# Patient Record
Sex: MALE
[De-identification: names, ages, dates, MRNs, and addresses within clinical notes are randomized per-mention and may not be internally consistent; named-entity substitution may affect disease eponyms.]

---

## 2018-03-28 ENCOUNTER — NON-APPOINTMENT (OUTPATIENT)
Age: 63
End: 2018-03-28

## 2018-03-28 ENCOUNTER — LABORATORY RESULT (OUTPATIENT)
Age: 63
End: 2018-03-28

## 2018-03-28 ENCOUNTER — APPOINTMENT (OUTPATIENT)
Dept: INTERNAL MEDICINE | Facility: CLINIC | Age: 63
End: 2018-03-28
Payer: COMMERCIAL

## 2018-03-28 VITALS
HEIGHT: 65 IN | DIASTOLIC BLOOD PRESSURE: 80 MMHG | HEART RATE: 59 BPM | OXYGEN SATURATION: 98 % | WEIGHT: 131 LBS | SYSTOLIC BLOOD PRESSURE: 130 MMHG | TEMPERATURE: 97.4 F | BODY MASS INDEX: 21.83 KG/M2 | RESPIRATION RATE: 17 BRPM

## 2018-03-28 PROCEDURE — 99396 PREV VISIT EST AGE 40-64: CPT

## 2018-03-28 PROCEDURE — 93000 ELECTROCARDIOGRAM COMPLETE: CPT

## 2018-03-29 LAB
25(OH)D3 SERPL-MCNC: 37.1 NG/ML
ALBUMIN SERPL ELPH-MCNC: 4.4 G/DL
ALP BLD-CCNC: 57 U/L
ALT SERPL-CCNC: 26 U/L
ANION GAP SERPL CALC-SCNC: 15 MMOL/L
APPEARANCE: CLEAR
AST SERPL-CCNC: 26 U/L
BASOPHILS # BLD AUTO: 0.02 K/UL
BASOPHILS NFR BLD AUTO: 0.5 %
BILIRUB SERPL-MCNC: 0.8 MG/DL
BILIRUBIN URINE: NEGATIVE
BLOOD URINE: NEGATIVE
BUN SERPL-MCNC: 19 MG/DL
CALCIUM SERPL-MCNC: 9.2 MG/DL
CHLORIDE SERPL-SCNC: 101 MMOL/L
CHOLEST SERPL-MCNC: 202 MG/DL
CHOLEST/HDLC SERPL: 3.9 RATIO
CO2 SERPL-SCNC: 26 MMOL/L
COLOR: YELLOW
CREAT SERPL-MCNC: 0.93 MG/DL
EOSINOPHIL # BLD AUTO: 0.04 K/UL
EOSINOPHIL NFR BLD AUTO: 1 %
GLUCOSE QUALITATIVE U: NEGATIVE MG/DL
GLUCOSE SERPL-MCNC: 96 MG/DL
HBA1C MFR BLD HPLC: 5.9 %
HCT VFR BLD CALC: 46 %
HDLC SERPL-MCNC: 52 MG/DL
HGB BLD-MCNC: 15.3 G/DL
IMM GRANULOCYTES NFR BLD AUTO: 0 %
KETONES URINE: NEGATIVE
LDLC SERPL CALC-MCNC: 127 MG/DL
LEUKOCYTE ESTERASE URINE: NEGATIVE
LYMPHOCYTES # BLD AUTO: 1.12 K/UL
LYMPHOCYTES NFR BLD AUTO: 28.2 %
MAN DIFF?: NORMAL
MCHC RBC-ENTMCNC: 29.5 PG
MCHC RBC-ENTMCNC: 33.3 GM/DL
MCV RBC AUTO: 88.8 FL
MONOCYTES # BLD AUTO: 0.46 K/UL
MONOCYTES NFR BLD AUTO: 11.6 %
NEUTROPHILS # BLD AUTO: 2.33 K/UL
NEUTROPHILS NFR BLD AUTO: 58.7 %
NITRITE URINE: NEGATIVE
PH URINE: 7.5
PLATELET # BLD AUTO: 161 K/UL
POTASSIUM SERPL-SCNC: 4.2 MMOL/L
PROT SERPL-MCNC: 6.8 G/DL
PROTEIN URINE: 30 MG/DL
PSA SERPL-MCNC: 2.32 NG/ML
RBC # BLD: 5.18 M/UL
RBC # FLD: 13.3 %
SODIUM SERPL-SCNC: 142 MMOL/L
SPECIFIC GRAVITY URINE: 1.03
TRIGL SERPL-MCNC: 113 MG/DL
TSH SERPL-ACNC: 3.32 UIU/ML
UROBILINOGEN URINE: 1 MG/DL
WBC # FLD AUTO: 3.97 K/UL

## 2018-04-20 ENCOUNTER — MESSAGE (OUTPATIENT)
Age: 63
End: 2018-04-20

## 2018-04-24 ENCOUNTER — LABORATORY RESULT (OUTPATIENT)
Age: 63
End: 2018-04-24

## 2018-04-26 LAB
APPEARANCE: CLEAR
BILIRUBIN URINE: NEGATIVE
BLOOD URINE: NEGATIVE
COLOR: YELLOW
GLUCOSE QUALITATIVE U: NEGATIVE MG/DL
KETONES URINE: NEGATIVE
LEUKOCYTE ESTERASE URINE: NEGATIVE
NITRITE URINE: NEGATIVE
PH URINE: 7.5
PROTEIN URINE: ABNORMAL MG/DL
SPECIFIC GRAVITY URINE: 1.02
UROBILINOGEN URINE: NEGATIVE MG/DL

## 2018-08-06 ENCOUNTER — MESSAGE (OUTPATIENT)
Age: 63
End: 2018-08-06

## 2018-08-09 LAB
APPEARANCE: CLEAR
BILIRUBIN URINE: NEGATIVE
BLOOD URINE: NEGATIVE
COLOR: YELLOW
GLUCOSE QUALITATIVE U: NEGATIVE MG/DL
KETONES URINE: NEGATIVE
LEUKOCYTE ESTERASE URINE: NEGATIVE
NITRITE URINE: NEGATIVE
PH URINE: 7.5
PROTEIN URINE: NEGATIVE MG/DL
SPECIFIC GRAVITY URINE: 1.02
UROBILINOGEN URINE: NEGATIVE MG/DL

## 2019-06-24 ENCOUNTER — APPOINTMENT (OUTPATIENT)
Dept: INTERNAL MEDICINE | Facility: CLINIC | Age: 64
End: 2019-06-24
Payer: COMMERCIAL

## 2019-06-24 VITALS
WEIGHT: 128 LBS | BODY MASS INDEX: 21.33 KG/M2 | HEART RATE: 64 BPM | TEMPERATURE: 98.1 F | OXYGEN SATURATION: 98 % | HEIGHT: 65 IN | SYSTOLIC BLOOD PRESSURE: 127 MMHG | RESPIRATION RATE: 17 BRPM | DIASTOLIC BLOOD PRESSURE: 80 MMHG

## 2019-06-24 DIAGNOSIS — H10.9 UNSPECIFIED CONJUNCTIVITIS: ICD-10-CM

## 2019-06-24 DIAGNOSIS — R05 COUGH: ICD-10-CM

## 2019-06-24 DIAGNOSIS — S69.90XA UNSPECIFIED INJURY OF UNSPECIFIED WRIST, HAND AND FINGER(S), INITIAL ENCOUNTER: ICD-10-CM

## 2019-06-24 DIAGNOSIS — Z23 ENCOUNTER FOR IMMUNIZATION: ICD-10-CM

## 2019-06-24 DIAGNOSIS — R07.89 OTHER CHEST PAIN: ICD-10-CM

## 2019-06-24 DIAGNOSIS — H61.20 IMPACTED CERUMEN, UNSPECIFIED EAR: ICD-10-CM

## 2019-06-24 DIAGNOSIS — Z12.5 ENCOUNTER FOR SCREENING FOR MALIGNANT NEOPLASM OF PROSTATE: ICD-10-CM

## 2019-06-24 DIAGNOSIS — Z81.8 FAMILY HISTORY OF OTHER MENTAL AND BEHAVIORAL DISORDERS: ICD-10-CM

## 2019-06-24 DIAGNOSIS — R94.31 ABNORMAL ELECTROCARDIOGRAM [ECG] [EKG]: ICD-10-CM

## 2019-06-24 DIAGNOSIS — R82.90 UNSPECIFIED ABNORMAL FINDINGS IN URINE: ICD-10-CM

## 2019-06-24 DIAGNOSIS — L03.90 CELLULITIS, UNSPECIFIED: ICD-10-CM

## 2019-06-24 DIAGNOSIS — E78.5 HYPERLIPIDEMIA, UNSPECIFIED: ICD-10-CM

## 2019-06-24 DIAGNOSIS — J06.9 ACUTE UPPER RESPIRATORY INFECTION, UNSPECIFIED: ICD-10-CM

## 2019-06-24 DIAGNOSIS — J01.90 ACUTE SINUSITIS, UNSPECIFIED: ICD-10-CM

## 2019-06-24 DIAGNOSIS — M25.519 PAIN IN UNSPECIFIED SHOULDER: ICD-10-CM

## 2019-06-24 DIAGNOSIS — R50.9 FEVER, UNSPECIFIED: ICD-10-CM

## 2019-06-24 DIAGNOSIS — Z00.00 ENCOUNTER FOR GENERAL ADULT MEDICAL EXAMINATION W/OUT ABNORMAL FINDINGS: ICD-10-CM

## 2019-06-24 DIAGNOSIS — R53.83 OTHER FATIGUE: ICD-10-CM

## 2019-06-24 PROCEDURE — 90471 IMMUNIZATION ADMIN: CPT

## 2019-06-24 PROCEDURE — 90715 TDAP VACCINE 7 YRS/> IM: CPT

## 2019-06-24 PROCEDURE — 99396 PREV VISIT EST AGE 40-64: CPT | Mod: 25

## 2019-06-28 LAB
25(OH)D3 SERPL-MCNC: 33.9 NG/ML
ALBUMIN SERPL ELPH-MCNC: 4.6 G/DL
ALP BLD-CCNC: 53 U/L
ALT SERPL-CCNC: 37 U/L
ANION GAP SERPL CALC-SCNC: 11 MMOL/L
APPEARANCE: CLEAR
AST SERPL-CCNC: 34 U/L
BASOPHILS # BLD AUTO: 0.03 K/UL
BASOPHILS NFR BLD AUTO: 0.6 %
BILIRUB SERPL-MCNC: 0.8 MG/DL
BILIRUBIN URINE: NEGATIVE
BLOOD URINE: NEGATIVE
BUN SERPL-MCNC: 22 MG/DL
CALCIUM SERPL-MCNC: 9.3 MG/DL
CHLORIDE SERPL-SCNC: 101 MMOL/L
CHOLEST SERPL-MCNC: 192 MG/DL
CHOLEST/HDLC SERPL: 3.6 RATIO
CO2 SERPL-SCNC: 27 MMOL/L
COLOR: NORMAL
CREAT SERPL-MCNC: 0.85 MG/DL
EOSINOPHIL # BLD AUTO: 0.05 K/UL
EOSINOPHIL NFR BLD AUTO: 0.9 %
ESTIMATED AVERAGE GLUCOSE: 120 MG/DL
GLUCOSE QUALITATIVE U: NEGATIVE
GLUCOSE SERPL-MCNC: 98 MG/DL
HBA1C MFR BLD HPLC: 5.8 %
HCT VFR BLD CALC: 46.5 %
HDLC SERPL-MCNC: 54 MG/DL
HGB BLD-MCNC: 14.8 G/DL
IMM GRANULOCYTES NFR BLD AUTO: 0.2 %
KETONES URINE: NEGATIVE
LDLC SERPL CALC-MCNC: 123 MG/DL
LEUKOCYTE ESTERASE URINE: NEGATIVE
LYMPHOCYTES # BLD AUTO: 1.55 K/UL
LYMPHOCYTES NFR BLD AUTO: 29.1 %
MAN DIFF?: NORMAL
MCHC RBC-ENTMCNC: 28.8 PG
MCHC RBC-ENTMCNC: 31.8 GM/DL
MCV RBC AUTO: 90.6 FL
MONOCYTES # BLD AUTO: 0.46 K/UL
MONOCYTES NFR BLD AUTO: 8.6 %
NEUTROPHILS # BLD AUTO: 3.22 K/UL
NEUTROPHILS NFR BLD AUTO: 60.6 %
NITRITE URINE: NEGATIVE
PH URINE: 7
PLATELET # BLD AUTO: 175 K/UL
POTASSIUM SERPL-SCNC: 4 MMOL/L
PROT SERPL-MCNC: 6.7 G/DL
PROTEIN URINE: NEGATIVE
PSA SERPL-MCNC: 2.52 NG/ML
RBC # BLD: 5.13 M/UL
RBC # FLD: 12.6 %
SODIUM SERPL-SCNC: 139 MMOL/L
SPECIFIC GRAVITY URINE: 1.02
T4 FREE SERPL-MCNC: 1.1 NG/DL
TRIGL SERPL-MCNC: 75 MG/DL
TSH SERPL-ACNC: 2.68 UIU/ML
UROBILINOGEN URINE: NORMAL
WBC # FLD AUTO: 5.32 K/UL

## 2020-06-25 ENCOUNTER — APPOINTMENT (OUTPATIENT)
Dept: INTERNAL MEDICINE | Facility: CLINIC | Age: 65
End: 2020-06-25
Payer: COMMERCIAL

## 2020-06-25 ENCOUNTER — NON-APPOINTMENT (OUTPATIENT)
Age: 65
End: 2020-06-25

## 2020-06-25 ENCOUNTER — APPOINTMENT (OUTPATIENT)
Dept: INTERNAL MEDICINE | Facility: CLINIC | Age: 65
End: 2020-06-25

## 2020-06-25 VITALS
WEIGHT: 121 LBS | OXYGEN SATURATION: 97 % | TEMPERATURE: 97 F | RESPIRATION RATE: 17 BRPM | DIASTOLIC BLOOD PRESSURE: 79 MMHG | BODY MASS INDEX: 20.16 KG/M2 | SYSTOLIC BLOOD PRESSURE: 131 MMHG | HEART RATE: 79 BPM | HEIGHT: 65 IN

## 2020-06-25 DIAGNOSIS — Z11.59 ENCOUNTER FOR SCREENING FOR OTHER VIRAL DISEASES: ICD-10-CM

## 2020-06-25 DIAGNOSIS — Z23 ENCOUNTER FOR IMMUNIZATION: ICD-10-CM

## 2020-06-25 DIAGNOSIS — E78.5 HYPERLIPIDEMIA, UNSPECIFIED: ICD-10-CM

## 2020-06-25 DIAGNOSIS — Z12.11 ENCOUNTER FOR SCREENING FOR MALIGNANT NEOPLASM OF COLON: ICD-10-CM

## 2020-06-25 PROCEDURE — 99396 PREV VISIT EST AGE 40-64: CPT

## 2020-06-25 NOTE — PLAN
[FreeTextEntry1] : 1.annual health examination\par * routine general labs \par * age appropriate health recommendations reviewed, discussed.\par 2. h/o hyperlipidemia\par * dietary counselled\par 3.h/o pre diabetes\par Dietary counseling given, dietary avoidance discussed, diet and exercise reviewed with patient; patient reminded of importance of aerobic exercise, weight control, dietary compliance and regular glucose monitoring\par 4.cancer screening\par * FIT test ordered; had colonoscopy three years ago\par 5. HM\par * Pneumococcal vaccine offered, discussed, he will think about it; hepatitis c ab screening; covid 19 antibodies\par schedule follow up in three weeks for labs results review.

## 2020-06-25 NOTE — HEALTH RISK ASSESSMENT
[Yes] : Yes [Good] : ~his/her~  mood as  good [Never (0 pts)] : Never (0 points) [1 or 2 (0 pts)] : 1 or 2 (0 points) [No falls in past year] : Patient reported no falls in the past year [0] : 2) Feeling down, depressed, or hopeless: Not at all (0) [HIV Test offered] : HIV Test offered [Hepatitis C test offered] : Hepatitis C test offered [None] : None [With Family] : lives with family [Employed] : employed [Graduate School] : graduate school [] :  [# Of Children ___] : has [unfilled] children [Sexually Active] : sexually active [Fully functional (using the telephone, shopping, preparing meals, housekeeping, doing laundry, using] : Fully functional and needs no help or supervision to perform IADLs (using the telephone, shopping, preparing meals, housekeeping, doing laundry, using transportation, managing medications and managing finances) [Fully functional (bathing, dressing, toileting, transferring, walking, feeding)] : Fully functional (bathing, dressing, toileting, transferring, walking, feeding) [Smoke Detector] : smoke detector [Carbon Monoxide Detector] : carbon monoxide detector [Seat Belt] :  uses seat belt [] : No [Change in mental status noted] : No change in mental status noted [Reports changes in hearing] : Reports no changes in hearing [Reports changes in vision] : Reports no changes in vision [Reports changes in dental health] : Reports no changes in dental health [Guns at Home] : no guns at home [ColonoscopyDate] : 04/2017

## 2020-06-25 NOTE — PHYSICAL EXAM
[No Acute Distress] : no acute distress [Well Developed] : well developed [Well Nourished] : well nourished [Well-Appearing] : well-appearing [PERRL] : pupils equal round and reactive to light [Normal Sclera/Conjunctiva] : normal sclera/conjunctiva [EOMI] : extraocular movements intact [Normal Oropharynx] : the oropharynx was normal [Normal Outer Ear/Nose] : the outer ears and nose were normal in appearance [No Lymphadenopathy] : no lymphadenopathy [Supple] : supple [No JVD] : no jugular venous distention [No Accessory Muscle Use] : no accessory muscle use [Thyroid Normal, No Nodules] : the thyroid was normal and there were no nodules present [No Respiratory Distress] : no respiratory distress  [Clear to Auscultation] : lungs were clear to auscultation bilaterally [Normal Rate] : normal rate  [No Murmur] : no murmur heard [Regular Rhythm] : with a regular rhythm [Normal S1, S2] : normal S1 and S2 [No Carotid Bruits] : no carotid bruits [No Varicosities] : no varicosities [No Abdominal Bruit] : a ~M bruit was not heard ~T in the abdomen [No Edema] : there was no peripheral edema [Pedal Pulses Present] : the pedal pulses are present [Soft] : abdomen soft [No Extremity Clubbing/Cyanosis] : no extremity clubbing/cyanosis [No Palpable Aorta] : no palpable aorta [Non-distended] : non-distended [Non Tender] : non-tender [No Masses] : no abdominal mass palpated [Normal Bowel Sounds] : normal bowel sounds [No HSM] : no HSM [Normal Anterior Cervical Nodes] : no anterior cervical lymphadenopathy [No CVA Tenderness] : no CVA  tenderness [Normal Posterior Cervical Nodes] : no posterior cervical lymphadenopathy [No Spinal Tenderness] : no spinal tenderness [No Joint Swelling] : no joint swelling [No Rash] : no rash [Grossly Normal Strength/Tone] : grossly normal strength/tone [No Focal Deficits] : no focal deficits [Coordination Grossly Intact] : coordination grossly intact [Normal Gait] : normal gait [Deep Tendon Reflexes (DTR)] : deep tendon reflexes were 2+ and symmetric [Normal Affect] : the affect was normal [Normal Insight/Judgement] : insight and judgment were intact

## 2020-06-26 LAB
25(OH)D3 SERPL-MCNC: 37.4 NG/ML
ALBUMIN SERPL ELPH-MCNC: 4.7 G/DL
ALP BLD-CCNC: 63 U/L
ALT SERPL-CCNC: 67 U/L
ANION GAP SERPL CALC-SCNC: 11 MMOL/L
APPEARANCE: CLEAR
AST SERPL-CCNC: 51 U/L
BACTERIA: NEGATIVE
BASOPHILS # BLD AUTO: 0.02 K/UL
BASOPHILS NFR BLD AUTO: 0.3 %
BILIRUB SERPL-MCNC: 0.6 MG/DL
BILIRUBIN URINE: ABNORMAL
BLOOD URINE: NEGATIVE
BUN SERPL-MCNC: 21 MG/DL
CALCIUM OXALATE CRYSTALS: ABNORMAL
CALCIUM SERPL-MCNC: 9.2 MG/DL
CHLORIDE SERPL-SCNC: 103 MMOL/L
CHOLEST SERPL-MCNC: 180 MG/DL
CHOLEST/HDLC SERPL: 3 RATIO
CO2 SERPL-SCNC: 27 MMOL/L
COLOR: YELLOW
CREAT SERPL-MCNC: 0.84 MG/DL
EOSINOPHIL # BLD AUTO: 0.03 K/UL
EOSINOPHIL NFR BLD AUTO: 0.5 %
ESTIMATED AVERAGE GLUCOSE: 114 MG/DL
GLUCOSE QUALITATIVE U: NEGATIVE
GLUCOSE SERPL-MCNC: 100 MG/DL
HBA1C MFR BLD HPLC: 5.6 %
HCT VFR BLD CALC: 46 %
HDLC SERPL-MCNC: 60 MG/DL
HGB BLD-MCNC: 14.3 G/DL
HYALINE CASTS: 0 /LPF
IMM GRANULOCYTES NFR BLD AUTO: 0.3 %
KETONES URINE: NORMAL
LDLC SERPL CALC-MCNC: 102 MG/DL
LEUKOCYTE ESTERASE URINE: NEGATIVE
LYMPHOCYTES # BLD AUTO: 1.15 K/UL
LYMPHOCYTES NFR BLD AUTO: 18.5 %
MAN DIFF?: NORMAL
MCHC RBC-ENTMCNC: 28.9 PG
MCHC RBC-ENTMCNC: 31.1 GM/DL
MCV RBC AUTO: 93.1 FL
MICROSCOPIC-UA: NORMAL
MONOCYTES # BLD AUTO: 0.39 K/UL
MONOCYTES NFR BLD AUTO: 6.3 %
NEUTROPHILS # BLD AUTO: 4.61 K/UL
NEUTROPHILS NFR BLD AUTO: 74.1 %
NITRITE URINE: NEGATIVE
PH URINE: 7
PLATELET # BLD AUTO: 162 K/UL
POTASSIUM SERPL-SCNC: 4.9 MMOL/L
PROT SERPL-MCNC: 6.3 G/DL
PROTEIN URINE: NEGATIVE
PSA FREE FLD-MCNC: 21 %
PSA FREE SERPL-MCNC: 0.45 NG/ML
PSA SERPL-MCNC: 2.2 NG/ML
RBC # BLD: 4.94 M/UL
RBC # FLD: 13 %
RED BLOOD CELLS URINE: 1 /HPF
SARS-COV-2 IGG SERPL IA-ACNC: 0.01 INDEX
SARS-COV-2 IGG SERPL QL IA: NEGATIVE
SODIUM SERPL-SCNC: 141 MMOL/L
SPECIFIC GRAVITY URINE: >=1.03
SQUAMOUS EPITHELIAL CELLS: 1 /HPF
T4 FREE SERPL-MCNC: 1.1 NG/DL
TRIGL SERPL-MCNC: 88 MG/DL
TSH SERPL-ACNC: 2.03 UIU/ML
UROBILINOGEN URINE: NORMAL
VIT B12 SERPL-MCNC: 883 PG/ML
WBC # FLD AUTO: 6.22 K/UL
WHITE BLOOD CELLS URINE: 0 /HPF

## 2020-06-29 LAB
HCV AB SER QL: NONREACTIVE
HCV S/CO RATIO: 0.07 S/CO

## 2020-06-30 ENCOUNTER — LABORATORY RESULT (OUTPATIENT)
Age: 65
End: 2020-06-30

## 2020-07-02 ENCOUNTER — APPOINTMENT (OUTPATIENT)
Dept: INTERNAL MEDICINE | Facility: CLINIC | Age: 65
End: 2020-07-02
Payer: COMMERCIAL

## 2020-07-02 VITALS
TEMPERATURE: 98 F | DIASTOLIC BLOOD PRESSURE: 71 MMHG | WEIGHT: 123 LBS | RESPIRATION RATE: 16 BRPM | BODY MASS INDEX: 20.49 KG/M2 | HEART RATE: 64 BPM | OXYGEN SATURATION: 98 % | SYSTOLIC BLOOD PRESSURE: 123 MMHG | HEIGHT: 65 IN

## 2020-07-02 DIAGNOSIS — R74.0 NONSPECIFIC ELEVATION OF LEVELS OF TRANSAMINASE AND LACTIC ACID DEHYDROGENASE [LDH]: ICD-10-CM

## 2020-07-02 PROCEDURE — 99214 OFFICE O/P EST MOD 30 MIN: CPT

## 2020-07-02 NOTE — HISTORY OF PRESENT ILLNESS
[de-identified] : patient here today to review and discuss labs results, no acute complains\par  [FreeTextEntry1] : follow up for labs results\par

## 2020-07-02 NOTE — PHYSICAL EXAM
[No Acute Distress] : no acute distress [Well-Appearing] : well-appearing [Well Developed] : well developed [Well Nourished] : well nourished [Normal Sclera/Conjunctiva] : normal sclera/conjunctiva [PERRL] : pupils equal round and reactive to light [EOMI] : extraocular movements intact [Normal Outer Ear/Nose] : the outer ears and nose were normal in appearance [Normal Oropharynx] : the oropharynx was normal [No JVD] : no jugular venous distention [Supple] : supple [No Lymphadenopathy] : no lymphadenopathy [No Accessory Muscle Use] : no accessory muscle use [Thyroid Normal, No Nodules] : the thyroid was normal and there were no nodules present [No Respiratory Distress] : no respiratory distress  [Normal Rate] : normal rate  [Clear to Auscultation] : lungs were clear to auscultation bilaterally [Regular Rhythm] : with a regular rhythm [No Murmur] : no murmur heard [Normal S1, S2] : normal S1 and S2 [No Abdominal Bruit] : a ~M bruit was not heard ~T in the abdomen [No Carotid Bruits] : no carotid bruits [No Varicosities] : no varicosities [Pedal Pulses Present] : the pedal pulses are present [No Extremity Clubbing/Cyanosis] : no extremity clubbing/cyanosis [No Edema] : there was no peripheral edema [No Palpable Aorta] : no palpable aorta [Non Tender] : non-tender [Non-distended] : non-distended [Soft] : abdomen soft [No Masses] : no abdominal mass palpated [No HSM] : no HSM [Normal Bowel Sounds] : normal bowel sounds [Normal Anterior Cervical Nodes] : no anterior cervical lymphadenopathy [Normal Posterior Cervical Nodes] : no posterior cervical lymphadenopathy [No CVA Tenderness] : no CVA  tenderness [No Spinal Tenderness] : no spinal tenderness [No Joint Swelling] : no joint swelling [Grossly Normal Strength/Tone] : grossly normal strength/tone [Coordination Grossly Intact] : coordination grossly intact [No Rash] : no rash [Normal Gait] : normal gait [No Focal Deficits] : no focal deficits [Deep Tendon Reflexes (DTR)] : deep tendon reflexes were 2+ and symmetric [Normal Affect] : the affect was normal [Normal Insight/Judgement] : insight and judgment were intact

## 2020-07-02 NOTE — PLAN
[FreeTextEntry1] : 1.dyslipidemia with high LDL\par low cholesterol, low triglycerides diet,dietary counseling given; dietary avoidance discussed; diet and exercise reviewed with patient\par 2. elevated transaminases\par * possible fatty liver, etiology discussed\par * low fat diet advised\par * repeat LFT in three months\par 3. pre diabetes controlled glucose\par Dietary counseling given, dietary avoidance discussed, diet and exercise reviewed with patient; patient reminded of importance of aerobic exercise, weight control, dietary compliance and regular glucose monitoring\par follow up in three months to monitor lipids, and LFT.

## 2020-12-23 PROBLEM — Z12.11 ENCOUNTER FOR FIT (FECAL IMMUNOCHEMICAL TEST) SCREENING: Status: RESOLVED | Noted: 2020-06-25 | Resolved: 2020-12-23

## 2021-06-29 ENCOUNTER — LABORATORY RESULT (OUTPATIENT)
Age: 66
End: 2021-06-29

## 2021-06-29 ENCOUNTER — APPOINTMENT (OUTPATIENT)
Dept: INTERNAL MEDICINE | Facility: CLINIC | Age: 66
End: 2021-06-29
Payer: COMMERCIAL

## 2021-06-29 ENCOUNTER — NON-APPOINTMENT (OUTPATIENT)
Age: 66
End: 2021-06-29

## 2021-06-29 VITALS
WEIGHT: 123 LBS | HEART RATE: 60 BPM | HEIGHT: 65 IN | BODY MASS INDEX: 20.49 KG/M2 | TEMPERATURE: 98 F | DIASTOLIC BLOOD PRESSURE: 84 MMHG | OXYGEN SATURATION: 97 % | SYSTOLIC BLOOD PRESSURE: 137 MMHG | RESPIRATION RATE: 16 BRPM

## 2021-06-29 DIAGNOSIS — Z92.29 PERSONAL HISTORY OF OTHER DRUG THERAPY: ICD-10-CM

## 2021-06-29 PROCEDURE — 99072 ADDL SUPL MATRL&STAF TM PHE: CPT

## 2021-06-29 PROCEDURE — 99396 PREV VISIT EST AGE 40-64: CPT

## 2021-06-29 NOTE — HEALTH RISK ASSESSMENT
[Good] : ~his/her~  mood as  good [No] : No [No falls in past year] : Patient reported no falls in the past year [0] : 2) Feeling down, depressed, or hopeless: Not at all (0) [Patient reported colonoscopy was normal] : Patient reported colonoscopy was normal [HIV test declined] : HIV test declined [Hepatitis C test declined] : Hepatitis C test declined [None] : None [With Family] : lives with family [Employed] : employed [College] : College [] :  [# Of Children ___] : has [unfilled] children [Sexually Active] : sexually active [Feels Safe at Home] : Feels safe at home [Fully functional (bathing, dressing, toileting, transferring, walking, feeding)] : Fully functional (bathing, dressing, toileting, transferring, walking, feeding) [Fully functional (using the telephone, shopping, preparing meals, housekeeping, doing laundry, using] : Fully functional and needs no help or supervision to perform IADLs (using the telephone, shopping, preparing meals, housekeeping, doing laundry, using transportation, managing medications and managing finances) [Smoke Detector] : smoke detector [Carbon Monoxide Detector] : carbon monoxide detector [Seat Belt] :  uses seat belt [] : No [Change in mental status noted] : No change in mental status noted [Reports changes in hearing] : Reports no changes in hearing [Reports changes in vision] : Reports no changes in vision [Reports changes in dental health] : Reports no changes in dental health [Guns at Home] : no guns at home [ColonoscopyDate] : 04/2017 [FreeTextEntry2] :

## 2021-06-29 NOTE — ASSESSMENT
[FreeTextEntry1] : 1. annual physical exam\par * routine general labs done\par * age appropriate health maintenance recommendations reviewed, discussed including healthy diet, regular exercise\par 2. dyslipidemia\par * dietary counselling\par 3. prediabetes\par * to check A1c\par * dietary counselling\par 4. colon cancer screening\par * FIT test\par 5. covid 19 vaccine completed\par * check serum antibodies\par * will follow up in three weeks

## 2021-06-29 NOTE — HISTORY OF PRESENT ILLNESS
[de-identified] : 65 years old male here for annual physical exam, no acute complains but nocturia

## 2021-07-03 ENCOUNTER — NON-APPOINTMENT (OUTPATIENT)
Age: 66
End: 2021-07-03

## 2021-07-07 LAB
25(OH)D3 SERPL-MCNC: 41.1 NG/ML
ALBUMIN SERPL ELPH-MCNC: 4.6 G/DL
ALP BLD-CCNC: 57 U/L
ALT SERPL-CCNC: 29 U/L
ANION GAP SERPL CALC-SCNC: 13 MMOL/L
APPEARANCE: CLEAR
AST SERPL-CCNC: 30 U/L
BACTERIA: NEGATIVE
BASOPHILS # BLD AUTO: 0.03 K/UL
BASOPHILS NFR BLD AUTO: 0.6 %
BILIRUB SERPL-MCNC: 0.5 MG/DL
BILIRUBIN URINE: NEGATIVE
BLOOD URINE: NEGATIVE
BUN SERPL-MCNC: 21 MG/DL
CALCIUM SERPL-MCNC: 9.1 MG/DL
CHLORIDE SERPL-SCNC: 102 MMOL/L
CHOLEST SERPL-MCNC: 171 MG/DL
CO2 SERPL-SCNC: 24 MMOL/L
COLOR: YELLOW
COVID-19 SPIKE DOMAIN ANTIBODY INTERPRETATION: POSITIVE
CREAT SERPL-MCNC: 0.84 MG/DL
EOSINOPHIL # BLD AUTO: 0.02 K/UL
EOSINOPHIL NFR BLD AUTO: 0.4 %
ESTIMATED AVERAGE GLUCOSE: 114 MG/DL
GLUCOSE QUALITATIVE U: NEGATIVE
GLUCOSE SERPL-MCNC: 89 MG/DL
HBA1C MFR BLD HPLC: 5.6 %
HCT VFR BLD CALC: 46.1 %
HDLC SERPL-MCNC: 57 MG/DL
HGB BLD-MCNC: 14.5 G/DL
HYALINE CASTS: 1 /LPF
IMM GRANULOCYTES NFR BLD AUTO: 0.2 %
KETONES URINE: NEGATIVE
LDLC SERPL CALC-MCNC: 98 MG/DL
LEUKOCYTE ESTERASE URINE: NEGATIVE
LYMPHOCYTES # BLD AUTO: 1.09 K/UL
LYMPHOCYTES NFR BLD AUTO: 21.2 %
MAN DIFF?: NORMAL
MCHC RBC-ENTMCNC: 29.1 PG
MCHC RBC-ENTMCNC: 31.5 GM/DL
MCV RBC AUTO: 92.4 FL
MICROSCOPIC-UA: NORMAL
MONOCYTES # BLD AUTO: 0.43 K/UL
MONOCYTES NFR BLD AUTO: 8.3 %
NEUTROPHILS # BLD AUTO: 3.57 K/UL
NEUTROPHILS NFR BLD AUTO: 69.3 %
NITRITE URINE: NEGATIVE
NONHDLC SERPL-MCNC: 114 MG/DL
PH URINE: 6.5
PLATELET # BLD AUTO: 168 K/UL
POTASSIUM SERPL-SCNC: 3.9 MMOL/L
PROT SERPL-MCNC: 6.5 G/DL
PROTEIN URINE: NORMAL
PSA FREE FLD-MCNC: 19 %
PSA FREE SERPL-MCNC: 0.51 NG/ML
PSA SERPL-MCNC: 2.67 NG/ML
RBC # BLD: 4.99 M/UL
RBC # FLD: 12.6 %
RED BLOOD CELLS URINE: 1 /HPF
SARS-COV-2 AB SERPL IA-ACNC: >250 U/ML
SODIUM SERPL-SCNC: 139 MMOL/L
SPECIFIC GRAVITY URINE: 1.03
SQUAMOUS EPITHELIAL CELLS: 0 /HPF
T4 FREE SERPL-MCNC: 1.1 NG/DL
TRIGL SERPL-MCNC: 80 MG/DL
TSH SERPL-ACNC: 3.39 UIU/ML
UROBILINOGEN URINE: NORMAL
VIT B12 SERPL-MCNC: 724 PG/ML
WBC # FLD AUTO: 5.15 K/UL
WHITE BLOOD CELLS URINE: 0 /HPF

## 2021-07-20 ENCOUNTER — APPOINTMENT (OUTPATIENT)
Dept: INTERNAL MEDICINE | Facility: CLINIC | Age: 66
End: 2021-07-20
Payer: COMMERCIAL

## 2021-07-20 VITALS
SYSTOLIC BLOOD PRESSURE: 145 MMHG | TEMPERATURE: 97.8 F | RESPIRATION RATE: 17 BRPM | HEIGHT: 65 IN | OXYGEN SATURATION: 96 % | WEIGHT: 126 LBS | HEART RATE: 62 BPM | DIASTOLIC BLOOD PRESSURE: 80 MMHG | BODY MASS INDEX: 20.99 KG/M2

## 2021-07-20 PROCEDURE — 99214 OFFICE O/P EST MOD 30 MIN: CPT

## 2021-07-20 PROCEDURE — 99072 ADDL SUPL MATRL&STAF TM PHE: CPT

## 2021-07-20 NOTE — ASSESSMENT
[FreeTextEntry1] : 1. dyslipidemia\par low cholesterol, low triglycerides diet,dietary counseling given; dietary avoidance discussed; diet and exercise reviewed with patient\par * will follow up one year for annual physical exam

## 2021-07-20 NOTE — HISTORY OF PRESENT ILLNESS
[FreeTextEntry1] : follow up for labs results\par  [de-identified] : patient here today to review and discuss labs results, no acute complains\par

## 2022-04-11 PROBLEM — Z11.59 SCREENING FOR VIRAL DISEASE: Status: ACTIVE | Noted: 2020-06-25

## 2022-06-30 ENCOUNTER — APPOINTMENT (OUTPATIENT)
Dept: INTERNAL MEDICINE | Facility: CLINIC | Age: 67
End: 2022-06-30

## 2022-06-30 ENCOUNTER — NON-APPOINTMENT (OUTPATIENT)
Age: 67
End: 2022-06-30

## 2022-06-30 VITALS
RESPIRATION RATE: 17 BRPM | DIASTOLIC BLOOD PRESSURE: 75 MMHG | HEART RATE: 55 BPM | OXYGEN SATURATION: 98 % | HEIGHT: 65 IN | BODY MASS INDEX: 20.66 KG/M2 | WEIGHT: 124 LBS | SYSTOLIC BLOOD PRESSURE: 132 MMHG | TEMPERATURE: 98 F

## 2022-06-30 PROCEDURE — 99396 PREV VISIT EST AGE 40-64: CPT

## 2022-06-30 NOTE — HEALTH RISK ASSESSMENT
[Good] : ~his/her~  mood as  good [Never] : Never [No] : No [No falls in past year] : Patient reported no falls in the past year [0] : 2) Feeling down, depressed, or hopeless: Not at all (0) [PHQ-2 Negative - No further assessment needed] : PHQ-2 Negative - No further assessment needed [Patient reported colonoscopy was normal] : Patient reported colonoscopy was normal [HIV test declined] : HIV test declined [Hepatitis C test declined] : Hepatitis C test declined [None] : None [With Family] : lives with family [Employed] : employed [College] : College [] :  [# Of Children ___] : has [unfilled] children [Sexually Active] : sexually active [Fully functional (bathing, dressing, toileting, transferring, walking, feeding)] : Fully functional (bathing, dressing, toileting, transferring, walking, feeding) [Fully functional (using the telephone, shopping, preparing meals, housekeeping, doing laundry, using] : Fully functional and needs no help or supervision to perform IADLs (using the telephone, shopping, preparing meals, housekeeping, doing laundry, using transportation, managing medications and managing finances) [Smoke Detector] : smoke detector [Carbon Monoxide Detector] : carbon monoxide detector [Seat Belt] :  uses seat belt [[id number]] : 0 [Change in mental status noted] : No change in mental status noted [Reports changes in hearing] : Reports no changes in hearing [Reports changes in vision] : Reports no changes in vision [Reports changes in dental health] : Reports no changes in dental health [Guns at Home] : no guns at home [ColonoscopyDate] : 2017 [FreeTextEntry2] :

## 2022-06-30 NOTE — HISTORY OF PRESENT ILLNESS
[de-identified] : 66 years old male presented today for annual physical exam, offers no complaints

## 2022-06-30 NOTE — PLAN
[FreeTextEntry1] : 1. annual physical exam\par * routine general labs done\par * age appropriate health maintenance recommendations reviewed, discussed including healthy diet, regular exercise\par 2. dyslipidemia\par * low cholesterol diet counseling\par 3. colon cancer screening\par * FIT test\par * follow up one month

## 2022-07-01 LAB
25(OH)D3 SERPL-MCNC: 43 NG/ML
ALBUMIN SERPL ELPH-MCNC: 4.5 G/DL
ALP BLD-CCNC: 69 U/L
ALT SERPL-CCNC: 37 U/L
ANION GAP SERPL CALC-SCNC: 13 MMOL/L
APPEARANCE: CLEAR
AST SERPL-CCNC: 36 U/L
BACTERIA: NEGATIVE
BASOPHILS # BLD AUTO: 0.03 K/UL
BASOPHILS NFR BLD AUTO: 0.7 %
BILIRUB SERPL-MCNC: 0.6 MG/DL
BILIRUBIN URINE: NEGATIVE
BLOOD URINE: NEGATIVE
BUN SERPL-MCNC: 20 MG/DL
CALCIUM SERPL-MCNC: 9.1 MG/DL
CHLORIDE SERPL-SCNC: 104 MMOL/L
CHOLEST SERPL-MCNC: 182 MG/DL
CO2 SERPL-SCNC: 26 MMOL/L
COLOR: YELLOW
CREAT SERPL-MCNC: 0.84 MG/DL
EGFR: 97 ML/MIN/1.73M2
EOSINOPHIL # BLD AUTO: 0.06 K/UL
EOSINOPHIL NFR BLD AUTO: 1.3 %
ESTIMATED AVERAGE GLUCOSE: 120 MG/DL
GLUCOSE QUALITATIVE U: NEGATIVE
GLUCOSE SERPL-MCNC: 94 MG/DL
HBA1C MFR BLD HPLC: 5.8 %
HCT VFR BLD CALC: 46.4 %
HDLC SERPL-MCNC: 57 MG/DL
HGB BLD-MCNC: 14.6 G/DL
HYALINE CASTS: 0 /LPF
IMM GRANULOCYTES NFR BLD AUTO: 0.2 %
KETONES URINE: NEGATIVE
LDLC SERPL CALC-MCNC: 109 MG/DL
LEUKOCYTE ESTERASE URINE: NEGATIVE
LYMPHOCYTES # BLD AUTO: 1.22 K/UL
LYMPHOCYTES NFR BLD AUTO: 26.9 %
MAN DIFF?: NORMAL
MCHC RBC-ENTMCNC: 29.1 PG
MCHC RBC-ENTMCNC: 31.5 GM/DL
MCV RBC AUTO: 92.6 FL
MICROSCOPIC-UA: NORMAL
MONOCYTES # BLD AUTO: 0.34 K/UL
MONOCYTES NFR BLD AUTO: 7.5 %
NEUTROPHILS # BLD AUTO: 2.88 K/UL
NEUTROPHILS NFR BLD AUTO: 63.4 %
NITRITE URINE: NEGATIVE
NONHDLC SERPL-MCNC: 125 MG/DL
PH URINE: 7
PLATELET # BLD AUTO: 174 K/UL
POTASSIUM SERPL-SCNC: 4.1 MMOL/L
PROT SERPL-MCNC: 6.4 G/DL
PROTEIN URINE: NORMAL
PSA FREE FLD-MCNC: 18 %
PSA FREE SERPL-MCNC: 0.48 NG/ML
PSA SERPL-MCNC: 2.6 NG/ML
RBC # BLD: 5.01 M/UL
RBC # FLD: 12.8 %
RED BLOOD CELLS URINE: 0 /HPF
SODIUM SERPL-SCNC: 143 MMOL/L
SPECIFIC GRAVITY URINE: 1.03
SQUAMOUS EPITHELIAL CELLS: 0 /HPF
T4 FREE SERPL-MCNC: 1.1 NG/DL
TRIGL SERPL-MCNC: 80 MG/DL
TSH SERPL-ACNC: 3.55 UIU/ML
UROBILINOGEN URINE: NORMAL
VIT B12 SERPL-MCNC: 739 PG/ML
WBC # FLD AUTO: 4.54 K/UL
WHITE BLOOD CELLS URINE: 0 /HPF

## 2022-08-23 ENCOUNTER — TRANSCRIPTION ENCOUNTER (OUTPATIENT)
Age: 67
End: 2022-08-23

## 2023-04-10 ENCOUNTER — NON-APPOINTMENT (OUTPATIENT)
Age: 68
End: 2023-04-10

## 2023-04-11 ENCOUNTER — APPOINTMENT (OUTPATIENT)
Dept: INTERNAL MEDICINE | Facility: CLINIC | Age: 68
End: 2023-04-11
Payer: MEDICARE

## 2023-04-11 ENCOUNTER — NON-APPOINTMENT (OUTPATIENT)
Age: 68
End: 2023-04-11

## 2023-04-11 VITALS
SYSTOLIC BLOOD PRESSURE: 128 MMHG | BODY MASS INDEX: 21.66 KG/M2 | WEIGHT: 130 LBS | OXYGEN SATURATION: 99 % | HEART RATE: 53 BPM | DIASTOLIC BLOOD PRESSURE: 70 MMHG | TEMPERATURE: 97.3 F | HEIGHT: 65 IN

## 2023-04-11 DIAGNOSIS — Z12.11 ENCOUNTER FOR SCREENING FOR MALIGNANT NEOPLASM OF COLON: ICD-10-CM

## 2023-04-11 DIAGNOSIS — R00.1 BRADYCARDIA, UNSPECIFIED: ICD-10-CM

## 2023-04-11 PROCEDURE — G0402 INITIAL PREVENTIVE EXAM: CPT

## 2023-04-11 NOTE — HISTORY OF PRESENT ILLNESS
[de-identified] : 67 years old male presents for annual physical exam, states feeling well, offers no complaints

## 2023-04-11 NOTE — HEALTH RISK ASSESSMENT
[Good] : ~his/her~  mood as  good [No] : No [No falls in past year] : Patient reported no falls in the past year [0] : 2) Feeling down, depressed, or hopeless: Not at all (0) [PHQ-2 Negative - No further assessment needed] : PHQ-2 Negative - No further assessment needed [Patient reported colonoscopy was normal] : Patient reported colonoscopy was normal [HIV test declined] : HIV test declined [Hepatitis C test declined] : Hepatitis C test declined [With Family] : lives with family [Employed] : employed [Graduate School] : graduate school [] :  [# Of Children ___] : has [unfilled] children [Sexually Active] : sexually active [Feels Safe at Home] : Feels safe at home [Fully functional (bathing, dressing, toileting, transferring, walking, feeding)] : Fully functional (bathing, dressing, toileting, transferring, walking, feeding) [Fully functional (using the telephone, shopping, preparing meals, housekeeping, doing laundry, using] : Fully functional and needs no help or supervision to perform IADLs (using the telephone, shopping, preparing meals, housekeeping, doing laundry, using transportation, managing medications and managing finances) [Smoke Detector] : smoke detector [Carbon Monoxide Detector] : carbon monoxide detector [Seat Belt] :  uses seat belt [Never] : Never [[id number]] : 0 [Reports changes in hearing] : Reports no changes in hearing [Reports changes in vision] : Reports no changes in vision [Reports changes in dental health] : Reports no changes in dental health [Guns at Home] : no guns at home [ColonoscopyDate] : 04/2017 [ColonoscopyComments] : internal hemorrhoid [FreeTextEntry2] :

## 2023-04-11 NOTE — PLAN
[FreeTextEntry1] : * routine general labs done\par * age appropriate health maintenance recommendations reviewed, discussed including healthy diet, regular exercise\par * low cholesterol, low triglycerides diet,dietary counseling given; dietary avoidance discussed; diet and exercise reviewed with patient\par * Dietary counseling given, dietary avoidance discussed, diet and exercise reviewed with patient; patient reminded of importance of aerobic exercise, weight control, dietary compliance and regular glucose monitoring\par * sinus bradycardia on ECG, he is an active runner of 3 miles four days a week\par * FIT test\par * to call back for test results.

## 2023-04-12 LAB
25(OH)D3 SERPL-MCNC: 35.7 NG/ML
ALBUMIN SERPL ELPH-MCNC: 4.6 G/DL
ALP BLD-CCNC: 73 U/L
ALT SERPL-CCNC: 37 U/L
ANION GAP SERPL CALC-SCNC: 13 MMOL/L
APPEARANCE: CLEAR
AST SERPL-CCNC: 29 U/L
BACTERIA: NEGATIVE
BASOPHILS # BLD AUTO: 0.04 K/UL
BASOPHILS NFR BLD AUTO: 0.7 %
BILIRUB SERPL-MCNC: 0.6 MG/DL
BILIRUBIN URINE: NEGATIVE
BLOOD URINE: NEGATIVE
BUN SERPL-MCNC: 20 MG/DL
CALCIUM SERPL-MCNC: 9.4 MG/DL
CHLORIDE SERPL-SCNC: 100 MMOL/L
CHOLEST SERPL-MCNC: 188 MG/DL
CO2 SERPL-SCNC: 24 MMOL/L
COLOR: NORMAL
CREAT SERPL-MCNC: 0.72 MG/DL
EGFR: 101 ML/MIN/1.73M2
EOSINOPHIL # BLD AUTO: 0.04 K/UL
EOSINOPHIL NFR BLD AUTO: 0.7 %
ESTIMATED AVERAGE GLUCOSE: 114 MG/DL
GLUCOSE QUALITATIVE U: NEGATIVE
GLUCOSE SERPL-MCNC: 95 MG/DL
HBA1C MFR BLD HPLC: 5.6 %
HCT VFR BLD CALC: 46.8 %
HDLC SERPL-MCNC: 57 MG/DL
HGB BLD-MCNC: 15.1 G/DL
HYALINE CASTS: 0 /LPF
IMM GRANULOCYTES NFR BLD AUTO: 0.4 %
KETONES URINE: NEGATIVE
LDLC SERPL CALC-MCNC: 113 MG/DL
LEUKOCYTE ESTERASE URINE: NEGATIVE
LYMPHOCYTES # BLD AUTO: 1.08 K/UL
LYMPHOCYTES NFR BLD AUTO: 19.5 %
MAN DIFF?: NORMAL
MCHC RBC-ENTMCNC: 29.5 PG
MCHC RBC-ENTMCNC: 32.3 GM/DL
MCV RBC AUTO: 91.4 FL
MICROSCOPIC-UA: NORMAL
MONOCYTES # BLD AUTO: 0.36 K/UL
MONOCYTES NFR BLD AUTO: 6.5 %
NEUTROPHILS # BLD AUTO: 4 K/UL
NEUTROPHILS NFR BLD AUTO: 72.2 %
NITRITE URINE: NEGATIVE
NONHDLC SERPL-MCNC: 130 MG/DL
PH URINE: 7
PLATELET # BLD AUTO: 167 K/UL
POTASSIUM SERPL-SCNC: 4 MMOL/L
PROT SERPL-MCNC: 6.5 G/DL
PROTEIN URINE: NEGATIVE
PSA FREE FLD-MCNC: 18 %
PSA FREE SERPL-MCNC: 0.48 NG/ML
PSA SERPL-MCNC: 2.68 NG/ML
RBC # BLD: 5.12 M/UL
RBC # FLD: 12.5 %
RED BLOOD CELLS URINE: 2 /HPF
SODIUM SERPL-SCNC: 138 MMOL/L
SPECIFIC GRAVITY URINE: 1.02
SQUAMOUS EPITHELIAL CELLS: 0 /HPF
T4 FREE SERPL-MCNC: 1.2 NG/DL
TRIGL SERPL-MCNC: 90 MG/DL
TSH SERPL-ACNC: 2.23 UIU/ML
UROBILINOGEN URINE: NORMAL
VIT B12 SERPL-MCNC: 802 PG/ML
WBC # FLD AUTO: 5.54 K/UL
WHITE BLOOD CELLS URINE: 0 /HPF

## 2023-10-17 ENCOUNTER — APPOINTMENT (OUTPATIENT)
Dept: INTERNAL MEDICINE | Facility: CLINIC | Age: 68
End: 2023-10-17

## 2024-03-04 ENCOUNTER — TRANSCRIPTION ENCOUNTER (OUTPATIENT)
Age: 69
End: 2024-03-04

## 2024-03-04 ENCOUNTER — INPATIENT (INPATIENT)
Facility: HOSPITAL | Age: 69
LOS: 11 days | Discharge: NURSING, REHAB OR LONG-TERM CARE | DRG: 820 | End: 2024-03-16
Attending: NEUROLOGICAL SURGERY | Admitting: STUDENT IN AN ORGANIZED HEALTH CARE EDUCATION/TRAINING PROGRAM
Payer: MEDICARE

## 2024-03-04 VITALS
DIASTOLIC BLOOD PRESSURE: 86 MMHG | SYSTOLIC BLOOD PRESSURE: 131 MMHG | TEMPERATURE: 98 F | WEIGHT: 126.99 LBS | OXYGEN SATURATION: 95 % | RESPIRATION RATE: 20 BRPM | HEIGHT: 65 IN | HEART RATE: 81 BPM

## 2024-03-04 LAB
ALBUMIN SERPL ELPH-MCNC: 4.2 G/DL — SIGNIFICANT CHANGE UP (ref 3.3–5)
ALP SERPL-CCNC: 77 U/L — SIGNIFICANT CHANGE UP (ref 40–120)
ALT FLD-CCNC: 28 U/L — SIGNIFICANT CHANGE UP (ref 10–45)
ANION GAP SERPL CALC-SCNC: 11 MMOL/L — SIGNIFICANT CHANGE UP (ref 5–17)
APTT BLD: 31.4 SEC — SIGNIFICANT CHANGE UP (ref 24.5–35.6)
AST SERPL-CCNC: 40 U/L — SIGNIFICANT CHANGE UP (ref 10–40)
BASOPHILS # BLD AUTO: 0.03 K/UL — SIGNIFICANT CHANGE UP (ref 0–0.2)
BASOPHILS NFR BLD AUTO: 0.4 % — SIGNIFICANT CHANGE UP (ref 0–2)
BILIRUB SERPL-MCNC: 0.4 MG/DL — SIGNIFICANT CHANGE UP (ref 0.2–1.2)
BLD GP AB SCN SERPL QL: NEGATIVE — SIGNIFICANT CHANGE UP
BUN SERPL-MCNC: 33 MG/DL — HIGH (ref 7–23)
CALCIUM SERPL-MCNC: 10.2 MG/DL — SIGNIFICANT CHANGE UP (ref 8.4–10.5)
CHLORIDE SERPL-SCNC: 98 MMOL/L — SIGNIFICANT CHANGE UP (ref 96–108)
CK SERPL-CCNC: 165 U/L — SIGNIFICANT CHANGE UP (ref 30–200)
CO2 SERPL-SCNC: 29 MMOL/L — SIGNIFICANT CHANGE UP (ref 22–31)
CREAT SERPL-MCNC: 0.93 MG/DL — SIGNIFICANT CHANGE UP (ref 0.5–1.3)
EGFR: 91 ML/MIN/1.73M2 — SIGNIFICANT CHANGE UP
EOSINOPHIL # BLD AUTO: 0.1 K/UL — SIGNIFICANT CHANGE UP (ref 0–0.5)
EOSINOPHIL NFR BLD AUTO: 1.4 % — SIGNIFICANT CHANGE UP (ref 0–6)
GLUCOSE SERPL-MCNC: 114 MG/DL — HIGH (ref 70–99)
HCT VFR BLD CALC: 41.5 % — SIGNIFICANT CHANGE UP (ref 39–50)
HGB BLD-MCNC: 13.5 G/DL — SIGNIFICANT CHANGE UP (ref 13–17)
IMM GRANULOCYTES NFR BLD AUTO: 0.3 % — SIGNIFICANT CHANGE UP (ref 0–0.9)
INR BLD: 1.04 RATIO — SIGNIFICANT CHANGE UP (ref 0.85–1.18)
LYMPHOCYTES # BLD AUTO: 1.01 K/UL — SIGNIFICANT CHANGE UP (ref 1–3.3)
LYMPHOCYTES # BLD AUTO: 14.3 % — SIGNIFICANT CHANGE UP (ref 13–44)
MCHC RBC-ENTMCNC: 28.5 PG — SIGNIFICANT CHANGE UP (ref 27–34)
MCHC RBC-ENTMCNC: 32.5 GM/DL — SIGNIFICANT CHANGE UP (ref 32–36)
MCV RBC AUTO: 87.7 FL — SIGNIFICANT CHANGE UP (ref 80–100)
MONOCYTES # BLD AUTO: 0.72 K/UL — SIGNIFICANT CHANGE UP (ref 0–0.9)
MONOCYTES NFR BLD AUTO: 10.2 % — SIGNIFICANT CHANGE UP (ref 2–14)
NEUTROPHILS # BLD AUTO: 5.16 K/UL — SIGNIFICANT CHANGE UP (ref 1.8–7.4)
NEUTROPHILS NFR BLD AUTO: 73.4 % — SIGNIFICANT CHANGE UP (ref 43–77)
NRBC # BLD: 0 /100 WBCS — SIGNIFICANT CHANGE UP (ref 0–0)
PLATELET # BLD AUTO: 277 K/UL — SIGNIFICANT CHANGE UP (ref 150–400)
POTASSIUM SERPL-MCNC: 4.4 MMOL/L — SIGNIFICANT CHANGE UP (ref 3.5–5.3)
POTASSIUM SERPL-SCNC: 4.4 MMOL/L — SIGNIFICANT CHANGE UP (ref 3.5–5.3)
PROT SERPL-MCNC: 6.8 G/DL — SIGNIFICANT CHANGE UP (ref 6–8.3)
PROTHROM AB SERPL-ACNC: 10.9 SEC — SIGNIFICANT CHANGE UP (ref 9.5–13)
RBC # BLD: 4.73 M/UL — SIGNIFICANT CHANGE UP (ref 4.2–5.8)
RBC # FLD: 12.7 % — SIGNIFICANT CHANGE UP (ref 10.3–14.5)
RH IG SCN BLD-IMP: POSITIVE — SIGNIFICANT CHANGE UP
SODIUM SERPL-SCNC: 138 MMOL/L — SIGNIFICANT CHANGE UP (ref 135–145)
WBC # BLD: 7.04 K/UL — SIGNIFICANT CHANGE UP (ref 3.8–10.5)
WBC # FLD AUTO: 7.04 K/UL — SIGNIFICANT CHANGE UP (ref 3.8–10.5)

## 2024-03-04 PROCEDURE — 71045 X-RAY EXAM CHEST 1 VIEW: CPT | Mod: 26

## 2024-03-04 PROCEDURE — 72148 MRI LUMBAR SPINE W/O DYE: CPT | Mod: 26,MH

## 2024-03-04 PROCEDURE — 72146 MRI CHEST SPINE W/O DYE: CPT | Mod: 26,MH

## 2024-03-04 PROCEDURE — 72141 MRI NECK SPINE W/O DYE: CPT | Mod: 26,MH

## 2024-03-04 PROCEDURE — 99285 EMERGENCY DEPT VISIT HI MDM: CPT | Mod: FS

## 2024-03-04 RX ORDER — DEXAMETHASONE 0.5 MG/5ML
10 ELIXIR ORAL ONCE
Refills: 0 | Status: COMPLETED | OUTPATIENT
Start: 2024-03-04 | End: 2024-03-04

## 2024-03-04 RX ORDER — DEXAMETHASONE 0.5 MG/5ML
4 ELIXIR ORAL EVERY 6 HOURS
Refills: 0 | Status: DISCONTINUED | OUTPATIENT
Start: 2024-03-04 | End: 2024-03-05

## 2024-03-04 RX ADMIN — Medication 102 MILLIGRAM(S): at 22:38

## 2024-03-04 NOTE — ED PROVIDER NOTE - CHIEF COMPLAINT
The patient is a 68y Male complaining of  The patient is a 68y Male complaining of Lower extremity weakness

## 2024-03-04 NOTE — ED PROVIDER NOTE - PROGRESS NOTE DETAILS
Koko Muñoz MD (PGY-3) e/o cord and cauda equina compression. npo. nsgy in the AM. medicine admission for onc work up.

## 2024-03-04 NOTE — ED PROVIDER NOTE - NS ED ATTENDING STATEMENT MOD
I have seen and examined this patient and fully participated in the care of this patient as the teaching attending.  The service was shared with the CARLEE.  I reviewed and verified the documentation.

## 2024-03-04 NOTE — ED PROVIDER NOTE - OUTCOME OF DISCUSSION:
c/f diffuse metastatic disease with spinal canal invasion/cord compression and need for likely emergent MRI/neurosurgery consult

## 2024-03-04 NOTE — ED ADULT NURSE NOTE - OBJECTIVE STATEMENT
69 y/o male complaining of leg numbness. 67 y/o male complaining of leg numbness. A few months ago pt developed numbness and tingling in lower extremities went to outpatient MD and found to have spinal masses. Recently got MRI outpatient but has not seen an oncologist yet. Over the past few days patient has noticed a decline in status. Has been requiring a walker for the last week, increased numbness and tingling in lower extremities especially the right lower extremity. Denies bladder or bowel incontinence. PT A&Ox4, speaking in complete sentences, abdomen soft and nontender. Denies chest pain, SOB, fever, chills, headache, blurry vision. QRN placed IV. Pt in gown. VSS. Daughter at bedside. Safety and comfort measures maintained.

## 2024-03-04 NOTE — ED ADULT NURSE NOTE - NSFALLRISKINTERV_ED_ALL_ED

## 2024-03-04 NOTE — ED PROVIDER NOTE - ATTENDING CONTRIBUTION TO CARE
I was the supervising attending. I have independently seen face-to-face and examined the patient. I have reviewed the history and physical and discussed the MDM with the resident, fellow, CARLEE and/or student. I agree with the assessment and plan as presented unless otherwise documented as follows:    68M denies PMH, PSH of appendectomy, presenting with leg weakness. Patient endorses back pain since January. Symptoms progressed to include B/L leg weakness and paresthesias, R>L. Acutely worsened since 3d ago with 2 falls on Saturday and difficulty ambulating w/o assistance. Denies fevers/chills, bowel/bladder incontinence or retention, chest pain/SOB, abdominal pain, headache, neck pain. Had MRI performed last Thursday w/ concern for spinal masses, has not established care with oncology yet but has scheduled appt. Awake and alert, no acute distress. Exam with 1-2/5 strength R hip flexion, 4/5 strength L hip flexion, 5/5 strength B/L dorsi/plantar flexion, sensation decreased to RLE distal > proximal. No saddle anesthesia, no abdominal tenderness. Rectal exam not performed on initial evaluation due to patient being in the hallway. Workup by QPA reviewed, labs unremarkable, no significant leukocytosis or electrolyte abnormalities. CTH and C-spine unremarkable. Called by radiology regarding CT CAP, T- and L-spine, c/f likely metastatic disease with spinal masses invading into cord. High concern for cord compression, likely acute on chronic. Called MRI to expedite cord compression protocol, neurosurgery consulted emergently and will evaluate patient. Will hold on steroids at this time per neurosurgery recommendations. Results of imaging d/w patient and daughter at bedside. -Yancy Morales MD (Attending)

## 2024-03-04 NOTE — ED PROVIDER NOTE - RAPID ASSESSMENT
68-year-old male with no known past medical history until recently presents to the ED complaining of progressively worsening lower extremity numbness/paresthesias/weakness since January.  Patient reports in the beginning of January he began developing some lower extremity numbness and paresthesias, underwent outpatient x-rays and eventually MRI in February showing concern for metastatic disease.  Patient has an appointment with oncology but has not initiated any treatment options at this time.  Since having the MRI performed last Thursday, over this weekend since Saturday, 3/2/2024 patient has had a notable  decline in functional status, was walking independently last week and now requires a walker to walk even several steps.  Patient reports numbness predominantly of the right lower extremity distal to the knee and aching of the thighs bilaterally.  Patient denies saddle anesthesia, bladder or bowel incontinence or retention, fevers, chills, night sweats.    Patient had MRI without contrast 2/29/2024 showing: "Extensive marrow displacement seen through the visualized thoracic lumbar and visualized sacral spine slightly suspicious for osseous metastatic disease.  In addition there is epidural soft tissue seen extending from the L2-L3 level to the L4-L5 level this is compressing the thecal sac with moderate stenosis.  There is extension of soft tissue into the paravertebral soft tissues more prominent on the right side.  Findings suspicious for epidural extension of tumoral disease.  This will be better evaluated with IV contrast.   Suspicion of para-aortic lymphadenopathy.  Grade 1 spondylolisthesis of L5 on S1."    Oncology Dr. Gage Rodriguez    Patient was seen as a rapid assessment (QPA) patient. The patient will be seen and further worked up in the main emergency department and their care will be completed by the main emergency department team along with a thorough physical exam. Receiving team will follow up on labs, analgesia, any clinical imaging, reassess and disposition as clinically indicated, all decisions regarding the progression of care will be made at their discretion. - David Snider PA-C 68-year-old male with no known past medical history until recently presents to the ED complaining of progressively worsening lower extremity numbness/paresthesias/weakness since January.  Patient reports in the beginning of January he began developing some lower extremity numbness and paresthesias, underwent outpatient x-rays and eventually MRI in February showing concern for metastatic disease.  Patient has an appointment with oncology but has not initiated any treatment options at this time.  Since having the MRI performed last Thursday, over this weekend since Saturday, 3/2/2024 patient has had a notable  decline in functional status, was walking independently last week and now requires a walker to walk even several steps.  Patient reports numbness predominantly of the right lower extremity distal to the knee and aching of the thighs bilaterally.  Patient denies saddle anesthesia, bladder or bowel incontinence or retention, fevers, chills, night sweats.    Patient had MRI without contrast 2/29/2024 showing: "Extensive marrow displacement seen through the visualized thoracic lumbar and visualized sacral spine slightly suspicious for osseous metastatic disease.  In addition there is epidural soft tissue seen extending from the L2-L3 level to the L4-L5 level this is compressing the thecal sac with moderate stenosis.  There is extension of soft tissue into the paravertebral soft tissues more prominent on the right side.  Findings suspicious for epidural extension of tumoral disease.  This will be better evaluated with IV contrast.   Suspicion of para-aortic lymphadenopathy.  Grade 1 spondylolisthesis of L5 on S1."    Oncology Dr. Gage Rodriguez    Patient was seen as a rapid assessment (QPA) patient. The patient will be seen and further worked up in the main emergency department and their care will be completed by the main emergency department team along with a thorough physical exam. Receiving team will follow up on labs, analgesia, any clinical imaging, reassess and disposition as clinically indicated, all decisions regarding the progression of care will be made at their discretion. - David Snider PA-C    Attending MD Weiner: This patient was seen and orders were placed by the PA as per our department's QPA model.  I was not consulted in regards to this patient although I was present and available in the Emergency Department to the PA.  Patient was to be sent to main ED for full medical evaluation and receiving team was to follow up on any labs, analgesia, clinical imaging ordered by the PA.  Any reassessment and disposition decisions were to be made by receiving team as clinically indicated, all decisions regarding the progression of care to be made at their discretion.  I did not perform a comprehensive history and physical on this patient.

## 2024-03-04 NOTE — ED PROVIDER NOTE - PHYSICAL EXAMINATION
Gen: NAD, non-toxic appearing  Head: normal appearing  HEENT: normal conjunctiva  Lung: no respiratory distress, speaking in full sentences, ctab      CV: regular rate and rhythm, no murmurs  Abd: soft, non distended, non tender   MSK: no visible deformities  Neuro:  alert, oriented, reduced sensation over the lumbar distribution of the right lower extremity.  1 out of 5 strength with hip flexion to the right lower extremity.  4 out of 5 strength of the left lower extremity.  Intact sensation to the medial thigh.  Skin: No shawn rashes

## 2024-03-04 NOTE — ED PROVIDER NOTE - CLINICAL SUMMARY MEDICAL DECISION MAKING FREE TEXT BOX
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  Unclear primary source.  Vital signs unremarkable.  Will rule out interval changes to known spinal cord disease.  Rule out intracranial process given recent fall.  Discussed with neurosurgery, requesting that we hold off on steroids.  Spoke with radiology, acute cord compression protocol, will do with and without IV contrast per spine surgery request for better characterization, felt to be in the patient's best interest given that this process is likely diffuse rather than focal disease amenable to acute surgical decompression.

## 2024-03-04 NOTE — ED PROVIDER NOTE - OBJECTIVE STATEMENT
68-year-old male, with no pertinent chronic medical comorbidities, presenting with a chief complaint of lower extremity weakness.  He has known mass lesions to his spinal canal with associated stenosis at the level of the thoracic and lumbar spine.  Currently of unclear etiology, felt to be metastatic disease, has not received definitive cancer workup yet.  Had MRI late in February, which demonstrated lesions.  Now coming in with worsening numbness and worsening lower extremity weakness, particular to the right lower extremity.  Denies bowel or bladder incontinence.  Review of systems otherwise negative.  He did have 2 falls on Saturday.  No allergies to medications.

## 2024-03-05 ENCOUNTER — APPOINTMENT (OUTPATIENT)
Dept: NEUROSURGERY | Facility: HOSPITAL | Age: 69
End: 2024-03-05
Payer: MEDICARE

## 2024-03-05 ENCOUNTER — RESULT REVIEW (OUTPATIENT)
Age: 69
End: 2024-03-05

## 2024-03-05 DIAGNOSIS — E78.5 HYPERLIPIDEMIA, UNSPECIFIED: ICD-10-CM

## 2024-03-05 DIAGNOSIS — R73.03 PREDIABETES: ICD-10-CM

## 2024-03-05 DIAGNOSIS — Z98.890 OTHER SPECIFIED POSTPROCEDURAL STATES: Chronic | ICD-10-CM

## 2024-03-05 DIAGNOSIS — Z90.49 ACQUIRED ABSENCE OF OTHER SPECIFIED PARTS OF DIGESTIVE TRACT: Chronic | ICD-10-CM

## 2024-03-05 DIAGNOSIS — R00.1 BRADYCARDIA, UNSPECIFIED: ICD-10-CM

## 2024-03-05 DIAGNOSIS — G95.20 UNSPECIFIED CORD COMPRESSION: ICD-10-CM

## 2024-03-05 DIAGNOSIS — C79.9 SECONDARY MALIGNANT NEOPLASM OF UNSPECIFIED SITE: ICD-10-CM

## 2024-03-05 DIAGNOSIS — Z86.79 PERSONAL HISTORY OF OTHER DISEASES OF THE CIRCULATORY SYSTEM: ICD-10-CM

## 2024-03-05 DIAGNOSIS — Z29.9 ENCOUNTER FOR PROPHYLACTIC MEASURES, UNSPECIFIED: ICD-10-CM

## 2024-03-05 LAB
A1C WITH ESTIMATED AVERAGE GLUCOSE RESULT: 6 % — HIGH (ref 4–5.6)
ALBUMIN SERPL ELPH-MCNC: 3.9 G/DL — SIGNIFICANT CHANGE UP (ref 3.3–5)
ALP SERPL-CCNC: 97 U/L — SIGNIFICANT CHANGE UP (ref 40–120)
ALT FLD-CCNC: 88 U/L — HIGH (ref 10–45)
ANION GAP SERPL CALC-SCNC: 12 MMOL/L — SIGNIFICANT CHANGE UP (ref 5–17)
APPEARANCE UR: CLEAR — SIGNIFICANT CHANGE UP
APTT BLD: 28.8 SEC — SIGNIFICANT CHANGE UP (ref 24.5–35.6)
AST SERPL-CCNC: 142 U/L — HIGH (ref 10–40)
BACTERIA # UR AUTO: NEGATIVE /HPF — SIGNIFICANT CHANGE UP
BASOPHILS # BLD AUTO: 0.01 K/UL — SIGNIFICANT CHANGE UP (ref 0–0.2)
BASOPHILS NFR BLD AUTO: 0.2 % — SIGNIFICANT CHANGE UP (ref 0–2)
BILIRUB SERPL-MCNC: 0.7 MG/DL — SIGNIFICANT CHANGE UP (ref 0.2–1.2)
BILIRUB UR-MCNC: NEGATIVE — SIGNIFICANT CHANGE UP
BLD GP AB SCN SERPL QL: NEGATIVE — SIGNIFICANT CHANGE UP
BUN SERPL-MCNC: 26 MG/DL — HIGH (ref 7–23)
CALCIUM SERPL-MCNC: 10.2 MG/DL — SIGNIFICANT CHANGE UP (ref 8.4–10.5)
CHLORIDE SERPL-SCNC: 99 MMOL/L — SIGNIFICANT CHANGE UP (ref 96–108)
CO2 SERPL-SCNC: 27 MMOL/L — SIGNIFICANT CHANGE UP (ref 22–31)
COLOR SPEC: YELLOW — SIGNIFICANT CHANGE UP
COMMENT - URINE: SIGNIFICANT CHANGE UP
CREAT SERPL-MCNC: 0.68 MG/DL — SIGNIFICANT CHANGE UP (ref 0.5–1.3)
DIFF PNL FLD: NEGATIVE — SIGNIFICANT CHANGE UP
EGFR: 103 ML/MIN/1.73M2 — SIGNIFICANT CHANGE UP
EOSINOPHIL # BLD AUTO: 0 K/UL — SIGNIFICANT CHANGE UP (ref 0–0.5)
EOSINOPHIL NFR BLD AUTO: 0 % — SIGNIFICANT CHANGE UP (ref 0–6)
ESTIMATED AVERAGE GLUCOSE: 126 MG/DL — HIGH (ref 68–114)
GAS PNL BLDA: SIGNIFICANT CHANGE UP
GLUCOSE BLDC GLUCOMTR-MCNC: 116 MG/DL — HIGH (ref 70–99)
GLUCOSE SERPL-MCNC: 134 MG/DL — HIGH (ref 70–99)
GLUCOSE UR QL: NEGATIVE MG/DL — SIGNIFICANT CHANGE UP
HCT VFR BLD CALC: 38.5 % — LOW (ref 39–50)
HGB BLD-MCNC: 12.6 G/DL — LOW (ref 13–17)
HYALINE CASTS # UR AUTO: 0 — SIGNIFICANT CHANGE UP
IMM GRANULOCYTES NFR BLD AUTO: 0.5 % — SIGNIFICANT CHANGE UP (ref 0–0.9)
INR BLD: 1.01 RATIO — SIGNIFICANT CHANGE UP (ref 0.85–1.18)
KETONES UR-MCNC: NEGATIVE MG/DL — SIGNIFICANT CHANGE UP
LEUKOCYTE ESTERASE UR-ACNC: NEGATIVE — SIGNIFICANT CHANGE UP
LYMPHOCYTES # BLD AUTO: 0.37 K/UL — LOW (ref 1–3.3)
LYMPHOCYTES # BLD AUTO: 5.6 % — LOW (ref 13–44)
MAGNESIUM SERPL-MCNC: 2.2 MG/DL — SIGNIFICANT CHANGE UP (ref 1.6–2.6)
MCHC RBC-ENTMCNC: 28.3 PG — SIGNIFICANT CHANGE UP (ref 27–34)
MCHC RBC-ENTMCNC: 32.7 GM/DL — SIGNIFICANT CHANGE UP (ref 32–36)
MCV RBC AUTO: 86.5 FL — SIGNIFICANT CHANGE UP (ref 80–100)
MONOCYTES # BLD AUTO: 0.32 K/UL — SIGNIFICANT CHANGE UP (ref 0–0.9)
MONOCYTES NFR BLD AUTO: 4.8 % — SIGNIFICANT CHANGE UP (ref 2–14)
NEUTROPHILS # BLD AUTO: 5.9 K/UL — SIGNIFICANT CHANGE UP (ref 1.8–7.4)
NEUTROPHILS NFR BLD AUTO: 88.9 % — HIGH (ref 43–77)
NITRITE UR-MCNC: NEGATIVE — SIGNIFICANT CHANGE UP
NRBC # BLD: 0 /100 WBCS — SIGNIFICANT CHANGE UP (ref 0–0)
PH UR: 7 — SIGNIFICANT CHANGE UP (ref 5–8)
PHOSPHATE SERPL-MCNC: 4.6 MG/DL — HIGH (ref 2.5–4.5)
PLATELET # BLD AUTO: 234 K/UL — SIGNIFICANT CHANGE UP (ref 150–400)
POTASSIUM SERPL-MCNC: 4.1 MMOL/L — SIGNIFICANT CHANGE UP (ref 3.5–5.3)
POTASSIUM SERPL-SCNC: 4.1 MMOL/L — SIGNIFICANT CHANGE UP (ref 3.5–5.3)
PROT SERPL-MCNC: 6.3 G/DL — SIGNIFICANT CHANGE UP (ref 6–8.3)
PROT UR-MCNC: NEGATIVE MG/DL — SIGNIFICANT CHANGE UP
PROTHROM AB SERPL-ACNC: 11.1 SEC — SIGNIFICANT CHANGE UP (ref 9.5–13)
RBC # BLD: 4.45 M/UL — SIGNIFICANT CHANGE UP (ref 4.2–5.8)
RBC # FLD: 12.9 % — SIGNIFICANT CHANGE UP (ref 10.3–14.5)
RBC CASTS # UR COMP ASSIST: 0 /HPF — SIGNIFICANT CHANGE UP (ref 0–4)
RH IG SCN BLD-IMP: POSITIVE — SIGNIFICANT CHANGE UP
SODIUM SERPL-SCNC: 138 MMOL/L — SIGNIFICANT CHANGE UP (ref 135–145)
SP GR SPEC: >1.03 — HIGH (ref 1–1.03)
UROBILINOGEN FLD QL: 0.2 MG/DL — SIGNIFICANT CHANGE UP (ref 0.2–1)
WBC # BLD: 6.63 K/UL — SIGNIFICANT CHANGE UP (ref 3.8–10.5)
WBC # FLD AUTO: 6.63 K/UL — SIGNIFICANT CHANGE UP (ref 3.8–10.5)
WBC UR QL: 0 /HPF — SIGNIFICANT CHANGE UP (ref 0–5)

## 2024-03-05 PROCEDURE — 63101 REMOVE VERT BODY DCMPRN THRC: CPT | Mod: 22

## 2024-03-05 PROCEDURE — 88365 INSITU HYBRIDIZATION (FISH): CPT | Mod: 26

## 2024-03-05 PROCEDURE — 88305 TISSUE EXAM BY PATHOLOGIST: CPT | Mod: 26

## 2024-03-05 PROCEDURE — 72158 MRI LUMBAR SPINE W/O & W/DYE: CPT | Mod: 26

## 2024-03-05 PROCEDURE — 22610 ARTHRD PST TQ 1NTRSPC THRC: CPT | Mod: 59

## 2024-03-05 PROCEDURE — 22556 ARTHRD ANT NTRBD MIN DSC THC: CPT

## 2024-03-05 PROCEDURE — 27280 ARTHR SI JT OPN B1GRF INSTRM: CPT | Mod: 50

## 2024-03-05 PROCEDURE — 15734 MUSCLE-SKIN GRAFT TRUNK: CPT | Mod: 59

## 2024-03-05 PROCEDURE — 14301 TIS TRNFR ANY 30.1-60 SQ CM: CPT

## 2024-03-05 PROCEDURE — 22614 ARTHRD PST TQ 1NTRSPC EA ADD: CPT

## 2024-03-05 PROCEDURE — 63102 REMOVE VERT BODY DCMPRN LMBR: CPT | Mod: 22

## 2024-03-05 PROCEDURE — 88341 IMHCHEM/IMCYTCHM EA ADD ANTB: CPT | Mod: 26

## 2024-03-05 PROCEDURE — 88374 M/PHMTRC ALYS ISHQUANT/SEMIQ: CPT | Mod: 26,59

## 2024-03-05 PROCEDURE — 14302 TIS TRNFR ADDL 30 SQ CM: CPT

## 2024-03-05 PROCEDURE — 22854 INSJ BIOMECHANICAL DEVICE: CPT

## 2024-03-05 PROCEDURE — 22585 ARTHRD ANT NTRBD MIN DSC EA: CPT

## 2024-03-05 PROCEDURE — 88307 TISSUE EXAM BY PATHOLOGIST: CPT | Mod: 26

## 2024-03-05 PROCEDURE — 15002 WOUND PREP TRK/ARM/LEG: CPT

## 2024-03-05 PROCEDURE — 72157 MRI CHEST SPINE W/O & W/DYE: CPT | Mod: 26

## 2024-03-05 PROCEDURE — 22612 ARTHRD PST TQ 1NTRSPC LUMBAR: CPT

## 2024-03-05 PROCEDURE — 88342 IMHCHEM/IMCYTCHM 1ST ANTB: CPT | Mod: 26

## 2024-03-05 PROCEDURE — 63276 BX/EXC XDRL SPINE LESN THRC: CPT

## 2024-03-05 PROCEDURE — 99223 1ST HOSP IP/OBS HIGH 75: CPT

## 2024-03-05 PROCEDURE — 88334 PATH CONSLTJ SURG CYTO XM EA: CPT | Mod: 26,59

## 2024-03-05 PROCEDURE — 63277 BX/EXC XDRL SPINE LESN LMBR: CPT

## 2024-03-05 PROCEDURE — 88331 PATH CONSLTJ SURG 1 BLK 1SPC: CPT | Mod: 26

## 2024-03-05 PROCEDURE — 63103 REMOVE VERTEBRAL BODY ADD-ON: CPT

## 2024-03-05 PROCEDURE — 88360 TUMOR IMMUNOHISTOCHEM/MANUAL: CPT | Mod: 26

## 2024-03-05 PROCEDURE — 22843 INSERT SPINE FIXATION DEVICE: CPT

## 2024-03-05 PROCEDURE — 22848 INSERT PELV FIXATION DEVICE: CPT | Mod: 59

## 2024-03-05 PROCEDURE — 93970 EXTREMITY STUDY: CPT | Mod: 26

## 2024-03-05 DEVICE — SCREW CORT FIX 7X50MM: Type: IMPLANTABLE DEVICE | Status: FUNCTIONAL

## 2024-03-05 DEVICE — SURGIFOAM PAD 8CM X 12.5CM X 10MM (100): Type: IMPLANTABLE DEVICE | Status: FUNCTIONAL

## 2024-03-05 DEVICE — FLOSEAL WITH RECOTHROM THROMBIN 10ML: Type: IMPLANTABLE DEVICE | Status: FUNCTIONAL

## 2024-03-05 DEVICE — GWIRE NITINOL BLUNT 1.45X490MM: Type: IMPLANTABLE DEVICE | Status: FUNCTIONAL

## 2024-03-05 DEVICE — KIT A-LINE 1LUM 20G X 12CM SAFE KIT: Type: IMPLANTABLE DEVICE | Status: FUNCTIONAL

## 2024-03-05 DEVICE — SCREW ST MIS SNGL INNER VIPER: Type: IMPLANTABLE DEVICE | Status: FUNCTIONAL

## 2024-03-05 DEVICE — TACHOSIL 9.5 X 4.8CM: Type: IMPLANTABLE DEVICE | Status: FUNCTIONAL

## 2024-03-05 DEVICE — IMPLANTABLE DEVICE: Type: IMPLANTABLE DEVICE | Status: FUNCTIONAL

## 2024-03-05 DEVICE — ROD 480MM STRL: Type: IMPLANTABLE DEVICE | Status: FUNCTIONAL

## 2024-03-05 DEVICE — SCREW VIPER CORT FIX 6X50MM: Type: IMPLANTABLE DEVICE | Status: FUNCTIONAL

## 2024-03-05 DEVICE — SCREW VIPER CORT FIX 5X40MM: Type: IMPLANTABLE DEVICE | Status: FUNCTIONAL

## 2024-03-05 DEVICE — KIT BONE CEMENT CONFIDENCE: Type: IMPLANTABLE DEVICE | Status: FUNCTIONAL

## 2024-03-05 RX ORDER — GLUCAGON INJECTION, SOLUTION 0.5 MG/.1ML
1 INJECTION, SOLUTION SUBCUTANEOUS ONCE
Refills: 0 | Status: DISCONTINUED | OUTPATIENT
Start: 2024-03-05 | End: 2024-03-05

## 2024-03-05 RX ORDER — DEXTROSE 50 % IN WATER 50 %
25 SYRINGE (ML) INTRAVENOUS ONCE
Refills: 0 | Status: DISCONTINUED | OUTPATIENT
Start: 2024-03-05 | End: 2024-03-05

## 2024-03-05 RX ORDER — POLYETHYLENE GLYCOL 3350 17 G/17G
17 POWDER, FOR SOLUTION ORAL
Refills: 0 | Status: DISCONTINUED | OUTPATIENT
Start: 2024-03-05 | End: 2024-03-05

## 2024-03-05 RX ORDER — INSULIN LISPRO 100/ML
VIAL (ML) SUBCUTANEOUS EVERY 6 HOURS
Refills: 0 | Status: DISCONTINUED | OUTPATIENT
Start: 2024-03-05 | End: 2024-03-05

## 2024-03-05 RX ORDER — MORPHINE SULFATE 50 MG/1
4 CAPSULE, EXTENDED RELEASE ORAL ONCE
Refills: 0 | Status: DISCONTINUED | OUTPATIENT
Start: 2024-03-05 | End: 2024-03-05

## 2024-03-05 RX ORDER — OXYCODONE HYDROCHLORIDE 5 MG/1
5 TABLET ORAL EVERY 4 HOURS
Refills: 0 | Status: DISCONTINUED | OUTPATIENT
Start: 2024-03-05 | End: 2024-03-05

## 2024-03-05 RX ORDER — SODIUM CHLORIDE 9 MG/ML
1000 INJECTION, SOLUTION INTRAVENOUS
Refills: 0 | Status: DISCONTINUED | OUTPATIENT
Start: 2024-03-05 | End: 2024-03-05

## 2024-03-05 RX ORDER — PANTOPRAZOLE SODIUM 20 MG/1
40 TABLET, DELAYED RELEASE ORAL DAILY
Refills: 0 | Status: DISCONTINUED | OUTPATIENT
Start: 2024-03-05 | End: 2024-03-05

## 2024-03-05 RX ORDER — DEXTROSE 50 % IN WATER 50 %
12.5 SYRINGE (ML) INTRAVENOUS ONCE
Refills: 0 | Status: DISCONTINUED | OUTPATIENT
Start: 2024-03-05 | End: 2024-03-05

## 2024-03-05 RX ORDER — OXYCODONE HYDROCHLORIDE 5 MG/1
10 TABLET ORAL EVERY 4 HOURS
Refills: 0 | Status: DISCONTINUED | OUTPATIENT
Start: 2024-03-05 | End: 2024-03-05

## 2024-03-05 RX ORDER — DEXTROSE 50 % IN WATER 50 %
15 SYRINGE (ML) INTRAVENOUS ONCE
Refills: 0 | Status: DISCONTINUED | OUTPATIENT
Start: 2024-03-05 | End: 2024-03-05

## 2024-03-05 RX ORDER — ACETAMINOPHEN 500 MG
650 TABLET ORAL EVERY 6 HOURS
Refills: 0 | Status: DISCONTINUED | OUTPATIENT
Start: 2024-03-05 | End: 2024-03-05

## 2024-03-05 RX ORDER — SENNA PLUS 8.6 MG/1
2 TABLET ORAL AT BEDTIME
Refills: 0 | Status: DISCONTINUED | OUTPATIENT
Start: 2024-03-05 | End: 2024-03-05

## 2024-03-05 RX ADMIN — MORPHINE SULFATE 4 MILLIGRAM(S): 50 CAPSULE, EXTENDED RELEASE ORAL at 01:49

## 2024-03-05 RX ADMIN — SODIUM CHLORIDE 75 MILLILITER(S): 9 INJECTION, SOLUTION INTRAVENOUS at 05:31

## 2024-03-05 RX ADMIN — Medication 4 MILLIGRAM(S): at 12:38

## 2024-03-05 RX ADMIN — Medication 4 MILLIGRAM(S): at 05:10

## 2024-03-05 NOTE — H&P ADULT - NSICDXPASTSURGICALHX_GEN_ALL_CORE_FT
PAST SURGICAL HISTORY:  History of appendectomy      PAST SURGICAL HISTORY:  History of appendectomy     History of arthroscopic knee surgery

## 2024-03-05 NOTE — ED ADULT NURSE REASSESSMENT NOTE - NS ED NURSE REASSESS COMMENT FT1
0745  speaking with pt. Pt A&Ox4. Minimal pain at present. IVL intact without sx of infilt. Fall risk precautions maintained. Color pink. skin W&D

## 2024-03-05 NOTE — ED ADULT NURSE REASSESSMENT NOTE - NS ED NURSE REASSESS COMMENT FT1
0818 Daughter at [facility]. Pt A&Ox4. Minimal pain at present. Declines pain medication  at present. IV fluid infusing well via IV pump. Pt voiding well. NPO

## 2024-03-05 NOTE — H&P ADULT - NSHPPHYSICALEXAM_GEN_ALL_CORE
Vital Signs Last 24 Hrs  T(C): 36.7 (05 Mar 2024 05:00), Max: 36.7 (05 Mar 2024 05:00)  T(F): 98.1 (05 Mar 2024 05:00), Max: 98.1 (05 Mar 2024 05:00)  HR: 85 (05 Mar 2024 07:28) (68 - 85)  BP: 133/81 (05 Mar 2024 07:28) (122/65 - 156/96)  BP(mean): --  RR: 18 (05 Mar 2024 07:28) (16 - 20)  SpO2: 98% (05 Mar 2024 07:28) (95% - 98%)    Parameters below as of 05 Mar 2024 07:28  Patient On (Oxygen Delivery Method): room air        CONSTITUTIONAL: NAD, well-developed, well-groomed, resting in bed  EYES: PERRL; sclera clear  ENMT: Moist oral mucosa, no pharyngeal exudate  NECK: Supple, no palpable masses  RESPIRATORY: Normal respiratory effort; lungs are clear to auscultation bilaterally; No wheezes or rales  CARDIOVASCULAR: Regular rate and rhythm; Normal S1 and S2; No murmurs, rubs, or gallops; trace-1+ edema in b/l LEs (R>L); Peripheral pulses are palpable bilaterally  ABDOMEN: Soft, Nontender, Nondistended; Bowel sounds present  MUSCULOSKELETAL:  No clubbing or cyanosis of digits; No joint swelling or tenderness to palpation  PSYCH: AAOx3 (oriented to person, place, and time); affect appropriate  NEUROLOGY: moving all extremities spontaneously but limited movement of b/l LEs; strength testing grossly 5/5 in b/l UEs, 4/5 in LLE, 3/5 in RLE; decreased sensation to light touch in b/l LEs from ankle to thighs (R worse than L)  SKIN: No rashes; No palpable lesions

## 2024-03-05 NOTE — H&P ADULT - NSICDXFAMILYHX_GEN_ALL_CORE_FT
FAMILY HISTORY:  Father  Still living? Unknown  Family history of cardiac pacemaker, Age at diagnosis: Age Unknown    Mother  Still living? Unknown  FH: arthritis, Age at diagnosis: Age Unknown  FH: dementia, Age at diagnosis: Age Unknown

## 2024-03-05 NOTE — H&P ADULT - HISTORY OF PRESENT ILLNESS
68M w/ hx of HLD, pre-DM, sinus john, presenting with worsening LE numbness/paresthesias and weakness since Jan 2024. Patient reports in the beginning of January he began developing some lower extremity numbness and paresthesias, underwent outpatient x-rays and eventually recent MRI on 2/29/24 showing concern for osseous metastatic disease in thoracic/lumbar/sacral spine as well as extension of soft tissue into the paravertebral soft tissues more prominent on the right side. Patient has an appointment with oncology but has not initiated any treatment options at this time. Since having the MRI on Thursday, 2/29/24, over this weekend since Saturday, 3/2/2024 patient has had a notable decline in functional status. Pt was walking independently last week and now requires a walker to walk even several steps.  Patient reports numbness predominantly of the right lower extremity distal to the knee and aching of the thighs bilaterally.  Patient denies saddle anesthesia, bladder or bowel incontinence or retention, fevers, chills, night sweats.    In ED: VSS. Initial labs grossly unremarkable. CT imaging and MRI spine notable for multiple findings including diffuse osseous metastatic disease throughout the entire spine; pathologic fx w/ tumor into the epidural space at T8 resulting in thoracic cord compression; tumor extension into the epidural space at L3 resulting in cauda equina compression; pathologic fx w/ tumor in the epidural space at L4 contributing to severe canal stenosis; tumor within the bilateral psoas muscles at L3 and L4; bilateral neural foramen effacement by tumor in L-spine; 3rd rib lesion, b/l lung pleura lesions; Mesenteric and retroperitoneal adenopathy; narrowing versus underdistention in the sigmoid colon. Seen by NSGY. Received morphine 4mg IVP x1, dexamethasone 10mg IVP x1 and started on dexamethasone 4mg IV q6h  colon. Admitted to Medicine for further management.    .at L3/L4   are effaced by tumor as well as on the right more than the left at L2/L3   and L4/L5. 68M w/ hx of HLD, pre-DM, sinus john, presenting with worsening LE numbness/paresthesias and weakness since Jan 2024. Patient reports in the beginning of January he began developing some lower extremity numbness and paresthesias, underwent outpatient x-rays and eventually recent MRI on 2/29/24 showing concern for osseous metastatic disease in thoracic/lumbar/sacral spine as well as extension of soft tissue into the paravertebral soft tissues more prominent on the right side. Patient has an appointment with oncology but has not initiated any treatment options at this time. Since having the MRI on Thursday, 2/29/24, over this weekend since Saturday, 3/2/2024 patient has had a notable decline in functional status. Pt was walking independently last week and now requires a walker to walk even several steps. Patient reports numbness predominantly of the right lower extremity distal to the knee and aching of the thighs bilaterally. Patient denies saddle anesthesia, bladder or bowel incontinence or retention, fevers, chills, night sweats.    In ED: VSS. Initial labs grossly unremarkable. CT imaging and MRI spine notable for multiple findings including diffuse osseous metastatic disease throughout the entire spine; pathologic fx w/ tumor into the epidural space at T8 resulting in thoracic cord compression; tumor extension into the epidural space at L3 resulting in cauda equina compression; pathologic fx w/ tumor in the epidural space at L4 contributing to severe canal stenosis; tumor within the bilateral psoas muscles at L3 and L4; bilateral neural foramen effacement by tumor in L-spine; 3rd rib lesion, b/l lung pleura lesions; mesenteric and retroperitoneal adenopathy; narrowing versus underdistention in the sigmoid colon. Seen by NSGY. Received morphine 4mg IVP x1, dexamethasone 10mg IVP x1 and started on dexamethasone 4mg IV q6h. Admitted to Medicine for further management. 68M w/ hx of HLD, pre-DM, sinus john, presenting with worsening LE numbness/paresthesias and weakness since Jan 2024. Patient reports in the beginning of January he began developing some lower extremity numbness and paresthesias, underwent outpatient x-rays and eventually recent MRI on 2/29/24 showing concern for osseous metastatic disease in thoracic/lumbar/sacral spine as well as extension of soft tissue into the paravertebral soft tissues more prominent on the right side. Patient has an appointment with oncology (Dr. Gage Rodriguez from [facility]) but has not met him yet for evaluation. Since having the MRI on Thursday, 2/29/24, over this weekend since Saturday, 3/2/2024 patient has had a notable decline in functional status that was complicated by 2 falls where his legs just gave out. No LOC. Pt was walking independently last week and now requires a walker to walk even several steps. Patient reports having back pain, numbness and weakness RLE>LLE and soreness/aching of legs.  Patient denies saddle anesthesia, bladder or bowel incontinence or retention, fevers, chills, night sweats. Endorsed noticing weight loss, but he is unsure how much as he does not typically weight himself. No known family hx of cancer. Last colonoscopy was in 2017 without significant findings.    In ED: VSS. Initial labs grossly unremarkable. CT imaging and MRI spine notable for multiple findings including diffuse osseous metastatic disease throughout the entire spine; pathologic fx w/ tumor into the epidural space at T8 resulting in thoracic cord compression; tumor extension into the epidural space at L3 resulting in cauda equina compression; pathologic fx w/ tumor in the epidural space at L4 contributing to severe canal stenosis; tumor within the bilateral psoas muscles at L3 and L4; bilateral neural foramen effacement by tumor in L-spine; 3rd rib lesion, b/l lung pleura lesions; mesenteric and retroperitoneal adenopathy; large fecal burden, narrowing versus underdistention in the sigmoid colon. Seen by NSGY. Received morphine 4mg IVP x1, dexamethasone 10mg IVP x1 and started on dexamethasone 4mg IV q6h. Admitted to Medicine for further management. 68M w/ hx of HLD, pre-DM, sinus john, presenting with worsening LE numbness/paresthesias and weakness since Jan 2024. Patient reports in the beginning of January he began developing some lower extremity numbness and paresthesias, underwent outpatient x-rays and eventually recent MRI on 2/29/24 showing concern for osseous metastatic disease in thoracic/lumbar/sacral spine as well as extension of soft tissue into the paravertebral soft tissues more prominent on the right side. Patient has an appointment with oncology (Dr. Gage Rodriguez from [facility]) but has not met him yet for evaluation. Since having the MRI on Thursday, 2/29/24, over this weekend since Saturday, 3/2/2024 patient has had a notable decline in functional status that was complicated by 2 falls where his legs just gave out. No LOC. Pt was walking independently last week and now requires a walker to walk even several steps. Patient reports having back pain, numbness and weakness RLE>LLE and soreness/aching of legs.  Patient denies f/c/n/v, CP, SOB, abdominal pain, saddle anesthesia, bladder or bowel incontinence or retention, night sweats. Endorsed noticing weight loss, but he is unsure how much as he does not typically weight himself. No known family hx of cancer. Last colonoscopy was in 2017 without significant findings.    In ED: VSS. Initial labs grossly unremarkable. CT imaging and MRI spine (prelim report) notable for multiple findings including diffuse osseous metastatic disease throughout the entire spine; pathologic fx w/ tumor into the epidural space at T8 resulting in thoracic cord compression; tumor extension into the epidural space at L3 resulting in cauda equina compression; pathologic fx w/ tumor in the epidural space at L4 contributing to severe canal stenosis; tumor within the bilateral psoas muscles at L3 and L4; bilateral neural foramen effacement by tumor in L-spine; 3rd rib lesion, b/l lung pleura lesions; mesenteric and retroperitoneal adenopathy; large fecal burden, narrowing versus underdistention in the sigmoid colon. Seen by NSGY. Received morphine 4mg IVP x1, dexamethasone 10mg IVP x1 and started on dexamethasone 4mg IV q6h. Admitted to Medicine for further management.

## 2024-03-05 NOTE — H&P ADULT - PROBLEM SELECTOR PLAN 2
CT H/C/A/P and MRI spine imaging notable for diffuse osseous metastatic disease throughout the entire spine; 3rd rib lesion, b/l lung pleura lesions; mesenteric and retroperitoneal adenopathy; large fecal burden with narrowing versus underdistention in the sigmoid colon (last colonoscopy in 2017 without significant findings).  - was scheduled to meet with Oncologist, Dr. Gage Rodriguez ([facility]) for evaluation (but has not met yet)  - c/w pain control with tylenol/oxy PRN for now  - c/w bowel regimen  - f/u PSA level CT H/C/A/P and MRI spine imaging notable for diffuse osseous metastatic disease throughout the entire spine; 3rd rib lesion, b/l lung pleura lesions; mesenteric and retroperitoneal adenopathy; large fecal burden with narrowing versus underdistention in the sigmoid colon (last colonoscopy in 2017 without significant findings).  - was scheduled to meet with Oncologist, Dr. Gage Rodriguez ([facility]) for evaluation (but has not met yet)  - c/w pain control with tylenol/oxy PRN for now  - c/w bowel regimen  - f/u PSA level  - f/u BLE duplex given R>L leg edema/pains  - will need tissue sample to determine primary

## 2024-03-05 NOTE — H&P ADULT - PROBLEM SELECTOR PLAN 6
- DVT ppx: SCDs if DVT studies neg; chemical ppx if okay with NSGY (added on for thoracic/lumbar decompression)  - Diet: NPO  - Dispo: pending OR - DVT ppx: SCDs if DVT studies neg; hold chemical ppx per NSGY given likely OR today  - Diet: NPO  - Dispo: likely OR today

## 2024-03-05 NOTE — H&P ADULT - NSHPSOCIALHISTORY_GEN_ALL_CORE
Denied smoking cigarettes or drinking EtOH. Previously ambulated without assistive devices, but required use of walker since Saturday (3/2/24). Prior to symptoms starting in Jan 2024, led an active lifestyle (ran 3 miles four days a week). Wife passed away from cancer. Pt has three daughters.

## 2024-03-05 NOTE — H&P ADULT - PROBLEM SELECTOR PLAN 3
Hx of sinus john on EKG. Per PCP records, appears to be in setting of previously being active runner.  - on admission, EKG was NSR and non-bradycardic  - no active issues

## 2024-03-05 NOTE — H&P ADULT - PROBLEM SELECTOR PLAN 1
- Appreciate NSGY recs; f/u further recs in AM  - keep NPO; added on for thoracic/lumbar decompression tentatively 3/5/24  - Neurorads consult in AM for T8 localization/marking  - c/w dexamethasone 4mg IV q6h   - check bladder scan q8h x24 hrs for urinary retention; will consider grigsby placement jeanette-OR  - given urgency and significant morbidity of cord compression, would not delay surgical intervention; no further workup needed at this time to proceed with thoracic/lumbar decompression from medicine standpoint MRI spine found evidence of tumor extensions into epidural space with associated with thoracic cord compression and cauda equina compression.  - Appreciate NSGY recs; f/u further recs in AM  - keep NPO; added on for thoracic/lumbar decompression tentatively 3/5/24  - Neurorads consult in AM for T8 localization/marking  - c/w dexamethasone 4mg IV q6h   - check bladder scan q8h x24 hrs for urinary retention; will consider grigsby placement jeanette-OR  - maintain fall/aspiration precautions, eventual PT/OT eval    #Preoperative evaluation  - No significant cardiac, pulmonary, or renal hx  - RCRI score of 0 (estimating 3.9% risk of MACE)  - Baseline >4 METs (prior to symptoms)   - Given urgency and significant morbidity/compromised quality of life of spinal cord compression, would not delay surgical intervention; no further workup needed at this time to proceed with thoracic/lumbar decompression from medicine standpoint MRI spine found evidence of tumor extensions into epidural space with associated with thoracic cord compression and cauda equina compression.  - Appreciate NSGY recs; f/u further recs in AM  - keep NPO; added on for thoracic/lumbar decompression tentatively 3/5/24  - Neurorads consult in AM for T8 localization/marking  - c/w dexamethasone 4mg IV q6h   - c/w PPI daily and low ISS for now for ppx while on steroids  - bladder scan q8h x24 hrs to check for urinary retention; will consider grigsby placement jeanette-OR  - maintain fall/aspiration precautions, eventual PT/OT eval    #Preoperative evaluation  - No significant cardiac, pulmonary, or renal hx  - RCRI score of 0 (estimating 3.9% risk of MACE)  - Baseline >4 METs (prior to symptoms)   - Given urgency and significant morbidity/compromised quality of life associated with spinal cord compression, would not delay surgical intervention; no further workup needed at this time to proceed with thoracic/lumbar decompression from medicine standpoint

## 2024-03-05 NOTE — H&P ADULT - ASSESSMENT
68M w/ hx of HLD, pre-DM, sinus john, presenting with progressively worsening LE numbness/paresthesias and weakness since Jan 2024 with acute change in functional status 3 days prior to admission. MRI/CT imaging concerning for metastatic disease of unknown primary c/b concerns of cord compression/cauda equina syndrome.

## 2024-03-05 NOTE — CONSULT NOTE ADULT - ASSESSMENT
Saturnino Solis  68M, no major pmh, p/w difficulty ambulating since Saturday and OSH MRI report c/f diffuse osseous spinal mets w/ cord compression. Back pain started in Jan, which progressed to RLE weakness in Feb., and now unable to walk w/o assistance since Sat. No bowel/bladder incontinence. 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 CT CAP + for lesions in 3rd rib, L pleura, mesenteric/retroperitoneal adenopathy, narrowed sigmoid colon. PLTs/Coags wnl. Exam: AO3, BUE 5/5, L HF 4+/5, R HF and KE 4/5, decreased sensation from R hip to ankle and along L shin/toes, normoreflexic, rectal tone wnl at rest, below normal w/ squeeze, cannot ambulate w/o assistance.    -Admit to medicine for metastatic w/u  -NPO and added-on for thoracic/lumbar decompression   -Neurorads in AM for T8 localization/marking  -Medicine clearance   -10 of dex and then 4q6  -urinalysis neg for blood   -PSA lab

## 2024-03-05 NOTE — H&P ADULT - NSHPLABSRESULTS_GEN_ALL_CORE
LABS:                        12.6   6.63  )-----------( 234      ( 05 Mar 2024 06:53 )             38.5     03-05    138  |  99  |  26<H>  ----------------------------<  134<H>  4.1   |  27  |  0.68    Ca    10.2      05 Mar 2024 06:53  Phos  4.6     03-05  Mg     2.2     03-05    TPro  6.3  /  Alb  3.9  /  TBili  0.7  /  DBili  x   /  AST  142<H>  /  ALT  88<H>  /  AlkPhos  97  03-05    PT/INR - ( 05 Mar 2024 06:53 )   PT: 11.1 sec;   INR: 1.01 ratio         PTT - ( 05 Mar 2024 06:53 )  PTT:28.8 sec  CARDIAC MARKERS ( 04 Mar 2024 22:51 )  x     / x     / 165 U/L / x     / x          Urinalysis Basic - ( 05 Mar 2024 06:53 )    Color: x / Appearance: x / SG: x / pH: x  Gluc: 134 mg/dL / Ketone: x  / Bili: x / Urobili: x   Blood: x / Protein: x / Nitrite: x   Leuk Esterase: x / RBC: x / WBC x   Sq Epi: x / Non Sq Epi: x / Bacteria: x      IMAGING/ADDITIONAL TESTS:    EKG (3/4/24) personally reviewed: interpretation limited by baseline artifacts, but grossly appears NSR, HR 75, QTc 435    < from: MR Acute Spinal Cord Compression (03.04.24 @ 23:40) >  IMPRESSION:  1. CERVICAL SPINE:   C4 vertebral metastasis. No cervical pathologic   fracture or significant epidural disease  2. THORACIC SPINE:   Diffuse osseous metastases. T8 pathologic burst   fracture with high grade epidural disease causing focal cord deformity   and cord edema  3. LUMBAR SPINE:   Diffuse osseous metastases. L4 superior endplate   pathologic fracture. High-grade epidural disease L3 and L4 vertebral   levels including contiguous involvement of the central canal neural   foramina paraspinal soft tissues  Preliminary report provided  --- End of Report ---    < from: CT Lumbar Spine Reform No Cont (03.04.24 @ 21:06) >  IMPRESSION:  CT CHEST: No pulmonary mass or suspicious nodules.  Lesion in the anterior right 3rd rib at the costochondral junction with   an extraosseous tumoral component extending to the anterior right pleural   surface measuring 1.5 cm. Additional pleural-based soft tissue mass on   the left measuring 1.1 cm without definite associated adjacent rib or   vertebral body lesion.  Enlarged right axillary lymph node.  CT ABDOMEN/PELVIS: Mesenteric and retroperitoneal adenopathy, unclear if   metastatic or secondary to primary a lymphoproliferative malignancy.  Suboptimal assessment of the bowel due to the lack of enteric contrast   and a large stool burden. Narrowing versus underdistention in the sigmoid   colon. Consider colonoscopy for further evaluation if one has not been   recently performed.  Cholelithiasis. Mild intra and extrahepatic biliary duct dilatation and   possible stone in the cystic duct. Follow-up with nonemergent MRCP can be   performed as clinically warranted.  CT THORACIC/LUMBAR SPINES: Osseous metastatic diseasewith pathologic   compression fracture deformities T8 and L4. Extraosseous extension of   tumor into the epidural space at T8, L3 and L4. Correlation with recent   MRI is recommended as the intraspinal canal contents are not well   evaluated on this modality. Tumor within the bilateral psoas muscles at   L3 and L4.  < end of copied text >    < from: CT Head No Cont (03.04.24 @ 20:55) >  IMPRESSION:  CT HEAD:  No acute hemorrhage, mass effect, or midline shift.  CT CERVICAL SPINE:  No acute fracture or subluxation.  No osteoblasticor lytic lesions.  --- End of Report ---  < end of copied text >

## 2024-03-05 NOTE — H&P ADULT - TIME BILLING
reviewing prior documentation, reviewing available recent outpatient records, independently obtaining a history, performing a physical examination, reviewing and independently interpreting tests/imaging, discussing the plan with the patient, counseling and educating the patient, providing emotional support, ordering medications/tests, documenting clinical information in the electronic health record, coordinating care with RN staff, and discussing management with NSGY team.

## 2024-03-05 NOTE — PRE-ANESTHESIA EVALUATION ADULT - BP NONINVASIVE DIASTOLIC (MM HG)
Discharge Information    IV Discontiued Time:  NA    Amount of Fluid Infused:  NA    Discharge Criteria = When patient returns to baseline or as per MD order    Consciousness:  Pt is fully awake    Circulation:  BP +/- 20% of pre-procedure level    Respiration:  Patient is able to breathe deeply    O2 Sat:  Patient is able to maintain O2 Sat >92% on room air    Activity:  Moves 4 extremities on command    Ambulation:  Patient is able to stand and walk     Dressing:  Clean/dry or No Dressing    Notes:   Discharge instructions and AVS given to patient    Patient meets criteria for discharge?  YES    Admitted to PCU?  No    Responsible adult present to accompany patient home?  Yes    Signature/Title:    Shoshana Alfaro RN Care Coordinator  [facility]    
Injection intake:    If this procedure is requiring IV sedation has patient been NPO for 6  Hours? NA    Is patient on coumadin, plavix or other prescribed blood thinner?   No    If patient is on coumadin was it held for 5 days?   NA    If patient is on plavix was it held for 7 days?    NA     Does patient take aspirin?  No    If this is for a cervical procedure and patient is on aspirin has it been held for 6 days?   NA    Any allergies to contrast dye, iodine, steroid and/or numbing medications?  NO    Is patient currently taking antibiotics or have an active infection?  NO    Does patient have a ? Yes       Is patient pregnant or breastfeeding?  Not Applicable    Are the vital signs normal?  Yes  
77

## 2024-03-06 ENCOUNTER — TRANSCRIPTION ENCOUNTER (OUTPATIENT)
Age: 69
End: 2024-03-06

## 2024-03-06 LAB
ANION GAP SERPL CALC-SCNC: 6 MMOL/L — SIGNIFICANT CHANGE UP (ref 5–17)
ANION GAP SERPL CALC-SCNC: 9 MMOL/L — SIGNIFICANT CHANGE UP (ref 5–17)
APTT BLD: 47.4 SEC — HIGH (ref 24.5–35.6)
BASOPHILS # BLD AUTO: 0.01 K/UL — SIGNIFICANT CHANGE UP (ref 0–0.2)
BASOPHILS # BLD AUTO: 0.01 K/UL — SIGNIFICANT CHANGE UP (ref 0–0.2)
BASOPHILS NFR BLD AUTO: 0.1 % — SIGNIFICANT CHANGE UP (ref 0–2)
BASOPHILS NFR BLD AUTO: 0.2 % — SIGNIFICANT CHANGE UP (ref 0–2)
BUN SERPL-MCNC: 25 MG/DL — HIGH (ref 7–23)
BUN SERPL-MCNC: 30 MG/DL — HIGH (ref 7–23)
CALCIUM SERPL-MCNC: 7.6 MG/DL — LOW (ref 8.4–10.5)
CALCIUM SERPL-MCNC: 8.9 MG/DL — SIGNIFICANT CHANGE UP (ref 8.4–10.5)
CHLORIDE SERPL-SCNC: 106 MMOL/L — SIGNIFICANT CHANGE UP (ref 96–108)
CHLORIDE SERPL-SCNC: 108 MMOL/L — SIGNIFICANT CHANGE UP (ref 96–108)
CO2 SERPL-SCNC: 23 MMOL/L — SIGNIFICANT CHANGE UP (ref 22–31)
CO2 SERPL-SCNC: 25 MMOL/L — SIGNIFICANT CHANGE UP (ref 22–31)
CREAT SERPL-MCNC: 0.77 MG/DL — SIGNIFICANT CHANGE UP (ref 0.5–1.3)
CREAT SERPL-MCNC: 0.91 MG/DL — SIGNIFICANT CHANGE UP (ref 0.5–1.3)
CULTURE RESULTS: SIGNIFICANT CHANGE UP
EGFR: 93 ML/MIN/1.73M2 — SIGNIFICANT CHANGE UP
EGFR: 99 ML/MIN/1.73M2 — SIGNIFICANT CHANGE UP
EOSINOPHIL # BLD AUTO: 0 K/UL — SIGNIFICANT CHANGE UP (ref 0–0.5)
EOSINOPHIL # BLD AUTO: 0.02 K/UL — SIGNIFICANT CHANGE UP (ref 0–0.5)
EOSINOPHIL NFR BLD AUTO: 0 % — SIGNIFICANT CHANGE UP (ref 0–6)
EOSINOPHIL NFR BLD AUTO: 0.4 % — SIGNIFICANT CHANGE UP (ref 0–6)
GAS PNL BLDA: SIGNIFICANT CHANGE UP
GLUCOSE BLDC GLUCOMTR-MCNC: 105 MG/DL — HIGH (ref 70–99)
GLUCOSE BLDC GLUCOMTR-MCNC: 124 MG/DL — HIGH (ref 70–99)
GLUCOSE BLDC GLUCOMTR-MCNC: 125 MG/DL — HIGH (ref 70–99)
GLUCOSE BLDC GLUCOMTR-MCNC: 129 MG/DL — HIGH (ref 70–99)
GLUCOSE SERPL-MCNC: 131 MG/DL — HIGH (ref 70–99)
GLUCOSE SERPL-MCNC: 156 MG/DL — HIGH (ref 70–99)
HCT VFR BLD CALC: 25.9 % — LOW (ref 39–50)
HCT VFR BLD CALC: 32.5 % — LOW (ref 39–50)
HCV AB S/CO SERPL IA: 0.04 S/CO — SIGNIFICANT CHANGE UP (ref 0–0.99)
HCV AB SERPL-IMP: SIGNIFICANT CHANGE UP
HGB BLD-MCNC: 11 G/DL — LOW (ref 13–17)
HGB BLD-MCNC: 8.3 G/DL — LOW (ref 13–17)
IMM GRANULOCYTES NFR BLD AUTO: 0.2 % — SIGNIFICANT CHANGE UP (ref 0–0.9)
IMM GRANULOCYTES NFR BLD AUTO: 1.1 % — HIGH (ref 0–0.9)
INR BLD: 1.07 RATIO — SIGNIFICANT CHANGE UP (ref 0.85–1.18)
LYMPHOCYTES # BLD AUTO: 0.63 K/UL — LOW (ref 1–3.3)
LYMPHOCYTES # BLD AUTO: 0.65 K/UL — LOW (ref 1–3.3)
LYMPHOCYTES # BLD AUTO: 11.2 % — LOW (ref 13–44)
LYMPHOCYTES # BLD AUTO: 6.5 % — LOW (ref 13–44)
MAGNESIUM SERPL-MCNC: 1.9 MG/DL — SIGNIFICANT CHANGE UP (ref 1.6–2.6)
MAGNESIUM SERPL-MCNC: 2.6 MG/DL — SIGNIFICANT CHANGE UP (ref 1.6–2.6)
MCHC RBC-ENTMCNC: 26.9 PG — LOW (ref 27–34)
MCHC RBC-ENTMCNC: 27.2 PG — SIGNIFICANT CHANGE UP (ref 27–34)
MCHC RBC-ENTMCNC: 32 GM/DL — SIGNIFICANT CHANGE UP (ref 32–36)
MCHC RBC-ENTMCNC: 33.8 GM/DL — SIGNIFICANT CHANGE UP (ref 32–36)
MCV RBC AUTO: 80.2 FL — SIGNIFICANT CHANGE UP (ref 80–100)
MCV RBC AUTO: 83.8 FL — SIGNIFICANT CHANGE UP (ref 80–100)
MONOCYTES # BLD AUTO: 0.62 K/UL — SIGNIFICANT CHANGE UP (ref 0–0.9)
MONOCYTES # BLD AUTO: 0.78 K/UL — SIGNIFICANT CHANGE UP (ref 0–0.9)
MONOCYTES NFR BLD AUTO: 11 % — SIGNIFICANT CHANGE UP (ref 2–14)
MONOCYTES NFR BLD AUTO: 7.9 % — SIGNIFICANT CHANGE UP (ref 2–14)
NEUTROPHILS # BLD AUTO: 4.36 K/UL — SIGNIFICANT CHANGE UP (ref 1.8–7.4)
NEUTROPHILS # BLD AUTO: 8.38 K/UL — HIGH (ref 1.8–7.4)
NEUTROPHILS NFR BLD AUTO: 77 % — SIGNIFICANT CHANGE UP (ref 43–77)
NEUTROPHILS NFR BLD AUTO: 84.4 % — HIGH (ref 43–77)
NRBC # BLD: 0 /100 WBCS — SIGNIFICANT CHANGE UP (ref 0–0)
NRBC # BLD: 0 /100 WBCS — SIGNIFICANT CHANGE UP (ref 0–0)
PHOSPHATE SERPL-MCNC: 2.7 MG/DL — SIGNIFICANT CHANGE UP (ref 2.5–4.5)
PHOSPHATE SERPL-MCNC: 4.6 MG/DL — HIGH (ref 2.5–4.5)
PLATELET # BLD AUTO: 119 K/UL — LOW (ref 150–400)
PLATELET # BLD AUTO: 173 K/UL — SIGNIFICANT CHANGE UP (ref 150–400)
POTASSIUM SERPL-MCNC: 4.1 MMOL/L — SIGNIFICANT CHANGE UP (ref 3.5–5.3)
POTASSIUM SERPL-MCNC: 4.1 MMOL/L — SIGNIFICANT CHANGE UP (ref 3.5–5.3)
POTASSIUM SERPL-SCNC: 4.1 MMOL/L — SIGNIFICANT CHANGE UP (ref 3.5–5.3)
POTASSIUM SERPL-SCNC: 4.1 MMOL/L — SIGNIFICANT CHANGE UP (ref 3.5–5.3)
PROTHROM AB SERPL-ACNC: 11.8 SEC — SIGNIFICANT CHANGE UP (ref 9.5–13)
PSA FLD-MCNC: 4.28 NG/ML — HIGH (ref 0–4)
RBC # BLD: 3.09 M/UL — LOW (ref 4.2–5.8)
RBC # BLD: 4.05 M/UL — LOW (ref 4.2–5.8)
RBC # FLD: 19.1 % — HIGH (ref 10.3–14.5)
RBC # FLD: 19.1 % — HIGH (ref 10.3–14.5)
SODIUM SERPL-SCNC: 137 MMOL/L — SIGNIFICANT CHANGE UP (ref 135–145)
SODIUM SERPL-SCNC: 140 MMOL/L — SIGNIFICANT CHANGE UP (ref 135–145)
SPECIMEN SOURCE: SIGNIFICANT CHANGE UP
WBC # BLD: 5.65 K/UL — SIGNIFICANT CHANGE UP (ref 3.8–10.5)
WBC # BLD: 9.93 K/UL — SIGNIFICANT CHANGE UP (ref 3.8–10.5)
WBC # FLD AUTO: 5.65 K/UL — SIGNIFICANT CHANGE UP (ref 3.8–10.5)
WBC # FLD AUTO: 9.93 K/UL — SIGNIFICANT CHANGE UP (ref 3.8–10.5)

## 2024-03-06 PROCEDURE — 99291 CRITICAL CARE FIRST HOUR: CPT

## 2024-03-06 PROCEDURE — 76377 3D RENDER W/INTRP POSTPROCES: CPT | Mod: 26

## 2024-03-06 PROCEDURE — 72192 CT PELVIS W/O DYE: CPT | Mod: 26

## 2024-03-06 PROCEDURE — 72131 CT LUMBAR SPINE W/O DYE: CPT | Mod: 26

## 2024-03-06 PROCEDURE — 72128 CT CHEST SPINE W/O DYE: CPT | Mod: 26

## 2024-03-06 PROCEDURE — 72020 X-RAY EXAM OF SPINE 1 VIEW: CPT | Mod: 26

## 2024-03-06 RX ORDER — DEXTROSE 50 % IN WATER 50 %
25 SYRINGE (ML) INTRAVENOUS ONCE
Refills: 0 | Status: DISCONTINUED | OUTPATIENT
Start: 2024-03-06 | End: 2024-03-06

## 2024-03-06 RX ORDER — METHOCARBAMOL 500 MG/1
750 TABLET, FILM COATED ORAL EVERY 8 HOURS
Refills: 0 | Status: DISCONTINUED | OUTPATIENT
Start: 2024-03-06 | End: 2024-03-16

## 2024-03-06 RX ORDER — GLUCAGON INJECTION, SOLUTION 0.5 MG/.1ML
1 INJECTION, SOLUTION SUBCUTANEOUS ONCE
Refills: 0 | Status: DISCONTINUED | OUTPATIENT
Start: 2024-03-06 | End: 2024-03-06

## 2024-03-06 RX ORDER — HYDROMORPHONE HYDROCHLORIDE 2 MG/ML
0.5 INJECTION INTRAMUSCULAR; INTRAVENOUS; SUBCUTANEOUS
Refills: 0 | Status: DISCONTINUED | OUTPATIENT
Start: 2024-03-06 | End: 2024-03-06

## 2024-03-06 RX ORDER — MAGNESIUM SULFATE 500 MG/ML
1 VIAL (ML) INJECTION ONCE
Refills: 0 | Status: COMPLETED | OUTPATIENT
Start: 2024-03-06 | End: 2024-03-07

## 2024-03-06 RX ORDER — ACETAMINOPHEN 500 MG
1000 TABLET ORAL EVERY 8 HOURS
Refills: 0 | Status: DISCONTINUED | OUTPATIENT
Start: 2024-03-06 | End: 2024-03-11

## 2024-03-06 RX ORDER — SODIUM CHLORIDE 9 MG/ML
1000 INJECTION INTRAMUSCULAR; INTRAVENOUS; SUBCUTANEOUS ONCE
Refills: 0 | Status: COMPLETED | OUTPATIENT
Start: 2024-03-06 | End: 2024-03-06

## 2024-03-06 RX ORDER — SODIUM CHLORIDE 9 MG/ML
1000 INJECTION, SOLUTION INTRAVENOUS
Refills: 0 | Status: DISCONTINUED | OUTPATIENT
Start: 2024-03-06 | End: 2024-03-06

## 2024-03-06 RX ORDER — METHOCARBAMOL 500 MG/1
500 TABLET, FILM COATED ORAL EVERY 8 HOURS
Refills: 0 | Status: DISCONTINUED | OUTPATIENT
Start: 2024-03-06 | End: 2024-03-06

## 2024-03-06 RX ORDER — DEXTROSE 50 % IN WATER 50 %
15 SYRINGE (ML) INTRAVENOUS ONCE
Refills: 0 | Status: DISCONTINUED | OUTPATIENT
Start: 2024-03-06 | End: 2024-03-06

## 2024-03-06 RX ORDER — DEXTROSE 50 % IN WATER 50 %
12.5 SYRINGE (ML) INTRAVENOUS ONCE
Refills: 0 | Status: DISCONTINUED | OUTPATIENT
Start: 2024-03-06 | End: 2024-03-06

## 2024-03-06 RX ORDER — POLYETHYLENE GLYCOL 3350 17 G/17G
17 POWDER, FOR SOLUTION ORAL EVERY 24 HOURS
Refills: 0 | Status: DISCONTINUED | OUTPATIENT
Start: 2024-03-06 | End: 2024-03-06

## 2024-03-06 RX ORDER — POLYETHYLENE GLYCOL 3350 17 G/17G
17 POWDER, FOR SOLUTION ORAL
Refills: 0 | Status: DISCONTINUED | OUTPATIENT
Start: 2024-03-06 | End: 2024-03-16

## 2024-03-06 RX ORDER — SODIUM CHLORIDE 9 MG/ML
1000 INJECTION, SOLUTION INTRAVENOUS ONCE
Refills: 0 | Status: COMPLETED | OUTPATIENT
Start: 2024-03-06 | End: 2024-03-06

## 2024-03-06 RX ORDER — PANTOPRAZOLE SODIUM 20 MG/1
40 TABLET, DELAYED RELEASE ORAL DAILY
Refills: 0 | Status: DISCONTINUED | OUTPATIENT
Start: 2024-03-06 | End: 2024-03-06

## 2024-03-06 RX ORDER — HYDROMORPHONE HYDROCHLORIDE 2 MG/ML
0.5 INJECTION INTRAMUSCULAR; INTRAVENOUS; SUBCUTANEOUS ONCE
Refills: 0 | Status: DISCONTINUED | OUTPATIENT
Start: 2024-03-06 | End: 2024-03-06

## 2024-03-06 RX ORDER — SENNA PLUS 8.6 MG/1
2 TABLET ORAL AT BEDTIME
Refills: 0 | Status: DISCONTINUED | OUTPATIENT
Start: 2024-03-06 | End: 2024-03-16

## 2024-03-06 RX ORDER — CEFAZOLIN SODIUM 1 G
2000 VIAL (EA) INJECTION EVERY 8 HOURS
Refills: 0 | Status: COMPLETED | OUTPATIENT
Start: 2024-03-06 | End: 2024-03-06

## 2024-03-06 RX ORDER — CHLORHEXIDINE GLUCONATE 213 G/1000ML
1 SOLUTION TOPICAL DAILY
Refills: 0 | Status: DISCONTINUED | OUTPATIENT
Start: 2024-03-06 | End: 2024-03-11

## 2024-03-06 RX ORDER — ACETAMINOPHEN 500 MG
1000 TABLET ORAL ONCE
Refills: 0 | Status: COMPLETED | OUTPATIENT
Start: 2024-03-06 | End: 2024-03-06

## 2024-03-06 RX ORDER — DEXAMETHASONE 0.5 MG/5ML
4 ELIXIR ORAL EVERY 6 HOURS
Refills: 0 | Status: DISCONTINUED | OUTPATIENT
Start: 2024-03-06 | End: 2024-03-06

## 2024-03-06 RX ORDER — GABAPENTIN 400 MG/1
300 CAPSULE ORAL
Refills: 0 | Status: DISCONTINUED | OUTPATIENT
Start: 2024-03-06 | End: 2024-03-11

## 2024-03-06 RX ORDER — INSULIN LISPRO 100/ML
VIAL (ML) SUBCUTANEOUS
Refills: 0 | Status: DISCONTINUED | OUTPATIENT
Start: 2024-03-06 | End: 2024-03-07

## 2024-03-06 RX ORDER — SODIUM CHLORIDE 9 MG/ML
500 INJECTION, SOLUTION INTRAVENOUS ONCE
Refills: 0 | Status: COMPLETED | OUTPATIENT
Start: 2024-03-06 | End: 2024-03-06

## 2024-03-06 RX ORDER — NALOXONE HYDROCHLORIDE 4 MG/.1ML
0.1 SPRAY NASAL
Refills: 0 | Status: DISCONTINUED | OUTPATIENT
Start: 2024-03-06 | End: 2024-03-07

## 2024-03-06 RX ORDER — MAGNESIUM HYDROXIDE 400 MG/1
30 TABLET, CHEWABLE ORAL EVERY 12 HOURS
Refills: 0 | Status: DISCONTINUED | OUTPATIENT
Start: 2024-03-06 | End: 2024-03-16

## 2024-03-06 RX ORDER — HYDROMORPHONE HYDROCHLORIDE 2 MG/ML
30 INJECTION INTRAMUSCULAR; INTRAVENOUS; SUBCUTANEOUS
Refills: 0 | Status: DISCONTINUED | OUTPATIENT
Start: 2024-03-06 | End: 2024-03-07

## 2024-03-06 RX ORDER — ONDANSETRON 8 MG/1
4 TABLET, FILM COATED ORAL EVERY 6 HOURS
Refills: 0 | Status: DISCONTINUED | OUTPATIENT
Start: 2024-03-06 | End: 2024-03-16

## 2024-03-06 RX ORDER — SODIUM,POTASSIUM PHOSPHATES 278-250MG
1 POWDER IN PACKET (EA) ORAL ONCE
Refills: 0 | Status: COMPLETED | OUTPATIENT
Start: 2024-03-06 | End: 2024-03-07

## 2024-03-06 RX ORDER — SODIUM CHLORIDE 9 MG/ML
500 INJECTION INTRAMUSCULAR; INTRAVENOUS; SUBCUTANEOUS ONCE
Refills: 0 | Status: COMPLETED | OUTPATIENT
Start: 2024-03-06 | End: 2024-03-06

## 2024-03-06 RX ORDER — HYDROMORPHONE HYDROCHLORIDE 2 MG/ML
0.5 INJECTION INTRAMUSCULAR; INTRAVENOUS; SUBCUTANEOUS
Refills: 0 | Status: DISCONTINUED | OUTPATIENT
Start: 2024-03-06 | End: 2024-03-07

## 2024-03-06 RX ADMIN — POLYETHYLENE GLYCOL 3350 17 GRAM(S): 17 POWDER, FOR SOLUTION ORAL at 17:44

## 2024-03-06 RX ADMIN — SODIUM CHLORIDE 75 MILLILITER(S): 9 INJECTION, SOLUTION INTRAVENOUS at 03:13

## 2024-03-06 RX ADMIN — HYDROMORPHONE HYDROCHLORIDE 0.5 MILLIGRAM(S): 2 INJECTION INTRAMUSCULAR; INTRAVENOUS; SUBCUTANEOUS at 10:30

## 2024-03-06 RX ADMIN — GABAPENTIN 300 MILLIGRAM(S): 400 CAPSULE ORAL at 06:18

## 2024-03-06 RX ADMIN — HYDROMORPHONE HYDROCHLORIDE 30 MILLILITER(S): 2 INJECTION INTRAMUSCULAR; INTRAVENOUS; SUBCUTANEOUS at 19:00

## 2024-03-06 RX ADMIN — Medication 1000 MILLIGRAM(S): at 06:19

## 2024-03-06 RX ADMIN — Medication 1000 MILLIGRAM(S): at 14:40

## 2024-03-06 RX ADMIN — Medication 100 MILLIGRAM(S): at 13:40

## 2024-03-06 RX ADMIN — HYDROMORPHONE HYDROCHLORIDE 0.5 MILLIGRAM(S): 2 INJECTION INTRAMUSCULAR; INTRAVENOUS; SUBCUTANEOUS at 03:30

## 2024-03-06 RX ADMIN — SODIUM CHLORIDE 500 MILLILITER(S): 9 INJECTION INTRAMUSCULAR; INTRAVENOUS; SUBCUTANEOUS at 03:09

## 2024-03-06 RX ADMIN — Medication 1000 MILLIGRAM(S): at 02:05

## 2024-03-06 RX ADMIN — Medication 1000 MILLIGRAM(S): at 13:40

## 2024-03-06 RX ADMIN — HYDROMORPHONE HYDROCHLORIDE 30 MILLILITER(S): 2 INJECTION INTRAMUSCULAR; INTRAVENOUS; SUBCUTANEOUS at 14:33

## 2024-03-06 RX ADMIN — SODIUM CHLORIDE 1000 MILLILITER(S): 9 INJECTION INTRAMUSCULAR; INTRAVENOUS; SUBCUTANEOUS at 15:30

## 2024-03-06 RX ADMIN — Medication 1000 MILLIGRAM(S): at 07:00

## 2024-03-06 RX ADMIN — PANTOPRAZOLE SODIUM 40 MILLIGRAM(S): 20 TABLET, DELAYED RELEASE ORAL at 11:51

## 2024-03-06 RX ADMIN — Medication 100 MILLIGRAM(S): at 06:18

## 2024-03-06 RX ADMIN — METHOCARBAMOL 750 MILLIGRAM(S): 500 TABLET, FILM COATED ORAL at 21:22

## 2024-03-06 RX ADMIN — CHLORHEXIDINE GLUCONATE 1 APPLICATION(S): 213 SOLUTION TOPICAL at 11:51

## 2024-03-06 RX ADMIN — Medication 1000 MILLIGRAM(S): at 21:30

## 2024-03-06 RX ADMIN — HYDROMORPHONE HYDROCHLORIDE 0.5 MILLIGRAM(S): 2 INJECTION INTRAMUSCULAR; INTRAVENOUS; SUBCUTANEOUS at 13:45

## 2024-03-06 RX ADMIN — METHOCARBAMOL 500 MILLIGRAM(S): 500 TABLET, FILM COATED ORAL at 06:18

## 2024-03-06 RX ADMIN — Medication 400 MILLIGRAM(S): at 01:50

## 2024-03-06 RX ADMIN — METHOCARBAMOL 750 MILLIGRAM(S): 500 TABLET, FILM COATED ORAL at 13:40

## 2024-03-06 RX ADMIN — SODIUM CHLORIDE 500 MILLILITER(S): 9 INJECTION, SOLUTION INTRAVENOUS at 19:00

## 2024-03-06 RX ADMIN — HYDROMORPHONE HYDROCHLORIDE 0.5 MILLIGRAM(S): 2 INJECTION INTRAMUSCULAR; INTRAVENOUS; SUBCUTANEOUS at 07:42

## 2024-03-06 RX ADMIN — Medication 5 MILLIGRAM(S): at 12:40

## 2024-03-06 RX ADMIN — GABAPENTIN 300 MILLIGRAM(S): 400 CAPSULE ORAL at 17:44

## 2024-03-06 RX ADMIN — Medication 1 TABLET(S): at 11:51

## 2024-03-06 RX ADMIN — SODIUM CHLORIDE 1000 MILLILITER(S): 9 INJECTION, SOLUTION INTRAVENOUS at 20:00

## 2024-03-06 RX ADMIN — HYDROMORPHONE HYDROCHLORIDE 0.5 MILLIGRAM(S): 2 INJECTION INTRAMUSCULAR; INTRAVENOUS; SUBCUTANEOUS at 08:00

## 2024-03-06 RX ADMIN — Medication 1000 MILLIGRAM(S): at 21:00

## 2024-03-06 RX ADMIN — SENNA PLUS 2 TABLET(S): 8.6 TABLET ORAL at 21:22

## 2024-03-06 RX ADMIN — HYDROMORPHONE HYDROCHLORIDE 0.5 MILLIGRAM(S): 2 INJECTION INTRAMUSCULAR; INTRAVENOUS; SUBCUTANEOUS at 13:30

## 2024-03-06 RX ADMIN — HYDROMORPHONE HYDROCHLORIDE 0.5 MILLIGRAM(S): 2 INJECTION INTRAMUSCULAR; INTRAVENOUS; SUBCUTANEOUS at 03:45

## 2024-03-06 RX ADMIN — POLYETHYLENE GLYCOL 3350 17 GRAM(S): 17 POWDER, FOR SOLUTION ORAL at 06:18

## 2024-03-06 NOTE — DISCHARGE NOTE NURSING/CASE MANAGEMENT/SOCIAL WORK - PATIENT PORTAL LINK FT
You can access the FollowMyHealth Patient Portal offered by [facility] by registering at the following website: [email or web address]. By joining [employer]’s FollowMyHealth portal, you will also be able to view your health information using other applications (apps) compatible with our system.

## 2024-03-06 NOTE — PROGRESS NOTE ADULT - ASSESSMENT
A/P:  68M w/ hx of HLD, pre-DM, presenting with c/f diffuse osseous spinal mets w/ cord compression. and cord edema POD 0 from   stage 1:  T8 VCR, T6-T10 fusion for tumor, small csf leak primarily repaired  Stage 2: L4 Partial Corpectomy, L2-Pelvis Fusion  Plastics Closure ([place])    Frozen: Poorly Differentiated Neoplasm    Neuro: neuro checks q 1 hr  monitor for CSF leak, keep sitting up as tolearted per NS   decadron 4 mg q 6 hr for cord edema  CT today  monitor drain output  PCA pump   follow official path     Respiratory: on NC     CV: MAP> 85 mmhg   TTE pending  hypotensive,  ml/hr     Endocrine:   pre-DM, finger stick q 6 hr and ISS     GI: advance diet as tolerated   PPI    Renal: NS 75 ml/hr    Heme/Onc:  cbc post op pending,  ml              DVT ppx: hold chemorpophylaxis POD0, doppler venous      ID: jeanette-op ABX       Social/Family:   Discharge planning:     Code Status: [x] Full Code [] DNR [] DNI [] Goals of Care:   Disposition: [x] ICU [] Stroke Unit [] RCU []PCU []Floor [] Discharge Home     Patient at high risk for neurologic deterioration, critical care time, excluding procedures: 40 minutes       A/P:  68M w/ hx of HLD, pre-DM, presenting with c/f diffuse osseous spinal mets w/ cord compression. and cord edema POD 0 from   stage 1:  T8 VCR, T6-T10 fusion for tumor, small csf leak primarily repaired  Stage 2: L4 Partial Corpectomy, L2-Pelvis Fusion  Plastics Closure ([place])    Frozen: Poorly Differentiated Neoplasm    Neuro: neuro checks q 1 hr  Monitor for CSF leak, keep sitting up as tolearted per NS   CT today  Monitor drain output: 3 HMV, 1 bile bag  PCA pump   follow official path   Pain management currently: Dilaudid, standing IV tylenol until PCA pump could be implemented  PT/OT    Respiratory: on 3 L NC   IS    CV: MAP> 85 mmhg   TTE pending    Endocrine:   pre-DM, finger stick q 6 hr and ISS     GI: advance diet as tolerated - CCD  Bowel regimen  LBM: PTA    Renal: IVL    Heme/Onc:  cbc post op pending,  ml              DVT ppx: hold chemorpophylaxis POD0, doppler venous      ID: Afebrile      Social/Family:   Discharge planning:     Code Status: [x] Full Code [] DNR [] DNI [] Goals of Care:   Disposition: [x] ICU [] Stroke Unit [] RCU []PCU []Floor [] Discharge Home     Patient at high risk for neurologic deterioration, critical care time, excluding procedures: 40 minutes

## 2024-03-06 NOTE — PHYSICAL THERAPY INITIAL EVALUATION ADULT - PERTINENT HX OF CURRENT PROBLEM, REHAB EVAL
68M, no major pmh, p/w difficulty ambulating since Saturday and OSH MRI report c/f diffuse osseous spinal mets w/ cord compression. Back pain started in Jan, which progressed to RLE weakness in Feb., and now unable to walk w/o assistance since Sat. No bowel/bladder incontinence. 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 CT CAP + for lesions in 3rd rib, L pleura, mesenteric/retroperitoneal adenopathy, narrowed sigmoid colon. PLTs/Coags wnl. Pre-Op exam: AO3, BUE 5/5, L HF 4+/5, R HF and KE 4/5, decreased sensation from R hip to ankle and along L shin/toes, normoreflexic, rectal tone wnl at rest, below normal w/ squeeze, cannot ambulate w/o assistance.    Now s/p   -T8 VCR, T6-T10 fusion for tumor, small csf leak primarily repaired  -L4 Partial Corpectomy, L2-Pelvis Fusion

## 2024-03-06 NOTE — PATIENT PROFILE ADULT - VISION (WITH CORRECTIVE LENSES IF THE PATIENT USUALLY WEARS THEM):
pt. wears glassess/Normal vision: sees adequately in most situations; can see medication labels, newsprint

## 2024-03-06 NOTE — BRIEF OPERATIVE NOTE - FIRST ASSIST NAME
Yesi Gomez 1950 is a 72 y.o. male, here for evaluation of the following chief complaint(s):  Follow-up (Patient here for follow up on RA. )         ASSESSMENT/PLAN:    Yesi Gomez 1950 is a 72 y.o. male seen in follow-up for rheumatoid arthritis. 1.  Seronegative, nonerosive rheumatoid arthritis-I suspect most of his joint pain is actually due to osteoarthritis. I see no striking synovitis on exam but he may have some swelling in the right elbow. I will actually increase his Plaquenil to 400 mg daily which would be the standard dose based on his weight anyway. 2.  High risk medication use-he follows with the eye doctor regularly while on Plaquenil. 3.  Chronic gout-no signs of acute attack currently. Continue allopurinol 100 mg daily. His uric acid level is elevated at 8.5 but since he has not had a gout attack in quite some time I will hold off on making any changes for right now but if he starts having attacks again will have a low threshold to titrate allopurinol to a goal uric acid of less than 6. If we need to adjust allopurinol would adjust in 50 mg increments given CKD 3.    4.  CKD 3-avoid methotrexate and systemic NSAIDs. 5.  Carotid artery disease/peripheral vascular disease-status post stenting. Patients with inflammatory arthritis have an increased cardiovascular risk. He is on a statin. 6.  Osteoarthritis-need to avoid NSAIDs because of vascular disease and CKD 3. Can take Tylenol as needed not to exceed 3000 mg total in a day. 1. Rheumatoid arthritis of multiple sites with negative rheumatoid factor (HCC)  -     hydroxychloroquine (PLAQUENIL) 200 MG tablet; Take 2 tablets by mouth every morning, Disp-180 tablet, R-3Normal  -     C-Reactive Protein; Future  -     Sedimentation Rate; Future  -     CBC with Auto Differential; Future  -     Comprehensive Metabolic Panel; Future  2.  High risk medication use  -     hydroxychloroquine (PLAQUENIL) 200 MG
Bhaskar Mart (Resident)
Steve George (Resident)

## 2024-03-06 NOTE — PROGRESS NOTE ADULT - SUBJECTIVE AND OBJECTIVE BOX
Called to evaluate PCA for 68 year old male   S\P Stage 1:  T8 VCR, T6-T10 fusion for tumor, small csf leak primarily repaired  Stage 2: L4 Partial Corpectomy, L2-Pelvis Fusion with Plastics Closure    A+Ox3 Endorsing 7 out of 10 pain level with movement, 3 out of 10 at rest.   Medicated with IV hydromorphone 0.5mg with good analgesia.  Continue acetaminophen 1000mg po q8hr, gabapentin 300mg po BID and Robaxin 750mg po TID  Allergy: Latex, NKDA  Denies opioid use prehospitalization, was taking Meloxicam prn    PCA discussed and explained to patient. Questions answered.   Will initiate hydromorphone PCA 0.2mg / 6minute delay / no basal / 4mg four hour limit.  Anesthesia pain management to follow.

## 2024-03-06 NOTE — PROGRESS NOTE ADULT - SUBJECTIVE AND OBJECTIVE BOX
Patient seen and examined at bedside.    --Anticoagulation--    T(C): 37.1 (03-06-24 @ 03:00), Max: 37.1 (03-06-24 @ 03:00)  HR: 66 (03-06-24 @ 04:00) (63 - 85)  BP: 131/77 (03-05-24 @ 14:32) (122/65 - 133/81)  RR: 20 (03-06-24 @ 04:00) (14 - 23)  SpO2: 98% (03-06-24 @ 04:00) (94% - 100%)  Wt(kg): --    Exam: AO3, BUE 5/5, LLE 5/5, R HF 4/5 and KE 4+/5 with assistance, D/F and P/F 5/5

## 2024-03-06 NOTE — PROGRESS NOTE ADULT - SUBJECTIVE AND OBJECTIVE BOX
HPI:  68M w/ hx of HLD, pre-DM, sinus john, presenting with worsening LE numbness/paresthesias and weakness since Jan 2024. Patient reports in the beginning of January he began developing some lower extremity numbness and paresthesias, underwent outpatient x-rays and eventually recent MRI on 2/29/24 showing concern for osseous metastatic disease in thoracic/lumbar/sacral spine as well as extension of soft tissue into the paravertebral soft tissues more prominent on the right side. Patient has an appointment with oncology (Dr. Gage Rodriguez from [facility]) but has not met him yet for evaluation. Since having the MRI on Thursday, 2/29/24, over this weekend since Saturday, 3/2/2024 patient has had a notable decline in functional status that was complicated by 2 falls where his legs just gave out. No LOC. Pt was walking independently last week and now requires a walker to walk even several steps. Patient reports having back pain, numbness and weakness RLE>LLE and soreness/aching of legs.  Patient denies f/c/n/v, CP, SOB, abdominal pain, saddle anesthesia, bladder or bowel incontinence or retention, night sweats. Endorsed noticing weight loss, but he is unsure how much as he does not typically weight himself. No known family hx of cancer. Last colonoscopy was in 2017 without significant findings.    In ED: VSS. Initial labs grossly unremarkable. CT imaging and MRI spine (prelim report) notable for multiple findings including diffuse osseous metastatic disease throughout the entire spine; pathologic fx w/ tumor into the epidural space at T8 resulting in thoracic cord compression; tumor extension into the epidural space at L3 resulting in cauda equina compression; pathologic fx w/ tumor in the epidural space at L4 contributing to severe canal stenosis; tumor within the bilateral psoas muscles at L3 and L4; bilateral neural foramen effacement by tumor in L-spine; 3rd rib lesion, b/l lung pleura lesions; mesenteric and retroperitoneal adenopathy; large fecal burden, narrowing versus underdistention in the sigmoid colon. Seen by NSGY. Received morphine 4mg IVP x1, dexamethasone 10mg IVP x1 and started on dexamethasone 4mg IV q6h. Admitted to Medicine for further management. (05 Mar 2024 05:40)    SURGERY:   tage 1:  T8 VCR, T6-T10 fusion for tumor, small csf leak primarily repaired  Stage 2: L4 Partial Corpectomy, L2-Pelvis Fusion  Plastics Closure ([place])    Frozen: Poorly Differentiated Neoplasm          ICU Vital Signs Last 24 Hrs  T(C): 36.8 (05 Mar 2024 14:32), Max: 36.8 (05 Mar 2024 12:40)  T(F): 98.2 (05 Mar 2024 13:35), Max: 98.3 (05 Mar 2024 12:40)  HR: 75 (05 Mar 2024 14:32) (68 - 85)  BP: 131/77 (05 Mar 2024 14:32) (122/65 - 156/96)  BP(mean): 81 (05 Mar 2024 14:32) (81 - 81)  ABP: --  ABP(mean): --  RR: 16 (05 Mar 2024 14:32) (16 - 20)  SpO2: 96% (05 Mar 2024 14:32) (96% - 99%)    O2 Parameters below as of 05 Mar 2024 12:40  Patient On (Oxygen Delivery Method): room air           03-05 @ 07:01  -  03-05 @ 23:24  --------------------------------------------------------  IN: 0 mL / OUT: 1250 mL / NET: -1250 mL                             12.6   6.63  )-----------( 234      ( 05 Mar 2024 06:53 )             38.5    03-05    138  |  99  |  26<H>  ----------------------------<  134<H>  4.1   |  27  |  0.68    Ca    10.2      05 Mar 2024 06:53  Phos  4.6     03-05  Mg     2.2     03-05    TPro  6.3  /  Alb  3.9  /  TBili  0.7  /  DBili  x   /  AST  142<H>  /  ALT  88<H>  /  AlkPhos  97  03-05   ABG - ( 05 Mar 2024 21:50 )  pH, Arterial: 7.47  pH, Blood: x     /  pCO2: 36    /  pO2: 139   / HCO3: 26    / Base Excess: 2.6   /  SaO2: 100.0                    PHYSICAL EXAM:    General: No Acute Distress     Neurological: Awake, alert oriented to person, place and time, Following Commands, PERRL, EOMI, Face Symmetrical, Speech Fluent, Moving all extremities, Muscle Strength normal in all four extremities, No Drift, Sensation to Light Touch Intact    Pulmonary: Clear to Auscultation, No Rales, No Rhonchi, No Wheezes     Cardiovascular: S1, S2, Regular Rate and Rhythm     Gastrointestinal: Soft, Nontender, Nondistended     Extremities: No calf tenderness     Incision:       LABS:  Na: 138 (03-05 @ 06:53), 138 (03-04 @ 19:17)  K: 4.1 (03-05 @ 06:53), 4.4 (03-04 @ 19:17)  Cl: 99 (03-05 @ 06:53), 98 (03-04 @ 19:17)  CO2: 27 (03-05 @ 06:53), 29 (03-04 @ 19:17)  BUN: 26 (03-05 @ 06:53), 33 (03-04 @ 19:17)  Cr: 0.68 (03-05 @ 06:53), 0.93 (03-04 @ 19:17)  Glu: 134(03-05 @ 06:53), 114(03-04 @ 19:17)    Hgb: 12.6 (03-05 @ 06:53), 13.5 (03-04 @ 19:17)  Hct: 38.5 (03-05 @ 06:53), 41.5 (03-04 @ 19:17)  WBC: 6.63 (03-05 @ 06:53), 7.04 (03-04 @ 19:17)  Plt: 234 (03-05 @ 06:53), 277 (03-04 @ 19:17)    INR: 1.01 03-05-24 @ 06:53, 1.04 03-04-24 @ 19:17  PTT: 28.8 03-05-24 @ 06:53, 31.4 03-04-24 @ 19:17        IMPRESSION:    1. THORACIC SPINE:   Diffuse osseous metastases. T8 pathologic burst   fracture with high grade epidural disease causing focal cord deformity   and cord edema    2. LUMBAR SPINE:   Diffuse osseous metastases. L4 superior endplate   pathologic fracture. High-grade epidural disease L3 and L4 vertebral   levels including contiguous involvement of the central canal neural   foramina paraspinal soft tissues    3. See separate body CT report for additional findings    The paraspinal musculature is unremarkable.    IMPRESSION:  CT CHEST: No pulmonary mass or suspicious nodules.  Lesion in the anterior right 3rd rib at the costochondral junction with   an extraosseous tumoral component extending to the anterior right pleural   surface measuring 1.5 cm. Additional pleural-based soft tissue mass on   the left measuring 1.1 cm without definite associated adjacent rib or   vertebral body lesion.  Enlarged right axillary lymph node.  CT ABDOMEN/PELVIS: Mesenteric and retroperitoneal adenopathy, unclear if   metastatic or secondary to primary a lymphoproliferative malignancy.  Suboptimal assessment of the bowel due to the lack of enteric contrast   and a large stool burden. Narrowing versus underdistention in the sigmoid   colon. Consider colonoscopy for further evaluation if one has not been   recently performed.  Cholelithiasis. Mild intra and extrahepatic biliary duct dilatation and   possible stone in the cystic duct. Follow-up with nonemergent MRCP can be   performed as clinically warranted.  CT THORACIC/LUMBAR SPINES: Osseous metastatic diseasewith pathologic   compression fracture deformities T8 and L4. Extraosseous extension of   tumor into the epidural space at T8, L3 and L4. Correlation with recent   MRI is recommended as the intraspinal canal contents are not well   evaluated on this modality. Tumor within the bilateral psoas muscles at   L3 and L4.    Findings were discussed with Dr. Morales 3/4/2024 9:56 PM by Dr. Alcantar with   read back confirmation.      A/P:  68M w/ hx of HLD, pre-DM, presenting with c/f diffuse osseous spinal mets w/ cord compression. and cord edema POD 0 from   stage 1:  T8 VCR, T6-T10 fusion for tumor, small csf leak primarily repaired  Stage 2: L4 Partial Corpectomy, L2-Pelvis Fusion  Plastics Closure ([place])    Frozen: Poorly Differentiated Neoplasm    Neuro: neuro checks q 1 hr  monitor for CSF leak , keep sitting up as tolearted per NS   decadron 4 mg q 6 hr for cord edema  CT tomorrow  monitor drain output  PCA pump   follow official path   Respiratory: on NC   CV: MAP> 85 mmhg   TTE pending  Endocrine: pre-DM, finger stick q 6 hr and ISS   Heme/Onc:  cbc post op pending,  ml            DVT ppx: holc chemorpophylaxis POD0 , doppler venous   Renal: NS 75 ml/hr   ID: jeanette-op ABX   GI: advance diet as tolerated   Social/Family:   Discharge planning:     Code Status: [] Full Code [] DNR [] DNI [] Goals of Care:   Disposition: [] ICU [] Stroke Unit [] RCU []PCU []Floor [] Discharge Home     Patient at high risk for neurologic deterioration, critical care time, excluding procedures: 45 minutes HPI:  68M w/ hx of HLD, pre-DM, sinus john, presenting with worsening LE numbness/paresthesias and weakness since Jan 2024. Patient reports in the beginning of January he began developing some lower extremity numbness and paresthesias, underwent outpatient x-rays and eventually recent MRI on 2/29/24 showing concern for osseous metastatic disease in thoracic/lumbar/sacral spine as well as extension of soft tissue into the paravertebral soft tissues more prominent on the right side. Patient has an appointment with oncology (Dr. Gage Rodriguez from [facility]) but has not met him yet for evaluation. Since having the MRI on Thursday, 2/29/24, over this weekend since Saturday, 3/2/2024 patient has had a notable decline in functional status that was complicated by 2 falls where his legs just gave out. No LOC. Pt was walking independently last week and now requires a walker to walk even several steps. Patient reports having back pain, numbness and weakness RLE>LLE and soreness/aching of legs.  Patient denies f/c/n/v, CP, SOB, abdominal pain, saddle anesthesia, bladder or bowel incontinence or retention, night sweats. Endorsed noticing weight loss, but he is unsure how much as he does not typically weight himself. No known family hx of cancer. Last colonoscopy was in 2017 without significant findings.    In ED: VSS. Initial labs grossly unremarkable. CT imaging and MRI spine (prelim report) notable for multiple findings including diffuse osseous metastatic disease throughout the entire spine; pathologic fx w/ tumor into the epidural space at T8 resulting in thoracic cord compression; tumor extension into the epidural space at L3 resulting in cauda equina compression; pathologic fx w/ tumor in the epidural space at L4 contributing to severe canal stenosis; tumor within the bilateral psoas muscles at L3 and L4; bilateral neural foramen effacement by tumor in L-spine; 3rd rib lesion, b/l lung pleura lesions; mesenteric and retroperitoneal adenopathy; large fecal burden, narrowing versus underdistention in the sigmoid colon. Seen by NSGY. Received morphine 4mg IVP x1, dexamethasone 10mg IVP x1 and started on dexamethasone 4mg IV q6h. Admitted to Medicine for further management. (05 Mar 2024 05:40)    SURGERY:   tage 1:  T8 VCR, T6-T10 fusion for tumor, small csf leak primarily repaired  Stage 2: L4 Partial Corpectomy, L2-Pelvis Fusion  Plastics Closure ([place])    Frozen: Poorly Differentiated Neoplasm          ICU Vital Signs Last 24 Hrs  T(C): 36.8 (05 Mar 2024 14:32), Max: 36.8 (05 Mar 2024 12:40)  T(F): 98.2 (05 Mar 2024 13:35), Max: 98.3 (05 Mar 2024 12:40)  HR: 75 (05 Mar 2024 14:32) (68 - 85)  BP: 131/77 (05 Mar 2024 14:32) (122/65 - 156/96)  BP(mean): 81 (05 Mar 2024 14:32) (81 - 81)  ABP: --  ABP(mean): --  RR: 16 (05 Mar 2024 14:32) (16 - 20)  SpO2: 96% (05 Mar 2024 14:32) (96% - 99%)    O2 Parameters below as of 05 Mar 2024 12:40  Patient On (Oxygen Delivery Method): room air           03-05 @ 07:01  -  03-05 @ 23:24  --------------------------------------------------------  IN: 0 mL / OUT: 1250 mL / NET: -1250 mL                             12.6   6.63  )-----------( 234      ( 05 Mar 2024 06:53 )             38.5    03-05    138  |  99  |  26<H>  ----------------------------<  134<H>  4.1   |  27  |  0.68    Ca    10.2      05 Mar 2024 06:53  Phos  4.6     03-05  Mg     2.2     03-05    TPro  6.3  /  Alb  3.9  /  TBili  0.7  /  DBili  x   /  AST  142<H>  /  ALT  88<H>  /  AlkPhos  97  03-05   ABG - ( 05 Mar 2024 21:50 )  pH, Arterial: 7.47  pH, Blood: x     /  pCO2: 36    /  pO2: 139   / HCO3: 26    / Base Excess: 2.6   /  SaO2: 100.0                    PHYSICAL EXAM:    General: No Acute Distress     Neurological: sleepy, screaming in pain, oriented x 1 , Following Commands, PERRL, EOMI, Face Symmetrical, Speech Fluent, UE 4/5 effort dependent, wiggle toes, knee extension 3+/5 bilaterally pain and effort limited, dorsiflexion and plantar flexion 5/5 , knee flexion 3+ /5 bilaterally pain and effort limited   Pulmonary: Clear to Auscultation, No Rales, No Rhonchi, No Wheezes     Cardiovascular: S1, S2, Regular Rate and Rhythm     Gastrointestinal: Soft, Nontender, Nondistended     Extremities: No calf tenderness     Incision:       LABS:  Na: 138 (03-05 @ 06:53), 138 (03-04 @ 19:17)  K: 4.1 (03-05 @ 06:53), 4.4 (03-04 @ 19:17)  Cl: 99 (03-05 @ 06:53), 98 (03-04 @ 19:17)  CO2: 27 (03-05 @ 06:53), 29 (03-04 @ 19:17)  BUN: 26 (03-05 @ 06:53), 33 (03-04 @ 19:17)  Cr: 0.68 (03-05 @ 06:53), 0.93 (03-04 @ 19:17)  Glu: 134(03-05 @ 06:53), 114(03-04 @ 19:17)    Hgb: 12.6 (03-05 @ 06:53), 13.5 (03-04 @ 19:17)  Hct: 38.5 (03-05 @ 06:53), 41.5 (03-04 @ 19:17)  WBC: 6.63 (03-05 @ 06:53), 7.04 (03-04 @ 19:17)  Plt: 234 (03-05 @ 06:53), 277 (03-04 @ 19:17)    INR: 1.01 03-05-24 @ 06:53, 1.04 03-04-24 @ 19:17  PTT: 28.8 03-05-24 @ 06:53, 31.4 03-04-24 @ 19:17        IMPRESSION:    1. THORACIC SPINE:   Diffuse osseous metastases. T8 pathologic burst   fracture with high grade epidural disease causing focal cord deformity   and cord edema    2. LUMBAR SPINE:   Diffuse osseous metastases. L4 superior endplate   pathologic fracture. High-grade epidural disease L3 and L4 vertebral   levels including contiguous involvement of the central canal neural   foramina paraspinal soft tissues    3. See separate body CT report for additional findings    The paraspinal musculature is unremarkable.    IMPRESSION:  CT CHEST: No pulmonary mass or suspicious nodules.  Lesion in the anterior right 3rd rib at the costochondral junction with   an extraosseous tumoral component extending to the anterior right pleural   surface measuring 1.5 cm. Additional pleural-based soft tissue mass on   the left measuring 1.1 cm without definite associated adjacent rib or   vertebral body lesion.  Enlarged right axillary lymph node.  CT ABDOMEN/PELVIS: Mesenteric and retroperitoneal adenopathy, unclear if   metastatic or secondary to primary a lymphoproliferative malignancy.  Suboptimal assessment of the bowel due to the lack of enteric contrast   and a large stool burden. Narrowing versus underdistention in the sigmoid   colon. Consider colonoscopy for further evaluation if one has not been   recently performed.  Cholelithiasis. Mild intra and extrahepatic biliary duct dilatation and   possible stone in the cystic duct. Follow-up with nonemergent MRCP can be   performed as clinically warranted.  CT THORACIC/LUMBAR SPINES: Osseous metastatic diseasewith pathologic   compression fracture deformities T8 and L4. Extraosseous extension of   tumor into the epidural space at T8, L3 and L4. Correlation with recent   MRI is recommended as the intraspinal canal contents are not well   evaluated on this modality. Tumor within the bilateral psoas muscles at   L3 and L4.    Findings were discussed with Dr. Morales 3/4/2024 9:56 PM by Dr. Alcantar with   read back confirmation.      A/P:  68M w/ hx of HLD, pre-DM, presenting with c/f diffuse osseous spinal mets w/ cord compression. and cord edema POD 0 from   stage 1:  T8 VCR, T6-T10 fusion for tumor, small csf leak primarily repaired  Stage 2: L4 Partial Corpectomy, L2-Pelvis Fusion  Plastics Closure ([place])    Frozen: Poorly Differentiated Neoplasm    Neuro: neuro checks q 1 hr  monitor for CSF leak , keep sitting up as tolearted per NS   decadron 4 mg q 6 hr for cord edema  CT tomorrow  monitor drain output  PCA pump   follow official path   Respiratory: on NC   CV: MAP> 85 mmhg   TTE pending  hypotensive,  ml/hr   Endocrine: pre-DM, finger stick q 6 hr and ISS   Heme/Onc:  cbc post op pending,  ml            DVT ppx: hold chemorpophylaxis POD0 , doppler venous   Renal: NS 75 ml/hr   ID: jeanette-op ABX   GI: advance diet as tolerated   PPI  Social/Family:   Discharge planning:     Code Status: [x] Full Code [] DNR [] DNI [] Goals of Care:   Disposition: [x] ICU [] Stroke Unit [] RCU []PCU []Floor [] Discharge Home     Patient at high risk for neurologic deterioration, critical care time, excluding procedures: 40 minutes

## 2024-03-06 NOTE — PATIENT PROFILE ADULT - FALL HARM RISK - HARM RISK INTERVENTIONS
Assistance with ambulation/Assistance OOB with selected safe patient handling equipment/Communicate Risk of Fall with Harm to all staff/Discuss with provider need for PT consult/Monitor gait and stability/Provide patient with walking aids - walker, cane, crutches/Reinforce activity limits and safety measures with patient and family/Sit up slowly, dangle for a short time, stand at bedside before walking/Tailored Fall Risk Interventions/Use of alarms - bed, chair and/or voice tab/Visual Cue: Yellow wristband and red socks/Bed in lowest position, wheels locked, appropriate side rails in place/Call bell, personal items and telephone in reach/Instruct patient to call for assistance before getting out of bed or chair/Non-slip footwear when patient is out of bed/[place] to call system/Physically safe environment - no spills, clutter or unnecessary equipment/Purposeful Proactive Rounding/Room/bathroom lighting operational, light cord in reach

## 2024-03-06 NOTE — BRIEF OPERATIVE NOTE - NSICDXBRIEFPROCEDURE_GEN_ALL_CORE_FT
PROCEDURES:  Exploration, spine, thoracic 06-Mar-2024 00:14:43  Bhaskar Mart  
PROCEDURES:  Exploration, spine, thoracic 06-Mar-2024 00:14:43  Bhaskar Mart

## 2024-03-06 NOTE — PROGRESS NOTE ADULT - CRITICAL CARE ATTENDING COMMENT
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 and cord edema     Surgery 3/5 -  stage 1:  T8 VCR, T6-T10 fusion for tumor, small csf leak primarily repaired  Stage 2: L4 Partial Corpectomy, L2-Pelvis Fusion  Plastics Closure ([place])    Frozen: Poorly Differentiated Neoplasm    Exam: oriented x 3, Following Commands,   PERRL, EOMI, Face Symmetrical, Speech Fluent  UEs maintains antigravity, at least 4/5 to confrontation  LLE lifts antigravity, RLE HF 3+/5, KF/KE 4/5, PF/DF 5/5    Plan:  neuro checks q1h  post-op CT now -> f/u  monitor for CSF leak , keep sitting up as tolerated per NS   clarify steroids with NSGY, f/u official pathology  monitor drain output  analgesia: tylenol, robaxin  pain consult    Wean NC as able, incentive spirometer  MAP> 85 mmhg, TTE pending  finger stick TID and AC, ISS   CCD diet, PPI, bowel regimen  Stop IVF  jeanette-op ABX   SCDs, DVT ppx: hold chemorpophylaxis POD1, doppler venous    I have personally provided the above noted minutes of critical care time including review of laboratory values, imaging, interdisciplinary care coordination, and frequent monitoring for decompensation.    Based on my personal evaluation, this patient has a high probability of imminent or life-threatening deterioration due to the presence of: spinal mass s/p resection and thoracic and lumbosacral fusion, pain, relative hypotension -  which required my direct attention, intervention, and personal management. Other billable procedures, if performed, are documented separately.

## 2024-03-06 NOTE — DISCHARGE NOTE NURSING/CASE MANAGEMENT/SOCIAL WORK - NSDCPEFALRISK_GEN_ALL_CORE
For information on Fall & Injury Prevention, visit: [email or web address] OR  [email or web address] OR  https://www.cdc.gov/steadi/patient.html

## 2024-03-06 NOTE — BRIEF OPERATIVE NOTE - OPERATION/FINDINGS
PRS closure
Stage 1:  T8 VCR, T6-T10 fusion for tumor, small csf leak primarily repaired  Stage 2: L4 Partial Corpectomy, L2-Pelvis Fusion  Plastics Closure ([place])    Frozen: Poorly Differentiated Neoplasm

## 2024-03-06 NOTE — PHYSICAL THERAPY INITIAL EVALUATION ADULT - GENERAL OBSERVATIONS, REHAB EVAL
Pt received semi supine in bed +PCA pump, +ICU Monitoring lines, +3 hemovacs, +drain, +james, +IV, ok for PT per [facility].

## 2024-03-06 NOTE — PROGRESS NOTE ADULT - ASSESSMENT
68M w/ hx of HLD, pre-DM, presenting with c/f diffuse osseous spinal mets w/ cord compression. and cord edema. Now s/p T8 VCR, T6-T10 fusion for tumor, small csf leak primarily repaired, L4 Partial Corpectomy, L2-Pelvis Fusion, PRS Closure.     Plan:      - Pain control as need  - Strict I&Os   - Monitor drain outputs   - Aquacel in place until POD5 (3/10)  - Appreciate care per primary team and NSC     Plastic Surgery  [phone]

## 2024-03-06 NOTE — PROGRESS NOTE ADULT - ASSESSMENT
Saturnino Solis  68M, no major pmh, p/w difficulty ambulating since Saturday and OSH MRI report c/f diffuse osseous spinal mets w/ cord compression. Back pain started in Jan, which progressed to RLE weakness in Feb., and now unable to walk w/o assistance since Sat. No bowel/bladder incontinence. 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 CT CAP + for lesions in 3rd rib, L pleura, mesenteric/retroperitoneal adenopathy, narrowed sigmoid colon. PLTs/Coags wnl. Pre-Op exam: AO3, BUE 5/5, L HF 4+/5, R HF and KE 4/5, decreased sensation from R hip to ankle and along L shin/toes, normoreflexic, rectal tone wnl at rest, below normal w/ squeeze, cannot ambulate w/o assistance.    Now s/p   -T8 VCR, T6-T10 fusion for tumor, small csf leak primarily repaired  -L4 Partial Corpectomy, L2-Pelvis Fusion  -Plastics Closure ([place])    CT T/L/P in AM  LED-p   PCA-p   sit up as tolerated   f/u official path

## 2024-03-06 NOTE — PROGRESS NOTE ADULT - SUBJECTIVE AND OBJECTIVE BOX
Plastic Surgery Daily Resident Progress Note    SUBJECTIVE: Pt seen and examined at bedside.     OBJECTIVE:  Vital Signs Last 24 Hrs  T(C): 37.1 (06 Mar 2024 03:00), Max: 37.1 (06 Mar 2024 03:00)  T(F): 98.8 (06 Mar 2024 03:00), Max: 98.8 (06 Mar 2024 03:00)  HR: 64 (06 Mar 2024 06:00) (63 - 76)  BP: 131/77 (05 Mar 2024 14:32) (122/71 - 131/77)  BP(mean): 81 (05 Mar 2024 14:32) (81 - 81)  RR: 17 (06 Mar 2024 06:00) (14 - 23)  SpO2: 98% (06 Mar 2024 06:00) (94% - 100%)    Parameters below as of 06 Mar 2024 01:50  Patient On (Oxygen Delivery Method): room air      Physical Exam:  General Appearance: A&Ox3, No acute distress  CV: RRR  Abdomen: Soft, ND, NT   Back: Soft, no palpable collections, dressings c/d/i, HVx4    lactated ringers. milliLiter(s) (75 mL/Hr)  multivitamin Tablet(s)      Medications:  MEDICATIONS  (STANDING):  acetaminophen     Tablet .. 1000 milliGRAM(s) Oral every 8 hours  ceFAZolin   IVPB 2000 milliGRAM(s) IV Intermittent every 8 hours  chlorhexidine 4% Liquid 1 Application(s) Topical daily  gabapentin 300 milliGRAM(s) Oral two times a day  insulin lispro (ADMELOG) corrective regimen sliding scale   SubCutaneous Before meals and at bedtime  lactated ringers. 1000 milliLiter(s) (75 mL/Hr) IV Continuous <Continuous>  methocarbamol 500 milliGRAM(s) Oral every 8 hours  multivitamin 1 Tablet(s) Oral daily  pantoprazole  Injectable 40 milliGRAM(s) IV Push daily  polyethylene glycol 3350 17 Gram(s) Oral every 24 hours  senna 2 Tablet(s) Oral at bedtime    MEDICATIONS  (PRN):  bisacodyl 5 milliGRAM(s) Oral every 12 hours PRN Constipation  HYDROmorphone  Injectable 0.5 milliGRAM(s) IV Push every 10 minutes PRN Moderate Pain (4 - 6)  magnesium hydroxide Suspension 30 milliLiter(s) Oral every 12 hours PRN Constipation  ondansetron Injectable 4 milliGRAM(s) IV Push every 6 hours PRN Nausea and/or Vomiting    Allergies:  No Known Drug Allergies  latex (Rash)      Labs:  03-06    140  |  106  |  25<H>  ----------------------------<  156<H>  4.1   |  25  |  0.77    Ca    8.9      06 Mar 2024 02:38  Phos  4.6     03-06  Mg     2.6     03-06    TPro  6.3  /  Alb  3.9  /  TBili  0.7  /  DBili  x   /  AST  142<H>  /  ALT  88<H>  /  AlkPhos  97  03-05                          11.0   9.93  )-----------( 173      ( 06 Mar 2024 02:38 )             32.5     PT/INR - ( 06 Mar 2024 02:38 )   PT: 11.8 sec;   INR: 1.07 ratio         PTT - ( 06 Mar 2024 02:38 )  PTT:47.4 sec

## 2024-03-06 NOTE — PROGRESS NOTE ADULT - ASSESSMENT
A/P:  68M w/ hx of HLD, pre-DM, presenting with c/f diffuse osseous spinal mets w/ cord compression. and cord edema POD 0 from   stage 1:  T8 VCR, T6-T10 fusion for tumor, small csf leak primarily repaired  Stage 2: L4 Partial Corpectomy, L2-Pelvis Fusion  Plastics Closure ([place])    Frozen: Poorly Differentiated Neoplasm    Neuro: neuro checks q 1 hr  Monitor for CSF leak, keep sitting up as tolearted per NS   CT today  Monitor drain output: 3 HMV, 1 bile bag  PCA pump   follow official path   Pain management currently: Dilaudid, standing IV tylenol until PCA pump could be implemented  PT/OT    Respiratory: on 3 L NC   IS    CV: MAP> 85 mmhg   TTE pending    Endocrine:   pre-DM, finger stick q 6 hr and ISS     GI: advance diet as tolerated - CCD  Bowel regimen  LBM: PTA    Renal: IVL    Heme/Onc:  cbc post op pending,  ml              DVT ppx: hold chemorpophylaxis POD0, doppler venous      ID: Afebrile      Social/Family:   Discharge planning:     Code Status: [x] Full Code [] DNR [] DNI [] Goals of Care:   Disposition: [x] ICU [] Stroke Unit [] RCU []PCU []Floor [] Discharge Home     Patient at high risk for neurologic deterioration, critical care time, excluding procedures: 40 minutes

## 2024-03-06 NOTE — PROGRESS NOTE ADULT - SUBJECTIVE AND OBJECTIVE BOX
68M w/ hx of HLD, pre-DM, sinus john, presenting with worsening LE numbness/paresthesias and weakness since Jan 2024. Patient reports in the beginning of January he began developing some lower extremity numbness and paresthesias, underwent outpatient x-rays and eventually recent MRI on 2/29/24 showing concern for osseous metastatic disease in thoracic/lumbar/sacral spine as well as extension of soft tissue into the paravertebral soft tissues more prominent on the right side. Patient has an appointment with oncology (Dr. Gage Rodriguez from [facility]) but has not met him yet for evaluation. Since having the MRI on Thursday, 2/29/24, over this weekend since Saturday, 3/2/2024 patient has had a notable decline in functional status that was complicated by 2 falls where his legs just gave out. No LOC. Pt was walking independently last week and now requires a walker to walk even several steps. Patient reports having back pain, numbness and weakness RLE>LLE and soreness/aching of legs.  Patient denies f/c/n/v, CP, SOB, abdominal pain, saddle anesthesia, bladder or bowel incontinence or retention, night sweats. Endorsed noticing weight loss, but he is unsure how much as he does not typically weight himself. No known family hx of cancer. Last colonoscopy was in 2017 without significant findings.    In ED: VSS. Initial labs grossly unremarkable. CT imaging and MRI spine (prelim report) notable for multiple findings including diffuse osseous metastatic disease throughout the entire spine; pathologic fx w/ tumor into the epidural space at T8 resulting in thoracic cord compression; tumor extension into the epidural space at L3 resulting in cauda equina compression; pathologic fx w/ tumor in the epidural space at L4 contributing to severe canal stenosis; tumor within the bilateral psoas muscles at L3 and L4; bilateral neural foramen effacement by tumor in L-spine; 3rd rib lesion, b/l lung pleura lesions; mesenteric and retroperitoneal adenopathy; large fecal burden, narrowing versus underdistention in the sigmoid colon. Seen by NSGY. Received morphine 4mg IVP x1, dexamethasone 10mg IVP x1 and started on dexamethasone 4mg IV q6h. Admitted to Medicine for further management.    24 hour Events:     Stage 1:  T8 VCR, T6-T10 fusion for tumor, small csf leak primarily repaired  Stage 2: L4 Partial Corpectomy, L2-Pelvis Fusion  Plastics Closure ([place])Allergies    No Known Drug Allergies  latex (Rash)    Intolerances        REVIEW OF SYSTEMS: [ ] Unable to Assess due to neurologic exam   [X] All ROS addressed below are non-contributory, except:  Neuro: [ ] Headache [ ] Back pain [ ] Numbness [ ] Weakness [ ] Ataxia [ ] Dizziness [ ] Aphasia [ ] Dysarthria [ ] Visual disturbance  Resp: [ ] Shortness of breath/dyspnea, [ ] Orthopnea [ ] Cough  CV: [ ] Chest pain [ ] Palpitation [ ] Lightheadedness [ ] Syncope  Renal: [ ] Thirst [ ] Edema  GI: [ ] Nausea [ ] Emesis [ ] Abdominal pain [ ] Constipation [ ] Diarrhea  Hem: [ ] Hematemesis [ ] bright red blood per rectum  ID: [ ] Fever [ ] Chills [ ] Dysuria  ENT: [ ] Rhinorrhea      DEVICES:   [ ] Restraints [ ] ET tube [ ] central line [ ] arterial line [ ] grigsby [ ] NGT/OGT [ ] EVD [ ] LD [ ] LILLIE/HMV [ ] Trach [ ] PEG [ ] Chest Tube     VITALS:   Vital Signs Last 24 Hrs  T(C): 37.1 (06 Mar 2024 03:00), Max: 37.1 (06 Mar 2024 03:00)  T(F): 98.8 (06 Mar 2024 03:00), Max: 98.8 (06 Mar 2024 03:00)  HR: 64 (06 Mar 2024 06:00) (63 - 76)  BP: 131/77 (05 Mar 2024 14:32) (122/71 - 131/77)  BP(mean): 81 (05 Mar 2024 14:32) (81 - 81)  RR: 17 (06 Mar 2024 06:00) (14 - 23)  SpO2: 98% (06 Mar 2024 06:00) (94% - 100%)    Parameters below as of 06 Mar 2024 01:50  Patient On (Oxygen Delivery Method): room air      CAPILLARY BLOOD GLUCOSE      POCT Blood Glucose.: 116 mg/dL (05 Mar 2024 13:42)    I&O's Summary    05 Mar 2024 07:01  -  06 Mar 2024 07:00  --------------------------------------------------------  IN: 1100 mL / OUT: 1805 mL / NET: -705 mL        Respiratory:    ABG - ( 06 Mar 2024 00:14 )  pH, Arterial: 7.44  pH, Blood: x     /  pCO2: 37    /  pO2: 177   / HCO3: 25    / Base Excess: 1.1   /  SaO2: 99.6                LABS:                        11.0   9.93  )-----------( 173      ( 06 Mar 2024 02:38 )             32.5     03-06    140  |  106  |  25<H>  ----------------------------<  156<H>  4.1   |  25  |  0.77             MEDICATION LEVELS:     IVF FLUIDS/MEDICATIONS:   MEDICATIONS  (STANDING):  acetaminophen     Tablet .. 1000 milliGRAM(s) Oral every 8 hours  ceFAZolin   IVPB 2000 milliGRAM(s) IV Intermittent every 8 hours  chlorhexidine 4% Liquid 1 Application(s) Topical daily  dextrose 5%. 1000 milliLiter(s) (50 mL/Hr) IV Continuous <Continuous>  dextrose 5%. 1000 milliLiter(s) (100 mL/Hr) IV Continuous <Continuous>  dextrose 50% Injectable 25 Gram(s) IV Push once  dextrose 50% Injectable 25 Gram(s) IV Push once  dextrose 50% Injectable 12.5 Gram(s) IV Push once  gabapentin 300 milliGRAM(s) Oral two times a day  glucagon  Injectable 1 milliGRAM(s) IntraMuscular once  insulin lispro (ADMELOG) corrective regimen sliding scale   SubCutaneous Before meals and at bedtime  lactated ringers. 1000 milliLiter(s) (75 mL/Hr) IV Continuous <Continuous>  methocarbamol 500 milliGRAM(s) Oral every 8 hours  multivitamin 1 Tablet(s) Oral daily  pantoprazole  Injectable 40 milliGRAM(s) IV Push daily  polyethylene glycol 3350 17 Gram(s) Oral every 24 hours  senna 2 Tablet(s) Oral at bedtime    MEDICATIONS  (PRN):  bisacodyl 5 milliGRAM(s) Oral every 12 hours PRN Constipation  dextrose Oral Gel 15 Gram(s) Oral once PRN Blood Glucose LESS THAN 70 milliGRAM(s)/deciliter  HYDROmorphone  Injectable 0.5 milliGRAM(s) IV Push every 10 minutes PRN Moderate Pain (4 - 6)  magnesium hydroxide Suspension 30 milliLiter(s) Oral every 12 hours PRN Constipation  ondansetron Injectable 4 milliGRAM(s) IV Push every 6 hours PRN Nausea and/or Vomiting        PHYSICAL EXAM:    General: No Acute Distress     Neurological: Awake, moaning in pain, oriented x 3, Following Commands, PERRL, EOMI, Face Symmetrical, Speech Fluent, UE 5/5, RLE HF 4/5, KF 4/5, KE 4/5, DF/PF 5/5. LLE 5/5. Pain limited exam.   Pulmonary: Clear to Auscultation, No Rales, No Rhonchi, No Wheezes     Cardiovascular: S1, S2, Regular Rate and Rhythm     Gastrointestinal: Soft, Nontender, Nondistended     Extremities: No calf tenderness    Patient admitted for osseous mets with cord compression    24 hour Events:     Stage 1:  T8 VCR, T6-T10 fusion for tumor, small csf leak primarily repaired  Stage 2: L4 Partial Corpectomy, L2-Pelvis Fusion  Plastics Closure ([place])Allergies  3/6- Patient complains of back pain and movement is limited 2/2 pain.    No Known Drug Allergies  latex ([place])    Intolerances        REVIEW OF SYSTEMS: [ ] Unable to Assess due to neurologic exam   [X] All ROS addressed below are non-contributory, except:  Neuro: [ ] Headache [ ] Back pain [ ] Numbness [ ] Weakness [ ] Ataxia [ ] Dizziness [ ] Aphasia [ ] Dysarthria [ ] Visual disturbance  Resp: [ ] Shortness of breath/dyspnea, [ ] Orthopnea [ ] Cough  CV: [ ] Chest pain [ ] Palpitation [ ] Lightheadedness [ ] Syncope  Renal: [ ] Thirst [ ] Edema  GI: [ ] Nausea [ ] Emesis [ ] Abdominal pain [ ] Constipation [ ] Diarrhea  Hem: [ ] Hematemesis [ ] bright red blood per rectum  ID: [ ] Fever [ ] Chills [ ] Dysuria  ENT: [ ] Rhinorrhea      DEVICES:   [ ] Restraints [ ] ET tube [ ] central line [ ] arterial line [ ] grigsby [ ] NGT/OGT [ ] EVD [ ] LD [ ] LILLIE/HMV [ ] Trach [ ] PEG [ ] Chest Tube     VITALS:   Vital Signs Last 24 Hrs  T(C): 37.1 (06 Mar 2024 03:00), Max: 37.1 (06 Mar 2024 03:00)  T(F): 98.8 (06 Mar 2024 03:00), Max: 98.8 (06 Mar 2024 03:00)  HR: 64 (06 Mar 2024 06:00) (63 - 76)  BP: 131/77 (05 Mar 2024 14:32) (122/71 - 131/77)  BP(mean): 81 (05 Mar 2024 14:32) (81 - 81)  RR: 17 (06 Mar 2024 06:00) (14 - 23)  SpO2: 98% (06 Mar 2024 06:00) (94% - 100%)    Parameters below as of 06 Mar 2024 01:50  Patient On (Oxygen Delivery Method): room air      CAPILLARY BLOOD GLUCOSE      POCT Blood Glucose.: 116 mg/dL (05 Mar 2024 13:42)    I&O's Summary    05 Mar 2024 07:01  -  06 Mar 2024 07:00  --------------------------------------------------------  IN: 1100 mL / OUT: 1805 mL / NET: -705 mL        Respiratory:    ABG - ( 06 Mar 2024 00:14 )  pH, Arterial: 7.44  pH, Blood: x     /  pCO2: 37    /  pO2: 177   / HCO3: 25    / Base Excess: 1.1   /  SaO2: 99.6                LABS:                        11.0   9.93  )-----------( 173      ( 06 Mar 2024 02:38 )             32.5     03-06    140  |  106  |  25<H>  ----------------------------<  156<H>  4.1   |  25  |  0.77             MEDICATION LEVELS:     IVF FLUIDS/MEDICATIONS:   MEDICATIONS  (STANDING):  acetaminophen     Tablet .. 1000 milliGRAM(s) Oral every 8 hours  ceFAZolin   IVPB 2000 milliGRAM(s) IV Intermittent every 8 hours  chlorhexidine 4% Liquid 1 Application(s) Topical daily  dextrose 5%. 1000 milliLiter(s) (50 mL/Hr) IV Continuous <Continuous>  dextrose 5%. 1000 milliLiter(s) (100 mL/Hr) IV Continuous <Continuous>  dextrose 50% Injectable 25 Gram(s) IV Push once  dextrose 50% Injectable 25 Gram(s) IV Push once  dextrose 50% Injectable 12.5 Gram(s) IV Push once  gabapentin 300 milliGRAM(s) Oral two times a day  glucagon  Injectable 1 milliGRAM(s) IntraMuscular once  insulin lispro (ADMELOG) corrective regimen sliding scale   SubCutaneous Before meals and at bedtime  lactated ringers. 1000 milliLiter(s) (75 mL/Hr) IV Continuous <Continuous>  methocarbamol 500 milliGRAM(s) Oral every 8 hours  multivitamin 1 Tablet(s) Oral daily  pantoprazole  Injectable 40 milliGRAM(s) IV Push daily  polyethylene glycol 3350 17 Gram(s) Oral every 24 hours  senna 2 Tablet(s) Oral at bedtime    MEDICATIONS  (PRN):  bisacodyl 5 milliGRAM(s) Oral every 12 hours PRN Constipation  dextrose Oral Gel 15 Gram(s) Oral once PRN Blood Glucose LESS THAN 70 milliGRAM(s)/deciliter  HYDROmorphone  Injectable 0.5 milliGRAM(s) IV Push every 10 minutes PRN Moderate Pain (4 - 6)  magnesium hydroxide Suspension 30 milliLiter(s) Oral every 12 hours PRN Constipation  ondansetron Injectable 4 milliGRAM(s) IV Push every 6 hours PRN Nausea and/or Vomiting        PHYSICAL EXAM:    General: No Acute Distress     Neurological: Awake, moaning in pain, oriented x 3, Following Commands, PERRL, EOMI, Face Symmetrical, Speech Fluent, UE 5/5, RLE HF 4/5, KF 4/5, KE 4/5, DF/PF 5/5. LLE 5/5. Pain limited exam.   Pulmonary: Clear to Auscultation, No Rales, No Rhonchi, No Wheezes     Cardiovascular: S1, S2, Regular Rate and Rhythm     Gastrointestinal: Soft, Nontender, Nondistended     Extremities: No calf tenderness

## 2024-03-06 NOTE — PHYSICAL THERAPY INITIAL EVALUATION ADULT - ADDITIONAL COMMENTS
Pt lives in a private home with 2 daughters there are 4 steps to enter,1 flight of stairs to bedroom, in the process of making first floor living situation. Pt performed ADL/IADLs independently up until last week when he required assist to ambulate. Ambulates with rolling walker. Owns DME: rolling walker, hospital bed.

## 2024-03-06 NOTE — PROGRESS NOTE ADULT - SUBJECTIVE AND OBJECTIVE BOX
Patient admitted for osseous mets with cord compression    24 hour Events:     Stage 1:  T8 VCR, T6-T10 fusion for tumor, small csf leak primarily repaired  Stage 2: L4 Partial Corpectomy, L2-Pelvis Fusion  Plastics Closure ([place])Allergies  3/6- Patient complains of back pain and movement is limited 2/2 pain.    No Known Drug Allergies  latex (Rash)    Intolerances        REVIEW OF SYSTEMS: [ ] Unable to Assess due to neurologic exam   [X] All ROS addressed below are non-contributory, except:  Neuro: [ ] Headache [ ] Back pain [ ] Numbness [ ] Weakness [ ] Ataxia [ ] Dizziness [ ] Aphasia [ ] Dysarthria [ ] Visual disturbance  Resp: [ ] Shortness of breath/dyspnea, [ ] Orthopnea [ ] Cough  CV: [ ] Chest pain [ ] Palpitation [ ] Lightheadedness [ ] Syncope  Renal: [ ] Thirst [ ] Edema  GI: [ ] Nausea [ ] Emesis [ ] Abdominal pain [ ] Constipation [ ] Diarrhea  Hem: [ ] Hematemesis [ ] bright red blood per rectum  ID: [ ] Fever [ ] Chills [ ] Dysuria  ENT: [ ] Rhinorrhea      DEVICES:   [ ] Restraints [ ] ET tube [ ] central line [ ] arterial line [ ] grigsby [ ] NGT/OGT [ ] EVD [ ] LD [ ] LILLIE/HMV [ ] Trach [ ] PEG [ ] Chest Tube     VITALS:   Vital Signs Last 24 Hrs  T(C): 37.1 (06 Mar 2024 03:00), Max: 37.1 (06 Mar 2024 03:00)  T(F): 98.8 (06 Mar 2024 03:00), Max: 98.8 (06 Mar 2024 03:00)  HR: 64 (06 Mar 2024 06:00) (63 - 76)  BP: 131/77 (05 Mar 2024 14:32) (122/71 - 131/77)  BP(mean): 81 (05 Mar 2024 14:32) (81 - 81)  RR: 17 (06 Mar 2024 06:00) (14 - 23)  SpO2: 98% (06 Mar 2024 06:00) (94% - 100%)    Parameters below as of 06 Mar 2024 01:50  Patient On (Oxygen Delivery Method): room air      CAPILLARY BLOOD GLUCOSE      POCT Blood Glucose.: 116 mg/dL (05 Mar 2024 13:42)    I&O's Summary    05 Mar 2024 07:01  -  06 Mar 2024 07:00  --------------------------------------------------------  IN: 1100 mL / OUT: 1805 mL / NET: -705 mL        Respiratory:    ABG - ( 06 Mar 2024 00:14 )  pH, Arterial: 7.44  pH, Blood: x     /  pCO2: 37    /  pO2: 177   / HCO3: 25    / Base Excess: 1.1   /  SaO2: 99.6                LABS:                        11.0   9.93  )-----------( 173      ( 06 Mar 2024 02:38 )             32.5     03-06    140  |  106  |  25<H>  ----------------------------<  156<H>  4.1   |  25  |  0.77             MEDICATION LEVELS:     IVF FLUIDS/MEDICATIONS:   MEDICATIONS  (STANDING):  acetaminophen     Tablet .. 1000 milliGRAM(s) Oral every 8 hours  ceFAZolin   IVPB 2000 milliGRAM(s) IV Intermittent every 8 hours  chlorhexidine 4% Liquid 1 Application(s) Topical daily  dextrose 5%. 1000 milliLiter(s) (50 mL/Hr) IV Continuous <Continuous>  dextrose 5%. 1000 milliLiter(s) (100 mL/Hr) IV Continuous <Continuous>  dextrose 50% Injectable 25 Gram(s) IV Push once  dextrose 50% Injectable 25 Gram(s) IV Push once  dextrose 50% Injectable 12.5 Gram(s) IV Push once  gabapentin 300 milliGRAM(s) Oral two times a day  glucagon  Injectable 1 milliGRAM(s) IntraMuscular once  insulin lispro (ADMELOG) corrective regimen sliding scale   SubCutaneous Before meals and at bedtime  lactated ringers. 1000 milliLiter(s) (75 mL/Hr) IV Continuous <Continuous>  methocarbamol 500 milliGRAM(s) Oral every 8 hours  multivitamin 1 Tablet(s) Oral daily  pantoprazole  Injectable 40 milliGRAM(s) IV Push daily  polyethylene glycol 3350 17 Gram(s) Oral every 24 hours  senna 2 Tablet(s) Oral at bedtime    MEDICATIONS  (PRN):  bisacodyl 5 milliGRAM(s) Oral every 12 hours PRN Constipation  dextrose Oral Gel 15 Gram(s) Oral once PRN Blood Glucose LESS THAN 70 milliGRAM(s)/deciliter  HYDROmorphone  Injectable 0.5 milliGRAM(s) IV Push every 10 minutes PRN Moderate Pain (4 - 6)  magnesium hydroxide Suspension 30 milliLiter(s) Oral every 12 hours PRN Constipation  ondansetron Injectable 4 milliGRAM(s) IV Push every 6 hours PRN Nausea and/or Vomiting        PHYSICAL EXAM:    General: No Acute Distress     Neurological: Awake, moaning in pain, oriented x 3, Following Commands, PERRL, EOMI, Face Symmetrical, Speech Fluent, UE 5/5, RLE HF 4/5, KF 4/5, KE 4/5, DF/PF 5/5. LLE 5/5. Pain limited exam.   Pulmonary: Clear to Auscultation, No Rales, No Rhonchi, No Wheezes     Cardiovascular: S1, S2, Regular Rate and Rhythm     Gastrointestinal: Soft, Nontender, Nondistended     Extremities: No calf tenderness

## 2024-03-07 LAB
ANION GAP SERPL CALC-SCNC: 7 MMOL/L — SIGNIFICANT CHANGE UP (ref 5–17)
BASOPHILS # BLD AUTO: 0.02 K/UL — SIGNIFICANT CHANGE UP (ref 0–0.2)
BASOPHILS NFR BLD AUTO: 0.3 % — SIGNIFICANT CHANGE UP (ref 0–2)
BUN SERPL-MCNC: 24 MG/DL — HIGH (ref 7–23)
CALCIUM SERPL-MCNC: 6.7 MG/DL — LOW (ref 8.4–10.5)
CHLORIDE SERPL-SCNC: 112 MMOL/L — HIGH (ref 96–108)
CO2 SERPL-SCNC: 20 MMOL/L — LOW (ref 22–31)
CREAT SERPL-MCNC: 0.52 MG/DL — SIGNIFICANT CHANGE UP (ref 0.5–1.3)
EGFR: 111 ML/MIN/1.73M2 — SIGNIFICANT CHANGE UP
EOSINOPHIL # BLD AUTO: 0.03 K/UL — SIGNIFICANT CHANGE UP (ref 0–0.5)
EOSINOPHIL NFR BLD AUTO: 0.4 % — SIGNIFICANT CHANGE UP (ref 0–6)
GLUCOSE BLDC GLUCOMTR-MCNC: 98 MG/DL — SIGNIFICANT CHANGE UP (ref 70–99)
GLUCOSE SERPL-MCNC: 102 MG/DL — HIGH (ref 70–99)
HCT VFR BLD CALC: 27.5 % — LOW (ref 39–50)
HCT VFR BLD CALC: 29.3 % — LOW (ref 39–50)
HGB BLD-MCNC: 9 G/DL — LOW (ref 13–17)
HGB BLD-MCNC: 9.6 G/DL — LOW (ref 13–17)
IMM GRANULOCYTES NFR BLD AUTO: 0.3 % — SIGNIFICANT CHANGE UP (ref 0–0.9)
LYMPHOCYTES # BLD AUTO: 0.67 K/UL — LOW (ref 1–3.3)
LYMPHOCYTES # BLD AUTO: 10 % — LOW (ref 13–44)
MCHC RBC-ENTMCNC: 26.9 PG — LOW (ref 27–34)
MCHC RBC-ENTMCNC: 27.2 PG — SIGNIFICANT CHANGE UP (ref 27–34)
MCHC RBC-ENTMCNC: 32.7 GM/DL — SIGNIFICANT CHANGE UP (ref 32–36)
MCHC RBC-ENTMCNC: 32.8 GM/DL — SIGNIFICANT CHANGE UP (ref 32–36)
MCV RBC AUTO: 82.1 FL — SIGNIFICANT CHANGE UP (ref 80–100)
MCV RBC AUTO: 83.1 FL — SIGNIFICANT CHANGE UP (ref 80–100)
MONOCYTES # BLD AUTO: 0.62 K/UL — SIGNIFICANT CHANGE UP (ref 0–0.9)
MONOCYTES NFR BLD AUTO: 9.3 % — SIGNIFICANT CHANGE UP (ref 2–14)
NEUTROPHILS # BLD AUTO: 5.34 K/UL — SIGNIFICANT CHANGE UP (ref 1.8–7.4)
NEUTROPHILS NFR BLD AUTO: 79.7 % — HIGH (ref 43–77)
NRBC # BLD: 0 /100 WBCS — SIGNIFICANT CHANGE UP (ref 0–0)
NRBC # BLD: 0 /100 WBCS — SIGNIFICANT CHANGE UP (ref 0–0)
PLATELET # BLD AUTO: 111 K/UL — LOW (ref 150–400)
PLATELET # BLD AUTO: 133 K/UL — LOW (ref 150–400)
POTASSIUM SERPL-MCNC: 3.6 MMOL/L — SIGNIFICANT CHANGE UP (ref 3.5–5.3)
POTASSIUM SERPL-SCNC: 3.6 MMOL/L — SIGNIFICANT CHANGE UP (ref 3.5–5.3)
RBC # BLD: 3.31 M/UL — LOW (ref 4.2–5.8)
RBC # BLD: 3.57 M/UL — LOW (ref 4.2–5.8)
RBC # FLD: 18.8 % — HIGH (ref 10.3–14.5)
RBC # FLD: 19 % — HIGH (ref 10.3–14.5)
SODIUM SERPL-SCNC: 139 MMOL/L — SIGNIFICANT CHANGE UP (ref 135–145)
WBC # BLD: 6.04 K/UL — SIGNIFICANT CHANGE UP (ref 3.8–10.5)
WBC # BLD: 6.7 K/UL — SIGNIFICANT CHANGE UP (ref 3.8–10.5)
WBC # FLD AUTO: 6.04 K/UL — SIGNIFICANT CHANGE UP (ref 3.8–10.5)
WBC # FLD AUTO: 6.7 K/UL — SIGNIFICANT CHANGE UP (ref 3.8–10.5)

## 2024-03-07 PROCEDURE — 99232 SBSQ HOSP IP/OBS MODERATE 35: CPT

## 2024-03-07 RX ORDER — HYDROMORPHONE HYDROCHLORIDE 2 MG/ML
0.25 INJECTION INTRAMUSCULAR; INTRAVENOUS; SUBCUTANEOUS EVERY 4 HOURS
Refills: 0 | Status: DISCONTINUED | OUTPATIENT
Start: 2024-03-07 | End: 2024-03-12

## 2024-03-07 RX ORDER — ACETAMINOPHEN 500 MG
650 TABLET ORAL EVERY 6 HOURS
Refills: 0 | Status: DISCONTINUED | OUTPATIENT
Start: 2024-03-07 | End: 2024-03-09

## 2024-03-07 RX ORDER — SODIUM CHLORIDE 9 MG/ML
500 INJECTION INTRAMUSCULAR; INTRAVENOUS; SUBCUTANEOUS ONCE
Refills: 0 | Status: COMPLETED | OUTPATIENT
Start: 2024-03-07 | End: 2024-03-07

## 2024-03-07 RX ORDER — CALCIUM GLUCONATE 100 MG/ML
2 VIAL (ML) INTRAVENOUS ONCE
Refills: 0 | Status: COMPLETED | OUTPATIENT
Start: 2024-03-07 | End: 2024-03-07

## 2024-03-07 RX ORDER — POTASSIUM CHLORIDE 20 MEQ
40 PACKET (EA) ORAL ONCE
Refills: 0 | Status: COMPLETED | OUTPATIENT
Start: 2024-03-07 | End: 2024-03-07

## 2024-03-07 RX ORDER — ENOXAPARIN SODIUM 100 MG/ML
40 INJECTION SUBCUTANEOUS
Refills: 0 | Status: DISCONTINUED | OUTPATIENT
Start: 2024-03-07 | End: 2024-03-07

## 2024-03-07 RX ORDER — OXYCODONE HYDROCHLORIDE 5 MG/1
5 TABLET ORAL EVERY 4 HOURS
Refills: 0 | Status: DISCONTINUED | OUTPATIENT
Start: 2024-03-07 | End: 2024-03-12

## 2024-03-07 RX ORDER — OXYCODONE HYDROCHLORIDE 5 MG/1
10 TABLET ORAL EVERY 4 HOURS
Refills: 0 | Status: DISCONTINUED | OUTPATIENT
Start: 2024-03-07 | End: 2024-03-12

## 2024-03-07 RX ORDER — PHENYLEPHRINE HYDROCHLORIDE 10 MG/ML
0.1 INJECTION INTRAVENOUS
Qty: 40 | Refills: 0 | Status: DISCONTINUED | OUTPATIENT
Start: 2024-03-07 | End: 2024-03-08

## 2024-03-07 RX ADMIN — SENNA PLUS 2 TABLET(S): 8.6 TABLET ORAL at 21:28

## 2024-03-07 RX ADMIN — Medication 1000 MILLIGRAM(S): at 21:28

## 2024-03-07 RX ADMIN — PHENYLEPHRINE HYDROCHLORIDE 2.16 MICROGRAM(S)/KG/MIN: 10 INJECTION INTRAVENOUS at 19:30

## 2024-03-07 RX ADMIN — Medication 1 PACKET(S): at 06:14

## 2024-03-07 RX ADMIN — METHOCARBAMOL 750 MILLIGRAM(S): 500 TABLET, FILM COATED ORAL at 06:16

## 2024-03-07 RX ADMIN — METHOCARBAMOL 750 MILLIGRAM(S): 500 TABLET, FILM COATED ORAL at 21:54

## 2024-03-07 RX ADMIN — POLYETHYLENE GLYCOL 3350 17 GRAM(S): 17 POWDER, FOR SOLUTION ORAL at 17:48

## 2024-03-07 RX ADMIN — Medication 5 MILLIGRAM(S): at 06:16

## 2024-03-07 RX ADMIN — Medication 200 GRAM(S): at 11:06

## 2024-03-07 RX ADMIN — POLYETHYLENE GLYCOL 3350 17 GRAM(S): 17 POWDER, FOR SOLUTION ORAL at 06:15

## 2024-03-07 RX ADMIN — METHOCARBAMOL 750 MILLIGRAM(S): 500 TABLET, FILM COATED ORAL at 13:05

## 2024-03-07 RX ADMIN — HYDROMORPHONE HYDROCHLORIDE 30 MILLILITER(S): 2 INJECTION INTRAMUSCULAR; INTRAVENOUS; SUBCUTANEOUS at 07:18

## 2024-03-07 RX ADMIN — Medication 1 TABLET(S): at 11:06

## 2024-03-07 RX ADMIN — Medication 5 MILLIGRAM(S): at 11:06

## 2024-03-07 RX ADMIN — GABAPENTIN 300 MILLIGRAM(S): 400 CAPSULE ORAL at 06:14

## 2024-03-07 RX ADMIN — OXYCODONE HYDROCHLORIDE 5 MILLIGRAM(S): 5 TABLET ORAL at 19:00

## 2024-03-07 RX ADMIN — SODIUM CHLORIDE 500 MILLILITER(S): 9 INJECTION INTRAMUSCULAR; INTRAVENOUS; SUBCUTANEOUS at 15:50

## 2024-03-07 RX ADMIN — CHLORHEXIDINE GLUCONATE 1 APPLICATION(S): 213 SOLUTION TOPICAL at 21:28

## 2024-03-07 RX ADMIN — Medication 102 GRAM(S): at 06:14

## 2024-03-07 RX ADMIN — Medication 1000 MILLIGRAM(S): at 06:30

## 2024-03-07 RX ADMIN — Medication 1000 MILLIGRAM(S): at 13:05

## 2024-03-07 RX ADMIN — Medication 5 MILLIGRAM(S): at 23:07

## 2024-03-07 RX ADMIN — OXYCODONE HYDROCHLORIDE 5 MILLIGRAM(S): 5 TABLET ORAL at 18:12

## 2024-03-07 RX ADMIN — GABAPENTIN 300 MILLIGRAM(S): 400 CAPSULE ORAL at 17:49

## 2024-03-07 RX ADMIN — SODIUM CHLORIDE 1000 MILLILITER(S): 9 INJECTION INTRAMUSCULAR; INTRAVENOUS; SUBCUTANEOUS at 14:50

## 2024-03-07 RX ADMIN — Medication 1000 MILLIGRAM(S): at 06:00

## 2024-03-07 RX ADMIN — OXYCODONE HYDROCHLORIDE 10 MILLIGRAM(S): 5 TABLET ORAL at 23:07

## 2024-03-07 RX ADMIN — Medication 40 MILLIEQUIVALENT(S): at 06:14

## 2024-03-07 RX ADMIN — SODIUM CHLORIDE 500 MILLILITER(S): 9 INJECTION INTRAMUSCULAR; INTRAVENOUS; SUBCUTANEOUS at 09:44

## 2024-03-07 NOTE — DIETITIAN INITIAL EVALUATION ADULT - PERTINENT MEDS FT
MEDICATIONS  (STANDING):  acetaminophen     Tablet .. 1000 milliGRAM(s) Oral every 8 hours  bisacodyl 5 milliGRAM(s) Oral every 12 hours  chlorhexidine 4% Liquid 1 Application(s) Topical daily  gabapentin 300 milliGRAM(s) Oral two times a day  HYDROmorphone PCA (1 mG/mL) 30 milliLiter(s) PCA Continuous PCA Continuous  insulin lispro (ADMELOG) corrective regimen sliding scale   SubCutaneous Before meals and at bedtime  methocarbamol 750 milliGRAM(s) Oral every 8 hours  multivitamin 1 Tablet(s) Oral daily  polyethylene glycol 3350 17 Gram(s) Oral two times a day  senna 2 Tablet(s) Oral at bedtime    MEDICATIONS  (PRN):  HYDROmorphone PCA (1 mG/mL) Rescue Clinician Bolus 0.5 milliGRAM(s) IV Push every 15 minutes PRN for Pain Scale GREATER THAN 6  magnesium hydroxide Suspension 30 milliLiter(s) Oral every 12 hours PRN Constipation  naloxone Injectable 0.1 milliGRAM(s) IV Push every 3 minutes PRN For ANY of the following changes in patient status:  A. RR LESS THAN 10 breaths per minute, B. Oxygen saturation LESS THAN 90%, C. Sedation score of 6  ondansetron Injectable 4 milliGRAM(s) IV Push every 6 hours PRN Nausea and/or Vomiting

## 2024-03-07 NOTE — OCCUPATIONAL THERAPY INITIAL EVALUATION ADULT - GENERAL OBSERVATIONS, REHAB EVAL
Pt received semisupine in bed in NAD, RN Pat aware. +IVL Pt received semisupine in bed in NAD, RN aware. +IVL +NSCU monitoring +grigsby +PCA pump +O2 NC +HMVx3 +drain

## 2024-03-07 NOTE — DIETITIAN INITIAL EVALUATION ADULT - NSFNSGIIOFT_GEN_A_CORE
-Last BM PTA. On bowel regimen (senna, Miralax, Dulcolax). Prescribed IV Zofran PRN.   -A1c 6.0%. History of pre-DM noted. Continues on insulin lispro sliding scale to aid in management of BG.   -Prescribed multivitamin.   -Multiple drains noted (R upper accordian, R lower accordian, L lower accordian and L upper accordian). See nursing flow sheet for output documentation.

## 2024-03-07 NOTE — DIETITIAN INITIAL EVALUATION ADULT - PROBLEM SELECTOR PLAN 1
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  - Appreciate NSGY recs; f/u further recs in AM  - keep NPO; added on for thoracic/lumbar decompression tentatively 3/5/24  - Neurorads consult in AM for T8 localization/marking  - c/w dexamethasone 4mg IV q6h   - c/w PPI daily and low ISS for now for ppx while on steroids  - bladder scan q8h x24 hrs to check for urinary retention; will consider grigsby placement jeanette-OR  - maintain fall/aspiration precautions, eventual PT/OT eval    #Preoperative evaluation  - No significant cardiac, pulmonary, or renal hx  - RCRI score of 0 (estimating 3.9% risk of MACE)  - Baseline >4 METs (prior to symptoms)   - Given urgency and significant morbidity/compromised quality of life associated with spinal cord compression, would not delay surgical intervention; no further workup needed at this time to proceed with thoracic/lumbar decompression from medicine standpoint

## 2024-03-07 NOTE — DIETITIAN INITIAL EVALUATION ADULT - NSFNSPHYEXAMSKINFT_GEN_A_CORE
Pressure Injury 1: Left:, knee, Suspected deep tissue injury  Pressure Injury 2: Right:, knee, Suspected deep tissue injury  Pressure Injury 3: sacrum, Suspected deep tissue injury  Pressure Injury 4: right shin, Stage II

## 2024-03-07 NOTE — OCCUPATIONAL THERAPY INITIAL EVALUATION ADULT - ADDITIONAL COMMENTS
Pt lives in a private home with 2 daughters there are 4 steps to enter,1 flight of stairs to bedroom, in the process of making first floor living situation. Pt performed ADL/IADLs independently up until last week when he required assist to ambulate. Has both a tub and walk in shower. In the past week pt has used rolling walker 2/2 back pain, does not use at baseline. Owns DME: rolling walker, hospital bed.

## 2024-03-07 NOTE — PROGRESS NOTE ADULT - SUBJECTIVE AND OBJECTIVE BOX
Patient admitted for osseous mets with cord compression.     24 hour Events:     Stage 1:  T8 VCR, T6-T10 fusion for tumor, small csf leak primarily repaired  Stage 2: L4 Partial Corpectomy, L2-Pelvis Fusion  Plastics Closure ([place])Allergies  3/6- Patient complains of back pain and movement is limited 2/2 pain.  3/7- Overnight no acute events, patient's examination is stable.    Allergies    No Known Drug Allergies  latex (Rash)    Intolerances        REVIEW OF SYSTEMS: [ ] Unable to Assess due to neurologic exam   [x] All ROS addressed below are non-contributory, except:  Neuro: [ ] Headache [ ] Back pain [ ] Numbness [ ] Weakness [ ] Ataxia [ ] Dizziness [ ] Aphasia [ ] Dysarthria [ ] Visual disturbance  Resp: [ ] Shortness of breath/dyspnea, [ ] Orthopnea [ ] Cough  CV: [ ] Chest pain [ ] Palpitation [ ] Lightheadedness [ ] Syncope  Renal: [ ] Thirst [ ] Edema  GI: [ ] Nausea [ ] Emesis [ ] Abdominal pain [ ] Constipation [ ] Diarrhea  Hem: [ ] Hematemesis [ ] bright red blood per rectum  ID: [ ] Fever [ ] Chills [ ] Dysuria  ENT: [ ] Rhinorrhea      DEVICES:   [ ] Restraints [ ] ET tube [ ] central line [ ] arterial line [ ] grigsby [ ] NGT/OGT [ ] EVD [ ] LD [ ] LILLIE/HMV [ ] Trach [ ] PEG [ ] Chest Tube     VITALS:   Vital Signs Last 24 Hrs  T(C): 37 (07 Mar 2024 03:00), Max: 37.1 (06 Mar 2024 11:05)  T(F): 98.6 (07 Mar 2024 03:00), Max: 98.8 (06 Mar 2024 11:05)  HR: 78 (07 Mar 2024 06:00) (65 - 83)  BP: --  BP(mean): --  RR: 19 (07 Mar 2024 06:00) (10 - 20)  SpO2: 97% (07 Mar 2024 06:00) (92% - 100%)    Parameters below as of 06 Mar 2024 19:00  Patient On (Oxygen Delivery Method): nasal cannula  O2 Flow (L/min): 2    CAPILLARY BLOOD GLUCOSE      POCT Blood Glucose.: 125 mg/dL (06 Mar 2024 21:33)  POCT Blood Glucose.: 129 mg/dL (06 Mar 2024 16:38)  POCT Blood Glucose.: 124 mg/dL (06 Mar 2024 12:22)  POCT Blood Glucose.: 105 mg/dL (06 Mar 2024 08:41)    I&O's Summary    06 Mar 2024 07:01  -  07 Mar 2024 07:00  --------------------------------------------------------  IN: 4175 mL / OUT: 1935 mL / NET: 2240 mL        Respiratory:    ABG - ( 06 Mar 2024 00:14 )  pH, Arterial: 7.44  pH, Blood: x     /  pCO2: 37    /  pO2: 177   / HCO3: 25    / Base Excess: 1.1   /  SaO2: 99.6                LABS:                        9.6    6.70  )-----------( 133      ( 07 Mar 2024 04:24 )             29.3     03-07    139  |  112<H>  |  24<H>  ----------------------------<  102<H>  3.6   |  20<L>  |  0.52             MEDICATION LEVELS:     IVF FLUIDS/MEDICATIONS:   MEDICATIONS  (STANDING):  acetaminophen     Tablet .. 1000 milliGRAM(s) Oral every 8 hours  bisacodyl 5 milliGRAM(s) Oral every 12 hours  chlorhexidine 4% Liquid 1 Application(s) Topical daily  gabapentin 300 milliGRAM(s) Oral two times a day  HYDROmorphone PCA (1 mG/mL) 30 milliLiter(s) PCA Continuous PCA Continuous  insulin lispro (ADMELOG) corrective regimen sliding scale   SubCutaneous Before meals and at bedtime  methocarbamol 750 milliGRAM(s) Oral every 8 hours  multivitamin 1 Tablet(s) Oral daily  polyethylene glycol 3350 17 Gram(s) Oral two times a day  senna 2 Tablet(s) Oral at bedtime    MEDICATIONS  (PRN):  HYDROmorphone PCA (1 mG/mL) Rescue Clinician Bolus 0.5 milliGRAM(s) IV Push every 15 minutes PRN for Pain Scale GREATER THAN 6  magnesium hydroxide Suspension 30 milliLiter(s) Oral every 12 hours PRN Constipation  naloxone Injectable 0.1 milliGRAM(s) IV Push every 3 minutes PRN For ANY of the following changes in patient status:  A. RR LESS THAN 10 breaths per minute, B. Oxygen saturation LESS THAN 90%, C. Sedation score of 6  ondansetron Injectable 4 milliGRAM(s) IV Push every 6 hours PRN Nausea and/or Vomiting      PHYSICAL EXAM:    General: No Acute Distress     Neurological: Awake, oriented x 3, Following Commands, PERRL, EOMI, Face Symmetrical, Speech Fluent, UE 5/5, RLE HF 4/5, KF 4/5, KE 3/5, DF/PF 5/5. LLE 5/5. Pain limited exam.   Pulmonary: Clear to Auscultation, No Rales, No Rhonchi, No Wheezes     Cardiovascular: S1, S2, Regular Rate and Rhythm     Gastrointestinal: Soft, Nontender, Nondistended     Extremities: No calf tenderness

## 2024-03-07 NOTE — DIETITIAN INITIAL EVALUATION ADULT - PERTINENT LABORATORY DATA
03-07    139  |  112<H>  |  24<H>  ----------------------------<  102<H>  3.6   |  20<L>  |  0.52    Ca    6.7<L>      07 Mar 2024 04:24  Phos  2.7     03-06  Mg     1.9     03-06    POCT Blood Glucose.: 125 mg/dL (03-06-24 @ 21:33)  A1C with Estimated Average Glucose Result: 6.0 % (03-05-24 @ 06:53)

## 2024-03-07 NOTE — DIETITIAN INITIAL EVALUATION ADULT - ADD RECOMMEND
1. Recommend to continue consistent carbohydrate diet as prescribed. Can consider liberalizing if POCT BG remains within or below FSBG goal. Defer consistency to medical team, SLP.   2. Encourage and monitor PO intake. Encourage use of daily menus. Honor dietary preferences as expressed as able.  3. Continue multivitamin as prescribed. Consider addition of vitamin C to further assist in wound healing.   4. RD to add diet Mighty Shakes twice daily to provide additional kcal/protein to support healing.   5. Monitor wt trends/labs/skin integrity/hydration status/bowel regularity.

## 2024-03-07 NOTE — DIETITIAN INITIAL EVALUATION ADULT - OTHER INFO
Dosing wt (3/5): 126.7 lbs  [facility] 130 lbs Dosing wt (3/5): 126.7 lbs  Wt trend (per [facility]): (4/11/23): 130 lbs  Pt reports  lbs. Appears consistent, will monitor/trend.

## 2024-03-07 NOTE — PROGRESS NOTE ADULT - ASSESSMENT
A/P:  68M w/ hx of HLD, pre-DM, presenting with c/f diffuse osseous spinal mets w/ cord compression. and cord edema POD 1 from   stage 1:  T8 VCR, T6-T10 fusion for tumor, small csf leak primarily repaired  Stage 2: L4 Partial Corpectomy, L2-Pelvis Fusion  Plastics Closure ([place])    Frozen: Poorly Differentiated Neoplasm    Neuro: neuro checks q 4 hr  Monitor for CSF leak, keep sitting up as tolerated per NS   CT - reviewed   Monitor drain output: 3 HMV (145), (165), (125) cc drained from each HMV, 1 bile bag (135)cc  PCA pump   follow official path   Pain management currently: Dilaudid, standing IV tylenol until PCA pump could be implemented  PT/OT    Respiratory: on 2 L NC   IS    CV: MAP> 65 mmhg     Endocrine:   Pre- DM: A1c 6.0    GI: CCD  Bowel regimen  LBM: PTA    Renal: IVL  Keep Oseguera in- per neurosurgery     Heme/Onc:    CBC stable   DVT ppx: SQL as per neurosurgery  3/5- LED - negative    ID: Afebrile      Social/Family:   Discharge planning:     Code Status: [x] Full Code [] DNR [] DNI [] Goals of Care:   Disposition: [x] ICU [] Stroke Unit [] RCU []PCU []Floor [] Discharge Home          A/P:  68M w/ hx of HLD, pre-DM, presenting with c/f diffuse osseous spinal mets w/ cord compression. and cord edema POD 1 from   stage 1:  T8 VCR, T6-T10 fusion for tumor, small csf leak primarily repaired  Stage 2: L4 Partial Corpectomy, L2-Pelvis Fusion  Plastics Closure ([place])    Frozen: Poorly Differentiated Neoplasm    Neuro: neuro checks q 4 hr  Monitor for CSF leak, keep sitting up as tolerated per NS   CT - reviewed   Monitor drain output: 3 HMV (145), (165), (125) cc drained from each HMV, 1 bile bag (135)cc  PCA pump (d/c'ed )  follow official path   Pain management currently: Dilaudid, standing IV tylenol until PCA pump could be implemented  PT/OT    Respiratory:   RA  IS    CV: MAP> 65 mmhg   Plan to wean off rg    Endocrine:   Pre- DM: A1c 6.0    GI: CCD  Bowel regimen  LBM: PTA    Renal: IVL  Keep Oseguera in- per neurosurgery     Heme/Onc:    CBC: thrombocytopenia. 4T score: 4: intermediate risk: will order HIT AB  DVT ppx: SQL ( on hold ) as per neurosurgery  3/5- LED - negative    ID: Afebrile      Social/Family:   Discharge planning:     Code Status: [x] Full Code [] DNR [] DNI [] Goals of Care:   Disposition: [x] ICU [] Stroke Unit [] RCU []PCU []Floor [] Discharge Home

## 2024-03-07 NOTE — PROGRESS NOTE ADULT - SUBJECTIVE AND OBJECTIVE BOX
Patient admitted for osseous mets with cord compression    24 hour Events:     Stage 1:  T8 VCR, T6-T10 fusion for tumor, small csf leak primarily repaired  Stage 2: L4 Partial Corpectomy, L2-Pelvis Fusion  Plastics Closure ([place])Allergies  3/6- Patient complains of back pain and movement is limited 2/2 pain.  3/7- Overnight no acute events, patient's examination is stable.    Allergies    No Known Drug Allergies  latex (Rash)    Intolerances        REVIEW OF SYSTEMS: [ ] Unable to Assess due to neurologic exam   [x] All ROS addressed below are non-contributory, except:  Neuro: [ ] Headache [ ] Back pain [ ] Numbness [ ] Weakness [ ] Ataxia [ ] Dizziness [ ] Aphasia [ ] Dysarthria [ ] Visual disturbance  Resp: [ ] Shortness of breath/dyspnea, [ ] Orthopnea [ ] Cough  CV: [ ] Chest pain [ ] Palpitation [ ] Lightheadedness [ ] Syncope  Renal: [ ] Thirst [ ] Edema  GI: [ ] Nausea [ ] Emesis [ ] Abdominal pain [ ] Constipation [ ] Diarrhea  Hem: [ ] Hematemesis [ ] bright red blood per rectum  ID: [ ] Fever [ ] Chills [ ] Dysuria  ENT: [ ] Rhinorrhea      DEVICES:   [ ] Restraints [ ] ET tube [ ] central line [ ] arterial line [ ] grigsby [ ] NGT/OGT [ ] EVD [ ] LD [ ] LILLIE/HMV [ ] Trach [ ] PEG [ ] Chest Tube     VITALS:   Vital Signs Last 24 Hrs  T(C): 37 (07 Mar 2024 03:00), Max: 37.1 (06 Mar 2024 11:05)  T(F): 98.6 (07 Mar 2024 03:00), Max: 98.8 (06 Mar 2024 11:05)  HR: 78 (07 Mar 2024 06:00) (65 - 83)  BP: --  BP(mean): --  RR: 19 (07 Mar 2024 06:00) (10 - 20)  SpO2: 97% (07 Mar 2024 06:00) (92% - 100%)    Parameters below as of 06 Mar 2024 19:00  Patient On (Oxygen Delivery Method): nasal cannula  O2 Flow (L/min): 2    CAPILLARY BLOOD GLUCOSE      POCT Blood Glucose.: 125 mg/dL (06 Mar 2024 21:33)  POCT Blood Glucose.: 129 mg/dL (06 Mar 2024 16:38)  POCT Blood Glucose.: 124 mg/dL (06 Mar 2024 12:22)  POCT Blood Glucose.: 105 mg/dL (06 Mar 2024 08:41)    I&O's Summary    06 Mar 2024 07:01  -  07 Mar 2024 07:00  --------------------------------------------------------  IN: 4175 mL / OUT: 1935 mL / NET: 2240 mL        Respiratory:    ABG - ( 06 Mar 2024 00:14 )  pH, Arterial: 7.44  pH, Blood: x     /  pCO2: 37    /  pO2: 177   / HCO3: 25    / Base Excess: 1.1   /  SaO2: 99.6                LABS:                        9.6    6.70  )-----------( 133      ( 07 Mar 2024 04:24 )             29.3     03-07    139  |  112<H>  |  24<H>  ----------------------------<  102<H>  3.6   |  20<L>  |  0.52             MEDICATION LEVELS:     IVF FLUIDS/MEDICATIONS:   MEDICATIONS  (STANDING):  acetaminophen     Tablet .. 1000 milliGRAM(s) Oral every 8 hours  bisacodyl 5 milliGRAM(s) Oral every 12 hours  chlorhexidine 4% Liquid 1 Application(s) Topical daily  gabapentin 300 milliGRAM(s) Oral two times a day  HYDROmorphone PCA (1 mG/mL) 30 milliLiter(s) PCA Continuous PCA Continuous  insulin lispro (ADMELOG) corrective regimen sliding scale   SubCutaneous Before meals and at bedtime  methocarbamol 750 milliGRAM(s) Oral every 8 hours  multivitamin 1 Tablet(s) Oral daily  polyethylene glycol 3350 17 Gram(s) Oral two times a day  senna 2 Tablet(s) Oral at bedtime    MEDICATIONS  (PRN):  HYDROmorphone PCA (1 mG/mL) Rescue Clinician Bolus 0.5 milliGRAM(s) IV Push every 15 minutes PRN for Pain Scale GREATER THAN 6  magnesium hydroxide Suspension 30 milliLiter(s) Oral every 12 hours PRN Constipation  naloxone Injectable 0.1 milliGRAM(s) IV Push every 3 minutes PRN For ANY of the following changes in patient status:  A. RR LESS THAN 10 breaths per minute, B. Oxygen saturation LESS THAN 90%, C. Sedation score of 6  ondansetron Injectable 4 milliGRAM(s) IV Push every 6 hours PRN Nausea and/or Vomiting      PHYSICAL EXAM:    General: No Acute Distress     Neurological: Awake, oriented x 3, Following Commands, PERRL, EOMI, Face Symmetrical, Speech Fluent, UE 5/5, RLE HF 4/5, KF 4/5, KE 3/5, DF/PF 5/5. LLE 5/5. Pain limited exam.   Pulmonary: Clear to Auscultation, No Rales, No Rhonchi, No Wheezes     Cardiovascular: S1, S2, Regular Rate and Rhythm     Gastrointestinal: Soft, Nontender, Nondistended     Extremities: No calf tenderness    Patient admitted for osseous mets with cord compression.     24 hour Events:     Stage 1:  T8 VCR, T6-T10 fusion for tumor, small csf leak primarily repaired  Stage 2: L4 Partial Corpectomy, L2-Pelvis Fusion  Plastics Closure ([place])Allergies  3/6- Patient complains of back pain and movement is limited 2/2 pain.  3/7- Overnight no acute events, patient's examination is stable.    Allergies    No Known Drug Allergies  latex (Rash)    Intolerances        REVIEW OF SYSTEMS: [ ] Unable to Assess due to neurologic exam   [x] All ROS addressed below are non-contributory, except:  Neuro: [ ] Headache [ ] Back pain [ ] Numbness [ ] Weakness [ ] Ataxia [ ] Dizziness [ ] Aphasia [ ] Dysarthria [ ] Visual disturbance  Resp: [ ] Shortness of breath/dyspnea, [ ] Orthopnea [ ] Cough  CV: [ ] Chest pain [ ] Palpitation [ ] Lightheadedness [ ] Syncope  Renal: [ ] Thirst [ ] Edema  GI: [ ] Nausea [ ] Emesis [ ] Abdominal pain [ ] Constipation [ ] Diarrhea  Hem: [ ] Hematemesis [ ] bright red blood per rectum  ID: [ ] Fever [ ] Chills [ ] Dysuria  ENT: [ ] Rhinorrhea      DEVICES:   [ ] Restraints [ ] ET tube [ ] central line [ ] arterial line [ ] grigsby [ ] NGT/OGT [ ] EVD [ ] LD [ ] LILLIE/HMV [ ] Trach [ ] PEG [ ] Chest Tube     VITALS:   Vital Signs Last 24 Hrs  T(C): 37 (07 Mar 2024 03:00), Max: 37.1 (06 Mar 2024 11:05)  T(F): 98.6 (07 Mar 2024 03:00), Max: 98.8 (06 Mar 2024 11:05)  HR: 78 (07 Mar 2024 06:00) (65 - 83)  BP: --  BP(mean): --  RR: 19 (07 Mar 2024 06:00) (10 - 20)  SpO2: 97% (07 Mar 2024 06:00) (92% - 100%)    Parameters below as of 06 Mar 2024 19:00  Patient On (Oxygen Delivery Method): nasal cannula  O2 Flow (L/min): 2    CAPILLARY BLOOD GLUCOSE      POCT Blood Glucose.: 125 mg/dL (06 Mar 2024 21:33)  POCT Blood Glucose.: 129 mg/dL (06 Mar 2024 16:38)  POCT Blood Glucose.: 124 mg/dL (06 Mar 2024 12:22)  POCT Blood Glucose.: 105 mg/dL (06 Mar 2024 08:41)    I&O's Summary    06 Mar 2024 07:01  -  07 Mar 2024 07:00  --------------------------------------------------------  IN: 4175 mL / OUT: 1935 mL / NET: 2240 mL        Respiratory:    ABG - ( 06 Mar 2024 00:14 )  pH, Arterial: 7.44  pH, Blood: x     /  pCO2: 37    /  pO2: 177   / HCO3: 25    / Base Excess: 1.1   /  SaO2: 99.6                LABS:                        9.6    6.70  )-----------( 133      ( 07 Mar 2024 04:24 )             29.3     03-07    139  |  112<H>  |  24<H>  ----------------------------<  102<H>  3.6   |  20<L>  |  0.52             MEDICATION LEVELS:     IVF FLUIDS/MEDICATIONS:   MEDICATIONS  (STANDING):  acetaminophen     Tablet .. 1000 milliGRAM(s) Oral every 8 hours  bisacodyl 5 milliGRAM(s) Oral every 12 hours  chlorhexidine 4% Liquid 1 Application(s) Topical daily  gabapentin 300 milliGRAM(s) Oral two times a day  HYDROmorphone PCA (1 mG/mL) 30 milliLiter(s) PCA Continuous PCA Continuous  insulin lispro (ADMELOG) corrective regimen sliding scale   SubCutaneous Before meals and at bedtime  methocarbamol 750 milliGRAM(s) Oral every 8 hours  multivitamin 1 Tablet(s) Oral daily  polyethylene glycol 3350 17 Gram(s) Oral two times a day  senna 2 Tablet(s) Oral at bedtime    MEDICATIONS  (PRN):  HYDROmorphone PCA (1 mG/mL) Rescue Clinician Bolus 0.5 milliGRAM(s) IV Push every 15 minutes PRN for Pain Scale GREATER THAN 6  magnesium hydroxide Suspension 30 milliLiter(s) Oral every 12 hours PRN Constipation  naloxone Injectable 0.1 milliGRAM(s) IV Push every 3 minutes PRN For ANY of the following changes in patient status:  A. RR LESS THAN 10 breaths per minute, B. Oxygen saturation LESS THAN 90%, C. Sedation score of 6  ondansetron Injectable 4 milliGRAM(s) IV Push every 6 hours PRN Nausea and/or Vomiting      PHYSICAL EXAM:    General: No Acute Distress     Neurological: Awake, oriented x 3, Following Commands, PERRL, EOMI, Face Symmetrical, Speech Fluent, UE 5/5, RLE HF 4/5, KF 4/5, KE 3/5, DF/PF 5/5. LLE 5/5. Pain limited exam.   Pulmonary: Clear to Auscultation, No Rales, No Rhonchi, No Wheezes     Cardiovascular: S1, S2, Regular Rate and Rhythm     Gastrointestinal: Soft, Nontender, Nondistended     Extremities: No calf tenderness

## 2024-03-07 NOTE — DIETITIAN INITIAL EVALUATION ADULT - PROBLEM SELECTOR PLAN 6
- DVT ppx: SCDs if DVT studies neg; hold chemical ppx per NSGY given likely OR today  - Diet: NPO  - Dispo: likely OR today

## 2024-03-07 NOTE — DIETITIAN INITIAL EVALUATION ADULT - ORAL INTAKE PTA/DIET HISTORY
-Pt reports appetite "OK" at baseline; consumes 3 meals daily. Denies decreased appetite/PO intake PTA.   -Denies food allergies. Denies intolerance to chewing/swallowing.   -Reports limiting starch. Hx of pre-DM noted. Otherwise, pt endorsed enjoyment of most foods.   -Reports taking daily multivitamin.   -Dneies nausea/vomiting/diarrhea; +constipation in-house. On bowel regimen (see below).

## 2024-03-07 NOTE — DIETITIAN INITIAL EVALUATION ADULT - PHYSCIAL ASSESSMENT
Pt in bed consuming breakfast meal; unable to conduct Nutrition Focused Physical Assessment at this time.

## 2024-03-07 NOTE — DIETITIAN INITIAL EVALUATION ADULT - PROBLEM SELECTOR PLAN 2
CT H/C/A/P and MRI spine imaging notable for diffuse osseous metastatic disease throughout the entire spine; 3rd rib lesion, b/l lung pleura lesions; mesenteric and retroperitoneal adenopathy; large fecal burden with narrowing versus underdistention in the sigmoid colon (last colonoscopy in 2017 without significant findings).  - was scheduled to meet with Oncologist, Dr. Gage Rodriguez ([facility]) for evaluation (but has not met yet)  - c/w pain control with tylenol/oxy PRN for now  - c/w bowel regimen  - f/u PSA level  - f/u BLE duplex given R>L leg edema/pains  - will need tissue sample to determine primary

## 2024-03-07 NOTE — PROGRESS NOTE ADULT - SUBJECTIVE AND OBJECTIVE BOX
Plastic Surgery Daily Resident Progress Note    SUBJECTIVE: Pt seen and examined at bedside on morning rounds without complains. Pain well controlled.    OBJECTIVE:  Vital Signs Last 24 Hrs  T(C): 37 (07 Mar 2024 03:00), Max: 37.1 (06 Mar 2024 07:00)  T(F): 98.6 (07 Mar 2024 03:00), Max: 98.8 (06 Mar 2024 11:05)  HR: 78 (07 Mar 2024 06:00) (59 - 83)  BP: --  BP(mean): --  RR: 19 (07 Mar 2024 06:00) (10 - 20)  SpO2: 97% (07 Mar 2024 06:00) (92% - 100%)    Parameters below as of 06 Mar 2024 19:00  Patient On (Oxygen Delivery Method): nasal cannula  O2 Flow (L/min): 2    Physical Exam:  General Appearance: A&Ox3, No acute distress  CV: RRR  Abdomen: Soft, ND, NT   Back: Soft, no palpable collections, dressings c/d/i, HVx4    multivitamin Tablet(s)      Medications:  MEDICATIONS  (STANDING):  acetaminophen     Tablet .. 1000 milliGRAM(s) Oral every 8 hours  bisacodyl 5 milliGRAM(s) Oral every 12 hours  chlorhexidine 4% Liquid 1 Application(s) Topical daily  gabapentin 300 milliGRAM(s) Oral two times a day  HYDROmorphone PCA (1 mG/mL) 30 milliLiter(s) PCA Continuous PCA Continuous  insulin lispro (ADMELOG) corrective regimen sliding scale   SubCutaneous Before meals and at bedtime  methocarbamol 750 milliGRAM(s) Oral every 8 hours  multivitamin 1 Tablet(s) Oral daily  polyethylene glycol 3350 17 Gram(s) Oral two times a day  senna 2 Tablet(s) Oral at bedtime    MEDICATIONS  (PRN):  HYDROmorphone PCA (1 mG/mL) Rescue Clinician Bolus 0.5 milliGRAM(s) IV Push every 15 minutes PRN for Pain Scale GREATER THAN 6  magnesium hydroxide Suspension 30 milliLiter(s) Oral every 12 hours PRN Constipation  naloxone Injectable 0.1 milliGRAM(s) IV Push every 3 minutes PRN For ANY of the following changes in patient status:  A. RR LESS THAN 10 breaths per minute, B. Oxygen saturation LESS THAN 90%, C. Sedation score of 6  ondansetron Injectable 4 milliGRAM(s) IV Push every 6 hours PRN Nausea and/or Vomiting    Allergies:  No Known Drug Allergies  latex (Rash)      Labs:  03-07    139  |  112<H>  |  24<H>  ----------------------------<  102<H>  3.6   |  20<L>  |  0.52    Ca    6.7<L>      07 Mar 2024 04:24  Phos  2.7     03-06  Mg     1.9     03-06                            9.6    6.70  )-----------( 133      ( 07 Mar 2024 04:24 )             29.3     PT/INR - ( 06 Mar 2024 02:38 )   PT: 11.8 sec;   INR: 1.07 ratio         PTT - ( 06 Mar 2024 02:38 )  PTT:47.4 sec

## 2024-03-07 NOTE — DIETITIAN INITIAL EVALUATION ADULT - REASON FOR ADMISSION
Pt is a 67 yo M with PMH: HLD, pre-DM. Presented with concern for diffuse osseous spinal mets with cord compression and cord edema. S/P stage I; T8 VCR, T6-10 fusion for tumor, small CSF leak primarily repaired. S/P stage 2; L4 partial corpectomy, L2-Pelvis fusion with plastics closure.

## 2024-03-07 NOTE — DIETITIAN INITIAL EVALUATION ADULT - NS FNS ENTERAL CURRENT ORDER
How Many Skin Cancers Have You Had?: one What Is The Reason For Today's Visit?: History of Non-Melanoma Skin Cancer When Was Your Last Cancer Diagnosed?: 2020 Current diet order meets estimated nutrient requirements

## 2024-03-07 NOTE — PROGRESS NOTE ADULT - SUBJECTIVE AND OBJECTIVE BOX
Patient seen and examined at bedside.    --Anticoagulation--    T(C): 37 (03-06-24 @ 19:00), Max: 37.1 (03-06-24 @ 03:00)  HR: 71 (03-06-24 @ 20:00) (59 - 80)  BP: --  RR: 14 (03-06-24 @ 20:00) (10 - 23)  SpO2: 99% (03-06-24 @ 20:00) (92% - 100%)  Wt(kg): --    Exam: AO3, BUE 5/5, L HF 4/5, R HF 4/5, and R KE 4+/5 with assistance, L KE 5/5, D/F and P/F 5/5 b/l. dec sensation RLE > LLE

## 2024-03-07 NOTE — DIETITIAN INITIAL EVALUATION ADULT - HEIGHT FOR BMI (CENTIMETERS)
165.1 Glycopyrrolate Counseling:  I discussed with the patient the risks of glycopyrrolate including but not limited to skin rash, drowsiness, dry mouth, difficulty urinating, and blurred vision.

## 2024-03-07 NOTE — DIETITIAN INITIAL EVALUATION ADULT - REASON INDICATOR FOR ASSESSMENT
Nutrition Assessment warranted for length of stay.  Information obtained from: RN, comprehensive chart review, interdisciplinary medical rounds  Nutrition Assessment warranted for length of stay.  Information obtained from: RN, comprehensive chart review, interdisciplinary medical rounds, patient

## 2024-03-07 NOTE — PROGRESS NOTE ADULT - ATTENDING COMMENTS
I agree with the above documentation by Dr. Caruso with the following summary/modifications -     68M w/hx of HLD, pre-DM, presenting with c/f diffuse osseous spinal mets w/ cord compression. and cord edema     Surgery 3/5 -  stage 1:  T8 VCR, T6-T10 fusion for tumor, small csf leak primarily repaired  Stage 2: L4 Partial Corpectomy, L2-Pelvis Fusion  Plastics Closure ([place])  Frozen: Poorly Differentiated Neoplasm    Recent events: started on PCA pump    Exam: oriented x 3, Following Commands,   PERRL, EOMI, Face Symmetrical, Speech Fluent  UEs maintains antigravity, at least 4/5 to confrontation  LLE lifts antigravity, weak R HF and KE 3-4/5    Plan:  neuro checks q4h  monitor for CSF leak , keep sitting up as tolerated per NS   clarify steroids with NSGY, f/u official pathology  monitor drain output  analgesia: tylenol, robaxin, PCA pump    Wean NC as able, incentive spirometer  MAP> 65 mmhg  CCD diet, PPI, bowel regimen -> monitor for BM  stop fingersticks, monitor glucose on BMP  jeanette-op ABx  SCDs, DVT ppx: hold chemoprophylaxis post-op -> will d/w NSGY    Medically complex  Non-critical care time: 40 minutes spent 2/2 post-spinal surgery, hemodynamic management, pain management I agree with the above documentation by Dr. Caruso with the following summary/modifications -     68M w/hx of HLD, pre-DM, presenting with c/f diffuse osseous spinal mets w/ cord compression. and cord edema     Surgery 3/5 -  stage 1:  T8 VCR, T6-T10 fusion for tumor, small csf leak primarily repaired  Stage 2: L4 Partial Corpectomy, L2-Pelvis Fusion  Plastics Closure ([place])  Frozen: Poorly Differentiated Neoplasm    Recent events: started on PCA pump    Exam: oriented x 3, Following Commands,   PERRL, EOMI, Face Symmetrical, Speech Fluent  UEs maintains antigravity, at least 4/5 to confrontation  LLE lifts antigravity, weak R HF and KE 3-4/5    Plan:  neuro checks q4h  monitor for CSF leak , keep sitting up as tolerated per NS   f/u official pathology  monitor drain output  analgesia: tylenol, robaxin, PCA pump    Wean NC as able, incentive spirometer  MAP> 65 mmhg  CCD diet, PPI, bowel regimen -> monitor for BM  stop fingersticks, monitor glucose on BMP  jeanette-op ABx  SCDs, DVT ppx: hold chemoprophylaxis post-op -> will d/w NSGY    Medically complex  Non-critical care time: 40 minutes spent 2/2 post-spinal surgery, hemodynamic management, pain management

## 2024-03-07 NOTE — PROGRESS NOTE ADULT - ASSESSMENT
68M w/ hx of HLD, pre-DM, presenting with c/f diffuse osseous spinal mets w/ cord compression. and cord edema. Now s/p T8 VCR, T6-T10 fusion for tumor, small csf leak primarily repaired, L4 Partial Corpectomy, L2-Pelvis Fusion, PRS Closure.     Plan:      - Pain control as needed  - Strict I&Os   - Monitor drain outputs   - Aquacel in place until POD5 (3/10)  - Appreciate care per primary team and NSC     Plastic Surgery  [phone]

## 2024-03-07 NOTE — PROGRESS NOTE ADULT - ASSESSMENT
[place]  68M, no major pmh, p/w difficulty ambulating since Saturday and OSH MRI report c/f diffuse osseous spinal mets w/ cord compression. Back pain started in Jan, which progressed to RLE weakness in Feb., and now unable to walk w/o assistance since Sat. No bowel/bladder incontinence. 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 CT CAP + for lesions in 3rd rib, L pleura, mesenteric/retroperitoneal adenopathy, narrowed sigmoid colon. PLTs/Coags wnl. Pre-Op exam: AO3, BUE 5/5, L HF 4+/5, R HF and KE 4/5, decreased sensation from R hip to ankle and along L shin/toes, normoreflexic, rectal tone wnl at rest, below normal w/ squeeze, cannot ambulate w/o assistance.    Now s/p   -T8 VCR, T6-T10 fusion for tumor, small csf leak primarily repaired  -L4 Partial Corpectomy, L2-Pelvis Fusion  -Plastics Closure ([place])    Can sit up as tolerated   f/u official path  Per DMS, can work with PT but no bending/lifting/twisting, no steroids  Does not need brace per DMS

## 2024-03-07 NOTE — PROGRESS NOTE ADULT - ASSESSMENT
A/P:  68M w/ hx of HLD, pre-DM, presenting with c/f diffuse osseous spinal mets w/ cord compression. and cord edema POD 1 from   stage 1:  T8 VCR, T6-T10 fusion for tumor, small csf leak primarily repaired  Stage 2: L4 Partial Corpectomy, L2-Pelvis Fusion  Plastics Closure ([place])    Frozen: Poorly Differentiated Neoplasm    Neuro: neuro checks q 1 hr  Monitor for CSF leak, keep sitting up as tolearted per NS   CT - reviewed   Monitor drain output: 3 HMV (145), (165), (125) cc drained from each HMV, 1 bile bag (135)cc  PCA pump   follow official path   Pain management currently: Dilaudid, standing IV tylenol until PCA pump could be implemented  PT/OT    Respiratory: on 3 L NC   IS    CV: MAP> 85 mmhg   TTE pending    Endocrine:   pre-DM, finger stick q 6 hr and ISS     GI: advance diet as tolerated - CCD  Bowel regimen  LBM: PTA    Renal: IVL    Heme/Onc:    CBC stable   DVT ppx: SQL as per neurosurgery  LED - pending results     ID: Afebrile      Social/Family:   Discharge planning:     Code Status: [x] Full Code [] DNR [] DNI [] Goals of Care:   Disposition: [x] ICU [] Stroke Unit [] RCU []PCU []Floor [] Discharge Home     Patient at high risk for neurologic deterioration, critical care time, excluding procedures: 40 minutes       A/P:  68M w/ hx of HLD, pre-DM, presenting with c/f diffuse osseous spinal mets w/ cord compression. and cord edema POD 1 from   stage 1:  T8 VCR, T6-T10 fusion for tumor, small csf leak primarily repaired  Stage 2: L4 Partial Corpectomy, L2-Pelvis Fusion  Plastics Closure ([place])    Frozen: Poorly Differentiated Neoplasm    Neuro: neuro checks q 4 hr  Monitor for CSF leak, keep sitting up as tolerated per NS   CT - reviewed   Monitor drain output: 3 HMV (145), (165), (125) cc drained from each HMV, 1 bile bag (135)cc  PCA pump   follow official path   Pain management currently: Dilaudid, standing IV tylenol until PCA pump could be implemented  PT/OT    Respiratory: on 2 L NC   IS    CV: MAP> 65 mmhg     Endocrine:   Pre- DM: A1c 6.0    GI: CCD  Bowel regimen  LBM: PTA    Renal: IVL  Keep Oseguera in- per neurosurgery     Heme/Onc:    CBC stable   DVT ppx: SQL as per neurosurgery  3/5- LED - negative    ID: Afebrile      Social/Family:   Discharge planning:     Code Status: [x] Full Code [] DNR [] DNI [] Goals of Care:   Disposition: [x] ICU [] Stroke Unit [] RCU []PCU []Floor [] Discharge Home     Patient at high risk for neurologic deterioration, critical care time, excluding procedures: 40 minutes       A/P:  68M w/ hx of HLD, pre-DM, presenting with c/f diffuse osseous spinal mets w/ cord compression. and cord edema POD 1 from   stage 1:  T8 VCR, T6-T10 fusion for tumor, small csf leak primarily repaired  Stage 2: L4 Partial Corpectomy, L2-Pelvis Fusion  Plastics Closure ([place])    Frozen: Poorly Differentiated Neoplasm    Neuro: neuro checks q 4 hr  Monitor for CSF leak, keep sitting up as tolerated per NS   CT - reviewed   Monitor drain output: 3 HMV (145), (165), (125) cc drained from each HMV, 1 bile bag (135)cc  PCA pump   follow official path   Pain management currently: Dilaudid, standing IV tylenol until PCA pump could be implemented  PT/OT    Respiratory: on 2 L NC   IS    CV: MAP> 65 mmhg     Endocrine:   Pre- DM: A1c 6.0    GI: CCD  Bowel regimen  LBM: PTA    Renal: IVL  Keep Oseguera in- per neurosurgery     Heme/Onc:    CBC stable   DVT ppx: SQL as per neurosurgery  3/5- LED - negative    ID: Afebrile      Social/Family:   Discharge planning:     Code Status: [x] Full Code [] DNR [] DNI [] Goals of Care:   Disposition: [x] ICU [] Stroke Unit [] RCU []PCU []Floor [] Discharge Home

## 2024-03-07 NOTE — PROGRESS NOTE ADULT - SUBJECTIVE AND OBJECTIVE BOX
Day 2\1 of Anesthesia Pain Management Service    SUBJECTIVE: Patient attempting to get OOB with RN / PT    Pain Scale Score:	[X] Refer to charted pain scores    THERAPY:    [ ] IV PCA Morphine		[ ] 5 mg/mL	[ ] 1 mg/mL  [X] IV PCA Hydromorphone	[ ] 5 mg/mL	[X] 1 mg/mL  [ ] IV PCA Fentanyl		[ ] 50 micrograms/mL    Demand dose: 0.2 mg     Lockout: 6 minutes   Continuous Rate: 0 mg/hr  4 Hour Limit: 4 mg    MEDICATIONS  (STANDING):  acetaminophen Tablet 1000 milliGRAM(s) Oral every 8 hours  bisacodyl 5 milliGRAM(s) Oral every 12 hours  calcium gluconate IVPB 2 Gram(s) IV Intermittent once  chlorhexidine 4% Liquid 1 Application(s) Topical daily  gabapentin 300 milliGRAM(s) Oral two times a day  HYDROmorphone PCA (1 mG/mL) 30 milliLiter(s) PCA Continuous PCA Continuous  methocarbamol 750 milliGRAM(s) Oral every 8 hours  multivitamin 1 Tablet(s) Oral daily  polyethylene glycol 3350 17 Gram(s) Oral two times a day  senna 2 Tablet(s) Oral at bedtime    MEDICATIONS  (PRN):  HYDROmorphone PCA (1 mG/mL) Rescue Clinician Bolus 0.5 milliGRAM(s) IV Push every 15 minutes PRN for Pain Scale GREATER THAN 6  magnesium hydroxide Suspension 30 milliLiter(s) Oral every 12 hours PRN Constipation  naloxone Injectable 0.1 milliGRAM(s) IV Push every 3 minutes PRN For ANY of the following changes in patient status:  A. RR LESS THAN 10 breaths per minute, B. Oxygen saturation LESS THAN 90%, C. Sedation score of 6  ondansetron Injectable 4 milliGRAM(s) IV Push every 6 hours PRN Nausea and/or Vomiting      OBJECTIVE:    Sedation Score:	[ X] Alert 	[ ] Drowsy 	[ ] Arousable	[ ] Asleep	[ ] Unresponsive    Side Effects:	[X ] None	[ ] Nausea	[ ] Vomiting	[ ] Pruritus  		[ ] Other:    Vital Signs Last 24 Hrs  T(C): 37.5 (07 Mar 2024 07:00), Max: 37.5 (07 Mar 2024 07:00)  T(F): 99.5 (07 Mar 2024 07:00), Max: 99.5 (07 Mar 2024 07:00)  HR: 82 (07 Mar 2024 09:00) (65 - 83)  BP: --  BP(mean): --  RR: 17 (07 Mar 2024 09:00) (10 - 20)  SpO2: 100% (07 Mar 2024 09:00) (92% - 100%)    Parameters below as of 07 Mar 2024 07:00  Patient On (Oxygen Delivery Method): nasal cannula  O2 Flow (L/min): 2      ASSESSMENT/ PLAN    Therapy to  be:               [X] Continued   [ ] Discontinued   [ ] Changed to PRN Analgesics    Documentation and Verification of current medications:   [X] Done	[ ] Not done, not eligible    Comments: Attempting to get OOB with assistance of RN / PT. Endorsing pain. Total PCA use 5.6mg / 19 hours. Continue PCA.

## 2024-03-07 NOTE — OCCUPATIONAL THERAPY INITIAL EVALUATION ADULT - PERTINENT HX OF CURRENT PROBLEM, REHAB EVAL
68M, no major pmh, p/w difficulty ambulating since Saturday and OSH MRI report c/f diffuse osseous spinal mets w/ cord compression. Back pain started in Jan, which progressed to RLE weakness in Feb., and now unable to walk w/o assistance since Sat. No bowel/bladder incontinence. 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 CT CAP + for lesions in 3rd rib, L pleura, mesenteric/retroperitoneal adenopathy, narrowed sigmoid colon. PLTs/Coags wnl. Pre-Op exam: AO3, BUE 5/5, L HF 4+/5, R HF and KE 4/5, decreased sensation from R hip to ankle and along L shin/toes, normoreflexic, rectal tone wnl at rest, below normal w/ squeeze, cannot ambulate w/o assistance.  Pt now s/p -T8 VCR, T6-T10 fusion for tumor, small csf leak primarily repaired -L4 Partial Corpectomy, L2-Pelvis Fusion

## 2024-03-08 ENCOUNTER — RESULT REVIEW (OUTPATIENT)
Age: 69
End: 2024-03-08

## 2024-03-08 LAB
ADD ON TEST-SPECIMEN IN LAB: SIGNIFICANT CHANGE UP
ANION GAP SERPL CALC-SCNC: 4 MMOL/L — LOW (ref 5–17)
BASOPHILS # BLD AUTO: 0.02 K/UL — SIGNIFICANT CHANGE UP (ref 0–0.2)
BASOPHILS NFR BLD AUTO: 0.3 % — SIGNIFICANT CHANGE UP (ref 0–2)
BUN SERPL-MCNC: 17 MG/DL — SIGNIFICANT CHANGE UP (ref 7–23)
CA-I BLD-SCNC: 1.19 MMOL/L — SIGNIFICANT CHANGE UP (ref 1.15–1.33)
CALCIUM SERPL-MCNC: 8 MG/DL — LOW (ref 8.4–10.5)
CHLORIDE SERPL-SCNC: 100 MMOL/L — SIGNIFICANT CHANGE UP (ref 96–108)
CO2 SERPL-SCNC: 31 MMOL/L — SIGNIFICANT CHANGE UP (ref 22–31)
CREAT SERPL-MCNC: 0.69 MG/DL — SIGNIFICANT CHANGE UP (ref 0.5–1.3)
EGFR: 102 ML/MIN/1.73M2 — SIGNIFICANT CHANGE UP
EOSINOPHIL # BLD AUTO: 0.11 K/UL — SIGNIFICANT CHANGE UP (ref 0–0.5)
EOSINOPHIL NFR BLD AUTO: 1.6 % — SIGNIFICANT CHANGE UP (ref 0–6)
GLUCOSE SERPL-MCNC: 141 MG/DL — HIGH (ref 70–99)
HCT VFR BLD CALC: 29.1 % — LOW (ref 39–50)
HCT VFR BLD CALC: 30.2 % — LOW (ref 39–50)
HEPARIN-PF4 AB RESULT: <0.6 U/ML — SIGNIFICANT CHANGE UP (ref 0–0.9)
HGB BLD-MCNC: 9.3 G/DL — LOW (ref 13–17)
HGB BLD-MCNC: 9.5 G/DL — LOW (ref 13–17)
IMM GRANULOCYTES NFR BLD AUTO: 0.6 % — SIGNIFICANT CHANGE UP (ref 0–0.9)
LYMPHOCYTES # BLD AUTO: 0.75 K/UL — LOW (ref 1–3.3)
LYMPHOCYTES # BLD AUTO: 10.9 % — LOW (ref 13–44)
MAGNESIUM SERPL-MCNC: 1.7 MG/DL — SIGNIFICANT CHANGE UP (ref 1.6–2.6)
MCHC RBC-ENTMCNC: 26.4 PG — LOW (ref 27–34)
MCHC RBC-ENTMCNC: 26.5 PG — LOW (ref 27–34)
MCHC RBC-ENTMCNC: 31.5 GM/DL — LOW (ref 32–36)
MCHC RBC-ENTMCNC: 32 GM/DL — SIGNIFICANT CHANGE UP (ref 32–36)
MCV RBC AUTO: 82.9 FL — SIGNIFICANT CHANGE UP (ref 80–100)
MCV RBC AUTO: 83.9 FL — SIGNIFICANT CHANGE UP (ref 80–100)
MONOCYTES # BLD AUTO: 0.56 K/UL — SIGNIFICANT CHANGE UP (ref 0–0.9)
MONOCYTES NFR BLD AUTO: 8.2 % — SIGNIFICANT CHANGE UP (ref 2–14)
NEUTROPHILS # BLD AUTO: 5.37 K/UL — SIGNIFICANT CHANGE UP (ref 1.8–7.4)
NEUTROPHILS NFR BLD AUTO: 78.4 % — HIGH (ref 43–77)
NRBC # BLD: 0 /100 WBCS — SIGNIFICANT CHANGE UP (ref 0–0)
NRBC # BLD: 0 /100 WBCS — SIGNIFICANT CHANGE UP (ref 0–0)
PF4 HEPARIN CMPLX AB SER-ACNC: NEGATIVE — SIGNIFICANT CHANGE UP
PHOSPHATE SERPL-MCNC: 2.6 MG/DL — SIGNIFICANT CHANGE UP (ref 2.5–4.5)
PLATELET # BLD AUTO: 141 K/UL — LOW (ref 150–400)
PLATELET # BLD AUTO: 150 K/UL — SIGNIFICANT CHANGE UP (ref 150–400)
POTASSIUM SERPL-MCNC: 4 MMOL/L — SIGNIFICANT CHANGE UP (ref 3.5–5.3)
POTASSIUM SERPL-SCNC: 4 MMOL/L — SIGNIFICANT CHANGE UP (ref 3.5–5.3)
RBC # BLD: 3.51 M/UL — LOW (ref 4.2–5.8)
RBC # BLD: 3.6 M/UL — LOW (ref 4.2–5.8)
RBC # FLD: 17.7 % — HIGH (ref 10.3–14.5)
RBC # FLD: 18 % — HIGH (ref 10.3–14.5)
SODIUM SERPL-SCNC: 135 MMOL/L — SIGNIFICANT CHANGE UP (ref 135–145)
WBC # BLD: 6.56 K/UL — SIGNIFICANT CHANGE UP (ref 3.8–10.5)
WBC # BLD: 6.85 K/UL — SIGNIFICANT CHANGE UP (ref 3.8–10.5)
WBC # FLD AUTO: 6.56 K/UL — SIGNIFICANT CHANGE UP (ref 3.8–10.5)
WBC # FLD AUTO: 6.85 K/UL — SIGNIFICANT CHANGE UP (ref 3.8–10.5)

## 2024-03-08 PROCEDURE — 93306 TTE W/DOPPLER COMPLETE: CPT | Mod: 26

## 2024-03-08 PROCEDURE — 93356 MYOCRD STRAIN IMG SPCKL TRCK: CPT

## 2024-03-08 PROCEDURE — 99233 SBSQ HOSP IP/OBS HIGH 50: CPT

## 2024-03-08 RX ORDER — ENOXAPARIN SODIUM 100 MG/ML
40 INJECTION SUBCUTANEOUS
Refills: 0 | Status: DISCONTINUED | OUTPATIENT
Start: 2024-03-08 | End: 2024-03-16

## 2024-03-08 RX ORDER — SODIUM,POTASSIUM PHOSPHATES 278-250MG
1 POWDER IN PACKET (EA) ORAL ONCE
Refills: 0 | Status: COMPLETED | OUTPATIENT
Start: 2024-03-08 | End: 2024-03-09

## 2024-03-08 RX ORDER — POLYETHYLENE GLYCOL 3350 17 G/17G
17 POWDER, FOR SOLUTION ORAL
Refills: 0 | Status: DISCONTINUED | OUTPATIENT
Start: 2024-03-08 | End: 2024-03-08

## 2024-03-08 RX ORDER — MAGNESIUM SULFATE 500 MG/ML
1 VIAL (ML) INJECTION ONCE
Refills: 0 | Status: COMPLETED | OUTPATIENT
Start: 2024-03-08 | End: 2024-03-09

## 2024-03-08 RX ORDER — MULTIVIT WITH MIN/MFOLATE/K2 340-15/3 G
296 POWDER (GRAM) ORAL ONCE
Refills: 0 | Status: COMPLETED | OUTPATIENT
Start: 2024-03-08 | End: 2024-03-08

## 2024-03-08 RX ADMIN — OXYCODONE HYDROCHLORIDE 5 MILLIGRAM(S): 5 TABLET ORAL at 19:32

## 2024-03-08 RX ADMIN — OXYCODONE HYDROCHLORIDE 5 MILLIGRAM(S): 5 TABLET ORAL at 10:21

## 2024-03-08 RX ADMIN — Medication 1000 MILLIGRAM(S): at 14:16

## 2024-03-08 RX ADMIN — OXYCODONE HYDROCHLORIDE 10 MILLIGRAM(S): 5 TABLET ORAL at 00:07

## 2024-03-08 RX ADMIN — ENOXAPARIN SODIUM 40 MILLIGRAM(S): 100 INJECTION SUBCUTANEOUS at 17:57

## 2024-03-08 RX ADMIN — Medication 1000 MILLIGRAM(S): at 21:52

## 2024-03-08 RX ADMIN — HYDROMORPHONE HYDROCHLORIDE 0.25 MILLIGRAM(S): 2 INJECTION INTRAMUSCULAR; INTRAVENOUS; SUBCUTANEOUS at 13:35

## 2024-03-08 RX ADMIN — CHLORHEXIDINE GLUCONATE 1 APPLICATION(S): 213 SOLUTION TOPICAL at 22:37

## 2024-03-08 RX ADMIN — Medication 1 TABLET(S): at 11:40

## 2024-03-08 RX ADMIN — Medication 5 MILLIGRAM(S): at 11:40

## 2024-03-08 RX ADMIN — OXYCODONE HYDROCHLORIDE 5 MILLIGRAM(S): 5 TABLET ORAL at 10:51

## 2024-03-08 RX ADMIN — METHOCARBAMOL 750 MILLIGRAM(S): 500 TABLET, FILM COATED ORAL at 14:45

## 2024-03-08 RX ADMIN — POLYETHYLENE GLYCOL 3350 17 GRAM(S): 17 POWDER, FOR SOLUTION ORAL at 17:57

## 2024-03-08 RX ADMIN — Medication 5 MILLIGRAM(S): at 23:32

## 2024-03-08 RX ADMIN — METHOCARBAMOL 750 MILLIGRAM(S): 500 TABLET, FILM COATED ORAL at 21:52

## 2024-03-08 RX ADMIN — Medication 1000 MILLIGRAM(S): at 05:46

## 2024-03-08 RX ADMIN — Medication 1000 MILLIGRAM(S): at 22:22

## 2024-03-08 RX ADMIN — OXYCODONE HYDROCHLORIDE 10 MILLIGRAM(S): 5 TABLET ORAL at 06:32

## 2024-03-08 RX ADMIN — METHOCARBAMOL 750 MILLIGRAM(S): 500 TABLET, FILM COATED ORAL at 05:47

## 2024-03-08 RX ADMIN — GABAPENTIN 300 MILLIGRAM(S): 400 CAPSULE ORAL at 17:57

## 2024-03-08 RX ADMIN — OXYCODONE HYDROCHLORIDE 10 MILLIGRAM(S): 5 TABLET ORAL at 05:57

## 2024-03-08 RX ADMIN — HYDROMORPHONE HYDROCHLORIDE 0.25 MILLIGRAM(S): 2 INJECTION INTRAMUSCULAR; INTRAVENOUS; SUBCUTANEOUS at 04:08

## 2024-03-08 RX ADMIN — OXYCODONE HYDROCHLORIDE 5 MILLIGRAM(S): 5 TABLET ORAL at 20:02

## 2024-03-08 RX ADMIN — HYDROMORPHONE HYDROCHLORIDE 0.25 MILLIGRAM(S): 2 INJECTION INTRAMUSCULAR; INTRAVENOUS; SUBCUTANEOUS at 13:50

## 2024-03-08 RX ADMIN — GABAPENTIN 300 MILLIGRAM(S): 400 CAPSULE ORAL at 05:46

## 2024-03-08 RX ADMIN — SENNA PLUS 2 TABLET(S): 8.6 TABLET ORAL at 21:52

## 2024-03-08 RX ADMIN — Medication 296 MILLILITER(S): at 17:57

## 2024-03-08 RX ADMIN — HYDROMORPHONE HYDROCHLORIDE 0.25 MILLIGRAM(S): 2 INJECTION INTRAMUSCULAR; INTRAVENOUS; SUBCUTANEOUS at 04:38

## 2024-03-08 NOTE — PROGRESS NOTE ADULT - SUBJECTIVE AND OBJECTIVE BOX
Patient admitted for osseous mets with cord compression.     24 hour Events:     Stage 1:  T8 VCR, T6-T10 fusion for tumor, small csf leak primarily repaired  Stage 2: L4 Partial Corpectomy, L2-Pelvis Fusion  Plastics Closure ([place])Allergies  3/6- Patient complains of back pain and movement is limited 2/2 pain.  3/7- Overnight no acute events, patient's examination is stable.  3/8- No acute events overnight. Patient with fluctuating examination, due to pain.     Allergies    No Known Drug Allergies  latex (Rash)    Intolerances        REVIEW OF SYSTEMS: [ ] Unable to Assess due to neurologic exam   [x ] All ROS addressed below are non-contributory, except:  Neuro: [ ] Headache [ ] Back pain [ ] Numbness [ ] Weakness [ ] Ataxia [ ] Dizziness [ ] Aphasia [ ] Dysarthria [ ] Visual disturbance  Resp: [ ] Shortness of breath/dyspnea, [ ] Orthopnea [ ] Cough  CV: [ ] Chest pain [ ] Palpitation [ ] Lightheadedness [ ] Syncope  Renal: [ ] Thirst [ ] Edema  GI: [ ] Nausea [ ] Emesis [ ] Abdominal pain [ ] Constipation [ ] Diarrhea  Hem: [ ] Hematemesis [ ] bright red blood per rectum  ID: [ ] Fever [ ] Chills [ ] Dysuria  ENT: [ ] Rhinorrhea      DEVICES:   [ ] Restraints [ ] ET tube [ ] central line [ ] arterial line [ ] grigsby [ ] NGT/OGT [ ] EVD [ ] LD [ ] LILLIE/HMV [ ] Trach [ ] PEG [ ] Chest Tube     VITALS:   Vital Signs Last 24 Hrs  T(C): 37.1 (07 Mar 2024 23:00), Max: 37.2 (07 Mar 2024 15:00)  T(F): 98.7 (07 Mar 2024 23:00), Max: 99 (07 Mar 2024 15:00)  HR: 80 (08 Mar 2024 06:00) (69 - 90)  BP: --  BP(mean): --  RR: 19 (08 Mar 2024 06:00) (11 - 24)  SpO2: 100% (08 Mar 2024 06:00) (92% - 100%)    Parameters below as of 07 Mar 2024 23:00  Patient On (Oxygen Delivery Method): nasal cannula  O2 Flow (L/min): 2    CAPILLARY BLOOD GLUCOSE      POCT Blood Glucose.: 98 mg/dL (07 Mar 2024 08:33)    I&O's Summary    07 Mar 2024 07:01  -  08 Mar 2024 07:00  --------------------------------------------------------  IN: 1521.6 mL / OUT: 2242 mL / NET: -720.4 mL        Respiratory:        LABS:                        9.5    6.85  )-----------( 141      ( 08 Mar 2024 04:10 )             30.2     03-07    139  |  112<H>  |  24<H>  ----------------------------<  102<H>  3.6   |  20<L>  |  0.52             MEDICATION LEVELS:     IVF FLUIDS/MEDICATIONS:   MEDICATIONS  (STANDING):  acetaminophen     Tablet .. 1000 milliGRAM(s) Oral every 8 hours  bisacodyl 5 milliGRAM(s) Oral every 12 hours  chlorhexidine 4% Liquid 1 Application(s) Topical daily  gabapentin 300 milliGRAM(s) Oral two times a day  methocarbamol 750 milliGRAM(s) Oral every 8 hours  multivitamin 1 Tablet(s) Oral daily  polyethylene glycol 3350 17 Gram(s) Oral two times a day  senna 2 Tablet(s) Oral at bedtime    MEDICATIONS  (PRN):  acetaminophen     Tablet .. 650 milliGRAM(s) Oral every 6 hours PRN Temp greater or equal to 38.5C (101.3F), Mild Pain (1 - 3)  HYDROmorphone  Injectable 0.25 milliGRAM(s) IV Push every 4 hours PRN break through pain  magnesium hydroxide Suspension 30 milliLiter(s) Oral every 12 hours PRN Constipation  ondansetron Injectable 4 milliGRAM(s) IV Push every 6 hours PRN Nausea and/or Vomiting  oxyCODONE    IR 5 milliGRAM(s) Oral every 4 hours PRN Moderate Pain (4 - 6)  oxyCODONE    IR 10 milliGRAM(s) Oral every 4 hours PRN Severe Pain (7 - 10)        IMAGING:      EXAMINATION:  PHYSICAL EXAM:    Constitutional: No Acute Distress     Neurological: Awake, alert oriented to person, place and time, Following Commands, PERRL, EOMI, No Gaze Preference, Face Symmetrical. Pain limited exam.    Motor exam:          Upper extremity                         Delt     Bicep     Tricep    HG                                                 R         5/5 5/5        5/5       5/5                                               L          5/5 5/5 5/5       5/5          Lower extremity                        HF         KF        KE       DF         PF                                                  R        4/5 4/5        4/5 5/5 5/5                                               L         4+/5        5/5 5/5 5/5 5/5    Pulmonary: Clear to Auscultation, No rales, No rhonchi, No wheezes     Cardiovascular: S1, S2, Regular rate and rhythm     Gastrointestinal: Soft, Non-tender, Non-distended     Extremities: No calf tenderness     Incision:

## 2024-03-08 NOTE — PROGRESS NOTE ADULT - SUBJECTIVE AND OBJECTIVE BOX
Patient admitted for osseous mets with cord compression.     24 hour Events:     Stage 1:  T8 VCR, T6-T10 fusion for tumor, small csf leak primarily repaired  Stage 2: L4 Partial Corpectomy, L2-Pelvis Fusion  Plastics Closure ([place])Allergies  3/6- Patient complains of back pain and movement is limited 2/2 pain.  3/7- Overnight no acute events, patient's examination is stable.  3/8- No acute events overnight. Patient with fluctuating examination, due to pain.     Allergies    No Known Drug Allergies  latex (Rash)    Intolerances        REVIEW OF SYSTEMS: [ ] Unable to Assess due to neurologic exam   [x ] All ROS addressed below are non-contributory, except:  Neuro: [ ] Headache [ ] Back pain [ ] Numbness [ ] Weakness [ ] Ataxia [ ] Dizziness [ ] Aphasia [ ] Dysarthria [ ] Visual disturbance  Resp: [ ] Shortness of breath/dyspnea, [ ] Orthopnea [ ] Cough  CV: [ ] Chest pain [ ] Palpitation [ ] Lightheadedness [ ] Syncope  Renal: [ ] Thirst [ ] Edema  GI: [ ] Nausea [ ] Emesis [ ] Abdominal pain [ ] Constipation [ ] Diarrhea  Hem: [ ] Hematemesis [ ] bright red blood per rectum  ID: [ ] Fever [ ] Chills [ ] Dysuria  ENT: [ ] Rhinorrhea      DEVICES:   [ ] Restraints [ ] ET tube [ ] central line [ ] arterial line [ ] grigsby [ ] NGT/OGT [ ] EVD [ ] LD [ ] LILLIE/HMV [ ] Trach [ ] PEG [ ] Chest Tube     VITALS:   Vital Signs Last 24 Hrs  T(C): 37.1 (07 Mar 2024 23:00), Max: 37.2 (07 Mar 2024 15:00)  T(F): 98.7 (07 Mar 2024 23:00), Max: 99 (07 Mar 2024 15:00)  HR: 80 (08 Mar 2024 06:00) (69 - 90)  BP: --  BP(mean): --  RR: 19 (08 Mar 2024 06:00) (11 - 24)  SpO2: 100% (08 Mar 2024 06:00) (92% - 100%)    Parameters below as of 07 Mar 2024 23:00  Patient On (Oxygen Delivery Method): nasal cannula  O2 Flow (L/min): 2    CAPILLARY BLOOD GLUCOSE      POCT Blood Glucose.: 98 mg/dL (07 Mar 2024 08:33)    I&O's Summary    07 Mar 2024 07:01  -  08 Mar 2024 07:00  --------------------------------------------------------  IN: 1521.6 mL / OUT: 2242 mL / NET: -720.4 mL        Respiratory:        LABS:                        9.5    6.85  )-----------( 141      ( 08 Mar 2024 04:10 )             30.2     03-07    139  |  112<H>  |  24<H>  ----------------------------<  102<H>  3.6   |  20<L>  |  0.52             MEDICATION LEVELS:     IVF FLUIDS/MEDICATIONS:   MEDICATIONS  (STANDING):  acetaminophen     Tablet .. 1000 milliGRAM(s) Oral every 8 hours  bisacodyl 5 milliGRAM(s) Oral every 12 hours  chlorhexidine 4% Liquid 1 Application(s) Topical daily  gabapentin 300 milliGRAM(s) Oral two times a day  methocarbamol 750 milliGRAM(s) Oral every 8 hours  multivitamin 1 Tablet(s) Oral daily  polyethylene glycol 3350 17 Gram(s) Oral two times a day  senna 2 Tablet(s) Oral at bedtime    MEDICATIONS  (PRN):  acetaminophen     Tablet .. 650 milliGRAM(s) Oral every 6 hours PRN Temp greater or equal to 38.5C (101.3F), Mild Pain (1 - 3)  HYDROmorphone  Injectable 0.25 milliGRAM(s) IV Push every 4 hours PRN break through pain  magnesium hydroxide Suspension 30 milliLiter(s) Oral every 12 hours PRN Constipation  ondansetron Injectable 4 milliGRAM(s) IV Push every 6 hours PRN Nausea and/or Vomiting  oxyCODONE    IR 5 milliGRAM(s) Oral every 4 hours PRN Moderate Pain (4 - 6)  oxyCODONE    IR 10 milliGRAM(s) Oral every 4 hours PRN Severe Pain (7 - 10)        IMAGING:      EXAMINATION:  PHYSICAL EXAM:    Constitutional: No Acute Distress     Neurological: Awake, alert oriented to person, place and time, Following Commands, PERRL, EOMI, No Gaze Preference, Face Symmetrical. Pain limited exam.    Motor exam:          Upper extremity                         Delt     Bicep     Tricep    HG                                                 R         5/5 5/5        5/5       5/5                                               L          5/5 5/5 5/5       5/5          Lower extremity                        HF         KF        KE       DF         PF                                                  R        4/5 4/5        4/5 5/5 5/5                                               L         4+/5        5/5 5/5 5/5 5/5    Pulmonary: Clear to Auscultation, No rales, No rhonchi, No wheezes     Cardiovascular: S1, S2, Regular rate and rhythm     Gastrointestinal: Soft, Non-tender, Non-distended     Extremities: No calf tenderness     Incision:    Patient admitted for osseous mets with cord compression.     24 hour Events:     Stage 1:  T8 VCR, T6-T10 fusion for tumor, small csf leak primarily repaired  Stage 2: L4 Partial Corpectomy, L2-Pelvis Fusion  Plastics Closure ([place])Allergies  3/6- Patient complains of back pain and movement is limited 2/2 pain.  3/7- Overnight no acute events, patient's examination is stable.  3/8- No acute events overnight. Patient with fluctuating examination, due to pain.     Allergies    No Known Drug Allergies  latex (Rash)    Intolerances      REVIEW OF SYSTEMS: [ ] Unable to Assess due to neurologic exam   [x ] All ROS addressed below are non-contributory, except:  Neuro: [ ] Headache [ ] Back pain [ ] Numbness [ ] Weakness [ ] Ataxia [ ] Dizziness [ ] Aphasia [ ] Dysarthria [ ] Visual disturbance  Resp: [ ] Shortness of breath/dyspnea, [ ] Orthopnea [ ] Cough  CV: [ ] Chest pain [ ] Palpitation [ ] Lightheadedness [ ] Syncope  Renal: [ ] Thirst [ ] Edema  GI: [ ] Nausea [ ] Emesis [ ] Abdominal pain [ ] Constipation [ ] Diarrhea  Hem: [ ] Hematemesis [ ] bright red blood per rectum  ID: [ ] Fever [ ] Chills [ ] Dysuria  ENT: [ ] Rhinorrhea    ICU Vital Signs Last 24 Hrs  T(C): 36.8 (08 Mar 2024 19:00), Max: 37.1 (07 Mar 2024 23:00)  T(F): 98.3 (08 Mar 2024 19:00), Max: 98.7 (07 Mar 2024 23:00)  HR: 73 (08 Mar 2024 20:00) (69 - 88)  BP: --  BP(mean): --  ABP: 116/54 (08 Mar 2024 20:00) (102/38 - 134/58)  ABP(mean): 77 (08 Mar 2024 20:00) (62 - 88)  RR: 16 (08 Mar 2024 20:00) (11 - 19)  SpO2: 100% (08 Mar 2024 20:00) (96% - 100%)    O2 Parameters below as of 08 Mar 2024 19:00  Patient On (Oxygen Delivery Method): nasal cannula  O2 Flow (L/min): 2    I&O's Summary    07 Mar 2024 07:01  -  08 Mar 2024 07:00  --------------------------------------------------------  IN: 1571.6 mL / OUT: 2302 mL / NET: -730.4 mL    08 Mar 2024 07:01  -  08 Mar 2024 22:30  --------------------------------------------------------  IN: 672 mL / OUT: 1635 mL / NET: -963 mL                          9.5    6.85  )-----------( 141      ( 08 Mar 2024 04:10 )             30.2   03-07    139  |  112<H>  |  24<H>  ----------------------------<  102<H>  3.6   |  20<L>  |  0.52    Ca    6.7<L>      07 Mar 2024 04:24    MEDICATIONS  (STANDING):  acetaminophen     Tablet .. 1000 milliGRAM(s) Oral every 8 hours  bisacodyl 5 milliGRAM(s) Oral every 12 hours  chlorhexidine 4% Liquid 1 Application(s) Topical daily  enoxaparin Injectable 40 milliGRAM(s) SubCutaneous <User Schedule>  gabapentin 300 milliGRAM(s) Oral two times a day  methocarbamol 750 milliGRAM(s) Oral every 8 hours  multivitamin 1 Tablet(s) Oral daily  polyethylene glycol 3350 17 Gram(s) Oral two times a day  senna 2 Tablet(s) Oral at bedtime    MEDICATIONS  (PRN):  acetaminophen     Tablet .. 650 milliGRAM(s) Oral every 6 hours PRN Temp greater or equal to 38.5C (101.3F), Mild Pain (1 - 3)  HYDROmorphone  Injectable 0.25 milliGRAM(s) IV Push every 4 hours PRN break through pain  magnesium hydroxide Suspension 30 milliLiter(s) Oral every 12 hours PRN Constipation  ondansetron Injectable 4 milliGRAM(s) IV Push every 6 hours PRN Nausea and/or Vomiting  oxyCODONE    IR 10 milliGRAM(s) Oral every 4 hours PRN Severe Pain (7 - 10)  oxyCODONE    IR 5 milliGRAM(s) Oral every 4 hours PRN Moderate Pain (4 - 6)    < from: CT Pelvis Bony Only No Cont (03.06.24 @ 11:37) >  Findings:    There is redemonstration of a moderate pathologic compression fracture of   the L4 vertebral body. Patient is status post posterior decompression   through the imaged portion of the lower lumbar spine to the level of   L5-S1. Patient is status post lumbar pelvic spinal fusion with paired   bilateral paraspinal rods extending from the imaged portion of the lower   thoracic spine to the level of S2. There are paired bilateral pedicle   screws at the levels of L3, L4, L5, and S1. There are bilateral   sacroiliac bolts at the level of S2. The bilateral L3 and L5 and left L4   pedicle screws are augmented with cement placement. Extruded cement   material extends along the right psoas muscle from the level of the right   L3 pedicle screw by approximately 3.5 cm. Postsurgical changes are seen   within the post laminectomy bed include soft tissue swelling,   subcutaneous and epidural air, and a surgical drain. Morcellated bone   graft material seen posteriorly. Soft tissue fullness is seen within the   right greater than left psoas muscles consistent with known extraosseous   extension of disease. No epidural extension of disease is better seen on   prior MRI.    Additional areas of metastatic disease within the right hemisacrum are   better seen on prior MRI.    No disproportionate fatty atrophy of musculature.    Oseguera catheter within urinary bladder.    IMPRESSION:    Status post instrumented pelvic lumbar pelvic spinal fusion as above.   Pathologic moderate compression deformity of the L4 vertebral body,   unchanged. Additional area of metastatic disease within the right   hemisacrum is better seen on previous MRI.      EXAMINATION:  PHYSICAL EXAM:    Constitutional: No Acute Distress     Neurological: Awake, alert oriented to person, place and time, Following Commands, PERRL, EOMI, No Gaze Preference, Face Symmetrical. Pain limited exam.    Motor exam:          Upper extremity                         Delt     Bicep     Tricep    HG                                                 R         5/5        5/5        5/5       5/5                                               L          5/5        5/5        5/5       5/5          Lower extremity                        HF         KF        KE       DF         PF                                                  R        4/5        4/5        4/5       5/5         5/5                                               L         4+/5        4/5       5/5       5/5          5/5    Pulmonary: Clear to Auscultation, No rales, No rhonchi, No wheezes     Cardiovascular: S1, S2, Regular rate and rhythm     Gastrointestinal: Soft, Non-tender, Non-distended     Extremities: No calf tenderness

## 2024-03-08 NOTE — PROGRESS NOTE ADULT - SUBJECTIVE AND OBJECTIVE BOX
Patient seen and examined at bedside.    --Anticoagulation--    T(C): 37.1 (03-07-24 @ 23:00), Max: 37.5 (03-07-24 @ 07:00)  HR: 80 (03-08-24 @ 00:00) (70 - 90)  BP: --  RR: 13 (03-08-24 @ 00:00) (12 - 24)  SpO2: 99% (03-08-24 @ 00:00) (92% - 100%)  Wt(kg): --    Exam: AO3, BUE 5/5, L HF 4/5, R HF/KF 4/5, KE 4/5, ow b/l D/F and P/F 5/5, LLE 5/5. dec sensation RLE > LLE

## 2024-03-08 NOTE — PROGRESS NOTE ADULT - ATTENDING COMMENTS
68-year-old man with diffuse osseous spinal mets w/ cord compression and cord edema status post stage 1 T8 VCR, T6-T10 fusion for tumor with small csf leak primarily repaired and Stage 2 L4 Partial Corpectomy, L2-Pelvis Fusion with Plastic closure.     Pain control  Monitor drain output  MAP >65  Start VTE prophylaxis  Bowel regimen    Patient seen, data reviewed, care plan updated and coordinated with NSGY.

## 2024-03-08 NOTE — PROGRESS NOTE ADULT - ASSESSMENT
A/P:  68M w/ hx of HLD, pre-DM, presenting with c/f diffuse osseous spinal mets w/ cord compression and cord edema POD 3 from   stage 1:  T8 VCR, T6-T10 fusion for tumor, small csf leak primarily repaired  Stage 2: L4 Partial Corpectomy, L2-Pelvis Fusion  Plastics Closure ([place])    Frozen: Poorly Differentiated Neoplasm    Neuro: neuro checks q 4 hr  No evidence of CSF leak, keep sitting up as tolerated per NS   CT - reviewed   Monitor drain output: 3 HMV (90), (50), (70) cc drained from each HMV, 1 bile bag (2)cc  PCA pump (d/c'ed )-- oxy and dilaudid for breakthrough pain   follow official path   Pain management currently: Dilaudid, standing IV tylenol until PCA pump could be implemented  PT/OT/OOB    Respiratory:   2L   IS    CV: MAP> 65 mmhg     Endocrine:   Pre- DM: A1c 6.0    GI: CCD  Bowel regimen, Mag citrate   LBM: PTA    Renal: IVL  Keep Oseguera in- per neurosurgery     Heme/Onc:    CBC: thrombocytopenia- improving 4T score: 4: intermediate risk: will order HIT AB  DVT ppx: SQL restart today  3/5- LED - negative    ID: Afebrile    Disposition: Floor      Social/Family:   Discharge planning:     Code Status: [x] Full Code [] DNR [] DNI [] Goals of Care:   Disposition: [x] ICU [] Stroke Unit [] RCU []PCU []Floor [] Discharge Home          A/P:  68M w/ hx of HLD, pre-DM, presenting with c/f diffuse osseous spinal mets w/ cord compression and cord edema POD 3 from   stage 1:  T8 VCR, T6-T10 fusion for tumor, small csf leak primarily repaired  Stage 2: L4 Partial Corpectomy, L2-Pelvis Fusion  Plastics Closure ([place])    Frozen: Poorly Differentiated Neoplasm    Neuro: neuro checks q 4 hr  No evidence of CSF leak, keep sitting up as tolerated per NS   CT - reviewed   Monitor drain outpu  PCA pump (d/c'ed )-- oxy and dilaudid for breakthrough pain   follow official path   Pain management currently: Dilaudid, standing IV tylenol until PCA pump could be implemented  PT/OT/OOB    Respiratory:   2L   IS    CV: MAP> 65 mmhg     Endocrine:   Pre- DM: A1c 6.0    GI: CCD  Bowel regimen, Mag citrate   LBM: PTA    Renal: IVL  Keep Oseguera in- per neurosurgery     Heme/Onc:    CBC: thrombocytopenia  negative PF4 AB  DVT ppx: SQL restart today  3/5- LED - negative    ID: Afebrile    Disposition: Floor      Social/Family:   Discharge planning:     Code Status: [x] Full Code [] DNR [] DNI [] Goals of Care:   Disposition: [x] ICU [] Stroke Unit [] RCU []PCU []Floor [] Discharge Home

## 2024-03-08 NOTE — PROGRESS NOTE ADULT - ASSESSMENT
A/P:  68M w/ hx of HLD, pre-DM, presenting with c/f diffuse osseous spinal mets w/ cord compression. and cord edema POD 3 from   stage 1:  T8 VCR, T6-T10 fusion for tumor, small csf leak primarily repaired  Stage 2: L4 Partial Corpectomy, L2-Pelvis Fusion  Plastics Closure ([place])    Frozen: Poorly Differentiated Neoplasm    Neuro: neuro checks q 4 hr  Monitor for CSF leak, keep sitting up as tolerated per NS   CT - reviewed   Monitor drain output: 3 HMV (145), (165), (125) cc drained from each HMV, 1 bile bag (135)cc  PCA pump (d/c'ed )  follow official path   Pain management currently: Dilaudid, standing IV tylenol until PCA pump could be implemented  PT/OT    Respiratory:   RA  IS    CV: MAP> 65 mmhg   Plan to wean off rg    Endocrine:   Pre- DM: A1c 6.0    GI: CCD  Bowel regimen  LBM: PTA    Renal: IVL  Keep Oseguera in- per neurosurgery     Heme/Onc:    CBC: thrombocytopenia. 4T score: 4: intermediate risk: will order HIT AB  DVT ppx: SQL ( on hold ) as per neurosurgery  3/5- LED - negative    ID: Afebrile      Social/Family:   Discharge planning:     Code Status: [x] Full Code [] DNR [] DNI [] Goals of Care:   Disposition: [x] ICU [] Stroke Unit [] RCU []PCU []Floor [] Discharge Home          A/P:  68M w/ hx of HLD, pre-DM, presenting with c/f diffuse osseous spinal mets w/ cord compression and cord edema POD 3 from   stage 1:  T8 VCR, T6-T10 fusion for tumor, small csf leak primarily repaired  Stage 2: L4 Partial Corpectomy, L2-Pelvis Fusion  Plastics Closure ([place])    Frozen: Poorly Differentiated Neoplasm    Neuro: neuro checks q 4 hr  No evidence of CSF leak, keep sitting up as tolerated per NS   CT - reviewed   Monitor drain output: 3 HMV (90), (50), (70) cc drained from each HMV, 1 bile bag (2)cc  PCA pump (d/c'ed )-- oxy and dilaudid for breakthrough pain   follow official path   Pain management currently: Dilaudid, standing IV tylenol until PCA pump could be implemented  PT/OT/OOB    Respiratory:   2L   IS    CV: MAP> 65 mmhg     Endocrine:   Pre- DM: A1c 6.0    GI: CCD  Bowel regimen, Mag citrate   LBM: PTA    Renal: IVL  Keep Oseguera in- per neurosurgery     Heme/Onc:    CBC: thrombocytopenia- improving 4T score: 4: intermediate risk: will order HIT AB  DVT ppx: SQL restart today  3/5- LED - negative    ID: Afebrile    Disposition: Floor      Social/Family:   Discharge planning:     Code Status: [x] Full Code [] DNR [] DNI [] Goals of Care:   Disposition: [x] ICU [] Stroke Unit [] RCU []PCU []Floor [] Discharge Home

## 2024-03-08 NOTE — PROGRESS NOTE ADULT - ASSESSMENT
SolisSaturnino  68M, no major pmh, p/w difficulty ambulating since Saturday and OSH MRI report c/f diffuse osseous spinal mets w/ cord compression. Back pain started in Jan, which progressed to RLE weakness in Feb., and now unable to walk w/o assistance since Sat. No bowel/bladder incontinence. 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 CT CAP + for lesions in 3rd rib, L pleura, mesenteric/retroperitoneal adenopathy, narrowed sigmoid colon. PLTs/Coags wnl. Pre-Op exam: AO3, BUE 5/5, L HF 4+/5, R HF and KE 4/5, decreased sensation from R hip to ankle and along L shin/toes, normoreflexic, rectal tone wnl at rest, below normal w/ squeeze, cannot ambulate w/o assistance.    Now s/p   -T8 VCR, T6-T10 fusion for tumor, small csf leak primarily repaired  -L4 Partial Corpectomy, L2-Pelvis Fusion  -Plastics Closure ([place])    wean pressors   f/u HIT ab   Can sit up as tolerated   f/u official path  Per DMS, can work with PT but no bending/lifting/twisting, no steroids  Does not need brace per DMS

## 2024-03-09 PROCEDURE — 99232 SBSQ HOSP IP/OBS MODERATE 35: CPT

## 2024-03-09 RX ORDER — ACETAMINOPHEN 500 MG
1000 TABLET ORAL ONCE
Refills: 0 | Status: COMPLETED | OUTPATIENT
Start: 2024-03-09 | End: 2024-03-09

## 2024-03-09 RX ADMIN — Medication 1 TABLET(S): at 11:37

## 2024-03-09 RX ADMIN — HYDROMORPHONE HYDROCHLORIDE 0.25 MILLIGRAM(S): 2 INJECTION INTRAMUSCULAR; INTRAVENOUS; SUBCUTANEOUS at 18:26

## 2024-03-09 RX ADMIN — Medication 1000 MILLIGRAM(S): at 22:13

## 2024-03-09 RX ADMIN — METHOCARBAMOL 750 MILLIGRAM(S): 500 TABLET, FILM COATED ORAL at 22:14

## 2024-03-09 RX ADMIN — Medication 1000 MILLIGRAM(S): at 02:52

## 2024-03-09 RX ADMIN — POLYETHYLENE GLYCOL 3350 17 GRAM(S): 17 POWDER, FOR SOLUTION ORAL at 05:29

## 2024-03-09 RX ADMIN — Medication 100 GRAM(S): at 00:11

## 2024-03-09 RX ADMIN — Medication 5 MILLIGRAM(S): at 11:37

## 2024-03-09 RX ADMIN — HYDROMORPHONE HYDROCHLORIDE 0.25 MILLIGRAM(S): 2 INJECTION INTRAMUSCULAR; INTRAVENOUS; SUBCUTANEOUS at 05:34

## 2024-03-09 RX ADMIN — Medication 400 MILLIGRAM(S): at 02:22

## 2024-03-09 RX ADMIN — Medication 1 PACKET(S): at 05:32

## 2024-03-09 RX ADMIN — POLYETHYLENE GLYCOL 3350 17 GRAM(S): 17 POWDER, FOR SOLUTION ORAL at 17:55

## 2024-03-09 RX ADMIN — OXYCODONE HYDROCHLORIDE 5 MILLIGRAM(S): 5 TABLET ORAL at 09:42

## 2024-03-09 RX ADMIN — GABAPENTIN 300 MILLIGRAM(S): 400 CAPSULE ORAL at 05:29

## 2024-03-09 RX ADMIN — METHOCARBAMOL 750 MILLIGRAM(S): 500 TABLET, FILM COATED ORAL at 05:32

## 2024-03-09 RX ADMIN — METHOCARBAMOL 750 MILLIGRAM(S): 500 TABLET, FILM COATED ORAL at 13:04

## 2024-03-09 RX ADMIN — OXYCODONE HYDROCHLORIDE 5 MILLIGRAM(S): 5 TABLET ORAL at 10:12

## 2024-03-09 RX ADMIN — CHLORHEXIDINE GLUCONATE 1 APPLICATION(S): 213 SOLUTION TOPICAL at 22:14

## 2024-03-09 RX ADMIN — HYDROMORPHONE HYDROCHLORIDE 0.25 MILLIGRAM(S): 2 INJECTION INTRAMUSCULAR; INTRAVENOUS; SUBCUTANEOUS at 18:56

## 2024-03-09 RX ADMIN — HYDROMORPHONE HYDROCHLORIDE 0.25 MILLIGRAM(S): 2 INJECTION INTRAMUSCULAR; INTRAVENOUS; SUBCUTANEOUS at 22:45

## 2024-03-09 RX ADMIN — Medication 1000 MILLIGRAM(S): at 22:43

## 2024-03-09 RX ADMIN — HYDROMORPHONE HYDROCHLORIDE 0.25 MILLIGRAM(S): 2 INJECTION INTRAMUSCULAR; INTRAVENOUS; SUBCUTANEOUS at 22:15

## 2024-03-09 RX ADMIN — HYDROMORPHONE HYDROCHLORIDE 0.25 MILLIGRAM(S): 2 INJECTION INTRAMUSCULAR; INTRAVENOUS; SUBCUTANEOUS at 06:04

## 2024-03-09 RX ADMIN — Medication 1000 MILLIGRAM(S): at 13:04

## 2024-03-09 RX ADMIN — GABAPENTIN 300 MILLIGRAM(S): 400 CAPSULE ORAL at 17:55

## 2024-03-09 RX ADMIN — ENOXAPARIN SODIUM 40 MILLIGRAM(S): 100 INJECTION SUBCUTANEOUS at 17:55

## 2024-03-09 NOTE — PROGRESS NOTE ADULT - SUBJECTIVE AND OBJECTIVE BOX
Patient admitted for osseous mets with cord compression.     24 hour Events:     Stage 1:  T8 VCR, T6-T10 fusion for tumor, small csf leak primarily repaired  Stage 2: L4 Partial Corpectomy, L2-Pelvis Fusion  Plastics Closure ([place])Allergies  3/6- Patient complains of back pain and movement is limited 2/2 pain.  3/7- Overnight no acute events, patient's examination is stable.  3/8- No acute events overnight. Patient with fluctuating examination, due to pain.   3/9- No acute events overnight.     Allergies    No Known Drug Allergies  latex (Rash)    Intolerances        REVIEW OF SYSTEMS: [ ] Unable to Assess due to neurologic exam   [ ] All ROS addressed below are non-contributory, except:  Neuro: [ ] Headache [ ] Back pain [ ] Numbness [ ] Weakness [ ] Ataxia [ ] Dizziness [ ] Aphasia [ ] Dysarthria [ ] Visual disturbance  Resp: [ ] Shortness of breath/dyspnea, [ ] Orthopnea [ ] Cough  CV: [ ] Chest pain [ ] Palpitation [ ] Lightheadedness [ ] Syncope  Renal: [ ] Thirst [ ] Edema  GI: [ ] Nausea [ ] Emesis [ ] Abdominal pain [ ] Constipation [ ] Diarrhea  Hem: [ ] Hematemesis [ ] bright red blood per rectum  ID: [ ] Fever [ ] Chills [ ] Dysuria  ENT: [ ] Rhinorrhea      DEVICES:   [ ] Restraints [ ] ET tube [ ] central line [ ] arterial line [ ] grigsby [ ] NGT/OGT [ ] EVD [ ] LD [ ] LILLIE/HMV [ ] Trach [ ] PEG [ ] Chest Tube     VITALS:   Vital Signs Last 24 Hrs  T(C): 36.6 (09 Mar 2024 03:00), Max: 37 (08 Mar 2024 15:00)  T(F): 97.9 (09 Mar 2024 03:00), Max: 98.6 (08 Mar 2024 15:00)  HR: 71 (09 Mar 2024 06:00) (62 - 88)  BP: --  BP(mean): --  RR: 21 (09 Mar 2024 06:00) (11 - 21)  SpO2: 97% (09 Mar 2024 06:00) (96% - 100%)    Parameters below as of 08 Mar 2024 19:00  Patient On (Oxygen Delivery Method): nasal cannula  O2 Flow (L/min): 2    CAPILLARY BLOOD GLUCOSE        I&O's Summary    08 Mar 2024 07:01  -  09 Mar 2024 07:00  --------------------------------------------------------  IN: 1122 mL / OUT: 2855 mL / NET: -1733 mL        Respiratory:        LABS:                        9.3    6.56  )-----------( 150      ( 08 Mar 2024 22:37 )             29.1     03-08    135  |  100  |  17  ----------------------------<  141<H>  4.0   |  31  |  0.69             MEDICATION LEVELS:     IVF FLUIDS/MEDICATIONS:   MEDICATIONS  (STANDING):  acetaminophen     Tablet .. 1000 milliGRAM(s) Oral every 8 hours  bisacodyl 5 milliGRAM(s) Oral every 12 hours  chlorhexidine 4% Liquid 1 Application(s) Topical daily  enoxaparin Injectable 40 milliGRAM(s) SubCutaneous <User Schedule>  gabapentin 300 milliGRAM(s) Oral two times a day  methocarbamol 750 milliGRAM(s) Oral every 8 hours  multivitamin 1 Tablet(s) Oral daily  polyethylene glycol 3350 17 Gram(s) Oral two times a day  senna 2 Tablet(s) Oral at bedtime    MEDICATIONS  (PRN):  acetaminophen     Tablet .. 650 milliGRAM(s) Oral every 6 hours PRN Temp greater or equal to 38.5C (101.3F), Mild Pain (1 - 3)  HYDROmorphone  Injectable 0.25 milliGRAM(s) IV Push every 4 hours PRN break through pain  magnesium hydroxide Suspension 30 milliLiter(s) Oral every 12 hours PRN Constipation  ondansetron Injectable 4 milliGRAM(s) IV Push every 6 hours PRN Nausea and/or Vomiting  oxyCODONE    IR 5 milliGRAM(s) Oral every 4 hours PRN Moderate Pain (4 - 6)  oxyCODONE    IR 10 milliGRAM(s) Oral every 4 hours PRN Severe Pain (7 - 10)        IMAGING:      EXAMINATION:  PHYSICAL EXAM:    Constitutional: No Acute Distress     Neurological: Awake, alert oriented to person, place and time, Following Commands, PERRL, EOMI, No Gaze Preference, Face Symmetrical, Speech Fluent, No dysmetria, No ataxia, No nystagmus     Motor exam:          Upper extremity                         Delt     Bicep     Tricep    HG                                                 R         5/5 5/5 5/5 5/5                                               L          5/5 5/5 5/5 5/5          Lower extremity                        HF         KF        KE       DF         PF                                                  R        5/5 5/5 5/5 5/5 5/5                                               L         5/5 5/5 5/5 5/5 5/5                                                 Sensation: [ ] intact to light touch  [ ] decreased:     Reflexes: Deep Tendon Reflexes Intact     Pulmonary: Clear to Auscultation, No rales, No rhonchi, No wheezes     Cardiovascular: S1, S2, Regular rate and rhythm     Gastrointestinal: Soft, Non-tender, Non-distended     Extremities: No calf tenderness     Incision:    Patient admitted for osseous mets with cord compression.     24 hour Events:     Stage 1:  T8 VCR, T6-T10 fusion for tumor, small csf leak primarily repaired  Stage 2: L4 Partial Corpectomy, L2-Pelvis Fusion  Plastics Closure ([place])Allergies  3/6- Patient complains of back pain and movement is limited 2/2 pain.  3/7- Overnight no acute events, patient's examination is stable.  3/8- No acute events overnight. Patient with fluctuating examination, due to pain.   3/9- No acute events overnight.     Allergies    No Known Drug Allergies  latex (Rash)    Intolerances        REVIEW OF SYSTEMS: [ ] Unable to Assess due to neurologic exam   [X] All ROS addressed below are non-contributory, except:  Neuro: [ ] Headache [ ] Back pain [ ] Numbness [ ] Weakness [ ] Ataxia [ ] Dizziness [ ] Aphasia [ ] Dysarthria [ ] Visual disturbance  Resp: [ ] Shortness of breath/dyspnea, [ ] Orthopnea [ ] Cough  CV: [ ] Chest pain [ ] Palpitation [ ] Lightheadedness [ ] Syncope  Renal: [ ] Thirst [ ] Edema  GI: [ ] Nausea [ ] Emesis [ ] Abdominal pain [ ] Constipation [ ] Diarrhea  Hem: [ ] Hematemesis [ ] bright red blood per rectum  ID: [ ] Fever [ ] Chills [ ] Dysuria  ENT: [ ] Rhinorrhea      DEVICES:   [ ] Restraints [ ] ET tube [ ] central line [ ] arterial line [ ] grigsby [ ] NGT/OGT [ ] EVD [ ] LD [ ] LILLIE/HMV [ ] Trach [ ] PEG [ ] Chest Tube     VITALS:   Vital Signs Last 24 Hrs  T(C): 36.6 (09 Mar 2024 03:00), Max: 37 (08 Mar 2024 15:00)  T(F): 97.9 (09 Mar 2024 03:00), Max: 98.6 (08 Mar 2024 15:00)  HR: 71 (09 Mar 2024 06:00) (62 - 88)  BP: --  BP(mean): --  RR: 21 (09 Mar 2024 06:00) (11 - 21)  SpO2: 97% (09 Mar 2024 06:00) (96% - 100%)    Parameters below as of 08 Mar 2024 19:00  Patient On (Oxygen Delivery Method): nasal cannula  O2 Flow (L/min): 2    CAPILLARY BLOOD GLUCOSE        I&O's Summary    08 Mar 2024 07:01  -  09 Mar 2024 07:00  --------------------------------------------------------  IN: 1122 mL / OUT: 2855 mL / NET: -1733 mL        Respiratory:        LABS:                        9.3    6.56  )-----------( 150      ( 08 Mar 2024 22:37 )             29.1     03-08    135  |  100  |  17  ----------------------------<  141<H>  4.0   |  31  |  0.69             MEDICATION LEVELS:     IVF FLUIDS/MEDICATIONS:   MEDICATIONS  (STANDING):  acetaminophen     Tablet .. 1000 milliGRAM(s) Oral every 8 hours  bisacodyl 5 milliGRAM(s) Oral every 12 hours  chlorhexidine 4% Liquid 1 Application(s) Topical daily  enoxaparin Injectable 40 milliGRAM(s) SubCutaneous <User Schedule>  gabapentin 300 milliGRAM(s) Oral two times a day  methocarbamol 750 milliGRAM(s) Oral every 8 hours  multivitamin 1 Tablet(s) Oral daily  polyethylene glycol 3350 17 Gram(s) Oral two times a day  senna 2 Tablet(s) Oral at bedtime    MEDICATIONS  (PRN):  acetaminophen     Tablet .. 650 milliGRAM(s) Oral every 6 hours PRN Temp greater or equal to 38.5C (101.3F), Mild Pain (1 - 3)  HYDROmorphone  Injectable 0.25 milliGRAM(s) IV Push every 4 hours PRN break through pain  magnesium hydroxide Suspension 30 milliLiter(s) Oral every 12 hours PRN Constipation  ondansetron Injectable 4 milliGRAM(s) IV Push every 6 hours PRN Nausea and/or Vomiting  oxyCODONE    IR 5 milliGRAM(s) Oral every 4 hours PRN Moderate Pain (4 - 6)  oxyCODONE    IR 10 milliGRAM(s) Oral every 4 hours PRN Severe Pain (7 - 10)        IMAGING:        EXAMINATION:  PHYSICAL EXAM:    Constitutional: No Acute Distress     Neurological: Awake, alert oriented to person, place and time, Following Commands, PERRL, EOMI, No Gaze Preference, Face Symmetrical, Speech Fluent, No dysmetria, No ataxia, No nystagmus     Motor exam:          Upper extremity                         Delt     Bicep     Tricep    HG                                                 R         5/5 5/5 5/5 5/5                                               L          5/5 5/5 5/5 5/5          Lower extremity                        HF         KF        KE       DF         PF                                                  R        4/5 5/5 4/5 5/5 5/5                                               L         5/5 5/5 5/5 5/5 5/5                                                 Sensation: [X] intact to light touch  [ ] decreased:   Pulmonary: Clear to Auscultation, No rales, No rhonchi, No wheezes     Cardiovascular: S1, S2, Regular rate and rhythm     Gastrointestinal: Soft, Non-tender, Non-distended     Extremities: No calf tenderness

## 2024-03-09 NOTE — PROGRESS NOTE ADULT - ATTENDING COMMENTS
68-year-old man with diffuse osseous spinal mets w/ cord compression and cord edema status post stage 1 T8 VCR, T6-T10 fusion for tumor with small csf leak primarily repaired and Stage 2 L4 Partial Corpectomy, L2-Pelvis Fusion with Plastic closure.     Pain control  Monitor drain output  MAP >65  VTE prophylaxis  Bowel regimen    Patient seen, data reviewed, care plan updated and coordinated with NSGY.

## 2024-03-09 NOTE — PROGRESS NOTE ADULT - ASSESSMENT
A/P:  68M w/ hx of HLD, pre-DM, presenting with c/f diffuse osseous spinal mets w/ cord compression and cord edema POD 3 from   stage 1:  T8 VCR, T6-T10 fusion for tumor, small csf leak primarily repaired  Stage 2: L4 Partial Corpectomy, L2-Pelvis Fusion  Plastics Closure ([place])    Frozen: Poorly Differentiated Neoplasm    Neuro: neuro checks q 4 hr  No evidence of CSF leak, keep sitting up as tolerated per NS   CT - reviewed   Monitor drain outpu  PCA pump (d/c'ed )-- oxy and dilaudid for breakthrough pain   follow official path   Pain management currently: Dilaudid, standing IV tylenol until PCA pump could be implemented  PT/OT/OOB    Respiratory:   2L   IS    CV: MAP> 65 mmhg     Endocrine:   Pre- DM: A1c 6.0    GI: CCD  Bowel regimen, Mag citrate   LBM: PTA    Renal: IVL  Keep Oseguera in- per neurosurgery     Heme/Onc:    CBC: thrombocytopenia  negative PF4 AB  DVT ppx: SQL restart today  3/5- LED - negative    ID: Afebrile    Disposition: Floor      Social/Family:   Discharge planning:     Code Status: [x] Full Code [] DNR [] DNI [] Goals of Care:   Disposition: [x] ICU [] Stroke Unit [] RCU []PCU []Floor [] Discharge Home          A/P:  68M w/ hx of HLD, pre-DM, presenting with c/f diffuse osseous spinal mets w/ cord compression and cord edema POD 3 from   stage 1:  T8 VCR, T6-T10 fusion for tumor, small csf leak primarily repaired  Stage 2: L4 Partial Corpectomy, L2-Pelvis Fusion  Plastics Closure ([place])    Frozen: Poorly Differentiated Neoplasm    Neuro: neuro checks q 4 hr  No evidence of CSF leak, keep sitting up as tolerated per NS   CT - reviewed   Monitor drain outpu  PCA pump (d/c'ed )-- oxy and dilaudid for breakthrough pain   follow official path   Pain management currently: Dilaudid, standing IV tylenol until PCA pump could be implemented  PT/OT/OOB    Respiratory:   2L   IS    CV: MAP> 65 mmhg     Endocrine:   Pre- DM: A1c 6.0    GI: CCD  Bowel regimen, Mag citrate, will add SMOG enema  LBM: PTA    Renal: IVL  Keep Oseguera in- per neurosurgery     Heme/Onc:    CBC: thrombocytopenia- improving   negative PF4 AB  DVT ppx: SQL  3/5- LED - negative    ID: Afebrile, no leukocyte count     Disposition: Floor      Social/Family:   Discharge planning:     Code Status: [x] Full Code [] DNR [] DNI [] Goals of Care:   Disposition: [x] ICU [] Stroke Unit [] RCU []PCU []Floor [] Discharge Home

## 2024-03-09 NOTE — PROGRESS NOTE ADULT - SUBJECTIVE AND OBJECTIVE BOX
Patient seen and examined at bedside.    --Anticoagulation--  enoxaparin Injectable 40 milliGRAM(s) SubCutaneous <User Schedule>    T(C): 36.8 (03-08-24 @ 23:00), Max: 37 (03-08-24 @ 15:00)  HR: 71 (03-09-24 @ 00:00) (69 - 88)  BP: --  RR: 15 (03-09-24 @ 00:00) (11 - 19)  SpO2: 98% (03-09-24 @ 00:00) (96% - 100%)  Wt(kg): --    Exam: AO3, BUE 5/5, L HF 5 KE 4 L PF/DF 5, R HF 4 KE 4 PF/DF 5

## 2024-03-09 NOTE — PROGRESS NOTE ADULT - ASSESSMENT
SolisSaturnino  68M, no major pmh, p/w difficulty ambulating since Saturday and OSH MRI report c/f diffuse osseous spinal mets w/ cord compression. Back pain started in Jan, which progressed to RLE weakness in Feb., and now unable to walk w/o assistance since Sat. No bowel/bladder incontinence. 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 CT CAP + for lesions in 3rd rib, L pleura, mesenteric/retroperitoneal adenopathy, narrowed sigmoid colon. PLTs/Coags wnl. Pre-Op exam: AO3, BUE 5/5, L HF 4+/5, R HF and KE 4/5, decreased sensation from R hip to ankle and along L shin/toes, normoreflexic, rectal tone wnl at rest, below normal w/ squeeze, cannot ambulate w/o assistance.    Now s/p   -T8 VCR, T6-T10 fusion for tumor, small csf leak primarily repaired  -L4 Partial Corpectomy, L2-Pelvis Fusion  -Plastics Closure ([place])    Needs BM   Can sit up as tolerated   Official path pending   Per DMS, can work with PT but no bending/lifting/twisting  No steroids  Does not need brace per DMS

## 2024-03-10 LAB
ANION GAP SERPL CALC-SCNC: 9 MMOL/L — SIGNIFICANT CHANGE UP (ref 5–17)
BUN SERPL-MCNC: 18 MG/DL — SIGNIFICANT CHANGE UP (ref 7–23)
CALCIUM SERPL-MCNC: 8.7 MG/DL — SIGNIFICANT CHANGE UP (ref 8.4–10.5)
CHLORIDE SERPL-SCNC: 97 MMOL/L — SIGNIFICANT CHANGE UP (ref 96–108)
CO2 SERPL-SCNC: 28 MMOL/L — SIGNIFICANT CHANGE UP (ref 22–31)
CREAT SERPL-MCNC: 0.48 MG/DL — LOW (ref 0.5–1.3)
EGFR: 114 ML/MIN/1.73M2 — SIGNIFICANT CHANGE UP
GLUCOSE SERPL-MCNC: 130 MG/DL — HIGH (ref 70–99)
HCT VFR BLD CALC: 31.4 % — LOW (ref 39–50)
HGB BLD-MCNC: 10.4 G/DL — LOW (ref 13–17)
MAGNESIUM SERPL-MCNC: 2.2 MG/DL — SIGNIFICANT CHANGE UP (ref 1.6–2.6)
MCHC RBC-ENTMCNC: 26.8 PG — LOW (ref 27–34)
MCHC RBC-ENTMCNC: 33.1 GM/DL — SIGNIFICANT CHANGE UP (ref 32–36)
MCV RBC AUTO: 80.9 FL — SIGNIFICANT CHANGE UP (ref 80–100)
NRBC # BLD: 0 /100 WBCS — SIGNIFICANT CHANGE UP (ref 0–0)
PHOSPHATE SERPL-MCNC: 3.9 MG/DL — SIGNIFICANT CHANGE UP (ref 2.5–4.5)
PLATELET # BLD AUTO: 215 K/UL — SIGNIFICANT CHANGE UP (ref 150–400)
POTASSIUM SERPL-MCNC: 4.3 MMOL/L — SIGNIFICANT CHANGE UP (ref 3.5–5.3)
POTASSIUM SERPL-SCNC: 4.3 MMOL/L — SIGNIFICANT CHANGE UP (ref 3.5–5.3)
RBC # BLD: 3.88 M/UL — LOW (ref 4.2–5.8)
RBC # FLD: 17.5 % — HIGH (ref 10.3–14.5)
SODIUM SERPL-SCNC: 134 MMOL/L — LOW (ref 135–145)
WBC # BLD: 7.26 K/UL — SIGNIFICANT CHANGE UP (ref 3.8–10.5)
WBC # FLD AUTO: 7.26 K/UL — SIGNIFICANT CHANGE UP (ref 3.8–10.5)

## 2024-03-10 PROCEDURE — 99232 SBSQ HOSP IP/OBS MODERATE 35: CPT | Mod: FS

## 2024-03-10 RX ORDER — SODIUM CHLORIDE 9 MG/ML
1000 INJECTION, SOLUTION INTRAVENOUS ONCE
Refills: 0 | Status: COMPLETED | OUTPATIENT
Start: 2024-03-10 | End: 2024-03-10

## 2024-03-10 RX ORDER — SODIUM CHLORIDE 9 MG/ML
2 INJECTION INTRAMUSCULAR; INTRAVENOUS; SUBCUTANEOUS EVERY 8 HOURS
Refills: 0 | Status: DISCONTINUED | OUTPATIENT
Start: 2024-03-10 | End: 2024-03-14

## 2024-03-10 RX ADMIN — METHOCARBAMOL 750 MILLIGRAM(S): 500 TABLET, FILM COATED ORAL at 21:47

## 2024-03-10 RX ADMIN — ENOXAPARIN SODIUM 40 MILLIGRAM(S): 100 INJECTION SUBCUTANEOUS at 17:02

## 2024-03-10 RX ADMIN — Medication 1000 MILLIGRAM(S): at 14:00

## 2024-03-10 RX ADMIN — POLYETHYLENE GLYCOL 3350 17 GRAM(S): 17 POWDER, FOR SOLUTION ORAL at 17:02

## 2024-03-10 RX ADMIN — HYDROMORPHONE HYDROCHLORIDE 0.25 MILLIGRAM(S): 2 INJECTION INTRAMUSCULAR; INTRAVENOUS; SUBCUTANEOUS at 16:15

## 2024-03-10 RX ADMIN — OXYCODONE HYDROCHLORIDE 5 MILLIGRAM(S): 5 TABLET ORAL at 15:01

## 2024-03-10 RX ADMIN — Medication 1000 MILLIGRAM(S): at 21:45

## 2024-03-10 RX ADMIN — METHOCARBAMOL 750 MILLIGRAM(S): 500 TABLET, FILM COATED ORAL at 15:23

## 2024-03-10 RX ADMIN — OXYCODONE HYDROCHLORIDE 5 MILLIGRAM(S): 5 TABLET ORAL at 15:35

## 2024-03-10 RX ADMIN — Medication 1000 MILLIGRAM(S): at 22:15

## 2024-03-10 RX ADMIN — OXYCODONE HYDROCHLORIDE 5 MILLIGRAM(S): 5 TABLET ORAL at 09:35

## 2024-03-10 RX ADMIN — OXYCODONE HYDROCHLORIDE 5 MILLIGRAM(S): 5 TABLET ORAL at 10:05

## 2024-03-10 RX ADMIN — SODIUM CHLORIDE 2 GRAM(S): 9 INJECTION INTRAMUSCULAR; INTRAVENOUS; SUBCUTANEOUS at 21:47

## 2024-03-10 RX ADMIN — Medication 5 MILLIGRAM(S): at 12:15

## 2024-03-10 RX ADMIN — METHOCARBAMOL 750 MILLIGRAM(S): 500 TABLET, FILM COATED ORAL at 05:36

## 2024-03-10 RX ADMIN — SODIUM CHLORIDE 1000 MILLILITER(S): 9 INJECTION, SOLUTION INTRAVENOUS at 21:47

## 2024-03-10 RX ADMIN — Medication 1 TABLET(S): at 12:16

## 2024-03-10 RX ADMIN — Medication 5 MILLIGRAM(S): at 21:47

## 2024-03-10 RX ADMIN — GABAPENTIN 300 MILLIGRAM(S): 400 CAPSULE ORAL at 17:02

## 2024-03-10 RX ADMIN — GABAPENTIN 300 MILLIGRAM(S): 400 CAPSULE ORAL at 05:36

## 2024-03-10 RX ADMIN — SENNA PLUS 2 TABLET(S): 8.6 TABLET ORAL at 21:47

## 2024-03-10 RX ADMIN — Medication 1000 MILLIGRAM(S): at 14:30

## 2024-03-10 RX ADMIN — HYDROMORPHONE HYDROCHLORIDE 0.25 MILLIGRAM(S): 2 INJECTION INTRAMUSCULAR; INTRAVENOUS; SUBCUTANEOUS at 16:00

## 2024-03-10 RX ADMIN — CHLORHEXIDINE GLUCONATE 1 APPLICATION(S): 213 SOLUTION TOPICAL at 21:45

## 2024-03-10 RX ADMIN — Medication 1000 MILLIGRAM(S): at 05:37

## 2024-03-10 NOTE — PROGRESS NOTE ADULT - ASSESSMENT
A/P:  68M w/ hx of HLD, pre-DM, presenting with c/f diffuse osseous spinal mets w/ cord compression and cord edema POD 5 from   stage 1:  T8 VCR, T6-T10 fusion for tumor, small csf leak primarily repaired  Stage 2: L4 Partial Corpectomy, L2-Pelvis Fusion  Plastics Closure ([place])    Frozen: Poorly Differentiated Neoplasm    Neuro: neuro checks q 4 hr  No evidence of CSF leak, keep sitting up as tolerated per NS   CT - reviewed   Monitor drain outpu  PCA pump (d/c'ed )-- oxy and dilaudid for breakthrough pain   follow official path   Pain management currently: Dilaudid, standing IV tylenol until PCA pump could be implemented  PT/OT/OOB    Respiratory:   2L   IS    CV: MAP> 65 mmhg     Endocrine:   Pre- DM: A1c 6.0    GI: CCD  Bowel regimen, Mag citrate, will add SMOG enema  LBM: PTA    Renal: IVL  Keep Oseguera in- per neurosurgery     Heme/Onc:    CBC: thrombocytopenia- improving   negative PF4 AB  DVT ppx: SQL  3/5- LED - negative    ID: Afebrile, no leukocyte count     Disposition: Floor      Social/Family:   Discharge planning:     Code Status: [x] Full Code [] DNR [] DNI [] Goals of Care:   Disposition: [x] ICU [] Stroke Unit [] RCU []PCU []Floor [] Discharge Home          A/P:  68M w/ hx of HLD, pre-DM, presenting with c/f diffuse osseous spinal mets w/ cord compression and cord edema POD 5 from   stage 1:  T8 VCR, T6-T10 fusion for tumor, small csf leak primarily repaired  Stage 2: L4 Partial Corpectomy, L2-Pelvis Fusion  Plastics Closure ([place])    Frozen: Poorly Differentiated Neoplasm    Neuro: neuro checks q 4 hr  No evidence of CSF leak, keep sitting up as tolerated per NS   Monitor drain output  PCA pump (d/c'ed )-- oxy , dilaudid for breakthrough pain as well as tyl, gabapentin, robaxin  follow official path   PT/OT/OOB    Respiratory:   2L   IS    CV: MAP> 65 mmhg     Endocrine:   Pre- DM: A1c 6.0  goal euglycemia     GI: CCD  Bowel regimen, Mag citrate, SMOG enema  LBM: 3/9    Renal: IVL  grigsby d/c'd   follow up trial of void     Heme/Onc:    CBC: thrombocytopenia- improving   negative PF4 AB  DVT ppx: SQL  3/5- LED - negative    ID: Afebrile, no leukocyte count       Social/Family: awaiting       Code Status: [x] Full Code [] DNR [] DNI [] Goals of Care:   Disposition: [] ICU [] Stroke Unit [] RCU []PCU [x]Floor [] Discharge Home

## 2024-03-10 NOTE — PROGRESS NOTE ADULT - SUBJECTIVE AND OBJECTIVE BOX
Patient admitted for osseous mets with cord compression.     24 hour Events:     Stage 1:  T8 VCR, T6-T10 fusion for tumor, small csf leak primarily repaired  Stage 2: L4 Partial Corpectomy, L2-Pelvis Fusion  Plastics Closure ([place])Allergies  3/6- Patient complains of back pain and movement is limited 2/2 pain.  3/7- Overnight no acute events, patient's examination is stable.  3/8- No acute events overnight. Patient with fluctuating examination, due to pain.   3/9- No acute events overnight.     Allergies    No Known Drug Allergies  latex (Rash)    Intolerances        REVIEW OF SYSTEMS: [ ] Unable to Assess due to neurologic exam   [X] All ROS addressed below are non-contributory, except:  Neuro: [ ] Headache [ ] Back pain [ ] Numbness [ ] Weakness [ ] Ataxia [ ] Dizziness [ ] Aphasia [ ] Dysarthria [ ] Visual disturbance  Resp: [ ] Shortness of breath/dyspnea, [ ] Orthopnea [ ] Cough  CV: [ ] Chest pain [ ] Palpitation [ ] Lightheadedness [ ] Syncope  Renal: [ ] Thirst [ ] Edema  GI: [ ] Nausea [ ] Emesis [ ] Abdominal pain [ ] Constipation [ ] Diarrhea  Hem: [ ] Hematemesis [ ] bright red blood per rectum  ID: [ ] Fever [ ] Chills [ ] Dysuria  ENT: [ ] Rhinorrhea      DEVICES:   [ ] Restraints [ ] ET tube [ ] central line [ ] arterial line [ ] grigsby [ ] NGT/OGT [ ] EVD [ ] LD [x] LILLIE/HMV, 3 hemovacs [ ] Trach [ ] PEG [x] bile bag     ICU Vital Signs Last 24 Hrs  T(C): 37.6 (09 Mar 2024 23:00), Max: 37.6 (09 Mar 2024 23:00)  T(F): 99.7 (09 Mar 2024 23:00), Max: 99.7 (09 Mar 2024 23:00)  HR: 85 (09 Mar 2024 23:00) (62 - 88)  BP: --  BP(mean): --  ABP: 144/70 (09 Mar 2024 23:00) (105/54 - 144/70)  ABP(mean): 101 (09 Mar 2024 23:00) (73 - 101)  RR: 14 (09 Mar 2024 23:00) (11 - 25)  SpO2: 98% (09 Mar 2024 23:00) (91% - 100%)    O2 Parameters below as of 09 Mar 2024 19:00  Patient On (Oxygen Delivery Method): nasal cannula  O2 Flow (L/min): 2      I&O's Summary    08 Mar 2024 07:01  -  09 Mar 2024 07:00  --------------------------------------------------------  IN: 1122 mL / OUT: 2855 mL / NET: -1733 mL    09 Mar 2024 06:01  -  10 Mar 2024 01:10  --------------------------------------------------------  IN: 125 mL / OUT: 1125 mL / NET: -1000 mL        LABS:                        9.3    6.56  )-----------( 150      ( 08 Mar 2024 22:37 )             29.1   03-08    135  |  100  |  17  ----------------------------<  141<H>  4.0   |  31  |  0.69    Ca    8.0<L>      08 Mar 2024 22:37  Phos  2.6     03-08  Mg     1.7     03-08         MEDICATIONS  (STANDING):  acetaminophen     Tablet .. 1000 milliGRAM(s) Oral every 8 hours  bisacodyl 5 milliGRAM(s) Oral every 12 hours  chlorhexidine 4% Liquid 1 Application(s) Topical daily  enoxaparin Injectable 40 milliGRAM(s) SubCutaneous <User Schedule>  gabapentin 300 milliGRAM(s) Oral two times a day  methocarbamol 750 milliGRAM(s) Oral every 8 hours  multivitamin 1 Tablet(s) Oral daily  polyethylene glycol 3350 17 Gram(s) Oral two times a day  senna 2 Tablet(s) Oral at bedtime    MEDICATIONS  (PRN):  HYDROmorphone  Injectable 0.25 milliGRAM(s) IV Push every 4 hours PRN break through pain  magnesium hydroxide Suspension 30 milliLiter(s) Oral every 12 hours PRN Constipation  ondansetron Injectable 4 milliGRAM(s) IV Push every 6 hours PRN Nausea and/or Vomiting  oxyCODONE    IR 5 milliGRAM(s) Oral every 4 hours PRN Moderate Pain (4 - 6)  oxyCODONE    IR 10 milliGRAM(s) Oral every 4 hours PRN Severe Pain (7 - 10)      EXAMINATION:  PHYSICAL EXAM:    Constitutional: No Acute Distress     Neurological: Awake, alert oriented to person, place and time, Following Commands, PERRL, EOMI, No Gaze Preference, Face Symmetrical, Speech Fluent, No dysmetria, No ataxia, No nystagmus     Motor exam:          Upper extremity                         Delt     Bicep     Tricep    HG                                                 R         5/5        5/5        5/5       5/5                                               L          5/5        5/5        5/5       5/5          Lower extremity                        HF         KF        KE       DF         PF                                                  R        4/5        5/5        4/5       5/5         5/5                                               L         5/5        5/5       4/5       5/5          5/5                                                 Sensation: [X] intact to light touch  [ ] decreased:   Pulmonary: Clear to Auscultation, No rales, No rhonchi, No wheezes     Cardiovascular: S1, S2, Regular rate and rhythm     Gastrointestinal: Soft, Non-tender, Non-distended     Extremities: No calf tenderness  Patient admitted for osseous mets with cord compression.     24 hour Events:     Stage 1:  T8 VCR, T6-T10 fusion for tumor, small csf leak primarily repaired  Stage 2: L4 Partial Corpectomy, L2-Pelvis Fusion  Plastics Closure ([place])Allergies  3/6- Patient complains of back pain and movement is limited 2/2 pain.  3/7- Overnight no acute events, patient's examination is stable.  3/8- No acute events overnight. Patient with fluctuating examination, due to pain.   3/9- No acute events overnight.   3/10 stable exam     Allergies    No Known Drug Allergies  latex (Rash)    Intolerances        REVIEW OF SYSTEMS: [ ] Unable to Assess due to neurologic exam   [X] All ROS addressed below are non-contributory, except:  Neuro: [ ] Headache [ ] Back pain [ ] Numbness [ ] Weakness [ ] Ataxia [ ] Dizziness [ ] Aphasia [ ] Dysarthria [ ] Visual disturbance  Resp: [ ] Shortness of breath/dyspnea, [ ] Orthopnea [ ] Cough  CV: [ ] Chest pain [ ] Palpitation [ ] Lightheadedness [ ] Syncope  Renal: [ ] Thirst [ ] Edema  GI: [ ] Nausea [ ] Emesis [ ] Abdominal pain [ ] Constipation [ ] Diarrhea  Hem: [ ] Hematemesis [ ] bright red blood per rectum  ID: [ ] Fever [ ] Chills [ ] Dysuria  ENT: [ ] Rhinorrhea      DEVICES:   [ ] Restraints [ ] ET tube [ ] central line [ ] arterial line [ ] grigsby [ ] NGT/OGT [ ] EVD [ ] LD [x] LILLIE/HMV, 3 hemovacs [ ] Trach [ ] PEG [x] bile bag     T(C): 37.1 (03-10-24 @ 07:00), Max: 37.6 (03-09-24 @ 23:00)  HR: 67 (03-10-24 @ 07:00) (67 - 93)  BP: --  RR: 16 (03-10-24 @ 07:00) (13 - 25)  SpO2: 100% (03-10-24 @ 07:00) (91% - 100%)  03-09-24 @ 06:01  -  03-10-24 @ 07:00  --------------------------------------------------------  IN: 375 mL / OUT: 2335 mL / NET: -1960 mL    acetaminophen     Tablet .. 1000 milliGRAM(s) Oral every 8 hours  bisacodyl 5 milliGRAM(s) Oral every 12 hours  chlorhexidine 4% Liquid 1 Application(s) Topical daily  enoxaparin Injectable 40 milliGRAM(s) SubCutaneous <User Schedule>  gabapentin 300 milliGRAM(s) Oral two times a day  HYDROmorphone  Injectable 0.25 milliGRAM(s) IV Push every 4 hours PRN  magnesium hydroxide Suspension 30 milliLiter(s) Oral every 12 hours PRN  methocarbamol 750 milliGRAM(s) Oral every 8 hours  multivitamin 1 Tablet(s) Oral daily  ondansetron Injectable 4 milliGRAM(s) IV Push every 6 hours PRN  oxyCODONE    IR 5 milliGRAM(s) Oral every 4 hours PRN  oxyCODONE    IR 10 milliGRAM(s) Oral every 4 hours PRN  polyethylene glycol 3350 17 Gram(s) Oral two times a day  senna 2 Tablet(s) Oral at bedtime        EXAMINATION:  PHYSICAL EXAM:    Constitutional: No Acute Distress     Neurological: Awake, alert oriented to person, place and time, Following Commands, PERRL, EOMI, No Gaze Preference, Face Symmetrical, Speech Fluent, No dysmetria, No ataxia, No nystagmus     Motor exam:          Upper extremity                         Delt     Bicep     Tricep    HG                                                 R         5/5        5/5        5/5       5/5                                               L          5/5        5/5        5/5       5/5          Lower extremity                        HF         KF        KE       DF         PF                                                  R        4/5        5/5        4/5       5/5         5/5                                               L         5/5        5/5       4/5       5/5          5/5                                                 Sensation: [X] intact to light touch  [ ] decreased:   Pulmonary: Clear to Auscultation, No rales, No rhonchi, No wheezes     Cardiovascular: S1, S2, Regular rate and rhythm     Gastrointestinal: Soft, Non-tender, Non-distended         LABS:  Na: 135 (03-08 @ 22:37)  K: 4.0 (03-08 @ 22:37)  Cl: 100 (03-08 @ 22:37)  CO2: 31 (03-08 @ 22:37)  BUN: 17 (03-08 @ 22:37)  Cr: 0.69 (03-08 @ 22:37)  Glu: 141(03-08 @ 22:37)    Hgb: 9.3 (03-08 @ 22:37), 9.5 (03-08 @ 04:10), 9.0 (03-07 @ 18:07)  Hct: 29.1 (03-08 @ 22:37), 30.2 (03-08 @ 04:10), 27.5 (03-07 @ 18:07)  WBC: 6.56 (03-08 @ 22:37), 6.85 (03-08 @ 04:10), 6.04 (03-07 @ 18:07)  Plt: 150 (03-08 @ 22:37), 141 (03-08 @ 04:10), 111 (03-07 @ 18:07)    INR:   PTT:           Extremities: No calf tenderness

## 2024-03-10 NOTE — PROGRESS NOTE ADULT - SUBJECTIVE AND OBJECTIVE BOX
Plastic Surgery Progress Note (pg [facility]: [id number], [facility]: [phone])    SUBJECTIVE  The patient was seen and examined. No acute events overnight.    OBJECTIVE  ___________________________________________________  VITAL SIGNS / I&O's   Vital Signs Last 24 Hrs  T(C): 37.1 (10 Mar 2024 07:00), Max: 37.6 (09 Mar 2024 23:00)  T(F): 98.7 (10 Mar 2024 07:00), Max: 99.7 (09 Mar 2024 23:00)  HR: 67 (10 Mar 2024 07:00) (67 - 93)  BP: --  BP(mean): --  RR: 16 (10 Mar 2024 07:00) (13 - 25)  SpO2: 100% (10 Mar 2024 07:00) (91% - 100%)    Parameters below as of 09 Mar 2024 19:00  Patient On (Oxygen Delivery Method): nasal cannula  O2 Flow (L/min): 2        09 Mar 2024 06:01  -  10 Mar 2024 07:00  --------------------------------------------------------  IN:    Oral Fluid: 375 mL  Total IN: 375 mL    OUT:    Accordian (mL): 15 mL    Accordian (mL): 75 mL    Accordian (mL): 160 mL    Drain (mL): 60 mL    Indwelling Catheter - Urethral (mL): 700 mL    Voided (mL): 1325 mL  Total OUT: 2335 mL    Total NET: -1960 mL        ___________________________________________________  PHYSICAL EXAM    -- CONSTITUTIONAL: NAD, lying in bed  -- NEURO: Awake, alert  PEDRO PABLO:  soft no collections  prineo in place cdi  JPs s/s     ___________________________________________________  LABS                        9.3    6.56  )-----------( 150      ( 08 Mar 2024 22:37 )             29.1     08 Mar 2024 22:37    135    |  100    |  17     ----------------------------<  141    4.0     |  31     |  0.69     Ca    8.0        08 Mar 2024 22:37  Phos  2.6       08 Mar 2024 22:37  Mg     1.7       08 Mar 2024 22:37        CAPILLARY BLOOD GLUCOSE            Urinalysis Basic - ( 08 Mar 2024 22:37 )    Color: x / Appearance: x / SG: x / pH: x  Gluc: 141 mg/dL / Ketone: x  / Bili: x / Urobili: x   Blood: x / Protein: x / Nitrite: x   Leuk Esterase: x / RBC: x / WBC x   Sq Epi: x / Non Sq Epi: x / Bacteria: x      ___________________________________________________  MICRO  Recent Cultures:  Specimen Source: Clean Catch Clean Catch (Midstream), 03-05 @ 01:58; Results   <10,000 CFU/mL Normal Urogenital Mari; Gram Stain: --; Organism: --    ___________________________________________________  MEDICATIONS  (STANDING):  acetaminophen     Tablet .. 1000 milliGRAM(s) Oral every 8 hours  bisacodyl 5 milliGRAM(s) Oral every 12 hours  chlorhexidine 4% Liquid 1 Application(s) Topical daily  enoxaparin Injectable 40 milliGRAM(s) SubCutaneous <User Schedule>  gabapentin 300 milliGRAM(s) Oral two times a day  methocarbamol 750 milliGRAM(s) Oral every 8 hours  multivitamin 1 Tablet(s) Oral daily  polyethylene glycol 3350 17 Gram(s) Oral two times a day  senna 2 Tablet(s) Oral at bedtime    MEDICATIONS  (PRN):  HYDROmorphone  Injectable 0.25 milliGRAM(s) IV Push every 4 hours PRN break through pain  magnesium hydroxide Suspension 30 milliLiter(s) Oral every 12 hours PRN Constipation  ondansetron Injectable 4 milliGRAM(s) IV Push every 6 hours PRN Nausea and/or Vomiting  oxyCODONE    IR 5 milliGRAM(s) Oral every 4 hours PRN Moderate Pain (4 - 6)  oxyCODONE    IR 10 milliGRAM(s) Oral every 4 hours PRN Severe Pain (7 - 10)

## 2024-03-10 NOTE — PROGRESS NOTE ADULT - ASSESSMENT
68M w/ hx of HLD, pre-DM, presenting with c/f diffuse osseous spinal mets w/ cord compression. and cord edema. Now s/p T8 VCR, T6-T10 fusion for tumor, small csf leak primarily repaired, L4 Partial Corpectomy, L2-Pelvis Fusion, PRS Closure.     Plan:      - Monitor drain outputs   - keep prineo  - Appreciate care per primary team and NSC     Aubrey Méndez MD  Plastic and Reconstructive Surgery, PGY3  Available on Microsoft Teams  [facility]: [id number], [facility]: [phone] [facility]: Green Team [id number]

## 2024-03-10 NOTE — PROGRESS NOTE ADULT - NS PANP COMMENT GEN_ALL_CORE FT
I agree with the note above and I independently examined the patient and agree with the above examination     68M w/ hx of HLD, pre-DM, presenting with c/f diffuse osseous spinal mets w/ cord compression. and cord edema  post   stage 1:  T8 VCR, T6-T10 fusion for tumor, small csf leak primarily repaired  Stage 2: L4 Partial Corpectomy, L2-Pelvis Fusion  Poorly Differentiated Neoplasm  neuro checks q 4 hr, 3 hemovac and bile bag ( RU 15cc, RL 75 cc, lL 160 cc, bile bag 60  ml output in 24 hr, managed by plastics)   PT/OT   follow official path and workup for primary malignancy , oncology consulted    2 L NC , Incentive spirometer   SBP goal  mmhg  CCD diet , Last BM today   IVL   lovenox chemoprophlaxis, cbc pending      40 independent minutes

## 2024-03-10 NOTE — PROGRESS NOTE ADULT - SUBJECTIVE AND OBJECTIVE BOX
Patient seen and examined at bedside.    --Anticoagulation--  enoxaparin Injectable 40 milliGRAM(s) SubCutaneous <User Schedule>    T(C): 37.3 (03-10-24 @ 03:00), Max: 37.6 (03-09-24 @ 23:00)  HR: 93 (03-10-24 @ 03:00) (67 - 93)  BP: --  RR: 19 (03-10-24 @ 03:00) (13 - 25)  SpO2: 98% (03-10-24 @ 03:00) (91% - 100%)  Wt(kg): --    PHYSICAL EXAM:    Constitutional: No Acute Distress     Neurological: Awake, alert oriented to person, place and time, Following Commands, PERRL, EOMI, No Gaze Preference, Face Symmetrical, Speech Fluent, No dysmetria, No ataxia, No nystagmus     Motor exam:          Upper extremity                         Delt     Bicep     Tricep    HG                                                 R         5/5        5/5        5/5       5/5                                               L          5/5        5/5        5/5       5/5          Lower extremity                        HF         KF        KE       DF         PF                                                  R        4/5        5/5        4/5       5/5         5/5                                               L         5/5        5/5       4/5       5/5          5/5                                                 Sensation: [X] intact to light touch  [ ] decreased:   Pulmonary: Clear to Auscultation, No rales, No rhonchi, No wheezes     Cardiovascular: S1, S2, Regular rate and rhythm     Gastrointestinal: Soft, Non-tender, Non-distended     Extremities: No calf tenderness

## 2024-03-11 LAB
ALBUMIN SERPL ELPH-MCNC: 2.4 G/DL — LOW (ref 3.3–5)
ALP SERPL-CCNC: 120 U/L — SIGNIFICANT CHANGE UP (ref 40–120)
ALT FLD-CCNC: 134 U/L — HIGH (ref 10–45)
ANION GAP SERPL CALC-SCNC: 7 MMOL/L — SIGNIFICANT CHANGE UP (ref 5–17)
ANION GAP SERPL CALC-SCNC: 9 MMOL/L — SIGNIFICANT CHANGE UP (ref 5–17)
AST SERPL-CCNC: 161 U/L — HIGH (ref 10–40)
BILIRUB DIRECT SERPL-MCNC: <0.1 MG/DL — SIGNIFICANT CHANGE UP (ref 0–0.3)
BILIRUB INDIRECT FLD-MCNC: >0.3 MG/DL — SIGNIFICANT CHANGE UP (ref 0.2–1)
BILIRUB SERPL-MCNC: 0.4 MG/DL — SIGNIFICANT CHANGE UP (ref 0.2–1.2)
BLD GP AB SCN SERPL QL: NEGATIVE — SIGNIFICANT CHANGE UP
BUN SERPL-MCNC: 18 MG/DL — SIGNIFICANT CHANGE UP (ref 7–23)
BUN SERPL-MCNC: 18 MG/DL — SIGNIFICANT CHANGE UP (ref 7–23)
CALCIUM SERPL-MCNC: 8.4 MG/DL — SIGNIFICANT CHANGE UP (ref 8.4–10.5)
CALCIUM SERPL-MCNC: 8.6 MG/DL — SIGNIFICANT CHANGE UP (ref 8.4–10.5)
CHLORIDE SERPL-SCNC: 98 MMOL/L — SIGNIFICANT CHANGE UP (ref 96–108)
CHLORIDE SERPL-SCNC: 99 MMOL/L — SIGNIFICANT CHANGE UP (ref 96–108)
CO2 SERPL-SCNC: 26 MMOL/L — SIGNIFICANT CHANGE UP (ref 22–31)
CO2 SERPL-SCNC: 30 MMOL/L — SIGNIFICANT CHANGE UP (ref 22–31)
CREAT SERPL-MCNC: 0.48 MG/DL — LOW (ref 0.5–1.3)
CREAT SERPL-MCNC: 0.65 MG/DL — SIGNIFICANT CHANGE UP (ref 0.5–1.3)
EGFR: 104 ML/MIN/1.73M2 — SIGNIFICANT CHANGE UP
EGFR: 114 ML/MIN/1.73M2 — SIGNIFICANT CHANGE UP
GLUCOSE BLDC GLUCOMTR-MCNC: 106 MG/DL — HIGH (ref 70–99)
GLUCOSE BLDC GLUCOMTR-MCNC: 108 MG/DL — HIGH (ref 70–99)
GLUCOSE BLDC GLUCOMTR-MCNC: 118 MG/DL — HIGH (ref 70–99)
GLUCOSE SERPL-MCNC: 106 MG/DL — HIGH (ref 70–99)
GLUCOSE SERPL-MCNC: 111 MG/DL — HIGH (ref 70–99)
HCT VFR BLD CALC: 29 % — LOW (ref 39–50)
HGB BLD-MCNC: 9.2 G/DL — LOW (ref 13–17)
MAGNESIUM SERPL-MCNC: 2.1 MG/DL — SIGNIFICANT CHANGE UP (ref 1.6–2.6)
MAGNESIUM SERPL-MCNC: 2.1 MG/DL — SIGNIFICANT CHANGE UP (ref 1.6–2.6)
MCHC RBC-ENTMCNC: 26.6 PG — LOW (ref 27–34)
MCHC RBC-ENTMCNC: 31.7 GM/DL — LOW (ref 32–36)
MCV RBC AUTO: 83.8 FL — SIGNIFICANT CHANGE UP (ref 80–100)
MRSA PCR RESULT.: SIGNIFICANT CHANGE UP
NRBC # BLD: 0 /100 WBCS — SIGNIFICANT CHANGE UP (ref 0–0)
PHOSPHATE SERPL-MCNC: 3.7 MG/DL — SIGNIFICANT CHANGE UP (ref 2.5–4.5)
PHOSPHATE SERPL-MCNC: 4 MG/DL — SIGNIFICANT CHANGE UP (ref 2.5–4.5)
PLATELET # BLD AUTO: 208 K/UL — SIGNIFICANT CHANGE UP (ref 150–400)
POTASSIUM SERPL-MCNC: 4.2 MMOL/L — SIGNIFICANT CHANGE UP (ref 3.5–5.3)
POTASSIUM SERPL-MCNC: 4.6 MMOL/L — SIGNIFICANT CHANGE UP (ref 3.5–5.3)
POTASSIUM SERPL-SCNC: 4.2 MMOL/L — SIGNIFICANT CHANGE UP (ref 3.5–5.3)
POTASSIUM SERPL-SCNC: 4.6 MMOL/L — SIGNIFICANT CHANGE UP (ref 3.5–5.3)
PROT SERPL-MCNC: 5.4 G/DL — LOW (ref 6–8.3)
RBC # BLD: 3.46 M/UL — LOW (ref 4.2–5.8)
RBC # FLD: 17.7 % — HIGH (ref 10.3–14.5)
RH IG SCN BLD-IMP: POSITIVE — SIGNIFICANT CHANGE UP
S AUREUS DNA NOSE QL NAA+PROBE: SIGNIFICANT CHANGE UP
SODIUM SERPL-SCNC: 134 MMOL/L — LOW (ref 135–145)
SODIUM SERPL-SCNC: 135 MMOL/L — SIGNIFICANT CHANGE UP (ref 135–145)
SURGICAL PATHOLOGY STUDY: SIGNIFICANT CHANGE UP
WBC # BLD: 5.92 K/UL — SIGNIFICANT CHANGE UP (ref 3.8–10.5)
WBC # FLD AUTO: 5.92 K/UL — SIGNIFICANT CHANGE UP (ref 3.8–10.5)

## 2024-03-11 PROCEDURE — 99222 1ST HOSP IP/OBS MODERATE 55: CPT

## 2024-03-11 PROCEDURE — 99233 SBSQ HOSP IP/OBS HIGH 50: CPT

## 2024-03-11 RX ORDER — GABAPENTIN 400 MG/1
400 CAPSULE ORAL EVERY 8 HOURS
Refills: 0 | Status: DISCONTINUED | OUTPATIENT
Start: 2024-03-11 | End: 2024-03-12

## 2024-03-11 RX ORDER — ACETAMINOPHEN 500 MG
1000 TABLET ORAL EVERY 6 HOURS
Refills: 0 | Status: DISCONTINUED | OUTPATIENT
Start: 2024-03-11 | End: 2024-03-16

## 2024-03-11 RX ADMIN — Medication 1 TABLET(S): at 11:16

## 2024-03-11 RX ADMIN — METHOCARBAMOL 750 MILLIGRAM(S): 500 TABLET, FILM COATED ORAL at 21:23

## 2024-03-11 RX ADMIN — ENOXAPARIN SODIUM 40 MILLIGRAM(S): 100 INJECTION SUBCUTANEOUS at 17:19

## 2024-03-11 RX ADMIN — OXYCODONE HYDROCHLORIDE 5 MILLIGRAM(S): 5 TABLET ORAL at 11:50

## 2024-03-11 RX ADMIN — Medication 1000 MILLIGRAM(S): at 14:35

## 2024-03-11 RX ADMIN — POLYETHYLENE GLYCOL 3350 17 GRAM(S): 17 POWDER, FOR SOLUTION ORAL at 05:00

## 2024-03-11 RX ADMIN — SODIUM CHLORIDE 2 GRAM(S): 9 INJECTION INTRAMUSCULAR; INTRAVENOUS; SUBCUTANEOUS at 13:02

## 2024-03-11 RX ADMIN — HYDROMORPHONE HYDROCHLORIDE 0.25 MILLIGRAM(S): 2 INJECTION INTRAMUSCULAR; INTRAVENOUS; SUBCUTANEOUS at 15:06

## 2024-03-11 RX ADMIN — SENNA PLUS 2 TABLET(S): 8.6 TABLET ORAL at 21:26

## 2024-03-11 RX ADMIN — SODIUM CHLORIDE 2 GRAM(S): 9 INJECTION INTRAMUSCULAR; INTRAVENOUS; SUBCUTANEOUS at 21:23

## 2024-03-11 RX ADMIN — METHOCARBAMOL 750 MILLIGRAM(S): 500 TABLET, FILM COATED ORAL at 05:00

## 2024-03-11 RX ADMIN — OXYCODONE HYDROCHLORIDE 5 MILLIGRAM(S): 5 TABLET ORAL at 03:30

## 2024-03-11 RX ADMIN — GABAPENTIN 400 MILLIGRAM(S): 400 CAPSULE ORAL at 13:03

## 2024-03-11 RX ADMIN — OXYCODONE HYDROCHLORIDE 5 MILLIGRAM(S): 5 TABLET ORAL at 03:00

## 2024-03-11 RX ADMIN — SODIUM CHLORIDE 2 GRAM(S): 9 INJECTION INTRAMUSCULAR; INTRAVENOUS; SUBCUTANEOUS at 05:00

## 2024-03-11 RX ADMIN — OXYCODONE HYDROCHLORIDE 5 MILLIGRAM(S): 5 TABLET ORAL at 20:17

## 2024-03-11 RX ADMIN — Medication 5 MILLIGRAM(S): at 11:26

## 2024-03-11 RX ADMIN — GABAPENTIN 400 MILLIGRAM(S): 400 CAPSULE ORAL at 21:23

## 2024-03-11 RX ADMIN — Medication 1000 MILLIGRAM(S): at 05:30

## 2024-03-11 RX ADMIN — Medication 1000 MILLIGRAM(S): at 14:01

## 2024-03-11 RX ADMIN — POLYETHYLENE GLYCOL 3350 17 GRAM(S): 17 POWDER, FOR SOLUTION ORAL at 17:19

## 2024-03-11 RX ADMIN — OXYCODONE HYDROCHLORIDE 5 MILLIGRAM(S): 5 TABLET ORAL at 20:50

## 2024-03-11 RX ADMIN — Medication 1000 MILLIGRAM(S): at 05:00

## 2024-03-11 RX ADMIN — GABAPENTIN 300 MILLIGRAM(S): 400 CAPSULE ORAL at 05:10

## 2024-03-11 RX ADMIN — OXYCODONE HYDROCHLORIDE 5 MILLIGRAM(S): 5 TABLET ORAL at 11:17

## 2024-03-11 RX ADMIN — HYDROMORPHONE HYDROCHLORIDE 0.25 MILLIGRAM(S): 2 INJECTION INTRAMUSCULAR; INTRAVENOUS; SUBCUTANEOUS at 15:36

## 2024-03-11 RX ADMIN — METHOCARBAMOL 750 MILLIGRAM(S): 500 TABLET, FILM COATED ORAL at 13:03

## 2024-03-11 NOTE — PROGRESS NOTE ADULT - SUBJECTIVE AND OBJECTIVE BOX
24 hour Events:     Stage 1:  T8 VCR, T6-T10 fusion for tumor, small csf leak primarily repaired  Stage 2: L4 Partial Corpectomy, L2-Pelvis Fusion  Plastics Closure ([place])Allergies  3/6- Patient complains of back pain and movement is limited 2/2 pain.  3/7- Overnight no acute events, patient's examination is stable.  3/8- No acute events overnight. Patient with fluctuating examination, due to pain.   3/9- No acute events overnight.   3/10 stable exam     overnight events: stable exam     Allergies    No Known Drug Allergies  latex (Rash)    Intolerances        REVIEW OF SYSTEMS: [ ] Unable to Assess due to neurologic exam   [X] All ROS addressed below are non-contributory, except:  Neuro: [ ] Headache [ ] Back pain [ ] Numbness [ ] Weakness [ ] Ataxia [ ] Dizziness [ ] Aphasia [ ] Dysarthria [ ] Visual disturbance  Resp: [ ] Shortness of breath/dyspnea, [ ] Orthopnea [ ] Cough  CV: [ ] Chest pain [ ] Palpitation [ ] Lightheadedness [ ] Syncope  Renal: [ ] Thirst [ ] Edema  GI: [ ] Nausea [ ] Emesis [ ] Abdominal pain [ ] Constipation [ ] Diarrhea  Hem: [ ] Hematemesis [ ] bright red blood per rectum  ID: [ ] Fever [ ] Chills [ ] Dysuria  ENT: [ ] Rhinorrhea      DEVICES:   [ ] Restraints [ ] ET tube [ ] central line [ ] arterial line [ ] grigsby [ ] NGT/OGT [ ] EVD [ ] LD [x] LILLIE/HMV, 3 hemovacs [ ] Trach [ ] PEG [x] bile bag       T(C): 37.1 (03-11-24 @ 07:00), Max: 37.1 (03-11-24 @ 07:00)  HR: 71 (03-11-24 @ 07:00) (68 - 80)  BP: 109/57 (03-11-24 @ 07:00) (109/57 - 130/68)  RR: 23 (03-11-24 @ 07:00) (13 - 23)  SpO2: 92% (03-11-24 @ 07:00) (92% - 99%)  03-10-24 @ 07:01  -  03-11-24 @ 07:00  --------------------------------------------------------  IN: 2470 mL / OUT: 2975 mL / NET: -505 mL    acetaminophen     Tablet .. 1000 milliGRAM(s) Oral every 8 hours  bisacodyl 5 milliGRAM(s) Oral every 12 hours  chlorhexidine 4% Liquid 1 Application(s) Topical daily  enoxaparin Injectable 40 milliGRAM(s) SubCutaneous <User Schedule>  gabapentin 300 milliGRAM(s) Oral two times a day  HYDROmorphone  Injectable 0.25 milliGRAM(s) IV Push every 4 hours PRN  magnesium hydroxide Suspension 30 milliLiter(s) Oral every 12 hours PRN  methocarbamol 750 milliGRAM(s) Oral every 8 hours  multivitamin 1 Tablet(s) Oral daily  ondansetron Injectable 4 milliGRAM(s) IV Push every 6 hours PRN  oxyCODONE    IR 10 milliGRAM(s) Oral every 4 hours PRN  oxyCODONE    IR 5 milliGRAM(s) Oral every 4 hours PRN  polyethylene glycol 3350 17 Gram(s) Oral two times a day  senna 2 Tablet(s) Oral at bedtime  sodium chloride 2 Gram(s) Oral every 8 hours      EXAMINATION:  PHYSICAL EXAM:    Constitutional: No Acute Distress     Neurological: Awake, alert oriented to person, place and time, Following Commands, PERRL, EOMI, No Gaze Preference, Face Symmetrical, Speech Fluent, No dysmetria, No ataxia, No nystagmus     Motor exam:          Upper extremity                         Delt     Bicep     Tricep    HG                                                 R         5/5        5/5        5/5       5/5                                               L          5/5        5/5        5/5       5/5          Lower extremity                        HF         KF        KE       DF         PF                                                  R        4/5        5/5        4/5       5/5         5/5                                               L         5/5        5/5       4/5       5/5          5/5                                                 Sensation: [X] intact to light touch  [ ] decreased:   Pulmonary: Clear to Auscultation, No rales, No rhonchi, No wheezes     Cardiovascular: S1, S2, Regular rate and rhythm     Gastrointestinal: Soft, Non-tender, Non-distended   Extremities: No calf tenderness       LABS:  Na: 135 (03-11 @ 03:45), 134 (03-10 @ 13:34), 135 (03-08 @ 22:37)  K: 4.2 (03-11 @ 03:45), 4.3 (03-10 @ 13:34), 4.0 (03-08 @ 22:37)  Cl: 98 (03-11 @ 03:45), 97 (03-10 @ 13:34), 100 (03-08 @ 22:37)  CO2: 30 (03-11 @ 03:45), 28 (03-10 @ 13:34), 31 (03-08 @ 22:37)  BUN: 18 (03-11 @ 03:45), 18 (03-10 @ 13:34), 17 (03-08 @ 22:37)  Cr: 0.65 (03-11 @ 03:45), 0.48 (03-10 @ 13:34), 0.69 (03-08 @ 22:37)  Glu: 106(03-11 @ 03:45), 130(03-10 @ 13:34), 141(03-08 @ 22:37)    Hgb: 9.2 (03-11 @ 03:45), 10.4 (03-10 @ 13:34), 9.3 (03-08 @ 22:37)  Hct: 29.0 (03-11 @ 03:45), 31.4 (03-10 @ 13:34), 29.1 (03-08 @ 22:37)  WBC: 5.92 (03-11 @ 03:45), 7.26 (03-10 @ 13:34), 6.56 (03-08 @ 22:37)  Plt: 208 (03-11 @ 03:45), 215 (03-10 @ 13:34), 150 (03-08 @ 22:37)    INR:   PTT:           A/P:  68M w/ hx of HLD, pre-DM, presenting with c/f diffuse osseous spinal mets w/ cord compression and cord edema POD 5 from   stage 1:  T8 VCR, T6-T10 fusion for tumor, small csf leak primarily repaired  Stage 2: L4 Partial Corpectomy, L2-Pelvis Fusion  Plastics Closure ([place])    Frozen: Poorly Differentiated Neoplasm    Neuro: neuro checks q 4 hr  No evidence of CSF leak   Monitor drain output righ upper 40 cc/24 hr , right lower 25 cc/24 hr left lower 120 cc/24 hr   ,  1 bile bag 40 cc/24 hr  Monitor drain outputs, may remove 1 drain later today per plastics   follow official path   oxyCODONE    IR 10 milliGRAM(s) Oral every 4 hours PRN  oxyCODONE    IR 5 milliGRAM(s) Oral every 4 hours PRN  increase neurontin 400 mg q 8 hr   tylrnol PRN   PT/OT/OOB    Respiratory:   RA   IS    CV: SBP  mmhg     Endocrine:   Pre- DM: A1c 6.0  goal euglycemia     GI: CCD  LBM: 3/10 continue stool softner   repeat LFT in the setting of standing tylenol     Renal: IVL  voiding freely        Heme/Onc:    hgb plt stable   negative PF4 AB  DVT ppx: SQL  3/5- LED - negative    ID: Afebrile, no leukocyte count       Social/Family: awaiting       Code Status: [x] Full Code [] DNR [] DNI [] Goals of Care:   Disposition: [] ICU [] Stroke Unit [] RCU []PCU [x]Floor [] Discharge Home

## 2024-03-11 NOTE — PROGRESS NOTE ADULT - SUBJECTIVE AND OBJECTIVE BOX
SUBJECTIVE: The patient was seen and examined. No acute events overnight.      Vital Signs Last 24 Hrs  T(C): 36.9 (11 Mar 2024 03:00), Max: 37 (10 Mar 2024 19:00)  T(F): 98.4 (11 Mar 2024 03:00), Max: 98.6 (10 Mar 2024 19:00)  HR: 75 (11 Mar 2024 03:00) (68 - 80)  BP: 123/64 (11 Mar 2024 03:00) (122/58 - 130/68)  BP(mean): 88 (11 Mar 2024 03:00) (84 - 91)  RR: 13 (11 Mar 2024 03:00) (13 - 20)  SpO2: 95% (11 Mar 2024 03:00) (95% - 99%)    Parameters below as of 11 Mar 2024 03:00  Patient On (Oxygen Delivery Method): room air        I&O's Detail    10 Mar 2024 07:01  -  11 Mar 2024 07:00  --------------------------------------------------------  IN:    Lactated Ringers Bolus: 1000 mL    Oral Fluid: 1470 mL  Total IN: 2470 mL    OUT:    Accordian (mL): 120 mL    Accordian (mL): 25 mL    Accordian (mL): 40 mL    Drain (mL): 40 mL    Incontinent per Collection Bag (mL): 2750 mL  Total OUT: 2975 mL    Total NET: -505 mL          Physical Exam:  CONSTITUTIONAL: NAD, lying in bed  NEURO: Awake, alert  BACK:  soft no collections  prineo in place cdi  [facility] s/s     LABS:                        9.2    5.92  )-----------( 208      ( 11 Mar 2024 03:45 )             29.0     03-11    135  |  98  |  18  ----------------------------<  106<H>  4.2   |  30  |  0.65    Ca    8.4      11 Mar 2024 03:45  Phos  3.7     03-11  Mg     2.1     03-11        Urinalysis Basic - ( 11 Mar 2024 03:45 )    Color: x / Appearance: x / SG: x / pH: x  Gluc: 106 mg/dL / Ketone: x  / Bili: x / Urobili: x   Blood: x / Protein: x / Nitrite: x   Leuk Esterase: x / RBC: x / WBC x   Sq Epi: x / Non Sq Epi: x / Bacteria: x        RADIOLOGY & ADDITIONAL STUDIES:

## 2024-03-11 NOTE — PROGRESS NOTE ADULT - SUBJECTIVE AND OBJECTIVE BOX
Patient seen and examined at bedside.    --Anticoagulation--  enoxaparin Injectable 40 milliGRAM(s) SubCutaneous <User Schedule>    T(C): 36.8 (03-10-24 @ 23:00), Max: 37.3 (03-10-24 @ 03:00)  HR: 68 (03-10-24 @ 23:00) (67 - 93)  BP: 122/58 (03-10-24 @ 23:00) (122/58 - 130/68)  RR: 14 (03-10-24 @ 23:00) (14 - 20)  SpO2: 98% (03-10-24 @ 23:00) (98% - 100%)  Wt(kg): --    Exam: AO3, BUE 5/5, L HF 5 KE 4 L PF/DF 5, R HF 4 KE 4 PF/DF 5/5

## 2024-03-11 NOTE — PROGRESS NOTE ADULT - ASSESSMENT
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 Back pain started in Jan, which progressed to RLE weakness in Feb., and now unable to walk w/o assistance since Sat. No bowel/bladder incontinence. 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 CT CAP + for lesions in 3rd rib, L pleura, mesenteric/retroperitoneal adenopathy, narrowed sigmoid colon. PLTs/Coags wnl. Pre-Op exam: AO3, BUE 5/5, L HF 4+/5, R HF and KE 4/5, decreased sensation from R hip to ankle and along L shin/toes, normoreflexic, rectal tone wnl at rest, below normal w/ squeeze, cannot ambulate w/o assistance.    Now s/p   -T8 VCR, T6-T10 fusion for tumor, small csf leak primarily repaired  -L4 Partial Corpectomy, L2-Pelvis Fusion  -Plastics Closure ([place])    Transfer to floor   Call Oncology in AM  Can sit up as tolerated   Official path pending   Per DMS, can work with PT but no bending/lifting/twisting  No steroids  Does not need brace per DMS

## 2024-03-11 NOTE — PROGRESS NOTE ADULT - ASSESSMENT
68M w/ hx of HLD, pre-DM, presenting with c/f diffuse osseous spinal mets w/ cord compression. and cord edema. Now s/p T8 VCR, T6-T10 fusion for tumor, small csf leak primarily repaired, L4 Partial Corpectomy, L2-Pelvis Fusion, PRS Closure.     Plan:      - Monitor drain outputs, may remove 1 drain later today  - keep prineo  - Appreciate care per primary team and NSC     Plastic Surgery   [phone]

## 2024-03-12 DIAGNOSIS — D64.9 ANEMIA, UNSPECIFIED: ICD-10-CM

## 2024-03-12 LAB
ALBUMIN SERPL ELPH-MCNC: 2.4 G/DL — LOW (ref 3.3–5)
ALP SERPL-CCNC: 106 U/L — SIGNIFICANT CHANGE UP (ref 40–120)
ALT FLD-CCNC: 113 U/L — HIGH (ref 10–45)
ANION GAP SERPL CALC-SCNC: 9 MMOL/L — SIGNIFICANT CHANGE UP (ref 5–17)
AST SERPL-CCNC: 98 U/L — HIGH (ref 10–40)
BILIRUB SERPL-MCNC: 0.3 MG/DL — SIGNIFICANT CHANGE UP (ref 0.2–1.2)
BUN SERPL-MCNC: 22 MG/DL — SIGNIFICANT CHANGE UP (ref 7–23)
CALCIUM SERPL-MCNC: 8.3 MG/DL — LOW (ref 8.4–10.5)
CHLORIDE SERPL-SCNC: 99 MMOL/L — SIGNIFICANT CHANGE UP (ref 96–108)
CO2 SERPL-SCNC: 28 MMOL/L — SIGNIFICANT CHANGE UP (ref 22–31)
CREAT SERPL-MCNC: 0.71 MG/DL — SIGNIFICANT CHANGE UP (ref 0.5–1.3)
EGFR: 101 ML/MIN/1.73M2 — SIGNIFICANT CHANGE UP
FIBRINOGEN PPP-MCNC: 720 MG/DL — HIGH (ref 200–445)
GLUCOSE SERPL-MCNC: 99 MG/DL — SIGNIFICANT CHANGE UP (ref 70–99)
HAV IGM SER-ACNC: SIGNIFICANT CHANGE UP
HBV CORE AB SER-ACNC: SIGNIFICANT CHANGE UP
HBV CORE IGM SER-ACNC: SIGNIFICANT CHANGE UP
HBV CORE IGM SER-ACNC: SIGNIFICANT CHANGE UP
HBV SURFACE AB SER-ACNC: <3 MIU/ML — LOW
HBV SURFACE AG SER-ACNC: SIGNIFICANT CHANGE UP
HCT VFR BLD CALC: 28.5 % — LOW (ref 39–50)
HCV AB S/CO SERPL IA: 0.04 S/CO — SIGNIFICANT CHANGE UP (ref 0–0.99)
HCV AB SERPL-IMP: SIGNIFICANT CHANGE UP
HGB BLD-MCNC: 9.6 G/DL — LOW (ref 13–17)
HIV 1+2 AB+HIV1 P24 AG SERPL QL IA: SIGNIFICANT CHANGE UP
LDH SERPL L TO P-CCNC: 464 U/L — HIGH (ref 50–242)
MAGNESIUM SERPL-MCNC: 2 MG/DL — SIGNIFICANT CHANGE UP (ref 1.6–2.6)
MCHC RBC-ENTMCNC: 27.7 PG — SIGNIFICANT CHANGE UP (ref 27–34)
MCHC RBC-ENTMCNC: 33.7 GM/DL — SIGNIFICANT CHANGE UP (ref 32–36)
MCV RBC AUTO: 82.1 FL — SIGNIFICANT CHANGE UP (ref 80–100)
NRBC # BLD: 0 /100 WBCS — SIGNIFICANT CHANGE UP (ref 0–0)
PHOSPHATE SERPL-MCNC: 3.6 MG/DL — SIGNIFICANT CHANGE UP (ref 2.5–4.5)
PLATELET # BLD AUTO: 241 K/UL — SIGNIFICANT CHANGE UP (ref 150–400)
POTASSIUM SERPL-MCNC: 4 MMOL/L — SIGNIFICANT CHANGE UP (ref 3.5–5.3)
POTASSIUM SERPL-SCNC: 4 MMOL/L — SIGNIFICANT CHANGE UP (ref 3.5–5.3)
PROT SERPL-MCNC: 4.8 G/DL — LOW (ref 6–8.3)
RBC # BLD: 3.47 M/UL — LOW (ref 4.2–5.8)
RBC # FLD: 17.5 % — HIGH (ref 10.3–14.5)
SODIUM SERPL-SCNC: 136 MMOL/L — SIGNIFICANT CHANGE UP (ref 135–145)
URATE SERPL-MCNC: 4.5 MG/DL — SIGNIFICANT CHANGE UP (ref 3.4–8.8)
WBC # BLD: 7.5 K/UL — SIGNIFICANT CHANGE UP (ref 3.8–10.5)
WBC # FLD AUTO: 7.5 K/UL — SIGNIFICANT CHANGE UP (ref 3.8–10.5)

## 2024-03-12 PROCEDURE — 88291 CYTO/MOLECULAR REPORT: CPT

## 2024-03-12 PROCEDURE — 88189 FLOWCYTOMETRY/READ 16 & >: CPT

## 2024-03-12 PROCEDURE — 99233 SBSQ HOSP IP/OBS HIGH 50: CPT

## 2024-03-12 PROCEDURE — 99223 1ST HOSP IP/OBS HIGH 75: CPT

## 2024-03-12 PROCEDURE — 93010 ELECTROCARDIOGRAM REPORT: CPT

## 2024-03-12 PROCEDURE — 99222 1ST HOSP IP/OBS MODERATE 55: CPT

## 2024-03-12 RX ORDER — HYDROMORPHONE HYDROCHLORIDE 2 MG/ML
0.25 INJECTION INTRAMUSCULAR; INTRAVENOUS; SUBCUTANEOUS EVERY 4 HOURS
Refills: 0 | Status: DISCONTINUED | OUTPATIENT
Start: 2024-03-12 | End: 2024-03-14

## 2024-03-12 RX ORDER — CHLORHEXIDINE GLUCONATE 213 G/1000ML
1 SOLUTION TOPICAL
Refills: 0 | Status: DISCONTINUED | OUTPATIENT
Start: 2024-03-12 | End: 2024-03-16

## 2024-03-12 RX ORDER — OXYCODONE HYDROCHLORIDE 5 MG/1
10 TABLET ORAL EVERY 4 HOURS
Refills: 0 | Status: DISCONTINUED | OUTPATIENT
Start: 2024-03-12 | End: 2024-03-16

## 2024-03-12 RX ORDER — GABAPENTIN 400 MG/1
600 CAPSULE ORAL EVERY 8 HOURS
Refills: 0 | Status: DISCONTINUED | OUTPATIENT
Start: 2024-03-12 | End: 2024-03-16

## 2024-03-12 RX ORDER — OXYCODONE HYDROCHLORIDE 5 MG/1
5 TABLET ORAL EVERY 4 HOURS
Refills: 0 | Status: DISCONTINUED | OUTPATIENT
Start: 2024-03-12 | End: 2024-03-16

## 2024-03-12 RX ADMIN — Medication 1 TABLET(S): at 13:21

## 2024-03-12 RX ADMIN — SODIUM CHLORIDE 2 GRAM(S): 9 INJECTION INTRAMUSCULAR; INTRAVENOUS; SUBCUTANEOUS at 05:31

## 2024-03-12 RX ADMIN — METHOCARBAMOL 750 MILLIGRAM(S): 500 TABLET, FILM COATED ORAL at 13:21

## 2024-03-12 RX ADMIN — METHOCARBAMOL 750 MILLIGRAM(S): 500 TABLET, FILM COATED ORAL at 05:31

## 2024-03-12 RX ADMIN — SENNA PLUS 2 TABLET(S): 8.6 TABLET ORAL at 22:05

## 2024-03-12 RX ADMIN — GABAPENTIN 400 MILLIGRAM(S): 400 CAPSULE ORAL at 13:23

## 2024-03-12 RX ADMIN — OXYCODONE HYDROCHLORIDE 10 MILLIGRAM(S): 5 TABLET ORAL at 16:49

## 2024-03-12 RX ADMIN — GABAPENTIN 600 MILLIGRAM(S): 400 CAPSULE ORAL at 22:06

## 2024-03-12 RX ADMIN — CHLORHEXIDINE GLUCONATE 1 APPLICATION(S): 213 SOLUTION TOPICAL at 21:46

## 2024-03-12 RX ADMIN — OXYCODONE HYDROCHLORIDE 10 MILLIGRAM(S): 5 TABLET ORAL at 08:55

## 2024-03-12 RX ADMIN — SODIUM CHLORIDE 2 GRAM(S): 9 INJECTION INTRAMUSCULAR; INTRAVENOUS; SUBCUTANEOUS at 21:47

## 2024-03-12 RX ADMIN — OXYCODONE HYDROCHLORIDE 5 MILLIGRAM(S): 5 TABLET ORAL at 01:20

## 2024-03-12 RX ADMIN — GABAPENTIN 400 MILLIGRAM(S): 400 CAPSULE ORAL at 05:31

## 2024-03-12 RX ADMIN — OXYCODONE HYDROCHLORIDE 10 MILLIGRAM(S): 5 TABLET ORAL at 09:25

## 2024-03-12 RX ADMIN — METHOCARBAMOL 750 MILLIGRAM(S): 500 TABLET, FILM COATED ORAL at 21:47

## 2024-03-12 RX ADMIN — OXYCODONE HYDROCHLORIDE 10 MILLIGRAM(S): 5 TABLET ORAL at 17:20

## 2024-03-12 RX ADMIN — OXYCODONE HYDROCHLORIDE 5 MILLIGRAM(S): 5 TABLET ORAL at 00:52

## 2024-03-12 RX ADMIN — SODIUM CHLORIDE 2 GRAM(S): 9 INJECTION INTRAMUSCULAR; INTRAVENOUS; SUBCUTANEOUS at 13:21

## 2024-03-12 RX ADMIN — ENOXAPARIN SODIUM 40 MILLIGRAM(S): 100 INJECTION SUBCUTANEOUS at 17:11

## 2024-03-12 NOTE — CONSULT NOTE ADULT - SUBJECTIVE AND OBJECTIVE BOX
Saturnino Solis  68M, no major pmh, p/w difficulty ambulating since Saturday and OSH MRI report c/f diffuse osseous spinal mets w/ cord compression. Back pain started in Jan, which progressed to RLE weakness in Feb., and now unable to walk w/o assistance since Sat. No bowel/bladder incontinence. 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 CT CAP + for lesions in 3rd rib, L pleura, mesenteric/retroperitoneal adenopathy, narrowed sigmoid colon. PLTs/Coags wnl. Exam: AO3, BUE 5/5, L HF 4+/5, R HF and KE 4/5, decreased sensation from R hip to ankle and along L shin/toes, normoreflexic, rectal tone wnl at rest, below normal w/ squeeze, cannot ambulate w/o assistance.    --Anticoagulation--    T(C): 36.6 (03-04-24 @ 17:20), Max: 36.6 (03-04-24 @ 17:20)  HR: 75 (03-05-24 @ 02:00) (68 - 81)  BP: 144/85 (03-05-24 @ 02:00) (131/86 - 156/96)  RR: 17 (03-05-24 @ 01:45) (17 - 20)  SpO2: 97% (03-05-24 @ 01:45) (95% - 98%)  Wt(kg): --    Exam: AO3, BUE 5/5, L HF 4+/5, R HF and KE 4/5, decreased sensation from R hip to ankle and along L shin/toes, normoreflexic, rectal tone wnl at rest, below normal w/ squeeze, cannot ambulate w/o assistance.  
HPI:  68M w/ hx of HLD, pre-DM, sinus john, presenting with worsening LE numbness/paresthesias and weakness since Jan 2024. Patient reports in the beginning of January he began developing some lower extremity numbness and paresthesias, underwent outpatient x-rays and eventually recent MRI on 2/29/24 showing concern for osseous metastatic disease in thoracic/lumbar/sacral spine as well as extension of soft tissue into the paravertebral soft tissues more prominent on the right side. Patient has an appointment with oncology (Dr. Gage Rodriguez from [facility]) but has not met him yet for evaluation. Since having the MRI on Thursday, 2/29/24, over this weekend since Saturday, 3/2/2024 patient has had a notable decline in functional status that was complicated by 2 falls where his legs just gave out. No LOC. Pt was walking independently last week and now requires a walker to walk even several steps. Patient reports having back pain, numbness and weakness RLE>LLE and soreness/aching of legs.  Patient denies f/c/n/v, CP, SOB, abdominal pain, saddle anesthesia, bladder or bowel incontinence or retention, night sweats. Endorsed noticing weight loss, but he is unsure how much as he does not typically weight himself. No known family hx of cancer. Last colonoscopy was in 2017 without significant findings.    In ED: VSS. Initial labs grossly unremarkable. CT imaging and MRI spine (prelim report) notable for multiple findings including diffuse osseous metastatic disease throughout the entire spine; pathologic fx w/ tumor into the epidural space at T8 resulting in thoracic cord compression; tumor extension into the epidural space at L3 resulting in cauda equina compression; pathologic fx w/ tumor in the epidural space at L4 contributing to severe canal stenosis; tumor within the bilateral psoas muscles at L3 and L4; bilateral neural foramen effacement by tumor in L-spine; 3rd rib lesion, b/l lung pleura lesions; mesenteric and retroperitoneal adenopathy; large fecal burden, narrowing versus underdistention in the sigmoid colon. Seen by NSGY. Received morphine 4mg IVP x1, dexamethasone 10mg IVP x1 and started on dexamethasone 4mg IV q6h. Admitted to Medicine for further management. (05 Mar 2024 05:40)      Allergies    No Known Drug Allergies  latex (Rash)    Intolerances        MEDICATIONS  (STANDING):  chlorhexidine 2% Cloths 1 Application(s) Topical <User Schedule>  enoxaparin Injectable 40 milliGRAM(s) SubCutaneous <User Schedule>  gabapentin 600 milliGRAM(s) Oral every 8 hours  methocarbamol 750 milliGRAM(s) Oral every 8 hours  multivitamin 1 Tablet(s) Oral daily  polyethylene glycol 3350 17 Gram(s) Oral two times a day  senna 2 Tablet(s) Oral at bedtime  sodium chloride 2 Gram(s) Oral every 8 hours    MEDICATIONS  (PRN):  acetaminophen     Tablet .. 1000 milliGRAM(s) Oral every 6 hours PRN Temp greater or equal to 38C (100.4F), Mild Pain (1 - 3)  HYDROmorphone  Injectable 0.25 milliGRAM(s) IV Push every 4 hours PRN break through pain  magnesium hydroxide Suspension 30 milliLiter(s) Oral every 12 hours PRN Constipation  ondansetron Injectable 4 milliGRAM(s) IV Push every 6 hours PRN Nausea and/or Vomiting  oxyCODONE    IR 5 milliGRAM(s) Oral every 4 hours PRN Moderate Pain (4 - 6)  oxyCODONE    IR 10 milliGRAM(s) Oral every 4 hours PRN Severe Pain (7 - 10)      PAST MEDICAL & SURGICAL HISTORY:  HLD (hyperlipidemia)      Prediabetes      Sinus bradycardia      History of appendectomy      History of arthroscopic knee surgery          FAMILY HISTORY:  FH: dementia (Mother)    FH: arthritis (Mother)    Family history of cardiac pacemaker (Father)        SOCIAL HISTORY: No alcohol, tobacco, or drug use history    REVIEW OF SYSTEMS:  CONSTITUTIONAL: no fever  EYES/ENT: No visual changes; no throat pain  NECK: No pain or stiffness  RESPIRATORY: no SOB or cough  CARDIOVASCULAR: No chest pain or palpitations  GASTROINTESTINAL: No abdominal pain. No N/V/D/C  GENITOURINARY: No dysuria, change in frequency, or hematuria  NEUROLOGICAL: No numbness or focal weakness  SKIN: No itching, burning, rashes, or lesions  Psych: No depression  MSK: has back pain  Allergy: no urticaria        T(F): 98 (03-12-24 @ 16:30), Max: 99.3 (03-12-24 @ 01:06)  HR: 83 (03-12-24 @ 16:30)  BP: 119/83 (03-12-24 @ 16:30)  RR: 18 (03-12-24 @ 16:30)  SpO2: 94% (03-12-24 @ 16:30)  Wt(kg): --    GENERAL: NAD  Pulm: no increased WOB, CTAB/L  CV: RRR, S1, S2, no m/g/r  ABDOMEN: soft, NT, ND,  MSK: 3 surgical drains w/ sanguinous fluid  EXTREMITIES: no appreciable edema in b/l LE  Neuro: A&Ox3, no focal deficits  SKIN: warm and dry, no visible rash                          9.6    7.50  )-----------( 241      ( 12 Mar 2024 05:05 )             28.5       03-12    136  |  99  |  22  ----------------------------<  99  4.0   |  28  |  0.71    Ca    8.3<L>      12 Mar 2024 05:04  Phos  3.6     03-12  Mg     2.0     03-12    TPro  4.8<L>  /  Alb  2.4<L>  /  TBili  0.3  /  DBili  x   /  AST  98<H>  /  ALT  113<H>  /  AlkPhos  106  03-12      Magnesium: 2.0 mg/dL (03-12 @ 05:04)  Phosphorus: 3.6 mg/dL (03-12 @ 05:04)  Uric Acid: 4.5 mg/dL (03-12 @ 05:04)  Lactate Dehydrogenase, Serum: 464 U/L (03-12 @ 05:04)        
HPI:  68M w/ hx of HLD, pre-DM, sinus john, presenting with worsening LE numbness/paresthesias and weakness since Jan 2024. Patient reports in the beginning of January he began developing some lower extremity numbness and paresthesias, underwent outpatient x-rays and eventually recent MRI on 2/29/24 showing concern for osseous metastatic disease in thoracic/lumbar/sacral spine as well as extension of soft tissue into the paravertebral soft tissues more prominent on the right side. Patient has an appointment with oncology (Dr. Gage Rodriguez from [facility]) but has not met him yet for evaluation. Since having the MRI on Thursday, 2/29/24, over this weekend since Saturday, 3/2/2024 patient has had a notable decline in functional status that was complicated by 2 falls where his legs just gave out. No LOC. Pt was walking independently last week and now requires a walker to walk even several steps. Patient reports having back pain, numbness and weakness RLE>LLE and soreness/aching of legs.  Patient denies f/c/n/v, CP, SOB, abdominal pain, saddle anesthesia, bladder or bowel incontinence or retention, night sweats. Endorsed noticing weight loss, but he is unsure how much as he does not typically weight himself. No known family hx of cancer. Last colonoscopy was in 2017 without significant findings.    In ED: VSS. Initial labs grossly unremarkable. CT imaging and MRI spine (prelim report) notable for multiple findings including diffuse osseous metastatic disease throughout the entire spine; pathologic fx w/ tumor into the epidural space at T8 resulting in thoracic cord compression; tumor extension into the epidural space at L3 resulting in cauda equina compression; pathologic fx w/ tumor in the epidural space at L4 contributing to severe canal stenosis; tumor within the bilateral psoas muscles at L3 and L4; bilateral neural foramen effacement by tumor in L-spine; 3rd rib lesion, b/l lung pleura lesions; mesenteric and retroperitoneal adenopathy; large fecal burden, narrowing versus underdistention in the sigmoid colon. Seen by NSGY. Received morphine 4mg IVP x1, dexamethasone 10mg IVP x1 and started on dexamethasone 4mg IV q6h. Admitted to Medicine for further management. (05 Mar 2024 05:40)    Patient was admitted on 3/5, s/p T8 VCR, T6-T10 fusion for tumor, small csf leak primarily repaired  L4 Partial Corpectomy, L2-Pelvis Fusion, seen today, has some back pain.     REVIEW OF SYSTEMS  Denies chest pain, SOB, N/V, F/C, abdominal pain     VITALS  T(C): 37.1 (03-11-24 @ 07:00), Max: 37.1 (03-11-24 @ 07:00)  HR: 71 (03-11-24 @ 07:00) (68 - 80)  BP: 109/57 (03-11-24 @ 07:00) (109/57 - 130/68)  RR: 23 (03-11-24 @ 07:00) (13 - 23)  SpO2: 92% (03-11-24 @ 07:00) (92% - 99%)  Wt(kg): --    PAST MEDICAL & SURGICAL HISTORY  HLD (hyperlipidemia)    Prediabetes    Sinus bradycardia    History of appendectomy    History of arthroscopic knee surgery        FUNCTIONAL HISTORY  Lives with adult children, he is a , 4 SLAVA, can stay on first floor    Independent ADLs, started using walker recently     CURRENT FUNCTIONAL STATUS  PT  3/10  transfers min assist with RW  gait CG with RW x 10 feet  sitting rest     OT 3/8  bed mobility mod assist x 2  transfers mod assist x 2  LB dressing max assist     RECENT LABS/IMAGING  CBC Full  -  ( 11 Mar 2024 03:45 )  WBC Count : 5.92 K/uL  RBC Count : 3.46 M/uL  Hemoglobin : 9.2 g/dL  Hematocrit : 29.0 %  Platelet Count - Automated : 208 K/uL  Mean Cell Volume : 83.8 fl  Mean Cell Hemoglobin : 26.6 pg  Mean Cell Hemoglobin Concentration : 31.7 gm/dL  Auto Neutrophil # : x  Auto Lymphocyte # : x  Auto Monocyte # : x  Auto Eosinophil # : x  Auto Basophil # : x  Auto Neutrophil % : x  Auto Lymphocyte % : x  Auto Monocyte % : x  Auto Eosinophil % : x  Auto Basophil % : x    03-11    135  |  98  |  18  ----------------------------<  106<H>  4.2   |  30  |  0.65    Ca    8.4      11 Mar 2024 03:45  Phos  3.7     03-11  Mg     2.1     03-11      Urinalysis Basic - ( 11 Mar 2024 03:45 )    Color: x / Appearance: x / SG: x / pH: x  Gluc: 106 mg/dL / Ketone: x  / Bili: x / Urobili: x   Blood: x / Protein: x / Nitrite: x   Leuk Esterase: x / RBC: x / WBC x   Sq Epi: x / Non Sq Epi: x / Bacteria: x    < from: CT Pelvis Bony Only No Cont (03.06.24 @ 11:37) >    IMPRESSION:    Status post instrumented pelvic lumbar pelvic spinal fusion as above.   Pathologic moderate compression deformity of the L4 vertebral body,   unchanged. Additional area of metastatic disease within the right   hemisacrum is better seen on previous MRI.    < end of copied text >    < from: CT Lumbar Spine No Cont (03.06.24 @ 11:37) >      IMPRESSION:    1.  THORACIC SPINE:   T8 corpectomy with placement of a spacing device.   T6-T10 posterior stabilization. Routine postoperative appearance    2.  LUMBAR SPINE:   L2-S2 posterior stabilization. Routine postoperative   appearance      < end of copied text >    < from: MR Lumbar Spine w/wo IV Cont (03.05.24 @ 04:34) >      IMPRESSION:    1. THORACIC SPINE:   Diffuse osseous metastases. T8 pathologic burst   fracture with high grade epidural disease causing focal cord deformity   and cord edema    2. LUMBAR SPINE:   Diffuse osseous metastases. L4 superior endplate   pathologic fracture. High-grade epidural disease L3 and L4 vertebral   levels including contiguous involvement of the central canal neural   foramina paraspinal soft tissues    3. See separate body CT report for additional findings      < end of copied text >      ALLERGIES  No Known Drug Allergies  latex (Rash)      MEDICATIONS   acetaminophen     Tablet .. 1000 milliGRAM(s) Oral every 8 hours  bisacodyl 5 milliGRAM(s) Oral every 12 hours  chlorhexidine 4% Liquid 1 Application(s) Topical daily  enoxaparin Injectable 40 milliGRAM(s) SubCutaneous <User Schedule>  gabapentin 300 milliGRAM(s) Oral two times a day  HYDROmorphone  Injectable 0.25 milliGRAM(s) IV Push every 4 hours PRN  magnesium hydroxide Suspension 30 milliLiter(s) Oral every 12 hours PRN  methocarbamol 750 milliGRAM(s) Oral every 8 hours  multivitamin 1 Tablet(s) Oral daily  ondansetron Injectable 4 milliGRAM(s) IV Push every 6 hours PRN  oxyCODONE    IR 10 milliGRAM(s) Oral every 4 hours PRN  oxyCODONE    IR 5 milliGRAM(s) Oral every 4 hours PRN  polyethylene glycol 3350 17 Gram(s) Oral two times a day  senna 2 Tablet(s) Oral at bedtime  sodium chloride 2 Gram(s) Oral every 8 hours      ----------------------------------------------------------------------------------------  PHYSICAL EXAM  Constitutional - NAD, Comfortable, in bed   Chest - Breathing comfortably  Cardiovascular - S1S2   Abdomen - Soft   Extremities - No C/C/E, No calf tenderness   Neurologic Exam -    follows commands                 Cognitive - Awake, Alert, AAO to self, place, date, year, situation     Communication - Fluent, No dysarthria        Motor -                     LEFT    UE - ShAB 5/5, EF 5/5, EE 5/5, WE 5/5,  5/5                    RIGHT UE - ShAB 5/5, EF 5/5, EE 5/5, WE 5/5,  5/5                    LEFT    LE - HF 5/5, KE 5/5, DF 5/5, PF 5/5                    RIGHT LE - HF 3/5, KE 3/5, DF 5/5, PF 5/5        Sensory - decreased RLE      Psychiatric - Mood stable, Affect WNL  ----------------------------------------------------------------------------------------  ASSESSMENT/PLAN  68yMale with functional deficits after thoracic/lumbar fusion  MRI with diffuse osseous mets, pathologic fractures, T8, L4, epidural cord compression  pathology, oncology pending    Pain - Tylenol dilaudid, oxycodone, methocarbamol gabapentin   DVT PPX - SCDs lovenox   Rehab - Will continue to follow for ongoing rehab needs and recommendations.   continue bedside therapy, PT/OT    Recommend ACUTE inpatient rehabilitation for the functional deficits consisting of 3 hours of therapy/day & x 4 weeks, 24 hour RN/daily PMR physician for comorbid medical management. Patient will be able to tolerate 3 hours a day.   
Chief Complaint:  Patient is a 68y old  Male who presents with a chief complaint of malignant cord compression/cauda equina syndrome (12 Mar 2024 08:29)    HPI:  68M w/ hx of HLD, pre-DM, sinus john    Admitted w/ worsening LE numbness/paresthesias and weakness since Jan 2024. MRI 2/29/24 showed concern for osseous metastatic disease in thoracic/lumbar/sacral spine as well as extension of soft tissue into the paravertebral soft tissues more prominent on the right side. Since 3/2/2024 patient had a notable decline in functional status that was complicated by 2 falls where his legs just gave out. Pt was walking independently last week and now requires a walker to walk even several steps. Patient reports having back pain, numbness and weakness RLE>LLE and soreness/aching of legs.     In ED - CT imaging and MRI spine (prelim report) notable for multiple findings including diffuse osseous metastatic disease throughout the entire spine; pathologic fx w/ tumor into the epidural space at T8 resulting in thoracic cord compression; tumor extension into the epidural space at L3 resulting in cauda equina compression; pathologic fx w/ tumor in the epidural space at L4 contributing to severe canal stenosis; tumor within the bilateral psoas muscles at L3 and L4; bilateral neural foramen effacement by tumor in L-spine; 3rd rib lesion, b/l lung pleura lesions; mesenteric and retroperitoneal adenopathy; large fecal burden, narrowing versus underdistention in the sigmoid colon.     3/6 s/p Stage 1:  T8 VCR, T6-T10 fusion for tumor, small csf leak primarily repaired  Stage 2: L4 Partial Corpectomy, L2-Pelvis Fusion  Plastics Closure ([place])    Current out- patient pain regimen: None    Out Patient Pain Management provider: None    [facility] Prescription Monitoring Program: Reference #[id number]    Opioid Risk Tool (ORT-OUD) Score: Low    Pain Score: 10/10 w/ movement    Patient seen lying in bed, appears comfortable, reports good effect w/ Oxycodone. Acknowledged that he had been avoiding opioids, thus resulting in uncontrolled pain. Lengthy discussion re: acute pain and need for short term opioids and risks of uncontrolled pain decreasing mobility and associated risks. Pt expressed understanding. Pain is mostly in the back at op site and also endorses some paresthesias right thigh and knee. Educated re: goal of pain management to find effective oral regimen so that pain is tolerable and patient can function.    REVIEW OF SYSTEMS:  CONSTITUTIONAL: No fever, weight loss  NEURO: No headaches, memory loss, loss of strength, tremors, dizziness or blurred vision  RESP: No shortness of breath, cough  CV: No chest pain, palpitations  GI: No abdominal pain, nausea, vomiting, diarrhea, constipation, incontinence  : No urinary incontinence/retention  MSK: No joint pain or swelling; No upper or lower motor strength weakness  SKIN: No itching, burning, rashes, or lesions   PSYCHIATRIC: No depression, anxiety, mood swings, or difficulty sleeping      PHYSICAL EXAM  GENERAL: Seen at bedside, NAD, well-groomed, well-developed, appears stated age, no signs of toxicity  NEURO: Alert & Oriented X3, Good concentration; Follows commands  HEENT: Head atraumatic, normocephalic; speech clear and fluent  GI: Appetite good,  (+)BM  : Voiding   EXTREMITIES: moving all extremities  SKIN: No rashes or lesions  PSYCH: affect normal; good eye contact; no signs of depression or anxiety    PAST MEDICAL & SURGICAL HISTORY:  HLD (hyperlipidemia)      Prediabetes      Sinus bradycardia      History of appendectomy      History of arthroscopic knee surgery          FAMILY HISTORY:  FH: dementia (Mother)    FH: arthritis (Mother)    Family history of cardiac pacemaker (Father)        Allergies    No Known Drug Allergies  latex (Rash)      MEDICATIONS  (STANDING):  enoxaparin Injectable 40 milliGRAM(s) SubCutaneous <User Schedule>  gabapentin 400 milliGRAM(s) Oral every 8 hours  methocarbamol 750 milliGRAM(s) Oral every 8 hours  multivitamin 1 Tablet(s) Oral daily  polyethylene glycol 3350 17 Gram(s) Oral two times a day  senna 2 Tablet(s) Oral at bedtime  sodium chloride 2 Gram(s) Oral every 8 hours    MEDICATIONS  (PRN):  acetaminophen     Tablet .. 1000 milliGRAM(s) Oral every 6 hours PRN Temp greater or equal to 38C (100.4F), Mild Pain (1 - 3)  HYDROmorphone  Injectable 0.25 milliGRAM(s) IV Push every 4 hours PRN break through pain  magnesium hydroxide Suspension 30 milliLiter(s) Oral every 12 hours PRN Constipation  ondansetron Injectable 4 milliGRAM(s) IV Push every 6 hours PRN Nausea and/or Vomiting  oxyCODONE    IR 10 milliGRAM(s) Oral every 4 hours PRN Severe Pain (7 - 10)  oxyCODONE    IR 5 milliGRAM(s) Oral every 4 hours PRN Moderate Pain (4 - 6)      Vital Signs:  T(C): 36.9 (03-12-24 @ 09:03)  HR: 76 (03-12-24 @ 09:03)  BP: 135/78 (03-12-24 @ 09:03)  RR: 20 (03-12-24 @ 09:03)  SpO2: 93% (03-12-24 @ 09:03)    Pertinent labs/radiology:  Reviewed                          9.6    7.50  )-----------( 241      ( 12 Mar 2024 05:05 )             28.5       03-12    136  |  99  |  22  ----------------------------<  99  4.0   |  28  |  0.71    Ca    8.3<L>      12 Mar 2024 05:04  Phos  3.6     03-12  Mg     2.0     03-12    TPro  4.8<L>  /  Alb  2.4<L>  /  TBili  0.3  /  DBili  x   /  AST  98<H>  /  ALT  113<H>  /  AlkPhos  106  03-12

## 2024-03-12 NOTE — PROGRESS NOTE ADULT - SUBJECTIVE AND OBJECTIVE BOX
Ashley Zuniga MD  Division of Hospital Medicine  [facility]  Spectra: [phone]      Patient is a 68y old  Male who presents with a chief complaint of malignant cord compression/cauda equina syndrome (12 Mar 2024 12:38)      SUBJECTIVE / OVERNIGHT EVENTS: no acute events overnight. no fever, chills, dizziness, chest pain, nor dyspnea. post-op pain relatively controlled. had large volume diarrhea earlier today in setting of enema given day prior in [facility]U but otherwise doing well.  ADDITIONAL REVIEW OF SYSTEMS:    MEDICATIONS  (STANDING):  enoxaparin Injectable 40 milliGRAM(s) SubCutaneous <User Schedule>  gabapentin 400 milliGRAM(s) Oral every 8 hours  methocarbamol 750 milliGRAM(s) Oral every 8 hours  multivitamin 1 Tablet(s) Oral daily  polyethylene glycol 3350 17 Gram(s) Oral two times a day  senna 2 Tablet(s) Oral at bedtime  sodium chloride 2 Gram(s) Oral every 8 hours    MEDICATIONS  (PRN):  acetaminophen     Tablet .. 1000 milliGRAM(s) Oral every 6 hours PRN Temp greater or equal to 38C (100.4F), Mild Pain (1 - 3)  HYDROmorphone  Injectable 0.25 milliGRAM(s) IV Push every 4 hours PRN break through pain  magnesium hydroxide Suspension 30 milliLiter(s) Oral every 12 hours PRN Constipation  ondansetron Injectable 4 milliGRAM(s) IV Push every 6 hours PRN Nausea and/or Vomiting  oxyCODONE    IR 10 milliGRAM(s) Oral every 4 hours PRN Severe Pain (7 - 10)  oxyCODONE    IR 5 milliGRAM(s) Oral every 4 hours PRN Moderate Pain (4 - 6)    CAPILLARY BLOOD GLUCOSE      POCT Blood Glucose.: 118 mg/dL (11 Mar 2024 21:11)  POCT Blood Glucose.: 108 mg/dL (11 Mar 2024 16:23)    I&O's Summary    11 Mar 2024 07:01  -  12 Mar 2024 07:00  --------------------------------------------------------  IN: 600 mL / OUT: 1095 mL / NET: -495 mL    12 Mar 2024 07:01  -  12 Mar 2024 13:08  --------------------------------------------------------  IN: 360 mL / OUT: 0 mL / NET: 360 mL        PHYSICAL EXAM:  Vital Signs Last 24 Hrs  T(C): 36.8 (12 Mar 2024 12:54), Max: 37.4 (12 Mar 2024 01:06)  T(F): 98.3 (12 Mar 2024 12:54), Max: 99.3 (12 Mar 2024 01:06)  HR: 76 (12 Mar 2024 12:54) (68 - 82)  BP: 129/79 (12 Mar 2024 12:54) (114/67 - 135/78)  BP(mean): 68 (11 Mar 2024 16:30) (68 - 80)  RR: 18 (12 Mar 2024 12:54) (18 - 22)  SpO2: 94% (12 Mar 2024 12:54) (92% - 99%)    Parameters below as of 12 Mar 2024 12:54  Patient On (Oxygen Delivery Method): room air      CONSTITUTIONAL: NAD, well-developed, well-groomed  EYES: PERRLA; conjunctiva and sclera clear  ENMT: Moist oral mucosa, no pharyngeal injection or exudates; normal dentition  NECK: Supple, no palpable masses; no thyromegaly  RESPIRATORY: Normal respiratory effort; lungs are clear to auscultation bilaterally  CARDIOVASCULAR: Regular rate and rhythm, normal S1 and S2, no murmur/rub/gallop; No lower extremity edema  ABDOMEN: Soft, Nondistended, Nontender to palpation, normoactive bowel sounds  MUSCULOSKELETAL: No clubbing or cyanosis of digits; no joint swelling or tenderness to palpation  PSYCH: A+O to person, place, and time; affect appropriate  NEUROLOGY: CN 2-12 are intact and symmetric; no gross sensory deficits, grossly 5/5 strength throughout  SKIN: No rashes; no palpable lesions, +hemovac x 3 and bile bag x1 with serosanguinous drainage    LABS:                        9.6    7.50  )-----------( 241      ( 12 Mar 2024 05:05 )             28.5     03-12    136  |  99  |  22  ----------------------------<  99  4.0   |  28  |  0.71    Ca    8.3<L>      12 Mar 2024 05:04  Phos  3.6     03-12  Mg     2.0     03-12    TPro  4.8<L>  /  Alb  2.4<L>  /  TBili  0.3  /  DBili  x   /  AST  98<H>  /  ALT  113<H>  /  AlkPhos  106  03-12        Urinalysis Basic - ( 12 Mar 2024 05:04 )    Color: x / Appearance: x / SG: x / pH: x  Gluc: 99 mg/dL / Ketone: x  / Bili: x / Urobili: x   Blood: x / Protein: x / Nitrite: x   Leuk Esterase: x / RBC: x / WBC x   Sq Epi: x / Non Sq Epi: x / Bacteria: x      RADIOLOGY & ADDITIONAL TESTS:  Results Reviewed: no leukocytosis, stable anemia  Imaging Personally Reviewed:  Electrocardiogram Personally Reviewed:    COORDINATION OF CARE:  Care Discussed with Consultants/Other Providers [Y]: Neurosurgery MARY Townsend  Prior or Outpatient Records Reviewed [Y/N]:

## 2024-03-12 NOTE — CONSULT NOTE ADULT - ASSESSMENT
67 yo M who p/w back pain to ED and found to have diffuse osseous mets w/ cord edema s/p neurosurg intervention and spinal tumor bx c/w DLBCL. Hematology consulted for new dx of DLBCL.    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 Extraosseous extension of tumor into the epidural space at T8, L3 and L4. Correlation with recent MRI is recommended as the intraspinal canal contents are not well evaluated on this modality. Tumor within the bilateral psoas muscles at L3 and L4.  - MRI CERVICAL SPINE:   C4 vertebral metastasis. No cervical pathologic fracture or significant epidural disease  - MRI THORACIC SPINE:   Diffuse osseous metastases. T8 pathologic burst fracture with high grade epidural disease causing focal cord deformity and cord edema  -MRI LUMBAR SPINE:   Diffuse osseous metastases. L4 superior endplate pathologic fracture. High-grade epidural disease L3 and L4 vertebral levels including contiguous involvement of the central canal neural foramina paraspinal soft tissues  -CT C/A/P: CT CHEST: No pulmonary mass or suspicious nodules. Lesion in the anterior right 3rd rib at the costochondral junction with an extraosseous tumoral component extending to the anterior right pleural surface measuring 1.5 cm. Additional pleural-based soft tissue mass on the left measuring 1.1 cm without definite associated adjacent rib or vertebral body lesion. Enlarged right axillary lymph node.    Mesenteric and retroperitoneal adenopathy, unclear if metastatic or secondary to primary a lymphoproliferative malignancy. Suboptimal assessment of the bowel due to the lack of enteric contrast and a large stool burden. Narrowing versus underdistention in the sigmoid colon. Consider colonoscopy for further evaluation if one has not been recently performed. Cholelithiasis. Mild intra and extrahepatic biliary duct dilatation and possible stone in the cystic duct. Follow-up with nonemergent MRCP can be performed as clinically warranted.  -ECHO 3/8/24: EF 71 %, no regional wall motion abnormalities seen, no significant valvular disease.      Plan:  -get TLS labs: BMP, Ca, Phos, Uric acid; LDH tomorrow  - send G6PD (will need Rasburicase during treatment)  -order Hepatitis panel, HIV, EBV, CMV  -PET CT as outpatient  [place] [place] when appropriate to start [place], currently needs wound healing (surgery a week ago w/ multiple drains containing sanguinous fluid)  -plan to start R-CHOP inpatient vs outpatient after appropriate wound healing and will need a PICC line vs mediport accordingly  -Dr. Chatterjee is aware    Discussed w/ Dr. Gagan Barraza MD, PhD  Heme/Onc Fellow  [id number]

## 2024-03-12 NOTE — CONSULT NOTE ADULT - REASON FOR ADMISSION
malignant cord compression/cauda equina syndrome

## 2024-03-12 NOTE — CONSULT NOTE ADULT - ATTENDING COMMENTS
69 yo M who p/w back pain to ED and found to have diffuse osseous mets w/ cord edema s/p neurosurg intervention and spinal tumor bx c/w DLBCL. Hematology consulted for new dx of DLBCL.    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    Stage IV  -get TLS labs: BMP, Ca, Phos, Uric acid; LDH tomorrow  -PET CT as outpatient  [place] [place] when appropriate to start [place], currently needs wound healing (surgery a week ago w/ multiple drains containing sanguinous fluid)  will follow

## 2024-03-12 NOTE — PROGRESS NOTE ADULT - ASSESSMENT
67 yo male with PMH of HLD, pre-DM, and sinus bradycardia who presents with progressively worsening LE numbness/paresthesias and weakness since January 2024 with acute change in functional status 3 days prior to admission. MRI/CT imaging concerning for metastatic disease with cord compression/cauda equina syndrome. CT CAP + for lesions in 3rd rib, L pleura, mesenteric/retroperitoneal adenopathy, narrowed sigmoid colon. Now s/p Stage 1: T8 VCR, T6-T10 fusion for tumor, small CSF leak primarily repaired on 3/5 and stage 2 L4 partial corpectomy, L2-pelvis fusion with plastics closure. Transferred out of [facility] on 3/11. Path: DLBCL. Heme/onc consulted.

## 2024-03-12 NOTE — PROGRESS NOTE ADULT - ASSESSMENT
ASSESSMENT: 68M w/ hx of HLD, pre-DM, sinus john, presenting with worsening LE numbness/paresthesias and weakness since Jan 2024, outpatient x-rays& MRI on 2/29/24 showing concern for osseous metastatic disease in thoracic/lumbar/sacral spine as well as extension of soft tissue into the paravertebral soft tissues more prominent on the right side. Pt came to ED on 3/2 for back pain and worsening LE weakness RLE > LLE, fall x2 and decline in functional status where his legs gave out. Pt was walking independently last week and now requires a walker to walk even several steps. Also endorses weight loss. In ED CT imaging and MRI spine (prelim report) notable for multiple findings including diffuse osseous metastatic disease throughout the entire spine; pathologic fx w/ tumor into the epidural space at T8 resulting in thoracic cord compression; tumor extension into the epidural space at L3 resulting in cauda equina compression; pathologic fx w/ tumor in the epidural space at L4 contributing to severe canal stenosis; tumor within the bilateral psoas muscles at L3 and L4; bilateral neural foramen effacement by tumor in L-spine.   - now s/p stage 1: T8 VCR (vertebral column resection) T6-T10 fusion for tumor resection, cmall csf leak primarily r    NEURO:    PULM:  Incentive spirometry, mobilize as tolerated    CV: -160 mmHg     RENAL: IVL     GI:      ENDO:   Goal euglycemia (-180)    HEME/ONC:  VTE prophylaxis: [] SCDs [] chemoprophylaxis [] hold chemoprophylaxis due to: [] high risk of DVT/PE on admission due to:    ID:  Bia-op antibiotics     ASSESSMENT: 68M w/ hx of HLD, pre-DM, sinus john, presenting with worsening LE numbness/paresthesias and weakness since Jan 2024, outpatient x-rays& MRI on 2/29/24 showing concern for osseous metastatic disease in thoracic/lumbar/sacral spine as well as extension of soft tissue into the paravertebral soft tissues more prominent on the right side. Pt came to ED on 3/2 for back pain and worsening LE weakness RLE > LLE, fall x2 and decline in functional status where his legs gave out. Pt was walking independently last week and now requires a walker to walk even several steps. Also endorses weight loss. In ED CT imaging and MRI spine (prelim report) notable for multiple findings including diffuse osseous metastatic disease throughout the entire spine; pathologic fx w/ tumor into the epidural space at T8 resulting in thoracic cord compression; tumor extension into the epidural space at L3 resulting in cauda equina compression; pathologic fx w/ tumor in the epidural space at L4 contributing to severe canal stenosis; tumor within the bilateral psoas muscles at L3 and L4; bilateral neural foramen effacement by tumor in L-spine.   - now s/p stage 1: T8 VCR (vertebral column resection) T6-T10 fusion for tumor resection, small csf leak primarily repaired, Stage 2 L4 Partial Corpectomy, L2-Pelvis Fusion, Plastics Closure.     NEURO: Neurochecks q4h   Path: B cell lymphoma - onc consulted f/u rec   Drain output in 24h: Bile Bag (25cc), per plastics HMV R upper 50cc, R Lower 15cc, L Lower 105cc  Pain control w/ PRN meds tylenol, oxycodone 5/10, dilaudid (breakthrough). Standing meds: Robaxin, gabapentin 400TID   Activity: work with PT, NO bending, lifting or twisting.   PT/OT PMR Rec AR     PULM: on room air sat >92%   Incentive spirometer at bedside, mobilize as tolerated    CV: -160 mmHg, within parameter without medications     RENAL: IVL, voiding appropriately   Hyponatremia, on Na tabs 2q6h, Na 135     GI: CCD   LBM 3/12 , bowel regimen with miralax and senna     ENDO: Goal euglycemia (-180)    HEME/ONC: post op anemia s/p 1 UPRBC intraop for EBL 550cc. H/H stable on CBC, hemodynamically stable   Surgical Path: B cell lymphoma - pending onc recs   VTE prophylaxis: [x] SCDs [x] chemoprophylaxis [x] high risk of DVT/PE on admission due to: malignancy, LED 3/5 negative for DVT     ID: afebrile, no leukocytosis   Bia-op antibiotics w/ ancef     Will discuss with Dr. Partida   [id number]  ASSESSMENT: 68M w/ hx of HLD, pre-DM, sinus john, presenting with worsening LE numbness/paresthesias and weakness since Jan 2024, outpatient x-rays& MRI on 2/29/24 showing concern for osseous metastatic disease in thoracic/lumbar/sacral spine as well as extension of soft tissue into the paravertebral soft tissues more prominent on the right side. Pt came to ED on 3/2 for back pain and worsening LE weakness RLE > LLE, fall x2 and decline in functional status where his legs gave out. Pt was walking independently last week and now requires a walker to walk even several steps. Also endorses weight loss. In ED CT imaging and MRI spine (prelim report) notable for multiple findings including diffuse osseous metastatic disease throughout the entire spine; pathologic fx w/ tumor into the epidural space at T8 resulting in thoracic cord compression; tumor extension into the epidural space at L3 resulting in cauda equina compression; pathologic fx w/ tumor in the epidural space at L4 contributing to severe canal stenosis; tumor within the bilateral psoas muscles at L3 and L4; bilateral neural foramen effacement by tumor in L-spine.   - now s/p stage 1: T8 VCR (vertebral column resection) T6-T10 fusion for tumor resection, small csf leak primarily repaired, Stage 2 L4 Partial Corpectomy, L2-Pelvis Fusion, Plastics Closure.     NEURO: Neurochecks q4h   Path: Diffuse large B cell lymphoma - heme/onc consulted will f/u rec   Drain output in 24h: Bile Bag (25cc), per plastics HMV R upper 50cc, R Lower 15cc, L Lower 105cc  Pain control w/ PRN meds tylenol, oxycodone 5/10, dilaudid (breakthrough). Standing meds: Robaxin, gabapentin 600 TID  Activity: work with PT, NO bending, lifting or twisting.   PT/OT PMR Rec AR     PULM: on room air sat >92%   Incentive spirometer at bedside, mobilize as tolerated    CV: -160 mmHg, within parameter without medications     RENAL: IVL, voiding appropriately   Hyponatremia improving on Na tabs 2q6h, Na 135     GI: CCD   LBM 3/12 , bowel regimen with miralax and senna   Transaminitis improving     ENDO: Goal euglycemia (-180)    HEME/ONC: post op anemia s/p 1 UPRBC intraop for EBL 550cc. H/H stable on CBC, hemodynamically stable   Surgical Path: Diffuse large B cell lymphoma - pending heme/onc recs   VTE prophylaxis: [x] SCDs [x] chemoprophylaxis [x] high risk of DVT/PE on admission due to: malignancy, LED 3/5 negative for DVT     ID: afebrile, no leukocytosis   Bia-op antibiotics w/ ancef   HIV neg     Will discuss with Dr. Partida   [id number]

## 2024-03-12 NOTE — PROGRESS NOTE ADULT - SUBJECTIVE AND OBJECTIVE BOX
SUBJECTIVE:     Vital Signs Last 24 Hrs  T(C): 37.2 (03-12-24 @ 04:57), Max: 37.4 (03-12-24 @ 01:06)  T(F): 99 (03-12-24 @ 04:57), Max: 99.3 (03-12-24 @ 01:06)  HR: 74 (03-12-24 @ 04:57) (68 - 82)  BP: 120/75 (03-12-24 @ 04:57) (113/57 - 135/78)  BP(mean): 68 (03-11-24 @ 16:30) (68 - 80)  RR: 18 (03-12-24 @ 04:57) (18 - 22)  SpO2: 92% (03-12-24 @ 04:57) (92% - 99%)    PHYSICAL EXAM:  Constitutional: No Acute Distress   Neurological:   Incision:   Drains:   Pulmonary: Clear to Auscultation, No rales, No rhonchi, No wheezes   Cardiovascular: S1, S2, Regular rate and rhythm   Gastrointestinal: Soft, Non-tender, Non-distended, +bowel sounds x 4  Extremities: No calf tenderness bilaterally, no cyanosis, clubbing or edema    LABS:             9.6    7.50  )-----------( 241      ( 12 Mar 2024 05:05 )             28.5    03-12  136  |  99  |  22  ----------------------------<  99  4.0   |  28  |  0.71  Ca    8.3<L>      12 Mar 2024 05:04  Phos  3.6     03-12  Mg     2.0     03-12  TPro  4.8<L>  /  Alb  2.4<L>  /  TBili  0.3  /  DBili  x   /  AST  98<H>  /  ALT  113<H>  /  AlkPhos  106  03-12    03-11 @ 07:01  -  03-12 @ 07:00  --------------------------------------------------------  IN: 600 mL / OUT: 1095 mL / NET: -495 mL    IMAGING:   < from: CT Lumbar Spine No Cont (03.06.24 @ 11:37) >  ACC: [id number] EXAM:  CT THORACIC SPINE   ORDERED BY: HARRIETT KELSEY   ACC: [id number] EXAM:  CT LUMBAR SPINE   ORDERED BY: HARRIETT KELSEY   FINDINGS:   THORACIC SPINE  The thoracic spine is significant for interval posterior stabilization.   This was performed T6-T10 with T8 corpectomy. Within the T8 surgical   space a vertically oriented spacing device was positioned. Vertebroplasty   material is present within T6 T7 T9 and T10. Pedicle screws are present   at each of these levels on each side. These is secured by vertical rods.   Hardware causes mild streak artifact partially obscuring adjacent   structures. Allowing for this artifact, the hardware appears intact and   appropriate in position. Interface between trabecular bone and screw   threads appears appropriate. T8 laminectomy is noted. The posterior   paraspinal soft tissues demonstrate small pockets of fluid and air   typical for the recent postoperative state. There is also noted in the   epidural space and adjacent to the corpectomy spacing device. No large   fluid collection is noted. There is paraspinal infiltration eccentric to   the right that does not appear substantially changed from preoperative   imaging 3/5/2024. Note that the cord and the previously demonstrated   epidural disease are not resolved by the CT technique.    The remaining thoracic vertebra above T1-T5 and below T11 and T12 remain   intact. No interval fracture or lesion has developed.    Thoracic intervertebral disc spaces appear unchanged.    LUMBAR SPINE  The lumbar spine is significant for interval posterior stabilization.   This was performed at L2-S1 with pedicle screwspresent at each level in   each side. At the inferior margin of this construct an additional screw   crosses through the sacral level S2 to extend into each site of the   pelvic bones to the superior acetabular regions. These are secured by   vertical rods. This hardware causes some mild streak artifact partially   obscuring adjacent structures. Allowing for this artifact the hardware   appears intact and appropriate in position. Intravenous between   trabecular bone and screw threads appears appropriate. Vertebroplasty   material is noted within L2 L3 L4 and L5. Partial collapse of the L4   superior endplate is again noted. L3 and L4 laminectomy is noted. The   post-rest paraspinal soft tissues demonstrate infiltration and small foci   of air including in the epidural space. No large fluid collection is   seen. The hardware is noted to stabilize the slight grade 1   anterolisthesis L5 on S1. Asymmetry soft tissue extension extending into   the psoas musculature right greater than left does not appear   substantially changed from preoperative imaging 3/5/2024.  The L1 vertebral body remains intact. Lumbar intervertebral disc spaces   at the T12-L1 and L1-L2 levels remain intact.    IMPRESSION:  1.  THORACIC SPINE:   T8 corpectomy with placement of a spacing device.   T6-T10 posterior stabilization. Routine postoperative appearance    2.  LUMBAR SPINE:   L2-S2 posterior stabilization. Routine postoperative   appearance  --- End of Report ---  DANNI GERARD MD; Attending Radiologist  This document has been electronically signed. Mar  6 2024  2:02PM  < end of copied text >    MEDICATIONS  (STANDING):  bisacodyl 5 milliGRAM(s) Oral every 12 hours  enoxaparin Injectable 40 milliGRAM(s) SubCutaneous <User Schedule>  gabapentin 400 milliGRAM(s) Oral every 8 hours  methocarbamol 750 milliGRAM(s) Oral every 8 hours  multivitamin 1 Tablet(s) Oral daily  polyethylene glycol 3350 17 Gram(s) Oral two times a day  senna 2 Tablet(s) Oral at bedtime  sodium chloride 2 Gram(s) Oral every 8 hours    MEDICATIONS  (PRN):  acetaminophen     Tablet .. 1000 milliGRAM(s) Oral every 6 hours PRN Temp greater or equal to 38C (100.4F), Mild Pain (1 - 3)  HYDROmorphone  Injectable 0.25 milliGRAM(s) IV Push every 4 hours PRN break through pain  magnesium hydroxide Suspension 30 milliLiter(s) Oral every 12 hours PRN Constipation  ondansetron Injectable 4 milliGRAM(s) IV Push every 6 hours PRN Nausea and/or Vomiting  oxyCODONE    IR 10 milliGRAM(s) Oral every 4 hours PRN Severe Pain (7 - 10)  oxyCODONE    IR 5 milliGRAM(s) Oral every 4 hours PRN Moderate Pain (4 - 6)    DIET: CCD  SUBJECTIVE: patient seen and evaluated at bedside this AM. no overnight events. Endorsing abdominal cramping this AM after bowel movement, that quickly improved now resolved likely cramping 2/2 bowel regimen meds. Vitals and labs reviewed.     Vital Signs Last 24 Hrs  T(C): 37.2 (03-12-24 @ 04:57), Max: 37.4 (03-12-24 @ 01:06)  T(F): 99 (03-12-24 @ 04:57), Max: 99.3 (03-12-24 @ 01:06)  HR: 74 (03-12-24 @ 04:57) (68 - 82)  BP: 120/75 (03-12-24 @ 04:57) (113/57 - 135/78)  BP(mean): 68 (03-11-24 @ 16:30) (68 - 80)  RR: 18 (03-12-24 @ 04:57) (18 - 22)  SpO2: 92% (03-12-24 @ 04:57) (92% - 99%)    PHYSICAL EXAM:  Constitutional: No Acute Distress   Neurological: Awake, Oriented x3 (person, place and time), no facial asymmetry, EOMI, speech fluent, Motor Exam: B/L upper extremities 5/5, R Lower ext: HF/KE/KF 4/5, DF/PF 5/5, L Lower ext: 5/5 strength, decreased sensation over RLE below knee  Incision: thoracic to pelvic   Drains: 1 bile bag, LILLIE's x3: R upper, R lower, and L Lower   Pulmonary: Clear lungs   Cardiovascular:  Regular rate and rhythm   Gastrointestinal: Soft, Non-tender, Non-distended abdomen, bowel sounds present x 4  Extremities: No edema, no calf tenderness    LABS:             9.6    7.50  )-----------( 241      ( 12 Mar 2024 05:05 )             28.5    03-12  136  |  99  |  22  ----------------------------<  99  4.0   |  28  |  0.71  Ca    8.3<L>      12 Mar 2024 05:04  Phos  3.6     03-12  Mg     2.0     03-12  TPro  4.8<L>  /  Alb  2.4<L>  /  TBili  0.3  /  DBili  x   /  AST  98<H>  /  ALT  113<H>  /  AlkPhos  106  03-12 03-11 @ 07:01  -  03-12 @ 07:00  --------------------------------------------------------  IN: 600 mL / OUT: 1095 mL / NET: -495 mL    IMAGING:   < from: CT Lumbar Spine No Cont (03.06.24 @ 11:37) >  ACC: [id number] EXAM:  CT THORACIC SPINE   ORDERED BY: HARRIETT KELSEY   ACC: [id number] EXAM:  CT LUMBAR SPINE   ORDERED BY: HARRIETT KELSEY   FINDINGS:   THORACIC SPINE  The thoracic spine is significant for interval posterior stabilization.   This was performed T6-T10 with T8 corpectomy. Within the T8 surgical   space a vertically oriented spacing device was positioned. Vertebroplasty   material is present within T6 T7 T9 and T10. Pedicle screws are present   at each of these levels on each side. These is secured by vertical rods.   Hardware causes mild streak artifact partially obscuring adjacent   structures. Allowing for this artifact, the hardware appears intact and   appropriate in position. Interface between trabecular bone and screw   threads appears appropriate. T8 laminectomy is noted. The posterior   paraspinal soft tissues demonstrate small pockets of fluid and air   typical for the recent postoperative state. There is also noted in the   epidural space and adjacent to the corpectomy spacing device. No large   fluid collection is noted. There is paraspinal infiltration eccentric to   the right that does not appear substantially changed from preoperative   imaging 3/5/2024. Note that the cord and the previously demonstrated   epidural disease are not resolved by the CT technique.    The remaining thoracic vertebra above T1-T5 and below T11 and T12 remain   intact. No interval fracture or lesion has developed.    Thoracic intervertebral disc spaces appear unchanged.    LUMBAR SPINE  The lumbar spine is significant for interval posterior stabilization.   This was performed at L2-S1 with pedicle screwspresent at each level in   each side. At the inferior margin of this construct an additional screw   crosses through the sacral level S2 to extend into each site of the   pelvic bones to the superior acetabular regions. These are secured by   vertical rods. This hardware causes some mild streak artifact partially   obscuring adjacent structures. Allowing for this artifact the hardware   appears intact and appropriate in position. Intravenous between   trabecular bone and screw threads appears appropriate. Vertebroplasty   material is noted within L2 L3 L4 and L5. Partial collapse of the L4   superior endplate is again noted. L3 and L4 laminectomy is noted. The   post-rest paraspinal soft tissues demonstrate infiltration and small foci   of air including in the epidural space. No large fluid collection is   seen. The hardware is noted to stabilize the slight grade 1   anterolisthesis L5 on S1. Asymmetry soft tissue extension extending into   the psoas musculature right greater than left does not appear   substantially changed from preoperative imaging 3/5/2024.  The L1 vertebral body remains intact. Lumbar intervertebral disc spaces   at the T12-L1 and L1-L2 levels remain intact.    IMPRESSION:  1.  THORACIC SPINE:   T8 corpectomy with placement of a spacing device.   T6-T10 posterior stabilization. Routine postoperative appearance    2.  LUMBAR SPINE:   L2-S2 posterior stabilization. Routine postoperative   appearance  --- End of Report ---  DANNI GERARD MD; Attending Radiologist  This document has been electronically signed. Mar  6 2024  2:02PM  < end of copied text >    MEDICATIONS  (STANDING):  bisacodyl 5 milliGRAM(s) Oral every 12 hours  enoxaparin Injectable 40 milliGRAM(s) SubCutaneous <User Schedule>  gabapentin 400 milliGRAM(s) Oral every 8 hours  methocarbamol 750 milliGRAM(s) Oral every 8 hours  multivitamin 1 Tablet(s) Oral daily  polyethylene glycol 3350 17 Gram(s) Oral two times a day  senna 2 Tablet(s) Oral at bedtime  sodium chloride 2 Gram(s) Oral every 8 hours    MEDICATIONS  (PRN):  acetaminophen     Tablet .. 1000 milliGRAM(s) Oral every 6 hours PRN Temp greater or equal to 38C (100.4F), Mild Pain (1 - 3)  HYDROmorphone  Injectable 0.25 milliGRAM(s) IV Push every 4 hours PRN break through pain  magnesium hydroxide Suspension 30 milliLiter(s) Oral every 12 hours PRN Constipation  ondansetron Injectable 4 milliGRAM(s) IV Push every 6 hours PRN Nausea and/or Vomiting  oxyCODONE    IR 10 milliGRAM(s) Oral every 4 hours PRN Severe Pain (7 - 10)  oxyCODONE    IR 5 milliGRAM(s) Oral every 4 hours PRN Moderate Pain (4 - 6)    DIET: CCD  SUBJECTIVE: patient seen and evaluated at bedside this AM. No overnight events. Endorsing abdominal cramping this AM after bowel movement, that quickly improved now resolved likely cramping 2/2 bowel regimen meds. Vitals and labs reviewed.     Vital Signs Last 24 Hrs  T(C): 37.2 (03-12-24 @ 04:57), Max: 37.4 (03-12-24 @ 01:06)  T(F): 99 (03-12-24 @ 04:57), Max: 99.3 (03-12-24 @ 01:06)  HR: 74 (03-12-24 @ 04:57) (68 - 82)  BP: 120/75 (03-12-24 @ 04:57) (113/57 - 135/78)  BP(mean): 68 (03-11-24 @ 16:30) (68 - 80)  RR: 18 (03-12-24 @ 04:57) (18 - 22)  SpO2: 92% (03-12-24 @ 04:57) (92% - 99%)    PHYSICAL EXAM:  Constitutional: No Acute Distress   Neurological: Awake, Oriented x3 (person, place and time), no facial asymmetry, EOMI, speech fluent, Motor Exam: B/L upper extremities 5/5, R Lower ext: HF/KE/KF 4/5, DF/PF 5/5, L Lower ext: 5/5 strength, decreased sensation over RLE below knee  Incision: thoracic to pelvic incision clean/dry and intact   Drains: 1 bile bag, HMVx3: R upper, R lower, and L Lower   Pulmonary: Clear lungs   Cardiovascular:  Regular rate and rhythm   Gastrointestinal: Soft, Non-tender, Non-distended abdomen, bowel sounds present x 4  Extremities: No edema, no calf tenderness    LABS:             9.6    7.50  )-----------( 241      ( 12 Mar 2024 05:05 )             28.5    03-12  136  |  99  |  22  ----------------------------<  99  4.0   |  28  |  0.71  Ca    8.3<L>      12 Mar 2024 05:04  Phos  3.6     03-12  Mg     2.0     03-12  TPro  4.8<L>  /  Alb  2.4<L>  /  TBili  0.3  /  DBili  x   /  AST  98<H>  /  ALT  113<H>  /  AlkPhos  106  03-12 03-11 @ 07:01  -  03-12 @ 07:00  --------------------------------------------------------  IN: 600 mL / OUT: 1095 mL / NET: -495 mL    IMAGING:   < from: CT Lumbar Spine No Cont (03.06.24 @ 11:37) >  ACC: [id number] EXAM:  CT THORACIC SPINE   ORDERED BY: HARRIETT KELSEY   ACC: [id number] EXAM:  CT LUMBAR SPINE   ORDERED BY: HARRIETT KELSEY   FINDINGS:   THORACIC SPINE  The thoracic spine is significant for interval posterior stabilization.   This was performed T6-T10 with T8 corpectomy. Within the T8 surgical   space a vertically oriented spacing device was positioned. Vertebroplasty   material is present within T6 T7 T9 and T10. Pedicle screws are present   at each of these levels on each side. These is secured by vertical rods.   Hardware causes mild streak artifact partially obscuring adjacent   structures. Allowing for this artifact, the hardware appears intact and   appropriate in position. Interface between trabecular bone and screw   threads appears appropriate. T8 laminectomy is noted. The posterior   paraspinal soft tissues demonstrate small pockets of fluid and air   typical for the recent postoperative state. There is also noted in the   epidural space and adjacent to the corpectomy spacing device. No large   fluid collection is noted. There is paraspinal infiltration eccentric to   the right that does not appear substantially changed from preoperative   imaging 3/5/2024. Note that the cord and the previously demonstrated   epidural disease are not resolved by the CT technique.    The remaining thoracic vertebra above T1-T5 and below T11 and T12 remain   intact. No interval fracture or lesion has developed.    Thoracic intervertebral disc spaces appear unchanged.    LUMBAR SPINE  The lumbar spine is significant for interval posterior stabilization.   This was performed at L2-S1 with pedicle screwspresent at each level in   each side. At the inferior margin of this construct an additional screw   crosses through the sacral level S2 to extend into each site of the   pelvic bones to the superior acetabular regions. These are secured by   vertical rods. This hardware causes some mild streak artifact partially   obscuring adjacent structures. Allowing for this artifact the hardware   appears intact and appropriate in position. Intravenous between   trabecular bone and screw threads appears appropriate. Vertebroplasty   material is noted within L2 L3 L4 and L5. Partial collapse of the L4   superior endplate is again noted. L3 and L4 laminectomy is noted. The   post-rest paraspinal soft tissues demonstrate infiltration and small foci   of air including in the epidural space. No large fluid collection is   seen. The hardware is noted to stabilize the slight grade 1   anterolisthesis L5 on S1. Asymmetry soft tissue extension extending into   the psoas musculature right greater than left does not appear   substantially changed from preoperative imaging 3/5/2024.  The L1 vertebral body remains intact. Lumbar intervertebral disc spaces   at the T12-L1 and L1-L2 levels remain intact.    IMPRESSION:  1.  THORACIC SPINE:   T8 corpectomy with placement of a spacing device.   T6-T10 posterior stabilization. Routine postoperative appearance    2.  LUMBAR SPINE:   L2-S2 posterior stabilization. Routine postoperative   appearance  --- End of Report ---  DANNI GERARD MD; Attending Radiologist  This document has been electronically signed. Mar  6 2024  2:02PM  < end of copied text >    MEDICATIONS  (STANDING):  bisacodyl 5 milliGRAM(s) Oral every 12 hours  enoxaparin Injectable 40 milliGRAM(s) SubCutaneous <User Schedule>  gabapentin 400 milliGRAM(s) Oral every 8 hours  methocarbamol 750 milliGRAM(s) Oral every 8 hours  multivitamin 1 Tablet(s) Oral daily  polyethylene glycol 3350 17 Gram(s) Oral two times a day  senna 2 Tablet(s) Oral at bedtime  sodium chloride 2 Gram(s) Oral every 8 hours    MEDICATIONS  (PRN):  acetaminophen     Tablet .. 1000 milliGRAM(s) Oral every 6 hours PRN Temp greater or equal to 38C (100.4F), Mild Pain (1 - 3)  HYDROmorphone  Injectable 0.25 milliGRAM(s) IV Push every 4 hours PRN break through pain  magnesium hydroxide Suspension 30 milliLiter(s) Oral every 12 hours PRN Constipation  ondansetron Injectable 4 milliGRAM(s) IV Push every 6 hours PRN Nausea and/or Vomiting  oxyCODONE    IR 10 milliGRAM(s) Oral every 4 hours PRN Severe Pain (7 - 10)  oxyCODONE    IR 5 milliGRAM(s) Oral every 4 hours PRN Moderate Pain (4 - 6)    DIET: CCD  SUBJECTIVE: patient seen and evaluated at bedside this AM. No overnight events. Endorsing abdominal cramping this AM after bowel movement, that quickly improved now resolved discussed w/ patient that cramping likely 2/2 bowel regimen meds (will dc dulcolax). Vitals and labs reviewed.     Vital Signs Last 24 Hrs  T(C): 37.2 (03-12-24 @ 04:57), Max: 37.4 (03-12-24 @ 01:06)  T(F): 99 (03-12-24 @ 04:57), Max: 99.3 (03-12-24 @ 01:06)  HR: 74 (03-12-24 @ 04:57) (68 - 82)  BP: 120/75 (03-12-24 @ 04:57) (113/57 - 135/78)  BP(mean): 68 (03-11-24 @ 16:30) (68 - 80)  RR: 18 (03-12-24 @ 04:57) (18 - 22)  SpO2: 92% (03-12-24 @ 04:57) (92% - 99%)    PHYSICAL EXAM:  Constitutional: No Acute Distress   Neurological: Awake, Oriented x3 (person, place and time), no facial asymmetry, EOMI, speech fluent, Motor Exam: B/L upper extremities 5/5, R Lower ext: HF/KE/KF 4/5, DF/PF 5/5, L Lower ext: 5/5 strength, decreased sensation over RLE below knee  Incision: thoracic to pelvic incision clean/dry and intact   Drains: 1 bile bag, HMVx3: R upper, R lower, and L Lower   Pulmonary: Clear lungs   Cardiovascular:  Regular rate and rhythm   Gastrointestinal: Soft, Non-tender, Non-distended abdomen, bowel sounds present x 4  Extremities: No edema, no calf tenderness    LABS:             9.6    7.50  )-----------( 241      ( 12 Mar 2024 05:05 )             28.5    03-12  136  |  99  |  22  ----------------------------<  99  4.0   |  28  |  0.71  Ca    8.3<L>      12 Mar 2024 05:04  Phos  3.6     03-12  Mg     2.0     03-12  TPro  4.8<L>  /  Alb  2.4<L>  /  TBili  0.3  /  DBili  x   /  AST  98<H>  /  ALT  113<H>  /  AlkPhos  106  03-12 03-11 @ 07:01  -  03-12 @ 07:00  --------------------------------------------------------  IN: 600 mL / OUT: 1095 mL / NET: -495 mL    IMAGING:   < from: CT Lumbar Spine No Cont (03.06.24 @ 11:37) >  ACC: [id number] EXAM:  CT THORACIC SPINE   ORDERED BY: HARRIETT KELSEY   ACC: [id number] EXAM:  CT LUMBAR SPINE   ORDERED BY: HARRIETT KELSEY   FINDINGS:   THORACIC SPINE  The thoracic spine is significant for interval posterior stabilization.   This was performed T6-T10 with T8 corpectomy. Within the T8 surgical   space a vertically oriented spacing device was positioned. Vertebroplasty   material is present within T6 T7 T9 and T10. Pedicle screws are present   at each of these levels on each side. These is secured by vertical rods.   Hardware causes mild streak artifact partially obscuring adjacent   structures. Allowing for this artifact, the hardware appears intact and   appropriate in position. Interface between trabecular bone and screw   threads appears appropriate. T8 laminectomy is noted. The posterior   paraspinal soft tissues demonstrate small pockets of fluid and air   typical for the recent postoperative state. There is also noted in the   epidural space and adjacent to the corpectomy spacing device. No large   fluid collection is noted. There is paraspinal infiltration eccentric to   the right that does not appear substantially changed from preoperative   imaging 3/5/2024. Note that the cord and the previously demonstrated   epidural disease are not resolved by the CT technique.    The remaining thoracic vertebra above T1-T5 and below T11 and T12 remain   intact. No interval fracture or lesion has developed.    Thoracic intervertebral disc spaces appear unchanged.    LUMBAR SPINE  The lumbar spine is significant for interval posterior stabilization.   This was performed at L2-S1 with pedicle screwspresent at each level in   each side. At the inferior margin of this construct an additional screw   crosses through the sacral level S2 to extend into each site of the   pelvic bones to the superior acetabular regions. These are secured by   vertical rods. This hardware causes some mild streak artifact partially   obscuring adjacent structures. Allowing for this artifact the hardware   appears intact and appropriate in position. Intravenous between   trabecular bone and screw threads appears appropriate. Vertebroplasty   material is noted within L2 L3 L4 and L5. Partial collapse of the L4   superior endplate is again noted. L3 and L4 laminectomy is noted. The   post-rest paraspinal soft tissues demonstrate infiltration and small foci   of air including in the epidural space. No large fluid collection is   seen. The hardware is noted to stabilize the slight grade 1   anterolisthesis L5 on S1. Asymmetry soft tissue extension extending into   the psoas musculature right greater than left does not appear   substantially changed from preoperative imaging 3/5/2024.  The L1 vertebral body remains intact. Lumbar intervertebral disc spaces   at the T12-L1 and L1-L2 levels remain intact.    IMPRESSION:  1.  THORACIC SPINE:   T8 corpectomy with placement of a spacing device.   T6-T10 posterior stabilization. Routine postoperative appearance    2.  LUMBAR SPINE:   L2-S2 posterior stabilization. Routine postoperative   appearance  --- End of Report ---  DANNI GERARD MD; Attending Radiologist  This document has been electronically signed. Mar  6 2024  2:02PM  < end of copied text >    MEDICATIONS  (STANDING):  bisacodyl 5 milliGRAM(s) Oral every 12 hours  enoxaparin Injectable 40 milliGRAM(s) SubCutaneous <User Schedule>  gabapentin 400 milliGRAM(s) Oral every 8 hours  methocarbamol 750 milliGRAM(s) Oral every 8 hours  multivitamin 1 Tablet(s) Oral daily  polyethylene glycol 3350 17 Gram(s) Oral two times a day  senna 2 Tablet(s) Oral at bedtime  sodium chloride 2 Gram(s) Oral every 8 hours    MEDICATIONS  (PRN):  acetaminophen     Tablet .. 1000 milliGRAM(s) Oral every 6 hours PRN Temp greater or equal to 38C (100.4F), Mild Pain (1 - 3)  HYDROmorphone  Injectable 0.25 milliGRAM(s) IV Push every 4 hours PRN break through pain  magnesium hydroxide Suspension 30 milliLiter(s) Oral every 12 hours PRN Constipation  ondansetron Injectable 4 milliGRAM(s) IV Push every 6 hours PRN Nausea and/or Vomiting  oxyCODONE    IR 10 milliGRAM(s) Oral every 4 hours PRN Severe Pain (7 - 10)  oxyCODONE    IR 5 milliGRAM(s) Oral every 4 hours PRN Moderate Pain (4 - 6)    DIET: CCD

## 2024-03-12 NOTE — CONSULT NOTE ADULT - ASSESSMENT
68M w/ hx of HLD, pre-DM, sinus john    Admitted w/ worsening LE numbness/paresthesias and weakness since Jan 2024. MRI 2/29/24 showed concern for osseous metastatic disease in thoracic/lumbar/sacral spine as well as extension of soft tissue into the paravertebral soft tissues more prominent on the right side. Since 3/2/2024 patient had a notable decline in functional status that was complicated by 2 falls where his legs just gave out. Pt was walking independently last week and now requires a walker to walk even several steps. Patient reports having back pain, numbness and weakness RLE>LLE and soreness/aching of legs.     In ED - CT imaging and MRI spine (prelim report) notable for multiple findings including diffuse osseous metastatic disease throughout the entire spine; pathologic fx w/ tumor into the epidural space at T8 resulting in thoracic cord compression; tumor extension into the epidural space at L3 resulting in cauda equina compression; pathologic fx w/ tumor in the epidural space at L4 contributing to severe canal stenosis; tumor within the bilateral psoas muscles at L3 and L4; bilateral neural foramen effacement by tumor in L-spine; 3rd rib lesion, b/l lung pleura lesions; mesenteric and retroperitoneal adenopathy; large fecal burden, narrowing versus underdistention in the sigmoid colon.     3/6 s/p Stage 1:  T8 VCR, T6-T10 fusion for tumor, small csf leak primarily repaired  Stage 2: L4 Partial Corpectomy, L2-Pelvis Fusion  Plastics Closure ([place])    Current out- patient pain regimen: None  Out Patient Pain Management provider: None    Patient reports good effect w/ Oxycodone. Acknowledged that he had been avoiding opioids, thus resulting in uncontrolled pain.  Pain is mostly in the back at op site and also endorses some paresthesias right thigh and knee.    Plan discussed with primary team:  Increase Gabapentin to 600 mg every 8 hrs (CrCl = 82.7)  Continue Oxy IR 5 mg every 4 hrs PRN moderate pain and 10 mg every 4 hrs PRN severe pain  Discontinue IV Dilaudid  Recommend GAP consult (Geriatric and Palliative Care) for this patient with advanced illness and management of cancer-related pain    Warm/cool packs for comfort  Continue Bowel Regimen  Incentive Spirometer  PT per primary team  Monitor for sedation, respiratory depression  Out-patient pain practice list can be provided for additional pain management after discharge if necessary  Narcan Rescue Kit on discharge (Naloxone 4 mg/0.1 ml nasal spray - 1 spray q 2-3 minutes alternating between nostrils)    Time spent on encounter:   55     Minutes    [facility]  [phone]

## 2024-03-13 LAB
ALBUMIN SERPL ELPH-MCNC: 2.4 G/DL — LOW (ref 3.3–5)
ALP SERPL-CCNC: 211 U/L — HIGH (ref 40–120)
ALT FLD-CCNC: 160 U/L — HIGH (ref 10–45)
ANION GAP SERPL CALC-SCNC: 11 MMOL/L — SIGNIFICANT CHANGE UP (ref 5–17)
AST SERPL-CCNC: 133 U/L — HIGH (ref 10–40)
BASOPHILS # BLD AUTO: 0 K/UL — SIGNIFICANT CHANGE UP (ref 0–0.2)
BASOPHILS NFR BLD AUTO: 0 % — SIGNIFICANT CHANGE UP (ref 0–2)
BILIRUB SERPL-MCNC: 0.4 MG/DL — SIGNIFICANT CHANGE UP (ref 0.2–1.2)
BUN SERPL-MCNC: 20 MG/DL — SIGNIFICANT CHANGE UP (ref 7–23)
CALCIUM SERPL-MCNC: 8.5 MG/DL — SIGNIFICANT CHANGE UP (ref 8.4–10.5)
CHLORIDE SERPL-SCNC: 99 MMOL/L — SIGNIFICANT CHANGE UP (ref 96–108)
CHOLEST SERPL-MCNC: 151 MG/DL — SIGNIFICANT CHANGE UP
CO2 SERPL-SCNC: 24 MMOL/L — SIGNIFICANT CHANGE UP (ref 22–31)
CREAT SERPL-MCNC: 0.58 MG/DL — SIGNIFICANT CHANGE UP (ref 0.5–1.3)
EGFR: 108 ML/MIN/1.73M2 — SIGNIFICANT CHANGE UP
EOSINOPHIL # BLD AUTO: 0.15 K/UL — SIGNIFICANT CHANGE UP (ref 0–0.5)
EOSINOPHIL NFR BLD AUTO: 1.8 % — SIGNIFICANT CHANGE UP (ref 0–6)
GLUCOSE SERPL-MCNC: 102 MG/DL — HIGH (ref 70–99)
HCT VFR BLD CALC: 31.3 % — LOW (ref 39–50)
HDLC SERPL-MCNC: 27 MG/DL — LOW
HGB BLD-MCNC: 9.9 G/DL — LOW (ref 13–17)
LIPID PNL WITH DIRECT LDL SERPL: 101 MG/DL — HIGH
LYMPHOCYTES # BLD AUTO: 0.78 K/UL — LOW (ref 1–3.3)
LYMPHOCYTES # BLD AUTO: 9.6 % — LOW (ref 13–44)
MAGNESIUM SERPL-MCNC: 2 MG/DL — SIGNIFICANT CHANGE UP (ref 1.6–2.6)
MANUAL SMEAR VERIFICATION: SIGNIFICANT CHANGE UP
MCHC RBC-ENTMCNC: 26.5 PG — LOW (ref 27–34)
MCHC RBC-ENTMCNC: 31.6 GM/DL — LOW (ref 32–36)
MCV RBC AUTO: 83.7 FL — SIGNIFICANT CHANGE UP (ref 80–100)
MONOCYTES # BLD AUTO: 0.78 K/UL — SIGNIFICANT CHANGE UP (ref 0–0.9)
MONOCYTES NFR BLD AUTO: 9.6 % — SIGNIFICANT CHANGE UP (ref 2–14)
NEUTROPHILS # BLD AUTO: 6.37 K/UL — SIGNIFICANT CHANGE UP (ref 1.8–7.4)
NEUTROPHILS NFR BLD AUTO: 78.1 % — HIGH (ref 43–77)
NON HDL CHOLESTEROL: 124 MG/DL — SIGNIFICANT CHANGE UP
PHOSPHATE SERPL-MCNC: 3.4 MG/DL — SIGNIFICANT CHANGE UP (ref 2.5–4.5)
PLAT MORPH BLD: NORMAL — SIGNIFICANT CHANGE UP
PLATELET # BLD AUTO: 302 K/UL — SIGNIFICANT CHANGE UP (ref 150–400)
POLYCHROMASIA BLD QL SMEAR: SLIGHT — SIGNIFICANT CHANGE UP
POTASSIUM SERPL-MCNC: 3.8 MMOL/L — SIGNIFICANT CHANGE UP (ref 3.5–5.3)
POTASSIUM SERPL-SCNC: 3.8 MMOL/L — SIGNIFICANT CHANGE UP (ref 3.5–5.3)
PROMYELOCYTES # FLD: 0.9 % — HIGH (ref 0–0)
PROT SERPL-MCNC: 5.1 G/DL — LOW (ref 6–8.3)
RBC # BLD: 3.74 M/UL — LOW (ref 4.2–5.8)
RBC # FLD: 17.4 % — HIGH (ref 10.3–14.5)
RBC BLD AUTO: SIGNIFICANT CHANGE UP
SODIUM SERPL-SCNC: 134 MMOL/L — LOW (ref 135–145)
TRIGL SERPL-MCNC: 127 MG/DL — SIGNIFICANT CHANGE UP
WBC # BLD: 8.15 K/UL — SIGNIFICANT CHANGE UP (ref 3.8–10.5)
WBC # FLD AUTO: 8.15 K/UL — SIGNIFICANT CHANGE UP (ref 3.8–10.5)

## 2024-03-13 PROCEDURE — 99232 SBSQ HOSP IP/OBS MODERATE 35: CPT

## 2024-03-13 RX ADMIN — OXYCODONE HYDROCHLORIDE 5 MILLIGRAM(S): 5 TABLET ORAL at 04:00

## 2024-03-13 RX ADMIN — GABAPENTIN 600 MILLIGRAM(S): 400 CAPSULE ORAL at 05:14

## 2024-03-13 RX ADMIN — Medication 1000 MILLIGRAM(S): at 15:36

## 2024-03-13 RX ADMIN — OXYCODONE HYDROCHLORIDE 5 MILLIGRAM(S): 5 TABLET ORAL at 10:25

## 2024-03-13 RX ADMIN — Medication 1000 MILLIGRAM(S): at 08:44

## 2024-03-13 RX ADMIN — OXYCODONE HYDROCHLORIDE 5 MILLIGRAM(S): 5 TABLET ORAL at 03:29

## 2024-03-13 RX ADMIN — POLYETHYLENE GLYCOL 3350 17 GRAM(S): 17 POWDER, FOR SOLUTION ORAL at 05:15

## 2024-03-13 RX ADMIN — METHOCARBAMOL 750 MILLIGRAM(S): 500 TABLET, FILM COATED ORAL at 13:21

## 2024-03-13 RX ADMIN — Medication 1000 MILLIGRAM(S): at 09:14

## 2024-03-13 RX ADMIN — SODIUM CHLORIDE 2 GRAM(S): 9 INJECTION INTRAMUSCULAR; INTRAVENOUS; SUBCUTANEOUS at 05:15

## 2024-03-13 RX ADMIN — ENOXAPARIN SODIUM 40 MILLIGRAM(S): 100 INJECTION SUBCUTANEOUS at 16:58

## 2024-03-13 RX ADMIN — OXYCODONE HYDROCHLORIDE 5 MILLIGRAM(S): 5 TABLET ORAL at 17:49

## 2024-03-13 RX ADMIN — GABAPENTIN 600 MILLIGRAM(S): 400 CAPSULE ORAL at 21:06

## 2024-03-13 RX ADMIN — CHLORHEXIDINE GLUCONATE 1 APPLICATION(S): 213 SOLUTION TOPICAL at 05:14

## 2024-03-13 RX ADMIN — OXYCODONE HYDROCHLORIDE 5 MILLIGRAM(S): 5 TABLET ORAL at 09:55

## 2024-03-13 RX ADMIN — METHOCARBAMOL 750 MILLIGRAM(S): 500 TABLET, FILM COATED ORAL at 05:14

## 2024-03-13 RX ADMIN — SODIUM CHLORIDE 2 GRAM(S): 9 INJECTION INTRAMUSCULAR; INTRAVENOUS; SUBCUTANEOUS at 13:21

## 2024-03-13 RX ADMIN — METHOCARBAMOL 750 MILLIGRAM(S): 500 TABLET, FILM COATED ORAL at 21:06

## 2024-03-13 RX ADMIN — GABAPENTIN 600 MILLIGRAM(S): 400 CAPSULE ORAL at 13:23

## 2024-03-13 RX ADMIN — Medication 1 TABLET(S): at 13:21

## 2024-03-13 RX ADMIN — POLYETHYLENE GLYCOL 3350 17 GRAM(S): 17 POWDER, FOR SOLUTION ORAL at 17:00

## 2024-03-13 RX ADMIN — Medication 1000 MILLIGRAM(S): at 15:06

## 2024-03-13 RX ADMIN — SODIUM CHLORIDE 2 GRAM(S): 9 INJECTION INTRAMUSCULAR; INTRAVENOUS; SUBCUTANEOUS at 21:07

## 2024-03-13 NOTE — PROGRESS NOTE ADULT - SUBJECTIVE AND OBJECTIVE BOX
SUBJECTIVE:   Doing well. R knee cap to shin paresthesias OOB to chair   OVERNIGHT EVENTS: none    Vital Signs Last 24 Hrs  T(C): 36.8 (13 Mar 2024 09:50), Max: 37.6 (12 Mar 2024 21:00)  T(F): 98.2 (13 Mar 2024 09:50), Max: 99.6 (12 Mar 2024 21:00)  HR: 69 (13 Mar 2024 09:50) (69 - 83)  BP: 118/66 (13 Mar 2024 09:50) (105/64 - 119/83)  BP(mean): --  RR: 16 (13 Mar 2024 09:50) (16 - 18)  SpO2: 94% (13 Mar 2024 09:50) (94% - 95%)    Parameters below as of 13 Mar 2024 09:50  Patient On (Oxygen Delivery Method): room air        PHYSICAL EXAM:    Constitutional: No Acute Distress     Neurological: Awake alert Ox3, Speech clear Following Commands, Moving all Extremities 5/5 except R HF 4/5 KF/KE 4+/5 DF/PF 5/5 Left bile bag 25cc / L HMV  60ccscant drainage R HMV upper 5 R Lower HMV 20cc/ 24 hrs . Incision C/D/I     Pulmonary: Clear to Auscultation,     Cardiovascular: S1, S2, Regular rate and rhythm     Gastrointestinal: Soft, Non-tender, Non-distended     Extremities: No calf tenderness         LABS:                        9.9    8.15  )-----------( 302      ( 13 Mar 2024 05:57 )             31.3    03-13    134<L>  |  99  |  20  ----------------------------<  102<H>  3.8   |  24  |  0.58    Ca    8.5      13 Mar 2024 05:57  Phos  3.4     03-13  Mg     2.0     03-13    TPro  5.1<L>  /  Alb  2.4<L>  /  TBili  0.4  /  DBili  x   /  AST  133<H>  /  ALT  160<H>  /  AlkPhos  211<H>  03-13      IMAGING:         MEDICATIONS:    acetaminophen     Tablet .. 1000 milliGRAM(s) Oral every 6 hours PRN Temp greater or equal to 38C (100.4F), Mild Pain (1 - 3)  gabapentin 600 milliGRAM(s) Oral every 8 hours  HYDROmorphone  Injectable 0.25 milliGRAM(s) IV Push every 4 hours PRN break through pain  methocarbamol 750 milliGRAM(s) Oral every 8 hours  ondansetron Injectable 4 milliGRAM(s) IV Push every 6 hours PRN Nausea and/or Vomiting  oxyCODONE    IR 10 milliGRAM(s) Oral every 4 hours PRN Severe Pain (7 - 10)  oxyCODONE    IR 5 milliGRAM(s) Oral every 4 hours PRN Moderate Pain (4 - 6)  magnesium hydroxide Suspension 30 milliLiter(s) Oral every 12 hours PRN Constipation  polyethylene glycol 3350 17 Gram(s) Oral two times a day  senna 2 Tablet(s) Oral at bedtime  enoxaparin Injectable 40 milliGRAM(s) SubCutaneous <User Schedule>  multivitamin 1 Tablet(s) Oral daily  sodium chloride 2 Gram(s) Oral every 8 hours      DIET:

## 2024-03-13 NOTE — PROGRESS NOTE ADULT - ASSESSMENT
68M w/ hx of HLD, pre-DM, presenting with c/f diffuse osseous spinal mets w/ cord compression. and cord edema. Now s/p T8 VCR, T6-T10 fusion for tumor, small csf leak primarily repaired, L4 Partial Corpectomy, L2-Pelvis Fusion, PRS Closure.     Plan:   - will coordinate with neurosurgery, would like to remove both left drains today  - Monitor drain outputs  - keep prineo  - Appreciate care per primary team and NSC     Plastic Surgery   [phone]

## 2024-03-13 NOTE — PROGRESS NOTE ADULT - SUBJECTIVE AND OBJECTIVE BOX
Ashley Zuniga MD  Division of Hospital Medicine  [facility]  Spectra: [phone]      Patient is a 68y old  Male who presents with a chief complaint of malignant cord compression/cauda equina syndrome (13 Mar 2024 08:05)      SUBJECTIVE / OVERNIGHT EVENTS: no acute events overnight. no fever, chills, chest pain, nor dyspnea. pain controlled on current regimen.   ADDITIONAL REVIEW OF SYSTEMS:    MEDICATIONS  (STANDING):  chlorhexidine 2% Cloths 1 Application(s) Topical <User Schedule>  enoxaparin Injectable 40 milliGRAM(s) SubCutaneous <User Schedule>  gabapentin 600 milliGRAM(s) Oral every 8 hours  methocarbamol 750 milliGRAM(s) Oral every 8 hours  multivitamin 1 Tablet(s) Oral daily  polyethylene glycol 3350 17 Gram(s) Oral two times a day  senna 2 Tablet(s) Oral at bedtime  sodium chloride 2 Gram(s) Oral every 8 hours    MEDICATIONS  (PRN):  acetaminophen     Tablet .. 1000 milliGRAM(s) Oral every 6 hours PRN Temp greater or equal to 38C (100.4F), Mild Pain (1 - 3)  HYDROmorphone  Injectable 0.25 milliGRAM(s) IV Push every 4 hours PRN break through pain  magnesium hydroxide Suspension 30 milliLiter(s) Oral every 12 hours PRN Constipation  ondansetron Injectable 4 milliGRAM(s) IV Push every 6 hours PRN Nausea and/or Vomiting  oxyCODONE    IR 5 milliGRAM(s) Oral every 4 hours PRN Moderate Pain (4 - 6)  oxyCODONE    IR 10 milliGRAM(s) Oral every 4 hours PRN Severe Pain (7 - 10)      CAPILLARY BLOOD GLUCOSE        I&O's Summary    12 Mar 2024 07:01  -  13 Mar 2024 07:00  --------------------------------------------------------  IN: 1280 mL / OUT: 1660 mL / NET: -380 mL        PHYSICAL EXAM:  Vital Signs Last 24 Hrs  T(C): 36.8 (13 Mar 2024 09:50), Max: 37.6 (12 Mar 2024 21:00)  T(F): 98.2 (13 Mar 2024 09:50), Max: 99.6 (12 Mar 2024 21:00)  HR: 69 (13 Mar 2024 09:50) (69 - 83)  BP: 118/66 (13 Mar 2024 09:50) (105/64 - 129/79)  BP(mean): --  RR: 16 (13 Mar 2024 09:50) (16 - 18)  SpO2: 94% (13 Mar 2024 09:50) (94% - 95%)    Parameters below as of 13 Mar 2024 09:50  Patient On (Oxygen Delivery Method): room air    CONSTITUTIONAL: NAD, well-developed, well-groomed  EYES: PERRLA; conjunctiva and sclera clear  ENMT: Moist oral mucosa, no pharyngeal injection or exudates; normal dentition  NECK: Supple, no palpable masses; no thyromegaly  RESPIRATORY: Normal respiratory effort; lungs are clear to auscultation bilaterally  CARDIOVASCULAR: Regular rate and rhythm, normal S1 and S2, no murmur/rub/gallop; No lower extremity edema  ABDOMEN: Soft, Nondistended, Nontender to palpation, normoactive bowel sounds  MUSCULOSKELETAL: No clubbing or cyanosis of digits; no joint swelling or tenderness to palpation  PSYCH: A+O to person, place, and time; affect appropriate  NEUROLOGY: CN 2-12 are intact and symmetric; no gross sensory deficits, grossly 5/5 strength throughout  SKIN: No rashes; no palpable lesions, +hemovac x 2 with  serosanguinous drainage    LABS:                        9.9    8.15  )-----------( 302      ( 13 Mar 2024 05:57 )             31.3     03-13    134<L>  |  99  |  20  ----------------------------<  102<H>  3.8   |  24  |  0.58    Ca    8.5      13 Mar 2024 05:57  Phos  3.4     03-13  Mg     2.0     03-13    TPro  5.1<L>  /  Alb  2.4<L>  /  TBili  0.4  /  DBili  x   /  AST  133<H>  /  ALT  160<H>  /  AlkPhos  211<H>  03-13        Urinalysis Basic - ( 13 Mar 2024 05:57 )    Color: x / Appearance: x / SG: x / pH: x  Gluc: 102 mg/dL / Ketone: x  / Bili: x / Urobili: x   Blood: x / Protein: x / Nitrite: x   Leuk Esterase: x / RBC: x / WBC x   Sq Epi: x / Non Sq Epi: x / Bacteria: x        RADIOLOGY & ADDITIONAL TESTS:  Results Reviewed: H/H stable, electrolytes at goal  Imaging Personally Reviewed:  Electrocardiogram Personally Reviewed:    COORDINATION OF CARE:  Care Discussed with Consultants/Other Providers [Y]: neurosurgery NP Celena  Prior or Outpatient Records Reviewed [Y/N]:

## 2024-03-13 NOTE — PROGRESS NOTE ADULT - ASSESSMENT
68M w/ hx of HLD, pre-DM, sinus john, presenting with worsening LE numbness/paresthesias and weakness since Jan 2024, outpatient x-rays& MRI on 2/29/24 showing concern for osseous metastatic disease in thoracic/lumbar/sacral spine as well as extension of soft tissue into the paravertebral soft tissues more prominent on the right side. Pt came to ED on 3/2 for back pain and worsening LE weakness RLE > LLE, fall x2 and decline in functional status where his legs gave out. Pt was walking independently last week and now requires a walker to walk even several steps. Also endorses weight loss. In ED CT imaging and MRI spine notable for multiple findings including diffuse osseous metastatic disease throughout the entire spine; pathologic fx w/ tumor into the epidural space at T8 resulting in thoracic cord compression; tumor extension into the epidural space at L3 resulting in cauda equina compression; pathologic fx w/ tumor in the epidural space at L4 contributing to severe canal stenosis; tumor within the bilateral psoas muscles at L3 and L4; bilateral neural foramen effacement by tumor in L-spine.   - s/p stage 1: T8 VCR (vertebral column resection) T6-T10 fusion for tumor resection, small csf leak primarily repaired, Stage 2 L4 Partial Corpectomy, L2-Pelvis Fusion, Plastics Closure 3/5/24   Pathology Diffuse large B-cell lymphoma, germinal center B-cell type.    Plan     Neuro stable. Left bile bag removed wo difficulty. L HMV removed by plastics   Vitals stable  Hematology -Needs treatment for B cell lymphoma in expedited fashion.  Cleared for chemotherapy 2 weeks post op. D/w Heme fellow Plans for discharge to acute rehab for possible 1 week & chemotherapy  after rehab. Patient & family (daughter & niece at bedside)  agreeable . Logistics of inpatient or outpatient chemo to be worked out depending on functional status.   Pain well controlled On oxycodone prn & robaxin q8   DVT ppx  labs acceptable   PT/OT acute rehab.

## 2024-03-13 NOTE — PROGRESS NOTE ADULT - SUBJECTIVE AND OBJECTIVE BOX
SUBJECTIVE: The patient was seen and examined. No acute events overnight. Patient states pain is well controlled.      Vital Signs Last 24 Hrs  T(C): 37.1 (13 Mar 2024 05:00), Max: 37.6 (12 Mar 2024 21:00)  T(F): 98.8 (13 Mar 2024 05:00), Max: 99.6 (12 Mar 2024 21:00)  HR: 71 (13 Mar 2024 05:00) (71 - 83)  BP: 110/70 (13 Mar 2024 05:00) (105/64 - 135/78)  BP(mean): --  RR: 18 (13 Mar 2024 05:00) (18 - 20)  SpO2: 95% (13 Mar 2024 05:00) (93% - 95%)    Parameters below as of 13 Mar 2024 05:00  Patient On (Oxygen Delivery Method): room air        I&O's Detail    12 Mar 2024 07:01  -  13 Mar 2024 07:00  --------------------------------------------------------  IN:    Oral Fluid: 1280 mL  Total IN: 1280 mL    OUT:    Accordian (mL): 60 mL    Accordian (mL): 5 mL    Accordian (mL): 20 mL    Drain (mL): 25 mL    Voided (mL): 1550 mL  Total OUT: 1660 mL    Total NET: -380 mL          Physical Exam:  CONSTITUTIONAL: NAD, lying in bed  NEURO: Awake, alert  BACK:  soft no collections  prineo in place cdi  hemovac x3 s/s  bile bag s/s       LABS:                        9.9    8.15  )-----------( 302      ( 13 Mar 2024 05:57 )             31.3     03-13    134<L>  |  99  |  20  ----------------------------<  102<H>  3.8   |  24  |  0.58    Ca    8.5      13 Mar 2024 05:57  Phos  3.4     03-13  Mg     2.0     03-13    TPro  5.1<L>  /  Alb  2.4<L>  /  TBili  0.4  /  DBili  x   /  AST  133<H>  /  ALT  160<H>  /  AlkPhos  211<H>  03-13      Urinalysis Basic - ( 13 Mar 2024 05:57 )    Color: x / Appearance: x / SG: x / pH: x  Gluc: 102 mg/dL / Ketone: x  / Bili: x / Urobili: x   Blood: x / Protein: x / Nitrite: x   Leuk Esterase: x / RBC: x / WBC x   Sq Epi: x / Non Sq Epi: x / Bacteria: x        RADIOLOGY & ADDITIONAL STUDIES:

## 2024-03-14 DIAGNOSIS — C83.30 DIFFUSE LARGE B-CELL LYMPHOMA, UNSPECIFIED SITE: ICD-10-CM

## 2024-03-14 LAB
CMV DNA CSF QL NAA+PROBE: SIGNIFICANT CHANGE UP IU/ML
CMV DNA SPEC NAA+PROBE-LOG#: SIGNIFICANT CHANGE UP LOG10IU/ML
HIV 1+2 AB+HIV1 P24 AG SERPL QL IA: SIGNIFICANT CHANGE UP

## 2024-03-14 PROCEDURE — 99232 SBSQ HOSP IP/OBS MODERATE 35: CPT

## 2024-03-14 PROCEDURE — 99233 SBSQ HOSP IP/OBS HIGH 50: CPT

## 2024-03-14 RX ORDER — SODIUM CHLORIDE 9 MG/ML
2 INJECTION INTRAMUSCULAR; INTRAVENOUS; SUBCUTANEOUS EVERY 12 HOURS
Refills: 0 | Status: DISCONTINUED | OUTPATIENT
Start: 2024-03-14 | End: 2024-03-16

## 2024-03-14 RX ADMIN — OXYCODONE HYDROCHLORIDE 5 MILLIGRAM(S): 5 TABLET ORAL at 18:33

## 2024-03-14 RX ADMIN — SODIUM CHLORIDE 2 GRAM(S): 9 INJECTION INTRAMUSCULAR; INTRAVENOUS; SUBCUTANEOUS at 05:10

## 2024-03-14 RX ADMIN — GABAPENTIN 600 MILLIGRAM(S): 400 CAPSULE ORAL at 21:14

## 2024-03-14 RX ADMIN — SODIUM CHLORIDE 2 GRAM(S): 9 INJECTION INTRAMUSCULAR; INTRAVENOUS; SUBCUTANEOUS at 17:38

## 2024-03-14 RX ADMIN — ENOXAPARIN SODIUM 40 MILLIGRAM(S): 100 INJECTION SUBCUTANEOUS at 17:36

## 2024-03-14 RX ADMIN — OXYCODONE HYDROCHLORIDE 5 MILLIGRAM(S): 5 TABLET ORAL at 12:46

## 2024-03-14 RX ADMIN — GABAPENTIN 600 MILLIGRAM(S): 400 CAPSULE ORAL at 05:10

## 2024-03-14 RX ADMIN — OXYCODONE HYDROCHLORIDE 5 MILLIGRAM(S): 5 TABLET ORAL at 00:30

## 2024-03-14 RX ADMIN — CHLORHEXIDINE GLUCONATE 1 APPLICATION(S): 213 SOLUTION TOPICAL at 06:47

## 2024-03-14 RX ADMIN — METHOCARBAMOL 750 MILLIGRAM(S): 500 TABLET, FILM COATED ORAL at 21:14

## 2024-03-14 RX ADMIN — OXYCODONE HYDROCHLORIDE 5 MILLIGRAM(S): 5 TABLET ORAL at 12:16

## 2024-03-14 RX ADMIN — METHOCARBAMOL 750 MILLIGRAM(S): 500 TABLET, FILM COATED ORAL at 05:10

## 2024-03-14 RX ADMIN — OXYCODONE HYDROCHLORIDE 5 MILLIGRAM(S): 5 TABLET ORAL at 00:00

## 2024-03-14 RX ADMIN — Medication 1 TABLET(S): at 11:07

## 2024-03-14 RX ADMIN — OXYCODONE HYDROCHLORIDE 5 MILLIGRAM(S): 5 TABLET ORAL at 04:33

## 2024-03-14 RX ADMIN — OXYCODONE HYDROCHLORIDE 5 MILLIGRAM(S): 5 TABLET ORAL at 04:03

## 2024-03-14 RX ADMIN — OXYCODONE HYDROCHLORIDE 5 MILLIGRAM(S): 5 TABLET ORAL at 19:00

## 2024-03-14 NOTE — PROGRESS NOTE ADULT - SUBJECTIVE AND OBJECTIVE BOX
Plastic Surgery Progress Note (pg [facility]: [id number], [facility]: [phone])    SUBJECTIVE  The patient was seen and examined. No acute events overnight. Pain controlled, afebrile w/ stable vitals.     OBJECTIVE  ___________________________________________________  VITAL SIGNS / I&O's   Vital Signs Last 24 Hrs  T(C): 37.2 (14 Mar 2024 05:00), Max: 37.2 (14 Mar 2024 05:00)  T(F): 98.9 (14 Mar 2024 05:00), Max: 98.9 (14 Mar 2024 05:00)  HR: 82 (14 Mar 2024 05:00) (69 - 87)  BP: 115/68 (14 Mar 2024 05:00) (111/63 - 118/70)  BP(mean): --  RR: 20 (14 Mar 2024 05:00) (16 - 20)  SpO2: 95% (14 Mar 2024 05:00) (94% - 96%)    Parameters below as of 14 Mar 2024 05:00  Patient On (Oxygen Delivery Method): room air          13 Mar 2024 07:01  -  14 Mar 2024 07:00  --------------------------------------------------------  IN:    Oral Fluid: 320 mL  Total IN: 320 mL    OUT:    Accordian (mL): 18 mL    Accordian (mL): 70 mL    Voided (mL): 200 mL    Voided (mL): 1250 mL  Total OUT: 1538 mL    Total NET: -1218 mL        ___________________________________________________  PHYSICAL EXAM    CONSTITUTIONAL: NAD, lying in bed  NEURO: Awake, alert  BACK:  soft no collections  prineo in place cdi  hemovac x1 s/s    ___________________________________________________  LABS                        9.9    8.15  )-----------( 302      ( 13 Mar 2024 05:57 )             31.3     13 Mar 2024 05:57    134    |  99     |  20     ----------------------------<  102    3.8     |  24     |  0.58     Ca    8.5        13 Mar 2024 05:57  Phos  3.4       13 Mar 2024 05:57  Mg     2.0       13 Mar 2024 05:57    TPro  5.1    /  Alb  2.4    /  TBili  0.4    /  DBili  x      /  AST  133    /  ALT  160    /  AlkPhos  211    13 Mar 2024 05:57      CAPILLARY BLOOD GLUCOSE            Urinalysis Basic - ( 13 Mar 2024 05:57 )    Color: x / Appearance: x / SG: x / pH: x  Gluc: 102 mg/dL / Ketone: x  / Bili: x / Urobili: x   Blood: x / Protein: x / Nitrite: x   Leuk Esterase: x / RBC: x / WBC x   Sq Epi: x / Non Sq Epi: x / Bacteria: x      ___________________________________________________  MICRO  Recent Cultures:    ___________________________________________________  MEDICATIONS  (STANDING):  chlorhexidine 2% Cloths 1 Application(s) Topical <User Schedule>  enoxaparin Injectable 40 milliGRAM(s) SubCutaneous <User Schedule>  gabapentin 600 milliGRAM(s) Oral every 8 hours  methocarbamol 750 milliGRAM(s) Oral every 8 hours  multivitamin 1 Tablet(s) Oral daily  polyethylene glycol 3350 17 Gram(s) Oral two times a day  senna 2 Tablet(s) Oral at bedtime  sodium chloride 2 Gram(s) Oral every 8 hours    MEDICATIONS  (PRN):  acetaminophen     Tablet .. 1000 milliGRAM(s) Oral every 6 hours PRN Temp greater or equal to 38C (100.4F), Mild Pain (1 - 3)  HYDROmorphone  Injectable 0.25 milliGRAM(s) IV Push every 4 hours PRN break through pain  magnesium hydroxide Suspension 30 milliLiter(s) Oral every 12 hours PRN Constipation  ondansetron Injectable 4 milliGRAM(s) IV Push every 6 hours PRN Nausea and/or Vomiting  oxyCODONE    IR 10 milliGRAM(s) Oral every 4 hours PRN Severe Pain (7 - 10)  oxyCODONE    IR 5 milliGRAM(s) Oral every 4 hours PRN Moderate Pain (4 - 6)

## 2024-03-14 NOTE — PROGRESS NOTE ADULT - SUBJECTIVE AND OBJECTIVE BOX
Ashley Zuniga MD  Division of Hospital Medicine  [facility]  Spectra: [phone]      Patient is a 68y old  Male who presents with a chief complaint of malignant cord compression/cauda equina syndrome (14 Mar 2024 11:20)      SUBJECTIVE / OVERNIGHT EVENTS: no acute events overnight. seen out of bed in chair with pain controlled 3/10 in severity. no lightheadedness, dizziness, chest pain. sob, abd pain, n/v, nor constipation. does feel a little "off" with a "fogginess" he feels 2/2 to pain medications/muscle relaxants.  ADDITIONAL REVIEW OF SYSTEMS:    MEDICATIONS  (STANDING):  chlorhexidine 2% Cloths 1 Application(s) Topical <User Schedule>  enoxaparin Injectable 40 milliGRAM(s) SubCutaneous <User Schedule>  gabapentin 600 milliGRAM(s) Oral every 8 hours  methocarbamol 750 milliGRAM(s) Oral every 8 hours  multivitamin 1 Tablet(s) Oral daily  polyethylene glycol 3350 17 Gram(s) Oral two times a day  senna 2 Tablet(s) Oral at bedtime  sodium chloride 2 Gram(s) Oral every 8 hours    MEDICATIONS  (PRN):  acetaminophen     Tablet .. 1000 milliGRAM(s) Oral every 6 hours PRN Temp greater or equal to 38C (100.4F), Mild Pain (1 - 3)  HYDROmorphone  Injectable 0.25 milliGRAM(s) IV Push every 4 hours PRN break through pain  magnesium hydroxide Suspension 30 milliLiter(s) Oral every 12 hours PRN Constipation  ondansetron Injectable 4 milliGRAM(s) IV Push every 6 hours PRN Nausea and/or Vomiting  oxyCODONE    IR 5 milliGRAM(s) Oral every 4 hours PRN Moderate Pain (4 - 6)  oxyCODONE    IR 10 milliGRAM(s) Oral every 4 hours PRN Severe Pain (7 - 10)      CAPILLARY BLOOD GLUCOSE        I&O's Summary    13 Mar 2024 07:01  -  14 Mar 2024 07:00  --------------------------------------------------------  IN: 320 mL / OUT: 1538 mL / NET: -1218 mL        PHYSICAL EXAM:  Vital Signs Last 24 Hrs  T(C): 36.7 (14 Mar 2024 13:17), Max: 37.2 (14 Mar 2024 05:00)  T(F): 98 (14 Mar 2024 13:17), Max: 98.9 (14 Mar 2024 05:00)  HR: 78 (14 Mar 2024 13:17) (71 - 87)  BP: 102/59 (14 Mar 2024 13:17) (102/59 - 119/71)  BP(mean): --  RR: 18 (14 Mar 2024 13:17) (18 - 20)  SpO2: 98% (14 Mar 2024 13:17) (94% - 98%)    Parameters below as of 14 Mar 2024 05:00  Patient On (Oxygen Delivery Method): room air    CONSTITUTIONAL: NAD, well-developed, well-groomed  EYES: PERRLA; conjunctiva and sclera clear  ENMT: Moist oral mucosa, no pharyngeal injection or exudates; normal dentition  NECK: Supple, no palpable masses; no thyromegaly  RESPIRATORY: Normal respiratory effort; lungs are clear to auscultation bilaterally  CARDIOVASCULAR: Regular rate and rhythm, normal S1 and S2, no murmur/rub/gallop; No lower extremity edema  ABDOMEN: Soft, Nondistended, Nontender to palpation, normoactive bowel sounds  MUSCULOSKELETAL: No clubbing or cyanosis of digits; no joint swelling or tenderness to palpation  PSYCH: A+O to person, place, and time; affect appropriate  NEUROLOGY: CN 2-12 are intact and symmetric; no gross sensory deficits, grossly 5/5 strength throughout  SKIN: No rashes; no palpable lesions, +hemovac x 1 with serosanguinous drainage    LABS:                        9.9    8.15  )-----------( 302      ( 13 Mar 2024 05:57 )             31.3     03-13    134<L>  |  99  |  20  ----------------------------<  102<H>  3.8   |  24  |  0.58    Ca    8.5      13 Mar 2024 05:57  Phos  3.4     03-13  Mg     2.0     03-13    TPro  5.1<L>  /  Alb  2.4<L>  /  TBili  0.4  /  DBili  x   /  AST  133<H>  /  ALT  160<H>  /  AlkPhos  211<H>  03-13        Urinalysis Basic - ( 13 Mar 2024 05:57 )    Color: x / Appearance: x / SG: x / pH: x  Gluc: 102 mg/dL / Ketone: x  / Bili: x / Urobili: x   Blood: x / Protein: x / Nitrite: x   Leuk Esterase: x / RBC: x / WBC x   Sq Epi: x / Non Sq Epi: x / Bacteria: x        RADIOLOGY & ADDITIONAL TESTS:  Results Reviewed:   Imaging Personally Reviewed:  Electrocardiogram Personally Reviewed:    COORDINATION OF CARE:  Care Discussed with Consultants/Other Providers [Y]: Neurosurgery NP Celena  Prior or Outpatient Records Reviewed [Y/N]:

## 2024-03-14 NOTE — CHART NOTE - NSCHARTNOTEFT_GEN_A_CORE
68M w/ hx of HLD, pre-DM, sinus john    Admitted w/ worsening LE numbness/paresthesias and weakness since Jan 2024. MRI 2/29/24 showed concern for osseous metastatic disease in thoracic/lumbar/sacral spine as well as extension of soft tissue into the paravertebral soft tissues more prominent on the right side. Since 3/2/2024 patient had a notable decline in functional status that was complicated by 2 falls where his legs just gave out. Pt was walking independently last week and now requires a walker to walk even several steps. Patient reports having back pain, numbness and weakness RLE>LLE and soreness/aching of legs.     In ED - CT imaging and MRI spine (prelim report) notable for multiple findings including diffuse osseous metastatic disease throughout the entire spine; pathologic fx w/ tumor into the epidural space at T8 resulting in thoracic cord compression; tumor extension into the epidural space at L3 resulting in cauda equina compression; pathologic fx w/ tumor in the epidural space at L4 contributing to severe canal stenosis; tumor within the bilateral psoas muscles at L3 and L4; bilateral neural foramen effacement by tumor in L-spine; 3rd rib lesion, b/l lung pleura lesions; mesenteric and retroperitoneal adenopathy; large fecal burden, narrowing versus underdistention in the sigmoid colon.     3/6 s/p Stage 1:  T8 VCR, T6-T10 fusion for tumor, small csf leak primarily repaired  Stage 2: L4 Partial Corpectomy, L2-Pelvis Fusion  Plastics Closure ([facility])    Current out- patient pain regimen: None  Out Patient Pain Management provider: None    Patient w/ acute post-op pain, EMR reviewed, pain controlled w/ current regimen.  Only required 4 doses Oxy IR 5 mg for BTP in 24 hrs, RNs documenting pain level decreases to 1/10.    Discontinue Oxycodone 10 mg (not taking)  Continue Gabapentin 600 mg every 8 hrs (CrCl = 101.3), if paresthesias persist can consider increasing to 800 mg TID.  Continue Oxy IR 5 mg every 4 hrs PRN severe pain   Discontinue IV Dilaudid  Recommend GAP consult (Geriatric and Palliative Care) for this patient with advanced illness and management of cancer-related pain    Warm/cool packs for comfort  Continue Bowel Regimen  Incentive Spirometer  PT per primary team  Monitor for sedation, respiratory depression  Out-patient pain practice list can be provided for additional pain management after discharge if necessary  Narcan Rescue Kit on discharge (Naloxone 4 mg/0.1 ml nasal spray - 1 spray q 2-3 minutes alternating between nostrils)    Signing off    [facility]  [phone]
CAPRINI SCORE [CLOT] Score on Admission for     AGE RELATED RISK FACTORS                                                       MOBILITY RELATED FACTORS  [ ] Age 41-60 years                                            (1 Point)                  [ ] Bed rest                                                        (1 Point)  [x ] Age: 61-74 years                                           (2 Points)                 [ ] Plaster cast                                                   (2 Points)  [ ] Age= 75 years                                              (3 Points)                 [ ] Bed bound for more than 72 hours                 (2 Points)    DISEASE RELATED RISK FACTORS                                               GENDER SPECIFIC FACTORS  [ ] Edema in the lower extremities                       (1 Point)                  [ ] Pregnancy                                                     (1 Point)  [ ] Varicose veins                                               (1 Point)                  [ ] Post-partum < 6 weeks                                   (1 Point)             [ ] BMI > 25 Kg/m2                                            (1 Point)                  [ ] Hormonal therapy  or oral contraception          (1 Point)                 [ ] Sepsis (in the previous month)                        (1 Point)                  [ ] History of pregnancy complications                 (1 point)  [ ] Pneumonia or serious lung disease                                               [ ] Unexplained or recurrent                     (1 Point)           (in the previous month)                               (1 Point)  [ ] Abnormal pulmonary function test                     (1 Point)                 SURGERY RELATED RISK FACTORS (include planned surgeries)  [ ] Acute myocardial infarction                              (1 Point)                 [ ]  Section                                             (1 Point)  [ ] Congestive heart failure (in the previous month)  (1 Point)         [ ] Minor surgery                                                  (1 Point)   [ ] Inflammatory bowel disease                             (1 Point)                 [ ] Arthroscopic surgery                                        (2 Points)  [ ] Central venous access                                      (2 Points)                [ x] General surgery lasting more than 45 minutes   (2 Points)       [ ] Stroke (in the previous month)                          (5 Points)               [ ] Elective arthroplasty                                         (5 Points)            [ ] current or past malignancy                              (2 Points)                                                                                                       HEMATOLOGY RELATED FACTORS                                                 TRAUMA RELATED RISK FACTORS  [ ] Prior episodes of VTE                                     (3 Points)                [ ] Fracture of the hip, pelvis, or leg                       (5 Points)  [ ] Positive family history for VTE                         (3 Points)                 [ ] Acute spinal cord injury (in the previous month)  (5 Points)  [ ] Prothrombin 86586 A                                     (3 Points)                 [ ] Paralysis  (less than 1 month)                             (5 Points)  [ ] Factor V Leiden                                             (3 Points)                  [ ] Multiple Trauma within 1 month                        (5 Points)  [ ] Lupus anticoagulants                                     (3 Points)                                                           [ ] Anticardiolipin antibodies                               (3 Points)                                                       [ ] High homocysteine in the blood                      (3 Points)                                             [ ] Other congenital or acquired thrombophilia      (3 Points)                                                [ ] Heparin induced thrombocytopenia                  (3 Points)                                          Total Score [      4    ]    Risk:  Very low 0   Low 1 to 2   Moderate 3 to 4   High =5       VTE Prophylasix Recommednations:  [ x] mechanical pneumatic compression devices                                      [ ] contraindicated: _____________________  [ ] chemo prophylasix                                                                                   [ x] contraindicated _____________________    **** HIGH LIKELIHOOD DVT PRESENT ON ADMISSION  [ ] (please order LE dopplers within 24 hours of admission)
Patient seen and examined in ED earlier this morning.    History, labs, chart reviewed.    Reports being in good spirits, aware of plan for OR today. Had opportunity to speak with neurosurgery team, all questioned answered. Currently, reports mild intermittent lower back pain and decreased sensation of RLE. No pain in legs. Urinating well without sensation of incomplete emptying.    On exam, lungs CTA b/l. +S1S2, RRR, no m/g/r. Abdomen soft, non-distended. Foot plantar/dorsiflexors 5/5 b/l. No suprapubic distension or tenderness. Diminished sensation to light touch in LEs b/l, R>L. No clonus.    Plan for OR today with neurosurgery for urgent spinal decompression in setting of likely metastatic disease. No contraindication to OR, patient is optimized for this urgent procedure.    Post-operatively, hospital medicine will continue to follow and guide care of medical conditions.

## 2024-03-14 NOTE — PROGRESS NOTE ADULT - ASSESSMENT
69 yo male with PMH of HLD, pre-DM, and sinus bradycardia who presents with progressively worsening LE numbness/paresthesias and weakness since January 2024 with acute change in functional status 3 days prior to admission. MRI/CT imaging concerning for metastatic disease with cord compression/cauda equina syndrome. CT CAP + for lesions in 3rd rib, L pleura, mesenteric/retroperitoneal adenopathy, narrowed sigmoid colon. Now s/p Stage 1: T8 VCR, T6-T10 fusion for tumor, small CSF leak primarily repaired on 3/5 and stage 2 L4 partial corpectomy, L2-pelvis fusion with plastics closure. Transferred out of [facility] on 3/11. Path: DLBCL. Heme/onc consulted with plan for initiation of treatment after rehab.

## 2024-03-14 NOTE — PROGRESS NOTE ADULT - SUBJECTIVE AND OBJECTIVE BOX
patient now on 4 [facility]    REVIEW OF SYSTEMS  Constitutional - No fever,  No fatigue  HEENT - No vertigo, No neck pain  Neurological - No headaches, No memory loss  Psychiatric - No depression, No anxiety    FUNCTIONAL PROGRESS  PT 3/14  bed mobility min assist  transfers CG with RW  gait CG with RW x 50 feet   standing rest     OT 3/8  bed mobility mod assist x 2  transfers mod assist x 2  LB dressing max assist     VITALS  T(C): 36.7 (03-14-24 @ 08:00), Max: 37.2 (03-14-24 @ 05:00)  HR: 85 (03-14-24 @ 08:00) (71 - 87)  BP: 119/71 (03-14-24 @ 08:00) (111/63 - 119/71)  RR: 18 (03-14-24 @ 08:00) (18 - 20)  SpO2: 94% (03-14-24 @ 08:00) (94% - 96%)  Wt(kg): --    MEDICATIONS   acetaminophen     Tablet .. 1000 milliGRAM(s) every 6 hours PRN  chlorhexidine 2% Cloths 1 Application(s) <User Schedule>  enoxaparin Injectable 40 milliGRAM(s) <User Schedule>  gabapentin 600 milliGRAM(s) every 8 hours  HYDROmorphone  Injectable 0.25 milliGRAM(s) every 4 hours PRN  magnesium hydroxide Suspension 30 milliLiter(s) every 12 hours PRN  methocarbamol 750 milliGRAM(s) every 8 hours  multivitamin 1 Tablet(s) daily  ondansetron Injectable 4 milliGRAM(s) every 6 hours PRN  oxyCODONE    IR 10 milliGRAM(s) every 4 hours PRN  oxyCODONE    IR 5 milliGRAM(s) every 4 hours PRN  polyethylene glycol 3350 17 Gram(s) two times a day  senna 2 Tablet(s) at bedtime  sodium chloride 2 Gram(s) every 8 hours      RECENT LABS - Reviewed                        9.9    8.15  )-----------( 302      ( 13 Mar 2024 05:57 )             31.3     03-13    134<L>  |  99  |  20  ----------------------------<  102<H>  3.8   |  24  |  0.58    Ca    8.5      13 Mar 2024 05:57  Phos  3.4     03-13  Mg     2.0     03-13    TPro  5.1<L>  /  Alb  2.4<L>  /  TBili  0.4  /  DBili  x   /  AST  133<H>  /  ALT  160<H>  /  AlkPhos  211<H>  03-13      Urinalysis Basic - ( 13 Mar 2024 05:57 )    Color: x / Appearance: x / SG: x / pH: x  Gluc: 102 mg/dL / Ketone: x  / Bili: x / Urobili: x   Blood: x / Protein: x / Nitrite: x   Leuk Esterase: x / RBC: x / WBC x   Sq Epi: x / Non Sq Epi: x / Bacteria: x        ----------------------------------------------------------------------------------------  PHYSICAL EXAM  Constitutional - NAD, Comfortable, in chair    Chest - Breathing comfortably  Cardiovascular - S1S2   Abdomen - Soft   Extremities - No C/C/E, No calf tenderness   Neurologic Exam -    follows commands                 Cognitive - Awake, Alert, AAO to self, place, date, year, situation     Communication - Fluent, No dysarthria        Motor -                     LEFT    UE - ShAB 5/5, EF 5/5, EE 5/5, WE 5/5,  5/5                    RIGHT UE - ShAB 5/5, EF 5/5, EE 5/5, WE 5/5,  5/5                    LEFT    LE - HF 5/5, KE 5/5, DF 5/5, PF 5/5                    RIGHT LE - HF 3/5, KE 3/5, DF 5/5, PF 5/5        Sensory - decreased RLE      Psychiatric - Mood stable, Affect WNL  ----------------------------------------------------------------------------------------  ASSESSMENT/PLAN  68yMale with functional deficits after thoracic/lumbar fusion  MRI with diffuse osseous mets, pathologic fractures, T8, L4, epidural cord compression  pathology, DLBCL, plan for PET CT as outpatient, to start chemo after short course of inpatient rehab    Pain - Tylenol gabapentin oxycodone, seen by chronic pain   DVT PPX - SCDs lovenox   Rehab - Will continue to follow for ongoing rehab needs and recommendations.   continue bedside therapy, PT/OT    Recommend ACUTE inpatient rehabilitation for the functional deficits consisting of 3 hours of therapy/day & x 4 weeks, 24 hour RN/daily PMR physician for comorbid medical management. Patient will be able to tolerate 3 hours a day.

## 2024-03-14 NOTE — PROGRESS NOTE ADULT - ASSESSMENT
68M w/ hx of HLD, pre-DM, sinus john, presenting with worsening LE numbness/paresthesias and weakness since Jan 2024, outpatient x-rays& MRI on 2/29/24 showing concern for osseous metastatic disease in thoracic/lumbar/sacral spine as well as extension of soft tissue into the paravertebral soft tissues more prominent on the right side. Pt came to ED on 3/2 for back pain and worsening LE weakness RLE > LLE, fall x2 and decline in functional status where his legs gave out. Pt was walking independently last week and now requires a walker to walk even several steps. Also endorses weight loss. In ED CT imaging and MRI spine notable for multiple findings including diffuse osseous metastatic disease throughout the entire spine; pathologic fx w/ tumor into the epidural space at T8 resulting in thoracic cord compression; tumor extension into the epidural space at L3 resulting in cauda equina compression; pathologic fx w/ tumor in the epidural space at L4 contributing to severe canal stenosis; tumor within the bilateral psoas muscles at L3 and L4; bilateral neural foramen effacement by tumor in L-spine.   - s/p stage 1: T8 VCR (vertebral column resection) T6-T10 fusion for tumor resection, small csf leak primarily repaired, Stage 2 L4 Partial Corpectomy, L2-Pelvis Fusion, Plastics Closure 3/5/24   Pathology Diffuse large B-cell lymphoma, germinal center B-cell type.    Plan     Neuro stable.   Vitals stable  Hematology -Needs treatment for B cell lymphoma in expedited fashion.  Cleared for chemotherapy 2 weeks post op. D/w Heme fellow Plans for discharge to acute rehab for possible 1 week & chemotherapy  after rehab. Patient & family (daughter & niece at bedside)  agreeable . Logistics of inpatient or outpatient chemo to be worked out depending on functional status.   Pain well controlled On oxycodone prn & robaxin q8 & Neurontin   DVT ppx  Hyponatremia - on salt tabs   am labs   PT/OT acute rehab. Possible d/c in am

## 2024-03-14 NOTE — PROGRESS NOTE ADULT - ASSESSMENT
68M w/ hx of HLD, pre-DM, presenting with c/f diffuse osseous spinal mets w/ cord compression. and cord edema. Now s/p T8 VCR, T6-T10 fusion for tumor, small csf leak primarily repaired, L4 Partial Corpectomy, L2-Pelvis Fusion, PRS Closure.     Plan:   - L drains removed 3/13  - Right upper drain removed 3/14  - Monitor drain output for remaining drain, possibly able to remove 3/15 if output continues to decline  - keep prineo  - Appreciate care per primary team and [facility] [place] [facility]     Plastic Surgery   [phone]

## 2024-03-15 DIAGNOSIS — R21 RASH AND OTHER NONSPECIFIC SKIN ERUPTION: ICD-10-CM

## 2024-03-15 DIAGNOSIS — E87.1 HYPO-OSMOLALITY AND HYPONATREMIA: ICD-10-CM

## 2024-03-15 PROBLEM — R00.1 BRADYCARDIA, UNSPECIFIED: Chronic | Status: ACTIVE | Noted: 2024-03-05

## 2024-03-15 PROBLEM — R73.03 PREDIABETES: Chronic | Status: ACTIVE | Noted: 2024-03-05

## 2024-03-15 PROBLEM — E78.5 HYPERLIPIDEMIA, UNSPECIFIED: Chronic | Status: ACTIVE | Noted: 2024-03-05

## 2024-03-15 LAB
ANION GAP SERPL CALC-SCNC: 12 MMOL/L — SIGNIFICANT CHANGE UP (ref 5–17)
BUN SERPL-MCNC: 16 MG/DL — SIGNIFICANT CHANGE UP (ref 7–23)
CALCIUM SERPL-MCNC: 8.7 MG/DL — SIGNIFICANT CHANGE UP (ref 8.4–10.5)
CHLORIDE SERPL-SCNC: 99 MMOL/L — SIGNIFICANT CHANGE UP (ref 96–108)
CO2 SERPL-SCNC: 25 MMOL/L — SIGNIFICANT CHANGE UP (ref 22–31)
CREAT SERPL-MCNC: 0.59 MG/DL — SIGNIFICANT CHANGE UP (ref 0.5–1.3)
EBV DNA SERPL NAA+PROBE-ACNC: SIGNIFICANT CHANGE UP IU/ML
EBVPCR LOG: SIGNIFICANT CHANGE UP LOG10IU/ML
EGFR: 107 ML/MIN/1.73M2 — SIGNIFICANT CHANGE UP
GLUCOSE SERPL-MCNC: 93 MG/DL — SIGNIFICANT CHANGE UP (ref 70–99)
HCT VFR BLD CALC: 30.5 % — LOW (ref 39–50)
HGB BLD-MCNC: 9.6 G/DL — LOW (ref 13–17)
MCHC RBC-ENTMCNC: 26.4 PG — LOW (ref 27–34)
MCHC RBC-ENTMCNC: 31.5 GM/DL — LOW (ref 32–36)
MCV RBC AUTO: 83.8 FL — SIGNIFICANT CHANGE UP (ref 80–100)
NRBC # BLD: 0 /100 WBCS — SIGNIFICANT CHANGE UP (ref 0–0)
PLATELET # BLD AUTO: 358 K/UL — SIGNIFICANT CHANGE UP (ref 150–400)
POTASSIUM SERPL-MCNC: 4 MMOL/L — SIGNIFICANT CHANGE UP (ref 3.5–5.3)
POTASSIUM SERPL-SCNC: 4 MMOL/L — SIGNIFICANT CHANGE UP (ref 3.5–5.3)
RBC # BLD: 3.64 M/UL — LOW (ref 4.2–5.8)
RBC # FLD: 17.6 % — HIGH (ref 10.3–14.5)
SODIUM SERPL-SCNC: 136 MMOL/L — SIGNIFICANT CHANGE UP (ref 135–145)
WBC # BLD: 10.74 K/UL — HIGH (ref 3.8–10.5)
WBC # FLD AUTO: 10.74 K/UL — HIGH (ref 3.8–10.5)

## 2024-03-15 PROCEDURE — 99232 SBSQ HOSP IP/OBS MODERATE 35: CPT

## 2024-03-15 RX ORDER — HYDROCORTISONE 1 %
1 OINTMENT (GRAM) TOPICAL
Qty: 0 | Refills: 0 | DISCHARGE
Start: 2024-03-15 | End: 2024-03-21

## 2024-03-15 RX ORDER — HYDROCORTISONE 1 %
1 OINTMENT (GRAM) TOPICAL EVERY 12 HOURS
Refills: 0 | Status: DISCONTINUED | OUTPATIENT
Start: 2024-03-15 | End: 2024-03-16

## 2024-03-15 RX ADMIN — SODIUM CHLORIDE 2 GRAM(S): 9 INJECTION INTRAMUSCULAR; INTRAVENOUS; SUBCUTANEOUS at 05:09

## 2024-03-15 RX ADMIN — Medication 1 TABLET(S): at 13:07

## 2024-03-15 RX ADMIN — SODIUM CHLORIDE 2 GRAM(S): 9 INJECTION INTRAMUSCULAR; INTRAVENOUS; SUBCUTANEOUS at 17:26

## 2024-03-15 RX ADMIN — SENNA PLUS 2 TABLET(S): 8.6 TABLET ORAL at 21:02

## 2024-03-15 RX ADMIN — OXYCODONE HYDROCHLORIDE 10 MILLIGRAM(S): 5 TABLET ORAL at 05:13

## 2024-03-15 RX ADMIN — METHOCARBAMOL 750 MILLIGRAM(S): 500 TABLET, FILM COATED ORAL at 05:08

## 2024-03-15 RX ADMIN — OXYCODONE HYDROCHLORIDE 5 MILLIGRAM(S): 5 TABLET ORAL at 17:26

## 2024-03-15 RX ADMIN — OXYCODONE HYDROCHLORIDE 10 MILLIGRAM(S): 5 TABLET ORAL at 10:59

## 2024-03-15 RX ADMIN — OXYCODONE HYDROCHLORIDE 5 MILLIGRAM(S): 5 TABLET ORAL at 17:57

## 2024-03-15 RX ADMIN — CHLORHEXIDINE GLUCONATE 1 APPLICATION(S): 213 SOLUTION TOPICAL at 05:59

## 2024-03-15 RX ADMIN — METHOCARBAMOL 750 MILLIGRAM(S): 500 TABLET, FILM COATED ORAL at 21:02

## 2024-03-15 RX ADMIN — OXYCODONE HYDROCHLORIDE 5 MILLIGRAM(S): 5 TABLET ORAL at 00:16

## 2024-03-15 RX ADMIN — GABAPENTIN 600 MILLIGRAM(S): 400 CAPSULE ORAL at 21:02

## 2024-03-15 RX ADMIN — Medication 1 APPLICATION(S): at 17:25

## 2024-03-15 RX ADMIN — METHOCARBAMOL 750 MILLIGRAM(S): 500 TABLET, FILM COATED ORAL at 13:07

## 2024-03-15 RX ADMIN — OXYCODONE HYDROCHLORIDE 10 MILLIGRAM(S): 5 TABLET ORAL at 12:00

## 2024-03-15 RX ADMIN — POLYETHYLENE GLYCOL 3350 17 GRAM(S): 17 POWDER, FOR SOLUTION ORAL at 17:25

## 2024-03-15 RX ADMIN — OXYCODONE HYDROCHLORIDE 5 MILLIGRAM(S): 5 TABLET ORAL at 01:00

## 2024-03-15 RX ADMIN — GABAPENTIN 600 MILLIGRAM(S): 400 CAPSULE ORAL at 05:08

## 2024-03-15 RX ADMIN — ENOXAPARIN SODIUM 40 MILLIGRAM(S): 100 INJECTION SUBCUTANEOUS at 17:25

## 2024-03-15 RX ADMIN — GABAPENTIN 600 MILLIGRAM(S): 400 CAPSULE ORAL at 13:07

## 2024-03-15 RX ADMIN — OXYCODONE HYDROCHLORIDE 10 MILLIGRAM(S): 5 TABLET ORAL at 06:00

## 2024-03-15 NOTE — PROGRESS NOTE ADULT - PROBLEM SELECTOR PROBLEM 2
Metastatic disease
DLBCL (diffuse large B cell lymphoma)
DLBCL (diffuse large B cell lymphoma)
Metastatic disease

## 2024-03-15 NOTE — PROGRESS NOTE ADULT - PROBLEM SELECTOR PLAN 8
DVT ppx: lovenox  duplex on 3/5/24 negative for DVT    Dispo: Acute Rehab - controlled by diet/exercise, not on meds  - lipid panel reviewed

## 2024-03-15 NOTE — PROGRESS NOTE ADULT - PROBLEM SELECTOR PLAN 6
- controlled by diet/exercise, not on meds  - HbA1c 6.0% Hx of sinus john on EKG. Per PCP records, appears to be in setting of previously being active runner.  - on admission, EKG was NSR and non-bradycardic  - no active issues

## 2024-03-15 NOTE — PROGRESS NOTE ADULT - PROBLEM SELECTOR PROBLEM 6
Pharmacy is calling. They need to verify the Adderall prescription. Please call/    Prediabetes History of sinus bradycardia

## 2024-03-15 NOTE — PROGRESS NOTE ADULT - PROBLEM SELECTOR PLAN 7
- controlled by diet/exercise, not on meds  - lipid panel reviewed - controlled by diet/exercise, not on meds  - HbA1c 6.0%

## 2024-03-15 NOTE — PROGRESS NOTE ADULT - SUBJECTIVE AND OBJECTIVE BOX
SUBJECTIVE: Patient seen and examined at bedside this am, daughter present. He is comfortable in no acute distress, pain well controlled on current regimen. vitals and labs reviewed, no overnight events. Pending bed at [facility]     Vital Signs Last 24 Hrs  T(C): 36.8 (03-15-24 @ 08:00), Max: 37.7 (03-15-24 @ 00:00)  T(F): 98.2 (03-15-24 @ 08:00), Max: 99.9 (03-15-24 @ 00:00)  HR: 75 (03-15-24 @ 08:00) (70 - 78)  BP: 107/61 (03-15-24 @ 08:00) (102/59 - 126/60)  BP(mean): --  RR: 18 (03-15-24 @ 08:00) (18 - 18)  SpO2: 98% (03-15-24 @ 08:00) (95% - 98%)    PHYSICAL EXAM:  Constitutional: No Acute Distress  Neurological: Awake/ Alert, oriented x3 (person, place and time), EOMI,  speech fluent, Motor exam: b/l Upper extremities 5/5 strength, Lower extremities: RIGHT  HF 4/5 KF/KE 4+/5 DF/PF 5/5, LEFT 5/5 throughout, Dec sensation to RLE below knee  Incision: Posterior incisions Thoracic and lumbar covered w/ prineo clean and dry   Drains: all prev drain sites covered w. dressing that is clean, dry and intact   Pulmonary: Clear lungs   Cardiovascular:  Regular rate and rhythm   Gastrointestinal: Soft, Non-tender, Non-distended , +bowel sounds x 4  Extremities: No calf tenderness bilaterally, no edema, + SCD's on b/l LE     LABS:             9.6    10.74 )-----------( 358      ( 15 Mar 2024 06:37 )             30.5    03-15  136  |  99  |  16  ----------------------------<  93  4.0   |  25  |  0.59  Ca    8.7      15 Mar 2024 06:37    03-14 @ 07:01  -  03-15 @ 07:00  --------------------------------------------------------  IN: 720 mL / OUT: 2030 mL / NET: -1310 mL    03-15 @ 07:01  -  03-15 @ 13:05  --------------------------------------------------------  IN: 0 mL / OUT: 700 mL / NET: -700 mL    IMAGING: reviewed     MEDICATIONS  (STANDING):  chlorhexidine 2% Cloths 1 Application(s) Topical <User Schedule>  enoxaparin Injectable 40 milliGRAM(s) SubCutaneous <User Schedule>  gabapentin 600 milliGRAM(s) Oral every 8 hours  hydrocortisone 1% Ointment 1 Application(s) Topical every 12 hours  methocarbamol 750 milliGRAM(s) Oral every 8 hours  multivitamin 1 Tablet(s) Oral daily  polyethylene glycol 3350 17 Gram(s) Oral two times a day  senna 2 Tablet(s) Oral at bedtime  sodium chloride 2 Gram(s) Oral every 12 hours    MEDICATIONS  (PRN):  acetaminophen     Tablet .. 1000 milliGRAM(s) Oral every 6 hours PRN Temp greater or equal to 38C (100.4F), Mild Pain (1 - 3)  magnesium hydroxide Suspension 30 milliLiter(s) Oral every 12 hours PRN Constipation  ondansetron Injectable 4 milliGRAM(s) IV Push every 6 hours PRN Nausea and/or Vomiting  oxyCODONE    IR 10 milliGRAM(s) Oral every 4 hours PRN Severe Pain (7 - 10)  oxyCODONE    IR 5 milliGRAM(s) Oral every 4 hours PRN Moderate Pain (4 - 6)    DIET: regular

## 2024-03-15 NOTE — PROGRESS NOTE ADULT - TIME BILLING
chart review of PT/OT notes, exam, counseling and education on inpatient rehabilitation, coordination of care with rehab team and 
- Ordering, reviewing, and interpreting labs, testing, and imaging.  - Independently obtaining a review of systems and performing a physical exam  - Reviewing consultant documentation/recommendations.  - Counselling and educating patient regarding interpretation of aforementioned items and plan of care.
Data reviewed, patient seen/examined and care plan reviewed/updated with fellow.
- Ordering, reviewing, and interpreting labs, testing, and imaging.  - Independently obtaining a review of systems and performing a physical exam  - Reviewing consultant documentation/recommendations.  - Counselling and educating patient regarding interpretation of aforementioned items and plan of care.
Data reviewed, patient seen/examined and care plan reviewed/updated with fellow.
- Ordering, reviewing, and interpreting labs, testing, and imaging.  - Independently obtaining a review of systems and performing a physical exam  - Reviewing consultant documentation/recommendations.  - Counselling and educating patient regarding interpretation of aforementioned items and plan of care.
- Ordering, reviewing, and interpreting labs, testing, and imaging.  - Independently obtaining a review of systems and performing a physical exam  - Reviewing consultant documentation/recommendations.  - Counselling and educating patient regarding interpretation of aforementioned items and plan of care.

## 2024-03-15 NOTE — PROGRESS NOTE ADULT - SUBJECTIVE AND OBJECTIVE BOX
Ashley Zuniga MD  Division of Hospital Medicine  [facility]  Spectra: [phone]      Patient is a 68y old  Male who presents with a chief complaint of malignant cord compression/cauda equina syndrome (15 Mar 2024 13:05)      SUBJECTIVE / OVERNIGHT EVENTS: no acute events overnight. seen ambulating with PT down the eduardo did well though with increased pain with ambulation (improved with oxycodone 10mg). Endorses L back rash/itching   ADDITIONAL REVIEW OF SYSTEMS:    MEDICATIONS  (STANDING):  chlorhexidine 2% Cloths 1 Application(s) Topical <User Schedule>  enoxaparin Injectable 40 milliGRAM(s) SubCutaneous <User Schedule>  gabapentin 600 milliGRAM(s) Oral every 8 hours  hydrocortisone 1% Ointment 1 Application(s) Topical every 12 hours  methocarbamol 750 milliGRAM(s) Oral every 8 hours  multivitamin 1 Tablet(s) Oral daily  polyethylene glycol 3350 17 Gram(s) Oral two times a day  senna 2 Tablet(s) Oral at bedtime  sodium chloride 2 Gram(s) Oral every 12 hours    MEDICATIONS  (PRN):  acetaminophen     Tablet .. 1000 milliGRAM(s) Oral every 6 hours PRN Temp greater or equal to 38C (100.4F), Mild Pain (1 - 3)  magnesium hydroxide Suspension 30 milliLiter(s) Oral every 12 hours PRN Constipation  ondansetron Injectable 4 milliGRAM(s) IV Push every 6 hours PRN Nausea and/or Vomiting  oxyCODONE    IR 5 milliGRAM(s) Oral every 4 hours PRN Moderate Pain (4 - 6)  oxyCODONE    IR 10 milliGRAM(s) Oral every 4 hours PRN Severe Pain (7 - 10)      CAPILLARY BLOOD GLUCOSE        I&O's Summary    14 Mar 2024 07:01  -  15 Mar 2024 07:00  --------------------------------------------------------  IN: 720 mL / OUT: 2030 mL / NET: -1310 mL    15 Mar 2024 07:01  -  15 Mar 2024 13:36  --------------------------------------------------------  IN: 0 mL / OUT: 700 mL / NET: -700 mL        PHYSICAL EXAM:  Vital Signs Last 24 Hrs  T(C): 36.8 (15 Mar 2024 08:00), Max: 37.7 (15 Mar 2024 00:00)  T(F): 98.2 (15 Mar 2024 08:00), Max: 99.9 (15 Mar 2024 00:00)  HR: 75 (15 Mar 2024 08:00) (70 - 78)  BP: 107/61 (15 Mar 2024 08:00) (107/61 - 126/60)  BP(mean): --  RR: 18 (15 Mar 2024 08:00) (18 - 18)  SpO2: 98% (15 Mar 2024 08:00) (95% - 98%)    Parameters below as of 15 Mar 2024 04:53  Patient On (Oxygen Delivery Method): room air    CONSTITUTIONAL: NAD, well-developed, well-groomed  EYES: PERRLA; conjunctiva and sclera clear  ENMT: Moist oral mucosa, no pharyngeal injection or exudates; normal dentition  NECK: Supple, no palpable masses; no thyromegaly  RESPIRATORY: Normal respiratory effort; lungs are clear to auscultation bilaterally  CARDIOVASCULAR: Regular rate and rhythm, normal S1 and S2, no murmur/rub/gallop; No lower extremity edema  ABDOMEN: Soft, Nondistended, Nontender to palpation, normoactive bowel sounds  MUSCULOSKELETAL: No clubbing or cyanosis of digits; no joint swelling or tenderness to palpation  PSYCH: A+O to person, place, and time; affect appropriate  NEUROLOGY: CN 2-12 are intact and symmetric; no gross sensory deficits, grossly 5/5 strength throughout with exception of 4+/5 R HF  SKIN: No rashes; no palpable lesions, incision sites with sutures c/d/i, +pink/erythematous maculopapular rash on L mid back    LABS:                        9.6    10.74 )-----------( 358      ( 15 Mar 2024 06:37 )             30.5     03-15    136  |  99  |  16  ----------------------------<  93  4.0   |  25  |  0.59    Ca    8.7      15 Mar 2024 06:37        Urinalysis Basic - ( 15 Mar 2024 06:37 )    Color: x / Appearance: x / SG: x / pH: x  Gluc: 93 mg/dL / Ketone: x  / Bili: x / Urobili: x   Blood: x / Protein: x / Nitrite: x   Leuk Esterase: x / RBC: x / WBC x   Sq Epi: x / Non Sq Epi: x / Bacteria: x          RADIOLOGY & ADDITIONAL TESTS:  Results Reviewed: H/H stable, hyponatremia resolved  Imaging Personally Reviewed:  Electrocardiogram Personally Reviewed:    COORDINATION OF CARE:  Care Discussed with Consultants/Other Providers [Y]: Neurosurgery MARY Townsend  Prior or Outpatient Records Reviewed [Y/N]:

## 2024-03-15 NOTE — PROGRESS NOTE ADULT - ASSESSMENT
68M w/ hx of HLD, pre-DM, presenting with c/f diffuse osseous spinal mets w/ cord compression. and cord edema. Now s/p T8 VCR, T6-T10 fusion for tumor, small csf leak primarily repaired, L4 Partial Corpectomy, L2-Pelvis Fusion, PRS Closure.     Plan:   - L drains removed 3/13  - Right upper drain removed 3/14  - Monitor drain output for remaining drain  - keep prineo  - Appreciate care per primary team and [facility]     [facility]   [phone]

## 2024-03-15 NOTE — PROGRESS NOTE ADULT - PROBLEM SELECTOR PLAN 4
s/p 1u PRBC intra-op.  - Hb stable in 9 range  - monitor H/H on CBC periodically resolved with salt tabs  - c/w salt tabs as ordered   - monitor Na on BMP

## 2024-03-15 NOTE — PROGRESS NOTE ADULT - PROBLEM SELECTOR PLAN 5
Hx of sinus john on EKG. Per PCP records, appears to be in setting of previously being active runner.  - on admission, EKG was NSR and non-bradycardic  - no active issues s/p 1u PRBC intra-op.  - Hb stable in 9 range  - monitor H/H on CBC periodically

## 2024-03-15 NOTE — PROGRESS NOTE ADULT - SUBJECTIVE AND OBJECTIVE BOX
SUBJECTIVE: The patient was seen and examined. No acute events overnight.      Vital Signs Last 24 Hrs  T(C): 37.2 (15 Mar 2024 04:53), Max: 37.7 (15 Mar 2024 00:00)  T(F): 98.9 (15 Mar 2024 04:53), Max: 99.9 (15 Mar 2024 00:00)  HR: 78 (15 Mar 2024 04:53) (70 - 85)  BP: 113/64 (15 Mar 2024 04:53) (102/59 - 126/60)  BP(mean): --  RR: 18 (15 Mar 2024 04:53) (18 - 18)  SpO2: 95% (15 Mar 2024 04:53) (94% - 98%)    Parameters below as of 15 Mar 2024 04:53  Patient On (Oxygen Delivery Method): room air        I&O's Detail    14 Mar 2024 07:01  -  15 Mar 2024 07:00  --------------------------------------------------------  IN:    Oral Fluid: 720 mL  Total IN: 720 mL    OUT:    Accordian (mL): 30 mL    Voided (mL): 2000 mL  Total OUT: 2030 mL    Total NET: -1310 mL          Physical Exam:  CONSTITUTIONAL: NAD, lying in bed  NEURO: Awake, alert  BACK:  soft  palpable fluid collection in left upper  prineo in place cdi  hemovac x1 s/s    LABS:                        9.6    10.74 )-----------( 358      ( 15 Mar 2024 06:37 )             30.5     03-15    136  |  99  |  16  ----------------------------<  93  4.0   |  25  |  0.59    Ca    8.7      15 Mar 2024 06:37        Urinalysis Basic - ( 15 Mar 2024 06:37 )    Color: x / Appearance: x / SG: x / pH: x  Gluc: 93 mg/dL / Ketone: x  / Bili: x / Urobili: x   Blood: x / Protein: x / Nitrite: x   Leuk Esterase: x / RBC: x / WBC x   Sq Epi: x / Non Sq Epi: x / Bacteria: x        RADIOLOGY & ADDITIONAL STUDIES:

## 2024-03-15 NOTE — PROGRESS NOTE ADULT - PROBLEM SELECTOR PLAN 3
s/p 1u PRBC intra-op.  - Hb stable in 9 range  - monitor hemovac output  - monitor H/H on CBC
s/p 1u PRBC intra-op.  - Hb stable in 9 range  - monitor hemovac and bile bag output  - monitor H/H on CBC
s/p 1u PRBC intra-op.  - Hb stable in 9 range  - monitor hemovac and bile bag output  - monitor H/H on CBC
has pink/erythematous maculopapular rash on L mid back consistent with contact dermatitis  - start topical hydrocortisone ointment to affected area BID x 7 days  - can trial benadryl PRN itching if needed but topical should suffice  - avoid sutures/incision areas

## 2024-03-15 NOTE — PROGRESS NOTE ADULT - PROBLEM SELECTOR PLAN 1
MRI spine found evidence of tumor extensions into epidural space with associated with thoracic cord compression and cauda equina compression  - s/p Stage 1: T8 VCR, T6-T10 fusion for tumor, small CSF leak primarily repaired on 3/5 and stage 2 L4 partial corpectomy, L2-pelvis fusion with plastics closure  - off steroids post-op  - post-op care as per neurosurgery team  - monitor hemovac output
MRI spine found evidence of tumor extensions into epidural space with associated with thoracic cord compression and cauda equina compression  - s/p Stage 1: T8 VCR, T6-T10 fusion for tumor, small CSF leak primarily repaired on 3/5 and stage 2 L4 partial corpectomy, L2-pelvis fusion with plastics closure  - off steroids post-op  - post-op care as per neurosurgery team  - all hemovacs now removed  - pain control with oxycodone PRN and gabapentin ATC, bowel regimen
MRI spine found evidence of tumor extensions into epidural space with associated with thoracic cord compression and cauda equina compression  - s/p Stage 1: T8 VCR, T6-T10 fusion for tumor, small CSF leak primarily repaired on 3/5 and stage 2 L4 partial corpectomy, L2-pelvis fusion with plastics closure  - off steroids post-op  - post-op care as per neurosurgery team  - monitor hemovac output  - pain control with oxycodone PRN and gabapentin ATC, bowel regimen
MRI spine found evidence of tumor extensions into epidural space with associated with thoracic cord compression and cauda equina compression  - s/p Stage 1: T8 VCR, T6-T10 fusion for tumor, small CSF leak primarily repaired on 3/5 and stage 2 L4 partial corpectomy, L2-pelvis fusion with plastics closure  - off steroids post-op  - post-op care as per neurosurgery team  - monitor hemovac and bile bag output

## 2024-03-15 NOTE — PROGRESS NOTE ADULT - ASSESSMENT
ASSESSMENT: 68M w/ hx of HLD, pre-DM, sinus john, presenting with worsening LE numbness/paresthesias and weakness since Jan 2024, outpatient x-rays& MRI on 2/29/24 showing concern for osseous metastatic disease in thoracic/lumbar/sacral spine as well as extension of soft tissue into the paravertebral soft tissues more prominent on the right side. Pt came to ED on 3/2 for back pain and worsening LE weakness RLE > LLE, fall x2 and decline in functional status where his legs gave out. Pt was walking independently last week and now requires a walker to walk even several steps. Also endorses weight loss. In ED CT imaging and MRI spine (prelim report) notable for multiple findings including diffuse osseous metastatic disease throughout the entire spine; pathologic fx w/ tumor into the epidural space at T8 resulting in thoracic cord compression; tumor extension into the epidural space at L3 resulting in cauda equina compression; pathologic fx w/ tumor in the epidural space at L4 contributing to severe canal stenosis; tumor within the bilateral psoas muscles at L3 and L4; bilateral neural foramen effacement by tumor in L-spine.   - now s/p stage 1: T8 VCR (vertebral column resection) T6-T10 fusion for tumor resection, small csf leak primarily repaired, Stage 2 L4 Partial Corpectomy, L2-Pelvis Fusion, Plastics Closure.     NEURO: Neurochecks / vitals q4h   Path: Diffuse large B cell lymphoma - heme/onc consulted   Plastics following- Remaining R Lower drain removed today 3/15   Pain control w/ PRN meds tylenol, oxycodone 5/10. Standing meds: Robaxin, gabapentin 600 TID  Activity: work with PT, NO bending, lifting or twisting.   PT/OT PMR Rec AR     PULM: sat >94%, on room air   Incentive spirometer at bedside, mobilize as tolerated    CV: -150mmHg, within parameter w/o medications    RENAL: IVL, voiding appropriately   Hyponatremia improving on Na tabs 2q12h, Na 136. continue to follow in rehab    GI: reg diet   LBM 3/14, cont bowel regimen w/ miralax and senna   Hepatitis panel negative     ENDO:  no active issues     HEME/ONC: post op anemia s/p 1 UPRBC intraop for EBL 550cc. H/H stable on CBC, hemodynamically stable   Surgical Path: Diffuse large B cell lymphoma- Oncology following. Needs treatment for B cell lymphoma in expedited fashion.  Cleared by neurosurgery for chemotherapy 2 weeks post op. will attend 1 week of acute rehab, then follow up with Dr. Chatterjee regarding treatment plan. Logistics of inpatient or outpatient chemo to be worked out depending on functional status.   VTE prophylaxis: [x] SCDs [x] chemoprophylaxis [x] high risk of DVT/PE on admission due to: malignancy, LED 3/5 negative for DVT     ID: afebrile. No clinical evidence of infection  received periop abx  hiv neg     Will discuss with Dr. Partida   910[address] spectra  ASSESSMENT: 68M w/ hx of HLD, pre-DM, sinus john, presenting with worsening LE numbness/paresthesias and weakness since Jan 2024, outpatient x-rays& MRI on 2/29/24 showing concern for osseous metastatic disease in thoracic/lumbar/sacral spine as well as extension of soft tissue into the paravertebral soft tissues more prominent on the right side. Pt came to ED on 3/2 for back pain and worsening LE weakness RLE > LLE, fall x2 and decline in functional status where his legs gave out. Pt was walking independently last week and now requires a walker to walk even several steps. Also endorses weight loss. In ED CT imaging and MRI spine (prelim report) notable for multiple findings including diffuse osseous metastatic disease throughout the entire spine; pathologic fx w/ tumor into the epidural space at T8 resulting in thoracic cord compression; tumor extension into the epidural space at L3 resulting in cauda equina compression; pathologic fx w/ tumor in the epidural space at L4 contributing to severe canal stenosis; tumor within the bilateral psoas muscles at L3 and L4; bilateral neural foramen effacement by tumor in L-spine.   - now s/p stage 1: T8 VCR (vertebral column resection) T6-T10 fusion for tumor resection, small csf leak primarily repaired, Stage 2 L4 Partial Corpectomy, L2-Pelvis Fusion, Plastics Closure 3/5    NEURO: Neurochecks / vitals q4h   Path: Diffuse large B cell lymphoma - heme/onc consulted   Plastics following- Remaining R Lower drain removed today 3/15   Pain control w/ PRN meds tylenol, oxycodone 5/10. Standing meds: Robaxin, gabapentin 600 TID  Activity: work with PT, NO bending, lifting or twisting.   PT/OT PMR Rec AR     PULM: sat >94%, on room air   Incentive spirometer at bedside, mobilize as tolerated    CV: -150mmHg, within parameter w/o medications    RENAL: IVL, voiding appropriately   Hyponatremia improving on Na tabs 2q12h, Na 136. continue to follow BMP in rehab    GI: reg diet   LBM 3/14, cont bowel regimen w/ miralax and senna   Hepatitis panel negative     ENDO:  no active issues     HEME/ONC: post op anemia s/p 1 UPRBC intraop for EBL 550cc. H/H stable on CBC, hemodynamically stable   Surgical Path: Diffuse large B cell lymphoma- Oncology following. Needs treatment for B cell lymphoma in expedited fashion. Cleared for chemotherapy 2 weeks post op. Will attend 1 week of acute rehab, then follow up with Dr. Chatterjee regarding treatment plan. Logistics of inpatient or outpatient chemo to be worked out depending on functional status.   VTE prophylaxis: [x] SCDs [x] chemoprophylaxis [x] high risk of DVT/PE on admission due to: malignancy, LED 3/5 negative for DVT     ID: afebrile. No clinical evidence of infection  received periop abx w/ ancef   hiv neg     Will discuss with Dr. Partida   [id number] spectra

## 2024-03-15 NOTE — PROGRESS NOTE ADULT - NSPROGADDITIONALINFOA_GEN_ALL_CORE
.  Ashley Zuniga MD  Division of Hospital Medicine  [facility]   Spectra: [phone]    Plan discussed with patient and Neurosurgery MARY Townsend.
.  Ashley Zuniga MD  Division of Hospital Medicine  [facility]   Spectra: [phone]    Plan discussed with patient and Neurosurgery MARY Townsend.
.  Ashley Zuniga MD  Division of Hospital Medicine  [facility]   Spectra: [phone]    Plan discussed with patient and Neurosurgery NP Celena.
.  Ashley Zuniga MD  Division of Hospital Medicine  [facility]   Spectra: [phone]    Plan discussed with patient and Neurosurgery NP Celena.

## 2024-03-15 NOTE — PROGRESS NOTE ADULT - PROBLEM SELECTOR PLAN 2
CT H/C/A/P and MRI spine imaging notable for diffuse osseous metastatic disease throughout the entire spine; 3rd rib lesion, b/l lung pleura lesions; mesenteric and retroperitoneal adenopathy; large fecal burden with narrowing versus underdistention in the sigmoid colon (last colonoscopy in 2017 without significant findings).  - s/p OR as above  - path consistent with DLBCL  - Heme/onc consulted, recs appreciated  - plan to initiate chemotherapy as outpatient vs. inpatient after completes acute rehab  - will need close follow-up with heme/onc upon discharge
CT H/C/A/P and MRI spine imaging notable for diffuse osseous metastatic disease throughout the entire spine; 3rd rib lesion, b/l lung pleura lesions; mesenteric and retroperitoneal adenopathy; large fecal burden with narrowing versus underdistention in the sigmoid colon (last colonoscopy in 2017 without significant findings).  - s/p OR as above  - path consistent with DLBCL  - Heme/onc consulted, f/u recs  - pain control with oxycodone PRN and gabapentin ATC, bowel regimen
CT H/C/A/P and MRI spine imaging notable for diffuse osseous metastatic disease throughout the entire spine; 3rd rib lesion, b/l lung pleura lesions; mesenteric and retroperitoneal adenopathy; large fecal burden with narrowing versus underdistention in the sigmoid colon (last colonoscopy in 2017 without significant findings).  - s/p OR as above  - path consistent with DLBCL  - Heme/onc consulted, recs appreciated. will need to initiate inpatient vs. outpatient treatment once clear from neurosurgery standpoint  - pain control with oxycodone PRN and gabapentin ATC, bowel regimen
CT H/C/A/P and MRI spine imaging notable for diffuse osseous metastatic disease throughout the entire spine; 3rd rib lesion, b/l lung pleura lesions; mesenteric and retroperitoneal adenopathy; large fecal burden with narrowing versus underdistention in the sigmoid colon (last colonoscopy in 2017 without significant findings).  - s/p OR as above  - path consistent with DLBCL  - Heme/onc consulted, recs appreciated  - plan to initiate chemotherapy as outpatient vs. inpatient after completes acute rehab  - will need close follow-up with heme/onc upon discharge

## 2024-03-16 ENCOUNTER — INPATIENT (INPATIENT)
Facility: HOSPITAL | Age: 69
LOS: 5 days | Discharge: ANOTHER HOSPITAL | DRG: 949 | End: 2024-03-22
Attending: PHYSICAL MEDICINE & REHABILITATION | Admitting: PHYSICAL MEDICINE & REHABILITATION
Payer: MEDICARE

## 2024-03-16 ENCOUNTER — TRANSCRIPTION ENCOUNTER (OUTPATIENT)
Age: 69
End: 2024-03-16

## 2024-03-16 VITALS
TEMPERATURE: 99 F | WEIGHT: 117.95 LBS | HEART RATE: 69 BPM | OXYGEN SATURATION: 95 % | DIASTOLIC BLOOD PRESSURE: 66 MMHG | SYSTOLIC BLOOD PRESSURE: 109 MMHG | HEIGHT: 65 IN | RESPIRATION RATE: 17 BRPM

## 2024-03-16 VITALS
RESPIRATION RATE: 18 BRPM | SYSTOLIC BLOOD PRESSURE: 110 MMHG | HEART RATE: 69 BPM | OXYGEN SATURATION: 94 % | TEMPERATURE: 98 F | DIASTOLIC BLOOD PRESSURE: 65 MMHG

## 2024-03-16 DIAGNOSIS — D64.9 ANEMIA, UNSPECIFIED: ICD-10-CM

## 2024-03-16 DIAGNOSIS — R26.89 OTHER ABNORMALITIES OF GAIT AND MOBILITY: ICD-10-CM

## 2024-03-16 DIAGNOSIS — Z51.89 ENCOUNTER FOR OTHER SPECIFIED AFTERCARE: ICD-10-CM

## 2024-03-16 DIAGNOSIS — E44.0 MODERATE PROTEIN-CALORIE MALNUTRITION: ICD-10-CM

## 2024-03-16 DIAGNOSIS — D43.4 NEOPLASM OF UNCERTAIN BEHAVIOR OF SPINAL CORD: ICD-10-CM

## 2024-03-16 DIAGNOSIS — L89.892 PRESSURE ULCER OF OTHER SITE, STAGE 2: ICD-10-CM

## 2024-03-16 DIAGNOSIS — R73.03 PREDIABETES: ICD-10-CM

## 2024-03-16 DIAGNOSIS — G82.20 PARAPLEGIA, UNSPECIFIED: ICD-10-CM

## 2024-03-16 DIAGNOSIS — G83.4 CAUDA EQUINA SYNDROME: ICD-10-CM

## 2024-03-16 DIAGNOSIS — C83.30 DIFFUSE LARGE B-CELL LYMPHOMA, UNSPECIFIED SITE: ICD-10-CM

## 2024-03-16 DIAGNOSIS — Z48.3 AFTERCARE FOLLOWING SURGERY FOR NEOPLASM: ICD-10-CM

## 2024-03-16 DIAGNOSIS — L89.152 PRESSURE ULCER OF SACRAL REGION, STAGE 2: ICD-10-CM

## 2024-03-16 DIAGNOSIS — R50.9 FEVER, UNSPECIFIED: ICD-10-CM

## 2024-03-16 DIAGNOSIS — Z98.890 OTHER SPECIFIED POSTPROCEDURAL STATES: Chronic | ICD-10-CM

## 2024-03-16 DIAGNOSIS — E78.5 HYPERLIPIDEMIA, UNSPECIFIED: ICD-10-CM

## 2024-03-16 DIAGNOSIS — Z90.49 ACQUIRED ABSENCE OF OTHER SPECIFIED PARTS OF DIGESTIVE TRACT: Chronic | ICD-10-CM

## 2024-03-16 DIAGNOSIS — M84.58XD PATHOLOGICAL FRACTURE IN NEOPLASTIC DISEASE, OTHER SPECIFIED SITE, SUBSEQUENT ENCOUNTER FOR FRACTURE WITH ROUTINE HEALING: ICD-10-CM

## 2024-03-16 DIAGNOSIS — Z98.1 ARTHRODESIS STATUS: ICD-10-CM

## 2024-03-16 DIAGNOSIS — E87.1 HYPO-OSMOLALITY AND HYPONATREMIA: ICD-10-CM

## 2024-03-16 DIAGNOSIS — B96.20 UNSPECIFIED ESCHERICHIA COLI [E. COLI] AS THE CAUSE OF DISEASES CLASSIFIED ELSEWHERE: ICD-10-CM

## 2024-03-16 DIAGNOSIS — Z63.4 DISAPPEARANCE AND DEATH OF FAMILY MEMBER: ICD-10-CM

## 2024-03-16 DIAGNOSIS — R20.0 ANESTHESIA OF SKIN: ICD-10-CM

## 2024-03-16 DIAGNOSIS — G95.20 UNSPECIFIED CORD COMPRESSION: ICD-10-CM

## 2024-03-16 DIAGNOSIS — I82.463 ACUTE EMBOLISM AND THROMBOSIS OF CALF MUSCULAR VEIN, BILATERAL: ICD-10-CM

## 2024-03-16 DIAGNOSIS — N39.0 URINARY TRACT INFECTION, SITE NOT SPECIFIED: ICD-10-CM

## 2024-03-16 DIAGNOSIS — G62.9 POLYNEUROPATHY, UNSPECIFIED: ICD-10-CM

## 2024-03-16 LAB
FLUAV AG NPH QL: SIGNIFICANT CHANGE UP
FLUBV AG NPH QL: SIGNIFICANT CHANGE UP
RSV RNA NPH QL NAA+NON-PROBE: SIGNIFICANT CHANGE UP
SARS-COV-2 RNA SPEC QL NAA+PROBE: SIGNIFICANT CHANGE UP

## 2024-03-16 PROCEDURE — 88341 IMHCHEM/IMCYTCHM EA ADD ANTB: CPT

## 2024-03-16 PROCEDURE — 83735 ASSAY OF MAGNESIUM: CPT

## 2024-03-16 PROCEDURE — C1889: CPT

## 2024-03-16 PROCEDURE — 88331 PATH CONSLTJ SURG 1 BLK 1SPC: CPT

## 2024-03-16 PROCEDURE — 80061 LIPID PANEL: CPT

## 2024-03-16 PROCEDURE — 72157 MRI CHEST SPINE W/O & W/DYE: CPT | Mod: MC

## 2024-03-16 PROCEDURE — 84132 ASSAY OF SERUM POTASSIUM: CPT

## 2024-03-16 PROCEDURE — 88280 CHROMOSOME KARYOTYPE STUDY: CPT

## 2024-03-16 PROCEDURE — 86803 HEPATITIS C AB TEST: CPT

## 2024-03-16 PROCEDURE — 80074 ACUTE HEPATITIS PANEL: CPT

## 2024-03-16 PROCEDURE — 97110 THERAPEUTIC EXERCISES: CPT

## 2024-03-16 PROCEDURE — 88365 INSITU HYBRIDIZATION (FISH): CPT

## 2024-03-16 PROCEDURE — 88307 TISSUE EXAM BY PATHOLOGIST: CPT

## 2024-03-16 PROCEDURE — 72192 CT PELVIS W/O DYE: CPT | Mod: MC

## 2024-03-16 PROCEDURE — 82435 ASSAY OF BLOOD CHLORIDE: CPT

## 2024-03-16 PROCEDURE — 87640 STAPH A DNA AMP PROBE: CPT

## 2024-03-16 PROCEDURE — 87641 MR-STAPH DNA AMP PROBE: CPT

## 2024-03-16 PROCEDURE — 82962 GLUCOSE BLOOD TEST: CPT

## 2024-03-16 PROCEDURE — 93005 ELECTROCARDIOGRAM TRACING: CPT

## 2024-03-16 PROCEDURE — 83605 ASSAY OF LACTIC ACID: CPT

## 2024-03-16 PROCEDURE — 96374 THER/PROPH/DIAG INJ IV PUSH: CPT

## 2024-03-16 PROCEDURE — 86705 HEP B CORE ANTIBODY IGM: CPT

## 2024-03-16 PROCEDURE — 85384 FIBRINOGEN ACTIVITY: CPT

## 2024-03-16 PROCEDURE — 88285 CHROMOSOME COUNT ADDITIONAL: CPT

## 2024-03-16 PROCEDURE — 97166 OT EVAL MOD COMPLEX 45 MIN: CPT

## 2024-03-16 PROCEDURE — P9016: CPT

## 2024-03-16 PROCEDURE — 85730 THROMBOPLASTIN TIME PARTIAL: CPT

## 2024-03-16 PROCEDURE — 99285 EMERGENCY DEPT VISIT HI MDM: CPT

## 2024-03-16 PROCEDURE — 88184 FLOWCYTOMETRY/ TC 1 MARKER: CPT

## 2024-03-16 PROCEDURE — 88305 TISSUE EXAM BY PATHOLOGIST: CPT

## 2024-03-16 PROCEDURE — 87799 DETECT AGENT NOS DNA QUANT: CPT

## 2024-03-16 PROCEDURE — 85610 PROTHROMBIN TIME: CPT

## 2024-03-16 PROCEDURE — 86706 HEP B SURFACE ANTIBODY: CPT

## 2024-03-16 PROCEDURE — 87389 HIV-1 AG W/HIV-1&-2 AB AG IA: CPT

## 2024-03-16 PROCEDURE — 81001 URINALYSIS AUTO W/SCOPE: CPT

## 2024-03-16 PROCEDURE — 82550 ASSAY OF CK (CPK): CPT

## 2024-03-16 PROCEDURE — 80076 HEPATIC FUNCTION PANEL: CPT

## 2024-03-16 PROCEDURE — 88237 TISSUE CULTURE BONE MARROW: CPT

## 2024-03-16 PROCEDURE — 88185 FLOWCYTOMETRY/TC ADD-ON: CPT

## 2024-03-16 PROCEDURE — 93306 TTE W/DOPPLER COMPLETE: CPT

## 2024-03-16 PROCEDURE — 76498 UNLISTED MR PROCEDURE: CPT

## 2024-03-16 PROCEDURE — 99222 1ST HOSP IP/OBS MODERATE 55: CPT

## 2024-03-16 PROCEDURE — 88275 CYTOGENETICS 100-300: CPT

## 2024-03-16 PROCEDURE — 80053 COMPREHEN METABOLIC PANEL: CPT

## 2024-03-16 PROCEDURE — 87086 URINE CULTURE/COLONY COUNT: CPT

## 2024-03-16 PROCEDURE — 97162 PT EVAL MOD COMPLEX 30 MIN: CPT

## 2024-03-16 PROCEDURE — 86704 HEP B CORE ANTIBODY TOTAL: CPT

## 2024-03-16 PROCEDURE — 83615 LACTATE (LD) (LDH) ENZYME: CPT

## 2024-03-16 PROCEDURE — 76000 FLUOROSCOPY <1 HR PHYS/QHP: CPT

## 2024-03-16 PROCEDURE — 88360 TUMOR IMMUNOHISTOCHEM/MANUAL: CPT

## 2024-03-16 PROCEDURE — 97530 THERAPEUTIC ACTIVITIES: CPT

## 2024-03-16 PROCEDURE — 71045 X-RAY EXAM CHEST 1 VIEW: CPT

## 2024-03-16 PROCEDURE — 82947 ASSAY GLUCOSE BLOOD QUANT: CPT

## 2024-03-16 PROCEDURE — 84100 ASSAY OF PHOSPHORUS: CPT

## 2024-03-16 PROCEDURE — 70450 CT HEAD/BRAIN W/O DYE: CPT | Mod: MC

## 2024-03-16 PROCEDURE — A9585: CPT

## 2024-03-16 PROCEDURE — 86022 PLATELET ANTIBODIES: CPT

## 2024-03-16 PROCEDURE — 88333 PATH CONSLTJ SURG CYTO XM 1: CPT

## 2024-03-16 PROCEDURE — 76377 3D RENDER W/INTRP POSTPROCES: CPT

## 2024-03-16 PROCEDURE — 82803 BLOOD GASES ANY COMBINATION: CPT

## 2024-03-16 PROCEDURE — 85027 COMPLETE CBC AUTOMATED: CPT

## 2024-03-16 PROCEDURE — 97535 SELF CARE MNGMENT TRAINING: CPT

## 2024-03-16 PROCEDURE — 84295 ASSAY OF SERUM SODIUM: CPT

## 2024-03-16 PROCEDURE — 36415 COLL VENOUS BLD VENIPUNCTURE: CPT

## 2024-03-16 PROCEDURE — 72020 X-RAY EXAM OF SPINE 1 VIEW: CPT

## 2024-03-16 PROCEDURE — 86900 BLOOD TYPING SEROLOGIC ABO: CPT

## 2024-03-16 PROCEDURE — C1713: CPT

## 2024-03-16 PROCEDURE — 85018 HEMOGLOBIN: CPT

## 2024-03-16 PROCEDURE — 81450 HL NEO GSAP 5-50DNA/DNA&RNA: CPT

## 2024-03-16 PROCEDURE — 71260 CT THORAX DX C+: CPT | Mod: MC

## 2024-03-16 PROCEDURE — 87205 SMEAR GRAM STAIN: CPT

## 2024-03-16 PROCEDURE — 80048 BASIC METABOLIC PNL TOTAL CA: CPT

## 2024-03-16 PROCEDURE — 86850 RBC ANTIBODY SCREEN: CPT

## 2024-03-16 PROCEDURE — 84550 ASSAY OF BLOOD/URIC ACID: CPT

## 2024-03-16 PROCEDURE — 93356 MYOCRD STRAIN IMG SPCKL TRCK: CPT

## 2024-03-16 PROCEDURE — 82955 ASSAY OF G6PD ENZYME: CPT

## 2024-03-16 PROCEDURE — 93970 EXTREMITY STUDY: CPT

## 2024-03-16 PROCEDURE — 83036 HEMOGLOBIN GLYCOSYLATED A1C: CPT

## 2024-03-16 PROCEDURE — 86923 COMPATIBILITY TEST ELECTRIC: CPT

## 2024-03-16 PROCEDURE — 72158 MRI LUMBAR SPINE W/O & W/DYE: CPT | Mod: MC

## 2024-03-16 PROCEDURE — C9399: CPT

## 2024-03-16 PROCEDURE — 82330 ASSAY OF CALCIUM: CPT

## 2024-03-16 PROCEDURE — 72128 CT CHEST SPINE W/O DYE: CPT | Mod: MC

## 2024-03-16 PROCEDURE — 88264 CHROMOSOME ANALYSIS 20-25: CPT

## 2024-03-16 PROCEDURE — 72125 CT NECK SPINE W/O DYE: CPT | Mod: MC

## 2024-03-16 PROCEDURE — 85014 HEMATOCRIT: CPT

## 2024-03-16 PROCEDURE — 88342 IMHCHEM/IMCYTCHM 1ST ANTB: CPT

## 2024-03-16 PROCEDURE — 85025 COMPLETE CBC W/AUTO DIFF WBC: CPT

## 2024-03-16 PROCEDURE — 74177 CT ABD & PELVIS W/CONTRAST: CPT | Mod: MC

## 2024-03-16 PROCEDURE — 97116 GAIT TRAINING THERAPY: CPT

## 2024-03-16 PROCEDURE — 72131 CT LUMBAR SPINE W/O DYE: CPT | Mod: MC

## 2024-03-16 PROCEDURE — 88374 M/PHMTRC ALYS ISHQUANT/SEMIQ: CPT

## 2024-03-16 PROCEDURE — 86901 BLOOD TYPING SEROLOGIC RH(D): CPT

## 2024-03-16 PROCEDURE — G0103: CPT

## 2024-03-16 PROCEDURE — C1769: CPT

## 2024-03-16 PROCEDURE — 88271 CYTOGENETICS DNA PROBE: CPT

## 2024-03-16 RX ORDER — METHOCARBAMOL 500 MG/1
750 TABLET, FILM COATED ORAL EVERY 8 HOURS
Refills: 0 | Status: DISCONTINUED | OUTPATIENT
Start: 2024-03-16 | End: 2024-03-22

## 2024-03-16 RX ORDER — MAGNESIUM HYDROXIDE 400 MG/1
30 TABLET, CHEWABLE ORAL
Qty: 0 | Refills: 0 | DISCHARGE
Start: 2024-03-16

## 2024-03-16 RX ORDER — MELOXICAM 15 MG/1
1 TABLET ORAL
Refills: 0 | DISCHARGE

## 2024-03-16 RX ORDER — ENOXAPARIN SODIUM 100 MG/ML
40 INJECTION SUBCUTANEOUS
Qty: 0 | Refills: 0 | DISCHARGE
Start: 2024-03-16

## 2024-03-16 RX ORDER — POLYETHYLENE GLYCOL 3350 17 G/17G
17 POWDER, FOR SOLUTION ORAL
Qty: 0 | Refills: 0 | DISCHARGE
Start: 2024-03-16

## 2024-03-16 RX ORDER — GABAPENTIN 400 MG/1
600 CAPSULE ORAL EVERY 8 HOURS
Refills: 0 | Status: DISCONTINUED | OUTPATIENT
Start: 2024-03-16 | End: 2024-03-22

## 2024-03-16 RX ORDER — METHOCARBAMOL 500 MG/1
1 TABLET, FILM COATED ORAL
Qty: 0 | Refills: 0 | DISCHARGE
Start: 2024-03-16

## 2024-03-16 RX ORDER — ACETAMINOPHEN 500 MG
2 TABLET ORAL
Qty: 0 | Refills: 0 | DISCHARGE
Start: 2024-03-16

## 2024-03-16 RX ORDER — OXYCODONE HYDROCHLORIDE 5 MG/1
1 TABLET ORAL
Qty: 0 | Refills: 0 | DISCHARGE
Start: 2024-03-16

## 2024-03-16 RX ORDER — ENOXAPARIN SODIUM 100 MG/ML
40 INJECTION SUBCUTANEOUS
Refills: 0 | Status: DISCONTINUED | OUTPATIENT
Start: 2024-03-16 | End: 2024-03-22

## 2024-03-16 RX ORDER — SODIUM CHLORIDE 9 MG/ML
2 INJECTION INTRAMUSCULAR; INTRAVENOUS; SUBCUTANEOUS
Qty: 0 | Refills: 0 | DISCHARGE
Start: 2024-03-16

## 2024-03-16 RX ORDER — POLYETHYLENE GLYCOL 3350 17 G/17G
17 POWDER, FOR SOLUTION ORAL
Refills: 0 | Status: DISCONTINUED | OUTPATIENT
Start: 2024-03-16 | End: 2024-03-22

## 2024-03-16 RX ORDER — GABAPENTIN 400 MG/1
1 CAPSULE ORAL
Qty: 0 | Refills: 0 | DISCHARGE
Start: 2024-03-16

## 2024-03-16 RX ORDER — SODIUM CHLORIDE 9 MG/ML
2 INJECTION INTRAMUSCULAR; INTRAVENOUS; SUBCUTANEOUS EVERY 12 HOURS
Refills: 0 | Status: DISCONTINUED | OUTPATIENT
Start: 2024-03-16 | End: 2024-03-22

## 2024-03-16 RX ORDER — ACETAMINOPHEN 500 MG
2 TABLET ORAL
Refills: 0 | DISCHARGE

## 2024-03-16 RX ORDER — OXYCODONE HYDROCHLORIDE 5 MG/1
10 TABLET ORAL EVERY 4 HOURS
Refills: 0 | Status: DISCONTINUED | OUTPATIENT
Start: 2024-03-16 | End: 2024-03-20

## 2024-03-16 RX ORDER — SENNA PLUS 8.6 MG/1
2 TABLET ORAL AT BEDTIME
Refills: 0 | Status: DISCONTINUED | OUTPATIENT
Start: 2024-03-16 | End: 2024-03-22

## 2024-03-16 RX ORDER — OXYCODONE HYDROCHLORIDE 5 MG/1
5 TABLET ORAL EVERY 4 HOURS
Refills: 0 | Status: DISCONTINUED | OUTPATIENT
Start: 2024-03-16 | End: 2024-03-20

## 2024-03-16 RX ORDER — SENNA PLUS 8.6 MG/1
2 TABLET ORAL
Qty: 0 | Refills: 0 | DISCHARGE
Start: 2024-03-16

## 2024-03-16 RX ORDER — ACETAMINOPHEN 500 MG
975 TABLET ORAL EVERY 6 HOURS
Refills: 0 | Status: DISCONTINUED | OUTPATIENT
Start: 2024-03-16 | End: 2024-03-22

## 2024-03-16 RX ADMIN — OXYCODONE HYDROCHLORIDE 5 MILLIGRAM(S): 5 TABLET ORAL at 22:39

## 2024-03-16 RX ADMIN — Medication 1000 MILLIGRAM(S): at 05:17

## 2024-03-16 RX ADMIN — GABAPENTIN 600 MILLIGRAM(S): 400 CAPSULE ORAL at 05:17

## 2024-03-16 RX ADMIN — OXYCODONE HYDROCHLORIDE 10 MILLIGRAM(S): 5 TABLET ORAL at 14:59

## 2024-03-16 RX ADMIN — OXYCODONE HYDROCHLORIDE 10 MILLIGRAM(S): 5 TABLET ORAL at 18:45

## 2024-03-16 RX ADMIN — GABAPENTIN 600 MILLIGRAM(S): 400 CAPSULE ORAL at 14:59

## 2024-03-16 RX ADMIN — POLYETHYLENE GLYCOL 3350 17 GRAM(S): 17 POWDER, FOR SOLUTION ORAL at 05:18

## 2024-03-16 RX ADMIN — SENNA PLUS 2 TABLET(S): 8.6 TABLET ORAL at 21:22

## 2024-03-16 RX ADMIN — METHOCARBAMOL 750 MILLIGRAM(S): 500 TABLET, FILM COATED ORAL at 21:23

## 2024-03-16 RX ADMIN — SODIUM CHLORIDE 2 GRAM(S): 9 INJECTION INTRAMUSCULAR; INTRAVENOUS; SUBCUTANEOUS at 05:17

## 2024-03-16 RX ADMIN — OXYCODONE HYDROCHLORIDE 5 MILLIGRAM(S): 5 TABLET ORAL at 21:22

## 2024-03-16 RX ADMIN — METHOCARBAMOL 750 MILLIGRAM(S): 500 TABLET, FILM COATED ORAL at 14:59

## 2024-03-16 RX ADMIN — CHLORHEXIDINE GLUCONATE 1 APPLICATION(S): 213 SOLUTION TOPICAL at 05:18

## 2024-03-16 RX ADMIN — ENOXAPARIN SODIUM 40 MILLIGRAM(S): 100 INJECTION SUBCUTANEOUS at 18:30

## 2024-03-16 RX ADMIN — SODIUM CHLORIDE 2 GRAM(S): 9 INJECTION INTRAMUSCULAR; INTRAVENOUS; SUBCUTANEOUS at 18:45

## 2024-03-16 RX ADMIN — METHOCARBAMOL 750 MILLIGRAM(S): 500 TABLET, FILM COATED ORAL at 05:17

## 2024-03-16 RX ADMIN — Medication 1 APPLICATION(S): at 05:18

## 2024-03-16 RX ADMIN — OXYCODONE HYDROCHLORIDE 5 MILLIGRAM(S): 5 TABLET ORAL at 01:50

## 2024-03-16 RX ADMIN — GABAPENTIN 600 MILLIGRAM(S): 400 CAPSULE ORAL at 21:22

## 2024-03-16 RX ADMIN — OXYCODONE HYDROCHLORIDE 5 MILLIGRAM(S): 5 TABLET ORAL at 09:58

## 2024-03-16 SDOH — SOCIAL STABILITY - SOCIAL INSECURITY: DISSAPEARANCE AND DEATH OF FAMILY MEMBER: Z63.4

## 2024-03-16 NOTE — H&P ADULT - NSHPLABSRESULTS_GEN_ALL_CORE
RECENT LABS/IMAGING                        9.6    10.74 )-----------( 358      ( 15 Mar 2024 06:37 )             30.5     03-15    136  |  99  |  16  ----------------------------<  93  4.0   |  25  |  0.59    Ca    8.7      15 Mar 2024 06:37      Urinalysis Basic - ( 15 Mar 2024 06:37 )    Color: x / Appearance: x / SG: x / pH: x  Gluc: 93 mg/dL / Ketone: x  / Bili: x / Urobili: x   Blood: x / Protein: x / Nitrite: x   Leuk Esterase: x / RBC: x / WBC x   Sq Epi: x / Non Sq Epi: x / Bacteria: x    < from: TTE W or WO Ultrasound Enhancing Agent (03.08.24 @ 07:25) >    CONCLUSIONS:      1. Left ventricular cavity is normal in size. Left ventricular wall thickness is normal. Left ventricular systolic function is normal with an ejection fraction of 71 % by Putnam's method of disks. There are no regional wall motion abnormalities seen.   2. Left ventricular global longitudinal strain is -24.4 % which is normal (< -18%). Images were acquired on a Ram ultrasound system and processed using Tribzi strain analysis software with a heart rate of 75 bpm and a blood pressure of 117/58 mmHg.   3. Normal left ventricular diastolic function, with normal filling pressure.   4. Normal right ventricular cavity size, with wall thickness, and normal systolic function.   5. The left atrium is mildly dilated.   6. No significant valvular disease.   7. No pericardial effusion seen.   8. No prior echocardiogram is available for comparison.  < from: CT 3D Reconstruct w/ Workstation (03.06.24 @ 11:38) >    Status post instrumented pelvic lumbar pelvic spinal fusion as above.   Pathologic moderate compression deformity of the L4 vertebral body,   unchanged. Additional area of metastatic disease within the right   hemisacrum is better seen on previous MRI.    < end of copied text >    < from: CT Thoracic Spine No Cont (03.06.24 @ 11:37) >    IMPRESSION:    1.  THORACIC SPINE:   T8 corpectomy with placement of a spacing device.   T6-T10 posterior stabilization. Routine postoperative appearance    2.  LUMBAR SPINE:   L2-S2 posterior stabilization. Routine postoperative   appearance    < end of copied text >

## 2024-03-16 NOTE — H&P ADULT - NSHPSOCIALHISTORY_GEN_ALL_CORE
Smoking - Denied  EtOH - Denied   Drugs - Denied     Lives   PTA: Independent in ADLs and ambulation     CURRENT FUNCTIONAL STATUS  Bed Mobility:   Transfers:   Gait: Smoking - Denied  EtOH - Denied   Drugs - Denied     Pt lives in a private home with 2 daughters there are 4 steps to enter,1 flight of stairs to bedroom, in the process of making first floor living situation. Pt performed ADL/IADLs independently up until last week when he required assist to ambulate. Has both a tub and walk in shower. In the past week pt has used rolling walker 2/2 back pain, does not use at baseline. Owns DME: rolling walker, hospital bed.    CURRENT FUNCTIONAL STATUS  PT/OT 3/15  Bed Mobility: Min A  Transfers: Sit-stand CG RW  Gait: 50ft x 2, CG RW  LBD: Max A

## 2024-03-16 NOTE — DISCHARGE NOTE PROVIDER - CARE PROVIDERS DIRECT ADDRESSES
,DirectAddress_Unknown,[email or web address] ,DirectAddress_Unknown,[email or web address]

## 2024-03-16 NOTE — H&P ADULT - NSHPPHYSICALEXAM_GEN_ALL_CORE
Gen - NAD, Comfortable  HEENT - NCAT, EOMI, MMM  Neck - Supple, No limited ROM  Pulm - CTAB, No wheeze, No rhonchi, No crackles  Cardiovascular - RRR, S1S2, No murmurs  Abdomen - Soft, NT/ND, +BS  Extremities - No C/C/E, No calf tenderness  Neuro-     Cognitive - AAOx3     Communication - Fluent, No dysarthria     Motor -                     LEFT    UE - ShAB 5/5, EF 5/5, EE 5/5, WE 5/5,  5/5                    RIGHT UE - ShAB 5/5, EF 5/5, EE 5/5, WE 5/5,  5/5                    LEFT    LE - HF 3/5, KE 4/5, DF 5/5, PF 5/5                    RIGHT LE - HF 2/5, KE 4/5, DF 5/5, PF 5/5        Sensory - Slightly diminished to LT of R lateral thigh and R medial calf     Reflexes - No clonus     Tone - normal  Psychiatric - Mood stable, Affect WNL  Skin:  all skin intact. Surgical incision healing well, no erythema or drainage.  Wounds: None Present Gen - NAD, Comfortable  HEENT - NCAT, EOMI, MMM  Neck - Supple, No limited ROM  Pulm - CTAB, No wheeze, No rhonchi, No crackles  Cardiovascular - RRR, S1S2, No murmurs  Abdomen - Soft, NT/ND, +BS  Extremities - No C/C/E, No calf tenderness  Neuro-     Cognitive - AAOx3     Communication - Fluent, No dysarthria     Motor -                     LEFT    UE - ShAB 5/5, EF 5/5, EE 5/5, WE 5/5,  5/5                    RIGHT UE - ShAB 5/5, EF 5/5, EE 5/5, WE 5/5,  5/5                    LEFT    LE - HF 3/5, KE 4/5, DF 5/5, PF 5/5                    RIGHT LE - HF 2/5, KE 4/5, DF 5/5, PF 5/5        Sensory - Slightly diminished to LT of R lateral thigh and R medial calf     Reflexes - No clonus     Tone - normal  Psychiatric - Mood stable, Affect WNL  Skin:   - Upper thoracic surgical incision: covered with Prineo dressing with no erythema, warmth,  nor discharge  - Lower thoracic and lumbosacral surgical incision: covered with Prineo dressing with no erythema, warmth,  nor discharge  - Bia-incisional sites of previous drains healing with no erythema, warmth,  nor discharge  - Stage 2 pressure injuries on sacrum and right shin

## 2024-03-16 NOTE — PROGRESS NOTE ADULT - REASON FOR ADMISSION
malignant cord compression/cauda equina syndrome

## 2024-03-16 NOTE — DISCHARGE NOTE PROVIDER - NSDCMRMEDTOKEN_GEN_ALL_CORE_FT
meloxicam 15 mg oral tablet: 1 tab(s) orally once a day for back pain (recent 14-day prescription)  Multivitamins tablet: 1 tablet orally once a day  Tylenol 500 mg oral tablet: 2 tab(s) orally 3 times a day as needed for  pain (Takes max 3000mg/day)   Aquaphor Itch Relief Maximum Strength 1% topical ointment: Apply topically to affected area every 12 hours 1 Apply topically to affected area every 12 hours  LEFT UPPER BACK RASH - DO NOT APPLY TO /NEAR INCISION  enoxaparin: 40 milligram(s) subcutaneous once a day at 1800. DVT PROPHYLAXIS WHILE IN REHAB ONLY  gabapentin 600 mg oral tablet: 1 tab(s) orally every 8 hours  magnesium hydroxide 8% oral suspension: 30 milliliter(s) orally every 12 hours As needed Constipation  methocarbamol 750 mg oral tablet: 1 tab(s) orally every 8 hours  Multiple Vitamins oral tablet: 1 tab(s) orally once a day  oxyCODONE 10 mg oral tablet: 1 tab(s) orally every 4 hours As needed Severe Pain (7 - 10)  oxyCODONE 5 mg oral tablet: 1 tab(s) orally every 4 hours As needed Moderate Pain (4 - 6)  polyethylene glycol 3350 oral powder for reconstitution: 17 gram(s) orally 2 times a day  senna leaf extract oral tablet: 2 tab(s) orally once a day (at bedtime)  sodium chloride 1 g oral tablet: 2 tab(s) orally every 12 hours  Tylenol 325 mg oral tablet: 2 tab(s) orally every 6 hours as needed for  mild pain

## 2024-03-16 NOTE — H&P ADULT - ATTENDING COMMENTS
68M presented with C.E.S 2/2 spinal tumors s/p resection, good candidate for acute rehab, agree with resident plan as above.

## 2024-03-16 NOTE — PROGRESS NOTE ADULT - SUBJECTIVE AND OBJECTIVE BOX
SUBJECTIVE: Patient seen and examined at bedside this am, both daughters present. He is comfortable in no acute distress, pain well controlled on current regimen. vitals and labs reviewed, no overnight events. discharge planning including medication reconciliation, med adverse side effects and post op incision care discussed with patient and family who display understanding.     Vital Signs Last 24 Hrs  T(C): 37 (03-16-24 @ 13:57), Max: 37.6 (03-15-24 @ 21:05)  T(F): 98.6 (03-16-24 @ 13:57), Max: 99.6 (03-15-24 @ 21:05)  HR: 69 (03-16-24 @ 13:57) (64 - 78)  BP: 109/66 (03-16-24 @ 13:57) (105/60 - 118/68)  BP(mean): --  RR: 17 (03-16-24 @ 13:57) (17 - 18)  SpO2: 95% (03-16-24 @ 13:57) (93% - 97%)    PHYSICAL EXAM:  Constitutional: No Acute Distress  Neurological: Awake/ Alert, oriented x3 (person, place and time), EOMI,  speech fluent, Motor exam: b/l Upper extremities 5/5 strength, Lower extremities: RIGHT  HF 4/5 KF/KE 4+/5 DF/PF 5/5, LEFT 5/5 throughout, Dec sensation to RLE below knee  Incision: Posterior incisions Thoracic and lumbar covered w/ prineo clean and dry   Drains: all prev drain sites covered w. dressing that is clean, dry and intact   Pulmonary: Clear lungs   Cardiovascular:  Regular rate and rhythm   Gastrointestinal: Soft, Non-tender, Non-distended , +bowel sounds x 4  Extremities: No calf tenderness bilaterally, no edema, + SCD's on b/l LE     LABS:              9.6    10.74 )-----------( 358      ( 15 Mar 2024 06:37 )             30.5   03-15  136  |  99  |  16  ----------------------------<  93  4.0   |  25  |  0.59  Ca    8.7      15 Mar 2024 06:37    I&O's Summary  15 Mar 2024 07:01  -  16 Mar 2024 07:00  --------------------------------------------------------  IN: 400 mL / OUT: 2000 mL / NET: -1600 mL    IMAGING: reviewed     MEDICATIONS  (STANDING):  enoxaparin Injectable 40 milliGRAM(s) SubCutaneous <User Schedule>  gabapentin 600 milliGRAM(s) Oral every 8 hours  hydrocortisone 1% Ointment 1 Application(s) Topical every 12 hours  methocarbamol 750 milliGRAM(s) Oral every 8 hours  multivitamin 1 Tablet(s) Oral daily  polyethylene glycol 3350 17 Gram(s) Oral two times a day  senna 2 Tablet(s) Oral at bedtime  sodium chloride 2 Gram(s) Oral every 12 hours    MEDICATIONS  (PRN):  acetaminophen     Tablet .. 1000 milliGRAM(s) Oral every 6 hours PRN Temp greater or equal to 38C (100.4F), Mild Pain (1 - 3)  magnesium hydroxide Suspension 30 milliLiter(s) Oral every 12 hours PRN Constipation  ondansetron Injectable 4 milliGRAM(s) IV Push every 6 hours PRN Nausea and/or Vomiting  oxyCODONE    IR 5 milliGRAM(s) Oral every 4 hours PRN Moderate Pain (4 - 6)  oxyCODONE    IR 10 milliGRAM(s) Oral every 4 hours PRN Severe Pain (7 - 10)    DIET: regular

## 2024-03-16 NOTE — DISCHARGE NOTE PROVIDER - NSDCCPCAREPLAN_GEN_ALL_CORE_FT
PRINCIPAL DISCHARGE DIAGNOSIS  Diagnosis: Spinal cord compression  Assessment and Plan of Treatment: You had the follow procedures performed at [facility] on 3/5/2024 with Dr. Partida Stage 1:  T8 Vertebral Column Resection (VCR), T6-T10 fusion for tumor, small csf leak primarily repaired Stage 2: L4 Partial Corpectomy, L2-Pelvis Fusion Plastics Closure (Adrian)  Please follow up with Dr. Partida within 1-2 weeks of discharge.   Please follow up with Dr. Campbell (plastic surgeon) within 1 week of discharge.   Recommended Rehab stay for 1 week total, with urgent follow up with Oncology Dr. Quintanilla in 1 week for Chemo planning   PLEASE FOLLOW UP WITH HEMATOLOGIST/ONCOLOGIST DR QUINTANILLA WITHIN 1 WEEK OF DISCHARGE FROM REHAB FOR TREATMENT PLANNING.   Continue the following for pain: (chronic pain eval performed)   - Acetaminophen (limit in setting of elevated LFT's)   - Oxycodone 5mg every 4-6 hours AS NEEDED for moderate pain   -Oxycodone 10 mg every 4-6 hours as needed for severe pain   - Gabapentin 600 mg every 8 hours   - Robacin 750 mg every 8 hours   Please make all necessary appointments and follow up. ***Please DO NOT take any Aspirin and NSAIDs (Advil, Aleve, Motrin, Ibuprofen) until cleared by your Neurosurgeon*** Please DO NOT do any heavy lifting, bending, twisting and straining. You have prineo incision strips over wound THESE WILL FALL OFF ON THEIR OWN . You may shower, but NO SOAP / NO SHAMPOO. DO NOT do any scrubbing. Pat dry only. Please come to the emergency room for any of the following: positional headaches, altered mental status, seizures, pain uncontrolled by pain medications, fevers, leaking / bleeding from surgical site, chest pain and shortness of breath.  You have been started on a new medication, oxycodone.  Oxycodone helps to control pain. Oxycodone is an additive medication so it is important to limit the use of this medication.  Side effects include nausea, vomiting, constipation, dry mouth, lightheadedness, dizziness or drowsiness. Continue taking bowel regimen with miralax and senna.      SECONDARY DISCHARGE DIAGNOSES  Diagnosis: DLBCL (diffuse large B cell lymphoma)  Assessment and Plan of Treatment: Pathology report obtained from surgical procedure on 3/5   Please follow up urgently with Dr. Tobias Quintanilla from Oncology regarding treatment planning   [address], [place], [place] [id number] [phone]      Diagnosis: Hyponatremia  Assessment and Plan of Treatment: Hyponatremia now resolved/improved. Continue Na tabs 2q12h  Follow up BMP in 2 days (3/18) Na goal normonatremia 135-145       Diagnosis: Transaminitis  Assessment and Plan of Treatment: Mildly elevated Liver Function Tests seen on CMP 3/13 without clinical correlation   Please follow up and repeat CMP for monitoring on 3/18   Limit hepatotoxic medications    Diagnosis: Rash  Assessment and Plan of Treatment: Hydrocortisone ointment 1% every 12 hours x7 days only to L upper back rash / irritation.   DO NOT APPLY TO INCISION SITE OR NEAR INCISION SITE

## 2024-03-16 NOTE — PROGRESS NOTE ADULT - THIS PATIENT HAS THE FOLLOWING CONDITION(S)/DIAGNOSES ON THIS ADMISSION:
None
spinal cord compression
None
spinal cord compression
None
spinal cord compression
spinal cord compression
None
spinal cord compression
spinal cord compression

## 2024-03-16 NOTE — H&P ADULT - ASSESSMENT
Assessment/Plan:  JOE YU is a 68y with ****.  Hospital Course complicated by ***. Patient now admitted for a multidisciplinary rehab program. 03-16-24 @ 12:10        Comprehensive Multidisciplinary Rehab Program:  - Start comprehensive rehab program of PT/OT/SLP - 3 hours a day, 5 days a week. P&O as needed       HTN  -   - Monitor BP    HLD  - cont statin    T2DM  - Lantus  - Premeal  - SSI  - Monitor fingersticks    Pain  - Tylenol PRN  - Avoid sedating medications that may interfere with cognitive recovery    Mood / Cognition  - Neuropsychology consult  - [ ] Enhanced Supervision  [ ] Constant Observation    Sleep  - Maintain quiet hours and a low stim environment.   - Monitor sleep logs (for BIU)  - Melatonin PRN     GI / Bowel  - Senna qHS  - Miralax PRN Daily  - GI ppx: ***     / Bladder  - Currently patient voids:      [ ] independent      [ ] external collection device (condom cath)      [ ] Indwelling grigsby catheter      [ ] Intermittent catheterization  - Continue bladder scans Q8 hours with straight cath for >400cc.  - Toileting schedule every 4 hours    Skin / Pressure injury  - Skin assessment on admission performed [  ] :   - Monitor Incisions:    - Turn q2 hours in bed while awake, air mattress  - nursing to monitor skin qShift  - soft heel protectors  - skin barrier cream PRN    Diet/Dysphagia:  - Diet Consistency: ***  - Supplements: ***  - Aspiration Precautions  - SLP consult for swallow function evaluation and treatment  - Nutrition consult    DVT prophylaxis:   - *****  - SCDs  - Last Doppler on ***       Outpatient Follow-up:  ** Specialty and Name of physician      Code Status/Emergency Contact:      ---------------    Goals: Safe discharge to home  Estimated Length of Stay: 10-14 days  Rehab Potential: Good  Medical Prognosis: Good  Estimated Disposition: Home with home care      PRESCREEN COMPARISON:  I have reviewed the prescreen information and I have found no relevant changes between the preadmission screening and my post admission evaluation.    RATIONALE FOR INPATIENT ADMISSION: Patient demonstrates the following:  [X] Medically appropriate for rehabilitation admission  [X] Has attainable rehab goals with an appropriate initial discharge plan  [X]Has rehabilitation potential (expected to make a significant improvement within a reasonable period of time)  [X] Requires close medical management by a rehab physician, rehab nursing care, Hospitalist and comprehensive interdisciplinary team (including PT, OT and/or SLP, Prosthetics and Orthotics)     Assessment/Plan:  JOE YU is a 68y with ****.  Hospital Course complicated by ***. Patient now admitted for a multidisciplinary rehab program. 03-16-24 @ 12:10    68M w/ hx of HLD, pre-DM, and sinus bradycardia who presented to [facility] on 3/5 with worsening LE numbness/paresthesias and weakness since Jan 2024, outpatient x-rays & MRI on 2/29/24 showing concern for osseous metastatic disease in thoracic/lumbar/sacral spine as well as extension of soft tissue into the paravertebral soft tissues more prominent on the right side. He was previously ambulating independently without device but now required a RW. Repeat MRI showed multiple findings including diffuse osseous metastatic disease throughout the entire spine; pathologic fx w/ tumor into the epidural space at T8 resulting in thoracic cord compression; tumor extension into the epidural space at L3 resulting in cauda equina compression; pathologic fx w/ tumor in the epidural space at L4 contributing to severe canal stenosis; tumor within the bilateral psoas muscles at L3 and L4; bilateral neural foramen effacement by tumor in L-spine. Patient s/p stage 1: T8 VCR (vertebral column resection) T6-T10 fusion for tumor resection, small csf leak primarily repaired, Stage 2 L4 Partial Corpectomy, L2-Pelvis Fusion, Plastics Closure 3/5 HMV drain x3 and 1 Bile bag placed w/ output closely monitored. Post operatively monitored in NSCU on PCA pump, durotomy encountered intraop and primarily repaired up from OR flat and incremental 15 degrees with no positional headaches or sign of CSF leak. Patient evaluated by PT/OT/PM&R and recommended for acute inpatient rehab. Patient is medically stable for discharge to [facility] on 3/16.    Comprehensive Multidisciplinary Rehab Program:  - Start comprehensive rehab program of PT/OT/SLP - 3 hours a day, 5 days a week. P&O as needed       HTN  -   - Monitor BP    HLD  - cont statin    T2DM  - Lantus  - Premeal  - SSI  - Monitor fingersticks    Pain  - Tylenol PRN  - Avoid sedating medications that may interfere with cognitive recovery    Mood / Cognition  - Neuropsychology consult  - [ ] Enhanced Supervision  [ ] Constant Observation    Sleep  - Maintain quiet hours and a low stim environment.   - Monitor sleep logs (for BIU)  - Melatonin PRN     GI / Bowel  - Senna qHS  - Miralax PRN Daily  - GI ppx: ***     / Bladder  - Currently patient voids:      [ ] independent      [ ] external collection device (condom cath)      [ ] Indwelling grigsby catheter      [ ] Intermittent catheterization  - Continue bladder scans Q8 hours with straight cath for >400cc.  - Toileting schedule every 4 hours    Skin / Pressure injury  - Skin assessment on admission performed [  ] :   - Monitor Incisions:    - Turn q2 hours in bed while awake, air mattress  - nursing to monitor skin qShift  - soft heel protectors  - skin barrier cream PRN    Diet/Dysphagia:  - Diet Consistency: ***  - Supplements: ***  - Aspiration Precautions  - SLP consult for swallow function evaluation and treatment  - Nutrition consult    DVT prophylaxis:   - *****  - SCDs  - Last Doppler on ***       Outpatient Follow-up:  ** Specialty and Name of physician      Code Status/Emergency Contact:      ---------------    Goals: Safe discharge to home  Estimated Length of Stay: 10-14 days  Rehab Potential: Good  Medical Prognosis: Good  Estimated Disposition: Home with home care      PRESCREEN COMPARISON:  I have reviewed the prescreen information and I have found no relevant changes between the preadmission screening and my post admission evaluation.    RATIONALE FOR INPATIENT ADMISSION: Patient demonstrates the following:  [X] Medically appropriate for rehabilitation admission  [X] Has attainable rehab goals with an appropriate initial discharge plan  [X]Has rehabilitation potential (expected to make a significant improvement within a reasonable period of time)  [X] Requires close medical management by a rehab physician, rehab nursing care, Hospitalist and comprehensive interdisciplinary team (including PT, OT and/or SLP, Prosthetics and Orthotics)     Assessment/Plan:  JOE YU is a 68y with hx of HLD, pre-DM, and sinus bradycardia who presented to [facility] on 3/5 with worsening LE numbness/paresthesias and weakness since Jan 2024, found to have diffuse osseous metastatic disease throughout entire spine, pathological fx with tumor extending into epidural space at T8, tumor extension into L3 epidural space, pathologic fx w/ tumor in the epidural space at L4 contributing to severe canal stenosis; tumor within the bilateral psoas muscles at L3 and L4; bilateral neural foramen effacement by tumor in L-spine. Patient s/p stage 1: T8 VCR (vertebral column resection) T6-T10 fusion for tumor resection, small csf leak primarily repaired, Stage 2 L4 Partial Corpectomy, L2-Pelvis Fusion, Plastics Closure 3/5. Patient now admitted for a multidisciplinary rehab program. 03-16-24 @ 12:10      Cauda equina syndrome  Spine tumor resection  - MRI showed multiple findings including diffuse osseous metastatic disease throughout the entire spine; pathologic fx w/ tumor into the epidural space at T8 resulting in thoracic cord compression; tumor extension into the epidural space at L3 resulting in cauda equina compression; pathologic fx w/ tumor in the epidural space at L4 contributing to severe canal stenosis; tumor within the bilateral psoas muscles at L3 and L4; bilateral neural foramen effacement by tumor in L-spine.   - s/p stage 1: T8 VCR (vertebral column resection) T6-T10 fusion for tumor resection, small csf leak primarily repaired, Stage 2 L4 Partial Corpectomy, L2-Pelvis Fusion, Plastics Closure 3/5 HMV drain x3 and 1 Bile bag   - Start comprehensive rehab program of PT/OT/SLP - 3 hours a day, 5 days a week. 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    - Stage IV  -PET CT as outpatient  -[facility] to get back w/ recommendations when appropriate to start chemo, currently needs wound healing (surgery a week ago w/ multiple drains containing sanguinous fluid)  will follow .   - F/u with oncology outpatient    Pain  - tylenol, oxycodone PRN pain  - Robaxin 750mg q8 for spasms  - gabapentin 600mg q8    Pre-diabetes  - Off meds  - Consistent carb diet    Hyponatremia  - Continue sodium tabs  - Trend lytes    Sleep  - Melatonin PRN     GI / Bowel  - Senna qHS  - Miralax PRN Daily     / Bladder  - Currently patient voids independently. Check PVRs on admission. SC for > 400ccs    Skin / Pressure injury  - Skin assessment on admission performed [  ] :   - Monitor Incisions:    - Turn q2 hours in bed while awake, air mattress  - nursing to monitor skin qShift  - soft heel protectors  - skin barrier cream PRN    Diet/Dysphagia:  - Diet Consistency: Consistent carbs    DVT prophylaxis:   - Lovenox ppx      Outpatient Follow-up:  Bhaskar Partida  Neurosurgery  [address]  [place] [address]  Phone: [phone]  Fax: [phone]  Follow Up Time: 2 weeks    Tobias Chatterjee  Medical Oncology  [address] [place] [address]  Phone: [phone]  Fax: [phone]  Follow Up Time: 1 week    Gunnar Campbell  Plastic Surgery  [address], [address]  [place] [address]  Phone: [phone]  Fax: [phone]  Follow Up Time:      ---------------    Goals: Safe discharge to home  Estimated Length of Stay: 10-14 days  Rehab Potential: Good  Medical Prognosis: Good  Estimated Disposition: Home with home care      PRESCREEN COMPARISON:  I have reviewed the prescreen information and I have found no relevant changes between the preadmission screening and my post admission evaluation.    RATIONALE FOR INPATIENT ADMISSION: Patient demonstrates the following:  [X] Medically appropriate for rehabilitation admission  [X] Has attainable rehab goals with an appropriate initial discharge plan  [X]Has rehabilitation potential (expected to make a significant improvement within a reasonable period of time)  [X] Requires close medical management by a rehab physician, rehab nursing care, Hospitalist and comprehensive interdisciplinary team (including PT, OT and/or SLP, Prosthetics and Orthotics)     Assessment/Plan:  JOE YU is a 68y with hx of HLD, pre-DM, and sinus bradycardia who presented to [facility] on 3/5 with worsening LE numbness/paresthesias and weakness since Jan 2024, found to have diffuse osseous metastatic disease throughout entire spine, pathological fx with tumor extending into epidural space at T8, tumor extension into L3 epidural space, pathologic fx w/ tumor in the epidural space at L4 contributing to severe canal stenosis; tumor within the bilateral psoas muscles at L3 and L4; bilateral neural foramen effacement by tumor in L-spine. Patient s/p stage 1: T8 VCR (vertebral column resection) T6-T10 fusion for tumor resection, small csf leak primarily repaired, Stage 2 L4 Partial Corpectomy, L2-Pelvis Fusion, Plastics Closure 3/5. Patient now admitted for a multidisciplinary rehab program. 03-16-24 @ 12:10      Cauda equina syndrome  Spine tumor resection  - MRI showed multiple findings including diffuse osseous metastatic disease throughout the entire spine; pathologic fx w/ tumor into the epidural space at T8 resulting in thoracic cord compression; tumor extension into the epidural space at L3 resulting in cauda equina compression; pathologic fx w/ tumor in the epidural space at L4 contributing to severe canal stenosis; tumor within the bilateral psoas muscles at L3 and L4; bilateral neural foramen effacement by tumor in L-spine.   - s/p stage 1: T8 VCR (vertebral column resection) T6-T10 fusion for tumor resection, small csf leak primarily repaired, Stage 2 L4 Partial Corpectomy, L2-Pelvis Fusion, Plastics Closure 3/5 HMV drain x3 and 1 Bile bag   - All drains removed  - Start comprehensive rehab program of PT/OT/SLP - 3 hours a day, 5 days a week. 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    - Stage IV  -PET CT as outpatient  [place] [place]/ [place] when appropriate to start chemo, currently needs wound healing (surgery a week ago w/ multiple drains containing sanguinous fluid)  will follow .   - F/u with oncology outpatient    Pain  - tylenol, oxycodone PRN pain  - Robaxin 750mg q8 for spasms  - gabapentin 600mg q8    Pre-diabetes  - Off meds  - Consistent carb diet    Hyponatremia  - Continue sodium tabs  - Trend lytes    Sleep  - Melatonin PRN     GI / Bowel  - Senna qHS  - Miralax PRN Daily     / Bladder  - Currently patient voids independently. Check PVRs on admission. SC for > 400ccs    Skin / Pressure injury  - Skin assessment on admission performed [ x ] : Surgical site healing well  - Monitor Incisions:  q shift  - Turn q2 hours in bed while awake, air mattress  - nursing to monitor skin qShift    Diet/Dysphagia:  - Diet Consistency: Consistent carb    DVT prophylaxis:   - Lovenox ppx      Outpatient Follow-up:  Bhaskar Partida  Neurosurgery  [address]  [place] [address]  Phone: [phone]  Fax: [phone]  Follow Up Time: 2 weeks    Tobias Chatterjee  Medical Oncology  [address] [place] [address]  Phone: [phone]  Fax: [phone]  Follow Up Time: 1 week    Gunnar Campbell  Plastic Surgery  [address] [facility]  [place] [address]  Phone: [phone]  Fax: [phone]  Follow Up Time:      ---------------    Goals: Safe discharge to home  Estimated Length of Stay: 10-14 days  Rehab Potential: Good  Medical Prognosis: Good  Estimated Disposition: Home with home care      PRESCREEN COMPARISON:  I have reviewed the prescreen information and I have found no relevant changes between the preadmission screening and my post admission evaluation.    RATIONALE FOR INPATIENT ADMISSION: Patient demonstrates the following:  [X] Medically appropriate for rehabilitation admission  [X] Has attainable rehab goals with an appropriate initial discharge plan  [X]Has rehabilitation potential (expected to make a significant improvement within a reasonable period of time)  [X] Requires close medical management by a rehab physician, rehab nursing care, Hospitalist and comprehensive interdisciplinary team (including PT, OT and/or SLP, Prosthetics and Orthotics)     Mr. 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      Spinal cord compression due to metastasis s/p surgical resection  - MRI    * diffuse osseous metastatic disease throughout the entire spine    * pathologic fx w/ tumor into the epidural space at T8 resulting in thoracic cord compression    * tumor extension into the epidural space at L3 resulting in cauda equina compression    * pathologic fx w/ tumor in the epidural space at L4 contributing to severe canal stenosis    * tumor within the bilateral psoas muscles at L3 and L4    * bilateral neural foramen effacement by tumor in L-spine.   - Surgical pathology report: Diffuse large B-cell lymphoma  - Spine tumor resection surgeries:    * Stage 1: T8 VCR (vertebral column resection) T6-T10 fusion for tumor resection, small csf leak primarily repaired    * Stage 2: L4 Partial Corpectomy, L2-Pelvis Fusion, Plastics Closure 3/5 HMV drain x3 and 1 Bile bag     * All drains removed    * Spine and fall precautions    * F/U with neurosurgery, plastics, and oncology outpatient  - Cauda equina syndrome  - Paraparesis  - Lower extremity sensory deficits  - Impaired ADLs and mobility  - Need for assistance with personal care  - Start comprehensive rehab program of PT/OT - 3 hours a day, 5 days a week. 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    - Stage IV  - PET CT as outpatient  - [facility] [place] to get back w/ recommendations when appropriate to start chemo, currently needs wound healing (surgery a week ago w/ multiple drains containing sanguinous fluid)  will follow .   - F/u with oncology outpatient    Pain  - tylenol, oxycodone PRN pain  - Robaxin 750mg q8 for spasms  - gabapentin 600mg q8    Pre-diabetes  - Off meds  - Consistent carb diet    Hyponatremia  - Continue sodium tabs  - Trend electrolytes    Sleep  - Melatonin PRN     GI / Bowel  - Senna qHS  - Miralax PRN Daily     / Bladder  - Currently patient voids independently. Check PVRs on admission. SC for > 400ccs    Skin / Pressure injury  - Skin assessment on admission performed [ x ] : Surgical site healing well  - Monitor Incisions:  q shift  - Turn q2 hours in bed while awake, air mattress  - nursing to monitor skin qShift    Diet/Dysphagia:  - Diet Consistency: Consistent carb    DVT prophylaxis:   - Lovenox ppx      Outpatient Follow-up:  Bhaskar Partida  Neurosurgery  [address] [facility] [address]  [place] [address]  Phone: [phone]  Fax: [phone]  Follow Up Time: 2 weeks    Tobias Chatterjee  Medical Oncology  [address] [place] [address]  Phone: [phone]  Fax: [phone]  Follow Up Time: 1 week    Gunnar Campbell  Plastic Surgery  [address]  [place] [address]  Phone: [phone]  Fax: [phone]  Follow Up Time:      ---------------    Goals: Safe discharge to home  Estimated Length of Stay: 14-21 days  Rehab Potential: Good  Medical Prognosis: Good  Estimated Disposition: Home with home care    PRESCREEN COMPARISON:  I have reviewed the prescreen information and I have found no relevant changes between the preadmission screening and my post admission evaluation.    RATIONALE FOR INPATIENT ADMISSION: Patient demonstrates the following:  [X] Medically appropriate for rehabilitation admission  [X] Has attainable rehab goals with an appropriate initial discharge plan  [X]Has rehabilitation potential (expected to make a significant improvement within a reasonable period of time)  [X] Requires close medical management by a rehab physician, rehab nursing care, Hospitalist and comprehensive interdisciplinary team (including PT, OT and/or SLP, Prosthetics and Orthotics)     Mr. 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      Thoracic spinal cord injury secondary to compression by metastasis s/p surgical resection  - MRI:    * diffuse osseous metastatic disease throughout the entire spine    * pathologic fx w/ tumor into the epidural space at T8 resulting in thoracic cord compression    * tumor extension into the epidural space at L3 resulting in cauda equina compression    * pathologic fx w/ tumor in the epidural space at L4 contributing to severe canal stenosis    * tumor within the bilateral psoas muscles at L3 and L4    * bilateral neural foramen effacement by tumor in L-spine.   - Surgical pathology report: Diffuse large B-cell lymphoma  - Spine tumor resection surgeries:    * Stage 1: T8 VCR (vertebral column resection) T6-T10 fusion for tumor resection, small csf leak primarily repaired    * Stage 2: L4 Partial Corpectomy, L2-Pelvis Fusion, Plastics Closure 3/5 HMV drain x3 and 1 Bile bag     * All drains removed    * Spine and fall precautions    * F/U with neurosurgery, plastics, and oncology outpatient  - Cauda equina syndrome  - Paraparesis  - Lower extremity sensory deficits  - Impaired ADLs and mobility  - Need for assistance with personal care  - Start comprehensive rehab program of PT/OT - 3 hours a day, 5 days a week. 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    - Stage IV  - PET CT as outpatient  - [facility] to get back w/ recommendations when appropriate to start chemo, currently needs wound healing (surgery a week ago w/ multiple drains containing sanguinous fluid)  will follow .   - F/u with oncology outpatient    Pain  - tylenol, oxycodone PRN pain  - Robaxin 750mg q8 for spasms  - gabapentin 600mg q8    Pre-diabetes  - Off meds  - Consistent carb diet    Hyponatremia  - Continue sodium tabs  - Trend electrolytes    Sleep  - Melatonin PRN     GI / Bowel  - Senna qHS  - Miralax PRN Daily     / Bladder  - Currently patient voids independently. Check PVRs on admission. SC for > 400ccs    Skin / Pressure injury assessment   * Upper thoracic surgical incision: covered with Prineo dressing with no erythema, warmth,  nor discharge  * Lower thoracic and lumbosacral surgical incision: covered with Prineo dressing with no erythema, warmth,  nor discharge  * Bia-incisional sites of previous drains healing with no erythema, warmth,  nor discharge  * Stage 2 pressure injuries on sacrum and right shin  - Monitor Incisions:  q shift  - Turn q2 hours in bed while awake, air mattress  - nursing to monitor skin qShift    Diet/Dysphagia:  - Diet Consistency: Consistent carb    DVT prophylaxis:   - Lovenox ppx      Outpatient Follow-up:  Bhaskar Partida  Neurosurgery  [address]  [place] [address]  Phone: [phone]  Fax: [phone]  Follow Up Time: 2 weeks    Tobias Chatterjee  Medical Oncology  [address] [place] [address]  Phone: [phone]  Fax: [phone]  Follow Up Time: 1 week    Gunnar Campbell  Plastic Surgery  [address] [facility] [address] [facility]  [place] [address]  Phone: [phone]  Fax: [phone]  Follow Up Time:      ---------------    Goals: Safe discharge to home  Estimated Length of Stay: 14-21 days  Rehab Potential: Good  Medical Prognosis: Good  Estimated Disposition: Home with home care    PRESCREEN COMPARISON:  I have reviewed the prescreen information and I have found no relevant changes between the preadmission screening and my post admission evaluation.    RATIONALE FOR INPATIENT ADMISSION: Patient demonstrates the following:  [X] Medically appropriate for rehabilitation admission  [X] Has attainable rehab goals with an appropriate initial discharge plan  [X]Has rehabilitation potential (expected to make a significant improvement within a reasonable period of time)  [X] Requires close medical management by a rehab physician, rehab nursing care, Hospitalist and comprehensive interdisciplinary team (including PT, OT and/or SLP, Prosthetics and Orthotics)

## 2024-03-16 NOTE — DISCHARGE NOTE PROVIDER - PROVIDER TOKENS
PROVIDER:[TOKEN:[[id number]],FOLLOWUP:[2 weeks]],PROVIDER:[TOKEN:[[id number]],FOLLOWUP:[1 week]] PROVIDER:[TOKEN:[[id number]],FOLLOWUP:[2 weeks]],PROVIDER:[TOKEN:[[id number]],FOLLOWUP:[1 week]],PROVIDER:[TOKEN:[[id number]]]

## 2024-03-16 NOTE — H&P ADULT - REASON FOR ADMISSION
Spinal tumor resection Spinal cord compression due to metastasis s/p surgical resection Thoracic spinal cord injury secondary to compression by metastasis s/p surgical resection

## 2024-03-16 NOTE — H&P ADULT - NSHPREVIEWOFSYSTEMS_GEN_ALL_CORE
REVIEW OF SYSTEMS  Constitutional: No fever, No Chills, No fatigue  HEENT: No eye pain, No visual disturbances, No difficulty hearing  Pulm: No cough,  No shortness of breath  Cardio: No chest pain, No palpitations  GI:  No abdominal pain, No nausea, No vomiting, No diarrhea, No constipation  : No dysuria, No frequency, No hematuria  Neuro: No headaches, No memory loss, No tremors. (+) loss of strength, numbness, difficulty walking  Skin: No itching, No lesions   MSK: No joint pain, No joint swelling, (+) back pain Constitutional: No fever, No Chills, No fatigue  HEENT: No eye pain, No visual disturbances, No difficulty hearing  Pulm: No cough,  No shortness of breath  Cardio: No chest pain, No palpitations  GI:  No abdominal pain, No nausea, No vomiting, No diarrhea, No constipation  : No dysuria, No frequency, No hematuria  Neuro: No headaches, No memory loss, No tremors. (+) loss of strength, numbness, difficulty walking  Skin: No itching, No lesions   MSK: No joint pain, No joint swelling, (+) back pain

## 2024-03-16 NOTE — PROGRESS NOTE ADULT - ASSESSMENT
ASSESSMENT: 68M w/ hx of HLD, pre-DM, sinus john, presenting with worsening LE numbness/paresthesias and weakness since Jan 2024, outpatient x-rays& MRI on 2/29/24 showing concern for osseous metastatic disease in thoracic/lumbar/sacral spine as well as extension of soft tissue into the paravertebral soft tissues more prominent on the right side. Pt came to ED on 3/2 for back pain and worsening LE weakness RLE > LLE, fall x2 and decline in functional status where his legs gave out. Pt was walking independently last week and now requires a walker to walk even several steps. Also endorses weight loss. In ED CT imaging and MRI spine (prelim report) notable for multiple findings including diffuse osseous metastatic disease throughout the entire spine; pathologic fx w/ tumor into the epidural space at T8 resulting in thoracic cord compression; tumor extension into the epidural space at L3 resulting in cauda equina compression; pathologic fx w/ tumor in the epidural space at L4 contributing to severe canal stenosis; tumor within the bilateral psoas muscles at L3 and L4; bilateral neural foramen effacement by tumor in L-spine.   - now s/p stage 1: T8 VCR (vertebral column resection) T6-T10 fusion for tumor resection, small csf leak primarily repaired, Stage 2 L4 Partial Corpectomy, L2-Pelvis Fusion, Plastics Closure 3/5    NEURO: Neurochecks / vitals q4h   Path: Diffuse large B cell lymphoma - heme/onc consulted. F/u outpatient   Plastics following- all drains d/c'd   Pain control w/ PRN meds tylenol, oxycodone 5/10. Standing meds: Robaxin, gabapentin 600 TID  Activity: work with PT, NO bending, lifting or twisting.   PT/OT PMR Rec AR     PULM: sat >94%, on room air   Incentive spirometer at bedside, mobilize as tolerated    CV: -150mmHg, within parameter w/o medications    RENAL: IVL, voiding appropriately   Hyponatremia improving on Na tabs 2q12h, Na 136. continue to follow BMP in rehab    GI: reg diet   LBM 3/15, cont bowel regimen w/ miralax and senna   Hepatitis panel negative. Transaminitis no clinical correlation- continue to follow at rehab. Limit hepatotoxic meds     ENDO:  no active issues     HEME/ONC: post op anemia s/p 1 UPRBC intraop for EBL 550cc. H/H stable on CBC, hemodynamically stable   Surgical Path: Diffuse large B cell lymphoma- Oncology following. Needs treatment for B cell lymphoma in expedited fashion. Cleared for chemotherapy 2 weeks post op. Will attend 1 week of acute rehab, then follow up with Dr. Chatterjee regarding treatment plan. Logistics of inpatient or outpatient chemo to be worked out depending on functional status.   VTE prophylaxis: [x] SCDs [x] chemoprophylaxis [x] high risk of DVT/PE on admission due to: malignancy, LED 3/5 negative for DVT     ID: afebrile. No clinical evidence of infection  received periop abx w/ ancef   hiv neg     Will discuss with Dr. Partida   [id number] spectra

## 2024-03-16 NOTE — DISCHARGE NOTE PROVIDER - CARE PROVIDER_API CALL
Bhaskar Partida  Neurosurgery  [address], [address]  [place] [address]  Phone: [phone]  Fax: [phone]  Follow Up Time: 2 weeks    Tobias Chatterjee  Medical Oncology  [address] [place] [address]  Phone: [phone]  Fax: [phone]  Follow Up Time: 1 week   Bhaskar Partida  Neurosurgery  [address], [address]  [place] [address]  Phone: [phone]  Fax: [phone]  Follow Up Time: 2 weeks    Tobias Chatterjee  Medical Oncology  [address] [place] [address]  Phone: [phone]  Fax: [phone]  Follow Up Time: 1 week    Gunnar Campbell  Plastic Surgery  [address], [address]  [place] [address]  Phone: [phone]  Fax: [phone]  Follow Up Time:

## 2024-03-16 NOTE — PATIENT PROFILE ADULT - FALL HARM RISK - HARM RISK INTERVENTIONS

## 2024-03-16 NOTE — DISCHARGE NOTE PROVIDER - HOSPITAL COURSE
68M w/ hx of HLD, pre-DM, sinus john, presenting with worsening LE numbness/paresthesias and weakness since Jan 2024, outpatient x-rays & MRI on 2/29/24 showing concern for osseous metastatic disease in thoracic/lumbar/sacral spine as well as extension of soft tissue into the paravertebral soft tissues more prominent on the right side. Pt came to ED on 3/2 for back pain and worsening LE weakness RLE > LLE, fall x2 and decline in functional status where his legs gave out. Pt was walking independently last week and now requires a walker to walk even several steps. Also endorses weight loss. In ED CT imaging and MRI spine (prelim report) notable for multiple findings including diffuse osseous metastatic disease throughout the entire spine; pathologic fx w/ tumor into the epidural space at T8 resulting in thoracic cord compression; tumor extension into the epidural space at L3 resulting in cauda equina compression; pathologic fx w/ tumor in the epidural space at L4 contributing to severe canal stenosis; tumor within the bilateral psoas muscles at L3 and L4; bilateral neural foramen effacement by tumor in L-spine. Admitted to [facility] cleared by medicine for urgent procedure. Metastatic work up CT C/A/P results available.   Now s/p stage 1: T8 VCR (vertebral column resection) T6-T10 fusion for tumor resection, small csf leak primarily repaired, Stage 2 L4 Partial Corpectomy, L2-Pelvis Fusion, Plastics Closure 3/5 HMV drain x3 and 1 Bile bag placed w/ output closely monitored. Post oper[facility] pump, durotomy encountered intraop and primarily repaired up from OR flat and incremental 15 degrees with no positional headaches or sign of CSF leak. MAP goals >85, automapping has not required pressors. Experience intermittent RLE worsening weakness exam is pain limited. CT T/L/P performed postop. Exam stable pain well controlled off PCA. Patient transferred to [address] [place] [address] [facility]. Patient evaluated by PT./OT and PMR postop who recommend Acute Rehab. Drains removed by plastic surgery. Pathology result from OR- DLBCL diffuse large B cell lymphoma. Results discussed with patient. Hematology/oncology consulted who recommend plan to start R-CHOP inpatient vs outpatient after appropriate wound healing and will need a PICC line vs mediport accordingly. Patient is cleared to begin Chemo approx 2 weeks from OR date. Plan to participate in 1 week of Acute Rehab, subsequently follow up with Dr. Chatterjee (onc) in 1 week for treatment planning. heme/Oncology Team coordinating discharge follow up (they have been notified of patient discharge)   Lower extremity Dopplers negative for DVT on admission   Hospitalist following on 4 christian for co-management   On the day of discharge the patient is medically and neurologically stable for discharge to Rehab

## 2024-03-16 NOTE — DISCHARGE NOTE PROVIDER - REASON FOR ADMISSION
malignant cord compression/cauda equina syndrome malignant cord compression/cauda equina syndrome  Stage 1:  T8 Vertebral Column Resection (VCR), T6-T10 fusion for tumor, small csf leak primarily repaired Stage 2: L4 Partial Corpectomy, L2-Pelvis Fusion Plastics Closure ([place]) malignant cord compression/cauda equina syndrome  Stage 1:  T8 Vertebral Column Resection (VCR), T6-T10 fusion for tumor, small csf leak primarily repaired Stage 2: L4 Partial Corpectomy, L2-Pelvis Fusion Plastics Closure ([place]) 3/5

## 2024-03-16 NOTE — H&P ADULT - HISTORY OF PRESENT ILLNESS
68M w/ hx of HLD, pre-DM, and sinus bradycardia who presented to [facility] on 3/5 with worsening LE numbness/paresthesias and weakness since Jan 2024, outpatient x-rays & MRI on 2/29/24 showing concern for osseous metastatic disease in thoracic/lumbar/sacral spine as well as extension of soft tissue into the paravertebral soft tissues more prominent on the right side. He was previously ambulating independently without device but now required a RW. Repeat MRI showed multiple findings including diffuse osseous metastatic disease throughout the entire spine; pathologic fx w/ tumor into the epidural space at T8 resulting in thoracic cord compression; tumor extension into the epidural space at L3 resulting in cauda equina compression; pathologic fx w/ tumor in the epidural space at L4 contributing to severe canal stenosis; tumor within the bilateral psoas muscles at L3 and L4; bilateral neural foramen effacement by tumor in L-spine. Patient s/p stage 1: T8 VCR (vertebral column resection) T6-T10 fusion for tumor resection, small csf leak primarily repaired, Stage 2 L4 Partial Corpectomy, L2-Pelvis Fusion, Plastics Closure 3/5 HMV drain x3 and 1 Bile bag placed w/ output closely monitored. Post operatively monitored in [facility] on PCA pump, durotomy encountered intraop and primarily repaired up from OR flat and incremental 15 degrees with no positional headaches or sign of CSF leak. Patient evaluated by PT/OT/PM&R and recommended for acute inpatient rehab. Patient is medically stable for discharge to [facility] on 3/16. Mr. Saturnino Solis is a 68-year-old male patient with past medical history of HLD, pre-Diabetes Mellitus, and sinus bradycardia who presented to [facility] on 3/5 with a history of progressively worsening lower extremity numbness/paresthesias and weakness with onset on Jan 2024. She underwent x-rays & MRI imaging as outpatient on 2/29/24 showing concern for osseous metastatic disease in thoracic/lumbar/sacral spine as well as extension of soft tissue into the paravertebral soft tissues more prominent on the right side. He was previously ambulating independently without device but now required a RW. Repeat MRI showed multiple findings including diffuse osseous metastatic disease throughout the entire spine; pathologic fx w/ tumor into the epidural space at T8 resulting in thoracic cord compression; tumor extension into the epidural space at L3 resulting in cauda equina compression; pathologic fx w/ tumor in the epidural space at L4 contributing to severe canal stenosis; tumor within the bilateral psoas muscles at L3 and L4; bilateral neural foramen effacement by tumor in L-spine. He underwent a two stage surgical resection with a combined surgical team including Neurosurgery (Dr. Bhaskar Partida) and Plastic Surgery (Dr. Gunnar Campbell). He underwent the following procedures:     ·	Stage 1: T8 VCR (vertebral column resection) T6-T10 fusion for tumor resection, small csf leak primarily repaired  ·	Stage 2: L4 Partial Corpectomy, L2-Pelvis Fusion, Plastics Closure 3/5 HMV drain x3 and 1 Bile bag placed w/ output closely monitored.     He was monitored post-operatively in [facility] on PCA pump, durotomy encountered intraop and primarily repaired up from OR flat and incremental 15 degrees with no positional headaches or sign of CSF leak. Surgical pathology reporting diffuse large B-cell lymphoma. Patient evaluated by PT/OT/PM&R and recommended for acute inpatient rehab. Patient was discharged to [facility] on 3/16/24.

## 2024-03-16 NOTE — PROGRESS NOTE ADULT - PROVIDER SPECIALTY LIST ADULT
Anesthesia
NSICU
NSICU
Plastic Surgery
Plastic Surgery
Anesthesia
NSICU
NSICU
Neurosurgery
Plastic Surgery
Anesthesia
NSICU
Neurosurgery
Neurosurgery
Plastic Surgery
NSICU
Neurosurgery
Plastic Surgery
Rehab Medicine
NSICU
Neurosurgery
Hospitalist

## 2024-03-17 LAB
ALBUMIN SERPL ELPH-MCNC: 2 G/DL — LOW (ref 3.3–5)
ALP SERPL-CCNC: 136 U/L — HIGH (ref 40–120)
ALT FLD-CCNC: 138 U/L — HIGH (ref 10–45)
ANION GAP SERPL CALC-SCNC: 6 MMOL/L — SIGNIFICANT CHANGE UP (ref 5–17)
AST SERPL-CCNC: 69 U/L — HIGH (ref 10–40)
BASOPHILS # BLD AUTO: 0.1 K/UL — SIGNIFICANT CHANGE UP (ref 0–0.2)
BASOPHILS NFR BLD AUTO: 1 % — SIGNIFICANT CHANGE UP (ref 0–2)
BILIRUB SERPL-MCNC: 0.5 MG/DL — SIGNIFICANT CHANGE UP (ref 0.2–1.2)
BUN SERPL-MCNC: 21 MG/DL — SIGNIFICANT CHANGE UP (ref 7–23)
CALCIUM SERPL-MCNC: 8.5 MG/DL — SIGNIFICANT CHANGE UP (ref 8.4–10.5)
CHLORIDE SERPL-SCNC: 99 MMOL/L — SIGNIFICANT CHANGE UP (ref 96–108)
CO2 SERPL-SCNC: 31 MMOL/L — SIGNIFICANT CHANGE UP (ref 22–31)
CREAT SERPL-MCNC: 0.68 MG/DL — SIGNIFICANT CHANGE UP (ref 0.5–1.3)
EGFR: 103 ML/MIN/1.73M2 — SIGNIFICANT CHANGE UP
EOSINOPHIL # BLD AUTO: 0.21 K/UL — SIGNIFICANT CHANGE UP (ref 0–0.5)
EOSINOPHIL NFR BLD AUTO: 2 % — SIGNIFICANT CHANGE UP (ref 0–6)
G6PD RBC-CCNC: 15.1 U/G HGB — SIGNIFICANT CHANGE UP (ref 7–20.5)
GLUCOSE SERPL-MCNC: 91 MG/DL — SIGNIFICANT CHANGE UP (ref 70–99)
HCT VFR BLD CALC: 30.7 % — LOW (ref 39–50)
HGB BLD-MCNC: 9.7 G/DL — LOW (ref 13–17)
LYMPHOCYTES # BLD AUTO: 0.93 K/UL — LOW (ref 1–3.3)
LYMPHOCYTES # BLD AUTO: 9 % — LOW (ref 13–44)
MCHC RBC-ENTMCNC: 26.4 PG — LOW (ref 27–34)
MCHC RBC-ENTMCNC: 31.6 GM/DL — LOW (ref 32–36)
MCV RBC AUTO: 83.4 FL — SIGNIFICANT CHANGE UP (ref 80–100)
MONOCYTES # BLD AUTO: 0.62 K/UL — SIGNIFICANT CHANGE UP (ref 0–0.9)
MONOCYTES NFR BLD AUTO: 6 % — SIGNIFICANT CHANGE UP (ref 2–14)
NEUTROPHILS # BLD AUTO: 8.25 K/UL — HIGH (ref 1.8–7.4)
NEUTROPHILS NFR BLD AUTO: 75 % — SIGNIFICANT CHANGE UP (ref 43–77)
PLATELET # BLD AUTO: 438 K/UL — HIGH (ref 150–400)
POTASSIUM SERPL-MCNC: 3.8 MMOL/L — SIGNIFICANT CHANGE UP (ref 3.5–5.3)
POTASSIUM SERPL-SCNC: 3.8 MMOL/L — SIGNIFICANT CHANGE UP (ref 3.5–5.3)
PROT SERPL-MCNC: 5.3 G/DL — LOW (ref 6–8.3)
RBC # BLD: 3.68 M/UL — LOW (ref 4.2–5.8)
RBC # FLD: 17.5 % — HIGH (ref 10.3–14.5)
SODIUM SERPL-SCNC: 136 MMOL/L — SIGNIFICANT CHANGE UP (ref 135–145)
WBC # BLD: 10.31 K/UL — SIGNIFICANT CHANGE UP (ref 3.8–10.5)
WBC # FLD AUTO: 10.31 K/UL — SIGNIFICANT CHANGE UP (ref 3.8–10.5)

## 2024-03-17 PROCEDURE — 99223 1ST HOSP IP/OBS HIGH 75: CPT

## 2024-03-17 PROCEDURE — 99232 SBSQ HOSP IP/OBS MODERATE 35: CPT

## 2024-03-17 RX ORDER — HYDROCORTISONE 1 %
1 OINTMENT (GRAM) TOPICAL EVERY 12 HOURS
Refills: 0 | Status: DISCONTINUED | OUTPATIENT
Start: 2024-03-17 | End: 2024-03-22

## 2024-03-17 RX ADMIN — METHOCARBAMOL 750 MILLIGRAM(S): 500 TABLET, FILM COATED ORAL at 05:41

## 2024-03-17 RX ADMIN — METHOCARBAMOL 750 MILLIGRAM(S): 500 TABLET, FILM COATED ORAL at 21:30

## 2024-03-17 RX ADMIN — OXYCODONE HYDROCHLORIDE 10 MILLIGRAM(S): 5 TABLET ORAL at 21:30

## 2024-03-17 RX ADMIN — OXYCODONE HYDROCHLORIDE 10 MILLIGRAM(S): 5 TABLET ORAL at 17:42

## 2024-03-17 RX ADMIN — SODIUM CHLORIDE 2 GRAM(S): 9 INJECTION INTRAMUSCULAR; INTRAVENOUS; SUBCUTANEOUS at 17:42

## 2024-03-17 RX ADMIN — OXYCODONE HYDROCHLORIDE 10 MILLIGRAM(S): 5 TABLET ORAL at 18:37

## 2024-03-17 RX ADMIN — OXYCODONE HYDROCHLORIDE 5 MILLIGRAM(S): 5 TABLET ORAL at 03:15

## 2024-03-17 RX ADMIN — METHOCARBAMOL 750 MILLIGRAM(S): 500 TABLET, FILM COATED ORAL at 14:27

## 2024-03-17 RX ADMIN — OXYCODONE HYDROCHLORIDE 10 MILLIGRAM(S): 5 TABLET ORAL at 14:21

## 2024-03-17 RX ADMIN — OXYCODONE HYDROCHLORIDE 10 MILLIGRAM(S): 5 TABLET ORAL at 12:21

## 2024-03-17 RX ADMIN — OXYCODONE HYDROCHLORIDE 10 MILLIGRAM(S): 5 TABLET ORAL at 22:12

## 2024-03-17 RX ADMIN — GABAPENTIN 600 MILLIGRAM(S): 400 CAPSULE ORAL at 14:27

## 2024-03-17 RX ADMIN — POLYETHYLENE GLYCOL 3350 17 GRAM(S): 17 POWDER, FOR SOLUTION ORAL at 17:42

## 2024-03-17 RX ADMIN — Medication 1 TABLET(S): at 12:21

## 2024-03-17 RX ADMIN — OXYCODONE HYDROCHLORIDE 5 MILLIGRAM(S): 5 TABLET ORAL at 02:43

## 2024-03-17 RX ADMIN — GABAPENTIN 600 MILLIGRAM(S): 400 CAPSULE ORAL at 21:30

## 2024-03-17 RX ADMIN — SODIUM CHLORIDE 2 GRAM(S): 9 INJECTION INTRAMUSCULAR; INTRAVENOUS; SUBCUTANEOUS at 06:30

## 2024-03-17 RX ADMIN — POLYETHYLENE GLYCOL 3350 17 GRAM(S): 17 POWDER, FOR SOLUTION ORAL at 05:42

## 2024-03-17 RX ADMIN — OXYCODONE HYDROCHLORIDE 5 MILLIGRAM(S): 5 TABLET ORAL at 06:53

## 2024-03-17 RX ADMIN — GABAPENTIN 600 MILLIGRAM(S): 400 CAPSULE ORAL at 05:40

## 2024-03-17 RX ADMIN — ENOXAPARIN SODIUM 40 MILLIGRAM(S): 100 INJECTION SUBCUTANEOUS at 17:42

## 2024-03-17 NOTE — DIETITIAN INITIAL EVALUATION ADULT - HEIGHT FOR BMI (INCHES)
Subjective:       Patient ID: Rola Richter is a 78 y.o. female.    Chief Complaint: No chief complaint on file.    New to me patient here for UC visit.  Few days of ST and sinus congestion/cough and PND which is green.  No fever and a little SOB at times, no pl pain.    Review of Systems   Constitutional: Negative for fever.   HENT: Positive for postnasal drip, sinus pressure and sore throat.    Respiratory: Positive for shortness of breath.    Cardiovascular: Negative for chest pain.   Gastrointestinal: Negative for abdominal pain and nausea.   Skin: Negative for rash.   All other systems reviewed and are negative.      Objective:      Physical Exam  Constitutional:       General: She is not in acute distress.     Appearance: She is well-developed.   HENT:      Right Ear: Tympanic membrane normal. Tympanic membrane is not erythematous.      Left Ear: Tympanic membrane normal. Tympanic membrane is not erythematous.      Nose: Mucosal edema present.      Right Sinus: Maxillary sinus tenderness and frontal sinus tenderness present.      Left Sinus: Maxillary sinus tenderness and frontal sinus tenderness present.      Mouth/Throat:      Pharynx: Posterior oropharyngeal erythema present.   Cardiovascular:      Rate and Rhythm: Normal rate and regular rhythm.      Heart sounds: No murmur heard.      Pulmonary:      Effort: Pulmonary effort is normal.      Breath sounds: Normal breath sounds.   Musculoskeletal:      Cervical back: Neck supple.   Lymphadenopathy:      Cervical: No cervical adenopathy.   Skin:     General: Skin is warm and dry.         Assessment:       1. Sore throat    2. Acute pansinusitis, recurrence not specified    3. Primary hypertension        Plan:       Sore throat  -     POCT COVID-19 Rapid Screening  - Negative    Acute pansinusitis, recurrence not specified  -     doxycycline (MONODOX) 100 MG capsule; Take 1 capsule (100 mg total) by mouth 2 (two) times daily.  Dispense: 20 capsule;  Refill: 0    Other ordersDictation #1  MRN:[id number]  CSN:[id number]    -     promethazine-dextromethorphan (PROMETHAZINE-DM) 6.25-15 mg/5 mL Syrp; Take 5 mLs by mouth 4 (four) times daily as needed.  Dispense: 180 mL; Refill: 0      Patient Instructions   Push fluids intake.  Drink plenty of water.         5

## 2024-03-17 NOTE — CONSULT NOTE ADULT - ASSESSMENT
Pt returning ANUJA Nagy call regarding XR CHEST results.  Please call [phone] 68y with hx of HLD, pre-DM, and sinus bradycardia who presented to [facility] on 3/5 with worsening LE numbness/paresthesias and weakness since Jan 2024, found to have diffuse osseous metastatic disease throughout entire spine, pathological fx with tumor extending into epidural space at T8, tumor extension into L3 epidural space, pathologic fx w/ tumor in the epidural space at L4 contributing to severe canal stenosis; tumor within the bilateral psoas muscles at L3 and L4; bilateral neural foramen effacement by tumor in L-spine. Patient s/p stage 1: T8 VCR (vertebral column resection) T6-T10 fusion for tumor resection, small csf leak primarily repaired, Stage 2 L4 Partial Corpectomy, L2-Pelvis Fusion, Plastics Closure 3/5. Patient now admitted for a multidisciplinary rehab program. 03-16-24 @ 12:10    Diffuse Osseous Metastatic Disease  Pathological Fx w/ Tumor Extending into Epidural Space  Cauda Equina Syndrome  Spine tumor resection  - MRI showed multiple findings including diffuse osseous metastatic disease throughout the entire spine; pathologic fx w/ tumor into the epidural space at T8 resulting in thoracic cord compression; tumor extension into the epidural space at L3 resulting in cauda equina compression; pathologic fx w/ tumor in the epidural space at L4 contributing to severe canal stenosis; tumor within the bilateral psoas muscles at L3 and L4; bilateral neural foramen effacement by tumor in L-spine.   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    - Stage IV  -Seen by heme/onc 3/12, PET CT as outpatient, [facility] to get back w/ recommendations when appropriate to start chemo, currently needs wound healing  -Per discharge note: Patient is cleared to begin Chemo approx 2 weeks from OR date. Plan to participate in 1 week of Acute Rehab, subsequently follow up with Dr. Chatterjee (onc) in 1 week for treatment planning.    Pre-diabetes  -HbA1C 6.0 on 3/5  -Consistent carb diet  -Outpatient monitoring with PMD    Hyponatremia  -Na 136  -Continue sodium chloride 1g Q12H  -Encourage oral intake  -Monitor BMP     Anemia  -Likely multifactorial  -s/p 1 unit RBC transfusion intra-op  -H/H stable  -Monitor CBC    History of Sinus Bradycardia   -EKG 3/12 personally reviewed, shows NSR with HR 72, no acute changes  -Per documentation, "Per PCP records, appears to be in setting of previously being active runner"  -Monitor as routine     History of HLD  -Controlled by diet/exercise, not on meds  -Lipid panel 3/13 reviewed     DVT PPx  - Lovenox SC   68y with hx of HLD, pre-DM, and sinus bradycardia who presented to [facility] on 3/5 with worsening LE numbness/paresthesias and weakness since Jan 2024, found to have diffuse osseous metastatic disease throughout entire spine, pathological fx with tumor extending into epidural space at T8, tumor extension into L3 epidural space, pathologic fx w/ tumor in the epidural space at L4 contributing to severe canal stenosis; tumor within the bilateral psoas muscles at L3 and L4; bilateral neural foramen effacement by tumor in L-spine. Patient s/p stage 1: T8 VCR (vertebral column resection) T6-T10 fusion for tumor resection, small csf leak primarily repaired, Stage 2 L4 Partial Corpectomy, L2-Pelvis Fusion, Plastics Closure 3/5. Patient now admitted for a multidisciplinary rehab program. 03-16-24 @ 12:10    Diffuse Osseous Metastatic Disease  Pathological Fx w/ Tumor Extending into Epidural Space  Cauda Equina Syndrome  Spine tumor resection  - MRI showed multiple findings including diffuse osseous metastatic disease throughout the entire spine; pathologic fx w/ tumor into the epidural space at T8 resulting in thoracic cord compression; tumor extension into the epidural space at L3 resulting in cauda equina compression; pathologic fx w/ tumor in the epidural space at L4 contributing to severe canal stenosis; tumor within the bilateral psoas muscles at L3 and L4; bilateral neural foramen effacement by tumor in L-spine.   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    - Stage IV  -Seen by heme/onc 3/12, PET CT as outpatient, [facility] to get back w/ recommendations when appropriate to start chemo, currently needs wound healing  -Per discharge note: Patient is cleared to begin Chemo approx 2 weeks from OR date. Plan to participate in 1 week of Acute Rehab, subsequently follow up with Dr. Chatterjee (onc) in 1 week for treatment planning.    Pre-diabetes  -HbA1C 6.0 on 3/5  -Consistent carb diet  -Outpatient monitoring with PMD    Hyponatremia  -Na 136  -Continue sodium chloride 2g Q12H  -Encourage oral intake  -Monitor BMP, taper salt tablets if able     Anemia  -Likely multifactorial  -s/p 1 unit RBC transfusion intra-op  -H/H stable  -Monitor CBC    History of Sinus Bradycardia   -EKG 3/12 personally reviewed, shows NSR with HR 72, no acute changes  -Per documentation, "Per PCP records, appears to be in setting of previously being active runner"  -Monitor as routine     History of HLD  -Controlled by diet/exercise, not on meds  -Lipid panel 3/13 reviewed     DVT PPx  - Lovenox SC

## 2024-03-17 NOTE — DIETITIAN INITIAL EVALUATION ADULT - ORAL INTAKE PTA/DIET HISTORY
Patient reports good appetite, observed 100% breakfast tray consumed. Reported  lb in Fall 2023. Current documented weight 117.9 lb. NFPE completed. Noted stage 2 pressure injury. Patient declined double portion protein and declined oral nutrition supplement at this time. Encouraged adequate protein intake with each meal. Continue daily MVI.

## 2024-03-17 NOTE — DIETITIAN INITIAL EVALUATION ADULT - ADD RECOMMEND
1. Consistent Carbohydrate Diet (No Snacks)  2. Daily MVI  3. Encourage adequate protein with each meal

## 2024-03-17 NOTE — DIETITIAN INITIAL EVALUATION ADULT - PERTINENT LABORATORY DATA
03-17    136  |  99  |  21  ----------------------------<  91  3.8   |  31  |  0.68    Ca    8.5      17 Mar 2024 05:59    TPro  5.3<L>  /  Alb  2.0<L>  /  TBili  0.5  /  DBili  x   /  AST  69<H>  /  ALT  138<H>  /  AlkPhos  136<H>  03-17  A1C with Estimated Average Glucose Result: 6.0 % (03-05-24 @ 06:53)

## 2024-03-17 NOTE — DIETITIAN INITIAL EVALUATION ADULT - OTHER INFO
68M w/ hx of HLD, pre-DM, and sinus bradycardia who presented to [facility] on 3/5 with worsening LE numbness/paresthesias and weakness since Jan 2024, outpatient x-rays & MRI on 2/29/24 showing concern for osseous metastatic disease in thoracic/lumbar/sacral spine as well as extension of soft tissue into the paravertebral soft tissues more prominent on the right side. He was previously ambulating independently without device but now required a RW. Repeat MRI showed multiple findings including diffuse osseous metastatic disease throughout the entire spine; pathologic fx w/ tumor into the epidural space at T8 resulting in thoracic cord compression; tumor extension into the epidural space at L3 resulting in cauda equina compression; pathologic fx w/ tumor in the epidural space at L4 contributing to severe canal stenosis; tumor within the bilateral psoas muscles at L3 and L4; bilateral neural foramen effacement by tumor in L-spine. Patient s/p stage 1: T8 VCR (vertebral column resection) T6-T10 fusion for tumor resection, small csf leak primarily repaired, Stage 2 L4 Partial Corpectomy, L2-Pelvis Fusion, Plastics Closure 3/5 HMV drain x3 and 1 Bile bag placed w/ output closely monitored. Post operatively monitored in NSCU on PCA pump, durotomy encountered intraop and primarily repaired up from OR flat and incremental 15 degrees with no positional headaches or sign of CSF leak. Patient evaluated by PT/OT/PM&R and recommended for acute inpatient rehab. Patient is medically stable for discharge to [facility] on 3/16.

## 2024-03-17 NOTE — DIETITIAN INITIAL EVALUATION ADULT - PERTINENT MEDS FT
MEDICATIONS  (STANDING):  enoxaparin Injectable 40 milliGRAM(s) SubCutaneous <User Schedule>  gabapentin 600 milliGRAM(s) Oral every 8 hours  methocarbamol 750 milliGRAM(s) Oral every 8 hours  multivitamin 1 Tablet(s) Oral daily  polyethylene glycol 3350 17 Gram(s) Oral two times a day  senna 2 Tablet(s) Oral at bedtime  sodium chloride 2 Gram(s) Oral every 12 hours    MEDICATIONS  (PRN):  acetaminophen     Tablet .. 975 milliGRAM(s) Oral every 6 hours PRN Temp greater or equal to 38C (100.4F), Mild Pain (1 - 3)  oxyCODONE    IR 5 milliGRAM(s) Oral every 4 hours PRN Moderate Pain (4 - 6)  oxyCODONE    IR 10 milliGRAM(s) Oral every 4 hours PRN Severe Pain (7 - 10)

## 2024-03-17 NOTE — PROGRESS NOTE ADULT - SUBJECTIVE AND OBJECTIVE BOX
Cc: Gait dysfunction    HPI: Patient seen and examined at bedside. No acute events overnight.   Pain overall controlled, no chest pain, no N/V, no Fevers/Chills. No other new ROS  Has been tolerating rehabilitation program.    acetaminophen     Tablet .. 975 milliGRAM(s) Oral every 6 hours PRN  enoxaparin Injectable 40 milliGRAM(s) SubCutaneous <User Schedule>  gabapentin 600 milliGRAM(s) Oral every 8 hours  hydrocortisone 1% Cream 1 Application(s) Topical every 12 hours PRN  methocarbamol 750 milliGRAM(s) Oral every 8 hours  multivitamin 1 Tablet(s) Oral daily  oxyCODONE    IR 5 milliGRAM(s) Oral every 4 hours PRN  oxyCODONE    IR 10 milliGRAM(s) Oral every 4 hours PRN  polyethylene glycol 3350 17 Gram(s) Oral two times a day  senna 2 Tablet(s) Oral at bedtime  sodium chloride 2 Gram(s) Oral every 12 hours      T(C): 37.1 (03-17-24 @ 07:45), Max: 37.6 (03-16-24 @ 19:58)  HR: 70 (03-17-24 @ 07:45) (70 - 75)  BP: 101/66 (03-17-24 @ 07:45) (101/66 - 105/69)  RR: 17 (03-17-24 @ 07:45) (17 - 17)  SpO2: 94% (03-17-24 @ 07:45) (93% - 94%)    In NAD  HEENT- EOMI  Heart- S1S2  Lungs- CTA bl.  Abd- + BS, NT  Ext- No calf pain  Neuro- Exam unchanged    CBC 3/17/24 reviewed  CMP 3/17/24 reviewed  CMP 3/13/24 reviewed      Imp: Patient with diagnosis of diffuse osseous metastatic disease s/p tumor resection admitted for comprehensive acute rehabilitation.    Plan:  - Continue PT/OT/SLP as indicated  - DVT prophylaxis - continue Lovenox subQ  - Skin- Turn q2h, check skin daily  - Continue current medications  -Active issues-   Pain - Continue Gabapentin, Robaxin, Oxycodone PRN, Tylenol  - Patient is stable to continue current rehabilitation program.

## 2024-03-18 ENCOUNTER — NON-APPOINTMENT (OUTPATIENT)
Age: 69
End: 2024-03-18

## 2024-03-18 LAB
ALBUMIN SERPL ELPH-MCNC: 2 G/DL — LOW (ref 3.3–5)
ALP SERPL-CCNC: 127 U/L — HIGH (ref 40–120)
ALT FLD-CCNC: 131 U/L — HIGH (ref 10–45)
ANION GAP SERPL CALC-SCNC: 5 MMOL/L — SIGNIFICANT CHANGE UP (ref 5–17)
ANION GAP SERPL CALC-SCNC: 6 MMOL/L — SIGNIFICANT CHANGE UP (ref 5–17)
AST SERPL-CCNC: 62 U/L — HIGH (ref 10–40)
BASOPHILS # BLD AUTO: 0.03 K/UL — SIGNIFICANT CHANGE UP (ref 0–0.2)
BASOPHILS NFR BLD AUTO: 0.3 % — SIGNIFICANT CHANGE UP (ref 0–2)
BILIRUB SERPL-MCNC: 0.5 MG/DL — SIGNIFICANT CHANGE UP (ref 0.2–1.2)
BUN SERPL-MCNC: 24 MG/DL — HIGH (ref 7–23)
BUN SERPL-MCNC: 25 MG/DL — HIGH (ref 7–23)
CALCIUM SERPL-MCNC: 8.4 MG/DL — SIGNIFICANT CHANGE UP (ref 8.4–10.5)
CALCIUM SERPL-MCNC: 8.5 MG/DL — SIGNIFICANT CHANGE UP (ref 8.4–10.5)
CHLORIDE SERPL-SCNC: 100 MMOL/L — SIGNIFICANT CHANGE UP (ref 96–108)
CHLORIDE SERPL-SCNC: 97 MMOL/L — SIGNIFICANT CHANGE UP (ref 96–108)
CO2 SERPL-SCNC: 30 MMOL/L — SIGNIFICANT CHANGE UP (ref 22–31)
CO2 SERPL-SCNC: 30 MMOL/L — SIGNIFICANT CHANGE UP (ref 22–31)
CREAT SERPL-MCNC: 0.71 MG/DL — SIGNIFICANT CHANGE UP (ref 0.5–1.3)
CREAT SERPL-MCNC: 0.74 MG/DL — SIGNIFICANT CHANGE UP (ref 0.5–1.3)
EGFR: 100 ML/MIN/1.73M2 — SIGNIFICANT CHANGE UP
EGFR: 101 ML/MIN/1.73M2 — SIGNIFICANT CHANGE UP
EOSINOPHIL # BLD AUTO: 0.18 K/UL — SIGNIFICANT CHANGE UP (ref 0–0.5)
EOSINOPHIL NFR BLD AUTO: 1.9 % — SIGNIFICANT CHANGE UP (ref 0–6)
GLUCOSE SERPL-MCNC: 130 MG/DL — HIGH (ref 70–99)
GLUCOSE SERPL-MCNC: 99 MG/DL — SIGNIFICANT CHANGE UP (ref 70–99)
HCT VFR BLD CALC: 28.9 % — LOW (ref 39–50)
HCT VFR BLD CALC: 29.8 % — LOW (ref 39–50)
HGB BLD-MCNC: 9.4 G/DL — LOW (ref 13–17)
HGB BLD-MCNC: 9.7 G/DL — LOW (ref 13–17)
IMM GRANULOCYTES NFR BLD AUTO: 2.5 % — HIGH (ref 0–0.9)
LACTATE SERPL-SCNC: 1.3 MMOL/L — SIGNIFICANT CHANGE UP (ref 0.7–2)
LYMPHOCYTES # BLD AUTO: 0.76 K/UL — LOW (ref 1–3.3)
LYMPHOCYTES # BLD AUTO: 7.8 % — LOW (ref 13–44)
MCHC RBC-ENTMCNC: 27.1 PG — SIGNIFICANT CHANGE UP (ref 27–34)
MCHC RBC-ENTMCNC: 27.1 PG — SIGNIFICANT CHANGE UP (ref 27–34)
MCHC RBC-ENTMCNC: 32.5 GM/DL — SIGNIFICANT CHANGE UP (ref 32–36)
MCHC RBC-ENTMCNC: 32.6 GM/DL — SIGNIFICANT CHANGE UP (ref 32–36)
MCV RBC AUTO: 83.2 FL — SIGNIFICANT CHANGE UP (ref 80–100)
MCV RBC AUTO: 83.3 FL — SIGNIFICANT CHANGE UP (ref 80–100)
MONOCYTES # BLD AUTO: 0.58 K/UL — SIGNIFICANT CHANGE UP (ref 0–0.9)
MONOCYTES NFR BLD AUTO: 6 % — SIGNIFICANT CHANGE UP (ref 2–14)
NEUTROPHILS # BLD AUTO: 7.91 K/UL — HIGH (ref 1.8–7.4)
NEUTROPHILS NFR BLD AUTO: 81.5 % — HIGH (ref 43–77)
NRBC # BLD: 0 /100 WBCS — SIGNIFICANT CHANGE UP (ref 0–0)
NRBC # BLD: 0 /100 WBCS — SIGNIFICANT CHANGE UP (ref 0–0)
PLATELET # BLD AUTO: 408 K/UL — HIGH (ref 150–400)
PLATELET # BLD AUTO: 412 K/UL — HIGH (ref 150–400)
POTASSIUM SERPL-MCNC: 4 MMOL/L — SIGNIFICANT CHANGE UP (ref 3.5–5.3)
POTASSIUM SERPL-MCNC: 4.1 MMOL/L — SIGNIFICANT CHANGE UP (ref 3.5–5.3)
POTASSIUM SERPL-SCNC: 4 MMOL/L — SIGNIFICANT CHANGE UP (ref 3.5–5.3)
POTASSIUM SERPL-SCNC: 4.1 MMOL/L — SIGNIFICANT CHANGE UP (ref 3.5–5.3)
PROCALCITONIN SERPL-MCNC: 0.11 NG/ML — HIGH
PROT SERPL-MCNC: 5.1 G/DL — LOW (ref 6–8.3)
RAPID RVP RESULT: SIGNIFICANT CHANGE UP
RBC # BLD: 3.47 M/UL — LOW (ref 4.2–5.8)
RBC # BLD: 3.58 M/UL — LOW (ref 4.2–5.8)
RBC # FLD: 17 % — HIGH (ref 10.3–14.5)
RBC # FLD: 17.4 % — HIGH (ref 10.3–14.5)
SARS-COV-2 RNA SPEC QL NAA+PROBE: SIGNIFICANT CHANGE UP
SODIUM SERPL-SCNC: 132 MMOL/L — LOW (ref 135–145)
SODIUM SERPL-SCNC: 136 MMOL/L — SIGNIFICANT CHANGE UP (ref 135–145)
WBC # BLD: 9.32 K/UL — SIGNIFICANT CHANGE UP (ref 3.8–10.5)
WBC # BLD: 9.7 K/UL — SIGNIFICANT CHANGE UP (ref 3.8–10.5)
WBC # FLD AUTO: 9.32 K/UL — SIGNIFICANT CHANGE UP (ref 3.8–10.5)
WBC # FLD AUTO: 9.7 K/UL — SIGNIFICANT CHANGE UP (ref 3.8–10.5)

## 2024-03-18 PROCEDURE — 99232 SBSQ HOSP IP/OBS MODERATE 35: CPT

## 2024-03-18 RX ORDER — ACETAMINOPHEN 500 MG
1000 TABLET ORAL ONCE
Refills: 0 | Status: DISCONTINUED | OUTPATIENT
Start: 2024-03-18 | End: 2024-03-18

## 2024-03-18 RX ADMIN — SODIUM CHLORIDE 2 GRAM(S): 9 INJECTION INTRAMUSCULAR; INTRAVENOUS; SUBCUTANEOUS at 05:47

## 2024-03-18 RX ADMIN — OXYCODONE HYDROCHLORIDE 10 MILLIGRAM(S): 5 TABLET ORAL at 04:20

## 2024-03-18 RX ADMIN — OXYCODONE HYDROCHLORIDE 10 MILLIGRAM(S): 5 TABLET ORAL at 16:19

## 2024-03-18 RX ADMIN — ENOXAPARIN SODIUM 40 MILLIGRAM(S): 100 INJECTION SUBCUTANEOUS at 18:46

## 2024-03-18 RX ADMIN — OXYCODONE HYDROCHLORIDE 10 MILLIGRAM(S): 5 TABLET ORAL at 06:07

## 2024-03-18 RX ADMIN — GABAPENTIN 600 MILLIGRAM(S): 400 CAPSULE ORAL at 21:49

## 2024-03-18 RX ADMIN — Medication 1 TABLET(S): at 12:56

## 2024-03-18 RX ADMIN — SODIUM CHLORIDE 2 GRAM(S): 9 INJECTION INTRAMUSCULAR; INTRAVENOUS; SUBCUTANEOUS at 18:44

## 2024-03-18 RX ADMIN — METHOCARBAMOL 750 MILLIGRAM(S): 500 TABLET, FILM COATED ORAL at 05:47

## 2024-03-18 RX ADMIN — OXYCODONE HYDROCHLORIDE 10 MILLIGRAM(S): 5 TABLET ORAL at 15:28

## 2024-03-18 RX ADMIN — METHOCARBAMOL 750 MILLIGRAM(S): 500 TABLET, FILM COATED ORAL at 13:01

## 2024-03-18 RX ADMIN — Medication 975 MILLIGRAM(S): at 20:03

## 2024-03-18 RX ADMIN — SENNA PLUS 2 TABLET(S): 8.6 TABLET ORAL at 21:49

## 2024-03-18 RX ADMIN — OXYCODONE HYDROCHLORIDE 10 MILLIGRAM(S): 5 TABLET ORAL at 22:55

## 2024-03-18 RX ADMIN — METHOCARBAMOL 750 MILLIGRAM(S): 500 TABLET, FILM COATED ORAL at 21:49

## 2024-03-18 RX ADMIN — OXYCODONE HYDROCHLORIDE 10 MILLIGRAM(S): 5 TABLET ORAL at 08:30

## 2024-03-18 RX ADMIN — GABAPENTIN 600 MILLIGRAM(S): 400 CAPSULE ORAL at 13:00

## 2024-03-18 RX ADMIN — GABAPENTIN 600 MILLIGRAM(S): 400 CAPSULE ORAL at 05:46

## 2024-03-18 RX ADMIN — OXYCODONE HYDROCHLORIDE 10 MILLIGRAM(S): 5 TABLET ORAL at 21:49

## 2024-03-18 RX ADMIN — POLYETHYLENE GLYCOL 3350 17 GRAM(S): 17 POWDER, FOR SOLUTION ORAL at 05:47

## 2024-03-18 RX ADMIN — POLYETHYLENE GLYCOL 3350 17 GRAM(S): 17 POWDER, FOR SOLUTION ORAL at 18:46

## 2024-03-18 RX ADMIN — Medication 975 MILLIGRAM(S): at 21:00

## 2024-03-18 RX ADMIN — OXYCODONE HYDROCHLORIDE 10 MILLIGRAM(S): 5 TABLET ORAL at 09:44

## 2024-03-18 NOTE — PROVIDER CONTACT NOTE (OTHER) - ACTION/TREATMENT ORDERED:
Pt Bloodwork ordered and complete, PO Tylenol administered as per order, chest xray ordered but refused by patient, MD notified. COVID swab ordered and complete , UA ordered.

## 2024-03-18 NOTE — CHART NOTE - NSCHARTNOTEFT_GEN_A_CORE
Notified by RN that patient had a low grade temperature of 100.1 rectally. Will order fever workup on patient. Given PO tylenol. Will f/u on labs.     Plan:  -cbc, bmp, lactate, procalcitonin  -blood cx  -UA  -RVP

## 2024-03-18 NOTE — PROGRESS NOTE ADULT - SUBJECTIVE AND OBJECTIVE BOX
HPI:  Mr. Saturnino Solis is a 68-year-old male patient with past medical history of HLD, pre-Diabetes Mellitus, and sinus bradycardia who presented to [facility] on 3/5 with a history of progressively worsening lower extremity numbness/paresthesias and weakness with onset on Jan 2024. She underwent x-rays & MRI imaging as outpatient on 2/29/24 showing concern for osseous metastatic disease in thoracic/lumbar/sacral spine as well as extension of soft tissue into the paravertebral soft tissues more prominent on the right side. He was previously ambulating independently without device but now required a RW. Repeat MRI showed multiple findings including diffuse osseous metastatic disease throughout the entire spine; pathologic fx w/ tumor into the epidural space at T8 resulting in thoracic cord compression; tumor extension into the epidural space at L3 resulting in cauda equina compression; pathologic fx w/ tumor in the epidural space at L4 contributing to severe canal stenosis; tumor within the bilateral psoas muscles at L3 and L4; bilateral neural foramen effacement by tumor in L-spine. He underwent a two stage surgical resection with a combined surgical team including Neurosurgery (Dr. Bhaskar Partida) and Plastic Surgery (Dr. Gunnar Campbell). He underwent the following procedures:     ·	Stage 1: T8 VCR (vertebral column resection) T6-T10 fusion for tumor resection, small csf leak primarily repaired  ·	Stage 2: L4 Partial Corpectomy, L2-Pelvis Fusion, Plastics Closure 3/5 HMV drain x3 and 1 Bile bag placed w/ output closely monitored.     He was monitored post-operatively in NSCU on PCA pump, durotomy encountered intraop and primarily repaired up from OR flat and incremental 15 degrees with no positional headaches or sign of CSF leak. Surgical pathology reporting diffuse large B-cell lymphoma. Patient evaluated by PT/OT/PM&R and recommended for acute inpatient rehab. Patient was discharged to [facility] on 3/16/24. (16 Mar 2024 12:07)    ___________________________________________________________________________    SUBJECTIVE/ROS  Patient was seen and evaluated at bedside today.  Reported no overnight events and is in no acute distress.  Tolerated admission process and initial evaluations.  Had a discussion related to treatment plan and expressed understanding.  Eager to participate on the recommended rehabilitation program.  Denies any CP, SOB, YUN, palpitations, fever, chills, body aches, cough, congestion, or any other symptoms at this time.   ___________________________________________________________________________    VITALS  T(C): 36.7 (03-18-24 @ 09:00), Max: 36.9 (03-17-24 @ 19:56)  HR: 73 (03-18-24 @ 09:00) (73 - 73)  BP: 103/67 (03-18-24 @ 09:00) (103/67 - 108/66)  RR: 17 (03-18-24 @ 09:00) (16 - 17)  SpO2: 94% (03-18-24 @ 09:00) (94% - 94%)  ___________________________________________________________________________    LABS                          9.4    9.32  )-----------( 412      ( 18 Mar 2024 05:33 )             28.9       03-18    136  |  100  |  24<H>  ----------------------------<  99  4.1   |  30  |  0.71    Ca    8.5      18 Mar 2024 05:33    TPro  5.1<L>  /  Alb  2.0<L>  /  TBili  0.5  /  DBili  x   /  AST  62<H>  /  ALT  131<H>  /  AlkPhos  127<H>  03-18    ___________________________________________________________________________    MEDICATIONS  (STANDING):  enoxaparin Injectable 40 milliGRAM(s) SubCutaneous <User Schedule>  gabapentin 600 milliGRAM(s) Oral every 8 hours  methocarbamol 750 milliGRAM(s) Oral every 8 hours  multivitamin 1 Tablet(s) Oral daily  polyethylene glycol 3350 17 Gram(s) Oral two times a day  senna 2 Tablet(s) Oral at bedtime  sodium chloride 2 Gram(s) Oral every 12 hours    MEDICATIONS  (PRN):  acetaminophen     Tablet .. 975 milliGRAM(s) Oral every 6 hours PRN Temp greater or equal to 38C (100.4F), Mild Pain (1 - 3)  hydrocortisone 1% Cream 1 Application(s) Topical every 12 hours PRN Itching  oxyCODONE    IR 5 milliGRAM(s) Oral every 4 hours PRN Moderate Pain (4 - 6)  oxyCODONE    IR 10 milliGRAM(s) Oral every 4 hours PRN Severe Pain (7 - 10)    ___________________________________________________________________________    PHYSICAL EXAM:    Physical Exam: Gen - NAD, Comfortable  HEENT - NCAT, EOMI, MMM  Neck - Supple, No limited ROM  Pulm - CTAB, No wheeze, No rhonchi, No crackles  Cardiovascular - RRR, S1S2, No murmurs  Abdomen - Soft, NT/ND, +BS  Extremities - No C/C/E, No calf tenderness  Neuro-     Cognitive - AAOx3     Communication - Fluent, No dysarthria     Motor - See NATALIE Examination below     Sensory - See NATALIE Examination below     Reflexes - No clonus     Tone - normal  Psychiatric - Mood stable, Affect WNL  Skin:   - Upper thoracic surgical incision: covered with Prineo dressing with no erythema, warmth,  nor discharge  - Lower thoracic and lumbosacral surgical incision: covered with Prineo dressing with no erythema, warmth,  nor discharge  - Bia-incisional sites of previous drains healing with no erythema, warmth,  nor discharge  - Stage 2 pressure injuries on sacrum and right shin  ___________________________________________________________________________    NATALIE Examination (3/18/24)    Motor (Key Muscles):              C5      C6      C7      C8      T1      L2      L3      L4      L5      S1  R        5/5     5/5    5/5     5/5    5/5    3/5    3/5    5/5     5/5     5/5  L         5/5    5/5     5/5    5/5    5/5     5/5   3/5     4/5     5/5     5/5      Sensory (Light touch): (0=absent, 1=impaired, 2=normal, NT=not testable)             C1   C2   C3   C4   C5   C6   C7   C8   R         2    2      2      2     2      2     2     2   L         2     2     2      2     2      2     2     2               T1   T2   T3   T4   T5   T6   T7   T8   T9   T10   T11   T12  R         2     2     2     2     2     2     2      2     2      2       2       2   L         2     2     2     2      2     2     2     2      2     2       2       2                L1   L2   L3   L4   L5   S1   S2   S3   S4-5         R         2     2     1    1    1    2     2     2     2   L         2     2     2     2     2     2     2     2     2      Sensory (Pin Prick): (0=absent, 1=impaired, 2=normal, NT=not testable)             C1   C2   C3   C4   C5   C6   C7   C8   R          2    2      2     2     2      2     2     2   L          2     2     2     2      2     2      2     2               T1   T2   T3   T4   T5   T6   T7   T8   T9   T10   T11   T12  R         2     2     2     2     2      2     2     2      2     2       2       1  L         2     2     2      2     2      2     2     2     2     2       2       1               L1   L2   L3   L4   L5   S1   S2   S3   S4-5         R         2     2     2    2     2     2     2     2      2   L         2     2     2     2     2     2     2     2      2    (DAP) Deep Anal Pressure Present  (VAC) Voluntary Anal Contraction Present    ___________________________________________________________________________

## 2024-03-18 NOTE — PROGRESS NOTE ADULT - ASSESSMENT
Mr. Saturnino Solis is a 68-year-old male patient with past medical history of HLD, pre-Diabetes Mellitus, and sinus bradycardia who is admitted for Acute Inpatient Rehabilitation with a multidisciplinary rehab program at [facility] with functional impairments in ADLs and mobility secondary to spinal diffuse large B-cell lymphoma metastasis requiring surgical resection.      Thoracic spinal cord injury secondary to compression by metastasis s/p surgical resection  - T8 AIS D  - MRI:    * diffuse osseous metastatic disease throughout the entire spine    * pathologic fx w/ tumor into the epidural space at T8 resulting in thoracic cord compression    * tumor extension into the epidural space at L3 resulting in cauda equina compression    * pathologic fx w/ tumor in the epidural space at L4 contributing to severe canal stenosis    * tumor within the bilateral psoas muscles at L3 and L4    * bilateral neural foramen effacement by tumor in L-spine.   - Surgical pathology report: Diffuse large B-cell lymphoma  - Spine tumor resection surgeries:    * Stage 1: T8 VCR (vertebral column resection) T6-T10 fusion for tumor resection, small csf leak primarily repaired    * Stage 2: L4 Partial Corpectomy, L2-Pelvis Fusion, Plastics Closure 3/5 HMV drain x3 and 1 Bile bag     * All drains removed    * Spine and fall precautions    * F/U with neurosurgery, plastics, and oncology outpatient  - Cauda equina syndrome  - Paraparesis  - Lower extremity sensory deficits  - Impaired ADLs and mobility  - Need for assistance with personal care  - Start comprehensive rehab program of PT/OT - 3 hours a day, 5 days a week. 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    - Stage IV  - PET CT as outpatient  - [facility] to get back w/ recommendations when appropriate to start chemo, currently needs wound healing (surgery a week ago w/ multiple drains containing sanguinous fluid)  will follow .   - F/u with oncology outpatient    Pain  - tylenol, oxycodone PRN pain  - Robaxin 750mg q8 for spasms  - gabapentin 600mg q8    Pre-diabetes  - Off meds  - Consistent carb diet    Hyponatremia  - Continue sodium tabs  - Trend electrolytes    Sleep  - Melatonin PRN     GI / Bowel  - Senna qHS  - Miralax PRN Daily     / Bladder  - Currently patient voids independently. Check PVRs on admission. SC for > 400ccs    Skin / Pressure injury assessment   * Upper thoracic surgical incision: covered with Prineo dressing with no erythema, warmth,  nor discharge  * Lower thoracic and lumbosacral surgical incision: covered with Prineo dressing with no erythema, warmth,  nor discharge  * Bia-incisional sites of previous drains healing with no erythema, warmth,  nor discharge  * Stage 2 pressure injuries on sacrum and right shin  - Monitor Incisions:  q shift  - Turn q2 hours in bed while awake, air mattress  - nursing to monitor skin qShift    Diet/Dysphagia:  - Diet Consistency: Consistent carb    DVT prophylaxis:   - Lovenox ppx      Outpatient Follow-up:  Bhaskar Partida  Neurosurgery  [address]  [place] [address]  Phone: [phone]  Fax: [phone]  Follow Up Time: 2 weeks    Tobias Chatterjee  Medical Oncology  [address] [place] [address]  Phone: [phone]  Fax: [phone]  Follow Up Time: 1 week    Gunnar Campbell  Plastic Surgery  [address]  [place] [address]  Phone: [phone]  Fax: [phone]  Follow Up Time:      ---------------

## 2024-03-18 NOTE — PROGRESS NOTE ADULT - SUBJECTIVE AND OBJECTIVE BOX
|-------------------------------------------|                       Subjective   |-------------------------------------------|    No events overnight  No new complaints to offer me    |------------------------------------------|                       Objective  |------------------------------------------|    Vital Signs Last 24 Hrs  T(F): 98 (18 Mar 2024 09:00), Max: 98.4 (17 Mar 2024 19:56)  HR: 73 (18 Mar 2024 09:00) (73 - 73)  BP: 103/67 (18 Mar 2024 09:00) (103/67 - 108/66)  RR: 17 (18 Mar 2024 09:00) (16 - 17)  SpO2: 94% (18 Mar 2024 09:00) (94% - 94%)  I&O's Summary    17 Mar 2024 07:01  -  18 Mar 2024 07:00  --------------------------------------------------------  IN: 0 mL / OUT: 200 mL / NET: -200 mL        Examination:   General: NAD, Comfortable  Pulm: CTA BL No Rhonchi Rales or wheezes  Neck: Supple, No JVD  CVS: RRR No rubs murmurs or gallops  Abdomen: Soft, Nontender, Nondistended; No masses or organomegally  Extremities: No calf tenderness, No pitting edema  Labs:                        9.4    9.32  )-----------( 412      ( 18 Mar 2024 05:33 )             28.9       03-18    136  |  100  |  24  ----------------------------<  99  4.1   |  30  |  0.71    Ca    8.5      18 Mar 2024 05:33    TPro  5.1  /  Alb  2.0  /  TBili  0.5  /  DBili  x   /  AST  62  /  ALT  131  /  AlkPhos  127  03-18       |-------------------------------------------------|                  Assessment and plan  |-------------------------------------------------|    68M  HLD, pre-DM, and sinus bradycardia   Admit for Worseing BL Lower extremity paraesthesia   found to have diffuse osseous metastatic disease throughout entire spine, pathological fx with tumor extending into epidural space at T8, tumor extension into L3 epidural space, pathologic fx w/ tumor in the epidural space at L4 contributing to severe canal stenosis; tumor within the bilateral psoas muscles at L3 and L4; bilateral neural foramen effacement by tumor in L-spine. Patient s/p stage 1: T8 VCR (vertebral column resection) T6-T10 fusion for tumor resection, small csf leak primarily repaired, Stage 2 L4 Partial Corpectomy, L2-Pelvis Fusion, Plastics Closure 3/5. Patient now admitted for a multidisciplinary rehab program. 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 Plan to participate in 1 week of Acute Rehab, subsequently follow up with Dr. Chatterjee (onc) in 1 week for treatment planning.    Pre-diabetes  -HbA1C 6.0 on 3/5  -Consistent carb diet  -Outpatient monitoring with PMD    Hyponatremia  -Na 136  -Continue sodium chloride 2g Q12H  -Encourage oral intake  -Monitor BMP, taper salt tablets if able     Anemia  -Likely multifactorial  -s/p 1 unit RBC transfusion intra-op  -H/H stable  -Monitor CBC    History of Sinus Bradycardia   -EKG 3/12 reviewed, shows NSR with HR 72, no acute changes  -Per documentation, "Per PCP records, appears to be in setting of previously being active runner"        DVT PPx  - Lovenox SC

## 2024-03-19 LAB
APPEARANCE UR: ABNORMAL
BACTERIA # UR AUTO: ABNORMAL /HPF
BILIRUB UR-MCNC: NEGATIVE — SIGNIFICANT CHANGE UP
CHROM ANALY INTERPHASE BLD FISH-IMP: SIGNIFICANT CHANGE UP
COLOR SPEC: YELLOW — SIGNIFICANT CHANGE UP
DIFF PNL FLD: ABNORMAL
EPI CELLS # UR: 0 — SIGNIFICANT CHANGE UP
GLUCOSE UR QL: NEGATIVE MG/DL — SIGNIFICANT CHANGE UP
KETONES UR-MCNC: NEGATIVE MG/DL — SIGNIFICANT CHANGE UP
LEUKOCYTE ESTERASE UR-ACNC: ABNORMAL
NITRITE UR-MCNC: POSITIVE
PH UR: 6 — SIGNIFICANT CHANGE UP (ref 5–8)
PROT UR-MCNC: SIGNIFICANT CHANGE UP MG/DL
RBC CASTS # UR COMP ASSIST: 1 /HPF — SIGNIFICANT CHANGE UP (ref 0–4)
SP GR SPEC: 1.02 — SIGNIFICANT CHANGE UP (ref 1–1.03)
UROBILINOGEN FLD QL: 1 MG/DL — SIGNIFICANT CHANGE UP (ref 0.2–1)
WBC UR QL: >50 /HPF — HIGH (ref 0–5)

## 2024-03-19 PROCEDURE — 90791 PSYCH DIAGNOSTIC EVALUATION: CPT

## 2024-03-19 PROCEDURE — 99232 SBSQ HOSP IP/OBS MODERATE 35: CPT

## 2024-03-19 PROCEDURE — 93970 EXTREMITY STUDY: CPT | Mod: 26

## 2024-03-19 RX ORDER — CEFTRIAXONE 500 MG/1
1000 INJECTION, POWDER, FOR SOLUTION INTRAMUSCULAR; INTRAVENOUS EVERY 24 HOURS
Refills: 0 | Status: COMPLETED | OUTPATIENT
Start: 2024-03-19 | End: 2024-03-21

## 2024-03-19 RX ADMIN — OXYCODONE HYDROCHLORIDE 10 MILLIGRAM(S): 5 TABLET ORAL at 09:30

## 2024-03-19 RX ADMIN — SODIUM CHLORIDE 2 GRAM(S): 9 INJECTION INTRAMUSCULAR; INTRAVENOUS; SUBCUTANEOUS at 05:41

## 2024-03-19 RX ADMIN — SODIUM CHLORIDE 2 GRAM(S): 9 INJECTION INTRAMUSCULAR; INTRAVENOUS; SUBCUTANEOUS at 17:24

## 2024-03-19 RX ADMIN — GABAPENTIN 600 MILLIGRAM(S): 400 CAPSULE ORAL at 14:24

## 2024-03-19 RX ADMIN — GABAPENTIN 600 MILLIGRAM(S): 400 CAPSULE ORAL at 05:40

## 2024-03-19 RX ADMIN — OXYCODONE HYDROCHLORIDE 5 MILLIGRAM(S): 5 TABLET ORAL at 21:24

## 2024-03-19 RX ADMIN — CEFTRIAXONE 100 MILLIGRAM(S): 500 INJECTION, POWDER, FOR SOLUTION INTRAMUSCULAR; INTRAVENOUS at 17:24

## 2024-03-19 RX ADMIN — METHOCARBAMOL 750 MILLIGRAM(S): 500 TABLET, FILM COATED ORAL at 05:40

## 2024-03-19 RX ADMIN — Medication 1 TABLET(S): at 12:51

## 2024-03-19 RX ADMIN — METHOCARBAMOL 750 MILLIGRAM(S): 500 TABLET, FILM COATED ORAL at 14:24

## 2024-03-19 RX ADMIN — OXYCODONE HYDROCHLORIDE 5 MILLIGRAM(S): 5 TABLET ORAL at 22:24

## 2024-03-19 RX ADMIN — OXYCODONE HYDROCHLORIDE 10 MILLIGRAM(S): 5 TABLET ORAL at 08:41

## 2024-03-19 RX ADMIN — OXYCODONE HYDROCHLORIDE 10 MILLIGRAM(S): 5 TABLET ORAL at 03:58

## 2024-03-19 RX ADMIN — ENOXAPARIN SODIUM 40 MILLIGRAM(S): 100 INJECTION SUBCUTANEOUS at 17:25

## 2024-03-19 RX ADMIN — OXYCODONE HYDROCHLORIDE 10 MILLIGRAM(S): 5 TABLET ORAL at 13:30

## 2024-03-19 RX ADMIN — POLYETHYLENE GLYCOL 3350 17 GRAM(S): 17 POWDER, FOR SOLUTION ORAL at 05:41

## 2024-03-19 RX ADMIN — SENNA PLUS 2 TABLET(S): 8.6 TABLET ORAL at 21:19

## 2024-03-19 RX ADMIN — METHOCARBAMOL 750 MILLIGRAM(S): 500 TABLET, FILM COATED ORAL at 21:19

## 2024-03-19 RX ADMIN — POLYETHYLENE GLYCOL 3350 17 GRAM(S): 17 POWDER, FOR SOLUTION ORAL at 17:24

## 2024-03-19 RX ADMIN — OXYCODONE HYDROCHLORIDE 10 MILLIGRAM(S): 5 TABLET ORAL at 18:03

## 2024-03-19 RX ADMIN — OXYCODONE HYDROCHLORIDE 10 MILLIGRAM(S): 5 TABLET ORAL at 17:24

## 2024-03-19 RX ADMIN — GABAPENTIN 600 MILLIGRAM(S): 400 CAPSULE ORAL at 21:19

## 2024-03-19 RX ADMIN — OXYCODONE HYDROCHLORIDE 10 MILLIGRAM(S): 5 TABLET ORAL at 12:51

## 2024-03-19 NOTE — CONSULT NOTE ADULT - ASSESSMENT
Patient is seated in wheelchair at bedside. Family/visitors are not present. Patient is alert and oriented to person, place, time, and situation. Speech is fluent and comprehensible. He indicates pain is being managed. Appetite and sleep are good.     Emotional functioning and behavioral history: Mood and affect normal. Patient indicates he is grieving the recent loss of his wife. Overall, he reports "doing ok." He has been utilizing adaptive coping strategies, such as seeking (and providing) comfort to his daughters, reading a [employer]ism book about coping with loss, engaging in prayer, and staying connected to his [place] community. On self-report screening of recent mood, his responses suggest minimal anxiety (JASMIN-7 = 1/21) and minimal depression (PHQ-9 = 2/27), including reduced sense of pleasure, trouble concentrating, and trouble relaxing. He denies ideation, plan, or intent to harm self/other. Thoughts are logical and future-oriented. He denies prior psychiatric history. He denies use of alcohol or substances.     Psychosocial history: He was born and raised in [place]. 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 He is a [employer]  at a [employer] in [place], [place]. He lives with two of his daughters, and has another daughter who lives out of state but is here presently.     Impression: Patient with mild adjustment difficulty associated with recent change in functioning due to health concerns (cancer). Additionally, he is bereaving the recent loss of his wife. He acknowledges use of multiple adaptive coping strategies and has a support network of family and friends/colleagues. He is eager to participate in acute rehabilitation and is hopeful he will make progress over time.     Psychoeducation is provided regarding the rehabilitation process and adaptive coping. Support and encouragement are provided. He expresses appreciation for session.     Plan: Follow-up, supportive psychotherapy to address adjustment and bereavement. Patient in agreement with plan. Neuropsychology remains available through discharge.

## 2024-03-19 NOTE — PROGRESS NOTE ADULT - SUBJECTIVE AND OBJECTIVE BOX
|-------------------------------------------|                       Subjective   |-------------------------------------------|    No events overnight  No new complaints to offer me    |------------------------------------------|                       Objective  |------------------------------------------|    Vital Signs Last 24 Hrs  T(F): 98.5 (19 Mar 2024 08:34), Max: 100.1 (18 Mar 2024 19:56)  HR: 80 (19 Mar 2024 08:34) (77 - 80)  BP: 105/62 (19 Mar 2024 08:34) (105/62 - 114/67)  RR: 16 (19 Mar 2024 08:34) (16 - 18)  SpO2: 95% (19 Mar 2024 08:34) (95% - 97%)  I&O's Summary      Examination:   General: NAD, Comfortable  Pulm: CTA BL No Rhonchi Rales or wheezes  Neck: Supple, No JVD  CVS: RRR No rubs murmurs or gallops  Abdomen: Soft, Nontender, Nondistended; No masses or organomegally  Extremities: No calf tenderness, No pitting edema  Labs:                        9.7    9.70  )-----------( 408      ( 18 Mar 2024 20:05 )             29.8       03-18    132  |  97  |  25  ----------------------------<  130  4.0   |  30  |  0.74    Ca    8.4      18 Mar 2024 20:05    TPro  5.1  /  Alb  2.0  /  TBili  0.5  /  DBili  x   /  AST  62  /  ALT  131  /  AlkPhos  127  03-18        Lactate, Blood: 1.3 mmol/L (03-18 @ 20:05)    |-------------------------------------------------|                  Assessment and plan  |-------------------------------------------------|      68M  HLD, pre-DM, and sinus bradycardia   Admit for Worseing BL Lower extremity paraesthesia   found to have diffuse osseous metastatic disease throughout entire spine, pathological fx with tumor extending into epidural space at T8, tumor extension into L3 epidural space, pathologic fx w/ tumor in the epidural space at L4 contributing to severe canal stenosis; tumor within the bilateral psoas muscles at L3 and L4; bilateral neural foramen effacement by tumor in L-spine. Patient s/p stage 1: T8 VCR (vertebral column resection) T6-T10 fusion for tumor resection, small csf leak primarily repaired, Stage 2 L4 Partial Corpectomy, L2-Pelvis Fusion, Plastics Closure 3/5. Patient now admitted for a multidisciplinary rehab program. 03-16-24 @ 12:10    Fever  100.1 measured night of Mar18  Procal and lactate low normal. RVP neg. declined CXR  UA still pending.   Mar19 assymptomatic and stable. will cont to watch. 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 Plan to participate in 1 week of Acute Rehab, subsequently follow up with Dr. Chatterjee (onc) in 1 week for treatment planning.        Pre-diabetes  -HbA1C 6.0 on 3/5  -Consistent carb diet  -Outpatient monitoring with PMD    Hyponatremia  -Na 136  -Continue sodium chloride 2g Q12H  -Encourage oral intake  -Monitor BMP, taper salt tablets if able     Anemia  -Likely multifactorial  -s/p 1 unit RBC transfusion intra-op  -H/H stable  -Monitor CBC    History of Sinus Bradycardia   -EKG 3/12 reviewed, shows NSR with HR 72, no acute changes  -Per documentation, "Per PCP records, appears to be in setting of previously being active runner"        DVT PPx  - Lovenox SC

## 2024-03-19 NOTE — PROVIDER CONTACT NOTE (OTHER) - ASSESSMENT
pt with BLE swelling, ultrasound ordered. Call received from radiologist with positive dvt to b/l LE soleal vein. VSS

## 2024-03-19 NOTE — PROVIDER CONTACT NOTE (OTHER) - SITUATION
pt with BLE swelling, ultrasound ordered. Call received from radiologist with positive dvt to b/l LE soleal vein.
Pt is A&Ox4, VSS except elevated temp of 99.9 oral, 100.1 rectal. Pt asymptomatic, denies chills and aches. MD notified

## 2024-03-19 NOTE — PROGRESS NOTE ADULT - SUBJECTIVE AND OBJECTIVE BOX
HPI:  Mr. Saturnino Solis is a 68-year-old male patient with past medical history of HLD, pre-Diabetes Mellitus, and sinus bradycardia who presented to [facility] on 3/5 with a history of progressively worsening lower extremity numbness/paresthesias and weakness with onset on Jan 2024. She underwent x-rays & MRI imaging as outpatient on 2/29/24 showing concern for osseous metastatic disease in thoracic/lumbar/sacral spine as well as extension of soft tissue into the paravertebral soft tissues more prominent on the right side. He was previously ambulating independently without device but now required a RW. Repeat MRI showed multiple findings including diffuse osseous metastatic disease throughout the entire spine; pathologic fx w/ tumor into the epidural space at T8 resulting in thoracic cord compression; tumor extension into the epidural space at L3 resulting in cauda equina compression; pathologic fx w/ tumor in the epidural space at L4 contributing to severe canal stenosis; tumor within the bilateral psoas muscles at L3 and L4; bilateral neural foramen effacement by tumor in L-spine. He underwent a two stage surgical resection with a combined surgical team including Neurosurgery (Dr. Bhaskar Partida) and Plastic Surgery (Dr. Gunnar Campbell). He underwent the following procedures:     ·	Stage 1: T8 VCR (vertebral column resection) T6-T10 fusion for tumor resection, small csf leak primarily repaired  ·	Stage 2: L4 Partial Corpectomy, L2-Pelvis Fusion, Plastics Closure 3/5 HMV drain x3 and 1 Bile bag placed w/ output closely monitored.     He was monitored post-operatively in NSCU on PCA pump, durotomy encountered intraop and primarily repaired up from OR flat and incremental 15 degrees with no positional headaches or sign of CSF leak. Surgical pathology reporting diffuse large B-cell lymphoma. Patient evaluated by PT/OT/PM&R and recommended for acute inpatient rehab. Patient was discharged to [facility] on 3/16/24. (16 Mar 2024 12:07)    ___________________________________________________________________________    SUBJECTIVE/ROS  Patient was seen and evaluated at bedside today.  Reported no overnight events and is in no acute distress.  Expressed some sedation with pain medications but that it is tolerable.  Psychology consulted for assessment given patient is dealing from grief from family passing.  Case to be discussed at Interdisciplinary Team meeting later today.  A tentative discharge date will be determined per scheduled discussion.  Patient is motivated to participate on the recommended rehabilitation program.  Denies any CP, SOB, YUN, palpitations, fever, chills, body aches, cough, congestion, or any other symptoms at this time.   ___________________________________________________________________________    Vital Signs Last 24 Hrs  T(C): 36.9 (19 Mar 2024 08:34), Max: 37.8 (18 Mar 2024 19:56)  T(F): 98.5 (19 Mar 2024 08:34), Max: 100.1 (18 Mar 2024 19:56)  HR: 80 (19 Mar 2024 08:34) (77 - 80)  BP: 105/62 (19 Mar 2024 08:34) (105/62 - 114/67)  RR: 16 (19 Mar 2024 08:34) (16 - 18)  SpO2: 95% (19 Mar 2024 08:34) (95% - 97%)    ___________________________________________________________________________    LABS                          9.4    9.32  )-----------( 412      ( 18 Mar 2024 05:33 )             28.9       03-18    136  |  100  |  24<H>  ----------------------------<  99  4.1   |  30  |  0.71    Ca    8.5      18 Mar 2024 05:33    TPro  5.1<L>  /  Alb  2.0<L>  /  TBili  0.5  /  DBili  x   /  AST  62<H>  /  ALT  131<H>  /  AlkPhos  127<H>  03-18    ___________________________________________________________________________    MEDICATIONS  (STANDING):  enoxaparin Injectable 40 milliGRAM(s) SubCutaneous <User Schedule>  gabapentin 600 milliGRAM(s) Oral every 8 hours  methocarbamol 750 milliGRAM(s) Oral every 8 hours  multivitamin 1 Tablet(s) Oral daily  polyethylene glycol 3350 17 Gram(s) Oral two times a day  senna 2 Tablet(s) Oral at bedtime  sodium chloride 2 Gram(s) Oral every 12 hours    MEDICATIONS  (PRN):  acetaminophen     Tablet .. 975 milliGRAM(s) Oral every 6 hours PRN Temp greater or equal to 38C (100.4F), Mild Pain (1 - 3)  hydrocortisone 1% Cream 1 Application(s) Topical every 12 hours PRN Itching  oxyCODONE    IR 5 milliGRAM(s) Oral every 4 hours PRN Moderate Pain (4 - 6)  oxyCODONE    IR 10 milliGRAM(s) Oral every 4 hours PRN Severe Pain (7 - 10)    ___________________________________________________________________________    PHYSICAL EXAM:    Physical Exam: Gen - NAD, Comfortable  HEENT - NCAT, EOMI, MMM  Neck - Supple, No limited ROM  Pulm - CTAB, No wheeze, No rhonchi, No crackles  Cardiovascular - RRR, S1S2, No murmurs  Abdomen - Soft, NT/ND, +BS  Extremities - No C/C/E, No calf tenderness  Neuro-     Cognitive - AAOx3     Communication - Fluent, No dysarthria     Motor - See NATALIE Examination below     Sensory - See NATALIE Examination below     Reflexes - No clonus     Tone - normal  Psychiatric - Mood stable, Affect WNL  Skin:   - Upper thoracic surgical incision: covered with Prineo dressing with no erythema, warmth,  nor discharge  - Lower thoracic and lumbosacral surgical incision: covered with Prineo dressing with no erythema, warmth,  nor discharge  - Bia-incisional sites of previous drains healing with no erythema, warmth,  nor discharge  - Stage 2 pressure injuries on sacrum and right shin  ___________________________________________________________________________    NATALIE Examination (3/18/24)    Motor (Key Muscles):              C5      C6      C7      C8      T1      L2      L3      L4      L5      S1  R        5/5     5/5    5/5     5/5    5/5    3/5    3/5    5/5     5/5     5/5  L         5/5    5/5     5/5    5/5    5/5     5/5   3/5     4/5     5/5     5/5      Sensory (Light touch): (0=absent, 1=impaired, 2=normal, NT=not testable)             C1   C2   C3   C4   C5   C6   C7   C8   R         2    2      2      2     2      2     2     2   L         2     2     2      2     2      2     2     2               T1   T2   T3   T4   T5   T6   T7   T8   T9   T10   T11   T12  R         2     2     2     2     2     2     2      2     2      2       2       2   L         2     2     2     2      2     2     2     2      2     2       2       2                L1   L2   L3   L4   L5   S1   S2   S3   S4-5         R         2     2     1    1    1    2     2     2     2   L         2     2     2     2     2     2     2     2     2      Sensory (Pin Prick): (0=absent, 1=impaired, 2=normal, NT=not testable)             C1   C2   C3   C4   C5   C6   C7   C8   R          2    2      2     2     2      2     2     2   L          2     2     2     2      2     2      2     2               T1   T2   T3   T4   T5   T6   T7   T8   T9   T10   T11   T12  R         2     2     2     2     2      2     2     2      2     2       2       1  L         2     2     2      2     2      2     2     2     2     2       2       1               L1   L2   L3   L4   L5   S1   S2   S3   S4-5         R         2     2     2    2     2     2     2     2      2   L         2     2     2     2     2     2     2     2      2    (DAP) Deep Anal Pressure Present  (VAC) Voluntary Anal Contraction Present    ___________________________________________________________________________

## 2024-03-19 NOTE — PROGRESS NOTE ADULT - ASSESSMENT
Mr. Saturnino Solis is a 68-year-old male patient with past medical history of HLD, pre-Diabetes Mellitus, and sinus bradycardia who is admitted for Acute Inpatient Rehabilitation with a multidisciplinary rehab program at [facility] with functional impairments in ADLs and mobility secondary to spinal diffuse large B-cell lymphoma metastasis requiring surgical resection.      Thoracic spinal cord injury secondary to compression by metastasis s/p surgical resection  - T8 AIS D  - MRI:    * diffuse osseous metastatic disease throughout the entire spine    * pathologic fx w/ tumor into the epidural space at T8 resulting in thoracic cord compression    * tumor extension into the epidural space at L3 resulting in cauda equina compression    * pathologic fx w/ tumor in the epidural space at L4 contributing to severe canal stenosis    * tumor within the bilateral psoas muscles at L3 and L4    * bilateral neural foramen effacement by tumor in L-spine.   - Surgical pathology report: Diffuse large B-cell lymphoma  - Spine tumor resection surgeries:    * Stage 1: T8 VCR (vertebral column resection) T6-T10 fusion for tumor resection, small csf leak primarily repaired    * Stage 2: L4 Partial Corpectomy, L2-Pelvis Fusion, Plastics Closure 3/5 HMV drain x3 and 1 Bile bag     * All drains removed    * Spine and fall precautions    * F/U with neurosurgery, plastics, and oncology outpatient  - Cauda equina syndrome  - Paraparesis  - Lower extremity sensory deficits  - Impaired ADLs and mobility  - Need for assistance with personal care  - Start comprehensive rehab program of PT/OT - 3 hours a day, 5 days a week. 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    - Stage IV  - PET CT as outpatient  - [facility] [place] get [place] w/ recommendations when appropriate to start chemo, currently needs wound healing (surgery a week ago w/ multiple drains containing sanguinous fluid)  will follow .   - F/u with oncology outpatient    Pain  - tylenol, oxycodone PRN pain  - Robaxin 750mg q8 for spasms  - gabapentin 600mg q8    Pre-diabetes  - Off meds  - Consistent carb diet    Hyponatremia  - Continue sodium tabs  - Trend electrolytes    Sleep  - Melatonin PRN     GI / Bowel  - Senna qHS  - Miralax PRN Daily     / Bladder  - Currently patient voids independently. Check PVRs on admission. SC for > 400ccs    Skin / Pressure injury assessment   * Upper thoracic surgical incision: covered with Prineo dressing with no erythema, warmth,  nor discharge  * Lower thoracic and lumbosacral surgical incision: covered with Prineo dressing with no erythema, warmth,  nor discharge  * Bia-incisional sites of previous drains healing with no erythema, warmth,  nor discharge  * Stage 2 pressure injuries on sacrum and right shin  - Monitor Incisions:  q shift  - Turn q2 hours in bed while awake, air mattress  - nursing to monitor skin qShift    Diet/Dysphagia:  - Diet Consistency: Consistent carb    DVT prophylaxis:   - Lovenox ppx      Outpatient Follow-up:  Bhaskar Partida  Neurosurgery  [address]  [place] [address]  Phone: [phone]  Fax: [phone]  Follow Up Time: 2 weeks    Tobias Chatterjee  Medical Oncology  [address] [place] [address]  Phone: [phone]  Fax: [phone]  Follow Up Time: 1 week    Gunnar Campbell  Plastic Surgery  [address] [facility] [address] [facility] [address]  [place] [address]  Phone: [phone]  Fax: [phone]  Follow Up Time:      ---------------

## 2024-03-20 DIAGNOSIS — I82.4Z9 ACUTE EMBOLISM AND THROMBOSIS OF UNSPECIFIED DEEP VEINS OF UNSPECIFIED DISTAL LOWER EXTREMITY: ICD-10-CM

## 2024-03-20 DIAGNOSIS — C85.90 NON-HODGKIN LYMPHOMA, UNSPECIFIED, UNSPECIFIED SITE: ICD-10-CM

## 2024-03-20 PROCEDURE — 99223 1ST HOSP IP/OBS HIGH 75: CPT

## 2024-03-20 PROCEDURE — 99232 SBSQ HOSP IP/OBS MODERATE 35: CPT

## 2024-03-20 RX ORDER — HYDROMORPHONE HYDROCHLORIDE 2 MG/ML
2 INJECTION INTRAMUSCULAR; INTRAVENOUS; SUBCUTANEOUS EVERY 6 HOURS
Refills: 0 | Status: DISCONTINUED | OUTPATIENT
Start: 2024-03-20 | End: 2024-03-22

## 2024-03-20 RX ORDER — OXYCODONE HYDROCHLORIDE 5 MG/1
10 TABLET ORAL EVERY 4 HOURS
Refills: 0 | Status: DISCONTINUED | OUTPATIENT
Start: 2024-03-20 | End: 2024-03-22

## 2024-03-20 RX ORDER — TRAMADOL HYDROCHLORIDE 50 MG/1
50 TABLET ORAL EVERY 6 HOURS
Refills: 0 | Status: DISCONTINUED | OUTPATIENT
Start: 2024-03-20 | End: 2024-03-22

## 2024-03-20 RX ADMIN — OXYCODONE HYDROCHLORIDE 10 MILLIGRAM(S): 5 TABLET ORAL at 19:35

## 2024-03-20 RX ADMIN — SODIUM CHLORIDE 2 GRAM(S): 9 INJECTION INTRAMUSCULAR; INTRAVENOUS; SUBCUTANEOUS at 17:07

## 2024-03-20 RX ADMIN — GABAPENTIN 600 MILLIGRAM(S): 400 CAPSULE ORAL at 21:06

## 2024-03-20 RX ADMIN — METHOCARBAMOL 750 MILLIGRAM(S): 500 TABLET, FILM COATED ORAL at 21:06

## 2024-03-20 RX ADMIN — Medication 1 TABLET(S): at 12:48

## 2024-03-20 RX ADMIN — HYDROMORPHONE HYDROCHLORIDE 2 MILLIGRAM(S): 2 INJECTION INTRAMUSCULAR; INTRAVENOUS; SUBCUTANEOUS at 23:02

## 2024-03-20 RX ADMIN — ENOXAPARIN SODIUM 40 MILLIGRAM(S): 100 INJECTION SUBCUTANEOUS at 17:06

## 2024-03-20 RX ADMIN — OXYCODONE HYDROCHLORIDE 10 MILLIGRAM(S): 5 TABLET ORAL at 20:30

## 2024-03-20 RX ADMIN — SODIUM CHLORIDE 2 GRAM(S): 9 INJECTION INTRAMUSCULAR; INTRAVENOUS; SUBCUTANEOUS at 05:47

## 2024-03-20 RX ADMIN — METHOCARBAMOL 750 MILLIGRAM(S): 500 TABLET, FILM COATED ORAL at 14:26

## 2024-03-20 RX ADMIN — OXYCODONE HYDROCHLORIDE 10 MILLIGRAM(S): 5 TABLET ORAL at 08:14

## 2024-03-20 RX ADMIN — OXYCODONE HYDROCHLORIDE 10 MILLIGRAM(S): 5 TABLET ORAL at 02:20

## 2024-03-20 RX ADMIN — HYDROMORPHONE HYDROCHLORIDE 2 MILLIGRAM(S): 2 INJECTION INTRAMUSCULAR; INTRAVENOUS; SUBCUTANEOUS at 12:47

## 2024-03-20 RX ADMIN — GABAPENTIN 600 MILLIGRAM(S): 400 CAPSULE ORAL at 05:47

## 2024-03-20 RX ADMIN — POLYETHYLENE GLYCOL 3350 17 GRAM(S): 17 POWDER, FOR SOLUTION ORAL at 05:47

## 2024-03-20 RX ADMIN — OXYCODONE HYDROCHLORIDE 10 MILLIGRAM(S): 5 TABLET ORAL at 09:01

## 2024-03-20 RX ADMIN — METHOCARBAMOL 750 MILLIGRAM(S): 500 TABLET, FILM COATED ORAL at 05:46

## 2024-03-20 RX ADMIN — CEFTRIAXONE 100 MILLIGRAM(S): 500 INJECTION, POWDER, FOR SOLUTION INTRAMUSCULAR; INTRAVENOUS at 16:54

## 2024-03-20 RX ADMIN — HYDROMORPHONE HYDROCHLORIDE 2 MILLIGRAM(S): 2 INJECTION INTRAMUSCULAR; INTRAVENOUS; SUBCUTANEOUS at 14:29

## 2024-03-20 RX ADMIN — GABAPENTIN 600 MILLIGRAM(S): 400 CAPSULE ORAL at 14:26

## 2024-03-20 RX ADMIN — HYDROMORPHONE HYDROCHLORIDE 2 MILLIGRAM(S): 2 INJECTION INTRAMUSCULAR; INTRAVENOUS; SUBCUTANEOUS at 17:05

## 2024-03-20 RX ADMIN — OXYCODONE HYDROCHLORIDE 10 MILLIGRAM(S): 5 TABLET ORAL at 03:20

## 2024-03-20 RX ADMIN — HYDROMORPHONE HYDROCHLORIDE 2 MILLIGRAM(S): 2 INJECTION INTRAMUSCULAR; INTRAVENOUS; SUBCUTANEOUS at 17:54

## 2024-03-20 NOTE — CONSULT NOTE ADULT - PROBLEM SELECTOR RECOMMENDATION 2
< from: US Duplex Venous Lower Ext Complete, Bilateral (03.19.24 @ 13:58) >  IMPRESSION:  Acute deep venous thrombosis: below the knee.  Bilateral soleal vein thrombosis.  Weighing risks of bleeding (recent spine surgery) vs. clotting risks in decision making-with distal thromboses, can consider continuing current prophylactic dosing LMWH, with repeat LE venous duplex study 1 week (or earlier as may be indicated clinically). If any progression of clot(s)-->therapeutic anticoagulation if cleared by neurosurgery to do so.

## 2024-03-20 NOTE — CONSULT NOTE ADULT - SUBJECTIVE AND OBJECTIVE BOX
Patient seen alone at bedside for 30-minute psychology consultation. Neuropsychologist is introduced as a member of the rehabilitation team and the purpose of the session is explained. Patient agrees to participate.     Per patient, in January 2024, he began experiencing difficulty walking and consulted an orthopedist, followed by physical therapy. When his walking did not improve, he sought further medical consultation and indicates MRI revealed the presence of tumors in his spine and elsewhere in his body. He was supposed to follow-up with oncology, when his wife's health started declining from her own cancer. Since then, his wife has passed away and he had surgery on his spine. He is here for acute rehabilitation services. He intends to follow-up with oncology as an outpatient.     Medical records are reviewed. Per records: Saturnino Solis is a 68-year-old male patient with past medical history of HLD, pre-Diabetes Mellitus, and sinus bradycardia who presented to [facility] on 3/5 with a history of progressively worsening lower extremity numbness/paresthesias and weakness with onset on Jan 2024. She underwent x-rays & MRI imaging as outpatient on 2/29/24 showing concern for osseous metastatic disease in thoracic/lumbar/sacral spine as well as extension of soft tissue into the paravertebral soft tissues more prominent on the right side. He was previously ambulating independently without device but now required a RW. Repeat MRI showed multiple findings including diffuse osseous metastatic disease throughout the entire spine; pathologic fx w/ tumor into the epidural space at T8 resulting in thoracic cord compression; tumor extension into the epidural space at L3 resulting in cauda equina compression; pathologic fx w/ tumor in the epidural space at L4 contributing to severe canal stenosis; tumor within the bilateral psoas muscles at L3 and L4; bilateral neural foramen effacement by tumor in L-spine. He underwent a two stage surgical resection with a combined surgical team including Neurosurgery (Dr. Bhaskar Partida) and Plastic Surgery (Dr. Gunnar Campbell). He underwent the following procedures:     Stage 1: T8 VCR (vertebral column resection) T6-T10 fusion for tumor resection, small csf leak primarily repaired  Stage 2: L4 Partial Corpectomy, L2-Pelvis Fusion, Plastics Closure 3/5 HMV drain x3 and 1 Bile bag placed w/ output closely monitored.     He was monitored post-operatively in NSCU on PCA pump, durotomy encountered intraop and primarily repaired up from OR flat and incremental 15 degrees with no positional headaches or sign of CSF leak. Surgical pathology reporting diffuse large B-cell lymphoma. Patient evaluated by PT/OT/PM&R and recommended for acute inpatient rehab. Patient was discharged to [facility] on 3/16/24. 
HPI   68 year old male with past medical history of HLD, pre-DM, and sinus bradycardia who presented to [facility] on 3/5 with worsening LE numbness/paresthesias and weakness since Jan 2024, outpatient x-rays & MRI on 2/29/24 showing concern for osseous metastatic disease in thoracic/lumbar/sacral spine as well as extension of soft tissue into the paravertebral soft tissues more prominent on the right side. He was previously ambulating independently without device but now required a RW. Repeat MRI showed multiple findings including diffuse osseous metastatic disease throughout the entire spine; pathologic fx w/ tumor into the epidural space at T8 resulting in thoracic cord compression; tumor extension into the epidural space at L3 resulting in cauda equina compression; pathologic fx w/ tumor in the epidural space at L4 contributing to severe canal stenosis; tumor within the bilateral psoas muscles at L3 and L4; bilateral neural foramen effacement by tumor in L-spine. Patient s/p stage 1: T8 VCR (vertebral column resection) T6-T10 fusion for tumor resection, small csf leak primarily repaired, Stage 2 L4 Partial Corpectomy, L2-Pelvis Fusion, Plastics Closure 3/5 HMV drain x3 and 1 Bile bag placed w/ output closely monitored. Post operatively monitored in NSCU on PCA pump, durotomy encountered intraop and primarily repaired up from OR flat and incremental 15 degrees with no positional headaches or sign of CSF leak. Patient evaluated by PT/OT/PM&R and recommended for acute inpatient rehab. Patient is medically stable for discharge to [facility] on 3/16.    Patient seen and examined at bedside. Endorses back pain and LE weakness unchanged. Rest of ROS is negative.    ALLERGIES:  latex (Rash)  No Known Drug Allergies    PAST MEDICAL HISTORY:  HLD (hyperlipidemia)   Prediabetes   Sinus bradycardia.     PAST SURGICAL HISTORY:  History of appendectomy   History of arthroscopic knee surgery.     FAMILY HISTORY:  Father  Still living? Unknown  Family history of cardiac pacemaker, Age at diagnosis: Age Unknown    Mother  Still living? Unknown  FH: arthritis, Age at diagnosis: Age Unknown  FH: dementia, Age at diagnosis: Age Unknown.    Social History  Smoking - Denied  EtOH - Denied   Drugs - Denied     MEDICATIONS  (STANDING):  enoxaparin Injectable 40 milliGRAM(s) SubCutaneous <User Schedule>  gabapentin 600 milliGRAM(s) Oral every 8 hours  methocarbamol 750 milliGRAM(s) Oral every 8 hours  multivitamin 1 Tablet(s) Oral daily  polyethylene glycol 3350 17 Gram(s) Oral two times a day  senna 2 Tablet(s) Oral at bedtime  sodium chloride 2 Gram(s) Oral every 12 hours    MEDICATIONS  (PRN):  acetaminophen     Tablet .. 975 milliGRAM(s) Oral every 6 hours PRN Temp greater or equal to 38C (100.4F), Mild Pain (1 - 3)  oxyCODONE    IR 5 milliGRAM(s) Oral every 4 hours PRN Moderate Pain (4 - 6)  oxyCODONE    IR 10 milliGRAM(s) Oral every 4 hours PRN Severe Pain (7 - 10)    Vital Signs Last 24 Hrs  T(F): 98.7 (17 Mar 2024 07:45), Max: 99.7 (16 Mar 2024 19:58)  HR: 70 (17 Mar 2024 07:45) (69 - 75)  BP: 101/66 (17 Mar 2024 07:45) (101/66 - 109/66)  RR: 17 (17 Mar 2024 07:45) (17 - 17)  SpO2: 94% (17 Mar 2024 07:45) (93% - 95%)  I&O's Summary    16 Mar 2024 07:01  -  17 Mar 2024 07:00  --------------------------------------------------------  IN: 0 mL / OUT: 200 mL / NET: -200 mL    BMI (kg/m2): 19.6 (03-16-24 @ 13:57)    PHYSICAL EXAM:  GENERAL: NAD, frail   HEENT: NCAT, dry mucous membrane  CHEST/LUNG: Clear to percussion bilaterally; No rales, rhonchi, wheezing  HEART: Regular rate and rhythm; No murmurs  ABDOMEN: Soft, Nontender, Nondistended; Bowel sounds present  MUSCULOSKELETAL/EXTREMITIES:  2+ Peripheral Pulses, No LE edema  Thoracic/lumbar incision healing well, no redness/discharge   PSYCH: Appropriate affect  NEURO: Alert & Oriented x 3    I personally reviewed the below data/images/labs:    LABS:                        9.7    10.31 )-----------( 438      ( 17 Mar 2024 05:59 )             30.7       03-17    136  |  99  |  21  ----------------------------<  91  3.8   |  31  |  0.68    Ca    8.5      17 Mar 2024 05:59    TPro  5.3  /  Alb  2.0  /  TBili  0.5  /  DBili  x   /  AST  69  /  ALT  138  /  AlkPhos  136  03-17      03-13 Chol 151 mg/dL LDL -- HDL 27 mg/dL Trig 127 mg/dL    Urinalysis Basic - ( 17 Mar 2024 05:59 )    Color: x / Appearance: x / SG: x / pH: x  Gluc: 91 mg/dL / Ketone: x  / Bili: x / Urobili: x   Blood: x / Protein: x / Nitrite: x   Leuk Esterase: x / RBC: x / WBC x   Sq Epi: x / Non Sq Epi: x / Bacteria: x  TTE W or WO Ultrasound Enhancing Agent (03.08.24 @ 07:25) >   1. Left ventricular cavity is normal in size. Left ventricular wall thickness is normal. Left ventricular systolic function is normal with an ejection fraction of 71 % by Putnam's method of disks. There are no regional wall motion abnormalities seen.   2. Left ventricular global longitudinal strain is -24.4 % which is normal (< -18%). Images were acquired on a [employer] ultrasound system and processed using [employer] strain analysis software with a heart rate of 75 bpm and a blood pressure of 117/58 mmHg.   3. Normal left ventricular diastolic function, with normal filling pressure.   4. Normal right ventricular cavity size, with wall thickness, and normal systolic function.   5. The left atrium is mildly dilated.   6. No significant valvular disease.   7. No pericardial effusion seen.   8. No prior echocardiogram is available for comparison.    CT L Spine 3D Reconstruct w/ Workstation (03.06.24 @ 11:38) >  Status post instrumented pelvic lumbar pelvic spinal fusion as above.   Pathologic moderate compression deformity of the L4 vertebral body,   unchanged. Additional area of metastatic disease within the right   hemisacrum is better seen on previous MRI.    CT Thoracic Spine No Cont (03.06.24 @ 11:37)     IMPRESSION:  1.  THORACIC SPINE:   T8 corpectomy with placement of a spacing device.   T6-T10 posterior stabilization. Routine postoperative appearance    2.  LUMBAR SPINE:   L2-S2 posterior stabilization. Routine postoperative   appearance    Consultant(s) Notes Reviewed:   Care Discused with Consultants/Other Providers:  Imaging Personally Reviewed:       
SATURNINO SOLIS  68y  Male  Admitting: JAIRO Florentino    HPI:  Mr. Saturnino Solis is a 68-year-old male patient with past medical history of HLD, pre-Diabetes Mellitus, and sinus bradycardia who presented to [facility] on 3/5 with a history of progressively worsening lower extremity numbness/paresthesias and weakness with onset Jan 2024. He underwent x-rays & MRI imaging as outpatient on 2/29/24 showing concern for osseous metastatic disease in thoracic/lumbar/sacral spine as well as extension of soft tissue into the paravertebral soft tissues more prominent on the right side. He was previously ambulating independently without device but now required a RW. Repeat MRI showed multiple findings including diffuse osseous metastatic disease throughout the entire spine; pathologic fx w/ tumor into the epidural space at T8 resulting in thoracic cord compression; tumor extension into the epidural space at L3 resulting in cauda equina compression; pathologic fx w/ tumor in the epidural space at L4 contributing to severe canal stenosis; tumor within the bilateral psoas muscles at L3 and L4; bilateral neural foramen effacement by tumor in L-spine. He underwent a two stage surgical resection with a combined surgical team including Neurosurgery (Dr. Bhaskar Partida) and Plastic Surgery (Dr. Gunnar Campbell). He underwent the following procedures:     ·	Stage 1: T8 VCR (vertebral column resection) T6-T10 fusion for tumor resection, small csf leak primarily repaired  ·	Stage 2: L4 Partial Corpectomy, L2-Pelvis Fusion, Plastics Closure 3/5 HMV drain x3 and 1 Bile bag placed w/ output closely monitored.     He was monitored post-operatively in NSCU on PCA pump, durotomy encountered intraop and primarily repaired up from OR flat and incremental 15 degrees with no positional headaches or sign of CSF leak. Surgical pathology reporting diffuse large B-cell lymphoma. Patient evaluated by PT/OT/PM&R and recommended for acute inpatient rehab. Patient was discharged to [facility] on 3/16/24. (16 Mar 2024 12:07)  Oncology consulted for recent DLBCL diagnosis.    PAST MEDICAL & SURGICAL HISTORY:  HLD (hyperlipidemia)      Prediabetes      Sinus bradycardia      History of appendectomy      History of arthroscopic knee surgery      MEDICATIONS  (STANDING):  cefTRIAXone   IVPB 1000 milliGRAM(s) IV Intermittent every 24 hours  enoxaparin Injectable 40 milliGRAM(s) SubCutaneous <User Schedule>  gabapentin 600 milliGRAM(s) Oral every 8 hours  methocarbamol 750 milliGRAM(s) Oral every 8 hours  multivitamin 1 Tablet(s) Oral daily  polyethylene glycol 3350 17 Gram(s) Oral two times a day  senna 2 Tablet(s) Oral at bedtime  sodium chloride 2 Gram(s) Oral every 12 hours    MEDICATIONS  (PRN):  acetaminophen     Tablet .. 975 milliGRAM(s) Oral every 6 hours PRN Temp greater or equal to 38C (100.4F), Mild Pain (1 - 3)  hydrocortisone 1% Cream 1 Application(s) Topical every 12 hours PRN Itching  oxyCODONE    IR 10 milliGRAM(s) Oral every 4 hours PRN Severe Pain (7 - 10)  oxyCODONE    IR 5 milliGRAM(s) Oral every 4 hours PRN Moderate Pain (4 - 6)    Allergies    latex (Rash)  No Known Drug Allergies    FAMILY HISTORY:  FH: dementia (Mother)    FH: arthritis (Mother)    Family history of cardiac pacemaker (Father)        SOCIAL HISTORY: No EtOH, no tobacco    REVIEW OF SYSTEMS:    CONSTITUTIONAL: chills  EYES/ENT: No visual changes;  No vertigo or throat pain   NECK: No pain or stiffness  RESPIRATORY: No hemoptysis; No shortness of breath  CARDIOVASCULAR: No chest pain or palpitations  GASTROINTESTINAL: No hematemesis; No melena or hematochezia.  GENITOURINARY: No hematuria  NEUROLOGICAL: +weakness  SKIN: No itching   All other review of systems is negative unless indicated above.    Height (cm): 165.1 (03-19 @ 10:44)  Weight (kg): 53.5 (03-19 @ 10:44)  BMI (kg/m2): 19.6 (03-19 @ 10:44)  BSA (m2): 1.58 (03-19 @ 10:44)    T(F): 99.3 (03-20-24 @ 08:40), Max: 99.3 (03-20-24 @ 08:40)  HR: 63 (03-20-24 @ 08:40)  BP: 110/66 (03-20-24 @ 08:40)  RR: 16 (03-20-24 @ 08:40)  SpO2: 94% (03-20-24 @ 08:40)      GENERAL: NAD, well-developed  HEAD:  Atraumatic, Normocephalic  EYES: EOMI, PERRLA, conjunctiva and sclera clear  NECK: Supple, No JVD  CHEST/LUNG: Clear to auscultation ant.; No wheeze  HEART: Regular rate and rhythm; No murmurs, rubs, or gallops  ABDOMEN: Soft, Nontender, Nondistended; Bowel sounds present  EXTREMITIES:  no calf tenderness  NEUROLOGY: awake, alert  SKIN: No rashes on skin ant.      Labs:             9.7    9.70  )-----------( 408      ( 03-18 @ 20:05 )             29.8                9.4    9.32  )-----------( 412      ( 03-18 @ 05:33 )             28.9       03-18    132<L>  |  97  |  25<H>  ----------------------------<  130<H>  4.0   |  30  |  0.74    Ca    8.4      18 Mar 2024 20:05    Culture - Blood (collected 18 Mar 2024 20:10)  Source: .Blood Blood-Peripheral  Preliminary Report (20 Mar 2024 03:01):    No growth at 24 hours    Culture - Blood (collected 18 Mar 2024 20:05)  Source: .Blood Blood-Peripheral  Preliminary Report (20 Mar 2024 03:01):    No growth at 24 hours  Final Diagnosis   1&2. Spine tumor:   Diffuse large B-cell lymphoma, NOS, germinal center B-cell type. See   note and description.   Diagnostic note: The morphologic and immunophenotypic findings are   consistent with the DLBCL. Based on the immunohistochemical profile,   this DLBCL is of the germinal center B-cell type (Michael' algorithm).   The neoplastic B-cells express BCL2 but lack MYC. FISH identified   BCL2,   but not MYC rearrangement (see below), formally ruling out a double-hit   lymphoma. NGS molecular profiling has been requested. Upon completion,   the results and final interpretation will be reported in an addendum.   Dr. Kya Gibbs, Division of Hematopathology, has reviewed the case and   concurred with the above interpretation. _   Microscopic description:   Sections show a diffuse proliferation of medium-sized to large atypical   cells with round to irregular nuclei, vesicular chromatin and moderately   abundant cytoplasm. There are abundant apopotic bodies. Many of the cells   appear to show poor viability. Focal necrosis is noted. The atypical   cells are seen infiltrating bone and bone marrow, as well as as skeletal   muscle.   Immunohistochemistry:   Immunostains for the following markers were performed on block 2A:   CD3, CD5, CD20, PAX5, CD10, BCL6, BCL2, MUM1, Ki67, CMYC, Cyclin D1,   TIFFANI, CD21, CD23, CD30, P53   Neoplastic cells are:   Positive: CD20, PAX5, BCL6, CD10, BCL2, CD21 (partial)   Negative: CD3, CD5, MUM1, MYC, cyclin D1, TIFFANI, CD23, CD30   Other: CD3/CD5 highlight many scattered small T-cells. Ki-67 staining is   suboptimal but shows small focal area with proliferation index of up to   50%. p53 staining failed.   Repeated Ki-67 immunostain shows similar results. p53 is positive in the   neoplastic B-cells.   Cytogenetic studies   FISH Analysis   Accession / CaseNo: [id number] / [id number]   Collection Date: 03/05/2024   Results: Abnormal   Interpretation:   INTERPRETATION   SIGNAL PATTERN ABNORMALITY IDENTIFIED % CUTOFF   BCL2 (18q21) Rearrangement Detected 1~2R1~2G1~2F   BCL2 gene rearrangement 62.0% 5.8%   BCL6 (3q27) Rearrangement Normal 2F   None -   N/A   MYC (8q24) Rearrangement Not Detected 3F to 4F   MYC gains (trisomy/tetrasomy) 52.0% 16.2%   (Atypical)   MYC/IgH/CEN8 t(8;14) Not Detected 2R>2G2A   Gain of IGH/chromosome 14/14q or 24.0% 16.2%   (Atypical)   IGH rearrangement   3~4R>2G3~4A Gains of MYC/chromosome 8/8q and 54.0% 16.2%   IGH or chromosome 14/14q or   rearrangement to other loci   BCL2 rearrangements are observed in double/triple-hit lymphomas   (see note), 90% of follicular lymphomas, 30% of diffuse large cell   lymphomas (sometimes with prior follicular type), and rarely in other   lymphoproliferative disorders.   This abnormal signal pattern is indicative of a gain of MYC/chromosome   8/8q, but not MYC amplification, which is defined as >4 signals.   The >2G signal pattern with the MYC/IGH probe set is suggestive of gain   of the IGH locus, trisomy 14 or an IGH rearrangement with a gene other   than MYC.   There is no evidence of MYC or BCL6 rearrangement in this case.   Therefore, while this study is ABNORMAL, there is NO EVIDENCE OF A   "DOUBLE/TRIPLE-HIT LYMPHOMA " (see note).   NOTE:   High-grade B-cell lymphoma (HGBCL) with MYC and BCL2 and/or BCL6   rearrangements ( "double/triple-hit lymphoma ") is now renamed as diffuse   large Bcell lymphoma/high-grade B-cell lymphoma with MYC and BCL2 gene   rearrangements by 5th WHO classification. ICC classification comprises   two groups: HGBCL with MYC and BCL2 rearrangements (with or without BCL6   rearrangement) and a new provisional entity, HBGBL with MYC and BCL6   rearrangements.   Please note that the current WHO classification does not employ BCL6   gene rearrangement in double/triple-hit lymphomas. Cases with BCL6/MYC   t(3;8) pseudo-hit translocation need to be  from these entities.   Cases with Ig/MYC translocations have a worse prognosis than non-Ig/MYC   translocation cases. Cases with MYC gene amplification are not included   in these entities.   Probe Set Details:   BCL2 (18q21): nuc priya(3'BCL2,5'BCL2)x2~4(3 ' BCL2 con 5'   BCL2)x1~2{31/50}   BCL6 (3q27): nuc priya(BCL6x2){50}   MYC (8q24): nuc priya(MYCx3~4){26/50}   MYC/IgH/CEN8 t(8;14): nuc priya(CEP8x3~4,MYCx3~4,IGHx3~>3){27/50}/(CEP8x2,MY   Cx2,IGHx3~>3){12/50}   Molecular studies:   Neotype lymphoid disorders NGS profile ([employer]): Pending   Lymphoam work-up   Clinical Information   Epidural spine tumor   Verified by: Missael Masters   (Electronic Signature)   Reported on: 03/13/24 17:44 [facility], [facility] [place] [address]   Phone: [phone] Fax: [phone]     Consultant notes reviewed : YES [x ] ; NO [ ]      Radiology and additional tests:  < from: US Duplex Venous Lower Ext Complete, Bilateral (03.19.24 @ 13:58) >  IMPRESSION:  Acute deep venous thrombosis: below the knee.    Bilateral soleal vein thrombosis.    < end of copied text >  < from: CT Lumbar Spine No Cont (03.06.24 @ 11:37) >    IMPRESSION:    1.  THORACIC SPINE:   T8 corpectomy with placement of a spacing device.   T6-T10 posterior stabilization. Routine postoperative appearance    2.  LUMBAR SPINE:   L2-S2 posterior stabilization. Routine postoperative   appearance    < end of copied text >    < from: VA Duplex Lower Ext Vein Scan, Laci (03.05.24 @ 10:44) >  IMPRESSION:  No evidence of deep venous thrombosis in either lower extremity.      < end of copied text >

## 2024-03-20 NOTE — CONSULT NOTE ADULT - REASON FOR ADMISSION
Spinal tumor resection
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

## 2024-03-20 NOTE — CONSULT NOTE ADULT - ASSESSMENT
Mr. Saturnino Solis is a 68-year-old male patient with past medical history of HLD, pre-Diabetes Mellitus, and sinus bradycardia who presented to [facility] on 3/5 with a history of progressively worsening lower extremity numbness/paresthesias and weakness with onset Jan 2024. He underwent x-rays & MRI imaging as outpatient on 2/29/24 showing concern for osseous metastatic disease in thoracic/lumbar/sacral spine as well as extension of soft tissue into the paravertebral soft tissues more prominent on the right side. He was previously ambulating independently without device but now required a RW. Repeat MRI showed multiple findings including diffuse osseous metastatic disease throughout the entire spine; pathologic fx w/ tumor into the epidural space at T8 resulting in thoracic cord compression; tumor extension into the epidural space at L3 resulting in cauda equina compression; pathologic fx w/ tumor in the epidural space at L4 contributing to severe canal stenosis; tumor within the bilateral psoas muscles at L3 and L4; bilateral neural foramen effacement by tumor in L-spine. He underwent a two stage surgical resection with a combined surgical team including Neurosurgery (Dr. Bhaskar Partida) and Plastic Surgery (Dr. Gunnar Campbell). He underwent the following procedures:     ·	Stage 1: T8 VCR (vertebral column resection) T6-T10 fusion for tumor resection, small csf leak primarily repaired  ·	Stage 2: L4 Partial Corpectomy, L2-Pelvis Fusion, Plastics Closure 3/5 HMV drain x3 and 1 Bile bag placed w/ output closely monitored.     He was monitored post-operatively in NSCU on PCA pump, durotomy encountered intraop and primarily repaired up from OR flat and incremental 15 degrees with no positional headaches or sign of CSF leak. Surgical pathology reporting diffuse large B-cell lymphoma. Patient evaluated by PT/OT/PM&R and recommended for acute inpatient rehab. Patient was discharged to [facility] on 3/16/24. (16 Mar 2024 12:07)  Oncology consulted for recent DLBCL diagnosis.

## 2024-03-20 NOTE — CONSULT NOTE ADULT - PROBLEM SELECTOR RECOMMENDATION 9
Patient with recently diagnosed advanced DLBCL, s/p spine/pelvic surgeries. Patient aware of recommended H/O f/u as outpatient for necessary treatment (Dr. Chatterjee).  While here on acute rehab, supportive H/O care.

## 2024-03-20 NOTE — PROGRESS NOTE ADULT - SUBJECTIVE AND OBJECTIVE BOX
HPI:  Mr. Saturnino Solis is a 68-year-old male patient with past medical history of HLD, pre-Diabetes Mellitus, and sinus bradycardia who presented to [facility] on 3/5 with a history of progressively worsening lower extremity numbness/paresthesias and weakness with onset on Jan 2024. She underwent x-rays & MRI imaging as outpatient on 2/29/24 showing concern for osseous metastatic disease in thoracic/lumbar/sacral spine as well as extension of soft tissue into the paravertebral soft tissues more prominent on the right side. He was previously ambulating independently without device but now required a RW. Repeat MRI showed multiple findings including diffuse osseous metastatic disease throughout the entire spine; pathologic fx w/ tumor into the epidural space at T8 resulting in thoracic cord compression; tumor extension into the epidural space at L3 resulting in cauda equina compression; pathologic fx w/ tumor in the epidural space at L4 contributing to severe canal stenosis; tumor within the bilateral psoas muscles at L3 and L4; bilateral neural foramen effacement by tumor in L-spine. He underwent a two stage surgical resection with a combined surgical team including Neurosurgery (Dr. Bhaskar Partida) and Plastic Surgery (Dr. Gunnar Campbell). He underwent the following procedures:     ·	Stage 1: T8 VCR (vertebral column resection) T6-T10 fusion for tumor resection, small csf leak primarily repaired  ·	Stage 2: L4 Partial Corpectomy, L2-Pelvis Fusion, Plastics Closure 3/5 HMV drain x3 and 1 Bile bag placed w/ output closely monitored.     He was monitored post-operatively in NSCU on PCA pump, durotomy encountered intraop and primarily repaired up from OR flat and incremental 15 degrees with no positional headaches or sign of CSF leak. Surgical pathology reporting diffuse large B-cell lymphoma. Patient evaluated by PT/OT/PM&R and recommended for acute inpatient rehab. Patient was discharged to [facility] on 3/16/24. (16 Mar 2024 12:07)    ___________________________________________________________________________    SUBJECTIVE/ROS  Patient was seen and evaluated at bedside today.  Reported no overnight events and is in no acute distress.  Expressed some sedation with pain medications but that pain is considerable at times.  Pain regimen adjusted and will monitor response.  Psychology following and patient expressed is benefiting from it regarding his grief coping capacity.  Case was discussed at Interdisciplinary Team meeting  and tentative discharge date noted above.  Seen by Hematology-Oncology earlier today with recommendations on chart.  Follow-up doppler ordered for 3/26 per their recommendations.  Patient is motivated to participate on the recommended rehabilitation program.  Denies any CP, SOB, YUN, palpitations, fever, chills, body aches, cough, congestion, or any other symptoms at this time.   ___________________________________________________________________________    Vital Signs Last 24 Hrs  T(C): 37.4 (20 Mar 2024 08:40), Max: 37.4 (20 Mar 2024 08:40)  T(F): 99.3 (20 Mar 2024 08:40), Max: 99.3 (20 Mar 2024 08:40)  HR: 63 (20 Mar 2024 08:40) (63 - 86)  BP: 110/66 (20 Mar 2024 08:40) (106/62 - 110/66)  RR: 16 (20 Mar 2024 08:40) (16 - 16)  SpO2: 94% (20 Mar 2024 08:40) (94% - 94%)    ___________________________________________________________________________    LABS                          9.4    9.32  )-----------( 412      ( 18 Mar 2024 05:33 )             28.9       03-18    136  |  100  |  24<H>  ----------------------------<  99  4.1   |  30  |  0.71    Ca    8.5      18 Mar 2024 05:33    TPro  5.1<L>  /  Alb  2.0<L>  /  TBili  0.5  /  DBili  x   /  AST  62<H>  /  ALT  131<H>  /  AlkPhos  127<H>  03-18    ___________________________________________________________________________    MEDICATIONS  (STANDING):  cefTRIAXone   IVPB 1000 milliGRAM(s) IV Intermittent every 24 hours  enoxaparin Injectable 40 milliGRAM(s) SubCutaneous <User Schedule>  gabapentin 600 milliGRAM(s) Oral every 8 hours  HYDROmorphone   Tablet 2 milliGRAM(s) Oral every 6 hours  methocarbamol 750 milliGRAM(s) Oral every 8 hours  multivitamin 1 Tablet(s) Oral daily  polyethylene glycol 3350 17 Gram(s) Oral two times a day  senna 2 Tablet(s) Oral at bedtime  sodium chloride 2 Gram(s) Oral every 12 hours    MEDICATIONS  (PRN):  acetaminophen     Tablet .. 975 milliGRAM(s) Oral every 6 hours PRN Temp greater or equal to 38C (100.4F), Mild Pain (1 - 3)  hydrocortisone 1% Cream 1 Application(s) Topical every 12 hours PRN Itching  oxyCODONE    IR 10 milliGRAM(s) Oral every 4 hours PRN Severe Pain (7 - 10)  traMADol 50 milliGRAM(s) Oral every 6 hours PRN Moderate Pain (4 - 6)    ___________________________________________________________________________    PHYSICAL EXAM:    Physical Exam: Gen - NAD, Comfortable  HEENT - NCAT, EOMI, MMM  Neck - Supple, No limited ROM  Pulm - CTAB, No wheeze, No rhonchi, No crackles  Cardiovascular - RRR, S1S2, No murmurs  Abdomen - Soft, NT/ND, +BS  Extremities - No C/C/E, No calf tenderness  Neuro-     Cognitive - AAOx3     Communication - Fluent, No dysarthria     Motor - See NATALIE Examination below     Sensory - See NATALIE Examination below     Reflexes - No clonus     Tone - normal  Psychiatric - Mood stable, Affect WNL  Skin:   - Upper thoracic surgical incision: covered with Prineo dressing with no erythema, warmth,  nor discharge  - Lower thoracic and lumbosacral surgical incision: covered with Prineo dressing with no erythema, warmth,  nor discharge  - Bia-incisional sites of previous drains healing with no erythema, warmth,  nor discharge  - Stage 2 pressure injuries on sacrum and right shin  ___________________________________________________________________________    NATALIE Examination (3/18/24)    Motor (Key Muscles):              C5      C6      C7      C8      T1      L2      L3      L4      L5      S1  R        5/5     5/5    5/5     5/5    5/5    3/5    3/5    5/5     5/5     5/5  L         5/5    5/5     5/5    5/5    5/5     5/5   3/5     4/5     5/5     5/5      Sensory (Light touch): (0=absent, 1=impaired, 2=normal, NT=not testable)             C1   C2   C3   C4   C5   C6   C7   C8   R         2    2      2      2     2      2     2     2   L         2     2     2      2     2      2     2     2               T1   T2   T3   T4   T5   T6   T7   T8   T9   T10   T11   T12  R         2     2     2     2     2     2     2      2     2      2       2       2   L         2     2     2     2      2     2     2     2      2     2       2       2                L1   L2   L3   L4   L5   S1   S2   S3   S4-5         R         2     2     1    1    1    2     2     2     2   L         2     2     2     2     2     2     2     2     2      Sensory (Pin Prick): (0=absent, 1=impaired, 2=normal, NT=not testable)             C1   C2   C3   C4   C5   C6   C7   C8   R          2    2      2     2     2      2     2     2   L          2     2     2     2      2     2      2     2               T1   T2   T3   T4   T5   T6   T7   T8   T9   T10   T11   T12  R         2     2     2     2     2      2     2     2      2     2       2       1  L         2     2     2      2     2      2     2     2     2     2       2       1               L1   L2   L3   L4   L5   S1   S2   S3   S4-5         R         2     2     2    2     2     2     2     2      2   L         2     2     2     2     2     2     2     2      2    (DAP) Deep Anal Pressure Present  (VAC) Voluntary Anal Contraction Present    ___________________________________________________________________________   HPI:  Mr. Saturnino Solis is a 68-year-old male patient with past medical history of HLD, pre-Diabetes Mellitus, and sinus bradycardia who presented to [facility] on 3/5 with a history of progressively worsening lower extremity numbness/paresthesias and weakness with onset on Jan 2024. She underwent x-rays & MRI imaging as outpatient on 2/29/24 showing concern for osseous metastatic disease in thoracic/lumbar/sacral spine as well as extension of soft tissue into the paravertebral soft tissues more prominent on the right side. He was previously ambulating independently without device but now required a RW. Repeat MRI showed multiple findings including diffuse osseous metastatic disease throughout the entire spine; pathologic fx w/ tumor into the epidural space at T8 resulting in thoracic cord compression; tumor extension into the epidural space at L3 resulting in cauda equina compression; pathologic fx w/ tumor in the epidural space at L4 contributing to severe canal stenosis; tumor within the bilateral psoas muscles at L3 and L4; bilateral neural foramen effacement by tumor in L-spine. He underwent a two stage surgical resection with a combined surgical team including Neurosurgery (Dr. Bhaskar Partida) and Plastic Surgery (Dr. Gunnar Campbell). He underwent the following procedures:     ·	Stage 1: T8 VCR (vertebral column resection) T6-T10 fusion for tumor resection, small csf leak primarily repaired  ·	Stage 2: L4 Partial Corpectomy, L2-Pelvis Fusion, Plastics Closure 3/5 HMV drain x3 and 1 Bile bag placed w/ output closely monitored.     He was monitored post-operatively in NSCU on PCA pump, durotomy encountered intraop and primarily repaired up from OR flat and incremental 15 degrees with no positional headaches or sign of CSF leak. Surgical pathology reporting diffuse large B-cell lymphoma. Patient evaluated by PT/OT/PM&R and recommended for acute inpatient rehab. Patient was discharged to [facility] on 3/16/24. (16 Mar 2024 12:07)    ___________________________________________________________________________    SUBJECTIVE/ROS  Patient was seen and evaluated at bedside today.  Reported no overnight events and is in no acute distress.  Expressed some sedation with pain medications but that pain is considerable at times.  Pain regimen adjusted and will monitor response.  Psychology following and patient expressed is benefiting from it regarding his grief coping capacity.  Case was discussed at Interdisciplinary Team meeting  and tentative discharge date noted above.  Acute below knee bilateral soleal DVTs noted yesterday as below noted.  Seen by Hematology-Oncology earlier today with recommendations on chart.  Follow-up doppler ordered for 3/26 per their recommendations.  Patient is motivated to participate on the recommended rehabilitation program.  Denies any CP, SOB, YUN, palpitations, fever, chills, body aches, cough, congestion, or any other symptoms at this time.   ___________________________________________________________________________    US DPLX LWR EXT VEINS COMPL BI (03/19/2024)   IMPRESSION: Acute deep venous thrombosis: below the knee. Bilateral soleal vein thrombosis.    ___________________________________________________________________________    Vital Signs Last 24 Hrs  T(C): 37.4 (20 Mar 2024 08:40), Max: 37.4 (20 Mar 2024 08:40)  T(F): 99.3 (20 Mar 2024 08:40), Max: 99.3 (20 Mar 2024 08:40)  HR: 63 (20 Mar 2024 08:40) (63 - 86)  BP: 110/66 (20 Mar 2024 08:40) (106/62 - 110/66)  RR: 16 (20 Mar 2024 08:40) (16 - 16)  SpO2: 94% (20 Mar 2024 08:40) (94% - 94%)    ___________________________________________________________________________    LABS                          9.4    9.32  )-----------( 412      ( 18 Mar 2024 05:33 )             28.9       03-18    136  |  100  |  24<H>  ----------------------------<  99  4.1   |  30  |  0.71    Ca    8.5      18 Mar 2024 05:33    TPro  5.1<L>  /  Alb  2.0<L>  /  TBili  0.5  /  DBili  x   /  AST  62<H>  /  ALT  131<H>  /  AlkPhos  127<H>  03-18    ___________________________________________________________________________    MEDICATIONS  (STANDING):  cefTRIAXone   IVPB 1000 milliGRAM(s) IV Intermittent every 24 hours  enoxaparin Injectable 40 milliGRAM(s) SubCutaneous <User Schedule>  gabapentin 600 milliGRAM(s) Oral every 8 hours  HYDROmorphone   Tablet 2 milliGRAM(s) Oral every 6 hours  methocarbamol 750 milliGRAM(s) Oral every 8 hours  multivitamin 1 Tablet(s) Oral daily  polyethylene glycol 3350 17 Gram(s) Oral two times a day  senna 2 Tablet(s) Oral at bedtime  sodium chloride 2 Gram(s) Oral every 12 hours    MEDICATIONS  (PRN):  acetaminophen     Tablet .. 975 milliGRAM(s) Oral every 6 hours PRN Temp greater or equal to 38C (100.4F), Mild Pain (1 - 3)  hydrocortisone 1% Cream 1 Application(s) Topical every 12 hours PRN Itching  oxyCODONE    IR 10 milliGRAM(s) Oral every 4 hours PRN Severe Pain (7 - 10)  traMADol 50 milliGRAM(s) Oral every 6 hours PRN Moderate Pain (4 - 6)    ___________________________________________________________________________    PHYSICAL EXAM:    Physical Exam: Gen - NAD, Comfortable  HEENT - NCAT, EOMI, MMM  Neck - Supple, No limited ROM  Pulm - CTAB, No wheeze, No rhonchi, No crackles  Cardiovascular - RRR, S1S2, No murmurs  Abdomen - Soft, NT/ND, +BS  Extremities - No C/C/E, No calf tenderness  Neuro-     Cognitive - AAOx3     Communication - Fluent, No dysarthria     Motor - See NATALIE Examination below     Sensory - See NATALIE Examination below     Reflexes - No clonus     Tone - normal  Psychiatric - Mood stable, Affect WNL  Skin:   - Upper thoracic surgical incision: covered with Prineo dressing with no erythema, warmth,  nor discharge  - Lower thoracic and lumbosacral surgical incision: covered with Prineo dressing with no erythema, warmth,  nor discharge  - Bia-incisional sites of previous drains healing with no erythema, warmth,  nor discharge  - Stage 2 pressure injuries on sacrum and right shin  ___________________________________________________________________________    NATALIE Examination (3/18/24)    Motor (Key Muscles):              C5      C6      C7      C8      T1      L2      L3      L4      L5      S1  R        5/5     5/5    5/5     5/5    5/5    3/5    3/5    5/5     5/5     5/5  L         5/5    5/5     5/5    5/5    5/5     5/5   3/5     4/5     5/5     5/5      Sensory (Light touch): (0=absent, 1=impaired, 2=normal, NT=not testable)             C1   C2   C3   C4   C5   C6   C7   C8   R         2    2      2      2     2      2     2     2   L         2     2     2      2     2      2     2     2               T1   T2   T3   T4   T5   T6   T7   T8   T9   T10   T11   T12  R         2     2     2     2     2     2     2      2     2      2       2       2   L         2     2     2     2      2     2     2     2      2     2       2       2                L1   L2   L3   L4   L5   S1   S2   S3   S4-5         R         2     2     1    1    1    2     2     2     2   L         2     2     2     2     2     2     2     2     2      Sensory (Pin Prick): (0=absent, 1=impaired, 2=normal, NT=not testable)             C1   C2   C3   C4   C5   C6   C7   C8   R          2    2      2     2     2      2     2     2   L          2     2     2     2      2     2      2     2               T1   T2   T3   T4   T5   T6   T7   T8   T9   T10   T11   T12  R         2     2     2     2     2      2     2     2      2     2       2       1  L         2     2     2      2     2      2     2     2     2     2       2       1               L1   L2   L3   L4   L5   S1   S2   S3   S4-5         R         2     2     2    2     2     2     2     2      2   L         2     2     2     2     2     2     2     2      2    (DAP) Deep Anal Pressure Present  (VAC) Voluntary Anal Contraction Present    ___________________________________________________________________________

## 2024-03-20 NOTE — PROGRESS NOTE ADULT - ASSESSMENT
Mr. Saturnino Solis is a 68-year-old male patient with past medical history of HLD, pre-Diabetes Mellitus, and sinus bradycardia who is admitted for Acute Inpatient Rehabilitation with a multidisciplinary rehab program at [facility] with functional impairments in ADLs and mobility secondary to spinal diffuse large B-cell lymphoma metastasis requiring surgical resection.      Thoracic spinal cord injury secondary to compression by metastasis s/p surgical resection  - T8 AIS D  - MRI:    * diffuse osseous metastatic disease throughout the entire spine    * pathologic fx w/ tumor into the epidural space at T8 resulting in thoracic cord compression    * tumor extension into the epidural space at L3 resulting in cauda equina compression    * pathologic fx w/ tumor in the epidural space at L4 contributing to severe canal stenosis    * tumor within the bilateral psoas muscles at L3 and L4    * bilateral neural foramen effacement by tumor in L-spine.   - Surgical pathology report: Diffuse large B-cell lymphoma  - Spine tumor resection surgeries:    * Stage 1: T8 VCR (vertebral column resection) T6-T10 fusion for tumor resection, small csf leak primarily repaired    * Stage 2: L4 Partial Corpectomy, L2-Pelvis Fusion, Plastics Closure 3/5 HMV drain x3 and 1 Bile bag     * All drains removed    * Spine and fall precautions    * F/U with neurosurgery, plastics, and oncology outpatient  - Cauda equina syndrome  - Paraparesis  - Lower extremity sensory deficits  - Impaired ADLs and mobility  - Need for assistance with personal care  - Start comprehensive rehab program of PT/OT - 3 hours a day, 5 days a week. 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    - Stage IV  - PET CT as outpatient  - [facility] [place]/ recommendations when appropriate to start chemo, currently needs wound healing (surgery a week ago w/ multiple drains containing sanguinous fluid)  will follow .   - F/u with oncology outpatient    Pain  - gabapentin 600 milliGRAM(s) Oral every 8 hours  - HYDROmorphone   Tablet 2 milliGRAM(s) Oral every 6 hours  - methocarbamol 750 milliGRAM(s) Oral every 8 hours  (PRN):  - acetaminophen     Tablet .. 975 milliGRAM(s) Oral every 6 hours PRN Temp greater or equal to 38C (100.4F), Mild Pain (1 - 3)  - traMADol 50 milliGRAM(s) Oral every 6 hours PRN Moderate Pain (4 - 6)  - oxyCODONE    IR 10 milliGRAM(s) Oral every 4 hours PRN Severe Pain (7 - 10)    Pre-diabetes  - Off meds  - Consistent carb diet    Hyponatremia  - Continue sodium tabs  - Trend electrolytes    Sleep  - Melatonin PRN     GI / Bowel  - Senna qHS  - Miralax PRN Daily     / Bladder  - Currently patient voids independently. Check PVRs on admission. SC for > 400ccs    Skin / Pressure injury assessment   * Upper thoracic surgical incision: covered with Prineo dressing with no erythema, warmth,  nor discharge  * Lower thoracic and lumbosacral surgical incision: covered with Prineo dressing with no erythema, warmth,  nor discharge  * Bia-incisional sites of previous drains healing with no erythema, warmth,  nor discharge  * Stage 2 pressure injuries on sacrum and right shin  - Monitor Incisions:  q shift  - Turn q2 hours in bed while awake, air mattress  - nursing to monitor skin qShift    Diet/Dysphagia:  - Diet Consistency: Consistent carb    DVT prophylaxis:   - Lovenox ppx      Outpatient Follow-up:  Bhaskar Partida  Neurosurgery  [address]  [place] [address]  Phone: [phone]  Fax: [phone]  Follow Up Time: 2 weeks    Tobias Chatterjee  Medical Oncology  [address] [place] [address]  Phone: [phone]  Fax: [phone]  Follow Up Time: 1 week    Gunnar Campbell  Plastic Surgery  [address], [address]  [place] [address]  Phone: [phone]  Fax: [phone]  Follow Up Time:      ---------------   Mr. Saturnino Solis is a 68-year-old male patient with past medical history of HLD, pre-Diabetes Mellitus, and sinus bradycardia who is admitted for Acute Inpatient Rehabilitation with a multidisciplinary rehab program at [facility] with functional impairments in ADLs and mobility secondary to spinal diffuse large B-cell lymphoma metastasis requiring surgical resection.      Thoracic spinal cord injury secondary to compression by metastasis s/p surgical resection  - T8 AIS D  - MRI:    * diffuse osseous metastatic disease throughout the entire spine    * pathologic fx w/ tumor into the epidural space at T8 resulting in thoracic cord compression    * tumor extension into the epidural space at L3 resulting in cauda equina compression    * pathologic fx w/ tumor in the epidural space at L4 contributing to severe canal stenosis    * tumor within the bilateral psoas muscles at L3 and L4    * bilateral neural foramen effacement by tumor in L-spine.   - Surgical pathology report: Diffuse large B-cell lymphoma  - Spine tumor resection surgeries:    * Stage 1: T8 VCR (vertebral column resection) T6-T10 fusion for tumor resection, small csf leak primarily repaired    * Stage 2: L4 Partial Corpectomy, L2-Pelvis Fusion, Plastics Closure 3/5 HMV drain x3 and 1 Bile bag     * All drains removed    * Spine and fall precautions    * F/U with neurosurgery, plastics, and oncology outpatient  - Cauda equina syndrome  - Paraparesis  - Lower extremity sensory deficits  - Impaired ADLs and mobility  - Need for assistance with personal care  - Start comprehensive rehab program of PT/OT - 3 hours a day, 5 days a week. 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    - Stage IV  - PET CT as outpatient  - [facility] to get back w/ recommendations when appropriate to start chemo, currently needs wound healing (surgery a week ago w/ multiple drains containing sanguinous fluid)  will follow .   - F/u with oncology outpatient    Pain  - gabapentin 600 milliGRAM(s) Oral every 8 hours  - HYDROmorphone   Tablet 2 milliGRAM(s) Oral every 6 hours  - methocarbamol 750 milliGRAM(s) Oral every 8 hours  (PRN):  - acetaminophen     Tablet .. 975 milliGRAM(s) Oral every 6 hours PRN Temp greater or equal to 38C (100.4F), Mild Pain (1 - 3)  - traMADol 50 milliGRAM(s) Oral every 6 hours PRN Moderate Pain (4 - 6)  - oxyCODONE    IR 10 milliGRAM(s) Oral every 4 hours PRN Severe Pain (7 - 10)    Pre-diabetes  - Off meds  - Consistent carb diet    Hyponatremia  - Continue sodium tabs  - Trend electrolytes    Sleep  - Melatonin PRN     GI / Bowel  - Senna qHS  - Miralax PRN Daily     / Bladder  - Currently patient voids independently. Check PVRs on admission. SC for > 400ccs    Skin / Pressure injury assessment   * Upper thoracic surgical incision: covered with Prineo dressing with no erythema, warmth,  nor discharge  * Lower thoracic and lumbosacral surgical incision: covered with Prineo dressing with no erythema, warmth,  nor discharge  * Bia-incisional sites of previous drains healing with no erythema, warmth,  nor discharge  * Stage 2 pressure injuries on sacrum and right shin  - Monitor Incisions:  q shift  - Turn q2 hours in bed while awake, air mattress  - nursing to monitor skin qShift    Diet/Dysphagia:  - Diet Consistency: Consistent carb    DVT prophylaxis:   - Bilateral soleal DVTs noted (3/19)  - Seen by Hematology  - Continue Lovenox  - Follow-up imaging on 3/26 (ordered)    Outpatient Follow-up:  Bhaskar Partida  Neurosurgery  [address]  [place] [address]  Phone: [phone]  Fax: [phone]  Follow Up Time: 2 weeks    Tobias Chatterjee  Medical Oncology  [address] [place] [address]  Phone: [phone]  Fax: [phone]  Follow Up Time: 1 week    Gunnar Campbell  Plastic Surgery  [address]  [place] [address]  Phone: [phone]  Fax: [phone]  Follow Up Time:      ---------------

## 2024-03-21 ENCOUNTER — OUTPATIENT (OUTPATIENT)
Dept: OUTPATIENT SERVICES | Facility: HOSPITAL | Age: 69
LOS: 1 days | Discharge: HOME | End: 2024-03-21

## 2024-03-21 DIAGNOSIS — Z90.49 ACQUIRED ABSENCE OF OTHER SPECIFIED PARTS OF DIGESTIVE TRACT: Chronic | ICD-10-CM

## 2024-03-21 DIAGNOSIS — C83.30 DIFFUSE LARGE B-CELL LYMPHOMA, UNSPECIFIED SITE: ICD-10-CM

## 2024-03-21 LAB
ALBUMIN SERPL ELPH-MCNC: 2.1 G/DL — LOW (ref 3.3–5)
ALP SERPL-CCNC: 119 U/L — SIGNIFICANT CHANGE UP (ref 40–120)
ALT FLD-CCNC: 86 U/L — HIGH (ref 10–45)
ANION GAP SERPL CALC-SCNC: 6 MMOL/L — SIGNIFICANT CHANGE UP (ref 5–17)
AST SERPL-CCNC: 38 U/L — SIGNIFICANT CHANGE UP (ref 10–40)
BILIRUB SERPL-MCNC: 0.6 MG/DL — SIGNIFICANT CHANGE UP (ref 0.2–1.2)
BUN SERPL-MCNC: 15 MG/DL — SIGNIFICANT CHANGE UP (ref 7–23)
CALCIUM SERPL-MCNC: 8.5 MG/DL — SIGNIFICANT CHANGE UP (ref 8.4–10.5)
CHLORIDE SERPL-SCNC: 99 MMOL/L — SIGNIFICANT CHANGE UP (ref 96–108)
CO2 SERPL-SCNC: 30 MMOL/L — SIGNIFICANT CHANGE UP (ref 22–31)
CREAT SERPL-MCNC: 0.57 MG/DL — SIGNIFICANT CHANGE UP (ref 0.5–1.3)
EGFR: 108 ML/MIN/1.73M2 — SIGNIFICANT CHANGE UP
GLUCOSE SERPL-MCNC: 105 MG/DL — HIGH (ref 70–99)
HCT VFR BLD CALC: 28.4 % — LOW (ref 39–50)
HGB BLD-MCNC: 9.2 G/DL — LOW (ref 13–17)
MCHC RBC-ENTMCNC: 27.3 PG — SIGNIFICANT CHANGE UP (ref 27–34)
MCHC RBC-ENTMCNC: 32.4 GM/DL — SIGNIFICANT CHANGE UP (ref 32–36)
MCV RBC AUTO: 84.3 FL — SIGNIFICANT CHANGE UP (ref 80–100)
NRBC # BLD: 0 /100 WBCS — SIGNIFICANT CHANGE UP (ref 0–0)
PLATELET # BLD AUTO: 396 K/UL — SIGNIFICANT CHANGE UP (ref 150–400)
POTASSIUM SERPL-MCNC: 3.7 MMOL/L — SIGNIFICANT CHANGE UP (ref 3.5–5.3)
POTASSIUM SERPL-SCNC: 3.7 MMOL/L — SIGNIFICANT CHANGE UP (ref 3.5–5.3)
PROT SERPL-MCNC: 5.4 G/DL — LOW (ref 6–8.3)
RBC # BLD: 3.37 M/UL — LOW (ref 4.2–5.8)
RBC # FLD: 16.9 % — HIGH (ref 10.3–14.5)
SODIUM SERPL-SCNC: 135 MMOL/L — SIGNIFICANT CHANGE UP (ref 135–145)
WBC # BLD: 8.17 K/UL — SIGNIFICANT CHANGE UP (ref 3.8–10.5)
WBC # FLD AUTO: 8.17 K/UL — SIGNIFICANT CHANGE UP (ref 3.8–10.5)

## 2024-03-21 PROCEDURE — 99232 SBSQ HOSP IP/OBS MODERATE 35: CPT

## 2024-03-21 RX ADMIN — HYDROMORPHONE HYDROCHLORIDE 2 MILLIGRAM(S): 2 INJECTION INTRAMUSCULAR; INTRAVENOUS; SUBCUTANEOUS at 18:20

## 2024-03-21 RX ADMIN — HYDROMORPHONE HYDROCHLORIDE 2 MILLIGRAM(S): 2 INJECTION INTRAMUSCULAR; INTRAVENOUS; SUBCUTANEOUS at 00:00

## 2024-03-21 RX ADMIN — HYDROMORPHONE HYDROCHLORIDE 2 MILLIGRAM(S): 2 INJECTION INTRAMUSCULAR; INTRAVENOUS; SUBCUTANEOUS at 05:08

## 2024-03-21 RX ADMIN — CEFTRIAXONE 100 MILLIGRAM(S): 500 INJECTION, POWDER, FOR SOLUTION INTRAMUSCULAR; INTRAVENOUS at 17:21

## 2024-03-21 RX ADMIN — GABAPENTIN 600 MILLIGRAM(S): 400 CAPSULE ORAL at 13:05

## 2024-03-21 RX ADMIN — Medication 1 TABLET(S): at 11:16

## 2024-03-21 RX ADMIN — HYDROMORPHONE HYDROCHLORIDE 2 MILLIGRAM(S): 2 INJECTION INTRAMUSCULAR; INTRAVENOUS; SUBCUTANEOUS at 06:00

## 2024-03-21 RX ADMIN — METHOCARBAMOL 750 MILLIGRAM(S): 500 TABLET, FILM COATED ORAL at 13:05

## 2024-03-21 RX ADMIN — HYDROMORPHONE HYDROCHLORIDE 2 MILLIGRAM(S): 2 INJECTION INTRAMUSCULAR; INTRAVENOUS; SUBCUTANEOUS at 17:21

## 2024-03-21 RX ADMIN — GABAPENTIN 600 MILLIGRAM(S): 400 CAPSULE ORAL at 21:13

## 2024-03-21 RX ADMIN — GABAPENTIN 600 MILLIGRAM(S): 400 CAPSULE ORAL at 05:08

## 2024-03-21 RX ADMIN — METHOCARBAMOL 750 MILLIGRAM(S): 500 TABLET, FILM COATED ORAL at 05:09

## 2024-03-21 RX ADMIN — SODIUM CHLORIDE 2 GRAM(S): 9 INJECTION INTRAMUSCULAR; INTRAVENOUS; SUBCUTANEOUS at 05:13

## 2024-03-21 RX ADMIN — HYDROMORPHONE HYDROCHLORIDE 2 MILLIGRAM(S): 2 INJECTION INTRAMUSCULAR; INTRAVENOUS; SUBCUTANEOUS at 12:15

## 2024-03-21 RX ADMIN — HYDROMORPHONE HYDROCHLORIDE 2 MILLIGRAM(S): 2 INJECTION INTRAMUSCULAR; INTRAVENOUS; SUBCUTANEOUS at 11:15

## 2024-03-21 RX ADMIN — Medication 1 APPLICATION(S): at 08:50

## 2024-03-21 RX ADMIN — HYDROMORPHONE HYDROCHLORIDE 2 MILLIGRAM(S): 2 INJECTION INTRAMUSCULAR; INTRAVENOUS; SUBCUTANEOUS at 23:13

## 2024-03-21 RX ADMIN — SODIUM CHLORIDE 2 GRAM(S): 9 INJECTION INTRAMUSCULAR; INTRAVENOUS; SUBCUTANEOUS at 17:21

## 2024-03-21 RX ADMIN — ENOXAPARIN SODIUM 40 MILLIGRAM(S): 100 INJECTION SUBCUTANEOUS at 17:21

## 2024-03-21 RX ADMIN — METHOCARBAMOL 750 MILLIGRAM(S): 500 TABLET, FILM COATED ORAL at 21:13

## 2024-03-21 NOTE — PROGRESS NOTE ADULT - ASSESSMENT
68M  HLD, pre-DM, and sinus bradycardia   Admit for Worseing BL Lower extremity paraesthesia   found to have diffuse osseous metastatic disease throughout entire spine, pathological fx with tumor extending into epidural space at T8, tumor extension into L3 epidural space, pathologic fx w/ tumor in the epidural space at L4 contributing to severe canal stenosis; tumor within the bilateral psoas muscles at L3 and L4; bilateral neural foramen effacement by tumor in L-spine. Patient s/p stage 1: T8 VCR (vertebral column resection) T6-T10 fusion for tumor resection, small csf leak primarily repaired, Stage 2 L4 Partial Corpectomy, L2-Pelvis Fusion, Plastics Closure 3/5. Patient now admitted for a multidisciplinary rehab program. 03-16-24 @ 12:10    One episode of low grade fever  UTI   T 100.1 on March 18  UA showed large LE, positive nitrite  Urine cx pending. 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 Plan to participate in 1 week of Acute Rehab, subsequently follow up with Dr. Chatterjee (onc) in 1 week for treatment planning.    Pre-diabetes  -HbA1C 6.0 on 3/5  -Consistent carb diet  -Outpatient monitoring with PMD    Hyponatremia  -Na now wnl  -Continue sodium chloride 2g Q12H  -Encourage oral intake  -Monitor BMP, taper salt tablets if able     Anemia  -Likely multifactorial  -s/p 1 unit RBC transfusion intra-op  -H/H stable  -Monitor CBC    History of Sinus Bradycardia   -EKG 3/12 reviewed, shows NSR with HR 72, no acute changes  -Per documentation, "Per PCP records, appears to be in setting of previously being active runner"      DVT PPx  - Lovenox SC

## 2024-03-21 NOTE — CHART NOTE - NSCHARTNOTEFT_GEN_A_CORE
Nutrition Follow Up Note  Hospital Course   (Per Electronic Medical Record)    Source:  Patient [X]  Medical Record [X]      Diet:   Consistent Carbohydrate Diet w/ Thin Liquids (IDDSI Level 0)  Tolerates Diet Consistency Well  No Chewing/Swallowing Difficulties  No Recent Nausea, Vomiting, Diarrhea or Constipation (as Per Patient)  Consumes % of Meals (as Per Documentation) - States Good PO Intake/Appetite (Per Patient)  Declines Nutrition Supplementation   Education Provided on Proper Nutrition &  Consistent Carbohydrate Diet    Enteral/Parenteral Nutrition: Not Applicable    Current Weight: 117.9lb on 3/20  Obtain New Weight to Confirm  Obtain Weights Weekly     Pertinent Medications: MEDICATIONS  (STANDING):  cefTRIAXone   IVPB 1000 milliGRAM(s) IV Intermittent every 24 hours  enoxaparin Injectable 40 milliGRAM(s) SubCutaneous <User Schedule>  gabapentin 600 milliGRAM(s) Oral every 8 hours  HYDROmorphone   Tablet 2 milliGRAM(s) Oral every 6 hours  methocarbamol 750 milliGRAM(s) Oral every 8 hours  multivitamin 1 Tablet(s) Oral daily  polyethylene glycol 3350 17 Gram(s) Oral two times a day  senna 2 Tablet(s) Oral at bedtime  sodium chloride 2 Gram(s) Oral every 12 hours    MEDICATIONS  (PRN):  acetaminophen     Tablet .. 975 milliGRAM(s) Oral every 6 hours PRN Temp greater or equal to 38C (100.4F), Mild Pain (1 - 3)  hydrocortisone 1% Cream 1 Application(s) Topical every 12 hours PRN Itching  oxyCODONE    IR 10 milliGRAM(s) Oral every 4 hours PRN Severe Pain (7 - 10)  traMADol 50 milliGRAM(s) Oral every 6 hours PRN Moderate Pain (4 - 6)    Pertinent Labs:  03-21 Na135 mmol/L Glu 105 mg/dL<H> K+ 3.7 mmol/L Cr  0.57 mg/dL BUN 15 mg/dL 03-21 Alb 2.1 g/dL<L> 03-13 Chol 151 mg/dL LDL --    HDL 27 mg/dL<L> Trig 127 mg/dL    Skin: Multiple Pressure Ulcers (Stage 2s)   Multiple Surgical Incisions  (as Per Nursing Flow Sheet)     Edema: +1 Edema Noted to Bilateral Lower Extremities   (as Per Documentation)     Last Bowel Movement: on 3/20    Estimated Needs:   [X] No Change Since Previous Assessment    Previous Nutrition Diagnosis:   Moderate Malnutrition    Nutrition Diagnosis is [X] Ongoing - Declines Nutrition Supplementation     New Nutrition Diagnosis: [X] Not Applicable  [X] Chewing Difficulties      Interventions:   1. Education Provided on Proper Nutrition &  Consistent Carbohydrate Diet  2. Recommend Continue Nutrition Plan of Care     Monitoring & Evaluation:   [X] Weights   [X] PO Intake   [X] Skin Integrity   [X] Follow Up (Per Protocol)  [X] Tolerance to Diet Prescription   [X] Other: Labs     Registered Dietitian/Nutritionist Remains Available.  Eleno Ramirez, DAMIENN, CDN    Phone# [phone]

## 2024-03-21 NOTE — PROGRESS NOTE ADULT - SUBJECTIVE AND OBJECTIVE BOX
Patient is a 68y old  Male who presents with a chief complaint of Thoracic spinal cord injury secondary to compression by metastasis s/p surgical resection (21 Mar 2024 13:07)      Subjective and overnight events:  Patient seen and examined at bedside. no new complaints. lower back pain controlled. + reports mild neuropathy on right knee cap and foot.  no fever, chills, sob, cp, abd pain.     ALLERGIES:  latex (Rash)  No Known Drug Allergies    MEDICATIONS  (STANDING):  cefTRIAXone   IVPB 1000 milliGRAM(s) IV Intermittent every 24 hours  enoxaparin Injectable 40 milliGRAM(s) SubCutaneous <User Schedule>  gabapentin 600 milliGRAM(s) Oral every 8 hours  HYDROmorphone   Tablet 2 milliGRAM(s) Oral every 6 hours  methocarbamol 750 milliGRAM(s) Oral every 8 hours  multivitamin 1 Tablet(s) Oral daily  polyethylene glycol 3350 17 Gram(s) Oral two times a day  senna 2 Tablet(s) Oral at bedtime  sodium chloride 2 Gram(s) Oral every 12 hours    MEDICATIONS  (PRN):  acetaminophen     Tablet .. 975 milliGRAM(s) Oral every 6 hours PRN Temp greater or equal to 38C (100.4F), Mild Pain (1 - 3)  hydrocortisone 1% Cream 1 Application(s) Topical every 12 hours PRN Itching  oxyCODONE    IR 10 milliGRAM(s) Oral every 4 hours PRN Severe Pain (7 - 10)  traMADol 50 milliGRAM(s) Oral every 6 hours PRN Moderate Pain (4 - 6)    Vital Signs Last 24 Hrs  T(F): 98.2 (21 Mar 2024 08:33), Max: 99.3 (20 Mar 2024 20:37)  HR: 69 (21 Mar 2024 08:33) (69 - 78)  BP: 104/62 (21 Mar 2024 08:33) (104/62 - 108/63)  RR: 16 (21 Mar 2024 08:33) (16 - 16)  SpO2: 95% (21 Mar 2024 08:33) (95% - 95%)  I&O's Summary    PHYSICAL EXAM:  General: NAD, A/O x 3  ENT: MMM  Neck: Supple, No JVD  Lungs: Clear to auscultation bilaterally  Cardio: RRR, S1/S2, No murmurs  Abdomen: Soft, Nontender, Nondistended; Bowel sounds present  Extremities: No calf tenderness, No pitting edema    LABS:                        9.2    8.17  )-----------( 396      ( 21 Mar 2024 05:59 )             28.4     03-21    135  |  99  |  15  ----------------------------<  105  3.7   |  30  |  0.57    Ca    8.5      21 Mar 2024 05:59    TPro  5.4  /  Alb  2.1  /  TBili  0.6  /  DBili  x   /  AST  38  /  ALT  86  /  AlkPhos  119  03-21      Lactate, Blood: 1.3 mmol/L (03-18 @ 20:05)      03-13 Chol 151 mg/dL LDL -- HDL 27 mg/dL Trig 127 mg/dL      Urinalysis Basic - ( 21 Mar 2024 05:59 )    Color: x / Appearance: x / SG: x / pH: x  Gluc: 105 mg/dL / Ketone: x  / Bili: x / Urobili: x   Blood: x / Protein: x / Nitrite: x   Leuk Esterase: x / RBC: x / WBC x   Sq Epi: x / Non Sq Epi: x / Bacteria: x      Culture - Blood (collected 18 Mar 2024 20:10)  Source: .Blood Blood-Peripheral  Preliminary Report (21 Mar 2024 03:01):    No growth at 48 Hours    Culture - Blood (collected 18 Mar 2024 20:05)  Source: .Blood Blood-Peripheral  Preliminary Report (21 Mar 2024 03:01):    No growth at 48 Hours          RADIOLOGY & ADDITIONAL TESTS:    Care Discussed with Consultants/Other Providers:

## 2024-03-21 NOTE — PROGRESS NOTE ADULT - SUBJECTIVE AND OBJECTIVE BOX
HPI:  Mr. Saturnino Solis is a 68-year-old male patient with past medical history of HLD, pre-Diabetes Mellitus, and sinus bradycardia who presented to [facility] on 3/5 with a history of progressively worsening lower extremity numbness/paresthesias and weakness with onset on Jan 2024. She underwent x-rays & MRI imaging as outpatient on 2/29/24 showing concern for osseous metastatic disease in thoracic/lumbar/sacral spine as well as extension of soft tissue into the paravertebral soft tissues more prominent on the right side. He was previously ambulating independently without device but now required a RW. Repeat MRI showed multiple findings including diffuse osseous metastatic disease throughout the entire spine; pathologic fx w/ tumor into the epidural space at T8 resulting in thoracic cord compression; tumor extension into the epidural space at L3 resulting in cauda equina compression; pathologic fx w/ tumor in the epidural space at L4 contributing to severe canal stenosis; tumor within the bilateral psoas muscles at L3 and L4; bilateral neural foramen effacement by tumor in L-spine. He underwent a two stage surgical resection with a combined surgical team including Neurosurgery (Dr. Bhaskar Partida) and Plastic Surgery (Dr. Gunnar Campbell). He underwent the following procedures:     ·	Stage 1: T8 VCR (vertebral column resection) T6-T10 fusion for tumor resection, small csf leak primarily repaired  ·	Stage 2: L4 Partial Corpectomy, L2-Pelvis Fusion, Plastics Closure 3/5 HMV drain x3 and 1 Bile bag placed w/ output closely monitored.     He was monitored post-operatively in NSCU on PCA pump, durotomy encountered intraop and primarily repaired up from OR flat and incremental 15 degrees with no positional headaches or sign of CSF leak. Surgical pathology reporting diffuse large B-cell lymphoma. Patient evaluated by PT/OT/PM&R and recommended for acute inpatient rehab. Patient was discharged to [facility] on 3/16/24. (16 Mar 2024 12:07)    TDD: 3/27th Home  ___________________________________________________________________________    SUBJECTIVE/ROS  Patient was seen and evaluated at bedside today.  Reported no overnight events and is in no acute distress.  His pain is slightly improved with current adjustment.   Psychology following and patient expressed is benefiting from it regarding his grief coping capacity.  Patient is motivated to participate on the recommended rehabilitation program.  Denies any CP, SOB, YUN, palpitations, fever, chills, body aches, cough, congestion, or any other symptoms at this time.   ___________________________________________________________________________    US DPLX LWR EXT VEINS COMPL BI (03/19/2024)   IMPRESSION: Acute deep venous thrombosis: below the knee. Bilateral soleal vein thrombosis.    ___________________________________________________________________________    Vital Signs Last 24 Hrs  T(C): 36.8 (21 Mar 2024 08:33), Max: 37.4 (20 Mar 2024 20:37)  T(F): 98.2 (21 Mar 2024 08:33), Max: 99.3 (20 Mar 2024 20:37)  HR: 69 (21 Mar 2024 08:33) (69 - 78)  BP: 104/62 (21 Mar 2024 08:33) (104/62 - 108/63)  BP(mean): --  RR: 16 (21 Mar 2024 08:33) (16 - 16)  SpO2: 95% (21 Mar 2024 08:33) (95% - 95%)    Parameters below as of 21 Mar 2024 08:33  Patient On (Oxygen Delivery Method): room air      ___________________________________________________________________________  LABS                        9.2    8.17  )-----------( 396      ( 21 Mar 2024 05:59 )             28.4     03-21    135  |  99  |  15  ----------------------------<  105<H>  3.7   |  30  |  0.57    Ca    8.5      21 Mar 2024 05:59    TPro  5.4<L>  /  Alb  2.1<L>  /  TBili  0.6  /  DBili  x   /  AST  38  /  ALT  86<H>  /  AlkPhos  119  03-21    ___________________________________________________________________________    MEDICATIONS  (STANDING):  cefTRIAXone   IVPB 1000 milliGRAM(s) IV Intermittent every 24 hours  enoxaparin Injectable 40 milliGRAM(s) SubCutaneous <User Schedule>  gabapentin 600 milliGRAM(s) Oral every 8 hours  HYDROmorphone   Tablet 2 milliGRAM(s) Oral every 6 hours  methocarbamol 750 milliGRAM(s) Oral every 8 hours  multivitamin 1 Tablet(s) Oral daily  polyethylene glycol 3350 17 Gram(s) Oral two times a day  senna 2 Tablet(s) Oral at bedtime  sodium chloride 2 Gram(s) Oral every 12 hours    MEDICATIONS  (PRN):  acetaminophen     Tablet .. 975 milliGRAM(s) Oral every 6 hours PRN Temp greater or equal to 38C (100.4F), Mild Pain (1 - 3)  hydrocortisone 1% Cream 1 Application(s) Topical every 12 hours PRN Itching  oxyCODONE    IR 10 milliGRAM(s) Oral every 4 hours PRN Severe Pain (7 - 10)  traMADol 50 milliGRAM(s) Oral every 6 hours PRN Moderate Pain (4 - 6)    ___________________________________________________________________________    PHYSICAL EXAM:    Physical Exam: Gen - NAD, Comfortable  HEENT - NCAT, EOMI, MMM  Neck - Supple, No limited ROM  Pulm - CTAB, No wheeze, No rhonchi, No crackles  Cardiovascular - RRR, S1S2, No murmurs  Abdomen - Soft, NT/ND, +BS  Extremities - No C/C/E, No calf tenderness  Neuro-     Cognitive - AAOx3     Communication - Fluent, No dysarthria     Motor - See NATALIE Examination below     Sensory - See NATALIE Examination below     Reflexes - No clonus     Tone - normal  Psychiatric - Mood stable, Affect WNL  Skin:   - Upper thoracic surgical incision: covered with Prineo dressing with no erythema, warmth,  nor discharge  - Lower thoracic and lumbosacral surgical incision: covered with Prineo dressing with no erythema, warmth,  nor discharge  - Bia-incisional sites of previous drains healing with no erythema, warmth,  nor discharge  - Stage 2 pressure injuries on sacrum and right shin  ___________________________________________________________________________    NATALIE Examination (3/18/24)    Motor (Key Muscles):              C5      C6      C7      C8      T1      L2      L3      L4      L5      S1  R        5/5     5/5    5/5     5/5    5/5    3/5    3/5    5/5     5/5     5/5  L         5/5    5/5     5/5    5/5    5/5     5/5   3/5     4/5     5/5     5/5      Sensory (Light touch): (0=absent, 1=impaired, 2=normal, NT=not testable)             C1   C2   C3   C4   C5   C6   C7   C8   R         2    2      2      2     2      2     2     2   L         2     2     2      2     2      2     2     2               T1   T2   T3   T4   T5   T6   T7   T8   T9   T10   T11   T12  R         2     2     2     2     2     2     2      2     2      2       2       2   L         2     2     2     2      2     2     2     2      2     2       2       2                L1   L2   L3   L4   L5   S1   S2   S3   S4-5         R         2     2     1    1    1    2     2     2     2   L         2     2     2     2     2     2     2     2     2      Sensory (Pin Prick): (0=absent, 1=impaired, 2=normal, NT=not testable)             C1   C2   C3   C4   C5   C6   C7   C8   R          2    2      2     2     2      2     2     2   L          2     2     2     2      2     2      2     2               T1   T2   T3   T4   T5   T6   T7   T8   T9   T10   T11   T12  R         2     2     2     2     2      2     2     2      2     2       2       1  L         2     2     2      2     2      2     2     2     2     2       2       1               L1   L2   L3   L4   L5   S1   S2   S3   S4-5         R         2     2     1   1    1    2     2     2      2   L         2     2     2     2     2     2     2     2      2    (DAP) Deep Anal Pressure Present  (VAC) Voluntary Anal Contraction Present    ___________________________________________________________________________   HPI:  Mr. Saturnino Solis is a 68-year-old male patient with past medical history of HLD, pre-Diabetes Mellitus, and sinus bradycardia who presented to [facility] on 3/5 with a history of progressively worsening lower extremity numbness/paresthesias and weakness with onset on Jan 2024. She underwent x-rays & MRI imaging as outpatient on 2/29/24 showing concern for osseous metastatic disease in thoracic/lumbar/sacral spine as well as extension of soft tissue into the paravertebral soft tissues more prominent on the right side. He was previously ambulating independently without device but now required a RW. Repeat MRI showed multiple findings including diffuse osseous metastatic disease throughout the entire spine; pathologic fx w/ tumor into the epidural space at T8 resulting in thoracic cord compression; tumor extension into the epidural space at L3 resulting in cauda equina compression; pathologic fx w/ tumor in the epidural space at L4 contributing to severe canal stenosis; tumor within the bilateral psoas muscles at L3 and L4; bilateral neural foramen effacement by tumor in L-spine. He underwent a two stage surgical resection with a combined surgical team including Neurosurgery (Dr. Bhaskar Partida) and Plastic Surgery (Dr. Gunnar Campbell). He underwent the following procedures:     ·	Stage 1: T8 VCR (vertebral column resection) T6-T10 fusion for tumor resection, small csf leak primarily repaired  ·	Stage 2: L4 Partial Corpectomy, L2-Pelvis Fusion, Plastics Closure 3/5 HMV drain x3 and 1 Bile bag placed w/ output closely monitored.     He was monitored post-operatively in NSCU on PCA pump, durotomy encountered intraop and primarily repaired up from OR flat and incremental 15 degrees with no positional headaches or sign of CSF leak. Surgical pathology reporting diffuse large B-cell lymphoma. Patient evaluated by PT/OT/PM&R and recommended for acute inpatient rehab. Patient was discharged to [facility] on 3/16/24. (16 Mar 2024 12:07)    TDD: 3/27th Home  ___________________________________________________________________________    SUBJECTIVE/ROS  Patient was seen and evaluated at bedside today.  Reported no overnight events and is in no acute distress.  His pain is slightly improved with current adjustment.   Psychology following and patient expressed is benefiting from it regarding his grief coping capacity.  Scheduled for Oncology appointment tomorrow and transportation was coordinated earlier today.  Patient is motivated to participate on the recommended rehabilitation program.  Denies any CP, SOB, YUN, palpitations, fever, chills, body aches, cough, congestion, or any other symptoms at this time.   ___________________________________________________________________________    US DPLX LWR EXT VEINS COMPL BI (03/19/2024)   IMPRESSION: Acute deep venous thrombosis: below the knee. Bilateral soleal vein thrombosis.    ___________________________________________________________________________    Vital Signs Last 24 Hrs  T(C): 36.8 (21 Mar 2024 08:33), Max: 37.4 (20 Mar 2024 20:37)  T(F): 98.2 (21 Mar 2024 08:33), Max: 99.3 (20 Mar 2024 20:37)  HR: 69 (21 Mar 2024 08:33) (69 - 78)  BP: 104/62 (21 Mar 2024 08:33) (104/62 - 108/63)  RR: 16 (21 Mar 2024 08:33) (16 - 16)  SpO2: 95% (21 Mar 2024 08:33) (95% - 95%)    ___________________________________________________________________________    LABS                        9.2    8.17  )-----------( 396      ( 21 Mar 2024 05:59 )             28.4     03-21    135  |  99  |  15  ----------------------------<  105<H>  3.7   |  30  |  0.57    Ca    8.5      21 Mar 2024 05:59    TPro  5.4<L>  /  Alb  2.1<L>  /  TBili  0.6  /  DBili  x   /  AST  38  /  ALT  86<H>  /  AlkPhos  119  03-21    ___________________________________________________________________________    MEDICATIONS  (STANDING):  cefTRIAXone   IVPB 1000 milliGRAM(s) IV Intermittent every 24 hours  enoxaparin Injectable 40 milliGRAM(s) SubCutaneous <User Schedule>  gabapentin 600 milliGRAM(s) Oral every 8 hours  HYDROmorphone   Tablet 2 milliGRAM(s) Oral every 6 hours  methocarbamol 750 milliGRAM(s) Oral every 8 hours  multivitamin 1 Tablet(s) Oral daily  polyethylene glycol 3350 17 Gram(s) Oral two times a day  senna 2 Tablet(s) Oral at bedtime  sodium chloride 2 Gram(s) Oral every 12 hours    MEDICATIONS  (PRN):  acetaminophen     Tablet .. 975 milliGRAM(s) Oral every 6 hours PRN Temp greater or equal to 38C (100.4F), Mild Pain (1 - 3)  hydrocortisone 1% Cream 1 Application(s) Topical every 12 hours PRN Itching  oxyCODONE    IR 10 milliGRAM(s) Oral every 4 hours PRN Severe Pain (7 - 10)  traMADol 50 milliGRAM(s) Oral every 6 hours PRN Moderate Pain (4 - 6)    ___________________________________________________________________________    PHYSICAL EXAM:    Physical Exam: Gen - NAD, Comfortable  HEENT - NCAT, EOMI, MMM  Neck - Supple, No limited ROM  Pulm - CTAB, No wheeze, No rhonchi, No crackles  Cardiovascular - RRR, S1S2, No murmurs  Abdomen - Soft, NT/ND, +BS  Extremities - No C/C/E, No calf tenderness  Neuro-     Cognitive - AAOx3     Communication - Fluent, No dysarthria     Motor - See NATALIE Examination below     Sensory - See NATALIE Examination below     Reflexes - No clonus     Tone - normal  Psychiatric - Mood stable, Affect WNL  Skin:   - Upper thoracic surgical incision: covered with Prineo dressing with no erythema, warmth,  nor discharge  - Lower thoracic and lumbosacral surgical incision: covered with Prineo dressing with no erythema, warmth,  nor discharge  - Bia-incisional sites of previous drains healing with no erythema, warmth,  nor discharge  - Stage 2 pressure injuries on sacrum and right shin  ___________________________________________________________________________    NATALIE Examination (3/18/24)    Motor (Key Muscles):              C5      C6      C7      C8      T1      L2      L3      L4      L5      S1  R        5/5     5/5    5/5     5/5    5/5    3/5    3/5    5/5     5/5     5/5  L         5/5    5/5     5/5    5/5    5/5     5/5   3/5     4/5     5/5     5/5      Sensory (Light touch): (0=absent, 1=impaired, 2=normal, NT=not testable)             C1   C2   C3   C4   C5   C6   C7   C8   R         2    2      2      2     2      2     2     2   L         2     2     2      2     2      2     2     2               T1   T2   T3   T4   T5   T6   T7   T8   T9   T10   T11   T12  R         2     2     2     2     2     2     2      2     2      2       2       2   L         2     2     2     2      2     2     2     2      2     2       2       2                L1   L2   L3   L4   L5   S1   S2   S3   S4-5         R         2     2     1    1    1    2     2     2     2   L         2     2     2     2     2     2     2     2     2      Sensory (Pin Prick): (0=absent, 1=impaired, 2=normal, NT=not testable)             C1   C2   C3   C4   C5   C6   C7   C8   R          2    2      2     2     2      2     2     2   L          2     2     2     2      2     2      2     2               T1   T2   T3   T4   T5   T6   T7   T8   T9   T10   T11   T12  R         2     2     2     2     2      2     2     2      2     2       2       1  L         2     2     2      2     2      2     2     2     2     2       2       1               L1   L2   L3   L4   L5   S1   S2   S3   S4-5         R         2     2     1   1    1    2     2     2      2   L         2     2     2     2     2     2     2     2      2    (DAP) Deep Anal Pressure Present  (VAC) Voluntary Anal Contraction Present    ___________________________________________________________________________

## 2024-03-21 NOTE — PROGRESS NOTE ADULT - ASSESSMENT
Mr. 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      Thoracic spinal cord injury secondary to compression by metastasis s/p surgical resection  - T8 AIS D  - MRI:    * diffuse osseous metastatic disease throughout the entire spine    * pathologic fx w/ tumor into the epidural space at T8 resulting in thoracic cord compression    * tumor extension into the epidural space at L3 resulting in cauda equina compression    * pathologic fx w/ tumor in the epidural space at L4 contributing to severe canal stenosis    * tumor within the bilateral psoas muscles at L3 and L4    * bilateral neural foramen effacement by tumor in L-spine.   - Surgical pathology report: Diffuse large B-cell lymphoma  - Spine tumor resection surgeries:    * Stage 1: T8 VCR (vertebral column resection) T6-T10 fusion for tumor resection, small csf leak primarily repaired    * Stage 2: L4 Partial Corpectomy, L2-Pelvis Fusion, Plastics Closure 3/5 HMV drain x3 and 1 Bile bag     * All drains removed    * Spine and fall precautions    * F/U with neurosurgery, plastics, and oncology outpatient  - Cauda equina syndrome  - Paraparesis  - Lower extremity sensory deficits  - Impaired ADLs and mobility  - Need for assistance with personal care  - Start comprehensive rehab program of PT/OT - 3 hours a day, 5 days a week. 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    - Stage IV  - PET CT as outpatient  - [facility] [place] w/ recommendations when appropriate to start chemo, currently needs wound healing (surgery a week ago w/ multiple drains containing sanguinous fluid)  will follow .   - F/u with oncology outpatient    Pain  - gabapentin 600 milliGRAM(s) Oral every 8 hours  - HYDROmorphone   Tablet 2 milliGRAM(s) Oral every 6 hours  - methocarbamol 750 milliGRAM(s) Oral every 8 hours  (PRN):  - acetaminophen     Tablet .. 975 milliGRAM(s) Oral every 6 hours PRN Temp greater or equal to 38C (100.4F), Mild Pain (1 - 3)  - traMADol 50 milliGRAM(s) Oral every 6 hours PRN Moderate Pain (4 - 6)  - oxyCODONE    IR 10 milliGRAM(s) Oral every 4 hours PRN Severe Pain (7 - 10)    Pre-diabetes  - Off meds  - Consistent carb diet    Hyponatremia  - Continue sodium tabs  - Trend electrolytes    Sleep  - Melatonin PRN     GI / Bowel  - Senna qHS  - Miralax PRN Daily     / Bladder  - Currently patient voids independently. Check PVRs on admission. SC for > 400ccs    Skin / Pressure injury assessment   * Upper thoracic surgical incision: covered with Prineo dressing with no erythema, warmth,  nor discharge  * Lower thoracic and lumbosacral surgical incision: covered with Prineo dressing with no erythema, warmth,  nor discharge  * Bia-incisional sites of previous drains healing with no erythema, warmth,  nor discharge  * Stage 2 pressure injuries on sacrum and right shin  - Monitor Incisions:  q shift  - Turn q2 hours in bed while awake, air mattress  - nursing to monitor skin qShift    Diet/Dysphagia:  - Diet Consistency: Consistent carb    DVT prophylaxis:   - Bilateral soleal DVTs noted (3/19)  - Seen by Hematology  - Continue Lovenox  - Follow-up imaging on 3/26 (ordered)    Outpatient Follow-up:  Bhaskar Partida  Neurosurgery  [address]  [place] [address]  Phone: [phone]  Fax: [phone]  Follow Up Time: 2 weeks    Tobias Chatterjee  Medical Oncology  [address] [place] [address]  Phone: [phone]  Fax: [phone]  Follow Up Time: 1 week    Gunnar Campbell  Plastic Surgery  [address], [address]  [place] [address]  Phone: [phone]  Fax: [phone]  Follow Up Time:      ---------------   Mr. 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      Thoracic spinal cord injury secondary to compression by metastasis s/p surgical resection  - T8 AIS D  - MRI:    * diffuse osseous metastatic disease throughout the entire spine    * pathologic fx w/ tumor into the epidural space at T8 resulting in thoracic cord compression    * tumor extension into the epidural space at L3 resulting in cauda equina compression    * pathologic fx w/ tumor in the epidural space at L4 contributing to severe canal stenosis    * tumor within the bilateral psoas muscles at L3 and L4    * bilateral neural foramen effacement by tumor in L-spine.   - Surgical pathology report: Diffuse large B-cell lymphoma  - Spine tumor resection surgeries:    * Stage 1: T8 VCR (vertebral column resection) T6-T10 fusion for tumor resection, small csf leak primarily repaired    * Stage 2: L4 Partial Corpectomy, L2-Pelvis Fusion, Plastics Closure 3/5 HMV drain x3 and 1 Bile bag     * All drains removed    * Spine and fall precautions    * F/U with neurosurgery, plastics, and oncology outpatient  - Cauda equina syndrome  - Paraparesis  - Lower extremity sensory deficits  - Impaired ADLs and mobility  - Need for assistance with personal care  - Continue comprehensive rehab program of PT/OT - 3 hours a day, 5 days a week. 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    - Stage IV  - PET CT as outpatient  - [facility] [place] get back w/ recommendations when appropriate to start chemo, currently needs wound healing (surgery a week ago w/ multiple drains containing sanguinous fluid)  will follow .   - F/u with oncology outpatient    Pain  - gabapentin 600 milliGRAM(s) Oral every 8 hours  - HYDROmorphone   Tablet 2 milliGRAM(s) Oral every 6 hours  - methocarbamol 750 milliGRAM(s) Oral every 8 hours  (PRN):  - acetaminophen     Tablet .. 975 milliGRAM(s) Oral every 6 hours PRN Temp greater or equal to 38C (100.4F), Mild Pain (1 - 3)  - traMADol 50 milliGRAM(s) Oral every 6 hours PRN Moderate Pain (4 - 6)  - oxyCODONE    IR 10 milliGRAM(s) Oral every 4 hours PRN Severe Pain (7 - 10)    Pre-diabetes  - Off meds  - Consistent carb diet    Hyponatremia  - Continue sodium tabs  - Trend electrolytes    Sleep  - Melatonin PRN     GI / Bowel  - Senna qHS  - Miralax PRN Daily     / Bladder  - Currently patient voids independently. Check PVRs on admission. SC for > 400ccs    Skin / Pressure injury assessment   * Upper thoracic surgical incision: covered with Prineo dressing with no erythema, warmth,  nor discharge  * Lower thoracic and lumbosacral surgical incision: covered with Prineo dressing with no erythema, warmth,  nor discharge  * Bia-incisional sites of previous drains healing with no erythema, warmth,  nor discharge  * Stage 2 pressure injuries on sacrum and right shin  - Monitor Incisions:  q shift  - Turn q2 hours in bed while awake, air mattress  - nursing to monitor skin qShift    Diet/Dysphagia:  - Diet Consistency: Consistent carb    DVT prophylaxis:   - Bilateral soleal DVTs noted (3/19)  - Seen by Hematology  - Continue Lovenox  - Follow-up imaging on 3/26 (ordered)    Outpatient Follow-up:  Bhaskar Partida  Neurosurgery  [address]  [place] [address]  Phone: [phone]  Fax: [phone]  Follow Up Time: 2 weeks    Tobias Chatterjee  Medical Oncology  [address] [place] [address]  Phone: [phone]  Fax: [phone]  Follow Up Time: 1 week    Gunnar Campbell  Plastic Surgery  [address]  [place] [address]  Phone: [phone]  Fax: [phone]  Follow Up Time:      ---------------

## 2024-03-22 ENCOUNTER — APPOINTMENT (OUTPATIENT)
Dept: HEMATOLOGY ONCOLOGY | Facility: CLINIC | Age: 69
End: 2024-03-22
Payer: MEDICARE

## 2024-03-22 ENCOUNTER — TRANSCRIPTION ENCOUNTER (OUTPATIENT)
Age: 69
End: 2024-03-22

## 2024-03-22 ENCOUNTER — INPATIENT (INPATIENT)
Facility: HOSPITAL | Age: 69
LOS: 3 days | Discharge: HOME HEALTH | DRG: 846 | End: 2024-03-26
Attending: INTERNAL MEDICINE | Admitting: INTERNAL MEDICINE
Payer: MEDICARE

## 2024-03-22 VITALS
DIASTOLIC BLOOD PRESSURE: 68 MMHG | TEMPERATURE: 97.3 F | HEART RATE: 76 BPM | RESPIRATION RATE: 16 BRPM | SYSTOLIC BLOOD PRESSURE: 106 MMHG | OXYGEN SATURATION: 96 %

## 2024-03-22 VITALS
HEIGHT: 63 IN | DIASTOLIC BLOOD PRESSURE: 73 MMHG | HEART RATE: 70 BPM | OXYGEN SATURATION: 97 % | RESPIRATION RATE: 16 BRPM | WEIGHT: 115.3 LBS | SYSTOLIC BLOOD PRESSURE: 114 MMHG | TEMPERATURE: 99 F

## 2024-03-22 VITALS — WEIGHT: 117.95 LBS | HEIGHT: 65 IN

## 2024-03-22 DIAGNOSIS — Z98.890 OTHER SPECIFIED POSTPROCEDURAL STATES: Chronic | ICD-10-CM

## 2024-03-22 DIAGNOSIS — C83.30 DIFFUSE LARGE B-CELL LYMPHOMA, UNSPECIFIED SITE: ICD-10-CM

## 2024-03-22 DIAGNOSIS — Z90.49 ACQUIRED ABSENCE OF OTHER SPECIFIED PARTS OF DIGESTIVE TRACT: Chronic | ICD-10-CM

## 2024-03-22 DIAGNOSIS — I82.409 ACUTE EMBOLISM AND THROMBOSIS OF UNSPECIFIED DEEP VEINS OF UNSPECIFIED LOWER EXTREMITY: ICD-10-CM

## 2024-03-22 DIAGNOSIS — Z29.9 ENCOUNTER FOR PROPHYLACTIC MEASURES, UNSPECIFIED: ICD-10-CM

## 2024-03-22 DIAGNOSIS — C85.90 NON-HODGKIN LYMPHOMA, UNSPECIFIED, UNSPECIFIED SITE: ICD-10-CM

## 2024-03-22 DIAGNOSIS — B99.9 UNSPECIFIED INFECTIOUS DISEASE: ICD-10-CM

## 2024-03-22 LAB
-  AMOXICILLIN/CLAVULANIC ACID: SIGNIFICANT CHANGE UP
-  AMPICILLIN/SULBACTAM: SIGNIFICANT CHANGE UP
-  AMPICILLIN: SIGNIFICANT CHANGE UP
-  AZTREONAM: SIGNIFICANT CHANGE UP
-  CEFAZOLIN: SIGNIFICANT CHANGE UP
-  CEFEPIME: SIGNIFICANT CHANGE UP
-  CEFTRIAXONE: SIGNIFICANT CHANGE UP
-  CEFUROXIME: SIGNIFICANT CHANGE UP
-  CIPROFLOXACIN: SIGNIFICANT CHANGE UP
-  ERTAPENEM: SIGNIFICANT CHANGE UP
-  GENTAMICIN: SIGNIFICANT CHANGE UP
-  IMIPENEM: SIGNIFICANT CHANGE UP
-  LEVOFLOXACIN: SIGNIFICANT CHANGE UP
-  MEROPENEM: SIGNIFICANT CHANGE UP
-  NITROFURANTOIN: SIGNIFICANT CHANGE UP
-  PIPERACILLIN/TAZOBACTAM: SIGNIFICANT CHANGE UP
-  TOBRAMYCIN: SIGNIFICANT CHANGE UP
-  TRIMETHOPRIM/SULFAMETHOXAZOLE: SIGNIFICANT CHANGE UP
ALBUMIN SERPL ELPH-MCNC: 3 G/DL — LOW (ref 3.3–5)
ALP SERPL-CCNC: 137 U/L — HIGH (ref 40–120)
ALT FLD-CCNC: 86 U/L — HIGH (ref 10–45)
ANION GAP SERPL CALC-SCNC: 14 MMOL/L — SIGNIFICANT CHANGE UP (ref 5–17)
AST SERPL-CCNC: 61 U/L — HIGH (ref 10–40)
BILIRUB SERPL-MCNC: 0.4 MG/DL — SIGNIFICANT CHANGE UP (ref 0.2–1.2)
BUN SERPL-MCNC: 19 MG/DL — SIGNIFICANT CHANGE UP (ref 7–23)
CALCIUM SERPL-MCNC: 9.2 MG/DL — SIGNIFICANT CHANGE UP (ref 8.4–10.5)
CHLORIDE SERPL-SCNC: 98 MMOL/L — SIGNIFICANT CHANGE UP (ref 96–108)
CHROM ANALY OVERALL INTERP SPEC-IMP: SIGNIFICANT CHANGE UP
CO2 SERPL-SCNC: 22 MMOL/L — SIGNIFICANT CHANGE UP (ref 22–31)
CREAT SERPL-MCNC: 0.51 MG/DL — SIGNIFICANT CHANGE UP (ref 0.5–1.3)
CULTURE RESULTS: ABNORMAL
EGFR: 112 ML/MIN/1.73M2 — SIGNIFICANT CHANGE UP
GLUCOSE SERPL-MCNC: 105 MG/DL — HIGH (ref 70–99)
LDH SERPL L TO P-CCNC: 612 U/L — HIGH (ref 50–242)
MAGNESIUM SERPL-MCNC: 2.2 MG/DL — SIGNIFICANT CHANGE UP (ref 1.6–2.6)
METHOD TYPE: SIGNIFICANT CHANGE UP
ORGANISM # SPEC MICROSCOPIC CNT: ABNORMAL
ORGANISM # SPEC MICROSCOPIC CNT: SIGNIFICANT CHANGE UP
PHOSPHATE SERPL-MCNC: 4.3 MG/DL — SIGNIFICANT CHANGE UP (ref 2.5–4.5)
POTASSIUM SERPL-MCNC: 4.2 MMOL/L — SIGNIFICANT CHANGE UP (ref 3.5–5.3)
POTASSIUM SERPL-SCNC: 4.2 MMOL/L — SIGNIFICANT CHANGE UP (ref 3.5–5.3)
PROT SERPL-MCNC: 6 G/DL — SIGNIFICANT CHANGE UP (ref 6–8.3)
SODIUM SERPL-SCNC: 134 MMOL/L — LOW (ref 135–145)
SPECIMEN SOURCE: SIGNIFICANT CHANGE UP
URATE SERPL-MCNC: 4.1 MG/DL — SIGNIFICANT CHANGE UP (ref 3.4–8.8)

## 2024-03-22 PROCEDURE — 99239 HOSP IP/OBS DSCHRG MGMT >30: CPT

## 2024-03-22 PROCEDURE — 71250 CT THORAX DX C-: CPT | Mod: 26

## 2024-03-22 PROCEDURE — 99232 SBSQ HOSP IP/OBS MODERATE 35: CPT

## 2024-03-22 PROCEDURE — G2212 PROLONG OUTPT/OFFICE VIS: CPT

## 2024-03-22 PROCEDURE — 74176 CT ABD & PELVIS W/O CONTRAST: CPT | Mod: 26

## 2024-03-22 PROCEDURE — 99215 OFFICE O/P EST HI 40 MIN: CPT

## 2024-03-22 PROCEDURE — G2211 COMPLEX E/M VISIT ADD ON: CPT

## 2024-03-22 RX ORDER — ACETAMINOPHEN 500 MG
3 TABLET ORAL
Qty: 0 | Refills: 0 | DISCHARGE
Start: 2024-03-22

## 2024-03-22 RX ORDER — ENOXAPARIN SODIUM 100 MG/ML
40 INJECTION SUBCUTANEOUS EVERY 24 HOURS
Refills: 0 | Status: DISCONTINUED | OUTPATIENT
Start: 2024-03-22 | End: 2024-03-23

## 2024-03-22 RX ORDER — HYDROCORTISONE 1 %
1 OINTMENT (GRAM) TOPICAL
Qty: 0 | Refills: 0 | DISCHARGE
Start: 2024-03-22

## 2024-03-22 RX ORDER — HYDROMORPHONE HYDROCHLORIDE 2 MG/ML
1 INJECTION INTRAMUSCULAR; INTRAVENOUS; SUBCUTANEOUS ONCE
Refills: 0 | Status: DISCONTINUED | OUTPATIENT
Start: 2024-03-22 | End: 2024-03-22

## 2024-03-22 RX ORDER — OXYCODONE HYDROCHLORIDE 5 MG/1
10 TABLET ORAL EVERY 4 HOURS
Refills: 0 | Status: DISCONTINUED | OUTPATIENT
Start: 2024-03-22 | End: 2024-03-24

## 2024-03-22 RX ORDER — TRAMADOL HYDROCHLORIDE 50 MG/1
50 TABLET ORAL EVERY 6 HOURS
Refills: 0 | Status: DISCONTINUED | OUTPATIENT
Start: 2024-03-22 | End: 2024-03-26

## 2024-03-22 RX ORDER — HYDROMORPHONE HYDROCHLORIDE 2 MG/ML
1 INJECTION INTRAMUSCULAR; INTRAVENOUS; SUBCUTANEOUS
Qty: 0 | Refills: 0 | DISCHARGE
Start: 2024-03-22

## 2024-03-22 RX ORDER — GABAPENTIN 400 MG/1
2 CAPSULE ORAL
Qty: 0 | Refills: 0 | DISCHARGE
Start: 2024-03-22

## 2024-03-22 RX ORDER — METHOCARBAMOL 500 MG/1
1 TABLET, FILM COATED ORAL
Qty: 0 | Refills: 0 | DISCHARGE
Start: 2024-03-22

## 2024-03-22 RX ORDER — ALLOPURINOL 300 MG
300 TABLET ORAL DAILY
Refills: 0 | Status: DISCONTINUED | OUTPATIENT
Start: 2024-03-22 | End: 2024-03-26

## 2024-03-22 RX ORDER — POLYETHYLENE GLYCOL 3350 17 G/17G
17 POWDER, FOR SOLUTION ORAL
Qty: 0 | Refills: 0 | DISCHARGE
Start: 2024-03-22

## 2024-03-22 RX ORDER — METHOCARBAMOL 500 MG/1
750 TABLET, FILM COATED ORAL EVERY 8 HOURS
Refills: 0 | Status: DISCONTINUED | OUTPATIENT
Start: 2024-03-22 | End: 2024-03-26

## 2024-03-22 RX ORDER — SENNA PLUS 8.6 MG/1
2 TABLET ORAL
Qty: 0 | Refills: 0 | DISCHARGE
Start: 2024-03-22

## 2024-03-22 RX ORDER — SODIUM CHLORIDE 9 MG/ML
2 INJECTION INTRAMUSCULAR; INTRAVENOUS; SUBCUTANEOUS
Qty: 0 | Refills: 0 | DISCHARGE
Start: 2024-03-22

## 2024-03-22 RX ORDER — SODIUM CHLORIDE 9 MG/ML
1000 INJECTION INTRAMUSCULAR; INTRAVENOUS; SUBCUTANEOUS
Refills: 0 | Status: DISCONTINUED | OUTPATIENT
Start: 2024-03-22 | End: 2024-03-26

## 2024-03-22 RX ORDER — HYDROMORPHONE HYDROCHLORIDE 2 MG/ML
2 INJECTION INTRAMUSCULAR; INTRAVENOUS; SUBCUTANEOUS EVERY 6 HOURS
Refills: 0 | Status: COMPLETED | OUTPATIENT
Start: 2024-03-22 | End: 2024-03-29

## 2024-03-22 RX ORDER — GABAPENTIN 400 MG/1
600 CAPSULE ORAL EVERY 8 HOURS
Refills: 0 | Status: DISCONTINUED | OUTPATIENT
Start: 2024-03-22 | End: 2024-03-26

## 2024-03-22 RX ORDER — TRAMADOL HYDROCHLORIDE 50 MG/1
1 TABLET ORAL
Qty: 0 | Refills: 0 | DISCHARGE
Start: 2024-03-22

## 2024-03-22 RX ORDER — OXYCODONE HYDROCHLORIDE 5 MG/1
1 TABLET ORAL
Qty: 0 | Refills: 0 | DISCHARGE
Start: 2024-03-22

## 2024-03-22 RX ADMIN — SODIUM CHLORIDE 50 MILLILITER(S): 9 INJECTION INTRAMUSCULAR; INTRAVENOUS; SUBCUTANEOUS at 18:39

## 2024-03-22 RX ADMIN — METHOCARBAMOL 750 MILLIGRAM(S): 500 TABLET, FILM COATED ORAL at 15:54

## 2024-03-22 RX ADMIN — GABAPENTIN 600 MILLIGRAM(S): 400 CAPSULE ORAL at 05:55

## 2024-03-22 RX ADMIN — OXYCODONE HYDROCHLORIDE 10 MILLIGRAM(S): 5 TABLET ORAL at 22:04

## 2024-03-22 RX ADMIN — OXYCODONE HYDROCHLORIDE 10 MILLIGRAM(S): 5 TABLET ORAL at 23:04

## 2024-03-22 RX ADMIN — OXYCODONE HYDROCHLORIDE 10 MILLIGRAM(S): 5 TABLET ORAL at 15:15

## 2024-03-22 RX ADMIN — SODIUM CHLORIDE 2 GRAM(S): 9 INJECTION INTRAMUSCULAR; INTRAVENOUS; SUBCUTANEOUS at 05:56

## 2024-03-22 RX ADMIN — ENOXAPARIN SODIUM 40 MILLIGRAM(S): 100 INJECTION SUBCUTANEOUS at 22:04

## 2024-03-22 RX ADMIN — METHOCARBAMOL 750 MILLIGRAM(S): 500 TABLET, FILM COATED ORAL at 22:28

## 2024-03-22 RX ADMIN — OXYCODONE HYDROCHLORIDE 10 MILLIGRAM(S): 5 TABLET ORAL at 06:49

## 2024-03-22 RX ADMIN — HYDROMORPHONE HYDROCHLORIDE 2 MILLIGRAM(S): 2 INJECTION INTRAMUSCULAR; INTRAVENOUS; SUBCUTANEOUS at 05:56

## 2024-03-22 RX ADMIN — HYDROMORPHONE HYDROCHLORIDE 2 MILLIGRAM(S): 2 INJECTION INTRAMUSCULAR; INTRAVENOUS; SUBCUTANEOUS at 06:49

## 2024-03-22 RX ADMIN — HYDROMORPHONE HYDROCHLORIDE 2 MILLIGRAM(S): 2 INJECTION INTRAMUSCULAR; INTRAVENOUS; SUBCUTANEOUS at 18:02

## 2024-03-22 RX ADMIN — HYDROMORPHONE HYDROCHLORIDE 2 MILLIGRAM(S): 2 INJECTION INTRAMUSCULAR; INTRAVENOUS; SUBCUTANEOUS at 18:32

## 2024-03-22 RX ADMIN — GABAPENTIN 600 MILLIGRAM(S): 400 CAPSULE ORAL at 15:15

## 2024-03-22 RX ADMIN — TRAMADOL HYDROCHLORIDE 50 MILLIGRAM(S): 50 TABLET ORAL at 09:37

## 2024-03-22 RX ADMIN — Medication 1 APPLICATION(S): at 09:36

## 2024-03-22 RX ADMIN — TRAMADOL HYDROCHLORIDE 50 MILLIGRAM(S): 50 TABLET ORAL at 08:37

## 2024-03-22 RX ADMIN — GABAPENTIN 600 MILLIGRAM(S): 400 CAPSULE ORAL at 22:04

## 2024-03-22 RX ADMIN — OXYCODONE HYDROCHLORIDE 10 MILLIGRAM(S): 5 TABLET ORAL at 07:28

## 2024-03-22 RX ADMIN — HYDROMORPHONE HYDROCHLORIDE 2 MILLIGRAM(S): 2 INJECTION INTRAMUSCULAR; INTRAVENOUS; SUBCUTANEOUS at 00:01

## 2024-03-22 RX ADMIN — METHOCARBAMOL 750 MILLIGRAM(S): 500 TABLET, FILM COATED ORAL at 05:56

## 2024-03-22 RX ADMIN — OXYCODONE HYDROCHLORIDE 10 MILLIGRAM(S): 5 TABLET ORAL at 15:45

## 2024-03-22 NOTE — H&P ADULT - PROBLEM SELECTOR PLAN 1
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  CNS-IPI score: 4; high risk, Age >60, , >2 extra maira sites stage 4  Plan for curative intent treatment with R-CHOP along with IV MTX (3.5g/m2) on D14 given extensive spine involvement  Consented for MR-CHOP and placed in chart  To start Rituxan tomorrow 3/22  ECHO reviewed EF 71%, hepatitis/EBV/HIV panel negative   Start allopurinol 100 mg daily  Monitor TLS labs and CBC, Rasburicase if uric acid >8 CD10+ DLBCL, positive for BCL-2 rearrangement, negative for MYC rearrangement.  CNS-IPI score: 4; high risk, Age >60, , >2 extra maira sites stage 4  Plan for curative intent treatment with R-CHOP along with IV MTX (3.5g/m2) on D14 given extensive spine involvement  Consented for MR-CHOP and placed in chart  To start Rituxan tomorrow 3/22  ECHO reviewed EF 71%, hepatitis/EBV/HIV panel negative   Pain control/PT/OT  Start allopurinol 100 mg daily  Monitor TLS labs and CBC, Rasburicase if uric acid >8 CD10+ DLBCL, positive for BCL-2 rearrangement, negative for MYC rearrangement.  CNS-IPI score: 4; high risk, Age >60, , >2 extra maira sites stage 4  Plan for curative intent treatment with R-CHOP along with IV MTX (3.5g/m2) on D14 given extensive spine involvement  Consented for MR-CHOP and placed in chart  To start Rituxan tomorrow 3/22  ECHO reviewed EF 71%, hepatitis/EBV/HIV panel negative   Pain control/PT/OT  Start allopurinol 300 mg daily  Monitor TLS labs and CBC daily, Rasburicase 3mg if uric acid >8 and 6mg for uric acid>12 CD10+ DLBCL, positive for BCL-2 rearrangement, negative for MYC rearrangement.  CNS-IPI score: 4; high risk, Age >60, , >2 extra maira sites stage 4  Plan for curative intent treatment with R-CHOP along with IV MTX (3.5g/m2) on D14 given extensive spine involvement  Consented for MR-CHOP and placed in chart  To start Rituxan tomorrow 3/22  ECHO reviewed EF 71%, hepatitis/EBV/HIV panel negative   Pain control/PT/OT  Start allopurinol 300 mg daily  Monitor TLS labs and CBC daily, Rasburicase 3mg if uric acid >8 and 6mg for uric acid>12  Transfuse if Hb <8, PLT <10 or <15 and febrile or <50 and bleeding

## 2024-03-22 NOTE — DISCHARGE NOTE PROVIDER - DETAILS OF MALNUTRITION DIAGNOSIS/DIAGNOSES
This patient has been assessed with a concern for Malnutrition and was treated during this hospitalization for the following Nutrition diagnosis/diagnoses:     -  03/17/2024: Moderate protein-calorie malnutrition

## 2024-03-22 NOTE — HISTORY OF PRESENT ILLNESS
[de-identified] : This is the first time I am meeting Mr. Solis.  [de-identified] : Reason for visit: DLBCL  Since discharge from [facility], Mr. Solis has been at [facility].   History of present illness. Initially presented at [facility] with progressing LE numbness/paresthesias and weakness since Jan 2024.  MRI spine (3/5/24): diffuse osseous disease including C4, T8 burst fracture, with epidural disease causing cord deformity and cord edema, L4 endplant pathological fracture, L3-L4 continuous involvement of central canal neural foramina and paraspinal soft tissues. Additionally, CT C/A/P showed Stage IV disease with an anterior third right rib oseous lesion extending to a 1.5cm soft tissue lesion on right lung pleural surface, 1.1cm left plaural surface lesion, 2.4cm right axillary LN, extensive retroperitoneal and mesenteric LAD with amira conglomerate mass of 8.1 x 3.8 cm.   S/P two stage neurosurgical intervention:           Stage 1: T8 VCR (vertebral column resection) T6-T10 fusion for tumor resection, small csf leak primarily repaired 	Stage 2: L4 Partial Corpectomy, L2-Pelvis Fusion, Plastics Closure 3/5 HMV drain x3 and 1 Bile bag placed w/ output closely monitored.   Course complicated by bilateral soleal DVT  patient was discharges on 3/16 to [place] acute rehab.   Pathology:  CD10+ DLBCL, positive for BCL-2 rearrangement, negative for MYC rearrangement.   CNS-IPI score: 4; high risk  Age >60   >2 extra maira sites  stage 4   ECHO: performed   Social:  Active ,  recently lost wife due to breast cancer (Feb 2024) 3 daughters aged 31, 28, 26.   ROS: right leg weakness  Examination: Mr Solis is articulate and in no acute distress. He presents from a stretcher. He can stand with assistance. Extensor hallicus longus 4+/5 bilaterally

## 2024-03-22 NOTE — PATIENT PROFILE ADULT - FALL HARM RISK - HARM RISK INTERVENTIONS
Observation Stay Multiple Days Assistance with ambulation/Assistance OOB with selected safe patient handling equipment/Communicate Risk of Fall with Harm to all staff/Discuss with provider need for PT consult/Monitor for mental status changes/Monitor gait and stability/Provide patient with walking aids - walker, cane, crutches/Reinforce activity limits and safety measures with patient and family/Reorient to person, place and time as needed/Sit up slowly, dangle for a short time, stand at bedside before walking/Tailored Fall Risk Interventions/Visual Cue: Yellow wristband and red socks/Bed in lowest position, wheels locked, appropriate side rails in place/Call bell, personal items and telephone in reach/Instruct patient to call for assistance before getting out of bed or chair/Non-slip footwear when patient is out of bed/[place] to call system/Physically safe environment - no spills, clutter or unnecessary equipment/Purposeful Proactive Rounding/Room/bathroom lighting operational, light cord in reach

## 2024-03-22 NOTE — DISCHARGE NOTE PROVIDER - CARE PROVIDER_API CALL
Bhaskar Partida  Neurosurgery  [address]  [place] [address]  Phone: [phone]  Fax: [phone]  Follow Up Time: 1 week    Tobias Chatterjee  Medical Oncology  [address] [place] [address]  Phone: [phone]  Fax: [phone]  Follow Up Time: 1 week    Gunnar Campbell  Plastic Surgery  [address]  [place] [address]  Phone: [phone]  Fax: [phone]  Follow Up Time: 1 week

## 2024-03-22 NOTE — PROGRESS NOTE ADULT - PROVIDER SPECIALTY LIST ADULT
Internal Medicine
Physiatry
Heme/Onc
Hospitalist
Internal Medicine
Physiatry
Rehab Medicine
Physiatry

## 2024-03-22 NOTE — DISCHARGE NOTE PROVIDER - NSDCCPCAREPLAN_GEN_ALL_CORE_FT
PRINCIPAL DISCHARGE DIAGNOSIS  Diagnosis: Diffuse large B cell lymphoma  Assessment and Plan of Treatment: You suffered a thoracic spinal cord injury secondary to comression by metasis and underwent surgical resection and L2-Pelvis fusion. Your spinal injury level is T8 AIS D. Please follow up with oncologist and neurosurgery team.      SECONDARY DISCHARGE DIAGNOSES  Diagnosis: DVT, lower extremity  Assessment and Plan of Treatment: You have bilateral soleal DVTs identified on 3/19. Please obtain weekly dopplers at [facility] and follow up with oncology/PCP for further management.    Diagnosis: Acute UTI  Assessment and Plan of Treatment: You had a UTI during your rehab stay and treated with IV ceftriaxone for 3 days. Your urine culture sensitivites to antiobitocs are pending. This will be followed at [facility].    Diagnosis: Hyponatremia  Assessment and Plan of Treatment: Please continue taking sodium tabs as prescribed.

## 2024-03-22 NOTE — ASSESSMENT
[FreeTextEntry1] : 67yo with new diagnosis of germinal center DLBCL involving spine s/p 2 stage neurosurgical intervention. Course complicated by 1) spinal cord disease, 2) elevated LDH and 3) bilateral CVT.   PMHx: prediabetes.   For over 70 minutes (11:30 AM - 1PM), in the presence of daughter, Bita, we Dx the diagnosis of DLBCL.  Discussed curative intent treatment with R-CHOP given every 21 days x 6 cycles. Discussed reponse rate of approximately 80% with curative rate of 60%.  Given extensive spine involvement, discussed the addition of IV MTX (3.5g/m2) on D14. (MR-CHOP)  Patient has been consented for MR-CHOP Will arrange for transfer to [facility] today with chemo initiation.    Please obtain repeat CT C/A/P prior to treatment  Will also infused zometa during this first cycle.

## 2024-03-22 NOTE — H&P ADULT - ASSESSMENT
67yo with new diagnosis of germinal center DLBCL involving spine s/p 2 stage neurosurgical intervention. 69 yo M with new diagnosis of germinal center DLBCL involving spine s/p 2 stage neurosurgical intervention admitted to start MR-CHOP.  67 yo M with new diagnosis of germinal center DLBCL involving spine s/p 2 stage neurosurgical intervention admitted to start MR-CHOP.     pmhx : sinus bradycardia,  pre-DM, HLD, LE DVT

## 2024-03-22 NOTE — H&P ADULT - NSHPPHYSICALEXAM_GEN_ALL_CORE
GENERAL: NAD, well-developed  HEAD:  Atraumatic, Normocephalic  EYES: EOMI, PERRLA, conjunctiva and sclera clear  NECK: Supple, No JVD  CHEST/LUNG: Clear to auscultation bilaterally; No wheeze  HEART: Regular rate and rhythm; No murmurs, rubs, or gallops  ABDOMEN: Soft, Nontender, Nondistended; Bowel sounds present  EXTREMITIES:  2+ Peripheral Pulses, No clubbing, cyanosis, or edema  NEUROLOGY: non-focal  SKIN: No rashes or lesions GENERAL: NAD,  HEAD:  Atraumatic, Normocephalic  EYES: EOMI conjunctiva and sclera clear  CHEST/LUNG: Clear to auscultation bilaterally; No wheeze  HEART: Regular rate and rhythm; No murmurs, rubs, or gallops  ABDOMEN: Soft, Nontender, Nondistended; Bowel sounds present  EXTREMITIES:  No clubbing, cyanosis, or edema  NEUROLOGY: non-focal  SKIN: upper and lower back healing incisional scars, no discharge

## 2024-03-22 NOTE — DISCHARGE NOTE PROVIDER - CARE PROVIDERS DIRECT ADDRESSES
,DirectAddress_Unknown,[email or web address]

## 2024-03-22 NOTE — DISCHARGE NOTE NURSING/CASE MANAGEMENT/SOCIAL WORK - PATIENT PORTAL LINK FT
You can access the FollowMyHealth Patient Portal offered by [facility] by registering at the following website: [email or web address]. By joining [facility]’s FollowMyHealth portal, you will also be able to view your health information using other applications (apps) compatible with our system.

## 2024-03-22 NOTE — PROGRESS NOTE ADULT - REASON FOR ADMISSION
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
Spinal tumor resection
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

## 2024-03-22 NOTE — H&P ADULT - NSHPLABSRESULTS_GEN_ALL_CORE
LABS:                        9.2    8.17  )-----------( 396      ( 21 Mar 2024 05:59 )             28.4       03-21    135  |  99  |  15  ----------------------------<  105<H>  3.7   |  30  |  0.57    Ca    8.5      21 Mar 2024 05:59    TPro  5.4<L>  /  Alb  2.1<L>  /  TBili  0.6  /  DBili  x   /  AST  38  /  ALT  86<H>  /  AlkPhos  119  03-21        RADIOLOGY:

## 2024-03-22 NOTE — PROGRESS NOTE ADULT - PROBLEM SELECTOR PLAN 1
Patient with recently diagnosed advanced DLBCL, s/p spine/pelvic surgeries. Patient aware of recommended H/O f/u as outpatient for necessary treatment (Dr. Chatterjee).  While here on acute rehab, supportive H/O care.  Continues with rehab.

## 2024-03-22 NOTE — DISCHARGE NOTE PROVIDER - NSDCMRMEDTOKEN_GEN_ALL_CORE_FT
acetaminophen 325 mg oral tablet: 3 tab(s) orally every 6 hours As needed Temp greater or equal to 38C (100.4F), Mild Pain (1 - 3)  enoxaparin: 40 milligram(s) subcutaneous once a day at 1800. DVT PROPHYLAXIS WHILE IN REHAB ONLY  gabapentin 300 mg oral capsule: 2 cap(s) orally every 8 hours  hydrocortisone 1% topical cream: 1 Apply topically to affected area every 12 hours As needed Itching  HYDROmorphone 2 mg oral tablet: 1 tab(s) orally every 6 hours  methocarbamol 750 mg oral tablet: 1 tab(s) orally every 8 hours  Multiple Vitamins oral tablet: 1 tab(s) orally once a day  oxyCODONE 10 mg oral tablet: 1 tab(s) orally every 4 hours As needed Severe Pain (7 - 10)  polyethylene glycol 3350 oral powder for reconstitution: 17 gram(s) orally 2 times a day  senna leaf extract oral tablet: 2 tab(s) orally once a day (at bedtime)  sodium chloride 1 g oral tablet: 2 tab(s) orally every 12 hours  traMADol 50 mg oral tablet: 1 tab(s) orally every 6 hours As needed Moderate Pain (4 - 6)

## 2024-03-22 NOTE — H&P ADULT - PROBLEM SELECTOR PLAN 3
VA duplex LE 3/19/2024: Acute deep venous thrombosis: below the knee. Bilateral soleal vein thrombosis.  Likely provoked in setting of recent major surgery, immbolization, DLBCL  Weighing risks of bleeding (recent spine surgery) vs. clotting risks in decision making-with distal thromboses, can consider continuing current prophylactic dosing LMWH, with repeat LE venous duplex study 1 week (or earlier as may be indicated clinically). If any progression of clot(s)-->therapeutic anticoagulation if cleared by neurosurgery to do so. VA duplex LE 3/19/2024: Acute deep venous thrombosis: below the knee. Bilateral soleal vein thrombosis.  Likely provoked in setting of recent major surgery, immobilization  Weighing risks of bleeding (recent spine surgery) vs. clotting risks in decision making-with distal thromboses, continuing with current prophylactic LMWH, with repeat LE venous duplex study 1 week (or earlier as may be indicated clinically). If any progression of clot(s)-->therapeutic anticoagulation if cleared by neurosurgery to do so.

## 2024-03-22 NOTE — DISCHARGE NOTE PROVIDER - NSDCFUSCHEDAPPT_GEN_ALL_CORE_FT
Anna Vanegas  [facility] Physician [facility]  NEUROSURG [address]  Scheduled Appointment: 03/29/2024    Gunnar Campbell  [facility]  PLASTICSUR [address]  Scheduled Appointment: 03/29/2024

## 2024-03-22 NOTE — PROGRESS NOTE ADULT - NUTRITIONAL ASSESSMENT
This patient has been assessed with a concern for Malnutrition and has been determined to have a diagnosis/diagnoses of Moderate protein-calorie malnutrition.    This patient is being managed with:   Diet Consistent Carbohydrate/No Snacks-  Entered: Mar 16 2024  1:03PM  

## 2024-03-22 NOTE — H&P ADULT - HISTORY OF PRESENT ILLNESS
HPI:  68-year-old male patient with past medical history of HLD, pre-Diabetes Mellitus, and sinus bradycardia who presented to [facility] on 3/5 with a history of progressively worsening lower extremity numbness/paresthesias and weakness with onset Jan 2024. He underwent x-rays & MRI imaging as outpatient on 2/29/24 showing concern for osseous metastatic disease in thoracic/lumbar/sacral spine as well as extension of soft tissue into the paravertebral soft tissues more prominent on the right side. He was previously ambulating independently without device but now required a RW. Repeat MRI showed multiple findings including diffuse osseous metastatic disease throughout the entire spine; pathologic fx w/ tumor into the epidural space at T8 resulting in thoracic cord compression; tumor extension into the epidural space at L3 resulting in cauda equina compression; pathologic fx w/ tumor in the epidural space at L4 contributing to severe canal stenosis; tumor within the bilateral psoas muscles at L3 and L4; bilateral neural foramen effacement by tumor in L-spine. He underwent a two stage surgical resection with a combined surgical team including Neurosurgery (Dr. Bhaskar Partida) and Plastic Surgery (Dr. Gunnar Campbell). He underwent the following procedures:     ·	Stage 1: T8 VCR (vertebral column resection) T6-T10 fusion for tumor resection, small csf leak primarily repaired  ·	Stage 2: L4 Partial Corpectomy, L2-Pelvis Fusion, Plastics Closure 3/5 HMV drain x3 and 1 Bile bag placed w/ output closely monitored.     He was monitored post-operatively in NSCU on PCA pump, durotomy encountered intraop and primarily repaired up from OR flat and incremental 15 degrees with no positional headaches or sign of CSF leak. Surgical pathology reporting diffuse large B-cell lymphoma. Patient evaluated by PT/OT/PM&R and recommended for acute inpatient rehab. Patient was discharged to [facility] on 3/16/24. Seen Dr. Goldberg at [facility] today 3/22 who advised admission to [facility] for treatment.  68-year-old male patient with past medical history of HLD, pre-Diabetes Mellitus, and sinus bradycardia who presented to [facility] on 3/5 with a history of progressively worsening lower extremity numbness/paresthesias and weakness with onset Jan 2024. He underwent x-rays & MRI imaging as outpatient on 2/29/24 showing concern for osseous metastatic disease in thoracic/lumbar/sacral spine as well as extension of soft tissue into the paravertebral soft tissues more prominent on the right side. He was previously ambulating independently without device but now required a RW. Repeat MRI showed multiple findings including diffuse osseous metastatic disease throughout the entire spine; pathologic fx w/ tumor into the epidural space at T8 resulting in thoracic cord compression; tumor extension into the epidural space at L3 resulting in cauda equina compression; pathologic fx w/ tumor in the epidural space at L4 contributing to severe canal stenosis; tumor within the bilateral psoas muscles at L3 and L4; bilateral neural foramen effacement by tumor in L-spine. He underwent a two stage surgical resection with a combined surgical team including Neurosurgery (Dr. Bhaskar Partida) and Plastic Surgery (Dr. Gunnar Campbell). He underwent the following procedures:     ·	Stage 1: T8 VCR (vertebral column resection) T6-T10 fusion for tumor resection, small csf leak primarily repaired  ·	Stage 2: L4 Partial Corpectomy, L2-Pelvis Fusion, Plastics Closure 3/5 HMV drain x3 and 1 Bile bag placed w/ output closely monitored.     He was monitored post-operatively in NSCU on PCA pump, durotomy encountered intraop and primarily repaired up from OR flat and incremental 15 degrees with no positional headaches or sign of CSF leak. Surgical pathology reporting diffuse large B-cell lymphoma. Patient evaluated by PT/OT/PM&R and recommended for acute inpatient rehab. Patient was discharged to [facility] on 3/16/24. Seen Dr. Goldberg at [facility] today 3/22 who advised admission to [facility] for treatment.

## 2024-03-22 NOTE — H&P ADULT - NSHPSOCIALHISTORY_GEN_ALL_CORE
Denies tobacco, etoh or illicit drug use  Currently residing at [facility], previously was living with wife and 2 children   recently lost wife due to breast cancer ( feb 2024)  3 daughters aged 31, 28, 26  Active

## 2024-03-22 NOTE — PROGRESS NOTE ADULT - ASSESSMENT
Mr. Saturnino Solis is a 68-year-old male patient with past medical history of HLD, pre-Diabetes Mellitus, and sinus bradycardia who is admitted for Acute Inpatient Rehabilitation with a multidisciplinary rehab program at [facility] with functional impairments in ADLs and mobility secondary to spinal diffuse large B-cell lymphoma metastasis requiring surgical resection.      Thoracic spinal cord injury secondary to compression by metastasis s/p surgical resection  - T8 AIS D  - MRI:    * diffuse osseous metastatic disease throughout the entire spine    * pathologic fx w/ tumor into the epidural space at T8 resulting in thoracic cord compression    * tumor extension into the epidural space at L3 resulting in cauda equina compression    * pathologic fx w/ tumor in the epidural space at L4 contributing to severe canal stenosis    * tumor within the bilateral psoas muscles at L3 and L4    * bilateral neural foramen effacement by tumor in L-spine.   - Surgical pathology report: Diffuse large B-cell lymphoma  - Spine tumor resection surgeries:    * Stage 1: T8 VCR (vertebral column resection) T6-T10 fusion for tumor resection, small csf leak primarily repaired    * Stage 2: L4 Partial Corpectomy, L2-Pelvis Fusion, Plastics Closure 3/5 HMV drain x3 and 1 Bile bag     * All drains removed    * Spine and fall precautions    * F/U with neurosurgery, plastics, and oncology outpatient  - Cauda equina syndrome  - Paraparesis  - Lower extremity sensory deficits  - Impaired ADLs and mobility  - Need for assistance with personal care  - Continue comprehensive rehab program of PT/OT - 3 hours a day, 5 days a week. 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    - Stage IV  - PET CT as outpatient  - [facility] to get back w/ recommendations when appropriate to start chemo, currently needs wound healing (surgery a week ago w/ multiple drains containing sanguinous fluid)  will follow .   - F/u with oncology outpatient    Pain  - gabapentin 600 milliGRAM(s) Oral every 8 hours  - HYDROmorphone   Tablet 2 milliGRAM(s) Oral every 6 hours  - methocarbamol 750 milliGRAM(s) Oral every 8 hours  (PRN):  - acetaminophen     Tablet .. 975 milliGRAM(s) Oral every 6 hours PRN Temp greater or equal to 38C (100.4F), Mild Pain (1 - 3)  - traMADol 50 milliGRAM(s) Oral every 6 hours PRN Moderate Pain (4 - 6)  - oxyCODONE    IR 10 milliGRAM(s) Oral every 4 hours PRN Severe Pain (7 - 10)    Pre-diabetes  - Off meds  - Consistent carb diet    Hyponatremia  - Continue sodium tabs  - Trend electrolytes    Sleep  - Melatonin PRN     GI / Bowel  - Senna qHS  - Miralax PRN Daily     / Bladder  - Currently patient voids independently. Check PVRs on admission. SC for > 400ccs    Skin / Pressure injury assessment   * Upper thoracic surgical incision: covered with Prineo dressing with no erythema, warmth,  nor discharge  * Lower thoracic and lumbosacral surgical incision: covered with Prineo dressing with no erythema, warmth,  nor discharge  * Bia-incisional sites of previous drains healing with no erythema, warmth,  nor discharge  * Stage 2 pressure injuries on sacrum and right shin  - Monitor Incisions:  q shift  - Turn q2 hours in bed while awake, air mattress  - nursing to monitor skin qShift    Diet/Dysphagia:  - Diet Consistency: Consistent carb    DVT prophylaxis:   - Bilateral soleal DVTs noted (3/19)  - Seen by Hematology  - Continue Lovenox  - Follow-up imaging on 3/26 (ordered)    Outpatient Follow-up:  Bhaskar Partida  Neurosurgery  [address]  [place] [address]  Phone: [phone]  Fax: [phone]  Follow Up Time: 2 weeks    Tobias Chatterjee  Medical Oncology  [address] [place] [address]  Phone: [phone]  Fax: [phone]  Follow Up Time: 1 week    Gunnar Campbell  Plastic Surgery  [address]  [place] [address]  Phone: [phone]  Fax: [phone]  Follow Up Time:      ---------------   Mr. Saturnino Solis is a 68-year-old male patient with past medical history of HLD, pre-Diabetes Mellitus, and sinus bradycardia who is admitted for Acute Inpatient Rehabilitation with a multidisciplinary rehab program at [facility] with functional impairments in ADLs and mobility secondary to spinal diffuse large B-cell lymphoma metastasis requiring surgical resection.      Thoracic spinal cord injury secondary to compression by metastasis s/p surgical resection  - T8 AIS D  - MRI:    * diffuse osseous metastatic disease throughout the entire spine    * pathologic fx w/ tumor into the epidural space at T8 resulting in thoracic cord compression    * tumor extension into the epidural space at L3 resulting in cauda equina compression    * pathologic fx w/ tumor in the epidural space at L4 contributing to severe canal stenosis    * tumor within the bilateral psoas muscles at L3 and L4    * bilateral neural foramen effacement by tumor in L-spine.   - Surgical pathology report: Diffuse large B-cell lymphoma  - Spine tumor resection surgeries:    * Stage 1: T8 VCR (vertebral column resection) T6-T10 fusion for tumor resection, small csf leak primarily repaired    * Stage 2: L4 Partial Corpectomy, L2-Pelvis Fusion, Plastics Closure 3/5 HMV drain x3 and 1 Bile bag     * All drains removed    * Spine and fall precautions    * F/U with neurosurgery, plastics, and oncology outpatient  - Cauda equina syndrome  - Paraparesis  - Lower extremity sensory deficits  - Impaired ADLs and mobility  - Need for assistance with personal care  - Discharge to [facility] today    Hi risk DLBCL  - 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    - Stage IV  - PET CT as outpatient  - [facility] [place] w/ recommendations when appropriate to start chemo, currently needs wound healing (surgery a week ago w/ multiple drains containing sanguinous fluid)  will follow .   - F/u with oncology outpatient    Pain  - gabapentin 600 milliGRAM(s) Oral every 8 hours  - HYDROmorphone   Tablet 2 milliGRAM(s) Oral every 6 hours  - methocarbamol 750 milliGRAM(s) Oral every 8 hours  (PRN):  - acetaminophen     Tablet .. 975 milliGRAM(s) Oral every 6 hours PRN Temp greater or equal to 38C (100.4F), Mild Pain (1 - 3)  - traMADol 50 milliGRAM(s) Oral every 6 hours PRN Moderate Pain (4 - 6)  - oxyCODONE    IR 10 milliGRAM(s) Oral every 4 hours PRN Severe Pain (7 - 10)    Pre-diabetes  - Off meds  - Consistent carb diet    Hyponatremia  - Continue sodium tabs  - Trend electrolytes    Sleep  - Melatonin PRN     GI / Bowel  - Senna qHS  - Miralax PRN Daily     / Bladder  - Currently patient voids independently. Check PVRs on admission. SC for > 400ccs    Skin / Pressure injury assessment   * Upper thoracic surgical incision: covered with Prineo dressing with no erythema, warmth,  nor discharge  * Lower thoracic and lumbosacral surgical incision: covered with Prineo dressing with no erythema, warmth,  nor discharge  * Bia-incisional sites of previous drains healing with no erythema, warmth,  nor discharge  * Stage 2 pressure injuries on sacrum and right shin  - Monitor Incisions:  q shift  - Turn q2 hours in bed while awake, air mattress  - nursing to monitor skin qShift    Diet/Dysphagia:  - Diet Consistency: Consistent carb    DVT prophylaxis:   - Bilateral soleal DVTs noted (3/19)  - Seen by Hematology  - Continue Lovenox  - Follow-up imaging on 3/26 (ordered)    Outpatient Follow-up:  Bhaskar Partida  Neurosurgery  [address]  [place] [address]  Phone: [phone]  Fax: [phone]  Follow Up Time: 2 weeks    Tobias Chatterjee  Medical Oncology  [address] [place] [address]  Phone: [phone]  Fax: [phone]  Follow Up Time: 1 week    Gunnar Campbell  Plastic Surgery  [address]  [place] [address]  Phone: [phone]  Fax: [phone]  Follow Up Time:      ---------------

## 2024-03-22 NOTE — PATIENT PROFILE ADULT - DOES PATIENT HAVE ADVANCE DIRECTIVE
Thalidomide Pregnancy And Lactation Text: This medication is Pregnancy Category X and is absolutely contraindicated during pregnancy. It is unknown if it is excreted in breast milk. Topical Sulfur Applications Pregnancy And Lactation Text: This medication is Pregnancy Category C and has an unknown safety profile during pregnancy. It is unknown if this topical medication is excreted in breast milk. Hydroxychloroquine Counseling:  I discussed with the patient that a baseline ophthalmologic exam is needed at the start of therapy and every year thereafter while on therapy. A CBC may also be warranted for monitoring.  The side effects of this medication were discussed with the patient, including but not limited to agranulocytosis, aplastic anemia, seizures, rashes, retinopathy, and liver toxicity. Patient instructed to call the office should any adverse effect occur.  The patient verbalized understanding of the proper use and possible adverse effects of Plaquenil.  All the patient's questions and concerns were addressed. Bactrim Counseling:  I discussed with the patient the risks of sulfa antibiotics including but not limited to GI upset, allergic reaction, drug rash, diarrhea, dizziness, photosensitivity, and yeast infections.  Rarely, more serious reactions can occur including but not limited to aplastic anemia, agranulocytosis, methemoglobinemia, blood dyscrasias, liver or kidney failure, lung infiltrates or desquamative/blistering drug rashes. Rifampin Pregnancy And Lactation Text: This medication is Pregnancy Category C and it isn't know if it is safe during pregnancy. It is also excreted in breast milk and should not be used if you are breast feeding. High Dose Vitamin A Counseling: Side effects reviewed, pt to contact office should one occur. Azithromycin Pregnancy And Lactation Text: This medication is considered safe during pregnancy and is also secreted in breast milk. Glycopyrrolate Pregnancy And Lactation Text: This medication is Pregnancy Category B and is considered safe during pregnancy. It is unknown if it is excreted breast milk. Isotretinoin Pregnancy And Lactation Text: This medication is Pregnancy Category X and is considered extremely dangerous during pregnancy. It is unknown if it is excreted in breast milk. Cimetidine Counseling:  I discussed with the patient the risks of Cimetidine including but not limited to gynecomastia, headache, diarrhea, nausea, drowsiness, arrhythmias, pancreatitis, skin rashes, psychosis, bone marrow suppression and kidney toxicity. Imiquimod Counseling:  I discussed with the patient the risks of imiquimod including but not limited to erythema, scaling, itching, weeping, crusting, and pain.  Patient understands that the inflammatory response to imiquimod is variable from person to person and was educated regarded proper titration schedule.  If flu-like symptoms develop, patient knows to discontinue the medication and contact us. Use Enhanced Medication Counseling?: No Siliq Pregnancy And Lactation Text: The risk during pregnancy and breastfeeding is uncertain with this medication. Azathioprine Pregnancy And Lactation Text: This medication is Pregnancy Category D and isn't considered safe during pregnancy. It is unknown if this medication is excreted in breast milk. Imiquimod Pregnancy And Lactation Text: This medication is Pregnancy Category C. It is unknown if this medication is excreted in breast milk. Simponi Counseling:  I discussed with the patient the risks of golimumab including but not limited to myelosuppression, immunosuppression, autoimmune hepatitis, demyelinating diseases, lymphoma, and serious infections.  The patient understands that monitoring is required including a PPD at baseline and must alert us or the primary physician if symptoms of infection or other concerning signs are noted. Sarecycline Counseling: Patient advised regarding possible photosensitivity and discoloration of the teeth, skin, lips, tongue and gums.  Patient instructed to avoid sunlight, if possible.  When exposed to sunlight, patients should wear protective clothing, sunglasses, and sunscreen.  The patient was instructed to call the office immediately if the following severe adverse effects occur:  hearing changes, easy bruising/bleeding, severe headache, or vision changes.  The patient verbalized understanding of the proper use and possible adverse effects of sarecycline.  All of the patient's questions and concerns were addressed. Cimzia Pregnancy And Lactation Text: This medication crosses the placenta but can be considered safe in certain situations. Cimzia may be excreted in breast milk. Hydroxychloroquine Pregnancy And Lactation Text: This medication has been shown to cause fetal harm but it isn't assigned a Pregnancy Risk Category. There are small amounts excreted in breast milk. Cimzia Counseling:  I discussed with the patient the risks of Cimzia including but not limited to immunosuppression, allergic reactions and infections.  The patient understands that monitoring is required including a PPD at baseline and must alert us or the primary physician if symptoms of infection or other concerning signs are noted. Cellcept Counseling:  I discussed with the patient the risks of mycophenolate mofetil including but not limited to infection/immunosuppression, GI upset, hypokalemia, hypercholesterolemia, bone marrow suppression, lymphoproliferative disorders, malignancy, GI ulceration/bleed/perforation, colitis, interstitial lung disease, kidney failure, progressive multifocal leukoencephalopathy, and birth defects.  The patient understands that monitoring is required including a baseline creatinine and regular CBC testing. In addition, patient must alert us immediately if symptoms of infection or other concerning signs are noted. Valtrex Counseling: I discussed with the patient the risks of valacyclovir including but not limited to kidney damage, nausea, vomiting and severe allergy.  The patient understands that if the infection seems to be worsening or is not improving, they are to call. Zyclara Counseling:  I discussed with the patient the risks of imiquimod including but not limited to erythema, scaling, itching, weeping, crusting, and pain.  Patient understands that the inflammatory response to imiquimod is variable from person to person and was educated regarded proper titration schedule.  If flu-like symptoms develop, patient knows to discontinue the medication and contact us. Cimetidine Pregnancy And Lactation Text: This medication is Pregnancy Category B and is considered safe during pregnancy. It is also excreted in breast milk and breast feeding isn't recommended. Sarecycline Pregnancy And Lactation Text: This medication is Pregnancy Category D and not consider safe during pregnancy. It is also excreted in breast milk. Nsaids Counseling: NSAID Counseling: I discussed with the patient that NSAIDs should be taken with food. Prolonged use of NSAIDs can result in the development of stomach ulcers.  Patient advised to stop taking NSAIDs if abdominal pain occurs.  The patient verbalized understanding of the proper use and possible adverse effects of NSAIDs.  All of the patient's questions and concerns were addressed. Cosentyx Counseling:  I discussed with the patient the risks of Cosentyx including but not limited to worsening of Crohn's disease, immunosuppression, allergic reactions and infections.  The patient understands that monitoring is required including a PPD at baseline and must alert us or the primary physician if symptoms of infection or other concerning signs are noted. Minoxidil Counseling: Minoxidil is a topical medication which can increase blood flow where it is applied. It is uncertain how this medication increases hair growth. Side effects are uncommon and include stinging and allergic reactions. Cephalexin Counseling: I counseled the patient regarding use of cephalexin as an antibiotic for prophylactic and/or therapeutic purposes. Cephalexin (commonly prescribed under brand name Keflex) is a cephalosporin antibiotic which is active against numerous classes of bacteria, including most skin bacteria. Side effects may include nausea, diarrhea, gastrointestinal upset, rash, hives, yeast infections, and in rare cases, hepatitis, kidney disease, seizures, fever, confusion, neurologic symptoms, and others. Patients with severe allergies to penicillin medications are cautioned that there is about a 10% incidence of cross-reactivity with cephalosporins. When possible, patients with penicillin allergies should use alternatives to cephalosporins for antibiotic therapy. Detail Level: Zone Bactrim Pregnancy And Lactation Text: This medication is Pregnancy Category D and is known to cause fetal risk.  It is also excreted in breast milk. Doxepin Counseling:  Patient advised that the medication is sedating and not to drive a car after taking this medication. Patient informed of potential adverse effects including but not limited to dry mouth, urinary retention, and blurry vision.  The patient verbalized understanding of the proper use and possible adverse effects of doxepin.  All of the patient's questions and concerns were addressed. High Dose Vitamin A Pregnancy And Lactation Text: High dose vitamin A therapy is contraindicated during pregnancy and breast feeding. Valtrex Pregnancy And Lactation Text: this medication is Pregnancy Category B and is considered safe during pregnancy. This medication is not directly found in breast milk but it's metabolite acyclovir is present. Benzoyl Peroxide Counseling: Patient counseled that medicine may cause skin irritation and bleach clothing.  In the event of skin irritation, the patient was advised to reduce the amount of the drug applied or use it less frequently.   The patient verbalized understanding of the proper use and possible adverse effects of benzoyl peroxide.  All of the patient's questions and concerns were addressed. Cephalexin Pregnancy And Lactation Text: This medication is Pregnancy Category B and considered safe during pregnancy.  It is also excreted in breast milk but can be used safely for shorter doses. Cosentyx Pregnancy And Lactation Text: This medication is Pregnancy Category B and is considered safe during pregnancy. It is unknown if this medication is excreted in breast milk. Hydroxyzine Counseling: Patient advised that the medication is sedating and not to drive a car after taking this medication.  Patient informed of potential adverse effects including but not limited to dry mouth, urinary retention, and blurry vision.  The patient verbalized understanding of the proper use and possible adverse effects of hydroxyzine.  All of the patient's questions and concerns were addressed. Doxepin Pregnancy And Lactation Text: This medication is Pregnancy Category C and it isn't known if it is safe during pregnancy. It is also excreted in breast milk and breast feeding isn't recommended. Skyrizi Counseling: I discussed with the patient the risks of risankizumab-rzaa including but not limited to immunosuppression, and serious infections.  The patient understands that monitoring is required including a PPD at baseline and must alert us or the primary physician if symptoms of infection or other concerning signs are noted. Cyclophosphamide Counseling:  I discussed with the patient the risks of cyclophosphamide including but not limited to hair loss, hormonal abnormalities, decreased fertility, abdominal pain, diarrhea, nausea and vomiting, bone marrow suppression and infection. The patient understands that monitoring is required while taking this medication. Nsaids Pregnancy And Lactation Text: These medications are considered safe up to 30 weeks gestation. It is excreted in breast milk. Minoxidil Pregnancy And Lactation Text: This medication has not been assigned a Pregnancy Risk Category but animal studies failed to show danger with the topical medication. It is unknown if the medication is excreted in breast milk. Tetracycline Counseling: Patient counseled regarding possible photosensitivity and increased risk for sunburn.  Patient instructed to avoid sunlight, if possible.  When exposed to sunlight, patients should wear protective clothing, sunglasses, and sunscreen.  The patient was instructed to call the office immediately if the following severe adverse effects occur:  hearing changes, easy bruising/bleeding, severe headache, or vision changes.  The patient verbalized understanding of the proper use and possible adverse effects of tetracycline.  All of the patient's questions and concerns were addressed. Patient understands to avoid pregnancy while on therapy due to potential birth defects. Clindamycin Counseling: I counseled the patient regarding use of clindamycin as an antibiotic for prophylactic and/or therapeutic purposes. Clindamycin is active against numerous classes of bacteria, including skin bacteria. Side effects may include nausea, diarrhea, gastrointestinal upset, rash, hives, yeast infections, and in rare cases, colitis. Odomzo Counseling- I discussed with the patient the risks of Odomzo including but not limited to nausea, vomiting, diarrhea, constipation, weight loss, changes in the sense of taste, decreased appetite, muscle spasms, and hair loss.  The patient verbalized understanding of the proper use and possible adverse effects of Odomzo.  All of the patient's questions and concerns were addressed. Benzoyl Peroxide Pregnancy And Lactation Text: This medication is Pregnancy Category C. It is unknown if benzoyl peroxide is excreted in breast milk. Hydroxyzine Pregnancy And Lactation Text: This medication is not safe during pregnancy and should not be taken. It is also excreted in breast milk and breast feeding isn't recommended. Cyclosporine Counseling:  I discussed with the patient the risks of cyclosporine including but not limited to hypertension, gingival hyperplasia,myelosuppression, immunosuppression, liver damage, kidney damage, neurotoxicity, lymphoma, and serious infections. The patient understands that monitoring is required including baseline blood pressure, CBC, CMP, lipid panel and uric acid, and then 1-2 times monthly CMP and blood pressure. Dupixent Counseling: I discussed with the patient the risks of dupilumab including but not limited to eye infection and irritation, cold sores, injection site reactions, worsening of asthma, allergic reactions and increased risk of parasitic infection.  Live vaccines should be avoided while taking dupilumab. Dupilumab will also interact with certain medications such as warfarin and cyclosporine. The patient understands that monitoring is required and they must alert us or the primary physician if symptoms of infection or other concerning signs are noted. Picato Counseling:  I discussed with the patient the risks of Picato including but not limited to erythema, scaling, itching, weeping, crusting, and pain. Arava Counseling:  Patient counseled regarding adverse effects of Arava including but not limited to nausea, vomiting, abnormalities in liver function tests. Patients may develop mouth sores, rash, diarrhea, and abnormalities in blood counts. The patient understands that monitoring is required including LFTs and blood counts.  There is a rare possibility of scarring of the liver and lung problems that can occur when taking methotrexate. Persistent nausea, loss of appetite, pale stools, dark urine, cough, and shortness of breath should be reported immediately. Patient advised to discontinue Arava treatment and consult with a physician prior to attempting conception. The patient will have to undergo a treatment to eliminate Arava from the body prior to conception. Cyclophosphamide Pregnancy And Lactation Text: This medication is Pregnancy Category D and it isn't considered safe during pregnancy. This medication is excreted in breast milk. Dupixent Pregnancy And Lactation Text: This medication likely crosses the placenta but the risk for the fetus is uncertain. This medication is excreted in breast milk. Cyclosporine Pregnancy And Lactation Text: This medication is Pregnancy Category C and it isn't know if it is safe during pregnancy. This medication is excreted in breast milk. Clindamycin Pregnancy And Lactation Text: This medication can be used in pregnancy if certain situations. Clindamycin is also present in breast milk. Clofazimine Counseling:  I discussed with the patient the risks of clofazimine including but not limited to skin and eye pigmentation, liver damage, nausea/vomiting, gastrointestinal bleeding and allergy. Stelara Counseling:  I discussed with the patient the risks of ustekinumab including but not limited to immunosuppression, malignancy, posterior leukoencephalopathy syndrome, and serious infections.  The patient understands that monitoring is required including a PPD at baseline and must alert us or the primary physician if symptoms of infection or other concerning signs are noted. Carac Counseling:  I discussed with the patient the risks of Carac including but not limited to erythema, scaling, itching, weeping, crusting, and pain. Clofazimine Pregnancy And Lactation Text: This medication is Pregnancy Category C and isn't considered safe during pregnancy. It is excreted in breast milk. Doxycycline Counseling:  Patient counseled regarding possible photosensitivity and increased risk for sunburn.  Patient instructed to avoid sunlight, if possible.  When exposed to sunlight, patients should wear protective clothing, sunglasses, and sunscreen.  The patient was instructed to call the office immediately if the following severe adverse effects occur:  hearing changes, easy bruising/bleeding, severe headache, or vision changes.  The patient verbalized understanding of the proper use and possible adverse effects of doxycycline.  All of the patient's questions and concerns were addressed. Carac Pregnancy And Lactation Text: This medication is Pregnancy Category X and contraindicated in pregnancy and in women who may become pregnant. It is unknown if this medication is excreted in breast milk. Taltz Counseling: I discussed with the patient the risks of ixekizumab including but not limited to immunosuppression, serious infections, worsening of inflammatory bowel disease and drug reactions.  The patient understands that monitoring is required including a PPD at baseline and must alert us or the primary physician if symptoms of infection or other concerning signs are noted. Methotrexate Counseling:  Patient counseled regarding adverse effects of methotrexate including but not limited to nausea, vomiting, abnormalities in liver function tests. Patients may develop mouth sores, rash, diarrhea, and abnormalities in blood counts. The patient understands that monitoring is required including LFT's and blood counts.  There is a rare possibility of scarring of the liver and lung problems that can occur when taking methotrexate. Persistent nausea, loss of appetite, pale stools, dark urine, cough, and shortness of breath should be reported immediately. Patient advised to discontinue methotrexate treatment at least three months before attempting to become pregnant.  I discussed the need for folate supplements while taking methotrexate.  These supplements can decrease side effects during methotrexate treatment. The patient verbalized understanding of the proper use and possible adverse effects of methotrexate.  All of the patient's questions and concerns were addressed. No Albendazole Counseling:  I discussed with the patient the risks of albendazole including but not limited to cytopenia, kidney damage, nausea/vomiting and severe allergy.  The patient understands that this medication is being used in an off-label manner. Protopic Counseling: Patient may experience a mild burning sensation during topical application. Protopic is not approved in children less than 2 years of age. There have been case reports of hematologic and skin malignancies in patients using topical calcineurin inhibitors although causality is questionable. Fluconazole Counseling:  Patient counseled regarding adverse effects of fluconazole including but not limited to headache, diarrhea, nausea, upset stomach, liver function test abnormalities, taste disturbance, and stomach pain.  There is a rare possibility of liver failure that can occur when taking fluconazole.  The patient understands that monitoring of LFTs and kidney function test may be required, especially at baseline. The patient verbalized understanding of the proper use and possible adverse effects of fluconazole.  All of the patient's questions and concerns were addressed. Otezla Counseling: The side effects of Otezla were discussed with the patient, including but not limited to worsening or new depression, weight loss, diarrhea, nausea, upper respiratory tract infection, and headache. Patient instructed to call the office should any adverse effect occur.  The patient verbalized understanding of the proper use and possible adverse effects of Otezla.  All the patient's questions and concerns were addressed. Enbrel Counseling:  I discussed with the patient the risks of etanercept including but not limited to myelosuppression, immunosuppression, autoimmune hepatitis, demyelinating diseases, lymphoma, and infections.  The patient understands that monitoring is required including a PPD at baseline and must alert us or the primary physician if symptoms of infection or other concerning signs are noted. Albendazole Pregnancy And Lactation Text: This medication is Pregnancy Category C and it isn't known if it is safe during pregnancy. It is also excreted in breast milk. Solaraze Counseling:  I discussed with the patient the risks of Solaraze including but not limited to erythema, scaling, itching, weeping, crusting, and pain. Colchicine Counseling:  Patient counseled regarding adverse effects including but not limited to stomach upset (nausea, vomiting, stomach pain, or diarrhea).  Patient instructed to limit alcohol consumption while taking this medication.  Colchicine may reduce blood counts especially with prolonged use.  The patient understands that monitoring of kidney function and blood counts may be required, especially at baseline. The patient verbalized understanding of the proper use and possible adverse effects of colchicine.  All of the patient's questions and concerns were addressed. Methotrexate Pregnancy And Lactation Text: This medication is Pregnancy Category X and is known to cause fetal harm. This medication is excreted in breast milk. Griseofulvin Counseling:  I discussed with the patient the risks of griseofulvin including but not limited to photosensitivity, cytopenia, liver damage, nausea/vomiting and severe allergy.  The patient understands that this medication is best absorbed when taken with a fatty meal (e.g., ice cream or french fries). Fluconazole Pregnancy And Lactation Text: This medication is Pregnancy Category C and it isn't know if it is safe during pregnancy. It is also excreted in breast milk. Otezla Pregnancy And Lactation Text: This medication is Pregnancy Category C and it isn't known if it is safe during pregnancy. It is unknown if it is excreted in breast milk. Protopic Pregnancy And Lactation Text: This medication is Pregnancy Category C. It is unknown if this medication is excreted in breast milk when applied topically. 5-Fu Counseling: 5-Fluorouracil Counseling:  I discussed with the patient the risks of 5-fluorouracil including but not limited to erythema, scaling, itching, weeping, crusting, and pain. Doxycycline Pregnancy And Lactation Text: This medication is Pregnancy Category D and not consider safe during pregnancy. It is also excreted in breast milk but is considered safe for shorter treatment courses. Erythromycin Counseling:  I discussed with the patient the risks of erythromycin including but not limited to GI upset, allergic reaction, drug rash, diarrhea, increase in liver enzymes, and yeast infections. Prednisone Counseling:  I discussed with the patient the risks of prolonged use of prednisone including but not limited to weight gain, insomnia, osteoporosis, mood changes, diabetes, susceptibility to infection, glaucoma and high blood pressure.  In cases where prednisone use is prolonged, patients should be monitored with blood pressure checks, serum glucose levels and an eye exam.  Additionally, the patient may need to be placed on GI prophylaxis, PCP prophylaxis, and calcium and vitamin D supplementation and/or a bisphosphonate.  The patient verbalized understanding of the proper use and the possible adverse effects of prednisone.  All of the patient's questions and concerns were addressed. Tremfya Counseling: I discussed with the patient the risks of guselkumab including but not limited to immunosuppression, serious infections, worsening of inflammatory bowel disease and drug reactions.  The patient understands that monitoring is required including a PPD at baseline and must alert us or the primary physician if symptoms of infection or other concerning signs are noted. Griseofulvin Pregnancy And Lactation Text: This medication is Pregnancy Category X and is known to cause serious birth defects. It is unknown if this medication is excreted in breast milk but breast feeding should be avoided. Oxybutynin Counseling:  I discussed with the patient the risks of oxybutynin including but not limited to skin rash, drowsiness, dry mouth, difficulty urinating, and blurred vision. Humira Counseling:  I discussed with the patient the risks of adalimumab including but not limited to myelosuppression, immunosuppression, autoimmune hepatitis, demyelinating diseases, lymphoma, and serious infections.  The patient understands that monitoring is required including a PPD at baseline and must alert us or the primary physician if symptoms of infection or other concerning signs are noted. Ivermectin Counseling:  Patient instructed to take medication on an empty stomach with a full glass of water.  Patient informed of potential adverse effects including but not limited to nausea, diarrhea, dizziness, itching, and swelling of the extremities or lymph nodes.  The patient verbalized understanding of the proper use and possible adverse effects of ivermectin.  All of the patient's questions and concerns were addressed. Erythromycin Pregnancy And Lactation Text: This medication is Pregnancy Category B and is considered safe during pregnancy. It is also excreted in breast milk. Dapsone Counseling: I discussed with the patient the risks of dapsone including but not limited to hemolytic anemia, agranulocytosis, rashes, methemoglobinemia, kidney failure, peripheral neuropathy, headaches, GI upset, and liver toxicity.  Patients who start dapsone require monitoring including baseline LFTs and weekly CBCs for the first month, then every month thereafter.  The patient verbalized understanding of the proper use and possible adverse effects of dapsone.  All of the patient's questions and concerns were addressed. Topical Retinoid counseling:  Patient advised to apply a pea-sized amount only at bedtime and wait 30 minutes after washing their face before applying.  If too drying, patient may add a non-comedogenic moisturizer. The patient verbalized understanding of the proper use and possible adverse effects of retinoids.  All of the patient's questions and concerns were addressed. Itraconazole Counseling:  I discussed with the patient the risks of itraconazole including but not limited to liver damage, nausea/vomiting, neuropathy, and severe allergy.  The patient understands that this medication is best absorbed when taken with acidic beverages such as non-diet cola or ginger ale.  The patient understands that monitoring is required including baseline LFTs and repeat LFTs at intervals.  The patient understands that they are to contact us or the primary physician if concerning signs are noted. Oxybutynin Pregnancy And Lactation Text: This medication is Pregnancy Category B and is considered safe during pregnancy. It is unknown if it is excreted in breast milk. Solaraze Pregnancy And Lactation Text: This medication is Pregnancy Category B and is considered safe. There is some data to suggest avoiding during the third trimester. It is unknown if this medication is excreted in breast milk. Drysol Counseling:  I discussed with the patient the risks of drysol/aluminum chloride including but not limited to skin rash, itching, irritation, burning. Xeljanz Counseling: I discussed with the patient the risks of Xeljanz therapy including increased risk of infection, liver issues, headache, diarrhea, or cold symptoms. Live vaccines should be avoided. They were instructed to call if they have any problems. Birth Control Pills Pregnancy And Lactation Text: This medication should be avoided if pregnant and for the first 30 days post-partum. Ilumya Counseling: I discussed with the patient the risks of tildrakizumab including but not limited to immunosuppression, malignancy, posterior leukoencephalopathy syndrome, and serious infections.  The patient understands that monitoring is required including a PPD at baseline and must alert us or the primary physician if symptoms of infection or other concerning signs are noted. Birth Control Pills Counseling: Birth Control Pill Counseling: I discussed with the patient the potential side effects of OCPs including but not limited to increased risk of stroke, heart attack, thrombophlebitis, deep venous thrombosis, hepatic adenomas, breast changes, GI upset, headaches, and depression.  The patient verbalized understanding of the proper use and possible adverse effects of OCPs. All of the patient's questions and concerns were addressed. Metronidazole Counseling:  I discussed with the patient the risks of metronidazole including but not limited to seizures, nausea/vomiting, a metallic taste in the mouth, nausea/vomiting and severe allergy. Dapsone Pregnancy And Lactation Text: This medication is Pregnancy Category C and is not considered safe during pregnancy or breast feeding. Drysol Pregnancy And Lactation Text: This medication is considered safe during pregnancy and breast feeding. Tazorac Counseling:  Patient advised that medication is irritating and drying.  Patient may need to apply sparingly and wash off after an hour before eventually leaving it on overnight.  The patient verbalized understanding of the proper use and possible adverse effects of tazorac.  All of the patient's questions and concerns were addressed. Ketoconazole Counseling:   Patient counseled regarding improving absorption with orange juice.  Adverse effects include but are not limited to breast enlargement, headache, diarrhea, nausea, upset stomach, liver function test abnormalities, taste disturbance, and stomach pain.  There is a rare possibility of liver failure that can occur when taking ketoconazole. The patient understands that monitoring of LFTs may be required, especially at baseline. The patient verbalized understanding of the proper use and possible adverse effects of ketoconazole.  All of the patient's questions and concerns were addressed. Infliximab Counseling:  I discussed with the patient the risks of infliximab including but not limited to myelosuppression, immunosuppression, autoimmune hepatitis, demyelinating diseases, lymphoma, and serious infections.  The patient understands that monitoring is required including a PPD at baseline and must alert us or the primary physician if symptoms of infection or other concerning signs are noted. Xelreginaldz Pregnancy And Lactation Text: This medication is Pregnancy Category D and is not considered safe during pregnancy.  The risk during breast feeding is also uncertain. Erivedge Counseling- I discussed with the patient the risks of Erivedge including but not limited to nausea, vomiting, diarrhea, constipation, weight loss, changes in the sense of taste, decreased appetite, muscle spasms, and hair loss.  The patient verbalized understanding of the proper use and possible adverse effects of Erivedge.  All of the patient's questions and concerns were addressed. Elidel Counseling: Patient may experience a mild burning sensation during topical application. Elidel is not approved in children less than 2 years of age. There have been case reports of hematologic and skin malignancies in patients using topical calcineurin inhibitors although causality is questionable. Metronidazole Pregnancy And Lactation Text: This medication is Pregnancy Category B and considered safe during pregnancy.  It is also excreted in breast milk. Acitretin Counseling:  I discussed with the patient the risks of acitretin including but not limited to hair loss, dry lips/skin/eyes, liver damage, hyperlipidemia, depression/suicidal ideation, photosensitivity.  Serious rare side effects can include but are not limited to pancreatitis, pseudotumor cerebri, bony changes, clot formation/stroke/heart attack.  Patient understands that alcohol is contraindicated since it can result in liver toxicity and significantly prolong the elimination of the drug by many years. Xolair Counseling:  Patient informed of potential adverse effects including but not limited to fever, muscle aches, rash and allergic reactions.  The patient verbalized understanding of the proper use and possible adverse effects of Xolair.  All of the patient's questions and concerns were addressed. Spironolactone Pregnancy And Lactation Text: This medication can cause feminization of the male fetus and should be avoided during pregnancy. The active metabolite is also found in breast milk. Tazorac Pregnancy And Lactation Text: This medication is not safe during pregnancy. It is unknown if this medication is excreted in breast milk. Eucrisa Counseling: Patient may experience a mild burning sensation during topical application. Eucrisa is not approved in children less than 2 years of age. Spironolactone Counseling: Patient advised regarding risks of diarrhea, abdominal pain, hyperkalemia, birth defects (for female patients), liver toxicity and renal toxicity. The patient may need blood work to monitor liver and kidney function and potassium levels while on therapy. The patient verbalized understanding of the proper use and possible adverse effects of spironolactone.  All of the patient's questions and concerns were addressed. Ketoconazole Pregnancy And Lactation Text: This medication is Pregnancy Category C and it isn't know if it is safe during pregnancy. It is also excreted in breast milk and breast feeding isn't recommended. Acitretin Pregnancy And Lactation Text: This medication is Pregnancy Category X and should not be given to women who are pregnant or may become pregnant in the future. This medication is excreted in breast milk. Minocycline Counseling: Patient advised regarding possible photosensitivity and discoloration of the teeth, skin, lips, tongue and gums.  Patient instructed to avoid sunlight, if possible.  When exposed to sunlight, patients should wear protective clothing, sunglasses, and sunscreen.  The patient was instructed to call the office immediately if the following severe adverse effects occur:  hearing changes, easy bruising/bleeding, severe headache, or vision changes.  The patient verbalized understanding of the proper use and possible adverse effects of minocycline.  All of the patient's questions and concerns were addressed. SSKI Counseling:  I discussed with the patient the risks of SSKI including but not limited to thyroid abnormalities, metallic taste, GI upset, fever, headache, acne, arthralgias, paraesthesias, lymphadenopathy, easy bleeding, arrhythmias, and allergic reaction. Topical Clindamycin Counseling: Patient counseled that this medication may cause skin irritation or allergic reactions.  In the event of skin irritation, the patient was advised to reduce the amount of the drug applied or use it less frequently.   The patient verbalized understanding of the proper use and possible adverse effects of clindamycin.  All of the patient's questions and concerns were addressed. Terbinafine Counseling: Patient counseling regarding adverse effects of terbinafine including but not limited to headache, diarrhea, rash, upset stomach, liver function test abnormalities, itching, taste/smell disturbance, nausea, abdominal pain, and flatulence.  There is a rare possibility of liver failure that can occur when taking terbinafine.  The patient understands that a baseline LFT and kidney function test may be required. The patient verbalized understanding of the proper use and possible adverse effects of terbinafine.  All of the patient's questions and concerns were addressed. Rituxan Counseling:  I discussed with the patient the risks of Rituxan infusions. Side effects can include infusion reactions, severe drug rashes including mucocutaneous reactions, reactivation of latent hepatitis and other infections and rarely progressive multifocal leukoencephalopathy.  All of the patient's questions and concerns were addressed. Bexarotene Counseling:  I discussed with the patient the risks of bexarotene including but not limited to hair loss, dry lips/skin/eyes, liver abnormalities, hyperlipidemia, pancreatitis, depression/suicidal ideation, photosensitivity, drug rash/allergic reactions, hypothyroidism, anemia, leukopenia, infection, cataracts, and teratogenicity.  Patient understands that they will need regular blood tests to check lipid profile, liver function tests, white blood cell count, thyroid function tests and pregnancy test if applicable. Xolair Pregnancy And Lactation Text: This medication is Pregnancy Category B and is considered safe during pregnancy. This medication is excreted in breast milk. Gabapentin Counseling: I discussed with the patient the risks of gabapentin including but not limited to dizziness, somnolence, fatigue and ataxia. Rituxan Pregnancy And Lactation Text: This medication is Pregnancy Category C and it isn't know if it is safe during pregnancy. It is unknown if this medication is excreted in breast milk but similar antibodies are known to be excreted. Sski Pregnancy And Lactation Text: This medication is Pregnancy Category D and isn't considered safe during pregnancy. It is excreted in breast milk. Glycopyrrolate Counseling:  I discussed with the patient the risks of glycopyrrolate including but not limited to skin rash, drowsiness, dry mouth, difficulty urinating, and blurred vision. Hydroquinone Counseling:  Patient advised that medication may result in skin irritation, lightening (hypopigmentation), dryness, and burning.  In the event of skin irritation, the patient was advised to reduce the amount of the drug applied or use it less frequently.  Rarely, spots that are treated with hydroquinone can become darker (pseudoochronosis).  Should this occur, patient instructed to stop medication and call the office. The patient verbalized understanding of the proper use and possible adverse effects of hydroquinone.  All of the patient's questions and concerns were addressed. Quinolones Counseling:  I discussed with the patient the risks of fluoroquinolones including but not limited to GI upset, allergic reaction, drug rash, diarrhea, dizziness, photosensitivity, yeast infections, liver function test abnormalities, tendonitis/tendon rupture. Bexarotene Pregnancy And Lactation Text: This medication is Pregnancy Category X and should not be given to women who are pregnant or may become pregnant. This medication should not be used if you are breast feeding. Rifampin Counseling: I discussed with the patient the risks of rifampin including but not limited to liver damage, kidney damage, red-orange body fluids, nausea/vomiting and severe allergy. Siliq Counseling:  I discussed with the patient the risks of Siliq including but not limited to new or worsening depression, suicidal thoughts and behavior, immunosuppression, malignancy, posterior leukoencephalopathy syndrome, and serious infections.  The patient understands that monitoring is required including a PPD at baseline and must alert us or the primary physician if symptoms of infection or other concerning signs are noted. There is also a special program designed to monitor depression which is required with Siliq. Azathioprine Counseling:  I discussed with the patient the risks of azathioprine including but not limited to myelosuppression, immunosuppression, hepatotoxicity, lymphoma, and infections.  The patient understands that monitoring is required including baseline LFTs, Creatinine, possible TPMP genotyping and weekly CBCs for the first month and then every 2 weeks thereafter.  The patient verbalized understanding of the proper use and possible adverse effects of azathioprine.  All of the patient's questions and concerns were addressed. Thalidomide Counseling: I discussed with the patient the risks of thalidomide including but not limited to birth defects, anxiety, weakness, chest pain, dizziness, cough and severe allergy. Isotretinoin Counseling: Patient should get monthly blood tests, not donate blood, not drive at night if vision affected, not share medication, and not undergo elective surgery for 6 months after tx completed. Side effects reviewed, pt to contact office should one occur. Azithromycin Counseling:  I discussed with the patient the risks of azithromycin including but not limited to GI upset, allergic reaction, drug rash, diarrhea, and yeast infections. Topical Sulfur Applications Counseling: Topical Sulfur Counseling: Patient counseled that this medication may cause skin irritation or allergic reactions.  In the event of skin irritation, the patient was advised to reduce the amount of the drug applied or use it less frequently.   The patient verbalized understanding of the proper use and possible adverse effects of topical sulfur application.  All of the patient's questions and concerns were addressed.

## 2024-03-22 NOTE — PROGRESS NOTE ADULT - PROBLEM SELECTOR PLAN 2
Acute deep venous thrombosis: below the knee.  Bilateral soleal vein thrombosis.  Weighing risks of bleeding (recent spine surgery) vs. clotting risks in decision making-with distal thromboses, can consider continuing current prophylactic dosing LMWH, with repeat LE venous duplex study 1 week (or earlier as may be indicated clinically). If any progression of clot(s)-->therapeutic anticoagulation if cleared by neurosurgery to do so. Patient without related symptoms currently.

## 2024-03-22 NOTE — DISCHARGE NOTE PROVIDER - HOSPITAL COURSE
HPI:  Mr. Saturnino Solis is a 68-year-old male patient with past medical history of HLD, pre-Diabetes Mellitus, and sinus bradycardia who presented to [facility] on 3/5 with a history of progressively worsening lower extremity numbness/paresthesias and weakness with onset on Jan 2024. She underwent x-rays & MRI imaging as outpatient on 2/29/24 showing concern for osseous metastatic disease in thoracic/lumbar/sacral spine as well as extension of soft tissue into the paravertebral soft tissues more prominent on the right side. He was previously ambulating independently without device but now required a RW. Repeat MRI showed multiple findings including diffuse osseous metastatic disease throughout the entire spine; pathologic fx w/ tumor into the epidural space at T8 resulting in thoracic cord compression; tumor extension into the epidural space at L3 resulting in cauda equina compression; pathologic fx w/ tumor in the epidural space at L4 contributing to severe canal stenosis; tumor within the bilateral psoas muscles at L3 and L4; bilateral neural foramen effacement by tumor in L-spine. He underwent a two stage surgical resection with a combined surgical team including Neurosurgery (Dr. Bhaskar Partida) and Plastic Surgery (Dr. Gunnar Campbell). He underwent the following procedures:     Stage 1: T8 VCR (vertebral column resection) T6-T10 fusion for tumor resection, small csf leak primarily repaired  Stage 2: L4 Partial Corpectomy, L2-Pelvis Fusion, Plastics Closure 3/5 HMV drain x3 and 1 Bile bag placed w/ output closely monitored.     He was monitored post-operatively in NSCU on PCA pump, durotomy encountered intraop and primarily repaired up from OR flat and incremental 15 degrees with no positional headaches or sign of CSF leak. Surgical pathology reporting diffuse large B-cell lymphoma. Patient evaluated by PT/OT/PM&R and recommended for acute inpatient rehab. Patient was discharged to [facility] on 3/16/24. (16 Mar 2024 12:07)    Pt was stable upon rehab admission to  [facility]. Admitted with gait instability, ADL, and functional impairments.     Rehab Course significant for pain that was manageable with addition of standing Dilaudid 2mg q6h PO and PRN tramadol/Oxycodone. Rehab complicated by b/l soleal DVTs on 3/19 treated with monitoring. Please repeat duplexes weekly. Further complicated by UTI treated with IV CFTX for 3 days. His UCx is positive for E.coli, with pending abx sensitivity that needs to be followed up. Per discussion with the Oncology team evaluating Mr. Solis today at [facility], they recommended he will be admitted to [facility] for chemotherapy today. Patient was set to be discharged on 3/26/25 and can likely be discharged from [facility] to home. Should consult PM&R service at [facility] for reassessment once acute Oncologic treatment inpatient is deemed completed.      All other medical co-morbidities were stable. Pt tolerated course of inpatient PT/OT/SLP rehab with significant functional improvements.  Pt was medically cleared on 3/22 for discharge to [facility]    Pt will follow up with the following:    Bhaskar Partida  Neurosurgery  [address]  [place] [address]  Phone: [phone]  Fax: [phone]  Follow Up Time: 2 weeks    Tobias Chatterjee  Medical Oncology  [address] [place] [address]  Phone: [phone]  Fax: [phone]  Follow Up Time: 1 week    Gunnar Campbell  Plastic Surgery  [address]  [place] [address]  Phone: [phone]  Fax: [phone]  Follow Up Time:     HPI:  Mr. Saturnino Solis is a 68-year-old male patient with past medical history of HLD, pre-Diabetes Mellitus, and sinus bradycardia who presented to [facility] on 3/5 with a history of progressively worsening lower extremity numbness/paresthesias and weakness with onset on Jan 2024. She underwent x-rays & MRI imaging as outpatient on 2/29/24 showing concern for osseous metastatic disease in thoracic/lumbar/sacral spine as well as extension of soft tissue into the paravertebral soft tissues more prominent on the right side. He was previously ambulating independently without device but now required a RW. Repeat MRI showed multiple findings including diffuse osseous metastatic disease throughout the entire spine; pathologic fx w/ tumor into the epidural space at T8 resulting in thoracic cord compression; tumor extension into the epidural space at L3 resulting in cauda equina compression; pathologic fx w/ tumor in the epidural space at L4 contributing to severe canal stenosis; tumor within the bilateral psoas muscles at L3 and L4; bilateral neural foramen effacement by tumor in L-spine. He underwent a two stage surgical resection with a combined surgical team including Neurosurgery (Dr. Bhaskar Partida) and Plastic Surgery (Dr. Gunnar Campbell). He underwent the following procedures:     Stage 1: T8 VCR (vertebral column resection) T6-T10 fusion for tumor resection, small csf leak primarily repaired  Stage 2: L4 Partial Corpectomy, L2-Pelvis Fusion, Plastics Closure 3/5 HMV drain x3 and 1 Bile bag placed w/ output closely monitored.     He was monitored post-operatively in NSCU on PCA pump, durotomy encountered intraop and primarily repaired up from OR flat and incremental 15 degrees with no positional headaches or sign of CSF leak. Surgical pathology reporting diffuse large B-cell lymphoma. Patient evaluated by PT/OT/PM&R and recommended for acute inpatient rehab. Patient was discharged to [facility] on 3/16/24. (16 Mar 2024 12:07)    Pt was stable upon rehab admission to  [facility]. Admitted with gait instability, ADL, and functional impairments.     Rehab Course significant for pain that was manageable with addition of standing Dilaudid 2mg q6h PO and PRN tramadol/Oxycodone. Rehab complicated by b/l soleal DVTs on 3/19 treated with monitoring. Please repeat duplexes weekly. Further complicated by UTI treated with IV CFTX for 3 days. His UCx is positive for E.coli, with pending abx sensitivity that needs to be followed up. Per discussion with the Oncology team evaluating Mr. Solis today at [facility], they recommended he will be admitted to [facility] for chemotherapy today. Patient was set to be discharged on 3/26/25 and can likely be discharged from [facility] to home. Should consult PM&R service at [facility] for reassessment once acute Oncologic treatment inpatient is deemed completed.    Patient's family would need family training at [facility] on helping patient with transfers, mobility, etc.       All other medical co-morbidities were stable. Pt tolerated course of inpatient PT/OT/SLP rehab with significant functional improvements.  Pt was medically cleared on 3/22 for discharge to [facility]    Pt will follow up with the following:    Bhaskar Partida  Neurosurgery  [address]  [place] [address]  Phone: [phone]  Fax: [phone]  Follow Up Time: 2 weeks    Tobias Chatterjee  Medical Oncology  [address] [place] [address]  Phone: [phone]  Fax: [phone]  Follow Up Time: 1 week    Gunnar Campbell  Plastic Surgery  [address]  [place] [address]  Phone: [phone]  Fax: [phone]  Follow Up Time:     HPI:  Mr. Saturnino oSlis is a 68-year-old male patient with past medical history of HLD, pre-Diabetes Mellitus, and sinus bradycardia who presented to [facility] on 3/5 with a history of progressively worsening lower extremity numbness/paresthesias and weakness with onset on Jan 2024. She underwent x-rays & MRI imaging as outpatient on 2/29/24 showing concern for osseous metastatic disease in thoracic/lumbar/sacral spine as well as extension of soft tissue into the paravertebral soft tissues more prominent on the right side. He was previously ambulating independently without device but now required a RW. Repeat MRI showed multiple findings including diffuse osseous metastatic disease throughout the entire spine; pathologic fx w/ tumor into the epidural space at T8 resulting in thoracic cord compression; tumor extension into the epidural space at L3 resulting in cauda equina compression; pathologic fx w/ tumor in the epidural space at L4 contributing to severe canal stenosis; tumor within the bilateral psoas muscles at L3 and L4; bilateral neural foramen effacement by tumor in L-spine. He underwent a two stage surgical resection with a combined surgical team including Neurosurgery (Dr. Bhaskar Partida) and Plastic Surgery (Dr. Gunnar Campbell). He underwent the following procedures:     Stage 1: T8 VCR (vertebral column resection) T6-T10 fusion for tumor resection, small csf leak primarily repaired  Stage 2: L4 Partial Corpectomy, L2-Pelvis Fusion, Plastics Closure 3/5 HMV drain x3 and 1 Bile bag placed w/ output closely monitored.     He was monitored post-operatively in NSCU on PCA pump, durotomy encountered intraop and primarily repaired up from OR flat and incremental 15 degrees with no positional headaches or sign of CSF leak. Surgical pathology reporting diffuse large B-cell lymphoma. Patient evaluated by PT/OT/PM&R and recommended for acute inpatient rehab. Patient was discharged to [facility] on 3/16/24. (16 Mar 2024 12:07)    Pt was stable upon rehab admission to  Inpatient Rehabilitation Facility. Admitted with gait instability, ADL, and functional impairments.     Rehab Course significant for pain that was manageable with addition of standing Dilaudid 2mg q6h PO and PRN tramadol/Oxycodone. Rehab complicated by b/l soleal DVTs on 3/19 treated with monitoring. Please repeat duplexes weekly. Further complicated by UTI treated with IV CFTX for 3 days. His UCx is positive for E.coli, with pending abx sensitivity that needs to be followed up. Per discussion with the Oncology team evaluating Mr. Solis today at [facility], they recommended he will be admitted to [facility] for chemotherapy today. Patient was set to be discharged on 3/26/25 and can likely be discharged from [facility] to home. Should consult PM&R service at [facility] for reassessment once acute Oncologic treatment inpatient is deemed completed.    Patient's family would need family training at [facility] on assisting patient with transfers, mobility, etc.       All other medical co-morbidities were stable. Pt tolerated course of inpatient PT/OT/SLP rehab with significant functional improvements.  Pt was medically cleared on 3/22 for discharge to [facility]    Pt will follow up with the following:    Bhaskar Partida  Neurosurgery  [address]  [place] [address]  Phone: [phone]  Fax: [phone]  Follow Up Time: 2 weeks    Tobias Chatterjee  Medical Oncology  [address] [place] [address]  Phone: [phone]  Fax: [phone]  Follow Up Time: 1 week    Gunnar Campbell  Plastic Surgery  [address]  [place] [address]  Phone: [phone]  Fax: [phone]  Follow Up Time:

## 2024-03-22 NOTE — PROGRESS NOTE ADULT - SUBJECTIVE AND OBJECTIVE BOX
HPI:  Mr. Saturnino Solis is a 68-year-old male patient with past medical history of HLD, pre-Diabetes Mellitus, and sinus bradycardia who presented to [facility] on 3/5 with a history of progressively worsening lower extremity numbness/paresthesias and weakness with onset on Jan 2024. She underwent x-rays & MRI imaging as outpatient on 2/29/24 showing concern for osseous metastatic disease in thoracic/lumbar/sacral spine as well as extension of soft tissue into the paravertebral soft tissues more prominent on the right side. He was previously ambulating independently without device but now required a RW. Repeat MRI showed multiple findings including diffuse osseous metastatic disease throughout the entire spine; pathologic fx w/ tumor into the epidural space at T8 resulting in thoracic cord compression; tumor extension into the epidural space at L3 resulting in cauda equina compression; pathologic fx w/ tumor in the epidural space at L4 contributing to severe canal stenosis; tumor within the bilateral psoas muscles at L3 and L4; bilateral neural foramen effacement by tumor in L-spine. He underwent a two stage surgical resection with a combined surgical team including Neurosurgery (Dr. Bhaskar Partida) and Plastic Surgery (Dr. Gunnar Campbell). He underwent the following procedures:     ·	Stage 1: T8 VCR (vertebral column resection) T6-T10 fusion for tumor resection, small csf leak primarily repaired  ·	Stage 2: L4 Partial Corpectomy, L2-Pelvis Fusion, Plastics Closure 3/5 HMV drain x3 and 1 Bile bag placed w/ output closely monitored.     He was monitored post-operatively in NSCU on PCA pump, durotomy encountered intraop and primarily repaired up from OR flat and incremental 15 degrees with no positional headaches or sign of CSF leak. Surgical pathology reporting diffuse large B-cell lymphoma. Patient evaluated by PT/OT/PM&R and recommended for acute inpatient rehab. Patient was discharged to [facility] on 3/16/24. (16 Mar 2024 12:07)    TDD: 3/27th Home  ___________________________________________________________________________    SUBJECTIVE/ROS  Patient was seen and evaluated at bedside today.  Reported no overnight events and is in no acute distress.  His pain is currently under control with recent adjustments in analgesia.  Scheduled for Oncology appointment later today.  Reviewed weekend coverage with patient.  Patient expressed understanding and felt comfortable.  Patient is motivated to participate on the recommended rehabilitation program.  Denies any CP, SOB, YUN, palpitations, fever, chills, body aches, cough, congestion, or any other symptoms at this time.   ___________________________________________________________________________    US DPLX LWR EXT VEINS COMPL BI (03/19/2024)   IMPRESSION: Acute deep venous thrombosis: below the knee. Bilateral soleal vein thrombosis.    ___________________________________________________________________________    Vital Signs Last 24 Hrs  T(C): 36.9 (03-22-24 @ 08:17), Max: 37.2 (03-21-24 @ 21:17)  T(F): 98.4 (03-22-24 @ 08:17), Max: 98.9 (03-21-24 @ 21:17)  HR: 76 (03-22-24 @ 08:17) (73 - 76)  BP: 118/71 (03-22-24 @ 08:17) (118/67 - 118/71)  RR: 15 (03-22-24 @ 08:17) (15 - 16)  SpO2: 94% (03-22-24 @ 08:17) (94% - 95%)    ___________________________________________________________________________    LABS                        9.2    8.17  )-----------( 396      ( 21 Mar 2024 05:59 )             28.4     03-21    135  |  99  |  15  ----------------------------<  105<H>  3.7   |  30  |  0.57    Ca    8.5      21 Mar 2024 05:59    TPro  5.4<L>  /  Alb  2.1<L>  /  TBili  0.6  /  DBili  x   /  AST  38  /  ALT  86<H>  /  AlkPhos  119  03-21    ___________________________________________________________________________    MEDICATIONS  (STANDING):  cefTRIAXone   IVPB 1000 milliGRAM(s) IV Intermittent every 24 hours  enoxaparin Injectable 40 milliGRAM(s) SubCutaneous <User Schedule>  gabapentin 600 milliGRAM(s) Oral every 8 hours  HYDROmorphone   Tablet 2 milliGRAM(s) Oral every 6 hours  methocarbamol 750 milliGRAM(s) Oral every 8 hours  multivitamin 1 Tablet(s) Oral daily  polyethylene glycol 3350 17 Gram(s) Oral two times a day  senna 2 Tablet(s) Oral at bedtime  sodium chloride 2 Gram(s) Oral every 12 hours    MEDICATIONS  (PRN):  acetaminophen     Tablet .. 975 milliGRAM(s) Oral every 6 hours PRN Temp greater or equal to 38C (100.4F), Mild Pain (1 - 3)  hydrocortisone 1% Cream 1 Application(s) Topical every 12 hours PRN Itching  oxyCODONE    IR 10 milliGRAM(s) Oral every 4 hours PRN Severe Pain (7 - 10)  traMADol 50 milliGRAM(s) Oral every 6 hours PRN Moderate Pain (4 - 6)    ___________________________________________________________________________    PHYSICAL EXAM:    Physical Exam: Gen - NAD, Comfortable  HEENT - NCAT, EOMI, MMM  Neck - Supple, No limited ROM  Pulm - CTAB, No wheeze, No rhonchi, No crackles  Cardiovascular - RRR, S1S2, No murmurs  Abdomen - Soft, NT/ND, +BS  Extremities - No C/C/E, No calf tenderness  Neuro-     Cognitive - AAOx3     Communication - Fluent, No dysarthria     Motor - See NATALIE Examination below     Sensory - See NATALIE Examination below     Reflexes - No clonus     Tone - normal  Psychiatric - Mood stable, Affect WNL  Skin:   - Upper thoracic surgical incision: covered with Prineo dressing with no erythema, warmth,  nor discharge  - Lower thoracic and lumbosacral surgical incision: covered with Prineo dressing with no erythema, warmth,  nor discharge  - Bia-incisional sites of previous drains healing with no erythema, warmth,  nor discharge  - Stage 2 pressure injuries on sacrum and right shin  ___________________________________________________________________________    NATALIE Examination (3/18/24)    Motor (Key Muscles):              C5      C6      C7      C8      T1      L2      L3      L4      L5      S1  R        5/5     5/5    5/5     5/5    5/5    3/5    3/5    5/5     5/5     5/5  L         5/5    5/5     5/5    5/5    5/5     5/5   3/5     4/5     5/5     5/5      Sensory (Light touch): (0=absent, 1=impaired, 2=normal, NT=not testable)             C1   C2   C3   C4   C5   C6   C7   C8   R         2    2      2      2     2      2     2     2   L         2     2     2      2     2      2     2     2               T1   T2   T3   T4   T5   T6   T7   T8   T9   T10   T11   T12  R         2     2     2     2     2     2     2      2     2      2       2       2   L         2     2     2     2      2     2     2     2      2     2       2       2                L1   L2   L3   L4   L5   S1   S2   S3   S4-5         R         2     2     1    1    1    2     2     2     2   L         2     2     2     2     2     2     2     2     2      Sensory (Pin Prick): (0=absent, 1=impaired, 2=normal, NT=not testable)             C1   C2   C3   C4   C5   C6   C7   C8   R          2    2      2     2     2      2     2     2   L          2     2     2     2      2     2      2     2               T1   T2   T3   T4   T5   T6   T7   T8   T9   T10   T11   T12  R         2     2     2     2     2      2     2     2      2     2       2       1  L         2     2     2      2     2      2     2     2     2     2       2       1               L1   L2   L3   L4   L5   S1   S2   S3   S4-5         R         2     2     1   1    1    2     2     2      2   L         2     2     2     2     2     2     2     2      2    (DAP) Deep Anal Pressure Present  (VAC) Voluntary Anal Contraction Present    ___________________________________________________________________________   HPI:  Mr. Saturnino Solis is a 68-year-old male patient with past medical history of HLD, pre-Diabetes Mellitus, and sinus bradycardia who presented to [facility] on 3/5 with a history of progressively worsening lower extremity numbness/paresthesias and weakness with onset on Jan 2024. She underwent x-rays & MRI imaging as outpatient on 2/29/24 showing concern for osseous metastatic disease in thoracic/lumbar/sacral spine as well as extension of soft tissue into the paravertebral soft tissues more prominent on the right side. He was previously ambulating independently without device but now required a RW. Repeat MRI showed multiple findings including diffuse osseous metastatic disease throughout the entire spine; pathologic fx w/ tumor into the epidural space at T8 resulting in thoracic cord compression; tumor extension into the epidural space at L3 resulting in cauda equina compression; pathologic fx w/ tumor in the epidural space at L4 contributing to severe canal stenosis; tumor within the bilateral psoas muscles at L3 and L4; bilateral neural foramen effacement by tumor in L-spine. He underwent a two stage surgical resection with a combined surgical team including Neurosurgery (Dr. Bhaskar Partida) and Plastic Surgery (Dr. Gunnar Campbell). He underwent the following procedures:     ·	Stage 1: T8 VCR (vertebral column resection) T6-T10 fusion for tumor resection, small csf leak primarily repaired  ·	Stage 2: L4 Partial Corpectomy, L2-Pelvis Fusion, Plastics Closure 3/5 HMV drain x3 and 1 Bile bag placed w/ output closely monitored.     He was monitored post-operatively in NSCU on PCA pump, durotomy encountered intraop and primarily repaired up from OR flat and incremental 15 degrees with no positional headaches or sign of CSF leak. Surgical pathology reporting diffuse large B-cell lymphoma. Patient evaluated by PT/OT/PM&R and recommended for acute inpatient rehab. Patient was discharged to [facility] on 3/16/24. (16 Mar 2024 12:07)    TDD: 3/27th Home  ___________________________________________________________________________    SUBJECTIVE/ROS  Patient was seen and evaluated at bedside today.  Reported no overnight events and is in no acute distress.  His pain is currently under control with recent adjustments in analgesia.  Patient transported to scheduled Oncology appointment today.  Per discussion with the Oncology team evaluating Mr. Solis, he was recommended transfer to [facility] for chemotherapy.   Patient was set to be discharged on 3/26/25 and can likely be discharged from [facility] to home.   Should consult PM&R service at [facility] for reassessment once acute Oncologic treatment inpatient is deemed completed.  Denies any CP, SOB, YUN, palpitations, fever, chills, body aches, cough, congestion, or any other symptoms at this time.   ___________________________________________________________________________    US DPLX LWR EXT VEINS COMPL BI (03/19/2024)   IMPRESSION: Acute deep venous thrombosis: below the knee. Bilateral soleal vein thrombosis.    ___________________________________________________________________________    Vital Signs Last 24 Hrs  T(C): 36.9 (03-22-24 @ 08:17), Max: 37.2 (03-21-24 @ 21:17)  T(F): 98.4 (03-22-24 @ 08:17), Max: 98.9 (03-21-24 @ 21:17)  HR: 76 (03-22-24 @ 08:17) (73 - 76)  BP: 118/71 (03-22-24 @ 08:17) (118/67 - 118/71)  RR: 15 (03-22-24 @ 08:17) (15 - 16)  SpO2: 94% (03-22-24 @ 08:17) (94% - 95%)    ___________________________________________________________________________    LABS                        9.2    8.17  )-----------( 396      ( 21 Mar 2024 05:59 )             28.4     03-21    135  |  99  |  15  ----------------------------<  105<H>  3.7   |  30  |  0.57    Ca    8.5      21 Mar 2024 05:59    TPro  5.4<L>  /  Alb  2.1<L>  /  TBili  0.6  /  DBili  x   /  AST  38  /  ALT  86<H>  /  AlkPhos  119  03-21    ___________________________________________________________________________    MEDICATIONS  (STANDING):  cefTRIAXone   IVPB 1000 milliGRAM(s) IV Intermittent every 24 hours  enoxaparin Injectable 40 milliGRAM(s) SubCutaneous <User Schedule>  gabapentin 600 milliGRAM(s) Oral every 8 hours  HYDROmorphone   Tablet 2 milliGRAM(s) Oral every 6 hours  methocarbamol 750 milliGRAM(s) Oral every 8 hours  multivitamin 1 Tablet(s) Oral daily  polyethylene glycol 3350 17 Gram(s) Oral two times a day  senna 2 Tablet(s) Oral at bedtime  sodium chloride 2 Gram(s) Oral every 12 hours    MEDICATIONS  (PRN):  acetaminophen     Tablet .. 975 milliGRAM(s) Oral every 6 hours PRN Temp greater or equal to 38C (100.4F), Mild Pain (1 - 3)  hydrocortisone 1% Cream 1 Application(s) Topical every 12 hours PRN Itching  oxyCODONE    IR 10 milliGRAM(s) Oral every 4 hours PRN Severe Pain (7 - 10)  traMADol 50 milliGRAM(s) Oral every 6 hours PRN Moderate Pain (4 - 6)    ___________________________________________________________________________    PHYSICAL EXAM:    Physical Exam: Gen - NAD, Comfortable  HEENT - NCAT, EOMI, MMM  Neck - Supple, No limited ROM  Pulm - CTAB, No wheeze, No rhonchi, No crackles  Cardiovascular - RRR, S1S2, No murmurs  Abdomen - Soft, NT/ND, +BS  Extremities - No C/C/E, No calf tenderness  Neuro-     Cognitive - AAOx3     Communication - Fluent, No dysarthria     Motor - See NATALIE Examination below     Sensory - See NATALIE Examination below     Reflexes - No clonus     Tone - normal  Psychiatric - Mood stable, Affect WNL  Skin:   - Upper thoracic surgical incision: covered with Prineo dressing with no erythema, warmth,  nor discharge  - Lower thoracic and lumbosacral surgical incision: covered with Prineo dressing with no erythema, warmth,  nor discharge  - Bia-incisional sites of previous drains healing with no erythema, warmth,  nor discharge  - Stage 2 pressure injuries on sacrum and right shin  ___________________________________________________________________________    NATALIE Examination (3/18/24)    Motor (Key Muscles):              C5      C6      C7      C8      T1      L2      L3      L4      L5      S1  R        5/5     5/5    5/5     5/5    5/5    3/5    3/5    5/5     5/5     5/5  L         5/5    5/5     5/5    5/5    5/5     5/5   3/5     4/5     5/5     5/5      Sensory (Light touch): (0=absent, 1=impaired, 2=normal, NT=not testable)             C1   C2   C3   C4   C5   C6   C7   C8   R         2    2      2      2     2      2     2     2   L         2     2     2      2     2      2     2     2               T1   T2   T3   T4   T5   T6   T7   T8   T9   T10   T11   T12  R         2     2     2     2     2     2     2      2     2      2       2       2   L         2     2     2     2      2     2     2     2      2     2       2       2                L1   L2   L3   L4   L5   S1   S2   S3   S4-5         R         2     2     1    1    1    2     2     2     2   L         2     2     2     2     2     2     2     2     2      Sensory (Pin Prick): (0=absent, 1=impaired, 2=normal, NT=not testable)             C1   C2   C3   C4   C5   C6   C7   C8   R          2    2      2     2     2      2     2     2   L          2     2     2     2      2     2      2     2               T1   T2   T3   T4   T5   T6   T7   T8   T9   T10   T11   T12  R         2     2     2     2     2      2     2     2      2     2       2       1  L         2     2     2      2     2      2     2     2     2     2       2       1               L1   L2   L3   L4   L5   S1   S2   S3   S4-5         R         2     2     1   1    1    2     2     2      2   L         2     2     2     2     2     2     2     2      2    (DAP) Deep Anal Pressure Present  (VAC) Voluntary Anal Contraction Present    ___________________________________________________________________________

## 2024-03-22 NOTE — PROGRESS NOTE ADULT - ASSESSMENT
Mr. Saturnino Solis is a 68-year-old male patient with past medical history of HLD, pre-Diabetes Mellitus, and sinus bradycardia who presented to [facility] on 3/5 with a history of progressively worsening lower extremity numbness/paresthesias and weakness with onset Jan 2024. He underwent x-rays & MRI imaging as outpatient on 2/29/24 showing concern for osseous metastatic disease in thoracic/lumbar/sacral spine as well as extension of soft tissue into the paravertebral soft tissues more prominent on the right side. He was previously ambulating independently without device but now required a RW. Repeat MRI showed multiple findings including diffuse osseous metastatic disease throughout the entire spine; pathologic fx w/ tumor into the epidural space at T8 resulting in thoracic cord compression; tumor extension into the epidural space at L3 resulting in cauda equina compression; pathologic fx w/ tumor in the epidural space at L4 contributing to severe canal stenosis; tumor within the bilateral psoas muscles at L3 and L4; bilateral neural foramen effacement by tumor in L-spine. He underwent a two stage surgical resection with a combined surgical team including Neurosurgery (Dr. Bhaskar Partida) and Plastic Surgery (Dr. Gunnar Campbell). He underwent the following procedures:     ·	Stage 1: T8 VCR (vertebral column resection) T6-T10 fusion for tumor resection, small csf leak primarily repaired  ·	Stage 2: L4 Partial Corpectomy, L2-Pelvis Fusion, Plastics Closure 3/5 HMV drain x3 and 1 Bile bag placed w/ output closely monitored.     He was monitored post-operatively in NSCU on PCA pump, durotomy encountered intraop and primarily repaired up from OR flat and incremental 15 degrees with no positional headaches or sign of CSF leak. Surgical pathology reporting diffuse large B-cell lymphoma. Patient evaluated by PT/OT/PM&R and recommended for acute inpatient rehab. Patient was discharged to [facility] on 3/16/24. (16 Mar 2024 12:07)  Oncology consulted for recent DLBCL diagnosis.

## 2024-03-22 NOTE — DISCHARGE NOTE NURSING/CASE MANAGEMENT/SOCIAL WORK - NSDCPEFALRISK_GEN_ALL_CORE
For information on Fall & Injury Prevention, visit: [email or web address] OR  [email or web address] OR  https://www.cdc.gov/steadi/patient.html

## 2024-03-22 NOTE — PROGRESS NOTE ADULT - SUBJECTIVE AND OBJECTIVE BOX
SATURNINO SOLIS   68y   Male    Admitting: JAIRO Florentino  HPI:    Mr. Saturnino Solis is a 68-year-old male patient with past medical history of HLD, pre-Diabetes Mellitus, and sinus bradycardia who presented to [facility] on 3/5 with a history of progressively worsening lower extremity numbness/paresthesias and weakness with onset Jan 2024. He underwent x-rays & MRI imaging as outpatient on 2/29/24 showing concern for osseous metastatic disease in thoracic/lumbar/sacral spine as well as extension of soft tissue into the paravertebral soft tissues more prominent on the right side. He was previously ambulating independently without device but now required a RW. Repeat MRI showed multiple findings including diffuse osseous metastatic disease throughout the entire spine; pathologic fx w/ tumor into the epidural space at T8 resulting in thoracic cord compression; tumor extension into the epidural space at L3 resulting in cauda equina compression; pathologic fx w/ tumor in the epidural space at L4 contributing to severe canal stenosis; tumor within the bilateral psoas muscles at L3 and L4; bilateral neural foramen effacement by tumor in L-spine. He underwent a two stage surgical resection with a combined surgical team including Neurosurgery (Dr. Bhaskar Partida) and Plastic Surgery (Dr. Gunnar Campbell). He underwent the following procedures:     ·	Stage 1: T8 VCR (vertebral column resection) T6-T10 fusion for tumor resection, small csf leak primarily repaired  ·	Stage 2: L4 Partial Corpectomy, L2-Pelvis Fusion, Plastics Closure 3/5 HMV drain x3 and 1 Bile bag placed w/ output closely monitored.     He was monitored post-operatively in NSCU on PCA pump, durotomy encountered intraop and primarily repaired up from OR flat and incremental 15 degrees with no positional headaches or sign of CSF leak. Surgical pathology reporting diffuse large B-cell lymphoma. Patient evaluated by PT/OT/PM&R and recommended for acute inpatient rehab. Patient was discharged to [facility] on 3/16/24. (16 Mar 2024 12:07)  Oncology consulted for recent DLBCL diagnosis.    PAST MEDICAL & SURGICAL HISTORY:  HLD (hyperlipidemia)      Prediabetes      Sinus bradycardia      History of appendectomy      History of arthroscopic knee surgery        HEALTH ISSUES - PROBLEM Dx:  Non-Hodgkin lymphoma    DVT, lower extremity, distal      MEDICATIONS  (STANDING):  enoxaparin Injectable 40 milliGRAM(s) SubCutaneous <User Schedule>  gabapentin 600 milliGRAM(s) Oral every 8 hours  HYDROmorphone   Tablet 2 milliGRAM(s) Oral every 6 hours  methocarbamol 750 milliGRAM(s) Oral every 8 hours  multivitamin 1 Tablet(s) Oral daily  polyethylene glycol 3350 17 Gram(s) Oral two times a day  senna 2 Tablet(s) Oral at bedtime  sodium chloride 2 Gram(s) Oral every 12 hours    MEDICATIONS  (PRN):  acetaminophen     Tablet .. 975 milliGRAM(s) Oral every 6 hours PRN Temp greater or equal to 38C (100.4F), Mild Pain (1 - 3)  hydrocortisone 1% Cream 1 Application(s) Topical every 12 hours PRN Itching  oxyCODONE    IR 10 milliGRAM(s) Oral every 4 hours PRN Severe Pain (7 - 10)  traMADol 50 milliGRAM(s) Oral every 6 hours PRN Moderate Pain (4 - 6)    Allergies    latex (Rash)  No Known Drug Allergies    INTERVAL HPI/OVERNIGHT EVENTS:  Patient S&E at bedside. States feels well. Denies pain. No c/o CP or SOB.    VITAL SIGNS:  T(F): 98.4 (03-22-24 @ 08:17)  HR: 76 (03-22-24 @ 08:17)  BP: 118/71 (03-22-24 @ 08:17)  RR: 15 (03-22-24 @ 08:17)  SpO2: 94% (03-22-24 @ 08:17)      PHYSICAL EXAM:  Constitutional: NAD  Eyes: sclera non-icteric  Neck: no JVD  Respiratory: CTA ant.; no wheezes  Cardiovascular: RRR, no M/R/G  Gastrointestinal: soft, NTND  Extremities: no calf tenderness  Neurological: Awake, alert.    Labs:             9.2    8.17  )-----------( 396      ( 03-21 @ 05:59 )             28.4       03-21    135  |  99  |  15  ----------------------------<  105<H>  3.7   |  30  |  0.57    Ca    8.5      21 Mar 2024 05:59    TPro  5.4<L>  /  Alb  2.1<L>  /  TBili  0.6  /  DBili  x   /  AST  38  /  ALT  86<H>  /  AlkPhos  119  03-21      Consultant notes reviewed : YES [x ] ; NO [ ]

## 2024-03-22 NOTE — DISCHARGE NOTE PROVIDER - PROVIDER TOKENS
PROVIDER:[TOKEN:[[id number]],FOLLOWUP:[1 week]],PROVIDER:[TOKEN:[[id number]],FOLLOWUP:[1 week]],PROVIDER:[TOKEN:[[id number]],FOLLOWUP:[1 week]]

## 2024-03-23 LAB
ALBUMIN SERPL ELPH-MCNC: 2.9 G/DL — LOW (ref 3.3–5)
ALP SERPL-CCNC: 124 U/L — HIGH (ref 40–120)
ALT FLD-CCNC: 82 U/L — HIGH (ref 10–45)
ANION GAP SERPL CALC-SCNC: 10 MMOL/L — SIGNIFICANT CHANGE UP (ref 5–17)
APTT BLD: 29.9 SEC — SIGNIFICANT CHANGE UP (ref 24.5–35.6)
AST SERPL-CCNC: 54 U/L — HIGH (ref 10–40)
BASOPHILS # BLD AUTO: 0.04 K/UL — SIGNIFICANT CHANGE UP (ref 0–0.2)
BASOPHILS NFR BLD AUTO: 0.7 % — SIGNIFICANT CHANGE UP (ref 0–2)
BILIRUB SERPL-MCNC: 0.3 MG/DL — SIGNIFICANT CHANGE UP (ref 0.2–1.2)
BUN SERPL-MCNC: 22 MG/DL — SIGNIFICANT CHANGE UP (ref 7–23)
CALCIUM SERPL-MCNC: 8.4 MG/DL — SIGNIFICANT CHANGE UP (ref 8.4–10.5)
CHLORIDE SERPL-SCNC: 102 MMOL/L — SIGNIFICANT CHANGE UP (ref 96–108)
CO2 SERPL-SCNC: 25 MMOL/L — SIGNIFICANT CHANGE UP (ref 22–31)
CREAT SERPL-MCNC: 0.78 MG/DL — SIGNIFICANT CHANGE UP (ref 0.5–1.3)
D DIMER BLD IA.RAPID-MCNC: 1768 NG/ML DDU — HIGH
EGFR: 98 ML/MIN/1.73M2 — SIGNIFICANT CHANGE UP
EOSINOPHIL # BLD AUTO: 0.16 K/UL — SIGNIFICANT CHANGE UP (ref 0–0.5)
EOSINOPHIL NFR BLD AUTO: 2.7 % — SIGNIFICANT CHANGE UP (ref 0–6)
FIBRINOGEN PPP-MCNC: 542 MG/DL — HIGH (ref 200–445)
GLUCOSE SERPL-MCNC: 103 MG/DL — HIGH (ref 70–99)
HCT VFR BLD CALC: 28.2 % — LOW (ref 39–50)
HGB BLD-MCNC: 8.6 G/DL — LOW (ref 13–17)
IMM GRANULOCYTES NFR BLD AUTO: 1 % — HIGH (ref 0–0.9)
INR BLD: 1.12 RATIO — SIGNIFICANT CHANGE UP (ref 0.85–1.18)
LDH SERPL L TO P-CCNC: 544 U/L — HIGH (ref 50–242)
LYMPHOCYTES # BLD AUTO: 0.68 K/UL — LOW (ref 1–3.3)
LYMPHOCYTES # BLD AUTO: 11.7 % — LOW (ref 13–44)
MAGNESIUM SERPL-MCNC: 2.1 MG/DL — SIGNIFICANT CHANGE UP (ref 1.6–2.6)
MCHC RBC-ENTMCNC: 25.8 PG — LOW (ref 27–34)
MCHC RBC-ENTMCNC: 30.5 GM/DL — LOW (ref 32–36)
MCV RBC AUTO: 84.7 FL — SIGNIFICANT CHANGE UP (ref 80–100)
MONOCYTES # BLD AUTO: 0.62 K/UL — SIGNIFICANT CHANGE UP (ref 0–0.9)
MONOCYTES NFR BLD AUTO: 10.6 % — SIGNIFICANT CHANGE UP (ref 2–14)
NEUTROPHILS # BLD AUTO: 4.27 K/UL — SIGNIFICANT CHANGE UP (ref 1.8–7.4)
NEUTROPHILS NFR BLD AUTO: 73.3 % — SIGNIFICANT CHANGE UP (ref 43–77)
NRBC # BLD: 0 /100 WBCS — SIGNIFICANT CHANGE UP (ref 0–0)
PHOSPHATE SERPL-MCNC: 3.9 MG/DL — SIGNIFICANT CHANGE UP (ref 2.5–4.5)
PLATELET # BLD AUTO: 380 K/UL — SIGNIFICANT CHANGE UP (ref 150–400)
POTASSIUM SERPL-MCNC: 4.1 MMOL/L — SIGNIFICANT CHANGE UP (ref 3.5–5.3)
POTASSIUM SERPL-SCNC: 4.1 MMOL/L — SIGNIFICANT CHANGE UP (ref 3.5–5.3)
PROT SERPL-MCNC: 5.3 G/DL — LOW (ref 6–8.3)
PROTHROM AB SERPL-ACNC: 11.7 SEC — SIGNIFICANT CHANGE UP (ref 9.5–13)
RBC # BLD: 3.33 M/UL — LOW (ref 4.2–5.8)
RBC # FLD: 16.7 % — HIGH (ref 10.3–14.5)
SODIUM SERPL-SCNC: 137 MMOL/L — SIGNIFICANT CHANGE UP (ref 135–145)
URATE SERPL-MCNC: 4.6 MG/DL — SIGNIFICANT CHANGE UP (ref 3.4–8.8)
WBC # BLD: 5.83 K/UL — SIGNIFICANT CHANGE UP (ref 3.8–10.5)
WBC # FLD AUTO: 5.83 K/UL — SIGNIFICANT CHANGE UP (ref 3.8–10.5)

## 2024-03-23 PROCEDURE — 99233 SBSQ HOSP IP/OBS HIGH 50: CPT | Mod: FS

## 2024-03-23 RX ORDER — HYDROCORTISONE 20 MG
50 TABLET ORAL ONCE
Refills: 0 | Status: COMPLETED | OUTPATIENT
Start: 2024-03-23 | End: 2024-03-23

## 2024-03-23 RX ORDER — HYDROCORTISONE 20 MG
100 TABLET ORAL ONCE
Refills: 0 | Status: COMPLETED | OUTPATIENT
Start: 2024-03-23 | End: 2024-03-23

## 2024-03-23 RX ORDER — LACTULOSE 10 G/15ML
10 SOLUTION ORAL ONCE
Refills: 0 | Status: DISCONTINUED | OUTPATIENT
Start: 2024-03-23 | End: 2024-03-26

## 2024-03-23 RX ORDER — DIPHENHYDRAMINE HCL 50 MG
25 CAPSULE ORAL ONCE
Refills: 0 | Status: COMPLETED | OUTPATIENT
Start: 2024-03-23 | End: 2024-03-23

## 2024-03-23 RX ORDER — SENNA PLUS 8.6 MG/1
2 TABLET ORAL AT BEDTIME
Refills: 0 | Status: DISCONTINUED | OUTPATIENT
Start: 2024-03-23 | End: 2024-03-26

## 2024-03-23 RX ORDER — FAMOTIDINE 10 MG/ML
20 INJECTION INTRAVENOUS ONCE
Refills: 0 | Status: COMPLETED | OUTPATIENT
Start: 2024-03-23 | End: 2024-03-23

## 2024-03-23 RX ORDER — CHLORHEXIDINE GLUCONATE 213 G/1000ML
1 SOLUTION TOPICAL DAILY
Refills: 0 | Status: DISCONTINUED | OUTPATIENT
Start: 2024-03-23 | End: 2024-03-26

## 2024-03-23 RX ORDER — SALIVA SUBSTITUTE COMB NO.11 351 MG
15 POWDER IN PACKET (EA) MUCOUS MEMBRANE THREE TIMES A DAY
Refills: 0 | Status: DISCONTINUED | OUTPATIENT
Start: 2024-03-23 | End: 2024-03-23

## 2024-03-23 RX ORDER — MAGNESIUM HYDROXIDE 400 MG/1
30 TABLET, CHEWABLE ORAL DAILY
Refills: 0 | Status: DISCONTINUED | OUTPATIENT
Start: 2024-03-23 | End: 2024-03-26

## 2024-03-23 RX ORDER — APIXABAN 2.5 MG/1
5 TABLET, FILM COATED ORAL
Refills: 0 | Status: DISCONTINUED | OUTPATIENT
Start: 2024-03-23 | End: 2024-03-23

## 2024-03-23 RX ORDER — RITUXIMAB 10 MG/ML
574 INJECTION, SOLUTION INTRAVENOUS ONCE
Refills: 0 | Status: COMPLETED | OUTPATIENT
Start: 2024-03-23 | End: 2024-03-23

## 2024-03-23 RX ORDER — APIXABAN 2.5 MG/1
5 TABLET, FILM COATED ORAL
Refills: 0 | Status: DISCONTINUED | OUTPATIENT
Start: 2024-03-23 | End: 2024-03-26

## 2024-03-23 RX ORDER — DIPHENHYDRAMINE HCL 50 MG
50 CAPSULE ORAL ONCE
Refills: 0 | Status: COMPLETED | OUTPATIENT
Start: 2024-03-23 | End: 2024-03-23

## 2024-03-23 RX ORDER — ACETAMINOPHEN 500 MG
650 TABLET ORAL ONCE
Refills: 0 | Status: COMPLETED | OUTPATIENT
Start: 2024-03-23 | End: 2024-03-23

## 2024-03-23 RX ORDER — POLYETHYLENE GLYCOL 3350 17 G/17G
17 POWDER, FOR SOLUTION ORAL
Refills: 0 | Status: DISCONTINUED | OUTPATIENT
Start: 2024-03-23 | End: 2024-03-26

## 2024-03-23 RX ADMIN — HYDROMORPHONE HYDROCHLORIDE 2 MILLIGRAM(S): 2 INJECTION INTRAMUSCULAR; INTRAVENOUS; SUBCUTANEOUS at 01:25

## 2024-03-23 RX ADMIN — Medication 25 MILLIGRAM(S): at 12:36

## 2024-03-23 RX ADMIN — METHOCARBAMOL 750 MILLIGRAM(S): 500 TABLET, FILM COATED ORAL at 15:00

## 2024-03-23 RX ADMIN — HYDROMORPHONE HYDROCHLORIDE 2 MILLIGRAM(S): 2 INJECTION INTRAMUSCULAR; INTRAVENOUS; SUBCUTANEOUS at 18:03

## 2024-03-23 RX ADMIN — METHOCARBAMOL 750 MILLIGRAM(S): 500 TABLET, FILM COATED ORAL at 06:19

## 2024-03-23 RX ADMIN — Medication 650 MILLIGRAM(S): at 10:08

## 2024-03-23 RX ADMIN — RITUXIMAB 574 MILLIGRAM(S): 10 INJECTION, SOLUTION INTRAVENOUS at 11:30

## 2024-03-23 RX ADMIN — FAMOTIDINE 20 MILLIGRAM(S): 10 INJECTION INTRAVENOUS at 12:37

## 2024-03-23 RX ADMIN — Medication 300 MILLIGRAM(S): at 18:02

## 2024-03-23 RX ADMIN — Medication 15 MILLILITER(S): at 06:19

## 2024-03-23 RX ADMIN — GABAPENTIN 600 MILLIGRAM(S): 400 CAPSULE ORAL at 15:00

## 2024-03-23 RX ADMIN — TRAMADOL HYDROCHLORIDE 50 MILLIGRAM(S): 50 TABLET ORAL at 21:48

## 2024-03-23 RX ADMIN — Medication 50 MILLIGRAM(S): at 10:09

## 2024-03-23 RX ADMIN — TRAMADOL HYDROCHLORIDE 50 MILLIGRAM(S): 50 TABLET ORAL at 08:56

## 2024-03-23 RX ADMIN — APIXABAN 5 MILLIGRAM(S): 2.5 TABLET, FILM COATED ORAL at 18:00

## 2024-03-23 RX ADMIN — CHLORHEXIDINE GLUCONATE 1 APPLICATION(S): 213 SOLUTION TOPICAL at 12:25

## 2024-03-23 RX ADMIN — TRAMADOL HYDROCHLORIDE 50 MILLIGRAM(S): 50 TABLET ORAL at 09:59

## 2024-03-23 RX ADMIN — HYDROMORPHONE HYDROCHLORIDE 2 MILLIGRAM(S): 2 INJECTION INTRAMUSCULAR; INTRAVENOUS; SUBCUTANEOUS at 13:31

## 2024-03-23 RX ADMIN — HYDROMORPHONE HYDROCHLORIDE 2 MILLIGRAM(S): 2 INJECTION INTRAMUSCULAR; INTRAVENOUS; SUBCUTANEOUS at 00:25

## 2024-03-23 RX ADMIN — HYDROMORPHONE HYDROCHLORIDE 2 MILLIGRAM(S): 2 INJECTION INTRAMUSCULAR; INTRAVENOUS; SUBCUTANEOUS at 12:22

## 2024-03-23 RX ADMIN — HYDROMORPHONE HYDROCHLORIDE 2 MILLIGRAM(S): 2 INJECTION INTRAMUSCULAR; INTRAVENOUS; SUBCUTANEOUS at 06:19

## 2024-03-23 RX ADMIN — HYDROMORPHONE HYDROCHLORIDE 2 MILLIGRAM(S): 2 INJECTION INTRAMUSCULAR; INTRAVENOUS; SUBCUTANEOUS at 17:59

## 2024-03-23 RX ADMIN — Medication 50 MILLIGRAM(S): at 12:37

## 2024-03-23 RX ADMIN — Medication 100 MILLIGRAM(S): at 10:39

## 2024-03-23 RX ADMIN — METHOCARBAMOL 750 MILLIGRAM(S): 500 TABLET, FILM COATED ORAL at 21:51

## 2024-03-23 RX ADMIN — HYDROMORPHONE HYDROCHLORIDE 2 MILLIGRAM(S): 2 INJECTION INTRAMUSCULAR; INTRAVENOUS; SUBCUTANEOUS at 07:19

## 2024-03-23 RX ADMIN — TRAMADOL HYDROCHLORIDE 50 MILLIGRAM(S): 50 TABLET ORAL at 20:48

## 2024-03-23 RX ADMIN — GABAPENTIN 600 MILLIGRAM(S): 400 CAPSULE ORAL at 21:51

## 2024-03-23 RX ADMIN — GABAPENTIN 600 MILLIGRAM(S): 400 CAPSULE ORAL at 06:20

## 2024-03-23 NOTE — DIETITIAN INITIAL EVALUATION ADULT - PROBLEM SELECTOR PLAN 3
VA duplex LE 3/19/2024: Acute deep venous thrombosis: below the knee. Bilateral soleal vein thrombosis.  Likely provoked in setting of recent major surgery, immobilization  Weighing risks of bleeding (recent spine surgery) vs. clotting risks in decision making-with distal thromboses, continuing with current prophylactic LMWH, with repeat LE venous duplex study 1 week (or earlier as may be indicated clinically). If any progression of clot(s)-->therapeutic anticoagulation if cleared by neurosurgery to do so.

## 2024-03-23 NOTE — ADVANCED PRACTICE NURSE CONSULT - ASSESSMENT
Patient A&Ox4, VSS within range of baseline, no signs of distress no reports of pain. Lab results reviewed by Dr. Chatterjee and team during rounds, hepatitis panel done, non-reactive, ok to start treatment. Patient with R 20g PIV inserted 3/23/24, no s/s of tenderness or redness at insertion site, dressing c/d/i, flushing easily with 10cc NS, with positive blood return. Patient and daughters at bedside educated on chemo regime, verbalized understanding. Patient premedicated with Benadryl 50mg IV, Tylenol 650mg PO and Hydrocortisone 100mg IV as per order.  2 RN verification completed at bedside prior to start. Premedicated with Tylenol 650mg PO, Hydrocortisone 50mg IV, Benadryl 50mg IV. At 1130 started day 0 riTUXimab-pvvr (RUXIENCE) IVPB (eMAR) [Ordered as RUXIENCE IVPB (eMAR)] - 574 milliGRAM(s) in sodium chloride 0.9% 516.6 milliLiter(s), IV Intermittent, once, infusion started at a rate of 50 mG/hour; then rate increase by 50 mG/hour increments every 30 minutes if no signs of reactions, to a maximum rate of 400 mG/hour. At 1225 infusing rate of 150ml/hr when rigors were noted, infusion stopped and ROGER Mejia notified and at bedside. Medicated as per MAR, patient re-assessed with no signs of distress. Then at 1300 infusion restarted at 50ml/hr, ROGER Mejia aware. VSS done q 30 mins, no signs of distress, attached to lowest port of primary NS line into R PIV via Alaris pump. 50ml increments q 30mins as tolerated by patient, VSS done q 30 min, max rate reached 300ml/hr, patient tolerating well no s/s of distress.  Primary RN aware of treatment plan. Call bell within reach. Safety maintained, nursing care on-going.   Patient A&Ox4, VSS within range of baseline, no signs of distress no reports of pain. Lab results reviewed by Dr. Chatterjee and team during rounds, hepatitis panel done 3/12/24, non-reactive, ok to start treatment. Patient with R 20g PIV inserted 3/23/24, no s/s of tenderness or redness at insertion site, dressing c/d/i, flushing easily with 10cc NS, with positive blood return. Patient and daughters at bedside educated on chemo regime, verbalized understanding. Patient premedicated with Benadryl 50mg IV, Tylenol 650mg PO and Hydrocortisone 100mg IV as per order.  2 RN verification completed at bedside prior to start. Premedicated with Tylenol 650mg PO, Hydrocortisone 50mg IV, Benadryl 50mg IV. At 1130 started day 0 riTUXimab-pvvr (RUXIENCE) IVPB (eMAR) [Ordered as RUXIENCE IVPB (eMAR)] - 574 milliGRAM(s) in sodium chloride 0.9% 516.6 milliLiter(s), IV Intermittent, once, infusion started at a rate of 50 mG/hour; then rate increase by 50 mG/hour increments every 30 minutes if no signs of reactions. At 1225 infusing rate of 150ml/hr patient started rigoring , infusion stopped and ROGER Mejia notified and at bedside. Medicated as per MAR, patient re-assessed with no signs of distress. Then at 1300 infusion restarted at 50ml/hr, ROGER Mejia aware. VSS done q 30 mins, no signs of distress, 50ml increments q 30mins as tolerated by patient, max rate reached 100ml/hr. Primary RN aware of treatment plan. Call bell within reach. Safety maintained, nursing care on-going.

## 2024-03-23 NOTE — PHYSICAL THERAPY INITIAL EVALUATION ADULT - ADDITIONAL COMMENTS
Prior to admission, pt at [facility]. Prior to admission to [place], pt's daughters states pt lives with family in a private house with 4STE and 1 flight of stairs inside, is independent in ADLs and ambulation, owns a walker and no home care services prior to hospitalization.

## 2024-03-23 NOTE — PHYSICAL THERAPY INITIAL EVALUATION ADULT - IMPAIRED TRANSFERS: SIT/STAND, REHAB EVAL
impaired balance/narrow base of support/impaired postural control/impaired sensory feedback/decreased strength

## 2024-03-23 NOTE — PROGRESS NOTE ADULT - SUBJECTIVE AND OBJECTIVE BOX
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    Protocol/Chemo Regimen: R-CHOP along with IV MTX (3.5g/m2) on D14 given extensive spine involvement    Day: Rituxan Day 0.    Pt endorsed:   + fatigue  +back pain    Review of Systems: Denies any chest pain, palpitation, SOB.    Pain scale:   On Dilaudid    Diet: Regular/Ravin    Allergies: latex (Rash)  No Known Drug Allergies    HEME/ONC MEDICATIONS  apixaban 5 milliGRAM(s) Oral two times a day    STANDING MEDICATIONS  allopurinol 300 milliGRAM(s) Oral daily  chlorhexidine 2% Cloths 1 Application(s) Topical daily  gabapentin 600 milliGRAM(s) Oral every 8 hours  HYDROmorphone   Tablet 2 milliGRAM(s) Oral every 6 hours  lactulose Syrup 10 Gram(s) Oral once  magnesium hydroxide Suspension 30 milliLiter(s) Oral daily  methocarbamol 750 milliGRAM(s) Oral every 8 hours  polyethylene glycol 3350 17 Gram(s) Oral two times a day  senna 2 Tablet(s) Oral at bedtime  sodium chloride 0.9%. 1000 milliLiter(s) IV Continuous <Continuous>    PRN MEDICATIONS  oxyCODONE    IR 10 milliGRAM(s) Oral every 4 hours PRN  traMADol 50 milliGRAM(s) Oral every 6 hours PRN    Vital Signs Last 24 Hrs  T(C): 37 (23 Mar 2024 15:00), Max: 37.7 (23 Mar 2024 12:50)  T(F): 98.6 (23 Mar 2024 15:00), Max: 99.9 (23 Mar 2024 13:30)  HR: 73 (23 Mar 2024 15:00) (62 - 92)  BP: 107/66 (23 Mar 2024 15:00) (94/58 - 137/79)  BP(mean): --  RR: 16 (23 Mar 2024 15:00) (15 - 18)  SpO2: 97% (23 Mar 2024 15:00) (95% - 99%)    Parameters below as of 23 Mar 2024 15:00  Patient On (Oxygen Delivery Method): nasal cannula  O2 Flow (L/min): 2    PHYSICAL EXAM  General: NAD  HEENT: PERRLA, EOMOI, clear oropharynx  CV: (+) S1/S2 RRR  Lungs: clear to auscultation, no wheezes or rales  Abdomen: soft, non-tender, non-distended (+) BS  Ext: no clubbing, cyanosis or edema  Skin: no rashes and no petechiae  Neuro: alert and oriented X 3, no focal deficits  Central Line:     RECENT CULTURES:  03-19 @ 18:30  Clean Catch Clean Catch (Midstream)  Escherichia coli ESBL  Escherichia coli ESBL  LUPE    >100,000 CFU/ml Escherichia coli ESBL    03-18 @ 20:10  .Blood Blood-Peripheral  No growth at 4 days    03-18 @ 20:05  .Blood Blood-Peripheral  No growth at 4 days    LABS:                        8.6    5.83  )-----------( 380      ( 23 Mar 2024 07:23 )             28.2     Mean Cell Volume : 84.7 fl  Mean Cell Hemoglobin : 25.8 pg  Mean Cell Hemoglobin Concentration : 30.5 gm/dL  Auto Neutrophil # : 4.27 K/uL  Auto Lymphocyte # : 0.68 K/uL  Auto Monocyte # : 0.62 K/uL  Auto Eosinophil # : 0.16 K/uL  Auto Basophil # : 0.04 K/uL  Auto Neutrophil % : 73.3 %  Auto Lymphocyte % : 11.7 %  Auto Monocyte % : 10.6 %  Auto Eosinophil % : 2.7 %  Auto Basophil % : 0.7 %      03-23    137  |  102  |  22  ----------------------------<  103<H>  4.1   |  25  |  0.78    Ca    8.4      23 Mar 2024 07:23  Phos  3.9     03-23  Mg     2.1     03-23    TPro  5.3<L>  /  Alb  2.9<L>  /  TBili  0.3  /  DBili  x   /  AST  54<H>  /  ALT  82<H>  /  AlkPhos  124<H>  03-23  Mg 2.1  Phos 3.9  Mg 2.2  Phos 4.3  PT/INR - ( 23 Mar 2024 07:23 )   PT: 11.7 sec;   INR: 1.12 ratio    PTT - ( 23 Mar 2024 07:23 )  PTT:29.9 sec    Uric Acid 4.6      Uric Acid 4.1    RADIOLOGY & ADDITIONAL STUDIES:  CT Abdomen and Pelvis No Cont (03.22.24 @ 21:51) >  L2-S1 laminectomy with posterior spinal fusion. Since 3/6/2024   comparison, interval removal drainage catheters and increased size of   paraspinal collection containing punctate foci of air that is difficult   to measure given lack of intravenous contrast. While this maybe expected   postsurgical change, consider contrast-enhanced MRI for further   evaluation if warranted clinically.  Acute, mild compression deformity of L1.  Small bilateral pleural effusions.  Cholelithiasis with mild biliary duct dilatation,similar to 3/4/2024.  Retroperitoneal lymphadenopathy is redemonstrated.

## 2024-03-23 NOTE — DIETITIAN INITIAL EVALUATION ADULT - NSFNSPHYEXAMSKINFT_GEN_A_CORE
Pressure Injury 1: sacrum, none  Pressure Injury 2: none, none  Pressure Injury 3: none, none  Pressure Injury 4: none, none  Pressure Injury 5: none, none  Pressure Injury 6: none, none  Pressure Injury 7: none, none  Pressure Injury 8: none, none  Pressure Injury 9: none, none  Pressure Injury 10: none, none  Pressure Injury 11: none, none, Pressure Injury 1: sacrum, Suspected deep tissue injury  Pressure Injury 2: none, none  Pressure Injury 3: none, none  Pressure Injury 4: none, none  Pressure Injury 5: none, none  Pressure Injury 6: none, none  Pressure Injury 7: none, none  Pressure Injury 8: none, none  Pressure Injury 9: none, none  Pressure Injury 10: none, none  Pressure Injury 11: none, none

## 2024-03-23 NOTE — PROGRESS NOTE ADULT - ASSESSMENT
69 yo M with new diagnosis of germinal center DLBCL involving spine s/p 2 stage neurosurgical intervention admitted to start MR-CHOP.

## 2024-03-23 NOTE — PHYSICAL THERAPY INITIAL EVALUATION ADULT - PERTINENT HX OF CURRENT PROBLEM, REHAB EVAL
68-year-old male patient with past medical history of HLD, pre-Diabetes Mellitus, and sinus bradycardia who presented to [facility] on 3/5 with a history of progressively worsening lower extremity numbness/paresthesias and weakness with onset Jan 2024. He underwent x-rays & MRI imaging as outpatient on 2/29/24 showing concern for osseous metastatic disease in thoracic/lumbar/sacral spine as well as extension of soft tissue into the paravertebral soft tissues more prominent on the right side. He was previously ambulating independently without device but now required a RW. Repeat MRI showed multiple findings including diffuse osseous metastatic disease throughout the entire spine; pathologic fx w/ tumor into the epidural space at T8 resulting in thoracic cord compression; tumor extension into the epidural space at L3 resulting in cauda equina compression; pathologic fx w/ tumor in the epidural space at L4 contributing to severe canal stenosis; tumor within the bilateral psoas muscles at L3 and L4; bilateral neural foramen effacement by tumor in L-spine. He underwent a two stage surgical resection with a combined surgical team including Neurosurgery (Dr. Bhaskar Partida) and Plastic Surgery (Dr. Gunnar Campbell). He underwent the following procedures: Stage 1: T8 VCR (vertebral column resection) T6-T10 fusion for tumor resection, small csf leak primarily repaired; Stage 2: L4 Partial Corpectomy, L2-Pelvis Fusion, Plastics Closure 3/5 HMV drain x3 and 1 Bile bag placed w/ output closely monitored. He was monitored post-operatively in NSCU on PCA pump, durotomy encountered intraop and primarily repaired up from OR flat and incremental 15 degrees with no positional headaches or sign of CSF leak. Surgical pathology reporting diffuse large B-cell lymphoma. Patient evaluated by PT/OT/PM&R and recommended for acute inpatient rehab. Patient was discharged to [facility] on 3/16[facility]. Seen Dr. Goldberg at [facility] today 3/22 who advised admission to [facility] for treatment. VA duplex LE 3/19/2024: Acute DVT: below the knee. Bilateral soleal vein thrombosis. Weighing risks of bleeding (recent spine surgery) vs. clotting risks in decision making-with distal thromboses, continuing with current prophylactic LMWH, with repeat LE venous duplex study 1 week (or earlier as may be indicated clinically). If any progression of clot(s)-->therapeutic anticoagulation if cleared by neurosurgery to do so.

## 2024-03-23 NOTE — PHYSICAL THERAPY INITIAL EVALUATION ADULT - GENERAL OBSERVATIONS, REHAB EVAL
Rec'd sitting in bedside recliner in NAD, +PIV, A&O x4, daughters at bedside, pleasant and agreeable to PT.

## 2024-03-23 NOTE — DIETITIAN INITIAL EVALUATION ADULT - PERTINENT MEDS FT
MEDICATIONS  (STANDING):  allopurinol 300 milliGRAM(s) Oral daily  apixaban 5 milliGRAM(s) Oral two times a day  chlorhexidine 2% Cloths 1 Application(s) Topical daily  gabapentin 600 milliGRAM(s) Oral every 8 hours  HYDROmorphone   Tablet 2 milliGRAM(s) Oral every 6 hours  lactulose Syrup 10 Gram(s) Oral once  magnesium hydroxide Suspension 30 milliLiter(s) Oral daily  methocarbamol 750 milliGRAM(s) Oral every 8 hours  polyethylene glycol 3350 17 Gram(s) Oral two times a day  senna 2 Tablet(s) Oral at bedtime  sodium chloride 0.9%. 1000 milliLiter(s) (50 mL/Hr) IV Continuous <Continuous>    MEDICATIONS  (PRN):  oxyCODONE    IR 10 milliGRAM(s) Oral every 4 hours PRN Severe Pain (7 - 10)  traMADol 50 milliGRAM(s) Oral every 6 hours PRN Moderate Pain (4 - 6)

## 2024-03-23 NOTE — DIETITIAN INITIAL EVALUATION ADULT - PERTINENT LABORATORY DATA
03-23    137  |  102  |  22  ----------------------------<  103<H>  4.1   |  25  |  0.78    Ca    8.4      23 Mar 2024 07:23  Phos  3.9     03-23  Mg     2.1     03-23    TPro  5.3<L>  /  Alb  2.9<L>  /  TBili  0.3  /  DBili  x   /  AST  54<H>  /  ALT  82<H>  /  AlkPhos  124<H>  03-23  A1C with Estimated Average Glucose Result: 6.0 % (03-05-24 @ 06:53)

## 2024-03-23 NOTE — DIETITIAN INITIAL EVALUATION ADULT - ORAL INTAKE PTA/DIET HISTORY
pt reports being in the hospital over the past 3 weeks. prior to the hospital he was consuming cereal for breakfast, chicken, beef or pork for dinner and leftover for lunch.

## 2024-03-23 NOTE — PROGRESS NOTE ADULT - NS ATTEND AMEND GEN_ALL_CORE FT
.    Primary: [place]    Vital Signs Last 24 Hrs  T(C): 36.8 (23 Mar 2024 05:13), Max: 37.4 (22 Mar 2024 16:55)  T(F): 98.2 (23 Mar 2024 05:13), Max: 99.3 (22 Mar 2024 16:55)  HR: 72 (23 Mar 2024 05:13) (70 - 92)  BP: 108/71 (23 Mar 2024 05:13) (97/68 - 118/71)  BP(mean): --  RR: 18 (23 Mar 2024 05:13) (15 - 18)  SpO2: 96% (23 Mar 2024 05:13) (94% - 97%)    Parameters below as of 23 Mar 2024 05:13  Patient On (Oxygen Delivery Method): room air    MEDICATIONS  (STANDING):  allopurinol 300 milliGRAM(s) Oral daily  Biotene Dry Mouth Oral Rinse 15 milliLiter(s) Swish and Spit three times a day  chlorhexidine 2% Cloths 1 Application(s) Topical daily  enoxaparin Injectable 40 milliGRAM(s) SubCutaneous every 24 hours  gabapentin 600 milliGRAM(s) Oral every 8 hours  HYDROmorphone   Tablet 2 milliGRAM(s) Oral every 6 hours  magnesium hydroxide Suspension 30 milliLiter(s) Oral daily  methocarbamol 750 milliGRAM(s) Oral every 8 hours  polyethylene glycol 3350 17 Gram(s) Oral two times a day  senna 2 Tablet(s) Oral at bedtime  sodium chloride 0.9%. 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 Course complicated by 1) spinal cord disease, 2) elevated LDH and 3) bilateral DVT.  ?  PMHx: prediabetes.    Plan:  Heme:   start RCHOP - monitor for tumor lysis given elevated LDH  PLT goal > 50,000 given full anticoagulation  Change back to DOAC    ID: primary prophylaxis is not required    Nutrition: tolerating PO; D/C biotene     Over 50 minutes were spent in direct patient care and care coordination .    Primary: [place]    Vital Signs Last 24 Hrs  T(C): 36.8 (23 Mar 2024 05:13), Max: 37.4 (22 Mar 2024 16:55)  T(F): 98.2 (23 Mar 2024 05:13), Max: 99.3 (22 Mar 2024 16:55)  HR: 72 (23 Mar 2024 05:13) (70 - 92)  BP: 108/71 (23 Mar 2024 05:13) (97/68 - 118/71)  BP(mean): --  RR: 18 (23 Mar 2024 05:13) (15 - 18)  SpO2: 96% (23 Mar 2024 05:13) (94% - 97%)    Parameters below as of 23 Mar 2024 05:13  Patient On (Oxygen Delivery Method): room air    MEDICATIONS  (STANDING):  allopurinol 300 milliGRAM(s) Oral daily  Biotene Dry Mouth Oral Rinse 15 milliLiter(s) Swish and Spit three times a day  chlorhexidine 2% Cloths 1 Application(s) Topical daily  enoxaparin Injectable 40 milliGRAM(s) SubCutaneous every 24 hours  gabapentin 600 milliGRAM(s) Oral every 8 hours  HYDROmorphone   Tablet 2 milliGRAM(s) Oral every 6 hours  magnesium hydroxide Suspension 30 milliLiter(s) Oral daily  methocarbamol 750 milliGRAM(s) Oral every 8 hours  polyethylene glycol 3350 17 Gram(s) Oral two times a day  senna 2 Tablet(s) Oral at bedtime  sodium chloride 0.9%. 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 Course complicated by 1) spinal cord disease, 2) elevated LDH and 3) bilateral DVT.  ?  PMHx: prediabetes.    Plan:  Heme:   start RCHOP - monitor for tumor lysis given elevated LDH  PLT goal > 50,000 given full anticoagulation  Change back to DOAC    ID: primary prophylaxis is not required    Nutrition: tolerating PO; D/C biotene     Over 50 minutes were spent in direct patient care and care coordination.

## 2024-03-24 ENCOUNTER — TRANSCRIPTION ENCOUNTER (OUTPATIENT)
Age: 69
End: 2024-03-24

## 2024-03-24 DIAGNOSIS — R74.01 ELEVATION OF LEVELS OF LIVER TRANSAMINASE LEVELS: ICD-10-CM

## 2024-03-24 LAB
ALBUMIN SERPL ELPH-MCNC: 1.8 G/DL — LOW (ref 3.3–5)
ALBUMIN SERPL ELPH-MCNC: 2.3 G/DL — LOW (ref 3.3–5)
ALBUMIN SERPL ELPH-MCNC: 2.6 G/DL — LOW (ref 3.3–5)
ALBUMIN SERPL ELPH-MCNC: 3 G/DL — LOW (ref 3.3–5)
ALP SERPL-CCNC: 119 U/L — SIGNIFICANT CHANGE UP (ref 40–120)
ALP SERPL-CCNC: 128 U/L — HIGH (ref 40–120)
ALP SERPL-CCNC: 143 U/L — HIGH (ref 40–120)
ALP SERPL-CCNC: 92 U/L — SIGNIFICANT CHANGE UP (ref 40–120)
ALT FLD-CCNC: 109 U/L — HIGH (ref 10–45)
ALT FLD-CCNC: 59 U/L — HIGH (ref 10–45)
ALT FLD-CCNC: 75 U/L — HIGH (ref 10–45)
ALT FLD-CCNC: 81 U/L — HIGH (ref 10–45)
ANION GAP SERPL CALC-SCNC: 10 MMOL/L — SIGNIFICANT CHANGE UP (ref 5–17)
ANION GAP SERPL CALC-SCNC: 11 MMOL/L — SIGNIFICANT CHANGE UP (ref 5–17)
ANION GAP SERPL CALC-SCNC: 9 MMOL/L — SIGNIFICANT CHANGE UP (ref 5–17)
ANION GAP SERPL CALC-SCNC: 9 MMOL/L — SIGNIFICANT CHANGE UP (ref 5–17)
AST SERPL-CCNC: 37 U/L — SIGNIFICANT CHANGE UP (ref 10–40)
AST SERPL-CCNC: 45 U/L — HIGH (ref 10–40)
AST SERPL-CCNC: 50 U/L — HIGH (ref 10–40)
AST SERPL-CCNC: 82 U/L — HIGH (ref 10–40)
BASOPHILS # BLD AUTO: 0.02 K/UL — SIGNIFICANT CHANGE UP (ref 0–0.2)
BASOPHILS NFR BLD AUTO: 0.4 % — SIGNIFICANT CHANGE UP (ref 0–2)
BILIRUB SERPL-MCNC: 0.1 MG/DL — LOW (ref 0.2–1.2)
BILIRUB SERPL-MCNC: 0.2 MG/DL — SIGNIFICANT CHANGE UP (ref 0.2–1.2)
BUN SERPL-MCNC: 16 MG/DL — SIGNIFICANT CHANGE UP (ref 7–23)
BUN SERPL-MCNC: 18 MG/DL — SIGNIFICANT CHANGE UP (ref 7–23)
BUN SERPL-MCNC: 21 MG/DL — SIGNIFICANT CHANGE UP (ref 7–23)
BUN SERPL-MCNC: 22 MG/DL — SIGNIFICANT CHANGE UP (ref 7–23)
CALCIUM SERPL-MCNC: 5.6 MG/DL — CRITICAL LOW (ref 8.4–10.5)
CALCIUM SERPL-MCNC: 7.8 MG/DL — LOW (ref 8.4–10.5)
CALCIUM SERPL-MCNC: 7.9 MG/DL — LOW (ref 8.4–10.5)
CALCIUM SERPL-MCNC: 8.2 MG/DL — LOW (ref 8.4–10.5)
CHLORIDE SERPL-SCNC: 100 MMOL/L — SIGNIFICANT CHANGE UP (ref 96–108)
CHLORIDE SERPL-SCNC: 100 MMOL/L — SIGNIFICANT CHANGE UP (ref 96–108)
CHLORIDE SERPL-SCNC: 104 MMOL/L — SIGNIFICANT CHANGE UP (ref 96–108)
CHLORIDE SERPL-SCNC: 113 MMOL/L — HIGH (ref 96–108)
CO2 SERPL-SCNC: 19 MMOL/L — LOW (ref 22–31)
CO2 SERPL-SCNC: 24 MMOL/L — SIGNIFICANT CHANGE UP (ref 22–31)
CO2 SERPL-SCNC: 24 MMOL/L — SIGNIFICANT CHANGE UP (ref 22–31)
CO2 SERPL-SCNC: 25 MMOL/L — SIGNIFICANT CHANGE UP (ref 22–31)
CREAT SERPL-MCNC: 0.46 MG/DL — LOW (ref 0.5–1.3)
CREAT SERPL-MCNC: 0.54 MG/DL — SIGNIFICANT CHANGE UP (ref 0.5–1.3)
CREAT SERPL-MCNC: 0.63 MG/DL — SIGNIFICANT CHANGE UP (ref 0.5–1.3)
CREAT SERPL-MCNC: 0.64 MG/DL — SIGNIFICANT CHANGE UP (ref 0.5–1.3)
CULTURE RESULTS: SIGNIFICANT CHANGE UP
EGFR: 104 ML/MIN/1.73M2 — SIGNIFICANT CHANGE UP
EGFR: 105 ML/MIN/1.73M2 — SIGNIFICANT CHANGE UP
EGFR: 110 ML/MIN/1.73M2 — SIGNIFICANT CHANGE UP
EGFR: 115 ML/MIN/1.73M2 — SIGNIFICANT CHANGE UP
EOSINOPHIL # BLD AUTO: 0.06 K/UL — SIGNIFICANT CHANGE UP (ref 0–0.5)
EOSINOPHIL NFR BLD AUTO: 1.3 % — SIGNIFICANT CHANGE UP (ref 0–6)
GLUCOSE SERPL-MCNC: 104 MG/DL — HIGH (ref 70–99)
GLUCOSE SERPL-MCNC: 126 MG/DL — HIGH (ref 70–99)
GLUCOSE SERPL-MCNC: 141 MG/DL — HIGH (ref 70–99)
GLUCOSE SERPL-MCNC: 97 MG/DL — SIGNIFICANT CHANGE UP (ref 70–99)
HCT VFR BLD CALC: 26.2 % — LOW (ref 39–50)
HGB BLD-MCNC: 8.4 G/DL — LOW (ref 13–17)
IMM GRANULOCYTES NFR BLD AUTO: 0.8 % — SIGNIFICANT CHANGE UP (ref 0–0.9)
LDH SERPL L TO P-CCNC: 432 U/L — HIGH (ref 50–242)
LDH SERPL L TO P-CCNC: 580 U/L — HIGH (ref 50–242)
LDH SERPL L TO P-CCNC: 580 U/L — HIGH (ref 50–242)
LDH SERPL L TO P-CCNC: 760 U/L — HIGH (ref 50–242)
LYMPHOCYTES # BLD AUTO: 0.39 K/UL — LOW (ref 1–3.3)
LYMPHOCYTES # BLD AUTO: 8.2 % — LOW (ref 13–44)
MAGNESIUM SERPL-MCNC: 1.5 MG/DL — LOW (ref 1.6–2.6)
MAGNESIUM SERPL-MCNC: 2.1 MG/DL — SIGNIFICANT CHANGE UP (ref 1.6–2.6)
MAGNESIUM SERPL-MCNC: 2.1 MG/DL — SIGNIFICANT CHANGE UP (ref 1.6–2.6)
MAGNESIUM SERPL-MCNC: 2.2 MG/DL — SIGNIFICANT CHANGE UP (ref 1.6–2.6)
MCHC RBC-ENTMCNC: 27 PG — SIGNIFICANT CHANGE UP (ref 27–34)
MCHC RBC-ENTMCNC: 32.1 GM/DL — SIGNIFICANT CHANGE UP (ref 32–36)
MCV RBC AUTO: 84.2 FL — SIGNIFICANT CHANGE UP (ref 80–100)
MONOCYTES # BLD AUTO: 0.29 K/UL — SIGNIFICANT CHANGE UP (ref 0–0.9)
MONOCYTES NFR BLD AUTO: 6.1 % — SIGNIFICANT CHANGE UP (ref 2–14)
NEUTROPHILS # BLD AUTO: 3.96 K/UL — SIGNIFICANT CHANGE UP (ref 1.8–7.4)
NEUTROPHILS NFR BLD AUTO: 83.2 % — HIGH (ref 43–77)
NRBC # BLD: 0 /100 WBCS — SIGNIFICANT CHANGE UP (ref 0–0)
PHOSPHATE SERPL-MCNC: 2.2 MG/DL — LOW (ref 2.5–4.5)
PHOSPHATE SERPL-MCNC: 2.7 MG/DL — SIGNIFICANT CHANGE UP (ref 2.5–4.5)
PHOSPHATE SERPL-MCNC: 3 MG/DL — SIGNIFICANT CHANGE UP (ref 2.5–4.5)
PHOSPHATE SERPL-MCNC: 3.4 MG/DL — SIGNIFICANT CHANGE UP (ref 2.5–4.5)
PLATELET # BLD AUTO: 346 K/UL — SIGNIFICANT CHANGE UP (ref 150–400)
POTASSIUM SERPL-MCNC: 3.1 MMOL/L — LOW (ref 3.5–5.3)
POTASSIUM SERPL-MCNC: 3.4 MMOL/L — LOW (ref 3.5–5.3)
POTASSIUM SERPL-MCNC: 4.1 MMOL/L — SIGNIFICANT CHANGE UP (ref 3.5–5.3)
POTASSIUM SERPL-MCNC: 4.1 MMOL/L — SIGNIFICANT CHANGE UP (ref 3.5–5.3)
POTASSIUM SERPL-SCNC: 3.1 MMOL/L — LOW (ref 3.5–5.3)
POTASSIUM SERPL-SCNC: 3.4 MMOL/L — LOW (ref 3.5–5.3)
POTASSIUM SERPL-SCNC: 4.1 MMOL/L — SIGNIFICANT CHANGE UP (ref 3.5–5.3)
POTASSIUM SERPL-SCNC: 4.1 MMOL/L — SIGNIFICANT CHANGE UP (ref 3.5–5.3)
PROT SERPL-MCNC: 3.7 G/DL — LOW (ref 6–8.3)
PROT SERPL-MCNC: 4.8 G/DL — LOW (ref 6–8.3)
PROT SERPL-MCNC: 5 G/DL — LOW (ref 6–8.3)
PROT SERPL-MCNC: 5.5 G/DL — LOW (ref 6–8.3)
RBC # BLD: 3.11 M/UL — LOW (ref 4.2–5.8)
RBC # FLD: 16.6 % — HIGH (ref 10.3–14.5)
SODIUM SERPL-SCNC: 135 MMOL/L — SIGNIFICANT CHANGE UP (ref 135–145)
SODIUM SERPL-SCNC: 135 MMOL/L — SIGNIFICANT CHANGE UP (ref 135–145)
SODIUM SERPL-SCNC: 137 MMOL/L — SIGNIFICANT CHANGE UP (ref 135–145)
SODIUM SERPL-SCNC: 141 MMOL/L — SIGNIFICANT CHANGE UP (ref 135–145)
SPECIMEN SOURCE: SIGNIFICANT CHANGE UP
URATE SERPL-MCNC: 2.4 MG/DL — LOW (ref 3.4–8.8)
URATE SERPL-MCNC: 2.5 MG/DL — LOW (ref 3.4–8.8)
URATE SERPL-MCNC: 2.9 MG/DL — LOW (ref 3.4–8.8)
URATE SERPL-MCNC: 3.4 MG/DL — SIGNIFICANT CHANGE UP (ref 3.4–8.8)
WBC # BLD: 4.76 K/UL — SIGNIFICANT CHANGE UP (ref 3.8–10.5)
WBC # FLD AUTO: 4.76 K/UL — SIGNIFICANT CHANGE UP (ref 3.8–10.5)

## 2024-03-24 PROCEDURE — 99233 SBSQ HOSP IP/OBS HIGH 50: CPT | Mod: FS

## 2024-03-24 RX ORDER — DOXORUBICIN HYDROCHLORIDE 2 MG/ML
77 INJECTION, SOLUTION INTRAVENOUS ONCE
Refills: 0 | Status: COMPLETED | OUTPATIENT
Start: 2024-03-24 | End: 2024-03-24

## 2024-03-24 RX ORDER — METOCLOPRAMIDE HCL 10 MG
10 TABLET ORAL EVERY 6 HOURS
Refills: 0 | Status: DISCONTINUED | OUTPATIENT
Start: 2024-03-24 | End: 2024-03-26

## 2024-03-24 RX ORDER — VINCRISTINE SULFATE 1 MG/ML
2 VIAL (ML) INTRAVENOUS ONCE
Refills: 0 | Status: COMPLETED | OUTPATIENT
Start: 2024-03-24 | End: 2024-03-24

## 2024-03-24 RX ORDER — ONDANSETRON 8 MG/1
16 TABLET, FILM COATED ORAL ONCE
Refills: 0 | Status: COMPLETED | OUTPATIENT
Start: 2024-03-24 | End: 2024-03-24

## 2024-03-24 RX ORDER — CYCLOPHOSPHAMIDE 100 MG
1148 VIAL (EA) INTRAVENOUS ONCE
Refills: 0 | Status: COMPLETED | OUTPATIENT
Start: 2024-03-24 | End: 2024-03-24

## 2024-03-24 RX ORDER — APIXABAN 2.5 MG/1
1 TABLET, FILM COATED ORAL
Qty: 174 | Refills: 0
Start: 2024-03-24 | End: 2024-06-18

## 2024-03-24 RX ORDER — FOSAPREPITANT DIMEGLUMINE 150 MG/5ML
150 INJECTION, POWDER, LYOPHILIZED, FOR SOLUTION INTRAVENOUS ONCE
Refills: 0 | Status: COMPLETED | OUTPATIENT
Start: 2024-03-24 | End: 2024-03-24

## 2024-03-24 RX ORDER — POTASSIUM CHLORIDE 20 MEQ
20 PACKET (EA) ORAL
Refills: 0 | Status: COMPLETED | OUTPATIENT
Start: 2024-03-24 | End: 2024-03-24

## 2024-03-24 RX ADMIN — HYDROMORPHONE HYDROCHLORIDE 2 MILLIGRAM(S): 2 INJECTION INTRAMUSCULAR; INTRAVENOUS; SUBCUTANEOUS at 12:22

## 2024-03-24 RX ADMIN — Medication 100 MILLIGRAM(S): at 11:56

## 2024-03-24 RX ADMIN — POLYETHYLENE GLYCOL 3350 17 GRAM(S): 17 POWDER, FOR SOLUTION ORAL at 17:48

## 2024-03-24 RX ADMIN — Medication 20 MILLIEQUIVALENT(S): at 09:03

## 2024-03-24 RX ADMIN — SENNA PLUS 2 TABLET(S): 8.6 TABLET ORAL at 21:51

## 2024-03-24 RX ADMIN — HYDROMORPHONE HYDROCHLORIDE 2 MILLIGRAM(S): 2 INJECTION INTRAMUSCULAR; INTRAVENOUS; SUBCUTANEOUS at 06:25

## 2024-03-24 RX ADMIN — HYDROMORPHONE HYDROCHLORIDE 2 MILLIGRAM(S): 2 INJECTION INTRAMUSCULAR; INTRAVENOUS; SUBCUTANEOUS at 13:37

## 2024-03-24 RX ADMIN — APIXABAN 5 MILLIGRAM(S): 2.5 TABLET, FILM COATED ORAL at 17:49

## 2024-03-24 RX ADMIN — OXYCODONE HYDROCHLORIDE 10 MILLIGRAM(S): 5 TABLET ORAL at 09:03

## 2024-03-24 RX ADMIN — Medication 1148 MILLIGRAM(S): at 14:30

## 2024-03-24 RX ADMIN — METHOCARBAMOL 750 MILLIGRAM(S): 500 TABLET, FILM COATED ORAL at 05:25

## 2024-03-24 RX ADMIN — HYDROMORPHONE HYDROCHLORIDE 2 MILLIGRAM(S): 2 INJECTION INTRAMUSCULAR; INTRAVENOUS; SUBCUTANEOUS at 00:12

## 2024-03-24 RX ADMIN — SODIUM CHLORIDE 50 MILLILITER(S): 9 INJECTION INTRAMUSCULAR; INTRAVENOUS; SUBCUTANEOUS at 05:26

## 2024-03-24 RX ADMIN — DOXORUBICIN HYDROCHLORIDE 462 MILLIGRAM(S): 2 INJECTION, SOLUTION INTRAVENOUS at 14:15

## 2024-03-24 RX ADMIN — MAGNESIUM HYDROXIDE 30 MILLILITER(S): 400 TABLET, CHEWABLE ORAL at 12:21

## 2024-03-24 RX ADMIN — METHOCARBAMOL 750 MILLIGRAM(S): 500 TABLET, FILM COATED ORAL at 16:42

## 2024-03-24 RX ADMIN — Medication 20 MILLIEQUIVALENT(S): at 12:21

## 2024-03-24 RX ADMIN — HYDROMORPHONE HYDROCHLORIDE 2 MILLIGRAM(S): 2 INJECTION INTRAMUSCULAR; INTRAVENOUS; SUBCUTANEOUS at 01:12

## 2024-03-24 RX ADMIN — HYDROMORPHONE HYDROCHLORIDE 2 MILLIGRAM(S): 2 INJECTION INTRAMUSCULAR; INTRAVENOUS; SUBCUTANEOUS at 17:48

## 2024-03-24 RX ADMIN — OXYCODONE HYDROCHLORIDE 10 MILLIGRAM(S): 5 TABLET ORAL at 09:30

## 2024-03-24 RX ADMIN — Medication 2 MILLIGRAM(S): at 14:03

## 2024-03-24 RX ADMIN — APIXABAN 5 MILLIGRAM(S): 2.5 TABLET, FILM COATED ORAL at 05:25

## 2024-03-24 RX ADMIN — HYDROMORPHONE HYDROCHLORIDE 2 MILLIGRAM(S): 2 INJECTION INTRAMUSCULAR; INTRAVENOUS; SUBCUTANEOUS at 05:25

## 2024-03-24 RX ADMIN — GABAPENTIN 600 MILLIGRAM(S): 400 CAPSULE ORAL at 21:51

## 2024-03-24 RX ADMIN — HYDROMORPHONE HYDROCHLORIDE 2 MILLIGRAM(S): 2 INJECTION INTRAMUSCULAR; INTRAVENOUS; SUBCUTANEOUS at 23:55

## 2024-03-24 RX ADMIN — GABAPENTIN 600 MILLIGRAM(S): 400 CAPSULE ORAL at 16:42

## 2024-03-24 RX ADMIN — Medication 300 MILLIGRAM(S): at 12:22

## 2024-03-24 RX ADMIN — GABAPENTIN 600 MILLIGRAM(S): 400 CAPSULE ORAL at 06:25

## 2024-03-24 RX ADMIN — METHOCARBAMOL 750 MILLIGRAM(S): 500 TABLET, FILM COATED ORAL at 21:51

## 2024-03-24 RX ADMIN — Medication 20 MILLIEQUIVALENT(S): at 16:42

## 2024-03-24 RX ADMIN — ONDANSETRON 116 MILLIGRAM(S): 8 TABLET, FILM COATED ORAL at 11:55

## 2024-03-24 RX ADMIN — FOSAPREPITANT DIMEGLUMINE 300 MILLIGRAM(S): 150 INJECTION, POWDER, LYOPHILIZED, FOR SOLUTION INTRAVENOUS at 12:45

## 2024-03-24 NOTE — ADVANCED PRACTICE NURSE CONSULT - REASON FOR CONSULT
DLBCL.MR- Chop. Cycle 1 Consent in chart   DLBCL.MR- Chop. Cycle 1 Consent in chart. TTE done 3/8 EF 71%

## 2024-03-24 NOTE — DISCHARGE NOTE PROVIDER - NSDCCPCAREPLAN_GEN_ALL_CORE_FT
PRINCIPAL DISCHARGE DIAGNOSIS  Diagnosis: DLBCL (diffuse large B cell lymphoma)  Assessment and Plan of Treatment: Notify your physician if bleeding; swelling; persistent nausea and vomiting; unable to urinate; pain not relieved by medications; fever; numbness, tingling; excessive diarrhea, inability to tolerate liquids or foods; increased irritability or sluggishness, rash

## 2024-03-24 NOTE — DISCHARGE NOTE PROVIDER - CARE PROVIDER_API CALL
Tobias Chatterjee  Medical Oncology  [address] [place] [id number]  Phone: [phone]  Fax: [phone]  Follow Up Time:

## 2024-03-24 NOTE — ADVANCED PRACTICE NURSE CONSULT - ASSESSMENT
Pt alert and O x 4.  Labs reviewed by DR Chatterjee on rounds. Right 20 g placed today 3/24.   + blood return obtained and flushing well. site intact, no s/s of bleeding, redness or swelling. Chemotherapy teaching done, pt verbalized understanding. Pt premedicated with zofran 16 mg Iv, prednisone 100 mg po and emend 150 mg Iv. 2 certified RN verification done. At 14:03 started vinCRIStine IVPB (eMAR)  - Start Date: 24-Mar-2024 2 milliGRAM(s) in sodium chloride 0.9% 50 milliLiter(s), IV Intermittent, once, infuse over 5 Minute(s), infuse at 624 mL/Hr, Stop After 1 Doses. administered via free flow over 10 min via 20 Alban Right PIV, blood return checked Q 2-3 min. pt noted to have BM 3/23/23. pt tolerated  chemotherapy well.  At 14:15 started DOXOrubicin Injectable (eMAR) [Known as ADRIAMYCIN (eMAR)] - Start Date: 24-Mar-2024  77 milliGRAM(s), IV Push Chemo, once, Administer Over 5 Minute(s), Stop After 1 Doses  Special Instructions: see chemo order; day 1   (Calc Info: 50.3268 milliGRAM(s)/m2/DOSE x 1.53 m2 = 77 milliGRAM(s)/Dose     (Requested dose was 50 milliGRAM(s) per m2), Per Consultant Recommendations  Dispensed As:  DOXOrubicin Hydrochloride 2 mg/mL SOLN [employer], 2 mg/mL, 50.3268 milliGRAM(s)/m2 = 77 milliGRAM(s). Administer via free flow via 20 Alban Right 20 alban PIV, blood  return checked Q 2-3 minutes , patient tolerated well. At 14:30 started   .cyclophosphamide IVPB (eMAR) [Known as CYTOXAN IVPB (eMAR)] - Start Date: 24-Mar-2024  1148 milliGRAM(s) in sodium chloride 0.9% 250 milliLiter(s), IV Intermittent, once, infuse over 1 Hour(s), infuse at 307.4 mL/Hr, Stop After 1 Doses  Special Instructions: see chemo order; day 1. infusing over 1 hr via lowest port NS line via 20 alban PIV, pt tolerating well. well. Primary RN aware.   Primary RN aware of plan of care. safety maintained.   Pt alert and O x 4.  Labs reviewed by DR Chatterjee on rounds. Right 20 g placed today 3/24.   + blood return obtained and flushing well. site intact, no s/s of bleeding, redness or swelling. Chemotherapy teaching done, pt verbalized understanding. Pt premedicated with zofran 16 mg Iv, prednisone 100 mg po and emend 150 mg Iv. 2 certified RN verification done. At 14:03 started vinCRIStine IVPB (eMAR)  - Start Date: 24-Mar-2024 2 milliGRAM(s) in sodium chloride 0.9% 50 milliLiter(s), IV Intermittent, once, infuse over 5 Minute(s), infuse at 624 mL/Hr, Stop After 1 Doses. administered via free flow over 10 min via 20 [place] Right PIV, blood return checked Q 2-3 min. pt noted to have BM 3/23/23. pt tolerated  chemotherapy well. At 14:15 started DOXOrubicin Injectable (eMAR) [Known as ADRIAMYCIN (eMAR)] - Start Date: 24-Mar-2024  77 milliGRAM(s), IV Push Chemo, once, Administer Over 5 Minute(s), Stop After 1 Doses  Special Instructions: see chemo order; day 1   (Calc Info: 50.3268 milliGRAM(s)/m2/DOSE x 1.53 m2 = 77 milliGRAM(s)/Dose     (Requested dose was 50 milliGRAM(s) per m2), Per Consultant Recommendations  Dispensed As:  DOXOrubicin Hydrochloride 2 mg/mL SOLN [employer], 2 mg/mL, 50.3268 milliGRAM(s)/m2 = 77 milliGRAM(s). Administer via free flow via 20 Alban Right 20 alban PIV, blood  return checked Q 2-3 minutes , patient tolerated well. At 14:30 started   cyclophosphamide IVPB (eMAR) [Known as CYTOXAN IVPB (eMAR)] - Start Date: 24-Mar-2024  1148 milliGRAM(s) in sodium chloride 0.9% 250 milliLiter(s), IV Intermittent, once, infuse over 1 Hour(s), infuse at 307.4 mL/Hr, Stop After 1 Doses  Special Instructions: see chemo order; day 1. infusing over 1 hr via lowest port NS line via 20 alban PIV, pt tolerating well.  Primary RN aware of plan of care. safety maintained.

## 2024-03-24 NOTE — PROGRESS NOTE ADULT - SUBJECTIVE AND OBJECTIVE BOX
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    Protocol/Chemo Regimen: R-CHOP along with IV MTX (3.5g/m2) on D14 given extensive spine involvement    Day: Rituxan Day 0.  CHOP Day 1    Pt endorsed:   + fatigue  +back pain    Review of Systems: Denies any chest pain, palpitation, SOB.    Pain scale:   On Oxycodone/Tramadol PRN    Diet: Regular/Ravin    Allergies: latex (Rash)  No Known Drug Allergies    HEME/ONC MEDICATIONS  apixaban 5 milliGRAM(s) Oral two times a day    STANDING MEDICATIONS  allopurinol 300 milliGRAM(s) Oral daily  chlorhexidine 2% Cloths 1 Application(s) Topical daily  gabapentin 600 milliGRAM(s) Oral every 8 hours  HYDROmorphone   Tablet 2 milliGRAM(s) Oral every 6 hours  lactulose Syrup 10 Gram(s) Oral once  magnesium hydroxide Suspension 30 milliLiter(s) Oral daily  methocarbamol 750 milliGRAM(s) Oral every 8 hours  polyethylene glycol 3350 17 Gram(s) Oral two times a day  potassium chloride    Tablet ER 20 milliEquivalent(s) Oral every 2 hours  predniSONE   Tablet 100 milliGRAM(s) Oral every 24 hours  senna 2 Tablet(s) Oral at bedtime  sodium chloride 0.9%. 1000 milliLiter(s) IV Continuous <Continuous>    PRN MEDICATIONS  metoclopramide Injectable 10 milliGRAM(s) IV Push every 6 hours PRN  oxyCODONE    IR 10 milliGRAM(s) Oral every 4 hours PRN  traMADol 50 milliGRAM(s) Oral every 6 hours PRN    Vital Signs Last 24 Hrs  T(C): 37.1 (24 Mar 2024 14:57), Max: 37.1 (23 Mar 2024 16:00)  T(F): 98.8 (24 Mar 2024 14:57), Max: 98.8 (23 Mar 2024 16:00)  HR: 65 (24 Mar 2024 14:57) (62 - 82)  BP: 101/52 (24 Mar 2024 14:57) (101/52 - 128/75)  BP(mean): --  RR: 18 (24 Mar 2024 14:57) (18 - 18)  SpO2: 96% (24 Mar 2024 14:57) (96% - 99%)    Parameters below as of 24 Mar 2024 14:57  Patient On (Oxygen Delivery Method): room air    PHYSICAL EXAM  General: NAD  HEENT: PERRLA, EOMOI, clear oropharynx  CV: (+) S1/S2 RRR  Lungs: clear to auscultation, no wheezes or rales  Abdomen: soft, non-tender, non-distended (+) BS  Ext: trace edema in BLE  Skin: no rashes and no petechiae, s/p Vertebral column resection, Few steri strips +  Neuro: alert and oriented X 3, no focal deficits  Central Line: PIVL    RECENT CULTURES:  03-19 @ 18:30  Clean Catch Clean Catch (Midstream)  Escherichia coli ESBL  Escherichia coli ESBL  LUPE    >100,000 CFU/ml Escherichia coli ESBL  03-18 @ 20:10  .Blood Blood-Peripheral  No growth at 5 days  --  03-18 @ 20:05  .Blood Blood-Peripheral  No growth at 5 days    LABS:                        8.4    4.76  )-----------( 346      ( 24 Mar 2024 07:01 )             26.2     Mean Cell Volume : 84.2 fl  Mean Cell Hemoglobin : 27.0 pg  Mean Cell Hemoglobin Concentration : 32.1 gm/dL  Auto Neutrophil # : 3.96 K/uL  Auto Lymphocyte # : 0.39 K/uL  Auto Monocyte # : 0.29 K/uL  Auto Eosinophil # : 0.06 K/uL  Auto Basophil # : 0.02 K/uL  Auto Neutrophil % : 83.2 %  Auto Lymphocyte % : 8.2 %  Auto Monocyte % : 6.1 %  Auto Eosinophil % : 1.3 %  Auto Basophil % : 0.4 %    03-24    137  |  104  |  18  ----------------------------<  97  3.4<L>   |  24  |  0.54    Ca    7.9<L>      24 Mar 2024 07:01  Phos  3.0     03-24  Mg     2.1     03-24    TPro  4.8<L>  /  Alb  2.3<L>  /  TBili  0.2  /  DBili  x   /  AST  45<H>  /  ALT  75<H>  /  AlkPhos  119  03-24    Mg 2.1  Phos 3.0  Mg 2.1  Phos 3.4  Mg 1.5  Phos 2.2    PT/INR - ( 23 Mar 2024 07:23 )   PT: 11.7 sec;   INR: 1.12 ratio    PTT - ( 23 Mar 2024 07:23 )  PTT:29.9 sec    Uric Acid 2.9    Uric Acid 3.4      Uric Acid 2.5    RADIOLOGY & ADDITIONAL STUDIES:  CT Abdomen and Pelvis No Cont (03.22.24 @ 21:51) >  L2-S1 laminectomy with posterior spinal fusion. Since 3/6/2024   comparison, interval removal drainage catheters and increased size of   paraspinal collection containing punctate foci of air that is difficult   to measure given lack of intravenous contrast. While this maybe expected   postsurgical change, consider contrast-enhanced MRI for further   evaluation if warranted clinically.  Acute, mild compression deformity of L1.  Small bilateral pleural effusions.  Cholelithiasis with mild biliary duct dilatation,similar to 3/4/2024.  Retroperitoneal lymphadenopathy is redemonstrated.     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    Protocol/Chemo Regimen: M R-CHOP along with IV MTX (3.5g/m2) on D14 given extensive spine involvement     Day: MR CHOP Day 2    Pt endorsed:   + fatigue  +back pain    Review of Systems: Denies any chest pain, palpitation, SOB.    Pain scale:   On Oxycodone/Tramadol PRN    Diet: Regular/Ravin    Allergies: latex (Rash)  No Known Drug Allergies    HEME/ONC MEDICATIONS  apixaban 5 milliGRAM(s) Oral two times a day    STANDING MEDICATIONS  allopurinol 300 milliGRAM(s) Oral daily  chlorhexidine 2% Cloths 1 Application(s) Topical daily  gabapentin 600 milliGRAM(s) Oral every 8 hours  HYDROmorphone   Tablet 2 milliGRAM(s) Oral every 6 hours  lactulose Syrup 10 Gram(s) Oral once  magnesium hydroxide Suspension 30 milliLiter(s) Oral daily  methocarbamol 750 milliGRAM(s) Oral every 8 hours  polyethylene glycol 3350 17 Gram(s) Oral two times a day  potassium chloride    Tablet ER 20 milliEquivalent(s) Oral every 2 hours  predniSONE   Tablet 100 milliGRAM(s) Oral every 24 hours  senna 2 Tablet(s) Oral at bedtime  sodium chloride 0.9%. 1000 milliLiter(s) IV Continuous <Continuous>    PRN MEDICATIONS  metoclopramide Injectable 10 milliGRAM(s) IV Push every 6 hours PRN  oxyCODONE    IR 10 milliGRAM(s) Oral every 4 hours PRN  traMADol 50 milliGRAM(s) Oral every 6 hours PRN    Vital Signs Last 24 Hrs  T(C): 37.1 (24 Mar 2024 14:57), Max: 37.1 (23 Mar 2024 16:00)  T(F): 98.8 (24 Mar 2024 14:57), Max: 98.8 (23 Mar 2024 16:00)  HR: 65 (24 Mar 2024 14:57) (62 - 82)  BP: 101/52 (24 Mar 2024 14:57) (101/52 - 128/75)  BP(mean): --  RR: 18 (24 Mar 2024 14:57) (18 - 18)  SpO2: 96% (24 Mar 2024 14:57) (96% - 99%)    Parameters below as of 24 Mar 2024 14:57  Patient On (Oxygen Delivery Method): room air    PHYSICAL EXAM  General: NAD  HEENT: PERRLA, EOMOI, clear oropharynx  CV: (+) S1/S2 RRR  Lungs: clear to auscultation, no wheezes or rales  Abdomen: soft, non-tender, non-distended (+) BS  Ext: trace edema in BLE  Skin: no rashes and no petechiae, s/p Vertebral column resection, Few steri strips +  Neuro: alert and oriented X 3, no focal deficits  Central Line: PIVL    RECENT CULTURES:  03-19 @ 18:30  Clean Catch Clean Catch (Midstream)  Escherichia coli ESBL  Escherichia coli ESBL  LUPE    >100,000 CFU/ml Escherichia coli ESBL  03-18 @ 20:10  .Blood Blood-Peripheral  No growth at 5 days  --  03-18 @ 20:05  .Blood Blood-Peripheral  No growth at 5 days    LABS:                        8.4    4.76  )-----------( 346      ( 24 Mar 2024 07:01 )             26.2     Mean Cell Volume : 84.2 fl  Mean Cell Hemoglobin : 27.0 pg  Mean Cell Hemoglobin Concentration : 32.1 gm/dL  Auto Neutrophil # : 3.96 K/uL  Auto Lymphocyte # : 0.39 K/uL  Auto Monocyte # : 0.29 K/uL  Auto Eosinophil # : 0.06 K/uL  Auto Basophil # : 0.02 K/uL  Auto Neutrophil % : 83.2 %  Auto Lymphocyte % : 8.2 %  Auto Monocyte % : 6.1 %  Auto Eosinophil % : 1.3 %  Auto Basophil % : 0.4 %    03-24    137  |  104  |  18  ----------------------------<  97  3.4<L>   |  24  |  0.54    Ca    7.9<L>      24 Mar 2024 07:01  Phos  3.0     03-24  Mg     2.1     03-24    TPro  4.8<L>  /  Alb  2.3<L>  /  TBili  0.2  /  DBili  x   /  AST  45<H>  /  ALT  75<H>  /  AlkPhos  119  03-24    Mg 2.1  Phos 3.0  Mg 2.1  Phos 3.4  Mg 1.5  Phos 2.2    PT/INR - ( 23 Mar 2024 07:23 )   PT: 11.7 sec;   INR: 1.12 ratio    PTT - ( 23 Mar 2024 07:23 )  PTT:29.9 sec    Uric Acid 2.9    Uric Acid 3.4      Uric Acid 2.5    RADIOLOGY & ADDITIONAL STUDIES:  CT Abdomen and Pelvis No Cont (03.22.24 @ 21:51) >  L2-S1 laminectomy with posterior spinal fusion. Since 3/6/2024   comparison, interval removal drainage catheters and increased size of   paraspinal collection containing punctate foci of air that is difficult   to measure given lack of intravenous contrast. While this maybe expected   postsurgical change, consider contrast-enhanced MRI for further   evaluation if warranted clinically.  Acute, mild compression deformity of L1.  Small bilateral pleural effusions.  Cholelithiasis with mild biliary duct dilatation,similar to 3/4/2024.  Retroperitoneal lymphadenopathy is redemonstrated.

## 2024-03-24 NOTE — PROGRESS NOTE ADULT - NUTRITIONAL ASSESSMENT
This patient has been assessed with a concern for Malnutrition and has been determined to have a diagnosis/diagnoses of Severe protein-calorie malnutrition.    This patient is being managed with:   Diet Regular-  Ravin(7 Gm Arginine/7 Gm Glut/1.2 Gm HMB     Qty per Day:  2  Entered: Mar 23 2024  3:25PM   This patient has been assessed with a concern for Malnutrition and has been determined to have a diagnosis/diagnoses of Severe protein-calorie malnutrition.    This patient is being managed with:   Diet Regular-  Ravin(7 Gm Arginine/7 Gm Glut/1.2 Gm HMB     Qty per Day:  2  Entered: Mar 23 2024  3:25PM        He was monitored post-operatively in NSCU on PCA pump, durotomy encountered intraop and primarily repaired up from OR flat and incremental 15 degrees with no positional headaches or sign of CSF leak.Patient evaluated by PT/OT/PM&R and recommended for acute inpatient rehab. .

## 2024-03-24 NOTE — DISCHARGE NOTE PROVIDER - HOSPITAL COURSE
68-year-old male patient with past medical history of HLD, pre-Diabetes Mellitus, and sinus bradycardia who presented to [facility] on 3/5 with a history of progressively worsening lower extremity numbness/paresthesias and weakness with onset Jan 2024. 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 He underwent a two stage surgical resection with a combined surgical team including Neurosurgery, T8 VCR (vertebral column resection) T6-T10 fusion for tumor resection, L4 Partial Corpectomy, L2-Pelvis Fusion, Plastics Closure 3/5,  Surgical pathology reporting diffuse large B-cell lymphoma.  Patient was discharged to [facility] on 3/16/24, patient P/T [facility] for f/u at [facility] on 3/22,  advised admission to [facility] for treatment; MR Chop was started on 3/23.  Patient received IVF and antiemetics, strict I/O  and monitoring of CBC and electrolytes. Tolerated chemotherapy without complications. Stable for discharge home and follow up as an outpatient.

## 2024-03-24 NOTE — PROGRESS NOTE ADULT - NS ATTEND AMEND GEN_ALL_CORE FT
.    Primary: [place]    Vital Signs Last 24 Hrs  T(C): 36.7 (24 Mar 2024 01:30), Max: 37.7 (23 Mar 2024 12:50)  T(F): 98 (24 Mar 2024 01:30), Max: 99.9 (23 Mar 2024 13:30)  HR: 73 (24 Mar 2024 01:30) (62 - 82)  BP: 116/66 (24 Mar 2024 01:30) (94/58 - 137/79)  BP(mean): --  RR: 18 (24 Mar 2024 01:30) (15 - 18)  SpO2: 99% (24 Mar 2024 01:30) (95% - 99%)    Parameters below as of 24 Mar 2024 01:30  Patient On (Oxygen Delivery Method): room air    MEDICATIONS  (STANDING):  allopurinol 300 milliGRAM(s) Oral daily  apixaban 5 milliGRAM(s) Oral two times a day  chlorhexidine 2% Cloths 1 Application(s) Topical daily  gabapentin 600 milliGRAM(s) Oral every 8 hours  HYDROmorphone   Tablet 2 milliGRAM(s) Oral every 6 hours  lactulose Syrup 10 Gram(s) Oral once  magnesium hydroxide Suspension 30 milliLiter(s) Oral daily  methocarbamol 750 milliGRAM(s) Oral every 8 hours  polyethylene glycol 3350 17 Gram(s) Oral two times a day  senna 2 Tablet(s) Oral at bedtime  sodium chloride 0.9%. 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 Course complicated by 1) spinal cord disease, 2) elevated LDH and 3) bilateral DVT.    PMHx: prediabetes.    Plan:  Heme:   monitor for tumor lysis given elevated LDH  PLT goal > 50,000 given full anticoagulation  DOAC    ID: primary prophylaxis is not required    Nutrition: tolerating PO    Over 35 minutes were spent in direct patient care and in discharge management.  If tumor lysis labs at 3PM are negative then D/C home.  Follow-up Tuesday for Neulasta. .    Primary: [place]    Vital Signs Last 24 Hrs  T(C): 36.7 (24 Mar 2024 01:30), Max: 37.7 (23 Mar 2024 12:50)  T(F): 98 (24 Mar 2024 01:30), Max: 99.9 (23 Mar 2024 13:30)  HR: 73 (24 Mar 2024 01:30) (62 - 82)  BP: 116/66 (24 Mar 2024 01:30) (94/58 - 137/79)  BP(mean): --  RR: 18 (24 Mar 2024 01:30) (15 - 18)  SpO2: 99% (24 Mar 2024 01:30) (95% - 99%)    Parameters below as of 24 Mar 2024 01:30  Patient On (Oxygen Delivery Method): room air    MEDICATIONS  (STANDING):  allopurinol 300 milliGRAM(s) Oral daily  apixaban 5 milliGRAM(s) Oral two times a day  chlorhexidine 2% Cloths 1 Application(s) Topical daily  gabapentin 600 milliGRAM(s) Oral every 8 hours  HYDROmorphone   Tablet 2 milliGRAM(s) Oral every 6 hours  lactulose Syrup 10 Gram(s) Oral once  magnesium hydroxide Suspension 30 milliLiter(s) Oral daily  methocarbamol 750 milliGRAM(s) Oral every 8 hours  polyethylene glycol 3350 17 Gram(s) Oral two times a day  senna 2 Tablet(s) Oral at bedtime  sodium chloride 0.9%. 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 Course complicated by 1) spinal cord disease, 2) elevated LDH and 3) bilateral DVT.    PMHx: prediabetes.    Plan:  Heme:   monitor for tumor lysis given elevated LDH  PLT goal > 50,000 given full anticoagulation  DOAC    ID: primary prophylaxis is not required    Nutrition: tolerating PO    Over 50 minutes were spent in direct patient care and care coordination  If tumor lysis labs are negative then D/C home 3/25/24  Follow-up Tuesday for Neulasta.

## 2024-03-24 NOTE — DISCHARGE NOTE PROVIDER - NSDCFUSCHEDAPPT_GEN_ALL_CORE_FT
Anna Vanegas  [facility] Physician [facility]  NEUROSURG [address] [facility]  Scheduled Appointment: 03/29/2024    Gunnar Campbell  [facility]  PLASTICSUR [address] [facility]  Scheduled Appointment: 03/29/2024     [facility]  [facility]  Scheduled Appointment: 03/27/2024    Anna Vanegas  [facility]  NEUROSURG [address] [facility]  Scheduled Appointment: 03/29/2024    Gunnar Campbell  [facility]  PLASTICSUR [address]  Scheduled Appointment: 03/29/2024    [facility]  Scheduled Appointment: 04/01/2024    Tobias Chatterjee  [facility]  Scheduled Appointment: 04/01/2024

## 2024-03-24 NOTE — DISCHARGE NOTE PROVIDER - NSDCFUADDINST_GEN_ALL_CORE_FT
To [facility] on Wednesday 3/27 at 9:40 to receive Fulphila injection  To [facility] on Monday 4/1  at 1:30 pm  to see Dr Chatterjee.

## 2024-03-24 NOTE — PROGRESS NOTE ADULT - ASSESSMENT
67 yo M with new diagnosis of germinal center DLBCL involving spine s/p 2 stage neurosurgical intervention admitted to start MR-CHOP.      68-year-old male patient with past medical history of HLD, pre-Diabetes Mellitus, and sinus bradycardia who presented to [facility] on 3/5 with a history of progressively worsening lower extremity numbness/paresthesias and weakness with onset Jan 2024. 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 He underwent a two stage surgical resection with a combined surgical team including Neurosurgery, T8 VCR (vertebral column resection) T6-T10 fusion for tumor resection, L4 Partial Corpectomy, L2-Pelvis Fusion, Plastics Closure 3/5,  Surgical pathology reporting diffuse large B-cell lymphoma.  Patient was discharged to [facility] on 3/16/24, patient P/T [facility] for f/u at [facility] on 3/22,  advised admission to [facility] for treatment, patient has anemia secondary to chemotherapy and disease condition.

## 2024-03-24 NOTE — PROGRESS NOTE ADULT - PROBLEM SELECTOR PLAN 1
DLBCL (diffuse large B cell lymphoma). 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  Plan for curative intent treatment with R-CHOP along with IV MTX (3.5g/m2) on D14 given extensive spine involvement  ECHO reviewed EF 71%, hepatitis/EBV/HIV panel negative   Pain control/PT/OT  Continue allopurinol 300 mg daily  Monitor TLS labs and CBC daily, Rasburicase 3mg if uric acid >8 and 6mg for uric acid>12  Transfuse if Hb <8, PLT <10 or <15 and febrile or <50 and bleeding.  3/23- Rituxan today, developed infusion reactions when increased rate to 150 cc/hr, rigors and chills, Benadryl 25 mg IV, Hydrocortisone 50 mg and Pepcid 20 mg x1 dose given.  3/24- CHOP today, hypokalemia - Replete K+.  3/24- Discharge planning for tomorrow home, Neulasta on Tuesday, 3/26. DLBCL (diffuse large B cell lymphoma). 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  Plan for curative intent treatment with R-CHOP along with IV MTX (3.5g/m2) on D14 given extensive spine involvement  ECHO reviewed EF 71%, hepatitis/EBV/HIV panel negative   Pain control/PT/OT  Continue allopurinol 300 mg daily  Monitor TLS labs and CBC daily, Rasburicase 3mg if uric acid >8 and 6mg for uric acid>12  Transfuse if Hb <8, PLT <10 or <15 and febrile or <50 and bleeding.  3/23- Rituxan today, developed infusion reactions when increased rate to 150 cc/hr, rigors and chills, Benadryl 25 mg IV, Hydrocortisone 50 mg and Pepcid 20 mg x1 dose given.  3/24- CHOP today, hypokalemia - Replete K+.  3/24- Discharge planning for tomorrow home, Neulasta on Tuesday, 3/26.  Will require Day 14 Methotrexate.

## 2024-03-24 NOTE — DISCHARGE NOTE PROVIDER - NSDCMRMEDTOKEN_GEN_ALL_CORE_FT
apixaban 5 mg oral tablet: 1 tab(s) orally 2 times a day MDD: 2  gabapentin 300 mg oral capsule: 2 cap(s) orally every 8 hours  HYDROmorphone 2 mg oral tablet: 1 tab(s) orally every 6 hours  polyethylene glycol 3350 oral powder for reconstitution: 17 gram(s) orally 2 times a day  predniSONE 50 mg oral tablet: 2 tab(s) orally every 24 hours  traMADol 50 mg oral tablet: 1 tab(s) orally every 6 hours As needed Moderate Pain (4 - 6)   apixaban 5 mg oral tablet: 1 tab(s) orally 2 times a day  methocarbamol 750 mg oral tablet: 1 tab(s) orally every 8 hours  predniSONE 50 mg oral tablet: 2 tab(s) orally every 24 hours  traMADol 50 mg oral tablet: 1 tab(s) orally every 6 hours As needed Moderate Pain (4 - 6)   apixaban 5 mg oral tablet: 1 tab(s) orally 2 times a day  HYDROmorphone 2 mg oral tablet: 1 tab(s) orally every 6 hours as needed for  severe pain MDD: 4 tabs  methocarbamol 750 mg oral tablet: 1 tab(s) orally every 8 hours  naloxone 3 mg/0.1 mL nasal spray: 1 spray(s) intranasally every 8 hours as needed for narcotic overdose  Neurontin 600 mg oral tablet: 1 tab(s) orally every 8 hours  predniSONE 50 mg oral tablet: 2 tab(s) orally every 24 hours Continue thru 3/28 and then stop  senna (sennosides) 8.6 mg oral tablet: 2 tab(s) orally once a day (at bedtime) HOLD for LBM  traMADol 50 mg oral tablet: 1 tab(s) orally every 6 hours as needed for Moderate Pain (4 - 6) MDD: 4 tabs  traMADol 50 mg oral tablet: 1 tab(s) orally every 6 hours As needed Moderate Pain (4 - 6)   apixaban 5 mg oral tablet: 1 tab(s) orally 2 times a day  HYDROmorphone 2 mg oral tablet: 1 tab(s) orally every 6 hours as needed for  severe pain MDD: 4 tabs  methocarbamol 750 mg oral tablet: 1 tab(s) orally every 8 hours  naloxone 3 mg/0.1 mL nasal spray: 1 spray(s) intranasally every 8 hours as needed for narcotic overdose  Neurontin 600 mg oral tablet: 1 tab(s) orally every 8 hours  predniSONE 50 mg oral tablet: 2 tab(s) orally every 24 hours Continue thru 3/28 and then stop  senna (sennosides) 8.6 mg oral tablet: 2 tab(s) orally once a day (at bedtime) HOLD for LBM  traMADol 50 mg oral tablet: 1 tab(s) orally every 6 hours as needed for Moderate Pain (4 - 6) MDD: 4 tabs   apixaban 5 mg oral tablet: 1 tab(s) orally 2 times a day  HYDROmorphone 2 mg oral tablet: 1 tab(s) orally every 6 hours as needed for  severe pain MDD: 4 tabs  methocarbamol 750 mg oral tablet: 1 tab(s) orally every 8 hours  naloxone 3 mg/0.1 mL nasal spray: 1 spray(s) intranasally every 8 hours as needed for narcotic overdose  Neurontin 600 mg oral tablet: 1 tab(s) orally every 8 hours  predniSONE 50 mg oral tablet: 2 tab(s) orally every 24 hours Continue thru 3/28 and then stop  senna (sennosides) 8.6 mg oral tablet: 2 tab(s) orally once a day (at bedtime) HOLD for LBM  traMADol 50 mg oral tablet: 1 tab(s) orally every 6 hours as needed for Moderate Pain (4 - 6) MDD: 4 tabs  Transport Wheelchair: DLBCL, Pt will require a transport Patient will require a transport wheelchair due to generalized weakness secondary to deconditioning.

## 2024-03-25 ENCOUNTER — NON-APPOINTMENT (OUTPATIENT)
Age: 69
End: 2024-03-25

## 2024-03-25 LAB
ALBUMIN SERPL ELPH-MCNC: 2.6 G/DL — LOW (ref 3.3–5)
ALP SERPL-CCNC: 127 U/L — HIGH (ref 40–120)
ALT FLD-CCNC: 94 U/L — HIGH (ref 10–45)
ANION GAP SERPL CALC-SCNC: 9 MMOL/L — SIGNIFICANT CHANGE UP (ref 5–17)
APTT BLD: 27.4 SEC — SIGNIFICANT CHANGE UP (ref 24.5–35.6)
AST SERPL-CCNC: 62 U/L — HIGH (ref 10–40)
BASOPHILS # BLD AUTO: 0.01 K/UL — SIGNIFICANT CHANGE UP (ref 0–0.2)
BASOPHILS NFR BLD AUTO: 0.2 % — SIGNIFICANT CHANGE UP (ref 0–2)
BILIRUB SERPL-MCNC: 0.2 MG/DL — SIGNIFICANT CHANGE UP (ref 0.2–1.2)
BUN SERPL-MCNC: 19 MG/DL — SIGNIFICANT CHANGE UP (ref 7–23)
CALCIUM SERPL-MCNC: 8.1 MG/DL — LOW (ref 8.4–10.5)
CHLORIDE SERPL-SCNC: 102 MMOL/L — SIGNIFICANT CHANGE UP (ref 96–108)
CO2 SERPL-SCNC: 24 MMOL/L — SIGNIFICANT CHANGE UP (ref 22–31)
CREAT SERPL-MCNC: 0.62 MG/DL — SIGNIFICANT CHANGE UP (ref 0.5–1.3)
D DIMER BLD IA.RAPID-MCNC: 1168 NG/ML DDU — HIGH
EGFR: 105 ML/MIN/1.73M2 — SIGNIFICANT CHANGE UP
EOSINOPHIL # BLD AUTO: 0 K/UL — SIGNIFICANT CHANGE UP (ref 0–0.5)
EOSINOPHIL NFR BLD AUTO: 0 % — SIGNIFICANT CHANGE UP (ref 0–6)
FIBRINOGEN PPP-MCNC: 453 MG/DL — HIGH (ref 200–445)
GLUCOSE SERPL-MCNC: 111 MG/DL — HIGH (ref 70–99)
HCT VFR BLD CALC: 26.8 % — LOW (ref 39–50)
HGB BLD-MCNC: 8.5 G/DL — LOW (ref 13–17)
IMM GRANULOCYTES NFR BLD AUTO: 0.7 % — SIGNIFICANT CHANGE UP (ref 0–0.9)
INR BLD: 1.06 RATIO — SIGNIFICANT CHANGE UP (ref 0.85–1.18)
LDH SERPL L TO P-CCNC: 697 U/L — HIGH (ref 50–242)
LYMPHOCYTES # BLD AUTO: 0.44 K/UL — LOW (ref 1–3.3)
LYMPHOCYTES # BLD AUTO: 7.8 % — LOW (ref 13–44)
MAGNESIUM SERPL-MCNC: 2.4 MG/DL — SIGNIFICANT CHANGE UP (ref 1.6–2.6)
MCHC RBC-ENTMCNC: 26.7 PG — LOW (ref 27–34)
MCHC RBC-ENTMCNC: 31.7 GM/DL — LOW (ref 32–36)
MCV RBC AUTO: 84.3 FL — SIGNIFICANT CHANGE UP (ref 80–100)
MONOCYTES # BLD AUTO: 0.35 K/UL — SIGNIFICANT CHANGE UP (ref 0–0.9)
MONOCYTES NFR BLD AUTO: 6.2 % — SIGNIFICANT CHANGE UP (ref 2–14)
NEUTROPHILS # BLD AUTO: 4.77 K/UL — SIGNIFICANT CHANGE UP (ref 1.8–7.4)
NEUTROPHILS NFR BLD AUTO: 85.1 % — HIGH (ref 43–77)
NRBC # BLD: 0 /100 WBCS — SIGNIFICANT CHANGE UP (ref 0–0)
PHOSPHATE SERPL-MCNC: 2.7 MG/DL — SIGNIFICANT CHANGE UP (ref 2.5–4.5)
PLATELET # BLD AUTO: 339 K/UL — SIGNIFICANT CHANGE UP (ref 150–400)
POTASSIUM SERPL-MCNC: 4.2 MMOL/L — SIGNIFICANT CHANGE UP (ref 3.5–5.3)
POTASSIUM SERPL-SCNC: 4.2 MMOL/L — SIGNIFICANT CHANGE UP (ref 3.5–5.3)
PROT SERPL-MCNC: 5.3 G/DL — LOW (ref 6–8.3)
PROTHROM AB SERPL-ACNC: 11.6 SEC — SIGNIFICANT CHANGE UP (ref 9.5–13)
RBC # BLD: 3.18 M/UL — LOW (ref 4.2–5.8)
RBC # FLD: 16 % — HIGH (ref 10.3–14.5)
SODIUM SERPL-SCNC: 135 MMOL/L — SIGNIFICANT CHANGE UP (ref 135–145)
URATE SERPL-MCNC: 2.2 MG/DL — LOW (ref 3.4–8.8)
WBC # BLD: 5.61 K/UL — SIGNIFICANT CHANGE UP (ref 3.8–10.5)
WBC # FLD AUTO: 5.61 K/UL — SIGNIFICANT CHANGE UP (ref 3.8–10.5)

## 2024-03-25 PROCEDURE — 99232 SBSQ HOSP IP/OBS MODERATE 35: CPT | Mod: FS

## 2024-03-25 PROCEDURE — 99222 1ST HOSP IP/OBS MODERATE 55: CPT

## 2024-03-25 RX ADMIN — HYDROMORPHONE HYDROCHLORIDE 2 MILLIGRAM(S): 2 INJECTION INTRAMUSCULAR; INTRAVENOUS; SUBCUTANEOUS at 18:32

## 2024-03-25 RX ADMIN — CHLORHEXIDINE GLUCONATE 1 APPLICATION(S): 213 SOLUTION TOPICAL at 11:45

## 2024-03-25 RX ADMIN — HYDROMORPHONE HYDROCHLORIDE 2 MILLIGRAM(S): 2 INJECTION INTRAMUSCULAR; INTRAVENOUS; SUBCUTANEOUS at 01:03

## 2024-03-25 RX ADMIN — MAGNESIUM HYDROXIDE 30 MILLILITER(S): 400 TABLET, CHEWABLE ORAL at 11:31

## 2024-03-25 RX ADMIN — HYDROMORPHONE HYDROCHLORIDE 2 MILLIGRAM(S): 2 INJECTION INTRAMUSCULAR; INTRAVENOUS; SUBCUTANEOUS at 11:32

## 2024-03-25 RX ADMIN — METHOCARBAMOL 750 MILLIGRAM(S): 500 TABLET, FILM COATED ORAL at 05:54

## 2024-03-25 RX ADMIN — HYDROMORPHONE HYDROCHLORIDE 2 MILLIGRAM(S): 2 INJECTION INTRAMUSCULAR; INTRAVENOUS; SUBCUTANEOUS at 23:58

## 2024-03-25 RX ADMIN — TRAMADOL HYDROCHLORIDE 50 MILLIGRAM(S): 50 TABLET ORAL at 22:46

## 2024-03-25 RX ADMIN — HYDROMORPHONE HYDROCHLORIDE 2 MILLIGRAM(S): 2 INJECTION INTRAMUSCULAR; INTRAVENOUS; SUBCUTANEOUS at 17:32

## 2024-03-25 RX ADMIN — GABAPENTIN 600 MILLIGRAM(S): 400 CAPSULE ORAL at 13:04

## 2024-03-25 RX ADMIN — GABAPENTIN 600 MILLIGRAM(S): 400 CAPSULE ORAL at 21:13

## 2024-03-25 RX ADMIN — APIXABAN 5 MILLIGRAM(S): 2.5 TABLET, FILM COATED ORAL at 05:55

## 2024-03-25 RX ADMIN — METHOCARBAMOL 750 MILLIGRAM(S): 500 TABLET, FILM COATED ORAL at 13:03

## 2024-03-25 RX ADMIN — APIXABAN 5 MILLIGRAM(S): 2.5 TABLET, FILM COATED ORAL at 17:31

## 2024-03-25 RX ADMIN — TRAMADOL HYDROCHLORIDE 50 MILLIGRAM(S): 50 TABLET ORAL at 21:20

## 2024-03-25 RX ADMIN — Medication 300 MILLIGRAM(S): at 11:31

## 2024-03-25 RX ADMIN — METHOCARBAMOL 750 MILLIGRAM(S): 500 TABLET, FILM COATED ORAL at 21:13

## 2024-03-25 RX ADMIN — HYDROMORPHONE HYDROCHLORIDE 2 MILLIGRAM(S): 2 INJECTION INTRAMUSCULAR; INTRAVENOUS; SUBCUTANEOUS at 05:55

## 2024-03-25 RX ADMIN — HYDROMORPHONE HYDROCHLORIDE 2 MILLIGRAM(S): 2 INJECTION INTRAMUSCULAR; INTRAVENOUS; SUBCUTANEOUS at 12:32

## 2024-03-25 RX ADMIN — Medication 100 MILLIGRAM(S): at 11:32

## 2024-03-25 RX ADMIN — GABAPENTIN 600 MILLIGRAM(S): 400 CAPSULE ORAL at 05:54

## 2024-03-25 NOTE — CONSULT NOTE ADULT - SUBJECTIVE AND OBJECTIVE BOX
HPI:  68-year-old male patient with past medical history of HLD, pre-Diabetes Mellitus, and sinus bradycardia who presented to [facility] on 3/5 with a history of progressively worsening lower extremity numbness/paresthesias and weakness with onset Jan 2024. He underwent x-rays & MRI imaging as outpatient on 2/29/24 showing concern for osseous metastatic disease in thoracic/lumbar/sacral spine as well as extension of soft tissue into the paravertebral soft tissues more prominent on the right side. He was previously ambulating independently without device but now required a RW. Repeat MRI showed multiple findings including diffuse osseous metastatic disease throughout the entire spine; pathologic fx w/ tumor into the epidural space at T8 resulting in thoracic cord compression; tumor extension into the epidural space at L3 resulting in cauda equina compression; pathologic fx w/ tumor in the epidural space at L4 contributing to severe canal stenosis; tumor within the bilateral psoas muscles at L3 and L4; bilateral neural foramen effacement by tumor in L-spine. He underwent a two stage surgical resection with a combined surgical team including Neurosurgery (Dr. Bhaskar Partida) and Plastic Surgery (Dr. Gunnar Campbell). He underwent the following procedures:     ·	Stage 1: T8 VCR (vertebral column resection) T6-T10 fusion for tumor resection, small csf leak primarily repaired  ·	Stage 2: L4 Partial Corpectomy, L2-Pelvis Fusion, Plastics Closure 3/5 HMV drain x3 and 1 Bile bag placed w/ output closely monitored.     He was monitored post-operatively in NSCU on PCA pump, durotomy encountered intraop and primarily repaired up from OR flat and incremental 15 degrees with no positional headaches or sign of CSF leak. Surgical pathology reporting diffuse large B-cell lymphoma. Patient evaluated by PT/OT/PM&R and recommended for acute inpatient rehab. Patient was discharged to [facility] on 3/16/24. Seen Dr. Goldberg at [facility] today 3/22 who advised admission to [facility] for treatment.  (22 Mar 2024 14:31)    Patient was admitted on 3/22, seen today, daughter at bedside. No new complaints, denies new weakness.   Has some lower back discomfort.     REVIEW OF SYSTEMS  Denies chest pain, SOB, N/V, F/C, abdominal pain     VITALS  T(C): 36.6 (03-25-24 @ 09:22), Max: 37.1 (03-24-24 @ 14:57)  HR: 63 (03-25-24 @ 09:22) (63 - 77)  BP: 108/62 (03-25-24 @ 09:22) (101/52 - 124/67)  RR: 18 (03-25-24 @ 09:22) (18 - 18)  SpO2: 96% (03-25-24 @ 09:22) (94% - 96%)  Wt(kg): --    PAST MEDICAL & SURGICAL HISTORY  HLD (hyperlipidemia)    Prediabetes    Sinus bradycardia    History of appendectomy    History of arthroscopic knee surgery        FUNCTIONAL HISTORY  Lives with daughters, 4 steps to enter, one flight inside  Independent AMB and ADLs PTA     CURRENT FUNCTIONAL STATUS  PT  3/23  transfers min assist with RW  gait CG/min assist with RW x 45 feet     RECENT LABS/IMAGING  CBC Full  -  ( 25 Mar 2024 07:10 )  WBC Count : 5.61 K/uL  RBC Count : 3.18 M/uL  Hemoglobin : 8.5 g/dL  Hematocrit : 26.8 %  Platelet Count - Automated : 339 K/uL  Mean Cell Volume : 84.3 fl  Mean Cell Hemoglobin : 26.7 pg  Mean Cell Hemoglobin Concentration : 31.7 gm/dL  Auto Neutrophil # : 4.77 K/uL  Auto Lymphocyte # : 0.44 K/uL  Auto Monocyte # : 0.35 K/uL  Auto Eosinophil # : 0.00 K/uL  Auto Basophil # : 0.01 K/uL  Auto Neutrophil % : 85.1 %  Auto Lymphocyte % : 7.8 %  Auto Monocyte % : 6.2 %  Auto Eosinophil % : 0.0 %  Auto Basophil % : 0.2 %    03-25    135  |  102  |  19  ----------------------------<  111<H>  4.2   |  24  |  0.62    Ca    8.1<L>      25 Mar 2024 07:10  Phos  2.7     03-25  Mg     2.4     03-25    TPro  5.3<L>  /  Alb  2.6<L>  /  TBili  0.2  /  DBili  x   /  AST  62<H>  /  ALT  94<H>  /  AlkPhos  127<H>  03-25    Urinalysis Basic - ( 25 Mar 2024 07:10 )    Color: x / Appearance: x / SG: x / pH: x  Gluc: 111 mg/dL / Ketone: x  / Bili: x / Urobili: x   Blood: x / Protein: x / Nitrite: x   Leuk Esterase: x / RBC: x / WBC x   Sq Epi: x / Non Sq Epi: x / Bacteria: x      IMPRESSION:  L2-S1 laminectomy with posterior spinal fusion. Since 3/6/2024   comparison, interval removal drainage catheters and increased size of   paraspinal collection containing punctate foci of air that is difficult   to measure given lack of intravenous contrast. While this maybe expected   postsurgical change, consider contrast-enhanced MRI for further   evaluation if warranted clinically.    Acute, mild compression deformity of L1.    Small bilateral pleural effusions.    Cholelithiasis with mild biliary duct dilatation,similar to 3/4/2024.    Retroperitoneal lymphadenopathy is redemonstrated.      ALLERGIES  latex (Rash)  No Known Drug Allergies      MEDICATIONS   allopurinol 300 milliGRAM(s) Oral daily  apixaban 5 milliGRAM(s) Oral two times a day  chlorhexidine 2% Cloths 1 Application(s) Topical daily  gabapentin 600 milliGRAM(s) Oral every 8 hours  HYDROmorphone   Tablet 2 milliGRAM(s) Oral every 6 hours  lactulose Syrup 10 Gram(s) Oral once  magnesium hydroxide Suspension 30 milliLiter(s) Oral daily  methocarbamol 750 milliGRAM(s) Oral every 8 hours  metoclopramide Injectable 10 milliGRAM(s) IV Push every 6 hours PRN  oxyCODONE    IR 10 milliGRAM(s) Oral every 4 hours PRN  polyethylene glycol 3350 17 Gram(s) Oral two times a day  predniSONE   Tablet 100 milliGRAM(s) Oral every 24 hours  senna 2 Tablet(s) Oral at bedtime  sodium chloride 0.9%. 1000 milliLiter(s) IV Continuous <Continuous>  traMADol 50 milliGRAM(s) Oral every 6 hours PRN      ----------------------------------------------------------------------------------------  PHYSICAL EXAM  Constitutional - NAD, Comfortable, in chair   Chest - Breathing comfortably room air   Cardiovascular - S1S2   Abdomen - Soft   Extremities - No C/C/E, No calf tenderness   Neurologic Exam -    follows commands                 Cognitive - Awake, Alert, AAO to self, place, date, year, situation     Communication - Fluent, No dysarthria        Motor -                     LEFT    UE - ShAB 5/5, EF 5/5, EE 5/5, WE 5/5,  5/5                    RIGHT UE - ShAB 5/5, EF 5/5, EE 5/5, WE 5/5,  5/5                    LEFT    LE - HF 5/5, KE 3/5, DF 5/5, PF 5/5                    RIGHT LE - HF 5/5, KE 3/5, DF 5/5, PF 5/5        Sensory - Intact to LT     Psychiatric - Mood stable, Affect WNL  ----------------------------------------------------------------------------------------  ASSESSMENT/PLAN  68yMale with functional deficits after surgical resection of lymphoma  pathologic fracture, thoracic cord compression, s/p thoracic fusion, stage 2 lumbar fusion  readmitted for treatment, R-CHOP, IV MTX, plan for next cycle of treatment 4/8  DVT b/l LE , on eliquis   transaminitis, continue to monitor     Pain - Tylenol oxycodone, tramadol, methocarbamol   Rehab - Will continue to follow for ongoing rehab needs and recommendations.   needs OT evaluation   patient requires min assist with transfers, gait, stair training    Recommend ACUTE inpatient rehabilitation for the functional deficits consisting of 3 hours of therapy/day & x 4 weeks, 24 hour RN/daily PMR physician for comorbid medical management. Patient will be able to tolerate 3 hours a day.

## 2024-03-25 NOTE — PROGRESS NOTE ADULT - NS ATTEND AMEND GEN_ALL_CORE FT
.    Primary: [place]    Vital Signs Last 24 Hrs  T(C): 36.5 (25 Mar 2024 05:29), Max: 37.1 (24 Mar 2024 14:57)  T(F): 97.7 (25 Mar 2024 05:29), Max: 98.8 (24 Mar 2024 14:57)  HR: 65 (25 Mar 2024 05:29) (65 - 77)  BP: 117/67 (25 Mar 2024 05:29) (101/52 - 124/67)  BP(mean): --  RR: 18 (25 Mar 2024 05:29) (18 - 18)  SpO2: 96% (25 Mar 2024 05:29) (94% - 98%)    Parameters below as of 25 Mar 2024 05:29  Patient On (Oxygen Delivery Method): room air    MEDICATIONS  (STANDING):  allopurinol 300 milliGRAM(s) Oral daily  apixaban 5 milliGRAM(s) Oral two times a day  chlorhexidine 2% Cloths 1 Application(s) Topical daily  gabapentin 600 milliGRAM(s) Oral every 8 hours  HYDROmorphone   Tablet 2 milliGRAM(s) Oral every 6 hours  lactulose Syrup 10 Gram(s) Oral once  magnesium hydroxide Suspension 30 milliLiter(s) Oral daily  methocarbamol 750 milliGRAM(s) Oral every 8 hours  polyethylene glycol 3350 17 Gram(s) Oral two times a day  predniSONE   Tablet 100 milliGRAM(s) Oral every 24 hours  senna 2 Tablet(s) Oral at bedtime  sodium chloride 0.9%. 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 Course complicated by 1) spinal cord disease, 2) elevated LDH and 3) bilateral DVT.  Hospitalization complicated by mild transaminitis (active).    PMHx: prediabetes.    Plan:  Heme:   monitor for tumor lysis given elevated LDH  PLT goal > 50,000 given full anticoagulation  DOAC  Neulasta today in [facility].     ID: primary prophylaxis is not required    Nutrition: tolerating PO    Over 35 minutes were spent in discharge management.  Re-admit 4/8/24 (Monday) for high dose MTX. .    Primary: [place]    Vital Signs Last 24 Hrs  T(C): 36.5 (25 Mar 2024 05:29), Max: 37.1 (24 Mar 2024 14:57)  T(F): 97.7 (25 Mar 2024 05:29), Max: 98.8 (24 Mar 2024 14:57)  HR: 65 (25 Mar 2024 05:29) (65 - 77)  BP: 117/67 (25 Mar 2024 05:29) (101/52 - 124/67)  BP(mean): --  RR: 18 (25 Mar 2024 05:29) (18 - 18)  SpO2: 96% (25 Mar 2024 05:29) (94% - 98%)    Parameters below as of 25 Mar 2024 05:29  Patient On (Oxygen Delivery Method): room air    MEDICATIONS  (STANDING):  allopurinol 300 milliGRAM(s) Oral daily  apixaban 5 milliGRAM(s) Oral two times a day  chlorhexidine 2% Cloths 1 Application(s) Topical daily  gabapentin 600 milliGRAM(s) Oral every 8 hours  HYDROmorphone   Tablet 2 milliGRAM(s) Oral every 6 hours  lactulose Syrup 10 Gram(s) Oral once  magnesium hydroxide Suspension 30 milliLiter(s) Oral daily  methocarbamol 750 milliGRAM(s) Oral every 8 hours  polyethylene glycol 3350 17 Gram(s) Oral two times a day  predniSONE   Tablet 100 milliGRAM(s) Oral every 24 hours  senna 2 Tablet(s) Oral at bedtime  sodium chloride 0.9%. 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 Course complicated by 1) spinal cord disease, 2) elevated LDH and 3) bilateral DVT.  Hospitalization complicated by mild transaminitis (active).    PMHx: prediabetes.    Plan:  Heme:   monitor for tumor lysis given elevated LDH  PLT goal > 50,000 given full anticoagulation  DOAC  Ne[facility]asta 3/26/24 in [facility].     ID: primary prophylaxis is not required    Nutrition: tolerating PO    Discharge pending on home rehab.  Patient has been discharged from inpatient [facility] rehab and by that center deemed fit for home.    We await communication with PT recommendations.     Over 35 minutes were spent in direct patient care.  W Re-admit 4/8/24 (Monday) for high dose MTX.

## 2024-03-25 NOTE — OCCUPATIONAL THERAPY INITIAL EVALUATION ADULT - VISUAL ACUITY
not tested Dupixent Pregnancy And Lactation Text: This medication likely crosses the placenta but the risk for the fetus is uncertain. This medication is excreted in breast milk.

## 2024-03-25 NOTE — PROGRESS NOTE ADULT - SUBJECTIVE AND OBJECTIVE BOX
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    Protocol/Chemo Regimen: M R-CHOP along with IV MTX (3.5g/m2) on D14 given extensive spine involvement     Day: MR CHOP Day 3    Pt endorsed:   Generalized weakness     Review of Systems: Patient denies  nausea, vomiting, diarrhea, abdominal pain, SOB, chest pain and headache.      Pain scale:   On Oxycodone/Tramadol PRN    Diet: Regular/Ravin    Allergies: latex (Rash)  No Known Drug Allergies      Allergies    latex (Rash)  No Known Drug Allergies    Intolerances        ANTIMICROBIALS      HEME/ONC MEDICATIONS  apixaban 5 milliGRAM(s) Oral two times a day      STANDING MEDICATIONS  allopurinol 300 milliGRAM(s) Oral daily  chlorhexidine 2% Cloths 1 Application(s) Topical daily  gabapentin 600 milliGRAM(s) Oral every 8 hours  HYDROmorphone   Tablet 2 milliGRAM(s) Oral every 6 hours  lactulose Syrup 10 Gram(s) Oral once  magnesium hydroxide Suspension 30 milliLiter(s) Oral daily  methocarbamol 750 milliGRAM(s) Oral every 8 hours  polyethylene glycol 3350 17 Gram(s) Oral two times a day  predniSONE   Tablet 100 milliGRAM(s) Oral every 24 hours  senna 2 Tablet(s) Oral at bedtime  sodium chloride 0.9%. 1000 milliLiter(s) IV Continuous <Continuous>      PRN MEDICATIONS  metoclopramide Injectable 10 milliGRAM(s) IV Push every 6 hours PRN  oxyCODONE    IR 10 milliGRAM(s) Oral every 4 hours PRN  traMADol 50 milliGRAM(s) Oral every 6 hours PRN        Vital Signs Last 24 Hrs  T(C): 36.6 (25 Mar 2024 09:22), Max: 37.1 (24 Mar 2024 14:57)  T(F): 97.9 (25 Mar 2024 09:22), Max: 98.8 (24 Mar 2024 14:57)  HR: 63 (25 Mar 2024 09:22) (63 - 77)  BP: 108/62 (25 Mar 2024 09:22) (101/52 - 124/67)  BP(mean): --  RR: 18 (25 Mar 2024 09:22) (18 - 18)  SpO2: 96% (25 Mar 2024 09:22) (94% - 96%)    Parameters below as of 25 Mar 2024 09:22  Patient On (Oxygen Delivery Method): room air        PHYSICAL EXAM  General: NAD  HEENT:  clear oropharynx, anicteric sclera  CV: (+) S1/S2 RRR  Lungs: clear to auscultation, no wheezes or rales  Abdomen: soft, non-tender, non-distended (+) BS  Ext: +2 non pitting   edema b/l LE   Skin: no rash  Neuro: alert and oriented X 3  Central Line: PIVL CDI           LABS:                        8.5    5.61  )-----------( 339      ( 25 Mar 2024 07:10 )             26.8         Mean Cell Volume : 84.3 fl  Mean Cell Hemoglobin : 26.7 pg  Mean Cell Hemoglobin Concentration : 31.7 gm/dL  Auto Neutrophil # : 4.77 K/uL  Auto Lymphocyte # : 0.44 K/uL  Auto Monocyte # : 0.35 K/uL  Auto Eosinophil # : 0.00 K/uL  Auto Basophil # : 0.01 K/uL  Auto Neutrophil % : 85.1 %  Auto Lymphocyte % : 7.8 %  Auto Monocyte % : 6.2 %  Auto Eosinophil % : 0.0 %  Auto Basophil % : 0.2 %      03-25    135  |  102  |  19  ----------------------------<  111<H>  4.2   |  24  |  0.62    Ca    8.1<L>      25 Mar 2024 07:10  Phos  2.7     03-25  Mg     2.4     03-25    TPro  5.3<L>  /  Alb  2.6<L>  /  TBili  0.2  /  DBili  x   /  AST  62<H>  /  ALT  94<H>  /  AlkPhos  127<H>  03-25          PT/INR - ( 25 Mar 2024 07:10 )   PT: 11.6 sec;   INR: 1.06 ratio         PTT - ( 25 Mar 2024 07:10 )  PTT:27.4 sec    RECENT CULTURES:  03-23 @ 18:03  Clean Catch Clean Catch (Midstream)      <10,000 CFU/mL Normal Urogenital Mari  --  03-19 @ 18:30  Clean Catch Clean Catch (Midstream)  Escherichia coli ESBL  Escherichia coli ESBL  LUPE    >100,000 CFU/ml Escherichia coli ESBL  --  03-18 @ 20:10  .Blood Blood-Peripheral      No growth at 5 days  --  03-18 @ 20:05  .Blood Blood-Peripheral    No growth at 5 days  --    RADIOLOGY & ADDITIONAL STUDIES:  CT Abdomen and Pelvis No Cont (03.22.24 @ 21:51) >  L2-S1 laminectomy with posterior spinal fusion. Since 3/6/2024   comparison, interval removal drainage catheters and increased size of   paraspinal collection containing punctate foci of air that is difficult   to measure given lack of intravenous contrast. While this maybe expected   postsurgical change, consider contrast-enhanced MRI for further   evaluation if warranted clinically.  Acute, mild compression deformity of L1.  Small bilateral pleural effusions.  Cholelithiasis with mild biliary duct dilatation,similar to 3/4/2024.  Retroperitoneal lymphadenopathy is redemonstrated.  US Duplex Venous Lower Ext Complete, Bilateral (03.19.24 @ 13:58) >  IMPRESSION:  Acute deep venous thrombosis: below the knee.    Bilateral soleal vein thrombosis.           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    Protocol/Chemo Regimen: M R-CHOP along with IV MTX (3.5g/m2) on D14 given extensive spine involvement     Day: s/p  MR CHOP Day 3    Pt endorsed:     Generalized weakness     Review of Systems: Patient denies  nausea, vomiting, diarrhea, abdominal pain, SOB, chest pain and headache.      Pain scale:   On Oxycodone/Tramadol PRN    Diet: Regular/Ravin    Allergies: latex (Rash)  No Known Drug Allergies      Allergies    latex (Rash)  No Known Drug Allergies    Intolerances        ANTIMICROBIALS      HEME/ONC MEDICATIONS  apixaban 5 milliGRAM(s) Oral two times a day      STANDING MEDICATIONS  allopurinol 300 milliGRAM(s) Oral daily  chlorhexidine 2% Cloths 1 Application(s) Topical daily  gabapentin 600 milliGRAM(s) Oral every 8 hours  HYDROmorphone   Tablet 2 milliGRAM(s) Oral every 6 hours  lactulose Syrup 10 Gram(s) Oral once  magnesium hydroxide Suspension 30 milliLiter(s) Oral daily  methocarbamol 750 milliGRAM(s) Oral every 8 hours  polyethylene glycol 3350 17 Gram(s) Oral two times a day  predniSONE   Tablet 100 milliGRAM(s) Oral every 24 hours  senna 2 Tablet(s) Oral at bedtime  sodium chloride 0.9%. 1000 milliLiter(s) IV Continuous <Continuous>      PRN MEDICATIONS  metoclopramide Injectable 10 milliGRAM(s) IV Push every 6 hours PRN  oxyCODONE    IR 10 milliGRAM(s) Oral every 4 hours PRN  traMADol 50 milliGRAM(s) Oral every 6 hours PRN        Vital Signs Last 24 Hrs  T(C): 36.6 (25 Mar 2024 09:22), Max: 37.1 (24 Mar 2024 14:57)  T(F): 97.9 (25 Mar 2024 09:22), Max: 98.8 (24 Mar 2024 14:57)  HR: 63 (25 Mar 2024 09:22) (63 - 77)  BP: 108/62 (25 Mar 2024 09:22) (101/52 - 124/67)  BP(mean): --  RR: 18 (25 Mar 2024 09:22) (18 - 18)  SpO2: 96% (25 Mar 2024 09:22) (94% - 96%)    Parameters below as of 25 Mar 2024 09:22  Patient On (Oxygen Delivery Method): room air        PHYSICAL EXAM  General: NAD  HEENT:  clear oropharynx, anicteric sclera  CV: (+) S1/S2 RRR  Lungs: clear to auscultation, no wheezes or rales  Abdomen: soft, non-tender, non-distended (+) BS  Ext: +2 non pitting   edema b/l LE   Skin: no rashs/p Vertebral column resection,  steri strips + in place   Neuro: alert and oriented X 3  Central Line: PIVL CDI           LABS:                        8.5    5.61  )-----------( 339      ( 25 Mar 2024 07:10 )             26.8         Mean Cell Volume : 84.3 fl  Mean Cell Hemoglobin : 26.7 pg  Mean Cell Hemoglobin Concentration : 31.7 gm/dL  Auto Neutrophil # : 4.77 K/uL  Auto Lymphocyte # : 0.44 K/uL  Auto Monocyte # : 0.35 K/uL  Auto Eosinophil # : 0.00 K/uL  Auto Basophil # : 0.01 K/uL  Auto Neutrophil % : 85.1 %  Auto Lymphocyte % : 7.8 %  Auto Monocyte % : 6.2 %  Auto Eosinophil % : 0.0 %  Auto Basophil % : 0.2 %      03-25    135  |  102  |  19  ----------------------------<  111<H>  4.2   |  24  |  0.62    Ca    8.1<L>      25 Mar 2024 07:10  Phos  2.7     03-25  Mg     2.4     03-25    TPro  5.3<L>  /  Alb  2.6<L>  /  TBili  0.2  /  DBili  x   /  AST  62<H>  /  ALT  94<H>  /  AlkPhos  127<H>  03-25          PT/INR - ( 25 Mar 2024 07:10 )   PT: 11.6 sec;   INR: 1.06 ratio         PTT - ( 25 Mar 2024 07:10 )  PTT:27.4 sec    RECENT CULTURES:  03-23 @ 18:03  Clean Catch Clean Catch (Midstream)      <10,000 CFU/mL Normal Urogenital Mari  --  03-19 @ 18:30  Clean Catch Clean Catch (Midstream)  Escherichia coli ESBL  Escherichia coli ESBL  LUPE    >100,000 CFU/ml Escherichia coli ESBL  --  03-18 @ 20:10  .Blood Blood-Peripheral      No growth at 5 days  --  03-18 @ 20:05  .Blood Blood-Peripheral    No growth at 5 days  --    RADIOLOGY & ADDITIONAL STUDIES:  CT Abdomen and Pelvis No Cont (03.22.24 @ 21:51) >  L2-S1 laminectomy with posterior spinal fusion. Since 3/6/2024   comparison, interval removal drainage catheters and increased size of   paraspinal collection containing punctate foci of air that is difficult   to measure given lack of intravenous contrast. While this maybe expected   postsurgical change, consider contrast-enhanced MRI for further   evaluation if warranted clinically.  Acute, mild compression deformity of L1.  Small bilateral pleural effusions.  Cholelithiasis with mild biliary duct dilatation,similar to 3/4/2024.  Retroperitoneal lymphadenopathy is redemonstrated.  US Duplex Venous Lower Ext Complete, Bilateral (03.19.24 @ 13:58) >  IMPRESSION:  Acute deep venous thrombosis: below the knee.    Bilateral soleal vein thrombosis.

## 2024-03-25 NOTE — PROGRESS NOTE ADULT - NUTRITIONAL ASSESSMENT
This patient has been assessed with a concern for Malnutrition and has been determined to have a diagnosis/diagnoses of Severe protein-calorie malnutrition.    This patient is being managed with:   Diet Regular-  Ravin(7 Gm Arginine/7 Gm Glut/1.2 Gm HMB     Qty per Day:  2  Entered: Mar 23 2024  3:25PM

## 2024-03-25 NOTE — PROGRESS NOTE ADULT - PROBLEM SELECTOR PLAN 1
DLBCL (diffuse large B cell lymphoma). 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  Plan for curative intent treatment with R-CHOP along with IV MTX (3.5g/m2) on D14 given extensive spine involvement  ECHO reviewed EF 71%, hepatitis/EBV/HIV panel negative   Pain control/PT/OT  Continue allopurinol 300 mg daily  Monitor TLS labs and CBC daily, Rasburicase 3mg if uric acid >8 and 6mg for uric acid>12  Transfuse if Hb <8, PLT <10 or <15 and febrile or <50 and bleeding.  3/23- s/p Rituxan developed infusion reactions when increased rate to 150 cc/hr, rigors and chills, Benadryl 25 mg IV, Hydrocortisone 50 mg and Pepcid 20 mg x1 dose given.  3/24- CHOP today  3/26 - Discharge planning for tomorrow acute rehab , Neulasta on Tuesday   Will require Day 14 Methotrexate on 4/8

## 2024-03-25 NOTE — OCCUPATIONAL THERAPY INITIAL EVALUATION ADULT - ADDITIONAL COMMENTS
Pt admitted from [facility]. Prior to admission to [place], pt lives with family in a private house with 4 SLAVA and 1 flight of stairs inside, is independent in ADLs and ambulation, owns RW.

## 2024-03-25 NOTE — OCCUPATIONAL THERAPY INITIAL EVALUATION ADULT - PERTINENT HX OF CURRENT PROBLEM, REHAB EVAL
68-year-old male patient with past medical history of HLD, pre-Diabetes Mellitus, and sinus bradycardia who presented to [facility] on 3/5 with a history of progressively worsening lower extremity numbness/paresthesias and weakness with onset Jan 2024. He underwent x-rays & MRI imaging as outpatient on 2/29/24 showing concern for osseous metastatic disease in thoracic/lumbar/sacral spine as well as extension of soft tissue into the paravertebral soft tissues more prominent on the right side. He was previously ambulating independently without device but now required a RW. Repeat MRI showed multiple findings including diffuse osseous metastatic disease throughout the entire spine; pathologic fx w/ tumor into the epidural space at T8 resulting in thoracic cord compression; tumor extension into the epidural space at L3 resulting in cauda equina compression; pathologic fx w/ tumor in the epidural space at L4 contributing to severe canal stenosis; tumor within the bilateral psoas muscles at L3 and L4; bilateral neural foramen effacement by tumor in L-spine. He underwent a two stage surgical resection with a combined surgical team including Neurosurgery (Dr. Bhaskar Partida) and Plastic Surgery (Dr. Gunnar Campbell). He underwent the following procedures:   ·	Stage 1: T8 VCR (vertebral column resection) T6-T10 fusion for tumor resection, small csf leak primarily repaired  ·	Stage 2: L4 Partial Corpectomy, L2-Pelvis Fusion, Plastics Closure 3/5 HMV drain x3 and 1 Bile bag placed w/ output closely monitored. He was monitored post-operatively in NSCU on PCA pump, durotomy encountered intraop and primarily repaired up from OR flat and incremental 15 degrees with no positional headaches or sign of CSF leak. Surgical pathology reporting diffuse large B-cell lymphoma. Patient evaluated by PT/OT/PM&R and recommended for acute inpatient rehab. Patient was discharged to [facility] on 3/16/24. Seen Dr. Goldberg at [facility] today 3/22 who advised admission to [facility] for treatment. Patient was admitted on 3/22, seen today, daughter at bedside. No new complaints, denies new weakness.  Has some lower back discomfort.

## 2024-03-25 NOTE — PROGRESS NOTE ADULT - ASSESSMENT
68-year-old male patient with past medical history of HLD, pre-Diabetes Mellitus, and sinus bradycardia who presented to [facility] on 3/5 with a history of progressively worsening lower extremity numbness/paresthesias and weakness with onset Jan 2024. 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 He underwent a two stage surgical resection with a combined surgical team including Neurosurgery, T8 VCR (vertebral column resection) T6-T10 fusion for tumor resection, L4 Partial Corpectomy, L2-Pelvis Fusion, Plastics Closure 3/5,  Surgical pathology reporting diffuse large B-cell lymphoma.  Patient was discharged to [facility] on 3/16/24, patient P/T [facility] for f/u at [facility] on 3/22,  advised admission to [facility] for treatment; MR Chop was started on 3/23. 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

## 2024-03-26 ENCOUNTER — TRANSCRIPTION ENCOUNTER (OUTPATIENT)
Age: 69
End: 2024-03-26

## 2024-03-26 VITALS
RESPIRATION RATE: 18 BRPM | SYSTOLIC BLOOD PRESSURE: 101 MMHG | OXYGEN SATURATION: 95 % | HEART RATE: 58 BPM | DIASTOLIC BLOOD PRESSURE: 59 MMHG | TEMPERATURE: 98 F

## 2024-03-26 LAB
ALBUMIN SERPL ELPH-MCNC: 2.8 G/DL — LOW (ref 3.3–5)
ALP SERPL-CCNC: 132 U/L — HIGH (ref 40–120)
ALT FLD-CCNC: 82 U/L — HIGH (ref 10–45)
ANION GAP SERPL CALC-SCNC: 11 MMOL/L — SIGNIFICANT CHANGE UP (ref 5–17)
AST SERPL-CCNC: 43 U/L — HIGH (ref 10–40)
BASOPHILS # BLD AUTO: 0.01 K/UL — SIGNIFICANT CHANGE UP (ref 0–0.2)
BASOPHILS NFR BLD AUTO: 0.2 % — SIGNIFICANT CHANGE UP (ref 0–2)
BILIRUB SERPL-MCNC: 0.4 MG/DL — SIGNIFICANT CHANGE UP (ref 0.2–1.2)
BUN SERPL-MCNC: 17 MG/DL — SIGNIFICANT CHANGE UP (ref 7–23)
CALCIUM SERPL-MCNC: 8.3 MG/DL — LOW (ref 8.4–10.5)
CHLORIDE SERPL-SCNC: 99 MMOL/L — SIGNIFICANT CHANGE UP (ref 96–108)
CO2 SERPL-SCNC: 24 MMOL/L — SIGNIFICANT CHANGE UP (ref 22–31)
CREAT SERPL-MCNC: 0.47 MG/DL — LOW (ref 0.5–1.3)
EGFR: 115 ML/MIN/1.73M2 — SIGNIFICANT CHANGE UP
EOSINOPHIL # BLD AUTO: 0.02 K/UL — SIGNIFICANT CHANGE UP (ref 0–0.5)
EOSINOPHIL NFR BLD AUTO: 0.4 % — SIGNIFICANT CHANGE UP (ref 0–6)
GLUCOSE SERPL-MCNC: 111 MG/DL — HIGH (ref 70–99)
HCT VFR BLD CALC: 28.5 % — LOW (ref 39–50)
HGB BLD-MCNC: 9.2 G/DL — LOW (ref 13–17)
IMM GRANULOCYTES NFR BLD AUTO: 0.5 % — SIGNIFICANT CHANGE UP (ref 0–0.9)
LDH SERPL L TO P-CCNC: 667 U/L — HIGH (ref 50–242)
LYMPHOCYTES # BLD AUTO: 0.41 K/UL — LOW (ref 1–3.3)
LYMPHOCYTES # BLD AUTO: 7.3 % — LOW (ref 13–44)
MAGNESIUM SERPL-MCNC: 2.4 MG/DL — SIGNIFICANT CHANGE UP (ref 1.6–2.6)
MCHC RBC-ENTMCNC: 26.7 PG — LOW (ref 27–34)
MCHC RBC-ENTMCNC: 32.3 GM/DL — SIGNIFICANT CHANGE UP (ref 32–36)
MCV RBC AUTO: 82.6 FL — SIGNIFICANT CHANGE UP (ref 80–100)
MONOCYTES # BLD AUTO: 0.21 K/UL — SIGNIFICANT CHANGE UP (ref 0–0.9)
MONOCYTES NFR BLD AUTO: 3.7 % — SIGNIFICANT CHANGE UP (ref 2–14)
NEUTROPHILS # BLD AUTO: 4.93 K/UL — SIGNIFICANT CHANGE UP (ref 1.8–7.4)
NEUTROPHILS NFR BLD AUTO: 87.9 % — HIGH (ref 43–77)
NRBC # BLD: 0 /100 WBCS — SIGNIFICANT CHANGE UP (ref 0–0)
PHOSPHATE SERPL-MCNC: 3 MG/DL — SIGNIFICANT CHANGE UP (ref 2.5–4.5)
PLATELET # BLD AUTO: 369 K/UL — SIGNIFICANT CHANGE UP (ref 150–400)
POTASSIUM SERPL-MCNC: 3 MMOL/L — LOW (ref 3.5–5.3)
POTASSIUM SERPL-SCNC: 3 MMOL/L — LOW (ref 3.5–5.3)
PROT SERPL-MCNC: 5.4 G/DL — LOW (ref 6–8.3)
RBC # BLD: 3.45 M/UL — LOW (ref 4.2–5.8)
RBC # FLD: 15.7 % — HIGH (ref 10.3–14.5)
SODIUM SERPL-SCNC: 134 MMOL/L — LOW (ref 135–145)
URATE SERPL-MCNC: 2.6 MG/DL — LOW (ref 3.4–8.8)
WBC # BLD: 5.61 K/UL — SIGNIFICANT CHANGE UP (ref 3.8–10.5)
WBC # FLD AUTO: 5.61 K/UL — SIGNIFICANT CHANGE UP (ref 3.8–10.5)

## 2024-03-26 PROCEDURE — 80053 COMPREHEN METABOLIC PANEL: CPT

## 2024-03-26 PROCEDURE — 83735 ASSAY OF MAGNESIUM: CPT

## 2024-03-26 PROCEDURE — 74176 CT ABD & PELVIS W/O CONTRAST: CPT | Mod: MC

## 2024-03-26 PROCEDURE — 71250 CT THORAX DX C-: CPT | Mod: MC

## 2024-03-26 PROCEDURE — 85384 FIBRINOGEN ACTIVITY: CPT

## 2024-03-26 PROCEDURE — 83615 LACTATE (LD) (LDH) ENZYME: CPT

## 2024-03-26 PROCEDURE — 85379 FIBRIN DEGRADATION QUANT: CPT

## 2024-03-26 PROCEDURE — 97166 OT EVAL MOD COMPLEX 45 MIN: CPT

## 2024-03-26 PROCEDURE — 85025 COMPLETE CBC W/AUTO DIFF WBC: CPT

## 2024-03-26 PROCEDURE — 82955 ASSAY OF G6PD ENZYME: CPT

## 2024-03-26 PROCEDURE — 84100 ASSAY OF PHOSPHORUS: CPT

## 2024-03-26 PROCEDURE — 86850 RBC ANTIBODY SCREEN: CPT

## 2024-03-26 PROCEDURE — 97116 GAIT TRAINING THERAPY: CPT

## 2024-03-26 PROCEDURE — 86900 BLOOD TYPING SEROLOGIC ABO: CPT

## 2024-03-26 PROCEDURE — 87086 URINE CULTURE/COLONY COUNT: CPT

## 2024-03-26 PROCEDURE — 85730 THROMBOPLASTIN TIME PARTIAL: CPT

## 2024-03-26 PROCEDURE — 97162 PT EVAL MOD COMPLEX 30 MIN: CPT

## 2024-03-26 PROCEDURE — 86901 BLOOD TYPING SEROLOGIC RH(D): CPT

## 2024-03-26 PROCEDURE — 84550 ASSAY OF BLOOD/URIC ACID: CPT

## 2024-03-26 PROCEDURE — 85610 PROTHROMBIN TIME: CPT

## 2024-03-26 PROCEDURE — 97110 THERAPEUTIC EXERCISES: CPT

## 2024-03-26 PROCEDURE — 99239 HOSP IP/OBS DSCHRG MGMT >30: CPT

## 2024-03-26 RX ORDER — GABAPENTIN 400 MG/1
1 CAPSULE ORAL
Qty: 90 | Refills: 0
Start: 2024-03-26 | End: 2024-04-24

## 2024-03-26 RX ORDER — POTASSIUM CHLORIDE 20 MEQ
40 PACKET (EA) ORAL
Refills: 0 | Status: COMPLETED | OUTPATIENT
Start: 2024-03-26 | End: 2024-03-26

## 2024-03-26 RX ORDER — TRAMADOL HYDROCHLORIDE 50 MG/1
1 TABLET ORAL
Qty: 0 | Refills: 0 | DISCHARGE
Start: 2024-03-26

## 2024-03-26 RX ORDER — HYDROMORPHONE HYDROCHLORIDE 2 MG/ML
1 INJECTION INTRAMUSCULAR; INTRAVENOUS; SUBCUTANEOUS
Qty: 12 | Refills: 0
Start: 2024-03-26 | End: 2024-03-28

## 2024-03-26 RX ORDER — METHOCARBAMOL 500 MG/1
1 TABLET, FILM COATED ORAL
Qty: 42 | Refills: 0
Start: 2024-03-26 | End: 2024-04-08

## 2024-03-26 RX ORDER — TRAMADOL HYDROCHLORIDE 50 MG/1
1 TABLET ORAL
Qty: 12 | Refills: 0
Start: 2024-03-26 | End: 2024-03-28

## 2024-03-26 RX ORDER — METHOCARBAMOL 500 MG/1
1 TABLET, FILM COATED ORAL
Qty: 0 | Refills: 0 | DISCHARGE
Start: 2024-03-26

## 2024-03-26 RX ORDER — APIXABAN 2.5 MG/1
1 TABLET, FILM COATED ORAL
Qty: 0 | Refills: 0 | DISCHARGE
Start: 2024-03-26

## 2024-03-26 RX ORDER — NALOXONE HYDROCHLORIDE 4 MG/.1ML
1 SPRAY NASAL
Qty: 1 | Refills: 0
Start: 2024-03-26 | End: 2024-03-28

## 2024-03-26 RX ADMIN — HYDROMORPHONE HYDROCHLORIDE 2 MILLIGRAM(S): 2 INJECTION INTRAMUSCULAR; INTRAVENOUS; SUBCUTANEOUS at 11:59

## 2024-03-26 RX ADMIN — HYDROMORPHONE HYDROCHLORIDE 2 MILLIGRAM(S): 2 INJECTION INTRAMUSCULAR; INTRAVENOUS; SUBCUTANEOUS at 05:47

## 2024-03-26 RX ADMIN — Medication 40 MILLIEQUIVALENT(S): at 15:13

## 2024-03-26 RX ADMIN — MAGNESIUM HYDROXIDE 30 MILLILITER(S): 400 TABLET, CHEWABLE ORAL at 11:59

## 2024-03-26 RX ADMIN — HYDROMORPHONE HYDROCHLORIDE 2 MILLIGRAM(S): 2 INJECTION INTRAMUSCULAR; INTRAVENOUS; SUBCUTANEOUS at 01:30

## 2024-03-26 RX ADMIN — Medication 300 MILLIGRAM(S): at 11:59

## 2024-03-26 RX ADMIN — METHOCARBAMOL 750 MILLIGRAM(S): 500 TABLET, FILM COATED ORAL at 05:47

## 2024-03-26 RX ADMIN — GABAPENTIN 600 MILLIGRAM(S): 400 CAPSULE ORAL at 13:36

## 2024-03-26 RX ADMIN — Medication 100 MILLIGRAM(S): at 11:59

## 2024-03-26 RX ADMIN — APIXABAN 5 MILLIGRAM(S): 2.5 TABLET, FILM COATED ORAL at 05:47

## 2024-03-26 RX ADMIN — Medication 40 MILLIEQUIVALENT(S): at 13:41

## 2024-03-26 RX ADMIN — METHOCARBAMOL 750 MILLIGRAM(S): 500 TABLET, FILM COATED ORAL at 13:36

## 2024-03-26 RX ADMIN — TRAMADOL HYDROCHLORIDE 50 MILLIGRAM(S): 50 TABLET ORAL at 11:04

## 2024-03-26 RX ADMIN — GABAPENTIN 600 MILLIGRAM(S): 400 CAPSULE ORAL at 05:47

## 2024-03-26 NOTE — DISCHARGE NOTE NURSING/CASE MANAGEMENT/SOCIAL WORK - PATIENT PORTAL LINK FT
You can access the FollowMyHealth Patient Portal offered by [facility] by registering at the following website: [email or web address]. By joining [employer]’s FollowMyHealth portal, you will also be able to view your health information using other applications (apps) compatible with our system.

## 2024-03-26 NOTE — DISCHARGE NOTE NURSING/CASE MANAGEMENT/SOCIAL WORK - NSDCCRNAME_GEN_ALL_CORE_FT
As discussed you may refer to Licensed agency list provided to hire HHAs privately as needed to assist with functional activities

## 2024-03-26 NOTE — PROGRESS NOTE ADULT - PROBLEM SELECTOR PLAN 3
VA duplex LE 3/19/2024: Acute deep venous thrombosis: below the knee. Bilateral soleal vein thrombosis.  Likely provoked in setting of recent major surgery, immobilization  3/23- Start Eliquis 5 mg PO BID.

## 2024-03-26 NOTE — PROGRESS NOTE ADULT - PROBLEM SELECTOR PLAN 1
DLBCL (diffuse large B cell lymphoma). 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  Plan for curative intent treatment with R-CHOP along with IV MTX (3.5g/m2) on D14 given extensive spine involvement  ECHO reviewed EF 71%, hepatitis/EBV/HIV panel negative   Pain control/PT/OT  Continue allopurinol 300 mg daily  Monitor TLS labs and CBC daily, Rasburicase 3mg if uric acid >8 and 6mg for uric acid>12  Transfuse if Hb <8, PLT <10 or <15 and febrile or <50 and bleeding.  3/23- s/p Rituxan developed infusion reactions when increased rate to 150 cc/hr, rigors and chills, Benadryl 25 mg IV, Hydrocortisone 50 mg and Pepcid 20 mg x1 dose given.  3/24- CHOP today  3/26 - Discharge planning to home Neulasta on Wednesday   Will require Day 14 Methotrexate on 4/8 DLBCL (diffuse large B cell lymphoma). 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  Plan for curative intent treatment with R-CHOP along with IV MTX (3.5g/m2) on D14 given extensive spine involvement  ECHO reviewed EF 71%, hepatitis/EBV/HIV panel negative   Pain control/PT/OT  Continue allopurinol 300 mg daily  Monitor TLS labs and CBC daily, Rasburicase 3mg if uric acid >8 and 6mg for uric acid>12  Transfuse if Hb <8, PLT <10 or <15 and febrile or <50 and bleeding.  3/23- s/p Rituxan developed infusion reactions when increased rate to 150 cc/hr, rigors and chills, Benadryl 25 mg IV, Hydrocortisone 50 mg and Pepcid 20 mg x1 dose given.  3/24- CHOP today  3/26 - Discharge planning to home Neulasta on Wednesday  3/26 Hypokalemia Potassium chloride 40 MEq PO x 2   Will require Day 14 Methotrexate on 4/8

## 2024-03-26 NOTE — CHART NOTE - NSCHARTNOTEFT_GEN_A_CORE
Pt will require a transport Patient will require a transport wheelchair due to generalized weakness secondary to deconditioning. The beneficiary has a mobility limitation that significantly impairs his ability to participate in one or more MRADLs such as toileting, feeding, dressing, grooming, and bathing in customary locations in the home. The patients' mobility limitation cannot be sufficiently resolved by the use of an appropriately fitted cane or walker. The patient is  unable to ambulate with a walker. Use of a manual wheelchair will significantly improve the beneficiary's  ability to participate in MRADLs and the beneficiary will use it on a regular basis in the home. the beneficiary has a caregiver who is available , willing and able to provide assistance with the wheelchair. The patient is not able to self propel a standard or lightweight wheelchair
Lab called me about + UCx senstivity. Taken on 3/19 for positive UA at [facility]. He was treated with IV CFTX for 3 days. Sensitivity resulted with resistance to CFTX.   Called [facility] and spoke to Harvey and updated her about result.

## 2024-03-26 NOTE — PROGRESS NOTE ADULT - SUBJECTIVE AND OBJECTIVE BOX
no new complaints     REVIEW OF SYSTEMS  Constitutional - No fever,  No fatigue  HEENT - No vertigo, No neck pain  Neurological - No headaches, No memory loss  Psychiatric - No depression, No anxiety    FUNCTIONAL PROGRESS  OT 3/26  transfers CG with RW  LB dressing max assist   grooming supervision     VITALS  T(C): 36.5 (03-26-24 @ 09:00), Max: 36.8 (03-25-24 @ 17:13)  HR: 84 (03-26-24 @ 09:00) (61 - 84)  BP: 134/74 (03-26-24 @ 09:00) (107/69 - 137/78)  RR: 18 (03-26-24 @ 09:00) (16 - 18)  SpO2: 95% (03-26-24 @ 09:00) (95% - 98%)  Wt(kg): --    MEDICATIONS   allopurinol 300 milliGRAM(s) daily  apixaban 5 milliGRAM(s) two times a day  chlorhexidine 2% Cloths 1 Application(s) daily  gabapentin 600 milliGRAM(s) every 8 hours  HYDROmorphone   Tablet 2 milliGRAM(s) every 6 hours  lactulose Syrup 10 Gram(s) once  magnesium hydroxide Suspension 30 milliLiter(s) daily  methocarbamol 750 milliGRAM(s) every 8 hours  metoclopramide Injectable 10 milliGRAM(s) every 6 hours PRN  oxyCODONE    IR 10 milliGRAM(s) every 4 hours PRN  polyethylene glycol 3350 17 Gram(s) two times a day  predniSONE   Tablet 100 milliGRAM(s) every 24 hours  senna 2 Tablet(s) at bedtime  sodium chloride 0.9%. 1000 milliLiter(s) <Continuous>  traMADol 50 milliGRAM(s) every 6 hours PRN      RECENT LABS - Reviewed                        9.2    5.61  )-----------( 369      ( 26 Mar 2024 10:51 )             28.5     03-25    135  |  102  |  19  ----------------------------<  111<H>  4.2   |  24  |  0.62    Ca    8.1<L>      25 Mar 2024 07:10  Phos  2.7     03-25  Mg     2.4     03-25    TPro  5.3<L>  /  Alb  2.6<L>  /  TBili  0.2  /  DBili  x   /  AST  62<H>  /  ALT  94<H>  /  AlkPhos  127<H>  03-25    PT/INR - ( 25 Mar 2024 07:10 )   PT: 11.6 sec;   INR: 1.06 ratio         PTT - ( 25 Mar 2024 07:10 )  PTT:27.4 sec  Urinalysis Basic - ( 25 Mar 2024 07:10 )    Color: x / Appearance: x / SG: x / pH: x  Gluc: 111 mg/dL / Ketone: x  / Bili: x / Urobili: x   Blood: x / Protein: x / Nitrite: x   Leuk Esterase: x / RBC: x / WBC x   Sq Epi: x / Non Sq Epi: x / Bacteria: x        ----------------------------------------------------------------------------------------  PHYSICAL EXAM  Constitutional - NAD, Comfortable, in chair   Chest - Breathing comfortably room air   Cardiovascular - S1S2   Abdomen - Soft   Extremities - No C/C/E, No calf tenderness   Neurologic Exam -    follows commands                 Cognitive - Awake, Alert, AAO to self, place, date, year, situation     Communication - Fluent, No dysarthria        Motor -                     LEFT    UE - ShAB 5/5, EF 5/5, EE 5/5, WE 5/5,  5/5                    RIGHT UE - ShAB 5/5, EF 5/5, EE 5/5, WE 5/5,  5/5                    LEFT    LE - HF 5/5, KE 3/5, DF 5/5, PF 5/5                    RIGHT LE - HF 5/5, KE 3/5, DF 5/5, PF 5/5        Sensory - Intact to LT     Psychiatric - Mood stable, Affect WNL  ----------------------------------------------------------------------------------------  ASSESSMENT/PLAN  68yMale with functional deficits after surgical resection of lymphoma  pathologic fracture, thoracic cord compression, s/p thoracic fusion, stage 2 lumbar fusion  readmitted for treatment, R-CHOP, IV MTX  DVT b/l LE , on eliquis   transaminitis, continue to monitor     Pain - Tylenol oxycodone, tramadol, methocarbamol   Rehab - Will continue to follow for ongoing rehab needs and recommendations.   seen by OT, CG with transfers, needs assist with dressing   recommend PT follow up for stairs  plan is discharge home with family support, home therapy

## 2024-03-26 NOTE — PROGRESS NOTE ADULT - PROBLEM SELECTOR PLAN 2
Not neutropenic, afebrile  Pan cultures if febrile.  3/23 - Ucx on 3/19, E.coli >100K, patient asymptomatic, will recheck urine cx.  3/23- F/U UCX
Not neutropenic, afebrile  Pan cultures if febrile.  3/23 - Ucx on 3/19, E.coli >100K, patient asymptomatic, f/u  recheck urine cx.
Not neutropenic, afebrile  Pan cultures if febrile.  3/23 - Ucx on 3/19, E.coli >100K, patient asymptomatic, 3/23 recheck urine cx   <10,000 CFU/mL Normal Urogenital Mari
Not neutropenic, afebrile  Pan cultures if febrile.  3/23 - Ucx on 3/19, E.coli >100K, patient asymptomatic, will recheck urine cx.  3/23- F/U UCX

## 2024-03-26 NOTE — PROGRESS NOTE ADULT - SUBJECTIVE AND OBJECTIVE BOX
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    Protocol/Chemo Regimen: M R-CHOP along with IV MTX (3.5g/m2) on D14 given extensive spine involvement     Day: s/p  MR CHOP Day 4    Pt endorsed:     Generalized weakness     Review of Systems: Patient denies  nausea, vomiting, diarrhea, abdominal pain, SOB, chest pain and headache.      Pain scale:   On Oxycodone/Tramadol PRN    Diet: Regular/Ravin    Allergies: latex (Rash)  No Known Drug Allergies      Allergies    latex (Rash)  No Known Drug Allergies    Intolerances        ANTIMICROBIALS      HEME/ONC MEDICATIONS  apixaban 5 milliGRAM(s) Oral two times a day      STANDING MEDICATIONS  allopurinol 300 milliGRAM(s) Oral daily  chlorhexidine 2% Cloths 1 Application(s) Topical daily  gabapentin 600 milliGRAM(s) Oral every 8 hours  HYDROmorphone   Tablet 2 milliGRAM(s) Oral every 6 hours  lactulose Syrup 10 Gram(s) Oral once  magnesium hydroxide Suspension 30 milliLiter(s) Oral daily  methocarbamol 750 milliGRAM(s) Oral every 8 hours  polyethylene glycol 3350 17 Gram(s) Oral two times a day  predniSONE   Tablet 100 milliGRAM(s) Oral every 24 hours  senna 2 Tablet(s) Oral at bedtime  sodium chloride 0.9%. 1000 milliLiter(s) IV Continuous <Continuous>      PRN MEDICATIONS  metoclopramide Injectable 10 milliGRAM(s) IV Push every 6 hours PRN  oxyCODONE    IR 10 milliGRAM(s) Oral every 4 hours PRN  traMADol 50 milliGRAM(s) Oral every 6 hours PRN      Vital Signs Last 24 Hrs  T(C): 36.5 (26 Mar 2024 09:00), Max: 36.8 (25 Mar 2024 17:13)  T(F): 97.7 (26 Mar 2024 09:00), Max: 98.2 (25 Mar 2024 17:13)  HR: 84 (26 Mar 2024 09:00) (61 - 84)  BP: 134/74 (26 Mar 2024 09:00) (107/69 - 137/78)  BP(mean): --  RR: 18 (26 Mar 2024 09:00) (16 - 18)  SpO2: 95% (26 Mar 2024 09:00) (95% - 98%)    Parameters below as of 26 Mar 2024 09:00  Patient On (Oxygen Delivery Method): room air            PHYSICAL EXAM  General: NAD  HEENT:  clear oropharynx, anicteric sclera  CV: (+) S1/S2 RRR  Lungs: clear to auscultation, no wheezes or rales  Abdomen: soft, non-tender, non-distended (+) BS  Ext: +2 non pitting   edema b/l LE   Skin: no rashs/p Vertebral column resection,  steri strips + in place   Neuro: alert and oriented X 3  Central Line: PIVL CDI     LABS:                          8.5    5.61  )-----------( 339      ( 25 Mar 2024 07:10 )             26.8         Mean Cell Volume : 84.3 fl  Mean Cell Hemoglobin : 26.7 pg  Mean Cell Hemoglobin Concentration : 31.7 gm/dL  Auto Neutrophil # : 4.77 K/uL  Auto Lymphocyte # : 0.44 K/uL  Auto Monocyte # : 0.35 K/uL  Auto Eosinophil # : 0.00 K/uL  Auto Basophil # : 0.01 K/uL  Auto Neutrophil % : 85.1 %  Auto Lymphocyte % : 7.8 %  Auto Monocyte % : 6.2 %  Auto Eosinophil % : 0.0 %  Auto Basophil % : 0.2 %      03-25    135  |  102  |  19  ----------------------------<  111<H>  4.2   |  24  |  0.62    Ca    8.1<L>      25 Mar 2024 07:10  Phos  2.7     03-25  Mg     2.4     03-25    TPro  5.3<L>  /  Alb  2.6<L>  /  TBili  0.2  /  DBili  x   /  AST  62<H>  /  ALT  94<H>  /  AlkPhos  127<H>  03-25          PT/INR - ( 25 Mar 2024 07:10 )   PT: 11.6 sec;   INR: 1.06 ratio         PTT - ( 25 Mar 2024 07:10 )  PTT:27.4 sec                RECENT CULTURES:  03-23 @ 18:03  Clean Catch Clean Catch (Midstream)      <10,000 CFU/mL Normal Urogenital Mari  --  03-19 @ 18:30  Clean Catch Clean Catch (Midstream)  Escherichia coli ESBL  Escherichia coli ESBL  LUPE    >100,000 CFU/ml Escherichia coli ESBL  --  03-18 @ 20:10  .Blood Blood-Peripheral      No growth at 5 days  --  03-18 @ 20:05  .Blood Blood-Peripheral    No growth at 5 days  --    RADIOLOGY & ADDITIONAL STUDIES:  CT Abdomen and Pelvis No Cont (03.22.24 @ 21:51) >  L2-S1 laminectomy with posterior spinal fusion. Since 3/6/2024   comparison, interval removal drainage catheters and increased size of   paraspinal collection containing punctate foci of air that is difficult   to measure given lack of intravenous contrast. While this maybe expected   postsurgical change, consider contrast-enhanced MRI for further   evaluation if warranted clinically.  Acute, mild compression deformity of L1.  Small bilateral pleural effusions.  Cholelithiasis with mild biliary duct dilatation,similar to 3/4/2024.  Retroperitoneal lymphadenopathy is redemonstrated.  US Duplex Venous Lower Ext Complete, Bilateral (03.19.24 @ 13:58) >  IMPRESSION:  Acute deep venous thrombosis: below the knee.    Bilateral soleal vein thrombosis.

## 2024-03-26 NOTE — PROGRESS NOTE ADULT - ASSESSMENT
68-year-old male patient with past medical history of HLD, pre-Diabetes Mellitus, and sinus bradycardia who presented to [facility] on 3/5 with a history of progressively worsening lower extremity numbness/paresthesias and weakness with onset Jan 2024. 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 He underwent a two stage surgical resection with a combined surgical team including Neurosurgery, T8 VCR (vertebral column resection) T6-T10 fusion for tumor resection, L4 Partial Corpectomy, L2-Pelvis Fusion, Plastics Closure 3/5,  Surgical pathology reporting diffuse large B-cell lymphoma.  Patient was discharged to [facility] on 3/16/24, patient P/T [facility] for f/u at [facility] on 3/22,  advised admission to [facility] for treatment; MR Chop was started on 3/23. 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

## 2024-03-26 NOTE — PROGRESS NOTE ADULT - NS ATTEND AMEND GEN_ALL_CORE FT
.    Primary: [place]    Vital Signs Last 24 Hrs  T(C): 36.3 (26 Mar 2024 05:55), Max: 36.8 (25 Mar 2024 17:13)  T(F): 97.4 (26 Mar 2024 05:55), Max: 98.2 (25 Mar 2024 17:13)  HR: 64 (26 Mar 2024 05:55) (61 - 66)  BP: 135/76 (26 Mar 2024 05:55) (107/69 - 137/78)  BP(mean): --  RR: 17 (26 Mar 2024 05:55) (16 - 18)  SpO2: 97% (26 Mar 2024 05:55) (96% - 98%)    Parameters below as of 26 Mar 2024 05:55  Patient On (Oxygen Delivery Method): room air    MEDICATIONS  (STANDING):  allopurinol 300 milliGRAM(s) Oral daily  apixaban 5 milliGRAM(s) Oral two times a day  chlorhexidine 2% Cloths 1 Application(s) Topical daily  gabapentin 600 milliGRAM(s) Oral every 8 hours  HYDROmorphone   Tablet 2 milliGRAM(s) Oral every 6 hours  lactulose Syrup 10 Gram(s) Oral once  magnesium hydroxide Suspension 30 milliLiter(s) Oral daily  methocarbamol 750 milliGRAM(s) Oral every 8 hours  polyethylene glycol 3350 17 Gram(s) Oral two times a day  predniSONE   Tablet 100 milliGRAM(s) Oral every 24 hours  senna 2 Tablet(s) Oral at bedtime  sodium chloride 0.9%. 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 Course complicated by 1) spinal cord disease, 2) elevated LDH and 3) bilateral DVT.  Hospitalization complicated by mild transaminitis (active).    PMHx: prediabetes.    Plan:  Heme:   monitor for tumor lysis given elevated LDH  PLT goal > 50,000 given full anticoagulation  DOAC  Neulasta 3/27/24 in [facility].     ID: primary prophylaxis is not required    Nutrition: tolerating PO    Discharge pending on home rehab.  Patient has been discharged from inpatient [facility] rehab and by that center deemed fit for home.    We await communication with PT recommendations.     Over 35 minutes were spent in direct patient care.  W Re-admit 4/8/24 (Monday) for high dose MTX. .    Primary: [place]    Vital Signs Last 24 Hrs  T(C): 36.3 (26 Mar 2024 05:55), Max: 36.8 (25 Mar 2024 17:13)  T(F): 97.4 (26 Mar 2024 05:55), Max: 98.2 (25 Mar 2024 17:13)  HR: 64 (26 Mar 2024 05:55) (61 - 66)  BP: 135/76 (26 Mar 2024 05:55) (107/69 - 137/78)  BP(mean): --  RR: 17 (26 Mar 2024 05:55) (16 - 18)  SpO2: 97% (26 Mar 2024 05:55) (96% - 98%)    Parameters below as of 26 Mar 2024 05:55  Patient On (Oxygen Delivery Method): room air    MEDICATIONS  (STANDING):  allopurinol 300 milliGRAM(s) Oral daily  apixaban 5 milliGRAM(s) Oral two times a day  chlorhexidine 2% Cloths 1 Application(s) Topical daily  gabapentin 600 milliGRAM(s) Oral every 8 hours  HYDROmorphone   Tablet 2 milliGRAM(s) Oral every 6 hours  lactulose Syrup 10 Gram(s) Oral once  magnesium hydroxide Suspension 30 milliLiter(s) Oral daily  methocarbamol 750 milliGRAM(s) Oral every 8 hours  polyethylene glycol 3350 17 Gram(s) Oral two times a day  predniSONE   Tablet 100 milliGRAM(s) Oral every 24 hours  senna 2 Tablet(s) Oral at bedtime  sodium chloride 0.9%. 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 Course complicated by 1) spinal cord disease, 2) elevated LDH and 3) bilateral DVT.  Hospitalization complicated by mild transaminitis (active).    PMHx: prediabetes.    Plan:  Heme:   monitor for tumor lysis given elevated LDH  PLT goal > 50,000 given full anticoagulation  DOAC  Neulasta 3/27/24 in [facility].     ID: primary prophylaxis is not required    Nutrition: tolerating PO    Discharge pending on home rehab.  Patient has been discharged from inpatient [facility] rehab and by that center deemed fit for home.    We await communication with PT recommendations.     Over 35 minutes were spent in discharge management.  Re-admit 4/8/24 (Monday) for high dose MTX.

## 2024-03-26 NOTE — DISCHARGE NOTE NURSING/CASE MANAGEMENT/SOCIAL WORK - NSDCPEFALRISK_GEN_ALL_CORE
For information on Fall & Injury Prevention, visit: [email or web address] OR  [email or web address] OR  https://www.cdc.gov/steadi/patient.html

## 2024-03-26 NOTE — PROGRESS NOTE ADULT - PROBLEM SELECTOR PLAN 4
encourage ambulation/out of bed to chair.
3/24 - Grade 1 transaminitis, stable   avoid hepatotoxic agents.  Monitor LFTS daily
3/24 - Grade 1 transaminitis, stable   avoid hepatotoxic agents.  Monitor LFTS daily
encourage ambulation/out of bed to chair.

## 2024-03-26 NOTE — PROGRESS NOTE ADULT - PROBLEM SELECTOR PLAN 5
encourage ambulation/out of bed to chair.
3/24 - Grade 1 transaminitis, avoid hepatotoxic agents.
encourage ambulation/out of bed to chair.

## 2024-03-26 NOTE — PROGRESS NOTE ADULT - PROBLEM SELECTOR PROBLEM 1
DLBCL (diffuse large B cell lymphoma)

## 2024-03-27 ENCOUNTER — NON-APPOINTMENT (OUTPATIENT)
Age: 69
End: 2024-03-27

## 2024-03-27 ENCOUNTER — APPOINTMENT (OUTPATIENT)
Dept: INFUSION THERAPY | Facility: HOSPITAL | Age: 69
End: 2024-03-27

## 2024-03-27 LAB — G6PD RBC-CCNC: 17.1 U/G HGB — SIGNIFICANT CHANGE UP (ref 7–20.5)

## 2024-03-28 ENCOUNTER — APPOINTMENT (OUTPATIENT)
Dept: INTERNAL MEDICINE | Facility: CLINIC | Age: 69
End: 2024-03-28
Payer: MEDICARE

## 2024-03-28 VITALS
DIASTOLIC BLOOD PRESSURE: 66 MMHG | BODY MASS INDEX: 18.33 KG/M2 | TEMPERATURE: 98 F | RESPIRATION RATE: 16 BRPM | SYSTOLIC BLOOD PRESSURE: 103 MMHG | OXYGEN SATURATION: 96 % | WEIGHT: 110 LBS | HEART RATE: 73 BPM | HEIGHT: 65 IN

## 2024-03-28 DIAGNOSIS — Z51.89 ENCOUNTER FOR OTHER SPECIFIED AFTERCARE: ICD-10-CM

## 2024-03-28 DIAGNOSIS — D63.0 ANEMIA IN NEOPLASTIC DISEASE: ICD-10-CM

## 2024-03-28 DIAGNOSIS — Z09 ENCOUNTER FOR FOLLOW-UP EXAMINATION AFTER COMPLETED TREATMENT FOR CONDITIONS OTHER THAN MALIGNANT NEOPLASM: ICD-10-CM

## 2024-03-28 DIAGNOSIS — C83.38 DIFFUSE LARGE B-CELL LYMPHOMA, LYMPH NODES OF MULTIPLE SITES: ICD-10-CM

## 2024-03-28 PROCEDURE — 99496 TRANSJ CARE MGMT HIGH F2F 7D: CPT

## 2024-03-28 RX ORDER — GABAPENTIN 600 MG/1
600 TABLET, COATED ORAL
Refills: 0 | Status: ACTIVE | COMMUNITY

## 2024-03-28 RX ORDER — CYCLOPHOSPHAMIDE 200 MG/ML
500 INJECTION, SOLUTION INTRAVENOUS
Refills: 0 | Status: ACTIVE | COMMUNITY

## 2024-03-28 RX ORDER — RITUXIMAB 10 MG/ML
500 INJECTION, SOLUTION INTRAVENOUS
Refills: 0 | Status: ACTIVE | COMMUNITY

## 2024-03-28 RX ORDER — METHOTREXATE SODIUM 25 MG/ML
250 INJECTION, SOLUTION INTRA-ARTERIAL; INTRAMUSCULAR; INTRATHECAL; INTRAVENOUS
Refills: 0 | Status: ACTIVE | COMMUNITY

## 2024-03-28 RX ORDER — DOXORUBICIN HYDROCHLORIDE 2 MG/ML
50 INJECTION, POWDER, LYOPHILIZED, FOR SOLUTION INTRAVENOUS
Refills: 0 | Status: ACTIVE | COMMUNITY

## 2024-03-28 RX ORDER — TRAMADOL HYDROCHLORIDE 50 MG/1
50 TABLET, COATED ORAL
Refills: 0 | Status: ACTIVE | COMMUNITY

## 2024-03-28 NOTE — HISTORY OF PRESENT ILLNESS
[Post-hospitalization from ___ Hospital] : Post-hospitalization from [unfilled] Hospital [Admitted on: ___] : The patient was admitted on [unfilled] [Discharged on ___] : discharged on [unfilled] [Pertinent Labs] : pertinent labs [Radiology Findings] : radiology findings [Discharge Summary] : discharge summary [Discharge Med List] : discharge medication list [Med Reconciliation] : medication reconciliation has been completed [Patient Contacted By: ____] : and contacted by [unfilled] [[email or web address]] : 68 years old male presents for hospital follow up after discharge from [facility] on 03/26; he was admitted on 03/05 diagnosed with DLBCL with spine bone metastasis and cord compression, had surgery and started on chemotherapy CHOP. he today states feeling "ok" , pain mild on pain medication, has generalized weakness and occasional nausea. discharge summary reviewed, medication reconciliation done; scheduled to see oncologist on Monday April 1; to see neurosurgeon tomorrow.

## 2024-03-28 NOTE — PLAN
[FreeTextEntry1] : 1. hospital discharge follow up * discharge summary reviewed * medication reconciliation done 2. 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

## 2024-03-28 NOTE — PHYSICAL EXAM
[No Acute Distress] : no acute distress [Well Nourished] : well nourished [Well Developed] : well developed [Well-Appearing] : well-appearing [Normal Sclera/Conjunctiva] : normal sclera/conjunctiva [PERRL] : pupils equal round and reactive to light [EOMI] : extraocular movements intact [Normal Oropharynx] : the oropharynx was normal [Normal Outer Ear/Nose] : the outer ears and nose were normal in appearance [No JVD] : no jugular venous distention [Supple] : supple [No Lymphadenopathy] : no lymphadenopathy [Thyroid Normal, No Nodules] : the thyroid was normal and there were no nodules present [No Respiratory Distress] : no respiratory distress  [No Accessory Muscle Use] : no accessory muscle use [Clear to Auscultation] : lungs were clear to auscultation bilaterally [Normal Rate] : normal rate  [Normal S1, S2] : normal S1 and S2 [Regular Rhythm] : with a regular rhythm [No Carotid Bruits] : no carotid bruits [No Murmur] : no murmur heard [No Abdominal Bruit] : a ~M bruit was not heard ~T in the abdomen [No Varicosities] : no varicosities [No Edema] : there was no peripheral edema [Pedal Pulses Present] : the pedal pulses are present [No Palpable Aorta] : no palpable aorta [Soft] : abdomen soft [No Extremity Clubbing/Cyanosis] : no extremity clubbing/cyanosis [Non Tender] : non-tender [Non-distended] : non-distended [No HSM] : no HSM [No Masses] : no abdominal mass palpated [Normal Posterior Cervical Nodes] : no posterior cervical lymphadenopathy [Normal Bowel Sounds] : normal bowel sounds [Normal Anterior Cervical Nodes] : no anterior cervical lymphadenopathy [No CVA Tenderness] : no CVA  tenderness [No Joint Swelling] : no joint swelling [No Spinal Tenderness] : no spinal tenderness [Grossly Normal Strength/Tone] : grossly normal strength/tone [No Rash] : no rash [Coordination Grossly Intact] : coordination grossly intact [Normal Gait] : normal gait [No Focal Deficits] : no focal deficits [Deep Tendon Reflexes (DTR)] : deep tendon reflexes were 2+ and symmetric [Normal Affect] : the affect was normal [Normal Insight/Judgement] : insight and judgment were intact

## 2024-03-29 ENCOUNTER — NON-APPOINTMENT (OUTPATIENT)
Age: 69
End: 2024-03-29

## 2024-03-29 ENCOUNTER — APPOINTMENT (OUTPATIENT)
Dept: PLASTIC SURGERY | Facility: CLINIC | Age: 69
End: 2024-03-29
Payer: MEDICARE

## 2024-03-29 ENCOUNTER — APPOINTMENT (OUTPATIENT)
Dept: NEUROSURGERY | Facility: CLINIC | Age: 69
End: 2024-03-29
Payer: MEDICARE

## 2024-03-29 VITALS
HEIGHT: 65 IN | DIASTOLIC BLOOD PRESSURE: 58 MMHG | WEIGHT: 110 LBS | BODY MASS INDEX: 18.33 KG/M2 | OXYGEN SATURATION: 98 % | HEART RATE: 58 BPM | TEMPERATURE: 98 F | SYSTOLIC BLOOD PRESSURE: 93 MMHG

## 2024-03-29 VITALS
SYSTOLIC BLOOD PRESSURE: 98 MMHG | BODY MASS INDEX: 18.33 KG/M2 | DIASTOLIC BLOOD PRESSURE: 60 MMHG | TEMPERATURE: 98.2 F | HEIGHT: 65 IN | WEIGHT: 110 LBS | OXYGEN SATURATION: 98 % | HEART RATE: 65 BPM

## 2024-03-29 PROCEDURE — 99024 POSTOP FOLLOW-UP VISIT: CPT

## 2024-03-29 RX ORDER — TRAMADOL HYDROCHLORIDE 50 MG/1
50 TABLET, COATED ORAL
Qty: 24 | Refills: 0 | Status: ACTIVE | COMMUNITY
Start: 2024-03-28 | End: 1900-01-01

## 2024-03-29 NOTE — HISTORY OF PRESENT ILLNESS
[FreeTextEntry1] :  Pt is a 68 year male s/p back reconstruction with muscle flaps. DOS 3/5/24.  Pt doing well. He is undergoing chemotherapy. No complaints at this time. Denies fever, chills, pain, rash.

## 2024-03-29 NOTE — PHYSICAL EXAM
[de-identified] : Cervical back incision healed with some dry eschar, no drainage/bleeding noted, no sign of gross infection

## 2024-03-31 NOTE — PHYSICAL EXAM
[Longitudinal] : longitudinal [Clean] : clean [Intact] : intact [Healing Well] : healing well [No Drainage] : without drainage [Normal Skin] : normal [Oriented To Time, Place, And Person] : oriented to person, place, and time [Affect] : the affect was normal [Impaired Insight] : insight and judgment were intact [Person] : oriented to person [Place] : oriented to place [Time] : oriented to time [Short Term Intact] : short term memory intact [Remote Intact] : remote memory intact [Span Intact] : the attention span was normal [Concentration Intact] : normal concentrating ability [Fluency] : fluency intact [Comprehension] : comprehension intact [Current Events] : adequate knowledge of current events [Past History] : adequate knowledge of personal past history [Vocabulary] : adequate range of vocabulary [Cranial Nerves Oculomotor (III)] : extraocular motion intact [Cranial Nerves Optic (II)] : visual acuity intact bilaterally,  pupils equal round and reactive to light [Cranial Nerves Facial (VII)] : face symmetrical [Cranial Nerves Trigeminal (V)] : facial sensation intact symmetrically [Cranial Nerves Glossopharyngeal (IX)] : tongue and palate midline [Cranial Nerves Vestibulocochlear (VIII)] : hearing was intact bilaterally [Cranial Nerves Hypoglossal (XII)] : there was no tongue deviation with protrusion [Cranial Nerves Accessory (XI - Cranial And Spinal)] : head turning and shoulder shrug symmetric [Motor Tone] : muscle tone was normal in all four extremities [Motor Strength] : muscle strength was normal in all four extremities [No Muscle Atrophy] : normal bulk in all four extremities [Sensation Tactile Decrease] : light touch was intact [Abnormal Walk] : normal gait [Balance] : balance was intact [2+] : Patella left 2+ [Sclera] : the sclera and conjunctiva were normal [PERRL With Normal Accommodation] : pupils were equal in size, round, reactive to light, with normal accommodation [Extraocular Movements] : extraocular movements were intact [Outer Ear] : the ears and nose were normal in appearance [Oropharynx] : the oropharynx was normal [Neck Appearance] : the appearance of the neck was normal [Neck Cervical Mass (___cm)] : no neck mass was observed [Jugular Venous Distention Increased] : there was no jugular-venous distention [Thyroid Diffuse Enlargement] : the thyroid was not enlarged [Thyroid Nodule] : there were no palpable thyroid nodules [Auscultation Breath Sounds / Voice Sounds] : lungs were clear to auscultation bilaterally [Heart Rate And Rhythm] : heart rate was normal and rhythm regular [Heart Sounds] : normal S1 and S2 [Heart Sounds Gallop] : no gallops [Murmurs] : no murmurs [Heart Sounds Pericardial Friction Rub] : no pericardial rub [Full Pulse] : the pedal pulses are present [Edema] : there was no peripheral edema [Bowel Sounds] : normal bowel sounds [Abdomen Soft] : soft [Abdomen Tenderness] : non-tender [] : no hepato-splenomegaly [Abdomen Mass (___ Cm)] : no abdominal mass palpated [Tremor] : no tremor present [Past-pointing] : there was no past-pointing [Able to toe walk] : the patient was not able to toe walk [Able to heel walk] : the patient was not able to heel walk

## 2024-03-31 NOTE — ASSESSMENT
[FreeTextEntry1] : Impression: 68 years old male presents approximately 3 weeks s/p  Stage I: Posterior instrumented arthrodesis using allograft bone from T6 to T7 to T8 to T9 to T10 and Stage II: Posterior instrumented arthrodesis using allograft bone from L2 to L3 to L4 to L5 to S1 for multiple spine fractures. patient was admitted to [facility] ER with urinary retention and back pain. Imaging showed multiple pathological fractures with sever cord compression at T8.  He was discharged from [facility] on 03/26. Patient with a history of Diffuse large cell lymphoma. He is currently completing his first cycle of chemotherapy. This is scheduled to be completed on 4/18/2024. Patient has an oncology f/u on 4/1/24. He presents to for his POA accompanied by his daughter. He is ambulating with a walker. His posterior spine incision dressing was removed. Positive scabbing. No signs or symptoms of infection noted.  Discussion: A detailed discussion occurred regarding post operative rehabilitation and the need to continue f/u with  his oncologist.  Plan: -Xray thoraco/lumbar  -CT scan thoraco/lumbar in 3 months to evaluate the hardware -F/U in office in 3 months with new imaging

## 2024-03-31 NOTE — REASON FOR VISIT
[Family Member] : family member [de-identified] : 3/5/24 [de-identified] : T6-T10 posterior spinal fusion and L2-S1 posterior spinal fusion [de-identified] : 3 [de-identified] : 68 years old male presents approximately 3 weeks s/p  Stage I: Posterior instrumented arthrodesis using allograft bone from T6 to T7 to T8 to T9 to T10 and Stage II: Posterior instrumented arthrodesis using allograft bone from L2 to L3 to L4 to L5 to S1 for multiple spine fractures. patient was admitted to [facility] ER with urinary retention and back pain. Imaging showed multiple pathological fractures with sever cord compression at T8.  He was discharged from [facility] on 03/26.  Patient with a history of Diffuse large cell lymphoma. He is currently completing his first cycle of chemotherapy. This is scheduled to be completed on 4/18/2024. Patient has an oncology f/u on 4/1/24.  He presents to for his POA accompanied by his daughter. He is ambulating with a walker. His posterior spine incision dressing was removed. Positive scabbing. No signs or symptoms of infection noted.

## 2024-04-01 ENCOUNTER — RESULT REVIEW (OUTPATIENT)
Age: 69
End: 2024-04-01

## 2024-04-01 ENCOUNTER — OUTPATIENT (OUTPATIENT)
Dept: OUTPATIENT SERVICES | Facility: HOSPITAL | Age: 69
LOS: 1 days | End: 2024-04-01
Payer: MEDICARE

## 2024-04-01 ENCOUNTER — APPOINTMENT (OUTPATIENT)
Dept: HEMATOLOGY ONCOLOGY | Facility: CLINIC | Age: 69
End: 2024-04-01

## 2024-04-01 ENCOUNTER — APPOINTMENT (OUTPATIENT)
Dept: HEMATOLOGY ONCOLOGY | Facility: CLINIC | Age: 69
End: 2024-04-01
Payer: MEDICARE

## 2024-04-01 ENCOUNTER — APPOINTMENT (OUTPATIENT)
Dept: RADIOLOGY | Facility: IMAGING CENTER | Age: 69
End: 2024-04-01
Payer: MEDICARE

## 2024-04-01 VITALS
WEIGHT: 107.56 LBS | BODY MASS INDEX: 17.9 KG/M2 | OXYGEN SATURATION: 98 % | TEMPERATURE: 98.4 F | DIASTOLIC BLOOD PRESSURE: 66 MMHG | SYSTOLIC BLOOD PRESSURE: 111 MMHG | RESPIRATION RATE: 16 BRPM | HEART RATE: 74 BPM

## 2024-04-01 DIAGNOSIS — S22.009A UNSPECIFIED FRACTURE OF UNSPECIFIED THORACIC VERTEBRA, INITIAL ENCOUNTER FOR CLOSED FRACTURE: ICD-10-CM

## 2024-04-01 DIAGNOSIS — Z98.890 OTHER SPECIFIED POSTPROCEDURAL STATES: Chronic | ICD-10-CM

## 2024-04-01 DIAGNOSIS — Z90.49 ACQUIRED ABSENCE OF OTHER SPECIFIED PARTS OF DIGESTIVE TRACT: Chronic | ICD-10-CM

## 2024-04-01 LAB
BASOPHILS # BLD AUTO: 0.01 K/UL — SIGNIFICANT CHANGE UP (ref 0–0.2)
BASOPHILS NFR BLD AUTO: 1.3 % — SIGNIFICANT CHANGE UP (ref 0–2)
EOSINOPHIL # BLD AUTO: 0.03 K/UL — SIGNIFICANT CHANGE UP (ref 0–0.5)
EOSINOPHIL NFR BLD AUTO: 3.9 % — SIGNIFICANT CHANGE UP (ref 0–6)
HCT VFR BLD CALC: 28.9 % — LOW (ref 39–50)
HGB BLD-MCNC: 9.4 G/DL — LOW (ref 13–17)
IMM GRANULOCYTES NFR BLD AUTO: 1.3 % — HIGH (ref 0–0.9)
LYMPHOCYTES # BLD AUTO: 0.22 K/UL — LOW (ref 1–3.3)
LYMPHOCYTES # BLD AUTO: 28.6 % — SIGNIFICANT CHANGE UP (ref 13–44)
MCHC RBC-ENTMCNC: 26.9 PG — LOW (ref 27–34)
MCHC RBC-ENTMCNC: 32.5 G/DL — SIGNIFICANT CHANGE UP (ref 32–36)
MCV RBC AUTO: 82.6 FL — SIGNIFICANT CHANGE UP (ref 80–100)
MONOCYTES # BLD AUTO: 0.24 K/UL — SIGNIFICANT CHANGE UP (ref 0–0.9)
MONOCYTES NFR BLD AUTO: 31.2 % — HIGH (ref 2–14)
NEUTROPHILS # BLD AUTO: 0.26 K/UL — LOW (ref 1.8–7.4)
NEUTROPHILS NFR BLD AUTO: 33.7 % — LOW (ref 43–77)
NRBC # BLD: 0 /100 WBCS — SIGNIFICANT CHANGE UP (ref 0–0)
PLATELET # BLD AUTO: 174 K/UL — SIGNIFICANT CHANGE UP (ref 150–400)
RBC # BLD: 3.5 M/UL — LOW (ref 4.2–5.8)
RBC # FLD: 16.2 % — HIGH (ref 10.3–14.5)
WBC # BLD: 0.77 K/UL — CRITICAL LOW (ref 3.8–10.5)
WBC # FLD AUTO: 0.77 K/UL — CRITICAL LOW (ref 3.8–10.5)

## 2024-04-01 PROCEDURE — 72070 X-RAY EXAM THORAC SPINE 2VWS: CPT

## 2024-04-01 PROCEDURE — 72070 X-RAY EXAM THORAC SPINE 2VWS: CPT | Mod: 26

## 2024-04-01 PROCEDURE — G2212 PROLONG OUTPT/OFFICE VIS: CPT

## 2024-04-01 PROCEDURE — 72100 X-RAY EXAM L-S SPINE 2/3 VWS: CPT | Mod: 26

## 2024-04-01 PROCEDURE — 99215 OFFICE O/P EST HI 40 MIN: CPT

## 2024-04-01 PROCEDURE — 72100 X-RAY EXAM L-S SPINE 2/3 VWS: CPT

## 2024-04-01 PROCEDURE — G2211 COMPLEX E/M VISIT ADD ON: CPT

## 2024-04-01 RX ORDER — MORPHINE SULFATE 10 MG/1
10 CAPSULE, EXTENDED RELEASE ORAL
Qty: 60 | Refills: 0 | Status: DISCONTINUED | OUTPATIENT
Start: 2024-04-01 | End: 2024-04-01

## 2024-04-01 RX ORDER — OXYCODONE 9 MG/1
9 CAPSULE, EXTENDED RELEASE ORAL
Qty: 60 | Refills: 0 | Status: DISCONTINUED | COMMUNITY
Start: 2024-04-01 | End: 2024-04-01

## 2024-04-01 RX ORDER — OXYCODONE 5 MG/1
5 TABLET ORAL
Qty: 45 | Refills: 0 | Status: ACTIVE | COMMUNITY
Start: 2024-04-01

## 2024-04-01 RX ORDER — POLYETHYLENE GLYCOL 3350 17 G/17G
17 POWDER, FOR SOLUTION ORAL DAILY
Qty: 15 | Refills: 0 | Status: ACTIVE | COMMUNITY
Start: 2024-04-01 | End: 1900-01-01

## 2024-04-01 RX ORDER — MORPHINE SULFATE 15 MG/1
15 TABLET, FILM COATED, EXTENDED RELEASE ORAL
Qty: 60 | Refills: 0 | Status: DISCONTINUED | OUTPATIENT
Start: 2024-04-01 | End: 2024-04-01

## 2024-04-02 ENCOUNTER — APPOINTMENT (OUTPATIENT)
Dept: HEMATOLOGY ONCOLOGY | Facility: CLINIC | Age: 69
End: 2024-04-02
Payer: MEDICARE

## 2024-04-02 DIAGNOSIS — K59.00 CONSTIPATION, UNSPECIFIED: ICD-10-CM

## 2024-04-02 LAB
ALBUMIN SERPL ELPH-MCNC: 3.7 G/DL
ALP BLD-CCNC: 128 U/L
ALT SERPL-CCNC: 32 U/L
ANION GAP SERPL CALC-SCNC: 8 MMOL/L
AST SERPL-CCNC: 22 U/L
BILIRUB SERPL-MCNC: 0.4 MG/DL
BUN SERPL-MCNC: 21 MG/DL
CALCIUM SERPL-MCNC: 9.2 MG/DL
CHLORIDE SERPL-SCNC: 92 MMOL/L
CO2 SERPL-SCNC: 29 MMOL/L
CREAT SERPL-MCNC: 0.92 MG/DL
EGFR: 92 ML/MIN/1.73M2
GLUCOSE SERPL-MCNC: 131 MG/DL
POTASSIUM SERPL-SCNC: 4.5 MMOL/L
PROT SERPL-MCNC: 5.9 G/DL
SODIUM SERPL-SCNC: 130 MMOL/L

## 2024-04-02 PROCEDURE — 99212 OFFICE O/P EST SF 10 MIN: CPT

## 2024-04-02 NOTE — ASSESSMENT
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 Course complicated by 1) spinal cord disease, 2) elevated LDH and 3) bilateral DVT. Hospitalization complicated by mild transaminitis (active).  PMHx: prediabetes.  Plan: Heme:  PLT goal > 50,000 given full anticoagulation DOAC Admit day +14 for high dose MTX 3.5mg/mm (April 8th to avoid weekend admission). Planning six cycles of MR-CHOP    Pain: D/C methocarbamol tid, long-acting opiate bid, prn oxycodone.  GI: merolax  Hepatic: Bili/AST/ALT: WNL  Over 60 minutes were spent in direct patient care (1/25PM - 2:30PM); discussing pain management and hospitalization.  We will follow-up with Saturnino tomorrow AM.

## 2024-04-02 NOTE — ASSESSMENT
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 Course complicated by 1) spinal cord disease, 2) elevated LDH and 3) bilateral DVT. Hospitalization complicated by mild transaminitis (active).  PMHx: prediabetes.  Plan: Heme:  PLT goal > 50,000 given full anticoagulation DOAC Admit day +14 for high dose MTX 3.5mg/mm (April 8th to avoid weekend admission). Planning six cycles of MR-CHOP    Pain:  C/w Morphine Sul ER Q12 and Oxy for break through pain   GI:  Miralax 17g daily Start Lactulose daily and increase to TID if needed.   Hepatic: Bili/AST/ALT: WNL

## 2024-04-02 NOTE — HISTORY OF PRESENT ILLNESS
[de-identified] : Mr. Solis was last seen: in the hospital  [de-identified] : Reason for visit: DLBCL post hospitalization follow-up.  Since last discharge: has not defecated; pain 8/10  Saturnino does not spontaneously report: fever, chills, sweats, weight loss, skin rashes, petechiae, bruising, eye irritation, hearing loss, mouth sores, sore throat, cough, hemoptysis, pleuritic chest pain, dyspnea, change in vision, focal numbness/weakness, chest pains, palpitations, nausea, vomiting, diarrhea, dysuria, hematuria, bowel or bladder incontinence.  Medications:  CHOP  + Neulasta hydromorphine 2mg tid PRN  methocarbamol tid allopurinol 300mg daily apixaban 5mg bid  Neurontin 600mg tid   Examination:  Cheng is articulate and in no acute distress. No occiput, poster cervical, anterior cervical, submandibular, sublingual, submental, supraclavicular nor axillary adenopathy left paraspinal mass: non-tender Lungs: Clear Cardiac: without rubs Abd: soft and non-tender, Traube's space is tympanitic No inguinal nor femoral adenopathy No sig peripheral edema R thigh flextion causes back pain absent right knee jerk, 1- left knee jerk + anal wink Gait: using walker secondary to pain.

## 2024-04-02 NOTE — HISTORY OF PRESENT ILLNESS
[de-identified] : Mr. Solis was last seen: 4/1/24  [de-identified] : Reason for visit: F/u pain management  Since last visit: He reports he woke up at 3AM w/ NRS 8/10 pain took an Oxy and pain was better. He took MS Contin 6AM and was able to walk around for a bit. In comparison to the last few days pain is much more manageable.  He took Mirlax lastnight and still has not had a BM. Appetite is good was able to eat 100% of his breakfast.   Saturnino does not spontaneously report: fever, chills, sweats, weight loss, skin rashes, petechiae, bruising, eye irritation, hearing loss, mouth sores, sore throat, cough, hemoptysis, pleuritic chest pain, dyspnea, change in vision, focal numbness/weakness, chest pains, palpitations, nausea, vomiting, diarrhea, dysuria, hematuria, bowel or bladder incontinence.  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 No occiput, poster cervical, anterior cervical, submandibular, sublingual, submental, supraclavicular nor axillary adenopathy left paraspinal mass: non-tender Lungs: Clear Cardiac: without rubs Abd: soft and non-tender, Traube's space is tympanitic No inguinal nor femoral adenopathy No sig peripheral edema R thigh flextion causes back pain absent right knee jerk, 1- left knee jerk + anal wink Gait: using walker secondary to pain.

## 2024-04-08 ENCOUNTER — TRANSCRIPTION ENCOUNTER (OUTPATIENT)
Age: 69
End: 2024-04-08

## 2024-04-08 ENCOUNTER — INPATIENT (INPATIENT)
Facility: HOSPITAL | Age: 69
LOS: 1 days | Discharge: HOME HEALTH | DRG: 847 | End: 2024-04-10
Attending: INTERNAL MEDICINE | Admitting: INTERNAL MEDICINE
Payer: MEDICARE

## 2024-04-08 VITALS
OXYGEN SATURATION: 96 % | HEIGHT: 61.02 IN | RESPIRATION RATE: 18 BRPM | SYSTOLIC BLOOD PRESSURE: 121 MMHG | HEART RATE: 75 BPM | WEIGHT: 110.23 LBS | DIASTOLIC BLOOD PRESSURE: 76 MMHG | TEMPERATURE: 99 F

## 2024-04-08 DIAGNOSIS — I82.409 ACUTE EMBOLISM AND THROMBOSIS OF UNSPECIFIED DEEP VEINS OF UNSPECIFIED LOWER EXTREMITY: ICD-10-CM

## 2024-04-08 DIAGNOSIS — C83.30 DIFFUSE LARGE B-CELL LYMPHOMA, UNSPECIFIED SITE: ICD-10-CM

## 2024-04-08 DIAGNOSIS — Z90.49 ACQUIRED ABSENCE OF OTHER SPECIFIED PARTS OF DIGESTIVE TRACT: Chronic | ICD-10-CM

## 2024-04-08 DIAGNOSIS — B99.9 UNSPECIFIED INFECTIOUS DISEASE: ICD-10-CM

## 2024-04-08 DIAGNOSIS — C83.38 DIFFUSE LARGE B-CELL LYMPHOMA, LYMPH NODES OF MULTIPLE SITES: ICD-10-CM

## 2024-04-08 DIAGNOSIS — Z29.9 ENCOUNTER FOR PROPHYLACTIC MEASURES, UNSPECIFIED: ICD-10-CM

## 2024-04-08 DIAGNOSIS — G62.9 POLYNEUROPATHY, UNSPECIFIED: ICD-10-CM

## 2024-04-08 DIAGNOSIS — Z98.890 OTHER SPECIFIED POSTPROCEDURAL STATES: Chronic | ICD-10-CM

## 2024-04-08 LAB
ALBUMIN SERPL ELPH-MCNC: 3.8 G/DL — SIGNIFICANT CHANGE UP (ref 3.3–5)
ALP SERPL-CCNC: 147 U/L — HIGH (ref 40–120)
ALT FLD-CCNC: 21 U/L — SIGNIFICANT CHANGE UP (ref 10–45)
ANION GAP SERPL CALC-SCNC: 11 MMOL/L — SIGNIFICANT CHANGE UP (ref 5–17)
APTT BLD: 37 SEC — HIGH (ref 24.5–35.6)
AST SERPL-CCNC: 22 U/L — SIGNIFICANT CHANGE UP (ref 10–40)
BASOPHILS # BLD AUTO: 0.14 K/UL — SIGNIFICANT CHANGE UP (ref 0–0.2)
BASOPHILS NFR BLD AUTO: 0.9 % — SIGNIFICANT CHANGE UP (ref 0–2)
BILIRUB SERPL-MCNC: 0.2 MG/DL — SIGNIFICANT CHANGE UP (ref 0.2–1.2)
BLD GP AB SCN SERPL QL: NEGATIVE — SIGNIFICANT CHANGE UP
BUN SERPL-MCNC: 16 MG/DL — SIGNIFICANT CHANGE UP (ref 7–23)
CALCIUM SERPL-MCNC: 9.2 MG/DL — SIGNIFICANT CHANGE UP (ref 8.4–10.5)
CHLORIDE SERPL-SCNC: 98 MMOL/L — SIGNIFICANT CHANGE UP (ref 96–108)
CO2 SERPL-SCNC: 29 MMOL/L — SIGNIFICANT CHANGE UP (ref 22–31)
CREAT SERPL-MCNC: 0.55 MG/DL — SIGNIFICANT CHANGE UP (ref 0.5–1.3)
EGFR: 109 ML/MIN/1.73M2 — SIGNIFICANT CHANGE UP
EOSINOPHIL # BLD AUTO: 0 K/UL — SIGNIFICANT CHANGE UP (ref 0–0.5)
EOSINOPHIL NFR BLD AUTO: 0 % — SIGNIFICANT CHANGE UP (ref 0–6)
FIBRINOGEN PPP-MCNC: 570 MG/DL — HIGH (ref 200–445)
GIANT PLATELETS BLD QL SMEAR: PRESENT — SIGNIFICANT CHANGE UP
GLUCOSE SERPL-MCNC: 95 MG/DL — SIGNIFICANT CHANGE UP (ref 70–99)
HCT VFR BLD CALC: 33.4 % — LOW (ref 39–50)
HGB BLD-MCNC: 10.5 G/DL — LOW (ref 13–17)
INR BLD: 1.32 RATIO — HIGH (ref 0.85–1.18)
LDH SERPL L TO P-CCNC: 439 U/L — HIGH (ref 50–242)
LYMPHOCYTES # BLD AUTO: 0.27 K/UL — LOW (ref 1–3.3)
LYMPHOCYTES # BLD AUTO: 1.7 % — LOW (ref 13–44)
MAGNESIUM SERPL-MCNC: 2.3 MG/DL — SIGNIFICANT CHANGE UP (ref 1.6–2.6)
MANUAL SMEAR VERIFICATION: SIGNIFICANT CHANGE UP
MCHC RBC-ENTMCNC: 26.8 PG — LOW (ref 27–34)
MCHC RBC-ENTMCNC: 31.4 GM/DL — LOW (ref 32–36)
MCV RBC AUTO: 85.2 FL — SIGNIFICANT CHANGE UP (ref 80–100)
METAMYELOCYTES # FLD: 2.6 % — HIGH (ref 0–0)
MONOCYTES # BLD AUTO: 0.55 K/UL — SIGNIFICANT CHANGE UP (ref 0–0.9)
MONOCYTES NFR BLD AUTO: 3.5 % — SIGNIFICANT CHANGE UP (ref 2–14)
MYELOCYTES NFR BLD: 0.9 % — HIGH (ref 0–0)
NEUTROPHILS # BLD AUTO: 14.09 K/UL — HIGH (ref 1.8–7.4)
NEUTROPHILS NFR BLD AUTO: 78.1 % — HIGH (ref 43–77)
NEUTS BAND # BLD: 11.4 % — HIGH (ref 0–8)
PH UR: 7.5 — SIGNIFICANT CHANGE UP (ref 5–8)
PH UR: 8.5 (ref 5–8)
PHOSPHATE SERPL-MCNC: 4 MG/DL — SIGNIFICANT CHANGE UP (ref 2.5–4.5)
PLAT MORPH BLD: ABNORMAL
PLATELET # BLD AUTO: 211 K/UL — SIGNIFICANT CHANGE UP (ref 150–400)
POTASSIUM SERPL-MCNC: 3.9 MMOL/L — SIGNIFICANT CHANGE UP (ref 3.5–5.3)
POTASSIUM SERPL-SCNC: 3.9 MMOL/L — SIGNIFICANT CHANGE UP (ref 3.5–5.3)
PROMYELOCYTES # FLD: 0.9 % — HIGH (ref 0–0)
PROT SERPL-MCNC: 6.5 G/DL — SIGNIFICANT CHANGE UP (ref 6–8.3)
PROTHROM AB SERPL-ACNC: 13.7 SEC — HIGH (ref 9.5–13)
RBC # BLD: 3.92 M/UL — LOW (ref 4.2–5.8)
RBC # FLD: 17.6 % — HIGH (ref 10.3–14.5)
RBC BLD AUTO: SIGNIFICANT CHANGE UP
RH IG SCN BLD-IMP: POSITIVE — SIGNIFICANT CHANGE UP
SARS-COV-2 RNA SPEC QL NAA+PROBE: SIGNIFICANT CHANGE UP
SODIUM SERPL-SCNC: 138 MMOL/L — SIGNIFICANT CHANGE UP (ref 135–145)
TOXIC GRANULES BLD QL SMEAR: PRESENT — SIGNIFICANT CHANGE UP
URATE SERPL-MCNC: 6.1 MG/DL — SIGNIFICANT CHANGE UP (ref 3.4–8.8)
WBC # BLD: 15.74 K/UL — HIGH (ref 3.8–10.5)
WBC # FLD AUTO: 15.74 K/UL — HIGH (ref 3.8–10.5)

## 2024-04-08 PROCEDURE — 99221 1ST HOSP IP/OBS SF/LOW 40: CPT | Mod: FS

## 2024-04-08 RX ORDER — LEUCOVORIN CALCIUM 5 MG
1 TABLET ORAL
Qty: 60 | Refills: 0
Start: 2024-04-08 | End: 2024-04-22

## 2024-04-08 RX ORDER — METOCLOPRAMIDE HCL 10 MG
10 TABLET ORAL EVERY 6 HOURS
Refills: 0 | Status: DISCONTINUED | OUTPATIENT
Start: 2024-04-08 | End: 2024-04-10

## 2024-04-08 RX ORDER — LACTULOSE 10 G/15ML
10 SOLUTION ORAL DAILY
Refills: 0 | Status: DISCONTINUED | OUTPATIENT
Start: 2024-04-08 | End: 2024-04-10

## 2024-04-08 RX ORDER — OXYCODONE HYDROCHLORIDE 5 MG/1
5 TABLET ORAL EVERY 4 HOURS
Refills: 0 | Status: DISCONTINUED | OUTPATIENT
Start: 2024-04-08 | End: 2024-04-10

## 2024-04-08 RX ORDER — MORPHINE SULFATE 50 MG/1
15 CAPSULE, EXTENDED RELEASE ORAL
Refills: 0 | Status: DISCONTINUED | OUTPATIENT
Start: 2024-04-08 | End: 2024-04-10

## 2024-04-08 RX ORDER — SENNA PLUS 8.6 MG/1
2 TABLET ORAL
Qty: 0 | Refills: 0 | DISCHARGE

## 2024-04-08 RX ORDER — ONDANSETRON 8 MG/1
1 TABLET, FILM COATED ORAL
Qty: 30 | Refills: 0
Start: 2024-04-08 | End: 2024-04-17

## 2024-04-08 RX ORDER — METOCLOPRAMIDE HCL 10 MG
1 TABLET ORAL
Qty: 40 | Refills: 0
Start: 2024-04-08 | End: 2024-04-17

## 2024-04-08 RX ORDER — GABAPENTIN 400 MG/1
600 CAPSULE ORAL EVERY 8 HOURS
Refills: 0 | Status: DISCONTINUED | OUTPATIENT
Start: 2024-04-08 | End: 2024-04-10

## 2024-04-08 RX ORDER — SODIUM CHLORIDE 9 MG/ML
1000 INJECTION, SOLUTION INTRAVENOUS
Refills: 0 | Status: DISCONTINUED | OUTPATIENT
Start: 2024-04-08 | End: 2024-04-10

## 2024-04-08 RX ORDER — METHOTREXATE 2.5 MG/1
5145 TABLET ORAL ONCE
Refills: 0 | Status: COMPLETED | OUTPATIENT
Start: 2024-04-08 | End: 2024-04-08

## 2024-04-08 RX ORDER — ONDANSETRON 8 MG/1
16 TABLET, FILM COATED ORAL EVERY 24 HOURS
Refills: 0 | Status: COMPLETED | OUTPATIENT
Start: 2024-04-08 | End: 2024-04-09

## 2024-04-08 RX ORDER — SODIUM CHLORIDE 9 MG/ML
1000 INJECTION INTRAMUSCULAR; INTRAVENOUS; SUBCUTANEOUS
Refills: 0 | Status: DISCONTINUED | OUTPATIENT
Start: 2024-04-08 | End: 2024-04-08

## 2024-04-08 RX ORDER — APIXABAN 2.5 MG/1
5 TABLET, FILM COATED ORAL EVERY 12 HOURS
Refills: 0 | Status: DISCONTINUED | OUTPATIENT
Start: 2024-04-08 | End: 2024-04-10

## 2024-04-08 RX ADMIN — APIXABAN 5 MILLIGRAM(S): 2.5 TABLET, FILM COATED ORAL at 16:59

## 2024-04-08 RX ADMIN — OXYCODONE HYDROCHLORIDE 5 MILLIGRAM(S): 5 TABLET ORAL at 18:58

## 2024-04-08 RX ADMIN — MORPHINE SULFATE 15 MILLIGRAM(S): 50 CAPSULE, EXTENDED RELEASE ORAL at 18:23

## 2024-04-08 RX ADMIN — OXYCODONE HYDROCHLORIDE 5 MILLIGRAM(S): 5 TABLET ORAL at 18:29

## 2024-04-08 RX ADMIN — ONDANSETRON 116 MILLIGRAM(S): 8 TABLET, FILM COATED ORAL at 13:56

## 2024-04-08 RX ADMIN — GABAPENTIN 600 MILLIGRAM(S): 400 CAPSULE ORAL at 22:03

## 2024-04-08 RX ADMIN — METHOTREXATE 5145 MILLIGRAM(S): 2.5 TABLET ORAL at 15:24

## 2024-04-08 RX ADMIN — GABAPENTIN 600 MILLIGRAM(S): 400 CAPSULE ORAL at 13:41

## 2024-04-08 RX ADMIN — SODIUM CHLORIDE 150 MILLILITER(S): 9 INJECTION, SOLUTION INTRAVENOUS at 09:10

## 2024-04-08 RX ADMIN — MORPHINE SULFATE 15 MILLIGRAM(S): 50 CAPSULE, EXTENDED RELEASE ORAL at 16:59

## 2024-04-08 NOTE — H&P ADULT - PROBLEM SELECTOR PLAN 2
Not neutropenic, if febrile pan culture, f/u cx results.  4/8- F/U COVID PCR Not neutropenic, if febrile pan culture, f/u cx results.  4/8- COVID PCR (-).

## 2024-04-08 NOTE — H&P ADULT - ASSESSMENT
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

## 2024-04-08 NOTE — H&P ADULT - HISTORY OF PRESENT ILLNESS
8-year-old male patient with past medical history of HLD, pre-Diabetes Mellitus,B/L LE DVT on eliquis,  DLBCL (diffuse large B cell lymphoma)/ CD10+ DLBCL, positive for BCL-2 rearrangement, negative for MYC rearrangement receiving cycle 1 MR-CHOP (3/23 rituximab and 3/24 CHOP) now admitted for day 14  HD MTX.               Patient presented to [facility] on 3/5 with a history of progressively worsening lower extremity numbness/paresthesias and weakness with onset Jan 2024. He underwent x-rays & MRI imaging as outpatient on 2/29/24 showing concern for osseous metastatic disease in thoracic/lumbar/sacral spine as well as extension of soft tissue into the paravertebral soft tissues more prominent on the right side. He was  ambulating independently without device but required a  RW. Repeat MRI showed multiple findings including diffuse osseous metastatic disease throughout the entire spine; pathologic fx w/ tumor into the epidural space at T8 resulting in thoracic cord compression; tumor extension into the epidural space at L3 resulting in cauda equina compression; pathologic fx w/ tumor in the epidural space at L4 contributing to severe canal stenosis; tumor within the bilateral psoas muscles at L3 and L4; bilateral neural foramen effacement by tumor in L-spine. He underwent a two stage surgical resection with a combined surgical team including Neurosurgery (Dr. Bhaskar Partida) and Plastic Surgery (Dr. Gunnar Campbell). He underwent the following procedures:       Stage 1: T8 VCR (vertebral column resection) T6-T10 fusion for tumor resection, small csf leak primarily repaired     ·Stage 2: L4 Partial Corpectomy, L2-Pelvis Fusion, Plastics Closure 3/5 HMV drain x3 and 1 Bile bag placed w/ output closely monitored.     Patient was discharged to [facility] on 3/16/24, patient P/T [facility] for f/u at [facility] on 3/22,  advised admission to [facility] for treatment; MR Chop was started on 3/23.       Started cycle 1 MR-CHOP on 3/23/24  8-year-old male patient with past medical history of HLD, pre-Diabetes Mellitus,B/L LE DVT on eliquis,  DLBCL (diffuse large B cell lymphoma)/ CD10+ DLBCL, positive for BCL-2 rearrangement, negative for MYC rearrangement receiving cycle 1 MR-CHOP (3/23 rituximab and 3/24 CHOP) now admitted for day 14  HD MTX.      Patient presented to [facility] on 3/5 with a history of progressively worsening lower extremity numbness/paresthesias and weakness with onset Jan 2024. He underwent x-rays & MRI imaging as outpatient on 2/29/24 showing concern for osseous metastatic disease in thoracic/lumbar/sacral spine as well as extension of soft tissue into the paravertebral soft tissues more prominent on the right side. He was  ambulating independently without device but required a  RW. Repeat MRI showed multiple findings including diffuse osseous metastatic disease throughout the entire spine; pathologic fx w/ tumor into the epidural space at T8 resulting in thoracic cord compression; tumor extension into the epidural space at L3 resulting in cauda equina compression; pathologic fx w/ tumor in the epidural space at L4 contributing to severe canal stenosis; tumor within the bilateral psoas muscles at L3 and L4; bilateral neural foramen effacement by tumor in L-spine. He underwent a two stage surgical resection with a combined surgical team including Neurosurgery (Dr. Bhaskar Partida) and Plastic Surgery (Dr. Gunnar Campbell). He underwent the following procedures:    Stage 1: T8 VCR (vertebral column resection) T6-T10 fusion for tumor resection, small csf leak primarily repaired , Stage 2: L4 Partial Corpectomy, L2-Pelvis Fusion, Plastics Closure 3/5 HMV drain x3 and 1 Bile bag placed w/ output closely monitored. Patient was discharged to [facility] on 3/16/24, patient P/T [facility] for f/u at [facility] on 3/22,  advised admission to [facility] for treatment; MR Chop was started on 3/23.     Started cycle 1 MR-CHOP on 3/23/24

## 2024-04-08 NOTE — DISCHARGE NOTE PROVIDER - HOSPITAL COURSE
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 68-year-old male patient with past medical history of HLD, pre-Diabetes Mellitus,B/L LE DVT on Eliquis,  DLBCL (diffuse large B cell lymphoma)/ CD10+ DLBCL, positive for BCL-2 rearrangement, negative for MYC rearrangement receiving cycle 1 MR-CHOP (3/23 rituximab and 3/24 CHOP) now admitted for day 14  HD MTX.   Methotrexate was started after hydrating with D5W with NaHCo3, monitor Urine pH, maintained >7.5, monitored MTX level Q 24 hrs after completion of MTX, Leucovorin was started 24 hrs post MTX, evaluated by physical therapy 67 yo male Stage IV DLBCL, GCB subtype w/ spinal involvement (multiple pathological fractures and cord compressions) s/p two stage neurosurgical resection and plastics closure, currently on cycle 1 MR-CHOP (rituximab on 3/23, CHOP on 3/24) now admitted for Day 14 treatment of IV HD Methotrexate (3.5g/m2). PMH includes HLD, pre-DM, and b/le LE DVT's on Eliquis.    Methotrexate was started after hydrating with D5W with NaHCo3, monitor Urine pH, maintained >7.5, monitored MTX level Q 24 hrs after completion of MTX, Leucovorin was started 24 hrs post MTX, evaluated by physical therapy .    Day 16 of course, MTX level = 0.16. Patient stable for discharge. will follow up at [facility] for next cycle of MR-CHOP

## 2024-04-08 NOTE — DISCHARGE NOTE NURSING/CASE MANAGEMENT/SOCIAL WORK - PATIENT PORTAL LINK FT
You can access the FollowMyHealth Patient Portal offered by [facility] by registering at the following website: [email or web address]. By joining "[employer]’s FollowMyHealth portal, you will also be able to view your health information using other applications (apps) compatible with our system.

## 2024-04-08 NOTE — DISCHARGE NOTE PROVIDER - NSDCFUSCHEDAPPT_GEN_ALL_CORE_FT
[facility]  Scheduled Appointment: 04/15/2024    [facility]  Scheduled Appointment: 04/15/2024    Patricio Bautista  [facility]  [facility] [address]  Scheduled Appointment: 04/24/2024    Patricio Bautista  [facility]  [facility] [address]  Scheduled Appointment: 05/29/2024

## 2024-04-08 NOTE — DISCHARGE NOTE PROVIDER - NSDCFUADDINST_GEN_ALL_CORE_FT
You have an appointment at [facility] on Monday 4/15/24 at 7:50am for labwork then stay for 8:20am appt for chemo treatment.

## 2024-04-08 NOTE — H&P ADULT - PROBLEM SELECTOR PLAN 1
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  Monitor CBC with Diff, transfuse as needed.  Monitor electrolytes, replete as needed.  Daily weight.  Strict I/O.  Urine alkalization with D5W with 150 meq NaHCo3@ 150 cc/hr  Monitor urine pH prior to initiation of MTX, >7.5 then Q 6 hrs.  Methotrexate 3500 mg/m2= 5154mg IV over 3 hrs when urine pH >7.5.  Leucovorin 25 mg IVSS q 6 hrs after start of MTX and continue until MTX <0.06um  Premeds: Zofran 16 mg IV daily x2 day  Reglan 10 mg IV Q 6hrs PRN n/V  Monitor MTx level 24 hrs after start of MTX then daily.

## 2024-04-08 NOTE — PATIENT PROFILE ADULT - FALL HARM RISK - HARM RISK INTERVENTIONS

## 2024-04-08 NOTE — H&P ADULT - PROBLEM SELECTOR PLAN 3
On 3/19- BLE doppler shows acute DVT below knee, didn’t treat while in [place], Eliquis started on 3/23,   Continue  Eliquis

## 2024-04-08 NOTE — PHYSICAL THERAPY INITIAL EVALUATION ADULT - SITTING BALANCE: STATIC
Pt reports having a good appetite and PO intake PTA; consuming >75% of most meals. Follows regular diet. Pt denies any known food allergies or intolerances. Micronutrient/Other supplementation: vitamin C, Protein-energy supplementation: none. Denies any difficulty chewing/swallowing at this time. 
good balance

## 2024-04-08 NOTE — H&P ADULT - PROBLEM SELECTOR PLAN 5
Continue Eliquis, encourage ambulation.  PT- consult- patient fall risk, unsteady gait PT refereal.  Lactulose PRN for constipation.

## 2024-04-08 NOTE — DISCHARGE NOTE NURSING/CASE MANAGEMENT/SOCIAL WORK - NSDCPEFALRISK_GEN_ALL_CORE
History & Physical Examination    Patient Name: Candy Virk  MRN: [id number]  CSN: [id number]  YOB: 1977    Diagnosis: L. UPJ obstruction    Present Illness:  L. UPJ obstruction      No prescriptions prior to admission.     No current fac For information on Fall & Injury Prevention, visit: [email or web address] OR  [email or web address] OR  https://www.cdc.gov/steadi/patient.html

## 2024-04-08 NOTE — DISCHARGE NOTE PROVIDER - NSDCMRMEDTOKEN_GEN_ALL_CORE_FT
apixaban 5 mg oral tablet: 1 tab(s) orally 2 times a day  lactulose 10 g/15 mL oral syrup: 15 milliliter(s) orally once a day As needed for constipation.  leucovorin 10 mg oral tablet: 1 tab(s) orally every 6 hours MDD: 4  MS Contin 15 mg oral tablet, extended release: 1 tab(s) orally  MS Contin 15 mg/12 hr oral tablet, extended release: 1 tab(s) orally 2 times a day  naloxone 3 mg/0.1 mL nasal spray: 1 spray(s) intranasally every 8 hours as needed for narcotic overdose  Neurontin 600 mg oral tablet: 1 tab(s) orally every 8 hours  oxyCODONE 5 mg oral tablet: 1 tab(s) orally every 4 hours as needed for Severe back pain.  Transport Wheelchair: DLBCL, Pt will require a transport Patient will require a transport wheelchair due to generalized weakness secondary to deconditioning.   apixaban 5 mg oral tablet: 1 tab(s) orally 2 times a day  lactulose 10 g/15 mL oral syrup: 15 milliliter(s) orally once a day As needed for constipation.  leucovorin 10 mg oral tablet: 1 tab(s) orally every 6 hours MDD: 4  MS Contin 15 mg oral tablet, extended release: 1 tab(s) orally  MS Contin 15 mg/12 hr oral tablet, extended release: 1 tab(s) orally 2 times a day  naloxone 3 mg/0.1 mL nasal spray: 1 spray(s) intranasally every 8 hours as needed for narcotic overdose  Neurontin 600 mg oral tablet: 1 tab(s) orally every 8 hours  ondansetron 8 mg oral tablet: 1 tab(s) orally every 8 hours as needed for  nausea MDD: 3  oxyCODONE 5 mg oral tablet: 1 tab(s) orally every 4 hours as needed for Severe back pain.  Reglan 10 mg oral tablet: 1 tab(s) orally every 6 hours as needed for  nausea MDD: 4  Transport Wheelchair: DLBCL, Pt will require a transport Patient will require a transport wheelchair due to generalized weakness secondary to deconditioning.   acetaminophen 325 mg oral tablet: 2 tab(s) orally every 6 hours as needed for Mild Pain (1 - 3)  apixaban 5 mg oral tablet: 1 tab(s) orally 2 times a day  fluticasone 50 mcg/inh nasal spray: 1 spray(s) nasal 2 times a day  lactulose 10 g/15 mL oral syrup: 15 milliliter(s) orally once a day As needed for constipation.  leucovorin 10 mg oral tablet: 1 tab(s) orally every 6 hours - please continue for 3 days after discharge MDD: 4  MS Contin 15 mg/12 hr oral tablet, extended release: 1 tab(s) orally 2 times a day  naloxone 3 mg/0.1 mL nasal spray: 1 spray(s) intranasally every 8 hours as needed for narcotic overdose  Neurontin 600 mg oral tablet: 1 tab(s) orally every 8 hours  ondansetron 8 mg oral tablet: 1 tab(s) orally every 8 hours as needed for  nausea MDD: 3  oxyCODONE 5 mg oral tablet: 1 tab(s) orally every 4 hours as needed for Severe back pain.  Reglan 10 mg oral tablet: 1 tab(s) orally every 6 hours as needed for  nausea MDD: 4  Transport Wheelchair: DLBCL, Pt will require a transport Patient will require a transport wheelchair due to generalized weakness secondary to deconditioning.

## 2024-04-08 NOTE — DISCHARGE NOTE PROVIDER - NSDCFUADDAPPT_GEN_ALL_CORE_FT
You have an appointment with ______.  You have an appointment at [facility] on Monday 4/15/24 at 7:50am for labwork then stay for 8:20am appt for chemo treatment.

## 2024-04-08 NOTE — DISCHARGE NOTE PROVIDER - NSDCCPCAREPLAN_GEN_ALL_CORE_FT
PRINCIPAL DISCHARGE DIAGNOSIS  Diagnosis: DLBCL (diffuse large B cell lymphoma)  Assessment and Plan of Treatment: maintain counts, remain free from infection . Notify MD and report to ER for any temperature greater than or equal to 100.4 degrees, intractable nausea, vomiting, diarrhea, or uncontrolled bleeding.        SECONDARY DISCHARGE DIAGNOSES  Diagnosis: DLBCL (diffuse large B cell lymphoma)  Assessment and Plan of Treatment:

## 2024-04-08 NOTE — ADVANCED PRACTICE NURSE CONSULT - ASSESSMENT
Pt admitted today for chemotherapy, a&ox4.  Right 20G PIV placed today with positive blood return and flushing well. Site intact, No s/s of bleeding, swelling or redness. Labs reviewed by Dr. Lara on rounds. Urine PH 8.5, ok to start treatment. Chemotherapy teaching provided, pt verbalized understanding. 2 certified RN verification done. Pt premedicated with Zofran 16 mg IVPB. At 1527 Methotrexate 3.5 gm/m2= 5145mg Iv started, infusing over 3 hrs via Right 20G PIV through alaris pump, Pt tolerating well. primary RN aware of plan of care. Safety maintained.

## 2024-04-08 NOTE — PHYSICAL THERAPY INITIAL EVALUATION ADULT - PLANNED THERAPY INTERVENTIONS, PT EVAL
stair negotiation GOAL: Patient will negotiate up / down 1 flight of stairs with supervision in 2 weeks/balance training/bed mobility training/gait training/transfer training

## 2024-04-08 NOTE — H&P ADULT - NSHPLABSRESULTS_GEN_ALL_CORE
LABS:                            10.5   15.74 )-----------( 211      ( 08 Apr 2024 09:30 )             33.4         Mean Cell Volume : 85.2 fl  Mean Cell Hemoglobin : 26.8 pg  Mean Cell Hemoglobin Concentration : 31.4 gm/dL  Auto Neutrophil # : 14.09 K/uL  Auto Lymphocyte # : 0.27 K/uL  Auto Monocyte # : 0.55 K/uL  Auto Eosinophil # : 0.00 K/uL  Auto Basophil # : 0.14 K/uL  Auto Neutrophil % : 78.1 %  Auto Lymphocyte % : 1.7 %  Auto Monocyte % : 3.5 %  Auto Eosinophil % : 0.0 %  Auto Basophil % : 0.9 %      04-08    138  |  98  |  16  ----------------------------<  95  3.9   |  29  |  0.55    Ca    9.2      08 Apr 2024 09:30  Phos  4.0     04-08  Mg     2.3     04-08    TPro  6.5  /  Alb  3.8  /  TBili  0.2  /  DBili  x   /  AST  22  /  ALT  21  /  AlkPhos  147<H>  04-08      Mg 2.3  Phos 4.0      PT/INR - ( 08 Apr 2024 09:30 )   PT: 13.7 sec;   INR: 1.32 ratio         PTT - ( 08 Apr 2024 09:30 )  PTT:37.0 sec      Uric Acid 6.1

## 2024-04-08 NOTE — H&P ADULT - MLM HIDDEN
Ears: no ear pain and no hearing problems.Nose: no nasal congestion and no nasal drainage.Mouth/Throat: no dysphagia, no hoarseness and no throat pain.Neck: no lumps, no pain, no stiffness and no swollen glands. yes

## 2024-04-08 NOTE — DISCHARGE NOTE PROVIDER - CARE PROVIDER_API CALL
Tobias Chatterjee  Medical Oncology  [address] [place] [id number]  Phone: [phone]  Fax: [phone]  Follow Up Time:

## 2024-04-08 NOTE — DISCHARGE NOTE NURSING/CASE MANAGEMENT/SOCIAL WORK - NSDCFUADDAPPT_GEN_ALL_CORE_FT
You have an appointment at [facility] on Monday 4/15/24 at 7:50am for labwork then stay for 8:20am appt for chemo treatment.

## 2024-04-08 NOTE — PHYSICAL THERAPY INITIAL EVALUATION ADULT - ADDITIONAL COMMENTS
Patient lives in private home with 2 adult children (who work during the day), 4 steps to enter, 1 flight to bedroom. Patient has been staying on first floor. Ambulates with rolling walker, receives home PT, has [facility]HA a few hours/week. DME: Rolling walker, shower chair.

## 2024-04-08 NOTE — PHYSICAL THERAPY INITIAL EVALUATION ADULT - PERTINENT HX OF CURRENT PROBLEM, REHAB EVAL
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     He underwent a two stage surgical resection with a combined surgical team including Neurosurgery (Dr. Bhaskar Partida) and Plastic Surgery (Dr. Gunnar Campbell). He underwent the following procedures:    Stage 1: T8 VCR (vertebral column resection) T6-T10 fusion for tumor resection, small csf leak primarily repaired , Stage 2: L4 Partial Corpectomy, L2-Pelvis Fusion, Plastics Closure 3/5 HMV drain x3 and 1 Bile bag placed w/ output closely monitored. Patient was discharged to [facility] on 3/16/24, patient P/T [facility] for f/u at [facility] on 3/22,  advised admission to [address] for treatment; MR Chop was started on 3/23.   Started cycle 1 MR-CHOP on 3/23/24. Now admitted for day 14 HD MTX.

## 2024-04-09 LAB
ALBUMIN SERPL ELPH-MCNC: 2.8 G/DL — LOW (ref 3.3–5)
ALP SERPL-CCNC: 143 U/L — HIGH (ref 40–120)
ALT FLD-CCNC: 44 U/L — SIGNIFICANT CHANGE UP (ref 10–45)
ANION GAP SERPL CALC-SCNC: 8 MMOL/L — SIGNIFICANT CHANGE UP (ref 5–17)
AST SERPL-CCNC: 40 U/L — SIGNIFICANT CHANGE UP (ref 10–40)
BASOPHILS # BLD AUTO: 0.06 K/UL — SIGNIFICANT CHANGE UP (ref 0–0.2)
BASOPHILS NFR BLD AUTO: 0.5 % — SIGNIFICANT CHANGE UP (ref 0–2)
BILIRUB SERPL-MCNC: 0.3 MG/DL — SIGNIFICANT CHANGE UP (ref 0.2–1.2)
BUN SERPL-MCNC: 13 MG/DL — SIGNIFICANT CHANGE UP (ref 7–23)
CALCIUM SERPL-MCNC: 8.1 MG/DL — LOW (ref 8.4–10.5)
CHLORIDE SERPL-SCNC: 99 MMOL/L — SIGNIFICANT CHANGE UP (ref 96–108)
CO2 SERPL-SCNC: 33 MMOL/L — HIGH (ref 22–31)
CREAT SERPL-MCNC: 0.66 MG/DL — SIGNIFICANT CHANGE UP (ref 0.5–1.3)
EGFR: 103 ML/MIN/1.73M2 — SIGNIFICANT CHANGE UP
EOSINOPHIL # BLD AUTO: 0.01 K/UL — SIGNIFICANT CHANGE UP (ref 0–0.5)
EOSINOPHIL NFR BLD AUTO: 0.1 % — SIGNIFICANT CHANGE UP (ref 0–6)
GLUCOSE SERPL-MCNC: 104 MG/DL — HIGH (ref 70–99)
HCT VFR BLD CALC: 29.4 % — LOW (ref 39–50)
HGB BLD-MCNC: 8.8 G/DL — LOW (ref 13–17)
IMM GRANULOCYTES NFR BLD AUTO: 6.7 % — HIGH (ref 0–0.9)
LDH SERPL L TO P-CCNC: 377 U/L — HIGH (ref 50–242)
LYMPHOCYTES # BLD AUTO: 0.6 K/UL — LOW (ref 1–3.3)
LYMPHOCYTES # BLD AUTO: 4.9 % — LOW (ref 13–44)
MAGNESIUM SERPL-MCNC: 2.1 MG/DL — SIGNIFICANT CHANGE UP (ref 1.6–2.6)
MCHC RBC-ENTMCNC: 25.8 PG — LOW (ref 27–34)
MCHC RBC-ENTMCNC: 29.9 GM/DL — LOW (ref 32–36)
MCV RBC AUTO: 86.2 FL — SIGNIFICANT CHANGE UP (ref 80–100)
MONOCYTES # BLD AUTO: 1.03 K/UL — HIGH (ref 0–0.9)
MONOCYTES NFR BLD AUTO: 8.4 % — SIGNIFICANT CHANGE UP (ref 2–14)
NEUTROPHILS # BLD AUTO: 9.68 K/UL — HIGH (ref 1.8–7.4)
NEUTROPHILS NFR BLD AUTO: 79.4 % — HIGH (ref 43–77)
NRBC # BLD: 0 /100 WBCS — SIGNIFICANT CHANGE UP (ref 0–0)
PH UR: >=9 (ref 5–8)
PH UR: >=9 (ref 5–8)
PHOSPHATE SERPL-MCNC: 3.7 MG/DL — SIGNIFICANT CHANGE UP (ref 2.5–4.5)
PLATELET # BLD AUTO: 199 K/UL — SIGNIFICANT CHANGE UP (ref 150–400)
POTASSIUM SERPL-MCNC: 3.3 MMOL/L — LOW (ref 3.5–5.3)
POTASSIUM SERPL-SCNC: 3.3 MMOL/L — LOW (ref 3.5–5.3)
PROT SERPL-MCNC: 5 G/DL — LOW (ref 6–8.3)
RBC # BLD: 3.41 M/UL — LOW (ref 4.2–5.8)
RBC # FLD: 17.2 % — HIGH (ref 10.3–14.5)
SODIUM SERPL-SCNC: 140 MMOL/L — SIGNIFICANT CHANGE UP (ref 135–145)
URATE SERPL-MCNC: 6.5 MG/DL — SIGNIFICANT CHANGE UP (ref 3.4–8.8)
WBC # BLD: 12.2 K/UL — HIGH (ref 3.8–10.5)
WBC # FLD AUTO: 12.2 K/UL — HIGH (ref 3.8–10.5)

## 2024-04-09 PROCEDURE — 99232 SBSQ HOSP IP/OBS MODERATE 35: CPT | Mod: FS

## 2024-04-09 RX ORDER — POTASSIUM CHLORIDE 20 MEQ
40 PACKET (EA) ORAL EVERY 4 HOURS
Refills: 0 | Status: COMPLETED | OUTPATIENT
Start: 2024-04-09 | End: 2024-04-09

## 2024-04-09 RX ORDER — LEUCOVORIN CALCIUM 5 MG
25 TABLET ORAL EVERY 6 HOURS
Refills: 0 | Status: DISCONTINUED | OUTPATIENT
Start: 2024-04-09 | End: 2024-04-10

## 2024-04-09 RX ORDER — ACETAMINOPHEN 500 MG
650 TABLET ORAL EVERY 6 HOURS
Refills: 0 | Status: DISCONTINUED | OUTPATIENT
Start: 2024-04-09 | End: 2024-04-10

## 2024-04-09 RX ADMIN — Medication 40 MILLIEQUIVALENT(S): at 14:17

## 2024-04-09 RX ADMIN — Medication 40 MILLIEQUIVALENT(S): at 17:02

## 2024-04-09 RX ADMIN — MORPHINE SULFATE 15 MILLIGRAM(S): 50 CAPSULE, EXTENDED RELEASE ORAL at 05:20

## 2024-04-09 RX ADMIN — APIXABAN 5 MILLIGRAM(S): 2.5 TABLET, FILM COATED ORAL at 05:20

## 2024-04-09 RX ADMIN — GABAPENTIN 600 MILLIGRAM(S): 400 CAPSULE ORAL at 14:12

## 2024-04-09 RX ADMIN — APIXABAN 5 MILLIGRAM(S): 2.5 TABLET, FILM COATED ORAL at 17:02

## 2024-04-09 RX ADMIN — MORPHINE SULFATE 15 MILLIGRAM(S): 50 CAPSULE, EXTENDED RELEASE ORAL at 17:02

## 2024-04-09 RX ADMIN — OXYCODONE HYDROCHLORIDE 5 MILLIGRAM(S): 5 TABLET ORAL at 14:12

## 2024-04-09 RX ADMIN — MORPHINE SULFATE 15 MILLIGRAM(S): 50 CAPSULE, EXTENDED RELEASE ORAL at 17:25

## 2024-04-09 RX ADMIN — OXYCODONE HYDROCHLORIDE 5 MILLIGRAM(S): 5 TABLET ORAL at 14:47

## 2024-04-09 RX ADMIN — ONDANSETRON 116 MILLIGRAM(S): 8 TABLET, FILM COATED ORAL at 11:56

## 2024-04-09 RX ADMIN — Medication 25 MILLIGRAM(S): at 14:41

## 2024-04-09 RX ADMIN — GABAPENTIN 600 MILLIGRAM(S): 400 CAPSULE ORAL at 05:20

## 2024-04-09 RX ADMIN — GABAPENTIN 600 MILLIGRAM(S): 400 CAPSULE ORAL at 21:22

## 2024-04-09 RX ADMIN — MORPHINE SULFATE 15 MILLIGRAM(S): 50 CAPSULE, EXTENDED RELEASE ORAL at 05:50

## 2024-04-09 RX ADMIN — Medication 25 MILLIGRAM(S): at 21:22

## 2024-04-09 NOTE — PROGRESS NOTE ADULT - SUBJECTIVE AND OBJECTIVE BOX
Diagnosis: DLBCL Stage IV w/ spinal involvement, GCB subtype    Protocol/Chemo Regimen: cycle 1 MR-CHOP (admit for day 14 MTX)    Day: 15     Pt endorsed:    Review of Systems:    Pain scale:                                        Location:    Diet:     Allergies:  latex (Rash)  No Known Drug Allergies    HEME/ONC MEDICATIONS  apixaban 5 milliGRAM(s) Oral every 12 hours      STANDING MEDICATIONS  dextrose 5% 1000 milliLiter(s) IV Continuous <Continuous>  gabapentin 600 milliGRAM(s) Oral every 8 hours  morphine ER Tablet 15 milliGRAM(s) Oral two times a day  ondansetron  IVPB 16 milliGRAM(s) IV Intermittent every 24 hours      PRN MEDICATIONS  lactulose Syrup 10 Gram(s) Oral daily PRN  metoclopramide Injectable 10 milliGRAM(s) IV Push every 6 hours PRN  oxyCODONE    IR 5 milliGRAM(s) Oral every 4 hours PRN      Vital Signs Last 24 Hrs  T(C): 36.8 (09 Apr 2024 05:22), Max: 37.3 (08 Apr 2024 13:15)  T(F): 98.3 (09 Apr 2024 05:22), Max: 99.2 (08 Apr 2024 13:15)  HR: 72 (09 Apr 2024 05:22) (50 - 78)  BP: 122/72 (09 Apr 2024 05:22) (99/60 - 132/65)  RR: 16 (09 Apr 2024 05:22) (16 - 18)  SpO2: 96% (09 Apr 2024 05:22) (94% - 97%)    Parameters below as of 09 Apr 2024 05:22  Patient On (Oxygen Delivery Method): room air    PHYSICAL EXAM  General: adult in NAD  HEENT: clear oropharynx, anicteric sclera, pink conjunctiva  Neck: supple  CV: normal S1/S2 RRR  Lungs: positive air movement b/l ant lungs,clear to auscultation, no wheezes, no rales  Abdomen: soft non-tender non-distended  Ext: no clubbing cyanosis or edema  Skin: no rashes and no petechiae  Neuro: alert and oriented X 4, no focal deficits  Central Line:     LABS:                        10.5   15.74 )-----------( 211      ( 08 Apr 2024 09:30 )             33.4         Mean Cell Volume : 85.2 fl  Mean Cell Hemoglobin : 26.8 pg  Mean Cell Hemoglobin Concentration : 31.4 gm/dL  Auto Neutrophil # : 14.09 K/uL  Auto Lymphocyte # : 0.27 K/uL  Auto Monocyte # : 0.55 K/uL  Auto Eosinophil # : 0.00 K/uL  Auto Basophil # : 0.14 K/uL  Auto Neutrophil % : 78.1 %  Auto Lymphocyte % : 1.7 %  Auto Monocyte % : 3.5 %  Auto Eosinophil % : 0.0 %  Auto Basophil % : 0.9 %      04-08    138  |  98  |  16  ----------------------------<  95  3.9   |  29  |  0.55    Ca    9.2      08 Apr 2024 09:30  Phos  4.0     04-08  Mg     2.3     04-08    TPro  6.5  /  Alb  3.8  /  TBili  0.2  /  DBili  x   /  AST  22  /  ALT  21  /  AlkPhos  147<H>  04-08    PT/INR - ( 08 Apr 2024 09:30 )   PT: 13.7 sec;   INR: 1.32 ratio    PTT - ( 08 Apr 2024 09:30 )  PTT:37.0 sec      Uric Acid 6.1    RADIOLOGY & ADDITIONAL STUDIES:         Diagnosis: DLBCL Stage IV w/ spinal involvement, GCB subtype    Protocol/Chemo Regimen: cycle 1 MR-CHOP (admit for day 14 MTX)    Day: 15     Pt endorsed: no complaints    Review of Systems: denies nausea, vomiting, diarrhea, chest pain, sob    Pain scale: 0                                           Diet: regular    Allergies:  latex (Rash)  No Known Drug Allergies    HEME/ONC MEDICATIONS  apixaban 5 milliGRAM(s) Oral every 12 hours      STANDING MEDICATIONS  dextrose 5% 1000 milliLiter(s) IV Continuous <Continuous>  gabapentin 600 milliGRAM(s) Oral every 8 hours  morphine ER Tablet 15 milliGRAM(s) Oral two times a day  ondansetron  IVPB 16 milliGRAM(s) IV Intermittent every 24 hours      PRN MEDICATIONS  lactulose Syrup 10 Gram(s) Oral daily PRN  metoclopramide Injectable 10 milliGRAM(s) IV Push every 6 hours PRN  oxyCODONE    IR 5 milliGRAM(s) Oral every 4 hours PRN      Vital Signs Last 24 Hrs  T(C): 36.8 (09 Apr 2024 05:22), Max: 37.3 (08 Apr 2024 13:15)  T(F): 98.3 (09 Apr 2024 05:22), Max: 99.2 (08 Apr 2024 13:15)  HR: 72 (09 Apr 2024 05:22) (50 - 78)  BP: 122/72 (09 Apr 2024 05:22) (99/60 - 132/65)  RR: 16 (09 Apr 2024 05:22) (16 - 18)  SpO2: 96% (09 Apr 2024 05:22) (94% - 97%)    Parameters below as of 09 Apr 2024 05:22  Patient On (Oxygen Delivery Method): room air    PHYSICAL EXAM  General: adult in NAD  HEENT: clear oropharynx, anicteric sclera, pink conjunctiva  Neck: supple  CV: normal S1/S2 RRR  Lungs: positive air movement b/l ant lungs,clear to auscultation, no wheezes, no rales  Abdomen: soft non-tender non-distended  Ext: no clubbing cyanosis or edema  Skin: no rashes and no petechiae  Neuro: alert and oriented X 4, no focal deficits  Central Line:     Cultures:  Culture - Urine (03.23.24 @ 18:03)    Specimen Source: Clean Catch Clean Catch (Midstream)   Culture Results:   <10,000 CFU/mL Normal Urogenital Mari    Culture - Urine (03.19.24 @ 18:30)    -  Amoxicillin/Clavulanic Acid: S <=8/4 Consider reserving for cystitis when ampicillin/sulbactam is resistant   -  Ampicillin: R >16 These ampicillin results predict results for amoxicillin   -  Ampicillin/Sulbactam: R >16/8   -  Aztreonam: R >16   -  Cefazolin: R >16 For uncomplicated UTI with K. pneumoniae, E. coli, or P. mirablis: LUPE <=16 is sensitive and LUPE >=32 is resistant. This also predicts results for oral agents cefaclor, cefdinir, cefpodoxime, cefprozil, cefuroxime axetil, cephalexin and locarbef for uncomplicated UTI. Note that some isolates may be susceptible to these agents while testing resistant to cefazolin.   -  Cefepime: R >16   -  Ceftriaxone: R >32   -  Cefuroxime: R >16   -  Ciprofloxacin: R 1   -  Ertapenem: S <=0.5   -  Gentamicin: S <=2   -  Imipenem: S <=1   -  Levofloxacin: I 1   -  Meropenem: S <=1   -  Nitrofurantoin: S <=32 Should not be used to treat pyelonephritis   -  Piperacillin/Tazobactam: S <=8   -  Tobramycin: S <=2   -  Trimethoprim/Sulfamethoxazole: S <=0.5/9.5   Specimen Source: Clean Catch Clean Catch (Midstream)   Culture Results:   >100,000 CFU/ml Escherichia coli ESBL   Organism Identification: Escherichia coli ESBL   Organism: Escherichia coli ESBL   Method Type: LUPE    LABS:          RADIOLOGY & ADDITIONAL STUDIES:         Diagnosis: DLBCL Stage IV w/ spinal involvement, GCB subtype    Protocol/Chemo Regimen: cycle 1 MR-CHOP (admit for day 14 MTX)    Day: 15     Pt endorsed: no complaints    Review of Systems: denies nausea, vomiting, diarrhea, chest pain, sob    Pain scale: 0                                           Diet: regular    Allergies:  latex (Rash)  No Known Drug Allergies    HEME/ONC MEDICATIONS  apixaban 5 milliGRAM(s) Oral every 12 hours      STANDING MEDICATIONS  dextrose 5% 1000 milliLiter(s) IV Continuous <Continuous>  gabapentin 600 milliGRAM(s) Oral every 8 hours  morphine ER Tablet 15 milliGRAM(s) Oral two times a day  ondansetron  IVPB 16 milliGRAM(s) IV Intermittent every 24 hours      PRN MEDICATIONS  lactulose Syrup 10 Gram(s) Oral daily PRN  metoclopramide Injectable 10 milliGRAM(s) IV Push every 6 hours PRN  oxyCODONE    IR 5 milliGRAM(s) Oral every 4 hours PRN      Vital Signs Last 24 Hrs  T(C): 36.8 (09 Apr 2024 05:22), Max: 37.3 (08 Apr 2024 13:15)  T(F): 98.3 (09 Apr 2024 05:22), Max: 99.2 (08 Apr 2024 13:15)  HR: 72 (09 Apr 2024 05:22) (50 - 78)  BP: 122/72 (09 Apr 2024 05:22) (99/60 - 132/65)  RR: 16 (09 Apr 2024 05:22) (16 - 18)  SpO2: 96% (09 Apr 2024 05:22) (94% - 97%)    Parameters below as of 09 Apr 2024 05:22  Patient On (Oxygen Delivery Method): room air    PHYSICAL EXAM  General: adult in NAD  HEENT: clear oropharynx, anicteric sclera, pink conjunctiva  Neck: supple  CV: normal S1/S2 RRR  Lungs: positive air movement b/l ant lungs,clear to auscultation, no wheezes, no rales  Abdomen: soft non-tender non-distended  Ext: no clubbing cyanosis or edema  Skin: no rashes and no petechiae  Neuro: alert and oriented X 4, no focal deficits  Central Line: PIV    Cultures:  Culture - Urine (03.23.24 @ 18:03)    Specimen Source: Clean Catch Clean Catch (Midstream)   Culture Results:   <10,000 CFU/mL Normal Urogenital Mari    Culture - Urine (03.19.24 @ 18:30)    -  Amoxicillin/Clavulanic Acid: S <=8/4 Consider reserving for cystitis when ampicillin/sulbactam is resistant   -  Ampicillin: R >16 These ampicillin results predict results for amoxicillin   -  Ampicillin/Sulbactam: R >16/8   -  Aztreonam: R >16   -  Cefazolin: R >16 For uncomplicated UTI with K. pneumoniae, E. coli, or P. mirablis: LUPE <=16 is sensitive and LUPE >=32 is resistant. This also predicts results for oral agents cefaclor, cefdinir, cefpodoxime, cefprozil, cefuroxime axetil, cephalexin and locarbef for uncomplicated UTI. Note that some isolates may be susceptible to these agents while testing resistant to cefazolin.   -  Cefepime: R >16   -  Ceftriaxone: R >32   -  Cefuroxime: R >16   -  Ciprofloxacin: R 1   -  Ertapenem: S <=0.5   -  Gentamicin: S <=2   -  Imipenem: S <=1   -  Levofloxacin: I 1   -  Meropenem: S <=1   -  Nitrofurantoin: S <=32 Should not be used to treat pyelonephritis   -  Piperacillin/Tazobactam: S <=8   -  Tobramycin: S <=2   -  Trimethoprim/Sulfamethoxazole: S <=0.5/9.5   Specimen Source: Clean Catch Clean Catch (Midstream)   Culture Results:   >100,000 CFU/ml Escherichia coli ESBL   Organism Identification: Escherichia coli ESBL   Organism: Escherichia coli ESBL   Method Type: LUPE    LABS:                        8.8    12.20 )-----------( 199      ( 09 Apr 2024 07:54 )             29.4     09 Apr 2024 07:54    140    |  99     |  13     ----------------------------<  104    3.3     |  33     |  0.66     Ca    8.1        09 Apr 2024 07:54  Phos  3.7       09 Apr 2024 07:54  Mg     2.1       09 Apr 2024 07:54    TPro  5.0    /  Alb  2.8    /  TBili  0.3    /  DBili  x      /  AST  40     /  ALT  44     /  AlkPhos  143    09 Apr 2024 07:54    PT/INR - ( 08 Apr 2024 09:30 )   PT: 13.7 sec;   INR: 1.32 ratio    PTT - ( 08 Apr 2024 09:30 )  PTT:37.0 sec    LIVER FUNCTIONS - ( 09 Apr 2024 07:54 )  Alb: 2.8 g/dL / Pro: 5.0 g/dL / ALK PHOS: 143 U/L / ALT: 44 U/L / AST: 40 U/L / GGT: x           Urinalysis Basic - ( 09 Apr 2024 07:54 )  Color: x / Appearance: x / SG: x / pH: x  Gluc: 104 mg/dL / Ketone: x  / Bili: x / Urobili: x   Blood: x / Protein: x / Nitrite: x   Leuk Esterase: x / RBC: x / WBC x   Sq Epi: x / Non Sq Epi: x / Bacteria: x          RADIOLOGY & ADDITIONAL STUDIES:  from: Xray Lumbar Spine AP + Lateral (04.01.24 @ 16:07)   IMPRESSION:  Postsurgical changes as above.  Compression fracture deformities detailed above. Correlate with recent CT   for more detailed evaluation

## 2024-04-09 NOTE — PROGRESS NOTE ADULT - PROBLEM SELECTOR PLAN 1
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  Monitor CBC with Diff, transfuse as needed.  Monitor electrolytes, replete as needed.  Daily weight.  Strict I/O.  Urine alkalization with D5W with 150 meq NaHCo3@ 150 cc/hr  Monitor urine pH prior to initiation of MTX, >7.5 then Q 6 hrs.  Methotrexate 3500 mg/m2= 5154mg IV over 3 hrs when urine pH >7.5.  Leucovorin 25 mg IVSS q 6 hrs after start of MTX and continue until MTX <0.06um  Premeds: Zofran 16 mg IV daily x2 day  Reglan 10 mg IV Q 6hrs PRN n/V  Monitor MTx level 24 hrs after start of MTX then daily. 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  Monitor CBC with Diff, transfuse as needed.  Monitor electrolytes, replete as needed.  Daily weight.  Strict I/O.  Urine alkalization with D5W with 150 meq NaHCo3@ 150 cc/hr  Monitor urine pH prior to initiation of MTX, >7.5 then Q 6 hrs.  Methotrexate 3500 mg/m2= 5154mg IV over 3 hrs when urine pH >7.5.  Leucovorin 25 mg IVSS q 6 hrs after start of MTX and continue until MTX <0.06um  Premeds: Zofran 16 mg IV daily x2 day  Reglan 10 mg IV Q 6hrs PRN n/V  Monitor MTX level 24 hrs after start of MTX then daily.

## 2024-04-09 NOTE — PROGRESS NOTE ADULT - NS ATTEND AMEND GEN_ALL_CORE FT
DLBCL, GCB subtype, extensive spinal resection DLBCL, GCB subtype, had extensive spinal resection, admitted to  receive high dose MTX for CNS ppx in light of extensive paraspinal  disease.  Received rituxan in OPD, MTX 3.5 gm/sq m 4/8; now to continue to receive LV rescue, IVFs  with NaHCO3, urine alkalinization, I and O, diurese as needed, follow renal fxn.  Follow MTX levels.  Tolerate HD MTX well, no N/V/D.   Encourage OOB.

## 2024-04-09 NOTE — PROGRESS NOTE ADULT - ASSESSMENT
67 yo male Stage IV DLBCL, GCB subtype w/ spinal involvement (multiple pathological fractures and cord compressions) s/p two stage neurosurgical resection and plastics closure, currently on cycle 1 MR-CHOP (rituximab on 3/23, CHOPon 3/24) now admitted for Day 14 treatment of IV HD Methotrexate (3.5g/m2). PMH includes HLD, pre-DM, and b/le LE DVT's on Eliquis. 67 yo male Stage IV DLBCL, GCB subtype w/ spinal involvement (multiple pathological fractures and cord compressions) s/p two stage neurosurgical resection and plastics closure, currently on cycle 1 MR-CHOP (rituximab on 3/23, CHOP on 3/24) now admitted for Day 14 treatment of IV HD Methotrexate (3.5g/m2). PMH includes HLD, pre-DM, and b/le LE DVT's on Eliquis.

## 2024-04-10 ENCOUNTER — NON-APPOINTMENT (OUTPATIENT)
Age: 69
End: 2024-04-10

## 2024-04-10 VITALS
HEART RATE: 65 BPM | SYSTOLIC BLOOD PRESSURE: 115 MMHG | RESPIRATION RATE: 18 BRPM | TEMPERATURE: 98 F | DIASTOLIC BLOOD PRESSURE: 67 MMHG | OXYGEN SATURATION: 97 %

## 2024-04-10 LAB
ALBUMIN SERPL ELPH-MCNC: 3.1 G/DL — LOW (ref 3.3–5)
ALP SERPL-CCNC: 139 U/L — HIGH (ref 40–120)
ALT FLD-CCNC: 36 U/L — SIGNIFICANT CHANGE UP (ref 10–45)
ANION GAP SERPL CALC-SCNC: 9 MMOL/L — SIGNIFICANT CHANGE UP (ref 5–17)
AST SERPL-CCNC: 26 U/L — SIGNIFICANT CHANGE UP (ref 10–40)
BASOPHILS # BLD AUTO: 0.07 K/UL — SIGNIFICANT CHANGE UP (ref 0–0.2)
BASOPHILS NFR BLD AUTO: 0.7 % — SIGNIFICANT CHANGE UP (ref 0–2)
BILIRUB SERPL-MCNC: 0.3 MG/DL — SIGNIFICANT CHANGE UP (ref 0.2–1.2)
BUN SERPL-MCNC: 12 MG/DL — SIGNIFICANT CHANGE UP (ref 7–23)
CALCIUM SERPL-MCNC: 8.5 MG/DL — SIGNIFICANT CHANGE UP (ref 8.4–10.5)
CHLORIDE SERPL-SCNC: 101 MMOL/L — SIGNIFICANT CHANGE UP (ref 96–108)
CO2 SERPL-SCNC: 28 MMOL/L — SIGNIFICANT CHANGE UP (ref 22–31)
CREAT SERPL-MCNC: 0.59 MG/DL — SIGNIFICANT CHANGE UP (ref 0.5–1.3)
EGFR: 107 ML/MIN/1.73M2 — SIGNIFICANT CHANGE UP
EOSINOPHIL # BLD AUTO: 0.01 K/UL — SIGNIFICANT CHANGE UP (ref 0–0.5)
EOSINOPHIL NFR BLD AUTO: 0.1 % — SIGNIFICANT CHANGE UP (ref 0–6)
GLUCOSE SERPL-MCNC: 105 MG/DL — HIGH (ref 70–99)
HCT VFR BLD CALC: 29.8 % — LOW (ref 39–50)
HGB BLD-MCNC: 9 G/DL — LOW (ref 13–17)
IMM GRANULOCYTES NFR BLD AUTO: 1.1 % — HIGH (ref 0–0.9)
LDH SERPL L TO P-CCNC: 361 U/L — HIGH (ref 50–242)
LYMPHOCYTES # BLD AUTO: 0.51 K/UL — LOW (ref 1–3.3)
LYMPHOCYTES # BLD AUTO: 5.3 % — LOW (ref 13–44)
MAGNESIUM SERPL-MCNC: 2.3 MG/DL — SIGNIFICANT CHANGE UP (ref 1.6–2.6)
MCHC RBC-ENTMCNC: 26 PG — LOW (ref 27–34)
MCHC RBC-ENTMCNC: 30.2 GM/DL — LOW (ref 32–36)
MCV RBC AUTO: 86.1 FL — SIGNIFICANT CHANGE UP (ref 80–100)
MONOCYTES # BLD AUTO: 0.36 K/UL — SIGNIFICANT CHANGE UP (ref 0–0.9)
MONOCYTES NFR BLD AUTO: 3.8 % — SIGNIFICANT CHANGE UP (ref 2–14)
MTX SERPL-SCNC: 0.16 UMOL/L — LOW (ref 0.5–5)
MTX SERPL-SCNC: 0.84 UMOL/L — SIGNIFICANT CHANGE UP (ref 0.5–5)
NEUTROPHILS # BLD AUTO: 8.51 K/UL — HIGH (ref 1.8–7.4)
NEUTROPHILS NFR BLD AUTO: 89 % — HIGH (ref 43–77)
NRBC # BLD: 0 /100 WBCS — SIGNIFICANT CHANGE UP (ref 0–0)
PH UR: 8 — SIGNIFICANT CHANGE UP (ref 5–8)
PH UR: 8.5 (ref 5–8)
PH UR: >=9 (ref 5–8)
PHOSPHATE SERPL-MCNC: 3.8 MG/DL — SIGNIFICANT CHANGE UP (ref 2.5–4.5)
PLATELET # BLD AUTO: 214 K/UL — SIGNIFICANT CHANGE UP (ref 150–400)
POTASSIUM SERPL-MCNC: 4 MMOL/L — SIGNIFICANT CHANGE UP (ref 3.5–5.3)
POTASSIUM SERPL-SCNC: 4 MMOL/L — SIGNIFICANT CHANGE UP (ref 3.5–5.3)
PROT SERPL-MCNC: 5.3 G/DL — LOW (ref 6–8.3)
RBC # BLD: 3.46 M/UL — LOW (ref 4.2–5.8)
RBC # FLD: 17.5 % — HIGH (ref 10.3–14.5)
SODIUM SERPL-SCNC: 138 MMOL/L — SIGNIFICANT CHANGE UP (ref 135–145)
URATE SERPL-MCNC: 6 MG/DL — SIGNIFICANT CHANGE UP (ref 3.4–8.8)
WBC # BLD: 9.57 K/UL — SIGNIFICANT CHANGE UP (ref 3.8–10.5)
WBC # FLD AUTO: 9.57 K/UL — SIGNIFICANT CHANGE UP (ref 3.8–10.5)

## 2024-04-10 PROCEDURE — 97161 PT EVAL LOW COMPLEX 20 MIN: CPT

## 2024-04-10 PROCEDURE — 83615 LACTATE (LD) (LDH) ENZYME: CPT

## 2024-04-10 PROCEDURE — 36415 COLL VENOUS BLD VENIPUNCTURE: CPT

## 2024-04-10 PROCEDURE — 86900 BLOOD TYPING SEROLOGIC ABO: CPT

## 2024-04-10 PROCEDURE — 86850 RBC ANTIBODY SCREEN: CPT

## 2024-04-10 PROCEDURE — 85610 PROTHROMBIN TIME: CPT

## 2024-04-10 PROCEDURE — 85730 THROMBOPLASTIN TIME PARTIAL: CPT

## 2024-04-10 PROCEDURE — 94640 AIRWAY INHALATION TREATMENT: CPT

## 2024-04-10 PROCEDURE — 86901 BLOOD TYPING SEROLOGIC RH(D): CPT

## 2024-04-10 PROCEDURE — 87635 SARS-COV-2 COVID-19 AMP PRB: CPT

## 2024-04-10 PROCEDURE — 84550 ASSAY OF BLOOD/URIC ACID: CPT

## 2024-04-10 PROCEDURE — 85384 FIBRINOGEN ACTIVITY: CPT

## 2024-04-10 PROCEDURE — 83986 ASSAY PH BODY FLUID NOS: CPT

## 2024-04-10 PROCEDURE — 99238 HOSP IP/OBS DSCHRG MGMT 30/<: CPT

## 2024-04-10 PROCEDURE — 80204 DRUG ASSAY METHOTREXATE: CPT

## 2024-04-10 PROCEDURE — 80053 COMPREHEN METABOLIC PANEL: CPT

## 2024-04-10 PROCEDURE — 84100 ASSAY OF PHOSPHORUS: CPT

## 2024-04-10 PROCEDURE — 85025 COMPLETE CBC W/AUTO DIFF WBC: CPT

## 2024-04-10 PROCEDURE — 83735 ASSAY OF MAGNESIUM: CPT

## 2024-04-10 RX ORDER — LEUCOVORIN CALCIUM 5 MG
1 TABLET ORAL
Qty: 60 | Refills: 0
Start: 2024-04-10 | End: 2024-04-24

## 2024-04-10 RX ORDER — FLUTICASONE PROPIONATE 50 MCG
1 SPRAY, SUSPENSION NASAL
Refills: 0 | Status: DISCONTINUED | OUTPATIENT
Start: 2024-04-10 | End: 2024-04-10

## 2024-04-10 RX ORDER — ACETAMINOPHEN 500 MG
2 TABLET ORAL
Qty: 0 | Refills: 0 | DISCHARGE
Start: 2024-04-10

## 2024-04-10 RX ORDER — FLUTICASONE PROPIONATE 50 MCG
1 SPRAY, SUSPENSION NASAL
Qty: 0 | Refills: 0 | DISCHARGE
Start: 2024-04-10

## 2024-04-10 RX ORDER — MORPHINE SULFATE 50 MG/1
1 CAPSULE, EXTENDED RELEASE ORAL
Refills: 0 | DISCHARGE

## 2024-04-10 RX ADMIN — Medication 650 MILLIGRAM(S): at 11:46

## 2024-04-10 RX ADMIN — MORPHINE SULFATE 15 MILLIGRAM(S): 50 CAPSULE, EXTENDED RELEASE ORAL at 18:20

## 2024-04-10 RX ADMIN — GABAPENTIN 600 MILLIGRAM(S): 400 CAPSULE ORAL at 06:46

## 2024-04-10 RX ADMIN — Medication 1 SPRAY(S): at 14:44

## 2024-04-10 RX ADMIN — MORPHINE SULFATE 15 MILLIGRAM(S): 50 CAPSULE, EXTENDED RELEASE ORAL at 17:27

## 2024-04-10 RX ADMIN — MORPHINE SULFATE 15 MILLIGRAM(S): 50 CAPSULE, EXTENDED RELEASE ORAL at 06:46

## 2024-04-10 RX ADMIN — Medication 25 MILLIGRAM(S): at 08:27

## 2024-04-10 RX ADMIN — Medication 10 MILLIGRAM(S): at 11:59

## 2024-04-10 RX ADMIN — APIXABAN 5 MILLIGRAM(S): 2.5 TABLET, FILM COATED ORAL at 17:27

## 2024-04-10 RX ADMIN — LACTULOSE 10 GRAM(S): 10 SOLUTION ORAL at 13:46

## 2024-04-10 RX ADMIN — Medication 650 MILLIGRAM(S): at 12:28

## 2024-04-10 RX ADMIN — Medication 25 MILLIGRAM(S): at 02:52

## 2024-04-10 RX ADMIN — APIXABAN 5 MILLIGRAM(S): 2.5 TABLET, FILM COATED ORAL at 06:46

## 2024-04-10 RX ADMIN — GABAPENTIN 600 MILLIGRAM(S): 400 CAPSULE ORAL at 13:03

## 2024-04-10 RX ADMIN — SODIUM CHLORIDE 150 MILLILITER(S): 9 INJECTION, SOLUTION INTRAVENOUS at 08:28

## 2024-04-10 NOTE — PROGRESS NOTE ADULT - PROBLEM SELECTOR PLAN 1
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  Monitor CBC with Diff, transfuse as needed. electrolytes, replete as needed.  Daily weight. Strict I/O.  Urine alkalization with D5W with 150 meq NaHCo3@ 150 cc/hr  Monitor urine pH prior to initiation of MTX, >7.5 then Q 6 hrs.  Methotrexate 3500 mg/m2= 5154mg IV over 3 hrs when urine pH >7.5.  Leucovorin 25 mg IVSS q 6 hrs after start of MTX and continue until MTX <0.06um  4/9 MTX level 24 hrs = 0.84   cont to check MTX level daily - plan d/c home when > 0.20. 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  Monitor CBC with Diff, transfuse as needed. electrolytes, replete as needed.  Daily weight. Strict I/O.  Urine alkalization with D5W with 150 meq NaHCo3@ 150 cc/hr  Monitor urine pH prior to initiation of MTX, >7.5 then Q 6 hrs.  Methotrexate 3500 mg/m2= 5154mg IV over 3 hrs when urine pH >7.5.  Leucovorin 25 mg IVSS q 6 hrs after start of MTX and continue until MTX <0.06um  4/9 MTX level 24 hrs = 0.84   4/10 MTX level 0.16. d/c home on leucovorin x  3 days

## 2024-04-10 NOTE — PROGRESS NOTE ADULT - PROBLEM SELECTOR PLAN 5
Continue Eliquis, encourage ambulation.  PT- consult- patient fall risk, unsteady gait PT referral  Lactulose PRN for constipation.
Continue Eliquis, encourage ambulation.  PT- consult- patient fall risk, unsteady gait PT referral  Lactulose PRN for constipation.

## 2024-04-10 NOTE — PROGRESS NOTE ADULT - PROBLEM SELECTOR PLAN 3
On 3/19- BLE doppler shows acute DVT below knee, didn’t treat while in [place], Eliquis started on 3/23,   Continue  Eliquis
On 3/19- BLE doppler shows acute DVT below knee, didn’t treat while in [place], Eliquis started on 3/23,   Continue  Eliquis

## 2024-04-10 NOTE — PROGRESS NOTE ADULT - SUBJECTIVE AND OBJECTIVE BOX
Diagnosis: DLBCL Stage IV w/ paraspinal involvement, GCB subtype    Protocol/Chemo Regimen: cycle 1 MR-CHOP (admit for day 14 MTX)    Day: 16     Pt endorsed: no complaints    Review of Systems: denies nausea, vomiting, diarrhea, chest pain, sob    Pain scale: 0                                           Diet: regular    Allergies:  latex (Rash)  No Known Drug Allergies    HEME/ONC MEDICATIONS  apixaban 5 milliGRAM(s) Oral every 12 hours      STANDING MEDICATIONS  dextrose 5% 1000 milliLiter(s) IV Continuous <Continuous>  gabapentin 600 milliGRAM(s) Oral every 8 hours  leucovorin IVPB (eMAR) 25 milliGRAM(s) IV Intermittent every 6 hours  morphine ER Tablet 15 milliGRAM(s) Oral two times a day    PRN MEDICATIONS  acetaminophen     Tablet .. 650 milliGRAM(s) Oral every 6 hours PRN  lactulose Syrup 10 Gram(s) Oral daily PRN  metoclopramide Injectable 10 milliGRAM(s) IV Push every 6 hours PRN  oxyCODONE    IR 5 milliGRAM(s) Oral every 4 hours PRN      Vital Signs Last 24 Hrs  T(C): 36.8 (10 Apr 2024 05:10), Max: 36.9 (09 Apr 2024 09:35)  T(F): 98.2 (10 Apr 2024 05:10), Max: 98.5 (09 Apr 2024 09:35)  HR: 73 (10 Apr 2024 05:10) (69 - 77)  BP: 120/65 (10 Apr 2024 05:10) (100/59 - 122/73)  RR: 18 (10 Apr 2024 05:10) (18 - 18)  SpO2: 96% (10 Apr 2024 05:10) (94% - 98%)    Parameters below as of 10 Apr 2024 05:10  Patient On (Oxygen Delivery Method): room air      PHYSICAL EXAM  General: adult in NAD  HEENT: clear oropharynx, anicteric sclera, pink conjunctiva  Neck: supple  CV: normal S1/S2 RRR  Lungs: positive air movement b/l ant lungs,clear to auscultation, no wheezes, no rales  Abdomen: soft non-tender non-distended  Ext: no clubbing cyanosis or edema  Skin: no rashes and no petechiae  Neuro: alert and oriented X 4, no focal deficits  Central Line: PIV    Cultures:  Culture - Urine (03.23.24 @ 18:03)    Specimen Source: Clean Catch Clean Catch (Midstream)   Culture Results:   <10,000 CFU/mL Normal Urogenital Mari    Culture - Urine (03.19.24 @ 18:30)    -  Amoxicillin/Clavulanic Acid: S <=8/4 Consider reserving for cystitis when ampicillin/sulbactam is resistant   -  Ampicillin: R >16 These ampicillin results predict results for amoxicillin   -  Ampicillin/Sulbactam: R >16/8   -  Aztreonam: R >16   -  Cefazolin: R >16 For uncomplicated UTI with K. pneumoniae, E. coli, or P. mirablis: LUPE <=16 is sensitive and LUPE >=32 is resistant. This also predicts results for oral agents cefaclor, cefdinir, cefpodoxime, cefprozil, cefuroxime axetil, cephalexin and locarbef for uncomplicated UTI. Note that some isolates may be susceptible to these agents while testing resistant to cefazolin.   -  Cefepime: R >16   -  Ceftriaxone: R >32   -  Cefuroxime: R >16   -  Ciprofloxacin: R 1   -  Ertapenem: S <=0.5   -  Gentamicin: S <=2   -  Imipenem: S <=1   -  Levofloxacin: I 1   -  Meropenem: S <=1   -  Nitrofurantoin: S <=32 Should not be used to treat pyelonephritis   -  Piperacillin/Tazobactam: S <=8   -  Tobramycin: S <=2   -  Trimethoprim/Sulfamethoxazole: S <=0.5/9.5   Specimen Source: Clean Catch Clean Catch (Midstream)   Culture Results:   >100,000 CFU/ml Escherichia coli ESBL   Organism Identification: Escherichia coli ESBL   Organism: Escherichia coli ESBL   Method Type: LUPE    LABS:   Methotrexate Level, Serum (04.09.24 @ 16:11)    Methotrexate Level, Serum: 0.84: Therapeutic Range                          Urinalysis Basic - ( 09 Apr 2024 07:54 )  Color: x / Appearance: x / SG: x / pH: x  Gluc: 104 mg/dL / Ketone: x  / Bili: x / Urobili: x   Blood: x / Protein: x / Nitrite: x   Leuk Esterase: x / RBC: x / WBC x   Sq Epi: x / Non Sq Epi: x / Bacteria: x          RADIOLOGY & ADDITIONAL STUDIES:  from: Xray Lumbar Spine AP + Lateral (04.01.24 @ 16:07)   IMPRESSION:  Postsurgical changes as above.  Compression fracture deformities detailed above. Correlate with recent CT   for more detailed evaluation         Diagnosis: DLBCL Stage IV w/ paraspinal involvement, GCB subtype    Protocol/Chemo Regimen: cycle 1 MR-CHOP (admit for day 14 MTX)    Day: 16     Pt endorsed: intermittent nausea    Review of Systems: denies nausea, vomiting, diarrhea, chest pain, sob    Pain scale: 0                                           Diet: regular    Allergies:  latex (Rash)  No Known Drug Allergies    HEME/ONC MEDICATIONS  apixaban 5 milliGRAM(s) Oral every 12 hours      STANDING MEDICATIONS  dextrose 5% 1000 milliLiter(s) IV Continuous <Continuous>  gabapentin 600 milliGRAM(s) Oral every 8 hours  leucovorin IVPB (eMAR) 25 milliGRAM(s) IV Intermittent every 6 hours  morphine ER Tablet 15 milliGRAM(s) Oral two times a day    PRN MEDICATIONS  acetaminophen     Tablet .. 650 milliGRAM(s) Oral every 6 hours PRN  lactulose Syrup 10 Gram(s) Oral daily PRN  metoclopramide Injectable 10 milliGRAM(s) IV Push every 6 hours PRN  oxyCODONE    IR 5 milliGRAM(s) Oral every 4 hours PRN      Vital Signs Last 24 Hrs  T(C): 36.8 (10 Apr 2024 05:10), Max: 36.9 (09 Apr 2024 09:35)  T(F): 98.2 (10 Apr 2024 05:10), Max: 98.5 (09 Apr 2024 09:35)  HR: 73 (10 Apr 2024 05:10) (69 - 77)  BP: 120/65 (10 Apr 2024 05:10) (100/59 - 122/73)  RR: 18 (10 Apr 2024 05:10) (18 - 18)  SpO2: 96% (10 Apr 2024 05:10) (94% - 98%)    Parameters below as of 10 Apr 2024 05:10  Patient On (Oxygen Delivery Method): room air      PHYSICAL EXAM  General: adult in NAD  HEENT: clear oropharynx, anicteric sclera, pink conjunctiva  Neck: supple  CV: normal S1/S2 RRR  Lungs: positive air movement b/l ant lungs,clear to auscultation, no wheezes, no rales  Abdomen: soft non-tender non-distended  Ext: no clubbing cyanosis or edema  Skin: no rashes and no petechiae  Neuro: alert and oriented X 4, no focal deficits  Central Line: PIV    Cultures:  Culture - Urine (03.23.24 @ 18:03)    Specimen Source: Clean Catch Clean Catch (Midstream)   Culture Results:   <10,000 CFU/mL Normal Urogenital Mari    Culture - Urine (03.19.24 @ 18:30)    -  Amoxicillin/Clavulanic Acid: S <=8/4 Consider reserving for cystitis when ampicillin/sulbactam is resistant   -  Ampicillin: R >16 These ampicillin results predict results for amoxicillin   -  Ampicillin/Sulbactam: R >16/8   -  Aztreonam: R >16   -  Cefazolin: R >16 For uncomplicated UTI with K. pneumoniae, E. coli, or P. mirablis: LUPE <=16 is sensitive and LUPE >=32 is resistant. This also predicts results for oral agents cefaclor, cefdinir, cefpodoxime, cefprozil, cefuroxime axetil, cephalexin and locarbef for uncomplicated UTI. Note that some isolates may be susceptible to these agents while testing resistant to cefazolin.   -  Cefepime: R >16   -  Ceftriaxone: R >32   -  Cefuroxime: R >16   -  Ciprofloxacin: R 1   -  Ertapenem: S <=0.5   -  Gentamicin: S <=2   -  Imipenem: S <=1   -  Levofloxacin: I 1   -  Meropenem: S <=1   -  Nitrofurantoin: S <=32 Should not be used to treat pyelonephritis   -  Piperacillin/Tazobactam: S <=8   -  Tobramycin: S <=2   -  Trimethoprim/Sulfamethoxazole: S <=0.5/9.5   Specimen Source: Clean Catch Clean Catch (Midstream)   Culture Results:   >100,000 CFU/ml Escherichia coli ESBL   Organism Identification: Escherichia coli ESBL   Organism: Escherichia coli ESBL   Method Type: LUPE    LABS:     Methotrexate Level, Serum (04.10.24 @ 09:03)    Methotrexate Level, Serum: 0.16: Therapeutic Range                        9.0    9.57  )-----------( 214      ( 10 Apr 2024 09:03 )             29.8     10 Apr 2024 09:03    138    |  101    |  12     ----------------------------<  105    4.0     |  28     |  0.59     Ca    8.5        10 Apr 2024 09:03  Phos  3.8       10 Apr 2024 09:03  Mg     2.3       10 Apr 2024 09:03    TPro  5.3    /  Alb  3.1    /  TBili  0.3    /  DBili  x      /  AST  26     /  ALT  36     /  AlkPhos  139    10 Apr 2024 09:03    LIVER FUNCTIONS - ( 10 Apr 2024 09:03 )  Alb: 3.1 g/dL / Pro: 5.3 g/dL / ALK PHOS: 139 U/L / ALT: 36 U/L / AST: 26 U/L / GGT: x           Urinalysis Basic - ( 10 Apr 2024 09:03 )  Color: x / Appearance: x / SG: x / pH: x  Gluc: 105 mg/dL / Ketone: x  / Bili: x / Urobili: x   Blood: x / Protein: x / Nitrite: x   Leuk Esterase: x / RBC: x / WBC x   Sq Epi: x / Non Sq Epi: x / Bacteria: x     Urinalysis Basic - ( 09 Apr 2024 07:54 )  Color: x / Appearance: x / SG: x / pH: x  Gluc: 104 mg/dL / Ketone: x  / Bili: x / Urobili: x   Blood: x / Protein: x / Nitrite: x   Leuk Esterase: x / RBC: x / WBC x   Sq Epi: x / Non Sq Epi: x / Bacteria: x          RADIOLOGY & ADDITIONAL STUDIES:  from: Xray Lumbar Spine AP + Lateral (04.01.24 @ 16:07)   IMPRESSION:  Postsurgical changes as above.  Compression fracture deformities detailed above. Correlate with recent CT   for more detailed evaluation

## 2024-04-10 NOTE — PROGRESS NOTE ADULT - PROBLEM SELECTOR PLAN 2
Not neutropenic, if febrile pan culture, f/u cx results.  4/8- COVID PCR (-).
Not neutropenic, if febrile pan culture, f/u cx results.  4/8- COVID PCR (-).

## 2024-04-10 NOTE — PROGRESS NOTE ADULT - NS ATTEND AMEND GEN_ALL_CORE FT
DLBCL, GCB subtype, had extensive spinal resection, admitted to  receive high dose MTX for CNS ppx in light of extensive paraspinal  disease.  Received rituxan in OPD, MTX 3.5 gm/sq m 4/8; now to continue to receive LV rescue, IVFs  with NaHCO3, urine alkalinization, I and O, diurese as needed, follow renal fxn.  Follow MTX levels.  Tolerate HD MTX well, no N/V/D.   Encourage OOB. DLBCL, GCB subtype, had extensive spinal resection at T8 and L4, admitted to  receive high dose MTX for CNS ppx in light of extensive paraspinal  disease.  Tumor had TP53, CREBBP, CARD11, FAS, ZMYM3  Received rituxan in OPD, MTX 3.5 gm/sq m 4/8; now to continue to receive LV rescue, IVFs  with NaHCO3, urine alkalinization, I and O, diurese as needed, follow renal fxn.  Follow MTX levels.  MTX level 0.8 4/9  Tolerate HD MTX well, no N/V/D.   If level today < 0.2 can go home on po LV  pending OPD repeat level  Will be due for C2 R-CHOP on day 22  Incr LDH ? myeloid turnover post PEG filgrastim, disease less likely  vs TLS. LDH now trending down.  Encourage OOB.  Outpt f/u Dr. Chatterjee DLBCL, GCB subtype, had extensive spinal resection at T8 and L4, admitted to  receive high dose MTX for CNS ppx in light of extensive paraspinal  disease.  Tumor had TP53, CREBBP, CARD11, FAS, ZMYM3  Received rituxan in OPD, MTX 3.5 gm/sq m 4/8; now to continue to receive LV rescue, IVFs  with NaHCO3, urine alkalinization, I and O, diurese as needed, follow renal fxn.  Follow MTX levels.  MTX level 0.8 4/9  having nausea and vomiting   If level today < 0.2 can go home on po LV  pending OPD repeat level if nausea abates with  medication and taking po well.  Will be due for C2 R-CHOP on day 22  Incr LDH ? myeloid turnover post PEG filgrastim, disease less likely  vs TLS. LDH now trending down.  Encourage OOB.  Outpt f/u Dr. Chatterjee

## 2024-04-10 NOTE — PROGRESS NOTE ADULT - ASSESSMENT
67 yo male Stage IV DLBCL, GCB subtype w/ spinal involvement (multiple pathological fractures and cord compressions) s/p two stage neurosurgical resection and plastics closure, currently on cycle 1 MR-CHOP (rituximab on 3/23, CHOP on 3/24) now admitted for Day 14 treatment of IV HD Methotrexate (3.5g/m2). PMH includes HLD, pre-DM, and b/le LE DVT's on Eliquis.

## 2024-04-11 ENCOUNTER — NON-APPOINTMENT (OUTPATIENT)
Age: 69
End: 2024-04-11

## 2024-04-11 ENCOUNTER — RESULT REVIEW (OUTPATIENT)
Age: 69
End: 2024-04-11

## 2024-04-11 ENCOUNTER — APPOINTMENT (OUTPATIENT)
Dept: HEMATOLOGY ONCOLOGY | Facility: CLINIC | Age: 69
End: 2024-04-11
Payer: MEDICARE

## 2024-04-11 ENCOUNTER — APPOINTMENT (OUTPATIENT)
Dept: INFUSION THERAPY | Facility: HOSPITAL | Age: 69
End: 2024-04-11

## 2024-04-11 LAB
ALBUMIN SERPL ELPH-MCNC: 3.6 G/DL — SIGNIFICANT CHANGE UP (ref 3.3–5)
ALP SERPL-CCNC: 130 U/L — HIGH (ref 40–120)
ALT FLD-CCNC: 39 U/L — SIGNIFICANT CHANGE UP (ref 10–45)
ANION GAP SERPL CALC-SCNC: 9 MMOL/L — SIGNIFICANT CHANGE UP (ref 5–17)
AST SERPL-CCNC: 33 U/L — SIGNIFICANT CHANGE UP (ref 10–40)
BASOPHILS # BLD AUTO: 0.02 K/UL — SIGNIFICANT CHANGE UP (ref 0–0.2)
BASOPHILS NFR BLD AUTO: 0.2 % — SIGNIFICANT CHANGE UP (ref 0–2)
BILIRUB SERPL-MCNC: 0.4 MG/DL — SIGNIFICANT CHANGE UP (ref 0.2–1.2)
BUN SERPL-MCNC: 19 MG/DL — SIGNIFICANT CHANGE UP (ref 7–23)
CALCIUM SERPL-MCNC: 9.4 MG/DL — SIGNIFICANT CHANGE UP (ref 8.4–10.5)
CHLORIDE SERPL-SCNC: 102 MMOL/L — SIGNIFICANT CHANGE UP (ref 96–108)
CO2 SERPL-SCNC: 30 MMOL/L — SIGNIFICANT CHANGE UP (ref 22–31)
CREAT SERPL-MCNC: 0.52 MG/DL — SIGNIFICANT CHANGE UP (ref 0.5–1.3)
EGFR: 111 ML/MIN/1.73M2 — SIGNIFICANT CHANGE UP
EOSINOPHIL # BLD AUTO: 0 K/UL — SIGNIFICANT CHANGE UP (ref 0–0.5)
EOSINOPHIL NFR BLD AUTO: 0 % — SIGNIFICANT CHANGE UP (ref 0–6)
GLUCOSE SERPL-MCNC: 150 MG/DL — HIGH (ref 70–99)
HCT VFR BLD CALC: 27.1 % — LOW (ref 39–50)
HGB BLD-MCNC: 8.5 G/DL — LOW (ref 13–17)
IMM GRANULOCYTES NFR BLD AUTO: 0.6 % — SIGNIFICANT CHANGE UP (ref 0–0.9)
LYMPHOCYTES # BLD AUTO: 0.21 K/UL — LOW (ref 1–3.3)
LYMPHOCYTES # BLD AUTO: 2.1 % — LOW (ref 13–44)
MCHC RBC-ENTMCNC: 27.1 PG — SIGNIFICANT CHANGE UP (ref 27–34)
MCHC RBC-ENTMCNC: 31.4 G/DL — LOW (ref 32–36)
MCV RBC AUTO: 86.3 FL — SIGNIFICANT CHANGE UP (ref 80–100)
MONOCYTES # BLD AUTO: 0.06 K/UL — SIGNIFICANT CHANGE UP (ref 0–0.9)
MONOCYTES NFR BLD AUTO: 0.6 % — LOW (ref 2–14)
NEUTROPHILS # BLD AUTO: 9.42 K/UL — HIGH (ref 1.8–7.4)
NEUTROPHILS NFR BLD AUTO: 96.5 % — HIGH (ref 43–77)
NRBC # BLD: 0 /100 WBCS — SIGNIFICANT CHANGE UP (ref 0–0)
PLATELET # BLD AUTO: 225 K/UL — SIGNIFICANT CHANGE UP (ref 150–400)
POTASSIUM SERPL-MCNC: 4 MMOL/L — SIGNIFICANT CHANGE UP (ref 3.5–5.3)
POTASSIUM SERPL-SCNC: 4 MMOL/L — SIGNIFICANT CHANGE UP (ref 3.5–5.3)
PROT SERPL-MCNC: 6.1 G/DL — SIGNIFICANT CHANGE UP (ref 6–8.3)
RBC # BLD: 3.14 M/UL — LOW (ref 4.2–5.8)
RBC # FLD: 17.1 % — HIGH (ref 10.3–14.5)
SODIUM SERPL-SCNC: 141 MMOL/L — SIGNIFICANT CHANGE UP (ref 135–145)
WBC # BLD: 9.77 K/UL — SIGNIFICANT CHANGE UP (ref 3.8–10.5)
WBC # FLD AUTO: 9.77 K/UL — SIGNIFICANT CHANGE UP (ref 3.8–10.5)

## 2024-04-11 PROCEDURE — G2211 COMPLEX E/M VISIT ADD ON: CPT

## 2024-04-11 PROCEDURE — 99214 OFFICE O/P EST MOD 30 MIN: CPT

## 2024-04-12 DIAGNOSIS — R11.2 NAUSEA WITH VOMITING, UNSPECIFIED: ICD-10-CM

## 2024-04-12 DIAGNOSIS — E86.0 DEHYDRATION: ICD-10-CM

## 2024-04-14 ENCOUNTER — NON-APPOINTMENT (OUTPATIENT)
Age: 69
End: 2024-04-14

## 2024-04-15 ENCOUNTER — RESULT REVIEW (OUTPATIENT)
Age: 69
End: 2024-04-15

## 2024-04-15 ENCOUNTER — APPOINTMENT (OUTPATIENT)
Dept: HEMATOLOGY ONCOLOGY | Facility: CLINIC | Age: 69
End: 2024-04-15

## 2024-04-15 ENCOUNTER — APPOINTMENT (OUTPATIENT)
Dept: HEMATOLOGY ONCOLOGY | Facility: CLINIC | Age: 69
End: 2024-04-15
Payer: MEDICARE

## 2024-04-15 VITALS
HEART RATE: 93 BPM | TEMPERATURE: 98.4 F | WEIGHT: 101.83 LBS | OXYGEN SATURATION: 97 % | DIASTOLIC BLOOD PRESSURE: 78 MMHG | SYSTOLIC BLOOD PRESSURE: 128 MMHG | BODY MASS INDEX: 16.95 KG/M2 | RESPIRATION RATE: 16 BRPM

## 2024-04-15 LAB
BASOPHILS # BLD AUTO: 0.05 K/UL — SIGNIFICANT CHANGE UP (ref 0–0.2)
BASOPHILS NFR BLD AUTO: 0.7 % — SIGNIFICANT CHANGE UP (ref 0–2)
EOSINOPHIL # BLD AUTO: 0.02 K/UL — SIGNIFICANT CHANGE UP (ref 0–0.5)
EOSINOPHIL NFR BLD AUTO: 0.3 % — SIGNIFICANT CHANGE UP (ref 0–6)
HCT VFR BLD CALC: 30.3 % — LOW (ref 39–50)
HGB BLD-MCNC: 9.7 G/DL — LOW (ref 13–17)
IMM GRANULOCYTES NFR BLD AUTO: 1 % — HIGH (ref 0–0.9)
LYMPHOCYTES # BLD AUTO: 0.51 K/UL — LOW (ref 1–3.3)
LYMPHOCYTES # BLD AUTO: 7.5 % — LOW (ref 13–44)
MCHC RBC-ENTMCNC: 26.9 PG — LOW (ref 27–34)
MCHC RBC-ENTMCNC: 32 G/DL — SIGNIFICANT CHANGE UP (ref 32–36)
MCV RBC AUTO: 83.9 FL — SIGNIFICANT CHANGE UP (ref 80–100)
MONOCYTES # BLD AUTO: 0.36 K/UL — SIGNIFICANT CHANGE UP (ref 0–0.9)
MONOCYTES NFR BLD AUTO: 5.3 % — SIGNIFICANT CHANGE UP (ref 2–14)
NEUTROPHILS # BLD AUTO: 5.81 K/UL — SIGNIFICANT CHANGE UP (ref 1.8–7.4)
NEUTROPHILS NFR BLD AUTO: 85.2 % — HIGH (ref 43–77)
NRBC # BLD: 0 /100 WBCS — SIGNIFICANT CHANGE UP (ref 0–0)
PLATELET # BLD AUTO: 259 K/UL — SIGNIFICANT CHANGE UP (ref 150–400)
RBC # BLD: 3.61 M/UL — LOW (ref 4.2–5.8)
RBC # FLD: 16.6 % — HIGH (ref 10.3–14.5)
WBC # BLD: 6.82 K/UL — SIGNIFICANT CHANGE UP (ref 3.8–10.5)
WBC # FLD AUTO: 6.82 K/UL — SIGNIFICANT CHANGE UP (ref 3.8–10.5)

## 2024-04-15 PROCEDURE — G2211 COMPLEX E/M VISIT ADD ON: CPT

## 2024-04-15 PROCEDURE — 99214 OFFICE O/P EST MOD 30 MIN: CPT

## 2024-04-16 ENCOUNTER — NON-APPOINTMENT (OUTPATIENT)
Age: 69
End: 2024-04-16

## 2024-04-16 LAB
ALBUMIN SERPL ELPH-MCNC: 3.9 G/DL
ALP BLD-CCNC: 113 U/L
ALT SERPL-CCNC: 31 U/L
ANION GAP SERPL CALC-SCNC: 14 MMOL/L
AST SERPL-CCNC: 18 U/L
BILIRUB SERPL-MCNC: 0.4 MG/DL
BUN SERPL-MCNC: 13 MG/DL
CALCIUM SERPL-MCNC: 9 MG/DL
CHLORIDE SERPL-SCNC: 98 MMOL/L
CO2 SERPL-SCNC: 26 MMOL/L
CREAT SERPL-MCNC: 0.58 MG/DL
EGFR: 108 ML/MIN/1.73M2
GLUCOSE SERPL-MCNC: 127 MG/DL
POTASSIUM SERPL-SCNC: 3.1 MMOL/L
PROT SERPL-MCNC: 6 G/DL
SODIUM SERPL-SCNC: 138 MMOL/L

## 2024-04-16 NOTE — HISTORY OF PRESENT ILLNESS
[de-identified] : Mr. Solis was last seen: 4/11/24  [de-identified] : Reason for visit: hyperemesis post HD MTX  Since last visit: pain is better controlled by continues to be opiate dependent. Neurontin does not appear to have any affect.   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

## 2024-04-16 NOTE — ASSESSMENT
[FreeTextEntry1] : Assessment: 68-year-old , day +21 MRCHOP cycle 1 for stage IV germinal center DLBCL involving spine s/p 2 neurosurgical intervention. Course complicated by 1) spinal cord disease, 2) elevated LDH at presentation, 3) bilateral DVT; hyperemesis post HD-MTX   PMHx: prediabetes.  Plan: Heme:  PLT goal > 50,000 given full anticoagulation. DOAC delay cycle 2 until day + 28 - likely use HD-MTX post RCHOP given toxicity with first cycle Will obtain spinal MRI day 10 cycle post cycle 2 of RICE (4/1/24).  Pain: D/C Neurontin, add lidocaine patch.  Saturnino to call in 48 hours with a pain update.    Over 35 minutes were spent in direct patient care with greater than 50% managing his pain and disease.

## 2024-04-17 ENCOUNTER — NON-APPOINTMENT (OUTPATIENT)
Age: 69
End: 2024-04-17

## 2024-04-21 ENCOUNTER — NON-APPOINTMENT (OUTPATIENT)
Age: 69
End: 2024-04-21

## 2024-04-22 ENCOUNTER — APPOINTMENT (OUTPATIENT)
Dept: HEMATOLOGY ONCOLOGY | Facility: CLINIC | Age: 69
End: 2024-04-22

## 2024-04-22 ENCOUNTER — RESULT REVIEW (OUTPATIENT)
Age: 69
End: 2024-04-22

## 2024-04-22 ENCOUNTER — APPOINTMENT (OUTPATIENT)
Dept: INFUSION THERAPY | Facility: HOSPITAL | Age: 69
End: 2024-04-22

## 2024-04-22 LAB
ANION GAP SERPL CALC-SCNC: 11 MMOL/L — SIGNIFICANT CHANGE UP (ref 5–17)
BASOPHILS # BLD AUTO: 0.04 K/UL — SIGNIFICANT CHANGE UP (ref 0–0.2)
BASOPHILS NFR BLD AUTO: 0.6 % — SIGNIFICANT CHANGE UP (ref 0–2)
BUN SERPL-MCNC: 17 MG/DL — SIGNIFICANT CHANGE UP (ref 7–23)
CALCIUM SERPL-MCNC: 9.3 MG/DL — SIGNIFICANT CHANGE UP (ref 8.4–10.5)
CHLORIDE SERPL-SCNC: 99 MMOL/L — SIGNIFICANT CHANGE UP (ref 96–108)
CO2 SERPL-SCNC: 27 MMOL/L — SIGNIFICANT CHANGE UP (ref 22–31)
CREAT SERPL-MCNC: 0.45 MG/DL — LOW (ref 0.5–1.3)
EGFR: 116 ML/MIN/1.73M2 — SIGNIFICANT CHANGE UP
EOSINOPHIL # BLD AUTO: 0.05 K/UL — SIGNIFICANT CHANGE UP (ref 0–0.5)
EOSINOPHIL NFR BLD AUTO: 0.8 % — SIGNIFICANT CHANGE UP (ref 0–6)
GLUCOSE SERPL-MCNC: 111 MG/DL — HIGH (ref 70–99)
HCT VFR BLD CALC: 30.1 % — LOW (ref 39–50)
HGB BLD-MCNC: 9.8 G/DL — LOW (ref 13–17)
IMM GRANULOCYTES NFR BLD AUTO: 0.5 % — SIGNIFICANT CHANGE UP (ref 0–0.9)
LYMPHOCYTES # BLD AUTO: 0.22 K/UL — LOW (ref 1–3.3)
LYMPHOCYTES # BLD AUTO: 3.3 % — LOW (ref 13–44)
MCHC RBC-ENTMCNC: 27.8 PG — SIGNIFICANT CHANGE UP (ref 27–34)
MCHC RBC-ENTMCNC: 32.6 G/DL — SIGNIFICANT CHANGE UP (ref 32–36)
MCV RBC AUTO: 85.5 FL — SIGNIFICANT CHANGE UP (ref 80–100)
MONOCYTES # BLD AUTO: 0.16 K/UL — SIGNIFICANT CHANGE UP (ref 0–0.9)
MONOCYTES NFR BLD AUTO: 2.4 % — SIGNIFICANT CHANGE UP (ref 2–14)
NEUTROPHILS # BLD AUTO: 6.14 K/UL — SIGNIFICANT CHANGE UP (ref 1.8–7.4)
NEUTROPHILS NFR BLD AUTO: 92.4 % — HIGH (ref 43–77)
NRBC # BLD: 0 /100 WBCS — SIGNIFICANT CHANGE UP (ref 0–0)
PLATELET # BLD AUTO: 282 K/UL — SIGNIFICANT CHANGE UP (ref 150–400)
POTASSIUM SERPL-MCNC: 4.2 MMOL/L — SIGNIFICANT CHANGE UP (ref 3.5–5.3)
POTASSIUM SERPL-SCNC: 4.2 MMOL/L — SIGNIFICANT CHANGE UP (ref 3.5–5.3)
RBC # BLD: 3.52 M/UL — LOW (ref 4.2–5.8)
RBC # FLD: 17.7 % — HIGH (ref 10.3–14.5)
SODIUM SERPL-SCNC: 137 MMOL/L — SIGNIFICANT CHANGE UP (ref 135–145)
WBC # BLD: 6.64 K/UL — SIGNIFICANT CHANGE UP (ref 3.8–10.5)
WBC # FLD AUTO: 6.64 K/UL — SIGNIFICANT CHANGE UP (ref 3.8–10.5)

## 2024-04-23 DIAGNOSIS — Z51.11 ENCOUNTER FOR ANTINEOPLASTIC CHEMOTHERAPY: ICD-10-CM

## 2024-04-24 PROCEDURE — 97530 THERAPEUTIC ACTIVITIES: CPT | Mod: GP

## 2024-04-24 PROCEDURE — 84145 PROCALCITONIN (PCT): CPT

## 2024-04-24 PROCEDURE — 87040 BLOOD CULTURE FOR BACTERIA: CPT

## 2024-04-24 PROCEDURE — 80048 BASIC METABOLIC PNL TOTAL CA: CPT

## 2024-04-24 PROCEDURE — 80053 COMPREHEN METABOLIC PANEL: CPT

## 2024-04-24 PROCEDURE — 93970 EXTREMITY STUDY: CPT

## 2024-04-24 PROCEDURE — 97161 PT EVAL LOW COMPLEX 20 MIN: CPT | Mod: GP

## 2024-04-24 PROCEDURE — 85027 COMPLETE CBC AUTOMATED: CPT

## 2024-04-24 PROCEDURE — 97110 THERAPEUTIC EXERCISES: CPT | Mod: GP

## 2024-04-24 PROCEDURE — 81001 URINALYSIS AUTO W/SCOPE: CPT

## 2024-04-24 PROCEDURE — 87637 SARSCOV2&INF A&B&RSV AMP PRB: CPT

## 2024-04-24 PROCEDURE — 87186 SC STD MICRODIL/AGAR DIL: CPT

## 2024-04-24 PROCEDURE — 36415 COLL VENOUS BLD VENIPUNCTURE: CPT

## 2024-04-24 PROCEDURE — 83605 ASSAY OF LACTIC ACID: CPT

## 2024-04-24 PROCEDURE — 87086 URINE CULTURE/COLONY COUNT: CPT

## 2024-04-24 PROCEDURE — 85025 COMPLETE CBC W/AUTO DIFF WBC: CPT

## 2024-04-24 PROCEDURE — 97165 OT EVAL LOW COMPLEX 30 MIN: CPT | Mod: GO

## 2024-04-24 PROCEDURE — 0225U NFCT DS DNA&RNA 21 SARSCOV2: CPT

## 2024-04-24 PROCEDURE — 97116 GAIT TRAINING THERAPY: CPT | Mod: GP

## 2024-04-24 PROCEDURE — 97535 SELF CARE MNGMENT TRAINING: CPT | Mod: GO

## 2024-05-02 ENCOUNTER — RESULT REVIEW (OUTPATIENT)
Age: 69
End: 2024-05-02

## 2024-05-06 ENCOUNTER — RESULT REVIEW (OUTPATIENT)
Age: 69
End: 2024-05-06

## 2024-05-06 ENCOUNTER — APPOINTMENT (OUTPATIENT)
Dept: HEMATOLOGY ONCOLOGY | Facility: CLINIC | Age: 69
End: 2024-05-06

## 2024-05-06 ENCOUNTER — APPOINTMENT (OUTPATIENT)
Dept: HEMATOLOGY ONCOLOGY | Facility: CLINIC | Age: 69
End: 2024-05-06
Payer: MEDICARE

## 2024-05-06 VITALS
WEIGHT: 96.98 LBS | BODY MASS INDEX: 16.14 KG/M2 | HEART RATE: 74 BPM | RESPIRATION RATE: 16 BRPM | SYSTOLIC BLOOD PRESSURE: 119 MMHG | DIASTOLIC BLOOD PRESSURE: 79 MMHG | OXYGEN SATURATION: 99 % | TEMPERATURE: 97.5 F

## 2024-05-06 LAB
BASOPHILS # BLD AUTO: 0.12 K/UL — SIGNIFICANT CHANGE UP (ref 0–0.2)
BASOPHILS NFR BLD AUTO: 1 % — SIGNIFICANT CHANGE UP (ref 0–2)
EOSINOPHIL # BLD AUTO: 0 K/UL — SIGNIFICANT CHANGE UP (ref 0–0.5)
EOSINOPHIL NFR BLD AUTO: 0 % — SIGNIFICANT CHANGE UP (ref 0–6)
HCT VFR BLD CALC: 32.2 % — LOW (ref 39–50)
HGB BLD-MCNC: 10 G/DL — LOW (ref 13–17)
LYMPHOCYTES # BLD AUTO: 0.72 K/UL — LOW (ref 1–3.3)
LYMPHOCYTES # BLD AUTO: 6 % — LOW (ref 13–44)
MCHC RBC-ENTMCNC: 27.1 PG — SIGNIFICANT CHANGE UP (ref 27–34)
MCHC RBC-ENTMCNC: 31.1 G/DL — LOW (ref 32–36)
MCV RBC AUTO: 87.3 FL — SIGNIFICANT CHANGE UP (ref 80–100)
METAMYELOCYTES # FLD: 1 % — HIGH (ref 0–0)
MONOCYTES # BLD AUTO: 0.72 K/UL — SIGNIFICANT CHANGE UP (ref 0–0.9)
MONOCYTES NFR BLD AUTO: 6 % — SIGNIFICANT CHANGE UP (ref 2–14)
NEUTROPHILS # BLD AUTO: 10.35 K/UL — HIGH (ref 1.8–7.4)
NEUTROPHILS NFR BLD AUTO: 86 % — HIGH (ref 43–77)
NRBC # BLD: 0 /100 WBCS — SIGNIFICANT CHANGE UP (ref 0–0)
NRBC # BLD: SIGNIFICANT CHANGE UP /100 WBCS (ref 0–0)
PLAT MORPH BLD: NORMAL — SIGNIFICANT CHANGE UP
PLATELET # BLD AUTO: 159 K/UL — SIGNIFICANT CHANGE UP (ref 150–400)
RBC # BLD: 3.69 M/UL — LOW (ref 4.2–5.8)
RBC # FLD: 17.4 % — HIGH (ref 10.3–14.5)
RBC BLD AUTO: SIGNIFICANT CHANGE UP
WBC # BLD: 12.03 K/UL — HIGH (ref 3.8–10.5)
WBC # FLD AUTO: 12.03 K/UL — HIGH (ref 3.8–10.5)

## 2024-05-06 PROCEDURE — 99214 OFFICE O/P EST MOD 30 MIN: CPT

## 2024-05-06 PROCEDURE — G2211 COMPLEX E/M VISIT ADD ON: CPT

## 2024-05-06 NOTE — ASSESSMENT
[FreeTextEntry1] : Assessment: 68-year-old , day +15 cycle 2 RCHOP for stage IV germinal center DLBCL involving spine s/p 2 stage neurosurgical intervention. Course complicated by 1) spinal cord disease, 2) elevated LDH and 3) bilateral DVT. Prolonged cytopenia's with MRCHOP - that has transitioned care to RCHOP to be followed by HI-MTX   PMHx: prediabetes.  Plan: Heme: change RCHOP to q 21; radiographs (Spinal MRI day + 10 cycle 3)   PLT goal > 50,000 given full anticoagulation DOAC   Pain: continue oxycodone  start zanaflex 4mg qhs   Hepatic: Bili/AST/ALT: WNL  Over 35 minutes were spent in direct patient care with greater than 50% discussing his treatment plan.     [Medication(s)] : Medication(s)

## 2024-05-06 NOTE — HISTORY OF PRESENT ILLNESS
[de-identified] : Mr. Solis was last seen: 4/2/24  [de-identified] : Reason for visit: F/u pain management  Since last visit: 2/10 back pain/tighness; BM  He reports he woke up at 3AM w/ NRS 8/10 pain took an Oxy and pain was better. He took MS Contin 6AM and was able to walk around for a bit. In comparison to the last few days pain is much more manageable.  He took Mirlax lastnight and still has not had a BM. Appetite is good was able to eat 100% of his breakfast.   Saturnino does not spontaneously report: fever, chills, sweats, weight loss, skin rashes, petechiae, bruising, eye irritation, hearing loss, mouth sores, sore throat, cough, hemoptysis, pleuritic chest pain, dyspnea, change in vision, focal numbness/weakness, chest pains, palpitations, nausea, vomiting, diarrhea, dysuria, hematuria, bowel or bladder incontinence.  Medications: R CHOP  + Neulasta Oxycodone 5mg Q4PRN  -- three times a day apixaban 5mg bid zyprexa qhs    Examination: Minister Solis is articulate and in no acute distress. No occiput, poster cervical, anterior cervical, submandibular, sublingual, submental, supraclavicular nor axillary adenopathy left paraspinal mass: non-tender Lungs: Clear Cardiac: without rubs Abd: soft and non-tender, Traube's space is tympanitic No inguinal nor femoral adenopathy No sig peripheral edema Gait: using walker; can almost stand on each foot independently

## 2024-05-07 LAB
ALBUMIN SERPL ELPH-MCNC: 4 G/DL
ALP BLD-CCNC: 113 U/L
ALT SERPL-CCNC: 15 U/L
ANION GAP SERPL CALC-SCNC: 9 MMOL/L
AST SERPL-CCNC: 16 U/L
BILIRUB SERPL-MCNC: 0.2 MG/DL
BUN SERPL-MCNC: 16 MG/DL
CALCIUM SERPL-MCNC: 8.9 MG/DL
CHLORIDE SERPL-SCNC: 99 MMOL/L
CO2 SERPL-SCNC: 30 MMOL/L
CREAT SERPL-MCNC: 0.51 MG/DL
EGFR: 112 ML/MIN/1.73M2
GLUCOSE SERPL-MCNC: 102 MG/DL
POTASSIUM SERPL-SCNC: 4 MMOL/L
PROT SERPL-MCNC: 5.7 G/DL
SODIUM SERPL-SCNC: 138 MMOL/L

## 2024-05-13 ENCOUNTER — RESULT REVIEW (OUTPATIENT)
Age: 69
End: 2024-05-13

## 2024-05-13 ENCOUNTER — APPOINTMENT (OUTPATIENT)
Dept: INFUSION THERAPY | Facility: HOSPITAL | Age: 69
End: 2024-05-13

## 2024-05-13 LAB
BASOPHILS # BLD AUTO: 0.12 K/UL — SIGNIFICANT CHANGE UP (ref 0–0.2)
BASOPHILS NFR BLD AUTO: 1.4 % — SIGNIFICANT CHANGE UP (ref 0–2)
EOSINOPHIL # BLD AUTO: 0.13 K/UL — SIGNIFICANT CHANGE UP (ref 0–0.5)
EOSINOPHIL NFR BLD AUTO: 1.5 % — SIGNIFICANT CHANGE UP (ref 0–6)
HCT VFR BLD CALC: 31.8 % — LOW (ref 39–50)
HGB BLD-MCNC: 10.3 G/DL — LOW (ref 13–17)
IMM GRANULOCYTES NFR BLD AUTO: 0.8 % — SIGNIFICANT CHANGE UP (ref 0–0.9)
LYMPHOCYTES # BLD AUTO: 0.81 K/UL — LOW (ref 1–3.3)
LYMPHOCYTES # BLD AUTO: 9.5 % — LOW (ref 13–44)
MCHC RBC-ENTMCNC: 27.6 PG — SIGNIFICANT CHANGE UP (ref 27–34)
MCHC RBC-ENTMCNC: 32.4 G/DL — SIGNIFICANT CHANGE UP (ref 32–36)
MCV RBC AUTO: 85.3 FL — SIGNIFICANT CHANGE UP (ref 80–100)
MONOCYTES # BLD AUTO: 1.1 K/UL — HIGH (ref 0–0.9)
MONOCYTES NFR BLD AUTO: 12.9 % — SIGNIFICANT CHANGE UP (ref 2–14)
NEUTROPHILS # BLD AUTO: 6.28 K/UL — SIGNIFICANT CHANGE UP (ref 1.8–7.4)
NEUTROPHILS NFR BLD AUTO: 73.9 % — SIGNIFICANT CHANGE UP (ref 43–77)
NRBC # BLD: 0 /100 WBCS — SIGNIFICANT CHANGE UP (ref 0–0)
PLATELET # BLD AUTO: 318 K/UL — SIGNIFICANT CHANGE UP (ref 150–400)
RBC # BLD: 3.73 M/UL — LOW (ref 4.2–5.8)
RBC # FLD: 17.3 % — HIGH (ref 10.3–14.5)
WBC # BLD: 8.51 K/UL — SIGNIFICANT CHANGE UP (ref 3.8–10.5)
WBC # FLD AUTO: 8.51 K/UL — SIGNIFICANT CHANGE UP (ref 3.8–10.5)

## 2024-05-20 ENCOUNTER — APPOINTMENT (OUTPATIENT)
Dept: HEMATOLOGY ONCOLOGY | Facility: CLINIC | Age: 69
End: 2024-05-20

## 2024-05-21 ENCOUNTER — OUTPATIENT (OUTPATIENT)
Dept: OUTPATIENT SERVICES | Facility: HOSPITAL | Age: 69
LOS: 1 days | Discharge: HOME | End: 2024-05-21

## 2024-05-21 DIAGNOSIS — C83.30 DIFFUSE LARGE B-CELL LYMPHOMA, UNSPECIFIED SITE: ICD-10-CM

## 2024-05-21 DIAGNOSIS — Z98.890 OTHER SPECIFIED POSTPROCEDURAL STATES: Chronic | ICD-10-CM

## 2024-05-21 DIAGNOSIS — Z90.49 ACQUIRED ABSENCE OF OTHER SPECIFIED PARTS OF DIGESTIVE TRACT: Chronic | ICD-10-CM

## 2024-05-23 ENCOUNTER — OUTPATIENT (OUTPATIENT)
Dept: OUTPATIENT SERVICES | Facility: HOSPITAL | Age: 69
LOS: 1 days | End: 2024-05-23
Payer: MEDICARE

## 2024-05-23 ENCOUNTER — APPOINTMENT (OUTPATIENT)
Dept: MRI IMAGING | Facility: CLINIC | Age: 69
End: 2024-05-23
Payer: MEDICARE

## 2024-05-23 DIAGNOSIS — Z98.890 OTHER SPECIFIED POSTPROCEDURAL STATES: Chronic | ICD-10-CM

## 2024-05-23 DIAGNOSIS — C83.38 DIFFUSE LARGE B-CELL LYMPHOMA, LYMPH NODES OF MULTIPLE SITES: ICD-10-CM

## 2024-05-23 DIAGNOSIS — Z90.49 ACQUIRED ABSENCE OF OTHER SPECIFIED PARTS OF DIGESTIVE TRACT: Chronic | ICD-10-CM

## 2024-05-23 PROCEDURE — 72159 MR ANGIO SPINE W/O&W/DYE: CPT | Mod: 26,MH

## 2024-05-23 PROCEDURE — C8933: CPT

## 2024-05-23 PROCEDURE — A9585: CPT

## 2024-05-28 ENCOUNTER — RESULT REVIEW (OUTPATIENT)
Age: 69
End: 2024-05-28

## 2024-05-28 ENCOUNTER — APPOINTMENT (OUTPATIENT)
Dept: HEMATOLOGY ONCOLOGY | Facility: CLINIC | Age: 69
End: 2024-05-28

## 2024-05-28 ENCOUNTER — APPOINTMENT (OUTPATIENT)
Dept: HEMATOLOGY ONCOLOGY | Facility: CLINIC | Age: 69
End: 2024-05-28
Payer: MEDICARE

## 2024-05-28 VITALS
WEIGHT: 100.09 LBS | BODY MASS INDEX: 16.66 KG/M2 | RESPIRATION RATE: 16 BRPM | OXYGEN SATURATION: 96 % | TEMPERATURE: 98.3 F | SYSTOLIC BLOOD PRESSURE: 115 MMHG | DIASTOLIC BLOOD PRESSURE: 73 MMHG | HEART RATE: 82 BPM

## 2024-05-28 LAB
ALBUMIN SERPL ELPH-MCNC: 4 G/DL
ALP BLD-CCNC: 104 U/L
ALT SERPL-CCNC: 16 U/L
ANION GAP SERPL CALC-SCNC: 12 MMOL/L
AST SERPL-CCNC: 16 U/L
BASOPHILS # BLD AUTO: 0.06 K/UL — SIGNIFICANT CHANGE UP (ref 0–0.2)
BASOPHILS NFR BLD AUTO: 0.8 % — SIGNIFICANT CHANGE UP (ref 0–2)
BILIRUB SERPL-MCNC: 0.3 MG/DL
BUN SERPL-MCNC: 15 MG/DL
CALCIUM SERPL-MCNC: 9.2 MG/DL
CHLORIDE SERPL-SCNC: 103 MMOL/L
CO2 SERPL-SCNC: 27 MMOL/L
CREAT SERPL-MCNC: 0.5 MG/DL
EGFR: 112 ML/MIN/1.73M2
EOSINOPHIL # BLD AUTO: 0.04 K/UL — SIGNIFICANT CHANGE UP (ref 0–0.5)
EOSINOPHIL NFR BLD AUTO: 0.6 % — SIGNIFICANT CHANGE UP (ref 0–6)
GLUCOSE SERPL-MCNC: 103 MG/DL
HCT VFR BLD CALC: 33.7 % — LOW (ref 39–50)
HGB BLD-MCNC: 10.7 G/DL — LOW (ref 13–17)
IMM GRANULOCYTES NFR BLD AUTO: 1.4 % — HIGH (ref 0–0.9)
LYMPHOCYTES # BLD AUTO: 0.41 K/UL — LOW (ref 1–3.3)
LYMPHOCYTES # BLD AUTO: 5.7 % — LOW (ref 13–44)
MCHC RBC-ENTMCNC: 27.8 PG — SIGNIFICANT CHANGE UP (ref 27–34)
MCHC RBC-ENTMCNC: 31.8 G/DL — LOW (ref 32–36)
MCV RBC AUTO: 87.5 FL — SIGNIFICANT CHANGE UP (ref 80–100)
MONOCYTES # BLD AUTO: 0.77 K/UL — SIGNIFICANT CHANGE UP (ref 0–0.9)
MONOCYTES NFR BLD AUTO: 10.7 % — SIGNIFICANT CHANGE UP (ref 2–14)
NEUTROPHILS # BLD AUTO: 5.85 K/UL — SIGNIFICANT CHANGE UP (ref 1.8–7.4)
NEUTROPHILS NFR BLD AUTO: 80.8 % — HIGH (ref 43–77)
NRBC # BLD: 0 /100 WBCS — SIGNIFICANT CHANGE UP (ref 0–0)
PLATELET # BLD AUTO: 223 K/UL — SIGNIFICANT CHANGE UP (ref 150–400)
POTASSIUM SERPL-SCNC: 4.4 MMOL/L
PROT SERPL-MCNC: 5.6 G/DL
RBC # BLD: 3.85 M/UL — LOW (ref 4.2–5.8)
RBC # FLD: 17.2 % — HIGH (ref 10.3–14.5)
SODIUM SERPL-SCNC: 141 MMOL/L
WBC # BLD: 7.23 K/UL — SIGNIFICANT CHANGE UP (ref 3.8–10.5)
WBC # FLD AUTO: 7.23 K/UL — SIGNIFICANT CHANGE UP (ref 3.8–10.5)

## 2024-05-28 PROCEDURE — 99213 OFFICE O/P EST LOW 20 MIN: CPT

## 2024-05-28 PROCEDURE — G2211 COMPLEX E/M VISIT ADD ON: CPT

## 2024-05-28 NOTE — ASSESSMENT
[Medication(s)] : Medication(s) [FreeTextEntry1] : Assessment: 68-year-old , day +16 cycle 3 RCHOP for stage IV germinal center DLBCL involving spine s/p 2 stage neurosurgical intervention. Course complicated by 1) spinal cord disease, 2) elevated LDH and 3) bilateral DVT. Prolonged cytopenia's with MRCHOP - that has transitioned care to RCHOP to be followed by HI-MTX   PMHx: prediabetes.  Plan: Heme: change RCHOP to q 21;  Radiographs: Spinal MRI 5/23 results pending   PLT goal > 50,000 given full anticoagulation C/w DOAC   Pain: continue oxycodone    Hepatic: Bili/AST/ALT: WNL

## 2024-05-28 NOTE — HISTORY OF PRESENT ILLNESS
[de-identified] : Mr. Solis was last seen: 5/6/24  [de-identified] : Reason for visit: F/u pain management  Since last visit: Appetite is good. Back pain is better, still has tightness/ discomfort NRS 2/10.   Saturnino does not spontaneously report: fever, chills, sweats, weight loss, skin rashes, petechiae, bruising, eye irritation, hearing loss, mouth sores, sore throat, cough, hemoptysis, pleuritic chest pain, dyspnea, change in vision, focal numbness/weakness, chest pains, palpitations, nausea, vomiting, diarrhea, dysuria, hematuria, bowel or bladder incontinence.  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 No occiput, poster cervical, anterior cervical, submandibular, sublingual, submental, supraclavicular nor axillary adenopathy left paraspinal mass: non-tender Lungs: Clear Cardiac: without rubs Abd: soft and non-tender, Traube's space is tympanitic No inguinal nor femoral adenopathy + 1 R Tibal edema  Gait: using walker; can almost stand on each foot independently

## 2024-05-29 ENCOUNTER — APPOINTMENT (OUTPATIENT)
Dept: INTERNAL MEDICINE | Facility: CLINIC | Age: 69
End: 2024-05-29
Payer: MEDICARE

## 2024-05-29 ENCOUNTER — APPOINTMENT (OUTPATIENT)
Dept: INTERNAL MEDICINE | Facility: CLINIC | Age: 69
End: 2024-05-29

## 2024-05-29 ENCOUNTER — NON-APPOINTMENT (OUTPATIENT)
Age: 69
End: 2024-05-29

## 2024-05-29 VITALS
DIASTOLIC BLOOD PRESSURE: 75 MMHG | SYSTOLIC BLOOD PRESSURE: 114 MMHG | WEIGHT: 100 LBS | RESPIRATION RATE: 16 BRPM | BODY MASS INDEX: 16.66 KG/M2 | HEIGHT: 65 IN | HEART RATE: 80 BPM | OXYGEN SATURATION: 99 % | TEMPERATURE: 98 F

## 2024-05-29 DIAGNOSIS — R73.03 PREDIABETES.: ICD-10-CM

## 2024-05-29 DIAGNOSIS — E78.5 HYPERLIPIDEMIA, UNSPECIFIED: ICD-10-CM

## 2024-05-29 DIAGNOSIS — Z00.00 ENCOUNTER FOR GENERAL ADULT MEDICAL EXAMINATION W/OUT ABNORMAL FINDINGS: ICD-10-CM

## 2024-05-29 PROCEDURE — G0439: CPT

## 2024-05-29 RX ORDER — CYCLOBENZAPRINE HYDROCHLORIDE 5 MG/1
5 TABLET, FILM COATED ORAL
Qty: 10 | Refills: 0 | Status: DISCONTINUED | COMMUNITY
Start: 2024-02-10

## 2024-05-29 RX ORDER — OLANZAPINE 5 MG/1
5 TABLET, FILM COATED ORAL
Qty: 30 | Refills: 0 | Status: DISCONTINUED | COMMUNITY
Start: 2024-04-18

## 2024-05-29 RX ORDER — LEUCOVORIN CALCIUM 10 MG/1
10 TABLET ORAL
Qty: 60 | Refills: 0 | Status: ACTIVE | COMMUNITY
Start: 2024-04-08

## 2024-05-29 RX ORDER — NALOXONE HYDROCHLORIDE 4 MG/.1ML
4 SPRAY NASAL
Qty: 2 | Refills: 0 | Status: ACTIVE | COMMUNITY
Start: 2024-03-26

## 2024-05-29 RX ORDER — ONDANSETRON 8 MG/1
8 TABLET ORAL
Qty: 30 | Refills: 0 | Status: DISCONTINUED | COMMUNITY
Start: 2024-04-09

## 2024-05-29 RX ORDER — HYDROMORPHONE HYDROCHLORIDE 2 MG/1
2 TABLET ORAL
Qty: 36 | Refills: 0 | Status: DISCONTINUED | COMMUNITY
Start: 2024-03-29 | End: 2024-05-29

## 2024-05-29 NOTE — PLAN
[FreeTextEntry1] : 1. annual physical exam * routine general labs done * age appropriate health maintenance recommendations reviewed, discussed including healthy diet, regular exercise 2. DLBCL * f/u with hematology/oncology * c/w chemotherapy 3. dyslipidemia * low cholesterol, low triglycerides diet, dietary counseling given; dietary avoidance discussed; diet and exercise reviewed with patient 4. prediabetes * Dietary counseling given, dietary avoidance discussed, diet and exercise reviewed with patient; patient reminded of importance of aerobic exercise, weight control, dietary compliance and regular glucose monitoring * to call back for test results * f/u in six months

## 2024-05-29 NOTE — HEALTH RISK ASSESSMENT
[Fair] :  ~his/her~ mood as fair [No] : No [No falls in past year] : Patient reported no falls in the past year [0] : 2) Feeling down, depressed, or hopeless: Not at all (0) [PHQ-2 Negative - No further assessment needed] : PHQ-2 Negative - No further assessment needed [HIV test declined] : HIV test declined [Hepatitis C test declined] : Hepatitis C test declined [With Family] : lives with family [Retired] : retired [] :  [# Of Children ___] : has [unfilled] children [Sexually Active] : sexually active [Smoke Detector] : smoke detector [Carbon Monoxide Detector] : carbon monoxide detector [Seat Belt] :  uses seat belt [[id number]] : 0 [Reports changes in hearing] : Reports no changes in hearing [Reports changes in vision] : Reports no changes in vision [Reports changes in dental health] : Reports no changes in dental health [Guns at Home] : no guns at home [de-identified] : needs help [de-identified] : needs help

## 2024-05-30 LAB
25(OH)D3 SERPL-MCNC: 23.5 NG/ML
APPEARANCE: CLEAR
BACTERIA: NEGATIVE /HPF
BILIRUBIN URINE: NEGATIVE
BLOOD URINE: NEGATIVE
CAST: 0 /LPF
CHOLEST SERPL-MCNC: 161 MG/DL
COLOR: YELLOW
EPITHELIAL CELLS: 0 /HPF
ESTIMATED AVERAGE GLUCOSE: 108 MG/DL
GLUCOSE QUALITATIVE U: NEGATIVE MG/DL
HBA1C MFR BLD HPLC: 5.4 %
HDLC SERPL-MCNC: 49 MG/DL
KETONES URINE: NEGATIVE MG/DL
LDLC SERPL CALC-MCNC: 97 MG/DL
LEUKOCYTE ESTERASE URINE: NEGATIVE
MICROSCOPIC-UA: NORMAL
NITRITE URINE: NEGATIVE
NONHDLC SERPL-MCNC: 112 MG/DL
PH URINE: 6.5
PROTEIN URINE: NEGATIVE MG/DL
PSA FREE FLD-MCNC: 20 %
PSA FREE SERPL-MCNC: 0.43 NG/ML
PSA SERPL-MCNC: 2.15 NG/ML
RED BLOOD CELLS URINE: 2 /HPF
SPECIFIC GRAVITY URINE: 1.02
T4 FREE SERPL-MCNC: 1.2 NG/DL
TRIGL SERPL-MCNC: 77 MG/DL
TSH SERPL-ACNC: 2.78 UIU/ML
UROBILINOGEN URINE: 0.2 MG/DL
VIT B12 SERPL-MCNC: >2000 PG/ML
WHITE BLOOD CELLS URINE: 0 /HPF

## 2024-06-03 ENCOUNTER — RESULT REVIEW (OUTPATIENT)
Age: 69
End: 2024-06-03

## 2024-06-03 ENCOUNTER — APPOINTMENT (OUTPATIENT)
Dept: INFUSION THERAPY | Facility: HOSPITAL | Age: 69
End: 2024-06-03

## 2024-06-03 LAB
BASOPHILS # BLD AUTO: 0.05 K/UL — SIGNIFICANT CHANGE UP (ref 0–0.2)
BASOPHILS NFR BLD AUTO: 0.5 % — SIGNIFICANT CHANGE UP (ref 0–2)
EOSINOPHIL # BLD AUTO: 0.01 K/UL — SIGNIFICANT CHANGE UP (ref 0–0.5)
EOSINOPHIL NFR BLD AUTO: 0.1 % — SIGNIFICANT CHANGE UP (ref 0–6)
HCT VFR BLD CALC: 33.1 % — LOW (ref 39–50)
HGB BLD-MCNC: 10.5 G/DL — LOW (ref 13–17)
IMM GRANULOCYTES NFR BLD AUTO: 0.7 % — SIGNIFICANT CHANGE UP (ref 0–0.9)
LYMPHOCYTES # BLD AUTO: 0.37 K/UL — LOW (ref 1–3.3)
LYMPHOCYTES # BLD AUTO: 3.7 % — LOW (ref 13–44)
MCHC RBC-ENTMCNC: 27.9 PG — SIGNIFICANT CHANGE UP (ref 27–34)
MCHC RBC-ENTMCNC: 31.7 G/DL — LOW (ref 32–36)
MCV RBC AUTO: 87.8 FL — SIGNIFICANT CHANGE UP (ref 80–100)
MONOCYTES # BLD AUTO: 0.16 K/UL — SIGNIFICANT CHANGE UP (ref 0–0.9)
MONOCYTES NFR BLD AUTO: 1.6 % — LOW (ref 2–14)
NEUTROPHILS # BLD AUTO: 9.42 K/UL — HIGH (ref 1.8–7.4)
NEUTROPHILS NFR BLD AUTO: 93.4 % — HIGH (ref 43–77)
NRBC # BLD: 0 /100 WBCS — SIGNIFICANT CHANGE UP (ref 0–0)
PLATELET # BLD AUTO: 309 K/UL — SIGNIFICANT CHANGE UP (ref 150–400)
RBC # BLD: 3.77 M/UL — LOW (ref 4.2–5.8)
RBC # FLD: 16.5 % — HIGH (ref 10.3–14.5)
WBC # BLD: 10.08 K/UL — SIGNIFICANT CHANGE UP (ref 3.8–10.5)
WBC # FLD AUTO: 10.08 K/UL — SIGNIFICANT CHANGE UP (ref 3.8–10.5)

## 2024-06-03 RX ORDER — MORPHINE SULFATE 50 MG/1
1 CAPSULE, EXTENDED RELEASE ORAL
Refills: 0 | DISCHARGE

## 2024-06-03 RX ORDER — OXYCODONE HYDROCHLORIDE 5 MG/1
1 TABLET ORAL
Refills: 0 | DISCHARGE

## 2024-06-03 RX ORDER — LACTULOSE 10 G/15ML
15 SOLUTION ORAL
Qty: 0 | Refills: 0 | DISCHARGE

## 2024-06-04 ENCOUNTER — NON-APPOINTMENT (OUTPATIENT)
Age: 69
End: 2024-06-04

## 2024-06-04 DIAGNOSIS — Z51.89 ENCOUNTER FOR OTHER SPECIFIED AFTERCARE: ICD-10-CM

## 2024-06-04 DIAGNOSIS — C83.38 DIFFUSE LARGE B-CELL LYMPHOMA, LYMPH NODES OF MULTIPLE SITES: ICD-10-CM

## 2024-06-04 DIAGNOSIS — R11.2 NAUSEA WITH VOMITING, UNSPECIFIED: ICD-10-CM

## 2024-06-04 DIAGNOSIS — Z51.11 ENCOUNTER FOR ANTINEOPLASTIC CHEMOTHERAPY: ICD-10-CM

## 2024-06-06 RX ORDER — OXYCODONE 5 MG/1
5 TABLET ORAL EVERY 4 HOURS
Qty: 180 | Refills: 0 | Status: ACTIVE | COMMUNITY
Start: 2024-04-01 | End: 1900-01-01

## 2024-06-17 ENCOUNTER — RESULT REVIEW (OUTPATIENT)
Age: 69
End: 2024-06-17

## 2024-06-17 ENCOUNTER — APPOINTMENT (OUTPATIENT)
Dept: HEMATOLOGY ONCOLOGY | Facility: CLINIC | Age: 69
End: 2024-06-17
Payer: MEDICARE

## 2024-06-17 VITALS
DIASTOLIC BLOOD PRESSURE: 69 MMHG | HEART RATE: 86 BPM | WEIGHT: 104.06 LBS | BODY MASS INDEX: 17.32 KG/M2 | SYSTOLIC BLOOD PRESSURE: 112 MMHG | TEMPERATURE: 97.3 F | OXYGEN SATURATION: 98 % | RESPIRATION RATE: 16 BRPM

## 2024-06-17 DIAGNOSIS — I82.411 ACUTE EMBOLISM AND THROMBOSIS OF RIGHT FEMORAL VEIN: ICD-10-CM

## 2024-06-17 LAB
ALBUMIN SERPL ELPH-MCNC: 4.1 G/DL
ALP BLD-CCNC: 109 U/L
ALT SERPL-CCNC: 15 U/L
ANION GAP SERPL CALC-SCNC: 10 MMOL/L
AST SERPL-CCNC: 17 U/L
BASOPHILS # BLD AUTO: 0.04 K/UL — SIGNIFICANT CHANGE UP (ref 0–0.2)
BASOPHILS NFR BLD AUTO: 0.4 % — SIGNIFICANT CHANGE UP (ref 0–2)
BILIRUB SERPL-MCNC: 0.3 MG/DL
BUN SERPL-MCNC: 14 MG/DL
CALCIUM SERPL-MCNC: 9.2 MG/DL
CHLORIDE SERPL-SCNC: 103 MMOL/L
CO2 SERPL-SCNC: 27 MMOL/L
CREAT SERPL-MCNC: 0.45 MG/DL
EGFR: 116 ML/MIN/1.73M2
EOSINOPHIL # BLD AUTO: 0.01 K/UL — SIGNIFICANT CHANGE UP (ref 0–0.5)
EOSINOPHIL NFR BLD AUTO: 0.1 % — SIGNIFICANT CHANGE UP (ref 0–6)
GLUCOSE SERPL-MCNC: 118 MG/DL
HCT VFR BLD CALC: 34 % — LOW (ref 39–50)
HGB BLD-MCNC: 10.8 G/DL — LOW (ref 13–17)
IMM GRANULOCYTES NFR BLD AUTO: 2.2 % — HIGH (ref 0–0.9)
LYMPHOCYTES # BLD AUTO: 0.55 K/UL — LOW (ref 1–3.3)
LYMPHOCYTES # BLD AUTO: 5.3 % — LOW (ref 13–44)
MCHC RBC-ENTMCNC: 28.1 PG — SIGNIFICANT CHANGE UP (ref 27–34)
MCHC RBC-ENTMCNC: 31.8 G/DL — LOW (ref 32–36)
MCV RBC AUTO: 88.5 FL — SIGNIFICANT CHANGE UP (ref 80–100)
MONOCYTES # BLD AUTO: 0.87 K/UL — SIGNIFICANT CHANGE UP (ref 0–0.9)
MONOCYTES NFR BLD AUTO: 8.3 % — SIGNIFICANT CHANGE UP (ref 2–14)
NEUTROPHILS # BLD AUTO: 8.75 K/UL — HIGH (ref 1.8–7.4)
NEUTROPHILS NFR BLD AUTO: 83.7 % — HIGH (ref 43–77)
NRBC # BLD: 0 /100 WBCS — SIGNIFICANT CHANGE UP (ref 0–0)
PLATELET # BLD AUTO: 177 K/UL — SIGNIFICANT CHANGE UP (ref 150–400)
POTASSIUM SERPL-SCNC: 4 MMOL/L
PROT SERPL-MCNC: 6.1 G/DL
RBC # BLD: 3.84 M/UL — LOW (ref 4.2–5.8)
RBC # FLD: 16.7 % — HIGH (ref 10.3–14.5)
SODIUM SERPL-SCNC: 139 MMOL/L
WBC # BLD: 10.45 K/UL — SIGNIFICANT CHANGE UP (ref 3.8–10.5)
WBC # FLD AUTO: 10.45 K/UL — SIGNIFICANT CHANGE UP (ref 3.8–10.5)

## 2024-06-17 PROCEDURE — 99214 OFFICE O/P EST MOD 30 MIN: CPT

## 2024-06-17 PROCEDURE — G2211 COMPLEX E/M VISIT ADD ON: CPT

## 2024-06-17 RX ORDER — APIXABAN 5 MG/1
5 TABLET, FILM COATED ORAL
Qty: 60 | Refills: 2 | Status: ACTIVE | COMMUNITY
Start: 2024-04-25 | End: 1900-01-01

## 2024-06-17 RX ORDER — LACTULOSE 10 G/15ML
10 SOLUTION ORAL
Qty: 1 | Refills: 1 | Status: ACTIVE | COMMUNITY
Start: 2024-04-02 | End: 1900-01-01

## 2024-06-17 NOTE — HISTORY OF PRESENT ILLNESS
[de-identified] : Mr. Solis was last seen: 5/28/24  [de-identified] : Reason for visit: F/u pain management  Since last visit: back pain slightly increased after neulasta; lactulose effective for constipation.    Saturnino does not spontaneously report: fever, chills, sweats, weight loss, skin rashes, petechiae, bruising, eye irritation, hearing loss, mouth sores, sore throat, cough, hemoptysis, pleuritic chest pain, dyspnea, change in vision, focal numbness/weakness, chest pains, palpitations, nausea, vomiting, diarrhea, dysuria, hematuria, bowel or bladder incontinence.  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 No occiput, poster cervical, anterior cervical, submandibular, sublingual, submental, supraclavicular nor axillary adenopathy left paraspinal mass: non-tender Lungs: Clear Cardiac: without rubs Abd: soft and non-tender, Traube's space is tympanitic No inguinal nor femoral adenopathy No sig  Gait: using walker; can almost stand on each foot independently

## 2024-06-17 NOTE — ASSESSMENT
[Medication(s)] : Medication(s) [FreeTextEntry1] : Assessment: 68-year-old , day +14 cycle 4 RCHOP for stage IV germinal center DLBCL involving spine s/p 2 stage neurosurgical intervention. Course complicated by 1) spinal cord disease, 2) elevated LDH and 3) bilateral DVT. Prolonged cytopenia's with MRCHOP - that has transitioned care to RCHOP to be followed by HI-MTX   PMHx: prediabetes.  Plan: Heme: continue RCHOP x6 to be followed by high dose MTX    PLT goal > 50,000 given full anticoagulation C/w DOAC  Radiographs: Spinal MRI pended below   Pain: continue oxycodone; will discuss with neursurg.     Hepatic: Bili/AST/ALT: WNL  Over 35 minutes were spent in direct patient care with greater than 50% discussing his disease response.   Addendum I: MRI 5/23/24):  Again demonstrated status post T8 corpectomy with fusion of T6-T10 with multilevel transpedicular screws and vertical supporting rods. There is cement material in the T6, T7, T9 and T10 vertebral bodies.  Status post fusion of L2 through the pelvis with transpedicular screws L2-L5 and bilateral sacroiliac screws. There are vertical supporting rods. There is cement material within the L2-L5 vertebral bodies. There is a dorsal subcutaneous fluid collection spanning L2-L5 extending to the laminectomy bed measuring 7.7 x 3.2 cm. There is peripheral enhancement surrounding the fluid cavity. This may represent postsurgical changes. There is a small fluid collection in the laminectomy bed at T8.  There is a moderate L1 compression fracture which is unchanged from CT chest abdomen and pelvis 3/22/2024. There is focal kyphosis. Again demonstrated is a pathologic L4 compression fracture. Again demonstrated is a previously noted T8 compression fracture with corpectomy. The remainder the vertebral body heights are maintained. There are multiple STIR hyperintense enhancing lesions in the thoracic and lumbar spine consistent with osseous metastasis. This is overall improved compared to 3/5/2024. There is interval resolution of the epidural soft tissue tumor in the lumbar spine spanning L2-L5 and in the thoracic spine at the T8 level.  There is interval reexpansion of the thoracic cord at the T8 level. There is no cord compression. There is improved caliber of the spinal canal in the lumbar spine compared to preoperative imaging.  MRA images demonstrates no abnormal vasculature.  IMPRESSION:  Status post fusion T6-T10 and L2 through the pelvis. Again demonstrated are bone marrow infiltrative lesions in the thoracic and lumbar spine consistent with metastasis, significantly improved from prior MRI 3/5/2024. Resolution of epidural soft tissue tumor in the thoracic and lumbar spine with reexpansion of the spinal canal and resolution of cord compression.  Peripherally enhancing fluid collection in the dorsal subcutaneous soft tissues and laminectomy bed spanning L2-L5 which may represent postsurgical changes and clinical correlation is recommended.  Moderate L1 compression fracture which is new from 3/5/2024 but is not significant change from CT chest, abdomen and pelvis 3/22/2024. No new compression fracture.  A radiologist clinical note was sent to Dr. Chatterjee on 5/30/2024 4:37 PM via hospital based secure email.

## 2024-06-20 ENCOUNTER — OUTPATIENT (OUTPATIENT)
Dept: OUTPATIENT SERVICES | Facility: HOSPITAL | Age: 69
LOS: 1 days | End: 2024-06-20
Payer: MEDICARE

## 2024-06-20 ENCOUNTER — RESULT REVIEW (OUTPATIENT)
Age: 69
End: 2024-06-20

## 2024-06-20 ENCOUNTER — APPOINTMENT (OUTPATIENT)
Dept: CT IMAGING | Facility: CLINIC | Age: 69
End: 2024-06-20
Payer: MEDICARE

## 2024-06-20 DIAGNOSIS — Z90.49 ACQUIRED ABSENCE OF OTHER SPECIFIED PARTS OF DIGESTIVE TRACT: Chronic | ICD-10-CM

## 2024-06-20 DIAGNOSIS — Z98.890 OTHER SPECIFIED POSTPROCEDURAL STATES: Chronic | ICD-10-CM

## 2024-06-20 DIAGNOSIS — S22.009A UNSPECIFIED FRACTURE OF UNSPECIFIED THORACIC VERTEBRA, INITIAL ENCOUNTER FOR CLOSED FRACTURE: ICD-10-CM

## 2024-06-20 PROCEDURE — 72128 CT CHEST SPINE W/O DYE: CPT | Mod: 26,MH

## 2024-06-20 PROCEDURE — 72128 CT CHEST SPINE W/O DYE: CPT

## 2024-06-20 PROCEDURE — 76376 3D RENDER W/INTRP POSTPROCES: CPT | Mod: 26

## 2024-06-20 PROCEDURE — 76376 3D RENDER W/INTRP POSTPROCES: CPT

## 2024-06-20 PROCEDURE — 72131 CT LUMBAR SPINE W/O DYE: CPT

## 2024-06-20 PROCEDURE — 72131 CT LUMBAR SPINE W/O DYE: CPT | Mod: 26,MH

## 2024-06-20 RX ORDER — OLANZAPINE 10 MG/1
10 TABLET, FILM COATED ORAL
Qty: 30 | Refills: 2 | Status: ACTIVE | COMMUNITY
Start: 2024-04-12 | End: 1900-01-01

## 2024-06-20 RX ORDER — PREDNISONE 20 MG/1
20 TABLET ORAL
Qty: 25 | Refills: 0 | Status: ACTIVE | COMMUNITY
Start: 2024-04-18 | End: 1900-01-01

## 2024-06-20 RX ORDER — TIZANIDINE HYDROCHLORIDE 4 MG/1
4 CAPSULE ORAL
Qty: 30 | Refills: 0 | Status: ACTIVE | COMMUNITY
Start: 2024-05-06 | End: 1900-01-01

## 2024-06-21 DIAGNOSIS — C83.38 DIFFUSE LARGE B-CELL LYMPHOMA, LYMPH NODES OF MULTIPLE SITES: ICD-10-CM

## 2024-06-24 ENCOUNTER — APPOINTMENT (OUTPATIENT)
Dept: INFUSION THERAPY | Facility: HOSPITAL | Age: 69
End: 2024-06-24

## 2024-06-25 ENCOUNTER — NON-APPOINTMENT (OUTPATIENT)
Age: 69
End: 2024-06-25

## 2024-06-25 RX ORDER — METOCLOPRAMIDE 10 MG/1
10 TABLET ORAL
Qty: 60 | Refills: 0 | Status: ACTIVE | COMMUNITY
Start: 2024-04-30 | End: 1900-01-01

## 2024-06-28 PROBLEM — S22.009A: Status: ACTIVE | Noted: 2024-03-29

## 2024-07-01 ENCOUNTER — APPOINTMENT (OUTPATIENT)
Dept: NEUROSURGERY | Facility: CLINIC | Age: 69
End: 2024-07-01
Payer: MEDICARE

## 2024-07-01 ENCOUNTER — NON-APPOINTMENT (OUTPATIENT)
Age: 69
End: 2024-07-01

## 2024-07-01 VITALS
HEART RATE: 89 BPM | OXYGEN SATURATION: 96 % | DIASTOLIC BLOOD PRESSURE: 74 MMHG | BODY MASS INDEX: 17.33 KG/M2 | HEIGHT: 65 IN | SYSTOLIC BLOOD PRESSURE: 111 MMHG | WEIGHT: 104 LBS

## 2024-07-01 DIAGNOSIS — S22.009A UNSPECIFIED FRACTURE OF UNSPECIFIED THORACIC VERTEBRA, INITIAL ENCOUNTER FOR CLOSED FRACTURE: ICD-10-CM

## 2024-07-01 PROCEDURE — 99215 OFFICE O/P EST HI 40 MIN: CPT

## 2024-07-08 ENCOUNTER — APPOINTMENT (OUTPATIENT)
Dept: HEMATOLOGY ONCOLOGY | Facility: CLINIC | Age: 69
End: 2024-07-08
Payer: MEDICARE

## 2024-07-08 ENCOUNTER — RESULT REVIEW (OUTPATIENT)
Age: 69
End: 2024-07-08

## 2024-07-08 VITALS
BODY MASS INDEX: 17.68 KG/M2 | TEMPERATURE: 98.3 F | HEART RATE: 92 BPM | DIASTOLIC BLOOD PRESSURE: 68 MMHG | SYSTOLIC BLOOD PRESSURE: 112 MMHG | OXYGEN SATURATION: 96 % | WEIGHT: 106.26 LBS | RESPIRATION RATE: 16 BRPM

## 2024-07-08 DIAGNOSIS — C83.38 DIFFUSE LARGE B-CELL LYMPHOMA, LYMPH NODES OF MULTIPLE SITES: ICD-10-CM

## 2024-07-08 LAB
BASOPHILS # BLD AUTO: 0.05 K/UL — SIGNIFICANT CHANGE UP (ref 0–0.2)
BASOPHILS NFR BLD AUTO: 0.5 % — SIGNIFICANT CHANGE UP (ref 0–2)
EOSINOPHIL # BLD AUTO: 0.02 K/UL — SIGNIFICANT CHANGE UP (ref 0–0.5)
EOSINOPHIL NFR BLD AUTO: 0.2 % — SIGNIFICANT CHANGE UP (ref 0–6)
HCT VFR BLD CALC: 34.2 % — LOW (ref 39–50)
HGB BLD-MCNC: 10.7 G/DL — LOW (ref 13–17)
IMM GRANULOCYTES NFR BLD AUTO: 1.6 % — HIGH (ref 0–0.9)
LYMPHOCYTES # BLD AUTO: 0.58 K/UL — LOW (ref 1–3.3)
LYMPHOCYTES # BLD AUTO: 5.4 % — LOW (ref 13–44)
MCHC RBC-ENTMCNC: 28.1 PG — SIGNIFICANT CHANGE UP (ref 27–34)
MCHC RBC-ENTMCNC: 31.3 G/DL — LOW (ref 32–36)
MCV RBC AUTO: 89.8 FL — SIGNIFICANT CHANGE UP (ref 80–100)
MONOCYTES # BLD AUTO: 1.1 K/UL — HIGH (ref 0–0.9)
MONOCYTES NFR BLD AUTO: 10.2 % — SIGNIFICANT CHANGE UP (ref 2–14)
NEUTROPHILS # BLD AUTO: 8.9 K/UL — HIGH (ref 1.8–7.4)
NEUTROPHILS NFR BLD AUTO: 82.1 % — HIGH (ref 43–77)
NRBC # BLD: 0 /100 WBCS — SIGNIFICANT CHANGE UP (ref 0–0)
PLATELET # BLD AUTO: 169 K/UL — SIGNIFICANT CHANGE UP (ref 150–400)
RBC # BLD: 3.81 M/UL — LOW (ref 4.2–5.8)
RBC # FLD: 16.3 % — HIGH (ref 10.3–14.5)
WBC # BLD: 10.82 K/UL — HIGH (ref 3.8–10.5)
WBC # FLD AUTO: 10.82 K/UL — HIGH (ref 3.8–10.5)

## 2024-07-08 PROCEDURE — 99214 OFFICE O/P EST MOD 30 MIN: CPT

## 2024-07-08 PROCEDURE — G2211 COMPLEX E/M VISIT ADD ON: CPT

## 2024-07-08 RX ORDER — DULOXETINE HYDROCHLORIDE 30 MG/1
30 CAPSULE, DELAYED RELEASE PELLETS ORAL
Qty: 30 | Refills: 2 | Status: ACTIVE | COMMUNITY
Start: 2024-07-08 | End: 1900-01-01

## 2024-07-08 RX ORDER — OXYCODONE 5 MG/1
5 TABLET ORAL
Qty: 90 | Refills: 0 | Status: ACTIVE | COMMUNITY
Start: 2024-07-08 | End: 1900-01-01

## 2024-07-15 ENCOUNTER — APPOINTMENT (OUTPATIENT)
Dept: INFUSION THERAPY | Facility: HOSPITAL | Age: 69
End: 2024-07-15

## 2024-07-23 ENCOUNTER — OUTPATIENT (OUTPATIENT)
Dept: OUTPATIENT SERVICES | Facility: HOSPITAL | Age: 69
LOS: 1 days | Discharge: HOME | End: 2024-07-23

## 2024-07-23 DIAGNOSIS — C83.30 DIFFUSE LARGE B-CELL LYMPHOMA, UNSPECIFIED SITE: ICD-10-CM

## 2024-07-29 ENCOUNTER — RESULT REVIEW (OUTPATIENT)
Age: 69
End: 2024-07-29

## 2024-07-29 ENCOUNTER — APPOINTMENT (OUTPATIENT)
Dept: HEMATOLOGY ONCOLOGY | Facility: CLINIC | Age: 69
End: 2024-07-29
Payer: MEDICARE

## 2024-07-29 VITALS
BODY MASS INDEX: 17.61 KG/M2 | RESPIRATION RATE: 16 BRPM | OXYGEN SATURATION: 98 % | DIASTOLIC BLOOD PRESSURE: 72 MMHG | SYSTOLIC BLOOD PRESSURE: 114 MMHG | WEIGHT: 105.82 LBS | HEART RATE: 72 BPM | TEMPERATURE: 98.3 F

## 2024-07-29 LAB
BASOPHILS # BLD AUTO: 0.09 K/UL — SIGNIFICANT CHANGE UP (ref 0–0.2)
BASOPHILS NFR BLD AUTO: 1 % — SIGNIFICANT CHANGE UP (ref 0–2)
EOSINOPHIL # BLD AUTO: 0 K/UL — SIGNIFICANT CHANGE UP (ref 0–0.5)
EOSINOPHIL NFR BLD AUTO: 0 % — SIGNIFICANT CHANGE UP (ref 0–6)
HCT VFR BLD CALC: 32.2 % — LOW (ref 39–50)
HGB BLD-MCNC: 10.4 G/DL — LOW (ref 13–17)
LYMPHOCYTES # BLD AUTO: 0.85 K/UL — LOW (ref 1–3.3)
LYMPHOCYTES # BLD AUTO: 10 % — LOW (ref 13–44)
MCHC RBC-ENTMCNC: 28.3 PG — SIGNIFICANT CHANGE UP (ref 27–34)
MCHC RBC-ENTMCNC: 32.3 G/DL — SIGNIFICANT CHANGE UP (ref 32–36)
MCV RBC AUTO: 87.7 FL — SIGNIFICANT CHANGE UP (ref 80–100)
MONOCYTES # BLD AUTO: 1.19 K/UL — HIGH (ref 0–0.9)
MONOCYTES NFR BLD AUTO: 14 % — SIGNIFICANT CHANGE UP (ref 2–14)
NEUTROPHILS # BLD AUTO: 6.4 K/UL — SIGNIFICANT CHANGE UP (ref 1.8–7.4)
NEUTROPHILS NFR BLD AUTO: 75 % — SIGNIFICANT CHANGE UP (ref 43–77)
NRBC # BLD: 0 /100 WBCS — SIGNIFICANT CHANGE UP (ref 0–0)
NRBC # BLD: SIGNIFICANT CHANGE UP /100 WBCS (ref 0–0)
PLAT MORPH BLD: NORMAL — SIGNIFICANT CHANGE UP
PLATELET # BLD AUTO: 163 K/UL — SIGNIFICANT CHANGE UP (ref 150–400)
RBC # BLD: 3.67 M/UL — LOW (ref 4.2–5.8)
RBC # FLD: 15.9 % — HIGH (ref 10.3–14.5)
RBC BLD AUTO: SIGNIFICANT CHANGE UP
WBC # BLD: 8.53 K/UL — SIGNIFICANT CHANGE UP (ref 3.8–10.5)
WBC # FLD AUTO: 8.53 K/UL — SIGNIFICANT CHANGE UP (ref 3.8–10.5)

## 2024-07-29 PROCEDURE — 99214 OFFICE O/P EST MOD 30 MIN: CPT

## 2024-07-29 PROCEDURE — G2211 COMPLEX E/M VISIT ADD ON: CPT

## 2024-07-29 NOTE — HISTORY OF PRESENT ILLNESS
[de-identified] : Mr. Solis was last seen: 7/8/24  [de-identified] : Reason for visit: DLBCL  Since last visit:  feeling okay, using walker.   Did not start cymbalta.  Pain: 1-2/10 but he is stoic.    Saturnino does not spontaneously report: fever, chills, sweats, weight loss, skin rashes, petechiae, bruising, eye irritation, hearing loss, mouth sores, sore throat, cough, hemoptysis, pleuritic chest pain, dyspnea, change in vision, focal numbness/weakness, chest pains, palpitations, nausea, vomiting, diarrhea, dysuria, hematuria, bowel or bladder incontinence.  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 No occiput, poster cervical, anterior cervical, submandibular, sublingual, submental, supraclavicular nor axillary adenopathy left paraspinal mass: non-tender Lungs: Clear Cardiac: without rubs Abd: soft and non-tender, Traube's space is tympanitic No inguinal nor femoral adenopathy Trace to 1+ pretibial edema bilaterally. Gait: using walker; can almost stand on each foot independently;

## 2024-07-29 NOTE — HISTORY OF PRESENT ILLNESS
[de-identified] : Mr. Solis was last seen: 7/8/24  [de-identified] : Reason for visit: DLBCL  Since last visit:  feeling okay, using walker.   Did not start cymbalta.  Pain: 1-2/10 but he is stoic.    Saturnino does not spontaneously report: fever, chills, sweats, weight loss, skin rashes, petechiae, bruising, eye irritation, hearing loss, mouth sores, sore throat, cough, hemoptysis, pleuritic chest pain, dyspnea, change in vision, focal numbness/weakness, chest pains, palpitations, nausea, vomiting, diarrhea, dysuria, hematuria, bowel or bladder incontinence.  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 No occiput, poster cervical, anterior cervical, submandibular, sublingual, submental, supraclavicular nor axillary adenopathy left paraspinal mass: non-tender Lungs: Clear Cardiac: without rubs Abd: soft and non-tender, Traube's space is tympanitic No inguinal nor femoral adenopathy Trace to 1+ pretibial edema bilaterally. Gait: using walker; can almost stand on each foot independently;

## 2024-07-29 NOTE — ASSESSMENT
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 Course complicated by 1) spinal cord disease, 2) elevated LDH and 3) bilateral DVT. Prolonged cytopenia's with MRCHOP - that has transitioned care to RCHOP to be followed by HI-MTX   PMHx: prediabetes.  Plan: Heme: RCHOP to be followed by high dose 3.5g/mm MTX q 14-21  - starting three weeks post cycle 6 (arrange hospitalizing for: 8/5/24)  Planning 3-4 cycles.   PLT goal > 50,000 given full anticoagulation C/w DOAC  Radiographs: Spinal MRI pended below, Dr. Partida is planning next radiographs end of August.    Pain: continue oxycodone; Cymbalta 30mg qhs (will start)  Change Zyprexa to every other night.   Over 35 minutes were spent in direct patient care with greater than 50% discussing his disease response and planned hospitalization.   Addendum I: MRI 5/23/24):  Again demonstrated status post T8 corpectomy with fusion of T6-T10 with multilevel transpedicular screws and vertical supporting rods. There is cement material in the T6, T7, T9 and T10 vertebral bodies.  Status post fusion of L2 through the pelvis with transpedicular screws L2-L5 and bilateral sacroiliac screws. There are vertical supporting rods. There is cement material within the L2-L5 vertebral bodies. There is a dorsal subcutaneous fluid collection spanning L2-L5 extending to the laminectomy bed measuring 7.7 x 3.2 cm. There is peripheral enhancement surrounding the fluid cavity. This may represent postsurgical changes. There is a small fluid collection in the laminectomy bed at T8.  There is a moderate L1 compression fracture which is unchanged from CT chest abdomen and pelvis 3/22/2024. There is focal kyphosis. Again demonstrated is a pathologic L4 compression fracture. Again demonstrated is a previously noted T8 compression fracture with corpectomy. The remainder the vertebral body heights are maintained. There are multiple STIR hyperintense enhancing lesions in the thoracic and lumbar spine consistent with osseous metastasis. This is overall improved compared to 3/5/2024. There is interval resolution of the epidural soft tissue tumor in the lumbar spine spanning L2-L5 and in the thoracic spine at the T8 level.  There is interval reexpansion of the thoracic cord at the T8 level. There is no cord compression. There is improved caliber of the spinal canal in the lumbar spine compared to preoperative imaging.  MRA images demonstrates no abnormal vasculature.  IMPRESSION:  Status post fusion T6-T10 and L2 through the pelvis. Again demonstrated are bone marrow infiltrative lesions in the thoracic and lumbar spine consistent with metastasis, significantly improved from prior MRI 3/5/2024. Resolution of epidural soft tissue tumor in the thoracic and lumbar spine with reexpansion of the spinal canal and resolution of cord compression.  Peripherally enhancing fluid collection in the dorsal subcutaneous soft tissues and laminectomy bed spanning L2-L5 which may represent postsurgical changes and clinical correlation is recommended.  Moderate L1 compression fracture which is new from 3/5/2024 but is not significant change from CT chest, abdomen and pelvis 3/22/2024. No new compression fracture.  A radiologist clinical note was sent to Dr. Chatterjee on 5/30/2024 4:37 PM via hospital based secure email.     [AdvancecareDate] : 07/08/24

## 2024-08-05 ENCOUNTER — INPATIENT (INPATIENT)
Facility: HOSPITAL | Age: 69
LOS: 2 days | Discharge: HOME | DRG: 842 | End: 2024-08-08
Attending: INTERNAL MEDICINE | Admitting: INTERNAL MEDICINE
Payer: MEDICARE

## 2024-08-05 ENCOUNTER — TRANSCRIPTION ENCOUNTER (OUTPATIENT)
Age: 69
End: 2024-08-05

## 2024-08-05 VITALS
HEART RATE: 74 BPM | DIASTOLIC BLOOD PRESSURE: 70 MMHG | HEIGHT: 64.5 IN | SYSTOLIC BLOOD PRESSURE: 123 MMHG | RESPIRATION RATE: 18 BRPM | TEMPERATURE: 97 F | OXYGEN SATURATION: 98 % | WEIGHT: 107.81 LBS

## 2024-08-05 DIAGNOSIS — I82.409 ACUTE EMBOLISM AND THROMBOSIS OF UNSPECIFIED DEEP VEINS OF UNSPECIFIED LOWER EXTREMITY: ICD-10-CM

## 2024-08-05 DIAGNOSIS — Z98.890 OTHER SPECIFIED POSTPROCEDURAL STATES: Chronic | ICD-10-CM

## 2024-08-05 DIAGNOSIS — C83.30 DIFFUSE LARGE B-CELL LYMPHOMA, UNSPECIFIED SITE: ICD-10-CM

## 2024-08-05 DIAGNOSIS — C83.38 DIFFUSE LARGE B-CELL LYMPHOMA, LYMPH NODES OF MULTIPLE SITES: ICD-10-CM

## 2024-08-05 DIAGNOSIS — Z90.49 ACQUIRED ABSENCE OF OTHER SPECIFIED PARTS OF DIGESTIVE TRACT: Chronic | ICD-10-CM

## 2024-08-05 DIAGNOSIS — B99.9 UNSPECIFIED INFECTIOUS DISEASE: ICD-10-CM

## 2024-08-05 DIAGNOSIS — Z29.9 ENCOUNTER FOR PROPHYLACTIC MEASURES, UNSPECIFIED: ICD-10-CM

## 2024-08-05 LAB
ALBUMIN SERPL ELPH-MCNC: 3.9 G/DL — SIGNIFICANT CHANGE UP (ref 3.3–5)
ALP SERPL-CCNC: 86 U/L — SIGNIFICANT CHANGE UP (ref 40–120)
ALT FLD-CCNC: 16 U/L — SIGNIFICANT CHANGE UP (ref 10–45)
ANION GAP SERPL CALC-SCNC: 11 MMOL/L — SIGNIFICANT CHANGE UP (ref 5–17)
AST SERPL-CCNC: 18 U/L — SIGNIFICANT CHANGE UP (ref 10–40)
BASOPHILS # BLD AUTO: 0 K/UL — SIGNIFICANT CHANGE UP (ref 0–0.2)
BASOPHILS NFR BLD AUTO: 0 % — SIGNIFICANT CHANGE UP (ref 0–2)
BILIRUB SERPL-MCNC: 0.2 MG/DL — SIGNIFICANT CHANGE UP (ref 0.2–1.2)
BUN SERPL-MCNC: 16 MG/DL — SIGNIFICANT CHANGE UP (ref 7–23)
CALCIUM SERPL-MCNC: 9 MG/DL — SIGNIFICANT CHANGE UP (ref 8.4–10.5)
CHLORIDE SERPL-SCNC: 100 MMOL/L — SIGNIFICANT CHANGE UP (ref 96–108)
CO2 SERPL-SCNC: 26 MMOL/L — SIGNIFICANT CHANGE UP (ref 22–31)
CREAT SERPL-MCNC: 0.5 MG/DL — SIGNIFICANT CHANGE UP (ref 0.5–1.3)
EGFR: 112 ML/MIN/1.73M2 — SIGNIFICANT CHANGE UP
EOSINOPHIL # BLD AUTO: 0 K/UL — SIGNIFICANT CHANGE UP (ref 0–0.5)
EOSINOPHIL NFR BLD AUTO: 0 % — SIGNIFICANT CHANGE UP (ref 0–6)
GLUCOSE SERPL-MCNC: 127 MG/DL — HIGH (ref 70–99)
HCT VFR BLD CALC: 33.7 % — LOW (ref 39–50)
HGB BLD-MCNC: 10.2 G/DL — LOW (ref 13–17)
LYMPHOCYTES # BLD AUTO: 0.34 K/UL — LOW (ref 1–3.3)
LYMPHOCYTES # BLD AUTO: 7.1 % — LOW (ref 13–44)
MAGNESIUM SERPL-MCNC: 2.2 MG/DL — SIGNIFICANT CHANGE UP (ref 1.6–2.6)
MCHC RBC-ENTMCNC: 27.1 PG — SIGNIFICANT CHANGE UP (ref 27–34)
MCHC RBC-ENTMCNC: 30.3 GM/DL — LOW (ref 32–36)
MCV RBC AUTO: 89.4 FL — SIGNIFICANT CHANGE UP (ref 80–100)
MONOCYTES # BLD AUTO: 0.76 K/UL — SIGNIFICANT CHANGE UP (ref 0–0.9)
MONOCYTES NFR BLD AUTO: 16 % — HIGH (ref 2–14)
NEUTROPHILS # BLD AUTO: 3.66 K/UL — SIGNIFICANT CHANGE UP (ref 1.8–7.4)
NEUTROPHILS NFR BLD AUTO: 72.9 % — SIGNIFICANT CHANGE UP (ref 43–77)
PH UR: 7.5 — SIGNIFICANT CHANGE UP (ref 5–8)
PH UR: 8 — SIGNIFICANT CHANGE UP (ref 5–8)
PH UR: 8.5 (ref 5–8)
PH UR: 8.5 (ref 5–8)
PHOSPHATE SERPL-MCNC: 3.6 MG/DL — SIGNIFICANT CHANGE UP (ref 2.5–4.5)
PLATELET # BLD AUTO: 236 K/UL — SIGNIFICANT CHANGE UP (ref 150–400)
POTASSIUM SERPL-MCNC: 4.2 MMOL/L — SIGNIFICANT CHANGE UP (ref 3.5–5.3)
POTASSIUM SERPL-SCNC: 4.2 MMOL/L — SIGNIFICANT CHANGE UP (ref 3.5–5.3)
PROT SERPL-MCNC: 5.8 G/DL — LOW (ref 6–8.3)
RBC # BLD: 3.77 M/UL — LOW (ref 4.2–5.8)
RBC # FLD: 15.7 % — HIGH (ref 10.3–14.5)
SODIUM SERPL-SCNC: 137 MMOL/L — SIGNIFICANT CHANGE UP (ref 135–145)
WBC # BLD: 4.76 K/UL — SIGNIFICANT CHANGE UP (ref 3.8–10.5)
WBC # FLD AUTO: 4.76 K/UL — SIGNIFICANT CHANGE UP (ref 3.8–10.5)

## 2024-08-05 RX ORDER — METOCLOPRAMIDE HCL 5 MG/ML
1 VIAL (ML) INJECTION
Qty: 0 | Refills: 0 | DISCHARGE

## 2024-08-05 RX ORDER — OXYCODONE HYDROCHLORIDE 30 MG/1
5 TABLET ORAL EVERY 4 HOURS
Refills: 0 | Status: DISCONTINUED | OUTPATIENT
Start: 2024-08-05 | End: 2024-08-08

## 2024-08-05 RX ORDER — ONDANSETRON HCL/PF 4 MG/2 ML
16 VIAL (ML) INJECTION EVERY 24 HOURS
Refills: 0 | Status: COMPLETED | OUTPATIENT
Start: 2024-08-05 | End: 2024-08-06

## 2024-08-05 RX ORDER — APIXABAN 5 MG/1
5 TABLET, FILM COATED ORAL EVERY 12 HOURS
Refills: 0 | Status: DISCONTINUED | OUTPATIENT
Start: 2024-08-05 | End: 2024-08-08

## 2024-08-05 RX ORDER — LEUCOVORIN CALCIUM 10 MG/ML
2 INJECTION INTRAMUSCULAR; INTRAVENOUS
Qty: 56 | Refills: 0
Start: 2024-08-05 | End: 2024-08-11

## 2024-08-05 RX ORDER — LEUCOVORIN CALCIUM 10 MG/ML
1 INJECTION INTRAMUSCULAR; INTRAVENOUS
Qty: 0 | Refills: 0 | DISCHARGE

## 2024-08-05 RX ORDER — LEUCOVORIN CALCIUM 10 MG/ML
1 INJECTION INTRAMUSCULAR; INTRAVENOUS
Qty: 28 | Refills: 0
Start: 2024-08-05 | End: 2024-08-11

## 2024-08-05 RX ORDER — DULOXETINE HCL 20 MG
30 CAPSULE,DELAYED RELEASE (ENTERIC COATED) ORAL DAILY
Refills: 0 | Status: DISCONTINUED | OUTPATIENT
Start: 2024-08-05 | End: 2024-08-08

## 2024-08-05 RX ORDER — ACETAMINOPHEN 500 MG
650 TABLET ORAL EVERY 6 HOURS
Refills: 0 | Status: DISCONTINUED | OUTPATIENT
Start: 2024-08-05 | End: 2024-08-08

## 2024-08-05 RX ORDER — TIZANIDINE HYDROCHLORIDE 2 MG/1
4 CAPSULE ORAL DAILY
Refills: 0 | Status: DISCONTINUED | OUTPATIENT
Start: 2024-08-05 | End: 2024-08-08

## 2024-08-05 RX ORDER — LACTULOSE 10 G/15 ML
10 SOLUTION, ORAL ORAL DAILY
Refills: 0 | Status: DISCONTINUED | OUTPATIENT
Start: 2024-08-05 | End: 2024-08-08

## 2024-08-05 RX ORDER — SODIUM BICARBONATE 0.9MEQ/ML
0.23 SYRINGE (ML) INTRAVENOUS
Qty: 150 | Refills: 0 | Status: DISCONTINUED | OUTPATIENT
Start: 2024-08-05 | End: 2024-08-08

## 2024-08-05 RX ORDER — DULOXETINE HCL 20 MG
1 CAPSULE,DELAYED RELEASE (ENTERIC COATED) ORAL
Qty: 0 | Refills: 0 | DISCHARGE
Start: 2024-08-05

## 2024-08-05 RX ORDER — METHOTREXATE 2.5 MG/1
5285 TABLET ORAL ONCE
Refills: 0 | Status: COMPLETED | OUTPATIENT
Start: 2024-08-05 | End: 2024-08-05

## 2024-08-05 RX ADMIN — Medication 150 MEQ/KG/HR: at 10:43

## 2024-08-05 RX ADMIN — Medication 10 MILLIGRAM(S): at 21:49

## 2024-08-05 RX ADMIN — Medication 15 MILLILITER(S): at 21:49

## 2024-08-05 RX ADMIN — APIXABAN 5 MILLIGRAM(S): 5 TABLET, FILM COATED ORAL at 17:03

## 2024-08-05 RX ADMIN — METHOTREXATE 5285 MILLIGRAM(S): 2.5 TABLET ORAL at 15:08

## 2024-08-05 RX ADMIN — Medication 15 MILLILITER(S): at 13:37

## 2024-08-05 RX ADMIN — OXYCODONE HYDROCHLORIDE 5 MILLIGRAM(S): 30 TABLET ORAL at 21:49

## 2024-08-05 RX ADMIN — Medication 116 MILLIGRAM(S): at 14:25

## 2024-08-05 RX ADMIN — Medication 30 MILLIGRAM(S): at 17:03

## 2024-08-05 RX ADMIN — OXYCODONE HYDROCHLORIDE 5 MILLIGRAM(S): 30 TABLET ORAL at 22:49

## 2024-08-05 RX ADMIN — Medication 150 MEQ/KG/HR: at 21:49

## 2024-08-05 NOTE — H&P ADULT - PROBLEM SELECTOR PLAN 1
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  Monitor electrolytes, replete as needed.  Daily weight. Strict I/O, antemetics   Urine alkalization with D5W with 150 meq NaHCo3@ 150 cc/hr  Monitor urine pH prior to initiation of MTX, >7.5 then Q 6 hrs.  Methotrexate 3500 mg/m2 over 3 hrs when urine pH >7.5.  Leucovorin 25 mg IVSS q 6 hrs after start of MTX and continue until MTX <0.05um  Monitor MTX level 24 hrs after start of MTX then daily.  Discharge planning this week DLBCL (diffuse large B cell lymphoma)/ CD10+ DLBCL, positive for BCL-2 rearrangement, negative for MYC rearrangement s/p 6 cycles of RCHOP) now admitted cycle 2 HD MTX ( 3.5g/m2)   Admit to 7 kelly  Insert IVL   Monitor CBC with Diff, transfuse as needed.  Monitor electrolytes, replete as needed.  Daily weight. Strict I/O, antemetics   Urine alkalization with D5W with 150 meq NaHCo3@ 150 cc/hr  Monitor urine pH prior to initiation of MTX, >7.5 then Q 6 hrs.  Methotrexate 3500 mg/m2 over 3 hrs when urine pH >7.5.  Leucovorin 25 mg IVSS q 6 hrs after start of MTX and continue until MTX <0.05um  Monitor MTX level 24 hrs after start of MTX then daily.  Discharge planning this week

## 2024-08-05 NOTE — DISCHARGE NOTE PROVIDER - NSDCQMAMI_CARD_ALL_CORE
5/20/2021              Alexandro Barrios         Dear [facility],      Your recent pap was completely normal with negative HPV testing. I still need to see you once a year for annual gyne exams.  Remember
No

## 2024-08-05 NOTE — H&P ADULT - NEUROLOGICAL
Paresthesias in right thigh. negative Paresthesias in right thigh./normal/cranial nerves II-XII intact/sensation intact

## 2024-08-05 NOTE — H&P ADULT - HISTORY OF PRESENT ILLNESS
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 Patient is now s/p cycle 6  RCHOP on 7/15 and admitted for HD MTX (3.5gm/m2)     Patient presented to [facility] on 3/5/24 with a history of progressively worsening lower extremity numbness/paresthesias and weakness with onset Jan 2024. He underwent x-rays & MRI imaging as outpatient on 2/29/24 showing concern for osseous metastatic disease in thoracic/lumbar/sacral spine as well as extension of soft tissue into the paravertebral soft tissues more prominent on the right side. 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 Course complicated by 1) spinal cord disease, 2) elevated LDH and 3) bilateral DVT. First cycle of MR-CHOP was started 3/23/24.  8-year-old male patient with past medical history of HLD, pre-Diabetes Mellitus,B/L LE DVT on eliquis,  DLBCL (diffuse large B cell lymphoma)/ CD10+ DLBCL, positive for BCL-2 rearrangement, negative for MYC rearrangement who received 1 cycle of MR-CHOP with prolonged cytopenias and  transitioned  to RCHOP in which he completed 6 cycles (last 7/15) and now  admitted for cycle 2  HD MTX (3.5gm/m2) (cycle 1 HD MTX 4/8/24)     Patient presented to [facility] on 3/5/24 with a history of progressively worsening lower extremity numbness/paresthesias and weakness with onset Jan 2024. He underwent x-rays & MRI imaging as outpatient on 2/29/24 showing concern for osseous metastatic disease in thoracic/lumbar/sacral spine as well as extension of soft tissue into the paravertebral soft tissues more prominent on the right side. 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 Course complicated by 1) spinal cord disease, 2) elevated LDH and 3) bilateral DVT. First cycle of MR-CHOP was started 3/23/24.

## 2024-08-05 NOTE — H&P ADULT - ASSESSMENT
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 Patient is now s/p cycle 6  RCHOP on 7/15 and admitted for HD MTX (3.5gm/m2)       8-year-old male patient with past medical history of HLD, pre-Diabetes Mellitus,B/L LE DVT on eliquis,  DLBCL (diffuse large B cell lymphoma)/ CD10+ DLBCL, positive for BCL-2 rearrangement, negative for MYC rearrangement who received 1 cycle of MR-CHOP with prolonged cytopenias and  transitioned  to RCHOP in which he completed 6 cycles (last 7/15) and now  admitted for cycle 2  HD MTX (3.5gm/m2) (cycle 1 HD MTX 4/8/24)

## 2024-08-05 NOTE — PHYSICAL THERAPY INITIAL EVALUATION ADULT - ADDITIONAL COMMENTS
Pt resides in a pvt home w/ daughters, 4 steps to enter (+HR), first floor set up inside. PTA pt was independent w/ all functional mobility & ADL's. Utilized a RW for ambulation, also owns cane, shower chair, & transport wheelchair.

## 2024-08-05 NOTE — PHYSICAL THERAPY INITIAL EVALUATION ADULT - PERTINENT HX OF CURRENT PROBLEM, REHAB EVAL
68 y.o. M PMH HLD, pre-Diabetes Mellitus,B/L LE DVT on eliquis,  DLBCL (diffuse large B cell lymphoma)/ CD10+ DLBCL, positive for BCL-2 rearrangement, negative for MYC rearrangement who received 2 cycles of MR-CHOP with prolonged cytopenias and has transitioned care to RCHOP to be followed by HD-MTX. Patient is now s/p cycle 6  RCHOP on 7/15 and admitted for HD MTX (3.5gm/m2)     Patient presented to [facility] on 3/5/24 with a history of progressively worsening lower extremity numbness/paresthesias and weakness with onset Jan 2024. He underwent x-rays & MRI imaging as outpatient on 2/29/24 showing concern for osseous metastatic disease in thoracic/lumbar/sacral spine as well as extension of soft tissue into the paravertebral soft tissues more prominent on the right side. 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 Course complicated by 1) spinal cord disease, 2) elevated LDH and 3) bilateral DVT. First cycle of MR-CHOP was started 3/23/24.

## 2024-08-05 NOTE — DISCHARGE NOTE PROVIDER - NSDCACTIVITY_GEN_ALL_CORE
Walking - Indoors allowed Walking - Indoors allowed/No heavy lifting/straining/Walking - Outdoors allowed

## 2024-08-05 NOTE — DISCHARGE NOTE PROVIDER - HOSPITAL COURSE
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 Patient is now s/p cycle 6  RCHOP on 7/15 and admitted for HD MTX (3.5gm/m2) 68-year-old male patient with past medical history of HLD, pre-Diabetes Mellitus,B/L LE DVT on eliquis,  DLBCL (diffuse large B cell lymphoma)/ CD10+ DLBCL, positive for BCL-2 rearrangement, negative for MYC rearrangement who received 2 cycles of MR-CHOP with prolonged cytopenias and has transitioned care to RCHOP to be followed by HD-MTX. Patient is now s/p cycle 6  RCHOP on 7/15 and admitted for HD MTX (3.5gm/m2) Methotrexate was started after hydrating with D5W with NaHCo3, monitored  Urine pH closely  maintained >7.5, monitored MTX level Q 24 hrs after completion of MTX, Leucovorin was started 24 hrs post MTX, evaluated by physical therapy.  Patient received IVF and antiemetics, strict I/O  and monitoring of CBC and electrolytes. Tolerated chemotherapy without complications. Stable for discharge home and follow up as an outpatient.     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 Patient is now s/p cycle 6  RCHOP on 7/15 and admitted for HD MTX (3.5gm/m2) Methotrexate was started after hydrating with D5W with NaHCo3, monitored  Urine pH closely  maintained >7.5, monitored MTX level Q 24 hrs after completion of MTX, Leucovorin was started 24 hrs post MTX.  Patient received IVF and antiemetics, strict I/O  and monitoring of CBC and electrolytes. Tolerated chemotherapy without complications. Stable for discharge home and follow up as an outpatient.     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 Patient is now s/p cycle 6  RCHOP on 7/15 and admitted for HD MTX (3.5gm/m2) . IV hydration started with sodium bicarb to achieve urine ph >7.5 for HD MTX and then q6 to maintain at 8.5. Once alkalinized, Methotrexate 3.5 gm/m2 IV over 3 hours when urine ph >7.5. Leucovorin rescue to start post methotrexate . MTX levels were monitored daily. CBC and electrolytes were monitored. Strict I/O, daily weights, diuresis PRN, antiemetics. Patient tolerated chemotherapy without adverse effects. Methotrexate level at discharge was --,  will continue Leucovorin for -- hrs at home then stop      8-year-old male patient with past medical history of HLD, pre-Diabetes Mellitus,B/L LE DVT on eliquis,  DLBCL (diffuse large B cell lymphoma)/ CD10+ DLBCL, positive for BCL-2 rearrangement, negative for MYC rearrangement who received 1 cycle of MR-CHOP with prolonged cytopenias and  transitioned  to RCHOP in which he completed 6 cycles (last 7/15) and now  admitted for cycle 2  HD MTX (3.5gm/m2) (cycle 1 HD MTX 4/8/24) . IV hydration started with sodium bicarb to achieve urine ph >7.5 for HD MTX and then q6 to maintain at 8.5. Once alkalinized, Methotrexate 3.5 gm/m2 IV over 3 hours when urine ph >7.5. Leucovorin rescue to start post methotrexate . MTX levels were monitored daily. CBC and electrolytes were monitored. Strict I/O, daily weights, diuresis PRN, antiemetics. Patient tolerated chemotherapy without adverse effects. Methotrexate level at discharge was --,  will continue Leucovorin for -- hrs at home then stop      8-year-old male patient with past medical history of HLD, pre-Diabetes Mellitus,B/L LE DVT on eliquis,  DLBCL (diffuse large B cell lymphoma)/ CD10+ DLBCL, positive for BCL-2 rearrangement, negative for MYC rearrangement who received 1 cycle of MR-CHOP with prolonged cytopenias and  transitioned  to RCHOP in which he completed 6 cycles (last 7/15) and now  admitted for cycle 2  HD MTX (3.5gm/m2) (cycle 1 HD MTX 4/8/24) . IV hydration started with sodium bicarb to achieve urine ph >7.5 for HD MTX and then q6 to maintain at 8.5. Once alkalinized, Methotrexate 3.5 gm/m2 IV over 3 hours when urine ph >7.5. Leucovorin rescue to start post methotrexate . MTX levels were monitored daily. CBC and electrolytes were monitored. Strict I/O, daily weights, diuresis PRN, antiemetics. Patient tolerated chemotherapy without adverse effects. Methotrexate level at discharge was _________,  will continue Leucovorin for 5mg PO q 6 hrs at home then stop after 3 days.    Patient now stable for discharge home with outpatient follow-up.     68-year-old male patient with past medical history of HLD, pre-Diabetes Mellitus,B/L LE DVT on eliquis,  DLBCL (diffuse large B cell lymphoma)/ CD10+ DLBCL, positive for BCL-2 rearrangement, negative for MYC rearrangement who received 1 cycle of MR-CHOP with prolonged cytopenias and  transitioned  to RCHOP in which he completed 6 cycles (last 7/15) and now  admitted for cycle 2  HD MTX (3.5gm/m2) (cycle 1 HD MTX 4/8/24) . IV hydration started with sodium bicarb to achieve urine ph >7.5 for HD MTX and then q6 to maintain at 8.5. Once alkalinized, Methotrexate 3.5 gm/m2 IV over 3 hours when urine ph >7.5. Leucovorin rescue to start post methotrexate . MTX levels were monitored daily. CBC and electrolytes were monitored. Strict I/O, daily weights, diuresis PRN, antiemetics. Patient tolerated chemotherapy without adverse effects. Methotrexate level at discharge was 0.09,  will continue Leucovorin for 5mg PO q 6 hrs at home then stop after 2 days.    Patient now stable for discharge home with outpatient follow-up.

## 2024-08-05 NOTE — DISCHARGE NOTE PROVIDER - NSFOLLOWUPCLINICS_GEN_ALL_ED_FT
[facility]  Hematology/Oncology  [address] [id number]  Phone: [phone]  Fax:

## 2024-08-05 NOTE — DISCHARGE NOTE PROVIDER - NSDCFUADDAPPT_GEN_ALL_CORE_FT
You have an appointment on Monday 8/12 at 9am for labs and then to see Dr. Chatterjee  You have an appointment on Monday 8/12 at 9am for labs and then 930amto see Dr. Chatterjee

## 2024-08-05 NOTE — ADVANCED PRACTICE NURSE CONSULT - ASSESSMENT
Pt admitted today for chemotherapy treatment, patient A&O4. Left 20G PIV inserted today, flushing easily with positive blood return. Site intact, No s/s of bleeding, swelling or redness. Labs reviewed by Dr. Tatum on rounds. Urine PH 8.5. Chemotherapy teaching provided, well understood. 2 certified RN verification done. Pt premedicated with Zofran 16 mg Iv. At 15:08 Methotrexate 3.5 gm/m2= 5285 mg Iv started infusing over 3 hrs through left PIV via alaris pump, Pt tolerating well. primary RN aware of plan of care. Safety maintained.

## 2024-08-05 NOTE — DISCHARGE NOTE PROVIDER - CARE PROVIDER_API CALL
Tobias Chatterjee  Medical Oncology  [address] [place] [id number]  Phone: [phone]  Fax: [phone]  Follow Up Time:

## 2024-08-05 NOTE — PATIENT PROFILE ADULT - FALL HARM RISK - HARM RISK INTERVENTIONS

## 2024-08-05 NOTE — DISCHARGE NOTE PROVIDER - NSDCMRMEDTOKEN_GEN_ALL_CORE_FT
acetaminophen 325 mg oral tablet: 2 tab(s) orally every 6 hours as needed for Mild Pain (1 - 3)  apixaban 5 mg oral tablet: 1 tab(s) orally 2 times a day  fluticasone 50 mcg/inh nasal spray: 1 spray(s) nasal 2 times a day  lactulose 10 g/15 mL oral syrup: 15 milliliter(s) orally once a day As needed for constipation.  MS Contin 15 mg/12 hr oral tablet, extended release: 1 tab(s) orally 2 times a day  ondansetron 8 mg oral tablet: 1 tab(s) orally every 8 hours as needed for  nausea MDD: 3  oxyCODONE 5 mg oral tablet: 1 tab(s) orally every 4 hours as needed for Severe back pain.  Reglan 10 mg oral tablet: 1 tab(s) orally every 6 hours as needed for  nausea MDD: 4   apixaban 5 mg oral tablet: 1 tab(s) orally 2 times a day  DULoxetine 30 mg oral delayed release capsule: 1 cap(s) orally once a day  lactulose 10 g/15 mL oral syrup: 15 milliliter(s) orally once a day As needed for constipation.  leucovorin 5 mg oral tablet: 1 tab(s) orally every 6 hours  OLANZapine 10 mg oral tablet: 1 tab(s) orally every other day (at bedtime)  oxyCODONE 5 mg oral tablet: 1 tab(s) orally every 4 hours as needed for Severe back pain.  Reglan 10 mg oral tablet: 1 tab(s) orally every 6 hours as needed for  nausea  tiZANidine 4 mg oral capsule: 2 cap(s) orally once a day (at bedtime)   apixaban 5 mg oral tablet: 1 tab(s) orally 2 times a day  DULoxetine 30 mg oral delayed release capsule: 1 cap(s) orally once a day  lactulose 10 g/15 mL oral syrup: 15 milliliter(s) orally once a day As needed for constipation.  leucovorin 5 mg oral tablet: 1 tab(s) orally every 6 hours Continue for 3 days and then stop  OLANZapine 10 mg oral tablet: 1 tab(s) orally every other day (at bedtime)  oxyCODONE 5 mg oral tablet: 1 tab(s) orally every 4 hours as needed for Severe back pain.  Reglan 10 mg oral tablet: 1 tab(s) orally every 6 hours as needed for  nausea  tiZANidine 4 mg oral capsule: 2 cap(s) orally once a day (at bedtime)   apixaban 5 mg oral tablet: 1 tab(s) orally 2 times a day  DULoxetine 30 mg oral delayed release capsule: 1 cap(s) orally once a day  lactulose 10 g/15 mL oral syrup: 15 milliliter(s) orally once a day As needed for constipation.  leucovorin 5 mg oral tablet: 1 tab(s) orally every 6 hours  OLANZapine 10 mg oral tablet: 1 tab(s) orally every other day (at bedtime)  oxyCODONE 5 mg oral tablet: 1 tab(s) orally every 4 hours as needed for Severe back pain.  tiZANidine 4 mg oral capsule: 2 cap(s) orally once a day (at bedtime)  Zofran 8 mg oral tablet: 1 tab(s) orally every 8 hours as needed for  nausea   apixaban 5 mg oral tablet: 1 tab(s) orally 2 times a day  DULoxetine 30 mg oral delayed release capsule: 1 cap(s) orally once a day  lactulose 10 g/15 mL oral syrup: 15 milliliter(s) orally once a day As needed for constipation.  leucovorin 5 mg oral tablet: 2 tab(s) orally every 6 hours Continue for 3 days and then stop  OLANZapine 10 mg oral tablet: 1 tab(s) orally every other day (at bedtime)  oxyCODONE 5 mg oral tablet: 1 tab(s) orally every 4 hours as needed for Severe back pain.  tiZANidine 4 mg oral capsule: 2 cap(s) orally once a day (at bedtime)  Zofran 8 mg oral tablet: 1 tab(s) orally every 8 hours as needed for  nausea   apixaban 5 mg oral tablet: 1 tab(s) orally 2 times a day  DULoxetine 30 mg oral delayed release capsule: 1 cap(s) orally once a day  lactulose 10 g/15 mL oral syrup: 15 milliliter(s) orally once a day As needed for constipation.  leucovorin 5 mg oral tablet: 2 tab(s) orally every 6 hours Continue for 2 days and then stop  OLANZapine 10 mg oral tablet: 1 tab(s) orally every other day (at bedtime)  oxyCODONE 5 mg oral tablet: 1 tab(s) orally every 4 hours as needed for Severe back pain.  tiZANidine 4 mg oral capsule: 2 cap(s) orally once a day (at bedtime)  Zofran 8 mg oral tablet: 1 tab(s) orally every 8 hours as needed for  nausea

## 2024-08-05 NOTE — DISCHARGE NOTE PROVIDER - NSDCFUSCHEDAPPT_GEN_ALL_CORE_FT
Patricio Bautista  [facility]  [address]  Scheduled Appointment: 08/29/2024    [facility]  CATSCAN [address]  Scheduled Appointment: 09/03/2024    [facility]  [address]  Scheduled Appointment: 09/03/2024    Bhaskar Partida  [facility]  NEUROSURG [address] [facility]  Scheduled Appointment: 09/05/2024     Dwaine Rooney  [facility] Physician [facility]  [facility]  Scheduled Appointment: 08/09/2024    Patricio Bautista  [facility] Physician [facility]  INTMED [address]  Scheduled Appointment: 08/29/2024    [facility]  [address]  Scheduled Appointment: 09/03/2024    [facility]  [address]  Scheduled Appointment: 09/03/2024    Bhaskar Partida  [facility]  NEUROSURG [address] [facility]  Scheduled Appointment: 09/05/2024     [facility]  [facility]  Scheduled Appointment: 08/12/2024    Tobias Chattejree  [facility]  [facility]  Scheduled Appointment: 08/12/2024    Patricio Bautista  [facility]  INTMED [address]  Scheduled Appointment: 08/29/2024    [facility]  [address]  Scheduled Appointment: 09/03/2024    [facility]  [address]SC[address]  Scheduled Appointment: 09/03/2024    Bhaskar Partida  [facility]  NEUROSURG [address] [facility]  Scheduled Appointment: 09/05/2024

## 2024-08-05 NOTE — H&P ADULT - PROBLEM SELECTOR PLAN 4
Continue Eliquis, encourage ambulation.  PT- consult- patient fall risk, unsteady gait PT ordered   Lactulose PRN for constipation.

## 2024-08-05 NOTE — DISCHARGE NOTE PROVIDER - NSDCFUADDINST_GEN_ALL_CORE_FT
You have an appointment on Friday 8/9 at 9:30 am to see Dwaine DURAN  You have an appointment on Monday 8/12 at 9am for labs and then to see Dr. Chatterjee

## 2024-08-05 NOTE — H&P ADULT - NSHPLABSRESULTS_GEN_ALL_CORE
LABS:                          10.2   4.76  )-----------( 236      ( 05 Aug 2024 11:07 )             33.7         Mean Cell Volume : 89.4 fl  Mean Cell Hemoglobin : 27.1 pg  Mean Cell Hemoglobin Concentration : 30.3 gm/dL  Auto Neutrophil # : x  Auto Lymphocyte # : x  Auto Monocyte # : x  Auto Eosinophil # : x  Auto Basophil # : x  Auto Neutrophil % : x  Auto Lymphocyte % : x  Auto Monocyte % : x  Auto Eosinophil % : x  Auto Basophil % : x      08-05    137  |  100  |  16  ----------------------------<  127<H>  4.2   |  26  |  0.50    Ca    9.0      05 Aug 2024 11:07  Phos  3.6     08-05  Mg     2.2     08-05    TPro  5.8<L>  /  Alb  3.9  /  TBili  0.2  /  DBili  x   /  AST  18  /  ALT  16  /  AlkPhos  86  08-05      Mg 2.2  Phos 3.6

## 2024-08-06 LAB
ALBUMIN SERPL ELPH-MCNC: 3.8 G/DL — SIGNIFICANT CHANGE UP (ref 3.3–5)
ALP SERPL-CCNC: 91 U/L — SIGNIFICANT CHANGE UP (ref 40–120)
ALT FLD-CCNC: 23 U/L — SIGNIFICANT CHANGE UP (ref 10–45)
ANION GAP SERPL CALC-SCNC: 11 MMOL/L — SIGNIFICANT CHANGE UP (ref 5–17)
AST SERPL-CCNC: 26 U/L — SIGNIFICANT CHANGE UP (ref 10–40)
BASOPHILS # BLD AUTO: 0 K/UL — SIGNIFICANT CHANGE UP (ref 0–0.2)
BASOPHILS NFR BLD AUTO: 0 % — SIGNIFICANT CHANGE UP (ref 0–2)
BILIRUB SERPL-MCNC: 0.6 MG/DL — SIGNIFICANT CHANGE UP (ref 0.2–1.2)
BUN SERPL-MCNC: 8 MG/DL — SIGNIFICANT CHANGE UP (ref 7–23)
CALCIUM SERPL-MCNC: 9.2 MG/DL — SIGNIFICANT CHANGE UP (ref 8.4–10.5)
CHLORIDE SERPL-SCNC: 101 MMOL/L — SIGNIFICANT CHANGE UP (ref 96–108)
CO2 SERPL-SCNC: 29 MMOL/L — SIGNIFICANT CHANGE UP (ref 22–31)
CREAT SERPL-MCNC: 0.48 MG/DL — LOW (ref 0.5–1.3)
EGFR: 114 ML/MIN/1.73M2 — SIGNIFICANT CHANGE UP
EOSINOPHIL # BLD AUTO: 0 K/UL — SIGNIFICANT CHANGE UP (ref 0–0.5)
EOSINOPHIL NFR BLD AUTO: 0 % — SIGNIFICANT CHANGE UP (ref 0–6)
GLUCOSE SERPL-MCNC: 116 MG/DL — HIGH (ref 70–99)
HCT VFR BLD CALC: 35.1 % — LOW (ref 39–50)
HGB BLD-MCNC: 11.4 G/DL — LOW (ref 13–17)
LYMPHOCYTES # BLD AUTO: 0.26 K/UL — LOW (ref 1–3.3)
LYMPHOCYTES # BLD AUTO: 7.6 % — LOW (ref 13–44)
MAGNESIUM SERPL-MCNC: 2.3 MG/DL — SIGNIFICANT CHANGE UP (ref 1.6–2.6)
MCHC RBC-ENTMCNC: 28.1 PG — SIGNIFICANT CHANGE UP (ref 27–34)
MCHC RBC-ENTMCNC: 32.5 GM/DL — SIGNIFICANT CHANGE UP (ref 32–36)
MCV RBC AUTO: 86.7 FL — SIGNIFICANT CHANGE UP (ref 80–100)
MONOCYTES # BLD AUTO: 0.85 K/UL — SIGNIFICANT CHANGE UP (ref 0–0.9)
MONOCYTES NFR BLD AUTO: 24.6 % — HIGH (ref 2–14)
MTX SERPL-SCNC: 0.92 UMOL/L — SIGNIFICANT CHANGE UP (ref 0.5–5)
NEUTROPHILS # BLD AUTO: 2.35 K/UL — SIGNIFICANT CHANGE UP (ref 1.8–7.4)
NEUTROPHILS NFR BLD AUTO: 62.7 % — SIGNIFICANT CHANGE UP (ref 43–77)
PH UR: 8.5 (ref 5–8)
PH UR: >=9 (ref 5–8)
PHOSPHATE SERPL-MCNC: 3.4 MG/DL — SIGNIFICANT CHANGE UP (ref 2.5–4.5)
PLATELET # BLD AUTO: 246 K/UL — SIGNIFICANT CHANGE UP (ref 150–400)
POTASSIUM SERPL-MCNC: 3.4 MMOL/L — LOW (ref 3.5–5.3)
POTASSIUM SERPL-SCNC: 3.4 MMOL/L — LOW (ref 3.5–5.3)
PROT SERPL-MCNC: 5.9 G/DL — LOW (ref 6–8.3)
RBC # BLD: 4.05 M/UL — LOW (ref 4.2–5.8)
RBC # FLD: 15.4 % — HIGH (ref 10.3–14.5)
SODIUM SERPL-SCNC: 141 MMOL/L — SIGNIFICANT CHANGE UP (ref 135–145)
WBC # BLD: 3.47 K/UL — LOW (ref 3.8–10.5)
WBC # FLD AUTO: 3.47 K/UL — LOW (ref 3.8–10.5)

## 2024-08-06 PROCEDURE — 99233 SBSQ HOSP IP/OBS HIGH 50: CPT | Mod: FS

## 2024-08-06 RX ORDER — LEUCOVORIN CALCIUM 10 MG/ML
25 INJECTION INTRAMUSCULAR; INTRAVENOUS EVERY 6 HOURS
Refills: 0 | Status: DISCONTINUED | OUTPATIENT
Start: 2024-08-06 | End: 2024-08-08

## 2024-08-06 RX ORDER — POTASSIUM CHLORIDE 1500 MG/1
20 TABLET, EXTENDED RELEASE ORAL ONCE
Refills: 0 | Status: COMPLETED | OUTPATIENT
Start: 2024-08-06 | End: 2024-08-06

## 2024-08-06 RX ADMIN — APIXABAN 5 MILLIGRAM(S): 5 TABLET, FILM COATED ORAL at 05:52

## 2024-08-06 RX ADMIN — APIXABAN 5 MILLIGRAM(S): 5 TABLET, FILM COATED ORAL at 18:12

## 2024-08-06 RX ADMIN — OXYCODONE HYDROCHLORIDE 5 MILLIGRAM(S): 30 TABLET ORAL at 22:47

## 2024-08-06 RX ADMIN — OXYCODONE HYDROCHLORIDE 5 MILLIGRAM(S): 30 TABLET ORAL at 21:47

## 2024-08-06 RX ADMIN — Medication 15 MILLILITER(S): at 21:47

## 2024-08-06 RX ADMIN — Medication 15 MILLILITER(S): at 05:52

## 2024-08-06 RX ADMIN — Medication 150 MEQ/KG/HR: at 13:29

## 2024-08-06 RX ADMIN — POTASSIUM CHLORIDE 20 MILLIEQUIVALENT(S): 1500 TABLET, EXTENDED RELEASE ORAL at 11:40

## 2024-08-06 RX ADMIN — Medication 116 MILLIGRAM(S): at 13:29

## 2024-08-06 RX ADMIN — Medication 30 MILLIGRAM(S): at 18:12

## 2024-08-06 RX ADMIN — Medication 150 MEQ/KG/HR: at 05:52

## 2024-08-06 RX ADMIN — LEUCOVORIN CALCIUM 25 MILLIGRAM(S): 10 INJECTION INTRAMUSCULAR; INTRAVENOUS at 15:08

## 2024-08-06 RX ADMIN — Medication 15 MILLILITER(S): at 13:29

## 2024-08-06 RX ADMIN — TIZANIDINE HYDROCHLORIDE 4 MILLIGRAM(S): 2 CAPSULE ORAL at 21:47

## 2024-08-06 RX ADMIN — LEUCOVORIN CALCIUM 25 MILLIGRAM(S): 10 INJECTION INTRAMUSCULAR; INTRAVENOUS at 21:10

## 2024-08-06 NOTE — PROGRESS NOTE ADULT - ASSESSMENT
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 Patient is now s/p cycle 6 RCHOP on 7/15 and admitted for HD MTX (3.5gm/m2).

## 2024-08-06 NOTE — PROGRESS NOTE ADULT - SUBJECTIVE AND OBJECTIVE BOX
Diagnosis: DLBCL Stage IV     Protocol/Chemo Regimen: HD MTX (s/p RCHOP 7/15)     Day: 2     Subjective:     Review of Systems: Denies nausea, vomiting, diarrhea, chest pain, SOB     Pain scale: -     Diet: Regular     Allergies: No Known Drug Allergies, latex (Rash)    ----------------------------------             Diagnosis: DLBCL Stage IV     Protocol/Chemo Regimen: HD MTX (s/p RCHOP 7/15)     Day: 2     Subjective: no acute complaints     Review of Systems: Denies nausea, vomiting, diarrhea, chest pain, SOB     Pain scale: -     Diet: Regular     Allergies: No Known Drug Allergies, latex (Rash)    HEME/ONC MEDICATIONS  apixaban 5 milliGRAM(s) Oral every 12 hours    STANDING MEDICATIONS  Biotene Dry Mouth Oral Rinse 15 milliLiter(s) Swish and Spit three times a day  DULoxetine 30 milliGRAM(s) Oral daily  leucovorin IVPB (eMAR) 25 milliGRAM(s) IV Intermittent every 6 hours  OLANZapine 10 milliGRAM(s) Oral <User Schedule>  sodium bicarbonate  Infusion 0.46 mEq/kG/Hr IV Continuous <Continuous>    PRN MEDICATIONS  acetaminophen     Tablet .. 650 milliGRAM(s) Oral every 6 hours PRN  lactulose Syrup 10 Gram(s) Oral daily PRN  oxyCODONE    IR 5 milliGRAM(s) Oral every 4 hours PRN  tiZANidine 4 milliGRAM(s) Oral daily PRN    Vital Signs Last 24 Hrs  T(C): 36.4 (06 Aug 2024 13:00), Max: 36.7 (05 Aug 2024 16:57)  T(F): 97.5 (06 Aug 2024 13:00), Max: 98.1 (05 Aug 2024 16:57)  HR: 77 (06 Aug 2024 13:00) (64 - 77)  BP: 124/78 (06 Aug 2024 13:00) (117/75 - 132/79)  BP(mean): --  RR: 18 (06 Aug 2024 13:00) (18 - 18)  SpO2: 98% (06 Aug 2024 13:00) (95% - 98%)    Parameters below as of 06 Aug 2024 13:00  Patient On (Oxygen Delivery Method): room air    PHYSICAL EXAM  General: adult in NAD  HEENT: clear oropharynx, no erythema, no ulcers  Neck: supple  CV: normal S1, S2, RRR  Lungs: clear to auscultation, no wheezes, no rales  Abdomen: soft, nontender, nondistended, normal BS  Ext: no edema  Skin: no rash  Neuro: alert and oriented x 3    LABS:                        11.4   3.47  )-----------( 246      ( 06 Aug 2024 10:18 )             35.1     Mean Cell Volume : 86.7 fl  Mean Cell Hemoglobin : 28.1 pg  Mean Cell Hemoglobin Concentration : 32.5 gm/dL  Auto Neutrophil # : 2.35 K/uL  Auto Lymphocyte # : 0.26 K/uL  Auto Monocyte # : 0.85 K/uL  Auto Eosinophil # : 0.00 K/uL  Auto Basophil # : 0.00 K/uL  Auto Neutrophil % : 62.7 %  Auto Lymphocyte % : 7.6 %  Auto Monocyte % : 24.6 %  Auto Eosinophil % : 0.0 %  Auto Basophil % : 0.0 %    08-06    141  |  101  |  8   ----------------------------<  116<H>  3.4<L>   |  29  |  0.48<L>    Ca    9.2      06 Aug 2024 10:18  Phos  3.4     08-06  Mg     2.3     08-06    TPro  5.9<L>  /  Alb  3.8  /  TBili  0.6  /  DBili  x   /  AST  26  /  ALT  23  /  AlkPhos  91  08-06    Mg 2.3  Phos 3.4

## 2024-08-06 NOTE — PROGRESS NOTE ADULT - NS ATTEND AMEND GEN_ALL_CORE FT
Primary: [place]    DLBCL, GCB subtype, had extensive spinal resection at T8 and L4, admitted to receive cycle 2 high dose MTX for CNS ppx in light of extensive paraspinal disease. Initial HD-MTX during MR-CHOP in April 2024. Tumor had TP53, CREBBP, CARD11, FAS, ZMYM3.    Heme:  - Completed R-CHOP (completed 6 cycles 7/15/24), has received 8 doses of R since March 2024.  - MTX 3.5 gm/sq m on 8/5/24, now to continue to receive LV rescue, IVFs with NaHCO3, urine alkalinization, I and O, diurese as needed, follow renal fxn.  - Follow MTX levels.    DVT/VTE ppx:  - Hx of DVT on Apixaban    Dispo:  -Outpt f/u Dr. Chatterjee.

## 2024-08-06 NOTE — PROGRESS NOTE ADULT - PROBLEM SELECTOR PLAN 1
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  Monitor electrolytes, replete as needed.  Daily weight. Strict I/O, antemetics   Urine alkalization with D5W with 150 meq NaHCo3@ 150 cc/hr  Monitor urine pH prior to initiation of MTX, >7.5 then Q 6 hrs.  Methotrexate 3500 mg/m2 over 3 hrs when urine pH >7.5.  Leucovorin 25 mg IVSS q 6 hrs after start of MTX and continue until MTX <0.05um  Monitor MTX level 24 hrs after start of MTX then daily.  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  Monitor electrolytes, replete as needed.  Daily weight. Strict I/O, antemetics   Urine alkalization with D5W with 150 meq NaHCo3@ 150 cc/hr  Monitor urine pH prior to initiation of MTX, >7.5 then Q 6 hrs.  Methotrexate 3500 mg/m2 over 3 hrs when urine pH >7.5.  Leucovorin 25 mg IVSS q 6 hrs after start of MTX and continue until MTX <0.05um  Monitor MTX level 24 hrs after start of MTX then daily.  Discharge planning this week  8/6 Hypokalemia: replace kcl 20 meq po x 1

## 2024-08-07 LAB
ALBUMIN SERPL ELPH-MCNC: 3.7 G/DL — SIGNIFICANT CHANGE UP (ref 3.3–5)
ALP SERPL-CCNC: 84 U/L — SIGNIFICANT CHANGE UP (ref 40–120)
ALT FLD-CCNC: 26 U/L — SIGNIFICANT CHANGE UP (ref 10–45)
ANION GAP SERPL CALC-SCNC: 15 MMOL/L — SIGNIFICANT CHANGE UP (ref 5–17)
AST SERPL-CCNC: 22 U/L — SIGNIFICANT CHANGE UP (ref 10–40)
BASOPHILS # BLD AUTO: 0.02 K/UL — SIGNIFICANT CHANGE UP (ref 0–0.2)
BASOPHILS NFR BLD AUTO: 0.4 % — SIGNIFICANT CHANGE UP (ref 0–2)
BILIRUB SERPL-MCNC: 0.4 MG/DL — SIGNIFICANT CHANGE UP (ref 0.2–1.2)
BUN SERPL-MCNC: 9 MG/DL — SIGNIFICANT CHANGE UP (ref 7–23)
CALCIUM SERPL-MCNC: 8.7 MG/DL — SIGNIFICANT CHANGE UP (ref 8.4–10.5)
CHLORIDE SERPL-SCNC: 100 MMOL/L — SIGNIFICANT CHANGE UP (ref 96–108)
CO2 SERPL-SCNC: 28 MMOL/L — SIGNIFICANT CHANGE UP (ref 22–31)
CREAT SERPL-MCNC: 0.53 MG/DL — SIGNIFICANT CHANGE UP (ref 0.5–1.3)
EGFR: 111 ML/MIN/1.73M2 — SIGNIFICANT CHANGE UP
EOSINOPHIL # BLD AUTO: 0 K/UL — SIGNIFICANT CHANGE UP (ref 0–0.5)
EOSINOPHIL NFR BLD AUTO: 0 % — SIGNIFICANT CHANGE UP (ref 0–6)
GLUCOSE SERPL-MCNC: 175 MG/DL — HIGH (ref 70–99)
HCT VFR BLD CALC: 35.2 % — LOW (ref 39–50)
HGB BLD-MCNC: 11.1 G/DL — LOW (ref 13–17)
IMM GRANULOCYTES NFR BLD AUTO: 0.4 % — SIGNIFICANT CHANGE UP (ref 0–0.9)
LYMPHOCYTES # BLD AUTO: 0.35 K/UL — LOW (ref 1–3.3)
LYMPHOCYTES # BLD AUTO: 7.2 % — LOW (ref 13–44)
MAGNESIUM SERPL-MCNC: 2.3 MG/DL — SIGNIFICANT CHANGE UP (ref 1.6–2.6)
MCHC RBC-ENTMCNC: 27.7 PG — SIGNIFICANT CHANGE UP (ref 27–34)
MCHC RBC-ENTMCNC: 31.5 GM/DL — LOW (ref 32–36)
MCV RBC AUTO: 87.8 FL — SIGNIFICANT CHANGE UP (ref 80–100)
MONOCYTES # BLD AUTO: 0.51 K/UL — SIGNIFICANT CHANGE UP (ref 0–0.9)
MONOCYTES NFR BLD AUTO: 10.6 % — SIGNIFICANT CHANGE UP (ref 2–14)
NEUTROPHILS # BLD AUTO: 3.93 K/UL — SIGNIFICANT CHANGE UP (ref 1.8–7.4)
NEUTROPHILS NFR BLD AUTO: 81.4 % — HIGH (ref 43–77)
NRBC # BLD: 0 /100 WBCS — SIGNIFICANT CHANGE UP (ref 0–0)
PH UR: 5.5 — SIGNIFICANT CHANGE UP (ref 5–8)
PH UR: 8.5 (ref 5–8)
PH UR: >=9 (ref 5–8)
PH UR: >=9 (ref 5–8)
PHOSPHATE SERPL-MCNC: 3.2 MG/DL — SIGNIFICANT CHANGE UP (ref 2.5–4.5)
PLATELET # BLD AUTO: 242 K/UL — SIGNIFICANT CHANGE UP (ref 150–400)
POTASSIUM SERPL-MCNC: 3.3 MMOL/L — LOW (ref 3.5–5.3)
POTASSIUM SERPL-SCNC: 3.3 MMOL/L — LOW (ref 3.5–5.3)
PROT SERPL-MCNC: 5.5 G/DL — LOW (ref 6–8.3)
RBC # BLD: 4.01 M/UL — LOW (ref 4.2–5.8)
RBC # FLD: 15.6 % — HIGH (ref 10.3–14.5)
SODIUM SERPL-SCNC: 143 MMOL/L — SIGNIFICANT CHANGE UP (ref 135–145)
WBC # BLD: 4.83 K/UL — SIGNIFICANT CHANGE UP (ref 3.8–10.5)
WBC # FLD AUTO: 4.83 K/UL — SIGNIFICANT CHANGE UP (ref 3.8–10.5)

## 2024-08-07 PROCEDURE — 99232 SBSQ HOSP IP/OBS MODERATE 35: CPT | Mod: FS

## 2024-08-07 RX ORDER — LORATADINE 10 MG
17 TABLET,DISINTEGRATING ORAL DAILY
Refills: 0 | Status: DISCONTINUED | OUTPATIENT
Start: 2024-08-07 | End: 2024-08-08

## 2024-08-07 RX ORDER — SENNOSIDES 8.6 MG/1
2 TABLET ORAL AT BEDTIME
Refills: 0 | Status: DISCONTINUED | OUTPATIENT
Start: 2024-08-07 | End: 2024-08-08

## 2024-08-07 RX ORDER — POTASSIUM CHLORIDE 1500 MG/1
20 TABLET, EXTENDED RELEASE ORAL
Refills: 0 | Status: COMPLETED | OUTPATIENT
Start: 2024-08-07 | End: 2024-08-07

## 2024-08-07 RX ORDER — FUROSEMIDE 10 MG/ML
20 INJECTION, SOLUTION INTRAVENOUS ONCE
Refills: 0 | Status: COMPLETED | OUTPATIENT
Start: 2024-08-07 | End: 2024-08-07

## 2024-08-07 RX ADMIN — Medication 15 MILLILITER(S): at 21:24

## 2024-08-07 RX ADMIN — Medication 15 MILLILITER(S): at 05:30

## 2024-08-07 RX ADMIN — Medication 30 MILLIGRAM(S): at 17:50

## 2024-08-07 RX ADMIN — Medication 150 MEQ/KG/HR: at 13:03

## 2024-08-07 RX ADMIN — APIXABAN 5 MILLIGRAM(S): 5 TABLET, FILM COATED ORAL at 05:30

## 2024-08-07 RX ADMIN — Medication 10 MILLIGRAM(S): at 21:23

## 2024-08-07 RX ADMIN — POTASSIUM CHLORIDE 20 MILLIEQUIVALENT(S): 1500 TABLET, EXTENDED RELEASE ORAL at 15:33

## 2024-08-07 RX ADMIN — LEUCOVORIN CALCIUM 25 MILLIGRAM(S): 10 INJECTION INTRAMUSCULAR; INTRAVENOUS at 15:33

## 2024-08-07 RX ADMIN — OXYCODONE HYDROCHLORIDE 5 MILLIGRAM(S): 30 TABLET ORAL at 15:52

## 2024-08-07 RX ADMIN — Medication 150 MEQ/KG/HR: at 21:25

## 2024-08-07 RX ADMIN — LEUCOVORIN CALCIUM 25 MILLIGRAM(S): 10 INJECTION INTRAMUSCULAR; INTRAVENOUS at 21:23

## 2024-08-07 RX ADMIN — POTASSIUM CHLORIDE 20 MILLIEQUIVALENT(S): 1500 TABLET, EXTENDED RELEASE ORAL at 17:50

## 2024-08-07 RX ADMIN — SENNOSIDES 2 TABLET(S): 8.6 TABLET ORAL at 21:23

## 2024-08-07 RX ADMIN — OXYCODONE HYDROCHLORIDE 5 MILLIGRAM(S): 30 TABLET ORAL at 22:24

## 2024-08-07 RX ADMIN — OXYCODONE HYDROCHLORIDE 5 MILLIGRAM(S): 30 TABLET ORAL at 16:50

## 2024-08-07 RX ADMIN — Medication 15 MILLILITER(S): at 13:03

## 2024-08-07 RX ADMIN — OXYCODONE HYDROCHLORIDE 5 MILLIGRAM(S): 30 TABLET ORAL at 21:29

## 2024-08-07 RX ADMIN — FUROSEMIDE 20 MILLIGRAM(S): 10 INJECTION, SOLUTION INTRAVENOUS at 13:02

## 2024-08-07 RX ADMIN — POTASSIUM CHLORIDE 20 MILLIEQUIVALENT(S): 1500 TABLET, EXTENDED RELEASE ORAL at 13:03

## 2024-08-07 RX ADMIN — Medication 10 GRAM(S): at 15:52

## 2024-08-07 RX ADMIN — Medication 150 MEQ/KG/HR: at 17:51

## 2024-08-07 RX ADMIN — APIXABAN 5 MILLIGRAM(S): 5 TABLET, FILM COATED ORAL at 17:50

## 2024-08-07 RX ADMIN — LEUCOVORIN CALCIUM 25 MILLIGRAM(S): 10 INJECTION INTRAMUSCULAR; INTRAVENOUS at 03:08

## 2024-08-07 RX ADMIN — LEUCOVORIN CALCIUM 25 MILLIGRAM(S): 10 INJECTION INTRAMUSCULAR; INTRAVENOUS at 10:51

## 2024-08-07 NOTE — PROGRESS NOTE ADULT - PROBLEM SELECTOR PLAN 4
Continue Eliquis, encourage ambulation.  PT- consult- patient fall risk, unsteady gait PT ordered   Lactulose PRN for constipation. VTE ppx: continue Eliquis, encourage ambulation.  PT consult: patient fall risk, unsteady gait PT ordered   Lactulose PRN for constipation. VTE ppx: continue Eliquis, encourage ambulation.  PT consult: patient fall risk, unsteady gait PT ordered   Senna nightly, Miralax PRN, Lactulose PRN for constipation.

## 2024-08-07 NOTE — PROGRESS NOTE ADULT - SUBJECTIVE AND OBJECTIVE BOX
Diagnosis: DLBCL Stage IV     Protocol/Chemo Regimen: HD MTX (s/p RCHOP 7/15)     Day: 3    Subjective: no acute complaints, +urinating frequently with IVF    Review of Systems: Denies HA, chest pain, palpitations, shortness of breath, nausea, vomiting, abdominal pain, constipation, diarrhea     Pain scale: denies    Diet: Regular     Allergies: No Known Drug Allergies, latex (Rash)      ANTIMICROBIALS      HEME/ONC MEDICATIONS  apixaban 5 milliGRAM(s) Oral every 12 hours      STANDING MEDICATIONS  Biotene Dry Mouth Oral Rinse 15 milliLiter(s) Swish and Spit three times a day  DULoxetine 30 milliGRAM(s) Oral daily  leucovorin IVPB (eMAR) 25 milliGRAM(s) IV Intermittent every 6 hours  OLANZapine 10 milliGRAM(s) Oral <User Schedule>  potassium chloride    Tablet ER 20 milliEquivalent(s) Oral every 2 hours  sodium bicarbonate  Infusion 0.46 mEq/kG/Hr IV Continuous <Continuous>      PRN MEDICATIONS  acetaminophen     Tablet .. 650 milliGRAM(s) Oral every 6 hours PRN  lactulose Syrup 10 Gram(s) Oral daily PRN  oxyCODONE    IR 5 milliGRAM(s) Oral every 4 hours PRN  tiZANidine 4 milliGRAM(s) Oral daily PRN        Vital Signs Last 24 Hrs  T(C): 36.8 (07 Aug 2024 14:00), Max: 36.9 (06 Aug 2024 17:10)  T(F): 98.2 (07 Aug 2024 14:00), Max: 98.4 (06 Aug 2024 17:10)  HR: 78 (07 Aug 2024 14:00) (66 - 89)  BP: 103/69 (07 Aug 2024 14:00) (103/69 - 136/79)  BP(mean): --  RR: 18 (07 Aug 2024 14:00) (16 - 18)  SpO2: 97% (07 Aug 2024 14:00) (95% - 98%)    Parameters below as of 07 Aug 2024 14:00  Patient On (Oxygen Delivery Method): room air        PHYSICAL EXAM  General: adult in NAD  HEENT: clear oropharynx, no erythema, no ulcers  Neck: supple  CV: normal S1, S2, RRR  Lungs: CTAB, no wheezes  Abdomen: soft, nontender, nondistended, normoactive bowel sounds  Ext: +1 BLE edema  Skin: no rash  Neuro: alert and oriented x 3  PIV: CDI          LABS:             11.1   4.83  )-----------( 242      ( 07 Aug 2024 10:35 )             35.2     Mean Cell Volume : 87.8 fl  Mean Cell Hemoglobin : 27.7 pg  Mean Cell Hemoglobin Concentration : 31.5 gm/dL  Auto Neutrophil # : 3.93 K/uL  Auto Lymphocyte # : 0.35 K/uL  Auto Monocyte # : 0.51 K/uL  Auto Eosinophil # : 0.00 K/uL  Auto Basophil # : 0.02 K/uL  Auto Neutrophil % : 81.4 %  Auto Lymphocyte % : 7.2 %  Auto Monocyte % : 10.6 %  Auto Eosinophil % : 0.0 %  Auto Basophil % : 0.4 %    08-07    143  |  100  |  9   ----------------------------<  175<H>  3.3<L>   |  28  |  0.53    Ca    8.7      07 Aug 2024 10:36  Phos  3.2     08-07  Mg     2.3     08-07    TPro  5.5<L>  /  Alb  3.7  /  TBili  0.4  /  DBili  x   /  AST  22  /  ALT  26  /  AlkPhos  84  08-07    Mg 2.3  Phos 3.2             Diagnosis: DLBCL Stage IV     Protocol/Chemo Regimen: C2 HD MTX      Day: 3    Subjective: no acute complaints, +urinating frequently with IVF    Review of Systems: Denies HA, chest pain, palpitations, shortness of breath, nausea, vomiting, abdominal pain, constipation, diarrhea     Pain scale: denies    Diet: Regular     Allergies: No Known Drug Allergies, latex (Rash)      ANTIMICROBIALS      HEME/ONC MEDICATIONS  apixaban 5 milliGRAM(s) Oral every 12 hours      STANDING MEDICATIONS  Biotene Dry Mouth Oral Rinse 15 milliLiter(s) Swish and Spit three times a day  DULoxetine 30 milliGRAM(s) Oral daily  leucovorin IVPB (eMAR) 25 milliGRAM(s) IV Intermittent every 6 hours  OLANZapine 10 milliGRAM(s) Oral <User Schedule>  potassium chloride    Tablet ER 20 milliEquivalent(s) Oral every 2 hours  sodium bicarbonate  Infusion 0.46 mEq/kG/Hr IV Continuous <Continuous>      PRN MEDICATIONS  acetaminophen     Tablet .. 650 milliGRAM(s) Oral every 6 hours PRN  lactulose Syrup 10 Gram(s) Oral daily PRN  oxyCODONE    IR 5 milliGRAM(s) Oral every 4 hours PRN  tiZANidine 4 milliGRAM(s) Oral daily PRN        Vital Signs Last 24 Hrs  T(C): 36.8 (07 Aug 2024 14:00), Max: 36.9 (06 Aug 2024 17:10)  T(F): 98.2 (07 Aug 2024 14:00), Max: 98.4 (06 Aug 2024 17:10)  HR: 78 (07 Aug 2024 14:00) (66 - 89)  BP: 103/69 (07 Aug 2024 14:00) (103/69 - 136/79)  BP(mean): --  RR: 18 (07 Aug 2024 14:00) (16 - 18)  SpO2: 97% (07 Aug 2024 14:00) (95% - 98%)    Parameters below as of 07 Aug 2024 14:00  Patient On (Oxygen Delivery Method): room air        PHYSICAL EXAM  General: adult in NAD  HEENT: clear oropharynx, no erythema, no ulcers  Neck: supple  CV: normal S1, S2, RRR  Lungs: CTAB, no wheezes  Abdomen: soft, nontender, nondistended, normoactive bowel sounds  Ext: +1 BLE edema  Skin: no rash  Neuro: alert and oriented x 3  PIV: CDI          LABS:             11.1   4.83  )-----------( 242      ( 07 Aug 2024 10:35 )             35.2     Mean Cell Volume : 87.8 fl  Mean Cell Hemoglobin : 27.7 pg  Mean Cell Hemoglobin Concentration : 31.5 gm/dL  Auto Neutrophil # : 3.93 K/uL  Auto Lymphocyte # : 0.35 K/uL  Auto Monocyte # : 0.51 K/uL  Auto Eosinophil # : 0.00 K/uL  Auto Basophil # : 0.02 K/uL  Auto Neutrophil % : 81.4 %  Auto Lymphocyte % : 7.2 %  Auto Monocyte % : 10.6 %  Auto Eosinophil % : 0.0 %  Auto Basophil % : 0.4 %    08-07    143  |  100  |  9   ----------------------------<  175<H>  3.3<L>   |  28  |  0.53    Ca    8.7      07 Aug 2024 10:36  Phos  3.2     08-07  Mg     2.3     08-07    TPro  5.5<L>  /  Alb  3.7  /  TBili  0.4  /  DBili  x   /  AST  22  /  ALT  26  /  AlkPhos  84  08-07    Mg 2.3  Phos 3.2             Diagnosis: DLBCL Stage IV     Protocol/Chemo Regimen: C2 HD MTX      Day: 3    Subjective: no acute complaints, +urinating frequently with IVF, last BM on Sunday    Review of Systems: Denies HA, chest pain, palpitations, shortness of breath, nausea, vomiting, abdominal pain, constipation, diarrhea     Pain scale: denies    Diet: Regular     Allergies: No Known Drug Allergies, latex (Rash)      ANTIMICROBIALS      HEME/ONC MEDICATIONS  apixaban 5 milliGRAM(s) Oral every 12 hours      STANDING MEDICATIONS  Biotene Dry Mouth Oral Rinse 15 milliLiter(s) Swish and Spit three times a day  DULoxetine 30 milliGRAM(s) Oral daily  leucovorin IVPB (eMAR) 25 milliGRAM(s) IV Intermittent every 6 hours  OLANZapine 10 milliGRAM(s) Oral <User Schedule>  potassium chloride    Tablet ER 20 milliEquivalent(s) Oral every 2 hours  sodium bicarbonate  Infusion 0.46 mEq/kG/Hr IV Continuous <Continuous>      PRN MEDICATIONS  acetaminophen     Tablet .. 650 milliGRAM(s) Oral every 6 hours PRN  lactulose Syrup 10 Gram(s) Oral daily PRN  oxyCODONE    IR 5 milliGRAM(s) Oral every 4 hours PRN  tiZANidine 4 milliGRAM(s) Oral daily PRN        Vital Signs Last 24 Hrs  T(C): 36.8 (07 Aug 2024 14:00), Max: 36.9 (06 Aug 2024 17:10)  T(F): 98.2 (07 Aug 2024 14:00), Max: 98.4 (06 Aug 2024 17:10)  HR: 78 (07 Aug 2024 14:00) (66 - 89)  BP: 103/69 (07 Aug 2024 14:00) (103/69 - 136/79)  BP(mean): --  RR: 18 (07 Aug 2024 14:00) (16 - 18)  SpO2: 97% (07 Aug 2024 14:00) (95% - 98%)    Parameters below as of 07 Aug 2024 14:00  Patient On (Oxygen Delivery Method): room air        PHYSICAL EXAM  General: adult in NAD  HEENT: clear oropharynx, no erythema, no ulcers  Neck: supple  CV: normal S1, S2, RRR  Lungs: CTAB, no wheezes  Abdomen: soft, nontender, nondistended, normoactive bowel sounds  Ext: +1 BLE edema  Skin: no rash  Neuro: alert and oriented x 3  PIV: CDI          LABS:             11.1   4.83  )-----------( 242      ( 07 Aug 2024 10:35 )             35.2     Mean Cell Volume : 87.8 fl  Mean Cell Hemoglobin : 27.7 pg  Mean Cell Hemoglobin Concentration : 31.5 gm/dL  Auto Neutrophil # : 3.93 K/uL  Auto Lymphocyte # : 0.35 K/uL  Auto Monocyte # : 0.51 K/uL  Auto Eosinophil # : 0.00 K/uL  Auto Basophil # : 0.02 K/uL  Auto Neutrophil % : 81.4 %  Auto Lymphocyte % : 7.2 %  Auto Monocyte % : 10.6 %  Auto Eosinophil % : 0.0 %  Auto Basophil % : 0.4 %    08-07    143  |  100  |  9   ----------------------------<  175<H>  3.3<L>   |  28  |  0.53    Ca    8.7      07 Aug 2024 10:36  Phos  3.2     08-07  Mg     2.3     08-07    TPro  5.5<L>  /  Alb  3.7  /  TBili  0.4  /  DBili  x   /  AST  22  /  ALT  26  /  AlkPhos  84  08-07    Mg 2.3  Phos 3.2

## 2024-08-07 NOTE — PROGRESS NOTE ADULT - PROBLEM SELECTOR PLAN 3
On 3/19- BLE doppler shows acute DVT below knee; Eliquis started on 3/23,   Continue  Eliquis. On 3/19/24 - BLE doppler shows acute DVT below knee  Eliquis started on 3/23/24.  Continue Eliquis.

## 2024-08-07 NOTE — PROGRESS NOTE ADULT - ASSESSMENT
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 Patient is now s/p cycle 6 RCHOP on 7/15 and admitted for HD MTX (3.5gm/m2).  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 Patient is now s/p cycle 6 RCHOP on 7/15 and admitted for HD MTX (3.5gm/m2). 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 Patient is now admitted for Cycle 2 HDMTX (3.5gm/m2) (cycle 1 HD MTX 4/8/24). 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

## 2024-08-07 NOTE — PROGRESS NOTE ADULT - PROBLEM SELECTOR PLAN 2
Not neutropenic, afebrile  if febrile pan culture and CXR Not neutropenic, afebrile  If febrile, pan culture q 48 hrs and CXR q 5 days

## 2024-08-07 NOTE — PROGRESS NOTE ADULT - PROBLEM SELECTOR PLAN 1
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  Monitor electrolytes, replete as needed.  Daily weight. Strict I/O, antemetics   Urine alkalization with D5W with 150 meq NaHCo3@ 150 cc/hr  Monitor urine pH prior to initiation of MTX, >7.5 then Q 6 hrs.  Methotrexate 3500 mg/m2 over 3 hrs when urine pH >7.5.  Leucovorin 25 mg IVSS q 6 hrs after start of MTX and continue until MTX <0.05um  Monitor MTX level 24 hrs after start of MTX then daily.  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 Hgb goal >7.0 or >8.0 for discharge, plt goal >10k or >15k if febrile.   Monitor electrolytes, replete as needed.  Daily weight. Strict I/O, antemetics, mouth care. Diuresis PRN.  Urine alkalization with D5W with 150 meq NaHCo3@ 150 cc/hr  Monitor urine pH prior to initiation of MTX, >7.5 to start MTX then maintain at >8.5 Q 6 hrs.  Methotrexate 3500 mg/m2 over 3 hrs when urine pH >7.5.  Leucovorin 25 mg IVSS q 6 hrs after start of MTX and continue until MTX <0.05um  Monitor MTX level 24 hrs after start of MTX then daily.  Discharge planning once MTX level <0.2 - home on PO leucovorin x 3 days  8/7 KCl 20 meq PO x 3 doses for mild hypokalemia and also requiring lasix. Lasix 20 mg IV x 1 for BLE edema in the setting of aggressive IVF.  8/7 MTX level pending.

## 2024-08-07 NOTE — PROGRESS NOTE ADULT - NS ATTEND AMEND GEN_ALL_CORE FT
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 Initial HD-MTX during MR-CHOP in April 2024. Tumor had TP53, CREBBP, CARD11, FAS, ZMYM3.    Heme:  - Completed R-CHOP (completed 6 cycles 7/15/24), has received 8 doses of R since March 2024.  - MTX 3.5 gm/sq m on 8/5/24, now to continue to receive LV rescue, IVFs with NaHCO3, urine alkalinization, I and O, diurese as needed, follow renal fxn.  - Follow MTX levels.    DVT/VTE ppx:  - Hx of DVT on Apixaban    Dispo:  -Outpt f/u Dr. Chatterjee. Primary: [place]    67 yo M Cycle 2 Day 3 HD-MTX for GCB-type DLBCL. Onc Hx: had extensive spinal resection at T8 and L4, admitted to receive cycle 2 high dose MTX for CNS ppx in light of extensive paraspinal disease. Initial HD-MTX during MR-CHOP in April 2024. Tumor had TP53, CREBBP, CARD11, FAS, ZMYM3.    Heme:  - Completed R-CHOP (completed 6 cycles 7/15/24), has received 8 doses of R since March 2024.  - MTX 3.5 gm/sq m on 8/5/24, LV rescue, IVFs with NaHCO3, urine alkalinization, I and O, diurese as needed, follow renal fxn.  - Follow MTX levels.    DVT/VTE ppx:  - Hx of DVT on Apixaban    Dispo:  -Outpt f/u Dr. Chatterjee.

## 2024-08-08 ENCOUNTER — TRANSCRIPTION ENCOUNTER (OUTPATIENT)
Age: 69
End: 2024-08-08

## 2024-08-08 ENCOUNTER — NON-APPOINTMENT (OUTPATIENT)
Age: 69
End: 2024-08-08

## 2024-08-08 VITALS
RESPIRATION RATE: 18 BRPM | SYSTOLIC BLOOD PRESSURE: 117 MMHG | HEART RATE: 78 BPM | TEMPERATURE: 98 F | OXYGEN SATURATION: 98 % | DIASTOLIC BLOOD PRESSURE: 79 MMHG

## 2024-08-08 LAB
ALBUMIN SERPL ELPH-MCNC: 3.3 G/DL — SIGNIFICANT CHANGE UP (ref 3.3–5)
ALP SERPL-CCNC: 77 U/L — SIGNIFICANT CHANGE UP (ref 40–120)
ALT FLD-CCNC: 23 U/L — SIGNIFICANT CHANGE UP (ref 10–45)
ANION GAP SERPL CALC-SCNC: 9 MMOL/L — SIGNIFICANT CHANGE UP (ref 5–17)
AST SERPL-CCNC: 20 U/L — SIGNIFICANT CHANGE UP (ref 10–40)
BASOPHILS # BLD AUTO: 0.02 K/UL — SIGNIFICANT CHANGE UP (ref 0–0.2)
BASOPHILS NFR BLD AUTO: 0.7 % — SIGNIFICANT CHANGE UP (ref 0–2)
BILIRUB SERPL-MCNC: 0.5 MG/DL — SIGNIFICANT CHANGE UP (ref 0.2–1.2)
BUN SERPL-MCNC: 11 MG/DL — SIGNIFICANT CHANGE UP (ref 7–23)
CALCIUM SERPL-MCNC: 9.1 MG/DL — SIGNIFICANT CHANGE UP (ref 8.4–10.5)
CHLORIDE SERPL-SCNC: 101 MMOL/L — SIGNIFICANT CHANGE UP (ref 96–108)
CO2 SERPL-SCNC: 33 MMOL/L — HIGH (ref 22–31)
CREAT SERPL-MCNC: 0.55 MG/DL — SIGNIFICANT CHANGE UP (ref 0.5–1.3)
EGFR: 109 ML/MIN/1.73M2 — SIGNIFICANT CHANGE UP
EOSINOPHIL # BLD AUTO: 0.01 K/UL — SIGNIFICANT CHANGE UP (ref 0–0.5)
EOSINOPHIL NFR BLD AUTO: 0.3 % — SIGNIFICANT CHANGE UP (ref 0–6)
GLUCOSE SERPL-MCNC: 114 MG/DL — HIGH (ref 70–99)
HCT VFR BLD CALC: 34.1 % — LOW (ref 39–50)
HGB BLD-MCNC: 10.5 G/DL — LOW (ref 13–17)
IMM GRANULOCYTES NFR BLD AUTO: 0.7 % — SIGNIFICANT CHANGE UP (ref 0–0.9)
LYMPHOCYTES # BLD AUTO: 0.42 K/UL — LOW (ref 1–3.3)
LYMPHOCYTES # BLD AUTO: 14.4 % — SIGNIFICANT CHANGE UP (ref 13–44)
MAGNESIUM SERPL-MCNC: 2.2 MG/DL — SIGNIFICANT CHANGE UP (ref 1.6–2.6)
MCHC RBC-ENTMCNC: 27.3 PG — SIGNIFICANT CHANGE UP (ref 27–34)
MCHC RBC-ENTMCNC: 30.8 GM/DL — LOW (ref 32–36)
MCV RBC AUTO: 88.6 FL — SIGNIFICANT CHANGE UP (ref 80–100)
MONOCYTES # BLD AUTO: 0.2 K/UL — SIGNIFICANT CHANGE UP (ref 0–0.9)
MONOCYTES NFR BLD AUTO: 6.8 % — SIGNIFICANT CHANGE UP (ref 2–14)
MTX SERPL-SCNC: 0.09 UMOL/L — LOW (ref 0.5–5)
MTX SERPL-SCNC: 0.23 UMOL/L — LOW (ref 0.5–5)
NEUTROPHILS # BLD AUTO: 2.25 K/UL — SIGNIFICANT CHANGE UP (ref 1.8–7.4)
NEUTROPHILS NFR BLD AUTO: 77.1 % — HIGH (ref 43–77)
NRBC # BLD: 0 /100 WBCS — SIGNIFICANT CHANGE UP (ref 0–0)
PH UR: >=9 (ref 5–8)
PHOSPHATE SERPL-MCNC: 3.6 MG/DL — SIGNIFICANT CHANGE UP (ref 2.5–4.5)
PLATELET # BLD AUTO: 204 K/UL — SIGNIFICANT CHANGE UP (ref 150–400)
POTASSIUM SERPL-MCNC: 3.2 MMOL/L — LOW (ref 3.5–5.3)
POTASSIUM SERPL-SCNC: 3.2 MMOL/L — LOW (ref 3.5–5.3)
PROT SERPL-MCNC: 5.2 G/DL — LOW (ref 6–8.3)
RBC # BLD: 3.85 M/UL — LOW (ref 4.2–5.8)
RBC # FLD: 15.8 % — HIGH (ref 10.3–14.5)
SODIUM SERPL-SCNC: 143 MMOL/L — SIGNIFICANT CHANGE UP (ref 135–145)
WBC # BLD: 2.92 K/UL — LOW (ref 3.8–10.5)
WBC # FLD AUTO: 2.92 K/UL — LOW (ref 3.8–10.5)

## 2024-08-08 PROCEDURE — 85025 COMPLETE CBC W/AUTO DIFF WBC: CPT

## 2024-08-08 PROCEDURE — 97530 THERAPEUTIC ACTIVITIES: CPT

## 2024-08-08 PROCEDURE — 36415 COLL VENOUS BLD VENIPUNCTURE: CPT

## 2024-08-08 PROCEDURE — 83986 ASSAY PH BODY FLUID NOS: CPT

## 2024-08-08 PROCEDURE — 97116 GAIT TRAINING THERAPY: CPT

## 2024-08-08 PROCEDURE — 97161 PT EVAL LOW COMPLEX 20 MIN: CPT

## 2024-08-08 PROCEDURE — 84100 ASSAY OF PHOSPHORUS: CPT

## 2024-08-08 PROCEDURE — 83735 ASSAY OF MAGNESIUM: CPT

## 2024-08-08 PROCEDURE — 80204 DRUG ASSAY METHOTREXATE: CPT

## 2024-08-08 PROCEDURE — 99238 HOSP IP/OBS DSCHRG MGMT 30/<: CPT

## 2024-08-08 PROCEDURE — 80053 COMPREHEN METABOLIC PANEL: CPT

## 2024-08-08 RX ORDER — POTASSIUM CHLORIDE 1500 MG/1
20 TABLET, EXTENDED RELEASE ORAL
Refills: 0 | Status: DISCONTINUED | OUTPATIENT
Start: 2024-08-08 | End: 2024-08-08

## 2024-08-08 RX ORDER — POTASSIUM CHLORIDE 1500 MG/1
20 TABLET, EXTENDED RELEASE ORAL
Refills: 0 | Status: COMPLETED | OUTPATIENT
Start: 2024-08-08 | End: 2024-08-08

## 2024-08-08 RX ORDER — ASPIRIN 325 MG
5 TABLET ORAL ONCE
Refills: 0 | Status: DISCONTINUED | OUTPATIENT
Start: 2024-08-08 | End: 2024-08-08

## 2024-08-08 RX ORDER — LACTULOSE 10 G/15 ML
10 SOLUTION, ORAL ORAL
Refills: 0 | Status: COMPLETED | OUTPATIENT
Start: 2024-08-08 | End: 2024-08-08

## 2024-08-08 RX ORDER — LEUCOVORIN CALCIUM 10 MG/ML
2 INJECTION INTRAMUSCULAR; INTRAVENOUS
Qty: 56 | Refills: 0
Start: 2024-08-08 | End: 2024-08-14

## 2024-08-08 RX ADMIN — LEUCOVORIN CALCIUM 25 MILLIGRAM(S): 10 INJECTION INTRAMUSCULAR; INTRAVENOUS at 09:05

## 2024-08-08 RX ADMIN — POTASSIUM CHLORIDE 20 MILLIEQUIVALENT(S): 1500 TABLET, EXTENDED RELEASE ORAL at 13:52

## 2024-08-08 RX ADMIN — POTASSIUM CHLORIDE 20 MILLIEQUIVALENT(S): 1500 TABLET, EXTENDED RELEASE ORAL at 12:00

## 2024-08-08 RX ADMIN — POTASSIUM CHLORIDE 20 MILLIEQUIVALENT(S): 1500 TABLET, EXTENDED RELEASE ORAL at 09:05

## 2024-08-08 RX ADMIN — APIXABAN 5 MILLIGRAM(S): 5 TABLET, FILM COATED ORAL at 05:39

## 2024-08-08 RX ADMIN — LEUCOVORIN CALCIUM 25 MILLIGRAM(S): 10 INJECTION INTRAMUSCULAR; INTRAVENOUS at 02:33

## 2024-08-08 RX ADMIN — Medication 15 MILLILITER(S): at 05:39

## 2024-08-08 RX ADMIN — Medication 10 GRAM(S): at 07:39

## 2024-08-08 NOTE — DISCHARGE NOTE NURSING/CASE MANAGEMENT/SOCIAL WORK - NSDCPEELIQUIS_GEN_ALL_CORE
Apixaban/Eliquis - Compliance/Apixaban/Eliquis - Dietary Advice/Apixaban/Eliquis - Follow up monitoring/Apixaban/Eliquis - Potential for adverse drug reactions and interactions Doxepin Counseling:  Patient advised that the medication is sedating and not to drive a car after taking this medication. Patient informed of potential adverse effects including but not limited to dry mouth, urinary retention, and blurry vision.  The patient verbalized understanding of the proper use and possible adverse effects of doxepin.  All of the patient's questions and concerns were addressed.

## 2024-08-08 NOTE — PROGRESS NOTE ADULT - NS ATTEST RISK PROBLEM GEN_ALL_CORE FT
on active chemotherapy monitoring and management of side effects, cytopenias, infections, bleeding and other complications.

## 2024-08-08 NOTE — DISCHARGE NOTE NURSING/CASE MANAGEMENT/SOCIAL WORK - NSDCPEFALRISK_GEN_ALL_CORE
For information on Fall & Injury Prevention, visit: [email or web address] OR  [email or web address] OR  https://www.cdc.gov/steadi/patient.html

## 2024-08-08 NOTE — PROGRESS NOTE ADULT - ASSESSMENT
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 Patient is now admitted for Cycle 2 HDMTX (3.5gm/m2) (cycle 1 HD MTX 4/8/24). 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 Patient is now admitted for Cycle 2 HDMTX (3.5gm/m2) (cycle 1 HD MTX 4/8/24). 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

## 2024-08-08 NOTE — DISCHARGE NOTE NURSING/CASE MANAGEMENT/SOCIAL WORK - PATIENT PORTAL LINK FT
You can access the FollowMyHealth Patient Portal offered by [facility] by registering at the following website: [email or web address]. By joining [facility]’s FollowMyHealth portal, you will also be able to view your health information using other applications (apps) compatible with our system.

## 2024-08-08 NOTE — PROGRESS NOTE ADULT - NS ATTEND AMEND GEN_ALL_CORE FT
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 Onc Hx: had extensive spinal resection at T8 and L4, admitted to receive cycle 2 high dose MTX for CNS ppx in light of extensive paraspinal disease. Initial HD-MTX during MR-CHOP in April 2024. Tumor had TP53, CREBBP, CARD11, FAS, ZMYM3.    Heme:  - Completed R-CHOP (completed 6 cycles 7/15/24), has received 8 doses of R since March 2024.  - MTX 3.5 gm/sq m on 8/5/24, LV rescue, IVFs with NaHCO3, urine alkalinization, I and O, diurese as needed, follow renal fxn.  - Follow MTX levels.    DVT/VTE ppx:  - Hx of DVT on Apixaban    Dispo:  -Outpt f/u Dr. Chatterjee. Primary: Sen    67 yo M Cycle 2 Day 4 HD-MTX for GCB-type DLBCL. Onc Hx: had extensive spinal resection at T8 and L4, admitted to receive cycle 2 high dose MTX for CNS ppx in light of extensive paraspinal disease. Initial HD-MTX during MR-CHOP in April 2024. Tumor had TP53, CREBBP, CARD11, FAS, ZMYM3.    Heme:  - Completed R-CHOP (completed 6 cycles 7/15/24), has received 8 doses of R since March 2024.  - MTX 3.5 gm/sq m on 8/5/24, LV rescue, IVFs with NaHCO3, urine alkalinization, I and O, diurese as needed, follow renal fxn.  - Follow MTX levels.    DVT/VTE ppx:  - Hx of DVT on Apixaban    Dispo:  - Possible dc today, pending MTX level <0.2  -Outpt f/u Dr. Chatterjee.

## 2024-08-08 NOTE — PROGRESS NOTE ADULT - SUBJECTIVE AND OBJECTIVE BOX
Diagnosis: DLBCL Stage IV     Protocol/Chemo Regimen: C2 HD MTX 3.5gm      Day: 4    Subjective: no acute complaints, +urinating frequently with IVF, last BM on Sunday    Review of Systems: Denies HA, chest pain, palpitations, shortness of breath, nausea, vomiting, abdominal pain, constipation, diarrhea     Pain scale: denies    Diet: Regular     Allergies: No Known Drug Allergies, latex (Rash)      ANTIMICROBIALS      HEME/ONC MEDICATIONS  apixaban 5 milliGRAM(s) Oral every 12 hours      MEDICATIONS  (STANDING):  apixaban 5 milliGRAM(s) Oral every 12 hours  Biotene Dry Mouth Oral Rinse 15 milliLiter(s) Swish and Spit three times a day  DULoxetine 30 milliGRAM(s) Oral daily  leucovorin IVPB (eMAR) 25 milliGRAM(s) IV Intermittent every 6 hours  OLANZapine 10 milliGRAM(s) Oral <User Schedule>  potassium chloride  20 mEq/100 mL IVPB 20 milliEquivalent(s) IV Intermittent every 2 hours  senna 2 Tablet(s) Oral at bedtime  sodium bicarbonate  Infusion 0.46 mEq/kG/Hr (150 mL/Hr) IV Continuous <Continuous>    MEDICATIONS  (PRN):  acetaminophen     Tablet .. 650 milliGRAM(s) Oral every 6 hours PRN Temp greater or equal to 38C (100.4F), Mild Pain (1 - 3)  lactulose Syrup 10 Gram(s) Oral daily PRN constipation  oxyCODONE    IR 5 milliGRAM(s) Oral every 4 hours PRN Severe Pain (7 - 10)  polyethylene glycol 3350 17 Gram(s) Oral daily PRN Constipation  tiZANidine 4 milliGRAM(s) Oral daily PRN Muscle Spasm        Vital Signs Last 24 Hrs  T(C): 36.4 (08 Aug 2024 05:38), Max: 36.9 (07 Aug 2024 13:00)  T(F): 97.5 (08 Aug 2024 05:38), Max: 98.4 (07 Aug 2024 13:00)  HR: 66 (08 Aug 2024 05:38) (66 - 89)  BP: 121/74 (08 Aug 2024 05:38) (103/69 - 121/74)  RR: 18 (08 Aug 2024 05:38) (16 - 18)  SpO2: 96% (08 Aug 2024 05:38) (96% - 98%)    Parameters below as of 08 Aug 2024 05:38  Patient On (Oxygen Delivery Method): room air            PHYSICAL EXAM  General: adult in NAD  HEENT: clear oropharynx, no erythema, no ulcers  CV: normal S1, S2, RRR  Lungs: CTAB, no wheezes  Abdomen: soft, nontender, nondistended, normoactive bowel sounds  Ext: +1 BLE edema  Skin: no rash  Neuro: alert and oriented x 3  PIV: CDI      LABS:               Diagnosis: DLBCL Stage IV     Protocol/Chemo Regimen: C2 HD MTX 3.5gm      Day: 4    Subjective: no acute complaints, +BM today    Review of Systems: Denies HA, chest pain, palpitations, shortness of breath, nausea, vomiting, abdominal pain, constipation, diarrhea     Pain scale: denies    Diet: Regular     Allergies: No Known Drug Allergies, latex (Rash)      ANTIMICROBIALS      HEME/ONC MEDICATIONS  apixaban 5 milliGRAM(s) Oral every 12 hours      MEDICATIONS  (STANDING):  apixaban 5 milliGRAM(s) Oral every 12 hours  Biotene Dry Mouth Oral Rinse 15 milliLiter(s) Swish and Spit three times a day  DULoxetine 30 milliGRAM(s) Oral daily  OLANZapine 10 milliGRAM(s) Oral <User Schedule>  potassium chloride  20 mEq/100 mL IVPB 20 milliEquivalent(s) IV Intermittent every 2 hours  senna 2 Tablet(s) Oral at bedtime  sodium bicarbonate  Infusion 0.46 mEq/kG/Hr (150 mL/Hr) IV Continuous <Continuous>    MEDICATIONS  (PRN):  acetaminophen     Tablet .. 650 milliGRAM(s) Oral every 6 hours PRN Temp greater or equal to 38C (100.4F), Mild Pain (1 - 3)  lactulose Syrup 10 Gram(s) Oral daily PRN constipation  oxyCODONE    IR 5 milliGRAM(s) Oral every 4 hours PRN Severe Pain (7 - 10)  polyethylene glycol 3350 17 Gram(s) Oral daily PRN Constipation  tiZANidine 4 milliGRAM(s) Oral daily PRN Muscle Spasm        Vital Signs Last 24 Hrs  T(C): 36.4 (08 Aug 2024 05:38), Max: 36.9 (07 Aug 2024 13:00)  T(F): 97.5 (08 Aug 2024 05:38), Max: 98.4 (07 Aug 2024 13:00)  HR: 66 (08 Aug 2024 05:38) (66 - 89)  BP: 121/74 (08 Aug 2024 05:38) (103/69 - 121/74)  RR: 18 (08 Aug 2024 05:38) (16 - 18)  SpO2: 96% (08 Aug 2024 05:38) (96% - 98%)    Parameters below as of 08 Aug 2024 05:38  Patient On (Oxygen Delivery Method): room air      PHYSICAL EXAM  General: adult in NAD  HEENT: clear oropharynx, no erythema, no ulcers  CV: normal S1, S2, RRR  Lungs: CTAB, no wheezes  Abdomen: soft, nontender, nondistended, normoactive bowel sounds  Ext: +1 BLE edema  Skin: no rash  Neuro: alert and oriented x 3  PIV: CDI      LABS:                        10.5   2.92  )-----------( 204      ( 08 Aug 2024 06:57 )             34.1         Mean Cell Volume : 88.6 fl  Mean Cell Hemoglobin : 27.3 pg  Mean Cell Hemoglobin Concentration : 30.8 gm/dL  Auto Neutrophil # : 2.25 K/uL  Auto Lymphocyte # : 0.42 K/uL  Auto Monocyte # : 0.20 K/uL  Auto Eosinophil # : 0.01 K/uL  Auto Basophil # : 0.02 K/uL  Auto Neutrophil % : 77.1 %  Auto Lymphocyte % : 14.4 %  Auto Monocyte % : 6.8 %  Auto Eosinophil % : 0.3 %  Auto Basophil % : 0.7 %      08-08    143  |  101  |  11  ----------------------------<  114<H>  3.2<L>   |  33<H>  |  0.55    Ca    9.1      08 Aug 2024 06:59  Phos  3.6     08-08  Mg     2.2     08-08    TPro  5.2<L>  /  Alb  3.3  /  TBili  0.5  /  DBili  x   /  AST  20  /  ALT  23  /  AlkPhos  77  08-08

## 2024-08-08 NOTE — PROGRESS NOTE ADULT - PROBLEM/PLAN-1
Problem: Non-Pressure Injury Wound  Goal: # No deterioration in wound  Outcome: Outcome Met, Continue evaluating goal progress toward completion  Dr. George saw patient in the clinic today.  Plan to continue the VAC and she will see her again on 11/22/17.  Okay to not change the VAC dressing until then per Dr. George.        
DISPLAY PLAN FREE TEXT

## 2024-08-08 NOTE — PROGRESS NOTE ADULT - PROBLEM SELECTOR PLAN 4
VTE ppx: continue Eliquis, encourage ambulation.  PT consult: patient fall risk, unsteady gait PT ordered   Senna nightly, Miralax PRN, Lactulose PRN for constipation.

## 2024-08-08 NOTE — PROGRESS NOTE ADULT - PROBLEM SELECTOR PLAN 1
DLBCL (diffuse large B cell lymphoma)/ CD10+ DLBCL, positive for BCL-2 rearrangement, negative for MYC rearrangement s/p 6 cycles of RCHOP) now admitted for C2 HD MTX ( 3.5g/m2), first cycle HD MTX was with MR-CHOP in April 2024  Monitor CBC with Diff, transfuse as needed. Hgb goal >7.0 or >8.0 for discharge, plt goal >10k or >15k if febrile.   Monitor electrolytes, replete as needed.  Daily weight. Strict I/O, antemetics, mouth care. Diuresis PRN.  Urine alkalization with D5W with 150 meq NaHCo3@ 150 cc/hr  Monitor urine pH prior to initiation of MTX, >7.5 to start MTX then maintain at >8.5 Q 6 hrs.  Methotrexate 3500 mg/m2 over 3 hrs when urine pH >7.5.  Leucovorin 25 mg IVSS q 6 hrs after start of MTX and continue until MTX <0.05um  Monitor MTX level 24 hrs after start of MTX then daily.  Discharge planning once MTX level <0.2 - home on PO leucovorin x 3 days  8/8 KCl 20 meq IV x 3 doses for mild hypokalemia. Lasix 20 mg IV x 1 for BLE edema in the setting of aggressive IVF.  8/7 MTX level 0.23 DLBCL (diffuse large B cell lymphoma)/ CD10+ DLBCL, positive for BCL-2 rearrangement, negative for MYC rearrangement s/p 6 cycles of RCHOP) now admitted for C2 HD MTX ( 3.5g/m2), first cycle HD MTX was with MR-CHOP in April 2024  Monitor CBC with Diff, transfuse as needed. Hgb goal >7.0 or >8.0 for discharge, plt goal >10k or >15k if febrile.   Monitor electrolytes, replete as needed.  Daily weight. Strict I/O, antemetics, mouth care. Diuresis PRN.  Urine alkalization with D5W with 150 meq NaHCo3@ 150 cc/hr  Monitor urine pH prior to initiation of MTX, >7.5 to start MTX then maintain at >8.5 Q 6 hrs.  Methotrexate 3500 mg/m2 over 3 hrs when urine pH >7.5.  Leucovorin 25 mg IVSS q 6 hrs after start of MTX and continue until MTX <0.05um  Monitor MTX level 24 hrs after start of MTX then daily.  Discharge planning once MTX level <0.2 - home on PO leucovorin x 2 days  8/8 MTX level 0.09 Discharged home

## 2024-08-08 NOTE — PROGRESS NOTE ADULT - PROBLEM SELECTOR PROBLEM 1
DLBCL (diffuse large B cell lymphoma)

## 2024-08-12 ENCOUNTER — RESULT REVIEW (OUTPATIENT)
Age: 69
End: 2024-08-12

## 2024-08-12 ENCOUNTER — APPOINTMENT (OUTPATIENT)
Dept: HEMATOLOGY ONCOLOGY | Facility: CLINIC | Age: 69
End: 2024-08-12

## 2024-08-12 ENCOUNTER — APPOINTMENT (OUTPATIENT)
Dept: HEMATOLOGY ONCOLOGY | Facility: CLINIC | Age: 69
End: 2024-08-12
Payer: MEDICARE

## 2024-08-12 VITALS
OXYGEN SATURATION: 97 % | TEMPERATURE: 97.4 F | WEIGHT: 106.26 LBS | BODY MASS INDEX: 17.68 KG/M2 | HEART RATE: 67 BPM | RESPIRATION RATE: 17 BRPM | SYSTOLIC BLOOD PRESSURE: 108 MMHG | DIASTOLIC BLOOD PRESSURE: 67 MMHG

## 2024-08-12 LAB
BASOPHILS # BLD AUTO: 0.02 K/UL — SIGNIFICANT CHANGE UP (ref 0–0.2)
BASOPHILS NFR BLD AUTO: 0.9 % — SIGNIFICANT CHANGE UP (ref 0–2)
EOSINOPHIL # BLD AUTO: 0.01 K/UL — SIGNIFICANT CHANGE UP (ref 0–0.5)
EOSINOPHIL NFR BLD AUTO: 0.4 % — SIGNIFICANT CHANGE UP (ref 0–6)
HCT VFR BLD CALC: 33.6 % — LOW (ref 39–50)
HGB BLD-MCNC: 10.5 G/DL — LOW (ref 13–17)
IMM GRANULOCYTES NFR BLD AUTO: 0.4 % — SIGNIFICANT CHANGE UP (ref 0–0.9)
LYMPHOCYTES # BLD AUTO: 0.31 K/UL — LOW (ref 1–3.3)
LYMPHOCYTES # BLD AUTO: 13.5 % — SIGNIFICANT CHANGE UP (ref 13–44)
MCHC RBC-ENTMCNC: 28.4 PG — SIGNIFICANT CHANGE UP (ref 27–34)
MCHC RBC-ENTMCNC: 31.3 G/DL — LOW (ref 32–36)
MCV RBC AUTO: 90.8 FL — SIGNIFICANT CHANGE UP (ref 80–100)
MONOCYTES # BLD AUTO: 0.49 K/UL — SIGNIFICANT CHANGE UP (ref 0–0.9)
MONOCYTES NFR BLD AUTO: 21.3 % — HIGH (ref 2–14)
NEUTROPHILS # BLD AUTO: 1.46 K/UL — LOW (ref 1.8–7.4)
NEUTROPHILS NFR BLD AUTO: 63.5 % — SIGNIFICANT CHANGE UP (ref 43–77)
NRBC # BLD: 0 /100 WBCS — SIGNIFICANT CHANGE UP (ref 0–0)
PLATELET # BLD AUTO: 160 K/UL — SIGNIFICANT CHANGE UP (ref 150–400)
RBC # BLD: 3.7 M/UL — LOW (ref 4.2–5.8)
RBC # FLD: 15.1 % — HIGH (ref 10.3–14.5)
WBC # BLD: 2.3 K/UL — LOW (ref 3.8–10.5)
WBC # FLD AUTO: 2.3 K/UL — LOW (ref 3.8–10.5)

## 2024-08-12 PROCEDURE — G2211 COMPLEX E/M VISIT ADD ON: CPT

## 2024-08-12 PROCEDURE — 99214 OFFICE O/P EST MOD 30 MIN: CPT

## 2024-08-12 NOTE — HISTORY OF PRESENT ILLNESS
[de-identified] : Mr. Solis was last seen: 7/29/24  [de-identified] : Reason for visit: DLBCL  Since last visit:  feeling okay, using walker. started cymbalta.  Pain: 1-2/10 but he is stoic.    Saturnino does not spontaneously report: fever, chills, sweats, weight loss, skin rashes, petechiae, bruising, eye irritation, hearing loss, mouth sores, sore throat, cough, hemoptysis, pleuritic chest pain, dyspnea, change in vision, focal numbness/weakness, chest pains, palpitations, nausea, vomiting, diarrhea, dysuria, hematuria, bowel or bladder incontinence.  Medications: Oxycodone 5mg Q4PRN  -- four times a day = Lactulose cymbalta 30mg qd apixaban 5mg bid Reglan PRN Zyprexa qod -- zanaflex  Examination: Minister Solis is articulate and in no acute distress. No occiput, poster cervical, anterior cervical, submandibular, sublingual, submental, supraclavicular nor axillary adenopathy left paraspinal mass: non-tender Lungs: Clear Cardiac: without rubs Abd: soft and non-tender, Traube's space is tympanitic No inguinal nor femoral adenopathy Trace to 1+ pretibial edema bilaterally. Gait: using walker; can almost stand on each foot independently;

## 2024-08-12 NOTE — ASSESSMENT
[Medication(s)] : Medication(s) [Curative] : Goals of care discussed with patient: Curative [With Patient/Caregiver] : With Patient/Caregiver [Full Code] : full code [FreeTextEntry1] : Assessment: 68-year-old , day 8 High dose MTX (second of four planned infusion) and post RCHOP x 6 (completed:  7/15/24)  for stage IV germinal center DLBCL involving spine s/p 2 stage neurosurgical intervention. Course complicated by 1) spinal cord disease, 2) elevated LDH and 3) bilateral DVT. Prolonged cytopenia's with MRCHOP - that has transitioned care to RCHOP to be followed by HI-MTX x 3   PMHx: prediabetes.  Plan: Heme: RCHOP to be followed by high dose 3.5g/mm MTX q 14-21  - starting three weeks post cycle 6 (arrange hospitalizing for: 8/5/24)  Planning three more therapies. PLT goal > 50,000 given full anticoagulation C/w DOAC  Radiographs: Spinal MRI pended below, Dr. Partida is planning next radiographs end of August.    Pain: continue oxycodone; Cymbalta 30mg qhs (will start)  D/C Zyprexa.     Over 35 minutes were spent in direct patient care with greater than 50% discussing his disease response and planned hospitalization.   Addendum I: MRI 5/23/24):  Again demonstrated status post T8 corpectomy with fusion of T6-T10 with multilevel transpedicular screws and vertical supporting rods. There is cement material in the T6, T7, T9 and T10 vertebral bodies.  Status post fusion of L2 through the pelvis with transpedicular screws L2-L5 and bilateral sacroiliac screws. There are vertical supporting rods. There is cement material within the L2-L5 vertebral bodies. There is a dorsal subcutaneous fluid collection spanning L2-L5 extending to the laminectomy bed measuring 7.7 x 3.2 cm. There is peripheral enhancement surrounding the fluid cavity. This may represent postsurgical changes. There is a small fluid collection in the laminectomy bed at T8.  There is a moderate L1 compression fracture which is unchanged from CT chest abdomen and pelvis 3/22/2024. There is focal kyphosis. Again demonstrated is a pathologic L4 compression fracture. Again demonstrated is a previously noted T8 compression fracture with corpectomy. The remainder the vertebral body heights are maintained. There are multiple STIR hyperintense enhancing lesions in the thoracic and lumbar spine consistent with osseous metastasis. This is overall improved compared to 3/5/2024. There is interval resolution of the epidural soft tissue tumor in the lumbar spine spanning L2-L5 and in the thoracic spine at the T8 level.  There is interval reexpansion of the thoracic cord at the T8 level. There is no cord compression. There is improved caliber of the spinal canal in the lumbar spine compared to preoperative imaging.  MRA images demonstrates no abnormal vasculature.  IMPRESSION:  Status post fusion T6-T10 and L2 through the pelvis. Again demonstrated are bone marrow infiltrative lesions in the thoracic and lumbar spine consistent with metastasis, significantly improved from prior MRI 3/5/2024. Resolution of epidural soft tissue tumor in the thoracic and lumbar spine with reexpansion of the spinal canal and resolution of cord compression.  Peripherally enhancing fluid collection in the dorsal subcutaneous soft tissues and laminectomy bed spanning L2-L5 which may represent postsurgical changes and clinical correlation is recommended.  Moderate L1 compression fracture which is new from 3/5/2024 but is not significant change from CT chest, abdomen and pelvis 3/22/2024. No new compression fracture.  A radiologist clinical note was sent to Dr. Chatterjee on 5/30/2024 4:37 PM via hospital based secure email.     [AdvancecareDate] : 07/08/24

## 2024-08-19 ENCOUNTER — TRANSCRIPTION ENCOUNTER (OUTPATIENT)
Age: 69
End: 2024-08-19

## 2024-08-19 ENCOUNTER — INPATIENT (INPATIENT)
Facility: HOSPITAL | Age: 69
LOS: 1 days | Discharge: HOME | DRG: 847 | End: 2024-08-21
Attending: INTERNAL MEDICINE | Admitting: INTERNAL MEDICINE
Payer: MEDICARE

## 2024-08-19 VITALS — WEIGHT: 103.84 LBS | HEIGHT: 61 IN

## 2024-08-19 DIAGNOSIS — Z90.49 ACQUIRED ABSENCE OF OTHER SPECIFIED PARTS OF DIGESTIVE TRACT: Chronic | ICD-10-CM

## 2024-08-19 DIAGNOSIS — C83.30 DIFFUSE LARGE B-CELL LYMPHOMA, UNSPECIFIED SITE: ICD-10-CM

## 2024-08-19 DIAGNOSIS — C83.38 DIFFUSE LARGE B-CELL LYMPHOMA, LYMPH NODES OF MULTIPLE SITES: ICD-10-CM

## 2024-08-19 DIAGNOSIS — Z29.9 ENCOUNTER FOR PROPHYLACTIC MEASURES, UNSPECIFIED: ICD-10-CM

## 2024-08-19 DIAGNOSIS — I82.409 ACUTE EMBOLISM AND THROMBOSIS OF UNSPECIFIED DEEP VEINS OF UNSPECIFIED LOWER EXTREMITY: ICD-10-CM

## 2024-08-19 DIAGNOSIS — B99.9 UNSPECIFIED INFECTIOUS DISEASE: ICD-10-CM

## 2024-08-19 DIAGNOSIS — Z98.890 OTHER SPECIFIED POSTPROCEDURAL STATES: Chronic | ICD-10-CM

## 2024-08-19 LAB
ALBUMIN SERPL ELPH-MCNC: 4.3 G/DL — SIGNIFICANT CHANGE UP (ref 3.3–5)
ALP SERPL-CCNC: 104 U/L — SIGNIFICANT CHANGE UP (ref 40–120)
ALT FLD-CCNC: 19 U/L — SIGNIFICANT CHANGE UP (ref 10–45)
ANION GAP SERPL CALC-SCNC: 11 MMOL/L — SIGNIFICANT CHANGE UP (ref 5–17)
APTT BLD: 35.8 SEC — HIGH (ref 24.5–35.6)
AST SERPL-CCNC: 20 U/L — SIGNIFICANT CHANGE UP (ref 10–40)
BASOPHILS # BLD AUTO: 0 K/UL — SIGNIFICANT CHANGE UP (ref 0–0.2)
BASOPHILS NFR BLD AUTO: 0 % — SIGNIFICANT CHANGE UP (ref 0–2)
BILIRUB SERPL-MCNC: 0.4 MG/DL — SIGNIFICANT CHANGE UP (ref 0.2–1.2)
BLD GP AB SCN SERPL QL: NEGATIVE — SIGNIFICANT CHANGE UP
BUN SERPL-MCNC: 18 MG/DL — SIGNIFICANT CHANGE UP (ref 7–23)
CALCIUM SERPL-MCNC: 9.3 MG/DL — SIGNIFICANT CHANGE UP (ref 8.4–10.5)
CHLORIDE SERPL-SCNC: 104 MMOL/L — SIGNIFICANT CHANGE UP (ref 96–108)
CO2 SERPL-SCNC: 26 MMOL/L — SIGNIFICANT CHANGE UP (ref 22–31)
CREAT SERPL-MCNC: 0.52 MG/DL — SIGNIFICANT CHANGE UP (ref 0.5–1.3)
DACRYOCYTES BLD QL SMEAR: SLIGHT — SIGNIFICANT CHANGE UP
EGFR: 110 ML/MIN/1.73M2 — SIGNIFICANT CHANGE UP
ELLIPTOCYTES BLD QL SMEAR: SLIGHT — SIGNIFICANT CHANGE UP
EOSINOPHIL # BLD AUTO: 0 K/UL — SIGNIFICANT CHANGE UP (ref 0–0.5)
EOSINOPHIL NFR BLD AUTO: 0 % — SIGNIFICANT CHANGE UP (ref 0–6)
GLUCOSE SERPL-MCNC: 94 MG/DL — SIGNIFICANT CHANGE UP (ref 70–99)
HCT VFR BLD CALC: 36.7 % — LOW (ref 39–50)
HGB BLD-MCNC: 11.6 G/DL — LOW (ref 13–17)
INR BLD: 1.21 RATIO — HIGH (ref 0.85–1.18)
LDH SERPL L TO P-CCNC: 230 U/L — SIGNIFICANT CHANGE UP (ref 50–242)
LYMPHOCYTES # BLD AUTO: 0.44 K/UL — LOW (ref 1–3.3)
LYMPHOCYTES # BLD AUTO: 11.2 % — LOW (ref 13–44)
MAGNESIUM SERPL-MCNC: 2.4 MG/DL — SIGNIFICANT CHANGE UP (ref 1.6–2.6)
MANUAL SMEAR VERIFICATION: SIGNIFICANT CHANGE UP
MCHC RBC-ENTMCNC: 27.6 PG — SIGNIFICANT CHANGE UP (ref 27–34)
MCHC RBC-ENTMCNC: 31.6 GM/DL — LOW (ref 32–36)
MCV RBC AUTO: 87.4 FL — SIGNIFICANT CHANGE UP (ref 80–100)
MONOCYTES # BLD AUTO: 0.51 K/UL — SIGNIFICANT CHANGE UP (ref 0–0.9)
MONOCYTES NFR BLD AUTO: 12.9 % — SIGNIFICANT CHANGE UP (ref 2–14)
NEUTROPHILS # BLD AUTO: 2.91 K/UL — SIGNIFICANT CHANGE UP (ref 1.8–7.4)
NEUTROPHILS NFR BLD AUTO: 72.4 % — SIGNIFICANT CHANGE UP (ref 43–77)
NEUTS BAND # BLD: 0.9 % — SIGNIFICANT CHANGE UP (ref 0–8)
PH UR: 8 — SIGNIFICANT CHANGE UP (ref 5–8)
PHOSPHATE SERPL-MCNC: 4.1 MG/DL — SIGNIFICANT CHANGE UP (ref 2.5–4.5)
PLAT MORPH BLD: NORMAL — SIGNIFICANT CHANGE UP
PLATELET # BLD AUTO: 184 K/UL — SIGNIFICANT CHANGE UP (ref 150–400)
POIKILOCYTOSIS BLD QL AUTO: SLIGHT — SIGNIFICANT CHANGE UP
POTASSIUM SERPL-MCNC: 4.1 MMOL/L — SIGNIFICANT CHANGE UP (ref 3.5–5.3)
POTASSIUM SERPL-SCNC: 4.1 MMOL/L — SIGNIFICANT CHANGE UP (ref 3.5–5.3)
PROT SERPL-MCNC: 6.5 G/DL — SIGNIFICANT CHANGE UP (ref 6–8.3)
PROTHROM AB SERPL-ACNC: 13.2 SEC — HIGH (ref 9.5–13)
RBC # BLD: 4.2 M/UL — SIGNIFICANT CHANGE UP (ref 4.2–5.8)
RBC # FLD: 15.4 % — HIGH (ref 10.3–14.5)
RBC BLD AUTO: ABNORMAL
RH IG SCN BLD-IMP: POSITIVE — SIGNIFICANT CHANGE UP
SODIUM SERPL-SCNC: 141 MMOL/L — SIGNIFICANT CHANGE UP (ref 135–145)
URATE SERPL-MCNC: 5.2 MG/DL — SIGNIFICANT CHANGE UP (ref 3.4–8.8)
VARIANT LYMPHS # BLD: 2.6 % — SIGNIFICANT CHANGE UP (ref 0–6)
WBC # BLD: 3.97 K/UL — SIGNIFICANT CHANGE UP (ref 3.8–10.5)
WBC # FLD AUTO: 3.97 K/UL — SIGNIFICANT CHANGE UP (ref 3.8–10.5)

## 2024-08-19 PROCEDURE — 99221 1ST HOSP IP/OBS SF/LOW 40: CPT | Mod: FS

## 2024-08-19 RX ORDER — METOCLOPRAMIDE HCL 5 MG
10 TABLET ORAL EVERY 6 HOURS
Refills: 0 | Status: DISCONTINUED | OUTPATIENT
Start: 2024-08-19 | End: 2024-08-19

## 2024-08-19 RX ORDER — ONDANSETRON HCL/PF 4 MG/2 ML
1 VIAL (ML) INJECTION
Qty: 0 | Refills: 0 | DISCHARGE

## 2024-08-19 RX ORDER — TIZANIDINE HYDROCHLORIDE 2 MG/1
4 CAPSULE ORAL DAILY
Refills: 0 | Status: DISCONTINUED | OUTPATIENT
Start: 2024-08-19 | End: 2024-08-21

## 2024-08-19 RX ORDER — LEUCOVORIN CALCIUM 100 MG/10ML
5 INJECTION, POWDER, LYOPHILIZED, FOR SOLUTION INTRAMUSCULAR; INTRAVENOUS
Qty: 15 | Refills: 0
Start: 2024-08-19 | End: 2024-08-21

## 2024-08-19 RX ORDER — ONDANSETRON 2 MG/ML
16 INJECTION, SOLUTION INTRAMUSCULAR; INTRAVENOUS EVERY 24 HOURS
Refills: 0 | Status: COMPLETED | OUTPATIENT
Start: 2024-08-19 | End: 2024-08-20

## 2024-08-19 RX ORDER — TIZANIDINE HYDROCHLORIDE 2 MG/1
2 CAPSULE ORAL
Qty: 0 | Refills: 0 | DISCHARGE

## 2024-08-19 RX ORDER — ONDANSETRON 2 MG/ML
16 INJECTION, SOLUTION INTRAMUSCULAR; INTRAVENOUS EVERY 24 HOURS
Refills: 0 | Status: DISCONTINUED | OUTPATIENT
Start: 2024-08-19 | End: 2024-08-19

## 2024-08-19 RX ORDER — APIXABAN 5 MG/1
5 TABLET, FILM COATED ORAL
Refills: 0 | Status: DISCONTINUED | OUTPATIENT
Start: 2024-08-19 | End: 2024-08-21

## 2024-08-19 RX ORDER — LACTULOSE 10 G
10 PACKET (EA) ORAL DAILY
Refills: 0 | Status: DISCONTINUED | OUTPATIENT
Start: 2024-08-19 | End: 2024-08-21

## 2024-08-19 RX ORDER — OXYCODONE HYDROCHLORIDE 5 MG/1
5 TABLET ORAL EVERY 4 HOURS
Refills: 0 | Status: DISCONTINUED | OUTPATIENT
Start: 2024-08-19 | End: 2024-08-21

## 2024-08-19 RX ORDER — SODIUM BICARBONATE 84 MG/ML
0.48 INJECTION, SOLUTION INTRAVENOUS
Qty: 150 | Refills: 0 | Status: DISCONTINUED | OUTPATIENT
Start: 2024-08-19 | End: 2024-08-21

## 2024-08-19 RX ORDER — METOCLOPRAMIDE HCL 5 MG
10 TABLET ORAL EVERY 6 HOURS
Refills: 0 | Status: DISCONTINUED | OUTPATIENT
Start: 2024-08-19 | End: 2024-08-21

## 2024-08-19 RX ORDER — METHOTREXATE SODIUM 2.5 MG
5005 TABLET ORAL ONCE
Refills: 0 | Status: DISCONTINUED | OUTPATIENT
Start: 2024-08-19 | End: 2024-08-19

## 2024-08-19 RX ORDER — METHOTREXATE SODIUM 2.5 MG
5005 TABLET ORAL ONCE
Refills: 0 | Status: COMPLETED | OUTPATIENT
Start: 2024-08-19 | End: 2024-08-19

## 2024-08-19 RX ORDER — DULOXETINE HCL 30 MG
30 CAPSULE,DELAYED RELEASE (ENTERIC COATED) ORAL
Refills: 0 | Status: DISCONTINUED | OUTPATIENT
Start: 2024-08-19 | End: 2024-08-21

## 2024-08-19 RX ORDER — SODIUM CHLORIDE 9 MG/ML
1000 INJECTION INTRAMUSCULAR; INTRAVENOUS; SUBCUTANEOUS
Refills: 0 | Status: DISCONTINUED | OUTPATIENT
Start: 2024-08-19 | End: 2024-08-21

## 2024-08-19 RX ADMIN — Medication 30 MILLIGRAM(S): at 17:31

## 2024-08-19 RX ADMIN — OXYCODONE HYDROCHLORIDE 5 MILLIGRAM(S): 5 TABLET ORAL at 19:55

## 2024-08-19 RX ADMIN — APIXABAN 5 MILLIGRAM(S): 5 TABLET, FILM COATED ORAL at 17:31

## 2024-08-19 RX ADMIN — TIZANIDINE HYDROCHLORIDE 4 MILLIGRAM(S): 2 CAPSULE ORAL at 22:28

## 2024-08-19 RX ADMIN — ONDANSETRON 116 MILLIGRAM(S): 2 INJECTION, SOLUTION INTRAMUSCULAR; INTRAVENOUS at 15:53

## 2024-08-19 RX ADMIN — OXYCODONE HYDROCHLORIDE 5 MILLIGRAM(S): 5 TABLET ORAL at 20:55

## 2024-08-19 RX ADMIN — Medication 5005 MILLIGRAM(S): at 16:43

## 2024-08-19 NOTE — H&P ADULT - HISTORY OF PRESENT ILLNESS
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 Patient is now s/p cycle 6  RCHOP on 7/15 and admitted for cycle 3 HD MTX (3.5gm/m2)     Patient presented to [facility] on 3/5/24 with a history of progressively worsening lower extremity numbness/paresthesias and weakness with onset Jan 2024. He underwent x-rays & MRI imaging as outpatient on 2/29/24 showing concern for osseous metastatic disease in thoracic/lumbar/sacral spine as well as extension of soft tissue into the paravertebral soft tissues more prominent on the right side. 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 Course complicated by 1) spinal cord disease, 2) elevated LDH and 3) bilateral DVT. First cycle of MR-CHOP was started 3/23/24.

## 2024-08-19 NOTE — H&P ADULT - ASSESSMENT
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 Patient is now s/p cycle 6  RCHOP on 7/15 and admitted for cycle 3 HD MTX (3.5gm/m2)

## 2024-08-19 NOTE — DISCHARGE NOTE PROVIDER - NSDCFUADDAPPT_GEN_ALL_CORE_FT
Follow up at [facility] on  Follow up at [facility] on   Dr. Chatterjee on 8/26 – 1:30labs and 2pm with Dr. Chatterjee. Follow up at [facility] on  8/26 at  1:30 PM for labs and 2pm with Dr. Chatterjee.

## 2024-08-19 NOTE — DISCHARGE NOTE PROVIDER - NSDCCPCAREPLAN_GEN_ALL_CORE_FT
PRINCIPAL DISCHARGE DIAGNOSIS  Diagnosis: DLBCL (diffuse large B cell lymphoma)  Assessment and Plan of Treatment: Notify MD or go to nearest ER if you develop fever greater than 100.4, nausea, vomiting, not relieved with medications, worsening diarrhea, chest pain, difficulty breathing, worsening pain, bleeding.

## 2024-08-19 NOTE — PATIENT PROFILE ADULT - FUNCTIONAL ASSESSMENT - BASIC MOBILITY 6.
3-calculated by average/Not able to assess (calculate score using [facility] averaging method)

## 2024-08-19 NOTE — DISCHARGE NOTE PROVIDER - NSDCFUSCHEDAPPT_GEN_ALL_CORE_FT
[facility]  [facility]  Scheduled Appointment: 08/26/2024    Tobias Chatterjee  [facility]  [facility]  Scheduled Appointment: 08/26/2024    Patricio Bautista  [facility]  [facility] [id number]  Scheduled Appointment: 08/29/2024    [facility]  CATSCAN [address]  Scheduled Appointment: 09/18/2024    [facility]  [address]SCAN [address]  Scheduled Appointment: 09/18/2024    Bhaskar Partida  [facility]  NEUROSURG [id number] [facility]  Scheduled Appointment: 09/23/2024

## 2024-08-19 NOTE — ADVANCED PRACTICE NURSE CONSULT - ASSESSMENT
Pt admitted  today for chemotherapy treatment, alert and o x 4. Left 20 G PIV placed today,  accessed. + blood return obtained and flushing well. Site intact, No s/s of bleeding, swelling or redness. Labs reviewed by DR Goldberg on rounds. Urine PH 8.  Chemotherapy teaching provided, well understood. 2 certified RN verification done. Pt premedicated with Zofran 16 mg Iv. At 16:43 pt started  Methotrexate 3500 mg/m2= 5005 mg  mg Iv ,  infusing over 3 hrs via left 20 G PIV.  Pt tolerating well. Primary RN aware of plan of care. Safety maintained.

## 2024-08-19 NOTE — DISCHARGE NOTE PROVIDER - HOSPITAL COURSE
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 Patient is now s/p cycle 6  RCHOP on 7/15 and admitted for cycle 3 HD MTX (3.5gm/m2) 68-year-old male patient with past medical history of HLD, pre-Diabetes Mellitus,B/L LE DVT on eliquis,  DLBCL (diffuse large B cell lymphoma)/ CD10+ DLBCL, positive for BCL-2 rearrangement, negative for MYC rearrangement who received 2 cycles of MR-CHOP with prolonged cytopenias and has transitioned care to RCHOP to be followed by HD-MTX. Patient is now s/p cycle 6  RCHOP on 7/15 and admitted for cycle 3 HD MTX (3.5gm/m2).  During this hospitalization monitored his labs daily, CBC, CMP, electrolytes, maintained strict I/O, daily weights, monitored vital signs Q 4 hrs, pain meds as needed. Patient was on Duloxetine 30 mg for pain which was increased  to 60 mg PO daily, continued on IVF, monitored daily methotrexate levels today's level is 0.18. Physical therapy was evaluated during this admission ans was determined that he does not have any PT needs, he is stable for discharge with outpatient follow up.

## 2024-08-19 NOTE — PATIENT PROFILE ADULT - FALL HARM RISK - HARM RISK INTERVENTIONS

## 2024-08-19 NOTE — DISCHARGE NOTE PROVIDER - NSDCMRMEDTOKEN_GEN_ALL_CORE_FT
apixaban 5 mg oral tablet: 1 tab(s) orally 2 times a day  DULoxetine 30 mg oral delayed release capsule: 1 cap(s) orally once a day  lactulose 10 g/15 mL oral syrup: 15 milliliter(s) orally once a day As needed for constipation.  leucovorin 10 mg oral tablet: 5 milligram(s) orally every 8 hours  oxyCODONE 5 mg oral tablet: 1 tab(s) orally every 4 hours as needed for Severe back pain.  tiZANidine 4 mg oral capsule: 2 cap(s) orally once a day (at bedtime)   apixaban 5 mg oral tablet: 1 tab(s) orally 2 times a day  DULoxetine 60 mg oral delayed release capsule: 1 cap(s) orally once a day  lactulose 10 g/15 mL oral syrup: 15 milliliter(s) orally once a day As needed for constipation.  leucovorin 5 mg oral tablet: 1 tab(s) orally every 8 hours Take for 3 days, total of 9 doses, last dose on 8/23/2024 at 10 PM). MDD: 3  oxyCODONE 5 mg oral tablet: 1 tab(s) orally every 4 hours as needed for Severe back pain.  tiZANidine 4 mg oral capsule: 2 cap(s) orally once a day (at bedtime)

## 2024-08-19 NOTE — H&P ADULT - PROBLEM SELECTOR PLAN 1
DLBCL (diffuse large B cell lymphoma)/ CD10+ DLBCL, positive for BCL-2 rearrangement, negative for MYC rearrangement s/p 6 cycles of RCHOP) now admitted cycle 3 HD MTX ( 3.5g/m2)   Admit to 7 [facility]  Insert IVL       Monitor CBC with Diff, transfuse as needed.  Monitor electrolytes, replete as needed.  Daily weight. Strict I/O, antemetics   Urine alkalization with D5W with 150 meq NaHCo3@ 150 cc/hr  Monitor urine pH prior to initiation of MTX, >7.5 then Q 6 hrs.  Methotrexate 3500 mg/m2 over 3 hrs when urine pH >7.5.  Leucovorin 25 mg IVSS q 6 hrs after start of MTX and continue until MTX <0.05um  Monitor MTX level 24 hrs after start of MTX then daily.  D/C home on DLBCL (diffuse large B cell lymphoma)/ CD10+ DLBCL, positive for BCL-2 rearrangement, negative for MYC rearrangement s/p 6 cycles of RCHOP) now admitted cycle 3 HD MTX ( 3.5g/m2)   Admit to 7 [facility]  IVL   Monitor CBC with Diff, transfuse as needed.  Monitor electrolytes, replete as needed.  Daily weight. Strict I/O, antemetics   Urine alkalization with D5W with 150 meq NaHCo3@ 150 cc/hr  Monitor urine pH prior to initiation of MTX, >7.5 then Q 6 hrs.  Methotrexate 3500 mg/m2 = 5005 mg IV  over 3 hrs when urine pH >7.5.  Leucovorin 25 mg IVSS q 6 hrs after start of MTX and continue until MTX <0.05umZofran 16 mg IV daily x2 days , Reglan 10 mg IV Q 6hrs PRN.  Monitor MTX level 24 hrs after start of MTX then daily.  D/C home on

## 2024-08-20 DIAGNOSIS — I82.409 ACUTE EMBOLISM AND THROMBOSIS OF UNSPECIFIED DEEP VEINS OF UNSPECIFIED LOWER EXTREMITY: ICD-10-CM

## 2024-08-20 DIAGNOSIS — B99.9 UNSPECIFIED INFECTIOUS DISEASE: ICD-10-CM

## 2024-08-20 DIAGNOSIS — M54.9 DORSALGIA, UNSPECIFIED: ICD-10-CM

## 2024-08-20 DIAGNOSIS — C83.30 DIFFUSE LARGE B-CELL LYMPHOMA, UNSPECIFIED SITE: ICD-10-CM

## 2024-08-20 DIAGNOSIS — Z29.9 ENCOUNTER FOR PROPHYLACTIC MEASURES, UNSPECIFIED: ICD-10-CM

## 2024-08-20 LAB
ALBUMIN SERPL ELPH-MCNC: 3.5 G/DL — SIGNIFICANT CHANGE UP (ref 3.3–5)
ALBUMIN SERPL ELPH-MCNC: 3.7 G/DL — SIGNIFICANT CHANGE UP (ref 3.3–5)
ALP SERPL-CCNC: 90 U/L — SIGNIFICANT CHANGE UP (ref 40–120)
ALP SERPL-CCNC: 95 U/L — SIGNIFICANT CHANGE UP (ref 40–120)
ALT FLD-CCNC: 19 U/L — SIGNIFICANT CHANGE UP (ref 10–45)
ALT FLD-CCNC: 27 U/L — SIGNIFICANT CHANGE UP (ref 10–45)
ANION GAP SERPL CALC-SCNC: 11 MMOL/L — SIGNIFICANT CHANGE UP (ref 5–17)
ANION GAP SERPL CALC-SCNC: 8 MMOL/L — SIGNIFICANT CHANGE UP (ref 5–17)
AST SERPL-CCNC: 23 U/L — SIGNIFICANT CHANGE UP (ref 10–40)
AST SERPL-CCNC: 24 U/L — SIGNIFICANT CHANGE UP (ref 10–40)
BASOPHILS # BLD AUTO: 0.06 K/UL — SIGNIFICANT CHANGE UP (ref 0–0.2)
BASOPHILS NFR BLD AUTO: 1.8 % — SIGNIFICANT CHANGE UP (ref 0–2)
BILIRUB SERPL-MCNC: 0.4 MG/DL — SIGNIFICANT CHANGE UP (ref 0.2–1.2)
BILIRUB SERPL-MCNC: 0.7 MG/DL — SIGNIFICANT CHANGE UP (ref 0.2–1.2)
BUN SERPL-MCNC: 14 MG/DL — SIGNIFICANT CHANGE UP (ref 7–23)
BUN SERPL-MCNC: 14 MG/DL — SIGNIFICANT CHANGE UP (ref 7–23)
CALCIUM SERPL-MCNC: 8.5 MG/DL — SIGNIFICANT CHANGE UP (ref 8.4–10.5)
CALCIUM SERPL-MCNC: 8.9 MG/DL — SIGNIFICANT CHANGE UP (ref 8.4–10.5)
CHLORIDE SERPL-SCNC: 100 MMOL/L — SIGNIFICANT CHANGE UP (ref 96–108)
CHLORIDE SERPL-SCNC: 98 MMOL/L — SIGNIFICANT CHANGE UP (ref 96–108)
CO2 SERPL-SCNC: 28 MMOL/L — SIGNIFICANT CHANGE UP (ref 22–31)
CO2 SERPL-SCNC: 28 MMOL/L — SIGNIFICANT CHANGE UP (ref 22–31)
CREAT SERPL-MCNC: 0.57 MG/DL — SIGNIFICANT CHANGE UP (ref 0.5–1.3)
CREAT SERPL-MCNC: 0.63 MG/DL — SIGNIFICANT CHANGE UP (ref 0.5–1.3)
EGFR: 104 ML/MIN/1.73M2 — SIGNIFICANT CHANGE UP
EGFR: 107 ML/MIN/1.73M2 — SIGNIFICANT CHANGE UP
EOSINOPHIL # BLD AUTO: 0.12 K/UL — SIGNIFICANT CHANGE UP (ref 0–0.5)
EOSINOPHIL NFR BLD AUTO: 3.7 % — SIGNIFICANT CHANGE UP (ref 0–6)
GLUCOSE SERPL-MCNC: 115 MG/DL — HIGH (ref 70–99)
GLUCOSE SERPL-MCNC: 124 MG/DL — HIGH (ref 70–99)
HCT VFR BLD CALC: 31.9 % — LOW (ref 39–50)
HGB BLD-MCNC: 10.1 G/DL — LOW (ref 13–17)
LDH SERPL L TO P-CCNC: 213 U/L — SIGNIFICANT CHANGE UP (ref 50–242)
LYMPHOCYTES # BLD AUTO: 0.37 K/UL — LOW (ref 1–3.3)
LYMPHOCYTES # BLD AUTO: 11.1 % — LOW (ref 13–44)
MAGNESIUM SERPL-MCNC: 2.2 MG/DL — SIGNIFICANT CHANGE UP (ref 1.6–2.6)
MANUAL SMEAR VERIFICATION: SIGNIFICANT CHANGE UP
MCHC RBC-ENTMCNC: 28.1 PG — SIGNIFICANT CHANGE UP (ref 27–34)
MCHC RBC-ENTMCNC: 31.7 GM/DL — LOW (ref 32–36)
MCV RBC AUTO: 88.9 FL — SIGNIFICANT CHANGE UP (ref 80–100)
MONOCYTES # BLD AUTO: 0.31 K/UL — SIGNIFICANT CHANGE UP (ref 0–0.9)
MONOCYTES NFR BLD AUTO: 9.3 % — SIGNIFICANT CHANGE UP (ref 2–14)
MTX SERPL-SCNC: 0.8 UMOL/L — SIGNIFICANT CHANGE UP (ref 0.5–5)
NEUTROPHILS # BLD AUTO: 2.47 K/UL — SIGNIFICANT CHANGE UP (ref 1.8–7.4)
NEUTROPHILS NFR BLD AUTO: 72.2 % — SIGNIFICANT CHANGE UP (ref 43–77)
NEUTS BAND # BLD: 1.9 % — SIGNIFICANT CHANGE UP (ref 0–8)
PH UR: 8.5 (ref 5–8)
PH UR: >=9 (ref 5–8)
PHOSPHATE SERPL-MCNC: 3.5 MG/DL — SIGNIFICANT CHANGE UP (ref 2.5–4.5)
PLAT MORPH BLD: NORMAL — SIGNIFICANT CHANGE UP
PLATELET # BLD AUTO: 158 K/UL — SIGNIFICANT CHANGE UP (ref 150–400)
POTASSIUM SERPL-MCNC: 3.5 MMOL/L — SIGNIFICANT CHANGE UP (ref 3.5–5.3)
POTASSIUM SERPL-MCNC: 3.5 MMOL/L — SIGNIFICANT CHANGE UP (ref 3.5–5.3)
POTASSIUM SERPL-SCNC: 3.5 MMOL/L — SIGNIFICANT CHANGE UP (ref 3.5–5.3)
POTASSIUM SERPL-SCNC: 3.5 MMOL/L — SIGNIFICANT CHANGE UP (ref 3.5–5.3)
PROT SERPL-MCNC: 5.3 G/DL — LOW (ref 6–8.3)
PROT SERPL-MCNC: 5.7 G/DL — LOW (ref 6–8.3)
RBC # BLD: 3.59 M/UL — LOW (ref 4.2–5.8)
RBC # FLD: 14.9 % — HIGH (ref 10.3–14.5)
RBC BLD AUTO: SIGNIFICANT CHANGE UP
SODIUM SERPL-SCNC: 136 MMOL/L — SIGNIFICANT CHANGE UP (ref 135–145)
SODIUM SERPL-SCNC: 137 MMOL/L — SIGNIFICANT CHANGE UP (ref 135–145)
URATE SERPL-MCNC: 5.2 MG/DL — SIGNIFICANT CHANGE UP (ref 3.4–8.8)
WBC # BLD: 3.33 K/UL — LOW (ref 3.8–10.5)
WBC # FLD AUTO: 3.33 K/UL — LOW (ref 3.8–10.5)

## 2024-08-20 PROCEDURE — 99233 SBSQ HOSP IP/OBS HIGH 50: CPT | Mod: FS

## 2024-08-20 RX ORDER — LEUCOVORIN CALCIUM 100 MG/10ML
25 INJECTION, POWDER, LYOPHILIZED, FOR SOLUTION INTRAMUSCULAR; INTRAVENOUS EVERY 6 HOURS
Refills: 0 | Status: DISCONTINUED | OUTPATIENT
Start: 2024-08-20 | End: 2024-08-21

## 2024-08-20 RX ADMIN — APIXABAN 5 MILLIGRAM(S): 5 TABLET, FILM COATED ORAL at 05:16

## 2024-08-20 RX ADMIN — OXYCODONE HYDROCHLORIDE 5 MILLIGRAM(S): 5 TABLET ORAL at 18:52

## 2024-08-20 RX ADMIN — LEUCOVORIN CALCIUM 25 MILLIGRAM(S): 100 INJECTION, POWDER, LYOPHILIZED, FOR SOLUTION INTRAMUSCULAR; INTRAVENOUS at 17:02

## 2024-08-20 RX ADMIN — Medication 30 MILLIGRAM(S): at 17:59

## 2024-08-20 RX ADMIN — ONDANSETRON 116 MILLIGRAM(S): 2 INJECTION, SOLUTION INTRAMUSCULAR; INTRAVENOUS at 15:07

## 2024-08-20 RX ADMIN — LEUCOVORIN CALCIUM 25 MILLIGRAM(S): 100 INJECTION, POWDER, LYOPHILIZED, FOR SOLUTION INTRAMUSCULAR; INTRAVENOUS at 21:03

## 2024-08-20 RX ADMIN — APIXABAN 5 MILLIGRAM(S): 5 TABLET, FILM COATED ORAL at 17:02

## 2024-08-20 RX ADMIN — SODIUM CHLORIDE 20 MILLILITER(S): 9 INJECTION INTRAMUSCULAR; INTRAVENOUS; SUBCUTANEOUS at 17:03

## 2024-08-20 RX ADMIN — TIZANIDINE HYDROCHLORIDE 4 MILLIGRAM(S): 2 CAPSULE ORAL at 21:02

## 2024-08-20 RX ADMIN — SODIUM BICARBONATE 150 MEQ/KG/HR: 84 INJECTION, SOLUTION INTRAVENOUS at 17:02

## 2024-08-20 NOTE — DIETITIAN INITIAL EVALUATION ADULT - PHYSCIAL ASSESSMENT
Weight Hx Per:  - Source: patient   - UBW:   - Reported weight changes:    Weight Hx Per [facility]:     Current Admission Weights:  - Dosing weight: 103.13 pounds/47.1 kg (08/19)  - Daily weight:     Weight Change:  -     **  Will continue to monitor weight trends as available/able.     %IBW: Weight Hx Per:  - Source: patient   - UBW: 125 pounds   - Reported weight changes: Pt endorses weight loss in the past year.     Weight Hx Per [facility] HIE: no available weights to assess (HIE not loading)    Current Admission Weights:  - Dosing weight: 103.13 pounds/47.1 kg (08/19)  - Daily weight: 47 kg (08/20)    Weight Change:  - 18% weight loss >/ 1 year     **  Will continue to monitor weight trends as available/able.     %IBW: 92%

## 2024-08-20 NOTE — PROGRESS NOTE ADULT - NS ATTEND AMEND GEN_ALL_CORE FT
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 Patient is now s/p cycle 6  RCHOP on 7/15 and admitted for cycle 3 HD MTX (3.5gm/m2)    Plan:   Monitor CBC with Diff, transfuse as needed.  Monitor electrolytes, replete as needed.  Daily weight. Strict I/O, antemetics   Urine alkalization with D5W with 150 meq NaHCo3@ 150 cc/hr  Monitor urine pH prior to initiation of MTX, >7.5 then Q 6 hrs.  Methotrexate 3500 mg/m2 = 5005 mg IV  over 3 hrs when urine pH >7.5.  Leucovorin 25 mg IVSS q 6 hrs after start of MTX and continue until MTX <0.05umZofran 16 mg IV daily x2 days , Reglan 10 mg IV Q 6hrs PRN.  Monitor MTX level 24 hrs after start of MTX then daily.  D/C home when MTX level <0.2     DVT (deep venous thrombosis).   Continue Eliquis. Primary: Sen    Vital Signs Last 24 Hrs  T(C): 36.7 (20 Aug 2024 05:29), Max: 36.8 (20 Aug 2024 01:12)  T(F): 98 (20 Aug 2024 05:29), Max: 98.2 (20 Aug 2024 01:12)  HR: 62 (20 Aug 2024 05:29) (62 - 77)  BP: 117/72 (20 Aug 2024 05:29) (111/65 - 138/76)  BP(mean): --  RR: 18 (20 Aug 2024 05:29) (17 - 18)  SpO2: 98% (20 Aug 2024 05:29) (97% - 100%)    Parameters below as of 20 Aug 2024 05:29  Patient On (Oxygen Delivery Method): room air    Assessment: 68 year old day +2 cycle 3 high dose MTX as primary CNS prophylaxis for DLBCL.        Plan:   Monitor CBC with Diff, transfuse as needed.  Monitor electrolytes, replete as needed.  Daily weight. Strict I/O, antemetics   Urine alkalization with D5W with 150 meq NaHCo3@ 150 cc/hr  Monitor urine pH prior to initiation of MTX, >7.5 then Q 6 hrs.  Methotrexate 3500 mg/m2 = 5005 mg IV  over 3 hrs when urine pH >7.5.  Leucovorin 25 mg IVSS q 6 hrs after start of MTX and continue until MTX <0.05umZofran 16 mg IV daily x2 days , Reglan 10 mg IV Q 6hrs PRN.  Monitor MTX level 24 hrs after start of MTX then daily.  D/C home when MTX level <0.2     DVT (deep venous thrombosis).   Continue Eliquis.

## 2024-08-20 NOTE — PHYSICAL THERAPY INITIAL EVALUATION ADULT - ADDITIONAL COMMENTS
Pt resides in a pvt home w/ daughters, 4 steps to enter (+HR), first floor set up inside. PTA pt was independent w/ all functional mobility & ADL's. Utilized a RW for ambulation, also owns cane, shower chair, & transport wheelchair. Pt resides in a pvt home w/ daughters, 4 steps to enter (+HR), first floor set up inside. pt reports, during stair negotiation, family stays beside. PTA pt was independent w/ all functional mobility & ADL's. Utilized a RW for ambulation, also owns cane, shower chair, & transport wheelchair.  No h/o fall.

## 2024-08-20 NOTE — PHYSICAL THERAPY INITIAL EVALUATION ADULT - PERTINENT HX OF CURRENT PROBLEM, REHAB EVAL
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 Patient is now s/p cycle 6  RCHOP on 7/15 and admitted for cycle 3 HD MTX (3.5gm/m2)   Patient presented to [facility] on 3/5/24 with a history of progressively worsening lower extremity numbness/paresthesias and weakness with onset Jan 2024. He underwent x-rays & MRI imaging as outpatient on 2/29/24 showing concern for osseous metastatic disease in thoracic/lumbar/sacral spine as well as extension of soft tissue into the paravertebral soft tissues more prominent on the right side. 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 Patient is now s/p cycle 6  RCHOP on 7/15 and admitted for cycle 3 HD MTX (3.5gm/m2)   Patient presented to [facility] on 3/5/24 with a history of progressively worsening lower extremity numbness/paresthesias and weakness with onset Jan 2024. He underwent x-rays & MRI imaging as outpatient on 2/29/24 showing concern for osseous metastatic disease in thoracic/lumbar/sacral spine as well as extension of soft tissue into the paravertebral soft tissues more prominent on the right side.   Per Neuro sx note 3/12/2024- "CT imaging and MRI spine (prelim report) notable for multiple findings including diffuse osseous metastatic disease throughout the entire spine; pathologic fx w/ tumor into the epidural space at T8 resulting in thoracic cord compression; tumor extension into the epidural space at L3 resulting in cauda equina compression; pathologic fx w/ tumor in the epidural space at L4 contributing to severe canal stenosis; tumor within the bilateral psoas muscles at L3 and L4; bilateral neural foramen effacement by tumor in L-spine. - now s/p stage 1: T8 VCR (vertebral column resection) T6-T10 fusion for tumor resection, small csf leak primarily repaired, Stage 2 L4 Partial Corpectomy, L2-Pelvis Fusion, Plastics Closure 3/5/24. Activity: work with PT, NO bending, lifting or twisting. " Per neuro resident note 3/11- "Does not need brace per DMS"

## 2024-08-20 NOTE — DIETITIAN INITIAL EVALUATION ADULT - REASON INDICATOR FOR ASSESSMENT
Nutrition consult warranted for: pressure injury stage >/2   Information obtained from: electronic medical record and patient  Chart reviewed, events noted.

## 2024-08-20 NOTE — PROGRESS NOTE ADULT - PROBLEM SELECTOR PLAN 1
Monitor CBC with Diff, transfuse as needed.  Monitor electrolytes, replete as needed.  Daily weight. Strict I/O, antemetics   Urine alkalization with D5W with 150 meq NaHCo3@ 150 cc/hr  Monitor urine pH prior to initiation of MTX, >7.5 then Q 6 hrs.  Methotrexate 3500 mg/m2 = 5005 mg IV  over 3 hrs when urine pH >7.5.  Leucovorin 25 mg IVSS q 6 hrs after start of MTX and continue until MTX <0.05umZofran 16 mg IV daily x2 days , Reglan 10 mg IV Q 6hrs PRN.  Monitor MTX level 24 hrs after start of MTX then daily.  D/C home when MTX level <0.2 Monitor CBC with Diff, transfuse as needed.  Monitor electrolytes, replete as needed.  Daily weight. Strict I/O, antemetic   Continue Urine alkalization with D5W with 150 meq NaHCo3@ 150 cc/hr  Monitor urine pH prior to initiation of MTX, >7.5 then Q 6 hrs.  Methotrexate 3500 mg/m2 = 5005 mg IV  over 3 hrs when urine pH >7.5.  Leucovorin 25 mg IVSS q 6 hrs after start of MTX and continue until MTX <0.05umZofran 16 mg IV daily x2 days , Reglan 10 mg IV Q 6hrs PRN.  Monitor MTX level 24 hrs after start of MTX then daily.  D/C home when MTX level <0.2

## 2024-08-20 NOTE — DIETITIAN INITIAL EVALUATION ADULT - PERTINENT MEDS FT
MEDICATIONS  (STANDING):  apixaban 5 milliGRAM(s) Oral two times a day  DULoxetine 30 milliGRAM(s) Oral <User Schedule>  leucovorin IVPB (eMAR) 25 milliGRAM(s) IV Intermittent every 6 hours  ondansetron  IVPB 16 milliGRAM(s) IV Intermittent every 24 hours  sodium bicarbonate  Infusion 0.478 mEq/kG/Hr (150 mL/Hr) IV Continuous <Continuous>  sodium chloride 0.9%. 1000 milliLiter(s) (20 mL/Hr) IV Continuous <Continuous>    MEDICATIONS  (PRN):  lactulose Syrup 10 Gram(s) Oral daily PRN constipation  metoclopramide Injectable 10 milliGRAM(s) IV Push every 6 hours PRN nausea/vomiting  oxyCODONE    IR 5 milliGRAM(s) Oral every 4 hours PRN Severe back pain.  tiZANidine 4 milliGRAM(s) Oral daily PRN Muscle Spasm

## 2024-08-20 NOTE — DIETITIAN INITIAL EVALUATION ADULT - PERSON TAUGHT/METHOD
Emphasized the importance of adequate kcal and protein intake, provided recommendations to optimize nutritional intake in case of decreased appetite, recommended small frequent meals by consuming nutrient-dense snacks between meals./verbal instruction/patient instructed

## 2024-08-20 NOTE — PROGRESS NOTE ADULT - SUBJECTIVE AND OBJECTIVE BOX
Diagnosis:    Protocol/Chemo Regimen:    Day:     Pt endorsed:    Review of Systems:     Pain scale:     Diet:     Allergies    latex (Rash)  No Known Drug Allergies    Intolerances        ANTIMICROBIALS      HEME/ONC MEDICATIONS  apixaban 5 milliGRAM(s) Oral two times a day      STANDING MEDICATIONS  DULoxetine 30 milliGRAM(s) Oral <User Schedule>  ondansetron  IVPB 16 milliGRAM(s) IV Intermittent every 24 hours  sodium bicarbonate  Infusion 0.478 mEq/kG/Hr IV Continuous <Continuous>  sodium chloride 0.9%. 1000 milliLiter(s) IV Continuous <Continuous>      PRN MEDICATIONS  lactulose Syrup 10 Gram(s) Oral daily PRN  metoclopramide Injectable 10 milliGRAM(s) IV Push every 6 hours PRN  oxyCODONE    IR 5 milliGRAM(s) Oral every 4 hours PRN  tiZANidine 4 milliGRAM(s) Oral daily PRN        Vital Signs Last 24 Hrs  T(C): 36.7 (20 Aug 2024 05:29), Max: 36.8 (20 Aug 2024 01:12)  T(F): 98 (20 Aug 2024 05:29), Max: 98.2 (20 Aug 2024 01:12)  HR: 62 (20 Aug 2024 05:29) (62 - 77)  BP: 117/72 (20 Aug 2024 05:29) (111/65 - 138/76)  BP(mean): --  RR: 18 (20 Aug 2024 05:29) (17 - 18)  SpO2: 98% (20 Aug 2024 05:29) (97% - 100%)    Parameters below as of 20 Aug 2024 05:29  Patient On (Oxygen Delivery Method): room air        PHYSICAL EXAM  General: NAD  HEENT: PERRLA, EOMOI, clear oropharynx, anicteric sclera, pink conjunctiva  Neck: supple  CV: (+) S1/S2 RRR  Lungs: clear to auscultation, no wheezes or rales  Abdomen: soft, non-tender, non-distended (+) BS  Ext: no clubbing, cyanosis or edema  Skin: no rashes and no petechiae  Neuro: alert and oriented X 3, no focal deficits  Central Line:     RECENT CULTURES:        LABS:                        11.6   3.97  )-----------( 184      ( 19 Aug 2024 11:50 )             36.7         Mean Cell Volume : 87.4 fl  Mean Cell Hemoglobin : 27.6 pg  Mean Cell Hemoglobin Concentration : 31.6 gm/dL  Auto Neutrophil # : 2.91 K/uL  Auto Lymphocyte # : 0.44 K/uL  Auto Monocyte # : 0.51 K/uL  Auto Eosinophil # : 0.00 K/uL  Auto Basophil # : 0.00 K/uL  Auto Neutrophil % : 72.4 %  Auto Lymphocyte % : 11.2 %  Auto Monocyte % : 12.9 %  Auto Eosinophil % : 0.0 %  Auto Basophil % : 0.0 %      08-19    141  |  104  |  18  ----------------------------<  94  4.1   |  26  |  0.52    Ca    9.3      19 Aug 2024 11:50  Phos  4.1     08-19  Mg     2.4     08-19    TPro  6.5  /  Alb  4.3  /  TBili  0.4  /  DBili  x   /  AST  20  /  ALT  19  /  AlkPhos  104  08-19      Mg 2.4  Phos 4.1      PT/INR - ( 19 Aug 2024 11:50 )   PT: 13.2 sec;   INR: 1.21 ratio         PTT - ( 19 Aug 2024 11:50 )  PTT:35.8 sec      Uric Acid 5.2        RADIOLOGY & ADDITIONAL STUDIES:         Diagnosis: DLBCL stage IV GC    Protocol/Chemo Regimen: Cycle # 3 HD MTX    Day: 2    Pt endorsed: Feeling well this am, no complaints reported today.     Review of Systems: Denies any nausea, vomiting diarrhea, chest pain, palpitation.     Pain scale: chronic back pain    Diet: Regular    Allergies: latex (Rash)  No Known Drug Allergies    HEME/ONC MEDICATIONS  apixaban 5 milliGRAM(s) Oral two times a day    STANDING MEDICATIONS  DULoxetine 30 milliGRAM(s) Oral <User Schedule>  ondansetron  IVPB 16 milliGRAM(s) IV Intermittent every 24 hours  sodium bicarbonate  Infusion 0.478 mEq/kG/Hr IV Continuous <Continuous>  sodium chloride 0.9%. 1000 milliLiter(s) IV Continuous <Continuous>    PRN MEDICATIONS  lactulose Syrup 10 Gram(s) Oral daily PRN  metoclopramide Injectable 10 milliGRAM(s) IV Push every 6 hours PRN  oxyCODONE    IR 5 milliGRAM(s) Oral every 4 hours PRN  tiZANidine 4 milliGRAM(s) Oral daily PRN    Vital Signs Last 24 Hrs  T(C): 36.7 (20 Aug 2024 05:29), Max: 36.8 (20 Aug 2024 01:12)  T(F): 98 (20 Aug 2024 05:29), Max: 98.2 (20 Aug 2024 01:12)  HR: 62 (20 Aug 2024 05:29) (62 - 77)  BP: 117/72 (20 Aug 2024 05:29) (111/65 - 138/76)  BP(mean): --  RR: 18 (20 Aug 2024 05:29) (17 - 18)  SpO2: 98% (20 Aug 2024 05:29) (97% - 100%)    Parameters below as of 20 Aug 2024 05:29  Patient On (Oxygen Delivery Method): room air    PHYSICAL EXAM  General: NAD  HEENT: PERRLA, EOMOI, clear oropharynx  CV: (+) S1/S2 RRR  Lungs: clear to auscultation, no wheezes or rales  Abdomen: soft, non-tender, non-distended (+) BS  Ext: no clubbing, cyanosis or edema  Skin: no rashes and no petechiae  Neuro: alert and oriented X 3, no focal deficits  Central Line: PIVL    RECENT CULTURES:  NA    LABS:                       10.1   3.33  )-----------( 158      ( 20 Aug 2024 06:48 )             31.9     Mean Cell Volume : 88.9 fl  Mean Cell Hemoglobin : 28.1 pg  Mean Cell Hemoglobin Concentration : 31.7 gm/dL  Auto Neutrophil # : 2.47 K/uL  Auto Lymphocyte # : 0.37 K/uL  Auto Monocyte # : 0.31 K/uL  Auto Eosinophil # : 0.12 K/uL  Auto Basophil # : 0.06 K/uL  Auto Neutrophil % : 72.2 %  Auto Lymphocyte % : 11.1 %  Auto Monocyte % : 9.3 %  Auto Eosinophil % : 3.7 %  Auto Basophil % : 1.8 %      08-20    136  |  100  |  14  ----------------------------<  115<H>  3.5   |  28  |  0.57    Ca    8.9      20 Aug 2024 06:46  Phos  3.5     08-20  Mg     2.2     08-20    TPro  5.3<L>  /  Alb  3.5  /  TBili  0.7  /  DBili  x   /  AST  23  /  ALT  19  /  AlkPhos  90  08-20  Mg 2.2  Phos 3.5  Mg 2.4  Phos 4.1  PT/INR - ( 19 Aug 2024 11:50 )   PT: 13.2 sec;   INR: 1.21 ratio    PTT - ( 19 Aug 2024 11:50 )  PTT:35.8 sec    Uric Acid 5.2    Uric Acid 5.2    RADIOLOGY & ADDITIONAL STUDIES:  NA

## 2024-08-20 NOTE — PROGRESS NOTE ADULT - PROBLEM SELECTOR PLAN 3
Continue Eliquis. Continue Eliquis. (Acute deep venous thrombosis: below the knee, Bilateral soleal vein thrombosis)- Doppler from 3/19/2024.

## 2024-08-20 NOTE — DIETITIAN INITIAL EVALUATION ADULT - ADD RECOMMEND
1. Continue current diet order as prescribed.   2.   3.  1. Continue current diet order as prescribed.   2. Pt is not amenable to receiving oral nutritional supplements at this time.   3. Monitor and encourage PO intake. Encourage use of daily menus. Honor dietary preferences as expressed as able.

## 2024-08-20 NOTE — DIETITIAN INITIAL EVALUATION ADULT - PERTINENT LABORATORY DATA
08-20    136  |  100  |  14  ----------------------------<  115<H>  3.5   |  28  |  0.57    Ca    8.9      20 Aug 2024 06:46  Phos  3.5     08-20  Mg     2.2     08-20    TPro  5.3<L>  /  Alb  3.5  /  TBili  0.7  /  DBili  x   /  AST  23  /  ALT  19  /  AlkPhos  90  08-20  A1C with Estimated Average Glucose Result: 6.0 % (03-05-24 @ 06:53)

## 2024-08-20 NOTE — DIETITIAN INITIAL EVALUATION ADULT - ENERGY INTAKE
- Pt reports  appetite/PO intake since admission, consuming ~% of most meals. Is amenable to receiving oral nutrition supplements at this time. Pt made aware of menu ordering procedures in house, food preferences obtained will honor as able.  Fair (50-75%) - Pt reports good appetite/PO intake since admission, consuming >75% of most meals. Is amenable to receiving oral nutrition supplements at this time. Pt made aware of menu ordering procedures in house.  Adequate (%)

## 2024-08-20 NOTE — PHYSICAL THERAPY INITIAL EVALUATION ADULT - PLANNED THERAPY INTERVENTIONS, PT EVAL
GOAL: Pt will negotiate 15 steps  up and down using HR support, independent  within 2-3 weeks./balance training/gait training/strengthening

## 2024-08-20 NOTE — DIETITIAN INITIAL EVALUATION ADULT - PROBLEM SELECTOR PLAN 1
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  Monitor electrolytes, replete as needed.  Daily weight. Strict I/O, antemetics   Urine alkalization with D5W with 150 meq NaHCo3@ 150 cc/hr  Monitor urine pH prior to initiation of MTX, >7.5 then Q 6 hrs.  Methotrexate 3500 mg/m2 = 5005 mg IV  over 3 hrs when urine pH >7.5.  Leucovorin 25 mg IVSS q 6 hrs after start of MTX and continue until MTX <0.05umZofran 16 mg IV daily x2 days , Reglan 10 mg IV Q 6hrs PRN.  Monitor MTX level 24 hrs after start of MTX then daily.  D/C home on

## 2024-08-21 ENCOUNTER — TRANSCRIPTION ENCOUNTER (OUTPATIENT)
Age: 69
End: 2024-08-21

## 2024-08-21 VITALS
SYSTOLIC BLOOD PRESSURE: 119 MMHG | DIASTOLIC BLOOD PRESSURE: 75 MMHG | OXYGEN SATURATION: 98 % | HEART RATE: 76 BPM | RESPIRATION RATE: 18 BRPM | TEMPERATURE: 98 F

## 2024-08-21 LAB
ALBUMIN SERPL ELPH-MCNC: 3.3 G/DL — SIGNIFICANT CHANGE UP (ref 3.3–5)
ALP SERPL-CCNC: 89 U/L — SIGNIFICANT CHANGE UP (ref 40–120)
ALT FLD-CCNC: 24 U/L — SIGNIFICANT CHANGE UP (ref 10–45)
ANION GAP SERPL CALC-SCNC: 12 MMOL/L — SIGNIFICANT CHANGE UP (ref 5–17)
AST SERPL-CCNC: 21 U/L — SIGNIFICANT CHANGE UP (ref 10–40)
BASOPHILS # BLD AUTO: 0 K/UL — SIGNIFICANT CHANGE UP (ref 0–0.2)
BASOPHILS NFR BLD AUTO: 0 % — SIGNIFICANT CHANGE UP (ref 0–2)
BILIRUB SERPL-MCNC: 0.4 MG/DL — SIGNIFICANT CHANGE UP (ref 0.2–1.2)
BUN SERPL-MCNC: 9 MG/DL — SIGNIFICANT CHANGE UP (ref 7–23)
CALCIUM SERPL-MCNC: 8.6 MG/DL — SIGNIFICANT CHANGE UP (ref 8.4–10.5)
CHLORIDE SERPL-SCNC: 101 MMOL/L — SIGNIFICANT CHANGE UP (ref 96–108)
CO2 SERPL-SCNC: 29 MMOL/L — SIGNIFICANT CHANGE UP (ref 22–31)
CREAT SERPL-MCNC: 0.53 MG/DL — SIGNIFICANT CHANGE UP (ref 0.5–1.3)
DACRYOCYTES BLD QL SMEAR: SLIGHT — SIGNIFICANT CHANGE UP
EGFR: 110 ML/MIN/1.73M2 — SIGNIFICANT CHANGE UP
ELLIPTOCYTES BLD QL SMEAR: SLIGHT — SIGNIFICANT CHANGE UP
EOSINOPHIL # BLD AUTO: 0.06 K/UL — SIGNIFICANT CHANGE UP (ref 0–0.5)
EOSINOPHIL NFR BLD AUTO: 1.8 % — SIGNIFICANT CHANGE UP (ref 0–6)
GLUCOSE SERPL-MCNC: 124 MG/DL — HIGH (ref 70–99)
HCT VFR BLD CALC: 33.1 % — LOW (ref 39–50)
HGB BLD-MCNC: 10.8 G/DL — LOW (ref 13–17)
LDH SERPL L TO P-CCNC: 207 U/L — SIGNIFICANT CHANGE UP (ref 50–242)
LYMPHOCYTES # BLD AUTO: 0.49 K/UL — LOW (ref 1–3.3)
LYMPHOCYTES # BLD AUTO: 14.3 % — SIGNIFICANT CHANGE UP (ref 13–44)
MAGNESIUM SERPL-MCNC: 2.2 MG/DL — SIGNIFICANT CHANGE UP (ref 1.6–2.6)
MANUAL SMEAR VERIFICATION: SIGNIFICANT CHANGE UP
MCHC RBC-ENTMCNC: 28.7 PG — SIGNIFICANT CHANGE UP (ref 27–34)
MCHC RBC-ENTMCNC: 32.6 GM/DL — SIGNIFICANT CHANGE UP (ref 32–36)
MCV RBC AUTO: 88 FL — SIGNIFICANT CHANGE UP (ref 80–100)
MONOCYTES # BLD AUTO: 0.52 K/UL — SIGNIFICANT CHANGE UP (ref 0–0.9)
MONOCYTES NFR BLD AUTO: 15.2 % — HIGH (ref 2–14)
MTX SERPL-SCNC: 0.18 UMOL/L — LOW (ref 0.5–5)
NEUTROPHILS # BLD AUTO: 2.36 K/UL — SIGNIFICANT CHANGE UP (ref 1.8–7.4)
NEUTROPHILS NFR BLD AUTO: 67.8 % — SIGNIFICANT CHANGE UP (ref 43–77)
NEUTS BAND # BLD: 0.9 % — SIGNIFICANT CHANGE UP (ref 0–8)
PH UR: >=9 (ref 5–8)
PHOSPHATE SERPL-MCNC: 3.4 MG/DL — SIGNIFICANT CHANGE UP (ref 2.5–4.5)
PLAT MORPH BLD: NORMAL — SIGNIFICANT CHANGE UP
PLATELET # BLD AUTO: 177 K/UL — SIGNIFICANT CHANGE UP (ref 150–400)
POIKILOCYTOSIS BLD QL AUTO: SLIGHT — SIGNIFICANT CHANGE UP
POTASSIUM SERPL-MCNC: 3.2 MMOL/L — LOW (ref 3.5–5.3)
POTASSIUM SERPL-SCNC: 3.2 MMOL/L — LOW (ref 3.5–5.3)
PROT SERPL-MCNC: 5.2 G/DL — LOW (ref 6–8.3)
RBC # BLD: 3.76 M/UL — LOW (ref 4.2–5.8)
RBC # FLD: 15.1 % — HIGH (ref 10.3–14.5)
RBC BLD AUTO: ABNORMAL
SODIUM SERPL-SCNC: 142 MMOL/L — SIGNIFICANT CHANGE UP (ref 135–145)
URATE SERPL-MCNC: 5 MG/DL — SIGNIFICANT CHANGE UP (ref 3.4–8.8)
WBC # BLD: 3.43 K/UL — LOW (ref 3.8–10.5)
WBC # FLD AUTO: 3.43 K/UL — LOW (ref 3.8–10.5)

## 2024-08-21 PROCEDURE — 99239 HOSP IP/OBS DSCHRG MGMT >30: CPT

## 2024-08-21 RX ORDER — DULOXETINE HCL 30 MG
60 CAPSULE,DELAYED RELEASE (ENTERIC COATED) ORAL
Refills: 0 | Status: DISCONTINUED | OUTPATIENT
Start: 2024-08-21 | End: 2024-08-21

## 2024-08-21 RX ORDER — DULOXETINE HCL 30 MG
1 CAPSULE,DELAYED RELEASE (ENTERIC COATED) ORAL
Qty: 0 | Refills: 0 | DISCHARGE
Start: 2024-08-21

## 2024-08-21 RX ORDER — POTASSIUM CHLORIDE 10 MEQ
20 TABLET, EXT RELEASE, PARTICLES/CRYSTALS ORAL
Refills: 0 | Status: COMPLETED | OUTPATIENT
Start: 2024-08-21 | End: 2024-08-21

## 2024-08-21 RX ORDER — LEUCOVORIN CALCIUM 100 MG/10ML
1 INJECTION, POWDER, LYOPHILIZED, FOR SOLUTION INTRAMUSCULAR; INTRAVENOUS
Qty: 9 | Refills: 0
Start: 2024-08-21 | End: 2024-08-23

## 2024-08-21 RX ADMIN — LEUCOVORIN CALCIUM 25 MILLIGRAM(S): 100 INJECTION, POWDER, LYOPHILIZED, FOR SOLUTION INTRAMUSCULAR; INTRAVENOUS at 11:39

## 2024-08-21 RX ADMIN — Medication 20 MILLIEQUIVALENT(S): at 11:44

## 2024-08-21 RX ADMIN — Medication 20 MILLIEQUIVALENT(S): at 13:08

## 2024-08-21 RX ADMIN — APIXABAN 5 MILLIGRAM(S): 5 TABLET, FILM COATED ORAL at 05:27

## 2024-08-21 RX ADMIN — LEUCOVORIN CALCIUM 25 MILLIGRAM(S): 100 INJECTION, POWDER, LYOPHILIZED, FOR SOLUTION INTRAMUSCULAR; INTRAVENOUS at 03:32

## 2024-08-21 RX ADMIN — Medication 20 MILLIEQUIVALENT(S): at 15:06

## 2024-08-21 NOTE — PROGRESS NOTE ADULT - PROBLEM SELECTOR PLAN 3
Continue Eliquis. (Acute deep venous thrombosis: below the knee, Bilateral soleal vein thrombosis)- Doppler from 3/19/2024.

## 2024-08-21 NOTE — PROGRESS NOTE ADULT - PROBLEM SELECTOR PLAN 2
Not neutropenic, if febrile pan culture, f/u cx results.
Not neutropenic, if febrile pan culture, f/u cx results.

## 2024-08-21 NOTE — DISCHARGE NOTE NURSING/CASE MANAGEMENT/SOCIAL WORK - PATIENT PORTAL LINK FT
You can access the FollowMyHealth Patient Portal offered by [facility] by registering at the following website: [email or web address]. By joining [employer]’s FollowMyHealth portal, you will also be able to view your health information using other applications (apps) compatible with our system.

## 2024-08-21 NOTE — DISCHARGE NOTE NURSING/CASE MANAGEMENT/SOCIAL WORK - NSDCFUADDAPPT_GEN_ALL_CORE_FT
Follow up at [facility] on   Dr. Chatterjee on 8/26 – 1:30labs and 2pm with Dr. Chatterjee.

## 2024-08-21 NOTE — PROGRESS NOTE ADULT - NS ATTEND AMEND GEN_ALL_CORE FT
.Primary: [place]    Vital Signs Last 24 Hrs  T(C): 36.9 (21 Aug 2024 05:14), Max: 36.9 (20 Aug 2024 17:34)  T(F): 98.5 (21 Aug 2024 05:14), Max: 98.5 (21 Aug 2024 05:14)  HR: 70 (21 Aug 2024 05:14) (58 - 74)  BP: 117/72 (21 Aug 2024 05:14) (109/66 - 138/79)  BP(mean): --  RR: 18 (21 Aug 2024 05:14) (18 - 18)  SpO2: 95% (21 Aug 2024 05:14) (95% - 98%)    Parameters below as of 21 Aug 2024 05:14  Patient On (Oxygen Delivery Method): room air    MEDICATIONS  (STANDING):  apixaban 5 milliGRAM(s) Oral two times a day  DULoxetine 30 milliGRAM(s) Oral <User Schedule>  leucovorin IVPB (eMAR) 25 milliGRAM(s) IV Intermittent every 6 hours  sodium bicarbonate  Infusion 0.478 mEq/kG/Hr (150 mL/Hr) IV Continuous <Continuous>  sodium chloride 0.9%. 1000 milliLiter(s) (20 mL/Hr) IV Continuous <Continuous>      Assessment: 68 year old day +3 cycle 3 high dose MTX as primary CNS prophylaxis for DLBCL.        Plan:   Heme:  MTX (8/20/24): 0.8  maintain urine alkalization and IV leucovorin util 0.2 then D/C to home on oral leucovorin.  Continue Eliquis.    Nutrition: tolerating PO    Pain: cymbalta 30mg daily, PRN opiates    Over 55 minutes were spent in direct patient care and care coordination. .Primary: [place]    Vital Signs Last 24 Hrs  T(C): 36.9 (21 Aug 2024 05:14), Max: 36.9 (20 Aug 2024 17:34)  T(F): 98.5 (21 Aug 2024 05:14), Max: 98.5 (21 Aug 2024 05:14)  HR: 70 (21 Aug 2024 05:14) (58 - 74)  BP: 117/72 (21 Aug 2024 05:14) (109/66 - 138/79)  BP(mean): --  RR: 18 (21 Aug 2024 05:14) (18 - 18)  SpO2: 95% (21 Aug 2024 05:14) (95% - 98%)    Parameters below as of 21 Aug 2024 05:14  Patient On (Oxygen Delivery Method): room air    MEDICATIONS  (STANDING):  apixaban 5 milliGRAM(s) Oral two times a day  DULoxetine 30 milliGRAM(s) Oral <User Schedule>  leucovorin IVPB (eMAR) 25 milliGRAM(s) IV Intermittent every 6 hours  sodium bicarbonate  Infusion 0.478 mEq/kG/Hr (150 mL/Hr) IV Continuous <Continuous>  sodium chloride 0.9%. 1000 milliLiter(s) (20 mL/Hr) IV Continuous <Continuous>      Assessment: 68 year old day +3 cycle 3 high dose MTX as primary CNS prophylaxis for DLBCL.        Plan:   Heme:  MTX (8/21/24): 0.18  start oral leucovorin 5mg PO q6  Continue Eliquis.    Nutrition: tolerating PO    Pain: increase cymbalta 60mg daily, PRN opiates    Over 35 minutes were spent in discharge management.

## 2024-08-21 NOTE — PROGRESS NOTE ADULT - PROBLEM SELECTOR PLAN 5
Continue Eliquis, h/o LE DVT.   Encourage ambulation.
Continue Eliquis, h/o LE DVT.   Encourage ambulation.

## 2024-08-21 NOTE — PROGRESS NOTE ADULT - PROBLEM SELECTOR PLAN 4
Continue Tizanidine, Oxycodone PRN.  Increased Cymbalta to 60 mg PO daily for musculoskeletal pain.
Continue Tizanidine, Oxycodone PRN.

## 2024-08-21 NOTE — PROGRESS NOTE ADULT - PROBLEM SELECTOR PLAN 1
Monitor CBC with Diff, transfuse as needed.  Monitor electrolytes, replete as needed.  Daily weight. Strict I/O, antemetic   Continue Urine alkalization with D5W with 150 meq NaHCo3@ 150 cc/hr  Monitor urine pH prior to initiation of MTX, >7.5 then Q 6 hrs.  Methotrexate 3500 mg/m2 = 5005 mg IV  over 3 hrs when urine pH >7.5.  Leucovorin 25 mg IVSS q 6 hrs after start of MTX and continue until MTX <0.05umZofran 16 mg IV daily x2 days , Reglan 10 mg IV Q 6hrs PRN.  Monitor MTX level 24 hrs after start of MTX then daily.  D/C home when MTX level <0.2  8/21 - MTX level is 0.18, will discharge home today with outpatient follow up.  8/21 - Hypokalemia - Replete K+.

## 2024-08-21 NOTE — PROGRESS NOTE ADULT - ASSESSMENT
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 Patient is now s/p cycle 6  RCHOP on 7/15 and admitted for cycle 3 HD MTX (3.5gm/m2) 
negative...
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 Patient is now s/p cycle 6  RCHOP on 7/15 and admitted for cycle 3 HD MTX (3.5gm/m2)

## 2024-08-21 NOTE — PROGRESS NOTE ADULT - SUBJECTIVE AND OBJECTIVE BOX
Diagnosis:    Protocol/Chemo Regimen:    Day:     Pt endorsed:    Review of Systems:     Pain scale:     Diet:     Allergies    latex (Rash)  No Known Drug Allergies    Intolerances        ANTIMICROBIALS      HEME/ONC MEDICATIONS  apixaban 5 milliGRAM(s) Oral two times a day      STANDING MEDICATIONS  DULoxetine 60 milliGRAM(s) Oral <User Schedule>  leucovorin IVPB (eMAR) 25 milliGRAM(s) IV Intermittent every 6 hours  potassium chloride    Tablet ER 20 milliEquivalent(s) Oral every 2 hours  sodium bicarbonate  Infusion 0.478 mEq/kG/Hr IV Continuous <Continuous>  sodium chloride 0.9%. 1000 milliLiter(s) IV Continuous <Continuous>      PRN MEDICATIONS  lactulose Syrup 10 Gram(s) Oral daily PRN  metoclopramide Injectable 10 milliGRAM(s) IV Push every 6 hours PRN  oxyCODONE    IR 5 milliGRAM(s) Oral every 4 hours PRN  tiZANidine 4 milliGRAM(s) Oral daily PRN        Vital Signs Last 24 Hrs  T(C): 36.9 (21 Aug 2024 13:21), Max: 36.9 (20 Aug 2024 17:34)  T(F): 98.5 (21 Aug 2024 13:21), Max: 98.5 (21 Aug 2024 05:14)  HR: 76 (21 Aug 2024 13:21) (58 - 76)  BP: 119/75 (21 Aug 2024 13:21) (108/70 - 138/79)  BP(mean): --  RR: 18 (21 Aug 2024 13:21) (18 - 18)  SpO2: 98% (21 Aug 2024 13:21) (95% - 98%)    Parameters below as of 21 Aug 2024 13:21  Patient On (Oxygen Delivery Method): room air        PHYSICAL EXAM  General: NAD  HEENT: PERRLA, EOMOI, clear oropharynx, anicteric sclera, pink conjunctiva  Neck: supple  CV: (+) S1/S2 RRR  Lungs: clear to auscultation, no wheezes or rales  Abdomen: soft, non-tender, non-distended (+) BS  Ext: no clubbing, cyanosis or edema  Skin: no rashes and no petechiae  Neuro: alert and oriented X 3, no focal deficits  Central Line:     RECENT CULTURES:        LABS:                        10.8   3.43  )-----------( 177      ( 21 Aug 2024 07:25 )             33.1         Mean Cell Volume : 88.0 fl  Mean Cell Hemoglobin : 28.7 pg  Mean Cell Hemoglobin Concentration : 32.6 gm/dL  Auto Neutrophil # : 2.36 K/uL  Auto Lymphocyte # : 0.49 K/uL  Auto Monocyte # : 0.52 K/uL  Auto Eosinophil # : 0.06 K/uL  Auto Basophil # : 0.00 K/uL  Auto Neutrophil % : 67.8 %  Auto Lymphocyte % : 14.3 %  Auto Monocyte % : 15.2 %  Auto Eosinophil % : 1.8 %  Auto Basophil % : 0.0 %      08-21    142  |  101  |  9   ----------------------------<  124<H>  3.2<L>   |  29  |  0.53    Ca    8.6      21 Aug 2024 07:16  Phos  3.4     08-21  Mg     2.2     08-21    TPro  5.2<L>  /  Alb  3.3  /  TBili  0.4  /  DBili  x   /  AST  21  /  ALT  24  /  AlkPhos  89  08-21      Mg 2.2  Phos 3.4            Uric Acid 5.0        RADIOLOGY & ADDITIONAL STUDIES:         Diagnosis: DLBCL stage IV GC    Protocol/Chemo Regimen: Cycle # 3 HD MTX    Day: 3    Pt endorsed: Feeling well this am, no complaints reported today.     Review of Systems: Denies any nausea, vomiting diarrhea, chest pain, palpitation.     Pain scale: chronic back pain    Diet: Regular    Allergies: latex (Rash)  No Known Drug Allergies    HEME/ONC MEDICATIONS  apixaban 5 milliGRAM(s) Oral two times a day    STANDING MEDICATIONS  DULoxetine 60 milliGRAM(s) Oral <User Schedule>  leucovorin IVPB (eMAR) 25 milliGRAM(s) IV Intermittent every 6 hours  potassium chloride    Tablet ER 20 milliEquivalent(s) Oral every 2 hours  sodium bicarbonate  Infusion 0.478 mEq/kG/Hr IV Continuous <Continuous>  sodium chloride 0.9%. 1000 milliLiter(s) IV Continuous <Continuous>    PRN MEDICATIONS  lactulose Syrup 10 Gram(s) Oral daily PRN  metoclopramide Injectable 10 milliGRAM(s) IV Push every 6 hours PRN  oxyCODONE    IR 5 milliGRAM(s) Oral every 4 hours PRN  tiZANidine 4 milliGRAM(s) Oral daily PRN    Vital Signs Last 24 Hrs  T(C): 36.9 (21 Aug 2024 13:21), Max: 36.9 (20 Aug 2024 17:34)  T(F): 98.5 (21 Aug 2024 13:21), Max: 98.5 (21 Aug 2024 05:14)  HR: 76 (21 Aug 2024 13:21) (58 - 76)  BP: 119/75 (21 Aug 2024 13:21) (108/70 - 138/79)  BP(mean): --  RR: 18 (21 Aug 2024 13:21) (18 - 18)  SpO2: 98% (21 Aug 2024 13:21) (95% - 98%)    Parameters below as of 21 Aug 2024 13:21  Patient On (Oxygen Delivery Method): room air    PHYSICAL EXAM  General: NAD  HEENT: PERRLA, EOMOI, clear oropharynx  CV: (+) S1/S2 RRR  Lungs: clear to auscultation, no wheezes or rales  Abdomen: soft, non-tender, non-distended (+) BS  Ext: no clubbing, cyanosis or edema  Skin: no rashes and no petechiae  Neuro: alert and oriented X 3, no focal deficits  Central Line: PIVL    LABS:                        10.8   3.43  )-----------( 177      ( 21 Aug 2024 07:25 )             33.1     Mean Cell Volume : 88.0 fl  Mean Cell Hemoglobin : 28.7 pg  Mean Cell Hemoglobin Concentration : 32.6 gm/dL  Auto Neutrophil # : 2.36 K/uL  Auto Lymphocyte # : 0.49 K/uL  Auto Monocyte # : 0.52 K/uL  Auto Eosinophil # : 0.06 K/uL  Auto Basophil # : 0.00 K/uL  Auto Neutrophil % : 67.8 %  Auto Lymphocyte % : 14.3 %  Auto Monocyte % : 15.2 %  Auto Eosinophil % : 1.8 %  Auto Basophil % : 0.0 %    08-21    142  |  101  |  9   ----------------------------<  124<H>  3.2<L>   |  29  |  0.53    Ca    8.6      21 Aug 2024 07:16  Phos  3.4     08-21  Mg     2.2     08-21    TPro  5.2<L>  /  Alb  3.3  /  TBili  0.4  /  DBili  x   /  AST  21  /  ALT  24  /  AlkPhos  89  08-21  Mg 2.2  Phos 3.4    Uric Acid 5.0    RADIOLOGY & ADDITIONAL STUDIES:  NA

## 2024-08-22 ENCOUNTER — NON-APPOINTMENT (OUTPATIENT)
Age: 69
End: 2024-08-22

## 2024-08-26 ENCOUNTER — RESULT REVIEW (OUTPATIENT)
Age: 69
End: 2024-08-26

## 2024-08-26 ENCOUNTER — RX RENEWAL (OUTPATIENT)
Age: 69
End: 2024-08-26

## 2024-08-26 ENCOUNTER — APPOINTMENT (OUTPATIENT)
Dept: HEMATOLOGY ONCOLOGY | Facility: CLINIC | Age: 69
End: 2024-08-26
Payer: MEDICARE

## 2024-08-26 ENCOUNTER — APPOINTMENT (OUTPATIENT)
Dept: HEMATOLOGY ONCOLOGY | Facility: CLINIC | Age: 69
End: 2024-08-26

## 2024-08-26 VITALS
HEART RATE: 70 BPM | TEMPERATURE: 98.1 F | DIASTOLIC BLOOD PRESSURE: 64 MMHG | WEIGHT: 104.06 LBS | RESPIRATION RATE: 18 BRPM | OXYGEN SATURATION: 98 % | SYSTOLIC BLOOD PRESSURE: 99 MMHG | BODY MASS INDEX: 17.32 KG/M2

## 2024-08-26 LAB
ALBUMIN SERPL ELPH-MCNC: 4 G/DL
ALP BLD-CCNC: 97 U/L
ALT SERPL-CCNC: 23 U/L
ANION GAP SERPL CALC-SCNC: 9 MMOL/L
AST SERPL-CCNC: 22 U/L
BASOPHILS # BLD AUTO: 0.02 K/UL — SIGNIFICANT CHANGE UP (ref 0–0.2)
BASOPHILS NFR BLD AUTO: 1.3 % — SIGNIFICANT CHANGE UP (ref 0–2)
BILIRUB SERPL-MCNC: 0.4 MG/DL
BUN SERPL-MCNC: 14 MG/DL
CALCIUM SERPL-MCNC: 9.3 MG/DL
CHLORIDE SERPL-SCNC: 103 MMOL/L
CO2 SERPL-SCNC: 28 MMOL/L
CREAT SERPL-MCNC: 0.66 MG/DL
DACRYOCYTES BLD QL SMEAR: SLIGHT — SIGNIFICANT CHANGE UP
EGFR: 103 ML/MIN/1.73M2
ELLIPTOCYTES BLD QL SMEAR: SLIGHT — SIGNIFICANT CHANGE UP
EOSINOPHIL # BLD AUTO: 0.05 K/UL — SIGNIFICANT CHANGE UP (ref 0–0.5)
EOSINOPHIL NFR BLD AUTO: 3.3 % — SIGNIFICANT CHANGE UP (ref 0–6)
GLUCOSE SERPL-MCNC: 95 MG/DL
HCT VFR BLD CALC: 33.2 % — LOW (ref 39–50)
HGB BLD-MCNC: 10.9 G/DL — LOW (ref 13–17)
IMM GRANULOCYTES NFR BLD AUTO: 0.7 % — SIGNIFICANT CHANGE UP (ref 0–0.9)
LYMPHOCYTES # BLD AUTO: 0.32 K/UL — LOW (ref 1–3.3)
LYMPHOCYTES # BLD AUTO: 20.9 % — SIGNIFICANT CHANGE UP (ref 13–44)
MCHC RBC-ENTMCNC: 28.8 PG — SIGNIFICANT CHANGE UP (ref 27–34)
MCHC RBC-ENTMCNC: 32.8 G/DL — SIGNIFICANT CHANGE UP (ref 32–36)
MCV RBC AUTO: 87.6 FL — SIGNIFICANT CHANGE UP (ref 80–100)
MONOCYTES # BLD AUTO: 0.4 K/UL — SIGNIFICANT CHANGE UP (ref 0–0.9)
MONOCYTES NFR BLD AUTO: 26.1 % — HIGH (ref 2–14)
NEUTROPHILS # BLD AUTO: 0.73 K/UL — LOW (ref 1.8–7.4)
NEUTROPHILS NFR BLD AUTO: 47.7 % — SIGNIFICANT CHANGE UP (ref 43–77)
NRBC # BLD: 0 /100 WBCS — SIGNIFICANT CHANGE UP (ref 0–0)
PLAT MORPH BLD: NORMAL — SIGNIFICANT CHANGE UP
PLATELET # BLD AUTO: 170 K/UL — SIGNIFICANT CHANGE UP (ref 150–400)
POIKILOCYTOSIS BLD QL AUTO: SLIGHT — SIGNIFICANT CHANGE UP
POTASSIUM SERPL-SCNC: 4.5 MMOL/L
PROT SERPL-MCNC: 6.1 G/DL
RBC # BLD: 3.79 M/UL — LOW (ref 4.2–5.8)
RBC # FLD: 14.6 % — HIGH (ref 10.3–14.5)
RBC BLD AUTO: ABNORMAL
SODIUM SERPL-SCNC: 140 MMOL/L
WBC # BLD: 1.53 K/UL — LOW (ref 3.8–10.5)
WBC # FLD AUTO: 1.53 K/UL — LOW (ref 3.8–10.5)

## 2024-08-26 PROCEDURE — 99214 OFFICE O/P EST MOD 30 MIN: CPT

## 2024-08-26 PROCEDURE — G2211 COMPLEX E/M VISIT ADD ON: CPT

## 2024-08-26 NOTE — ASSESSMENT
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 Course complicated by 1) spinal cord disease, 2) elevated LDH and 3) bilateral DVT. Prolonged cytopenia's with MRCHOP - that has transitioned care to RCHOP to be followed by HI-MTX x 3   PMHx: prediabetes.  Plan: Heme: completed RCHOP x6 (3/24/24 -7/15/24).  Now pursing post RCHOP, high-dose MTX as treatment (total of four treatments); dose 3.5g/mm MTX ; next planned admission: 9/3/24 PLT goal > 50,000 given full anticoagulation C/W DOAC: total of 6 months of therapy -- to be completed on 9/24/24  Radiographs: Spinal MRI pended below, Dr. Partida is planning next radiographs 9/18/24 -- will add chest abd pelvic CT for restaging on the same date.     Pain: continue oxycodone; Cymbalta 60mg qhs (dose increased during last hospitalization)    Over 35 minutes were spent in direct patient care with greater than 50% discussing his disease response and planned hospitalization.   Addendum I: MRI 5/23/24):  Again demonstrated status post T8 corpectomy with fusion of T6-T10 with multilevel transpedicular screws and vertical supporting rods. There is cement material in the T6, T7, T9 and T10 vertebral bodies.  Status post fusion of L2 through the pelvis with transpedicular screws L2-L5 and bilateral sacroiliac screws. There are vertical supporting rods. There is cement material within the L2-L5 vertebral bodies. There is a dorsal subcutaneous fluid collection spanning L2-L5 extending to the laminectomy bed measuring 7.7 x 3.2 cm. There is peripheral enhancement surrounding the fluid cavity. This may represent postsurgical changes. There is a small fluid collection in the laminectomy bed at T8.  There is a moderate L1 compression fracture which is unchanged from CT chest abdomen and pelvis 3/22/2024. There is focal kyphosis. Again demonstrated is a pathologic L4 compression fracture. Again demonstrated is a previously noted T8 compression fracture with corpectomy. The remainder the vertebral body heights are maintained. There are multiple STIR hyperintense enhancing lesions in the thoracic and lumbar spine consistent with osseous metastasis. This is overall improved compared to 3/5/2024. There is interval resolution of the epidural soft tissue tumor in the lumbar spine spanning L2-L5 and in the thoracic spine at the T8 level.  There is interval reexpansion of the thoracic cord at the T8 level. There is no cord compression. There is improved caliber of the spinal canal in the lumbar spine compared to preoperative imaging.  MRA images demonstrates no abnormal vasculature.  IMPRESSION:  Status post fusion T6-T10 and L2 through the pelvis. Again demonstrated are bone marrow infiltrative lesions in the thoracic and lumbar spine consistent with metastasis, significantly improved from prior MRI 3/5/2024. Resolution of epidural soft tissue tumor in the thoracic and lumbar spine with reexpansion of the spinal canal and resolution of cord compression.  Peripherally enhancing fluid collection in the dorsal subcutaneous soft tissues and laminectomy bed spanning L2-L5 which may represent postsurgical changes and clinical correlation is recommended.  Moderate L1 compression fracture which is new from 3/5/2024 but is not significant change from CT chest, abdomen and pelvis 3/22/2024. No new compression fracture.  A radiologist clinical note was sent to Dr. Chatterjee on 5/30/2024 4:37 PM via hospital based secure email.

## 2024-08-26 NOTE — HISTORY OF PRESENT ILLNESS
[de-identified] : Mr. Solis was last seen: 8/12/24  [de-identified] : Reason for visit: DLBCL  Since last visit:  feeling okay, using walker. Cymbalta appears to be helping  Saturnino does not spontaneously report: fever, chills, sweats, weight loss, skin rashes, petechiae, bruising, eye irritation, hearing loss, mouth sores, sore throat, cough, hemoptysis, pleuritic chest pain, dyspnea, change in vision, focal numbness/weakness, chest pains, palpitations, nausea, vomiting, diarrhea, dysuria, hematuria, bowel or bladder incontinence.  Medications: Oxycodone 5mg Q4PRN  -- four times a day = Lactulose Cymbalta 60mg qd apixaban 5mg bid Reglan PRN Zanaflex  Examination: Minister Solis is articulate and in no acute distress. No occiput, poster cervical, anterior cervical, submandibular, sublingual, submental, supraclavicular nor axillary adenopathy left paraspinal mass: non-tender Lungs: Clear Cardiac: without rubs Abd: soft and non-tender, Traube's space is tympanitic No inguinal nor femoral adenopathy Trace to 1+ pretibial edema bilaterally. Gait: using walker; can almost stand on each foot independently;

## 2024-08-29 ENCOUNTER — APPOINTMENT (OUTPATIENT)
Dept: INTERNAL MEDICINE | Facility: CLINIC | Age: 69
End: 2024-08-29
Payer: MEDICARE

## 2024-08-29 VITALS
OXYGEN SATURATION: 98 % | TEMPERATURE: 97.9 F | WEIGHT: 104 LBS | BODY MASS INDEX: 17.33 KG/M2 | DIASTOLIC BLOOD PRESSURE: 66 MMHG | HEART RATE: 75 BPM | HEIGHT: 65 IN | SYSTOLIC BLOOD PRESSURE: 107 MMHG | RESPIRATION RATE: 17 BRPM

## 2024-08-29 DIAGNOSIS — I82.411 ACUTE EMBOLISM AND THROMBOSIS OF RIGHT FEMORAL VEIN: ICD-10-CM

## 2024-08-29 DIAGNOSIS — R73.03 PREDIABETES.: ICD-10-CM

## 2024-08-29 DIAGNOSIS — C83.38 DIFFUSE LARGE B-CELL LYMPHOMA, LYMPH NODES OF MULTIPLE SITES: ICD-10-CM

## 2024-08-29 DIAGNOSIS — E55.9 VITAMIN D DEFICIENCY, UNSPECIFIED: ICD-10-CM

## 2024-08-29 PROCEDURE — 99214 OFFICE O/P EST MOD 30 MIN: CPT

## 2024-08-29 PROCEDURE — G2211 COMPLEX E/M VISIT ADD ON: CPT

## 2024-08-29 RX ORDER — LEUCOVORIN CALCIUM 5 MG/1
5 TABLET ORAL
Qty: 28 | Refills: 0 | Status: ACTIVE | COMMUNITY
Start: 2024-08-06

## 2024-08-29 NOTE — PLAN
[FreeTextEntry1] : 1. DLBCL * c/w chemotherapy * f/u with hematology/oncology 2. prediabetes * a1c improved * Dietary counseling given, dietary avoidance discussed, diet and exercise reviewed with patient; patient reminded of importance of aerobic exercise, weight control, dietary compliance and regular glucose monitoring 3. DVT * c/w Eliquis 4. vitamin D insufficiency * recommend daily supplement * f/u in six months

## 2024-08-29 NOTE — HISTORY OF PRESENT ILLNESS
[FreeTextEntry1] : follow up [de-identified] : 69 years old male with DLBCL on chemotherapy, prediabetes, dyslipidemia presents for follow up, states feeling ok, scheduled for chemotherpay in hospital next Tuesday; today to review and discuss labs test results

## 2024-09-03 ENCOUNTER — TRANSCRIPTION ENCOUNTER (OUTPATIENT)
Age: 69
End: 2024-09-03

## 2024-09-03 ENCOUNTER — INPATIENT (INPATIENT)
Facility: HOSPITAL | Age: 69
LOS: 1 days | Discharge: HOME | DRG: 842 | End: 2024-09-05
Attending: INTERNAL MEDICINE | Admitting: INTERNAL MEDICINE
Payer: MEDICARE

## 2024-09-03 VITALS
TEMPERATURE: 98 F | HEART RATE: 75 BPM | DIASTOLIC BLOOD PRESSURE: 71 MMHG | SYSTOLIC BLOOD PRESSURE: 124 MMHG | RESPIRATION RATE: 18 BRPM

## 2024-09-03 DIAGNOSIS — C83.30 DIFFUSE LARGE B-CELL LYMPHOMA, UNSPECIFIED SITE: ICD-10-CM

## 2024-09-03 DIAGNOSIS — Z90.49 ACQUIRED ABSENCE OF OTHER SPECIFIED PARTS OF DIGESTIVE TRACT: Chronic | ICD-10-CM

## 2024-09-03 DIAGNOSIS — B99.9 UNSPECIFIED INFECTIOUS DISEASE: ICD-10-CM

## 2024-09-03 DIAGNOSIS — Z29.9 ENCOUNTER FOR PROPHYLACTIC MEASURES, UNSPECIFIED: ICD-10-CM

## 2024-09-03 DIAGNOSIS — Z98.890 OTHER SPECIFIED POSTPROCEDURAL STATES: Chronic | ICD-10-CM

## 2024-09-03 DIAGNOSIS — C83.38 DIFFUSE LARGE B-CELL LYMPHOMA, LYMPH NODES OF MULTIPLE SITES: ICD-10-CM

## 2024-09-03 DIAGNOSIS — I82.409 ACUTE EMBOLISM AND THROMBOSIS OF UNSPECIFIED DEEP VEINS OF UNSPECIFIED LOWER EXTREMITY: ICD-10-CM

## 2024-09-03 DIAGNOSIS — M54.9 DORSALGIA, UNSPECIFIED: ICD-10-CM

## 2024-09-03 LAB
ALBUMIN SERPL ELPH-MCNC: 4 G/DL — SIGNIFICANT CHANGE UP (ref 3.3–5)
ALP SERPL-CCNC: 101 U/L — SIGNIFICANT CHANGE UP (ref 40–120)
ALT FLD-CCNC: 19 U/L — SIGNIFICANT CHANGE UP (ref 10–45)
ANION GAP SERPL CALC-SCNC: 8 MMOL/L — SIGNIFICANT CHANGE UP (ref 5–17)
AST SERPL-CCNC: 16 U/L — SIGNIFICANT CHANGE UP (ref 10–40)
BASOPHILS # BLD AUTO: 0.01 K/UL — SIGNIFICANT CHANGE UP (ref 0–0.2)
BASOPHILS NFR BLD AUTO: 0.3 % — SIGNIFICANT CHANGE UP (ref 0–2)
BILIRUB SERPL-MCNC: 0.3 MG/DL — SIGNIFICANT CHANGE UP (ref 0.2–1.2)
BUN SERPL-MCNC: 15 MG/DL — SIGNIFICANT CHANGE UP (ref 7–23)
CALCIUM SERPL-MCNC: 9.1 MG/DL — SIGNIFICANT CHANGE UP (ref 8.4–10.5)
CHLORIDE SERPL-SCNC: 102 MMOL/L — SIGNIFICANT CHANGE UP (ref 96–108)
CO2 SERPL-SCNC: 29 MMOL/L — SIGNIFICANT CHANGE UP (ref 22–31)
CREAT SERPL-MCNC: 0.48 MG/DL — LOW (ref 0.5–1.3)
EGFR: 113 ML/MIN/1.73M2 — SIGNIFICANT CHANGE UP
EOSINOPHIL # BLD AUTO: 0.06 K/UL — SIGNIFICANT CHANGE UP (ref 0–0.5)
EOSINOPHIL NFR BLD AUTO: 1.6 % — SIGNIFICANT CHANGE UP (ref 0–6)
GLUCOSE SERPL-MCNC: 108 MG/DL — HIGH (ref 70–99)
HCT VFR BLD CALC: 34.4 % — LOW (ref 39–50)
HGB BLD-MCNC: 11.1 G/DL — LOW (ref 13–17)
IMM GRANULOCYTES NFR BLD AUTO: 0.3 % — SIGNIFICANT CHANGE UP (ref 0–0.9)
LYMPHOCYTES # BLD AUTO: 0.31 K/UL — LOW (ref 1–3.3)
LYMPHOCYTES # BLD AUTO: 8.5 % — LOW (ref 13–44)
MAGNESIUM SERPL-MCNC: 2.3 MG/DL — SIGNIFICANT CHANGE UP (ref 1.6–2.6)
MCHC RBC-ENTMCNC: 28.4 PG — SIGNIFICANT CHANGE UP (ref 27–34)
MCHC RBC-ENTMCNC: 32.3 GM/DL — SIGNIFICANT CHANGE UP (ref 32–36)
MCV RBC AUTO: 88 FL — SIGNIFICANT CHANGE UP (ref 80–100)
MONOCYTES # BLD AUTO: 0.43 K/UL — SIGNIFICANT CHANGE UP (ref 0–0.9)
MONOCYTES NFR BLD AUTO: 11.8 % — SIGNIFICANT CHANGE UP (ref 2–14)
MTX SERPL-SCNC: <0.04 UMOL/L — LOW (ref 0.5–5)
NEUTROPHILS # BLD AUTO: 2.82 K/UL — SIGNIFICANT CHANGE UP (ref 1.8–7.4)
NEUTROPHILS NFR BLD AUTO: 77.5 % — HIGH (ref 43–77)
NRBC # BLD: 0 /100 WBCS — SIGNIFICANT CHANGE UP (ref 0–0)
PH UR: 8.5 (ref 5–8)
PH UR: >=9 (ref 5–8)
PHOSPHATE SERPL-MCNC: 3.5 MG/DL — SIGNIFICANT CHANGE UP (ref 2.5–4.5)
PLATELET # BLD AUTO: 155 K/UL — SIGNIFICANT CHANGE UP (ref 150–400)
POTASSIUM SERPL-MCNC: 4.2 MMOL/L — SIGNIFICANT CHANGE UP (ref 3.5–5.3)
POTASSIUM SERPL-SCNC: 4.2 MMOL/L — SIGNIFICANT CHANGE UP (ref 3.5–5.3)
PROT SERPL-MCNC: 6.1 G/DL — SIGNIFICANT CHANGE UP (ref 6–8.3)
RBC # BLD: 3.91 M/UL — LOW (ref 4.2–5.8)
RBC # FLD: 14.9 % — HIGH (ref 10.3–14.5)
SODIUM SERPL-SCNC: 139 MMOL/L — SIGNIFICANT CHANGE UP (ref 135–145)
WBC # BLD: 3.64 K/UL — LOW (ref 3.8–10.5)
WBC # FLD AUTO: 3.64 K/UL — LOW (ref 3.8–10.5)

## 2024-09-03 PROCEDURE — 99221 1ST HOSP IP/OBS SF/LOW 40: CPT

## 2024-09-03 RX ORDER — METHOTREXATE SODIUM 2.5 MG
5005 TABLET ORAL ONCE
Refills: 0 | Status: COMPLETED | OUTPATIENT
Start: 2024-09-03 | End: 2024-09-03

## 2024-09-03 RX ORDER — ONDANSETRON 2 MG/ML
1 INJECTION, SOLUTION INTRAMUSCULAR; INTRAVENOUS
Qty: 0 | Refills: 0 | DISCHARGE

## 2024-09-03 RX ORDER — METOCLOPRAMIDE HCL 5 MG
10 TABLET ORAL EVERY 6 HOURS
Refills: 0 | Status: DISCONTINUED | OUTPATIENT
Start: 2024-09-03 | End: 2024-09-05

## 2024-09-03 RX ORDER — LEUCOVORIN CALCIUM 100 MG/10ML
1 INJECTION, POWDER, LYOPHILIZED, FOR SOLUTION INTRAMUSCULAR; INTRAVENOUS
Qty: 8 | Refills: 0
Start: 2024-09-03 | End: 2024-09-04

## 2024-09-03 RX ORDER — ACETAMINOPHEN 325 MG/1
650 TABLET ORAL EVERY 6 HOURS
Refills: 0 | Status: DISCONTINUED | OUTPATIENT
Start: 2024-09-03 | End: 2024-09-05

## 2024-09-03 RX ORDER — SODIUM BICARBONATE 84 MG/ML
0.48 INJECTION, SOLUTION INTRAVENOUS
Qty: 150 | Refills: 0 | Status: DISCONTINUED | OUTPATIENT
Start: 2024-09-03 | End: 2024-09-05

## 2024-09-03 RX ORDER — OXYCODONE HYDROCHLORIDE 5 MG/1
5 TABLET ORAL EVERY 4 HOURS
Refills: 0 | Status: DISCONTINUED | OUTPATIENT
Start: 2024-09-03 | End: 2024-09-05

## 2024-09-03 RX ORDER — DULOXETINE HCL 30 MG
30 CAPSULE,DELAYED RELEASE (ENTERIC COATED) ORAL DAILY
Refills: 0 | Status: DISCONTINUED | OUTPATIENT
Start: 2024-09-03 | End: 2024-09-05

## 2024-09-03 RX ORDER — APIXABAN 5 MG/1
5 TABLET, FILM COATED ORAL EVERY 12 HOURS
Refills: 0 | Status: DISCONTINUED | OUTPATIENT
Start: 2024-09-03 | End: 2024-09-05

## 2024-09-03 RX ORDER — TIZANIDINE HYDROCHLORIDE 2 MG/1
8 CAPSULE ORAL AT BEDTIME
Refills: 0 | Status: DISCONTINUED | OUTPATIENT
Start: 2024-09-03 | End: 2024-09-05

## 2024-09-03 RX ORDER — LACTULOSE 10 G
10 PACKET (EA) ORAL DAILY
Refills: 0 | Status: DISCONTINUED | OUTPATIENT
Start: 2024-09-03 | End: 2024-09-05

## 2024-09-03 RX ORDER — ONDANSETRON 2 MG/ML
16 INJECTION, SOLUTION INTRAMUSCULAR; INTRAVENOUS EVERY 24 HOURS
Refills: 0 | Status: COMPLETED | OUTPATIENT
Start: 2024-09-03 | End: 2024-09-04

## 2024-09-03 RX ORDER — DULOXETINE HCL 30 MG
1 CAPSULE,DELAYED RELEASE (ENTERIC COATED) ORAL
Refills: 0 | DISCHARGE

## 2024-09-03 RX ORDER — LEUCOVORIN CALCIUM 100 MG/10ML
25 INJECTION, POWDER, LYOPHILIZED, FOR SOLUTION INTRAMUSCULAR; INTRAVENOUS EVERY 6 HOURS
Refills: 0 | Status: DISCONTINUED | OUTPATIENT
Start: 2024-09-04 | End: 2024-09-05

## 2024-09-03 RX ADMIN — SODIUM BICARBONATE 150 MEQ/KG/HR: 84 INJECTION, SOLUTION INTRAVENOUS at 10:55

## 2024-09-03 RX ADMIN — OXYCODONE HYDROCHLORIDE 5 MILLIGRAM(S): 5 TABLET ORAL at 14:58

## 2024-09-03 RX ADMIN — Medication 30 MILLIGRAM(S): at 13:22

## 2024-09-03 RX ADMIN — SODIUM BICARBONATE 150 MEQ/KG/HR: 84 INJECTION, SOLUTION INTRAVENOUS at 21:57

## 2024-09-03 RX ADMIN — OXYCODONE HYDROCHLORIDE 5 MILLIGRAM(S): 5 TABLET ORAL at 15:58

## 2024-09-03 RX ADMIN — ONDANSETRON 116 MILLIGRAM(S): 2 INJECTION, SOLUTION INTRAMUSCULAR; INTRAVENOUS at 12:37

## 2024-09-03 RX ADMIN — APIXABAN 5 MILLIGRAM(S): 5 TABLET, FILM COATED ORAL at 17:11

## 2024-09-03 RX ADMIN — TIZANIDINE HYDROCHLORIDE 8 MILLIGRAM(S): 2 CAPSULE ORAL at 21:56

## 2024-09-03 RX ADMIN — Medication 5005 MILLIGRAM(S): at 14:14

## 2024-09-03 NOTE — DISCHARGE NOTE PROVIDER - NSDCCPCAREPLAN_GEN_ALL_CORE_FT
PRINCIPAL DISCHARGE DIAGNOSIS  Diagnosis: DLBCL (diffuse large B cell lymphoma)  Assessment and Plan of Treatment: Notify MD or report to ER for fever greater or equal to 100.4, persistent nausea, vomiting, diarrhea, bleeding.       PRINCIPAL DISCHARGE DIAGNOSIS  Diagnosis: DLBCL (diffuse large B cell lymphoma)  Assessment and Plan of Treatment: Notify MD or report to ER for fever greater or equal to 100.4, persistent nausea, vomiting, diarrhea, bleeding. Continue leucovorin as ordered x 3 days.

## 2024-09-03 NOTE — DISCHARGE NOTE PROVIDER - NSDCFUADDINST_GEN_ALL_CORE_FT
To [facility] on Follow up with Dr. Chatterjee with telehealth appointment on Monday 9/30 at 8:30am.

## 2024-09-03 NOTE — DISCHARGE NOTE PROVIDER - HOSPITAL COURSE
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 Patient is now s/p cycle 6  RCHOP on 7/15 and admitted for cycle 4 HD MTX (3.5gm/m2). PIV placed. Patient received IV hydration to alkalinize urine to pH >7.5 to receive HD MTX and then monitored q6 to maintain at 8.5. Patient received antiemetics, strict I/O and repletion of electrolytes as needed. Leucovorin rescue started post Methotrexate. Methotrexate levels were monitored. Patient tolerated chemotherapy. Discharged home with MTX level at ____   68-year-old male patient with past medical history of HLD, pre-Diabetes Mellitus,B/L LE DVT on eliquis,  DLBCL (diffuse large B cell lymphoma)/ CD10+ DLBCL, positive for BCL-2 rearrangement, negative for MYC rearrangement who received 2 cycles of MR-CHOP with prolonged cytopenias and has transitioned care to RCHOP to be followed by HD-MTX. Patient is now s/p cycle 6  RCHOP on 7/15 and admitted for cycle 4 HD MTX (3.5gm/m2). PIV placed. Patient received IV hydration to alkalinize urine to pH >7.5 to receive HD MTX and then monitored q6 to maintain at 8.5. Patient received antiemetics, strict I/O and repletion of electrolytes as needed. Leucovorin rescue started post Methotrexate. Methotrexate levels were monitored. Patient tolerated chemotherapy. Discharged home with MTX level at 0.16. Discharged with outpatient follow up arranged.

## 2024-09-03 NOTE — DISCHARGE NOTE PROVIDER - CARE PROVIDER_API CALL
Tobias Chatterjee  Medical Oncology  [address] [place] [id number]  Phone: [phone]  Fax: [phone]  Follow Up Time:

## 2024-09-03 NOTE — DISCHARGE NOTE PROVIDER - NSDCFUSCHEDAPPT_GEN_ALL_CORE_FT
Tobias Chatterjee  [facility] PreAdmits  Scheduled Appointment: 09/03/2024    [facility]  [address]  Scheduled Appointment: 09/18/2024    [facility]  [address]  Scheduled Appointment: 09/18/2024    [facility]  [address]  Scheduled Appointment: 09/18/2024    Bhaskar Partida  [facility] Physician [facility]  NEUROSURG [address]  Scheduled Appointment: 09/23/2024    Tobias Chatterjee  [facility]  [facility]  Scheduled Appointment: 09/30/2024     [facility]  [address]SCAN [address]  Scheduled Appointment: 09/18/2024    [facility] [address]  [facility]ment: 09/18/2024    [facility] [address]  Scheduled Appointment: 09/18/2024    Bhaskar Partida  [facility]  NEUROSURG [address] [facility]  Scheduled Appointment: 09/23/2024    Tobias Chatterjee  [facility]  [facility]  Scheduled Appointment: 09/30/2024

## 2024-09-03 NOTE — ADVANCED PRACTICE NURSE CONSULT - ASSESSMENT
Pt. seen in bed a/ox4,denies any discomfort at this time. Chemotherapy teachings done.Pt. verbalized understanding. G # 20 inserted , right upper arm today .Dsg dry and intact .Site with no s/s of redness ,swelling or pain. With positive blood return noted and flushing easily with 10 ML NS. Drug verification done by 2 RN.'s.  Lab. values reviewed by Dr. Chatterjee prior to signing orders.Urine ph=9 . Pt. received zofran 16  mg IVPSS  as premedications. At 14:14 , started pt. on methotrexate IVPB (eMAR)  - Start Date: 03-Sep-2024  5005 milliGRAM(s) in dextrose 5% 500 milliLiter(s), IV Intermittent, once, infuse over 3 Hour(s), infuse at 233.4 mL/Hr, Stop After 1 Doses  Special Instructions: Protect from light  Administration Instructions: CAUTION: HAZARDOUS DRUG  This is a High Alert Medication. Chemo Tip attached to the lowest port of saline via alaris pump .Infusing well over 3 hours . Pt. tolerated infusion well. Primary RN aware of present treatment . Safety maintained .

## 2024-09-03 NOTE — H&P ADULT - NSHPLABSRESULTS_GEN_ALL_CORE
LABS:                        11.1   3.64  )-----------( 155      ( 03 Sep 2024 09:54 )             34.4         Mean Cell Volume : 88.0 fl  Mean Cell Hemoglobin : 28.4 pg  Mean Cell Hemoglobin Concentration : 32.3 gm/dL  Auto Neutrophil # : 2.82 K/uL  Auto Lymphocyte # : 0.31 K/uL  Auto Monocyte # : 0.43 K/uL  Auto Eosinophil # : 0.06 K/uL  Auto Basophil # : 0.01 K/uL  Auto Neutrophil % : 77.5 %  Auto Lymphocyte % : 8.5 %  Auto Monocyte % : 11.8 %  Auto Eosinophil % : 1.6 %  Auto Basophil % : 0.3 %      09-03    139  |  102  |  15  ----------------------------<  108<H>  4.2   |  29  |  0.48<L>    Ca    9.1      03 Sep 2024 11:11  Phos  3.5     09-03  Mg     2.3     09-03    TPro  6.1  /  Alb  4.0  /  TBili  0.3  /  DBili  x   /  AST  16  /  ALT  19  /  AlkPhos  101  09-03

## 2024-09-03 NOTE — PATIENT PROFILE ADULT - FALL HARM RISK - RISK INTERVENTIONS

## 2024-09-03 NOTE — H&P ADULT - PROBLEM SELECTOR PLAN 1
DLBCL (diffuse large B cell lymphoma)/ CD10+ DLBCL, positive for BCL-2 rearrangement, negative for MYC rearrangement s/p 6 cycles of RCHOP) now admitted cycle 3 HD MTX ( 3.5g/m2)   Admit to 7 [facility]  Place IVL   Urine alkalization sodium bicarb to achieve pH 7.5 and then q6 to maintain at 8.5  Methotrexate 3500 mg/m2 over 3 hrs when urine pH >7.5.  Leucovorin 25 mg IVSS q 6 hrs after start of MTX and continue until MTX <0.05um  Zofran 16 mg IV daily x2 days , Reglan 10 mg IV Q 6hrs PRN.  Monitor MTX level 24 hrs after start of MTX then daily.  Monitor CBC with Diff, transfuse as needed.  Monitor electrolytes, replete as needed.  Daily weight. Strict I/O, antemetics   discharge home when MTX level <0.2 Admit to [facility]  DLBCL (diffuse large B cell lymphoma)/ CD10+ DLBCL, positive for BCL-2 rearrangement, negative for MYC rearrangement s/p 6 cycles of RCHOP) now admitted cycle 3 HD MTX ( 3.5g/m2)   Place IVL   Urine alkalization sodium bicarb to achieve pH 7.5 and then q6 to maintain at 8.5  Methotrexate 3500 mg/m2 over 3 hrs when urine pH >7.5.  Leucovorin 25 mg IVSS q 6 hrs after start of MTX   Zofran 16 mg IV daily x2 days , Reglan 10 mg IV Q 6hrs PRN.  Monitor MTX level 24 hrs after start of MTX then daily.  Monitor CBC with Diff, transfuse as needed.  Monitor electrolytes, replete as needed.  Daily weight. Strict I/O, antemetics   discharge home when MTX level <0.2 with leucovorin 5mg PO q6 x 3 days

## 2024-09-03 NOTE — H&P ADULT - ASSESSMENT
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 Patient is now s/p cycle 6  RCHOP on 7/15 and admitted for cycle 4 HD MTX (3.5gm/m2)

## 2024-09-03 NOTE — H&P ADULT - HISTORY OF PRESENT ILLNESS
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 Patient is now s/p cycle 6  RCHOP on 7/15 and admitted for cycle 4 HD MTX (3.5gm/m2)     Patient presented to [facility] on 3/5/24 with a history of progressively worsening lower extremity numbness/paresthesias and weakness with onset Jan 2024. He underwent x-rays & MRI imaging as outpatient on 2/29/24 showing concern for osseous metastatic disease in thoracic/lumbar/sacral spine as well as extension of soft tissue into the paravertebral soft tissues more prominent on the right side. 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 Course complicated by 1) spinal cord disease, 2) elevated LDH and 3) bilateral DVT. First cycle of MR-CHOP was started 3/23/24. Patient received cycle 1 HD MTX on 4/8, cycle 2 on 8/5 and cycle 3 on 8/19.

## 2024-09-03 NOTE — DISCHARGE NOTE PROVIDER - NSDCMRMEDTOKEN_GEN_ALL_CORE_FT
apixaban 5 mg oral tablet: 1 tab(s) orally 2 times a day  DULoxetine 60 mg oral delayed release capsule: 1 cap(s) orally once a day  lactulose 10 g/15 mL oral syrup: 15 milliliter(s) orally once a day As needed for constipation.  leucovorin 5 mg oral tablet: 1 tab(s) orally every 8 hours Take for 3 days, total of 9 doses, last dose on 8/23/2024 at 10 PM). MDD: 3  oxyCODONE 5 mg oral tablet: 1 tab(s) orally every 4 hours as needed for Severe back pain.  tiZANidine 4 mg oral capsule: 2 cap(s) orally once a day (at bedtime)   apixaban 5 mg oral tablet: 1 tab(s) orally 2 times a day  Cymbalta 30 mg oral delayed release capsule: 1 cap(s) orally once a day  lactulose 10 g/15 mL oral syrup: 15 milliliter(s) orally once a day As needed for constipation.  leucovorin 5 mg oral tablet: 1 tab(s) orally every 6 hours  oxyCODONE 5 mg oral tablet: 1 tab(s) orally every 4 hours as needed for Severe back pain.  tiZANidine 4 mg oral capsule: 2 cap(s) orally once a day (at bedtime)  Zofran 8 mg oral tablet: 1 tab(s) orally every 8 hours as needed for  nausea   apixaban 5 mg oral tablet: 1 tab(s) orally 2 times a day  Cymbalta 30 mg oral delayed release capsule: 1 cap(s) orally once a day  lactulose 10 g/15 mL oral syrup: 15 milliliter(s) orally once a day As needed for constipation.  leucovorin 5 mg oral tablet: 1 tab(s) orally every 6 hours stop after 3 days  oxyCODONE 5 mg oral tablet: 1 tab(s) orally every 4 hours as needed for Severe back pain.  tiZANidine 4 mg oral capsule: 2 cap(s) orally once a day (at bedtime)  Zofran 8 mg oral tablet: 1 tab(s) orally every 8 hours as needed for  nausea

## 2024-09-04 LAB
ALBUMIN SERPL ELPH-MCNC: 3.5 G/DL — SIGNIFICANT CHANGE UP (ref 3.3–5)
ALP SERPL-CCNC: 94 U/L — SIGNIFICANT CHANGE UP (ref 40–120)
ALT FLD-CCNC: 19 U/L — SIGNIFICANT CHANGE UP (ref 10–45)
ANION GAP SERPL CALC-SCNC: 9 MMOL/L — SIGNIFICANT CHANGE UP (ref 5–17)
AST SERPL-CCNC: 18 U/L — SIGNIFICANT CHANGE UP (ref 10–40)
BASOPHILS # BLD AUTO: 0 K/UL — SIGNIFICANT CHANGE UP (ref 0–0.2)
BASOPHILS NFR BLD AUTO: 0 % — SIGNIFICANT CHANGE UP (ref 0–2)
BILIRUB SERPL-MCNC: 0.7 MG/DL — SIGNIFICANT CHANGE UP (ref 0.2–1.2)
BUN SERPL-MCNC: 10 MG/DL — SIGNIFICANT CHANGE UP (ref 7–23)
CALCIUM SERPL-MCNC: 8.5 MG/DL — SIGNIFICANT CHANGE UP (ref 8.4–10.5)
CHLORIDE SERPL-SCNC: 98 MMOL/L — SIGNIFICANT CHANGE UP (ref 96–108)
CO2 SERPL-SCNC: 31 MMOL/L — SIGNIFICANT CHANGE UP (ref 22–31)
CREAT SERPL-MCNC: 0.62 MG/DL — SIGNIFICANT CHANGE UP (ref 0.5–1.3)
DACRYOCYTES BLD QL SMEAR: SLIGHT — SIGNIFICANT CHANGE UP
EGFR: 105 ML/MIN/1.73M2 — SIGNIFICANT CHANGE UP
ELLIPTOCYTES BLD QL SMEAR: SLIGHT — SIGNIFICANT CHANGE UP
EOSINOPHIL # BLD AUTO: 0.05 K/UL — SIGNIFICANT CHANGE UP (ref 0–0.5)
EOSINOPHIL NFR BLD AUTO: 1.7 % — SIGNIFICANT CHANGE UP (ref 0–6)
GLUCOSE SERPL-MCNC: 115 MG/DL — HIGH (ref 70–99)
HCT VFR BLD CALC: 33 % — LOW (ref 39–50)
HGB BLD-MCNC: 10.6 G/DL — LOW (ref 13–17)
LYMPHOCYTES # BLD AUTO: 0.35 K/UL — LOW (ref 1–3.3)
LYMPHOCYTES # BLD AUTO: 12.3 % — LOW (ref 13–44)
MAGNESIUM SERPL-MCNC: 2.3 MG/DL — SIGNIFICANT CHANGE UP (ref 1.6–2.6)
MANUAL SMEAR VERIFICATION: SIGNIFICANT CHANGE UP
MCHC RBC-ENTMCNC: 27.9 PG — SIGNIFICANT CHANGE UP (ref 27–34)
MCHC RBC-ENTMCNC: 32.1 GM/DL — SIGNIFICANT CHANGE UP (ref 32–36)
MCV RBC AUTO: 86.8 FL — SIGNIFICANT CHANGE UP (ref 80–100)
MONOCYTES # BLD AUTO: 0.23 K/UL — SIGNIFICANT CHANGE UP (ref 0–0.9)
MONOCYTES NFR BLD AUTO: 7.9 % — SIGNIFICANT CHANGE UP (ref 2–14)
MTX SERPL-SCNC: 5.08 UMOL/L — HIGH (ref 0.5–5)
NEUTROPHILS # BLD AUTO: 2.23 K/UL — SIGNIFICANT CHANGE UP (ref 1.8–7.4)
NEUTROPHILS NFR BLD AUTO: 77.2 % — HIGH (ref 43–77)
NEUTS BAND # BLD: 0.9 % — SIGNIFICANT CHANGE UP (ref 0–8)
PH UR: 8.5 (ref 5–8)
PH UR: >=9 (ref 5–8)
PHOSPHATE SERPL-MCNC: 3.2 MG/DL — SIGNIFICANT CHANGE UP (ref 2.5–4.5)
PLAT MORPH BLD: NORMAL — SIGNIFICANT CHANGE UP
PLATELET # BLD AUTO: 159 K/UL — SIGNIFICANT CHANGE UP (ref 150–400)
POIKILOCYTOSIS BLD QL AUTO: SLIGHT — SIGNIFICANT CHANGE UP
POTASSIUM SERPL-MCNC: 3.3 MMOL/L — LOW (ref 3.5–5.3)
POTASSIUM SERPL-SCNC: 3.3 MMOL/L — LOW (ref 3.5–5.3)
PROT SERPL-MCNC: 5.4 G/DL — LOW (ref 6–8.3)
RBC # BLD: 3.8 M/UL — LOW (ref 4.2–5.8)
RBC # FLD: 14.4 % — SIGNIFICANT CHANGE UP (ref 10.3–14.5)
RBC BLD AUTO: ABNORMAL
SODIUM SERPL-SCNC: 138 MMOL/L — SIGNIFICANT CHANGE UP (ref 135–145)
WBC # BLD: 2.86 K/UL — LOW (ref 3.8–10.5)
WBC # FLD AUTO: 2.86 K/UL — LOW (ref 3.8–10.5)

## 2024-09-04 PROCEDURE — 99233 SBSQ HOSP IP/OBS HIGH 50: CPT | Mod: FS

## 2024-09-04 RX ORDER — FAMOTIDINE 10 MG/ML
20 INJECTION INTRAVENOUS DAILY
Refills: 0 | Status: DISCONTINUED | OUTPATIENT
Start: 2024-09-04 | End: 2024-09-05

## 2024-09-04 RX ORDER — POTASSIUM CHLORIDE 10 MEQ
40 TABLET, EXT RELEASE, PARTICLES/CRYSTALS ORAL EVERY 4 HOURS
Refills: 0 | Status: COMPLETED | OUTPATIENT
Start: 2024-09-04 | End: 2024-09-04

## 2024-09-04 RX ADMIN — TIZANIDINE HYDROCHLORIDE 8 MILLIGRAM(S): 2 CAPSULE ORAL at 21:37

## 2024-09-04 RX ADMIN — ONDANSETRON 116 MILLIGRAM(S): 2 INJECTION, SOLUTION INTRAMUSCULAR; INTRAVENOUS at 12:13

## 2024-09-04 RX ADMIN — APIXABAN 5 MILLIGRAM(S): 5 TABLET, FILM COATED ORAL at 06:11

## 2024-09-04 RX ADMIN — Medication 40 MILLIEQUIVALENT(S): at 15:24

## 2024-09-04 RX ADMIN — Medication 30 MILLIGRAM(S): at 12:24

## 2024-09-04 RX ADMIN — FAMOTIDINE 20 MILLIGRAM(S): 10 INJECTION INTRAVENOUS at 15:24

## 2024-09-04 RX ADMIN — LEUCOVORIN CALCIUM 25 MILLIGRAM(S): 100 INJECTION, POWDER, LYOPHILIZED, FOR SOLUTION INTRAMUSCULAR; INTRAVENOUS at 19:55

## 2024-09-04 RX ADMIN — SODIUM BICARBONATE 150 MEQ/KG/HR: 84 INJECTION, SOLUTION INTRAVENOUS at 17:18

## 2024-09-04 RX ADMIN — Medication 40 MILLIEQUIVALENT(S): at 12:12

## 2024-09-04 RX ADMIN — LEUCOVORIN CALCIUM 25 MILLIGRAM(S): 100 INJECTION, POWDER, LYOPHILIZED, FOR SOLUTION INTRAMUSCULAR; INTRAVENOUS at 13:38

## 2024-09-04 RX ADMIN — APIXABAN 5 MILLIGRAM(S): 5 TABLET, FILM COATED ORAL at 17:18

## 2024-09-04 RX ADMIN — SODIUM BICARBONATE 150 MEQ/KG/HR: 84 INJECTION, SOLUTION INTRAVENOUS at 06:13

## 2024-09-04 NOTE — PROGRESS NOTE ADULT - SUBJECTIVE AND OBJECTIVE BOX
Diagnosis:    Protocol/Chemo Regimen:    Day:     Pt endorsed:    Review of Systems:     Pain scale:     Diet:     Allergies    latex (Rash)  No Known Drug Allergies    Intolerances    HEME/ONC MEDICATIONS  apixaban 5 milliGRAM(s) Oral every 12 hours      STANDING MEDICATIONS  DULoxetine 30 milliGRAM(s) Oral daily  leucovorin IVPB (eMAR) 25 milliGRAM(s) IV Intermittent every 6 hours  ondansetron  IVPB 16 milliGRAM(s) IV Intermittent every 24 hours  sodium bicarbonate  Infusion 0.478 mEq/kG/Hr IV Continuous <Continuous>  tiZANidine 8 milliGRAM(s) Oral at bedtime      PRN MEDICATIONS  acetaminophen     Tablet .. 650 milliGRAM(s) Oral every 6 hours PRN  lactulose Syrup 10 Gram(s) Oral daily PRN  metoclopramide Injectable 10 milliGRAM(s) IV Push every 6 hours PRN  oxyCODONE    IR 5 milliGRAM(s) Oral every 4 hours PRN        Vital Signs Last 24 Hrs  T(C): 36.5 (04 Sep 2024 05:32), Max: 36.8 (03 Sep 2024 17:55)  T(F): 97.7 (04 Sep 2024 05:32), Max: 98.2 (03 Sep 2024 17:55)  HR: 58 (04 Sep 2024 05:32) (58 - 75)  BP: 128/74 (04 Sep 2024 05:32) (124/71 - 148/90)  BP(mean): --  RR: 18 (04 Sep 2024 05:32) (18 - 18)  SpO2: 98% (04 Sep 2024 05:32) (97% - 98%)    Parameters below as of 04 Sep 2024 05:32  Patient On (Oxygen Delivery Method): room air    PHYSICAL EXAM  General: NAD  HEENT: clear oropharynx, anicteric sclera, pink conjunctiva  Neck: supple  CV: (+) S1/S2 RRR  Lungs: positive air movement b/l ant lungs, clear to auscultation, no wheezes, no rales  Abdomen: soft, non-tender, non-distended  Ext: no clubbing cyanosis or edema  Skin: no rashes and no petechiae  Neuro: alert and oriented X 3, no focal deficits  Central Line:         LABS:                        Diagnosis: DLBCL    Protocol/Chemo Regimen: Cycle 4 HD MTX (3.5gm/m2).     Day: 2    Pt endorsed: Decreased appetite.     Review of Systems: Denies nausea, vomiting, diarrhea, chest pain, shortness of breath, headache. Weakness remains stable. ambulates with a walker.     Pain scale: 0    Diet: regular    Allergies    latex (Rash)  No Known Drug Allergies    HEME/ONC MEDICATIONS  apixaban 5 milliGRAM(s) Oral every 12 hours      STANDING MEDICATIONS  DULoxetine 30 milliGRAM(s) Oral daily  leucovorin IVPB (eMAR) 25 milliGRAM(s) IV Intermittent every 6 hours  ondansetron  IVPB 16 milliGRAM(s) IV Intermittent every 24 hours  sodium bicarbonate  Infusion 0.478 mEq/kG/Hr IV Continuous <Continuous>  tiZANidine 8 milliGRAM(s) Oral at bedtime      PRN MEDICATIONS  acetaminophen     Tablet .. 650 milliGRAM(s) Oral every 6 hours PRN  lactulose Syrup 10 Gram(s) Oral daily PRN  metoclopramide Injectable 10 milliGRAM(s) IV Push every 6 hours PRN  oxyCODONE    IR 5 milliGRAM(s) Oral every 4 hours PRN    Vital Signs Last 24 Hrs  T(C): 36.5 (04 Sep 2024 05:32), Max: 36.8 (03 Sep 2024 17:55)  T(F): 97.7 (04 Sep 2024 05:32), Max: 98.2 (03 Sep 2024 17:55)  HR: 58 (04 Sep 2024 05:32) (58 - 75)  BP: 128/74 (04 Sep 2024 05:32) (124/71 - 148/90)  BP(mean): --  RR: 18 (04 Sep 2024 05:32) (18 - 18)  SpO2: 98% (04 Sep 2024 05:32) (97% - 98%)    Parameters below as of 04 Sep 2024 05:32  Patient On (Oxygen Delivery Method): room air    PHYSICAL EXAM  General: NAD  HEENT: clear oropharynx,   CV: (+) S1/S2 RRR  Lungs: positive air movement b/l ant lungs, clear to auscultation, no wheezes, no rales  Abdomen: soft, non-tender, non-distended  Ext: no edema  Skin: no rashes and no petechiae  Neuro: alert and oriented X 3, no focal deficits  Central Line: PIV    LABS:                   10.6   2.86  )-----------( 159      ( 04 Sep 2024 06:50 )             33.0         Mean Cell Volume : 86.8 fl  Mean Cell Hemoglobin : 27.9 pg  Mean Cell Hemoglobin Concentration : 32.1 gm/dL  Auto Neutrophil # : 2.23 K/uL  Auto Lymphocyte # : 0.35 K/uL  Auto Monocyte # : 0.23 K/uL  Auto Eosinophil # : 0.05 K/uL  Auto Basophil # : 0.00 K/uL  Auto Neutrophil % : 77.2 %  Auto Lymphocyte % : 12.3 %  Auto Monocyte % : 7.9 %  Auto Eosinophil % : 1.7 %  Auto Basophil % : 0.0 %      09-04    138  |  98  |  10  ----------------------------<  115<H>  3.3<L>   |  31  |  0.62    Ca    8.5      04 Sep 2024 06:53  Phos  3.2     09-04  Mg     2.3     09-04    TPro  5.4<L>  /  Alb  3.5  /  TBili  0.7  /  DBili  x   /  AST  18  /  ALT  19  /  AlkPhos  94  09-04

## 2024-09-04 NOTE — PROGRESS NOTE ADULT - NS ATTEND AMEND GEN_ALL_CORE FT
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 He had bone and paravertebral disease at presentation with leg weakness. He had NS decompression before starting therapy and has had slow improvement in ability to walk with a walker. He started therapy with MR-CHOP which segued to R-CHOP and HD MTX.  He is admitted for 3rd cycle HD MTX, 3.5 gm/sq m given 9/3  Cont IVFs with NaHCO3, alkalinize urine, follow I and O, creat, LFTs, MTX level.  LV rescue.  Diurese prn.  Below knee DVT - can stop eliquis, has had nearly 6 mos of AC.  Encourage ambulation with walker.

## 2024-09-05 ENCOUNTER — TRANSCRIPTION ENCOUNTER (OUTPATIENT)
Age: 69
End: 2024-09-05

## 2024-09-05 VITALS
OXYGEN SATURATION: 98 % | TEMPERATURE: 98 F | RESPIRATION RATE: 18 BRPM | HEART RATE: 75 BPM | SYSTOLIC BLOOD PRESSURE: 130 MMHG | DIASTOLIC BLOOD PRESSURE: 79 MMHG

## 2024-09-05 LAB
ALBUMIN SERPL ELPH-MCNC: 3.7 G/DL — SIGNIFICANT CHANGE UP (ref 3.3–5)
ALP SERPL-CCNC: 99 U/L — SIGNIFICANT CHANGE UP (ref 40–120)
ALT FLD-CCNC: 23 U/L — SIGNIFICANT CHANGE UP (ref 10–45)
ANION GAP SERPL CALC-SCNC: 7 MMOL/L — SIGNIFICANT CHANGE UP (ref 5–17)
AST SERPL-CCNC: 20 U/L — SIGNIFICANT CHANGE UP (ref 10–40)
BASOPHILS # BLD AUTO: 0.01 K/UL — SIGNIFICANT CHANGE UP (ref 0–0.2)
BASOPHILS NFR BLD AUTO: 0.4 % — SIGNIFICANT CHANGE UP (ref 0–2)
BILIRUB SERPL-MCNC: 0.6 MG/DL — SIGNIFICANT CHANGE UP (ref 0.2–1.2)
BUN SERPL-MCNC: 8 MG/DL — SIGNIFICANT CHANGE UP (ref 7–23)
CALCIUM SERPL-MCNC: 9.1 MG/DL — SIGNIFICANT CHANGE UP (ref 8.4–10.5)
CHLORIDE SERPL-SCNC: 99 MMOL/L — SIGNIFICANT CHANGE UP (ref 96–108)
CO2 SERPL-SCNC: 30 MMOL/L — SIGNIFICANT CHANGE UP (ref 22–31)
CREAT SERPL-MCNC: 0.56 MG/DL — SIGNIFICANT CHANGE UP (ref 0.5–1.3)
EGFR: 108 ML/MIN/1.73M2 — SIGNIFICANT CHANGE UP
EOSINOPHIL # BLD AUTO: 0.08 K/UL — SIGNIFICANT CHANGE UP (ref 0–0.5)
EOSINOPHIL NFR BLD AUTO: 3.2 % — SIGNIFICANT CHANGE UP (ref 0–6)
GLUCOSE SERPL-MCNC: 117 MG/DL — HIGH (ref 70–99)
HCT VFR BLD CALC: 34.3 % — LOW (ref 39–50)
HGB BLD-MCNC: 11 G/DL — LOW (ref 13–17)
IMM GRANULOCYTES NFR BLD AUTO: 0.4 % — SIGNIFICANT CHANGE UP (ref 0–0.9)
LYMPHOCYTES # BLD AUTO: 0.37 K/UL — LOW (ref 1–3.3)
LYMPHOCYTES # BLD AUTO: 15 % — SIGNIFICANT CHANGE UP (ref 13–44)
MAGNESIUM SERPL-MCNC: 2.2 MG/DL — SIGNIFICANT CHANGE UP (ref 1.6–2.6)
MCHC RBC-ENTMCNC: 27.6 PG — SIGNIFICANT CHANGE UP (ref 27–34)
MCHC RBC-ENTMCNC: 32.1 GM/DL — SIGNIFICANT CHANGE UP (ref 32–36)
MCV RBC AUTO: 86 FL — SIGNIFICANT CHANGE UP (ref 80–100)
MONOCYTES # BLD AUTO: 0.28 K/UL — SIGNIFICANT CHANGE UP (ref 0–0.9)
MONOCYTES NFR BLD AUTO: 11.3 % — SIGNIFICANT CHANGE UP (ref 2–14)
MTX SERPL-SCNC: 0.16 UMOL/L — LOW (ref 0.5–5)
MTX SERPL-SCNC: 0.22 UMOL/L — LOW (ref 0.5–5)
NEUTROPHILS # BLD AUTO: 1.72 K/UL — LOW (ref 1.8–7.4)
NEUTROPHILS NFR BLD AUTO: 69.7 % — SIGNIFICANT CHANGE UP (ref 43–77)
NRBC # BLD: 0 /100 WBCS — SIGNIFICANT CHANGE UP (ref 0–0)
PH UR: 8.5 (ref 5–8)
PH UR: >=9 (ref 5–8)
PH UR: >=9 (ref 5–8)
PHOSPHATE SERPL-MCNC: 3.2 MG/DL — SIGNIFICANT CHANGE UP (ref 2.5–4.5)
PLATELET # BLD AUTO: 169 K/UL — SIGNIFICANT CHANGE UP (ref 150–400)
POTASSIUM SERPL-MCNC: 3.9 MMOL/L — SIGNIFICANT CHANGE UP (ref 3.5–5.3)
POTASSIUM SERPL-SCNC: 3.9 MMOL/L — SIGNIFICANT CHANGE UP (ref 3.5–5.3)
PROT SERPL-MCNC: 5.6 G/DL — LOW (ref 6–8.3)
RBC # BLD: 3.99 M/UL — LOW (ref 4.2–5.8)
RBC # FLD: 14.7 % — HIGH (ref 10.3–14.5)
SODIUM SERPL-SCNC: 136 MMOL/L — SIGNIFICANT CHANGE UP (ref 135–145)
WBC # BLD: 2.47 K/UL — LOW (ref 3.8–10.5)
WBC # FLD AUTO: 2.47 K/UL — LOW (ref 3.8–10.5)

## 2024-09-05 PROCEDURE — 99238 HOSP IP/OBS DSCHRG MGMT 30/<: CPT

## 2024-09-05 RX ORDER — LEUCOVORIN CALCIUM 100 MG/10ML
1 INJECTION, POWDER, LYOPHILIZED, FOR SOLUTION INTRAMUSCULAR; INTRAVENOUS
Qty: 8 | Refills: 0
Start: 2024-09-05 | End: 2024-09-06

## 2024-09-05 RX ADMIN — LEUCOVORIN CALCIUM 25 MILLIGRAM(S): 100 INJECTION, POWDER, LYOPHILIZED, FOR SOLUTION INTRAMUSCULAR; INTRAVENOUS at 02:03

## 2024-09-05 RX ADMIN — APIXABAN 5 MILLIGRAM(S): 5 TABLET, FILM COATED ORAL at 05:57

## 2024-09-05 RX ADMIN — LEUCOVORIN CALCIUM 25 MILLIGRAM(S): 100 INJECTION, POWDER, LYOPHILIZED, FOR SOLUTION INTRAMUSCULAR; INTRAVENOUS at 13:39

## 2024-09-05 RX ADMIN — OXYCODONE HYDROCHLORIDE 5 MILLIGRAM(S): 5 TABLET ORAL at 08:08

## 2024-09-05 RX ADMIN — LEUCOVORIN CALCIUM 25 MILLIGRAM(S): 100 INJECTION, POWDER, LYOPHILIZED, FOR SOLUTION INTRAMUSCULAR; INTRAVENOUS at 08:06

## 2024-09-05 RX ADMIN — OXYCODONE HYDROCHLORIDE 5 MILLIGRAM(S): 5 TABLET ORAL at 09:08

## 2024-09-05 RX ADMIN — Medication 30 MILLIGRAM(S): at 11:20

## 2024-09-05 RX ADMIN — ACETAMINOPHEN 650 MILLIGRAM(S): 325 TABLET ORAL at 17:22

## 2024-09-05 RX ADMIN — FAMOTIDINE 20 MILLIGRAM(S): 10 INJECTION INTRAVENOUS at 11:20

## 2024-09-05 RX ADMIN — APIXABAN 5 MILLIGRAM(S): 5 TABLET, FILM COATED ORAL at 17:23

## 2024-09-05 NOTE — PROGRESS NOTE ADULT - PROBLEM SELECTOR PLAN 3
Patient is on DVT prophylaxis, continue Eliquis.   Encourage ambulation.
Patient is on DVT prophylaxis, continue Eliquis.   Encourage ambulation.

## 2024-09-05 NOTE — PROGRESS NOTE ADULT - NS ATTEND AMEND GEN_ALL_CORE FT
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 He had bone and paravertebral disease at presentation with leg weakness. He had NS decompression before starting therapy and has had slow improvement in ability to walk with a walker. He started therapy with MR-CHOP which segued to R-CHOP and HD MTX.  He is admitted for 3rd cycle HD MTX, 3.5 gm/sq m given 9/3  Cont IVFs with NaHCO3, alkalinize urine, follow I and O, creat, LFTs, MTX level.  LV rescue.  Diurese prn.  Below knee DVT - can stop eliquis, has had nearly 6 mos of AC.  Encourage ambulation with walker. 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 He had bone and paravertebral disease at presentation with leg weakness. He had NS decompression before starting therapy and has had slow improvement in ability to walk with a walker. He started therapy with MR-CHOP which segued to R-CHOP and HD MTX.  He is admitted for 3rd cycle HD MTX, 3.5 gm/sq m given 9/3  MTX level 0.22 early AM; can d/c home if repeat today < 0.20 on po LV 5 mg q6 hr x 3d.  Cont IVFs with NaHCO3, alkalinize urine, follow I and O until d/c.  LV rescue.  Diurese prn.  Below knee DVT - can stop eliquis towards end of Sept., 2024, will have had nearly 6 mos of AC.  Encourage ambulation with walker.  d/c home for f/u with Dr. Chatterjee at [facility]

## 2024-09-05 NOTE — PROGRESS NOTE ADULT - SUBJECTIVE AND OBJECTIVE BOX
Diagnosis: DLBCL    Protocol/Chemo Regimen: Cycle 4 HD MTX (3.5gm/m2).     Day: 3    Pt endorsed: Decreased appetite.     Review of Systems: Denies nausea, vomiting, diarrhea, chest pain, SOB     Pain scale: 0    Diet: regular    Allergies: latex (Rash), No Known Drug Allergies      ----------------------------                                                  Diagnosis: DLBCL    Protocol/Chemo Regimen: Cycle 4 HD MTX (3.5gm/m2).     Day: 3    Pt endorsed: no acute complaints     Review of Systems: Denies nausea, vomiting, diarrhea, chest pain, SOB     Pain scale: 0    Diet: regular    Allergies: latex (Rash), No Known Drug Allergies    HEME/ONC MEDICATIONS  apixaban 5 milliGRAM(s) Oral every 12 hours    STANDING MEDICATIONS  DULoxetine 30 milliGRAM(s) Oral daily  famotidine    Tablet 20 milliGRAM(s) Oral daily  leucovorin IVPB (eMAR) 25 milliGRAM(s) IV Intermittent every 6 hours  sodium bicarbonate  Infusion 0.478 mEq/kG/Hr IV Continuous <Continuous>  tiZANidine 8 milliGRAM(s) Oral at bedtime    PRN MEDICATIONS  acetaminophen     Tablet .. 650 milliGRAM(s) Oral every 6 hours PRN  lactulose Syrup 10 Gram(s) Oral daily PRN  metoclopramide Injectable 10 milliGRAM(s) IV Push every 6 hours PRN  oxyCODONE    IR 5 milliGRAM(s) Oral every 4 hours PRN    Vital Signs Last 24 Hrs  T(C): 36.6 (05 Sep 2024 05:56), Max: 36.7 (04 Sep 2024 21:36)  T(F): 97.8 (05 Sep 2024 05:56), Max: 98.1 (04 Sep 2024 21:36)  HR: 60 (05 Sep 2024 05:56) (60 - 74)  BP: 116/76 (05 Sep 2024 05:56) (112/72 - 138/77)  BP(mean): --  RR: 18 (05 Sep 2024 05:56) (18 - 18)  SpO2: 97% (05 Sep 2024 05:56) (97% - 100%)    Parameters below as of 05 Sep 2024 05:56  Patient On (Oxygen Delivery Method): room air    PHYSICAL EXAM  General: adult in NAD  HEENT: clear oropharynx, no erythema, no ulcers  Neck: supple  CV: normal S1, S2, RRR  Lungs: clear to auscultation, no wheezes, no rales  Abdomen: soft, nontender, nondistended, normal BS  Ext: no edema  Skin: no rash  Neuro: alert and oriented x 3    LABS:                        11.0   2.47  )-----------( 169      ( 05 Sep 2024 06:55 )             34.3     Mean Cell Volume : 86.0 fl  Mean Cell Hemoglobin : 27.6 pg  Mean Cell Hemoglobin Concentration : 32.1 gm/dL  Auto Neutrophil # : 1.72 K/uL  Auto Lymphocyte # : 0.37 K/uL  Auto Monocyte # : 0.28 K/uL  Auto Eosinophil # : 0.08 K/uL  Auto Basophil # : 0.01 K/uL  Auto Neutrophil % : 69.7 %  Auto Lymphocyte % : 15.0 %  Auto Monocyte % : 11.3 %  Auto Eosinophil % : 3.2 %  Auto Basophil % : 0.4 %    09-05    136  |  99  |  8   ----------------------------<  117<H>  3.9   |  30  |  0.56    Ca    9.1      05 Sep 2024 06:54  Phos  3.2     09-05  Mg     2.2     09-05    TPro  5.6<L>  /  Alb  3.7  /  TBili  0.6  /  DBili  x   /  AST  20  /  ALT  23  /  AlkPhos  99  09-05    Mg 2.2  Phos 3.2

## 2024-09-05 NOTE — PROGRESS NOTE ADULT - PROBLEM SELECTOR PLAN 1
Admit to 7 [facility]  DLBCL (diffuse large B cell lymphoma)/ CD10+ DLBCL, positive for BCL-2 rearrangement, negative for MYC rearrangement s/p 6 cycles of RCHOP) now admitted cycle 3 HD MTX ( 3.5g/m2)   Place IVL   Urine alkalization sodium bicarb to achieve pH 7.5 and then q6 to maintain at 8.5  Methotrexate 3500 mg/m2 over 3 hrs when urine pH >7.5.  Leucovorin 25 mg IVSS q 6 hrs after start of MTX   Zofran 16 mg IV daily x2 days , Reglan 10 mg IV Q 6hrs PRN.  Monitor MTX level 24 hrs after start of MTX then daily.  Monitor CBC with Diff, transfuse as needed.  Monitor electrolytes, replete as needed.  Daily weight. Strict I/O, antemetics   discharge home when MTX level <0.2 with leucovorin 5mg PO q6 x 3 days
Admit to 7 [facility]  DLBCL (diffuse large B cell lymphoma)/ CD10+ DLBCL, positive for BCL-2 rearrangement, negative for MYC rearrangement s/p 6 cycles of RCHOP) now admitted cycle 3 HD MTX ( 3.5g/m2)   Place IVL   Urine alkalization sodium bicarb to achieve pH 7.5 and then q6 to maintain at 8.5  Methotrexate 3500 mg/m2 over 3 hrs when urine pH >7.5.  Leucovorin 25 mg IVSS q 6 hrs after start of MTX   Zofran 16 mg IV daily x2 days , Reglan 10 mg IV Q 6hrs PRN.  Monitor MTX level 24 hrs after start of MTX then daily.  Monitor CBC with Diff, transfuse as needed.  Monitor electrolytes, replete as needed.  Daily weight. Strict I/O, antemetics   discharge home when MTX level <0.2 with leucovorin 5mg PO q6 x 3 days

## 2024-09-05 NOTE — DISCHARGE NOTE NURSING/CASE MANAGEMENT/SOCIAL WORK - PATIENT PORTAL LINK FT
You can access the FollowMyHealth Patient Portal offered by [facility] by registering at the following website: [email or web address]. By joining [facility]’s FollowMyHealth portal, you will also be able to view your health information using other applications (apps) compatible with our system.

## 2024-09-06 ENCOUNTER — NON-APPOINTMENT (OUTPATIENT)
Age: 69
End: 2024-09-06

## 2024-09-09 DIAGNOSIS — C83.38 DIFFUSE LARGE B-CELL LYMPHOMA, LYMPH NODES OF MULTIPLE SITES: ICD-10-CM

## 2024-09-09 RX ORDER — DIPHENHYDRAMINE HYDROCHLORIDE AND LIDOCAINE HYDROCHLORIDE AND ALUMINUM HYDROXIDE AND MAGNESIUM HYDRO
KIT
Qty: 1 | Refills: 0 | Status: ACTIVE | COMMUNITY
Start: 2024-09-09 | End: 1900-01-01

## 2024-09-09 RX ORDER — NYSTATIN 100000 [USP'U]/ML
100000 SUSPENSION ORAL 3 TIMES DAILY
Qty: 450 | Refills: 0 | Status: ACTIVE | COMMUNITY
Start: 2024-09-09 | End: 1900-01-01

## 2024-09-09 RX ORDER — DEXAMETHASONE 0.5 MG/5ML
0.5 SOLUTION ORAL
Qty: 450 | Refills: 0 | Status: ACTIVE | COMMUNITY
Start: 2024-09-09 | End: 1900-01-01

## 2024-09-18 ENCOUNTER — APPOINTMENT (OUTPATIENT)
Dept: CT IMAGING | Facility: IMAGING CENTER | Age: 69
End: 2024-09-18
Payer: MEDICARE

## 2024-09-18 ENCOUNTER — OUTPATIENT (OUTPATIENT)
Dept: OUTPATIENT SERVICES | Facility: HOSPITAL | Age: 69
LOS: 1 days | End: 2024-09-18
Payer: MEDICARE

## 2024-09-18 ENCOUNTER — RESULT REVIEW (OUTPATIENT)
Age: 69
End: 2024-09-18

## 2024-09-18 DIAGNOSIS — Z98.890 OTHER SPECIFIED POSTPROCEDURAL STATES: Chronic | ICD-10-CM

## 2024-09-18 DIAGNOSIS — Z90.49 ACQUIRED ABSENCE OF OTHER SPECIFIED PARTS OF DIGESTIVE TRACT: Chronic | ICD-10-CM

## 2024-09-18 DIAGNOSIS — S22.009A UNSPECIFIED FRACTURE OF UNSPECIFIED THORACIC VERTEBRA, INITIAL ENCOUNTER FOR CLOSED FRACTURE: ICD-10-CM

## 2024-09-18 PROCEDURE — 76377 3D RENDER W/INTRP POSTPROCES: CPT | Mod: 26

## 2024-09-18 PROCEDURE — 71250 CT THORAX DX C-: CPT | Mod: 26,MH

## 2024-09-18 PROCEDURE — 74176 CT ABD & PELVIS W/O CONTRAST: CPT | Mod: 26,MH

## 2024-09-18 PROCEDURE — 72131 CT LUMBAR SPINE W/O DYE: CPT | Mod: 26,MH

## 2024-09-18 PROCEDURE — 72128 CT CHEST SPINE W/O DYE: CPT | Mod: 26,MH

## 2024-09-18 PROCEDURE — 71250 CT THORAX DX C-: CPT

## 2024-09-18 PROCEDURE — 74176 CT ABD & PELVIS W/O CONTRAST: CPT

## 2024-09-18 PROCEDURE — 72131 CT LUMBAR SPINE W/O DYE: CPT

## 2024-09-18 PROCEDURE — 76377 3D RENDER W/INTRP POSTPROCES: CPT

## 2024-09-18 PROCEDURE — 72128 CT CHEST SPINE W/O DYE: CPT

## 2024-09-19 ENCOUNTER — OUTPATIENT (OUTPATIENT)
Dept: OUTPATIENT SERVICES | Facility: HOSPITAL | Age: 69
LOS: 1 days | Discharge: HOME | End: 2024-09-19

## 2024-09-19 DIAGNOSIS — C83.30 DIFFUSE LARGE B-CELL LYMPHOMA, UNSPECIFIED SITE: ICD-10-CM

## 2024-09-19 DIAGNOSIS — Z90.49 ACQUIRED ABSENCE OF OTHER SPECIFIED PARTS OF DIGESTIVE TRACT: Chronic | ICD-10-CM

## 2024-09-19 DIAGNOSIS — Z98.890 OTHER SPECIFIED POSTPROCEDURAL STATES: Chronic | ICD-10-CM

## 2024-09-23 ENCOUNTER — APPOINTMENT (OUTPATIENT)
Dept: NEUROSURGERY | Facility: CLINIC | Age: 69
End: 2024-09-23
Payer: MEDICARE

## 2024-09-23 ENCOUNTER — APPOINTMENT (OUTPATIENT)
Dept: NEUROSURGERY | Facility: CLINIC | Age: 69
End: 2024-09-23

## 2024-09-23 DIAGNOSIS — S32.019A UNSPECIFIED FRACTURE OF FIRST LUMBAR VERTEBRA, INITIAL ENCOUNTER FOR CLOSED FRACTURE: ICD-10-CM

## 2024-09-23 PROCEDURE — 99212 OFFICE O/P EST SF 10 MIN: CPT

## 2024-09-25 PROBLEM — S32.019A L1 VERTEBRAL FRACTURE: Status: ACTIVE | Noted: 2024-09-25

## 2024-09-25 NOTE — HISTORY OF PRESENT ILLNESS
[FreeTextEntry1] : Saturnino Solis is a pleasant but unfortunate gentleman who had back pain for quite some time in his lower back and then presented to the hospital urgently with urinary retention and weakness of his legs, right side worse than the left. In fact, the weakness had occurred for a number of days, but by the time he got to the hospital, he could not stand.  Pt underwent 3/5/24 thoracic spine surgery for pathologic fracture with high-grade spinal cord compression at T8. Pathologic fracture at L4 with significant epidural compression from L2-L3 to L4-L5 from epidural tumor.  Stage I: Posterior instrumented arthrodesis using allograft bone from T6 to T7 to T8 to T9 to T10. Placement of pedicle screws bilaterally at T6, T7, T9, and T10. Vertebroplasty via fenestrated screws at T6, T7, T9, and T10. Bilateral lateral extracavitary approach at T8 with removal of bilateral T8 and T9 ribs. Sectioning of bilateral T8 roots. Complete corpectomy of T8 vertebral body. Complete diskectomy of T7-8 disk. Complete diskectomy of T8-9 disk. Laminectomy for removal of epidural tumor at T7, T8, T9. Difficulty modifier. Placement of titanium cage filled with allograft bone as a biomechanical device between T7 and T9 vertebral bodies once the T8 body was removed allowing an anterior arthrodesis between T7 and T9.  Stage II: Posterior instrumented arthrodesis using allograft bone from L2 to L3 to L4 to L5 to S1. Sacropelvic arthrodesis using allograft bone from sacrum and pelvis. Placement of bilateral pedicle screws at L2, L3, L5, and S1. Placement of bilateral pelvic screws. Laminectomy of L2, L3, L4, L5, and S1 for removal of epidural tumor. Right-sided lateral extra cavitary approach for removal of epidural tumor and vertebral body involvement tumor. Partial removal of L3 and L4 vertebral body, greater than 30% of each. Vertebroplasty via fenestrated screws at L2, L3, and L5. Difficulty modifier.  Pt had MRA spinal canal on 5/23/24 that demonstrated: Status post fusion T6-T10 and L2 through the pelvis. Again demonstrated are bone marrow infiltrative lesions in the thoracic and lumbar spine consistent with metastasis, significantly improved from prior MRI 3/5/2024. Resolution of epidural soft tissue tumor in the thoracic and lumbar spine with reexpansion of the spinal canal and resolution of cord compression.  Peripherally enhancing fluid collection in the dorsal subcutaneous soft tissues and laminectomy bed spanning L2-L5 which may represent postsurgical changes and clinical correlation is recommended.  Moderate L1 compression fracture which is new from 3/5/2024 but is not significant change from CT chest, abdomen and pelvis 3/22/2024. No new compression fracture.  Pt presents for a follow up visit via telehealth.  Pt undergoing chemotherapy with Dr. Chatterjee.  Pt reports mouth sores are getting better but he is fatigued and is less flexible after surgery.  Pt ambulates with walker. Pt reports that he has pain on the bony protrusion in his lower back when he presses on a hard surface. Also endorses lower back stiffness and occasional sharp pain that radiates to the right thigh. Reports intermittent pain in the right leg, right heel and lower back.  Pt endorses tightness, soreness and stiffness and soreness more so than pain.  States that he walks leaning forward but overall feels slightly straighter.  CT T/L spine 9/18/24 reviewed in detail and patient is advised that fracture - anterior wedge compression fracture deformity of the L1 vertebral body looks the same.  Advised that if pain worsens that additional surgery can be done.  Explained that additional surgery will lead to increased stiffness.  If no surgery will follow up with CT T and L spine wo in 6 months as there is nothing concerning on current imaging.

## 2024-09-25 NOTE — REASON FOR VISIT
[Follow-Up: _____] : a [unfilled] follow-up visit [Home] : at home, [unfilled] , at the time of the visit. [Medical Office: ([facility])___] : at the medical office located in  [Patient] : the patient [Self] : self [Other: _____] : [unfilled]

## 2024-09-25 NOTE — ASSESSMENT
[FreeTextEntry1] : IMPRESSION: CT T/L spine 9/18/24 reviewed in detail and patient is advised that fracture - anterior wedge compression fracture deformity of the L1 vertebral body looks the same.  Advised that if pain worsens that additional surgery can be done.  Explained that additional surgery will lead to increased stiffness.  If no surgery will follow up with CT T and L spine wo in 6 months as there is nothing concerning on current imaging.  PLAN: 1. CT T& L SPINE UN 6 MONTHS 2. F/U AFTER IMAGING

## 2024-09-25 NOTE — REASON FOR VISIT
[Follow-Up: _____] : a [unfilled] follow-up visit [Home] : at home, [unfilled] , at the time of the visit. [Medical Office: ([facility])___] : at the medical office located in  [Patient] : the patient [Self] : self [Other: _____] : [unfilled]

## 2024-09-25 NOTE — REASON FOR VISIT
[Follow-Up: _____] : a [unfilled] follow-up visit [Home] : at home, [unfilled] , at the time of the visit. [Medical Office: ([facility])___] : at the medical office located in  [Patient] : the patient [Self] : self [Other: _____] : [unfilled]

## 2024-09-29 PROCEDURE — 83986 ASSAY PH BODY FLUID NOS: CPT

## 2024-09-29 PROCEDURE — 85610 PROTHROMBIN TIME: CPT

## 2024-09-29 PROCEDURE — 80053 COMPREHEN METABOLIC PANEL: CPT

## 2024-09-29 PROCEDURE — 86850 RBC ANTIBODY SCREEN: CPT

## 2024-09-29 PROCEDURE — 97161 PT EVAL LOW COMPLEX 20 MIN: CPT

## 2024-09-29 PROCEDURE — 83735 ASSAY OF MAGNESIUM: CPT

## 2024-09-29 PROCEDURE — 80204 DRUG ASSAY METHOTREXATE: CPT

## 2024-09-29 PROCEDURE — 86901 BLOOD TYPING SEROLOGIC RH(D): CPT

## 2024-09-29 PROCEDURE — 85025 COMPLETE CBC W/AUTO DIFF WBC: CPT

## 2024-09-29 PROCEDURE — 86900 BLOOD TYPING SEROLOGIC ABO: CPT

## 2024-09-29 PROCEDURE — 36415 COLL VENOUS BLD VENIPUNCTURE: CPT

## 2024-09-29 PROCEDURE — 84550 ASSAY OF BLOOD/URIC ACID: CPT

## 2024-09-29 PROCEDURE — 84100 ASSAY OF PHOSPHORUS: CPT

## 2024-09-29 PROCEDURE — 85730 THROMBOPLASTIN TIME PARTIAL: CPT

## 2024-09-29 PROCEDURE — 83615 LACTATE (LD) (LDH) ENZYME: CPT

## 2024-09-30 ENCOUNTER — APPOINTMENT (OUTPATIENT)
Dept: HEMATOLOGY ONCOLOGY | Facility: CLINIC | Age: 69
End: 2024-09-30
Payer: MEDICARE

## 2024-09-30 DIAGNOSIS — I82.411 ACUTE EMBOLISM AND THROMBOSIS OF RIGHT FEMORAL VEIN: ICD-10-CM

## 2024-09-30 PROCEDURE — 99214 OFFICE O/P EST MOD 30 MIN: CPT

## 2024-09-30 PROCEDURE — G2211 COMPLEX E/M VISIT ADD ON: CPT

## 2024-09-30 RX ORDER — OXYCODONE 5 MG/1
5 TABLET ORAL
Qty: 90 | Refills: 0 | Status: ACTIVE | COMMUNITY
Start: 2024-09-30 | End: 1900-01-01

## 2024-09-30 NOTE — HISTORY OF PRESENT ILLNESS
[de-identified] : Mr. Solis was last seen: 8/26/24. This is a telehealth visit. I am in [facility]. Saturnino is at home. Consent given for this telehealth visit.  [de-identified] : Reason for visit: DLBCL  Since last visit:  completed HD-MTX; evaluated by Jaquan (9/23/24); near resolution of mouth sores.   Saturnino does not spontaneously report: fever, chills, sweats, weight loss, skin rashes, petechiae, bruising, eye irritation, hearing loss, sore throat, cough, hemoptysis, pleuritic chest pain, dyspnea, change in vision, focal numbness/weakness, chest pains, palpitations, nausea, vomiting, diarrhea, dysuria, hematuria, bowel or bladder incontinence.  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 No occiput, poster cervical, anterior cervical, submandibular, sublingual, submental, supraclavicular nor axillary adenopathy left paraspinal mass: non-tender Lungs: Clear Cardiac: without rubs Abd: soft and non-tender, Traube's space is tympanitic No inguinal nor femoral adenopathy Trace to 1+ pretibial edema bilaterally. Gait: using walker; can almost stand on each foot independently;

## 2024-09-30 NOTE — ASSESSMENT
[FreeTextEntry1] : Assessment: 69-year-old , post high-dose MTX (x4 completed: 9/2/24) and post RCHOP x 6 (completed: 7/15/24) for stage IV germinal center DLBCL involving spine (s/p 2 stage neurosurgical intervention). Currently data suggestive of CR1 - will obtain clonoseq for molecular lifdg3nfg of remission.   Course complicated by 1) spinal cord disease, 2) elevated LDH and 3) bilateral DVT. Prolonged cytopenia's with MRCHOP - that has transitioned care to RCHOP to be followed by HI-MTX x 3   PMHx: prediabetes.  Plan: full anticoagulation to stop at this time:  total of 6 months of therapy Clonoseq: will request clonoseq permission.  D/C nystatin and Decadron   Radiographs: pended below    Pain: continue oxycodone; Cymbalta 30mg qhs - re-discussed 60mg qhs.  Over 35 minutes were spent in direct patient care with greater than 50% discussing his disease status and monitoring. I will discuss pain management with Dr. Bautista. Follow-up with this office in three months.   Addendum I:  Radiographs (9/18/24): CT THORACIC SPINE  -CT LUMBAR SPINE  - ORDERED BY: JAMES HOWARD  FINDINGS-  Patient is status post posterior spinal fusion from T6 through T10 using paired bilateral vertical rods secured by bilateral transpedicular screws (bypassing a T8 corpectomy, status post placement of T8 vertebral body cage device). Patient is also status post posterior spinal fusion from L2 through S2 using paired bilateral vertical rods secured by bilateral transpedicular screws. There are bilateral sacroiliac fusion screws. No significant change in kyphoplasty cement within the T6, T7, T9, and T10 vertebral bodies as well as additional vertebroplasty of L2, L3, L4 and L5. There is slight interval increase in lucency surrounding the bilateral L2 transpedicular screws (and possibly the L3 transpedicular screws to a lesser extent), suggesting interval mild mechanical loosening. There is no additional visualized hardware complication. There are laminectomy defects at T8 as well as unchanged laminectomies at L2-L3, L3-L4 and L4-L5, with associated postsurgical changes in the posterior paraspinal soft tissues.  There is no new fracture. There is no significant change in moderate anterior wedge compression deformity of the T1 vertebral body without significant retropulsion. There is grade 1 anterolisthesis at L5-S1.  There is no significant interval change in posterior disc bulge at L5-S1 and concomitant mild bilateral facet arthropathy result in mild to moderate bilateral neural foraminal narrowing. No definite additional areas of central canal or neural foraminal narrowing within the thoracic or lumbar spine.  The visualized abdomen and pelvis demonstrate no significant findings.  The paraspinal muscles appear symetric and unremarkable. .   IMPRESSION:  Patient is again noted to be status post posterior spinal fusion from T6 through T10 and L2-S2 posterior postsurgical changes, as above. As compared to 6/20/2024, there is slight interval increase in mild lucency surrounding the bilateral L2 transverse reticular screws (as well as minimal surrounding the L3 transpedicular screws to a lesser extent), suggesting mild mechanical loosening. Otherwise, no evidence for hardware complication.  No significant change in L1 compression deformity without significant retropulsion.  No additional significant interval change including no significant change in discogenic degenerative disease and facet arthropathy of the thoracolumbar spine, most pronounced at L5-S1 where there is mild to moderate bilateral neural foraminal narrowing.  CT CHEST ABDOMEN AND PELVIS  (09/18/2024): COMPARISON: CT chest, abdomen and pelvis 3/22/2024. CT thoracolumbar spine 6/20/2024. CHEST: LUNGS AND LARGE AIRWAYS: Patent central airways. No pulmonary nodules. PLEURA: No pleural effusion. VESSELS: Atherosclerotic change of a normal caliber thoracic aorta. Main pulmonary artery is normal in caliber. HEART: Heart size is normal. No pericardial effusion. Aortic valve and coronary artery calcifications. MEDIASTINUM AND EUGENE: No lymphadenopathy. CHEST WALL AND LOWER NECK: Marked decrease in the previously enlarged right axillary lymph node, which is no longer measurable.  ABDOMEN AND PELVIS: LIVER: Within normal limits. BILE DUCTS: Normal caliber. GALLBLADDER: Cholelithiasis. SPLEEN: Within normal limits. PANCREAS: Within normal limits. ADRENALS: Within normal limits. KIDNEYS/URETERS: Within normal limits.  BLADDER: Underdistended. REPRODUCTIVE ORGANS: Normal-sized prostate.  BOWEL: No bowel obstruction. Appendix is not visualized. No evidence of inflammation in the pericecal region. PERITONEUM/RETROPERITONEUM: Within normal limits. VESSELS: Atherosclerotic changes. 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 ABDOMINAL WALL: Within normal limits. BONES: Status post T6-T10 posterior spinal fusion with T8 corpectomy and T8 vertebral body cage device. Also status post L2-S1 posterior spinal fusion with overlying postsurgical changes including questionable paraspinal collection, unchanged since 6/20/2024. Anterior wedge compression deformity of L1, stable since 6/20/2024, but increased since 3/22/2024. Please refer to concurrently obtained dedicated CT spine.  IMPRESSION: Limited noncontrast study.  Decreased lymphadenopathy since 3/22/2024.

## 2024-12-04 NOTE — PHYSICAL THERAPY INITIAL EVALUATION ADULT - RISK REDUCTION/PREVENTION, PT EVAL
Medication: Boniva and bupropion passed protocol.   Last office visit date: 10/14/24  Next appointment scheduled?: Yes   Number of refills given: 1    
risk factors

## 2024-12-19 ENCOUNTER — OUTPATIENT (OUTPATIENT)
Dept: OUTPATIENT SERVICES | Facility: HOSPITAL | Age: 69
LOS: 1 days | Discharge: HOME | End: 2024-12-19

## 2024-12-19 DIAGNOSIS — Z98.890 OTHER SPECIFIED POSTPROCEDURAL STATES: Chronic | ICD-10-CM

## 2024-12-19 DIAGNOSIS — C83.30 DIFFUSE LARGE B-CELL LYMPHOMA, UNSPECIFIED SITE: ICD-10-CM

## 2024-12-19 DIAGNOSIS — Z90.49 ACQUIRED ABSENCE OF OTHER SPECIFIED PARTS OF DIGESTIVE TRACT: Chronic | ICD-10-CM

## 2024-12-23 ENCOUNTER — APPOINTMENT (OUTPATIENT)
Dept: HEMATOLOGY ONCOLOGY | Facility: CLINIC | Age: 69
End: 2024-12-23
Payer: MEDICARE

## 2024-12-23 VITALS
DIASTOLIC BLOOD PRESSURE: 85 MMHG | HEART RATE: 77 BPM | BODY MASS INDEX: 19.6 KG/M2 | RESPIRATION RATE: 17 BRPM | WEIGHT: 106.48 LBS | OXYGEN SATURATION: 99 % | SYSTOLIC BLOOD PRESSURE: 146 MMHG | HEIGHT: 62 IN | TEMPERATURE: 97.3 F

## 2024-12-23 DIAGNOSIS — Z92.29 PERSONAL HISTORY OF OTHER DRUG THERAPY: ICD-10-CM

## 2024-12-23 DIAGNOSIS — Z00.00 ENCOUNTER FOR GENERAL ADULT MEDICAL EXAMINATION W/OUT ABNORMAL FINDINGS: ICD-10-CM

## 2024-12-23 DIAGNOSIS — E55.9 VITAMIN D DEFICIENCY, UNSPECIFIED: ICD-10-CM

## 2024-12-23 DIAGNOSIS — Z86.69 PERSONAL HISTORY OF OTHER DISEASES OF THE NERVOUS SYSTEM AND SENSE ORGANS: ICD-10-CM

## 2024-12-23 DIAGNOSIS — Z87.898 PERSONAL HISTORY OF OTHER SPECIFIED CONDITIONS: ICD-10-CM

## 2024-12-23 DIAGNOSIS — R74.01 ELEVATION OF LEVELS OF LIVER TRANSAMINASE LEVELS: ICD-10-CM

## 2024-12-23 DIAGNOSIS — E78.5 HYPERLIPIDEMIA, UNSPECIFIED: ICD-10-CM

## 2024-12-23 DIAGNOSIS — Z86.79 PERSONAL HISTORY OF OTHER DISEASES OF THE CIRCULATORY SYSTEM: ICD-10-CM

## 2024-12-23 DIAGNOSIS — C83.38 DIFFUSE LARGE B-CELL LYMPHOMA, LYMPH NODES OF MULTIPLE SITES: ICD-10-CM

## 2024-12-23 DIAGNOSIS — Z12.5 ENCOUNTER FOR SCREENING FOR MALIGNANT NEOPLASM OF PROSTATE: ICD-10-CM

## 2024-12-23 DIAGNOSIS — Z87.2 PERSONAL HISTORY OF DISEASES OF THE SKIN AND SUBCUTANEOUS TISSUE: ICD-10-CM

## 2024-12-23 DIAGNOSIS — J06.9 ACUTE UPPER RESPIRATORY INFECTION, UNSPECIFIED: ICD-10-CM

## 2024-12-23 DIAGNOSIS — Z09 ENCOUNTER FOR FOLLOW-UP EXAMINATION AFTER COMPLETED TREATMENT FOR CONDITIONS OTHER THAN MALIGNANT NEOPLASM: ICD-10-CM

## 2024-12-23 DIAGNOSIS — Z11.59 ENCOUNTER FOR SCREENING FOR OTHER VIRAL DISEASES: ICD-10-CM

## 2024-12-23 DIAGNOSIS — S22.009A UNSPECIFIED FRACTURE OF UNSPECIFIED THORACIC VERTEBRA, INITIAL ENCOUNTER FOR CLOSED FRACTURE: ICD-10-CM

## 2024-12-23 DIAGNOSIS — R82.90 UNSPECIFIED ABNORMAL FINDINGS IN URINE: ICD-10-CM

## 2024-12-23 DIAGNOSIS — R00.1 BRADYCARDIA, UNSPECIFIED: ICD-10-CM

## 2024-12-23 DIAGNOSIS — Z87.09 PERSONAL HISTORY OF OTHER DISEASES OF THE RESPIRATORY SYSTEM: ICD-10-CM

## 2024-12-23 DIAGNOSIS — R07.89 OTHER CHEST PAIN: ICD-10-CM

## 2024-12-23 DIAGNOSIS — K59.00 CONSTIPATION, UNSPECIFIED: ICD-10-CM

## 2024-12-23 DIAGNOSIS — M25.519 PAIN IN UNSPECIFIED SHOULDER: ICD-10-CM

## 2024-12-23 DIAGNOSIS — S32.019A UNSPECIFIED FRACTURE OF FIRST LUMBAR VERTEBRA, INITIAL ENCOUNTER FOR CLOSED FRACTURE: ICD-10-CM

## 2024-12-23 DIAGNOSIS — Z23 ENCOUNTER FOR IMMUNIZATION: ICD-10-CM

## 2024-12-23 DIAGNOSIS — Z86.39 PERSONAL HISTORY OF OTHER ENDOCRINE, NUTRITIONAL AND METABOLIC DISEASE: ICD-10-CM

## 2024-12-23 DIAGNOSIS — Z12.11 ENCOUNTER FOR SCREENING FOR MALIGNANT NEOPLASM OF COLON: ICD-10-CM

## 2024-12-23 DIAGNOSIS — D63.0 ANEMIA IN NEOPLASTIC DISEASE: ICD-10-CM

## 2024-12-23 DIAGNOSIS — S69.90XA UNSPECIFIED INJURY OF UNSPECIFIED WRIST, HAND AND FINGER(S), INITIAL ENCOUNTER: ICD-10-CM

## 2024-12-23 DIAGNOSIS — R73.03 PREDIABETES.: ICD-10-CM

## 2024-12-23 DIAGNOSIS — R79.89 OTHER SPECIFIED ABNORMAL FINDINGS OF BLOOD CHEMISTRY: ICD-10-CM

## 2024-12-23 PROCEDURE — 99215 OFFICE O/P EST HI 40 MIN: CPT

## 2024-12-23 PROCEDURE — G2211 COMPLEX E/M VISIT ADD ON: CPT

## 2024-12-23 RX ORDER — FAMOTIDINE 20 MG/1
20 TABLET, FILM COATED ORAL DAILY
Qty: 30 | Refills: 4 | Status: ACTIVE | COMMUNITY
Start: 2024-12-23 | End: 1900-01-01

## 2024-12-23 RX ORDER — OXYCODONE 5 MG/1
5 TABLET ORAL
Qty: 120 | Refills: 0 | Status: ACTIVE | COMMUNITY
Start: 2024-12-23 | End: 1900-01-01

## 2025-01-01 ENCOUNTER — APPOINTMENT (OUTPATIENT)
Dept: INFUSION THERAPY | Facility: HOSPITAL | Age: 70
End: 2025-01-01

## 2025-01-01 ENCOUNTER — INPATIENT (INPATIENT)
Facility: HOSPITAL | Age: 70
LOS: 3 days | Discharge: OTHER INSTITUTION | DRG: 948 | End: 2025-08-04
Attending: PHYSICAL MEDICINE & REHABILITATION | Admitting: PHYSICAL MEDICINE & REHABILITATION
Payer: MEDICARE

## 2025-01-01 ENCOUNTER — APPOINTMENT (OUTPATIENT)
Dept: HEMATOLOGY ONCOLOGY | Facility: CLINIC | Age: 70
End: 2025-01-01

## 2025-01-01 ENCOUNTER — OUTPATIENT (OUTPATIENT)
Dept: OUTPATIENT SERVICES | Facility: HOSPITAL | Age: 70
LOS: 1 days | End: 2025-01-01
Payer: MEDICARE

## 2025-01-01 ENCOUNTER — INPATIENT (INPATIENT)
Facility: HOSPITAL | Age: 70
LOS: 20 days | DRG: 293 | End: 2025-08-25
Attending: INTERNAL MEDICINE | Admitting: STUDENT IN AN ORGANIZED HEALTH CARE EDUCATION/TRAINING PROGRAM
Payer: MEDICARE

## 2025-01-01 ENCOUNTER — INPATIENT (INPATIENT)
Facility: HOSPITAL | Age: 70
LOS: 1 days | Discharge: HOME | DRG: 842 | End: 2025-02-01
Attending: STUDENT IN AN ORGANIZED HEALTH CARE EDUCATION/TRAINING PROGRAM | Admitting: INTERNAL MEDICINE
Payer: MEDICARE

## 2025-01-01 ENCOUNTER — INPATIENT (INPATIENT)
Facility: HOSPITAL | Age: 70
LOS: 1 days | Discharge: HOME | DRG: 840 | End: 2025-01-25
Attending: HOSPITALIST | Admitting: STUDENT IN AN ORGANIZED HEALTH CARE EDUCATION/TRAINING PROGRAM
Payer: MEDICARE

## 2025-01-01 VITALS
DIASTOLIC BLOOD PRESSURE: 71 MMHG | SYSTOLIC BLOOD PRESSURE: 104 MMHG | OXYGEN SATURATION: 99 % | HEART RATE: 92 BPM | RESPIRATION RATE: 18 BRPM

## 2025-01-01 VITALS
HEART RATE: 76 BPM | HEIGHT: 60 IN | SYSTOLIC BLOOD PRESSURE: 131 MMHG | OXYGEN SATURATION: 98 % | RESPIRATION RATE: 18 BRPM | DIASTOLIC BLOOD PRESSURE: 69 MMHG | TEMPERATURE: 97 F | WEIGHT: 100.09 LBS

## 2025-01-01 VITALS
RESPIRATION RATE: 17 BRPM | OXYGEN SATURATION: 98 % | DIASTOLIC BLOOD PRESSURE: 64 MMHG | SYSTOLIC BLOOD PRESSURE: 109 MMHG | TEMPERATURE: 98 F | HEART RATE: 86 BPM

## 2025-01-01 VITALS
OXYGEN SATURATION: 100 % | WEIGHT: 88.63 LBS | RESPIRATION RATE: 18 BRPM | HEART RATE: 89 BPM | SYSTOLIC BLOOD PRESSURE: 106 MMHG | TEMPERATURE: 98 F | HEIGHT: 60 IN | DIASTOLIC BLOOD PRESSURE: 71 MMHG

## 2025-01-01 VITALS
OXYGEN SATURATION: 96 % | WEIGHT: 104.06 LBS | RESPIRATION RATE: 16 BRPM | HEIGHT: 63 IN | HEART RATE: 111 BPM | TEMPERATURE: 97 F | DIASTOLIC BLOOD PRESSURE: 91 MMHG | SYSTOLIC BLOOD PRESSURE: 136 MMHG

## 2025-01-01 VITALS
DIASTOLIC BLOOD PRESSURE: 81 MMHG | SYSTOLIC BLOOD PRESSURE: 128 MMHG | RESPIRATION RATE: 17 BRPM | HEART RATE: 98 BPM | OXYGEN SATURATION: 98 % | TEMPERATURE: 98 F

## 2025-01-01 VITALS
RESPIRATION RATE: 16 BRPM | SYSTOLIC BLOOD PRESSURE: 142 MMHG | WEIGHT: 77.16 LBS | OXYGEN SATURATION: 98 % | DIASTOLIC BLOOD PRESSURE: 82 MMHG | TEMPERATURE: 98 F | HEART RATE: 89 BPM | HEIGHT: 63 IN

## 2025-01-01 DIAGNOSIS — Z90.49 ACQUIRED ABSENCE OF OTHER SPECIFIED PARTS OF DIGESTIVE TRACT: Chronic | ICD-10-CM

## 2025-01-01 DIAGNOSIS — Z98.890 OTHER SPECIFIED POSTPROCEDURAL STATES: Chronic | ICD-10-CM

## 2025-01-01 DIAGNOSIS — R53.81 OTHER MALAISE: ICD-10-CM

## 2025-01-01 DIAGNOSIS — C83.30 DIFFUSE LARGE B-CELL LYMPHOMA, UNSPECIFIED SITE: ICD-10-CM

## 2025-01-01 DIAGNOSIS — K83.1 OBSTRUCTION OF BILE DUCT: ICD-10-CM

## 2025-01-01 DIAGNOSIS — J81.0 ACUTE PULMONARY EDEMA: ICD-10-CM

## 2025-01-01 DIAGNOSIS — Z29.9 ENCOUNTER FOR PROPHYLACTIC MEASURES, UNSPECIFIED: ICD-10-CM

## 2025-01-01 DIAGNOSIS — C83.38 DIFFUSE LARGE B-CELL LYMPHOMA, LYMPH NODES OF MULTIPLE SITES: ICD-10-CM

## 2025-01-01 DIAGNOSIS — J96.01 ACUTE RESPIRATORY FAILURE WITH HYPOXIA: ICD-10-CM

## 2025-01-01 DIAGNOSIS — Z51.5 ENCOUNTER FOR PALLIATIVE CARE: ICD-10-CM

## 2025-01-01 DIAGNOSIS — J93.9 PNEUMOTHORAX, UNSPECIFIED: ICD-10-CM

## 2025-01-01 DIAGNOSIS — R17 UNSPECIFIED JAUNDICE: ICD-10-CM

## 2025-01-01 DIAGNOSIS — I50.810 RIGHT HEART FAILURE, UNSPECIFIED: ICD-10-CM

## 2025-01-01 DIAGNOSIS — Z71.89 OTHER SPECIFIED COUNSELING: ICD-10-CM

## 2025-01-01 DIAGNOSIS — Z94.84 STEM CELLS TRANSPLANT STATUS: ICD-10-CM

## 2025-01-01 DIAGNOSIS — Z94.81 BONE MARROW TRANSPLANT STATUS: ICD-10-CM

## 2025-01-01 DIAGNOSIS — L89.90 PRESSURE ULCER OF UNSPECIFIED SITE, UNSPECIFIED STAGE: ICD-10-CM

## 2025-01-01 LAB
% CD3 - BAL: SIGNIFICANT CHANGE UP %
% CD4 - BAL: SIGNIFICANT CHANGE UP %
% CD8 - BAL: SIGNIFICANT CHANGE UP %
A1C WITH ESTIMATED AVERAGE GLUCOSE RESULT: 6.3 % — HIGH (ref 4–5.6)
ADD ON TEST-SPECIMEN IN LAB: SIGNIFICANT CHANGE UP
ALBUMIN SERPL ELPH-MCNC: 2.6 G/DL — LOW (ref 3.3–5)
ALBUMIN SERPL ELPH-MCNC: 2.6 G/DL — LOW (ref 3.3–5)
ALBUMIN SERPL ELPH-MCNC: 2.7 G/DL — LOW (ref 3.3–5)
ALBUMIN SERPL ELPH-MCNC: 2.7 G/DL — LOW (ref 3.3–5)
ALBUMIN SERPL ELPH-MCNC: 2.8 G/DL — LOW (ref 3.3–5)
ALBUMIN SERPL ELPH-MCNC: 2.9 G/DL — LOW (ref 3.3–5)
ALBUMIN SERPL ELPH-MCNC: 2.9 G/DL — LOW (ref 3.3–5)
ALBUMIN SERPL ELPH-MCNC: 3 G/DL — LOW (ref 3.3–5)
ALBUMIN SERPL ELPH-MCNC: 3 G/DL — LOW (ref 3.3–5)
ALBUMIN SERPL ELPH-MCNC: 3.1 G/DL — LOW (ref 3.3–5)
ALBUMIN SERPL ELPH-MCNC: 3.2 G/DL — LOW (ref 3.3–5)
ALBUMIN SERPL ELPH-MCNC: 3.3 G/DL — SIGNIFICANT CHANGE UP (ref 3.3–5)
ALBUMIN SERPL ELPH-MCNC: 3.4 G/DL — SIGNIFICANT CHANGE UP (ref 3.3–5)
ALBUMIN SERPL ELPH-MCNC: 3.5 G/DL — SIGNIFICANT CHANGE UP (ref 3.3–5)
ALBUMIN SERPL ELPH-MCNC: 3.5 G/DL — SIGNIFICANT CHANGE UP (ref 3.3–5)
ALBUMIN SERPL ELPH-MCNC: 3.6 G/DL — SIGNIFICANT CHANGE UP (ref 3.3–5)
ALBUMIN SERPL ELPH-MCNC: 3.7 G/DL — SIGNIFICANT CHANGE UP (ref 3.3–5)
ALBUMIN SERPL ELPH-MCNC: 3.8 G/DL — SIGNIFICANT CHANGE UP (ref 3.3–5)
ALBUMIN SERPL ELPH-MCNC: 4 G/DL — SIGNIFICANT CHANGE UP (ref 3.3–5)
ALP SERPL-CCNC: 129 U/L — HIGH (ref 40–120)
ALP SERPL-CCNC: 132 U/L — HIGH (ref 40–120)
ALP SERPL-CCNC: 138 U/L — HIGH (ref 40–120)
ALP SERPL-CCNC: 139 U/L — HIGH (ref 40–120)
ALP SERPL-CCNC: 143 U/L — HIGH (ref 40–120)
ALP SERPL-CCNC: 143 U/L — HIGH (ref 40–120)
ALP SERPL-CCNC: 145 U/L — HIGH (ref 40–120)
ALP SERPL-CCNC: 146 U/L — HIGH (ref 40–120)
ALP SERPL-CCNC: 148 U/L — HIGH (ref 40–120)
ALP SERPL-CCNC: 150 U/L — HIGH (ref 40–120)
ALP SERPL-CCNC: 151 U/L — HIGH (ref 40–120)
ALP SERPL-CCNC: 157 U/L — HIGH (ref 40–120)
ALP SERPL-CCNC: 160 U/L — HIGH (ref 40–120)
ALP SERPL-CCNC: 162 U/L — HIGH (ref 40–120)
ALP SERPL-CCNC: 163 U/L — HIGH (ref 40–120)
ALP SERPL-CCNC: 167 U/L — HIGH (ref 40–120)
ALP SERPL-CCNC: 169 U/L — HIGH (ref 40–120)
ALP SERPL-CCNC: 173 U/L — HIGH (ref 40–120)
ALP SERPL-CCNC: 176 U/L — HIGH (ref 40–120)
ALP SERPL-CCNC: 186 U/L — HIGH (ref 40–120)
ALP SERPL-CCNC: 186 U/L — HIGH (ref 40–120)
ALP SERPL-CCNC: 289 U/L — HIGH (ref 40–120)
ALP SERPL-CCNC: 289 U/L — HIGH (ref 40–120)
ALP SERPL-CCNC: 303 U/L — HIGH (ref 40–120)
ALP SERPL-CCNC: 323 U/L — HIGH (ref 40–120)
ALP SERPL-CCNC: 585 U/L — HIGH (ref 40–120)
ALP SERPL-CCNC: 653 U/L — HIGH (ref 40–120)
ALP SERPL-CCNC: 655 U/L — HIGH (ref 40–120)
ALT FLD-CCNC: 175 U/L — HIGH (ref 10–45)
ALT FLD-CCNC: 183 U/L — HIGH (ref 10–45)
ALT FLD-CCNC: 19 U/L — SIGNIFICANT CHANGE UP (ref 10–45)
ALT FLD-CCNC: 20 U/L — SIGNIFICANT CHANGE UP (ref 10–45)
ALT FLD-CCNC: 200 U/L — HIGH (ref 10–45)
ALT FLD-CCNC: 21 U/L — SIGNIFICANT CHANGE UP (ref 10–45)
ALT FLD-CCNC: 239 U/L — HIGH (ref 10–45)
ALT FLD-CCNC: 24 U/L — SIGNIFICANT CHANGE UP (ref 10–45)
ALT FLD-CCNC: 25 U/L — SIGNIFICANT CHANGE UP (ref 10–45)
ALT FLD-CCNC: 26 U/L — SIGNIFICANT CHANGE UP (ref 10–45)
ALT FLD-CCNC: 27 U/L — SIGNIFICANT CHANGE UP (ref 10–45)
ALT FLD-CCNC: 28 U/L — SIGNIFICANT CHANGE UP (ref 10–45)
ALT FLD-CCNC: 28 U/L — SIGNIFICANT CHANGE UP (ref 10–45)
ALT FLD-CCNC: 30 U/L — SIGNIFICANT CHANGE UP (ref 10–45)
ALT FLD-CCNC: 30 U/L — SIGNIFICANT CHANGE UP (ref 10–45)
ALT FLD-CCNC: 31 U/L — SIGNIFICANT CHANGE UP (ref 10–45)
ALT FLD-CCNC: 32 U/L — SIGNIFICANT CHANGE UP (ref 10–45)
ALT FLD-CCNC: 34 U/L — SIGNIFICANT CHANGE UP (ref 10–45)
ALT FLD-CCNC: 35 U/L — SIGNIFICANT CHANGE UP (ref 10–45)
ALT FLD-CCNC: 36 U/L — SIGNIFICANT CHANGE UP (ref 10–45)
ALT FLD-CCNC: 36 U/L — SIGNIFICANT CHANGE UP (ref 10–45)
ALT FLD-CCNC: 38 U/L — SIGNIFICANT CHANGE UP (ref 10–45)
ALT FLD-CCNC: 58 U/L — HIGH (ref 10–45)
ALT FLD-CCNC: 60 U/L — HIGH (ref 10–45)
ALT FLD-CCNC: 612 U/L — HIGH (ref 10–45)
ALT FLD-CCNC: 62 U/L — HIGH (ref 10–45)
ALT FLD-CCNC: 620 U/L — HIGH (ref 10–45)
ALT FLD-CCNC: 623 U/L — HIGH (ref 10–45)
ANION GAP SERPL CALC-SCNC: 10 MMOL/L — SIGNIFICANT CHANGE UP (ref 5–17)
ANION GAP SERPL CALC-SCNC: 11 MMOL/L — SIGNIFICANT CHANGE UP (ref 5–17)
ANION GAP SERPL CALC-SCNC: 12 MMOL/L — SIGNIFICANT CHANGE UP (ref 5–17)
ANION GAP SERPL CALC-SCNC: 13 MMOL/L — SIGNIFICANT CHANGE UP (ref 5–17)
ANION GAP SERPL CALC-SCNC: 14 MMOL/L — SIGNIFICANT CHANGE UP (ref 5–17)
ANION GAP SERPL CALC-SCNC: 15 MMOL/L — SIGNIFICANT CHANGE UP (ref 5–17)
ANION GAP SERPL CALC-SCNC: 16 MMOL/L — SIGNIFICANT CHANGE UP (ref 5–17)
ANION GAP SERPL CALC-SCNC: 16 MMOL/L — SIGNIFICANT CHANGE UP (ref 5–17)
ANION GAP SERPL CALC-SCNC: 17 MMOL/L — SIGNIFICANT CHANGE UP (ref 5–17)
ANION GAP SERPL CALC-SCNC: 8 MMOL/L — SIGNIFICANT CHANGE UP (ref 5–17)
ANION GAP SERPL CALC-SCNC: 9 MMOL/L — SIGNIFICANT CHANGE UP (ref 5–17)
ANION GAP SERPL CALC-SCNC: 9 MMOL/L — SIGNIFICANT CHANGE UP (ref 5–17)
ANISOCYTOSIS BLD QL: ABNORMAL
ANISOCYTOSIS BLD QL: SLIGHT — SIGNIFICANT CHANGE UP
APPEARANCE UR: CLEAR — SIGNIFICANT CHANGE UP
APTT BLD: 26.7 SEC — SIGNIFICANT CHANGE UP (ref 26.1–36.8)
APTT BLD: 29.2 SEC — SIGNIFICANT CHANGE UP (ref 26.1–36.8)
APTT BLD: 29.5 SEC — SIGNIFICANT CHANGE UP (ref 26.1–36.8)
APTT BLD: 30.6 SEC — SIGNIFICANT CHANGE UP (ref 26.1–36.8)
APTT BLD: 30.7 SEC — SIGNIFICANT CHANGE UP (ref 26.1–36.8)
APTT BLD: 30.9 SEC — SIGNIFICANT CHANGE UP (ref 26.1–36.8)
APTT BLD: 31 SEC — SIGNIFICANT CHANGE UP (ref 26.1–36.8)
APTT BLD: 31.4 SEC — SIGNIFICANT CHANGE UP (ref 26.1–36.8)
APTT BLD: 31.9 SEC — SIGNIFICANT CHANGE UP (ref 26.1–36.8)
APTT BLD: 32 SEC — SIGNIFICANT CHANGE UP (ref 26.1–36.8)
APTT BLD: 32 SEC — SIGNIFICANT CHANGE UP (ref 26.1–36.8)
APTT BLD: 32.5 SEC — SIGNIFICANT CHANGE UP (ref 24.5–35.6)
AST SERPL-CCNC: 17 U/L — SIGNIFICANT CHANGE UP (ref 10–40)
AST SERPL-CCNC: 20 U/L — SIGNIFICANT CHANGE UP (ref 10–40)
AST SERPL-CCNC: 22 U/L — SIGNIFICANT CHANGE UP (ref 10–40)
AST SERPL-CCNC: 24 U/L — SIGNIFICANT CHANGE UP (ref 10–40)
AST SERPL-CCNC: 25 U/L — SIGNIFICANT CHANGE UP (ref 10–40)
AST SERPL-CCNC: 25 U/L — SIGNIFICANT CHANGE UP (ref 10–40)
AST SERPL-CCNC: 26 U/L — SIGNIFICANT CHANGE UP (ref 10–40)
AST SERPL-CCNC: 27 U/L — SIGNIFICANT CHANGE UP (ref 10–40)
AST SERPL-CCNC: 28 U/L — SIGNIFICANT CHANGE UP (ref 10–40)
AST SERPL-CCNC: 28 U/L — SIGNIFICANT CHANGE UP (ref 10–40)
AST SERPL-CCNC: 29 U/L — SIGNIFICANT CHANGE UP (ref 10–40)
AST SERPL-CCNC: 29 U/L — SIGNIFICANT CHANGE UP (ref 10–40)
AST SERPL-CCNC: 306 U/L — HIGH (ref 10–40)
AST SERPL-CCNC: 31 U/L — SIGNIFICANT CHANGE UP (ref 10–40)
AST SERPL-CCNC: 33 U/L — SIGNIFICANT CHANGE UP (ref 10–40)
AST SERPL-CCNC: 37 U/L — SIGNIFICANT CHANGE UP (ref 10–40)
AST SERPL-CCNC: 384 U/L — HIGH (ref 10–40)
AST SERPL-CCNC: 39 U/L — SIGNIFICANT CHANGE UP (ref 10–40)
AST SERPL-CCNC: 401 U/L — HIGH (ref 10–40)
AST SERPL-CCNC: 43 U/L — HIGH (ref 10–40)
AST SERPL-CCNC: 45 U/L — HIGH (ref 10–40)
AST SERPL-CCNC: 62 U/L — HIGH (ref 10–40)
AST SERPL-CCNC: 67 U/L — HIGH (ref 10–40)
AST SERPL-CCNC: 72 U/L — HIGH (ref 10–40)
AST SERPL-CCNC: 83 U/L — HIGH (ref 10–40)
B PERT IGG+IGM PNL SER: CLEAR — SIGNIFICANT CHANGE UP
BACTERIA # UR AUTO: NEGATIVE /HPF — SIGNIFICANT CHANGE UP
BASOPHILS # BLD AUTO: 0.01 K/UL — SIGNIFICANT CHANGE UP (ref 0–0.2)
BASOPHILS # BLD AUTO: 0.02 K/UL — SIGNIFICANT CHANGE UP (ref 0–0.2)
BASOPHILS # BLD AUTO: 0.03 K/UL — SIGNIFICANT CHANGE UP (ref 0–0.2)
BASOPHILS # BLD AUTO: 0.03 K/UL — SIGNIFICANT CHANGE UP (ref 0–0.2)
BASOPHILS # BLD AUTO: 0.04 K/UL — SIGNIFICANT CHANGE UP (ref 0–0.2)
BASOPHILS # BLD MANUAL: 0 K/UL — SIGNIFICANT CHANGE UP (ref 0–0.2)
BASOPHILS # BLD MANUAL: 0.02 K/UL — SIGNIFICANT CHANGE UP (ref 0–0.2)
BASOPHILS # BLD MANUAL: 0.03 K/UL — SIGNIFICANT CHANGE UP (ref 0–0.2)
BASOPHILS NFR BLD AUTO: 0.1 % — SIGNIFICANT CHANGE UP (ref 0–2)
BASOPHILS NFR BLD AUTO: 0.2 % — SIGNIFICANT CHANGE UP (ref 0–2)
BASOPHILS NFR BLD AUTO: 0.3 % — SIGNIFICANT CHANGE UP (ref 0–2)
BASOPHILS NFR BLD AUTO: 0.3 % — SIGNIFICANT CHANGE UP (ref 0–2)
BASOPHILS NFR BLD AUTO: 0.4 % — SIGNIFICANT CHANGE UP (ref 0–2)
BASOPHILS NFR BLD AUTO: 0.5 % — SIGNIFICANT CHANGE UP (ref 0–2)
BASOPHILS NFR BLD AUTO: 0.6 % — SIGNIFICANT CHANGE UP (ref 0–2)
BASOPHILS NFR BLD AUTO: 0.6 % — SIGNIFICANT CHANGE UP (ref 0–2)
BASOPHILS NFR BLD AUTO: 0.7 % — SIGNIFICANT CHANGE UP (ref 0–2)
BASOPHILS NFR BLD AUTO: 0.7 % — SIGNIFICANT CHANGE UP (ref 0–2)
BASOPHILS NFR BLD AUTO: 1 % — SIGNIFICANT CHANGE UP (ref 0–2)
BASOPHILS NFR BLD AUTO: 1 % — SIGNIFICANT CHANGE UP (ref 0–2)
BASOPHILS NFR BLD AUTO: 1.2 % — SIGNIFICANT CHANGE UP (ref 0–2)
BASOPHILS NFR BLD AUTO: 1.8 % — SIGNIFICANT CHANGE UP (ref 0–2)
BASOPHILS NFR BLD MANUAL: 0 % — SIGNIFICANT CHANGE UP (ref 0–2)
BASOPHILS NFR BLD MANUAL: 0.8 % — SIGNIFICANT CHANGE UP (ref 0–2)
BASOPHILS NFR BLD MANUAL: 1.5 % — SIGNIFICANT CHANGE UP (ref 0–2)
BILIRUB DIRECT SERPL-MCNC: 0.2 MG/DL — SIGNIFICANT CHANGE UP (ref 0–0.3)
BILIRUB DIRECT SERPL-MCNC: 0.3 MG/DL — SIGNIFICANT CHANGE UP (ref 0–0.3)
BILIRUB DIRECT SERPL-MCNC: 0.4 MG/DL — HIGH (ref 0–0.3)
BILIRUB DIRECT SERPL-MCNC: 0.5 MG/DL — HIGH (ref 0–0.3)
BILIRUB DIRECT SERPL-MCNC: 7 MG/DL — HIGH (ref 0–0.3)
BILIRUB INDIRECT FLD-MCNC: 0.4 MG/DL — SIGNIFICANT CHANGE UP (ref 0.2–1)
BILIRUB INDIRECT FLD-MCNC: 0.5 MG/DL — SIGNIFICANT CHANGE UP (ref 0.2–1)
BILIRUB INDIRECT FLD-MCNC: 0.6 MG/DL — SIGNIFICANT CHANGE UP (ref 0.2–1)
BILIRUB INDIRECT FLD-MCNC: 0.9 MG/DL — SIGNIFICANT CHANGE UP (ref 0.2–1)
BILIRUB INDIRECT FLD-MCNC: 2.3 MG/DL — HIGH (ref 0.2–1)
BILIRUB SERPL-MCNC: 0.5 MG/DL — SIGNIFICANT CHANGE UP (ref 0.2–1.2)
BILIRUB SERPL-MCNC: 0.5 MG/DL — SIGNIFICANT CHANGE UP (ref 0.2–1.2)
BILIRUB SERPL-MCNC: 0.6 MG/DL — SIGNIFICANT CHANGE UP (ref 0.2–1.2)
BILIRUB SERPL-MCNC: 0.7 MG/DL — SIGNIFICANT CHANGE UP (ref 0.2–1.2)
BILIRUB SERPL-MCNC: 0.8 MG/DL — SIGNIFICANT CHANGE UP (ref 0.2–1.2)
BILIRUB SERPL-MCNC: 0.9 MG/DL — SIGNIFICANT CHANGE UP (ref 0.2–1.2)
BILIRUB SERPL-MCNC: 1 MG/DL — SIGNIFICANT CHANGE UP (ref 0.2–1.2)
BILIRUB SERPL-MCNC: 1.1 MG/DL — SIGNIFICANT CHANGE UP (ref 0.2–1.2)
BILIRUB SERPL-MCNC: 1.2 MG/DL — SIGNIFICANT CHANGE UP (ref 0.2–1.2)
BILIRUB SERPL-MCNC: 1.4 MG/DL — HIGH (ref 0.2–1.2)
BILIRUB SERPL-MCNC: 1.6 MG/DL — HIGH (ref 0.2–1.2)
BILIRUB SERPL-MCNC: 1.7 MG/DL — HIGH (ref 0.2–1.2)
BILIRUB SERPL-MCNC: 1.7 MG/DL — HIGH (ref 0.2–1.2)
BILIRUB SERPL-MCNC: 1.8 MG/DL — HIGH (ref 0.2–1.2)
BILIRUB SERPL-MCNC: 10.3 MG/DL — HIGH (ref 0.2–1.2)
BILIRUB SERPL-MCNC: 2.1 MG/DL — HIGH (ref 0.2–1.2)
BILIRUB SERPL-MCNC: 5.8 MG/DL — HIGH (ref 0.2–1.2)
BILIRUB SERPL-MCNC: 9.3 MG/DL — HIGH (ref 0.2–1.2)
BILIRUB SERPL-MCNC: 9.3 MG/DL — HIGH (ref 0.2–1.2)
BILIRUB UR-MCNC: ABNORMAL
BLD GP AB SCN SERPL QL: NEGATIVE — SIGNIFICANT CHANGE UP
BUN SERPL-MCNC: 13 MG/DL — SIGNIFICANT CHANGE UP (ref 7–23)
BUN SERPL-MCNC: 16 MG/DL — SIGNIFICANT CHANGE UP (ref 7–23)
BUN SERPL-MCNC: 18 MG/DL — SIGNIFICANT CHANGE UP (ref 7–23)
BUN SERPL-MCNC: 18 MG/DL — SIGNIFICANT CHANGE UP (ref 7–23)
BUN SERPL-MCNC: 19 MG/DL — SIGNIFICANT CHANGE UP (ref 7–23)
BUN SERPL-MCNC: 20 MG/DL — SIGNIFICANT CHANGE UP (ref 7–23)
BUN SERPL-MCNC: 20 MG/DL — SIGNIFICANT CHANGE UP (ref 7–23)
BUN SERPL-MCNC: 21 MG/DL — SIGNIFICANT CHANGE UP (ref 7–23)
BUN SERPL-MCNC: 22 MG/DL — SIGNIFICANT CHANGE UP (ref 7–23)
BUN SERPL-MCNC: 22 MG/DL — SIGNIFICANT CHANGE UP (ref 7–23)
BUN SERPL-MCNC: 23 MG/DL — SIGNIFICANT CHANGE UP (ref 7–23)
BUN SERPL-MCNC: 26 MG/DL — HIGH (ref 7–23)
BUN SERPL-MCNC: 27 MG/DL — HIGH (ref 7–23)
BUN SERPL-MCNC: 27 MG/DL — HIGH (ref 7–23)
BUN SERPL-MCNC: 28 MG/DL — HIGH (ref 7–23)
BUN SERPL-MCNC: 30 MG/DL — HIGH (ref 7–23)
BUN SERPL-MCNC: 31 MG/DL — HIGH (ref 7–23)
BUN SERPL-MCNC: 31 MG/DL — HIGH (ref 7–23)
BUN SERPL-MCNC: 32 MG/DL — HIGH (ref 7–23)
BUN SERPL-MCNC: 33 MG/DL — HIGH (ref 7–23)
BUN SERPL-MCNC: 34 MG/DL — HIGH (ref 7–23)
BUN SERPL-MCNC: 36 MG/DL — HIGH (ref 7–23)
BUN SERPL-MCNC: 36 MG/DL — HIGH (ref 7–23)
BUN SERPL-MCNC: 38 MG/DL — HIGH (ref 7–23)
BUN SERPL-MCNC: 39 MG/DL — HIGH (ref 7–23)
BUN SERPL-MCNC: 39 MG/DL — HIGH (ref 7–23)
BUN SERPL-MCNC: 40 MG/DL — HIGH (ref 7–23)
BUN SERPL-MCNC: 40 MG/DL — HIGH (ref 7–23)
CALCIUM SERPL-MCNC: 7.8 MG/DL — LOW (ref 8.4–10.5)
CALCIUM SERPL-MCNC: 7.9 MG/DL — LOW (ref 8.4–10.5)
CALCIUM SERPL-MCNC: 8.1 MG/DL — LOW (ref 8.4–10.5)
CALCIUM SERPL-MCNC: 8.2 MG/DL — LOW (ref 8.4–10.5)
CALCIUM SERPL-MCNC: 8.3 MG/DL — LOW (ref 8.4–10.5)
CALCIUM SERPL-MCNC: 8.3 MG/DL — LOW (ref 8.4–10.5)
CALCIUM SERPL-MCNC: 8.4 MG/DL — SIGNIFICANT CHANGE UP (ref 8.4–10.5)
CALCIUM SERPL-MCNC: 8.6 MG/DL — SIGNIFICANT CHANGE UP (ref 8.4–10.5)
CALCIUM SERPL-MCNC: 8.7 MG/DL — SIGNIFICANT CHANGE UP (ref 8.4–10.5)
CALCIUM SERPL-MCNC: 8.8 MG/DL — SIGNIFICANT CHANGE UP (ref 8.4–10.5)
CALCIUM SERPL-MCNC: 9 MG/DL — SIGNIFICANT CHANGE UP (ref 8.4–10.5)
CALCIUM SERPL-MCNC: 9.1 MG/DL — SIGNIFICANT CHANGE UP (ref 8.4–10.5)
CALCIUM SERPL-MCNC: 9.5 MG/DL — SIGNIFICANT CHANGE UP (ref 8.4–10.5)
CALCIUM SERPL-MCNC: 9.6 MG/DL — SIGNIFICANT CHANGE UP (ref 8.4–10.5)
CALCIUM SERPL-MCNC: 9.8 MG/DL — SIGNIFICANT CHANGE UP (ref 8.4–10.5)
CAST: 2 /LPF — SIGNIFICANT CHANGE UP (ref 0–4)
CD4:CD8 RATIO - BAL: SIGNIFICANT CHANGE UP RATIO
CHLORIDE SERPL-SCNC: 100 MMOL/L — SIGNIFICANT CHANGE UP (ref 96–108)
CHLORIDE SERPL-SCNC: 100 MMOL/L — SIGNIFICANT CHANGE UP (ref 96–108)
CHLORIDE SERPL-SCNC: 101 MMOL/L — SIGNIFICANT CHANGE UP (ref 96–108)
CHLORIDE SERPL-SCNC: 102 MMOL/L — SIGNIFICANT CHANGE UP (ref 96–108)
CHLORIDE SERPL-SCNC: 103 MMOL/L — SIGNIFICANT CHANGE UP (ref 96–108)
CHLORIDE SERPL-SCNC: 104 MMOL/L — SIGNIFICANT CHANGE UP (ref 96–108)
CHLORIDE SERPL-SCNC: 105 MMOL/L — SIGNIFICANT CHANGE UP (ref 96–108)
CHLORIDE SERPL-SCNC: 106 MMOL/L — SIGNIFICANT CHANGE UP (ref 96–108)
CHLORIDE SERPL-SCNC: 107 MMOL/L — SIGNIFICANT CHANGE UP (ref 96–108)
CHLORIDE SERPL-SCNC: 108 MMOL/L — SIGNIFICANT CHANGE UP (ref 96–108)
CHLORIDE SERPL-SCNC: 110 MMOL/L — HIGH (ref 96–108)
CHLORIDE SERPL-SCNC: 112 MMOL/L — HIGH (ref 96–108)
CHLORIDE SERPL-SCNC: 113 MMOL/L — HIGH (ref 96–108)
CHLORIDE SERPL-SCNC: 115 MMOL/L — HIGH (ref 96–108)
CHLORIDE SERPL-SCNC: 98 MMOL/L — SIGNIFICANT CHANGE UP (ref 96–108)
CHLORIDE SERPL-SCNC: 99 MMOL/L — SIGNIFICANT CHANGE UP (ref 96–108)
CMV DNA CSF QL NAA+PROBE: SIGNIFICANT CHANGE UP IU/ML
CMV DNA SPEC NAA+PROBE-LOG#: SIGNIFICANT CHANGE UP LOG10IU/ML
CO2 SERPL-SCNC: 16 MMOL/L — LOW (ref 22–31)
CO2 SERPL-SCNC: 20 MMOL/L — LOW (ref 22–31)
CO2 SERPL-SCNC: 21 MMOL/L — LOW (ref 22–31)
CO2 SERPL-SCNC: 22 MMOL/L — SIGNIFICANT CHANGE UP (ref 22–31)
CO2 SERPL-SCNC: 23 MMOL/L — SIGNIFICANT CHANGE UP (ref 22–31)
CO2 SERPL-SCNC: 24 MMOL/L — SIGNIFICANT CHANGE UP (ref 22–31)
CO2 SERPL-SCNC: 25 MMOL/L — SIGNIFICANT CHANGE UP (ref 22–31)
CO2 SERPL-SCNC: 25 MMOL/L — SIGNIFICANT CHANGE UP (ref 22–31)
CO2 SERPL-SCNC: 26 MMOL/L — SIGNIFICANT CHANGE UP (ref 22–31)
CO2 SERPL-SCNC: 27 MMOL/L — SIGNIFICANT CHANGE UP (ref 22–31)
CO2 SERPL-SCNC: 27 MMOL/L — SIGNIFICANT CHANGE UP (ref 22–31)
CO2 SERPL-SCNC: 28 MMOL/L — SIGNIFICANT CHANGE UP (ref 22–31)
CO2 SERPL-SCNC: 29 MMOL/L — SIGNIFICANT CHANGE UP (ref 22–31)
COD CRY URNS QL: PRESENT
COLOR FLD: ABNORMAL
COLOR SPEC: SIGNIFICANT CHANGE UP
CREAT SERPL-MCNC: 0.3 MG/DL — LOW (ref 0.5–1.3)
CREAT SERPL-MCNC: 0.32 MG/DL — LOW (ref 0.5–1.3)
CREAT SERPL-MCNC: 0.32 MG/DL — LOW (ref 0.5–1.3)
CREAT SERPL-MCNC: 0.33 MG/DL — LOW (ref 0.5–1.3)
CREAT SERPL-MCNC: 0.33 MG/DL — LOW (ref 0.5–1.3)
CREAT SERPL-MCNC: 0.36 MG/DL — LOW (ref 0.5–1.3)
CREAT SERPL-MCNC: 0.36 MG/DL — LOW (ref 0.5–1.3)
CREAT SERPL-MCNC: 0.37 MG/DL — LOW (ref 0.5–1.3)
CREAT SERPL-MCNC: 0.38 MG/DL — LOW (ref 0.5–1.3)
CREAT SERPL-MCNC: 0.38 MG/DL — LOW (ref 0.5–1.3)
CREAT SERPL-MCNC: 0.39 MG/DL — LOW (ref 0.5–1.3)
CREAT SERPL-MCNC: 0.41 MG/DL — LOW (ref 0.5–1.3)
CREAT SERPL-MCNC: 0.44 MG/DL — LOW (ref 0.5–1.3)
CREAT SERPL-MCNC: 0.45 MG/DL — LOW (ref 0.5–1.3)
CREAT SERPL-MCNC: 0.49 MG/DL — LOW (ref 0.5–1.3)
CREAT SERPL-MCNC: 0.49 MG/DL — LOW (ref 0.5–1.3)
CREAT SERPL-MCNC: 0.5 MG/DL — SIGNIFICANT CHANGE UP (ref 0.5–1.3)
CREAT SERPL-MCNC: 0.52 MG/DL — SIGNIFICANT CHANGE UP (ref 0.5–1.3)
CREAT SERPL-MCNC: 0.53 MG/DL — SIGNIFICANT CHANGE UP (ref 0.5–1.3)
CREAT SERPL-MCNC: 0.54 MG/DL — SIGNIFICANT CHANGE UP (ref 0.5–1.3)
CREAT SERPL-MCNC: 0.55 MG/DL — SIGNIFICANT CHANGE UP (ref 0.5–1.3)
CREAT SERPL-MCNC: 0.57 MG/DL — SIGNIFICANT CHANGE UP (ref 0.5–1.3)
CREAT SERPL-MCNC: 0.59 MG/DL — SIGNIFICANT CHANGE UP (ref 0.5–1.3)
CREAT SERPL-MCNC: 0.59 MG/DL — SIGNIFICANT CHANGE UP (ref 0.5–1.3)
CREAT SERPL-MCNC: 0.62 MG/DL — SIGNIFICANT CHANGE UP (ref 0.5–1.3)
CREAT SERPL-MCNC: 0.62 MG/DL — SIGNIFICANT CHANGE UP (ref 0.5–1.3)
CREAT SERPL-MCNC: 0.68 MG/DL — SIGNIFICANT CHANGE UP (ref 0.5–1.3)
CREAT SERPL-MCNC: 0.72 MG/DL — SIGNIFICANT CHANGE UP (ref 0.5–1.3)
CREAT SERPL-MCNC: 0.73 MG/DL — SIGNIFICANT CHANGE UP (ref 0.5–1.3)
CREAT SERPL-MCNC: 0.74 MG/DL — SIGNIFICANT CHANGE UP (ref 0.5–1.3)
CREAT SERPL-MCNC: 0.75 MG/DL — SIGNIFICANT CHANGE UP (ref 0.5–1.3)
CULTURE RESULTS: SIGNIFICANT CHANGE UP
CYSTATIN C SERPL-MCNC: 0.72 MG/L — LOW (ref 0.77–1.42)
DACRYOCYTES BLD QL SMEAR: SLIGHT — SIGNIFICANT CHANGE UP
DIFF PNL FLD: NEGATIVE — SIGNIFICANT CHANGE UP
EBV DNA SERPL NAA+PROBE-ACNC: SIGNIFICANT CHANGE UP IU/ML
EBVPCR LOG: SIGNIFICANT CHANGE UP LOG10IU/ML
EGFR: 100 ML/MIN/1.73M2 — SIGNIFICANT CHANGE UP
EGFR: 102 ML/MIN/1.73M2 — SIGNIFICANT CHANGE UP
EGFR: 102 ML/MIN/1.73M2 — SIGNIFICANT CHANGE UP
EGFR: 104 ML/MIN/1.73M2 — SIGNIFICANT CHANGE UP
EGFR: 104 ML/MIN/1.73M2 — SIGNIFICANT CHANGE UP
EGFR: 105 ML/MIN/1.73M2 — SIGNIFICANT CHANGE UP
EGFR: 105 ML/MIN/1.73M2 — SIGNIFICANT CHANGE UP
EGFR: 106 ML/MIN/1.73M2 — SIGNIFICANT CHANGE UP
EGFR: 107 ML/MIN/1.73M2 — SIGNIFICANT CHANGE UP
EGFR: 107 ML/MIN/1.73M2 — SIGNIFICANT CHANGE UP
EGFR: 109 ML/MIN/1.73M2 — SIGNIFICANT CHANGE UP
EGFR: 110 ML/MIN/1.73M2 — SIGNIFICANT CHANGE UP
EGFR: 111 ML/MIN/1.73M2 — SIGNIFICANT CHANGE UP
EGFR: 111 ML/MIN/1.73M2 — SIGNIFICANT CHANGE UP
EGFR: 112 ML/MIN/1.73M2 — SIGNIFICANT CHANGE UP
EGFR: 115 ML/MIN/1.73M2 — SIGNIFICANT CHANGE UP
EGFR: 115 ML/MIN/1.73M2 — SIGNIFICANT CHANGE UP
EGFR: 116 ML/MIN/1.73M2 — SIGNIFICANT CHANGE UP
EGFR: 116 ML/MIN/1.73M2 — SIGNIFICANT CHANGE UP
EGFR: 119 ML/MIN/1.73M2 — SIGNIFICANT CHANGE UP
EGFR: 119 ML/MIN/1.73M2 — SIGNIFICANT CHANGE UP
EGFR: 120 ML/MIN/1.73M2 — SIGNIFICANT CHANGE UP
EGFR: 120 ML/MIN/1.73M2 — SIGNIFICANT CHANGE UP
EGFR: 121 ML/MIN/1.73M2 — SIGNIFICANT CHANGE UP
EGFR: 122 ML/MIN/1.73M2 — SIGNIFICANT CHANGE UP
EGFR: 122 ML/MIN/1.73M2 — SIGNIFICANT CHANGE UP
EGFR: 123 ML/MIN/1.73M2 — SIGNIFICANT CHANGE UP
EGFR: 126 ML/MIN/1.73M2 — SIGNIFICANT CHANGE UP
EGFR: 127 ML/MIN/1.73M2 — SIGNIFICANT CHANGE UP
EGFR: 130 ML/MIN/1.73M2 — SIGNIFICANT CHANGE UP
EGFR: 130 ML/MIN/1.73M2 — SIGNIFICANT CHANGE UP
EGFR: 99 ML/MIN/1.73M2 — SIGNIFICANT CHANGE UP
ELLIPTOCYTES BLD QL SMEAR: SLIGHT — SIGNIFICANT CHANGE UP
ELLIPTOCYTES BLD QL SMEAR: SLIGHT — SIGNIFICANT CHANGE UP
EOSINOPHIL # BLD AUTO: 0 K/UL — SIGNIFICANT CHANGE UP (ref 0–0.5)
EOSINOPHIL # BLD AUTO: 0.01 K/UL — SIGNIFICANT CHANGE UP (ref 0–0.5)
EOSINOPHIL # BLD AUTO: 0.02 K/UL — SIGNIFICANT CHANGE UP (ref 0–0.5)
EOSINOPHIL # BLD AUTO: 0.03 K/UL — SIGNIFICANT CHANGE UP (ref 0–0.5)
EOSINOPHIL # BLD AUTO: 0.04 K/UL — SIGNIFICANT CHANGE UP (ref 0–0.5)
EOSINOPHIL # BLD AUTO: 0.08 K/UL — SIGNIFICANT CHANGE UP (ref 0–0.5)
EOSINOPHIL # BLD AUTO: 0.08 K/UL — SIGNIFICANT CHANGE UP (ref 0–0.5)
EOSINOPHIL # BLD MANUAL: 0 K/UL — SIGNIFICANT CHANGE UP (ref 0–0.5)
EOSINOPHIL # BLD MANUAL: 0 K/UL — SIGNIFICANT CHANGE UP (ref 0–0.5)
EOSINOPHIL # BLD MANUAL: 0.02 K/UL — SIGNIFICANT CHANGE UP (ref 0–0.5)
EOSINOPHIL # BLD MANUAL: 0.03 K/UL — SIGNIFICANT CHANGE UP (ref 0–0.5)
EOSINOPHIL # BLD MANUAL: 0.04 K/UL — SIGNIFICANT CHANGE UP (ref 0–0.5)
EOSINOPHIL # BLD MANUAL: 0.09 K/UL — SIGNIFICANT CHANGE UP (ref 0–0.5)
EOSINOPHIL # BLD MANUAL: 0.1 K/UL — SIGNIFICANT CHANGE UP (ref 0–0.5)
EOSINOPHIL NFR BLD AUTO: 0 % — SIGNIFICANT CHANGE UP (ref 0–6)
EOSINOPHIL NFR BLD AUTO: 0.1 % — SIGNIFICANT CHANGE UP (ref 0–6)
EOSINOPHIL NFR BLD AUTO: 0.1 % — SIGNIFICANT CHANGE UP (ref 0–6)
EOSINOPHIL NFR BLD AUTO: 0.2 % — SIGNIFICANT CHANGE UP (ref 0–6)
EOSINOPHIL NFR BLD AUTO: 0.2 % — SIGNIFICANT CHANGE UP (ref 0–6)
EOSINOPHIL NFR BLD AUTO: 0.3 % — SIGNIFICANT CHANGE UP (ref 0–6)
EOSINOPHIL NFR BLD AUTO: 0.5 % — SIGNIFICANT CHANGE UP (ref 0–6)
EOSINOPHIL NFR BLD AUTO: 0.7 % — SIGNIFICANT CHANGE UP (ref 0–6)
EOSINOPHIL NFR BLD AUTO: 1.2 % — SIGNIFICANT CHANGE UP (ref 0–6)
EOSINOPHIL NFR BLD AUTO: 1.3 % — SIGNIFICANT CHANGE UP (ref 0–6)
EOSINOPHIL NFR BLD AUTO: 1.6 % — SIGNIFICANT CHANGE UP (ref 0–6)
EOSINOPHIL NFR BLD AUTO: 1.8 % — SIGNIFICANT CHANGE UP (ref 0–6)
EOSINOPHIL NFR BLD AUTO: 2 % — SIGNIFICANT CHANGE UP (ref 0–6)
EOSINOPHIL NFR BLD AUTO: 3.1 % — SIGNIFICANT CHANGE UP (ref 0–6)
EOSINOPHIL NFR BLD MANUAL: 0 % — SIGNIFICANT CHANGE UP (ref 0–6)
EOSINOPHIL NFR BLD MANUAL: 0 % — SIGNIFICANT CHANGE UP (ref 0–6)
EOSINOPHIL NFR BLD MANUAL: 0.7 % — SIGNIFICANT CHANGE UP (ref 0–6)
EOSINOPHIL NFR BLD MANUAL: 0.7 % — SIGNIFICANT CHANGE UP (ref 0–6)
EOSINOPHIL NFR BLD MANUAL: 1.5 % — SIGNIFICANT CHANGE UP (ref 0–6)
ESTIMATED AVERAGE GLUCOSE: 134 MG/DL — HIGH (ref 68–114)
FLUAV AG NPH QL: SIGNIFICANT CHANGE UP
FLUAV AG NPH QL: SIGNIFICANT CHANGE UP
FLUBV AG NPH QL: SIGNIFICANT CHANGE UP
FLUBV AG NPH QL: SIGNIFICANT CHANGE UP
FLUID INTAKE SUBSTANCE CLASS: SIGNIFICANT CHANGE UP
FUNGITELL B-D-GLUCAN,  BRONCHIAL LAVAGE: SIGNIFICANT CHANGE UP
FUNGITELL: <31 PG/ML — SIGNIFICANT CHANGE UP
GALACTOMANNAN AG SERPL-ACNC: 0.07 INDEX — SIGNIFICANT CHANGE UP (ref 0–0.49)
GAMMA INTERFERON BACKGROUND BLD IA-ACNC: 0.03 IU/ML — SIGNIFICANT CHANGE UP
GAS PNL BLDA: SIGNIFICANT CHANGE UP
GAS PNL BLDV: SIGNIFICANT CHANGE UP
GFR/BSA.PRED SERPLBLD CYS-BASED-ARV: 107 ML/MIN/1.73M2 — SIGNIFICANT CHANGE UP
GIANT PLATELETS BLD QL SMEAR: PRESENT
GLUCOSE BLDC GLUCOMTR-MCNC: 104 MG/DL — HIGH (ref 70–99)
GLUCOSE BLDC GLUCOMTR-MCNC: 110 MG/DL — HIGH (ref 70–99)
GLUCOSE BLDC GLUCOMTR-MCNC: 117 MG/DL — HIGH (ref 70–99)
GLUCOSE BLDC GLUCOMTR-MCNC: 122 MG/DL — HIGH (ref 70–99)
GLUCOSE BLDC GLUCOMTR-MCNC: 130 MG/DL — HIGH (ref 70–99)
GLUCOSE BLDC GLUCOMTR-MCNC: 136 MG/DL — HIGH (ref 70–99)
GLUCOSE BLDC GLUCOMTR-MCNC: 148 MG/DL — HIGH (ref 70–99)
GLUCOSE BLDC GLUCOMTR-MCNC: 151 MG/DL — HIGH (ref 70–99)
GLUCOSE BLDC GLUCOMTR-MCNC: 161 MG/DL — HIGH (ref 70–99)
GLUCOSE BLDC GLUCOMTR-MCNC: 162 MG/DL — HIGH (ref 70–99)
GLUCOSE BLDC GLUCOMTR-MCNC: 171 MG/DL — HIGH (ref 70–99)
GLUCOSE BLDC GLUCOMTR-MCNC: 177 MG/DL — HIGH (ref 70–99)
GLUCOSE BLDC GLUCOMTR-MCNC: 186 MG/DL — HIGH (ref 70–99)
GLUCOSE BLDC GLUCOMTR-MCNC: 191 MG/DL — HIGH (ref 70–99)
GLUCOSE BLDC GLUCOMTR-MCNC: 197 MG/DL — HIGH (ref 70–99)
GLUCOSE BLDC GLUCOMTR-MCNC: 213 MG/DL — HIGH (ref 70–99)
GLUCOSE BLDC GLUCOMTR-MCNC: 215 MG/DL — HIGH (ref 70–99)
GLUCOSE BLDC GLUCOMTR-MCNC: 233 MG/DL — HIGH (ref 70–99)
GLUCOSE BLDC GLUCOMTR-MCNC: 286 MG/DL — HIGH (ref 70–99)
GLUCOSE SERPL-MCNC: 101 MG/DL — HIGH (ref 70–99)
GLUCOSE SERPL-MCNC: 101 MG/DL — HIGH (ref 70–99)
GLUCOSE SERPL-MCNC: 102 MG/DL — HIGH (ref 70–99)
GLUCOSE SERPL-MCNC: 104 MG/DL — HIGH (ref 70–99)
GLUCOSE SERPL-MCNC: 107 MG/DL — HIGH (ref 70–99)
GLUCOSE SERPL-MCNC: 109 MG/DL — HIGH (ref 70–99)
GLUCOSE SERPL-MCNC: 110 MG/DL — HIGH (ref 70–99)
GLUCOSE SERPL-MCNC: 113 MG/DL — HIGH (ref 70–99)
GLUCOSE SERPL-MCNC: 114 MG/DL — HIGH (ref 70–99)
GLUCOSE SERPL-MCNC: 115 MG/DL — HIGH (ref 70–99)
GLUCOSE SERPL-MCNC: 118 MG/DL — HIGH (ref 70–99)
GLUCOSE SERPL-MCNC: 119 MG/DL — HIGH (ref 70–99)
GLUCOSE SERPL-MCNC: 119 MG/DL — HIGH (ref 70–99)
GLUCOSE SERPL-MCNC: 121 MG/DL — HIGH (ref 70–99)
GLUCOSE SERPL-MCNC: 123 MG/DL — HIGH (ref 70–99)
GLUCOSE SERPL-MCNC: 126 MG/DL — HIGH (ref 70–99)
GLUCOSE SERPL-MCNC: 132 MG/DL — HIGH (ref 70–99)
GLUCOSE SERPL-MCNC: 136 MG/DL — HIGH (ref 70–99)
GLUCOSE SERPL-MCNC: 142 MG/DL — HIGH (ref 70–99)
GLUCOSE SERPL-MCNC: 148 MG/DL — HIGH (ref 70–99)
GLUCOSE SERPL-MCNC: 151 MG/DL — HIGH (ref 70–99)
GLUCOSE SERPL-MCNC: 168 MG/DL — HIGH (ref 70–99)
GLUCOSE SERPL-MCNC: 180 MG/DL — HIGH (ref 70–99)
GLUCOSE SERPL-MCNC: 182 MG/DL — HIGH (ref 70–99)
GLUCOSE SERPL-MCNC: 211 MG/DL — HIGH (ref 70–99)
GLUCOSE SERPL-MCNC: 234 MG/DL — HIGH (ref 70–99)
GLUCOSE SERPL-MCNC: 304 MG/DL — HIGH (ref 70–99)
GLUCOSE SERPL-MCNC: 362 MG/DL — HIGH (ref 70–99)
GLUCOSE SERPL-MCNC: 90 MG/DL — SIGNIFICANT CHANGE UP (ref 70–99)
GLUCOSE SERPL-MCNC: 91 MG/DL — SIGNIFICANT CHANGE UP (ref 70–99)
GLUCOSE SERPL-MCNC: 98 MG/DL — SIGNIFICANT CHANGE UP (ref 70–99)
GLUCOSE UR QL: NEGATIVE MG/DL — SIGNIFICANT CHANGE UP
GRAM STN FLD: SIGNIFICANT CHANGE UP
HADV DNA FLD NAA+PROBE-LOG#: SIGNIFICANT CHANGE UP COPIES/ML
HADV DNA SPEC QL NAA+PROBE: NEGATIVE — SIGNIFICANT CHANGE UP
HAV IGM SER-ACNC: SIGNIFICANT CHANGE UP
HBV CORE IGM SER-ACNC: SIGNIFICANT CHANGE UP
HBV SURFACE AG SER-ACNC: SIGNIFICANT CHANGE UP
HCT VFR BLD CALC: 21 % — CRITICAL LOW (ref 39–50)
HCT VFR BLD CALC: 21.1 % — LOW (ref 39–50)
HCT VFR BLD CALC: 21.6 % — LOW (ref 39–50)
HCT VFR BLD CALC: 21.6 % — LOW (ref 39–50)
HCT VFR BLD CALC: 21.7 % — LOW (ref 39–50)
HCT VFR BLD CALC: 22.4 % — LOW (ref 39–50)
HCT VFR BLD CALC: 22.8 % — LOW (ref 39–50)
HCT VFR BLD CALC: 23.5 % — LOW (ref 39–50)
HCT VFR BLD CALC: 23.9 % — LOW (ref 39–50)
HCT VFR BLD CALC: 24 % — LOW (ref 39–50)
HCT VFR BLD CALC: 24.2 % — LOW (ref 39–50)
HCT VFR BLD CALC: 24.5 % — LOW (ref 39–50)
HCT VFR BLD CALC: 25.2 % — LOW (ref 39–50)
HCT VFR BLD CALC: 25.2 % — LOW (ref 39–50)
HCT VFR BLD CALC: 25.3 % — LOW (ref 39–50)
HCT VFR BLD CALC: 25.3 % — LOW (ref 39–50)
HCT VFR BLD CALC: 25.4 % — LOW (ref 39–50)
HCT VFR BLD CALC: 25.5 % — LOW (ref 39–50)
HCT VFR BLD CALC: 26.1 % — LOW (ref 39–50)
HCT VFR BLD CALC: 26.3 % — LOW (ref 39–50)
HCT VFR BLD CALC: 26.5 % — LOW (ref 39–50)
HCT VFR BLD CALC: 26.7 % — LOW (ref 39–50)
HCT VFR BLD CALC: 26.7 % — LOW (ref 39–50)
HCT VFR BLD CALC: 27 % — LOW (ref 39–50)
HCT VFR BLD CALC: 27 % — LOW (ref 39–50)
HCT VFR BLD CALC: 27.4 % — LOW (ref 39–50)
HCT VFR BLD CALC: 27.6 % — LOW (ref 39–50)
HCT VFR BLD CALC: 28.6 % — LOW (ref 39–50)
HCT VFR BLD CALC: 33.5 % — LOW (ref 39–50)
HCT VFR BLD CALC: 33.5 % — LOW (ref 39–50)
HCT VFR BLD CALC: 33.7 % — LOW (ref 39–50)
HCT VFR BLD CALC: 37.1 % — LOW (ref 39–50)
HCV AB S/CO SERPL IA: 0.04 S/CO — SIGNIFICANT CHANGE UP (ref 0–0.99)
HCV AB SERPL-IMP: SIGNIFICANT CHANGE UP
HERPES SIMPLEX VIRUS 1/2 SURVEILLANCE PCR SOURCE: SIGNIFICANT CHANGE UP
HGB BLD-MCNC: 10.4 G/DL — LOW (ref 13–17)
HGB BLD-MCNC: 10.7 G/DL — LOW (ref 13–17)
HGB BLD-MCNC: 10.7 G/DL — LOW (ref 13–17)
HGB BLD-MCNC: 11.8 G/DL — LOW (ref 13–17)
HGB BLD-MCNC: 6.5 G/DL — CRITICAL LOW (ref 13–17)
HGB BLD-MCNC: 6.6 G/DL — CRITICAL LOW (ref 13–17)
HGB BLD-MCNC: 6.7 G/DL — CRITICAL LOW (ref 13–17)
HGB BLD-MCNC: 6.8 G/DL — CRITICAL LOW (ref 13–17)
HGB BLD-MCNC: 6.9 G/DL — CRITICAL LOW (ref 13–17)
HGB BLD-MCNC: 7.1 G/DL — LOW (ref 13–17)
HGB BLD-MCNC: 7.1 G/DL — LOW (ref 13–17)
HGB BLD-MCNC: 7.5 G/DL — LOW (ref 13–17)
HGB BLD-MCNC: 7.5 G/DL — LOW (ref 13–17)
HGB BLD-MCNC: 7.6 G/DL — LOW (ref 13–17)
HGB BLD-MCNC: 7.7 G/DL — LOW (ref 13–17)
HGB BLD-MCNC: 7.7 G/DL — LOW (ref 13–17)
HGB BLD-MCNC: 8.1 G/DL — LOW (ref 13–17)
HGB BLD-MCNC: 8.2 G/DL — LOW (ref 13–17)
HGB BLD-MCNC: 8.2 G/DL — LOW (ref 13–17)
HGB BLD-MCNC: 8.3 G/DL — LOW (ref 13–17)
HGB BLD-MCNC: 8.3 G/DL — LOW (ref 13–17)
HGB BLD-MCNC: 8.4 G/DL — LOW (ref 13–17)
HGB BLD-MCNC: 8.5 G/DL — LOW (ref 13–17)
HGB BLD-MCNC: 8.6 G/DL — LOW (ref 13–17)
HGB BLD-MCNC: 8.6 G/DL — LOW (ref 13–17)
HGB BLD-MCNC: 8.7 G/DL — LOW (ref 13–17)
HGB BLD-MCNC: 8.7 G/DL — LOW (ref 13–17)
HGB BLD-MCNC: 8.8 G/DL — LOW (ref 13–17)
HGB BLD-MCNC: 9 G/DL — LOW (ref 13–17)
HHV SPEC CULT: SIGNIFICANT CHANGE UP
HSV1 AG SPEC QL: SIGNIFICANT CHANGE UP
HSV1+2 DNA SPEC QL NAA+PROBE: SIGNIFICANT CHANGE UP
HSV2 AG SPEC QL: SIGNIFICANT CHANGE UP
HYPOCHROMIA BLD QL: SLIGHT — SIGNIFICANT CHANGE UP
IGA FLD-MCNC: 51 MG/DL — LOW (ref 84–499)
IGG FLD-MCNC: 249 MG/DL — LOW (ref 610–1660)
IGM SERPL-MCNC: <10 MG/DL — LOW (ref 35–242)
IMM GRANULOCYTES # BLD AUTO: 0.04 K/UL — SIGNIFICANT CHANGE UP (ref 0–0.07)
IMM GRANULOCYTES # BLD AUTO: 0.05 K/UL — SIGNIFICANT CHANGE UP (ref 0–0.07)
IMM GRANULOCYTES # BLD AUTO: 0.08 K/UL — HIGH (ref 0–0.07)
IMM GRANULOCYTES # BLD AUTO: 0.09 K/UL — HIGH (ref 0–0.07)
IMM GRANULOCYTES # BLD AUTO: 0.09 K/UL — HIGH (ref 0–0.07)
IMM GRANULOCYTES # BLD AUTO: 0.1 K/UL — HIGH (ref 0–0.07)
IMM GRANULOCYTES # BLD AUTO: 0.12 K/UL — HIGH (ref 0–0.07)
IMM GRANULOCYTES # BLD AUTO: 0.16 K/UL — HIGH (ref 0–0.07)
IMM GRANULOCYTES # BLD AUTO: 0.26 K/UL — HIGH (ref 0–0.07)
IMM GRANULOCYTES # BLD AUTO: 0.33 K/UL — HIGH (ref 0–0.07)
IMM GRANULOCYTES # BLD AUTO: 0.38 K/UL — HIGH (ref 0–0.07)
IMM GRANULOCYTES # BLD AUTO: 0.48 K/UL — HIGH (ref 0–0.07)
IMM GRANULOCYTES # BLD AUTO: 0.57 K/UL — HIGH (ref 0–0.07)
IMM GRANULOCYTES # BLD AUTO: 0.6 K/UL — HIGH (ref 0–0.07)
IMM GRANULOCYTES # BLD AUTO: 1.76 K/UL — HIGH (ref 0–0.07)
IMM GRANULOCYTES NFR BLD AUTO: 0.2 % — SIGNIFICANT CHANGE UP (ref 0–0.9)
IMM GRANULOCYTES NFR BLD AUTO: 0.4 % — SIGNIFICANT CHANGE UP (ref 0–0.9)
IMM GRANULOCYTES NFR BLD AUTO: 0.4 % — SIGNIFICANT CHANGE UP (ref 0–0.9)
IMM GRANULOCYTES NFR BLD AUTO: 0.5 % — SIGNIFICANT CHANGE UP (ref 0–0.9)
IMM GRANULOCYTES NFR BLD AUTO: 0.6 % — SIGNIFICANT CHANGE UP (ref 0–0.9)
IMM GRANULOCYTES NFR BLD AUTO: 1.1 % — HIGH (ref 0–0.9)
IMM GRANULOCYTES NFR BLD AUTO: 1.2 % — HIGH (ref 0–0.9)
IMM GRANULOCYTES NFR BLD AUTO: 1.7 % — HIGH (ref 0–0.9)
IMM GRANULOCYTES NFR BLD AUTO: 1.9 % — HIGH (ref 0–0.9)
IMM GRANULOCYTES NFR BLD AUTO: 10 % — HIGH (ref 0–0.9)
IMM GRANULOCYTES NFR BLD AUTO: 15.6 % — HIGH (ref 0–0.9)
IMM GRANULOCYTES NFR BLD AUTO: 16.2 % — HIGH (ref 0–0.9)
IMM GRANULOCYTES NFR BLD AUTO: 17.5 % — HIGH (ref 0–0.9)
IMM GRANULOCYTES NFR BLD AUTO: 2.2 % — HIGH (ref 0–0.9)
IMM GRANULOCYTES NFR BLD AUTO: 2.8 % — HIGH (ref 0–0.9)
IMM GRANULOCYTES NFR BLD AUTO: 27.3 % — HIGH (ref 0–0.9)
IMM GRANULOCYTES NFR BLD AUTO: 3.1 % — HIGH (ref 0–0.9)
IMM GRANULOCYTES NFR BLD AUTO: 6.3 % — HIGH (ref 0–0.9)
IMM GRANULOCYTES NFR BLD AUTO: 8 % — HIGH (ref 0–0.9)
IMM GRANULOCYTES NFR BLD AUTO: 8.1 % — HIGH (ref 0–0.9)
IMM GRANULOCYTES NFR BLD AUTO: 8.3 % — HIGH (ref 0–0.9)
IMM GRANULOCYTES NFR BLD AUTO: 9 % — HIGH (ref 0–0.9)
IMMATURE PLATELET FRACTION #: 1.9 K/UL — LOW (ref 3.9–12.5)
IMMATURE PLATELET FRACTION #: 2.5 K/UL — LOW (ref 3.9–12.5)
IMMATURE PLATELET FRACTION #: 2.8 K/UL — LOW (ref 3.9–12.5)
IMMATURE PLATELET FRACTION #: 3 K/UL — LOW (ref 3.9–12.5)
IMMATURE PLATELET FRACTION #: 3.2 K/UL — LOW (ref 3.9–12.5)
IMMATURE PLATELET FRACTION #: 3.6 K/UL — LOW (ref 3.9–12.5)
IMMATURE PLATELET FRACTION #: 3.9 K/UL — SIGNIFICANT CHANGE UP (ref 3.9–12.5)
IMMATURE PLATELET FRACTION #: 4.4 K/UL — SIGNIFICANT CHANGE UP (ref 3.9–12.5)
IMMATURE PLATELET FRACTION #: 4.7 K/UL — SIGNIFICANT CHANGE UP (ref 3.9–12.5)
IMMATURE PLATELET FRACTION #: 5 K/UL — SIGNIFICANT CHANGE UP (ref 3.9–12.5)
IMMATURE PLATELET FRACTION #: 6.4 K/UL — SIGNIFICANT CHANGE UP (ref 3.9–12.5)
IMMATURE PLATELET FRACTION #: 7 K/UL — SIGNIFICANT CHANGE UP (ref 3.9–12.5)
IMMATURE PLATELET FRACTION #: 7.2 K/UL — SIGNIFICANT CHANGE UP (ref 3.9–12.5)
IMMATURE PLATELET FRACTION %: 10.1 % — HIGH (ref 1.6–7.1)
IMMATURE PLATELET FRACTION %: 10.6 % — HIGH (ref 1.6–7.1)
IMMATURE PLATELET FRACTION %: 11.5 % — HIGH (ref 1.6–7.1)
IMMATURE PLATELET FRACTION %: 6.3 % — SIGNIFICANT CHANGE UP (ref 1.6–7.1)
IMMATURE PLATELET FRACTION %: 7.3 % — HIGH (ref 1.6–7.1)
IMMATURE PLATELET FRACTION %: 7.4 % — HIGH (ref 1.6–7.1)
IMMATURE PLATELET FRACTION %: 7.7 % — HIGH (ref 1.6–7.1)
IMMATURE PLATELET FRACTION %: 7.8 % — HIGH (ref 1.6–7.1)
IMMATURE PLATELET FRACTION %: 8.2 % — HIGH (ref 1.6–7.1)
IMMATURE PLATELET FRACTION %: 8.4 % — HIGH (ref 1.6–7.1)
IMMATURE PLATELET FRACTION %: 8.8 % — HIGH (ref 1.6–7.1)
IMMATURE PLATELET FRACTION %: 9.1 % — HIGH (ref 1.6–7.1)
IMMATURE PLATELET FRACTION %: 9.4 % — HIGH (ref 1.6–7.1)
INR BLD: 0.9 RATIO — SIGNIFICANT CHANGE UP (ref 0.85–1.16)
INR BLD: 0.91 RATIO — SIGNIFICANT CHANGE UP (ref 0.85–1.16)
INR BLD: 0.93 RATIO — SIGNIFICANT CHANGE UP (ref 0.85–1.16)
INR BLD: 0.93 RATIO — SIGNIFICANT CHANGE UP (ref 0.85–1.16)
INR BLD: 1.01 RATIO — SIGNIFICANT CHANGE UP (ref 0.85–1.16)
INR BLD: 1.01 RATIO — SIGNIFICANT CHANGE UP (ref 0.85–1.16)
INR BLD: 1.02 RATIO — SIGNIFICANT CHANGE UP (ref 0.85–1.16)
INR BLD: 1.08 RATIO — SIGNIFICANT CHANGE UP (ref 0.85–1.16)
INR BLD: 1.1 RATIO — SIGNIFICANT CHANGE UP (ref 0.85–1.16)
INR BLD: 1.11 RATIO — SIGNIFICANT CHANGE UP (ref 0.85–1.16)
INR BLD: 1.11 RATIO — SIGNIFICANT CHANGE UP (ref 0.85–1.16)
INR BLD: 1.12 RATIO — SIGNIFICANT CHANGE UP (ref 0.85–1.16)
INR BLD: 1.15 RATIO — SIGNIFICANT CHANGE UP (ref 0.85–1.16)
KAPPA LC SER QL IFE: 0.62 MG/DL — SIGNIFICANT CHANGE UP (ref 0.33–1.94)
KAPPA/LAMBDA FREE LIGHT CHAIN RATIO, SERUM: 0.97 RATIO — SIGNIFICANT CHANGE UP (ref 0.26–1.65)
KETONES UR-MCNC: 80 MG/DL
L PNEUMO DNA SPEC QL NAA+PROBE: SIGNIFICANT CHANGE UP
LACTATE SERPL-SCNC: 0.8 MMOL/L — SIGNIFICANT CHANGE UP (ref 0.5–2)
LACTATE SERPL-SCNC: 1.4 MMOL/L — SIGNIFICANT CHANGE UP (ref 0.5–2)
LACTATE SERPL-SCNC: 2.8 MMOL/L — HIGH (ref 0.7–2)
LAMBDA LC SER QL IFE: 0.64 MG/DL — SIGNIFICANT CHANGE UP (ref 0.57–2.63)
LDH SERPL L TO P-CCNC: 347 U/L — HIGH (ref 50–242)
LDH SERPL L TO P-CCNC: 365 U/L — HIGH (ref 50–242)
LDH SERPL L TO P-CCNC: 373 U/L — HIGH (ref 50–242)
LDH SERPL L TO P-CCNC: 394 U/L — HIGH (ref 50–242)
LDH SERPL L TO P-CCNC: 406 U/L — HIGH (ref 50–242)
LDH SERPL L TO P-CCNC: 409 U/L — HIGH (ref 50–242)
LDH SERPL L TO P-CCNC: 411 U/L — HIGH (ref 50–242)
LDH SERPL L TO P-CCNC: 414 U/L — HIGH (ref 50–242)
LDH SERPL L TO P-CCNC: 418 U/L — HIGH (ref 50–242)
LDH SERPL L TO P-CCNC: 443 U/L — HIGH (ref 50–242)
LDH SERPL L TO P-CCNC: 496 U/L — HIGH (ref 50–242)
LDH SERPL L TO P-CCNC: 511 U/L — HIGH (ref 50–242)
LDH SERPL L TO P-CCNC: 523 U/L — HIGH (ref 50–242)
LDH SERPL L TO P-CCNC: 533 U/L — HIGH (ref 50–242)
LDH SERPL L TO P-CCNC: 538 U/L — HIGH (ref 50–242)
LDH SERPL L TO P-CCNC: 545 U/L — HIGH (ref 50–242)
LDH SERPL L TO P-CCNC: 563 U/L — HIGH (ref 50–242)
LDH SERPL L TO P-CCNC: 593 U/L — HIGH (ref 50–242)
LDH SERPL L TO P-CCNC: 679 U/L — HIGH (ref 50–242)
LDH SERPL L TO P-CCNC: 680 U/L — HIGH (ref 50–242)
LDH SERPL L TO P-CCNC: 709 U/L — HIGH (ref 50–242)
LDH SERPL L TO P-CCNC: 744 U/L — HIGH (ref 50–242)
LDH SERPL L TO P-CCNC: 840 U/L — HIGH (ref 50–242)
LEGIONELLA AB SER-ACNC: SIGNIFICANT CHANGE UP
LEGIONELLA AG UR QL: NEGATIVE — SIGNIFICANT CHANGE UP
LEGIONELLA DNA SPEC NAA+PROBE: SIGNIFICANT CHANGE UP
LEUKOCYTE ESTERASE UR-ACNC: ABNORMAL
LOWER RESPIRATORY PANEL PCR RESULT: SIGNIFICANT CHANGE UP
LYMPHOCYTES # BLD AUTO: 0.1 K/UL — LOW (ref 1–3.3)
LYMPHOCYTES # BLD AUTO: 0.15 K/UL — LOW (ref 1–3.3)
LYMPHOCYTES # BLD AUTO: 0.16 K/UL — LOW (ref 1–3.3)
LYMPHOCYTES # BLD AUTO: 0.18 K/UL — LOW (ref 1–3.3)
LYMPHOCYTES # BLD AUTO: 0.18 K/UL — LOW (ref 1–3.3)
LYMPHOCYTES # BLD AUTO: 0.19 K/UL — LOW (ref 1–3.3)
LYMPHOCYTES # BLD AUTO: 0.21 K/UL — LOW (ref 1–3.3)
LYMPHOCYTES # BLD AUTO: 0.22 K/UL — LOW (ref 1–3.3)
LYMPHOCYTES # BLD AUTO: 0.22 K/UL — LOW (ref 1–3.3)
LYMPHOCYTES # BLD AUTO: 0.24 K/UL — LOW (ref 1–3.3)
LYMPHOCYTES # BLD AUTO: 0.24 K/UL — LOW (ref 1–3.3)
LYMPHOCYTES # BLD AUTO: 0.25 K/UL — LOW (ref 1–3.3)
LYMPHOCYTES # BLD AUTO: 0.26 K/UL — LOW (ref 1–3.3)
LYMPHOCYTES # BLD AUTO: 0.26 K/UL — LOW (ref 1–3.3)
LYMPHOCYTES # BLD AUTO: 0.29 K/UL — LOW (ref 1–3.3)
LYMPHOCYTES # BLD AUTO: 0.29 K/UL — LOW (ref 1–3.3)
LYMPHOCYTES # BLD AUTO: 0.3 K/UL — LOW (ref 1–3.3)
LYMPHOCYTES # BLD AUTO: 0.3 K/UL — LOW (ref 1–3.3)
LYMPHOCYTES # BLD AUTO: 0.33 K/UL — LOW (ref 1–3.3)
LYMPHOCYTES # BLD AUTO: 0.34 K/UL — LOW (ref 1–3.3)
LYMPHOCYTES # BLD AUTO: 0.34 K/UL — LOW (ref 1–3.3)
LYMPHOCYTES # BLD AUTO: 0.44 K/UL — LOW (ref 1–3.3)
LYMPHOCYTES # BLD AUTO: 2.8 % — LOW (ref 13–44)
LYMPHOCYTES # BLD AUTO: 21.9 % — SIGNIFICANT CHANGE UP (ref 13–44)
LYMPHOCYTES # BLD AUTO: 23.2 % — SIGNIFICANT CHANGE UP (ref 13–44)
LYMPHOCYTES # BLD AUTO: 3.7 % — LOW (ref 13–44)
LYMPHOCYTES # BLD AUTO: 4.1 % — LOW (ref 13–44)
LYMPHOCYTES # BLD AUTO: 4.8 % — LOW (ref 13–44)
LYMPHOCYTES # BLD AUTO: 6 % — LOW (ref 13–44)
LYMPHOCYTES # BLD MANUAL: 0.07 K/UL — LOW (ref 1–3.3)
LYMPHOCYTES # BLD MANUAL: 0.08 K/UL — LOW (ref 1–3.3)
LYMPHOCYTES # BLD MANUAL: 0.16 K/UL — LOW (ref 1–3.3)
LYMPHOCYTES # BLD MANUAL: 0.26 K/UL — LOW (ref 1–3.3)
LYMPHOCYTES # BLD MANUAL: 0.28 K/UL — LOW (ref 1–3.3)
LYMPHOCYTES # BLD MANUAL: 0.3 K/UL — LOW (ref 1–3.3)
LYMPHOCYTES # BLD MANUAL: 0.44 K/UL — LOW (ref 1–3.3)
LYMPHOCYTES # FLD: 46 % — SIGNIFICANT CHANGE UP
LYMPHOCYTES NFR BLD AUTO: 15 % — SIGNIFICANT CHANGE UP (ref 13–44)
LYMPHOCYTES NFR BLD AUTO: 4.1 % — LOW (ref 13–44)
LYMPHOCYTES NFR BLD AUTO: 5.3 % — LOW (ref 13–44)
LYMPHOCYTES NFR BLD AUTO: 5.3 % — LOW (ref 13–44)
LYMPHOCYTES NFR BLD AUTO: 5.4 % — LOW (ref 13–44)
LYMPHOCYTES NFR BLD AUTO: 5.6 % — LOW (ref 13–44)
LYMPHOCYTES NFR BLD AUTO: 5.8 % — LOW (ref 13–44)
LYMPHOCYTES NFR BLD AUTO: 5.8 % — LOW (ref 13–44)
LYMPHOCYTES NFR BLD AUTO: 6 % — LOW (ref 13–44)
LYMPHOCYTES NFR BLD AUTO: 6.3 % — LOW (ref 13–44)
LYMPHOCYTES NFR BLD AUTO: 6.5 % — LOW (ref 13–44)
LYMPHOCYTES NFR BLD AUTO: 7.3 % — LOW (ref 13–44)
LYMPHOCYTES NFR BLD AUTO: 7.8 % — LOW (ref 13–44)
LYMPHOCYTES NFR BLD AUTO: 8.7 % — LOW (ref 13–44)
LYMPHOCYTES NFR BLD AUTO: 9.4 % — LOW (ref 13–44)
LYMPHOCYTES NFR BLD MANUAL: 4.1 % — LOW (ref 13–44)
LYMPHOCYTES NFR BLD MANUAL: 4.4 % — LOW (ref 13–44)
LYMPHOCYTES NFR BLD MANUAL: 4.4 % — LOW (ref 13–44)
LYMPHOCYTES NFR BLD MANUAL: 5.3 % — LOW (ref 13–44)
LYMPHOCYTES NFR BLD MANUAL: 6.8 % — LOW (ref 13–44)
LYMPHOCYTES NFR BLD MANUAL: 8.2 % — LOW (ref 13–44)
LYMPHOCYTES NFR BLD MANUAL: 8.3 % — LOW (ref 13–44)
M PNEUMO DNA SPEC QL NAA+PROBE: NEGATIVE — SIGNIFICANT CHANGE UP
M TB IFN-G BLD-IMP: ABNORMAL
M TB IFN-G CD4+ BCKGRND COR BLD-ACNC: 0 IU/ML — SIGNIFICANT CHANGE UP
M TB IFN-G CD4+CD8+ BCKGRND COR BLD-ACNC: 0 IU/ML — SIGNIFICANT CHANGE UP
MAGNESIUM SERPL-MCNC: 1.5 MG/DL — LOW (ref 1.6–2.6)
MAGNESIUM SERPL-MCNC: 1.6 MG/DL — SIGNIFICANT CHANGE UP (ref 1.6–2.6)
MAGNESIUM SERPL-MCNC: 1.7 MG/DL — SIGNIFICANT CHANGE UP (ref 1.6–2.6)
MAGNESIUM SERPL-MCNC: 1.8 MG/DL — SIGNIFICANT CHANGE UP (ref 1.6–2.6)
MAGNESIUM SERPL-MCNC: 1.9 MG/DL — SIGNIFICANT CHANGE UP (ref 1.6–2.6)
MAGNESIUM SERPL-MCNC: 2 MG/DL — SIGNIFICANT CHANGE UP (ref 1.6–2.6)
MAGNESIUM SERPL-MCNC: 2.1 MG/DL — SIGNIFICANT CHANGE UP (ref 1.6–2.6)
MAGNESIUM SERPL-MCNC: 2.2 MG/DL — SIGNIFICANT CHANGE UP (ref 1.6–2.6)
MAGNESIUM SERPL-MCNC: 2.3 MG/DL — SIGNIFICANT CHANGE UP (ref 1.6–2.6)
MAGNESIUM SERPL-MCNC: 2.3 MG/DL — SIGNIFICANT CHANGE UP (ref 1.6–2.6)
MAGNESIUM SERPL-MCNC: 2.4 MG/DL — SIGNIFICANT CHANGE UP (ref 1.6–2.6)
MAGNESIUM SERPL-MCNC: 2.4 MG/DL — SIGNIFICANT CHANGE UP (ref 1.6–2.6)
MAGNESIUM SERPL-MCNC: 2.7 MG/DL — HIGH (ref 1.6–2.6)
MAGNESIUM SERPL-MCNC: 2.7 MG/DL — HIGH (ref 1.6–2.6)
MAGNESIUM SERPL-MCNC: 2.8 MG/DL — HIGH (ref 1.6–2.6)
MAGNESIUM SERPL-MCNC: 2.8 MG/DL — HIGH (ref 1.6–2.6)
MANUAL METAMYELOCYTE #: 0.01 K/UL — HIGH (ref 0–0)
MANUAL METAMYELOCYTE #: 0.02 K/UL — HIGH (ref 0–0)
MANUAL METAMYELOCYTE #: 0.05 K/UL — HIGH (ref 0–0)
MANUAL METAMYELOCYTE #: 0.09 K/UL — HIGH (ref 0–0)
MANUAL MYELOCYTE #: 0.02 K/UL — HIGH (ref 0–0)
MANUAL MYELOCYTE #: 0.02 K/UL — HIGH (ref 0–0)
MANUAL MYELOCYTE #: 0.04 K/UL — HIGH (ref 0–0)
MANUAL MYELOCYTE #: 0.05 K/UL — HIGH (ref 0–0)
MANUAL MYELOCYTE #: 0.05 K/UL — HIGH (ref 0–0)
MANUAL NEUTROPHIL BANDS #: 0.04 K/UL — SIGNIFICANT CHANGE UP (ref 0–0.84)
MANUAL NEUTROPHIL BANDS #: 0.08 K/UL — SIGNIFICANT CHANGE UP (ref 0–0.84)
MANUAL NEUTROPHIL BANDS #: 0.1 K/UL — SIGNIFICANT CHANGE UP (ref 0–0.84)
MANUAL NEUTROPHIL BANDS #: 0.14 K/UL — SIGNIFICANT CHANGE UP (ref 0–0.84)
MANUAL NEUTROPHIL BANDS #: 0.3 K/UL — SIGNIFICANT CHANGE UP (ref 0–0.84)
MANUAL NEUTROPHIL BANDS #: 0.43 K/UL — SIGNIFICANT CHANGE UP (ref 0–0.84)
MANUAL NEUTROPHIL BANDS #: 0.59 K/UL — SIGNIFICANT CHANGE UP (ref 0–0.84)
MANUAL NRBC #: 0.02 K/UL — HIGH (ref 0–0)
MANUAL SMEAR VERIFICATION: SIGNIFICANT CHANGE UP
MCHC RBC-ENTMCNC: 25.8 PG — LOW (ref 27–34)
MCHC RBC-ENTMCNC: 25.9 PG — LOW (ref 27–34)
MCHC RBC-ENTMCNC: 26 PG — LOW (ref 27–34)
MCHC RBC-ENTMCNC: 26.1 PG — LOW (ref 27–34)
MCHC RBC-ENTMCNC: 26.2 PG — LOW (ref 27–34)
MCHC RBC-ENTMCNC: 26.3 PG — LOW (ref 27–34)
MCHC RBC-ENTMCNC: 26.4 PG — LOW (ref 27–34)
MCHC RBC-ENTMCNC: 26.4 PG — LOW (ref 27–34)
MCHC RBC-ENTMCNC: 26.5 PG — LOW (ref 27–34)
MCHC RBC-ENTMCNC: 26.6 PG — LOW (ref 27–34)
MCHC RBC-ENTMCNC: 26.8 PG — LOW (ref 27–34)
MCHC RBC-ENTMCNC: 26.9 PG — LOW (ref 27–34)
MCHC RBC-ENTMCNC: 26.9 PG — LOW (ref 27–34)
MCHC RBC-ENTMCNC: 27.1 PG — SIGNIFICANT CHANGE UP (ref 27–34)
MCHC RBC-ENTMCNC: 27.1 PG — SIGNIFICANT CHANGE UP (ref 27–34)
MCHC RBC-ENTMCNC: 27.2 PG — SIGNIFICANT CHANGE UP (ref 27–34)
MCHC RBC-ENTMCNC: 27.3 PG — SIGNIFICANT CHANGE UP (ref 27–34)
MCHC RBC-ENTMCNC: 30.4 G/DL — LOW (ref 32–36)
MCHC RBC-ENTMCNC: 30.4 G/DL — LOW (ref 32–36)
MCHC RBC-ENTMCNC: 30.8 G/DL — LOW (ref 32–36)
MCHC RBC-ENTMCNC: 30.9 G/DL — LOW (ref 32–36)
MCHC RBC-ENTMCNC: 31 G/DL — LOW (ref 32–36)
MCHC RBC-ENTMCNC: 31 G/DL — LOW (ref 32–36)
MCHC RBC-ENTMCNC: 31.1 G/DL — LOW (ref 32–36)
MCHC RBC-ENTMCNC: 31.3 G/DL — LOW (ref 32–36)
MCHC RBC-ENTMCNC: 31.3 G/DL — LOW (ref 32–36)
MCHC RBC-ENTMCNC: 31.5 G/DL — LOW (ref 32–36)
MCHC RBC-ENTMCNC: 31.6 G/DL — LOW (ref 32–36)
MCHC RBC-ENTMCNC: 31.7 G/DL — LOW (ref 32–36)
MCHC RBC-ENTMCNC: 31.7 G/DL — LOW (ref 32–36)
MCHC RBC-ENTMCNC: 31.8 G/DL — LOW (ref 32–36)
MCHC RBC-ENTMCNC: 31.9 G/DL — LOW (ref 32–36)
MCHC RBC-ENTMCNC: 32 G/DL — SIGNIFICANT CHANGE UP (ref 32–36)
MCHC RBC-ENTMCNC: 32.1 G/DL — SIGNIFICANT CHANGE UP (ref 32–36)
MCHC RBC-ENTMCNC: 32.2 G/DL — SIGNIFICANT CHANGE UP (ref 32–36)
MCHC RBC-ENTMCNC: 32.3 G/DL — SIGNIFICANT CHANGE UP (ref 32–36)
MCHC RBC-ENTMCNC: 32.3 G/DL — SIGNIFICANT CHANGE UP (ref 32–36)
MCHC RBC-ENTMCNC: 32.5 G/DL — SIGNIFICANT CHANGE UP (ref 32–36)
MCHC RBC-ENTMCNC: 32.8 G/DL — SIGNIFICANT CHANGE UP (ref 32–36)
MCHC RBC-ENTMCNC: 33 G/DL — SIGNIFICANT CHANGE UP (ref 32–36)
MCHC RBC-ENTMCNC: 33.1 G/DL — SIGNIFICANT CHANGE UP (ref 32–36)
MCV RBC AUTO: 80.3 FL — SIGNIFICANT CHANGE UP (ref 80–100)
MCV RBC AUTO: 81.7 FL — SIGNIFICANT CHANGE UP (ref 80–100)
MCV RBC AUTO: 81.8 FL — SIGNIFICANT CHANGE UP (ref 80–100)
MCV RBC AUTO: 81.9 FL — SIGNIFICANT CHANGE UP (ref 80–100)
MCV RBC AUTO: 82.1 FL — SIGNIFICANT CHANGE UP (ref 80–100)
MCV RBC AUTO: 82.1 FL — SIGNIFICANT CHANGE UP (ref 80–100)
MCV RBC AUTO: 82.4 FL — SIGNIFICANT CHANGE UP (ref 80–100)
MCV RBC AUTO: 82.5 FL — SIGNIFICANT CHANGE UP (ref 80–100)
MCV RBC AUTO: 82.5 FL — SIGNIFICANT CHANGE UP (ref 80–100)
MCV RBC AUTO: 82.6 FL — SIGNIFICANT CHANGE UP (ref 80–100)
MCV RBC AUTO: 83.4 FL — SIGNIFICANT CHANGE UP (ref 80–100)
MCV RBC AUTO: 83.4 FL — SIGNIFICANT CHANGE UP (ref 80–100)
MCV RBC AUTO: 83.6 FL — SIGNIFICANT CHANGE UP (ref 80–100)
MCV RBC AUTO: 83.8 FL — SIGNIFICANT CHANGE UP (ref 80–100)
MCV RBC AUTO: 83.8 FL — SIGNIFICANT CHANGE UP (ref 80–100)
MCV RBC AUTO: 83.9 FL — SIGNIFICANT CHANGE UP (ref 80–100)
MCV RBC AUTO: 83.9 FL — SIGNIFICANT CHANGE UP (ref 80–100)
MCV RBC AUTO: 84 FL — SIGNIFICANT CHANGE UP (ref 80–100)
MCV RBC AUTO: 84.4 FL — SIGNIFICANT CHANGE UP (ref 80–100)
MCV RBC AUTO: 84.4 FL — SIGNIFICANT CHANGE UP (ref 80–100)
MCV RBC AUTO: 84.6 FL — SIGNIFICANT CHANGE UP (ref 80–100)
MCV RBC AUTO: 84.8 FL — SIGNIFICANT CHANGE UP (ref 80–100)
MCV RBC AUTO: 84.9 FL — SIGNIFICANT CHANGE UP (ref 80–100)
MCV RBC AUTO: 85.1 FL — SIGNIFICANT CHANGE UP (ref 80–100)
MCV RBC AUTO: 85.1 FL — SIGNIFICANT CHANGE UP (ref 80–100)
MCV RBC AUTO: 87.2 FL — SIGNIFICANT CHANGE UP (ref 80–100)
METAMYELOCYTES # FLD: 0.7 % — HIGH (ref 0–0)
METAMYELOCYTES # FLD: 0.7 % — HIGH (ref 0–0)
METAMYELOCYTES # FLD: 0.8 % — HIGH (ref 0–0)
METAMYELOCYTES # FLD: 1.5 % — HIGH (ref 0–0)
METAMYELOCYTES NFR BLD: 0.7 % — HIGH (ref 0–0)
METAMYELOCYTES NFR BLD: 0.7 % — HIGH (ref 0–0)
METAMYELOCYTES NFR BLD: 0.8 % — HIGH (ref 0–0)
METAMYELOCYTES NFR BLD: 1.5 % — HIGH (ref 0–0)
MICROCYTES BLD QL: SLIGHT — SIGNIFICANT CHANGE UP
MONOCYTES # BLD AUTO: 0.23 K/UL — SIGNIFICANT CHANGE UP (ref 0–0.9)
MONOCYTES # BLD AUTO: 0.27 K/UL — SIGNIFICANT CHANGE UP (ref 0–0.9)
MONOCYTES # BLD AUTO: 0.3 K/UL — SIGNIFICANT CHANGE UP (ref 0–0.9)
MONOCYTES # BLD AUTO: 0.37 K/UL — SIGNIFICANT CHANGE UP (ref 0–0.9)
MONOCYTES # BLD AUTO: 0.4 K/UL — SIGNIFICANT CHANGE UP (ref 0–0.9)
MONOCYTES # BLD AUTO: 0.48 K/UL — SIGNIFICANT CHANGE UP (ref 0–0.9)
MONOCYTES # BLD AUTO: 0.49 K/UL — SIGNIFICANT CHANGE UP (ref 0–0.9)
MONOCYTES # BLD AUTO: 0.53 K/UL — SIGNIFICANT CHANGE UP (ref 0–0.9)
MONOCYTES # BLD AUTO: 0.6 K/UL — SIGNIFICANT CHANGE UP (ref 0–0.9)
MONOCYTES # BLD AUTO: 0.64 K/UL — SIGNIFICANT CHANGE UP (ref 0–0.9)
MONOCYTES # BLD AUTO: 0.68 K/UL — SIGNIFICANT CHANGE UP (ref 0–0.9)
MONOCYTES # BLD AUTO: 0.93 K/UL — HIGH (ref 0–0.9)
MONOCYTES # BLD AUTO: 0.98 K/UL — HIGH (ref 0–0.9)
MONOCYTES # BLD AUTO: 0.99 K/UL — HIGH (ref 0–0.9)
MONOCYTES # BLD AUTO: 1 K/UL — HIGH (ref 0–0.9)
MONOCYTES # BLD AUTO: 1.01 K/UL — HIGH (ref 0–0.9)
MONOCYTES # BLD AUTO: 1.02 K/UL — HIGH (ref 0–0.9)
MONOCYTES # BLD AUTO: 1.08 K/UL — HIGH (ref 0–0.9)
MONOCYTES # BLD AUTO: 1.11 K/UL — HIGH (ref 0–0.9)
MONOCYTES # BLD AUTO: 1.14 K/UL — HIGH (ref 0–0.9)
MONOCYTES # BLD AUTO: 1.39 K/UL — HIGH (ref 0–0.9)
MONOCYTES # BLD AUTO: 1.47 K/UL — HIGH (ref 0–0.9)
MONOCYTES # BLD MANUAL: 0.13 K/UL — SIGNIFICANT CHANGE UP (ref 0–0.9)
MONOCYTES # BLD MANUAL: 0.17 K/UL — SIGNIFICANT CHANGE UP (ref 0–0.9)
MONOCYTES # BLD MANUAL: 0.27 K/UL — SIGNIFICANT CHANGE UP (ref 0–0.9)
MONOCYTES # BLD MANUAL: 0.41 K/UL — SIGNIFICANT CHANGE UP (ref 0–0.9)
MONOCYTES # BLD MANUAL: 0.52 K/UL — SIGNIFICANT CHANGE UP (ref 0–0.9)
MONOCYTES # BLD MANUAL: 0.7 K/UL — SIGNIFICANT CHANGE UP (ref 0–0.9)
MONOCYTES # BLD MANUAL: 1.02 K/UL — HIGH (ref 0–0.9)
MONOCYTES NFR BLD AUTO: 11.7 % — SIGNIFICANT CHANGE UP (ref 2–14)
MONOCYTES NFR BLD AUTO: 18.3 % — HIGH (ref 2–14)
MONOCYTES NFR BLD AUTO: 19.4 % — HIGH (ref 2–14)
MONOCYTES NFR BLD AUTO: 20.5 % — HIGH (ref 2–14)
MONOCYTES NFR BLD AUTO: 21 % — HIGH (ref 2–14)
MONOCYTES NFR BLD AUTO: 21.7 % — HIGH (ref 2–14)
MONOCYTES NFR BLD AUTO: 21.8 % — HIGH (ref 2–14)
MONOCYTES NFR BLD AUTO: 22.2 % — HIGH (ref 2–14)
MONOCYTES NFR BLD AUTO: 22.4 % — HIGH (ref 2–14)
MONOCYTES NFR BLD AUTO: 22.8 % — HIGH (ref 2–14)
MONOCYTES NFR BLD AUTO: 23 % — HIGH (ref 2–14)
MONOCYTES NFR BLD AUTO: 23.6 % — HIGH (ref 2–14)
MONOCYTES NFR BLD AUTO: 26 % — HIGH (ref 2–14)
MONOCYTES NFR BLD AUTO: 27 % — HIGH (ref 2–14)
MONOCYTES NFR BLD AUTO: 28.9 % — HIGH (ref 2–14)
MONOCYTES NFR BLD AUTO: 29.1 % — HIGH (ref 2–14)
MONOCYTES NFR BLD AUTO: 31.3 % — HIGH (ref 2–14)
MONOCYTES NFR BLD AUTO: 31.3 % — HIGH (ref 2–14)
MONOCYTES NFR BLD AUTO: 5.9 % — SIGNIFICANT CHANGE UP (ref 2–14)
MONOCYTES NFR BLD AUTO: 9.1 % — SIGNIFICANT CHANGE UP (ref 2–14)
MONOCYTES NFR BLD AUTO: 9.5 % — SIGNIFICANT CHANGE UP (ref 2–14)
MONOCYTES NFR BLD AUTO: 9.9 % — SIGNIFICANT CHANGE UP (ref 2–14)
MONOCYTES NFR BLD MANUAL: 10.3 % — SIGNIFICANT CHANGE UP (ref 2–14)
MONOCYTES NFR BLD MANUAL: 11.9 % — SIGNIFICANT CHANGE UP (ref 2–14)
MONOCYTES NFR BLD MANUAL: 12.2 % — SIGNIFICANT CHANGE UP (ref 2–14)
MONOCYTES NFR BLD MANUAL: 14.2 % — HIGH (ref 2–14)
MONOCYTES NFR BLD MANUAL: 15.9 % — HIGH (ref 2–14)
MONOCYTES NFR BLD MANUAL: 6.6 % — SIGNIFICANT CHANGE UP (ref 2–14)
MONOCYTES NFR BLD MANUAL: 8.8 % — SIGNIFICANT CHANGE UP (ref 2–14)
MONOS+MACROS # FLD: 4 % — SIGNIFICANT CHANGE UP
MRSA PCR RESULT.: SIGNIFICANT CHANGE UP
MYELOCYTES NFR BLD: 0.7 % — HIGH (ref 0–0)
MYELOCYTES NFR BLD: 0.8 % — HIGH (ref 0–0)
NEUTROPHILS # BLD AUTO: 0.38 K/UL — LOW (ref 1.8–7.4)
NEUTROPHILS # BLD AUTO: 0.46 K/UL — LOW (ref 1.8–7.4)
NEUTROPHILS # BLD AUTO: 0.5 K/UL — LOW (ref 1.8–7.4)
NEUTROPHILS # BLD AUTO: 0.91 K/UL — LOW (ref 1.8–7.4)
NEUTROPHILS # BLD AUTO: 0.96 K/UL — LOW (ref 1.8–7.4)
NEUTROPHILS # BLD AUTO: 1 K/UL — LOW (ref 1.8–7.4)
NEUTROPHILS # BLD AUTO: 1.49 K/UL — LOW (ref 1.8–7.4)
NEUTROPHILS # BLD AUTO: 2.18 K/UL — SIGNIFICANT CHANGE UP (ref 1.8–7.4)
NEUTROPHILS # BLD AUTO: 2.84 K/UL — SIGNIFICANT CHANGE UP (ref 1.8–7.4)
NEUTROPHILS # BLD AUTO: 2.91 K/UL — SIGNIFICANT CHANGE UP (ref 1.8–7.4)
NEUTROPHILS # BLD AUTO: 3.02 K/UL — SIGNIFICANT CHANGE UP (ref 1.8–7.4)
NEUTROPHILS # BLD AUTO: 3.1 K/UL — SIGNIFICANT CHANGE UP (ref 1.8–7.4)
NEUTROPHILS # BLD AUTO: 3.16 K/UL — SIGNIFICANT CHANGE UP (ref 1.8–7.4)
NEUTROPHILS # BLD AUTO: 3.67 K/UL — SIGNIFICANT CHANGE UP (ref 1.8–7.4)
NEUTROPHILS # BLD AUTO: 3.71 K/UL — SIGNIFICANT CHANGE UP (ref 1.8–7.4)
NEUTROPHILS # BLD AUTO: 3.92 K/UL — SIGNIFICANT CHANGE UP (ref 1.8–7.4)
NEUTROPHILS # BLD AUTO: 3.96 K/UL — SIGNIFICANT CHANGE UP (ref 1.8–7.4)
NEUTROPHILS # BLD AUTO: 4.07 K/UL — SIGNIFICANT CHANGE UP (ref 1.8–7.4)
NEUTROPHILS # BLD AUTO: 4.41 K/UL — SIGNIFICANT CHANGE UP (ref 1.8–7.4)
NEUTROPHILS # BLD AUTO: 4.59 K/UL — SIGNIFICANT CHANGE UP (ref 1.8–7.4)
NEUTROPHILS # BLD AUTO: 6.08 K/UL — SIGNIFICANT CHANGE UP (ref 1.8–7.4)
NEUTROPHILS # BLD AUTO: 6.11 K/UL — SIGNIFICANT CHANGE UP (ref 1.8–7.4)
NEUTROPHILS # BLD MANUAL: 1.33 K/UL — LOW (ref 1.8–7.4)
NEUTROPHILS # BLD MANUAL: 1.33 K/UL — LOW (ref 1.8–7.4)
NEUTROPHILS # BLD MANUAL: 1.71 K/UL — LOW (ref 1.8–7.4)
NEUTROPHILS # BLD MANUAL: 2.59 K/UL — SIGNIFICANT CHANGE UP (ref 1.8–7.4)
NEUTROPHILS # BLD MANUAL: 4.19 K/UL — SIGNIFICANT CHANGE UP (ref 1.8–7.4)
NEUTROPHILS # BLD MANUAL: 4.28 K/UL — SIGNIFICANT CHANGE UP (ref 1.8–7.4)
NEUTROPHILS # BLD MANUAL: 4.51 K/UL — SIGNIFICANT CHANGE UP (ref 1.8–7.4)
NEUTROPHILS NFR BLD AUTO: 39.6 % — LOW (ref 43–77)
NEUTROPHILS NFR BLD AUTO: 43.4 % — SIGNIFICANT CHANGE UP (ref 43–77)
NEUTROPHILS NFR BLD AUTO: 44.1 % — SIGNIFICANT CHANGE UP (ref 43–77)
NEUTROPHILS NFR BLD AUTO: 44.7 % — SIGNIFICANT CHANGE UP (ref 43–77)
NEUTROPHILS NFR BLD AUTO: 46 % — SIGNIFICANT CHANGE UP (ref 43–77)
NEUTROPHILS NFR BLD AUTO: 49 % — SIGNIFICANT CHANGE UP (ref 43–77)
NEUTROPHILS NFR BLD AUTO: 50 % — SIGNIFICANT CHANGE UP (ref 43–77)
NEUTROPHILS NFR BLD AUTO: 54.4 % — SIGNIFICANT CHANGE UP (ref 43–77)
NEUTROPHILS NFR BLD AUTO: 61.4 % — SIGNIFICANT CHANGE UP (ref 43–77)
NEUTROPHILS NFR BLD AUTO: 62.8 % — SIGNIFICANT CHANGE UP (ref 43–77)
NEUTROPHILS NFR BLD AUTO: 64.2 % — SIGNIFICANT CHANGE UP (ref 43–77)
NEUTROPHILS NFR BLD AUTO: 66.6 % — SIGNIFICANT CHANGE UP (ref 43–77)
NEUTROPHILS NFR BLD AUTO: 67.3 % — SIGNIFICANT CHANGE UP (ref 43–77)
NEUTROPHILS NFR BLD AUTO: 69.1 % — SIGNIFICANT CHANGE UP (ref 43–77)
NEUTROPHILS NFR BLD AUTO: 70.6 % — SIGNIFICANT CHANGE UP (ref 43–77)
NEUTROPHILS NFR BLD AUTO: 70.9 % — SIGNIFICANT CHANGE UP (ref 43–77)
NEUTROPHILS NFR BLD AUTO: 72.8 % — SIGNIFICANT CHANGE UP (ref 43–77)
NEUTROPHILS NFR BLD AUTO: 80.8 % — HIGH (ref 43–77)
NEUTROPHILS NFR BLD AUTO: 84 % — HIGH (ref 43–77)
NEUTROPHILS NFR BLD AUTO: 85.1 % — HIGH (ref 43–77)
NEUTROPHILS NFR BLD AUTO: 85.6 % — HIGH (ref 43–77)
NEUTROPHILS NFR BLD AUTO: 90.5 % — HIGH (ref 43–77)
NEUTROPHILS NFR BLD MANUAL: 65.1 % — SIGNIFICANT CHANGE UP (ref 43–77)
NEUTROPHILS NFR BLD MANUAL: 69.2 % — SIGNIFICANT CHANGE UP (ref 43–77)
NEUTROPHILS NFR BLD MANUAL: 74.9 % — SIGNIFICANT CHANGE UP (ref 43–77)
NEUTROPHILS NFR BLD MANUAL: 75.5 % — SIGNIFICANT CHANGE UP (ref 43–77)
NEUTROPHILS NFR BLD MANUAL: 76.6 % — SIGNIFICANT CHANGE UP (ref 43–77)
NEUTROPHILS NFR BLD MANUAL: 79.4 % — HIGH (ref 43–77)
NEUTROPHILS NFR BLD MANUAL: 83.6 % — HIGH (ref 43–77)
NEUTROPHILS-BODY FLUID: 50 % — SIGNIFICANT CHANGE UP
NEUTS BAND # BLD: 2.2 % — SIGNIFICANT CHANGE UP (ref 0–8)
NEUTS BAND # BLD: 3 % — SIGNIFICANT CHANGE UP (ref 0–8)
NEUTS BAND # BLD: 4.1 % — SIGNIFICANT CHANGE UP (ref 0–8)
NEUTS BAND # BLD: 5.3 % — SIGNIFICANT CHANGE UP (ref 0–8)
NEUTS BAND # BLD: 7.3 % — SIGNIFICANT CHANGE UP (ref 0–8)
NEUTS BAND # BLD: 7.5 % — SIGNIFICANT CHANGE UP (ref 0–8)
NEUTS BAND # BLD: 9.1 % — HIGH (ref 0–8)
NEUTS BAND NFR BLD: 2.2 % — SIGNIFICANT CHANGE UP (ref 0–8)
NEUTS BAND NFR BLD: 3 % — SIGNIFICANT CHANGE UP (ref 0–8)
NEUTS BAND NFR BLD: 4.1 % — SIGNIFICANT CHANGE UP (ref 0–8)
NEUTS BAND NFR BLD: 5.3 % — SIGNIFICANT CHANGE UP (ref 0–8)
NEUTS BAND NFR BLD: 7.3 % — SIGNIFICANT CHANGE UP (ref 0–8)
NEUTS BAND NFR BLD: 7.5 % — SIGNIFICANT CHANGE UP (ref 0–8)
NEUTS BAND NFR BLD: 9.1 % — HIGH (ref 0–8)
NIGHT BLUE STAIN TISS: SIGNIFICANT CHANGE UP
NITRITE UR-MCNC: POSITIVE
NOCARDIA RT-PCR-BAL: SIGNIFICANT CHANGE UP
NON-GYNECOLOGICAL CYTOLOGY STUDY: SIGNIFICANT CHANGE UP
NRBC # BLD AUTO: 0 K/UL — SIGNIFICANT CHANGE UP (ref 0–0)
NRBC # BLD: 0 /100 WBCS — SIGNIFICANT CHANGE UP (ref 0–0)
NRBC # BLD: 1 /100 WBCS — HIGH (ref 0–0)
NRBC # FLD: 0 K/UL — SIGNIFICANT CHANGE UP (ref 0–0)
NRBC BLD AUTO-RTO: 0 /100 WBCS — SIGNIFICANT CHANGE UP (ref 0–0)
NRBC BLD-RTO: 0 /100 WBCS — SIGNIFICANT CHANGE UP (ref 0–0)
NRBC BLD-RTO: 1 /100 WBCS — HIGH (ref 0–0)
NT-PROBNP SERPL-SCNC: 2166 PG/ML — HIGH (ref 0–300)
P JIROVECII DNA L RESP QL NAA+NON-PROBE: NEGATIVE — SIGNIFICANT CHANGE UP
PH UR: 6 — SIGNIFICANT CHANGE UP (ref 5–8)
PHOSPHATE SERPL-MCNC: 1.7 MG/DL — LOW (ref 2.5–4.5)
PHOSPHATE SERPL-MCNC: 2.1 MG/DL — LOW (ref 2.5–4.5)
PHOSPHATE SERPL-MCNC: 2.3 MG/DL — LOW (ref 2.5–4.5)
PHOSPHATE SERPL-MCNC: 2.4 MG/DL — LOW (ref 2.5–4.5)
PHOSPHATE SERPL-MCNC: 2.4 MG/DL — LOW (ref 2.5–4.5)
PHOSPHATE SERPL-MCNC: 2.5 MG/DL — SIGNIFICANT CHANGE UP (ref 2.5–4.5)
PHOSPHATE SERPL-MCNC: 2.6 MG/DL — SIGNIFICANT CHANGE UP (ref 2.5–4.5)
PHOSPHATE SERPL-MCNC: 2.6 MG/DL — SIGNIFICANT CHANGE UP (ref 2.5–4.5)
PHOSPHATE SERPL-MCNC: 2.7 MG/DL — SIGNIFICANT CHANGE UP (ref 2.5–4.5)
PHOSPHATE SERPL-MCNC: 2.8 MG/DL — SIGNIFICANT CHANGE UP (ref 2.5–4.5)
PHOSPHATE SERPL-MCNC: 2.8 MG/DL — SIGNIFICANT CHANGE UP (ref 2.5–4.5)
PHOSPHATE SERPL-MCNC: 2.9 MG/DL — SIGNIFICANT CHANGE UP (ref 2.5–4.5)
PHOSPHATE SERPL-MCNC: 3 MG/DL — SIGNIFICANT CHANGE UP (ref 2.5–4.5)
PHOSPHATE SERPL-MCNC: 3.1 MG/DL — SIGNIFICANT CHANGE UP (ref 2.5–4.5)
PHOSPHATE SERPL-MCNC: 3.1 MG/DL — SIGNIFICANT CHANGE UP (ref 2.5–4.5)
PHOSPHATE SERPL-MCNC: 3.2 MG/DL — SIGNIFICANT CHANGE UP (ref 2.5–4.5)
PHOSPHATE SERPL-MCNC: 3.3 MG/DL — SIGNIFICANT CHANGE UP (ref 2.5–4.5)
PHOSPHATE SERPL-MCNC: 3.3 MG/DL — SIGNIFICANT CHANGE UP (ref 2.5–4.5)
PHOSPHATE SERPL-MCNC: 3.5 MG/DL — SIGNIFICANT CHANGE UP (ref 2.5–4.5)
PHOSPHATE SERPL-MCNC: 3.6 MG/DL — SIGNIFICANT CHANGE UP (ref 2.5–4.5)
PHOSPHATE SERPL-MCNC: 3.6 MG/DL — SIGNIFICANT CHANGE UP (ref 2.5–4.5)
PHOSPHATE SERPL-MCNC: 3.7 MG/DL — SIGNIFICANT CHANGE UP (ref 2.5–4.5)
PHOSPHATE SERPL-MCNC: 3.7 MG/DL — SIGNIFICANT CHANGE UP (ref 2.5–4.5)
PHOSPHATE SERPL-MCNC: 3.8 MG/DL — SIGNIFICANT CHANGE UP (ref 2.5–4.5)
PHOSPHATE SERPL-MCNC: 5.2 MG/DL — HIGH (ref 2.5–4.5)
PLAT MORPH BLD: ABNORMAL
PLAT MORPH BLD: NORMAL — SIGNIFICANT CHANGE UP
PLATELET # BLD AUTO: 106 K/UL — LOW (ref 150–400)
PLATELET # BLD AUTO: 107 K/UL — LOW (ref 150–400)
PLATELET # BLD AUTO: 119 K/UL — LOW (ref 150–400)
PLATELET # BLD AUTO: 121 K/UL — LOW (ref 150–400)
PLATELET # BLD AUTO: 124 K/UL — LOW (ref 150–400)
PLATELET # BLD AUTO: 134 K/UL — LOW (ref 150–400)
PLATELET # BLD AUTO: 146 K/UL — LOW (ref 150–400)
PLATELET # BLD AUTO: 147 K/UL — LOW (ref 150–400)
PLATELET # BLD AUTO: 186 K/UL — SIGNIFICANT CHANGE UP (ref 150–400)
PLATELET # BLD AUTO: 197 K/UL — SIGNIFICANT CHANGE UP (ref 150–400)
PLATELET # BLD AUTO: 243 K/UL — SIGNIFICANT CHANGE UP (ref 150–400)
PLATELET # BLD AUTO: 243 K/UL — SIGNIFICANT CHANGE UP (ref 150–400)
PLATELET # BLD AUTO: 253 K/UL — SIGNIFICANT CHANGE UP (ref 150–400)
PLATELET # BLD AUTO: 26 K/UL — LOW (ref 150–400)
PLATELET # BLD AUTO: 267 K/UL — SIGNIFICANT CHANGE UP (ref 150–400)
PLATELET # BLD AUTO: 28 K/UL — LOW (ref 150–400)
PLATELET # BLD AUTO: 30 K/UL — LOW (ref 150–400)
PLATELET # BLD AUTO: 306 K/UL — SIGNIFICANT CHANGE UP (ref 150–400)
PLATELET # BLD AUTO: 33 K/UL — LOW (ref 150–400)
PLATELET # BLD AUTO: 34 K/UL — LOW (ref 150–400)
PLATELET # BLD AUTO: 35 K/UL — LOW (ref 150–400)
PLATELET # BLD AUTO: 38 K/UL — LOW (ref 150–400)
PLATELET # BLD AUTO: 41 K/UL — LOW (ref 150–400)
PLATELET # BLD AUTO: 42 K/UL — LOW (ref 150–400)
PLATELET # BLD AUTO: 44 K/UL — LOW (ref 150–400)
PLATELET # BLD AUTO: 49 K/UL — LOW (ref 150–400)
PLATELET # BLD AUTO: 79 K/UL — LOW (ref 150–400)
PLATELET # BLD AUTO: 82 K/UL — LOW (ref 150–400)
PLATELET # BLD AUTO: 85 K/UL — LOW (ref 150–400)
PLATELET # BLD AUTO: 86 K/UL — LOW (ref 150–400)
PLATELET # BLD AUTO: 95 K/UL — LOW (ref 150–400)
PMV BLD: 12 FL — SIGNIFICANT CHANGE UP (ref 7–13)
PMV BLD: 12.1 FL — SIGNIFICANT CHANGE UP (ref 7–13)
PMV BLD: 12.2 FL — SIGNIFICANT CHANGE UP (ref 7–13)
PMV BLD: 12.2 FL — SIGNIFICANT CHANGE UP (ref 7–13)
PMV BLD: 12.5 FL — SIGNIFICANT CHANGE UP (ref 7–13)
PMV BLD: 12.7 FL — SIGNIFICANT CHANGE UP (ref 7–13)
PMV BLD: 12.8 FL — SIGNIFICANT CHANGE UP (ref 7–13)
PMV BLD: SIGNIFICANT CHANGE UP FL (ref 7–13)
POIKILOCYTOSIS BLD QL AUTO: SLIGHT — SIGNIFICANT CHANGE UP
POLYCHROMASIA BLD QL SMEAR: ABNORMAL
POTASSIUM SERPL-MCNC: 2.8 MMOL/L — CRITICAL LOW (ref 3.5–5.3)
POTASSIUM SERPL-MCNC: 3.3 MMOL/L — LOW (ref 3.5–5.3)
POTASSIUM SERPL-MCNC: 3.4 MMOL/L — LOW (ref 3.5–5.3)
POTASSIUM SERPL-MCNC: 3.4 MMOL/L — LOW (ref 3.5–5.3)
POTASSIUM SERPL-MCNC: 3.5 MMOL/L — SIGNIFICANT CHANGE UP (ref 3.5–5.3)
POTASSIUM SERPL-MCNC: 3.6 MMOL/L — SIGNIFICANT CHANGE UP (ref 3.5–5.3)
POTASSIUM SERPL-MCNC: 3.7 MMOL/L — SIGNIFICANT CHANGE UP (ref 3.5–5.3)
POTASSIUM SERPL-MCNC: 3.7 MMOL/L — SIGNIFICANT CHANGE UP (ref 3.5–5.3)
POTASSIUM SERPL-MCNC: 3.8 MMOL/L — SIGNIFICANT CHANGE UP (ref 3.5–5.3)
POTASSIUM SERPL-MCNC: 3.9 MMOL/L — SIGNIFICANT CHANGE UP (ref 3.5–5.3)
POTASSIUM SERPL-MCNC: 4 MMOL/L — SIGNIFICANT CHANGE UP (ref 3.5–5.3)
POTASSIUM SERPL-MCNC: 4.1 MMOL/L — SIGNIFICANT CHANGE UP (ref 3.5–5.3)
POTASSIUM SERPL-MCNC: 4.2 MMOL/L — SIGNIFICANT CHANGE UP (ref 3.5–5.3)
POTASSIUM SERPL-MCNC: 4.3 MMOL/L — SIGNIFICANT CHANGE UP (ref 3.5–5.3)
POTASSIUM SERPL-MCNC: 4.8 MMOL/L — SIGNIFICANT CHANGE UP (ref 3.5–5.3)
POTASSIUM SERPL-SCNC: 2.8 MMOL/L — CRITICAL LOW (ref 3.5–5.3)
POTASSIUM SERPL-SCNC: 3.3 MMOL/L — LOW (ref 3.5–5.3)
POTASSIUM SERPL-SCNC: 3.4 MMOL/L — LOW (ref 3.5–5.3)
POTASSIUM SERPL-SCNC: 3.4 MMOL/L — LOW (ref 3.5–5.3)
POTASSIUM SERPL-SCNC: 3.5 MMOL/L — SIGNIFICANT CHANGE UP (ref 3.5–5.3)
POTASSIUM SERPL-SCNC: 3.6 MMOL/L — SIGNIFICANT CHANGE UP (ref 3.5–5.3)
POTASSIUM SERPL-SCNC: 3.7 MMOL/L — SIGNIFICANT CHANGE UP (ref 3.5–5.3)
POTASSIUM SERPL-SCNC: 3.7 MMOL/L — SIGNIFICANT CHANGE UP (ref 3.5–5.3)
POTASSIUM SERPL-SCNC: 3.8 MMOL/L — SIGNIFICANT CHANGE UP (ref 3.5–5.3)
POTASSIUM SERPL-SCNC: 3.9 MMOL/L — SIGNIFICANT CHANGE UP (ref 3.5–5.3)
POTASSIUM SERPL-SCNC: 4 MMOL/L — SIGNIFICANT CHANGE UP (ref 3.5–5.3)
POTASSIUM SERPL-SCNC: 4.1 MMOL/L — SIGNIFICANT CHANGE UP (ref 3.5–5.3)
POTASSIUM SERPL-SCNC: 4.2 MMOL/L — SIGNIFICANT CHANGE UP (ref 3.5–5.3)
POTASSIUM SERPL-SCNC: 4.3 MMOL/L — SIGNIFICANT CHANGE UP (ref 3.5–5.3)
POTASSIUM SERPL-SCNC: 4.8 MMOL/L — SIGNIFICANT CHANGE UP (ref 3.5–5.3)
PROCALCITONIN SERPL-MCNC: 0.11 NG/ML — HIGH
PROT SERPL-MCNC: 4.3 G/DL — LOW (ref 6–8.3)
PROT SERPL-MCNC: 4.7 G/DL — LOW (ref 6–8.3)
PROT SERPL-MCNC: 4.8 G/DL — LOW (ref 6–8.3)
PROT SERPL-MCNC: 4.9 G/DL — LOW (ref 6–8.3)
PROT SERPL-MCNC: 5 G/DL — LOW (ref 6–8.3)
PROT SERPL-MCNC: 5.1 G/DL — LOW (ref 6–8.3)
PROT SERPL-MCNC: 5.2 G/DL — LOW (ref 6–8.3)
PROT SERPL-MCNC: 5.3 G/DL — LOW (ref 6–8.3)
PROT SERPL-MCNC: 5.3 G/DL — LOW (ref 6–8.3)
PROT SERPL-MCNC: 5.4 G/DL — LOW (ref 6–8.3)
PROT SERPL-MCNC: 5.5 G/DL — LOW (ref 6–8.3)
PROT SERPL-MCNC: 5.5 G/DL — LOW (ref 6–8.3)
PROT SERPL-MCNC: 5.7 G/DL — LOW (ref 6–8.3)
PROT SERPL-MCNC: 5.7 G/DL — LOW (ref 6–8.3)
PROT SERPL-MCNC: 5.8 G/DL — LOW (ref 6–8.3)
PROT SERPL-MCNC: 6 G/DL — SIGNIFICANT CHANGE UP (ref 6–8.3)
PROT SERPL-MCNC: 6.1 G/DL — SIGNIFICANT CHANGE UP (ref 6–8.3)
PROT UR-MCNC: 30 MG/DL
PROTHROM AB SERPL-ACNC: 10.4 SEC — SIGNIFICANT CHANGE UP (ref 9.9–13.4)
PROTHROM AB SERPL-ACNC: 10.6 SEC — SIGNIFICANT CHANGE UP (ref 9.9–13.4)
PROTHROM AB SERPL-ACNC: 10.6 SEC — SIGNIFICANT CHANGE UP (ref 9.9–13.4)
PROTHROM AB SERPL-ACNC: 10.8 SEC — SIGNIFICANT CHANGE UP (ref 9.9–13.4)
PROTHROM AB SERPL-ACNC: 11.5 SEC — SIGNIFICANT CHANGE UP (ref 9.9–13.4)
PROTHROM AB SERPL-ACNC: 11.7 SEC — SIGNIFICANT CHANGE UP (ref 9.9–13.4)
PROTHROM AB SERPL-ACNC: 11.8 SEC — SIGNIFICANT CHANGE UP (ref 9.9–13.4)
PROTHROM AB SERPL-ACNC: 12.3 SEC — SIGNIFICANT CHANGE UP (ref 9.9–13.4)
PROTHROM AB SERPL-ACNC: 12.5 SEC — SIGNIFICANT CHANGE UP (ref 9.9–13.4)
PROTHROM AB SERPL-ACNC: 12.6 SEC — SIGNIFICANT CHANGE UP (ref 9.9–13.4)
PROTHROM AB SERPL-ACNC: 12.6 SEC — SIGNIFICANT CHANGE UP (ref 9.9–13.4)
PROTHROM AB SERPL-ACNC: 12.7 SEC — SIGNIFICANT CHANGE UP (ref 9.9–13.4)
PROTHROM AB SERPL-ACNC: 13.3 SEC — SIGNIFICANT CHANGE UP (ref 9.9–13.4)
QUANT TB PLUS MITOGEN MINUS NIL: 0.01 IU/ML — SIGNIFICANT CHANGE UP
RAPID RVP RESULT: SIGNIFICANT CHANGE UP
RBC # BLD: 2.53 M/UL — LOW (ref 4.2–5.8)
RBC # BLD: 2.56 M/UL — LOW (ref 4.2–5.8)
RBC # BLD: 2.56 M/UL — LOW (ref 4.2–5.8)
RBC # BLD: 2.59 M/UL — LOW (ref 4.2–5.8)
RBC # BLD: 2.68 M/UL — LOW (ref 4.2–5.8)
RBC # BLD: 2.68 M/UL — LOW (ref 4.2–5.8)
RBC # BLD: 2.8 M/UL — LOW (ref 4.2–5.8)
RBC # BLD: 2.82 M/UL — LOW (ref 4.2–5.8)
RBC # BLD: 2.85 M/UL — LOW (ref 4.2–5.8)
RBC # BLD: 2.92 M/UL — LOW (ref 4.2–5.8)
RBC # BLD: 2.98 M/UL — LOW (ref 4.2–5.8)
RBC # BLD: 3 M/UL — LOW (ref 4.2–5.8)
RBC # BLD: 3.02 M/UL — LOW (ref 4.2–5.8)
RBC # BLD: 3.03 M/UL — LOW (ref 4.2–5.8)
RBC # BLD: 3.05 M/UL — LOW (ref 4.2–5.8)
RBC # BLD: 3.08 M/UL — LOW (ref 4.2–5.8)
RBC # BLD: 3.09 M/UL — LOW (ref 4.2–5.8)
RBC # BLD: 3.16 M/UL — LOW (ref 4.2–5.8)
RBC # BLD: 3.19 M/UL — LOW (ref 4.2–5.8)
RBC # BLD: 3.2 M/UL — LOW (ref 4.2–5.8)
RBC # BLD: 3.21 M/UL — LOW (ref 4.2–5.8)
RBC # BLD: 3.24 M/UL — LOW (ref 4.2–5.8)
RBC # BLD: 3.25 M/UL — LOW (ref 4.2–5.8)
RBC # BLD: 3.27 M/UL — LOW (ref 4.2–5.8)
RBC # BLD: 3.28 M/UL — LOW (ref 4.2–5.8)
RBC # BLD: 3.29 M/UL — LOW (ref 4.2–5.8)
RBC # BLD: 3.35 M/UL — LOW (ref 4.2–5.8)
RBC # BLD: 3.96 M/UL — LOW (ref 4.2–5.8)
RBC # BLD: 4.06 M/UL — LOW (ref 4.2–5.8)
RBC # BLD: 4.08 M/UL — LOW (ref 4.2–5.8)
RBC # BLD: 4.52 M/UL — SIGNIFICANT CHANGE UP (ref 4.2–5.8)
RBC # FLD: 15.6 % — HIGH (ref 10.3–14.5)
RBC # FLD: 15.8 % — HIGH (ref 10.3–14.5)
RBC # FLD: 15.9 % — HIGH (ref 10.3–14.5)
RBC # FLD: 16 % — HIGH (ref 10.3–14.5)
RBC # FLD: 16.1 % — HIGH (ref 10.3–14.5)
RBC # FLD: 16.6 % — HIGH (ref 10.3–14.5)
RBC # FLD: 16.8 % — HIGH (ref 10.3–14.5)
RBC # FLD: 17.1 % — HIGH (ref 10.3–14.5)
RBC # FLD: 17.2 % — HIGH (ref 10.3–14.5)
RBC # FLD: 17.2 % — HIGH (ref 10.3–14.5)
RBC # FLD: 17.3 % — HIGH (ref 10.3–14.5)
RBC # FLD: 17.5 % — HIGH (ref 10.3–14.5)
RBC # FLD: 17.5 % — HIGH (ref 10.3–14.5)
RBC # FLD: 17.7 % — HIGH (ref 10.3–14.5)
RBC # FLD: 17.9 % — HIGH (ref 10.3–14.5)
RBC # FLD: 18 % — HIGH (ref 10.3–14.5)
RBC # FLD: 18 % — HIGH (ref 10.3–14.5)
RBC # FLD: 18.3 % — HIGH (ref 10.3–14.5)
RBC # FLD: 18.4 % — HIGH (ref 10.3–14.5)
RBC # FLD: 18.4 % — HIGH (ref 10.3–14.5)
RBC # FLD: 18.6 % — HIGH (ref 10.3–14.5)
RBC # FLD: 18.6 % — HIGH (ref 10.3–14.5)
RBC # FLD: 18.7 % — HIGH (ref 10.3–14.5)
RBC # FLD: 18.9 % — HIGH (ref 10.3–14.5)
RBC # FLD: 19 % — HIGH (ref 10.3–14.5)
RBC # FLD: 19.1 % — HIGH (ref 10.3–14.5)
RBC BLD AUTO: ABNORMAL
RBC BLD AUTO: SIGNIFICANT CHANGE UP
RBC CASTS # UR COMP ASSIST: 9 /HPF — HIGH (ref 0–4)
RCV VOL RI: 290 CELLS/UL — SIGNIFICANT CHANGE UP
REVIEW: SIGNIFICANT CHANGE UP
RH IG SCN BLD-IMP: POSITIVE — SIGNIFICANT CHANGE UP
RSV RNA NPH QL NAA+NON-PROBE: SIGNIFICANT CHANGE UP
RSV RNA NPH QL NAA+NON-PROBE: SIGNIFICANT CHANGE UP
S AUREUS DNA NOSE QL NAA+PROBE: SIGNIFICANT CHANGE UP
S PNEUM AG UR QL: NEGATIVE — SIGNIFICANT CHANGE UP
SARS-COV-2 RNA SPEC QL NAA+PROBE: SIGNIFICANT CHANGE UP
SCHISTOCYTES BLD QL AUTO: SLIGHT — SIGNIFICANT CHANGE UP
SODIUM SERPL-SCNC: 133 MMOL/L — LOW (ref 135–145)
SODIUM SERPL-SCNC: 135 MMOL/L — SIGNIFICANT CHANGE UP (ref 135–145)
SODIUM SERPL-SCNC: 136 MMOL/L — SIGNIFICANT CHANGE UP (ref 135–145)
SODIUM SERPL-SCNC: 136 MMOL/L — SIGNIFICANT CHANGE UP (ref 135–145)
SODIUM SERPL-SCNC: 137 MMOL/L — SIGNIFICANT CHANGE UP (ref 135–145)
SODIUM SERPL-SCNC: 138 MMOL/L — SIGNIFICANT CHANGE UP (ref 135–145)
SODIUM SERPL-SCNC: 138 MMOL/L — SIGNIFICANT CHANGE UP (ref 135–145)
SODIUM SERPL-SCNC: 139 MMOL/L — SIGNIFICANT CHANGE UP (ref 135–145)
SODIUM SERPL-SCNC: 140 MMOL/L — SIGNIFICANT CHANGE UP (ref 135–145)
SODIUM SERPL-SCNC: 140 MMOL/L — SIGNIFICANT CHANGE UP (ref 135–145)
SODIUM SERPL-SCNC: 141 MMOL/L — SIGNIFICANT CHANGE UP (ref 135–145)
SODIUM SERPL-SCNC: 142 MMOL/L — SIGNIFICANT CHANGE UP (ref 135–145)
SODIUM SERPL-SCNC: 142 MMOL/L — SIGNIFICANT CHANGE UP (ref 135–145)
SODIUM SERPL-SCNC: 143 MMOL/L — SIGNIFICANT CHANGE UP (ref 135–145)
SODIUM SERPL-SCNC: 144 MMOL/L — SIGNIFICANT CHANGE UP (ref 135–145)
SODIUM SERPL-SCNC: 144 MMOL/L — SIGNIFICANT CHANGE UP (ref 135–145)
SODIUM SERPL-SCNC: 146 MMOL/L — HIGH (ref 135–145)
SODIUM SERPL-SCNC: 147 MMOL/L — HIGH (ref 135–145)
SODIUM SERPL-SCNC: 148 MMOL/L — HIGH (ref 135–145)
SODIUM SERPL-SCNC: 149 MMOL/L — HIGH (ref 135–145)
SOURCE RESPIRATORY: SIGNIFICANT CHANGE UP
SOURCE RESPIRATORY: SIGNIFICANT CHANGE UP
SP GR SPEC: 1.03 — SIGNIFICANT CHANGE UP (ref 1–1.03)
SPECIMEN SOURCE FLD: SIGNIFICANT CHANGE UP
SPECIMEN SOURCE: SIGNIFICANT CHANGE UP
SQUAMOUS # UR AUTO: 3 /HPF — SIGNIFICANT CHANGE UP (ref 0–5)
TACROLIMUS SERPL-MCNC: 9.2 NG/ML — SIGNIFICANT CHANGE UP
TOTAL NUCLEATED CELL COUNT, BODY FLUID: 7 CELLS/UL — SIGNIFICANT CHANGE UP
TOXIC GRANULES BLD QL SMEAR: PRESENT
TROPONIN I, HIGH SENSITIVITY RESULT: 47.2 NG/L — SIGNIFICANT CHANGE UP
TUBE TYPE: SIGNIFICANT CHANGE UP
URATE SERPL-MCNC: 4.8 MG/DL — SIGNIFICANT CHANGE UP (ref 3.4–8.8)
URATE SERPL-MCNC: 4.8 MG/DL — SIGNIFICANT CHANGE UP (ref 3.4–8.8)
URATE SERPL-MCNC: 5.1 MG/DL — SIGNIFICANT CHANGE UP (ref 3.4–8.8)
URATE SERPL-MCNC: 5.4 MG/DL — SIGNIFICANT CHANGE UP (ref 3.4–8.8)
URATE SERPL-MCNC: 5.6 MG/DL — SIGNIFICANT CHANGE UP (ref 3.4–8.8)
URATE SERPL-MCNC: 6.3 MG/DL — SIGNIFICANT CHANGE UP (ref 3.4–8.8)
UROBILINOGEN FLD QL: 0.2 MG/DL — SIGNIFICANT CHANGE UP (ref 0.2–1)
VIRUS SPEC CULT: SIGNIFICANT CHANGE UP
WBC # BLD: 0.96 K/UL — CRITICAL LOW (ref 3.8–10.5)
WBC # BLD: 1 K/UL — CRITICAL LOW (ref 3.8–10.5)
WBC # BLD: 1.12 K/UL — LOW (ref 3.8–10.5)
WBC # BLD: 1.67 K/UL — LOW (ref 3.8–10.5)
WBC # BLD: 1.92 K/UL — LOW (ref 3.8–10.5)
WBC # BLD: 11.82 K/UL — HIGH (ref 3.8–10.5)
WBC # BLD: 2.04 K/UL — LOW (ref 3.8–10.5)
WBC # BLD: 2.44 K/UL — LOW (ref 3.8–10.5)
WBC # BLD: 2.65 K/UL — LOW (ref 3.8–10.5)
WBC # BLD: 2.73 K/UL — LOW (ref 3.8–10.5)
WBC # BLD: 3.43 K/UL — LOW (ref 3.8–10.5)
WBC # BLD: 3.47 K/UL — LOW (ref 3.8–10.5)
WBC # BLD: 3.68 K/UL — LOW (ref 3.8–10.5)
WBC # BLD: 3.73 K/UL — LOW (ref 3.8–10.5)
WBC # BLD: 3.83 K/UL — SIGNIFICANT CHANGE UP (ref 3.8–10.5)
WBC # BLD: 4.05 K/UL — SIGNIFICANT CHANGE UP (ref 3.8–10.5)
WBC # BLD: 4.28 K/UL — SIGNIFICANT CHANGE UP (ref 3.8–10.5)
WBC # BLD: 4.32 K/UL — SIGNIFICANT CHANGE UP (ref 3.8–10.5)
WBC # BLD: 4.41 K/UL — SIGNIFICANT CHANGE UP (ref 3.8–10.5)
WBC # BLD: 4.46 K/UL — SIGNIFICANT CHANGE UP (ref 3.8–10.5)
WBC # BLD: 4.49 K/UL — SIGNIFICANT CHANGE UP (ref 3.8–10.5)
WBC # BLD: 4.85 K/UL — SIGNIFICANT CHANGE UP (ref 3.8–10.5)
WBC # BLD: 5.39 K/UL — SIGNIFICANT CHANGE UP (ref 3.8–10.5)
WBC # BLD: 5.43 K/UL — SIGNIFICANT CHANGE UP (ref 3.8–10.5)
WBC # BLD: 5.47 K/UL — SIGNIFICANT CHANGE UP (ref 3.8–10.5)
WBC # BLD: 5.71 K/UL — SIGNIFICANT CHANGE UP (ref 3.8–10.5)
WBC # BLD: 5.89 K/UL — SIGNIFICANT CHANGE UP (ref 3.8–10.5)
WBC # BLD: 6.04 K/UL — SIGNIFICANT CHANGE UP (ref 3.8–10.5)
WBC # BLD: 6.44 K/UL — SIGNIFICANT CHANGE UP (ref 3.8–10.5)
WBC # BLD: 6.73 K/UL — SIGNIFICANT CHANGE UP (ref 3.8–10.5)
WBC # BLD: 7.14 K/UL — SIGNIFICANT CHANGE UP (ref 3.8–10.5)
WBC # FLD AUTO: 0.96 K/UL — CRITICAL LOW (ref 3.8–10.5)
WBC # FLD AUTO: 1 K/UL — CRITICAL LOW (ref 3.8–10.5)
WBC # FLD AUTO: 1.12 K/UL — LOW (ref 3.8–10.5)
WBC # FLD AUTO: 1.67 K/UL — LOW (ref 3.8–10.5)
WBC # FLD AUTO: 1.92 K/UL — LOW (ref 3.8–10.5)
WBC # FLD AUTO: 11.82 K/UL — HIGH (ref 3.8–10.5)
WBC # FLD AUTO: 2.04 K/UL — LOW (ref 3.8–10.5)
WBC # FLD AUTO: 2.44 K/UL — LOW (ref 3.8–10.5)
WBC # FLD AUTO: 2.65 K/UL — LOW (ref 3.8–10.5)
WBC # FLD AUTO: 2.73 K/UL — LOW (ref 3.8–10.5)
WBC # FLD AUTO: 3.43 K/UL — LOW (ref 3.8–10.5)
WBC # FLD AUTO: 3.47 K/UL — LOW (ref 3.8–10.5)
WBC # FLD AUTO: 3.68 K/UL — LOW (ref 3.8–10.5)
WBC # FLD AUTO: 3.73 K/UL — LOW (ref 3.8–10.5)
WBC # FLD AUTO: 3.83 K/UL — SIGNIFICANT CHANGE UP (ref 3.8–10.5)
WBC # FLD AUTO: 4.05 K/UL — SIGNIFICANT CHANGE UP (ref 3.8–10.5)
WBC # FLD AUTO: 4.28 K/UL — SIGNIFICANT CHANGE UP (ref 3.8–10.5)
WBC # FLD AUTO: 4.32 K/UL — SIGNIFICANT CHANGE UP (ref 3.8–10.5)
WBC # FLD AUTO: 4.41 K/UL — SIGNIFICANT CHANGE UP (ref 3.8–10.5)
WBC # FLD AUTO: 4.46 K/UL — SIGNIFICANT CHANGE UP (ref 3.8–10.5)
WBC # FLD AUTO: 4.49 K/UL — SIGNIFICANT CHANGE UP (ref 3.8–10.5)
WBC # FLD AUTO: 4.85 K/UL — SIGNIFICANT CHANGE UP (ref 3.8–10.5)
WBC # FLD AUTO: 5.39 K/UL — SIGNIFICANT CHANGE UP (ref 3.8–10.5)
WBC # FLD AUTO: 5.43 K/UL — SIGNIFICANT CHANGE UP (ref 3.8–10.5)
WBC # FLD AUTO: 5.47 K/UL — SIGNIFICANT CHANGE UP (ref 3.8–10.5)
WBC # FLD AUTO: 5.71 K/UL — SIGNIFICANT CHANGE UP (ref 3.8–10.5)
WBC # FLD AUTO: 5.89 K/UL — SIGNIFICANT CHANGE UP (ref 3.8–10.5)
WBC # FLD AUTO: 6.04 K/UL — SIGNIFICANT CHANGE UP (ref 3.8–10.5)
WBC # FLD AUTO: 6.44 K/UL — SIGNIFICANT CHANGE UP (ref 3.8–10.5)
WBC # FLD AUTO: 6.73 K/UL — SIGNIFICANT CHANGE UP (ref 3.8–10.5)
WBC # FLD AUTO: 7.14 K/UL — SIGNIFICANT CHANGE UP (ref 3.8–10.5)
WBC UR QL: 1 /HPF — SIGNIFICANT CHANGE UP (ref 0–5)

## 2025-01-01 PROCEDURE — 86850 RBC ANTIBODY SCREEN: CPT

## 2025-01-01 PROCEDURE — 87594 PNEUMCYSTS JIROVECII AMP PRB: CPT

## 2025-01-01 PROCEDURE — 71250 CT THORAX DX C-: CPT

## 2025-01-01 PROCEDURE — 99233 SBSQ HOSP IP/OBS HIGH 50: CPT | Mod: GC

## 2025-01-01 PROCEDURE — 83615 LACTATE (LD) (LDH) ENZYME: CPT

## 2025-01-01 PROCEDURE — 85018 HEMOGLOBIN: CPT

## 2025-01-01 PROCEDURE — 86900 BLOOD TYPING SEROLOGIC ABO: CPT

## 2025-01-01 PROCEDURE — 80053 COMPREHEN METABOLIC PANEL: CPT

## 2025-01-01 PROCEDURE — 99223 1ST HOSP IP/OBS HIGH 75: CPT | Mod: GC

## 2025-01-01 PROCEDURE — 97165 OT EVAL LOW COMPLEX 30 MIN: CPT | Mod: GO

## 2025-01-01 PROCEDURE — 82248 BILIRUBIN DIRECT: CPT

## 2025-01-01 PROCEDURE — 87899 AGENT NOS ASSAY W/OPTIC: CPT

## 2025-01-01 PROCEDURE — 94002 VENT MGMT INPAT INIT DAY: CPT

## 2025-01-01 PROCEDURE — 88312 SPECIAL STAINS GROUP 1: CPT

## 2025-01-01 PROCEDURE — 83880 ASSAY OF NATRIURETIC PEPTIDE: CPT

## 2025-01-01 PROCEDURE — 99233 SBSQ HOSP IP/OBS HIGH 50: CPT | Mod: FS

## 2025-01-01 PROCEDURE — 82962 GLUCOSE BLOOD TEST: CPT

## 2025-01-01 PROCEDURE — 71045 X-RAY EXAM CHEST 1 VIEW: CPT

## 2025-01-01 PROCEDURE — 82784 ASSAY IGA/IGD/IGG/IGM EACH: CPT

## 2025-01-01 PROCEDURE — 86356 MONONUCLEAR CELL ANTIGEN: CPT

## 2025-01-01 PROCEDURE — 99232 SBSQ HOSP IP/OBS MODERATE 35: CPT

## 2025-01-01 PROCEDURE — 71046 X-RAY EXAM CHEST 2 VIEWS: CPT | Mod: 26

## 2025-01-01 PROCEDURE — 93306 TTE W/DOPPLER COMPLETE: CPT

## 2025-01-01 PROCEDURE — 94003 VENT MGMT INPAT SUBQ DAY: CPT

## 2025-01-01 PROCEDURE — 71260 CT THORAX DX C+: CPT | Mod: 26

## 2025-01-01 PROCEDURE — 36430 TRANSFUSION BLD/BLD COMPNT: CPT

## 2025-01-01 PROCEDURE — 87070 CULTURE OTHR SPECIMN AEROBIC: CPT

## 2025-01-01 PROCEDURE — 85610 PROTHROMBIN TIME: CPT

## 2025-01-01 PROCEDURE — 87102 FUNGUS ISOLATION CULTURE: CPT

## 2025-01-01 PROCEDURE — 85027 COMPLETE CBC AUTOMATED: CPT

## 2025-01-01 PROCEDURE — 86923 COMPATIBILITY TEST ELECTRIC: CPT

## 2025-01-01 PROCEDURE — 84145 PROCALCITONIN (PCT): CPT

## 2025-01-01 PROCEDURE — 94640 AIRWAY INHALATION TREATMENT: CPT

## 2025-01-01 PROCEDURE — 97161 PT EVAL LOW COMPLEX 20 MIN: CPT

## 2025-01-01 PROCEDURE — 87449 NOS EACH ORGANISM AG IA: CPT

## 2025-01-01 PROCEDURE — 87637 SARSCOV2&INF A&B&RSV AMP PRB: CPT

## 2025-01-01 PROCEDURE — 87040 BLOOD CULTURE FOR BACTERIA: CPT

## 2025-01-01 PROCEDURE — 87799 DETECT AGENT NOS DNA QUANT: CPT

## 2025-01-01 PROCEDURE — 97530 THERAPEUTIC ACTIVITIES: CPT

## 2025-01-01 PROCEDURE — 87640 STAPH A DNA AMP PROBE: CPT

## 2025-01-01 PROCEDURE — 97116 GAIT TRAINING THERAPY: CPT

## 2025-01-01 PROCEDURE — 97535 SELF CARE MNGMENT TRAINING: CPT

## 2025-01-01 PROCEDURE — 83605 ASSAY OF LACTIC ACID: CPT

## 2025-01-01 PROCEDURE — 83735 ASSAY OF MAGNESIUM: CPT

## 2025-01-01 PROCEDURE — 32551 INSERTION OF CHEST TUBE: CPT | Mod: GC,59,RT

## 2025-01-01 PROCEDURE — 82947 ASSAY GLUCOSE BLOOD QUANT: CPT

## 2025-01-01 PROCEDURE — 84132 ASSAY OF SERUM POTASSIUM: CPT

## 2025-01-01 PROCEDURE — 87641 MR-STAPH DNA AMP PROBE: CPT

## 2025-01-01 PROCEDURE — C1769: CPT

## 2025-01-01 PROCEDURE — P9040: CPT

## 2025-01-01 PROCEDURE — 99291 CRITICAL CARE FIRST HOUR: CPT | Mod: 25

## 2025-01-01 PROCEDURE — 85730 THROMBOPLASTIN TIME PARTIAL: CPT

## 2025-01-01 PROCEDURE — 85014 HEMATOCRIT: CPT

## 2025-01-01 PROCEDURE — 93005 ELECTROCARDIOGRAM TRACING: CPT

## 2025-01-01 PROCEDURE — 82803 BLOOD GASES ANY COMBINATION: CPT

## 2025-01-01 PROCEDURE — 86713 LEGIONELLA ANTIBODY: CPT

## 2025-01-01 PROCEDURE — 83521 IG LIGHT CHAINS FREE EACH: CPT

## 2025-01-01 PROCEDURE — 88112 CYTOPATH CELL ENHANCE TECH: CPT

## 2025-01-01 PROCEDURE — 0225U NFCT DS DNA&RNA 21 SARSCOV2: CPT

## 2025-01-01 PROCEDURE — 80197 ASSAY OF TACROLIMUS: CPT

## 2025-01-01 PROCEDURE — 86901 BLOOD TYPING SEROLOGIC RH(D): CPT

## 2025-01-01 PROCEDURE — 36415 COLL VENOUS BLD VENIPUNCTURE: CPT

## 2025-01-01 PROCEDURE — 99223 1ST HOSP IP/OBS HIGH 75: CPT

## 2025-01-01 PROCEDURE — 97110 THERAPEUTIC EXERCISES: CPT | Mod: GP

## 2025-01-01 PROCEDURE — P9100: CPT

## 2025-01-01 PROCEDURE — 84100 ASSAY OF PHOSPHORUS: CPT

## 2025-01-01 PROCEDURE — 87252 VIRUS INOCULATION TISSUE: CPT

## 2025-01-01 PROCEDURE — 87255 GENET VIRUS ISOLATE HSV: CPT

## 2025-01-01 PROCEDURE — 93308 TTE F-UP OR LMTD: CPT | Mod: 26,GC

## 2025-01-01 PROCEDURE — C1874: CPT

## 2025-01-01 PROCEDURE — 71260 CT THORAX DX C+: CPT | Mod: MC

## 2025-01-01 PROCEDURE — 87116 MYCOBACTERIA CULTURE: CPT

## 2025-01-01 PROCEDURE — 89051 BODY FLUID CELL COUNT: CPT

## 2025-01-01 PROCEDURE — P9037: CPT

## 2025-01-01 PROCEDURE — 99233 SBSQ HOSP IP/OBS HIGH 50: CPT

## 2025-01-01 PROCEDURE — 87451 POLYVALENT MULT ORG EA AG IA: CPT

## 2025-01-01 PROCEDURE — 86480 TB TEST CELL IMMUN MEASURE: CPT

## 2025-01-01 PROCEDURE — 82435 ASSAY OF BLOOD CHLORIDE: CPT

## 2025-01-01 PROCEDURE — 87798 DETECT AGENT NOS DNA AMP: CPT

## 2025-01-01 PROCEDURE — 97530 THERAPEUTIC ACTIVITIES: CPT | Mod: GP

## 2025-01-01 PROCEDURE — 43274 ERCP DUCT STENT PLACEMENT: CPT | Mod: GC

## 2025-01-01 PROCEDURE — 84295 ASSAY OF SERUM SODIUM: CPT

## 2025-01-01 PROCEDURE — 99231 SBSQ HOSP IP/OBS SF/LOW 25: CPT

## 2025-01-01 PROCEDURE — 97166 OT EVAL MOD COMPLEX 45 MIN: CPT

## 2025-01-01 PROCEDURE — 99291 CRITICAL CARE FIRST HOUR: CPT

## 2025-01-01 PROCEDURE — 87205 SMEAR GRAM STAIN: CPT

## 2025-01-01 PROCEDURE — 85025 COMPLETE CBC W/AUTO DIFF WBC: CPT

## 2025-01-01 PROCEDURE — 71045 X-RAY EXAM CHEST 1 VIEW: CPT | Mod: 26

## 2025-01-01 PROCEDURE — 71045 X-RAY EXAM CHEST 1 VIEW: CPT | Mod: 26,76

## 2025-01-01 PROCEDURE — 76604 US EXAM CHEST: CPT | Mod: 26,GC

## 2025-01-01 PROCEDURE — 99239 HOSP IP/OBS DSCHRG MGMT >30: CPT

## 2025-01-01 PROCEDURE — 84550 ASSAY OF BLOOD/URIC ACID: CPT

## 2025-01-01 PROCEDURE — 81001 URINALYSIS AUTO W/SCOPE: CPT

## 2025-01-01 PROCEDURE — 82330 ASSAY OF CALCIUM: CPT

## 2025-01-01 PROCEDURE — 72128 CT CHEST SPINE W/O DYE: CPT

## 2025-01-01 PROCEDURE — 87581 M.PNEUMON DNA AMP PROBE: CPT

## 2025-01-01 PROCEDURE — 97110 THERAPEUTIC EXERCISES: CPT

## 2025-01-01 PROCEDURE — 99222 1ST HOSP IP/OBS MODERATE 55: CPT | Mod: FS,GC,25

## 2025-01-01 PROCEDURE — 97535 SELF CARE MNGMENT TRAINING: CPT | Mod: GO

## 2025-01-01 PROCEDURE — 82610 CYSTATIN C: CPT

## 2025-01-01 PROCEDURE — 87635 SARS-COV-2 COVID-19 AMP PRB: CPT

## 2025-01-01 PROCEDURE — 80074 ACUTE HEPATITIS PANEL: CPT

## 2025-01-01 PROCEDURE — 84484 ASSAY OF TROPONIN QUANT: CPT

## 2025-01-01 PROCEDURE — 74176 CT ABD & PELVIS W/O CONTRAST: CPT

## 2025-01-01 PROCEDURE — 99223 1ST HOSP IP/OBS HIGH 75: CPT | Mod: FS

## 2025-01-01 PROCEDURE — 88312 SPECIAL STAINS GROUP 1: CPT | Mod: 26

## 2025-01-01 PROCEDURE — 74328 X-RAY BILE DUCT ENDOSCOPY: CPT | Mod: 26,GC

## 2025-01-01 PROCEDURE — 87529 HSV DNA AMP PROBE: CPT

## 2025-01-01 PROCEDURE — 78472 GATED HEART PLANAR SINGLE: CPT | Mod: 26

## 2025-01-01 PROCEDURE — C9399: CPT

## 2025-01-01 PROCEDURE — 86832 HLA CLASS I HIGH DEFIN QUAL: CPT

## 2025-01-01 PROCEDURE — 74177 CT ABD & PELVIS W/CONTRAST: CPT | Mod: 26

## 2025-01-01 PROCEDURE — P9047: CPT

## 2025-01-01 PROCEDURE — 71250 CT THORAX DX C-: CPT | Mod: 26

## 2025-01-01 PROCEDURE — 87633 RESP VIRUS 12-25 TARGETS: CPT

## 2025-01-01 PROCEDURE — 87305 ASPERGILLUS AG IA: CPT

## 2025-01-01 PROCEDURE — 80076 HEPATIC FUNCTION PANEL: CPT

## 2025-01-01 PROCEDURE — 74177 CT ABD & PELVIS W/CONTRAST: CPT | Mod: MC

## 2025-01-01 PROCEDURE — 74330 X-RAY BILE/PANC ENDOSCOPY: CPT

## 2025-01-01 PROCEDURE — 81267 CHIMERISM ANAL NO CELL SELEC: CPT

## 2025-01-01 PROCEDURE — 80048 BASIC METABOLIC PNL TOTAL CA: CPT

## 2025-01-01 PROCEDURE — 88112 CYTOPATH CELL ENHANCE TECH: CPT | Mod: 26

## 2025-01-01 PROCEDURE — ZZZZZ: CPT

## 2025-01-01 PROCEDURE — 70450 CT HEAD/BRAIN W/O DYE: CPT | Mod: MC

## 2025-01-01 PROCEDURE — 99497 ADVNCD CARE PLAN 30 MIN: CPT | Mod: 25

## 2025-01-01 PROCEDURE — 76705 ECHO EXAM OF ABDOMEN: CPT

## 2025-01-01 PROCEDURE — 71275 CT ANGIOGRAPHY CHEST: CPT

## 2025-01-01 PROCEDURE — 93010 ELECTROCARDIOGRAM REPORT: CPT

## 2025-01-01 PROCEDURE — 93306 TTE W/DOPPLER COMPLETE: CPT | Mod: 26

## 2025-01-01 PROCEDURE — 86833 HLA CLASS II HIGH DEFIN QUAL: CPT

## 2025-01-01 PROCEDURE — A9560: CPT

## 2025-01-01 PROCEDURE — 97116 GAIT TRAINING THERAPY: CPT | Mod: GP

## 2025-01-01 PROCEDURE — 83036 HEMOGLOBIN GLYCOSYLATED A1C: CPT

## 2025-01-01 PROCEDURE — 99238 HOSP IP/OBS DSCHRG MGMT 30/<: CPT

## 2025-01-01 PROCEDURE — 32551 INSERTION OF CHEST TUBE: CPT | Mod: GC,59,LT

## 2025-01-01 PROCEDURE — 71046 X-RAY EXAM CHEST 2 VIEWS: CPT

## 2025-01-01 PROCEDURE — 78815 PET IMAGE W/CT SKULL-THIGH: CPT

## 2025-01-01 PROCEDURE — 99232 SBSQ HOSP IP/OBS MODERATE 35: CPT | Mod: GC

## 2025-01-01 PROCEDURE — 72128 CT CHEST SPINE W/O DYE: CPT | Mod: 26

## 2025-01-01 PROCEDURE — 99292 CRITICAL CARE ADDL 30 MIN: CPT | Mod: 25

## 2025-01-01 PROCEDURE — 99285 EMERGENCY DEPT VISIT HI MDM: CPT | Mod: FS,GC

## 2025-01-01 PROCEDURE — 31624 DX BRONCHOSCOPE/LAVAGE: CPT | Mod: GC

## 2025-01-01 PROCEDURE — 76705 ECHO EXAM OF ABDOMEN: CPT | Mod: 26

## 2025-01-01 PROCEDURE — 70450 CT HEAD/BRAIN W/O DYE: CPT | Mod: 26

## 2025-01-01 PROCEDURE — 99233 SBSQ HOSP IP/OBS HIGH 50: CPT | Mod: 25

## 2025-01-01 PROCEDURE — 97161 PT EVAL LOW COMPLEX 20 MIN: CPT | Mod: GP

## 2025-01-01 PROCEDURE — A9552: CPT

## 2025-01-01 PROCEDURE — 78472 GATED HEART PLANAR SINGLE: CPT | Mod: MC

## 2025-01-01 PROCEDURE — 82247 BILIRUBIN TOTAL: CPT

## 2025-01-01 PROCEDURE — 71275 CT ANGIOGRAPHY CHEST: CPT | Mod: 26

## 2025-01-01 PROCEDURE — 99285 EMERGENCY DEPT VISIT HI MDM: CPT

## 2025-01-01 PROCEDURE — 71045 X-RAY EXAM CHEST 1 VIEW: CPT | Mod: 26,77

## 2025-01-01 DEVICE — GWIRE VISIGLIDE ANG TIP 270CM .025: Type: IMPLANTABLE DEVICE | Status: FUNCTIONAL

## 2025-01-01 DEVICE — PLEURX CATHETER KIT: Type: IMPLANTABLE DEVICE | Status: FUNCTIONAL

## 2025-01-01 DEVICE — IMPLANTABLE DEVICE: Type: IMPLANTABLE DEVICE | Status: FUNCTIONAL

## 2025-01-01 DEVICE — TRAY PNEUMOTHORAX  5/CA: Type: IMPLANTABLE DEVICE | Status: FUNCTIONAL

## 2025-01-01 DEVICE — CATH THERMODIL PACE 7.5FR: Type: IMPLANTABLE DEVICE | Status: FUNCTIONAL

## 2025-01-01 DEVICE — KIT CVC 2LUM MAC 9FR CHG: Type: IMPLANTABLE DEVICE | Status: FUNCTIONAL

## 2025-01-01 DEVICE — HYDRATOME 44: Type: IMPLANTABLE DEVICE | Status: FUNCTIONAL

## 2025-01-01 DEVICE — STENT BIL WALL RX FC RMV US 10X60MM: Type: IMPLANTABLE DEVICE | Status: FUNCTIONAL

## 2025-01-01 DEVICE — KIT A-LINE 1LUM 20G X 12CM SAFE KIT: Type: IMPLANTABLE DEVICE | Status: FUNCTIONAL

## 2025-01-01 DEVICE — AUTOTOME CANNULATING SPHINCTEROTOME RX 44 20MM: Type: IMPLANTABLE DEVICE | Status: FUNCTIONAL

## 2025-01-01 DEVICE — BLLN EXTRACT FUSION QUATRO 8.5 10 12 15MM: Type: IMPLANTABLE DEVICE | Status: FUNCTIONAL

## 2025-01-01 DEVICE — CATH THERMODILUTION 7FR: Type: IMPLANTABLE DEVICE | Status: FUNCTIONAL

## 2025-01-01 DEVICE — PACK THORACENTESIS CHG: Type: IMPLANTABLE DEVICE | Status: FUNCTIONAL

## 2025-01-01 RX ORDER — PROPOFOL 10 MG/ML
20 INJECTION, EMULSION INTRAVENOUS
Qty: 500 | Refills: 0 | Status: DISCONTINUED | OUTPATIENT
Start: 2025-01-01 | End: 2025-01-01

## 2025-01-01 RX ORDER — FENTANYL CITRATE-0.9 % NACL/PF 100MCG/2ML
50 SYRINGE (ML) INTRAVENOUS ONCE
Refills: 0 | Status: DISCONTINUED | OUTPATIENT
Start: 2025-01-01 | End: 2025-01-01

## 2025-01-01 RX ORDER — TAMSULOSIN HYDROCHLORIDE 0.4 MG/1
1 CAPSULE ORAL
Qty: 0 | Refills: 0 | DISCHARGE
Start: 2025-01-01

## 2025-01-01 RX ORDER — FUROSEMIDE 10 MG/ML
20 INJECTION INTRAMUSCULAR; INTRAVENOUS ONCE
Refills: 0 | Status: COMPLETED | OUTPATIENT
Start: 2025-01-01 | End: 2025-01-01

## 2025-01-01 RX ORDER — PROPOFOL 10 MG/ML
19.98 INJECTION, EMULSION INTRAVENOUS
Qty: 1000 | Refills: 0 | Status: DISCONTINUED | OUTPATIENT
Start: 2025-01-01 | End: 2025-01-01

## 2025-01-01 RX ORDER — TACROLIMUS 0.5 MG/1
0.5 CAPSULE ORAL
Refills: 0 | Status: DISCONTINUED | OUTPATIENT
Start: 2025-01-01 | End: 2025-01-01

## 2025-01-01 RX ORDER — SODIUM CHLORIDE 9 G/1000ML
1000 INJECTION, SOLUTION INTRAVENOUS
Refills: 0 | Status: DISCONTINUED | OUTPATIENT
Start: 2025-01-01 | End: 2025-01-01

## 2025-01-01 RX ORDER — PROPOFOL 10 MG/ML
20 INJECTION, EMULSION INTRAVENOUS
Qty: 1000 | Refills: 0 | Status: DISCONTINUED | OUTPATIENT
Start: 2025-01-01 | End: 2025-01-01

## 2025-01-01 RX ORDER — SODIUM BICARBONATE 1 MEQ/ML
15 SYRINGE (ML) INTRAVENOUS
Refills: 0 | Status: DISCONTINUED | OUTPATIENT
Start: 2025-01-01 | End: 2025-01-01

## 2025-01-01 RX ORDER — FUROSEMIDE 10 MG/ML
40 INJECTION INTRAMUSCULAR; INTRAVENOUS ONCE
Refills: 0 | Status: COMPLETED | OUTPATIENT
Start: 2025-01-01 | End: 2025-01-01

## 2025-01-01 RX ORDER — WHITE PETROLATUM 1 G/G
1 OINTMENT TOPICAL
Qty: 0 | Refills: 0 | DISCHARGE
Start: 2025-01-01

## 2025-01-01 RX ORDER — LOPERAMIDE HCL 1 MG/7.5ML
1 SOLUTION ORAL
Qty: 0 | Refills: 0 | DISCHARGE
Start: 2025-01-01

## 2025-01-01 RX ORDER — IPRATROPIUM BROMIDE AND ALBUTEROL SULFATE .5; 2.5 MG/3ML; MG/3ML
3 SOLUTION RESPIRATORY (INHALATION) ONCE
Refills: 0 | Status: COMPLETED | OUTPATIENT
Start: 2025-01-01 | End: 2025-01-01

## 2025-01-01 RX ORDER — HYDROCORTISONE 20 MG
100 TABLET ORAL ONCE
Refills: 0 | Status: COMPLETED | OUTPATIENT
Start: 2025-01-01 | End: 2025-01-01

## 2025-01-01 RX ORDER — VANCOMYCIN HCL IN 5 % DEXTROSE 1.5G/250ML
1000 PLASTIC BAG, INJECTION (ML) INTRAVENOUS ONCE
Refills: 0 | Status: COMPLETED | OUTPATIENT
Start: 2025-01-01 | End: 2025-01-01

## 2025-01-01 RX ORDER — B1/B2/B3/B5/B6/B12/VIT C/FOLIC 500-0.5 MG
1 TABLET ORAL
Qty: 0 | Refills: 0 | DISCHARGE
Start: 2025-01-01

## 2025-01-01 RX ORDER — ACETAMINOPHEN 500 MG/5ML
650 LIQUID (ML) ORAL EVERY 6 HOURS
Refills: 0 | Status: DISCONTINUED | OUTPATIENT
Start: 2025-01-01 | End: 2025-01-01

## 2025-01-01 RX ORDER — DEXTROSE 50 % IN WATER 50 %
25 SYRINGE (ML) INTRAVENOUS ONCE
Refills: 0 | Status: DISCONTINUED | OUTPATIENT
Start: 2025-01-01 | End: 2025-01-01

## 2025-01-01 RX ORDER — VASOPRESSIN 20 [USP'U]/ML
0.04 INJECTION INTRAVENOUS
Qty: 40 | Refills: 0 | Status: DISCONTINUED | OUTPATIENT
Start: 2025-01-01 | End: 2025-01-01

## 2025-01-01 RX ORDER — TIZANIDINE 4 MG/1
11 TABLET ORAL
Refills: 0 | DISCHARGE

## 2025-01-01 RX ORDER — MEROPENEM 1 G/30ML
1000 INJECTION INTRAVENOUS EVERY 8 HOURS
Refills: 0 | Status: COMPLETED | OUTPATIENT
Start: 2025-01-01 | End: 2025-01-01

## 2025-01-01 RX ORDER — FUROSEMIDE 10 MG/ML
20 INJECTION INTRAMUSCULAR; INTRAVENOUS
Qty: 0 | Refills: 0 | DISCHARGE
Start: 2025-01-01

## 2025-01-01 RX ORDER — FUROSEMIDE 10 MG/ML
20 INJECTION INTRAMUSCULAR; INTRAVENOUS DAILY
Refills: 0 | Status: DISCONTINUED | OUTPATIENT
Start: 2025-01-01 | End: 2025-01-01

## 2025-01-01 RX ORDER — FILGRASTIM 300 UG/.5ML
300 INJECTION, SOLUTION INTRAVENOUS; SUBCUTANEOUS
Qty: 0 | Refills: 0 | DISCHARGE
Start: 2025-01-01

## 2025-01-01 RX ORDER — HEPARIN SODIUM 1000 [USP'U]/ML
5000 INJECTION INTRAVENOUS; SUBCUTANEOUS EVERY 12 HOURS
Refills: 0 | Status: DISCONTINUED | OUTPATIENT
Start: 2025-01-01 | End: 2025-01-01

## 2025-01-01 RX ORDER — PIPERACILLIN-TAZO-DEXTROSE,ISO 3.375G/5
4.5 IV SOLUTION, PIGGYBACK PREMIX FROZEN(ML) INTRAVENOUS EVERY 8 HOURS
Refills: 0 | Status: COMPLETED | OUTPATIENT
Start: 2025-01-01 | End: 2025-01-01

## 2025-01-01 RX ORDER — POSACONAZOLE 100 MG/1
300 TABLET, DELAYED RELEASE ORAL DAILY
Refills: 0 | Status: DISCONTINUED | OUTPATIENT
Start: 2025-01-01 | End: 2025-01-01

## 2025-01-01 RX ORDER — TACROLIMUS 0.5 MG/1
0.5 CAPSULE ORAL DAILY
Refills: 0 | Status: DISCONTINUED | OUTPATIENT
Start: 2025-01-01 | End: 2025-01-01

## 2025-01-01 RX ORDER — OXYCODONE HYDROCHLORIDE 30 MG/1
5 TABLET ORAL EVERY 6 HOURS
Refills: 0 | Status: DISCONTINUED | OUTPATIENT
Start: 2025-01-01 | End: 2025-01-01

## 2025-01-01 RX ORDER — DEXTROSE 50 % IN WATER 50 %
15 SYRINGE (ML) INTRAVENOUS ONCE
Refills: 0 | Status: DISCONTINUED | OUTPATIENT
Start: 2025-01-01 | End: 2025-01-01

## 2025-01-01 RX ORDER — TAMSULOSIN HYDROCHLORIDE 0.4 MG/1
0.4 CAPSULE ORAL AT BEDTIME
Refills: 0 | Status: DISCONTINUED | OUTPATIENT
Start: 2025-01-01 | End: 2025-01-01

## 2025-01-01 RX ORDER — RITUXIMAB-PVVR 100 MG/10ML
521 INJECTION, SOLUTION INTRAVENOUS ONCE
Refills: 0 | Status: COMPLETED | OUTPATIENT
Start: 2025-01-01 | End: 2025-01-01

## 2025-01-01 RX ORDER — DULOXETINE 20 MG/1
1 CAPSULE, DELAYED RELEASE ORAL
Refills: 0 | DISCHARGE

## 2025-01-01 RX ORDER — LOPERAMIDE HCL 1 MG/7.5ML
4 SOLUTION ORAL
Refills: 0 | Status: DISCONTINUED | OUTPATIENT
Start: 2025-01-01 | End: 2025-01-01

## 2025-01-01 RX ORDER — ONDANSETRON HCL/PF 4 MG/2 ML
8 VIAL (ML) INJECTION EVERY 8 HOURS
Refills: 0 | Status: DISCONTINUED | OUTPATIENT
Start: 2025-01-01 | End: 2025-01-01

## 2025-01-01 RX ORDER — TIZANIDINE 4 MG/1
1 TABLET ORAL
Refills: 0 | DISCHARGE

## 2025-01-01 RX ORDER — SODIUM CHLORIDE 0.65 %
1 AEROSOL, SPRAY (ML) NASAL THREE TIMES A DAY
Refills: 0 | Status: DISCONTINUED | OUTPATIENT
Start: 2025-01-01 | End: 2025-01-01

## 2025-01-01 RX ORDER — GLYCOPYRROLATE 0.2 MG/ML
0.4 INJECTION INTRAMUSCULAR; INTRAVENOUS ONCE
Refills: 0 | Status: COMPLETED | OUTPATIENT
Start: 2025-01-01 | End: 2025-01-01

## 2025-01-01 RX ORDER — ACETAMINOPHEN 500 MG/5ML
650 LIQUID (ML) ORAL ONCE
Refills: 0 | Status: COMPLETED | OUTPATIENT
Start: 2025-01-01 | End: 2025-01-01

## 2025-01-01 RX ORDER — OXYCODONE HYDROCHLORIDE 30 MG/1
1 TABLET ORAL
Qty: 0 | Refills: 0 | DISCHARGE
Start: 2025-01-01

## 2025-01-01 RX ORDER — PIPERACILLIN-TAZO-DEXTROSE,ISO 3.375G/5
4.5 IV SOLUTION, PIGGYBACK PREMIX FROZEN(ML) INTRAVENOUS ONCE
Refills: 0 | Status: COMPLETED | OUTPATIENT
Start: 2025-01-01 | End: 2025-01-01

## 2025-01-01 RX ORDER — LOPERAMIDE HCL 1 MG/7.5ML
2 SOLUTION ORAL
Refills: 0 | Status: DISCONTINUED | OUTPATIENT
Start: 2025-01-01 | End: 2025-01-01

## 2025-01-01 RX ORDER — FILGRASTIM 300 UG/.5ML
300 INJECTION, SOLUTION INTRAVENOUS; SUBCUTANEOUS ONCE
Refills: 0 | Status: COMPLETED | OUTPATIENT
Start: 2025-01-01 | End: 2025-01-01

## 2025-01-01 RX ORDER — FILGRASTIM 300 UG/.5ML
300 INJECTION, SOLUTION INTRAVENOUS; SUBCUTANEOUS DAILY
Refills: 0 | Status: DISCONTINUED | OUTPATIENT
Start: 2025-01-01 | End: 2025-01-01

## 2025-01-01 RX ORDER — HYDROMORPHONE/SOD CHLOR,ISO/PF 2 MG/10 ML
1 SYRINGE (ML) INJECTION ONCE
Refills: 0 | Status: DISCONTINUED | OUTPATIENT
Start: 2025-01-01 | End: 2025-01-01

## 2025-01-01 RX ORDER — WHITE PETROLATUM 1 G/G
1 OINTMENT TOPICAL DAILY
Refills: 0 | Status: DISCONTINUED | OUTPATIENT
Start: 2025-01-01 | End: 2025-01-01

## 2025-01-01 RX ORDER — MIDAZOLAM IN 0.9 % SOD.CHLORID 1 MG/ML
1 PLASTIC BAG, INJECTION (ML) INTRAVENOUS ONCE
Refills: 0 | Status: DISCONTINUED | OUTPATIENT
Start: 2025-01-01 | End: 2025-01-01

## 2025-01-01 RX ORDER — FENTANYL CITRATE-0.9 % NACL/PF 100MCG/2ML
0.5 SYRINGE (ML) INTRAVENOUS
Qty: 5000 | Refills: 0 | Status: DISCONTINUED | OUTPATIENT
Start: 2025-01-01 | End: 2025-01-01

## 2025-01-01 RX ORDER — FILGRASTIM 300 UG/.5ML
300 INJECTION, SOLUTION INTRAVENOUS; SUBCUTANEOUS EVERY 24 HOURS
Refills: 0 | Status: DISCONTINUED | OUTPATIENT
Start: 2025-01-01 | End: 2025-01-01

## 2025-01-01 RX ORDER — SODIUM CHLORIDE 9 G/1000ML
500 INJECTION, SOLUTION INTRAVENOUS
Refills: 0 | Status: DISCONTINUED | OUTPATIENT
Start: 2025-01-01 | End: 2025-01-01

## 2025-01-01 RX ORDER — DEXTROSE 50 % IN WATER 50 %
12.5 SYRINGE (ML) INTRAVENOUS ONCE
Refills: 0 | Status: DISCONTINUED | OUTPATIENT
Start: 2025-01-01 | End: 2025-01-01

## 2025-01-01 RX ORDER — B1/B2/B3/B5/B6/B12/VIT C/FOLIC 500-0.5 MG
1 TABLET ORAL DAILY
Refills: 0 | Status: DISCONTINUED | OUTPATIENT
Start: 2025-01-01 | End: 2025-01-01

## 2025-01-01 RX ORDER — OXYCODONE HYDROCHLORIDE 30 MG/1
5 TABLET ORAL
Refills: 0 | Status: DISCONTINUED | OUTPATIENT
Start: 2025-01-01 | End: 2025-01-01

## 2025-01-01 RX ORDER — METHYLPREDNISOLONE ACETATE 80 MG/ML
40 INJECTION, SUSPENSION INTRA-ARTICULAR; INTRALESIONAL; INTRAMUSCULAR; SOFT TISSUE EVERY 12 HOURS
Refills: 0 | Status: DISCONTINUED | OUTPATIENT
Start: 2025-01-01 | End: 2025-01-01

## 2025-01-01 RX ORDER — ACETAMINOPHEN 500 MG/5ML
2 LIQUID (ML) ORAL
Qty: 0 | Refills: 0 | DISCHARGE
Start: 2025-01-01

## 2025-01-01 RX ORDER — HYDROCORTISONE 20 MG
100 TABLET ORAL ONCE
Refills: 0 | Status: DISCONTINUED | OUTPATIENT
Start: 2025-01-01 | End: 2025-01-01

## 2025-01-01 RX ORDER — LOPERAMIDE HCL 1 MG/7.5ML
2 SOLUTION ORAL EVERY 8 HOURS
Refills: 0 | Status: DISCONTINUED | OUTPATIENT
Start: 2025-01-01 | End: 2025-01-01

## 2025-01-01 RX ORDER — ZINC SULFATE 50(220)MG
220 CAPSULE ORAL DAILY
Refills: 0 | Status: DISCONTINUED | OUTPATIENT
Start: 2025-01-01 | End: 2025-01-01

## 2025-01-01 RX ORDER — DULOXETINE 20 MG/1
60 CAPSULE, DELAYED RELEASE ORAL
Refills: 0 | Status: DISCONTINUED | OUTPATIENT
Start: 2025-01-01 | End: 2025-01-01

## 2025-01-01 RX ORDER — FENTANYL CITRATE-0.9 % NACL/PF 100MCG/2ML
50 SYRINGE (ML) INTRAVENOUS EVERY 4 HOURS
Refills: 0 | Status: DISCONTINUED | OUTPATIENT
Start: 2025-01-01 | End: 2025-01-01

## 2025-01-01 RX ORDER — MELATONIN 5 MG
3 TABLET ORAL AT BEDTIME
Refills: 0 | Status: DISCONTINUED | OUTPATIENT
Start: 2025-01-01 | End: 2025-01-01

## 2025-01-01 RX ORDER — GLUCAGON 3 MG/1
1 POWDER NASAL ONCE
Refills: 0 | Status: DISCONTINUED | OUTPATIENT
Start: 2025-01-01 | End: 2025-01-01

## 2025-01-01 RX ORDER — HYDROMORPHONE/SOD CHLOR,ISO/PF 2 MG/10 ML
0.5 SYRINGE (ML) INJECTION
Refills: 0 | Status: DISCONTINUED | OUTPATIENT
Start: 2025-01-01 | End: 2025-01-01

## 2025-01-01 RX ORDER — FILGRASTIM 300 UG/.5ML
300 INJECTION, SOLUTION INTRAVENOUS; SUBCUTANEOUS ONCE
Refills: 0 | Status: DISCONTINUED | OUTPATIENT
Start: 2025-01-01 | End: 2025-01-01

## 2025-01-01 RX ORDER — LIDOCAINE HYDROCHLORIDE 20 MG/ML
5 JELLY TOPICAL THREE TIMES A DAY
Refills: 0 | Status: DISCONTINUED | OUTPATIENT
Start: 2025-01-01 | End: 2025-01-01

## 2025-01-01 RX ORDER — FOSAPREPITANT 150 MG/5ML
150 INJECTION, POWDER, LYOPHILIZED, FOR SOLUTION INTRAVENOUS ONCE
Refills: 0 | Status: COMPLETED | OUTPATIENT
Start: 2025-01-01 | End: 2025-01-01

## 2025-01-01 RX ORDER — DULOXETINE 20 MG/1
60 CAPSULE, DELAYED RELEASE ORAL DAILY
Refills: 0 | Status: DISCONTINUED | OUTPATIENT
Start: 2025-01-01 | End: 2025-01-01

## 2025-01-01 RX ORDER — ATOVAQUONE 750 MG/5ML
1500 SUSPENSION ORAL DAILY
Refills: 0 | Status: DISCONTINUED | OUTPATIENT
Start: 2025-01-01 | End: 2025-01-01

## 2025-01-01 RX ORDER — WHITE PETROLATUM 1 G/G
1 OINTMENT TOPICAL
Refills: 0 | Status: DISCONTINUED | OUTPATIENT
Start: 2025-01-01 | End: 2025-01-01

## 2025-01-01 RX ORDER — DULOXETINE 20 MG/1
1 CAPSULE, DELAYED RELEASE ORAL
Qty: 0 | Refills: 0 | DISCHARGE
Start: 2025-01-01

## 2025-01-01 RX ORDER — POSACONAZOLE 100 MG/1
1 TABLET, DELAYED RELEASE ORAL
Qty: 0 | Refills: 0 | DISCHARGE
Start: 2025-01-01

## 2025-01-01 RX ORDER — TIZANIDINE 4 MG/1
4 TABLET ORAL AT BEDTIME
Refills: 0 | Status: DISCONTINUED | OUTPATIENT
Start: 2025-01-01 | End: 2025-01-01

## 2025-01-01 RX ORDER — ENOXAPARIN SODIUM 100 MG/ML
40 INJECTION SUBCUTANEOUS EVERY 24 HOURS
Refills: 0 | Status: DISCONTINUED | OUTPATIENT
Start: 2025-01-01 | End: 2025-01-01

## 2025-01-01 RX ORDER — INSULIN LISPRO 100 U/ML
INJECTION, SOLUTION INTRAVENOUS; SUBCUTANEOUS EVERY 6 HOURS
Refills: 0 | Status: DISCONTINUED | OUTPATIENT
Start: 2025-01-01 | End: 2025-01-01

## 2025-01-01 RX ORDER — OXYCODONE HYDROCHLORIDE 30 MG/1
5 TABLET ORAL DAILY
Refills: 0 | Status: DISCONTINUED | OUTPATIENT
Start: 2025-01-01 | End: 2025-01-01

## 2025-01-01 RX ORDER — MAGNESIUM SULFATE 500 MG/ML
2 SYRINGE (ML) INJECTION ONCE
Refills: 0 | Status: COMPLETED | OUTPATIENT
Start: 2025-01-01 | End: 2025-01-01

## 2025-01-01 RX ORDER — ATOVAQUONE 750 MG/5ML
10 SUSPENSION ORAL
Qty: 0 | Refills: 0 | DISCHARGE
Start: 2025-01-01

## 2025-01-01 RX ORDER — ALBUMIN (HUMAN) 12.5 G/50ML
50 INJECTION, SOLUTION INTRAVENOUS ONCE
Refills: 0 | Status: COMPLETED | OUTPATIENT
Start: 2025-01-01 | End: 2025-01-01

## 2025-01-01 RX ORDER — HALOPERIDOL 10 MG/1
0.5 TABLET ORAL
Refills: 0 | Status: DISCONTINUED | OUTPATIENT
Start: 2025-01-01 | End: 2025-01-01

## 2025-01-01 RX ORDER — SODIUM PHOSPHATE,DIBASIC DIHYD
30 POWDER (GRAM) MISCELLANEOUS ONCE
Refills: 0 | Status: COMPLETED | OUTPATIENT
Start: 2025-01-01 | End: 2025-01-01

## 2025-01-01 RX ORDER — METHYLPREDNISOLONE ACETATE 80 MG/ML
40 INJECTION, SUSPENSION INTRA-ARTICULAR; INTRALESIONAL; INTRAMUSCULAR; SOFT TISSUE EVERY 24 HOURS
Refills: 0 | Status: DISCONTINUED | OUTPATIENT
Start: 2025-01-01 | End: 2025-01-01

## 2025-01-01 RX ORDER — NOREPINEPHRINE BITARTRATE 8 MG
0.05 SOLUTION INTRAVENOUS
Qty: 32 | Refills: 0 | Status: DISCONTINUED | OUTPATIENT
Start: 2025-01-01 | End: 2025-01-01

## 2025-01-01 RX ORDER — SOD PHOS DI, MONO/K PHOS MONO 250 MG
2 TABLET ORAL ONCE
Refills: 0 | Status: COMPLETED | OUTPATIENT
Start: 2025-01-01 | End: 2025-01-01

## 2025-01-01 RX ORDER — DIPHENHYDRAMINE HCL 12.5MG/5ML
50 ELIXIR ORAL ONCE
Refills: 0 | Status: COMPLETED | OUTPATIENT
Start: 2025-01-01 | End: 2025-01-01

## 2025-01-01 RX ORDER — MIDAZOLAM IN 0.9 % SOD.CHLORID 1 MG/ML
1 PLASTIC BAG, INJECTION (ML) INTRAVENOUS
Refills: 0 | Status: DISCONTINUED | OUTPATIENT
Start: 2025-01-01 | End: 2025-01-01

## 2025-01-01 RX ORDER — SODIUM PHOSPHATE,DIBASIC DIHYD
30 POWDER (GRAM) MISCELLANEOUS ONCE
Refills: 0 | Status: DISCONTINUED | OUTPATIENT
Start: 2025-01-01 | End: 2025-01-01

## 2025-01-01 RX ORDER — VANCOMYCIN HCL IN 5 % DEXTROSE 1.5G/250ML
125 PLASTIC BAG, INJECTION (ML) INTRAVENOUS EVERY 12 HOURS
Refills: 0 | Status: DISCONTINUED | OUTPATIENT
Start: 2025-01-01 | End: 2025-01-01

## 2025-01-01 RX ORDER — ZINC SULFATE 50(220)MG
1 CAPSULE ORAL
Qty: 0 | Refills: 0 | DISCHARGE
Start: 2025-01-01

## 2025-01-01 RX ORDER — CYCLOPHOSPHAMIDE INJECTION, SOLUTION 200 MG/ML
1000 INJECTION INTRAVENOUS ONCE
Refills: 0 | Status: COMPLETED | OUTPATIENT
Start: 2025-01-01 | End: 2025-01-01

## 2025-01-01 RX ORDER — INDOMETHACIN 50 MG
100 CAPSULE ORAL ONCE
Refills: 0 | Status: DISCONTINUED | OUTPATIENT
Start: 2025-01-01 | End: 2025-01-01

## 2025-01-01 RX ORDER — GLYCOPYRROLATE 0.2 MG/ML
0.4 INJECTION INTRAMUSCULAR; INTRAVENOUS EVERY 6 HOURS
Refills: 0 | Status: DISCONTINUED | OUTPATIENT
Start: 2025-01-01 | End: 2025-01-01

## 2025-01-01 RX ADMIN — Medication 15 MILLILITER(S): at 11:50

## 2025-01-01 RX ADMIN — Medication 15 MILLILITER(S): at 00:23

## 2025-01-01 RX ADMIN — Medication 8 MILLIGRAM(S): at 16:29

## 2025-01-01 RX ADMIN — Medication 800 MILLIGRAM(S): at 17:12

## 2025-01-01 RX ADMIN — MEROPENEM 100 MILLIGRAM(S): 1 INJECTION INTRAVENOUS at 14:08

## 2025-01-01 RX ADMIN — NOREPINEPHRINE BITARTRATE 1.88 MICROGRAM(S)/KG/MIN: 8 SOLUTION at 19:34

## 2025-01-01 RX ADMIN — ATOVAQUONE 1500 MILLIGRAM(S): 750 SUSPENSION ORAL at 11:36

## 2025-01-01 RX ADMIN — Medication 50 MILLIEQUIVALENT(S): at 08:22

## 2025-01-01 RX ADMIN — OXYCODONE HYDROCHLORIDE 5 MILLIGRAM(S): 30 TABLET ORAL at 07:34

## 2025-01-01 RX ADMIN — Medication 800 MILLIGRAM(S): at 05:42

## 2025-01-01 RX ADMIN — OXYCODONE HYDROCHLORIDE 5 MILLIGRAM(S): 30 TABLET ORAL at 09:00

## 2025-01-01 RX ADMIN — WHITE PETROLATUM 1 APPLICATION(S): 1 OINTMENT TOPICAL at 17:52

## 2025-01-01 RX ADMIN — Medication 25 GRAM(S): at 14:05

## 2025-01-01 RX ADMIN — Medication 40 MILLIGRAM(S): at 05:38

## 2025-01-01 RX ADMIN — MEROPENEM 100 MILLIGRAM(S): 1 INJECTION INTRAVENOUS at 22:00

## 2025-01-01 RX ADMIN — Medication 15 MILLILITER(S): at 12:41

## 2025-01-01 RX ADMIN — Medication 15 MILLILITER(S): at 08:55

## 2025-01-01 RX ADMIN — HEPARIN SODIUM 5000 UNIT(S): 1000 INJECTION INTRAVENOUS; SUBCUTANEOUS at 05:42

## 2025-01-01 RX ADMIN — NOREPINEPHRINE BITARTRATE 1.88 MICROGRAM(S)/KG/MIN: 8 SOLUTION at 21:04

## 2025-01-01 RX ADMIN — Medication 25 GRAM(S): at 14:21

## 2025-01-01 RX ADMIN — Medication 5000 UNIT(S): at 22:18

## 2025-01-01 RX ADMIN — ATOVAQUONE 1500 MILLIGRAM(S): 750 SUSPENSION ORAL at 12:18

## 2025-01-01 RX ADMIN — WHITE PETROLATUM 1 APPLICATION(S): 1 OINTMENT TOPICAL at 06:19

## 2025-01-01 RX ADMIN — FUROSEMIDE 20 MILLIGRAM(S): 10 INJECTION INTRAMUSCULAR; INTRAVENOUS at 17:52

## 2025-01-01 RX ADMIN — Medication 15 MILLILITER(S): at 17:46

## 2025-01-01 RX ADMIN — Medication 125 MILLIGRAM(S): at 17:23

## 2025-01-01 RX ADMIN — METHYLPREDNISOLONE ACETATE 40 MILLIGRAM(S): 80 INJECTION, SUSPENSION INTRA-ARTICULAR; INTRALESIONAL; INTRAMUSCULAR; SOFT TISSUE at 05:07

## 2025-01-01 RX ADMIN — Medication 15 MILLILITER(S): at 06:38

## 2025-01-01 RX ADMIN — DULOXETINE 60 MILLIGRAM(S): 20 CAPSULE, DELAYED RELEASE ORAL at 11:33

## 2025-01-01 RX ADMIN — Medication 15 MILLILITER(S): at 08:41

## 2025-01-01 RX ADMIN — Medication 40 MILLIGRAM(S): at 06:05

## 2025-01-01 RX ADMIN — Medication 800 MILLIGRAM(S): at 05:27

## 2025-01-01 RX ADMIN — FILGRASTIM 300 MICROGRAM(S): 300 INJECTION, SOLUTION INTRAVENOUS; SUBCUTANEOUS at 11:39

## 2025-01-01 RX ADMIN — TAMSULOSIN HYDROCHLORIDE 0.4 MILLIGRAM(S): 0.4 CAPSULE ORAL at 21:40

## 2025-01-01 RX ADMIN — TAMSULOSIN HYDROCHLORIDE 0.4 MILLIGRAM(S): 0.4 CAPSULE ORAL at 22:47

## 2025-01-01 RX ADMIN — Medication 15 MILLILITER(S): at 14:39

## 2025-01-01 RX ADMIN — Medication 15 MILLILITER(S): at 05:38

## 2025-01-01 RX ADMIN — Medication 15 MILLILITER(S): at 00:24

## 2025-01-01 RX ADMIN — Medication 15 MILLIGRAM(S): at 18:35

## 2025-01-01 RX ADMIN — Medication 50 MICROGRAM(S): at 20:30

## 2025-01-01 RX ADMIN — Medication 15 MILLILITER(S): at 20:56

## 2025-01-01 RX ADMIN — METHYLPREDNISOLONE ACETATE 40 MILLIGRAM(S): 80 INJECTION, SUSPENSION INTRA-ARTICULAR; INTRALESIONAL; INTRAMUSCULAR; SOFT TISSUE at 05:02

## 2025-01-01 RX ADMIN — INSULIN LISPRO 2: 100 INJECTION, SOLUTION INTRAVENOUS; SUBCUTANEOUS at 11:22

## 2025-01-01 RX ADMIN — DULOXETINE 60 MILLIGRAM(S): 20 CAPSULE, DELAYED RELEASE ORAL at 22:46

## 2025-01-01 RX ADMIN — Medication 1 APPLICATION(S): at 08:23

## 2025-01-01 RX ADMIN — Medication 40 MILLIGRAM(S): at 12:01

## 2025-01-01 RX ADMIN — Medication 15 MILLILITER(S): at 17:52

## 2025-01-01 RX ADMIN — Medication 15 MILLILITER(S): at 05:20

## 2025-01-01 RX ADMIN — METHYLPREDNISOLONE ACETATE 40 MILLIGRAM(S): 80 INJECTION, SUSPENSION INTRA-ARTICULAR; INTRALESIONAL; INTRAMUSCULAR; SOFT TISSUE at 05:13

## 2025-01-01 RX ADMIN — MEROPENEM 100 MILLIGRAM(S): 1 INJECTION INTRAVENOUS at 22:09

## 2025-01-01 RX ADMIN — WHITE PETROLATUM 1 APPLICATION(S): 1 OINTMENT TOPICAL at 17:19

## 2025-01-01 RX ADMIN — Medication 15 MILLILITER(S): at 05:12

## 2025-01-01 RX ADMIN — TACROLIMUS 0.5 MILLIGRAM(S): 0.5 CAPSULE ORAL at 09:09

## 2025-01-01 RX ADMIN — TAMSULOSIN HYDROCHLORIDE 0.4 MILLIGRAM(S): 0.4 CAPSULE ORAL at 21:22

## 2025-01-01 RX ADMIN — Medication 5000 UNIT(S): at 13:36

## 2025-01-01 RX ADMIN — WHITE PETROLATUM 1 APPLICATION(S): 1 OINTMENT TOPICAL at 17:13

## 2025-01-01 RX ADMIN — Medication 15 MILLILITER(S): at 20:15

## 2025-01-01 RX ADMIN — Medication 15 MILLILITER(S): at 17:35

## 2025-01-01 RX ADMIN — Medication 5 MILLIGRAM(S): at 08:42

## 2025-01-01 RX ADMIN — Medication 50 MICROGRAM(S): at 02:45

## 2025-01-01 RX ADMIN — TACROLIMUS 0.5 MILLIGRAM(S): 0.5 CAPSULE ORAL at 07:28

## 2025-01-01 RX ADMIN — Medication 15 MILLILITER(S): at 12:48

## 2025-01-01 RX ADMIN — ENOXAPARIN SODIUM 40 MILLIGRAM(S): 100 INJECTION SUBCUTANEOUS at 17:08

## 2025-01-01 RX ADMIN — DULOXETINE 60 MILLIGRAM(S): 20 CAPSULE, DELAYED RELEASE ORAL at 12:10

## 2025-01-01 RX ADMIN — Medication 25 GRAM(S): at 05:59

## 2025-01-01 RX ADMIN — LIDOCAINE HYDROCHLORIDE 5 MILLILITER(S): 20 JELLY TOPICAL at 06:04

## 2025-01-01 RX ADMIN — WHITE PETROLATUM 1 APPLICATION(S): 1 OINTMENT TOPICAL at 05:13

## 2025-01-01 RX ADMIN — INSULIN LISPRO 1: 100 INJECTION, SOLUTION INTRAVENOUS; SUBCUTANEOUS at 17:09

## 2025-01-01 RX ADMIN — Medication 15 MILLILITER(S): at 20:27

## 2025-01-01 RX ADMIN — Medication 15 MILLILITER(S): at 08:47

## 2025-01-01 RX ADMIN — Medication 25 GRAM(S): at 22:17

## 2025-01-01 RX ADMIN — Medication 1 APPLICATION(S): at 08:24

## 2025-01-01 RX ADMIN — LIDOCAINE HYDROCHLORIDE 5 MILLILITER(S): 20 JELLY TOPICAL at 06:07

## 2025-01-01 RX ADMIN — Medication 1 APPLICATION(S): at 10:57

## 2025-01-01 RX ADMIN — Medication 1 APPLICATION(S): at 05:02

## 2025-01-01 RX ADMIN — METHYLPREDNISOLONE ACETATE 40 MILLIGRAM(S): 80 INJECTION, SUSPENSION INTRA-ARTICULAR; INTRALESIONAL; INTRAMUSCULAR; SOFT TISSUE at 05:12

## 2025-01-01 RX ADMIN — OXYCODONE HYDROCHLORIDE 5 MILLIGRAM(S): 30 TABLET ORAL at 15:32

## 2025-01-01 RX ADMIN — Medication 650 MILLIGRAM(S): at 15:01

## 2025-01-01 RX ADMIN — Medication 25 GRAM(S): at 05:04

## 2025-01-01 RX ADMIN — LIDOCAINE HYDROCHLORIDE 5 MILLILITER(S): 20 JELLY TOPICAL at 13:16

## 2025-01-01 RX ADMIN — Medication 800 MILLIGRAM(S): at 17:18

## 2025-01-01 RX ADMIN — POSACONAZOLE 300 MILLIGRAM(S): 100 TABLET, DELAYED RELEASE ORAL at 11:19

## 2025-01-01 RX ADMIN — Medication 1 APPLICATION(S): at 11:09

## 2025-01-01 RX ADMIN — OXYCODONE HYDROCHLORIDE 5 MILLIGRAM(S): 30 TABLET ORAL at 23:30

## 2025-01-01 RX ADMIN — Medication 1 APPLICATION(S): at 11:22

## 2025-01-01 RX ADMIN — HEPARIN SODIUM 5000 UNIT(S): 1000 INJECTION INTRAVENOUS; SUBCUTANEOUS at 18:03

## 2025-01-01 RX ADMIN — TACROLIMUS 0.5 MILLIGRAM(S): 0.5 CAPSULE ORAL at 20:11

## 2025-01-01 RX ADMIN — WHITE PETROLATUM 1 APPLICATION(S): 1 OINTMENT TOPICAL at 17:45

## 2025-01-01 RX ADMIN — TAMSULOSIN HYDROCHLORIDE 0.4 MILLIGRAM(S): 0.4 CAPSULE ORAL at 21:41

## 2025-01-01 RX ADMIN — OXYCODONE HYDROCHLORIDE 5 MILLIGRAM(S): 30 TABLET ORAL at 23:40

## 2025-01-01 RX ADMIN — DULOXETINE 60 MILLIGRAM(S): 20 CAPSULE, DELAYED RELEASE ORAL at 11:36

## 2025-01-01 RX ADMIN — OXYCODONE HYDROCHLORIDE 5 MILLIGRAM(S): 30 TABLET ORAL at 16:18

## 2025-01-01 RX ADMIN — Medication 15 MILLILITER(S): at 16:57

## 2025-01-01 RX ADMIN — Medication 800 MILLIGRAM(S): at 18:05

## 2025-01-01 RX ADMIN — Medication 1 APPLICATION(S): at 14:39

## 2025-01-01 RX ADMIN — OXYCODONE HYDROCHLORIDE 5 MILLIGRAM(S): 30 TABLET ORAL at 22:00

## 2025-01-01 RX ADMIN — Medication 800 MILLIGRAM(S): at 17:17

## 2025-01-01 RX ADMIN — OXYCODONE HYDROCHLORIDE 5 MILLIGRAM(S): 30 TABLET ORAL at 21:47

## 2025-01-01 RX ADMIN — Medication 125 MILLIGRAM(S): at 05:57

## 2025-01-01 RX ADMIN — Medication 4 MILLILITER(S): at 17:33

## 2025-01-01 RX ADMIN — INSULIN LISPRO 2: 100 INJECTION, SOLUTION INTRAVENOUS; SUBCUTANEOUS at 23:50

## 2025-01-01 RX ADMIN — Medication 1 APPLICATION(S): at 05:00

## 2025-01-01 RX ADMIN — RITUXIMAB-PVVR 521 MILLIGRAM(S): 100 INJECTION, SOLUTION INTRAVENOUS at 17:09

## 2025-01-01 RX ADMIN — Medication 5 MILLIGRAM(S): at 09:03

## 2025-01-01 RX ADMIN — TACROLIMUS 0.5 MILLIGRAM(S): 0.5 CAPSULE ORAL at 09:49

## 2025-01-01 RX ADMIN — Medication 15 MILLILITER(S): at 05:51

## 2025-01-01 RX ADMIN — METHYLPREDNISOLONE ACETATE 40 MILLIGRAM(S): 80 INJECTION, SUSPENSION INTRA-ARTICULAR; INTRALESIONAL; INTRAMUSCULAR; SOFT TISSUE at 05:00

## 2025-01-01 RX ADMIN — Medication 250 MILLIGRAM(S): at 12:14

## 2025-01-01 RX ADMIN — PROPOFOL 4.82 MICROGRAM(S)/KG/MIN: 10 INJECTION, EMULSION INTRAVENOUS at 21:04

## 2025-01-01 RX ADMIN — Medication 15 MILLILITER(S): at 06:07

## 2025-01-01 RX ADMIN — Medication 800 MILLIGRAM(S): at 17:35

## 2025-01-01 RX ADMIN — FILGRASTIM 300 MICROGRAM(S): 300 INJECTION, SOLUTION INTRAVENOUS; SUBCUTANEOUS at 15:02

## 2025-01-01 RX ADMIN — WHITE PETROLATUM 1 APPLICATION(S): 1 OINTMENT TOPICAL at 05:14

## 2025-01-01 RX ADMIN — TACROLIMUS 0.5 MILLIGRAM(S): 0.5 CAPSULE ORAL at 08:46

## 2025-01-01 RX ADMIN — Medication 15 MILLILITER(S): at 20:09

## 2025-01-01 RX ADMIN — METHYLPREDNISOLONE ACETATE 40 MILLIGRAM(S): 80 INJECTION, SUSPENSION INTRA-ARTICULAR; INTRALESIONAL; INTRAMUSCULAR; SOFT TISSUE at 17:53

## 2025-01-01 RX ADMIN — WHITE PETROLATUM 1 APPLICATION(S): 1 OINTMENT TOPICAL at 05:29

## 2025-01-01 RX ADMIN — Medication 125 MILLIGRAM(S): at 17:17

## 2025-01-01 RX ADMIN — Medication 15 MILLILITER(S): at 20:41

## 2025-01-01 RX ADMIN — INSULIN LISPRO 3: 100 INJECTION, SOLUTION INTRAVENOUS; SUBCUTANEOUS at 16:23

## 2025-01-01 RX ADMIN — Medication 40 MILLIGRAM(S): at 05:13

## 2025-01-01 RX ADMIN — Medication 50 MILLIEQUIVALENT(S): at 10:24

## 2025-01-01 RX ADMIN — Medication 40 MILLIGRAM(S): at 05:04

## 2025-01-01 RX ADMIN — METHYLPREDNISOLONE ACETATE 40 MILLIGRAM(S): 80 INJECTION, SUSPENSION INTRA-ARTICULAR; INTRALESIONAL; INTRAMUSCULAR; SOFT TISSUE at 06:38

## 2025-01-01 RX ADMIN — Medication 15 MILLILITER(S): at 06:26

## 2025-01-01 RX ADMIN — ATOVAQUONE 1500 MILLIGRAM(S): 750 SUSPENSION ORAL at 12:21

## 2025-01-01 RX ADMIN — Medication 800 MILLIGRAM(S): at 05:13

## 2025-01-01 RX ADMIN — Medication 1 APPLICATION(S): at 13:15

## 2025-01-01 RX ADMIN — INSULIN LISPRO 3: 100 INJECTION, SOLUTION INTRAVENOUS; SUBCUTANEOUS at 05:59

## 2025-01-01 RX ADMIN — Medication 800 MILLIGRAM(S): at 05:45

## 2025-01-01 RX ADMIN — METHYLPREDNISOLONE ACETATE 40 MILLIGRAM(S): 80 INJECTION, SUSPENSION INTRA-ARTICULAR; INTRALESIONAL; INTRAMUSCULAR; SOFT TISSUE at 17:33

## 2025-01-01 RX ADMIN — Medication 5 MILLILITER(S): at 17:22

## 2025-01-01 RX ADMIN — Medication 800 MILLIGRAM(S): at 17:16

## 2025-01-01 RX ADMIN — Medication 800 MILLIGRAM(S): at 05:07

## 2025-01-01 RX ADMIN — WHITE PETROLATUM 1 APPLICATION(S): 1 OINTMENT TOPICAL at 18:14

## 2025-01-01 RX ADMIN — Medication 125 MILLIGRAM(S): at 05:04

## 2025-01-01 RX ADMIN — Medication 40 MILLIGRAM(S): at 05:20

## 2025-01-01 RX ADMIN — Medication 1.01 MICROGRAM(S)/KG/HR: at 19:40

## 2025-01-01 RX ADMIN — Medication 1 SPRAY(S): at 21:22

## 2025-01-01 RX ADMIN — OXYCODONE HYDROCHLORIDE 5 MILLIGRAM(S): 30 TABLET ORAL at 08:07

## 2025-01-01 RX ADMIN — POSACONAZOLE 300 MILLIGRAM(S): 100 TABLET, DELAYED RELEASE ORAL at 12:27

## 2025-01-01 RX ADMIN — OXYCODONE HYDROCHLORIDE 5 MILLIGRAM(S): 30 TABLET ORAL at 15:30

## 2025-01-01 RX ADMIN — NOREPINEPHRINE BITARTRATE 1.88 MICROGRAM(S)/KG/MIN: 8 SOLUTION at 17:09

## 2025-01-01 RX ADMIN — WHITE PETROLATUM 1 APPLICATION(S): 1 OINTMENT TOPICAL at 17:16

## 2025-01-01 RX ADMIN — LOPERAMIDE HCL 2 MILLIGRAM(S): 1 SOLUTION ORAL at 20:02

## 2025-01-01 RX ADMIN — DULOXETINE 60 MILLIGRAM(S): 20 CAPSULE, DELAYED RELEASE ORAL at 11:29

## 2025-01-01 RX ADMIN — ATOVAQUONE 1500 MILLIGRAM(S): 750 SUSPENSION ORAL at 11:29

## 2025-01-01 RX ADMIN — Medication 1 APPLICATION(S): at 11:30

## 2025-01-01 RX ADMIN — Medication 125 MILLIGRAM(S): at 05:07

## 2025-01-01 RX ADMIN — DULOXETINE 60 MILLIGRAM(S): 20 CAPSULE, DELAYED RELEASE ORAL at 12:01

## 2025-01-01 RX ADMIN — Medication 15 MILLILITER(S): at 05:42

## 2025-01-01 RX ADMIN — MEROPENEM 100 MILLIGRAM(S): 1 INJECTION INTRAVENOUS at 05:02

## 2025-01-01 RX ADMIN — ENOXAPARIN SODIUM 40 MILLIGRAM(S): 100 INJECTION SUBCUTANEOUS at 17:23

## 2025-01-01 RX ADMIN — METHYLPREDNISOLONE ACETATE 40 MILLIGRAM(S): 80 INJECTION, SUSPENSION INTRA-ARTICULAR; INTRALESIONAL; INTRAMUSCULAR; SOFT TISSUE at 05:58

## 2025-01-01 RX ADMIN — TACROLIMUS 0.5 MILLIGRAM(S): 0.5 CAPSULE ORAL at 07:45

## 2025-01-01 RX ADMIN — Medication 5000 UNIT(S): at 13:17

## 2025-01-01 RX ADMIN — INSULIN LISPRO 1: 100 INJECTION, SOLUTION INTRAVENOUS; SUBCUTANEOUS at 23:55

## 2025-01-01 RX ADMIN — Medication 125 MILLIGRAM(S): at 18:04

## 2025-01-01 RX ADMIN — TAMSULOSIN HYDROCHLORIDE 0.4 MILLIGRAM(S): 0.4 CAPSULE ORAL at 21:31

## 2025-01-01 RX ADMIN — Medication 40 MILLIGRAM(S): at 05:08

## 2025-01-01 RX ADMIN — Medication 1.01 MICROGRAM(S)/KG/HR: at 21:20

## 2025-01-01 RX ADMIN — Medication 15 MILLILITER(S): at 17:19

## 2025-01-01 RX ADMIN — OXYCODONE HYDROCHLORIDE 5 MILLIGRAM(S): 30 TABLET ORAL at 21:28

## 2025-01-01 RX ADMIN — Medication 5000 UNIT(S): at 22:37

## 2025-01-01 RX ADMIN — WHITE PETROLATUM 1 APPLICATION(S): 1 OINTMENT TOPICAL at 06:10

## 2025-01-01 RX ADMIN — Medication 1 APPLICATION(S): at 11:51

## 2025-01-01 RX ADMIN — Medication 15 MILLILITER(S): at 11:17

## 2025-01-01 RX ADMIN — Medication 2 PACKET(S): at 01:53

## 2025-01-01 RX ADMIN — PROPOFOL 4.82 MICROGRAM(S)/KG/MIN: 10 INJECTION, EMULSION INTRAVENOUS at 05:41

## 2025-01-01 RX ADMIN — Medication 15 MILLILITER(S): at 18:06

## 2025-01-01 RX ADMIN — OXYCODONE HYDROCHLORIDE 5 MILLIGRAM(S): 30 TABLET ORAL at 17:18

## 2025-01-01 RX ADMIN — Medication 50 MILLIEQUIVALENT(S): at 12:12

## 2025-01-01 RX ADMIN — Medication 15 MILLILITER(S): at 18:05

## 2025-01-01 RX ADMIN — OXYCODONE HYDROCHLORIDE 5 MILLIGRAM(S): 30 TABLET ORAL at 21:45

## 2025-01-01 RX ADMIN — MEROPENEM 100 MILLIGRAM(S): 1 INJECTION INTRAVENOUS at 13:07

## 2025-01-01 RX ADMIN — TACROLIMUS 0.5 MILLIGRAM(S): 0.5 CAPSULE ORAL at 08:01

## 2025-01-01 RX ADMIN — ATOVAQUONE 1500 MILLIGRAM(S): 750 SUSPENSION ORAL at 12:01

## 2025-01-01 RX ADMIN — Medication 50 MILLIEQUIVALENT(S): at 09:25

## 2025-01-01 RX ADMIN — WHITE PETROLATUM 1 APPLICATION(S): 1 OINTMENT TOPICAL at 17:36

## 2025-01-01 RX ADMIN — TAMSULOSIN HYDROCHLORIDE 0.4 MILLIGRAM(S): 0.4 CAPSULE ORAL at 19:53

## 2025-01-01 RX ADMIN — MEROPENEM 100 MILLIGRAM(S): 1 INJECTION INTRAVENOUS at 21:10

## 2025-01-01 RX ADMIN — Medication 650 MILLIGRAM(S): at 11:15

## 2025-01-01 RX ADMIN — HEPARIN SODIUM 5000 UNIT(S): 1000 INJECTION INTRAVENOUS; SUBCUTANEOUS at 18:35

## 2025-01-01 RX ADMIN — OXYCODONE HYDROCHLORIDE 5 MILLIGRAM(S): 30 TABLET ORAL at 17:00

## 2025-01-01 RX ADMIN — Medication 125 MILLIGRAM(S): at 18:24

## 2025-01-01 RX ADMIN — Medication 650 MILLIGRAM(S): at 20:28

## 2025-01-01 RX ADMIN — Medication 1 APPLICATION(S): at 13:36

## 2025-01-01 RX ADMIN — Medication 15 MILLILITER(S): at 20:40

## 2025-01-01 RX ADMIN — TIZANIDINE 4 MILLIGRAM(S): 4 TABLET ORAL at 21:18

## 2025-01-01 RX ADMIN — Medication 50 MICROGRAM(S): at 21:15

## 2025-01-01 RX ADMIN — Medication 200 GRAM(S): at 17:12

## 2025-01-01 RX ADMIN — OXYCODONE HYDROCHLORIDE 5 MILLIGRAM(S): 30 TABLET ORAL at 16:15

## 2025-01-01 RX ADMIN — Medication 15 MILLILITER(S): at 15:32

## 2025-01-01 RX ADMIN — Medication 15 MILLILITER(S): at 20:01

## 2025-01-01 RX ADMIN — Medication 125 MILLIGRAM(S): at 17:12

## 2025-01-01 RX ADMIN — WHITE PETROLATUM 1 APPLICATION(S): 1 OINTMENT TOPICAL at 05:20

## 2025-01-01 RX ADMIN — OXYCODONE HYDROCHLORIDE 5 MILLIGRAM(S): 30 TABLET ORAL at 14:07

## 2025-01-01 RX ADMIN — WHITE PETROLATUM 1 APPLICATION(S): 1 OINTMENT TOPICAL at 05:08

## 2025-01-01 RX ADMIN — OXYCODONE HYDROCHLORIDE 5 MILLIGRAM(S): 30 TABLET ORAL at 08:16

## 2025-01-01 RX ADMIN — NOREPINEPHRINE BITARTRATE 1.88 MICROGRAM(S)/KG/MIN: 8 SOLUTION at 09:54

## 2025-01-01 RX ADMIN — Medication 125 MILLIGRAM(S): at 05:13

## 2025-01-01 RX ADMIN — Medication 40 MILLIGRAM(S): at 05:12

## 2025-01-01 RX ADMIN — ATOVAQUONE 1500 MILLIGRAM(S): 750 SUSPENSION ORAL at 12:47

## 2025-01-01 RX ADMIN — WHITE PETROLATUM 1 APPLICATION(S): 1 OINTMENT TOPICAL at 12:46

## 2025-01-01 RX ADMIN — MEROPENEM 100 MILLIGRAM(S): 1 INJECTION INTRAVENOUS at 21:11

## 2025-01-01 RX ADMIN — ATOVAQUONE 1500 MILLIGRAM(S): 750 SUSPENSION ORAL at 11:27

## 2025-01-01 RX ADMIN — Medication 220 MILLIGRAM(S): at 12:10

## 2025-01-01 RX ADMIN — Medication 500 MILLIGRAM(S): at 12:00

## 2025-01-01 RX ADMIN — Medication 15 MILLILITER(S): at 23:37

## 2025-01-01 RX ADMIN — Medication 15 MILLILITER(S): at 18:29

## 2025-01-01 RX ADMIN — TAMSULOSIN HYDROCHLORIDE 0.4 MILLIGRAM(S): 0.4 CAPSULE ORAL at 21:19

## 2025-01-01 RX ADMIN — Medication 15 MILLILITER(S): at 09:19

## 2025-01-01 RX ADMIN — Medication 25 GRAM(S): at 13:32

## 2025-01-01 RX ADMIN — TACROLIMUS 0.5 MILLIGRAM(S): 0.5 CAPSULE ORAL at 22:47

## 2025-01-01 RX ADMIN — Medication 15 MILLILITER(S): at 08:24

## 2025-01-01 RX ADMIN — Medication 500 MILLIGRAM(S): at 12:10

## 2025-01-01 RX ADMIN — Medication 15 MILLILITER(S): at 16:08

## 2025-01-01 RX ADMIN — Medication 5 MILLILITER(S): at 12:11

## 2025-01-01 RX ADMIN — Medication 1 APPLICATION(S): at 08:59

## 2025-01-01 RX ADMIN — Medication 1 TABLET(S): at 12:10

## 2025-01-01 RX ADMIN — Medication 50 MICROGRAM(S): at 20:29

## 2025-01-01 RX ADMIN — Medication 15 MILLILITER(S): at 17:11

## 2025-01-01 RX ADMIN — Medication 25 GRAM(S): at 05:14

## 2025-01-01 RX ADMIN — Medication 125 MILLIGRAM(S): at 17:35

## 2025-01-01 RX ADMIN — TAMSULOSIN HYDROCHLORIDE 0.4 MILLIGRAM(S): 0.4 CAPSULE ORAL at 20:15

## 2025-01-01 RX ADMIN — OXYCODONE HYDROCHLORIDE 5 MILLIGRAM(S): 30 TABLET ORAL at 17:12

## 2025-01-01 RX ADMIN — WHITE PETROLATUM 1 APPLICATION(S): 1 OINTMENT TOPICAL at 05:04

## 2025-01-01 RX ADMIN — WHITE PETROLATUM 1 APPLICATION(S): 1 OINTMENT TOPICAL at 18:06

## 2025-01-01 RX ADMIN — MEROPENEM 100 MILLIGRAM(S): 1 INJECTION INTRAVENOUS at 21:04

## 2025-01-01 RX ADMIN — Medication 15 MILLILITER(S): at 17:07

## 2025-01-01 RX ADMIN — OXYCODONE HYDROCHLORIDE 5 MILLIGRAM(S): 30 TABLET ORAL at 09:16

## 2025-01-01 RX ADMIN — WHITE PETROLATUM 1 APPLICATION(S): 1 OINTMENT TOPICAL at 17:34

## 2025-01-01 RX ADMIN — TACROLIMUS 0.5 MILLIGRAM(S): 0.5 CAPSULE ORAL at 07:56

## 2025-01-01 RX ADMIN — IPRATROPIUM BROMIDE AND ALBUTEROL SULFATE 3 MILLILITER(S): .5; 2.5 SOLUTION RESPIRATORY (INHALATION) at 21:46

## 2025-01-01 RX ADMIN — DULOXETINE 60 MILLIGRAM(S): 20 CAPSULE, DELAYED RELEASE ORAL at 12:23

## 2025-01-01 RX ADMIN — Medication 125 MILLIGRAM(S): at 17:45

## 2025-01-01 RX ADMIN — MEROPENEM 100 MILLIGRAM(S): 1 INJECTION INTRAVENOUS at 13:35

## 2025-01-01 RX ADMIN — TACROLIMUS 0.5 MILLIGRAM(S): 0.5 CAPSULE ORAL at 20:03

## 2025-01-01 RX ADMIN — ATOVAQUONE 1500 MILLIGRAM(S): 750 SUSPENSION ORAL at 12:23

## 2025-01-01 RX ADMIN — FUROSEMIDE 20 MILLIGRAM(S): 10 INJECTION INTRAMUSCULAR; INTRAVENOUS at 10:58

## 2025-01-01 RX ADMIN — WHITE PETROLATUM 1 APPLICATION(S): 1 OINTMENT TOPICAL at 18:00

## 2025-01-01 RX ADMIN — Medication 650 MILLIGRAM(S): at 16:20

## 2025-01-01 RX ADMIN — DULOXETINE 60 MILLIGRAM(S): 20 CAPSULE, DELAYED RELEASE ORAL at 11:25

## 2025-01-01 RX ADMIN — Medication 125 MILLIGRAM(S): at 05:11

## 2025-01-01 RX ADMIN — Medication 25 GRAM(S): at 21:10

## 2025-01-01 RX ADMIN — Medication 15 MILLILITER(S): at 00:20

## 2025-01-01 RX ADMIN — TACROLIMUS 0.5 MILLIGRAM(S): 0.5 CAPSULE ORAL at 21:47

## 2025-01-01 RX ADMIN — Medication 15 MILLILITER(S): at 19:57

## 2025-01-01 RX ADMIN — FILGRASTIM 300 MICROGRAM(S): 300 INJECTION, SOLUTION INTRAVENOUS; SUBCUTANEOUS at 14:59

## 2025-01-01 RX ADMIN — OXYCODONE HYDROCHLORIDE 5 MILLIGRAM(S): 30 TABLET ORAL at 16:32

## 2025-01-01 RX ADMIN — Medication 40 MILLIGRAM(S): at 06:18

## 2025-01-01 RX ADMIN — OXYCODONE HYDROCHLORIDE 5 MILLIGRAM(S): 30 TABLET ORAL at 19:40

## 2025-01-01 RX ADMIN — Medication 15 MILLILITER(S): at 05:05

## 2025-01-01 RX ADMIN — OXYCODONE HYDROCHLORIDE 5 MILLIGRAM(S): 30 TABLET ORAL at 20:50

## 2025-01-01 RX ADMIN — Medication 800 MILLIGRAM(S): at 17:50

## 2025-01-01 RX ADMIN — Medication 15 MILLILITER(S): at 18:25

## 2025-01-01 RX ADMIN — Medication 800 MILLIGRAM(S): at 17:45

## 2025-01-01 RX ADMIN — TIZANIDINE 4 MILLIGRAM(S): 4 TABLET ORAL at 21:31

## 2025-01-01 RX ADMIN — Medication 25 GRAM(S): at 13:36

## 2025-01-01 RX ADMIN — Medication 800 MILLIGRAM(S): at 06:01

## 2025-01-01 RX ADMIN — WHITE PETROLATUM 1 APPLICATION(S): 1 OINTMENT TOPICAL at 12:17

## 2025-01-01 RX ADMIN — Medication 15 MILLILITER(S): at 11:23

## 2025-01-01 RX ADMIN — Medication 800 MILLIGRAM(S): at 17:08

## 2025-01-01 RX ADMIN — OXYCODONE HYDROCHLORIDE 5 MILLIGRAM(S): 30 TABLET ORAL at 23:00

## 2025-01-01 RX ADMIN — Medication 40 MILLIGRAM(S): at 06:08

## 2025-01-01 RX ADMIN — WHITE PETROLATUM 1 APPLICATION(S): 1 OINTMENT TOPICAL at 05:03

## 2025-01-01 RX ADMIN — LIDOCAINE HYDROCHLORIDE 5 MILLILITER(S): 20 JELLY TOPICAL at 21:21

## 2025-01-01 RX ADMIN — FUROSEMIDE 20 MILLIGRAM(S): 10 INJECTION INTRAMUSCULAR; INTRAVENOUS at 12:45

## 2025-01-01 RX ADMIN — NOREPINEPHRINE BITARTRATE 1.88 MICROGRAM(S)/KG/MIN: 8 SOLUTION at 19:38

## 2025-01-01 RX ADMIN — DULOXETINE 60 MILLIGRAM(S): 20 CAPSULE, DELAYED RELEASE ORAL at 13:16

## 2025-01-01 RX ADMIN — LIDOCAINE HYDROCHLORIDE 5 MILLILITER(S): 20 JELLY TOPICAL at 22:38

## 2025-01-01 RX ADMIN — Medication 25 GRAM(S): at 12:18

## 2025-01-01 RX ADMIN — Medication 15 MILLILITER(S): at 12:32

## 2025-01-01 RX ADMIN — Medication 125 MILLIGRAM(S): at 05:02

## 2025-01-01 RX ADMIN — CYCLOPHOSPHAMIDE INJECTION, SOLUTION 1000 MILLIGRAM(S): 200 INJECTION INTRAVENOUS at 18:15

## 2025-01-01 RX ADMIN — Medication 50 MICROGRAM(S): at 02:00

## 2025-01-01 RX ADMIN — OXYCODONE HYDROCHLORIDE 5 MILLIGRAM(S): 30 TABLET ORAL at 21:00

## 2025-01-01 RX ADMIN — PROPOFOL 4.82 MICROGRAM(S)/KG/MIN: 10 INJECTION, EMULSION INTRAVENOUS at 12:00

## 2025-01-01 RX ADMIN — Medication 50 MICROGRAM(S): at 20:11

## 2025-01-01 RX ADMIN — Medication 1 APPLICATION(S): at 09:16

## 2025-01-01 RX ADMIN — Medication 15 MILLILITER(S): at 12:22

## 2025-01-01 RX ADMIN — OXYCODONE HYDROCHLORIDE 5 MILLIGRAM(S): 30 TABLET ORAL at 08:28

## 2025-01-01 RX ADMIN — ATOVAQUONE 1500 MILLIGRAM(S): 750 SUSPENSION ORAL at 13:32

## 2025-01-01 RX ADMIN — Medication 15 MILLILITER(S): at 08:59

## 2025-01-01 RX ADMIN — TAMSULOSIN HYDROCHLORIDE 0.4 MILLIGRAM(S): 0.4 CAPSULE ORAL at 21:00

## 2025-01-01 RX ADMIN — WHITE PETROLATUM 1 APPLICATION(S): 1 OINTMENT TOPICAL at 06:38

## 2025-01-01 RX ADMIN — OXYCODONE HYDROCHLORIDE 5 MILLIGRAM(S): 30 TABLET ORAL at 14:49

## 2025-01-01 RX ADMIN — Medication 40 MILLIEQUIVALENT(S): at 08:23

## 2025-01-01 RX ADMIN — Medication 15 MILLILITER(S): at 17:13

## 2025-01-01 RX ADMIN — MEROPENEM 100 MILLIGRAM(S): 1 INJECTION INTRAVENOUS at 05:12

## 2025-01-01 RX ADMIN — Medication 40 MILLIGRAM(S): at 06:27

## 2025-01-01 RX ADMIN — Medication 650 MILLIGRAM(S): at 05:48

## 2025-01-01 RX ADMIN — Medication 1 APPLICATION(S): at 05:42

## 2025-01-01 RX ADMIN — OXYCODONE HYDROCHLORIDE 5 MILLIGRAM(S): 30 TABLET ORAL at 06:04

## 2025-01-01 RX ADMIN — POSACONAZOLE 300 MILLIGRAM(S): 100 TABLET, DELAYED RELEASE ORAL at 14:09

## 2025-01-01 RX ADMIN — POSACONAZOLE 300 MILLIGRAM(S): 100 TABLET, DELAYED RELEASE ORAL at 11:25

## 2025-01-01 RX ADMIN — Medication 50 MICROGRAM(S): at 02:30

## 2025-01-01 RX ADMIN — Medication 100 MILLIGRAM(S): at 16:20

## 2025-01-01 RX ADMIN — OXYCODONE HYDROCHLORIDE 5 MILLIGRAM(S): 30 TABLET ORAL at 22:40

## 2025-01-01 RX ADMIN — Medication 800 MILLIGRAM(S): at 06:08

## 2025-01-01 RX ADMIN — OXYCODONE HYDROCHLORIDE 5 MILLIGRAM(S): 30 TABLET ORAL at 13:04

## 2025-01-01 RX ADMIN — OXYCODONE HYDROCHLORIDE 5 MILLIGRAM(S): 30 TABLET ORAL at 17:40

## 2025-01-01 RX ADMIN — OXYCODONE HYDROCHLORIDE 5 MILLIGRAM(S): 30 TABLET ORAL at 14:38

## 2025-01-01 RX ADMIN — WHITE PETROLATUM 1 APPLICATION(S): 1 OINTMENT TOPICAL at 05:27

## 2025-01-01 RX ADMIN — WHITE PETROLATUM 1 APPLICATION(S): 1 OINTMENT TOPICAL at 05:11

## 2025-01-01 RX ADMIN — Medication 15 MILLILITER(S): at 15:50

## 2025-01-01 RX ADMIN — MEROPENEM 100 MILLIGRAM(S): 1 INJECTION INTRAVENOUS at 14:46

## 2025-01-01 RX ADMIN — Medication 1 APPLICATION(S): at 09:26

## 2025-01-01 RX ADMIN — Medication 15 MILLILITER(S): at 06:04

## 2025-01-01 RX ADMIN — Medication 800 MILLIGRAM(S): at 17:07

## 2025-01-01 RX ADMIN — ATOVAQUONE 1500 MILLIGRAM(S): 750 SUSPENSION ORAL at 11:39

## 2025-01-01 RX ADMIN — PROPOFOL 4.82 MICROGRAM(S)/KG/MIN: 10 INJECTION, EMULSION INTRAVENOUS at 17:08

## 2025-01-01 RX ADMIN — Medication 20 MILLIGRAM(S): at 05:58

## 2025-01-01 RX ADMIN — Medication 800 MILLIGRAM(S): at 05:19

## 2025-01-01 RX ADMIN — Medication 15 MILLILITER(S): at 09:15

## 2025-01-01 RX ADMIN — Medication 25 GRAM(S): at 06:17

## 2025-01-01 RX ADMIN — OXYCODONE HYDROCHLORIDE 5 MILLIGRAM(S): 30 TABLET ORAL at 19:08

## 2025-01-01 RX ADMIN — Medication 1 APPLICATION(S): at 12:39

## 2025-01-01 RX ADMIN — Medication 25 GRAM(S): at 21:00

## 2025-01-01 RX ADMIN — METHYLPREDNISOLONE ACETATE 40 MILLIGRAM(S): 80 INJECTION, SUSPENSION INTRA-ARTICULAR; INTRALESIONAL; INTRAMUSCULAR; SOFT TISSUE at 17:07

## 2025-01-01 RX ADMIN — Medication 15 MILLILITER(S): at 19:51

## 2025-01-01 RX ADMIN — Medication 1.01 MICROGRAM(S)/KG/HR: at 21:05

## 2025-01-01 RX ADMIN — Medication 500 MILLIGRAM(S): at 12:21

## 2025-01-01 RX ADMIN — Medication 800 MILLIGRAM(S): at 05:57

## 2025-01-01 RX ADMIN — ATOVAQUONE 1500 MILLIGRAM(S): 750 SUSPENSION ORAL at 11:07

## 2025-01-01 RX ADMIN — Medication 125 MILLIGRAM(S): at 17:36

## 2025-01-01 RX ADMIN — Medication 650 MILLIGRAM(S): at 13:09

## 2025-01-01 RX ADMIN — Medication 50 MICROGRAM(S): at 02:15

## 2025-01-01 RX ADMIN — NOREPINEPHRINE BITARTRATE 1.88 MICROGRAM(S)/KG/MIN: 8 SOLUTION at 17:12

## 2025-01-01 RX ADMIN — Medication 25 GRAM(S): at 15:12

## 2025-01-01 RX ADMIN — Medication 50 MILLIEQUIVALENT(S): at 19:02

## 2025-01-01 RX ADMIN — OXYCODONE HYDROCHLORIDE 5 MILLIGRAM(S): 30 TABLET ORAL at 21:20

## 2025-01-01 RX ADMIN — ENOXAPARIN SODIUM 40 MILLIGRAM(S): 100 INJECTION SUBCUTANEOUS at 17:12

## 2025-01-01 RX ADMIN — Medication 20 MILLIGRAM(S): at 05:43

## 2025-01-01 RX ADMIN — INSULIN LISPRO 2: 100 INJECTION, SOLUTION INTRAVENOUS; SUBCUTANEOUS at 17:45

## 2025-01-01 RX ADMIN — WHITE PETROLATUM 1 APPLICATION(S): 1 OINTMENT TOPICAL at 18:25

## 2025-01-01 RX ADMIN — MEROPENEM 100 MILLIGRAM(S): 1 INJECTION INTRAVENOUS at 14:47

## 2025-01-01 RX ADMIN — Medication 15 MILLILITER(S): at 12:30

## 2025-01-01 RX ADMIN — ATOVAQUONE 1500 MILLIGRAM(S): 750 SUSPENSION ORAL at 11:05

## 2025-01-01 RX ADMIN — ALBUMIN (HUMAN) 50 MILLILITER(S): 12.5 INJECTION, SOLUTION INTRAVENOUS at 16:02

## 2025-01-01 RX ADMIN — Medication 5000 UNIT(S): at 14:02

## 2025-01-01 RX ADMIN — DULOXETINE 60 MILLIGRAM(S): 20 CAPSULE, DELAYED RELEASE ORAL at 12:39

## 2025-01-01 RX ADMIN — Medication 15 MILLILITER(S): at 12:55

## 2025-01-01 RX ADMIN — PROPOFOL 4.82 MICROGRAM(S)/KG/MIN: 10 INJECTION, EMULSION INTRAVENOUS at 00:00

## 2025-01-01 RX ADMIN — TAMSULOSIN HYDROCHLORIDE 0.4 MILLIGRAM(S): 0.4 CAPSULE ORAL at 22:30

## 2025-01-01 RX ADMIN — Medication 800 MILLIGRAM(S): at 06:00

## 2025-01-01 RX ADMIN — Medication 1 APPLICATION(S): at 12:47

## 2025-01-01 RX ADMIN — TACROLIMUS 0.5 MILLIGRAM(S): 0.5 CAPSULE ORAL at 08:23

## 2025-01-01 RX ADMIN — Medication 1.01 MICROGRAM(S)/KG/HR: at 03:21

## 2025-01-01 RX ADMIN — Medication 15 MILLILITER(S): at 17:29

## 2025-01-01 RX ADMIN — Medication 1.01 MICROGRAM(S)/KG/HR: at 04:15

## 2025-01-01 RX ADMIN — DULOXETINE 60 MILLIGRAM(S): 20 CAPSULE, DELAYED RELEASE ORAL at 12:04

## 2025-01-01 RX ADMIN — WHITE PETROLATUM 1 APPLICATION(S): 1 OINTMENT TOPICAL at 17:38

## 2025-01-01 RX ADMIN — DULOXETINE 60 MILLIGRAM(S): 20 CAPSULE, DELAYED RELEASE ORAL at 12:21

## 2025-01-01 RX ADMIN — Medication 125 MILLIGRAM(S): at 05:19

## 2025-01-01 RX ADMIN — Medication 800 MILLIGRAM(S): at 17:11

## 2025-01-01 RX ADMIN — OXYCODONE HYDROCHLORIDE 5 MILLIGRAM(S): 30 TABLET ORAL at 12:46

## 2025-01-01 RX ADMIN — Medication 15 MILLILITER(S): at 17:12

## 2025-01-01 RX ADMIN — INSULIN LISPRO 1: 100 INJECTION, SOLUTION INTRAVENOUS; SUBCUTANEOUS at 00:31

## 2025-01-01 RX ADMIN — Medication 15 MILLILITER(S): at 11:08

## 2025-01-01 RX ADMIN — Medication 800 MILLIGRAM(S): at 17:51

## 2025-01-01 RX ADMIN — TAMSULOSIN HYDROCHLORIDE 0.4 MILLIGRAM(S): 0.4 CAPSULE ORAL at 21:59

## 2025-01-01 RX ADMIN — OXYCODONE HYDROCHLORIDE 5 MILLIGRAM(S): 30 TABLET ORAL at 15:07

## 2025-01-01 RX ADMIN — ATOVAQUONE 1500 MILLIGRAM(S): 750 SUSPENSION ORAL at 12:09

## 2025-01-01 RX ADMIN — Medication 15 MILLILITER(S): at 11:30

## 2025-01-01 RX ADMIN — Medication 5 MILLILITER(S): at 12:20

## 2025-01-01 RX ADMIN — Medication 650 MILLIGRAM(S): at 12:09

## 2025-01-01 RX ADMIN — Medication 15 MILLILITER(S): at 20:57

## 2025-01-01 RX ADMIN — Medication 5 MILLILITER(S): at 05:46

## 2025-01-01 RX ADMIN — Medication 15 MILLILITER(S): at 13:08

## 2025-01-01 RX ADMIN — METHYLPREDNISOLONE ACETATE 40 MILLIGRAM(S): 80 INJECTION, SUSPENSION INTRA-ARTICULAR; INTRALESIONAL; INTRAMUSCULAR; SOFT TISSUE at 18:24

## 2025-01-01 RX ADMIN — TACROLIMUS 0.5 MILLIGRAM(S): 0.5 CAPSULE ORAL at 08:25

## 2025-01-01 RX ADMIN — Medication 300 MILLIGRAM(S): at 11:13

## 2025-01-01 RX ADMIN — TAMSULOSIN HYDROCHLORIDE 0.4 MILLIGRAM(S): 0.4 CAPSULE ORAL at 21:25

## 2025-01-01 RX ADMIN — PROPOFOL 4.82 MICROGRAM(S)/KG/MIN: 10 INJECTION, EMULSION INTRAVENOUS at 20:41

## 2025-01-01 RX ADMIN — Medication 800 MILLIGRAM(S): at 05:03

## 2025-01-01 RX ADMIN — Medication 15 MILLILITER(S): at 09:16

## 2025-01-01 RX ADMIN — Medication 15 MILLILITER(S): at 08:30

## 2025-01-01 RX ADMIN — Medication 125 MILLIGRAM(S): at 05:42

## 2025-01-01 RX ADMIN — Medication 1 APPLICATION(S): at 05:21

## 2025-01-01 RX ADMIN — Medication 85 MILLIMOLE(S): at 01:54

## 2025-01-01 RX ADMIN — Medication 15 MILLILITER(S): at 09:09

## 2025-01-01 RX ADMIN — Medication 40 MILLIGRAM(S): at 08:02

## 2025-01-01 RX ADMIN — MEROPENEM 100 MILLIGRAM(S): 1 INJECTION INTRAVENOUS at 21:19

## 2025-01-01 RX ADMIN — PROPOFOL 4.82 MICROGRAM(S)/KG/MIN: 10 INJECTION, EMULSION INTRAVENOUS at 04:15

## 2025-01-01 RX ADMIN — FILGRASTIM 300 MICROGRAM(S): 300 INJECTION, SOLUTION INTRAVENOUS; SUBCUTANEOUS at 13:35

## 2025-01-01 RX ADMIN — Medication 800 MILLIGRAM(S): at 05:11

## 2025-01-01 RX ADMIN — TACROLIMUS 0.5 MILLIGRAM(S): 0.5 CAPSULE ORAL at 08:07

## 2025-01-01 RX ADMIN — MEROPENEM 100 MILLIGRAM(S): 1 INJECTION INTRAVENOUS at 05:19

## 2025-01-01 RX ADMIN — ATOVAQUONE 1500 MILLIGRAM(S): 750 SUSPENSION ORAL at 11:24

## 2025-01-01 RX ADMIN — WHITE PETROLATUM 1 APPLICATION(S): 1 OINTMENT TOPICAL at 05:07

## 2025-01-01 RX ADMIN — Medication 25 GRAM(S): at 20:29

## 2025-01-01 RX ADMIN — Medication 1 APPLICATION(S): at 12:20

## 2025-01-01 RX ADMIN — WHITE PETROLATUM 1 APPLICATION(S): 1 OINTMENT TOPICAL at 18:05

## 2025-01-01 RX ADMIN — Medication 15 MILLILITER(S): at 17:18

## 2025-01-01 RX ADMIN — LIDOCAINE HYDROCHLORIDE 5 MILLILITER(S): 20 JELLY TOPICAL at 15:05

## 2025-01-01 RX ADMIN — Medication 125 MILLIGRAM(S): at 17:08

## 2025-01-01 RX ADMIN — Medication 1 APPLICATION(S): at 08:58

## 2025-01-01 RX ADMIN — Medication 15 MILLILITER(S): at 05:07

## 2025-01-01 RX ADMIN — INSULIN LISPRO 1: 100 INJECTION, SOLUTION INTRAVENOUS; SUBCUTANEOUS at 05:54

## 2025-01-01 RX ADMIN — Medication 15 MILLILITER(S): at 09:30

## 2025-01-01 RX ADMIN — TAMSULOSIN HYDROCHLORIDE 0.4 MILLIGRAM(S): 0.4 CAPSULE ORAL at 22:17

## 2025-01-01 RX ADMIN — Medication 650 MILLIGRAM(S): at 17:20

## 2025-01-01 RX ADMIN — Medication 15 MILLILITER(S): at 08:20

## 2025-01-01 RX ADMIN — INSULIN LISPRO 1: 100 INJECTION, SOLUTION INTRAVENOUS; SUBCUTANEOUS at 05:20

## 2025-01-01 RX ADMIN — TAMSULOSIN HYDROCHLORIDE 0.4 MILLIGRAM(S): 0.4 CAPSULE ORAL at 21:47

## 2025-01-01 RX ADMIN — Medication 4 MILLILITER(S): at 13:38

## 2025-01-01 RX ADMIN — DULOXETINE 60 MILLIGRAM(S): 20 CAPSULE, DELAYED RELEASE ORAL at 12:57

## 2025-01-01 RX ADMIN — NOREPINEPHRINE BITARTRATE 1.88 MICROGRAM(S)/KG/MIN: 8 SOLUTION at 20:41

## 2025-01-01 RX ADMIN — OXYCODONE HYDROCHLORIDE 5 MILLIGRAM(S): 30 TABLET ORAL at 08:27

## 2025-01-01 RX ADMIN — PROPOFOL 4.82 MICROGRAM(S)/KG/MIN: 10 INJECTION, EMULSION INTRAVENOUS at 19:39

## 2025-01-01 RX ADMIN — WHITE PETROLATUM 1 APPLICATION(S): 1 OINTMENT TOPICAL at 05:42

## 2025-01-01 RX ADMIN — OXYCODONE HYDROCHLORIDE 5 MILLIGRAM(S): 30 TABLET ORAL at 11:59

## 2025-01-01 RX ADMIN — Medication 800 MILLIGRAM(S): at 17:33

## 2025-01-01 RX ADMIN — DULOXETINE 60 MILLIGRAM(S): 20 CAPSULE, DELAYED RELEASE ORAL at 12:48

## 2025-01-01 RX ADMIN — POSACONAZOLE 300 MILLIGRAM(S): 100 TABLET, DELAYED RELEASE ORAL at 12:20

## 2025-01-01 RX ADMIN — OXYCODONE HYDROCHLORIDE 5 MILLIGRAM(S): 30 TABLET ORAL at 07:28

## 2025-01-01 RX ADMIN — Medication 15 MILLILITER(S): at 17:44

## 2025-01-01 RX ADMIN — TACROLIMUS 0.5 MILLIGRAM(S): 0.5 CAPSULE ORAL at 08:27

## 2025-01-01 RX ADMIN — FUROSEMIDE 20 MILLIGRAM(S): 10 INJECTION INTRAMUSCULAR; INTRAVENOUS at 21:46

## 2025-01-01 RX ADMIN — METHYLPREDNISOLONE ACETATE 40 MILLIGRAM(S): 80 INJECTION, SUSPENSION INTRA-ARTICULAR; INTRALESIONAL; INTRAMUSCULAR; SOFT TISSUE at 05:08

## 2025-01-01 RX ADMIN — TAMSULOSIN HYDROCHLORIDE 0.4 MILLIGRAM(S): 0.4 CAPSULE ORAL at 21:21

## 2025-01-01 RX ADMIN — Medication 40 MILLIEQUIVALENT(S): at 14:06

## 2025-01-01 RX ADMIN — DULOXETINE 60 MILLIGRAM(S): 20 CAPSULE, DELAYED RELEASE ORAL at 11:50

## 2025-01-01 RX ADMIN — Medication 800 MILLIGRAM(S): at 17:46

## 2025-01-01 RX ADMIN — PROPOFOL 4.82 MICROGRAM(S)/KG/MIN: 10 INJECTION, EMULSION INTRAVENOUS at 09:53

## 2025-01-01 RX ADMIN — Medication 15 MILLILITER(S): at 05:14

## 2025-01-01 RX ADMIN — Medication 50 MILLIEQUIVALENT(S): at 15:58

## 2025-01-01 RX ADMIN — Medication 15 MILLILITER(S): at 17:32

## 2025-01-01 RX ADMIN — WHITE PETROLATUM 1 APPLICATION(S): 1 OINTMENT TOPICAL at 06:27

## 2025-01-01 RX ADMIN — Medication 1 APPLICATION(S): at 11:17

## 2025-01-01 RX ADMIN — TAMSULOSIN HYDROCHLORIDE 0.4 MILLIGRAM(S): 0.4 CAPSULE ORAL at 21:10

## 2025-01-01 RX ADMIN — OXYCODONE HYDROCHLORIDE 5 MILLIGRAM(S): 30 TABLET ORAL at 09:27

## 2025-01-01 RX ADMIN — Medication 5000 UNIT(S): at 05:43

## 2025-01-01 RX ADMIN — Medication 15 MILLILITER(S): at 05:04

## 2025-01-01 RX ADMIN — Medication 15 MILLILITER(S): at 18:00

## 2025-01-01 RX ADMIN — OXYCODONE HYDROCHLORIDE 5 MILLIGRAM(S): 30 TABLET ORAL at 21:15

## 2025-01-01 RX ADMIN — TAMSULOSIN HYDROCHLORIDE 0.4 MILLIGRAM(S): 0.4 CAPSULE ORAL at 20:29

## 2025-01-01 RX ADMIN — SODIUM CHLORIDE 75 MILLILITER(S): 9 INJECTION, SOLUTION INTRAVENOUS at 16:00

## 2025-01-01 RX ADMIN — Medication 800 MILLIGRAM(S): at 17:22

## 2025-01-01 RX ADMIN — Medication 5000 UNIT(S): at 21:14

## 2025-01-01 RX ADMIN — POSACONAZOLE 300 MILLIGRAM(S): 100 TABLET, DELAYED RELEASE ORAL at 11:05

## 2025-01-01 RX ADMIN — Medication 15 MILLILITER(S): at 16:01

## 2025-01-01 RX ADMIN — Medication 125 MILLIGRAM(S): at 17:16

## 2025-01-01 RX ADMIN — Medication 40 MILLIGRAM(S): at 05:15

## 2025-01-01 RX ADMIN — HEPARIN SODIUM 5000 UNIT(S): 1000 INJECTION INTRAVENOUS; SUBCUTANEOUS at 05:49

## 2025-01-01 RX ADMIN — Medication 125 MILLIGRAM(S): at 06:27

## 2025-01-01 RX ADMIN — Medication 15 MILLILITER(S): at 20:02

## 2025-01-01 RX ADMIN — FILGRASTIM 300 MICROGRAM(S): 300 INJECTION, SOLUTION INTRAVENOUS; SUBCUTANEOUS at 12:45

## 2025-01-01 RX ADMIN — WHITE PETROLATUM 1 APPLICATION(S): 1 OINTMENT TOPICAL at 17:07

## 2025-01-01 RX ADMIN — Medication 800 MILLIGRAM(S): at 12:01

## 2025-01-01 RX ADMIN — Medication 800 MILLIGRAM(S): at 06:37

## 2025-01-01 RX ADMIN — Medication 650 MILLIGRAM(S): at 21:25

## 2025-01-01 RX ADMIN — PROPOFOL 4.82 MICROGRAM(S)/KG/MIN: 10 INJECTION, EMULSION INTRAVENOUS at 12:33

## 2025-01-01 RX ADMIN — Medication 15 MILLILITER(S): at 17:16

## 2025-01-01 RX ADMIN — Medication 650 MILLIGRAM(S): at 06:34

## 2025-01-01 RX ADMIN — Medication 5 MILLIGRAM(S): at 07:48

## 2025-01-01 RX ADMIN — Medication 15 MILLILITER(S): at 08:57

## 2025-01-01 RX ADMIN — METHYLPREDNISOLONE ACETATE 40 MILLIGRAM(S): 80 INJECTION, SUSPENSION INTRA-ARTICULAR; INTRALESIONAL; INTRAMUSCULAR; SOFT TISSUE at 05:04

## 2025-01-01 RX ADMIN — Medication 800 MILLIGRAM(S): at 05:12

## 2025-01-01 RX ADMIN — OXYCODONE HYDROCHLORIDE 5 MILLIGRAM(S): 30 TABLET ORAL at 22:20

## 2025-01-01 RX ADMIN — OXYCODONE HYDROCHLORIDE 5 MILLIGRAM(S): 30 TABLET ORAL at 22:10

## 2025-01-01 RX ADMIN — Medication 15 MILLILITER(S): at 08:58

## 2025-01-01 RX ADMIN — ATOVAQUONE 1500 MILLIGRAM(S): 750 SUSPENSION ORAL at 12:27

## 2025-01-01 RX ADMIN — Medication 125 MILLIGRAM(S): at 17:52

## 2025-01-01 RX ADMIN — TAMSULOSIN HYDROCHLORIDE 0.4 MILLIGRAM(S): 0.4 CAPSULE ORAL at 21:17

## 2025-01-01 RX ADMIN — Medication 125 MILLIGRAM(S): at 05:12

## 2025-01-01 RX ADMIN — ATOVAQUONE 1500 MILLIGRAM(S): 750 SUSPENSION ORAL at 12:35

## 2025-01-01 RX ADMIN — Medication 40 MILLIGRAM(S): at 05:43

## 2025-01-01 RX ADMIN — Medication 15 MILLILITER(S): at 08:23

## 2025-01-01 RX ADMIN — WHITE PETROLATUM 1 APPLICATION(S): 1 OINTMENT TOPICAL at 05:17

## 2025-01-01 RX ADMIN — POSACONAZOLE 300 MILLIGRAM(S): 100 TABLET, DELAYED RELEASE ORAL at 12:01

## 2025-01-01 RX ADMIN — Medication 40 MILLIGRAM(S): at 11:05

## 2025-01-01 RX ADMIN — FUROSEMIDE 20 MILLIGRAM(S): 10 INJECTION INTRAMUSCULAR; INTRAVENOUS at 15:41

## 2025-01-01 RX ADMIN — Medication 15 MILLILITER(S): at 18:26

## 2025-01-01 RX ADMIN — Medication 15 MILLILITER(S): at 11:51

## 2025-01-01 RX ADMIN — OXYCODONE HYDROCHLORIDE 5 MILLIGRAM(S): 30 TABLET ORAL at 05:20

## 2025-01-01 RX ADMIN — TACROLIMUS 0.5 MILLIGRAM(S): 0.5 CAPSULE ORAL at 08:41

## 2025-01-01 RX ADMIN — PROPOFOL 4.82 MICROGRAM(S)/KG/MIN: 10 INJECTION, EMULSION INTRAVENOUS at 08:01

## 2025-01-01 RX ADMIN — WHITE PETROLATUM 1 APPLICATION(S): 1 OINTMENT TOPICAL at 17:17

## 2025-01-01 RX ADMIN — FUROSEMIDE 40 MILLIGRAM(S): 10 INJECTION INTRAMUSCULAR; INTRAVENOUS at 11:35

## 2025-01-01 RX ADMIN — Medication 15 MILLILITER(S): at 07:56

## 2025-01-01 RX ADMIN — FUROSEMIDE 20 MILLIGRAM(S): 10 INJECTION INTRAMUSCULAR; INTRAVENOUS at 11:08

## 2025-01-01 RX ADMIN — OXYCODONE HYDROCHLORIDE 5 MILLIGRAM(S): 30 TABLET ORAL at 16:22

## 2025-01-01 RX ADMIN — Medication 800 MILLIGRAM(S): at 05:50

## 2025-01-01 RX ADMIN — WHITE PETROLATUM 1 APPLICATION(S): 1 OINTMENT TOPICAL at 12:54

## 2025-01-01 RX ADMIN — FUROSEMIDE 40 MILLIGRAM(S): 10 INJECTION INTRAMUSCULAR; INTRAVENOUS at 10:06

## 2025-01-01 RX ADMIN — Medication 125 MILLIGRAM(S): at 06:37

## 2025-01-01 RX ADMIN — Medication 125 MILLIGRAM(S): at 17:07

## 2025-01-01 RX ADMIN — TACROLIMUS 0.5 MILLIGRAM(S): 0.5 CAPSULE ORAL at 08:55

## 2025-01-01 RX ADMIN — FUROSEMIDE 20 MILLIGRAM(S): 10 INJECTION INTRAMUSCULAR; INTRAVENOUS at 05:32

## 2025-01-01 RX ADMIN — Medication 25 GRAM(S): at 20:15

## 2025-01-01 RX ADMIN — DULOXETINE 60 MILLIGRAM(S): 20 CAPSULE, DELAYED RELEASE ORAL at 12:17

## 2025-01-01 RX ADMIN — Medication 1 APPLICATION(S): at 05:54

## 2025-01-01 RX ADMIN — Medication 220 MILLIGRAM(S): at 12:20

## 2025-01-01 RX ADMIN — WHITE PETROLATUM 1 APPLICATION(S): 1 OINTMENT TOPICAL at 17:08

## 2025-01-01 RX ADMIN — NOREPINEPHRINE BITARTRATE 1.88 MICROGRAM(S)/KG/MIN: 8 SOLUTION at 05:43

## 2025-01-01 RX ADMIN — OXYCODONE HYDROCHLORIDE 5 MILLIGRAM(S): 30 TABLET ORAL at 18:00

## 2025-01-01 RX ADMIN — DULOXETINE 60 MILLIGRAM(S): 20 CAPSULE, DELAYED RELEASE ORAL at 14:38

## 2025-01-01 RX ADMIN — Medication 50 MILLIEQUIVALENT(S): at 21:34

## 2025-01-01 RX ADMIN — Medication 125 MILLIGRAM(S): at 06:18

## 2025-01-01 RX ADMIN — Medication 1 APPLICATION(S): at 12:55

## 2025-01-01 RX ADMIN — Medication 15 MILLILITER(S): at 05:02

## 2025-01-01 RX ADMIN — Medication 1 APPLICATION(S): at 21:59

## 2025-01-01 RX ADMIN — TAMSULOSIN HYDROCHLORIDE 0.4 MILLIGRAM(S): 0.4 CAPSULE ORAL at 21:12

## 2025-01-01 RX ADMIN — OXYCODONE HYDROCHLORIDE 5 MILLIGRAM(S): 30 TABLET ORAL at 21:35

## 2025-01-01 RX ADMIN — DULOXETINE 60 MILLIGRAM(S): 20 CAPSULE, DELAYED RELEASE ORAL at 18:35

## 2025-01-01 RX ADMIN — Medication 1 APPLICATION(S): at 12:23

## 2025-01-01 RX ADMIN — Medication 25 GRAM(S): at 21:40

## 2025-01-01 RX ADMIN — TACROLIMUS 0.5 MILLIGRAM(S): 0.5 CAPSULE ORAL at 21:21

## 2025-01-01 RX ADMIN — OXYCODONE HYDROCHLORIDE 5 MILLIGRAM(S): 30 TABLET ORAL at 22:25

## 2025-01-01 RX ADMIN — Medication 800 MILLIGRAM(S): at 05:20

## 2025-01-01 RX ADMIN — Medication 800 MILLIGRAM(S): at 17:23

## 2025-01-01 RX ADMIN — TACROLIMUS 0.5 MILLIGRAM(S): 0.5 CAPSULE ORAL at 08:20

## 2025-01-01 RX ADMIN — OXYCODONE HYDROCHLORIDE 5 MILLIGRAM(S): 30 TABLET ORAL at 21:40

## 2025-01-01 RX ADMIN — Medication 15 MILLILITER(S): at 17:36

## 2025-01-01 RX ADMIN — OXYCODONE HYDROCHLORIDE 5 MILLIGRAM(S): 30 TABLET ORAL at 07:56

## 2025-01-01 RX ADMIN — Medication 15 MILLILITER(S): at 15:33

## 2025-01-01 RX ADMIN — Medication 800 MILLIGRAM(S): at 06:18

## 2025-01-01 RX ADMIN — Medication 650 MILLIGRAM(S): at 13:38

## 2025-01-01 RX ADMIN — Medication 5 MILLILITER(S): at 11:26

## 2025-01-01 RX ADMIN — Medication 25 GRAM(S): at 13:17

## 2025-01-01 RX ADMIN — Medication 15 MILLILITER(S): at 20:39

## 2025-01-01 RX ADMIN — FOSAPREPITANT 300 MILLIGRAM(S): 150 INJECTION, POWDER, LYOPHILIZED, FOR SOLUTION INTRAVENOUS at 15:04

## 2025-01-01 RX ADMIN — Medication 800 MILLIGRAM(S): at 05:04

## 2025-01-01 RX ADMIN — Medication 0.5 MILLIGRAM(S): at 11:25

## 2025-01-01 RX ADMIN — OXYCODONE HYDROCHLORIDE 5 MILLIGRAM(S): 30 TABLET ORAL at 12:25

## 2025-01-01 RX ADMIN — Medication 1 APPLICATION(S): at 11:35

## 2025-01-01 RX ADMIN — MEROPENEM 100 MILLIGRAM(S): 1 INJECTION INTRAVENOUS at 05:56

## 2025-01-01 RX ADMIN — METHYLPREDNISOLONE ACETATE 40 MILLIGRAM(S): 80 INJECTION, SUSPENSION INTRA-ARTICULAR; INTRALESIONAL; INTRAMUSCULAR; SOFT TISSUE at 05:41

## 2025-01-01 RX ADMIN — DULOXETINE 60 MILLIGRAM(S): 20 CAPSULE, DELAYED RELEASE ORAL at 11:23

## 2025-01-01 RX ADMIN — OXYCODONE HYDROCHLORIDE 5 MILLIGRAM(S): 30 TABLET ORAL at 13:35

## 2025-01-01 RX ADMIN — FUROSEMIDE 20 MILLIGRAM(S): 10 INJECTION INTRAMUSCULAR; INTRAVENOUS at 11:16

## 2025-01-01 RX ADMIN — OXYCODONE HYDROCHLORIDE 5 MILLIGRAM(S): 30 TABLET ORAL at 21:17

## 2025-01-01 RX ADMIN — WHITE PETROLATUM 1 APPLICATION(S): 1 OINTMENT TOPICAL at 17:23

## 2025-01-01 RX ADMIN — Medication 1 APPLICATION(S): at 12:05

## 2025-01-01 RX ADMIN — FUROSEMIDE 40 MILLIGRAM(S): 10 INJECTION INTRAMUSCULAR; INTRAVENOUS at 13:59

## 2025-01-01 RX ADMIN — Medication 15 MILLILITER(S): at 18:03

## 2025-01-01 RX ADMIN — INSULIN LISPRO 1: 100 INJECTION, SOLUTION INTRAVENOUS; SUBCUTANEOUS at 18:13

## 2025-01-01 RX ADMIN — Medication 50 MILLIGRAM(S): at 16:20

## 2025-01-01 RX ADMIN — MEROPENEM 100 MILLIGRAM(S): 1 INJECTION INTRAVENOUS at 21:59

## 2025-01-01 RX ADMIN — Medication 40 MILLIGRAM(S): at 05:27

## 2025-01-01 RX ADMIN — WHITE PETROLATUM 1 APPLICATION(S): 1 OINTMENT TOPICAL at 05:54

## 2025-01-01 RX ADMIN — Medication 125 MILLIGRAM(S): at 05:08

## 2025-01-01 RX ADMIN — Medication 40 MILLIGRAM(S): at 05:11

## 2025-01-01 RX ADMIN — MEROPENEM 100 MILLIGRAM(S): 1 INJECTION INTRAVENOUS at 05:42

## 2025-01-01 RX ADMIN — Medication 800 MILLIGRAM(S): at 18:24

## 2025-01-01 RX ADMIN — Medication 650 MILLIGRAM(S): at 12:38

## 2025-01-01 RX ADMIN — OXYCODONE HYDROCHLORIDE 5 MILLIGRAM(S): 30 TABLET ORAL at 18:04

## 2025-01-01 RX ADMIN — OXYCODONE HYDROCHLORIDE 5 MILLIGRAM(S): 30 TABLET ORAL at 13:40

## 2025-01-01 RX ADMIN — Medication 15 MILLILITER(S): at 17:10

## 2025-01-01 RX ADMIN — Medication 800 MILLIGRAM(S): at 06:05

## 2025-01-01 RX ADMIN — FUROSEMIDE 40 MILLIGRAM(S): 10 INJECTION INTRAMUSCULAR; INTRAVENOUS at 16:14

## 2025-01-01 RX ADMIN — WHITE PETROLATUM 1 APPLICATION(S): 1 OINTMENT TOPICAL at 17:12

## 2025-01-01 RX ADMIN — Medication 15 MILLILITER(S): at 20:29

## 2025-01-01 RX ADMIN — PROPOFOL 4.82 MICROGRAM(S)/KG/MIN: 10 INJECTION, EMULSION INTRAVENOUS at 09:19

## 2025-01-01 RX ADMIN — METHYLPREDNISOLONE ACETATE 40 MILLIGRAM(S): 80 INJECTION, SUSPENSION INTRA-ARTICULAR; INTRALESIONAL; INTRAMUSCULAR; SOFT TISSUE at 17:45

## 2025-01-01 RX ADMIN — OXYCODONE HYDROCHLORIDE 5 MILLIGRAM(S): 30 TABLET ORAL at 21:24

## 2025-01-01 RX ADMIN — Medication 50 MICROGRAM(S): at 21:30

## 2025-01-01 RX ADMIN — Medication 15 MILLIGRAM(S): at 19:30

## 2025-01-01 RX ADMIN — WHITE PETROLATUM 1 APPLICATION(S): 1 OINTMENT TOPICAL at 18:03

## 2025-01-01 RX ADMIN — Medication 650 MILLIGRAM(S): at 12:00

## 2025-01-01 RX ADMIN — Medication 40 MILLIGRAM(S): at 12:26

## 2025-01-01 RX ADMIN — Medication 1 TABLET(S): at 12:21

## 2025-01-01 RX ADMIN — Medication 5 MILLILITER(S): at 06:07

## 2025-01-01 RX ADMIN — Medication 50 MILLIEQUIVALENT(S): at 15:03

## 2025-01-01 RX ADMIN — Medication 15 MILLILITER(S): at 00:00

## 2025-01-01 RX ADMIN — Medication 40 MILLIGRAM(S): at 05:03

## 2025-01-01 RX ADMIN — ATOVAQUONE 1500 MILLIGRAM(S): 750 SUSPENSION ORAL at 14:38

## 2025-01-01 RX ADMIN — OXYCODONE HYDROCHLORIDE 5 MILLIGRAM(S): 30 TABLET ORAL at 06:20

## 2025-01-01 RX ADMIN — POSACONAZOLE 300 MILLIGRAM(S): 100 TABLET, DELAYED RELEASE ORAL at 11:07

## 2025-01-01 RX ADMIN — OXYCODONE HYDROCHLORIDE 5 MILLIGRAM(S): 30 TABLET ORAL at 14:45

## 2025-01-01 RX ADMIN — WHITE PETROLATUM 1 APPLICATION(S): 1 OINTMENT TOPICAL at 07:07

## 2025-01-01 RX ADMIN — Medication 800 MILLIGRAM(S): at 17:53

## 2025-01-01 RX ADMIN — Medication 40 MILLIGRAM(S): at 05:06

## 2025-01-01 RX ADMIN — Medication 125 MILLIGRAM(S): at 06:01

## 2025-01-01 RX ADMIN — GLYCOPYRROLATE 0.4 MILLIGRAM(S): 0.2 INJECTION INTRAMUSCULAR; INTRAVENOUS at 11:26

## 2025-01-01 RX ADMIN — MEROPENEM 100 MILLIGRAM(S): 1 INJECTION INTRAVENOUS at 05:16

## 2025-01-01 RX ADMIN — OXYCODONE HYDROCHLORIDE 5 MILLIGRAM(S): 30 TABLET ORAL at 21:25

## 2025-01-01 RX ADMIN — ATOVAQUONE 1500 MILLIGRAM(S): 750 SUSPENSION ORAL at 11:50

## 2025-01-01 RX ADMIN — Medication 5 MILLILITER(S): at 17:32

## 2025-01-01 RX ADMIN — Medication 1 TABLET(S): at 12:00

## 2025-01-01 RX ADMIN — ATOVAQUONE 1500 MILLIGRAM(S): 750 SUSPENSION ORAL at 12:58

## 2025-01-01 RX ADMIN — INSULIN LISPRO 1: 100 INJECTION, SOLUTION INTRAVENOUS; SUBCUTANEOUS at 17:13

## 2025-01-01 RX ADMIN — Medication 1 MILLIGRAM(S): at 11:27

## 2025-01-01 RX ADMIN — Medication 800 MILLIGRAM(S): at 17:32

## 2025-01-01 RX ADMIN — Medication 15 MILLILITER(S): at 06:18

## 2025-01-01 RX ADMIN — OXYCODONE HYDROCHLORIDE 5 MILLIGRAM(S): 30 TABLET ORAL at 12:21

## 2025-01-01 RX ADMIN — Medication 800 MILLIGRAM(S): at 22:47

## 2025-01-01 RX ADMIN — Medication 1.01 MICROGRAM(S)/KG/HR: at 09:53

## 2025-01-01 RX ADMIN — Medication 50 MILLIEQUIVALENT(S): at 12:00

## 2025-01-01 RX ADMIN — ATOVAQUONE 1500 MILLIGRAM(S): 750 SUSPENSION ORAL at 12:10

## 2025-01-01 RX ADMIN — ATOVAQUONE 1500 MILLIGRAM(S): 750 SUSPENSION ORAL at 13:16

## 2025-01-01 RX ADMIN — Medication 1 APPLICATION(S): at 12:48

## 2025-01-01 RX ADMIN — DULOXETINE 60 MILLIGRAM(S): 20 CAPSULE, DELAYED RELEASE ORAL at 18:03

## 2025-01-01 RX ADMIN — Medication 5 MILLILITER(S): at 17:12

## 2025-01-01 RX ADMIN — Medication 15 MILLILITER(S): at 16:33

## 2025-01-01 RX ADMIN — Medication 40 MILLIGRAM(S): at 06:37

## 2025-01-01 RX ADMIN — NOREPINEPHRINE BITARTRATE 1.88 MICROGRAM(S)/KG/MIN: 8 SOLUTION at 12:25

## 2025-01-01 RX ADMIN — Medication 125 MILLIGRAM(S): at 17:33

## 2025-01-01 RX ADMIN — Medication 125 MILLIGRAM(S): at 06:00

## 2025-01-01 RX ADMIN — POSACONAZOLE 300 MILLIGRAM(S): 100 TABLET, DELAYED RELEASE ORAL at 11:26

## 2025-01-01 RX ADMIN — MEROPENEM 100 MILLIGRAM(S): 1 INJECTION INTRAVENOUS at 14:56

## 2025-01-01 RX ADMIN — WHITE PETROLATUM 1 APPLICATION(S): 1 OINTMENT TOPICAL at 06:18

## 2025-01-01 RX ADMIN — PROPOFOL 4.82 MICROGRAM(S)/KG/MIN: 10 INJECTION, EMULSION INTRAVENOUS at 19:35

## 2025-01-01 RX ADMIN — OXYCODONE HYDROCHLORIDE 5 MILLIGRAM(S): 30 TABLET ORAL at 17:22

## 2025-01-01 RX ADMIN — METHYLPREDNISOLONE ACETATE 40 MILLIGRAM(S): 80 INJECTION, SUSPENSION INTRA-ARTICULAR; INTRALESIONAL; INTRAMUSCULAR; SOFT TISSUE at 05:19

## 2025-01-01 RX ADMIN — Medication 40 MILLIEQUIVALENT(S): at 11:35

## 2025-01-01 RX ADMIN — Medication 1 APPLICATION(S): at 05:14

## 2025-01-01 RX ADMIN — Medication 5 MILLILITER(S): at 12:01

## 2025-01-01 RX ADMIN — PROPOFOL 4.82 MICROGRAM(S)/KG/MIN: 10 INJECTION, EMULSION INTRAVENOUS at 17:12

## 2025-01-01 RX ADMIN — Medication 15 MILLILITER(S): at 16:21

## 2025-01-01 RX ADMIN — Medication 15 MILLILITER(S): at 06:01

## 2025-01-01 RX ADMIN — Medication 800 MILLIGRAM(S): at 06:26

## 2025-01-01 RX ADMIN — Medication 15 MILLILITER(S): at 05:00

## 2025-01-01 RX ADMIN — Medication 1.01 MICROGRAM(S)/KG/HR: at 05:43

## 2025-01-01 RX ADMIN — Medication 800 MILLIGRAM(S): at 05:02

## 2025-01-01 RX ADMIN — Medication 5000 UNIT(S): at 05:13

## 2025-01-01 RX ADMIN — ATOVAQUONE 1500 MILLIGRAM(S): 750 SUSPENSION ORAL at 11:19

## 2025-01-01 RX ADMIN — Medication 25 GRAM(S): at 14:40

## 2025-01-01 RX ADMIN — TACROLIMUS 0.5 MILLIGRAM(S): 0.5 CAPSULE ORAL at 08:29

## 2025-01-01 RX ADMIN — POSACONAZOLE 300 MILLIGRAM(S): 100 TABLET, DELAYED RELEASE ORAL at 11:08

## 2025-01-01 RX ADMIN — Medication 5 MILLIGRAM(S): at 09:25

## 2025-01-01 RX ADMIN — Medication 5 MILLILITER(S): at 06:04

## 2025-01-01 RX ADMIN — Medication 15 MILLILITER(S): at 13:10

## 2025-01-01 RX ADMIN — Medication 1 APPLICATION(S): at 05:13

## 2025-01-01 RX ADMIN — MEROPENEM 100 MILLIGRAM(S): 1 INJECTION INTRAVENOUS at 06:00

## 2025-01-01 RX ADMIN — WHITE PETROLATUM 1 APPLICATION(S): 1 OINTMENT TOPICAL at 05:00

## 2025-01-01 RX ADMIN — Medication 40 MILLIEQUIVALENT(S): at 17:13

## 2025-01-01 RX ADMIN — OXYCODONE HYDROCHLORIDE 5 MILLIGRAM(S): 30 TABLET ORAL at 06:09

## 2025-01-01 RX ADMIN — Medication 15 MILLILITER(S): at 08:22

## 2025-01-01 RX ADMIN — Medication 800 MILLIGRAM(S): at 05:06

## 2025-01-01 RX ADMIN — Medication 40 MILLIGRAM(S): at 05:50

## 2025-01-01 RX ADMIN — WHITE PETROLATUM 1 APPLICATION(S): 1 OINTMENT TOPICAL at 22:08

## 2025-01-01 RX ADMIN — Medication 1 APPLICATION(S): at 08:22

## 2025-01-01 RX ADMIN — Medication 5000 UNIT(S): at 06:18

## 2025-01-01 RX ADMIN — FUROSEMIDE 20 MILLIGRAM(S): 10 INJECTION INTRAMUSCULAR; INTRAVENOUS at 16:45

## 2025-01-01 RX ADMIN — Medication 40 MILLIGRAM(S): at 08:24

## 2025-01-01 RX ADMIN — Medication 220 MILLIGRAM(S): at 12:00

## 2025-01-01 RX ADMIN — ENOXAPARIN SODIUM 40 MILLIGRAM(S): 100 INJECTION SUBCUTANEOUS at 17:22

## 2025-01-01 RX ADMIN — WHITE PETROLATUM 1 APPLICATION(S): 1 OINTMENT TOPICAL at 05:12

## 2025-01-01 RX ADMIN — Medication 125 MILLIGRAM(S): at 17:11

## 2025-01-01 RX ADMIN — Medication 15 MILLILITER(S): at 17:34

## 2025-01-01 RX ADMIN — Medication 1 APPLICATION(S): at 08:56

## 2025-01-01 RX ADMIN — Medication 15 MILLILITER(S): at 20:03

## 2025-01-01 RX ADMIN — WHITE PETROLATUM 1 APPLICATION(S): 1 OINTMENT TOPICAL at 05:43

## 2025-01-01 RX ADMIN — INSULIN LISPRO 3: 100 INJECTION, SOLUTION INTRAVENOUS; SUBCUTANEOUS at 00:35

## 2025-01-01 RX ADMIN — Medication 15 MILLILITER(S): at 05:10

## 2025-01-01 RX ADMIN — DULOXETINE 60 MILLIGRAM(S): 20 CAPSULE, DELAYED RELEASE ORAL at 13:31

## 2025-01-01 RX ADMIN — ENOXAPARIN SODIUM 40 MILLIGRAM(S): 100 INJECTION SUBCUTANEOUS at 17:46

## 2025-01-01 RX ADMIN — WHITE PETROLATUM 1 APPLICATION(S): 1 OINTMENT TOPICAL at 06:35

## 2025-01-01 RX ADMIN — Medication 800 MILLIGRAM(S): at 17:19

## 2025-01-01 RX ADMIN — Medication 25 GRAM(S): at 05:09

## 2025-01-01 RX ADMIN — Medication 1 APPLICATION(S): at 13:09

## 2025-01-01 RX ADMIN — OXYCODONE HYDROCHLORIDE 5 MILLIGRAM(S): 30 TABLET ORAL at 12:55

## 2025-01-01 RX ADMIN — Medication 4.5 GRAM(S): at 21:50

## 2025-01-01 RX ADMIN — Medication 25 GRAM(S): at 21:17

## 2025-01-01 RX ADMIN — Medication 15 MILLILITER(S): at 17:45

## 2025-01-01 RX ADMIN — OXYCODONE HYDROCHLORIDE 5 MILLIGRAM(S): 30 TABLET ORAL at 06:39

## 2025-01-01 RX ADMIN — LOPERAMIDE HCL 4 MILLIGRAM(S): 1 SOLUTION ORAL at 12:25

## 2025-01-01 RX ADMIN — WHITE PETROLATUM 1 APPLICATION(S): 1 OINTMENT TOPICAL at 05:05

## 2025-01-01 RX ADMIN — Medication 15 MILLILITER(S): at 08:21

## 2025-01-01 RX ADMIN — PROPOFOL 4.82 MICROGRAM(S)/KG/MIN: 10 INJECTION, EMULSION INTRAVENOUS at 01:53

## 2025-01-01 RX ADMIN — POSACONAZOLE 300 MILLIGRAM(S): 100 TABLET, DELAYED RELEASE ORAL at 12:10

## 2025-01-01 RX ADMIN — Medication 25 GRAM(S): at 22:37

## 2025-01-01 RX ADMIN — FUROSEMIDE 20 MILLIGRAM(S): 10 INJECTION INTRAMUSCULAR; INTRAVENOUS at 16:57

## 2025-01-01 RX ADMIN — OXYCODONE HYDROCHLORIDE 5 MILLIGRAM(S): 30 TABLET ORAL at 20:02

## 2025-01-01 RX ADMIN — TACROLIMUS 0.5 MILLIGRAM(S): 0.5 CAPSULE ORAL at 07:48

## 2025-01-01 RX ADMIN — ATOVAQUONE 1500 MILLIGRAM(S): 750 SUSPENSION ORAL at 11:22

## 2025-01-01 RX ADMIN — Medication 50 MICROGRAM(S): at 20:56

## 2025-01-01 RX ADMIN — Medication 15 MILLILITER(S): at 12:05

## 2025-01-01 RX ADMIN — MEROPENEM 100 MILLIGRAM(S): 1 INJECTION INTRAVENOUS at 14:55

## 2025-01-01 RX ADMIN — Medication 25 GRAM(S): at 05:08

## 2025-01-01 RX ADMIN — OXYCODONE HYDROCHLORIDE 5 MILLIGRAM(S): 30 TABLET ORAL at 22:28

## 2025-01-22 ENCOUNTER — APPOINTMENT (OUTPATIENT)
Dept: INTERNAL MEDICINE | Facility: CLINIC | Age: 70
End: 2025-01-22
Payer: MEDICARE

## 2025-01-22 ENCOUNTER — LABORATORY RESULT (OUTPATIENT)
Age: 70
End: 2025-01-22

## 2025-01-22 VITALS
SYSTOLIC BLOOD PRESSURE: 144 MMHG | RESPIRATION RATE: 17 BRPM | HEART RATE: 97 BPM | TEMPERATURE: 98.1 F | OXYGEN SATURATION: 99 % | WEIGHT: 105 LBS | HEIGHT: 62 IN | BODY MASS INDEX: 19.32 KG/M2 | DIASTOLIC BLOOD PRESSURE: 90 MMHG

## 2025-01-22 DIAGNOSIS — R17 UNSPECIFIED JAUNDICE: ICD-10-CM

## 2025-01-22 PROCEDURE — 99214 OFFICE O/P EST MOD 30 MIN: CPT

## 2025-01-22 PROCEDURE — G2211 COMPLEX E/M VISIT ADD ON: CPT | Mod: PD

## 2025-01-23 LAB
ALBUMIN SERPL ELPH-MCNC: 4.1 G/DL
ALP BLD-CCNC: 661 U/L
ALT SERPL-CCNC: 575 U/L
ANION GAP SERPL CALC-SCNC: 14 MMOL/L
AST SERPL-CCNC: 346 U/L
BASOPHILS # BLD AUTO: 0.09 K/UL
BASOPHILS NFR BLD AUTO: 1.8 %
BILIRUB SERPL-MCNC: 6.8 MG/DL
BUN SERPL-MCNC: 18 MG/DL
CALCIUM SERPL-MCNC: 9.3 MG/DL
CHLORIDE SERPL-SCNC: 97 MMOL/L
CO2 SERPL-SCNC: 27 MMOL/L
CREAT SERPL-MCNC: 0.59 MG/DL
EGFR: 106 ML/MIN/1.73M2
EOSINOPHIL # BLD AUTO: 0 K/UL
EOSINOPHIL NFR BLD AUTO: 0 %
GLUCOSE SERPL-MCNC: 91 MG/DL
HAPTOGLOB SERPL-MCNC: 58 MG/DL
HAV IGM SER QL: NONREACTIVE
HBV CORE IGM SER QL: NONREACTIVE
HBV SURFACE AG SER QL: NONREACTIVE
HCT VFR BLD CALC: 37.7 %
HCV AB SER QL: NONREACTIVE
HCV S/CO RATIO: 0.04 S/CO
HGB BLD-MCNC: 11.6 G/DL
LYMPHOCYTES # BLD AUTO: 0.35 K/UL
LYMPHOCYTES NFR BLD AUTO: 7 %
MAN DIFF?: NORMAL
MCHC RBC-ENTMCNC: 26.1 PG
MCHC RBC-ENTMCNC: 30.8 G/DL
MCV RBC AUTO: 84.7 FL
MONOCYTES # BLD AUTO: 0.31 K/UL
MONOCYTES NFR BLD AUTO: 6.1 %
NEUTROPHILS # BLD AUTO: 4.26 K/UL
NEUTROPHILS NFR BLD AUTO: 85.1 %
PLATELET # BLD AUTO: 234 K/UL
POTASSIUM SERPL-SCNC: 4.1 MMOL/L
PROT SERPL-MCNC: 6.1 G/DL
RBC # BLD: 4.45 M/UL
RBC # BLD: 4.45 M/UL
RBC # FLD: 15.6 %
RETICS # AUTO: 1.2 %
RETICS AGGREG/RBC NFR: 52.5 K/UL
SODIUM SERPL-SCNC: 138 MMOL/L
WBC # FLD AUTO: 5 K/UL

## 2025-01-24 DIAGNOSIS — K83.1 OBSTRUCTION OF BILE DUCT: ICD-10-CM

## 2025-01-25 ENCOUNTER — TRANSCRIPTION ENCOUNTER (OUTPATIENT)
Age: 70
End: 2025-01-25

## 2025-01-28 ENCOUNTER — NON-APPOINTMENT (OUTPATIENT)
Age: 70
End: 2025-01-28

## 2025-01-28 PROBLEM — C83.30 DIFFUSE LARGE B-CELL LYMPHOMA, UNSPECIFIED SITE: Chronic | Status: ACTIVE | Noted: 2025-01-23

## 2025-01-30 ENCOUNTER — RESULT REVIEW (OUTPATIENT)
Age: 70
End: 2025-01-30

## 2025-01-30 ENCOUNTER — NON-APPOINTMENT (OUTPATIENT)
Age: 70
End: 2025-01-30

## 2025-01-30 ENCOUNTER — APPOINTMENT (OUTPATIENT)
Dept: HEMATOLOGY ONCOLOGY | Facility: CLINIC | Age: 70
End: 2025-01-30
Payer: MEDICARE

## 2025-01-30 VITALS
SYSTOLIC BLOOD PRESSURE: 118 MMHG | DIASTOLIC BLOOD PRESSURE: 75 MMHG | TEMPERATURE: 97.2 F | WEIGHT: 103.15 LBS | RESPIRATION RATE: 18 BRPM | BODY MASS INDEX: 18.87 KG/M2 | OXYGEN SATURATION: 99 % | HEART RATE: 73 BPM

## 2025-01-30 LAB
ALBUMIN SERPL ELPH-MCNC: 3.6 G/DL
ALP BLD-CCNC: 331 U/L
ALT SERPL-CCNC: 272 U/L
ANION GAP SERPL CALC-SCNC: 11 MMOL/L
AST SERPL-CCNC: 99 U/L
BASOPHILS # BLD AUTO: 0.02 K/UL — SIGNIFICANT CHANGE UP (ref 0–0.2)
BASOPHILS NFR BLD AUTO: 0.4 % — SIGNIFICANT CHANGE UP (ref 0–2)
BILIRUB SERPL-MCNC: 1.8 MG/DL
BUN SERPL-MCNC: 23 MG/DL
CALCIUM SERPL-MCNC: 9 MG/DL
CHLORIDE SERPL-SCNC: 100 MMOL/L
CO2 SERPL-SCNC: 27 MMOL/L
CREAT SERPL-MCNC: 0.59 MG/DL
EGFR: 106 ML/MIN/1.73M2
EOSINOPHIL # BLD AUTO: 0.08 K/UL — SIGNIFICANT CHANGE UP (ref 0–0.5)
EOSINOPHIL NFR BLD AUTO: 1.8 % — SIGNIFICANT CHANGE UP (ref 0–6)
GLUCOSE SERPL-MCNC: 119 MG/DL
HCT VFR BLD CALC: 28.7 % — LOW (ref 39–50)
HGB BLD-MCNC: 9.2 G/DL — LOW (ref 13–17)
IMM GRANULOCYTES NFR BLD AUTO: 0.4 % — SIGNIFICANT CHANGE UP (ref 0–0.9)
LDH SERPL-CCNC: 683 U/L
LYMPHOCYTES # BLD AUTO: 0.27 K/UL — LOW (ref 1–3.3)
LYMPHOCYTES # BLD AUTO: 5.9 % — LOW (ref 13–44)
MCHC RBC-ENTMCNC: 27.5 PG — SIGNIFICANT CHANGE UP (ref 27–34)
MCHC RBC-ENTMCNC: 32.1 G/DL — SIGNIFICANT CHANGE UP (ref 32–36)
MCV RBC AUTO: 85.7 FL — SIGNIFICANT CHANGE UP (ref 80–100)
MONOCYTES # BLD AUTO: 0.48 K/UL — SIGNIFICANT CHANGE UP (ref 0–0.9)
MONOCYTES NFR BLD AUTO: 10.5 % — SIGNIFICANT CHANGE UP (ref 2–14)
NEUTROPHILS # BLD AUTO: 3.68 K/UL — SIGNIFICANT CHANGE UP (ref 1.8–7.4)
NEUTROPHILS NFR BLD AUTO: 81 % — HIGH (ref 43–77)
NRBC # BLD: 0 /100 WBCS — SIGNIFICANT CHANGE UP (ref 0–0)
NRBC BLD-RTO: 0 /100 WBCS — SIGNIFICANT CHANGE UP (ref 0–0)
PLATELET # BLD AUTO: 260 K/UL — SIGNIFICANT CHANGE UP (ref 150–400)
POTASSIUM SERPL-SCNC: 3.5 MMOL/L
PROT SERPL-MCNC: 5.5 G/DL
RBC # BLD: 3.35 M/UL — LOW (ref 4.2–5.8)
RBC # FLD: 16.1 % — HIGH (ref 10.3–14.5)
SODIUM SERPL-SCNC: 138 MMOL/L
URATE SERPL-MCNC: 4.5 MG/DL
WBC # BLD: 4.55 K/UL — SIGNIFICANT CHANGE UP (ref 3.8–10.5)
WBC # FLD AUTO: 4.55 K/UL — SIGNIFICANT CHANGE UP (ref 3.8–10.5)

## 2025-01-30 PROCEDURE — 99215 OFFICE O/P EST HI 40 MIN: CPT

## 2025-01-30 PROCEDURE — G2211 COMPLEX E/M VISIT ADD ON: CPT

## 2025-01-31 ENCOUNTER — TRANSCRIPTION ENCOUNTER (OUTPATIENT)
Age: 70
End: 2025-01-31

## 2025-02-01 ENCOUNTER — TRANSCRIPTION ENCOUNTER (OUTPATIENT)
Age: 70
End: 2025-02-01

## 2025-02-03 ENCOUNTER — LABORATORY RESULT (OUTPATIENT)
Age: 70
End: 2025-02-03

## 2025-02-03 ENCOUNTER — RESULT REVIEW (OUTPATIENT)
Age: 70
End: 2025-02-03

## 2025-02-03 ENCOUNTER — APPOINTMENT (OUTPATIENT)
Dept: HEMATOLOGY ONCOLOGY | Facility: CLINIC | Age: 70
End: 2025-02-03
Payer: MEDICARE

## 2025-02-03 ENCOUNTER — APPOINTMENT (OUTPATIENT)
Dept: HEMATOLOGY ONCOLOGY | Facility: CLINIC | Age: 70
End: 2025-02-03

## 2025-02-03 VITALS
RESPIRATION RATE: 18 BRPM | WEIGHT: 102.49 LBS | SYSTOLIC BLOOD PRESSURE: 107 MMHG | OXYGEN SATURATION: 99 % | BODY MASS INDEX: 18.75 KG/M2 | DIASTOLIC BLOOD PRESSURE: 66 MMHG | TEMPERATURE: 98.4 F | HEART RATE: 83 BPM

## 2025-02-03 DIAGNOSIS — C83.38 DIFFUSE LARGE B-CELL LYMPHOMA, LYMPH NODES OF MULTIPLE SITES: ICD-10-CM

## 2025-02-03 LAB
ALBUMIN SERPL ELPH-MCNC: 3.6 G/DL
ALP BLD-CCNC: 276 U/L
ALT SERPL-CCNC: 141 U/L
ANION GAP SERPL CALC-SCNC: 8 MMOL/L
AST SERPL-CCNC: 84 U/L
BASOPHILS # BLD AUTO: 0.01 K/UL — SIGNIFICANT CHANGE UP (ref 0–0.2)
BASOPHILS NFR BLD AUTO: 0.2 % — SIGNIFICANT CHANGE UP (ref 0–2)
BILIRUB SERPL-MCNC: 1.3 MG/DL
BUN SERPL-MCNC: 18 MG/DL
CALCIUM SERPL-MCNC: 8.9 MG/DL
CHLORIDE SERPL-SCNC: 100 MMOL/L
CO2 SERPL-SCNC: 28 MMOL/L
CREAT SERPL-MCNC: 0.46 MG/DL
EGFR: 115 ML/MIN/1.73M2
EOSINOPHIL # BLD AUTO: 0.01 K/UL — SIGNIFICANT CHANGE UP (ref 0–0.5)
EOSINOPHIL NFR BLD AUTO: 0.2 % — SIGNIFICANT CHANGE UP (ref 0–6)
GLUCOSE SERPL-MCNC: 195 MG/DL
HCT VFR BLD CALC: 29.1 % — LOW (ref 39–50)
HGB BLD-MCNC: 9.1 G/DL — LOW (ref 13–17)
IMM GRANULOCYTES NFR BLD AUTO: 0.2 % — SIGNIFICANT CHANGE UP (ref 0–0.9)
LYMPHOCYTES # BLD AUTO: 0.11 K/UL — LOW (ref 1–3.3)
LYMPHOCYTES # BLD AUTO: 2.2 % — LOW (ref 13–44)
MCHC RBC-ENTMCNC: 26.9 PG — LOW (ref 27–34)
MCHC RBC-ENTMCNC: 31.3 G/DL — LOW (ref 32–36)
MCV RBC AUTO: 86.1 FL — SIGNIFICANT CHANGE UP (ref 80–100)
MONOCYTES # BLD AUTO: 0.17 K/UL — SIGNIFICANT CHANGE UP (ref 0–0.9)
MONOCYTES NFR BLD AUTO: 3.3 % — SIGNIFICANT CHANGE UP (ref 2–14)
NEUTROPHILS # BLD AUTO: 4.79 K/UL — SIGNIFICANT CHANGE UP (ref 1.8–7.4)
NEUTROPHILS NFR BLD AUTO: 93.9 % — HIGH (ref 43–77)
NRBC # BLD: 0 /100 WBCS — SIGNIFICANT CHANGE UP (ref 0–0)
NRBC BLD-RTO: 0 /100 WBCS — SIGNIFICANT CHANGE UP (ref 0–0)
PLATELET # BLD AUTO: 266 K/UL — SIGNIFICANT CHANGE UP (ref 150–400)
POTASSIUM SERPL-SCNC: 3.9 MMOL/L
PROT SERPL-MCNC: 5.5 G/DL
RBC # BLD: 3.38 M/UL — LOW (ref 4.2–5.8)
RBC # FLD: 15.9 % — HIGH (ref 10.3–14.5)
SODIUM SERPL-SCNC: 137 MMOL/L
WBC # BLD: 5.1 K/UL — SIGNIFICANT CHANGE UP (ref 3.8–10.5)
WBC # FLD AUTO: 5.1 K/UL — SIGNIFICANT CHANGE UP (ref 3.8–10.5)

## 2025-02-03 PROCEDURE — G2211 COMPLEX E/M VISIT ADD ON: CPT

## 2025-02-03 PROCEDURE — 99215 OFFICE O/P EST HI 40 MIN: CPT

## 2025-02-04 RX ORDER — ALLOPURINOL 300 MG/1
300 TABLET ORAL
Refills: 0 | Status: ACTIVE | COMMUNITY
Start: 2025-02-01

## 2025-02-06 ENCOUNTER — RESULT REVIEW (OUTPATIENT)
Age: 70
End: 2025-02-06

## 2025-02-06 ENCOUNTER — APPOINTMENT (OUTPATIENT)
Dept: HEMATOLOGY ONCOLOGY | Facility: CLINIC | Age: 70
End: 2025-02-06

## 2025-02-06 ENCOUNTER — APPOINTMENT (OUTPATIENT)
Dept: INFUSION THERAPY | Facility: HOSPITAL | Age: 70
End: 2025-02-06

## 2025-02-06 ENCOUNTER — TRANSCRIPTION ENCOUNTER (OUTPATIENT)
Age: 70
End: 2025-02-06

## 2025-02-06 ENCOUNTER — OUTPATIENT (OUTPATIENT)
Dept: OUTPATIENT SERVICES | Facility: HOSPITAL | Age: 70
LOS: 1 days | End: 2025-02-06
Payer: MEDICARE

## 2025-02-06 VITALS
SYSTOLIC BLOOD PRESSURE: 121 MMHG | OXYGEN SATURATION: 100 % | WEIGHT: 102.07 LBS | TEMPERATURE: 99 F | HEART RATE: 87 BPM | DIASTOLIC BLOOD PRESSURE: 72 MMHG | HEIGHT: 62 IN | RESPIRATION RATE: 17 BRPM

## 2025-02-06 VITALS
SYSTOLIC BLOOD PRESSURE: 138 MMHG | HEART RATE: 89 BPM | OXYGEN SATURATION: 100 % | RESPIRATION RATE: 19 BRPM | DIASTOLIC BLOOD PRESSURE: 82 MMHG

## 2025-02-06 DIAGNOSIS — Z94.81 BONE MARROW TRANSPLANT STATUS: ICD-10-CM

## 2025-02-06 DIAGNOSIS — Z98.890 OTHER SPECIFIED POSTPROCEDURAL STATES: Chronic | ICD-10-CM

## 2025-02-06 DIAGNOSIS — Z49.01 ENCOUNTER FOR FITTING AND ADJUSTMENT OF EXTRACORPOREAL DIALYSIS CATHETER: ICD-10-CM

## 2025-02-06 DIAGNOSIS — Z90.49 ACQUIRED ABSENCE OF OTHER SPECIFIED PARTS OF DIGESTIVE TRACT: Chronic | ICD-10-CM

## 2025-02-06 DIAGNOSIS — C83.30 DIFFUSE LARGE B-CELL LYMPHOMA, UNSPECIFIED SITE: ICD-10-CM

## 2025-02-06 LAB
BASOPHILS # BLD AUTO: 0.01 K/UL — SIGNIFICANT CHANGE UP (ref 0–0.2)
BASOPHILS NFR BLD AUTO: 0.3 % — SIGNIFICANT CHANGE UP (ref 0–2)
EOSINOPHIL # BLD AUTO: 0.05 K/UL — SIGNIFICANT CHANGE UP (ref 0–0.5)
EOSINOPHIL NFR BLD AUTO: 1.6 % — SIGNIFICANT CHANGE UP (ref 0–6)
HCT VFR BLD CALC: 28.7 % — LOW (ref 39–50)
HGB BLD-MCNC: 9.4 G/DL — LOW (ref 13–17)
IMM GRANULOCYTES NFR BLD AUTO: 0.9 % — SIGNIFICANT CHANGE UP (ref 0–0.9)
LYMPHOCYTES # BLD AUTO: 0.16 K/UL — LOW (ref 1–3.3)
LYMPHOCYTES # BLD AUTO: 5 % — LOW (ref 13–44)
MCHC RBC-ENTMCNC: 27.2 PG — SIGNIFICANT CHANGE UP (ref 27–34)
MCHC RBC-ENTMCNC: 32.8 G/DL — SIGNIFICANT CHANGE UP (ref 32–36)
MCV RBC AUTO: 83.2 FL — SIGNIFICANT CHANGE UP (ref 80–100)
MONOCYTES # BLD AUTO: 0.13 K/UL — SIGNIFICANT CHANGE UP (ref 0–0.9)
MONOCYTES NFR BLD AUTO: 4.1 % — SIGNIFICANT CHANGE UP (ref 2–14)
NEUTROPHILS # BLD AUTO: 2.79 K/UL — SIGNIFICANT CHANGE UP (ref 1.8–7.4)
NEUTROPHILS NFR BLD AUTO: 88.1 % — HIGH (ref 43–77)
NRBC # BLD: 0 /100 WBCS — SIGNIFICANT CHANGE UP (ref 0–0)
NRBC BLD-RTO: 0 /100 WBCS — SIGNIFICANT CHANGE UP (ref 0–0)
PLATELET # BLD AUTO: 271 K/UL — SIGNIFICANT CHANGE UP (ref 150–400)
RBC # BLD: 3.45 M/UL — LOW (ref 4.2–5.8)
RBC # FLD: 15.3 % — HIGH (ref 10.3–14.5)
WBC # BLD: 3.17 K/UL — LOW (ref 3.8–10.5)
WBC # FLD AUTO: 3.17 K/UL — LOW (ref 3.8–10.5)

## 2025-02-06 PROCEDURE — C1894: CPT

## 2025-02-06 PROCEDURE — 36558 INSERT TUNNELED CV CATH: CPT

## 2025-02-06 PROCEDURE — 76937 US GUIDE VASCULAR ACCESS: CPT | Mod: 26

## 2025-02-06 PROCEDURE — 77001 FLUOROGUIDE FOR VEIN DEVICE: CPT

## 2025-02-06 PROCEDURE — 76937 US GUIDE VASCULAR ACCESS: CPT

## 2025-02-06 PROCEDURE — C1750: CPT

## 2025-02-06 PROCEDURE — 77001 FLUOROGUIDE FOR VEIN DEVICE: CPT | Mod: 26

## 2025-02-06 PROCEDURE — C1769: CPT

## 2025-02-06 PROCEDURE — C1887: CPT

## 2025-02-06 PROCEDURE — 36558 INSERT TUNNELED CV CATH: CPT | Mod: RT

## 2025-02-06 NOTE — ASU PREOP CHECKLIST - HEART RATE (BEATS/MIN)
87 Medical Necessity Statement: Based on my medical judgement, Mohs surgery is the most appropriate treatment for this cancer compared to other treatments.

## 2025-02-06 NOTE — ASU DISCHARGE PLAN (ADULT/PEDIATRIC) - NURSING INSTRUCTIONS
Please feel free to contact us at [phone] if any problems arise. After 6PM, Monday through Friday, on weekends and on holidays, please call [phone] and ask for the radiology resident on call to be paged.

## 2025-02-06 NOTE — ASU PATIENT PROFILE, ADULT - FALL HARM RISK - HARM RISK INTERVENTIONS

## 2025-02-06 NOTE — ASU PATIENT PROFILE, ADULT - NSICDXPASTMEDICALHX_GEN_ALL_CORE_FT
PAST MEDICAL HISTORY:  DLBCL (diffuse large B cell lymphoma)     HLD (hyperlipidemia)     Prediabetes     Sinus bradycardia

## 2025-02-06 NOTE — PROCEDURE NOTE - PROCEDURE FINDINGS AND DETAILS
RT IJ access. 19cm tip to cuff 14 Fr dialysis type catheter placed to RT upper chest with Fluoro and u/s guidnace. OK to use. Sterilwe dressings placed

## 2025-02-06 NOTE — ASU DISCHARGE PLAN (ADULT/PEDIATRIC) - NS MD DC FALL RISK RISK
For information on Fall & Injury Prevention, visit: [email or web address] OR  [email or web address] OR  https://www.cdc.gov/steadi/patient.html

## 2025-02-06 NOTE — ASU DISCHARGE PLAN (ADULT/PEDIATRIC) - FINANCIAL ASSISTANCE
[facility] provides services at a reduced cost to those who are determined to be eligible through [facility]’s financial assistance program. Information regarding [facility]’s financial assistance program can be found by going to [email or web address] or by calling [phone].

## 2025-02-06 NOTE — ASU DISCHARGE PLAN (ADULT/PEDIATRIC) - ASU DC SPECIAL INSTRUCTIONSFT
Tunneled catheter placeemnt    Discharge Instructions  - You have had a tunneled dialysis type catheter implanted in your chest.   - The catheter is ready for use.  - You may shower in 48 hours.   - Do not remove steri-strips (tiny pieces of tape); they will fall off on their own in 10-14 days  -Do not remove the medical glue; it will fall off on its own  - Do not perform any heavy lifting or put tension on the area for the next week or until the site is healed.  - You may resume your normal diet.  - You may resume your normal medications however you should wait 48 hours before restarting aspirin, plavix, or blood thinners.  - It is normal to experience some pain over the site for the next few days. You may take apply ice to the area (20 minutes on, 20 minutes off) and take Tylenol for that pain. Do not take more frequently than every 6 hours and do not exceed more than 3000mg of Tylenol in a 24 hour period.    - You were given conscious sedation which may make you drowsy, therefore you need someone to stay with you until the morning following the procedure.  - Do not drive, engage in heavy lifting or strenuous activity, or drink any alcoholic beverages for the next 24 hours.   - You may resume normal activity in 24 hours.    Notify your primary physician and/or Interventional Radiology IMMEDIATELY if you experience any of the following       - Fever of 100.4F or 38C       - Chills or Rigors/ Shakes       - Swelling and/or Redness in the area around the port       - Worsening Pain       - Blood soaked bandages or worsening bleeding       - Lightheadedness and/or dizziness upon standing       - Chest Pain/ Tightness       - Shortness of Breath       - Difficulty walking    If you have a problem that you believe requires IMMEDIATE attention, please go to your NEAREST Emergency Room. If you believe your problem can safely wait until you speak to a physician, please call Interventional Radiology for any concerns.    During Normal Weekday Business Hours- You can contact the Interventional Radiology department during normal business hours via telephone.  During Evenings and Weekends- If you need to contact Interventional Radiology during off hours, do so by calling the hospital and requesting to be connected to the Interventional Radiologist on call.

## 2025-02-06 NOTE — H&P ADULT - HISTORY OF PRESENT ILLNESS
Interventional Radiology    HPI: 69y Male with     Review of Systems:   Constitutional: No fever, weight loss, or fatigue  Neurological: No headaches, memory loss, loss of strength, numbness, or tremors  Respiratory: No cough, wheezing, chills or hemoptysis; No shortness of breath  Cardiovascular: No chest pain, palpitations, dizziness, or leg swelling  Gastrointestinal: No abdominal or epigastric pain. No nausea, vomiting, or hematemesis; No diarrhea or constipation. No melena or hematochezia.  Skin: No itching, burning, rashes, or lesions   Musculoskeletal: No joint pain or swelling; No muscle, back, or extremity pain    Allergies: No Known Drug Allergies  latex (Rash)    Medications (Abx/Cardiac/Anticoagulation/Blood Products)      Data:  157.5  46.3  T(C): 37.1  HR: 87  BP: 121/72  RR: 17  SpO2: 100%    -WBC 3.17 / HgB 9.4 / Hct 28.7 / Plt 271        Physical Exam  General: No acute distress, nontoxic, A&Ox3  Chest: Non labored breathing  Abdomen: Non-distended, non-tender, no preitoneal signs  Extremities: No swelling, warm. No pedal edema or calf tenderness notes.  Skin: No rashes or lesions    RADIOLOGY & ADDITIONAL TESTS:    Imaging Reviewed    H & P Note Reviewed from: 2/2/2025    Plan: 69y Male presents for   -Risks/Benefits/alternatives explained with the patient and/or healthcare proxy and witnessed informed consent obtained.

## 2025-02-07 ENCOUNTER — OUTPATIENT (OUTPATIENT)
Dept: OUTPATIENT SERVICES | Facility: HOSPITAL | Age: 70
LOS: 1 days | End: 2025-02-07
Payer: MEDICARE

## 2025-02-07 DIAGNOSIS — Z98.890 OTHER SPECIFIED POSTPROCEDURAL STATES: Chronic | ICD-10-CM

## 2025-02-07 DIAGNOSIS — C83.38 DIFFUSE LARGE B-CELL LYMPHOMA, LYMPH NODES OF MULTIPLE SITES: ICD-10-CM

## 2025-02-07 DIAGNOSIS — Z90.49 ACQUIRED ABSENCE OF OTHER SPECIFIED PARTS OF DIGESTIVE TRACT: Chronic | ICD-10-CM

## 2025-02-07 LAB
ALBUMIN SERPL ELPH-MCNC: 3.6 G/DL — SIGNIFICANT CHANGE UP (ref 3.3–5)
ALP SERPL-CCNC: 250 U/L — HIGH (ref 40–120)
ALT FLD-CCNC: 76 U/L — HIGH (ref 10–45)
ANION GAP SERPL CALC-SCNC: 11 MMOL/L — SIGNIFICANT CHANGE UP (ref 5–17)
ANION GAP SERPL CALC-SCNC: 12 MMOL/L — SIGNIFICANT CHANGE UP (ref 5–17)
AST SERPL-CCNC: 38 U/L — SIGNIFICANT CHANGE UP (ref 10–40)
BASOPHILS NFR BLD AUTO: 0 % — SIGNIFICANT CHANGE UP (ref 0–2)
BILIRUB SERPL-MCNC: 1.1 MG/DL — SIGNIFICANT CHANGE UP (ref 0.2–1.2)
BUN SERPL-MCNC: 17 MG/DL — SIGNIFICANT CHANGE UP (ref 7–23)
BUN SERPL-MCNC: 9 MG/DL — SIGNIFICANT CHANGE UP (ref 7–23)
CA-I BLD-SCNC: 1.18 MMOL/L — SIGNIFICANT CHANGE UP (ref 1.15–1.33)
CALCIUM SERPL-MCNC: 10.8 MG/DL — HIGH (ref 8.4–10.5)
CALCIUM SERPL-MCNC: 8.8 MG/DL — SIGNIFICANT CHANGE UP (ref 8.4–10.5)
CD3 CELLS # SPEC: 72 CELLS/UL — SIGNIFICANT CHANGE UP
CD3 VIABILILTY: 100 % — SIGNIFICANT CHANGE UP
CHLORIDE SERPL-SCNC: 95 MMOL/L — LOW (ref 96–108)
CHLORIDE SERPL-SCNC: 98 MMOL/L — SIGNIFICANT CHANGE UP (ref 96–108)
CO2 SERPL-SCNC: 26 MMOL/L — SIGNIFICANT CHANGE UP (ref 22–31)
CO2 SERPL-SCNC: 31 MMOL/L — SIGNIFICANT CHANGE UP (ref 22–31)
CREAT SERPL-MCNC: 0.39 MG/DL — LOW (ref 0.5–1.3)
CREAT SERPL-MCNC: 0.45 MG/DL — LOW (ref 0.5–1.3)
EGFR: 115 ML/MIN/1.73M2 — SIGNIFICANT CHANGE UP
EGFR: 120 ML/MIN/1.73M2 — SIGNIFICANT CHANGE UP
EOSINOPHIL NFR BLD AUTO: 0 — SIGNIFICANT CHANGE UP
GLUCOSE SERPL-MCNC: 185 MG/DL — HIGH (ref 70–99)
GLUCOSE SERPL-MCNC: 216 MG/DL — HIGH (ref 70–99)
HCT VFR BLD CALC: 23.7 % — LOW (ref 39–50)
HCT VFR BLD CALC: 26.8 % — LOW (ref 39–50)
HGB BLD-MCNC: 7.6 G/DL — LOW (ref 13–17)
HGB BLD-MCNC: 8.5 G/DL — LOW (ref 13–17)
LYMPHOCYTES # BLD AUTO: 6 % — LOW (ref 13–44)
MAGNESIUM SERPL-MCNC: 1.8 MG/DL — SIGNIFICANT CHANGE UP (ref 1.6–2.6)
MAGNESIUM SERPL-MCNC: 1.9 MG/DL — SIGNIFICANT CHANGE UP (ref 1.6–2.6)
MANUAL SMEAR VERIFICATION: SIGNIFICANT CHANGE UP
MCHC RBC-ENTMCNC: 26.5 PG — LOW (ref 27–34)
MCHC RBC-ENTMCNC: 26.8 PG — LOW (ref 27–34)
MCHC RBC-ENTMCNC: 31.7 G/DL — LOW (ref 32–36)
MCHC RBC-ENTMCNC: 32.1 G/DL — SIGNIFICANT CHANGE UP (ref 32–36)
MCV RBC AUTO: 83.5 FL — SIGNIFICANT CHANGE UP (ref 80–100)
MCV RBC AUTO: 83.5 FL — SIGNIFICANT CHANGE UP (ref 80–100)
MONOCYTES NFR BLD AUTO: 7 % — SIGNIFICANT CHANGE UP (ref 2–14)
NEUTROPHILS NFR BLD AUTO: 85 % — HIGH (ref 43–77)
NEUTS BAND # BLD: 2 % — SIGNIFICANT CHANGE UP (ref 0–8)
NEUTS BAND NFR BLD: 2 % — SIGNIFICANT CHANGE UP (ref 0–8)
NRBC # BLD: 0 /100 WBCS — SIGNIFICANT CHANGE UP (ref 0–0)
NRBC # BLD: 0 /100 WBCS — SIGNIFICANT CHANGE UP (ref 0–0)
NRBC BLD-RTO: 0 /100 WBCS — SIGNIFICANT CHANGE UP (ref 0–0)
NRBC BLD-RTO: 0 /100 WBCS — SIGNIFICANT CHANGE UP (ref 0–0)
PLAT MORPH BLD: NORMAL — SIGNIFICANT CHANGE UP
PLATELET # BLD AUTO: 141 K/UL — LOW (ref 150–400)
PLATELET # BLD AUTO: 251 K/UL — SIGNIFICANT CHANGE UP (ref 150–400)
POTASSIUM SERPL-MCNC: 3.3 MMOL/L — LOW (ref 3.5–5.3)
POTASSIUM SERPL-MCNC: 4.1 MMOL/L — SIGNIFICANT CHANGE UP (ref 3.5–5.3)
POTASSIUM SERPL-SCNC: 3.3 MMOL/L — LOW (ref 3.5–5.3)
POTASSIUM SERPL-SCNC: 4.1 MMOL/L — SIGNIFICANT CHANGE UP (ref 3.5–5.3)
PROT SERPL-MCNC: 5.6 G/DL — LOW (ref 6–8.3)
RBC # BLD: 2.84 M/UL — LOW (ref 4.2–5.8)
RBC # BLD: 3.21 M/UL — LOW (ref 4.2–5.8)
RBC # FLD: 15.4 % — HIGH (ref 10.3–14.5)
RBC # FLD: 15.5 % — HIGH (ref 10.3–14.5)
RBC BLD AUTO: NORMAL — SIGNIFICANT CHANGE UP
SODIUM SERPL-SCNC: 136 MMOL/L — SIGNIFICANT CHANGE UP (ref 135–145)
SODIUM SERPL-SCNC: 137 MMOL/L — SIGNIFICANT CHANGE UP (ref 135–145)
WBC # BLD: 1.35 K/UL — LOW (ref 3.8–10.5)
WBC # BLD: 1.47 K/UL — LOW (ref 3.8–10.5)
WBC # FLD AUTO: 1.35 K/UL — LOW (ref 3.8–10.5)
WBC # FLD AUTO: 1.47 K/UL — LOW (ref 3.8–10.5)

## 2025-02-07 PROCEDURE — 38206 HARVEST AUTO STEM CELLS: CPT

## 2025-02-07 RX ORDER — POTASSIUM CHLORIDE 750 MG/1
10 CAPSULE, EXTENDED RELEASE ORAL
Qty: 12 | Refills: 0 | Status: ACTIVE | COMMUNITY
Start: 2025-02-07 | End: 1900-01-01

## 2025-02-13 ENCOUNTER — APPOINTMENT (OUTPATIENT)
Dept: INFUSION THERAPY | Facility: HOSPITAL | Age: 70
End: 2025-02-13

## 2025-02-13 ENCOUNTER — APPOINTMENT (OUTPATIENT)
Dept: PULMONOLOGY | Facility: CLINIC | Age: 70
End: 2025-02-13
Payer: MEDICARE

## 2025-02-13 ENCOUNTER — OUTPATIENT (OUTPATIENT)
Dept: OUTPATIENT SERVICES | Facility: HOSPITAL | Age: 70
LOS: 1 days | End: 2025-02-13
Payer: MEDICARE

## 2025-02-13 ENCOUNTER — RESULT REVIEW (OUTPATIENT)
Age: 70
End: 2025-02-13

## 2025-02-13 ENCOUNTER — APPOINTMENT (OUTPATIENT)
Dept: HEMATOLOGY ONCOLOGY | Facility: CLINIC | Age: 70
End: 2025-02-13
Payer: MEDICARE

## 2025-02-13 ENCOUNTER — APPOINTMENT (OUTPATIENT)
Dept: CT IMAGING | Facility: IMAGING CENTER | Age: 70
End: 2025-02-13
Payer: MEDICARE

## 2025-02-13 VITALS
WEIGHT: 101.83 LBS | TEMPERATURE: 98.1 F | BODY MASS INDEX: 18.63 KG/M2 | HEART RATE: 81 BPM | RESPIRATION RATE: 18 BRPM | OXYGEN SATURATION: 99 % | DIASTOLIC BLOOD PRESSURE: 76 MMHG | SYSTOLIC BLOOD PRESSURE: 116 MMHG

## 2025-02-13 DIAGNOSIS — C83.38 DIFFUSE LARGE B-CELL LYMPHOMA, LYMPH NODES OF MULTIPLE SITES: ICD-10-CM

## 2025-02-13 DIAGNOSIS — Z98.890 OTHER SPECIFIED POSTPROCEDURAL STATES: Chronic | ICD-10-CM

## 2025-02-13 LAB
ALBUMIN SERPL ELPH-MCNC: 3.6 G/DL — SIGNIFICANT CHANGE UP (ref 3.3–5)
ALP SERPL-CCNC: 190 U/L — HIGH (ref 40–120)
ALT FLD-CCNC: 42 U/L — SIGNIFICANT CHANGE UP (ref 10–45)
ANION GAP SERPL CALC-SCNC: 12 MMOL/L — SIGNIFICANT CHANGE UP (ref 5–17)
AST SERPL-CCNC: 32 U/L — SIGNIFICANT CHANGE UP (ref 10–40)
BASOPHILS # BLD AUTO: 0 K/UL — SIGNIFICANT CHANGE UP (ref 0–0.2)
BASOPHILS NFR BLD AUTO: 0 % — SIGNIFICANT CHANGE UP (ref 0–2)
BILIRUB SERPL-MCNC: 0.9 MG/DL — SIGNIFICANT CHANGE UP (ref 0.2–1.2)
BUN SERPL-MCNC: 15 MG/DL — SIGNIFICANT CHANGE UP (ref 7–23)
CALCIUM SERPL-MCNC: 8.7 MG/DL — SIGNIFICANT CHANGE UP (ref 8.4–10.5)
CHLORIDE SERPL-SCNC: 98 MMOL/L — SIGNIFICANT CHANGE UP (ref 96–108)
CO2 SERPL-SCNC: 26 MMOL/L — SIGNIFICANT CHANGE UP (ref 22–31)
CREAT SERPL-MCNC: 0.49 MG/DL — LOW (ref 0.5–1.3)
EGFR: 112 ML/MIN/1.73M2 — SIGNIFICANT CHANGE UP
EOSINOPHIL # BLD AUTO: 0.05 K/UL — SIGNIFICANT CHANGE UP (ref 0–0.5)
EOSINOPHIL NFR BLD AUTO: 5 % — SIGNIFICANT CHANGE UP (ref 0–6)
GLUCOSE SERPL-MCNC: 176 MG/DL — HIGH (ref 70–99)
HCT VFR BLD CALC: 28 % — LOW (ref 39–50)
HGB BLD-MCNC: 9 G/DL — LOW (ref 13–17)
LYMPHOCYTES # BLD AUTO: 0.18 K/UL — LOW (ref 1–3.3)
LYMPHOCYTES # BLD AUTO: 18 % — SIGNIFICANT CHANGE UP (ref 13–44)
MCHC RBC-ENTMCNC: 27.1 PG — SIGNIFICANT CHANGE UP (ref 27–34)
MCHC RBC-ENTMCNC: 32.1 G/DL — SIGNIFICANT CHANGE UP (ref 32–36)
MCV RBC AUTO: 84.3 FL — SIGNIFICANT CHANGE UP (ref 80–100)
MONOCYTES # BLD AUTO: 0.36 K/UL — SIGNIFICANT CHANGE UP (ref 0–0.9)
MONOCYTES NFR BLD AUTO: 36 % — HIGH (ref 2–14)
NEUTROPHILS # BLD AUTO: 0.41 K/UL — LOW (ref 1.8–7.4)
NEUTROPHILS NFR BLD AUTO: 41 % — LOW (ref 43–77)
NRBC # BLD: 0 /100 WBCS — SIGNIFICANT CHANGE UP (ref 0–0)
NRBC BLD AUTO-RTO: SIGNIFICANT CHANGE UP /100 WBCS (ref 0–0)
NRBC BLD-RTO: 0 /100 WBCS — SIGNIFICANT CHANGE UP (ref 0–0)
PLAT MORPH BLD: NORMAL — SIGNIFICANT CHANGE UP
PLATELET # BLD AUTO: 201 K/UL — SIGNIFICANT CHANGE UP (ref 150–400)
POTASSIUM SERPL-MCNC: 3.7 MMOL/L — SIGNIFICANT CHANGE UP (ref 3.5–5.3)
POTASSIUM SERPL-SCNC: 3.7 MMOL/L — SIGNIFICANT CHANGE UP (ref 3.5–5.3)
PROT SERPL-MCNC: 5.6 G/DL — LOW (ref 6–8.3)
RBC # BLD: 3.32 M/UL — LOW (ref 4.2–5.8)
RBC # FLD: 15.1 % — HIGH (ref 10.3–14.5)
RBC BLD AUTO: SIGNIFICANT CHANGE UP
SODIUM SERPL-SCNC: 136 MMOL/L — SIGNIFICANT CHANGE UP (ref 135–145)
WBC # BLD: 0.99 K/UL — CRITICAL LOW (ref 3.8–10.5)
WBC # FLD AUTO: 0.99 K/UL — CRITICAL LOW (ref 3.8–10.5)

## 2025-02-13 PROCEDURE — 94726 PLETHYSMOGRAPHY LUNG VOLUMES: CPT

## 2025-02-13 PROCEDURE — 99215 OFFICE O/P EST HI 40 MIN: CPT

## 2025-02-13 PROCEDURE — 94010 BREATHING CAPACITY TEST: CPT

## 2025-02-13 PROCEDURE — 74176 CT ABD & PELVIS W/O CONTRAST: CPT | Mod: 26

## 2025-02-13 PROCEDURE — G2211 COMPLEX E/M VISIT ADD ON: CPT

## 2025-02-13 PROCEDURE — 71250 CT THORAX DX C-: CPT | Mod: 26

## 2025-02-13 PROCEDURE — 94729 DIFFUSING CAPACITY: CPT

## 2025-02-13 RX ORDER — PANTOPRAZOLE 40 MG/1
40 TABLET, DELAYED RELEASE ORAL
Qty: 30 | Refills: 2 | Status: DISCONTINUED | COMMUNITY
Start: 2025-02-03 | End: 2025-02-13

## 2025-02-14 ENCOUNTER — OUTPATIENT (OUTPATIENT)
Dept: OUTPATIENT SERVICES | Facility: HOSPITAL | Age: 70
LOS: 1 days | Discharge: HOME | End: 2025-02-14

## 2025-02-14 DIAGNOSIS — C83.30 DIFFUSE LARGE B-CELL LYMPHOMA, UNSPECIFIED SITE: ICD-10-CM

## 2025-02-14 DIAGNOSIS — Z90.49 ACQUIRED ABSENCE OF OTHER SPECIFIED PARTS OF DIGESTIVE TRACT: Chronic | ICD-10-CM

## 2025-02-14 DIAGNOSIS — C83.38 DIFFUSE LARGE B-CELL LYMPHOMA, LYMPH NODES OF MULTIPLE SITES: ICD-10-CM

## 2025-02-14 DIAGNOSIS — Z51.89 ENCOUNTER FOR OTHER SPECIFIED AFTERCARE: ICD-10-CM

## 2025-02-18 ENCOUNTER — APPOINTMENT (OUTPATIENT)
Dept: INFUSION THERAPY | Facility: HOSPITAL | Age: 70
End: 2025-02-18

## 2025-02-18 ENCOUNTER — RESULT REVIEW (OUTPATIENT)
Age: 70
End: 2025-02-18

## 2025-02-20 ENCOUNTER — APPOINTMENT (OUTPATIENT)
Dept: HEMATOLOGY ONCOLOGY | Facility: CLINIC | Age: 70
End: 2025-02-20

## 2025-02-20 ENCOUNTER — RESULT REVIEW (OUTPATIENT)
Age: 70
End: 2025-02-20

## 2025-02-20 LAB
ALBUMIN SERPL ELPH-MCNC: 3.9 G/DL
ALP BLD-CCNC: 259 U/L
ALT SERPL-CCNC: 23 U/L
ANION GAP SERPL CALC-SCNC: 12 MMOL/L
AST SERPL-CCNC: 25 U/L
BASOPHILS # BLD AUTO: 0.13 K/UL — SIGNIFICANT CHANGE UP (ref 0–0.2)
BASOPHILS NFR BLD AUTO: 0.4 % — SIGNIFICANT CHANGE UP (ref 0–2)
BILIRUB SERPL-MCNC: 0.7 MG/DL
BUN SERPL-MCNC: 14 MG/DL
CALCIUM SERPL-MCNC: 8.9 MG/DL
CHLORIDE SERPL-SCNC: 98 MMOL/L
CO2 SERPL-SCNC: 27 MMOL/L
CREAT SERPL-MCNC: 0.6 MG/DL
EGFR: 106 ML/MIN/1.73M2
EOSINOPHIL # BLD AUTO: 0.06 K/UL — SIGNIFICANT CHANGE UP (ref 0–0.5)
EOSINOPHIL NFR BLD AUTO: 0.2 % — SIGNIFICANT CHANGE UP (ref 0–6)
GLUCOSE SERPL-MCNC: 203 MG/DL
HCT VFR BLD CALC: 34.1 % — LOW (ref 39–50)
HGB BLD-MCNC: 10.5 G/DL — LOW (ref 13–17)
IMM GRANULOCYTES NFR BLD AUTO: 4.6 % — HIGH (ref 0–0.9)
LYMPHOCYTES # BLD AUTO: 0.41 K/UL — LOW (ref 1–3.3)
LYMPHOCYTES # BLD AUTO: 1.4 % — LOW (ref 13–44)
MCHC RBC-ENTMCNC: 26.6 PG — LOW (ref 27–34)
MCHC RBC-ENTMCNC: 30.8 G/DL — LOW (ref 32–36)
MCV RBC AUTO: 86.5 FL — SIGNIFICANT CHANGE UP (ref 80–100)
MONOCYTES # BLD AUTO: 1.6 K/UL — HIGH (ref 0–0.9)
MONOCYTES NFR BLD AUTO: 5.3 % — SIGNIFICANT CHANGE UP (ref 2–14)
NEUTROPHILS # BLD AUTO: 26.45 K/UL — HIGH (ref 1.8–7.4)
NEUTROPHILS NFR BLD AUTO: 88.1 % — HIGH (ref 43–77)
NRBC BLD AUTO-RTO: 0 /100 WBCS — SIGNIFICANT CHANGE UP (ref 0–0)
PLATELET # BLD AUTO: 172 K/UL — SIGNIFICANT CHANGE UP (ref 150–400)
POTASSIUM SERPL-SCNC: 3.8 MMOL/L
PROT SERPL-MCNC: 5.7 G/DL
RBC # BLD: 3.94 M/UL — LOW (ref 4.2–5.8)
RBC # FLD: 15.3 % — HIGH (ref 10.3–14.5)
SODIUM SERPL-SCNC: 138 MMOL/L
WBC # BLD: 31.03 K/UL — HIGH (ref 3.8–10.5)
WBC # FLD AUTO: 31.03 K/UL — HIGH (ref 3.8–10.5)

## 2025-02-21 ENCOUNTER — NON-APPOINTMENT (OUTPATIENT)
Age: 70
End: 2025-02-21

## 2025-02-21 ENCOUNTER — APPOINTMENT (OUTPATIENT)
Dept: HEMATOLOGY ONCOLOGY | Facility: CLINIC | Age: 70
End: 2025-02-21
Payer: MEDICARE

## 2025-02-21 PROCEDURE — 99212 OFFICE O/P EST SF 10 MIN: CPT | Mod: 2W

## 2025-02-21 RX ORDER — LEVOFLOXACIN 500 MG/1
500 TABLET, FILM COATED ORAL DAILY
Qty: 14 | Refills: 0 | Status: DISCONTINUED | COMMUNITY
Start: 2025-02-13 | End: 2025-02-21

## 2025-02-21 RX ORDER — LENALIDOMIDE 10 MG/1
10 CAPSULE ORAL
Qty: 14 | Refills: 0 | Status: ACTIVE | COMMUNITY
Start: 2025-02-21 | End: 1900-01-01

## 2025-02-21 RX ORDER — VALACYCLOVIR 500 MG/1
500 TABLET, FILM COATED ORAL TWICE DAILY
Qty: 60 | Refills: 1 | Status: ACTIVE | COMMUNITY
Start: 2025-02-21 | End: 1900-01-01

## 2025-02-21 RX ORDER — SULFAMETHOXAZOLE AND TRIMETHOPRIM 400; 80 MG/1; MG/1
400-80 TABLET ORAL
Qty: 30 | Refills: 1 | Status: ACTIVE | COMMUNITY
Start: 2025-02-21 | End: 1900-01-01

## 2025-02-21 RX ORDER — ASPIRIN ENTERIC COATED TABLETS 81 MG 81 MG/1
81 TABLET, DELAYED RELEASE ORAL
Qty: 30 | Refills: 1 | Status: ACTIVE | COMMUNITY
Start: 2025-02-21 | End: 1900-01-01

## 2025-02-24 ENCOUNTER — NON-APPOINTMENT (OUTPATIENT)
Age: 70
End: 2025-02-24

## 2025-02-24 ENCOUNTER — APPOINTMENT (OUTPATIENT)
Dept: INFUSION THERAPY | Facility: HOSPITAL | Age: 70
End: 2025-02-24

## 2025-02-25 DIAGNOSIS — C83.38 DIFFUSE LARGE B-CELL LYMPHOMA, LYMPH NODES OF MULTIPLE SITES: ICD-10-CM

## 2025-02-25 DIAGNOSIS — R11.2 NAUSEA WITH VOMITING, UNSPECIFIED: ICD-10-CM

## 2025-02-25 DIAGNOSIS — Z51.11 ENCOUNTER FOR ANTINEOPLASTIC CHEMOTHERAPY: ICD-10-CM

## 2025-03-03 ENCOUNTER — RESULT REVIEW (OUTPATIENT)
Age: 70
End: 2025-03-03

## 2025-03-03 ENCOUNTER — APPOINTMENT (OUTPATIENT)
Dept: INFUSION THERAPY | Facility: HOSPITAL | Age: 70
End: 2025-03-03

## 2025-03-03 LAB
BASOPHILS # BLD AUTO: 0.05 K/UL — SIGNIFICANT CHANGE UP (ref 0–0.2)
BASOPHILS NFR BLD AUTO: 0.7 % — SIGNIFICANT CHANGE UP (ref 0–2)
EOSINOPHIL # BLD AUTO: 0.12 K/UL — SIGNIFICANT CHANGE UP (ref 0–0.5)
EOSINOPHIL NFR BLD AUTO: 1.7 % — SIGNIFICANT CHANGE UP (ref 0–6)
HCT VFR BLD CALC: 30.4 % — LOW (ref 39–50)
HGB BLD-MCNC: 9.8 G/DL — LOW (ref 13–17)
IMM GRANULOCYTES NFR BLD AUTO: 1.2 % — HIGH (ref 0–0.9)
LYMPHOCYTES # BLD AUTO: 0.35 K/UL — LOW (ref 1–3.3)
LYMPHOCYTES # BLD AUTO: 5.1 % — LOW (ref 13–44)
MCHC RBC-ENTMCNC: 27.5 PG — SIGNIFICANT CHANGE UP (ref 27–34)
MCHC RBC-ENTMCNC: 32.2 G/DL — SIGNIFICANT CHANGE UP (ref 32–36)
MCV RBC AUTO: 85.4 FL — SIGNIFICANT CHANGE UP (ref 80–100)
MONOCYTES # BLD AUTO: 0.78 K/UL — SIGNIFICANT CHANGE UP (ref 0–0.9)
MONOCYTES NFR BLD AUTO: 11.3 % — SIGNIFICANT CHANGE UP (ref 2–14)
NEUTROPHILS # BLD AUTO: 5.5 K/UL — SIGNIFICANT CHANGE UP (ref 1.8–7.4)
NEUTROPHILS NFR BLD AUTO: 80 % — HIGH (ref 43–77)
NRBC BLD AUTO-RTO: 0 /100 WBCS — SIGNIFICANT CHANGE UP (ref 0–0)
PLATELET # BLD AUTO: 261 K/UL — SIGNIFICANT CHANGE UP (ref 150–400)
RBC # BLD: 3.56 M/UL — LOW (ref 4.2–5.8)
RBC # FLD: 16.3 % — HIGH (ref 10.3–14.5)
WBC # BLD: 6.88 K/UL — SIGNIFICANT CHANGE UP (ref 3.8–10.5)
WBC # FLD AUTO: 6.88 K/UL — SIGNIFICANT CHANGE UP (ref 3.8–10.5)

## 2025-03-03 PROCEDURE — 86850 RBC ANTIBODY SCREEN: CPT

## 2025-03-03 PROCEDURE — 82330 ASSAY OF CALCIUM: CPT

## 2025-03-03 PROCEDURE — 85027 COMPLETE CBC AUTOMATED: CPT

## 2025-03-03 PROCEDURE — 80053 COMPREHEN METABOLIC PANEL: CPT

## 2025-03-03 PROCEDURE — 80048 BASIC METABOLIC PNL TOTAL CA: CPT

## 2025-03-03 PROCEDURE — 83735 ASSAY OF MAGNESIUM: CPT

## 2025-03-03 PROCEDURE — 86367 STEM CELLS TOTAL COUNT: CPT

## 2025-03-03 PROCEDURE — 38206 HARVEST AUTO STEM CELLS: CPT

## 2025-03-03 PROCEDURE — 86901 BLOOD TYPING SEROLOGIC RH(D): CPT

## 2025-03-03 PROCEDURE — 85007 BL SMEAR W/DIFF WBC COUNT: CPT

## 2025-03-03 PROCEDURE — 86900 BLOOD TYPING SEROLOGIC ABO: CPT

## 2025-03-10 ENCOUNTER — APPOINTMENT (OUTPATIENT)
Dept: INFUSION THERAPY | Facility: HOSPITAL | Age: 70
End: 2025-03-10

## 2025-03-10 ENCOUNTER — RESULT REVIEW (OUTPATIENT)
Age: 70
End: 2025-03-10

## 2025-03-10 ENCOUNTER — APPOINTMENT (OUTPATIENT)
Dept: HEMATOLOGY ONCOLOGY | Facility: CLINIC | Age: 70
End: 2025-03-10

## 2025-03-10 ENCOUNTER — APPOINTMENT (OUTPATIENT)
Dept: HEMATOLOGY ONCOLOGY | Facility: CLINIC | Age: 70
End: 2025-03-10
Payer: MEDICARE

## 2025-03-10 VITALS
BODY MASS INDEX: 18.35 KG/M2 | HEART RATE: 70 BPM | SYSTOLIC BLOOD PRESSURE: 120 MMHG | DIASTOLIC BLOOD PRESSURE: 78 MMHG | WEIGHT: 100.31 LBS | TEMPERATURE: 98.1 F | RESPIRATION RATE: 16 BRPM | OXYGEN SATURATION: 99 %

## 2025-03-10 DIAGNOSIS — C83.38 DIFFUSE LARGE B-CELL LYMPHOMA, LYMPH NODES OF MULTIPLE SITES: ICD-10-CM

## 2025-03-10 LAB
ALBUMIN SERPL ELPH-MCNC: 3.8 G/DL — SIGNIFICANT CHANGE UP (ref 3.3–5)
ALP SERPL-CCNC: 131 U/L — HIGH (ref 40–120)
ALT FLD-CCNC: 34 U/L — SIGNIFICANT CHANGE UP (ref 10–45)
ANION GAP SERPL CALC-SCNC: 8 MMOL/L — SIGNIFICANT CHANGE UP (ref 5–17)
ANISOCYTOSIS BLD QL: SLIGHT — SIGNIFICANT CHANGE UP
AST SERPL-CCNC: 30 U/L — SIGNIFICANT CHANGE UP (ref 10–40)
BASOPHILS # BLD AUTO: 0.03 K/UL — SIGNIFICANT CHANGE UP (ref 0–0.2)
BASOPHILS NFR BLD AUTO: 1.2 % — SIGNIFICANT CHANGE UP (ref 0–2)
BILIRUB SERPL-MCNC: 0.5 MG/DL — SIGNIFICANT CHANGE UP (ref 0.2–1.2)
BUN SERPL-MCNC: 15 MG/DL — SIGNIFICANT CHANGE UP (ref 7–23)
CALCIUM SERPL-MCNC: 8.5 MG/DL — SIGNIFICANT CHANGE UP (ref 8.4–10.5)
CHLORIDE SERPL-SCNC: 102 MMOL/L — SIGNIFICANT CHANGE UP (ref 96–108)
CO2 SERPL-SCNC: 28 MMOL/L — SIGNIFICANT CHANGE UP (ref 22–31)
CREAT SERPL-MCNC: 0.59 MG/DL — SIGNIFICANT CHANGE UP (ref 0.5–1.3)
EGFR: 106 ML/MIN/1.73M2 — SIGNIFICANT CHANGE UP
EGFR: 106 ML/MIN/1.73M2 — SIGNIFICANT CHANGE UP
ELLIPTOCYTES BLD QL SMEAR: SLIGHT — SIGNIFICANT CHANGE UP
EOSINOPHIL # BLD AUTO: 0.19 K/UL — SIGNIFICANT CHANGE UP (ref 0–0.5)
EOSINOPHIL NFR BLD AUTO: 7.3 % — HIGH (ref 0–6)
GLUCOSE SERPL-MCNC: 95 MG/DL — SIGNIFICANT CHANGE UP (ref 70–99)
HCT VFR BLD CALC: 31.1 % — LOW (ref 39–50)
HGB BLD-MCNC: 10.2 G/DL — LOW (ref 13–17)
IMM GRANULOCYTES NFR BLD AUTO: 0.4 % — SIGNIFICANT CHANGE UP (ref 0–0.9)
LYMPHOCYTES # BLD AUTO: 0.3 K/UL — LOW (ref 1–3.3)
LYMPHOCYTES # BLD AUTO: 11.6 % — LOW (ref 13–44)
MCHC RBC-ENTMCNC: 27.9 PG — SIGNIFICANT CHANGE UP (ref 27–34)
MCHC RBC-ENTMCNC: 32.8 G/DL — SIGNIFICANT CHANGE UP (ref 32–36)
MCV RBC AUTO: 85.2 FL — SIGNIFICANT CHANGE UP (ref 80–100)
MONOCYTES # BLD AUTO: 0.83 K/UL — SIGNIFICANT CHANGE UP (ref 0–0.9)
MONOCYTES NFR BLD AUTO: 32 % — HIGH (ref 2–14)
NEUTROPHILS # BLD AUTO: 1.23 K/UL — LOW (ref 1.8–7.4)
NEUTROPHILS NFR BLD AUTO: 47.5 % — SIGNIFICANT CHANGE UP (ref 43–77)
NRBC BLD AUTO-RTO: 0 /100 WBCS — SIGNIFICANT CHANGE UP (ref 0–0)
PLAT MORPH BLD: NORMAL — SIGNIFICANT CHANGE UP
PLATELET # BLD AUTO: 209 K/UL — SIGNIFICANT CHANGE UP (ref 150–400)
POIKILOCYTOSIS BLD QL AUTO: SLIGHT — SIGNIFICANT CHANGE UP
POTASSIUM SERPL-MCNC: 3.8 MMOL/L — SIGNIFICANT CHANGE UP (ref 3.5–5.3)
POTASSIUM SERPL-SCNC: 3.8 MMOL/L — SIGNIFICANT CHANGE UP (ref 3.5–5.3)
PROT SERPL-MCNC: 5.6 G/DL — LOW (ref 6–8.3)
RBC # BLD: 3.65 M/UL — LOW (ref 4.2–5.8)
RBC # FLD: 17.2 % — HIGH (ref 10.3–14.5)
RBC BLD AUTO: ABNORMAL
SODIUM SERPL-SCNC: 138 MMOL/L — SIGNIFICANT CHANGE UP (ref 135–145)
WBC # BLD: 2.59 K/UL — LOW (ref 3.8–10.5)
WBC # FLD AUTO: 2.59 K/UL — LOW (ref 3.8–10.5)

## 2025-03-10 PROCEDURE — 99212 OFFICE O/P EST SF 10 MIN: CPT

## 2025-03-13 ENCOUNTER — APPOINTMENT (OUTPATIENT)
Dept: CT IMAGING | Facility: CLINIC | Age: 70
End: 2025-03-13

## 2025-03-17 ENCOUNTER — RESULT REVIEW (OUTPATIENT)
Age: 70
End: 2025-03-17

## 2025-03-17 ENCOUNTER — APPOINTMENT (OUTPATIENT)
Dept: INFUSION THERAPY | Facility: HOSPITAL | Age: 70
End: 2025-03-17

## 2025-03-17 LAB
ALBUMIN SERPL ELPH-MCNC: 3.7 G/DL — SIGNIFICANT CHANGE UP (ref 3.3–5)
ALP SERPL-CCNC: 137 U/L — HIGH (ref 40–120)
ALT FLD-CCNC: 33 U/L — SIGNIFICANT CHANGE UP (ref 10–45)
ANION GAP SERPL CALC-SCNC: 9 MMOL/L — SIGNIFICANT CHANGE UP (ref 5–17)
AST SERPL-CCNC: 34 U/L — SIGNIFICANT CHANGE UP (ref 10–40)
BASOPHILS # BLD AUTO: 0.12 K/UL — SIGNIFICANT CHANGE UP (ref 0–0.2)
BASOPHILS NFR BLD AUTO: 5.7 % — HIGH (ref 0–2)
BILIRUB SERPL-MCNC: 0.5 MG/DL — SIGNIFICANT CHANGE UP (ref 0.2–1.2)
BUN SERPL-MCNC: 17 MG/DL — SIGNIFICANT CHANGE UP (ref 7–23)
CALCIUM SERPL-MCNC: 8.5 MG/DL — SIGNIFICANT CHANGE UP (ref 8.4–10.5)
CHLORIDE SERPL-SCNC: 104 MMOL/L — SIGNIFICANT CHANGE UP (ref 96–108)
CO2 SERPL-SCNC: 26 MMOL/L — SIGNIFICANT CHANGE UP (ref 22–31)
CREAT SERPL-MCNC: 0.49 MG/DL — LOW (ref 0.5–1.3)
EGFR: 112 ML/MIN/1.73M2 — SIGNIFICANT CHANGE UP
EGFR: 112 ML/MIN/1.73M2 — SIGNIFICANT CHANGE UP
EOSINOPHIL # BLD AUTO: 0.04 K/UL — SIGNIFICANT CHANGE UP (ref 0–0.5)
EOSINOPHIL NFR BLD AUTO: 1.9 % — SIGNIFICANT CHANGE UP (ref 0–6)
GLUCOSE SERPL-MCNC: 141 MG/DL — HIGH (ref 70–99)
HCT VFR BLD CALC: 31.1 % — LOW (ref 39–50)
HGB BLD-MCNC: 10 G/DL — LOW (ref 13–17)
IMM GRANULOCYTES NFR BLD AUTO: 0.5 % — SIGNIFICANT CHANGE UP (ref 0–0.9)
LYMPHOCYTES # BLD AUTO: 0.36 K/UL — LOW (ref 1–3.3)
LYMPHOCYTES # BLD AUTO: 17 % — SIGNIFICANT CHANGE UP (ref 13–44)
MCHC RBC-ENTMCNC: 27.6 PG — SIGNIFICANT CHANGE UP (ref 27–34)
MCHC RBC-ENTMCNC: 32.2 G/DL — SIGNIFICANT CHANGE UP (ref 32–36)
MCV RBC AUTO: 85.9 FL — SIGNIFICANT CHANGE UP (ref 80–100)
MONOCYTES # BLD AUTO: 0.85 K/UL — SIGNIFICANT CHANGE UP (ref 0–0.9)
MONOCYTES NFR BLD AUTO: 40.1 % — HIGH (ref 2–14)
NEUTROPHILS # BLD AUTO: 0.74 K/UL — LOW (ref 1.8–7.4)
NEUTROPHILS NFR BLD AUTO: 34.8 % — LOW (ref 43–77)
NRBC BLD AUTO-RTO: 0 /100 WBCS — SIGNIFICANT CHANGE UP (ref 0–0)
PLATELET # BLD AUTO: 244 K/UL — SIGNIFICANT CHANGE UP (ref 150–400)
POTASSIUM SERPL-MCNC: 3.8 MMOL/L — SIGNIFICANT CHANGE UP (ref 3.5–5.3)
POTASSIUM SERPL-SCNC: 3.8 MMOL/L — SIGNIFICANT CHANGE UP (ref 3.5–5.3)
PROT SERPL-MCNC: 5.5 G/DL — LOW (ref 6–8.3)
RBC # BLD: 3.62 M/UL — LOW (ref 4.2–5.8)
RBC # FLD: 17.4 % — HIGH (ref 10.3–14.5)
SODIUM SERPL-SCNC: 139 MMOL/L — SIGNIFICANT CHANGE UP (ref 135–145)
WBC # BLD: 2.12 K/UL — LOW (ref 3.8–10.5)
WBC # FLD AUTO: 2.12 K/UL — LOW (ref 3.8–10.5)

## 2025-03-18 ENCOUNTER — OUTPATIENT (OUTPATIENT)
Dept: OUTPATIENT SERVICES | Facility: HOSPITAL | Age: 70
LOS: 1 days | End: 2025-03-18
Payer: MEDICARE

## 2025-03-18 ENCOUNTER — APPOINTMENT (OUTPATIENT)
Dept: CT IMAGING | Facility: CLINIC | Age: 70
End: 2025-03-18
Payer: MEDICARE

## 2025-03-18 ENCOUNTER — RESULT REVIEW (OUTPATIENT)
Age: 70
End: 2025-03-18

## 2025-03-18 DIAGNOSIS — Z98.890 OTHER SPECIFIED POSTPROCEDURAL STATES: Chronic | ICD-10-CM

## 2025-03-18 DIAGNOSIS — S32.019A UNSPECIFIED FRACTURE OF FIRST LUMBAR VERTEBRA, INITIAL ENCOUNTER FOR CLOSED FRACTURE: ICD-10-CM

## 2025-03-18 DIAGNOSIS — Z90.49 ACQUIRED ABSENCE OF OTHER SPECIFIED PARTS OF DIGESTIVE TRACT: Chronic | ICD-10-CM

## 2025-03-18 PROCEDURE — 76376 3D RENDER W/INTRP POSTPROCES: CPT | Mod: 26

## 2025-03-18 PROCEDURE — 72128 CT CHEST SPINE W/O DYE: CPT | Mod: 26

## 2025-03-18 PROCEDURE — 72128 CT CHEST SPINE W/O DYE: CPT

## 2025-03-18 PROCEDURE — 72131 CT LUMBAR SPINE W/O DYE: CPT | Mod: 26

## 2025-03-18 PROCEDURE — 76376 3D RENDER W/INTRP POSTPROCES: CPT

## 2025-03-18 PROCEDURE — 72131 CT LUMBAR SPINE W/O DYE: CPT

## 2025-03-24 ENCOUNTER — RESULT REVIEW (OUTPATIENT)
Age: 70
End: 2025-03-24

## 2025-03-24 ENCOUNTER — APPOINTMENT (OUTPATIENT)
Dept: INFUSION THERAPY | Facility: HOSPITAL | Age: 70
End: 2025-03-24

## 2025-03-24 ENCOUNTER — APPOINTMENT (OUTPATIENT)
Dept: HEMATOLOGY ONCOLOGY | Facility: CLINIC | Age: 70
End: 2025-03-24
Payer: MEDICARE

## 2025-03-24 VITALS
HEIGHT: 62 IN | RESPIRATION RATE: 15 BRPM | OXYGEN SATURATION: 99 % | HEART RATE: 84 BPM | TEMPERATURE: 97.4 F | BODY MASS INDEX: 18.9 KG/M2 | DIASTOLIC BLOOD PRESSURE: 72 MMHG | WEIGHT: 102.72 LBS | SYSTOLIC BLOOD PRESSURE: 117 MMHG

## 2025-03-24 LAB
ANION GAP SERPL CALC-SCNC: 11 MMOL/L — SIGNIFICANT CHANGE UP (ref 5–17)
BASOPHILS # BLD AUTO: 0.07 K/UL — SIGNIFICANT CHANGE UP (ref 0–0.2)
BASOPHILS NFR BLD AUTO: 1.6 % — SIGNIFICANT CHANGE UP (ref 0–2)
BUN SERPL-MCNC: 18 MG/DL — SIGNIFICANT CHANGE UP (ref 7–23)
CALCIUM SERPL-MCNC: 8.6 MG/DL — SIGNIFICANT CHANGE UP (ref 8.4–10.5)
CHLORIDE SERPL-SCNC: 101 MMOL/L — SIGNIFICANT CHANGE UP (ref 96–108)
CO2 SERPL-SCNC: 26 MMOL/L — SIGNIFICANT CHANGE UP (ref 22–31)
CREAT SERPL-MCNC: 0.51 MG/DL — SIGNIFICANT CHANGE UP (ref 0.5–1.3)
EGFR: 111 ML/MIN/1.73M2 — SIGNIFICANT CHANGE UP
EGFR: 111 ML/MIN/1.73M2 — SIGNIFICANT CHANGE UP
EOSINOPHIL # BLD AUTO: 0.08 K/UL — SIGNIFICANT CHANGE UP (ref 0–0.5)
EOSINOPHIL NFR BLD AUTO: 1.8 % — SIGNIFICANT CHANGE UP (ref 0–6)
GLUCOSE SERPL-MCNC: 179 MG/DL — HIGH (ref 70–99)
HCT VFR BLD CALC: 34.4 % — LOW (ref 39–50)
HGB BLD-MCNC: 10.9 G/DL — LOW (ref 13–17)
IMM GRANULOCYTES NFR BLD AUTO: 0.5 % — SIGNIFICANT CHANGE UP (ref 0–0.9)
LYMPHOCYTES # BLD AUTO: 0.28 K/UL — LOW (ref 1–3.3)
LYMPHOCYTES # BLD AUTO: 6.4 % — LOW (ref 13–44)
MCHC RBC-ENTMCNC: 27.5 PG — SIGNIFICANT CHANGE UP (ref 27–34)
MCHC RBC-ENTMCNC: 31.7 G/DL — LOW (ref 32–36)
MCV RBC AUTO: 86.6 FL — SIGNIFICANT CHANGE UP (ref 80–100)
MONOCYTES # BLD AUTO: 0.27 K/UL — SIGNIFICANT CHANGE UP (ref 0–0.9)
MONOCYTES NFR BLD AUTO: 6.2 % — SIGNIFICANT CHANGE UP (ref 2–14)
NEUTROPHILS # BLD AUTO: 3.63 K/UL — SIGNIFICANT CHANGE UP (ref 1.8–7.4)
NEUTROPHILS NFR BLD AUTO: 83.5 % — HIGH (ref 43–77)
NRBC BLD AUTO-RTO: 0 /100 WBCS — SIGNIFICANT CHANGE UP (ref 0–0)
PLATELET # BLD AUTO: 257 K/UL — SIGNIFICANT CHANGE UP (ref 150–400)
POTASSIUM SERPL-MCNC: 4 MMOL/L — SIGNIFICANT CHANGE UP (ref 3.5–5.3)
POTASSIUM SERPL-SCNC: 4 MMOL/L — SIGNIFICANT CHANGE UP (ref 3.5–5.3)
RBC # BLD: 3.97 M/UL — LOW (ref 4.2–5.8)
RBC # FLD: 16.8 % — HIGH (ref 10.3–14.5)
SODIUM SERPL-SCNC: 138 MMOL/L — SIGNIFICANT CHANGE UP (ref 135–145)
WBC # BLD: 4.35 K/UL — SIGNIFICANT CHANGE UP (ref 3.8–10.5)
WBC # FLD AUTO: 4.35 K/UL — SIGNIFICANT CHANGE UP (ref 3.8–10.5)

## 2025-03-24 PROCEDURE — 99215 OFFICE O/P EST HI 40 MIN: CPT

## 2025-03-24 PROCEDURE — G2211 COMPLEX E/M VISIT ADD ON: CPT

## 2025-03-31 ENCOUNTER — APPOINTMENT (OUTPATIENT)
Dept: NEUROSURGERY | Facility: CLINIC | Age: 70
End: 2025-03-31
Payer: MEDICARE

## 2025-03-31 ENCOUNTER — RESULT REVIEW (OUTPATIENT)
Age: 70
End: 2025-03-31

## 2025-03-31 ENCOUNTER — APPOINTMENT (OUTPATIENT)
Dept: INFUSION THERAPY | Facility: HOSPITAL | Age: 70
End: 2025-03-31

## 2025-03-31 VITALS
BODY MASS INDEX: 18.77 KG/M2 | SYSTOLIC BLOOD PRESSURE: 113 MMHG | HEIGHT: 62 IN | OXYGEN SATURATION: 98 % | DIASTOLIC BLOOD PRESSURE: 66 MMHG | HEART RATE: 80 BPM | WEIGHT: 102 LBS | RESPIRATION RATE: 16 BRPM

## 2025-03-31 LAB
ALBUMIN SERPL ELPH-MCNC: 3.8 G/DL — SIGNIFICANT CHANGE UP (ref 3.3–5)
ALP SERPL-CCNC: 159 U/L — HIGH (ref 40–120)
ALT FLD-CCNC: 40 U/L — SIGNIFICANT CHANGE UP (ref 10–45)
ANION GAP SERPL CALC-SCNC: 9 MMOL/L — SIGNIFICANT CHANGE UP (ref 5–17)
AST SERPL-CCNC: 32 U/L — SIGNIFICANT CHANGE UP (ref 10–40)
BASOPHILS # BLD AUTO: 0.04 K/UL — SIGNIFICANT CHANGE UP (ref 0–0.2)
BASOPHILS NFR BLD AUTO: 1.2 % — SIGNIFICANT CHANGE UP (ref 0–2)
BILIRUB SERPL-MCNC: 0.4 MG/DL — SIGNIFICANT CHANGE UP (ref 0.2–1.2)
BUN SERPL-MCNC: 15 MG/DL — SIGNIFICANT CHANGE UP (ref 7–23)
CALCIUM SERPL-MCNC: 8.7 MG/DL — SIGNIFICANT CHANGE UP (ref 8.4–10.5)
CHLORIDE SERPL-SCNC: 102 MMOL/L — SIGNIFICANT CHANGE UP (ref 96–108)
CO2 SERPL-SCNC: 27 MMOL/L — SIGNIFICANT CHANGE UP (ref 22–31)
CREAT SERPL-MCNC: 0.57 MG/DL — SIGNIFICANT CHANGE UP (ref 0.5–1.3)
EGFR: 107 ML/MIN/1.73M2 — SIGNIFICANT CHANGE UP
EGFR: 107 ML/MIN/1.73M2 — SIGNIFICANT CHANGE UP
EOSINOPHIL # BLD AUTO: 0.07 K/UL — SIGNIFICANT CHANGE UP (ref 0–0.5)
EOSINOPHIL NFR BLD AUTO: 2.2 % — SIGNIFICANT CHANGE UP (ref 0–6)
GLUCOSE SERPL-MCNC: 137 MG/DL — HIGH (ref 70–99)
HCT VFR BLD CALC: 33.5 % — LOW (ref 39–50)
HGB BLD-MCNC: 10.8 G/DL — LOW (ref 13–17)
IMM GRANULOCYTES NFR BLD AUTO: 0.3 % — SIGNIFICANT CHANGE UP (ref 0–0.9)
LYMPHOCYTES # BLD AUTO: 0.26 K/UL — LOW (ref 1–3.3)
LYMPHOCYTES # BLD AUTO: 8.1 % — LOW (ref 13–44)
MCHC RBC-ENTMCNC: 28.3 PG — SIGNIFICANT CHANGE UP (ref 27–34)
MCHC RBC-ENTMCNC: 32.2 G/DL — SIGNIFICANT CHANGE UP (ref 32–36)
MCV RBC AUTO: 87.9 FL — SIGNIFICANT CHANGE UP (ref 80–100)
MONOCYTES # BLD AUTO: 0.7 K/UL — SIGNIFICANT CHANGE UP (ref 0–0.9)
MONOCYTES NFR BLD AUTO: 21.8 % — HIGH (ref 2–14)
NEUTROPHILS # BLD AUTO: 2.13 K/UL — SIGNIFICANT CHANGE UP (ref 1.8–7.4)
NEUTROPHILS NFR BLD AUTO: 66.4 % — SIGNIFICANT CHANGE UP (ref 43–77)
NRBC BLD AUTO-RTO: 0 /100 WBCS — SIGNIFICANT CHANGE UP (ref 0–0)
PLATELET # BLD AUTO: 208 K/UL — SIGNIFICANT CHANGE UP (ref 150–400)
POTASSIUM SERPL-MCNC: 4.3 MMOL/L — SIGNIFICANT CHANGE UP (ref 3.5–5.3)
POTASSIUM SERPL-SCNC: 4.3 MMOL/L — SIGNIFICANT CHANGE UP (ref 3.5–5.3)
PROT SERPL-MCNC: 5.6 G/DL — LOW (ref 6–8.3)
RBC # BLD: 3.81 M/UL — LOW (ref 4.2–5.8)
RBC # FLD: 16.8 % — HIGH (ref 10.3–14.5)
SODIUM SERPL-SCNC: 138 MMOL/L — SIGNIFICANT CHANGE UP (ref 135–145)
WBC # BLD: 3.21 K/UL — LOW (ref 3.8–10.5)
WBC # FLD AUTO: 3.21 K/UL — LOW (ref 3.8–10.5)

## 2025-03-31 PROCEDURE — 99215 OFFICE O/P EST HI 40 MIN: CPT

## 2025-03-31 RX ORDER — SULFAMETHOXAZOLE AND TRIMETHOPRIM 400; 80 MG/1; MG/1
TABLET ORAL
Refills: 0 | Status: ACTIVE | COMMUNITY

## 2025-04-07 ENCOUNTER — RESULT REVIEW (OUTPATIENT)
Age: 70
End: 2025-04-07

## 2025-04-07 ENCOUNTER — APPOINTMENT (OUTPATIENT)
Dept: INFUSION THERAPY | Facility: HOSPITAL | Age: 70
End: 2025-04-07

## 2025-04-07 ENCOUNTER — APPOINTMENT (OUTPATIENT)
Dept: HEMATOLOGY ONCOLOGY | Facility: CLINIC | Age: 70
End: 2025-04-07
Payer: MEDICARE

## 2025-04-07 VITALS
TEMPERATURE: 97 F | RESPIRATION RATE: 17 BRPM | OXYGEN SATURATION: 98 % | HEIGHT: 62 IN | SYSTOLIC BLOOD PRESSURE: 133 MMHG | DIASTOLIC BLOOD PRESSURE: 76 MMHG | WEIGHT: 102.94 LBS | BODY MASS INDEX: 18.94 KG/M2 | HEART RATE: 85 BPM

## 2025-04-07 LAB
ALBUMIN SERPL ELPH-MCNC: 3.9 G/DL — SIGNIFICANT CHANGE UP (ref 3.3–5)
ALBUMIN SERPL ELPH-MCNC: 4.1 G/DL
ALP BLD-CCNC: 154 U/L
ALP SERPL-CCNC: 145 U/L — HIGH (ref 40–120)
ALT FLD-CCNC: 31 U/L — SIGNIFICANT CHANGE UP (ref 10–45)
ALT SERPL-CCNC: 34 U/L
ANION GAP SERPL CALC-SCNC: 8 MMOL/L
ANION GAP SERPL CALC-SCNC: 8 MMOL/L — SIGNIFICANT CHANGE UP (ref 5–17)
APTT BLD: 34.4 SEC — SIGNIFICANT CHANGE UP (ref 24.5–35.6)
AST SERPL-CCNC: 26 U/L — SIGNIFICANT CHANGE UP (ref 10–40)
AST SERPL-CCNC: 28 U/L
BASOPHILS # BLD AUTO: 0.05 K/UL — SIGNIFICANT CHANGE UP (ref 0–0.2)
BASOPHILS NFR BLD AUTO: 3.9 % — HIGH (ref 0–2)
BILIRUB SERPL-MCNC: 0.4 MG/DL — SIGNIFICANT CHANGE UP (ref 0.2–1.2)
BILIRUB SERPL-MCNC: 0.5 MG/DL
BUN SERPL-MCNC: 13 MG/DL
BUN SERPL-MCNC: 14 MG/DL — SIGNIFICANT CHANGE UP (ref 7–23)
CALCIUM SERPL-MCNC: 8.7 MG/DL — SIGNIFICANT CHANGE UP (ref 8.4–10.5)
CALCIUM SERPL-MCNC: 8.9 MG/DL
CHLORIDE SERPL-SCNC: 102 MMOL/L
CHLORIDE SERPL-SCNC: 103 MMOL/L — SIGNIFICANT CHANGE UP (ref 96–108)
CO2 SERPL-SCNC: 28 MMOL/L — SIGNIFICANT CHANGE UP (ref 22–31)
CO2 SERPL-SCNC: 29 MMOL/L
CREAT SERPL-MCNC: 0.49 MG/DL — LOW (ref 0.5–1.3)
CREAT SERPL-MCNC: 0.54 MG/DL
EGFR: 112 ML/MIN/1.73M2 — SIGNIFICANT CHANGE UP
EGFR: 112 ML/MIN/1.73M2 — SIGNIFICANT CHANGE UP
EGFRCR SERPLBLD CKD-EPI 2021: 109 ML/MIN/1.73M2
EOSINOPHIL # BLD AUTO: 0.07 K/UL — SIGNIFICANT CHANGE UP (ref 0–0.5)
EOSINOPHIL NFR BLD AUTO: 5.5 % — SIGNIFICANT CHANGE UP (ref 0–6)
GLUCOSE SERPL-MCNC: 114 MG/DL
GLUCOSE SERPL-MCNC: 99 MG/DL — SIGNIFICANT CHANGE UP (ref 70–99)
HCT VFR BLD CALC: 34.2 % — LOW (ref 39–50)
HGB BLD-MCNC: 10.9 G/DL — LOW (ref 13–17)
IMM GRANULOCYTES NFR BLD AUTO: 0 % — SIGNIFICANT CHANGE UP (ref 0–0.9)
INR BLD: 0.91 RATIO — SIGNIFICANT CHANGE UP (ref 0.85–1.16)
LYMPHOCYTES # BLD AUTO: 0.49 K/UL — LOW (ref 1–3.3)
LYMPHOCYTES # BLD AUTO: 38.6 % — SIGNIFICANT CHANGE UP (ref 13–44)
MCHC RBC-ENTMCNC: 28.2 PG — SIGNIFICANT CHANGE UP (ref 27–34)
MCHC RBC-ENTMCNC: 31.9 G/DL — LOW (ref 32–36)
MCV RBC AUTO: 88.4 FL — SIGNIFICANT CHANGE UP (ref 80–100)
MONOCYTES # BLD AUTO: 0.62 K/UL — SIGNIFICANT CHANGE UP (ref 0–0.9)
MONOCYTES NFR BLD AUTO: 48.8 % — HIGH (ref 2–14)
NEUTROPHILS # BLD AUTO: 0.04 K/UL — LOW (ref 1.8–7.4)
NEUTROPHILS NFR BLD AUTO: 3.2 % — LOW (ref 43–77)
NRBC BLD AUTO-RTO: 0 /100 WBCS — SIGNIFICANT CHANGE UP (ref 0–0)
PLATELET # BLD AUTO: 182 K/UL — SIGNIFICANT CHANGE UP (ref 150–400)
POTASSIUM SERPL-MCNC: 4 MMOL/L — SIGNIFICANT CHANGE UP (ref 3.5–5.3)
POTASSIUM SERPL-SCNC: 3.8 MMOL/L
POTASSIUM SERPL-SCNC: 4 MMOL/L — SIGNIFICANT CHANGE UP (ref 3.5–5.3)
PROT SERPL-MCNC: 5.4 G/DL — LOW (ref 6–8.3)
PROT SERPL-MCNC: 5.6 G/DL
PROTHROM AB SERPL-ACNC: 10.8 SEC — SIGNIFICANT CHANGE UP (ref 9.9–13.4)
RBC # BLD: 3.87 M/UL — LOW (ref 4.2–5.8)
RBC # FLD: 17.1 % — HIGH (ref 10.3–14.5)
SODIUM SERPL-SCNC: 139 MMOL/L
SODIUM SERPL-SCNC: 139 MMOL/L — SIGNIFICANT CHANGE UP (ref 135–145)
WBC # BLD: 1.27 K/UL — LOW (ref 3.8–10.5)
WBC # FLD AUTO: 1.27 K/UL — LOW (ref 3.8–10.5)

## 2025-04-07 PROCEDURE — 99213 OFFICE O/P EST LOW 20 MIN: CPT

## 2025-04-07 PROCEDURE — 93010 ELECTROCARDIOGRAM REPORT: CPT

## 2025-04-08 LAB
EBV EA AB SER IA-ACNC: 12 U/ML — HIGH
EBV EA AB TITR SER IF: POSITIVE
EBV EA IGG SER-ACNC: POSITIVE
EBV NA IGG SER IA-ACNC: 198 U/ML — HIGH
EBV PATRN SPEC IB-IMP: SIGNIFICANT CHANGE UP
EBV VCA IGG AVIDITY SER QL IA: POSITIVE
EBV VCA IGM SER IA-ACNC: 84.5 U/ML — HIGH
EBV VCA IGM SER IA-ACNC: <10 U/ML — SIGNIFICANT CHANGE UP
EBV VCA IGM TITR FLD: NEGATIVE — SIGNIFICANT CHANGE UP
HAV IGM SER-ACNC: SIGNIFICANT CHANGE UP
HBV SURFACE AB SER-ACNC: ABNORMAL
HSV1 IGG SER-ACNC: <0.01 INDEX — SIGNIFICANT CHANGE UP
HSV1 IGG SERPL QL IA: NEGATIVE — SIGNIFICANT CHANGE UP
HSV2 IGG FLD-ACNC: 0.03 INDEX — SIGNIFICANT CHANGE UP
HSV2 IGG SERPL QL IA: NEGATIVE — SIGNIFICANT CHANGE UP
MEV IGG SER-ACNC: 20.9 AU/ML — SIGNIFICANT CHANGE UP
MEV IGG+IGM SER-IMP: POSITIVE — SIGNIFICANT CHANGE UP
MUV AB SER-ACNC: NEGATIVE — SIGNIFICANT CHANGE UP
MUV IGG FLD-ACNC: <5 AU/ML — SIGNIFICANT CHANGE UP
RUBV IGG SER-ACNC: 0.14 INDEX — SIGNIFICANT CHANGE UP
RUBV IGG SER-IMP: NEGATIVE — SIGNIFICANT CHANGE UP
T GONDII IGG SER QL: <3 IU/ML — SIGNIFICANT CHANGE UP
T GONDII IGG SER QL: NEGATIVE — SIGNIFICANT CHANGE UP
T GONDII IGM SER QL: <3 AU/ML — SIGNIFICANT CHANGE UP
T GONDII IGM SER QL: NEGATIVE — SIGNIFICANT CHANGE UP
VZV IGG FLD QL IA: 1.24 S/CO — SIGNIFICANT CHANGE UP
VZV IGG FLD QL IA: POSITIVE — SIGNIFICANT CHANGE UP

## 2025-04-09 LAB
HEMOGLOBIN INTERPRETATION: SIGNIFICANT CHANGE UP
HGB A MFR BLD: 97.5 % — SIGNIFICANT CHANGE UP (ref 95.8–98)
HGB A2 MFR BLD: 2.5 % — SIGNIFICANT CHANGE UP (ref 2–3.2)

## 2025-04-10 ENCOUNTER — APPOINTMENT (OUTPATIENT)
Dept: HEMATOLOGY ONCOLOGY | Facility: CLINIC | Age: 70
End: 2025-04-10
Payer: MEDICARE

## 2025-04-10 ENCOUNTER — RESULT REVIEW (OUTPATIENT)
Age: 70
End: 2025-04-10

## 2025-04-10 ENCOUNTER — NON-APPOINTMENT (OUTPATIENT)
Age: 70
End: 2025-04-10

## 2025-04-10 ENCOUNTER — APPOINTMENT (OUTPATIENT)
Dept: INFUSION THERAPY | Facility: HOSPITAL | Age: 70
End: 2025-04-10

## 2025-04-10 VITALS
SYSTOLIC BLOOD PRESSURE: 101 MMHG | BODY MASS INDEX: 18.77 KG/M2 | HEART RATE: 84 BPM | DIASTOLIC BLOOD PRESSURE: 67 MMHG | TEMPERATURE: 98 F | WEIGHT: 102 LBS | OXYGEN SATURATION: 98 % | RESPIRATION RATE: 16 BRPM | HEIGHT: 62 IN

## 2025-04-10 LAB
ALBUMIN SERPL ELPH-MCNC: 3.9 G/DL
ALP BLD-CCNC: 120 U/L
ALT SERPL-CCNC: 24 U/L
ANION GAP SERPL CALC-SCNC: 8 MMOL/L
APPEARANCE UR: CLEAR — SIGNIFICANT CHANGE UP
AST SERPL-CCNC: 21 U/L
BASOPHILS # BLD AUTO: 0.03 K/UL — SIGNIFICANT CHANGE UP (ref 0–0.2)
BASOPHILS NFR BLD AUTO: 1.8 % — SIGNIFICANT CHANGE UP (ref 0–2)
BILIRUB SERPL-MCNC: 0.5 MG/DL
BILIRUB UR-MCNC: NEGATIVE — SIGNIFICANT CHANGE UP
BUN SERPL-MCNC: 21 MG/DL
CALCIUM SERPL-MCNC: 8.8 MG/DL
CHLORIDE SERPL-SCNC: 99 MMOL/L
CO2 SERPL-SCNC: 27 MMOL/L
COLOR SPEC: YELLOW — SIGNIFICANT CHANGE UP
CREAT SERPL-MCNC: 0.68 MG/DL
DACRYOCYTES BLD QL SMEAR: SLIGHT — SIGNIFICANT CHANGE UP
DIFF PNL FLD: NEGATIVE — SIGNIFICANT CHANGE UP
EGFRCR SERPLBLD CKD-EPI 2021: 102 ML/MIN/1.73M2
ELLIPTOCYTES BLD QL SMEAR: SLIGHT — SIGNIFICANT CHANGE UP
EOSINOPHIL # BLD AUTO: 0.09 K/UL — SIGNIFICANT CHANGE UP (ref 0–0.5)
EOSINOPHIL NFR BLD AUTO: 5.3 % — SIGNIFICANT CHANGE UP (ref 0–6)
GAMMA INTERFERON BACKGROUND BLD IA-ACNC: 0.04 IU/ML — SIGNIFICANT CHANGE UP
GLUCOSE SERPL-MCNC: 138 MG/DL
GLUCOSE UR QL: NEGATIVE MG/DL — SIGNIFICANT CHANGE UP
HCT VFR BLD CALC: 33.3 % — LOW (ref 39–50)
HGB BLD-MCNC: 10.4 G/DL — LOW (ref 13–17)
IMM GRANULOCYTES NFR BLD AUTO: 0 % — SIGNIFICANT CHANGE UP (ref 0–0.9)
KETONES UR-MCNC: NEGATIVE MG/DL — SIGNIFICANT CHANGE UP
LEUKOCYTE ESTERASE UR-ACNC: NEGATIVE — SIGNIFICANT CHANGE UP
LYMPHOCYTES # BLD AUTO: 0.32 K/UL — LOW (ref 1–3.3)
LYMPHOCYTES # BLD AUTO: 18.9 % — SIGNIFICANT CHANGE UP (ref 13–44)
M TB IFN-G BLD-IMP: NEGATIVE — SIGNIFICANT CHANGE UP
M TB IFN-G CD4+ BCKGRND COR BLD-ACNC: 0 IU/ML — SIGNIFICANT CHANGE UP
M TB IFN-G CD4+CD8+ BCKGRND COR BLD-ACNC: 0 IU/ML — SIGNIFICANT CHANGE UP
MCHC RBC-ENTMCNC: 27.7 PG — SIGNIFICANT CHANGE UP (ref 27–34)
MCHC RBC-ENTMCNC: 31.2 G/DL — LOW (ref 32–36)
MCV RBC AUTO: 88.8 FL — SIGNIFICANT CHANGE UP (ref 80–100)
MONOCYTES # BLD AUTO: 1.13 K/UL — HIGH (ref 0–0.9)
MONOCYTES NFR BLD AUTO: 66.9 % — HIGH (ref 2–14)
NEUTROPHILS # BLD AUTO: 0.12 K/UL — LOW (ref 1.8–7.4)
NEUTROPHILS NFR BLD AUTO: 7.1 % — LOW (ref 43–77)
NITRITE UR-MCNC: NEGATIVE — SIGNIFICANT CHANGE UP
NRBC BLD AUTO-RTO: 0 /100 WBCS — SIGNIFICANT CHANGE UP (ref 0–0)
PH UR: 6 — SIGNIFICANT CHANGE UP (ref 5–8)
PLAT MORPH BLD: NORMAL — SIGNIFICANT CHANGE UP
PLATELET # BLD AUTO: 138 K/UL — LOW (ref 150–400)
POIKILOCYTOSIS BLD QL AUTO: SLIGHT — SIGNIFICANT CHANGE UP
POTASSIUM SERPL-SCNC: 3.8 MMOL/L
PROT SERPL-MCNC: 5.6 G/DL
PROT UR-MCNC: 30 MG/DL
QUANT TB PLUS MITOGEN MINUS NIL: 6.18 IU/ML — SIGNIFICANT CHANGE UP
RBC # BLD: 3.75 M/UL — LOW (ref 4.2–5.8)
RBC # FLD: 17 % — HIGH (ref 10.3–14.5)
RBC BLD AUTO: ABNORMAL
SCHISTOCYTES BLD QL AUTO: SLIGHT — SIGNIFICANT CHANGE UP
SODIUM SERPL-SCNC: 134 MMOL/L
SP GR SPEC: 1.02 — SIGNIFICANT CHANGE UP (ref 1–1.03)
STRONGYLOIDES AB SER-ACNC: NEGATIVE — SIGNIFICANT CHANGE UP
UROBILINOGEN FLD QL: 0.2 MG/DL — SIGNIFICANT CHANGE UP (ref 0.2–1)
WBC # BLD: 1.69 K/UL — LOW (ref 3.8–10.5)
WBC # FLD AUTO: 1.69 K/UL — LOW (ref 3.8–10.5)

## 2025-04-10 PROCEDURE — 99215 OFFICE O/P EST HI 40 MIN: CPT

## 2025-04-10 PROCEDURE — G2211 COMPLEX E/M VISIT ADD ON: CPT

## 2025-04-11 ENCOUNTER — APPOINTMENT (OUTPATIENT)
Dept: HEMATOLOGY ONCOLOGY | Facility: CLINIC | Age: 70
End: 2025-04-11
Payer: MEDICARE

## 2025-04-11 LAB
BACTERIA # UR AUTO: NEGATIVE /HPF — SIGNIFICANT CHANGE UP
CAST: 1 /LPF — SIGNIFICANT CHANGE UP (ref 0–4)
COD CRY URNS QL: PRESENT
G6PD RBC-CCNC: 18 U/G HGB — SIGNIFICANT CHANGE UP (ref 7–20.5)
RBC CASTS # UR COMP ASSIST: 6 /HPF — HIGH (ref 0–4)
REVIEW: SIGNIFICANT CHANGE UP
SQUAMOUS # UR AUTO: 0 /HPF — SIGNIFICANT CHANGE UP (ref 0–5)
WBC UR QL: 0 /HPF — SIGNIFICANT CHANGE UP (ref 0–5)

## 2025-04-11 PROCEDURE — 99213 OFFICE O/P EST LOW 20 MIN: CPT | Mod: 93

## 2025-04-12 LAB
CULTURE RESULTS: NO GROWTH — SIGNIFICANT CHANGE UP
SPECIMEN SOURCE: SIGNIFICANT CHANGE UP

## 2025-04-13 LAB
-  STAPHYLOCOCCUS EPIDERMIDIS, METHICILLIN RESISTANT: SIGNIFICANT CHANGE UP
ALBUMIN SERPL ELPH-MCNC: 3.7 G/DL — SIGNIFICANT CHANGE UP (ref 3.3–5)
ALP SERPL-CCNC: 151 U/L — HIGH (ref 40–120)
ALT FLD-CCNC: 27 U/L — SIGNIFICANT CHANGE UP (ref 10–45)
ANION GAP SERPL CALC-SCNC: 11 MMOL/L — SIGNIFICANT CHANGE UP (ref 5–17)
AST SERPL-CCNC: 24 U/L — SIGNIFICANT CHANGE UP (ref 10–40)
BASOPHILS # BLD AUTO: 0.08 K/UL — SIGNIFICANT CHANGE UP (ref 0–0.2)
BASOPHILS NFR BLD AUTO: 0.4 % — SIGNIFICANT CHANGE UP (ref 0–2)
BILIRUB SERPL-MCNC: 0.3 MG/DL — SIGNIFICANT CHANGE UP (ref 0.2–1.2)
BUN SERPL-MCNC: 12 MG/DL — SIGNIFICANT CHANGE UP (ref 7–23)
CALCIUM SERPL-MCNC: 8.8 MG/DL — SIGNIFICANT CHANGE UP (ref 8.4–10.5)
CHLORIDE SERPL-SCNC: 103 MMOL/L — SIGNIFICANT CHANGE UP (ref 96–108)
CO2 SERPL-SCNC: 24 MMOL/L — SIGNIFICANT CHANGE UP (ref 22–31)
CREAT SERPL-MCNC: 0.52 MG/DL — SIGNIFICANT CHANGE UP (ref 0.5–1.3)
DACRYOCYTES BLD QL SMEAR: SLIGHT — SIGNIFICANT CHANGE UP
EGFR: 110 ML/MIN/1.73M2 — SIGNIFICANT CHANGE UP
EGFR: 110 ML/MIN/1.73M2 — SIGNIFICANT CHANGE UP
ELLIPTOCYTES BLD QL SMEAR: SLIGHT — SIGNIFICANT CHANGE UP
EOSINOPHIL # BLD AUTO: 0.22 K/UL — SIGNIFICANT CHANGE UP (ref 0–0.5)
EOSINOPHIL NFR BLD AUTO: 1 % — SIGNIFICANT CHANGE UP (ref 0–6)
GLUCOSE SERPL-MCNC: 156 MG/DL — HIGH (ref 70–99)
GRAM STN FLD: ABNORMAL
HCT VFR BLD CALC: 31.9 % — LOW (ref 39–50)
HGB BLD-MCNC: 10.2 G/DL — LOW (ref 13–17)
IMM GRANULOCYTES NFR BLD AUTO: 4.7 % — HIGH (ref 0–0.9)
LYMPHOCYTES # BLD AUTO: 0.98 K/UL — LOW (ref 1–3.3)
LYMPHOCYTES # BLD AUTO: 4.3 % — LOW (ref 13–44)
MCHC RBC-ENTMCNC: 27.6 PG — SIGNIFICANT CHANGE UP (ref 27–34)
MCHC RBC-ENTMCNC: 32 G/DL — SIGNIFICANT CHANGE UP (ref 32–36)
MCV RBC AUTO: 86.4 FL — SIGNIFICANT CHANGE UP (ref 80–100)
METHOD TYPE: SIGNIFICANT CHANGE UP
MONOCYTES # BLD AUTO: 1.7 K/UL — HIGH (ref 0–0.9)
MONOCYTES NFR BLD AUTO: 7.4 % — SIGNIFICANT CHANGE UP (ref 2–14)
NEUTROPHILS # BLD AUTO: 18.76 K/UL — HIGH (ref 1.8–7.4)
NEUTROPHILS NFR BLD AUTO: 82.2 % — HIGH (ref 43–77)
NRBC BLD AUTO-RTO: 0 /100 WBCS — SIGNIFICANT CHANGE UP (ref 0–0)
PLAT MORPH BLD: NORMAL — SIGNIFICANT CHANGE UP
PLATELET # BLD AUTO: 194 K/UL — SIGNIFICANT CHANGE UP (ref 150–400)
POIKILOCYTOSIS BLD QL AUTO: SLIGHT — SIGNIFICANT CHANGE UP
POTASSIUM SERPL-MCNC: 4.1 MMOL/L — SIGNIFICANT CHANGE UP (ref 3.5–5.3)
POTASSIUM SERPL-SCNC: 4.1 MMOL/L — SIGNIFICANT CHANGE UP (ref 3.5–5.3)
PROT SERPL-MCNC: 5.2 G/DL — LOW (ref 6–8.3)
RBC # BLD: 3.69 M/UL — LOW (ref 4.2–5.8)
RBC # FLD: 16.7 % — HIGH (ref 10.3–14.5)
RBC BLD AUTO: ABNORMAL
SODIUM SERPL-SCNC: 138 MMOL/L — SIGNIFICANT CHANGE UP (ref 135–145)
WBC # BLD: 22.82 K/UL — HIGH (ref 3.8–10.5)
WBC # FLD AUTO: 22.82 K/UL — HIGH (ref 3.8–10.5)

## 2025-04-14 ENCOUNTER — NON-APPOINTMENT (OUTPATIENT)
Age: 70
End: 2025-04-14

## 2025-04-14 ENCOUNTER — RESULT REVIEW (OUTPATIENT)
Age: 70
End: 2025-04-14

## 2025-04-14 ENCOUNTER — APPOINTMENT (OUTPATIENT)
Dept: INFUSION THERAPY | Facility: HOSPITAL | Age: 70
End: 2025-04-14

## 2025-04-14 LAB
BASOPHILS # BLD AUTO: 0.06 K/UL — SIGNIFICANT CHANGE UP (ref 0–0.2)
BASOPHILS NFR BLD AUTO: 0.3 % — SIGNIFICANT CHANGE UP (ref 0–2)
CULTURE RESULTS: ABNORMAL
EOSINOPHIL # BLD AUTO: 0.17 K/UL — SIGNIFICANT CHANGE UP (ref 0–0.5)
EOSINOPHIL NFR BLD AUTO: 0.7 % — SIGNIFICANT CHANGE UP (ref 0–6)
HCT VFR BLD CALC: 33.4 % — LOW (ref 39–50)
HGB BLD-MCNC: 10.9 G/DL — LOW (ref 13–17)
IMM GRANULOCYTES NFR BLD AUTO: 1.7 % — HIGH (ref 0–0.9)
LYMPHOCYTES # BLD AUTO: 1.07 K/UL — SIGNIFICANT CHANGE UP (ref 1–3.3)
LYMPHOCYTES # BLD AUTO: 4.6 % — LOW (ref 13–44)
MCHC RBC-ENTMCNC: 28 PG — SIGNIFICANT CHANGE UP (ref 27–34)
MCHC RBC-ENTMCNC: 32.6 G/DL — SIGNIFICANT CHANGE UP (ref 32–36)
MCV RBC AUTO: 85.9 FL — SIGNIFICANT CHANGE UP (ref 80–100)
MONOCYTES # BLD AUTO: 1.35 K/UL — HIGH (ref 0–0.9)
MONOCYTES NFR BLD AUTO: 5.8 % — SIGNIFICANT CHANGE UP (ref 2–14)
NEUTROPHILS # BLD AUTO: 20.3 K/UL — HIGH (ref 1.8–7.4)
NEUTROPHILS NFR BLD AUTO: 86.9 % — HIGH (ref 43–77)
NRBC BLD AUTO-RTO: 0 /100 WBCS — SIGNIFICANT CHANGE UP (ref 0–0)
ORGANISM # SPEC MICROSCOPIC CNT: ABNORMAL
ORGANISM # SPEC MICROSCOPIC CNT: ABNORMAL
PLATELET # BLD AUTO: 247 K/UL — SIGNIFICANT CHANGE UP (ref 150–400)
RBC # BLD: 3.89 M/UL — LOW (ref 4.2–5.8)
RBC # FLD: 17 % — HIGH (ref 10.3–14.5)
SPECIMEN SOURCE: SIGNIFICANT CHANGE UP
WBC # BLD: 23.35 K/UL — HIGH (ref 3.8–10.5)
WBC # FLD AUTO: 23.35 K/UL — HIGH (ref 3.8–10.5)

## 2025-04-15 ENCOUNTER — OUTPATIENT (OUTPATIENT)
Dept: OUTPATIENT SERVICES | Facility: HOSPITAL | Age: 70
LOS: 1 days | Discharge: HOME | End: 2025-04-15

## 2025-04-15 DIAGNOSIS — C83.30 DIFFUSE LARGE B-CELL LYMPHOMA, UNSPECIFIED SITE: ICD-10-CM

## 2025-04-15 DIAGNOSIS — Z90.49 ACQUIRED ABSENCE OF OTHER SPECIFIED PARTS OF DIGESTIVE TRACT: Chronic | ICD-10-CM

## 2025-04-18 NOTE — PROGRESS NOTE ADULT - ASSESSMENT
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 Patient is now s/p cycle 6  RCHOP on 7/15 and admitted for cycle 4 HD MTX (3.5gm/m2). 
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 Patient is now s/p cycle 6  RCHOP on 7/15 and admitted for cycle 4 HD MTX (3.5gm/m2)     
denies

## 2025-04-19 NOTE — ED ADULT TRIAGE NOTE - BIRTH SEX
EMERGENCY DEPARTMENT ENCOUNTER      CHIEF COMPLAINT    Chief Complaint   Patient presents with    Fall    Arm Pain    Hip Injury       HPI    Maranda Marcelo is a 88 year old female who presents with a left hip injury after a fall    History is obtained from the patient and EMS.  Last night around 6 PM she had a mechanical trip and fall in the garage, landing on her left elbow and her left hip.   She ended up laying there all night as she could not get up and she lives alone.  This morning a friend of hers found her and called 911.    The floor is concrete.  She was wearing sweatpants and 4 layers on top (shirts, a thermal shirt, and 2 hoodies).    EMS reports blood sugar in the 400s, blood pressure high, temperature 97.5, pulse 95, blood pressure 151/68, blood sugar of 450, GCS of 15.    They administered 500 mL of warmed LR and 75 mcg of fentanyl.  Pain is reasonable at the moment    She does not think she hit her head but a c-collar was placed      ALLERGIES    ALLERGIES:   Allergen Reactions    Fish GI UPSET and DIARRHEA     seafood    Percocet [Oxycodone-Acetaminophen] VOMITING     Nausea, vomiting, diarrhea    Seafood   (Food) SWELLING, VOMITING, DIARRHEA and Other (See Comments)     Taste of Idoine in her mouth    Tramadol VOMITING     Vomiting    Penicillins Other (See Comments)     unknown       CURRENT MEDICATIONS    Current Facility-Administered Medications   Medication Dose Route Frequency Provider Last Rate Last Admin    lactated ringers bolus 500 mL  500 mL Intravenous Once Orquidea Soto MD   Completed at 04/19/25 1102     Current Outpatient Medications   Medication Sig Dispense Refill    losartan (COZAAR) 50 MG tablet TAKE 1 TABLET BY MOUTH DAILY 90 tablet 1    metFORMIN (GLUCOPHAGE-XR) 500 MG 24 hr tablet TAKE 2 TABLETS BY MOUTH TWICE DAILY 360 tablet 1    DANETTE CONTOUR NEXT TEST test strip TEST BLOOD SUGAR ONCE TO TWICE DAILY 100 strip 3    insulin glargine (Lantus SoloStar) 100 UNIT/ML 
Scant shadow drainage on hand dressing. CMS good to right hand. Denies pain. Discharge instructions reviewed with wife and patient.  
pen-injector Inject 6-10 Units into the skin every morning AND 4 Units nightly. Prime 2 units before each dose. 15 mL 3    atorvastatin (LIPITOR) 10 MG tablet Take 1 tablet by mouth daily. 90 tablet 3    cyanocobalamin (VITAMIN B-12) 250 MCG tablet TAKE 1 TABLET BY MOUTH DAILY 90 tablet 0    Insulin Pen Needle 32G X 4 MM Misc Use to inject insulin two times daily. Remove needle cover(s) to expose needle before injecting. Dx code E11.9 100 each 3       PAST MEDICAL HISTORY    Past Medical History:   Diagnosis Date    BCC (basal cell carcinoma), face 2014    mid forehead    Diabetes Mellitus 01/01/1982    Type 2    Diabetic retinopathy  (CMD)     mild    [place] (hard of hearing)     right ear hearing aid    MACULAR DEGENERATION MACULAR EDEMA 06/15/2008    resolved with laser    Other and unspecified hyperlipidemia     Other cataract     Overdose of insulin, accidental or unintentional, initial encounter 02/05/2018    Pneumonia 1968    PONV (postoperative nausea and vomiting)     Posterior vitreous detachment of right eye     Sinus infection     UTI (lower urinary tract infection) last 07/2012       SURGICAL HISTORY    Past Surgical History:   Procedure Laterality Date    Cataract extraction w/  intraocular lens implant  01/01/2013    Bilateral; Dr. Aly    Myringotomy w/ tubes  01/01/2008    right ear    Part remv vitreous,ant apprch Left 08/2012    Removal of tonsils,<11 y/o  01/01/1945    tonsillectomy    Shoulder surgery  10/16/2014    Right shoulder arthroscopic contracture release Dr Thomson       SOCIAL HISTORY    Social History     Tobacco Use    Smoking status: Never    Smokeless tobacco: Never   Vaping Use    Vaping status: never used   Substance Use Topics    Alcohol use: No    Drug use: No       FAMILY HISTORY    Family History   Problem Relation Age of Onset    Cancer, Breast Mother 90    Heart disease Mother     Heart disease Father 63        MI    Other Brother         accident    Cancer, Lung Maternal 
Aunt     Cancer, Colon Neg Hx     Cancer, Ovarian Neg Hx        REVIEW OF SYSTEMS    Constitutional:  Denies fever, chills, or lightheadedness.   Eyes:  Denies change in visual acuity  HENT:  Denies URI symptoms recently  Respiratory:  Denies shortness of breath.   Cardiovascular:  Denies chest pain  GI:  Denies abdominal pain.  Some nausea earlier  Musculoskeletal:  Denies neck pain and back pain.  Left elbow pain and left hip pain  Neurologic:  Denies headache, focal weakness or sensory changes.       PHYSICAL EXAM    ED Triage Vitals [04/19/25 1031]   BP (!) 189/86   Heart Rate 99   Resp 19   Temp    SpO2 99 %     Pulse Ox Interpretation:  Normal  Constitutional:  Well developed, well nourished, elderly, somewhat frail  Eyes:  PERRL (3 mm), extra-ocular muscles intact, conjunctiva normal.   HENT:  Head atraumatic, external ears normal to inspection, nose normal to inspection, oropharynx is dry  Neck:   C-collar in place  Respiratory:  No respiratory distress, normal breath sounds, no rales, no wheezing.   Cardiovascular:  Normal rate, normal rhythm, no murmurs, no gallops, no rubs.   GI:  Soft, nondistended, non-tender   Musculoskeletal:   Marked soft tissue swelling and tenderness and ecchymosis of the posterior left elbow.  Very tender to palpation over the left hip and the left lower extremity is shortened and externally rotated.  The left lower extremity is neurovascularly intact  Integument:   Diminished skin turgor.  Skin is cool to touch with somewhat delayed cap refill  Neurologic:   Awake and alert.  Knew that it was 2025.  Knew that she fell on Good Friday and that tomorrow is Easter  Psychiatric:  Speech and behavior appropriate.       ED COURSE & MEDICAL DECISION MAKING      I ordered EKG, basic labs, CT head and C-spine, radiographs of the left elbow and the left hip, and a chest x-ray in anticipation of a hip fracture.  I ordered basic labs including a CK.  Her sugar was in the 400s so I ordered 
subcutaneous insulin.  She appears dry so I ordered another 500 mL warm LR    EKG    Sinus rhythm with PVCs    LABS    Labs Reviewed   COMPREHENSIVE METABOLIC PANEL - Abnormal; Notable for the following components:       Result Value    Sodium 134 (*)     Glucose 333 (*)     BUN 25 (*)     BUN/Cr 42 (*)     Alkaline Phosphatase 415 (*)     Albumin 3.1 (*)     A/G Ratio 0.9 (*)     All other components within normal limits   CREATINE KINASE - Abnormal; Notable for the following components:     (*)     All other components within normal limits   CBC WITH AUTOMATED DIFFERENTIAL (PERFORMABLE ONLY) - Abnormal; Notable for the following components:    WBC 11.2 (*)     RBC 3.62 (*)     HGB 10.4 (*)     HCT 32.5 (*)     Absolute Neutrophils 10.2 (*)     Absolute Lymphocytes 0.3 (*)     All other components within normal limits    Narrative:     This is an appended report.  These results have been appended to a previously verified report.   CBC WITH DIFFERENTIAL    Narrative:     The following orders were created for panel order CBC with Automated Differential.                  Procedure                               Abnormality         Status                                     ---------                               -----------         ------                                     CBC with Automated Dif...[[id number]]  Abnormal            Final result                                                 Please view results for these tests on the individual orders.   RAINBOW DRAW    Narrative:     The following orders were created for panel order [place] Draw Light Blue Top Collected? 1 Label; Red Top Collected? No Labels; Light Green Top Collected? No Labels; Lavender Top Collected? No Labels; Gold Top Collected? 1 Label; Pink Top Collected? 1 Label; Grey Top Collected? No Labels.                  Procedure                               Abnormality         Status                                     ---------                 
              -----------         ------                                     Light Blue Top[[id number]]                                 In process                                 Gold Top[[id number]]                                       In process                                 Pink Top Tube[[id number]]                                  In process                                                   Please view results for these tests on the individual orders.   LIGHT BLUE TOP   GOLD TOP   PINK TOP TUBE   POCT URINE DIP NON-AUTO       RADIOLOGY    ED X-RAY INTERPRETATION:  I independently visualized the patient's left hip radiographs.  Preliminary impression: 3 part comminuted left subtrochanteric proximal femur fracture  Impacted supracondylar left humerus fracture    CT HEAD WO CONTRAST   Final Result   IMPRESSION:      CT head:   1.  No evidence for fracture or acute intracranial process.      CT cervical spine:   1.  No evidence for fracture or malalignment.      Electronically Signed by: Cristopher Yadav DO   Signed on: 4/19/2025 11:40 AM   Created on Workstation ID: [id number]   Signed on Workstation ID: [id number]      CT CERVICAL SPINE WO CONTRAST   Final Result   IMPRESSION:      CT head:   1.  No evidence for fracture or acute intracranial process.      CT cervical spine:   1.  No evidence for fracture or malalignment.      Electronically Signed by: Cristopher Yadav DO   Signed on: 4/19/2025 11:40 AM   Created on Workstation ID: [id number]   Signed on Workstation ID: [id number]      XR HIP 2 VIEWS LEFT    (Results Pending)   XR CHEST AP OR PA    (Results Pending)   XR ELBOW 3 VIEWS LEFT    (Results Pending)       ED Medication Orders (From admission, onward)      Ordered Start     Status Ordering Provider    04/19/25 1030 04/19/25 1045  insulin regular (human) (HumuLIN R, NovoLIN R) injection 10 Units  ONCE         Last MAR action: Given JEFF SINGH    04/19/25 1041 04/19/25 1042  ondansetron (ZOFRAN) 
injection 4 mg  ONCE         Last MAR action: Given ORQUIDEA SINGH            1151: CT head and C-spine okay so C-spine cleared clinically and c-collar removed.    Sugar down to 333    Case discussed with Dr. Liam Garrett.  He will see the patient in consultation.  She is to be NPO.  For the arm, he recommended a long arm splint, posterior.  Does not need to be at 90 degrees in flexion, can be up to 110 degrees semiextended    I spoke with Dr. Tejas Foster, who graciously agreed to admit        FINAL IMPRESSION    Diagnoses that have been ruled out:   None   Diagnoses that are still under consideration:   None   Final diagnoses:   Closed displaced subtrochanteric fracture of left femur, initial encounter (CMD)   Fracture, supracondylar, elbow, closed, left, initial encounter   Hypothermia, initial encounter                Orquidea Singh MD  04/19/25 8452    
Male

## 2025-04-21 ENCOUNTER — APPOINTMENT (OUTPATIENT)
Dept: HEMATOLOGY ONCOLOGY | Facility: CLINIC | Age: 70
End: 2025-04-21
Payer: MEDICARE

## 2025-04-21 ENCOUNTER — APPOINTMENT (OUTPATIENT)
Dept: CT IMAGING | Facility: IMAGING CENTER | Age: 70
End: 2025-04-21
Payer: MEDICARE

## 2025-04-21 ENCOUNTER — RESULT REVIEW (OUTPATIENT)
Age: 70
End: 2025-04-21

## 2025-04-21 ENCOUNTER — OUTPATIENT (OUTPATIENT)
Dept: OUTPATIENT SERVICES | Facility: HOSPITAL | Age: 70
LOS: 1 days | End: 2025-04-21
Payer: MEDICARE

## 2025-04-21 ENCOUNTER — APPOINTMENT (OUTPATIENT)
Dept: INFUSION THERAPY | Facility: HOSPITAL | Age: 70
End: 2025-04-21

## 2025-04-21 VITALS
OXYGEN SATURATION: 98 % | DIASTOLIC BLOOD PRESSURE: 86 MMHG | WEIGHT: 100.31 LBS | SYSTOLIC BLOOD PRESSURE: 135 MMHG | RESPIRATION RATE: 16 BRPM | HEART RATE: 75 BPM | TEMPERATURE: 97.7 F | BODY MASS INDEX: 18.35 KG/M2

## 2025-04-21 DIAGNOSIS — Z98.890 OTHER SPECIFIED POSTPROCEDURAL STATES: Chronic | ICD-10-CM

## 2025-04-21 DIAGNOSIS — C83.38 DIFFUSE LARGE B-CELL LYMPHOMA, LYMPH NODES OF MULTIPLE SITES: ICD-10-CM

## 2025-04-21 DIAGNOSIS — Z90.49 ACQUIRED ABSENCE OF OTHER SPECIFIED PARTS OF DIGESTIVE TRACT: Chronic | ICD-10-CM

## 2025-04-21 LAB
ALBUMIN SERPL ELPH-MCNC: 4.2 G/DL — SIGNIFICANT CHANGE UP (ref 3.3–5)
ALP SERPL-CCNC: 193 U/L — HIGH (ref 40–120)
ALT FLD-CCNC: 62 U/L — HIGH (ref 10–45)
ANION GAP SERPL CALC-SCNC: 10 MMOL/L — SIGNIFICANT CHANGE UP (ref 5–17)
AST SERPL-CCNC: 43 U/L — HIGH (ref 10–40)
BASOPHILS # BLD AUTO: 0.07 K/UL — SIGNIFICANT CHANGE UP (ref 0–0.2)
BASOPHILS NFR BLD AUTO: 0.5 % — SIGNIFICANT CHANGE UP (ref 0–2)
BILIRUB SERPL-MCNC: 0.4 MG/DL — SIGNIFICANT CHANGE UP (ref 0.2–1.2)
BUN SERPL-MCNC: 12 MG/DL — SIGNIFICANT CHANGE UP (ref 7–23)
CALCIUM SERPL-MCNC: 8.9 MG/DL — SIGNIFICANT CHANGE UP (ref 8.4–10.5)
CHLORIDE SERPL-SCNC: 99 MMOL/L — SIGNIFICANT CHANGE UP (ref 96–108)
CO2 SERPL-SCNC: 26 MMOL/L — SIGNIFICANT CHANGE UP (ref 22–31)
CREAT SERPL-MCNC: 0.53 MG/DL — SIGNIFICANT CHANGE UP (ref 0.5–1.3)
EGFR: 110 ML/MIN/1.73M2 — SIGNIFICANT CHANGE UP
EGFR: 110 ML/MIN/1.73M2 — SIGNIFICANT CHANGE UP
EOSINOPHIL # BLD AUTO: 0.06 K/UL — SIGNIFICANT CHANGE UP (ref 0–0.5)
EOSINOPHIL NFR BLD AUTO: 0.4 % — SIGNIFICANT CHANGE UP (ref 0–6)
GLUCOSE SERPL-MCNC: 84 MG/DL — SIGNIFICANT CHANGE UP (ref 70–99)
HCT VFR BLD CALC: 35 % — LOW (ref 39–50)
HGB BLD-MCNC: 11.5 G/DL — LOW (ref 13–17)
IMM GRANULOCYTES NFR BLD AUTO: 0.8 % — SIGNIFICANT CHANGE UP (ref 0–0.9)
LYMPHOCYTES # BLD AUTO: 0.9 K/UL — LOW (ref 1–3.3)
LYMPHOCYTES # BLD AUTO: 6.3 % — LOW (ref 13–44)
MCHC RBC-ENTMCNC: 28.6 PG — SIGNIFICANT CHANGE UP (ref 27–34)
MCHC RBC-ENTMCNC: 32.9 G/DL — SIGNIFICANT CHANGE UP (ref 32–36)
MCV RBC AUTO: 87.1 FL — SIGNIFICANT CHANGE UP (ref 80–100)
MONOCYTES # BLD AUTO: 0.83 K/UL — SIGNIFICANT CHANGE UP (ref 0–0.9)
MONOCYTES NFR BLD AUTO: 5.8 % — SIGNIFICANT CHANGE UP (ref 2–14)
NEUTROPHILS # BLD AUTO: 12.23 K/UL — HIGH (ref 1.8–7.4)
NEUTROPHILS NFR BLD AUTO: 86.2 % — HIGH (ref 43–77)
NRBC BLD AUTO-RTO: 0 /100 WBCS — SIGNIFICANT CHANGE UP (ref 0–0)
PLATELET # BLD AUTO: 246 K/UL — SIGNIFICANT CHANGE UP (ref 150–400)
POTASSIUM SERPL-MCNC: 3.6 MMOL/L — SIGNIFICANT CHANGE UP (ref 3.5–5.3)
POTASSIUM SERPL-SCNC: 3.6 MMOL/L — SIGNIFICANT CHANGE UP (ref 3.5–5.3)
PROT SERPL-MCNC: 6.1 G/DL — SIGNIFICANT CHANGE UP (ref 6–8.3)
RBC # BLD: 4.02 M/UL — LOW (ref 4.2–5.8)
RBC # FLD: 17.3 % — HIGH (ref 10.3–14.5)
SODIUM SERPL-SCNC: 135 MMOL/L — SIGNIFICANT CHANGE UP (ref 135–145)
WBC # BLD: 14.2 K/UL — HIGH (ref 3.8–10.5)
WBC # FLD AUTO: 14.2 K/UL — HIGH (ref 3.8–10.5)

## 2025-04-21 PROCEDURE — 99215 OFFICE O/P EST HI 40 MIN: CPT

## 2025-04-21 PROCEDURE — G2211 COMPLEX E/M VISIT ADD ON: CPT

## 2025-04-21 PROCEDURE — 74177 CT ABD & PELVIS W/CONTRAST: CPT | Mod: 26

## 2025-04-21 RX ORDER — OXYCODONE 5 MG/1
5 TABLET ORAL
Qty: 90 | Refills: 0 | Status: ACTIVE | COMMUNITY
Start: 2025-04-21 | End: 1900-01-01

## 2025-04-25 DIAGNOSIS — C83.38 DIFFUSE LARGE B-CELL LYMPHOMA, LYMPH NODES OF MULTIPLE SITES: ICD-10-CM

## 2025-04-28 ENCOUNTER — RESULT REVIEW (OUTPATIENT)
Age: 70
End: 2025-04-28

## 2025-04-28 ENCOUNTER — APPOINTMENT (OUTPATIENT)
Dept: INFUSION THERAPY | Facility: HOSPITAL | Age: 70
End: 2025-04-28

## 2025-04-28 ENCOUNTER — APPOINTMENT (OUTPATIENT)
Dept: HEMATOLOGY ONCOLOGY | Facility: CLINIC | Age: 70
End: 2025-04-28
Payer: MEDICARE

## 2025-04-28 VITALS
TEMPERATURE: 97.7 F | WEIGHT: 103.62 LBS | OXYGEN SATURATION: 98 % | DIASTOLIC BLOOD PRESSURE: 76 MMHG | RESPIRATION RATE: 16 BRPM | BODY MASS INDEX: 18.95 KG/M2 | SYSTOLIC BLOOD PRESSURE: 118 MMHG | HEART RATE: 82 BPM

## 2025-04-28 LAB
ALBUMIN SERPL ELPH-MCNC: 3.9 G/DL — SIGNIFICANT CHANGE UP (ref 3.3–5)
ALP SERPL-CCNC: 167 U/L — HIGH (ref 40–120)
ALT FLD-CCNC: 41 U/L — SIGNIFICANT CHANGE UP (ref 10–45)
ANION GAP SERPL CALC-SCNC: 9 MMOL/L — SIGNIFICANT CHANGE UP (ref 5–17)
AST SERPL-CCNC: 33 U/L — SIGNIFICANT CHANGE UP (ref 10–40)
BASOPHILS # BLD AUTO: 0.06 K/UL — SIGNIFICANT CHANGE UP (ref 0–0.2)
BASOPHILS NFR BLD AUTO: 0.9 % — SIGNIFICANT CHANGE UP (ref 0–2)
BILIRUB SERPL-MCNC: 0.3 MG/DL — SIGNIFICANT CHANGE UP (ref 0.2–1.2)
BUN SERPL-MCNC: 14 MG/DL — SIGNIFICANT CHANGE UP (ref 7–23)
CALCIUM SERPL-MCNC: 8.8 MG/DL — SIGNIFICANT CHANGE UP (ref 8.4–10.5)
CHLORIDE SERPL-SCNC: 105 MMOL/L — SIGNIFICANT CHANGE UP (ref 96–108)
CO2 SERPL-SCNC: 27 MMOL/L — SIGNIFICANT CHANGE UP (ref 22–31)
CREAT SERPL-MCNC: 0.51 MG/DL — SIGNIFICANT CHANGE UP (ref 0.5–1.3)
EGFR: 111 ML/MIN/1.73M2 — SIGNIFICANT CHANGE UP
EGFR: 111 ML/MIN/1.73M2 — SIGNIFICANT CHANGE UP
EOSINOPHIL # BLD AUTO: 0.05 K/UL — SIGNIFICANT CHANGE UP (ref 0–0.5)
EOSINOPHIL NFR BLD AUTO: 0.7 % — SIGNIFICANT CHANGE UP (ref 0–6)
GLUCOSE SERPL-MCNC: 102 MG/DL — HIGH (ref 70–99)
HCT VFR BLD CALC: 33.1 % — LOW (ref 39–50)
HGB BLD-MCNC: 10.9 G/DL — LOW (ref 13–17)
IMM GRANULOCYTES NFR BLD AUTO: 0.9 % — SIGNIFICANT CHANGE UP (ref 0–0.9)
LYMPHOCYTES # BLD AUTO: 0.47 K/UL — LOW (ref 1–3.3)
LYMPHOCYTES # BLD AUTO: 6.9 % — LOW (ref 13–44)
MAGNESIUM SERPL-MCNC: 2.1 MG/DL — SIGNIFICANT CHANGE UP (ref 1.6–2.6)
MCHC RBC-ENTMCNC: 28.4 PG — SIGNIFICANT CHANGE UP (ref 27–34)
MCHC RBC-ENTMCNC: 32.9 G/DL — SIGNIFICANT CHANGE UP (ref 32–36)
MCV RBC AUTO: 86.2 FL — SIGNIFICANT CHANGE UP (ref 80–100)
MONOCYTES # BLD AUTO: 0.91 K/UL — HIGH (ref 0–0.9)
MONOCYTES NFR BLD AUTO: 13.3 % — SIGNIFICANT CHANGE UP (ref 2–14)
NEUTROPHILS # BLD AUTO: 5.3 K/UL — SIGNIFICANT CHANGE UP (ref 1.8–7.4)
NEUTROPHILS NFR BLD AUTO: 77.3 % — HIGH (ref 43–77)
NRBC BLD AUTO-RTO: 0 /100 WBCS — SIGNIFICANT CHANGE UP (ref 0–0)
PLATELET # BLD AUTO: 203 K/UL — SIGNIFICANT CHANGE UP (ref 150–400)
POTASSIUM SERPL-MCNC: 4.2 MMOL/L — SIGNIFICANT CHANGE UP (ref 3.5–5.3)
POTASSIUM SERPL-SCNC: 4.2 MMOL/L — SIGNIFICANT CHANGE UP (ref 3.5–5.3)
PROT SERPL-MCNC: 5.5 G/DL — LOW (ref 6–8.3)
RBC # BLD: 3.84 M/UL — LOW (ref 4.2–5.8)
RBC # FLD: 17.2 % — HIGH (ref 10.3–14.5)
SODIUM SERPL-SCNC: 141 MMOL/L — SIGNIFICANT CHANGE UP (ref 135–145)
WBC # BLD: 6.85 K/UL — SIGNIFICANT CHANGE UP (ref 3.8–10.5)
WBC # FLD AUTO: 6.85 K/UL — SIGNIFICANT CHANGE UP (ref 3.8–10.5)

## 2025-04-28 PROCEDURE — G2211 COMPLEX E/M VISIT ADD ON: CPT

## 2025-04-28 PROCEDURE — 86850 RBC ANTIBODY SCREEN: CPT

## 2025-04-28 PROCEDURE — 86900 BLOOD TYPING SEROLOGIC ABO: CPT

## 2025-04-28 PROCEDURE — 99215 OFFICE O/P EST HI 40 MIN: CPT

## 2025-04-28 PROCEDURE — 74177 CT ABD & PELVIS W/CONTRAST: CPT

## 2025-04-28 PROCEDURE — 86901 BLOOD TYPING SEROLOGIC RH(D): CPT

## 2025-04-29 ENCOUNTER — RESULT REVIEW (OUTPATIENT)
Age: 70
End: 2025-04-29

## 2025-04-29 DIAGNOSIS — R11.2 NAUSEA WITH VOMITING, UNSPECIFIED: ICD-10-CM

## 2025-04-29 DIAGNOSIS — C83.38 DIFFUSE LARGE B-CELL LYMPHOMA, LYMPH NODES OF MULTIPLE SITES: ICD-10-CM

## 2025-04-29 DIAGNOSIS — Z51.11 ENCOUNTER FOR ANTINEOPLASTIC CHEMOTHERAPY: ICD-10-CM

## 2025-04-29 PROBLEM — Z92.21 HISTORY OF CHEMOTHERAPY: Status: RESOLVED | Noted: 2025-04-29 | Resolved: 2025-04-29

## 2025-04-29 LAB
APTT BLD: 35.1 SEC — SIGNIFICANT CHANGE UP (ref 26.1–36.8)
INR BLD: 0.96 RATIO — SIGNIFICANT CHANGE UP (ref 0.85–1.16)
PROTHROM AB SERPL-ACNC: 11.3 SEC — SIGNIFICANT CHANGE UP (ref 9.9–13.4)

## 2025-05-02 ENCOUNTER — APPOINTMENT (OUTPATIENT)
Dept: CV DIAGNOSITCS | Facility: HOSPITAL | Age: 70
End: 2025-05-02

## 2025-05-02 ENCOUNTER — OUTPATIENT (OUTPATIENT)
Dept: OUTPATIENT SERVICES | Facility: HOSPITAL | Age: 70
LOS: 1 days | End: 2025-05-02

## 2025-05-02 ENCOUNTER — RESULT REVIEW (OUTPATIENT)
Age: 70
End: 2025-05-02

## 2025-05-02 DIAGNOSIS — C83.38 DIFFUSE LARGE B-CELL LYMPHOMA, LYMPH NODES OF MULTIPLE SITES: ICD-10-CM

## 2025-05-02 DIAGNOSIS — Z98.890 OTHER SPECIFIED POSTPROCEDURAL STATES: Chronic | ICD-10-CM

## 2025-05-02 DIAGNOSIS — Z90.49 ACQUIRED ABSENCE OF OTHER SPECIFIED PARTS OF DIGESTIVE TRACT: Chronic | ICD-10-CM

## 2025-05-02 PROCEDURE — 76376 3D RENDER W/INTRP POSTPROCES: CPT

## 2025-05-02 PROCEDURE — 76376 3D RENDER W/INTRP POSTPROCES: CPT | Mod: 26

## 2025-05-02 PROCEDURE — 93306 TTE W/DOPPLER COMPLETE: CPT | Mod: 26

## 2025-05-02 PROCEDURE — 93306 TTE W/DOPPLER COMPLETE: CPT

## 2025-05-02 PROCEDURE — 93356 MYOCRD STRAIN IMG SPCKL TRCK: CPT

## 2025-05-05 ENCOUNTER — APPOINTMENT (OUTPATIENT)
Dept: RADIATION ONCOLOGY | Facility: CLINIC | Age: 70
End: 2025-05-05
Payer: MEDICARE

## 2025-05-05 VITALS
TEMPERATURE: 97.16 F | RESPIRATION RATE: 16 BRPM | DIASTOLIC BLOOD PRESSURE: 75 MMHG | OXYGEN SATURATION: 99 % | HEIGHT: 62 IN | SYSTOLIC BLOOD PRESSURE: 124 MMHG | HEART RATE: 66 BPM

## 2025-05-05 DIAGNOSIS — Z92.21 PERSONAL HISTORY OF ANTINEOPLASTIC CHEMOTHERAPY: ICD-10-CM

## 2025-05-05 PROCEDURE — 99204 OFFICE O/P NEW MOD 45 MIN: CPT

## 2025-05-07 DIAGNOSIS — K83.1 OBSTRUCTION OF BILE DUCT: ICD-10-CM

## 2025-05-07 DIAGNOSIS — Z87.898 PERSONAL HISTORY OF OTHER SPECIFIED CONDITIONS: ICD-10-CM

## 2025-05-07 RX ORDER — PANTOPRAZOLE 40 MG/1
40 TABLET, DELAYED RELEASE ORAL DAILY
Qty: 30 | Refills: 0 | Status: ACTIVE | COMMUNITY
Start: 2025-05-07 | End: 1900-01-01

## 2025-05-07 RX ORDER — ONDANSETRON 8 MG/1
8 TABLET, ORALLY DISINTEGRATING ORAL EVERY 8 HOURS
Qty: 30 | Refills: 0 | Status: ACTIVE | COMMUNITY
Start: 2025-05-07 | End: 1900-01-01

## 2025-05-07 RX ORDER — POSACONAZOLE 100 MG/1
100 TABLET, DELAYED RELEASE ORAL DAILY
Qty: 90 | Refills: 0 | Status: ACTIVE | COMMUNITY
Start: 2025-05-07 | End: 1900-01-01

## 2025-05-07 RX ORDER — SULFAMETHOXAZOLE AND TRIMETHOPRIM 400; 80 MG/1; MG/1
400-80 TABLET ORAL DAILY
Qty: 30 | Refills: 0 | Status: ACTIVE | COMMUNITY
Start: 2025-05-07 | End: 1900-01-01

## 2025-05-07 RX ORDER — TACROLIMUS 0.5 MG/1
0.5 CAPSULE ORAL
Qty: 300 | Refills: 0 | Status: ACTIVE | COMMUNITY
Start: 2025-05-07 | End: 1900-01-01

## 2025-05-07 RX ORDER — URSODIOL 300 MG/1
300 CAPSULE ORAL
Qty: 60 | Refills: 0 | Status: ACTIVE | COMMUNITY
Start: 2025-05-07 | End: 1900-01-01

## 2025-05-07 RX ORDER — ACYCLOVIR 800 MG/1
800 TABLET ORAL
Qty: 60 | Refills: 0 | Status: ACTIVE | COMMUNITY
Start: 2025-05-07 | End: 1900-01-01

## 2025-05-08 ENCOUNTER — RESULT REVIEW (OUTPATIENT)
Age: 70
End: 2025-05-08

## 2025-05-08 ENCOUNTER — APPOINTMENT (OUTPATIENT)
Dept: INFUSION THERAPY | Facility: HOSPITAL | Age: 70
End: 2025-05-08

## 2025-05-08 ENCOUNTER — OUTPATIENT (OUTPATIENT)
Dept: OUTPATIENT SERVICES | Facility: HOSPITAL | Age: 70
LOS: 1 days | End: 2025-05-08
Payer: MEDICARE

## 2025-05-08 ENCOUNTER — APPOINTMENT (OUTPATIENT)
Dept: NUCLEAR MEDICINE | Facility: IMAGING CENTER | Age: 70
End: 2025-05-08

## 2025-05-08 ENCOUNTER — APPOINTMENT (OUTPATIENT)
Dept: HEMATOLOGY ONCOLOGY | Facility: CLINIC | Age: 70
End: 2025-05-08
Payer: MEDICARE

## 2025-05-08 VITALS
DIASTOLIC BLOOD PRESSURE: 76 MMHG | RESPIRATION RATE: 16 BRPM | SYSTOLIC BLOOD PRESSURE: 135 MMHG | TEMPERATURE: 97.9 F | OXYGEN SATURATION: 98 % | HEART RATE: 79 BPM | WEIGHT: 99.21 LBS | BODY MASS INDEX: 18.15 KG/M2

## 2025-05-08 DIAGNOSIS — Z94.84 STEM CELLS TRANSPLANT STATUS: ICD-10-CM

## 2025-05-08 DIAGNOSIS — C83.38 DIFFUSE LARGE B-CELL LYMPHOMA, LYMPH NODES OF MULTIPLE SITES: ICD-10-CM

## 2025-05-08 DIAGNOSIS — Z90.49 ACQUIRED ABSENCE OF OTHER SPECIFIED PARTS OF DIGESTIVE TRACT: Chronic | ICD-10-CM

## 2025-05-08 DIAGNOSIS — Z98.890 OTHER SPECIFIED POSTPROCEDURAL STATES: Chronic | ICD-10-CM

## 2025-05-08 DIAGNOSIS — E78.5 HYPERLIPIDEMIA, UNSPECIFIED: ICD-10-CM

## 2025-05-08 DIAGNOSIS — I82.411 ACUTE EMBOLISM AND THROMBOSIS OF RIGHT FEMORAL VEIN: ICD-10-CM

## 2025-05-08 LAB
ALBUMIN SERPL ELPH-MCNC: 4.2 G/DL — SIGNIFICANT CHANGE UP (ref 3.3–5)
ALP SERPL-CCNC: 220 U/L — HIGH (ref 40–120)
ALT FLD-CCNC: 95 U/L — HIGH (ref 10–45)
ANION GAP SERPL CALC-SCNC: 10 MMOL/L — SIGNIFICANT CHANGE UP (ref 5–17)
AST SERPL-CCNC: 76 U/L — HIGH (ref 10–40)
BASOPHILS # BLD AUTO: 0.03 K/UL — SIGNIFICANT CHANGE UP (ref 0–0.2)
BASOPHILS NFR BLD AUTO: 0.6 % — SIGNIFICANT CHANGE UP (ref 0–2)
BILIRUB SERPL-MCNC: 0.4 MG/DL — SIGNIFICANT CHANGE UP (ref 0.2–1.2)
BUN SERPL-MCNC: 17 MG/DL — SIGNIFICANT CHANGE UP (ref 7–23)
CALCIUM SERPL-MCNC: 9 MG/DL — SIGNIFICANT CHANGE UP (ref 8.4–10.5)
CHLORIDE SERPL-SCNC: 105 MMOL/L — SIGNIFICANT CHANGE UP (ref 96–108)
CO2 SERPL-SCNC: 24 MMOL/L — SIGNIFICANT CHANGE UP (ref 22–31)
CREAT SERPL-MCNC: 0.55 MG/DL — SIGNIFICANT CHANGE UP (ref 0.5–1.3)
EGFR: 109 ML/MIN/1.73M2 — SIGNIFICANT CHANGE UP
EGFR: 109 ML/MIN/1.73M2 — SIGNIFICANT CHANGE UP
EOSINOPHIL # BLD AUTO: 0.06 K/UL — SIGNIFICANT CHANGE UP (ref 0–0.5)
EOSINOPHIL NFR BLD AUTO: 1.3 % — SIGNIFICANT CHANGE UP (ref 0–6)
GLUCOSE SERPL-MCNC: 97 MG/DL — SIGNIFICANT CHANGE UP (ref 70–99)
HCT VFR BLD CALC: 35.4 % — LOW (ref 39–50)
HGB BLD-MCNC: 11.2 G/DL — LOW (ref 13–17)
IMM GRANULOCYTES NFR BLD AUTO: 0.4 % — SIGNIFICANT CHANGE UP (ref 0–0.9)
LDH SERPL L TO P-CCNC: 345 U/L — HIGH (ref 50–242)
LYMPHOCYTES # BLD AUTO: 0.35 K/UL — LOW (ref 1–3.3)
LYMPHOCYTES # BLD AUTO: 7.3 % — LOW (ref 13–44)
MAGNESIUM SERPL-MCNC: 2.2 MG/DL — SIGNIFICANT CHANGE UP (ref 1.6–2.6)
MCHC RBC-ENTMCNC: 28.2 PG — SIGNIFICANT CHANGE UP (ref 27–34)
MCHC RBC-ENTMCNC: 31.6 G/DL — LOW (ref 32–36)
MCV RBC AUTO: 89.2 FL — SIGNIFICANT CHANGE UP (ref 80–100)
MONOCYTES # BLD AUTO: 0.55 K/UL — SIGNIFICANT CHANGE UP (ref 0–0.9)
MONOCYTES NFR BLD AUTO: 11.5 % — SIGNIFICANT CHANGE UP (ref 2–14)
NEUTROPHILS # BLD AUTO: 3.77 K/UL — SIGNIFICANT CHANGE UP (ref 1.8–7.4)
NEUTROPHILS NFR BLD AUTO: 78.9 % — HIGH (ref 43–77)
NRBC BLD AUTO-RTO: 0 /100 WBCS — SIGNIFICANT CHANGE UP (ref 0–0)
PHOSPHATE SERPL-MCNC: 4.5 MG/DL — SIGNIFICANT CHANGE UP (ref 2.5–4.5)
PLATELET # BLD AUTO: 216 K/UL — SIGNIFICANT CHANGE UP (ref 150–400)
POTASSIUM SERPL-MCNC: 4 MMOL/L — SIGNIFICANT CHANGE UP (ref 3.5–5.3)
POTASSIUM SERPL-SCNC: 4 MMOL/L — SIGNIFICANT CHANGE UP (ref 3.5–5.3)
PROT SERPL-MCNC: 6 G/DL — SIGNIFICANT CHANGE UP (ref 6–8.3)
RBC # BLD: 3.97 M/UL — LOW (ref 4.2–5.8)
RBC # FLD: 17.2 % — HIGH (ref 10.3–14.5)
SODIUM SERPL-SCNC: 140 MMOL/L — SIGNIFICANT CHANGE UP (ref 135–145)
WBC # BLD: 4.78 K/UL — SIGNIFICANT CHANGE UP (ref 3.8–10.5)
WBC # FLD AUTO: 4.78 K/UL — SIGNIFICANT CHANGE UP (ref 3.8–10.5)

## 2025-05-08 PROCEDURE — G2211 COMPLEX E/M VISIT ADD ON: CPT

## 2025-05-08 PROCEDURE — 78815 PET IMAGE W/CT SKULL-THIGH: CPT | Mod: 26,PS

## 2025-05-08 PROCEDURE — 86850 RBC ANTIBODY SCREEN: CPT

## 2025-05-08 PROCEDURE — 99215 OFFICE O/P EST HI 40 MIN: CPT

## 2025-05-08 PROCEDURE — A9552: CPT

## 2025-05-08 PROCEDURE — 86901 BLOOD TYPING SEROLOGIC RH(D): CPT

## 2025-05-08 PROCEDURE — 86900 BLOOD TYPING SEROLOGIC ABO: CPT

## 2025-05-08 PROCEDURE — 78815 PET IMAGE W/CT SKULL-THIGH: CPT

## 2025-05-09 ENCOUNTER — NON-APPOINTMENT (OUTPATIENT)
Age: 70
End: 2025-05-09

## 2025-05-13 RX ORDER — URSODIOL 300 MG/1
300 CAPSULE ORAL
Refills: 0 | Status: DISCONTINUED | OUTPATIENT
Start: 2025-05-14 | End: 2025-06-05

## 2025-05-13 RX ORDER — SODIUM BICARBONATE 1 MEQ/ML
10 SYRINGE (ML) INTRAVENOUS
Refills: 0 | Status: DISCONTINUED | OUTPATIENT
Start: 2025-05-14 | End: 2025-06-05

## 2025-05-14 ENCOUNTER — INPATIENT (INPATIENT)
Facility: HOSPITAL | Age: 70
LOS: 21 days | Discharge: HOME | DRG: 14 | End: 2025-06-05
Attending: INTERNAL MEDICINE | Admitting: INTERNAL MEDICINE
Payer: MEDICARE

## 2025-05-14 VITALS
RESPIRATION RATE: 16 BRPM | TEMPERATURE: 98 F | HEIGHT: 60 IN | OXYGEN SATURATION: 99 % | WEIGHT: 103.84 LBS | HEART RATE: 77 BPM | SYSTOLIC BLOOD PRESSURE: 105 MMHG | DIASTOLIC BLOOD PRESSURE: 62 MMHG

## 2025-05-14 DIAGNOSIS — Z98.890 OTHER SPECIFIED POSTPROCEDURAL STATES: Chronic | ICD-10-CM

## 2025-05-14 DIAGNOSIS — Z94.84 STEM CELLS TRANSPLANT STATUS: ICD-10-CM

## 2025-05-14 DIAGNOSIS — Z29.9 ENCOUNTER FOR PROPHYLACTIC MEASURES, UNSPECIFIED: ICD-10-CM

## 2025-05-14 DIAGNOSIS — Z90.49 ACQUIRED ABSENCE OF OTHER SPECIFIED PARTS OF DIGESTIVE TRACT: Chronic | ICD-10-CM

## 2025-05-14 DIAGNOSIS — C83.38 DIFFUSE LARGE B-CELL LYMPHOMA, LYMPH NODES OF MULTIPLE SITES: ICD-10-CM

## 2025-05-14 LAB
ALBUMIN SERPL ELPH-MCNC: 4 G/DL — SIGNIFICANT CHANGE UP (ref 3.3–5)
ALP SERPL-CCNC: 352 U/L — HIGH (ref 40–120)
ALT FLD-CCNC: 86 U/L — HIGH (ref 10–45)
ANION GAP SERPL CALC-SCNC: 13 MMOL/L — SIGNIFICANT CHANGE UP (ref 5–17)
APTT BLD: 27.2 SEC — SIGNIFICANT CHANGE UP (ref 26.1–36.8)
AST SERPL-CCNC: 54 U/L — HIGH (ref 10–40)
BASOPHILS # BLD AUTO: 0.08 K/UL — SIGNIFICANT CHANGE UP (ref 0–0.2)
BASOPHILS NFR BLD AUTO: 4.3 % — HIGH (ref 0–2)
BILIRUB DIRECT SERPL-MCNC: 0.2 MG/DL — SIGNIFICANT CHANGE UP (ref 0–0.3)
BILIRUB INDIRECT FLD-MCNC: 0.2 MG/DL — SIGNIFICANT CHANGE UP (ref 0.2–1)
BILIRUB SERPL-MCNC: 0.4 MG/DL — SIGNIFICANT CHANGE UP (ref 0.2–1.2)
BLD GP AB SCN SERPL QL: NEGATIVE — SIGNIFICANT CHANGE UP
BUN SERPL-MCNC: 13 MG/DL — SIGNIFICANT CHANGE UP (ref 7–23)
CALCIUM SERPL-MCNC: 8.8 MG/DL — SIGNIFICANT CHANGE UP (ref 8.4–10.5)
CHLORIDE SERPL-SCNC: 102 MMOL/L — SIGNIFICANT CHANGE UP (ref 96–108)
CO2 SERPL-SCNC: 26 MMOL/L — SIGNIFICANT CHANGE UP (ref 22–31)
CREAT SERPL-MCNC: 0.55 MG/DL — SIGNIFICANT CHANGE UP (ref 0.5–1.3)
EGFR: 109 ML/MIN/1.73M2 — SIGNIFICANT CHANGE UP
EGFR: 109 ML/MIN/1.73M2 — SIGNIFICANT CHANGE UP
EOSINOPHIL # BLD AUTO: 0.02 K/UL — SIGNIFICANT CHANGE UP (ref 0–0.5)
EOSINOPHIL NFR BLD AUTO: 0.9 % — SIGNIFICANT CHANGE UP (ref 0–6)
GLUCOSE SERPL-MCNC: 61 MG/DL — LOW (ref 70–99)
HCT VFR BLD CALC: 35.1 % — LOW (ref 39–50)
HGB BLD-MCNC: 11.1 G/DL — LOW (ref 13–17)
INR BLD: 0.96 RATIO — SIGNIFICANT CHANGE UP (ref 0.85–1.16)
LDH SERPL L TO P-CCNC: 273 U/L — HIGH (ref 50–242)
LYMPHOCYTES # BLD AUTO: 0.57 K/UL — LOW (ref 1–3.3)
LYMPHOCYTES # BLD AUTO: 28.7 % — SIGNIFICANT CHANGE UP (ref 13–44)
MAGNESIUM SERPL-MCNC: 2.2 MG/DL — SIGNIFICANT CHANGE UP (ref 1.6–2.6)
MANUAL SMEAR VERIFICATION: SIGNIFICANT CHANGE UP
MCHC RBC-ENTMCNC: 28.2 PG — SIGNIFICANT CHANGE UP (ref 27–34)
MCHC RBC-ENTMCNC: 31.6 G/DL — LOW (ref 32–36)
MCV RBC AUTO: 89.1 FL — SIGNIFICANT CHANGE UP (ref 80–100)
MONOCYTES # BLD AUTO: 0.51 K/UL — SIGNIFICANT CHANGE UP (ref 0–0.9)
MONOCYTES NFR BLD AUTO: 26.1 % — HIGH (ref 2–14)
NEUTROPHILS # BLD AUTO: 0.79 K/UL — LOW (ref 1.8–7.4)
NEUTROPHILS NFR BLD AUTO: 37.4 % — LOW (ref 43–77)
NEUTS BAND # BLD: 2.6 % — SIGNIFICANT CHANGE UP (ref 0–8)
NEUTS BAND NFR BLD: 2.6 % — SIGNIFICANT CHANGE UP (ref 0–8)
PHOSPHATE SERPL-MCNC: 3.8 MG/DL — SIGNIFICANT CHANGE UP (ref 2.5–4.5)
PLAT MORPH BLD: NORMAL — SIGNIFICANT CHANGE UP
PLATELET # BLD AUTO: 223 K/UL — SIGNIFICANT CHANGE UP (ref 150–400)
POTASSIUM SERPL-MCNC: 4 MMOL/L — SIGNIFICANT CHANGE UP (ref 3.5–5.3)
POTASSIUM SERPL-SCNC: 4 MMOL/L — SIGNIFICANT CHANGE UP (ref 3.5–5.3)
PROT SERPL-MCNC: 5.6 G/DL — LOW (ref 6–8.3)
PROTHROM AB SERPL-ACNC: 10.9 SEC — SIGNIFICANT CHANGE UP (ref 9.9–13.4)
RBC # BLD: 3.94 M/UL — LOW (ref 4.2–5.8)
RBC # FLD: 16.9 % — HIGH (ref 10.3–14.5)
RBC BLD AUTO: SIGNIFICANT CHANGE UP
RH IG SCN BLD-IMP: POSITIVE — SIGNIFICANT CHANGE UP
SODIUM SERPL-SCNC: 141 MMOL/L — SIGNIFICANT CHANGE UP (ref 135–145)
WBC # BLD: 1.97 K/UL — LOW (ref 3.8–10.5)
WBC # FLD AUTO: 1.97 K/UL — LOW (ref 3.8–10.5)

## 2025-05-14 RX ORDER — BUSULFAN 2 MG/1
180 TABLET, FILM COATED ORAL DAILY
Refills: 0 | Status: COMPLETED | OUTPATIENT
Start: 2025-05-14 | End: 2025-05-15

## 2025-05-14 RX ORDER — ONDANSETRON HCL/PF 4 MG/2 ML
16 VIAL (ML) INJECTION EVERY 24 HOURS
Refills: 0 | Status: COMPLETED | OUTPATIENT
Start: 2025-05-14 | End: 2025-05-19

## 2025-05-14 RX ORDER — TACROLIMUS 0.5 MG/1
0.9 CAPSULE ORAL EVERY 24 HOURS
Refills: 0 | Status: DISCONTINUED | OUTPATIENT
Start: 2025-05-25 | End: 2025-05-28

## 2025-05-14 RX ORDER — LEVETIRACETAM 10 MG/ML
500 INJECTION, SOLUTION INTRAVENOUS
Refills: 0 | Status: COMPLETED | OUTPATIENT
Start: 2025-05-14 | End: 2025-05-17

## 2025-05-14 RX ORDER — TIZANIDINE 4 MG/1
4 TABLET ORAL
Refills: 0 | Status: DISCONTINUED | OUTPATIENT
Start: 2025-05-14 | End: 2025-06-05

## 2025-05-14 RX ORDER — DIPHENHYDRAMINE HCL 12.5MG/5ML
25 ELIXIR ORAL ONCE
Refills: 0 | Status: COMPLETED | OUTPATIENT
Start: 2025-05-20 | End: 2025-05-20

## 2025-05-14 RX ORDER — FLUDARABINE PHOSPHATE 25 MG/ML
40 INJECTION, SOLUTION INTRAVENOUS EVERY 24 HOURS
Refills: 0 | Status: COMPLETED | OUTPATIENT
Start: 2025-05-14 | End: 2025-05-18

## 2025-05-14 RX ORDER — ACETAMINOPHEN 500 MG/5ML
650 LIQUID (ML) ORAL ONCE
Refills: 0 | Status: COMPLETED | OUTPATIENT
Start: 2025-05-20 | End: 2025-05-20

## 2025-05-14 RX ORDER — VANCOMYCIN HCL IN 5 % DEXTROSE 1.5G/250ML
125 PLASTIC BAG, INJECTION (ML) INTRAVENOUS EVERY 12 HOURS
Refills: 0 | Status: DISCONTINUED | OUTPATIENT
Start: 2025-05-14 | End: 2025-05-15

## 2025-05-14 RX ORDER — FOSAPREPITANT 150 MG/5ML
150 INJECTION, POWDER, LYOPHILIZED, FOR SOLUTION INTRAVENOUS ONCE
Refills: 0 | Status: COMPLETED | OUTPATIENT
Start: 2025-05-23 | End: 2025-05-23

## 2025-05-14 RX ORDER — OXYCODONE HYDROCHLORIDE 30 MG/1
5 TABLET ORAL
Refills: 0 | Status: DISCONTINUED | OUTPATIENT
Start: 2025-05-14 | End: 2025-05-21

## 2025-05-14 RX ORDER — ONDANSETRON HCL/PF 4 MG/2 ML
16 VIAL (ML) INJECTION EVERY 24 HOURS
Refills: 0 | Status: COMPLETED | OUTPATIENT
Start: 2025-05-23 | End: 2025-05-24

## 2025-05-14 RX ORDER — DEXAMETHASONE 0.5 MG/1
10 TABLET ORAL EVERY 24 HOURS
Refills: 0 | Status: COMPLETED | OUTPATIENT
Start: 2025-05-14 | End: 2025-05-15

## 2025-05-14 RX ORDER — DULOXETINE 20 MG/1
60 CAPSULE, DELAYED RELEASE ORAL
Refills: 0 | Status: DISCONTINUED | OUTPATIENT
Start: 2025-05-14 | End: 2025-06-05

## 2025-05-14 RX ADMIN — BUSULFAN 180 MILLIGRAM(S): 2 TABLET, FILM COATED ORAL at 15:29

## 2025-05-14 RX ADMIN — Medication 800 MILLIGRAM(S): at 17:46

## 2025-05-14 RX ADMIN — FLUDARABINE PHOSPHATE 40 MILLIGRAM(S): 25 INJECTION, SOLUTION INTRAVENOUS at 14:11

## 2025-05-14 RX ADMIN — DULOXETINE 60 MILLIGRAM(S): 20 CAPSULE, DELAYED RELEASE ORAL at 21:06

## 2025-05-14 RX ADMIN — URSODIOL 300 MILLIGRAM(S): 300 CAPSULE ORAL at 17:48

## 2025-05-14 RX ADMIN — Medication 5 MILLILITER(S): at 12:10

## 2025-05-14 RX ADMIN — DEXAMETHASONE 100 MILLIGRAM(S): 0.5 TABLET ORAL at 14:10

## 2025-05-14 RX ADMIN — Medication 5 MILLILITER(S): at 15:31

## 2025-05-14 RX ADMIN — Medication 10 MILLILITER(S): at 20:51

## 2025-05-14 RX ADMIN — Medication 5 MILLILITER(S): at 20:51

## 2025-05-14 RX ADMIN — LEVETIRACETAM 500 MILLIGRAM(S): 10 INJECTION, SOLUTION INTRAVENOUS at 12:51

## 2025-05-14 RX ADMIN — OXYCODONE HYDROCHLORIDE 5 MILLIGRAM(S): 30 TABLET ORAL at 21:06

## 2025-05-14 RX ADMIN — TIZANIDINE 4 MILLIGRAM(S): 4 TABLET ORAL at 21:07

## 2025-05-14 RX ADMIN — Medication 10 MILLILITER(S): at 15:31

## 2025-05-14 RX ADMIN — Medication 116 MILLIGRAM(S): at 14:46

## 2025-05-14 RX ADMIN — Medication 10 MILLILITER(S): at 12:10

## 2025-05-14 RX ADMIN — Medication 125 MILLIGRAM(S): at 17:49

## 2025-05-14 NOTE — H&P ADULT - ASSESSMENT
69 year old male with refractory DLBCL, status post R-CHOP x 6, HD MTX x 4, salvage polatuzumab / rituximab / revlimid x 4, admitted for a haplo-identical pbsct from his daughter with Flu / Bu / Cy / TBI prep regimen

## 2025-05-14 NOTE — DIETITIAN INITIAL EVALUATION ADULT - ADD RECOMMEND
1. Continue current diet order: regular diet   2. Recommend   3. Monitor and encourage PO intake. Encourage use of daily menus. Honor dietary preferences as expressed as able.  1. Continue current diet order: regular diet   2. Pt is not amenable to receiving oral nutritional supplements at this time.   3. Monitor and encourage PO intake. Encourage use of daily menus. Honor dietary preferences as expressed as able.

## 2025-05-14 NOTE — H&P ADULT - PROBLEM SELECTOR PLAN 1
1. Admit to BMTU   2. TLC placement in IR   3. Day - 6 through day +3, antiemetics as ordered   4. Day -6 and day - 5: busulfan 130mg / m2 IV given over 3 hours every 24 hours for 2 doses. Administered after fludarabine. Hold azoles 3 days prior and when receiving busulfan. Do not give tylenol on busulfan days.    5. Day - 6 through day -2: Fludarabine 30mg / m2 IV administered over 30 minutes q 24 hours x 5 doses    6. Day -3 through day -2: CTX 14.5mg/kg IV administered over 1 hour q 24 hours x 3 doses    7. Day -1:  cGy once   8. CTX hydration per order set    9. Day 0: HPC infusion    10. Day +3, +4 - CTX 50mg / m2 IV given over 2 hours every 4 hours x 2 doses    11. Day +5: start tacrolimus gtt: 0.02 mg/kg day as a continuous infusion over 24 hours daily    12. Abatacept for GVHD prophylaxis: 10mg / kg (max 1000mg) IV given over 60 minutes on days +5, +14, +28, +56

## 2025-05-14 NOTE — DIETITIAN INITIAL EVALUATION ADULT - OTHER INFO
- BMT/SCT: Haplo-identical pbsct from his daughter with Flu / Bu / Cy / TBI prep regimen for treatment of refractory DLBCL

## 2025-05-14 NOTE — H&P ADULT - REASON FOR ADMISSION
Haplo-identical pbsct from his daughter with Flu / Bu / Cy / TBI prep regimen for treatment of refractory DLBCL

## 2025-05-14 NOTE — DIETITIAN INITIAL EVALUATION ADULT - NSFNSGIIOFT_GEN_A_CORE
- Pt denies nausea, vomiting, diarrhea, or constipation.   - Last BM:; not currently ordered for bowel regimen  - Pt denies nausea, vomiting, diarrhea, or constipation.   - Last BM: PTA; not currently ordered for bowel regimen.

## 2025-05-14 NOTE — PATIENT PROFILE ADULT - FALL HARM RISK - HARM RISK INTERVENTIONS

## 2025-05-14 NOTE — DIETITIAN INITIAL EVALUATION ADULT - ENERGY INTAKE
- Pt reports appetite/PO intake - Pt reports good appetite/PO intake; is not amenable to receiving oral nutritional supplements at this time. Adequate (%)

## 2025-05-14 NOTE — DIETITIAN INITIAL EVALUATION ADULT - PHYSCIAL ASSESSMENT
Weight Hx Per:  - Source: patient   - UBW:   - Reported weight changes:    [facility] to [facility]     Current Admission Weights:  - Dosing weight: 103.3 pounds/47.1 kg (05/14)    Weight Change:  - none noted     **  Will continue to monitor weight trends as available/able.     IBW: 98 pounds   %IBW: 105% Weight Hx Per:  - Source: patient   - UBW: 100 pounds     Weight Hx Per [facility] HIE: no available weights to assess.     Current Admission Weights:  - Dosing weight: 103.3 pounds/47.1 kg (05/14)    Weight Change:  - none noted     **  Will continue to monitor weight trends as available/able.     IBW: 98 pounds   %IBW: 105%

## 2025-05-14 NOTE — PHARMACOTHERAPY INTERVENTION NOTE - COMMENTS
69-year-old male with 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 He is admitted for an alloSCT with BERTRAM Bu/Flu/Cy/TBI conditioning and CAST for GVHD ppx. Today is day -6. Assisted in writing paper chemotherapy orders and performed medication reconciliation as below.    Patient reports taking following medications:  Duloxetine 60 mg PO QD  Valacyclovir 500 mg PO BID  Oxycodone 5 mg PO q6h PRN  Tizanidine 4 mg PO QHS  SMZ/TMP ss tab PO QD    Patient reports NKDA.    Conditioning Regimen: RI Bu/Flu/Cy/TBI  Day -6 (5/14) to Day -5 (5/15): Busulfan 130 mg/m2 IV administered over 3 hours for 2 doses -> avoid APAP and azole antifungals for 48 hours post busulfan  Day -6 (5/14) to Day -2 (5/18) Fludarabine 30 mg/m2 IV administered over 30 minutes for 5 doses  Day -3 (5/17) to Day -2 (5/18) Cyclophosphamide 14.5 mg/k2 IV administered over 1 hour for 2 doses  Day -1 (5/19)  cGy x1    GVHD ppx: CAST  Cyclophosphamide 50 mg/kg IV daily on Day +3 (5/23) and Day +4 (5/24).   Abatacept IV 10 mg/kg on days +5 (5/25), +14 (6/3), +28 (6/17), and +56 (7/15).  Patient will start tacrolimus 0.02 mg/kg IV on Sunday, 5/25 (day +5) - please order a one time trough on Wednesday, 5/28 (day +8) and  troughs every Monday to start on (day +). Goal trough is 8-12 ng/mL. Please ensure trough is drawn peripherally.         69-year-old male with 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 He is admitted for an alloSCT with BERTRAM Bu/Flu/Cy/TBI conditioning and CAST for GVHD ppx. Today is day -6. Assisted in writing paper chemotherapy orders and performed medication reconciliation as below.    Patient reports taking following medications:  Duloxetine 60 mg PO QD  Valacyclovir 500 mg PO BID  Oxycodone 5 mg PO q6h PRN  Tizanidine 4 mg PO QHS  SMZ/TMP ss tab PO QD    Patient reports NKDA.    Conditioning Regimen: RI Bu/Flu/Cy/TBI  Day -6 (5/14) to Day -5 (5/15): Busulfan 130 mg/m2 IV administered over 3 hours for 2 doses -> avoid APAP and azole antifungals for 48 hours post busulfan  Day -6 (5/14) to Day -2 (5/18) Fludarabine 30 mg/m2 IV administered over 30 minutes for 5 doses  Day -3 (5/17) to Day -2 (5/18) Cyclophosphamide 14.5 mg/k2 IV administered over 1 hour for 2 doses  Day -1 (5/19)  cGy x1    GVHD ppx: CAST  Cyclophosphamide 50 mg/kg IV daily on Day +3 (5/23) and Day +4 (5/24).   Abatacept IV 10 mg/kg on days +5 (5/25), +14 (6/3), +28 (6/17), and +56 (7/15).  Patient will start tacrolimus 0.02 mg/kg IV on Sunday, 5/25 (day +5) - please order a one time trough on Wednesday, 5/28 (day +8) and troughs every Tuesday to start on 6/3. Goal trough is 8-12 ng/mL. Please ensure trough is drawn peripherally.    Evelyne Murcia, PharmD, BCOP  Stem Cell Transplant Clinical Pharmacy Specialist  Available via Microsoft Teams         69-year-old male with 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 He is admitted for an alloSCT with BERTRAM Bu/Flu/Cy/TBI conditioning and CAST for GVHD ppx. Today is day -6. Assisted in writing paper chemotherapy orders and performed medication reconciliation as below.    Patient reports taking following medications:  Duloxetine 60 mg PO QD  Valacyclovir 500 mg PO BID (stopped Monday)  Oxycodone 5 mg PO q6h PRN (takes 1-2x/day)  Tizanidine 4 mg PO QHS  SMZ/TMP ss tab PO QD (stopped Monday)    Patient reports NKDA.    Conditioning Regimen: RI Bu/Flu/Cy/TBI  Day -6 (5/14) to Day -5 (5/15): Busulfan 130 mg/m2 IV administered over 3 hours for 2 doses -> avoid APAP and azole antifungals for 48 hours post busulfan  Day -6 (5/14) to Day -2 (5/18) Fludarabine 30 mg/m2 IV administered over 30 minutes for 5 doses  Day -3 (5/17) to Day -2 (5/18) Cyclophosphamide 14.5 mg/k2 IV administered over 1 hour for 2 doses  Day -1 (5/19)  cGy x1    GVHD ppx: CAST  Cyclophosphamide 50 mg/kg IV daily on Day +3 (5/23) and Day +4 (5/24).   Abatacept IV 10 mg/kg on days +5 (5/25), +14 (6/3), +28 (6/17), and +56 (7/15).  Patient will start tacrolimus 0.02 mg/kg IV on Sunday, 5/25 (day +5) - please order a one time trough on Wednesday, 5/28 (day +8) and troughs every Tuesday to start on 6/3. Goal trough is 8-12 ng/mL. Please ensure trough is drawn peripherally.    Evelyne Murcia, PharmD, BCOP  Stem Cell Transplant Clinical Pharmacy Specialist  Available via Microsoft Teams

## 2025-05-14 NOTE — H&P ADULT - PROBLEM SELECTOR PLAN 2
1. Antiviral prophylaxis with acyclovir 800mg po BID to start on admission    2. Posaconazole and levaquin to be started once neutropenic    3. Bactrim SS for PCP prophylaxis post engraftment    4. Monitor CMV PCR post engraftment    5. EBV, adenovirus monitoring weekly

## 2025-05-14 NOTE — H&P ADULT - MUSCULOSKELETAL
October 14, 2021    Hello, may I speak with Mini Andersen?    My name is Elen, Clinical Coordinator.      I am  with [facility]  [address]   [place] [address] [phone].    Before we get started may I verify your date of birth? 1955    I am calling to officially welcome you to our practice and ask about your recent visit. Is this a good time to talk? yes    Tell me about your visit with us. What things went well?  Everything`       We're always looking for ways to make our patients' experiences even better. Do you have recommendations on ways we may improve?  yes, more shots in my heel.    Overall were you satisfied with your first visit to our practice? Yes, ma'am       I appreciate you taking the time to speak with me today. Is there anything else I can do for you? Yes, if he could take care of this pain, I would have been here sooner.      Thank you, and have a great day.      
details…

## 2025-05-14 NOTE — DIETITIAN INITIAL EVALUATION ADULT - PERTINENT LABORATORY DATA
05-14    141  |  102  |  13  ----------------------------<  61[L]  4.0   |  26  |  0.55    Ca    8.8      14 May 2025 12:11  Phos  3.8     05-14  Mg     2.2     05-14    TPro  5.6[L]  /  Alb  4.0  /  TBili  0.4  /  DBili  0.2  /  AST  54[H]  /  ALT  86[H]  /  AlkPhos  352[H]  05-14

## 2025-05-14 NOTE — DIETITIAN INITIAL EVALUATION ADULT - REASON INDICATOR FOR ASSESSMENT
Nutrition consult warranted for: new BMT admission    Information obtained from: electronic medical record and patient  Chart reviewed, events noted.

## 2025-05-14 NOTE — DIETITIAN INITIAL EVALUATION ADULT - PERSON TAUGHT/METHOD
Discussed food safety and transplant education. Emphasized safe food handling, disposing of food after 24 hours, avoiding raw and fresh berries and using only individually wrapped dressings and condiments. Discussed importance of adequate consumption of meals/supplements to optimize protein-energy intake. Encouraged small/frequent meals, nutrient dense snacks, prioritizing protein foods at meal time. Pt made aware RD remains available to answer further questions./verbal instruction/teach back - (Patient repeats in own words)/patient instructed

## 2025-05-14 NOTE — H&P ADULT - HISTORY OF PRESENT ILLNESS
This is a 69 year old male with a medical history of DVT, kyphoscoliosis and DLBCL admitted for a haplo-identical pbsct from his daughter with Flu / Bu /Cy / TBI prep regimen. Hematologic history as follows: initially presented with complaints of progressive lower extremity parathesias since 1/2024. An MRI of the spine 3/5/24 showed diffuse osseous disease including C4, T8 burst fracture and epidural disease causing spinal cord deformity and cord edema, L4 pathological fracture, L3-L4 continuous involvement of central canal neural foramina and paraspinal soft tissues. A CT C/A/P showed stage IV disease with anterior third right rib oseous lesion extending to a 1.5cm soft tissue lesion on the right lung pleural surface. It also showed a left pleural space surface lesion, a 2.4cm right axillary lymph node, and extensive RP and mesenteric lymphadenopathy with a maira conglomerate mass. He underwent a two stage neurological intervention involving a T8 vertebral column resection, a T6-T10 fusion for tumor resection, a L4 partial corpectomy, and an L2 pelvis fusion. The course was complicated by bilateral soleal DVTs. He was then treated with R_CHOP x 6. The course was complicated by prolonged cytopenias. He then received HD MTX s 4 (completed 9/2/24). He was admitted 1/23/25 with transaminitis, likely an obstruction related to relapsed disease. A CT  A/P 1/23/25 showed increased mesenteric and RP lymphadenopathy and a new left lower renal pole and biliary ductal dilatation. HE had an ERCP with stent placement 1/24/25. He was initially worked up for CAR T therapy, but failed collection secondary to low lymphocyte counts. He was started on polatuzumab / rituximab / revlimid as a bridge to allogeneic pbsct. He received 4 cycles. CT A/P 4/21/25 showed response. He has no complaints on admission.

## 2025-05-14 NOTE — DIETITIAN INITIAL EVALUATION ADULT - PERTINENT MEDS FT
MEDICATIONS  (STANDING):  acyclovir   Oral Tab/Cap 800 milliGRAM(s) Oral every 12 hours  Biotene Dry Mouth Oral Rinse 5 milliLiter(s) Swish and Spit five times a day  busulfan IVPB (eMAR) 180 milliGRAM(s) IV Intermittent daily  chlorhexidine 4% Liquid 1 Application(s) Topical <User Schedule>  dexAMETHasone  IVPB 10 milliGRAM(s) IV Intermittent every 24 hours  DULoxetine 60 milliGRAM(s) Oral <User Schedule>  fludarabine IVPB (eMAR) 40 milliGRAM(s) IV Intermittent every 24 hours  levETIRAcetam 500 milliGRAM(s) Oral two times a day  ondansetron  IVPB 16 milliGRAM(s) IV Intermittent every 24 hours  oxyCODONE    IR 5 milliGRAM(s) Oral <User Schedule>  pantoprazole    Tablet 40 milliGRAM(s) Oral before breakfast  phytonadione   Solution 5 milliGRAM(s) Oral <User Schedule>  sodium bicarbonate Mouth Rinse 10 milliLiter(s) Swish and Spit five times a day  tiZANidine 4 milliGRAM(s) Oral <User Schedule>  ursodiol Capsule 300 milliGRAM(s) Oral two times a day    MEDICATIONS  (PRN):  sodium chloride 0.9% lock flush 10 milliLiter(s) IV Push every 1 hour PRN Pre/post blood products, medications, blood draw, and to maintain line patency

## 2025-05-15 LAB
ALBUMIN SERPL ELPH-MCNC: 3.6 G/DL — SIGNIFICANT CHANGE UP (ref 3.3–5)
ALP SERPL-CCNC: 316 U/L — HIGH (ref 40–120)
ALT FLD-CCNC: 68 U/L — HIGH (ref 10–45)
ANION GAP SERPL CALC-SCNC: 9 MMOL/L — SIGNIFICANT CHANGE UP (ref 5–17)
APTT BLD: 27.7 SEC — SIGNIFICANT CHANGE UP (ref 26.1–36.8)
AST SERPL-CCNC: 30 U/L — SIGNIFICANT CHANGE UP (ref 10–40)
BASOPHILS # BLD AUTO: 0 K/UL — SIGNIFICANT CHANGE UP (ref 0–0.2)
BASOPHILS NFR BLD AUTO: 0 % — SIGNIFICANT CHANGE UP (ref 0–2)
BILIRUB SERPL-MCNC: 0.4 MG/DL — SIGNIFICANT CHANGE UP (ref 0.2–1.2)
BUN SERPL-MCNC: 14 MG/DL — SIGNIFICANT CHANGE UP (ref 7–23)
CALCIUM SERPL-MCNC: 8.9 MG/DL — SIGNIFICANT CHANGE UP (ref 8.4–10.5)
CHLORIDE SERPL-SCNC: 105 MMOL/L — SIGNIFICANT CHANGE UP (ref 96–108)
CO2 SERPL-SCNC: 25 MMOL/L — SIGNIFICANT CHANGE UP (ref 22–31)
CREAT SERPL-MCNC: 0.58 MG/DL — SIGNIFICANT CHANGE UP (ref 0.5–1.3)
EGFR: 107 ML/MIN/1.73M2 — SIGNIFICANT CHANGE UP
EGFR: 107 ML/MIN/1.73M2 — SIGNIFICANT CHANGE UP
EOSINOPHIL # BLD AUTO: 0 K/UL — SIGNIFICANT CHANGE UP (ref 0–0.5)
EOSINOPHIL NFR BLD AUTO: 0 % — SIGNIFICANT CHANGE UP (ref 0–6)
GLUCOSE SERPL-MCNC: 124 MG/DL — HIGH (ref 70–99)
HCT VFR BLD CALC: 33.1 % — LOW (ref 39–50)
HGB BLD-MCNC: 10.9 G/DL — LOW (ref 13–17)
INR BLD: 1.01 RATIO — SIGNIFICANT CHANGE UP (ref 0.85–1.16)
LDH SERPL L TO P-CCNC: 273 U/L — HIGH (ref 50–242)
LYMPHOCYTES # BLD AUTO: 0.28 K/UL — LOW (ref 1–3.3)
LYMPHOCYTES # BLD AUTO: 15.7 % — SIGNIFICANT CHANGE UP (ref 13–44)
MAGNESIUM SERPL-MCNC: 2.2 MG/DL — SIGNIFICANT CHANGE UP (ref 1.6–2.6)
MANUAL SMEAR VERIFICATION: SIGNIFICANT CHANGE UP
MCHC RBC-ENTMCNC: 29.2 PG — SIGNIFICANT CHANGE UP (ref 27–34)
MCHC RBC-ENTMCNC: 32.9 G/DL — SIGNIFICANT CHANGE UP (ref 32–36)
MCV RBC AUTO: 88.7 FL — SIGNIFICANT CHANGE UP (ref 80–100)
MONOCYTES # BLD AUTO: 0.23 K/UL — SIGNIFICANT CHANGE UP (ref 0–0.9)
MONOCYTES NFR BLD AUTO: 13 % — SIGNIFICANT CHANGE UP (ref 2–14)
MRSA PCR RESULT.: SIGNIFICANT CHANGE UP
NEUTROPHILS # BLD AUTO: 1.27 K/UL — LOW (ref 1.8–7.4)
NEUTROPHILS NFR BLD AUTO: 71.3 % — SIGNIFICANT CHANGE UP (ref 43–77)
PHOSPHATE SERPL-MCNC: 3.8 MG/DL — SIGNIFICANT CHANGE UP (ref 2.5–4.5)
PLAT MORPH BLD: NORMAL — SIGNIFICANT CHANGE UP
PLATELET # BLD AUTO: 182 K/UL — SIGNIFICANT CHANGE UP (ref 150–400)
POTASSIUM SERPL-MCNC: 3.9 MMOL/L — SIGNIFICANT CHANGE UP (ref 3.5–5.3)
POTASSIUM SERPL-SCNC: 3.9 MMOL/L — SIGNIFICANT CHANGE UP (ref 3.5–5.3)
PROT SERPL-MCNC: 5.4 G/DL — LOW (ref 6–8.3)
PROTHROM AB SERPL-ACNC: 11.5 SEC — SIGNIFICANT CHANGE UP (ref 9.9–13.4)
RBC # BLD: 3.73 M/UL — LOW (ref 4.2–5.8)
RBC # FLD: 16.7 % — HIGH (ref 10.3–14.5)
RBC BLD AUTO: SIGNIFICANT CHANGE UP
S AUREUS DNA NOSE QL NAA+PROBE: SIGNIFICANT CHANGE UP
SODIUM SERPL-SCNC: 139 MMOL/L — SIGNIFICANT CHANGE UP (ref 135–145)
WBC # BLD: 1.78 K/UL — LOW (ref 3.8–10.5)
WBC # FLD AUTO: 1.78 K/UL — LOW (ref 3.8–10.5)

## 2025-05-15 PROCEDURE — 99233 SBSQ HOSP IP/OBS HIGH 50: CPT

## 2025-05-15 RX ORDER — ALTEPLASE 2.2 MG/2ML
2 INJECTION, POWDER, LYOPHILIZED, FOR SOLUTION INTRAVENOUS ONCE
Refills: 0 | Status: COMPLETED | OUTPATIENT
Start: 2025-05-15 | End: 2025-05-15

## 2025-05-15 RX ADMIN — Medication 5 MILLILITER(S): at 16:43

## 2025-05-15 RX ADMIN — Medication 10 MILLILITER(S): at 12:31

## 2025-05-15 RX ADMIN — Medication 800 MILLIGRAM(S): at 18:07

## 2025-05-15 RX ADMIN — Medication 125 MILLIGRAM(S): at 05:39

## 2025-05-15 RX ADMIN — Medication 5 MILLILITER(S): at 08:23

## 2025-05-15 RX ADMIN — DEXAMETHASONE 100 MILLIGRAM(S): 0.5 TABLET ORAL at 13:09

## 2025-05-15 RX ADMIN — URSODIOL 300 MILLIGRAM(S): 300 CAPSULE ORAL at 18:08

## 2025-05-15 RX ADMIN — Medication 800 MILLIGRAM(S): at 05:39

## 2025-05-15 RX ADMIN — DULOXETINE 60 MILLIGRAM(S): 20 CAPSULE, DELAYED RELEASE ORAL at 21:11

## 2025-05-15 RX ADMIN — Medication 10 MILLILITER(S): at 16:43

## 2025-05-15 RX ADMIN — TIZANIDINE 4 MILLIGRAM(S): 4 TABLET ORAL at 21:11

## 2025-05-15 RX ADMIN — Medication 5 MILLILITER(S): at 20:33

## 2025-05-15 RX ADMIN — Medication 5 MILLILITER(S): at 12:31

## 2025-05-15 RX ADMIN — OXYCODONE HYDROCHLORIDE 5 MILLIGRAM(S): 30 TABLET ORAL at 21:11

## 2025-05-15 RX ADMIN — Medication 1 APPLICATION(S): at 08:24

## 2025-05-15 RX ADMIN — Medication 10 MILLILITER(S): at 08:23

## 2025-05-15 RX ADMIN — LEVETIRACETAM 500 MILLIGRAM(S): 10 INJECTION, SOLUTION INTRAVENOUS at 05:39

## 2025-05-15 RX ADMIN — Medication 116 MILLIGRAM(S): at 14:44

## 2025-05-15 RX ADMIN — Medication 10 MILLILITER(S): at 20:33

## 2025-05-15 RX ADMIN — URSODIOL 300 MILLIGRAM(S): 300 CAPSULE ORAL at 05:39

## 2025-05-15 RX ADMIN — ALTEPLASE 2 MILLIGRAM(S): 2.2 INJECTION, POWDER, LYOPHILIZED, FOR SOLUTION INTRAVENOUS at 12:31

## 2025-05-15 RX ADMIN — LEVETIRACETAM 500 MILLIGRAM(S): 10 INJECTION, SOLUTION INTRAVENOUS at 18:08

## 2025-05-15 RX ADMIN — BUSULFAN 180 MILLIGRAM(S): 2 TABLET, FILM COATED ORAL at 14:44

## 2025-05-15 RX ADMIN — Medication 40 MILLIGRAM(S): at 05:39

## 2025-05-15 RX ADMIN — ALTEPLASE 2 MILLIGRAM(S): 2.2 INJECTION, POWDER, LYOPHILIZED, FOR SOLUTION INTRAVENOUS at 12:30

## 2025-05-15 RX ADMIN — Medication 5 MILLIGRAM(S): at 12:32

## 2025-05-15 RX ADMIN — FLUDARABINE PHOSPHATE 40 MILLIGRAM(S): 25 INJECTION, SOLUTION INTRAVENOUS at 13:14

## 2025-05-15 NOTE — PROGRESS NOTE ADULT - SUBJECTIVE AND OBJECTIVE BOX
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 with Flu / Bu / Cy / TBI prep regimen for treatment of refractory DLBCL    Disease: DLBCL  Type of transplant: Haplo-identical (daughter)   Prep Regimen: Flu / Bu / Cy / TBI   ABO / CMV:   Recipient: A POS/ NEG   Donor: A POS / NEG     S: Patient seen and examined with HPC Transplant Team:       O: Vitals:   Vital Signs Last 24 Hrs  T(C): 36.3 (15 May 2025 05:21), Max: 36.6 (14 May 2025 12:10)  T(F): 97.3 (15 May 2025 05:21), Max: 97.9 (14 May 2025 18:18)  HR: 62 (15 May 2025 05:21) (62 - 79)  BP: 106/60 (15 May 2025 05:21) (105/62 - 122/79)  BP(mean): --  RR: 16 (15 May 2025 05:21) (16 - 18)  SpO2: 97% (15 May 2025 05:21) (96% - 99%)    Parameters below as of 15 May 2025 05:21  Patient On (Oxygen Delivery Method): room air      Admit weight: 47.1kg   Daily Height in cm: 151 (14 May 2025 09:36)        Intake / Output:   05-14 @ 07:01  -  05-15 @ 07:00  --------------------------------------------------------  IN: 1859 mL / OUT: 2800 mL / NET: -941 mL          PE:   Oropharynx: no erythema or ulcerations   Oral Mucositis:                -                                        Grade: n/a  CVS: S1, S2 RRR   Lungs: CTA throughout bilaterally   Abdomen: + BS x 4, soft, NT, ND   Extremities: no edema   Gastric Mucositis:       -                                           Grade: n/a   Intestinal Mucositis:     -                                         Grade: n/a   Skin: no rash   TLC: CDI  Neuro: A&OX3      Labs:   CBC Full  -  ( 15 May 2025 06:19 )  WBC Count : 1.78 K/uL  Hemoglobin : 10.9 g/dL  Hematocrit : 33.1 %  Platelet Count - Automated : 182 K/uL  Mean Cell Volume : 88.7 fl  Mean Cell Hemoglobin : 29.2 pg  Mean Cell Hemoglobin Concentration : 32.9 g/dL  Auto Neutrophil # : x  Auto Lymphocyte # : x  Auto Monocyte # : x  Auto Eosinophil # : x  Auto Basophil # : x  Auto Neutrophil % : x  Auto Lymphocyte % : x  Auto Monocyte % : x  Auto Eosinophil % : x  Auto Basophil % : x                          10.9   1.78  )-----------( 182      ( 15 May 2025 06:19 )             33.1     05-15    139  |  105  |  14  ----------------------------<  124[H]  3.9   |  25  |  0.58    Ca    8.9      15 May 2025 06:24  Phos  3.8     05-15  Mg     2.2     05-15    TPro  5.4[L]  /  Alb  3.6  /  TBili  0.4  /  DBili  x   /  AST  30  /  ALT  68[H]  /  AlkPhos  316[H]  05-15    PT/INR - ( 15 May 2025 06:19 )   PT: 11.5 sec;   INR: 1.01 ratio         PTT - ( 15 May 2025 06:19 )  PTT:27.7 sec  LIVER FUNCTIONS - ( 15 May 2025 06:24 )  Alb: 3.6 g/dL / Pro: 5.4 g/dL / ALK PHOS: 316 U/L / ALT: 68 U/L / AST: 30 U/L / GGT: x           Lactate Dehydrogenase, Serum: 273 U/L (05-15 @ 06:24)  Lactate Dehydrogenase, Serum: 273 U/L (05-14 @ 12:11)          Meds:   Antimicrobials:   acyclovir   Oral Tab/Cap 800 milliGRAM(s) Oral every 12 hours  levoFLOXacin  Tablet 500 milliGRAM(s) Oral every 24 hours  vancomycin    Solution 125 milliGRAM(s) Oral every 12 hours      Heme / Onc:   busulfan IVPB (eMAR) 180 milliGRAM(s) IV Intermittent daily  fludarabine IVPB (eMAR) 40 milliGRAM(s) IV Intermittent every 24 hours      GI:  pantoprazole    Tablet 40 milliGRAM(s) Oral before breakfast  sodium bicarbonate Mouth Rinse 10 milliLiter(s) Swish and Spit five times a day  ursodiol Capsule 300 milliGRAM(s) Oral two times a day      Cardiovascular:       Immunologic:       Other medications:   Biotene Dry Mouth Oral Rinse 5 milliLiter(s) Swish and Spit five times a day  chlorhexidine 4% Liquid 1 Application(s) Topical <User Schedule>  dexAMETHasone  IVPB 10 milliGRAM(s) IV Intermittent every 24 hours  DULoxetine 60 milliGRAM(s) Oral <User Schedule>  levETIRAcetam 500 milliGRAM(s) Oral two times a day  ondansetron  IVPB 16 milliGRAM(s) IV Intermittent every 24 hours  oxyCODONE    IR 5 milliGRAM(s) Oral <User Schedule>  phytonadione   Solution 5 milliGRAM(s) Oral <User Schedule>  tiZANidine 4 milliGRAM(s) Oral <User Schedule>      PRN:   sodium chloride 0.9% lock flush 10 milliLiter(s) IV Push every 1 hour PRN      A/P:  69 year old male with refractory DLBCL, status post R-CHOP x 6, HD MTX x 4, salvage polatuzumab / rituximab / revlimid x 4, admitted for a haplo-identical pbsct from his daughter with Flu / Bu / Cy / TBI prep regimen   Day - 5   5/15- busulfan 2/2, fludarabine 2/5. No acute events overnight, vital signs are stable; continue keppra ppx for 24 hours post infusion of last dose of busulfan     1. Infectious Disease:   acyclovir   Oral Tab/Cap 800 milliGRAM(s) Oral every 12 hours  levoFLOXacin  Tablet 500 milliGRAM(s) Oral every 24 hours  vancomycin    Solution 125 milliGRAM(s) Oral every 12 hours    2. VOD Prophylaxis:   ursodiol Capsule 300 milliGRAM(s) Oral two times a day    3. GI Prophylaxis:   pantoprazole    Tablet 40 milliGRAM(s) Oral before breakfast    4. Mouthcare - NS / NaHCO3 rinses, Biotene; Skin care     5. GVHD prophylaxis   PTCy days +3, +4   Tacrolimus gtt to start on day + 5  Abatacept days +5, +14, +28, +56    6. Transfuse & replete electrolytes prn     7. IV hydration, daily weights, strict I&O, prn diuresis     8. PO intake as tolerated, nutrition follow up as needed    9. Antiemetics, anti-diarrhea medications:   dexAMETHasone  IVPB 10 milliGRAM(s) IV Intermittent every 24 hours  ondansetron  IVPB 16 milliGRAM(s) IV Intermittent every 24 hours    10. OOB as tolerated, physical therapy consult if needed     11. Monitor coags / fibrinogen 2x week, vitamin K as needed   phytonadione   Solution 5 milliGRAM(s) Oral <User Schedule>    12. Monitor closely for clinical changes, monitor for fevers     13. Emotional support provided, plan of care discussed and questions addressed     14. Patient education done regarding chemotherapy prep, plan of care, restrictions and discharge planning     15. Continue regular social work input     I have written the above note for Dr. Riley who performed service with me in the room.   Helena Diaz NP-C ([phone])    I have seen and examined patient with NP, I agree with above note as scribed.                    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 with Flu / Bu / Cy / TBI prep regimen for treatment of refractory DLBCL    Disease: DLBCL  Type of transplant: Haplo-identical (daughter)   Prep Regimen: Flu / Bu / Cy / TBI   ABO / CMV:   Recipient: A POS/ NEG   Donor: A POS / NEG     S: Patient seen and examined with HPC Transplant Team:   + dry mouth      O: Vitals:   Vital Signs Last 24 Hrs  T(C): 36.3 (15 May 2025 05:21), Max: 36.6 (14 May 2025 12:10)  T(F): 97.3 (15 May 2025 05:21), Max: 97.9 (14 May 2025 18:18)  HR: 62 (15 May 2025 05:21) (62 - 79)  BP: 106/60 (15 May 2025 05:21) (105/62 - 122/79)  BP(mean): --  RR: 16 (15 May 2025 05:21) (16 - 18)  SpO2: 97% (15 May 2025 05:21) (96% - 99%)    Parameters below as of 15 May 2025 05:21  Patient On (Oxygen Delivery Method): room air      Admit weight: 47.1kg   Daily Height in cm: 151 (14 May 2025 09:36)        Intake / Output:   05-14 @ 07:01  -  05-15 @ 07:00  --------------------------------------------------------  IN: 1859 mL / OUT: 2800 mL / NET: -941 mL          PE:   Oropharynx: no erythema or ulcerations   Oral Mucositis:                -                                        Grade: n/a  CVS: S1, S2 RRR   Lungs: CTA throughout bilaterally   Abdomen: + BS x 4, soft, NT, ND   Extremities: no edema   Gastric Mucositis:       -                                           Grade: n/a   Intestinal Mucositis:     -                                         Grade: n/a   Skin: no rash   TLC: CDI  Neuro: A&OX3      Labs:   CBC Full  -  ( 15 May 2025 06:19 )  WBC Count : 1.78 K/uL  Hemoglobin : 10.9 g/dL  Hematocrit : 33.1 %  Platelet Count - Automated : 182 K/uL  Mean Cell Volume : 88.7 fl  Mean Cell Hemoglobin : 29.2 pg  Mean Cell Hemoglobin Concentration : 32.9 g/dL  Auto Neutrophil # : x  Auto Lymphocyte # : x  Auto Monocyte # : x  Auto Eosinophil # : x  Auto Basophil # : x  Auto Neutrophil % : x  Auto Lymphocyte % : x  Auto Monocyte % : x  Auto Eosinophil % : x  Auto Basophil % : x                          10.9   1.78  )-----------( 182      ( 15 May 2025 06:19 )             33.1     05-15    139  |  105  |  14  ----------------------------<  124[H]  3.9   |  25  |  0.58    Ca    8.9      15 May 2025 06:24  Phos  3.8     05-15  Mg     2.2     05-15    TPro  5.4[L]  /  Alb  3.6  /  TBili  0.4  /  DBili  x   /  AST  30  /  ALT  68[H]  /  AlkPhos  316[H]  05-15    PT/INR - ( 15 May 2025 06:19 )   PT: 11.5 sec;   INR: 1.01 ratio         PTT - ( 15 May 2025 06:19 )  PTT:27.7 sec  LIVER FUNCTIONS - ( 15 May 2025 06:24 )  Alb: 3.6 g/dL / Pro: 5.4 g/dL / ALK PHOS: 316 U/L / ALT: 68 U/L / AST: 30 U/L / GGT: x           Lactate Dehydrogenase, Serum: 273 U/L (05-15 @ 06:24)  Lactate Dehydrogenase, Serum: 273 U/L (05-14 @ 12:11)          Meds:   Antimicrobials:   acyclovir   Oral Tab/Cap 800 milliGRAM(s) Oral every 12 hours  levoFLOXacin  Tablet 500 milliGRAM(s) Oral every 24 hours  vancomycin    Solution 125 milliGRAM(s) Oral every 12 hours      Heme / Onc:   busulfan IVPB (eMAR) 180 milliGRAM(s) IV Intermittent daily  fludarabine IVPB (eMAR) 40 milliGRAM(s) IV Intermittent every 24 hours      GI:  pantoprazole    Tablet 40 milliGRAM(s) Oral before breakfast  sodium bicarbonate Mouth Rinse 10 milliLiter(s) Swish and Spit five times a day  ursodiol Capsule 300 milliGRAM(s) Oral two times a day      Cardiovascular:       Immunologic:       Other medications:   Biotene Dry Mouth Oral Rinse 5 milliLiter(s) Swish and Spit five times a day  chlorhexidine 4% Liquid 1 Application(s) Topical <User Schedule>  dexAMETHasone  IVPB 10 milliGRAM(s) IV Intermittent every 24 hours  DULoxetine 60 milliGRAM(s) Oral <User Schedule>  levETIRAcetam 500 milliGRAM(s) Oral two times a day  ondansetron  IVPB 16 milliGRAM(s) IV Intermittent every 24 hours  oxyCODONE    IR 5 milliGRAM(s) Oral <User Schedule>  phytonadione   Solution 5 milliGRAM(s) Oral <User Schedule>  tiZANidine 4 milliGRAM(s) Oral <User Schedule>      PRN:   sodium chloride 0.9% lock flush 10 milliLiter(s) IV Push every 1 hour PRN      A/P:  69 year old male with refractory DLBCL, status post R-CHOP x 6, HD MTX x 4, salvage polatuzumab / rituximab / revlimid x 4, admitted for a haplo-identical pbsct from his daughter with Flu / Bu / Cy / TBI prep regimen   Day - 5   5/15- busulfan 2/2, fludarabine 2/5. No acute events overnight, vital signs are stable; continue keppra ppx for 24 hours post infusion of last dose of busulfan. No tylenol or posaconazole until day 0. Not neutropenic today, will d/c levaquin / vancomycin.     1. Infectious Disease:   acyclovir   Oral Tab/Cap 800 milliGRAM(s) Oral every 12 hours  levoFLOXacin  Tablet 500 milliGRAM(s) Oral every 24 hours  vancomycin    Solution 125 milliGRAM(s) Oral every 12 hours    2. VOD Prophylaxis:   ursodiol Capsule 300 milliGRAM(s) Oral two times a day    3. GI Prophylaxis:   pantoprazole    Tablet 40 milliGRAM(s) Oral before breakfast    4. Mouthcare - NS / NaHCO3 rinses, Biotene; Skin care     5. GVHD prophylaxis   PTCy days +3, +4   Tacrolimus gtt to start on day + 5  Abatacept days +5, +14, +28, +56    6. Transfuse & replete electrolytes prn     7. IV hydration, daily weights, strict I&O, prn diuresis     8. PO intake as tolerated, nutrition follow up as needed    9. Antiemetics, anti-diarrhea medications:   dexAMETHasone  IVPB 10 milliGRAM(s) IV Intermittent every 24 hours  ondansetron  IVPB 16 milliGRAM(s) IV Intermittent every 24 hours    10. OOB as tolerated, physical therapy consult if needed     11. Monitor coags / fibrinogen 2x week, vitamin K as needed   phytonadione   Solution 5 milliGRAM(s) Oral <User Schedule>    12. Monitor closely for clinical changes, monitor for fevers     13. Emotional support provided, plan of care discussed and questions addressed     14. Patient education done regarding chemotherapy prep, plan of care, restrictions and discharge planning     15. Continue regular social work input     I have written the above note for Dr. Riley who performed service with me in the room.   Helena Diaz NP-C ([phone])    I have seen and examined patient with NP, I agree with above note as scribed.

## 2025-05-15 NOTE — PHYSICAL THERAPY INITIAL EVALUATION ADULT - ADDITIONAL COMMENTS
Reviewed E-visit questionnaire and chart    1. Acute non-recurrent maxillary sinusitis  Start abx given symptoms for 2+ weeks. Continue conservative mgmt. - amoxicillin-clavulanate (AUGMENTIN) 875-125 MG per tablet; Take 1 tablet by mouth 2 times daily for 7 days  Dispense: 14 tablet; Refill: 0      Spent 5-10 min reviewing questionnaire, chart and formulating plan.      Roberto Tapia MD  12/20/2022  9:08 AM Pt lives in a private home with daughters, there are 5 steps to enter, first floor set up. Pt performed ADL/IADLs independently. Ambulates with cane outdoor, no assistive device in home. Owns DME: cane, rolling walker.

## 2025-05-15 NOTE — PHARMACOTHERAPY INTERVENTION NOTE - COMMENTS
69-year-old male with 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 He is admitted for an alloSCT with BERTRAM Bu/Flu/Cy/TBI conditioning and CAST for GVHD ppx. Today is day -5.    Conditioning Regimen: RI Bu/Flu/Cy/TBI  Day -6 (5/14) to Day -5 (5/15): Busulfan 130 mg/m2 IV administered over 3 hours for 2 doses -> avoid APAP and azole antifungals for 48 hours post busulfan  Day -6 (5/14) to Day -2 (5/18) Fludarabine 30 mg/m2 IV administered over 30 minutes for 5 doses  Moving up by 1 hour every day to ensure 48 hours between final dose and CTP infusion  Day -3 (5/17) to Day -2 (5/18) Cyclophosphamide 14.5 mg/k2 IV administered over 1 hour for 2 doses  Day -1 (5/19)  cGy x1    GVHD ppx: CAST  Cyclophosphamide 50 mg/kg IV daily on Day +3 (5/23) and Day +4 (5/24).   Abatacept IV 10 mg/kg on days +5 (5/25), +14 (6/3), +28 (6/17), and +56 (7/15).  Patient will start tacrolimus 0.02 mg/kg IV on Sunday, 5/25 (day +5) - please order a one time trough on Wednesday, 5/28 (day +8) and troughs every Tuesday to start on 6/3. Goal trough is 8-12 ng/mL. Please ensure trough is drawn peripherally.    Antimicrobial ppx.:  Will start antimicrobial (levofloxacin 500 mg PO QD), and PO vanco 125 mg BID once ANC <1000 & continue until ANC >500 for 3 consecutive days. Will start antifungal (posaconazole 300 mg PO QD) on day 0 (5/20) and continue until day +75. Will also plan to start GCSF on day +7 (5/27) & stop once ANC >1500 for two days.     Evelyne Murcia, PharmD, BCOP  Stem Cell Transplant Clinical Pharmacy Specialist  Available via [employer] Teams

## 2025-05-15 NOTE — PROGRESS NOTE ADULT - NSPROGADDITIONALINFOA_GEN_ALL_CORE
I rounded with the team including nurses, ACPs and PharmD.  I reviewed the data in depth.   I saw and examined the patient myself.   I reviewed and confirmed the above note.  The patient is day 05 of allogeneic HSCT.  He started his conditioning yesterday.   The patient is doing well with no complaints.  The vitals are stable. The oral cavity is clear. The line site is clean. The lungs are clear. There is no LE edema.  Overall, there are no specific issues. The patient is on appropriate therapy at this stage.   I answered the patient’s questions.  I encouraged po intake and ambulation.  SE Riley MD

## 2025-05-16 LAB
ALBUMIN SERPL ELPH-MCNC: 3.6 G/DL — SIGNIFICANT CHANGE UP (ref 3.3–5)
ALP SERPL-CCNC: 257 U/L — HIGH (ref 40–120)
ALT FLD-CCNC: 49 U/L — HIGH (ref 10–45)
ANION GAP SERPL CALC-SCNC: 11 MMOL/L — SIGNIFICANT CHANGE UP (ref 5–17)
AST SERPL-CCNC: 19 U/L — SIGNIFICANT CHANGE UP (ref 10–40)
BASOPHILS # BLD AUTO: 0.01 K/UL — SIGNIFICANT CHANGE UP (ref 0–0.2)
BASOPHILS NFR BLD AUTO: 0.6 % — SIGNIFICANT CHANGE UP (ref 0–2)
BILIRUB DIRECT SERPL-MCNC: 0.1 MG/DL — SIGNIFICANT CHANGE UP (ref 0–0.3)
BILIRUB INDIRECT FLD-MCNC: 0.3 MG/DL — SIGNIFICANT CHANGE UP (ref 0.2–1)
BILIRUB SERPL-MCNC: 0.4 MG/DL — SIGNIFICANT CHANGE UP (ref 0.2–1.2)
BLD GP AB SCN SERPL QL: NEGATIVE — SIGNIFICANT CHANGE UP
BUN SERPL-MCNC: 13 MG/DL — SIGNIFICANT CHANGE UP (ref 7–23)
CALCIUM SERPL-MCNC: 8.8 MG/DL — SIGNIFICANT CHANGE UP (ref 8.4–10.5)
CHLORIDE SERPL-SCNC: 106 MMOL/L — SIGNIFICANT CHANGE UP (ref 96–108)
CO2 SERPL-SCNC: 23 MMOL/L — SIGNIFICANT CHANGE UP (ref 22–31)
CREAT SERPL-MCNC: 0.57 MG/DL — SIGNIFICANT CHANGE UP (ref 0.5–1.3)
EBV DNA SERPL NAA+PROBE-ACNC: SIGNIFICANT CHANGE UP IU/ML
EBVPCR LOG: SIGNIFICANT CHANGE UP LOG10IU/ML
EGFR: 107 ML/MIN/1.73M2 — SIGNIFICANT CHANGE UP
EGFR: 107 ML/MIN/1.73M2 — SIGNIFICANT CHANGE UP
EOSINOPHIL # BLD AUTO: 0 K/UL — SIGNIFICANT CHANGE UP (ref 0–0.5)
EOSINOPHIL NFR BLD AUTO: 0 % — SIGNIFICANT CHANGE UP (ref 0–6)
GLUCOSE SERPL-MCNC: 121 MG/DL — HIGH (ref 70–99)
HCT VFR BLD CALC: 32.1 % — LOW (ref 39–50)
HGB BLD-MCNC: 10.3 G/DL — LOW (ref 13–17)
IMM GRANULOCYTES NFR BLD AUTO: 0.6 % — SIGNIFICANT CHANGE UP (ref 0–0.9)
LDH SERPL L TO P-CCNC: 244 U/L — HIGH (ref 50–242)
LYMPHOCYTES # BLD AUTO: 0.28 K/UL — LOW (ref 1–3.3)
LYMPHOCYTES # BLD AUTO: 17.1 % — SIGNIFICANT CHANGE UP (ref 13–44)
MAGNESIUM SERPL-MCNC: 2.4 MG/DL — SIGNIFICANT CHANGE UP (ref 1.6–2.6)
MCHC RBC-ENTMCNC: 28.5 PG — SIGNIFICANT CHANGE UP (ref 27–34)
MCHC RBC-ENTMCNC: 32.1 G/DL — SIGNIFICANT CHANGE UP (ref 32–36)
MCV RBC AUTO: 88.9 FL — SIGNIFICANT CHANGE UP (ref 80–100)
MONOCYTES # BLD AUTO: 0.21 K/UL — SIGNIFICANT CHANGE UP (ref 0–0.9)
MONOCYTES NFR BLD AUTO: 12.8 % — SIGNIFICANT CHANGE UP (ref 2–14)
NEUTROPHILS # BLD AUTO: 1.13 K/UL — LOW (ref 1.8–7.4)
NEUTROPHILS NFR BLD AUTO: 68.9 % — SIGNIFICANT CHANGE UP (ref 43–77)
NRBC BLD AUTO-RTO: 0 /100 WBCS — SIGNIFICANT CHANGE UP (ref 0–0)
PHOSPHATE SERPL-MCNC: 3.9 MG/DL — SIGNIFICANT CHANGE UP (ref 2.5–4.5)
PLATELET # BLD AUTO: 207 K/UL — SIGNIFICANT CHANGE UP (ref 150–400)
POTASSIUM SERPL-MCNC: 3.8 MMOL/L — SIGNIFICANT CHANGE UP (ref 3.5–5.3)
POTASSIUM SERPL-SCNC: 3.8 MMOL/L — SIGNIFICANT CHANGE UP (ref 3.5–5.3)
PROT SERPL-MCNC: 5.2 G/DL — LOW (ref 6–8.3)
RBC # BLD: 3.61 M/UL — LOW (ref 4.2–5.8)
RBC # FLD: 16.9 % — HIGH (ref 10.3–14.5)
RH IG SCN BLD-IMP: POSITIVE — SIGNIFICANT CHANGE UP
SODIUM SERPL-SCNC: 140 MMOL/L — SIGNIFICANT CHANGE UP (ref 135–145)
WBC # BLD: 1.64 K/UL — LOW (ref 3.8–10.5)
WBC # FLD AUTO: 1.64 K/UL — LOW (ref 3.8–10.5)

## 2025-05-16 PROCEDURE — 99233 SBSQ HOSP IP/OBS HIGH 50: CPT

## 2025-05-16 RX ORDER — FILGRASTIM 300 UG/.5ML
300 INJECTION, SOLUTION INTRAVENOUS; SUBCUTANEOUS DAILY
Refills: 0 | Status: DISCONTINUED | OUTPATIENT
Start: 2025-05-27 | End: 2025-06-05

## 2025-05-16 RX ORDER — PROCHLORPERAZINE 25 MG
10 SUPPOSITORY, RECTAL RECTAL EVERY 6 HOURS
Refills: 0 | Status: DISCONTINUED | OUTPATIENT
Start: 2025-05-16 | End: 2025-06-05

## 2025-05-16 RX ADMIN — Medication 1 APPLICATION(S): at 09:55

## 2025-05-16 RX ADMIN — OXYCODONE HYDROCHLORIDE 5 MILLIGRAM(S): 30 TABLET ORAL at 20:37

## 2025-05-16 RX ADMIN — Medication 800 MILLIGRAM(S): at 17:08

## 2025-05-16 RX ADMIN — URSODIOL 300 MILLIGRAM(S): 300 CAPSULE ORAL at 05:39

## 2025-05-16 RX ADMIN — Medication 10 MILLILITER(S): at 09:54

## 2025-05-16 RX ADMIN — Medication 10 MILLILITER(S): at 12:13

## 2025-05-16 RX ADMIN — Medication 5 MILLILITER(S): at 20:22

## 2025-05-16 RX ADMIN — FLUDARABINE PHOSPHATE 40 MILLIGRAM(S): 25 INJECTION, SOLUTION INTRAVENOUS at 12:17

## 2025-05-16 RX ADMIN — Medication 116 MILLIGRAM(S): at 11:33

## 2025-05-16 RX ADMIN — DULOXETINE 60 MILLIGRAM(S): 20 CAPSULE, DELAYED RELEASE ORAL at 20:37

## 2025-05-16 RX ADMIN — Medication 10 MILLILITER(S): at 17:08

## 2025-05-16 RX ADMIN — URSODIOL 300 MILLIGRAM(S): 300 CAPSULE ORAL at 17:08

## 2025-05-16 RX ADMIN — LEVETIRACETAM 500 MILLIGRAM(S): 10 INJECTION, SOLUTION INTRAVENOUS at 17:08

## 2025-05-16 RX ADMIN — LEVETIRACETAM 500 MILLIGRAM(S): 10 INJECTION, SOLUTION INTRAVENOUS at 05:39

## 2025-05-16 RX ADMIN — OXYCODONE HYDROCHLORIDE 5 MILLIGRAM(S): 30 TABLET ORAL at 21:00

## 2025-05-16 RX ADMIN — Medication 5 MILLILITER(S): at 09:54

## 2025-05-16 RX ADMIN — Medication 5 MILLILITER(S): at 17:08

## 2025-05-16 RX ADMIN — Medication 800 MILLIGRAM(S): at 05:40

## 2025-05-16 RX ADMIN — Medication 10 MILLILITER(S): at 20:22

## 2025-05-16 RX ADMIN — Medication 5 MILLILITER(S): at 12:12

## 2025-05-16 RX ADMIN — TIZANIDINE 4 MILLIGRAM(S): 4 TABLET ORAL at 20:38

## 2025-05-16 RX ADMIN — Medication 40 MILLIGRAM(S): at 05:39

## 2025-05-16 NOTE — PHARMACOTHERAPY INTERVENTION NOTE - COMMENTS
69-year-old male with 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 He is admitted for an alloSCT with BERTRAM Bu/Flu/Cy/TBI conditioning and CAST for GVHD ppx. Today is day -4.    Conditioning Regimen: RI Bu/Flu/Cy/TBI  Day -6 (5/14) to Day -5 (5/15): Busulfan 130 mg/m2 IV administered over 3 hours for 2 doses -> avoid APAP and azole antifungals for 48 hours post busulfan  Day -6 (5/14) to Day -2 (5/18) Fludarabine 30 mg/m2 IV administered over 30 minutes for 5 doses  Moving up by 1 hour every day to ensure 48 hours between final dose and CTP infusion  Day -3 (5/17) to Day -2 (5/18) Cyclophosphamide 14.5 mg/k2 IV administered over 1 hour for 2 doses  Day -1 (5/19)  cGy x1    GVHD ppx: CAST  Cyclophosphamide 50 mg/kg IV daily on Day +3 (5/23) and Day +4 (5/24).   Abatacept IV 10 mg/kg on days +5 (5/25), +14 (6/3), +28 (6/17), and +56 (7/15).  Patient will start tacrolimus 0.02 mg/kg IV on Sunday, 5/25 (day +5) - please order a one time trough on Wednesday, 5/28 (day +8) and one time trough on Saturday, 5/31 along with troughs every Tuesday to start on 6/3. Goal trough is 8-12 ng/mL. Please ensure trough is drawn peripherally.    Antimicrobial ppx.:  Will start antimicrobial (levofloxacin 500 mg PO QD), and PO vanco 125 mg BID once ANC <1000 & continue until ANC >500 for 3 consecutive days. Will start antifungal (posaconazole 300 mg PO QD) on day 0 (5/20) and continue until day +75. Will also plan to start GCSF on day +7 (5/27) & stop once ANC >1500 for two days.     Evelyne Murcia, PharmD, BCOP  Stem Cell Transplant Clinical Pharmacy Specialist  Available via [employer] Teams

## 2025-05-16 NOTE — PROGRESS NOTE ADULT - NSPROGADDITIONALINFOA_GEN_ALL_CORE
I rounded with the team including nurses, ACPs and PharmD.  I reviewed the data in depth.   I saw and examined the patient myself.   I reviewed and confirmed the above note.  The patient is day 05 of allogeneic HSCT.  He started his conditioning yesterday.   The patient is doing well with no complaints.  The vitals are stable. The oral cavity is clear. The line site is clean. The lungs are clear. There is no LE edema.  Overall, there are no specific issues. The patient is on appropriate therapy at this stage.   I answered the patient’s questions.  I encouraged po intake and ambulation.  SE Riley MD I rounded with the team including nurses, ACPs and PharmD.  I reviewed the data in depth.   I saw and examined the patient myself.   I reviewed and confirmed the above note.  The patient is day 05 of allogeneic HSCT.  He is tolerating his conditioning well.   The patient is doing well with no complaints.  The vitals are stable. The oral cavity is clear. The line site is clean. The lungs are clear. There is no LE edema.  Overall, there are no specific issues. The patient is on appropriate therapy at this stage.   I answered the patient’s questions.  I encouraged po intake and ambulation.  SE Riley MD

## 2025-05-16 NOTE — PROGRESS NOTE ADULT - SUBJECTIVE AND OBJECTIVE BOX
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 Patient admits to dry mouth, otherwise well.    O: Vitals:   Vital Signs Last 24 Hrs  T(C): 36.5 (16 May 2025 05:38), Max: 36.8 (15 May 2025 18:11)  T(F): 97.7 (16 May 2025 05:38), Max: 98.2 (15 May 2025 18:11)  HR: 61 (16 May 2025 05:38) (61 - 72)  BP: 133/74 (16 May 2025 05:38) (121/73 - 133/80)  BP(mean): --  RR: 16 (16 May 2025 05:38) (16 - 18)  SpO2: 98% (16 May 2025 05:38) (97% - 100%)    Parameters below as of 16 May 2025 05:38  Patient On (Oxygen Delivery Method): room air    Admit weight: 47.1kg   Daily Weight in kG:  (16 May 2025)    Intake / Output:   05-14 @ 07:01  -  05-15 @ 07:00  --------------------------------------------------------  IN: 1859 mL / OUT: 2800 mL / NET: -941 mL    05-15 @ 07:01  -  05-16 @ 06:43  --------------------------------------------------------  IN: 1587 mL / OUT: 2550 mL / NET: -963 mL      PE:   Oropharynx: no erythema or ulcerations   Oral Mucositis:                -                                     Grade: n/a  CVS: S1, S2 RRR   Lungs: CTA throughout bilaterally   Abdomen: + BS x 4, soft, NT, ND   Extremities: no edema   Gastric Mucositis:       -                                          Grade: n/a   Intestinal Mucositis:     -                                         Grade: n/a   Skin: no rash   TLC: CDI  Neuro: A&OX3      Labs:                 Cultures:       Radiology:       Meds:   Antimicrobials:   acyclovir   Oral Tab/Cap 800 milliGRAM(s) Oral every 12 hours      Heme / Onc:   fludarabine IVPB (eMAR) 40 milliGRAM(s) IV Intermittent every 24 hours      GI:  pantoprazole    Tablet 40 milliGRAM(s) Oral before breakfast  sodium bicarbonate Mouth Rinse 10 milliLiter(s) Swish and Spit five times a day  ursodiol Capsule 300 milliGRAM(s) Oral two times a day      Cardiovascular:       Immunologic:       Other medications:   Biotene Dry Mouth Oral Rinse 5 milliLiter(s) Swish and Spit five times a day  chlorhexidine 4% Liquid 1 Application(s) Topical <User Schedule>  DULoxetine 60 milliGRAM(s) Oral <User Schedule>  levETIRAcetam 500 milliGRAM(s) Oral two times a day  ondansetron  IVPB 16 milliGRAM(s) IV Intermittent every 24 hours  oxyCODONE    IR 5 milliGRAM(s) Oral <User Schedule>  phytonadione   Solution 5 milliGRAM(s) Oral <User Schedule>  tiZANidine 4 milliGRAM(s) Oral <User Schedule>      PRN:   sodium chloride 0.9% lock flush 10 milliLiter(s) IV Push every 1 hour PRN      A/P:  69 year old male with refractory DLBCL, status post R-CHOP x 6, HD MTX x 4, salvage polatuzumab / rituximab / revlimid x 4, admitted for a haplo-identical pbsct from his daughter with Flu / Bu / Cy / TBI prep regimen   Day -4   5/15- busulfan 2/2, fludarabine 2/5. No acute events overnight, vital signs are stable; continue keppra ppx for 24 hours post infusion of last dose of busulfan. No tylenol or posaconazole until day 0. Not neutropenic today, will d/c levaquin / vancomycin.   5/16 - fludarabine 3/5. VSS, afebrile.    1. Infectious Disease:   acyclovir Oral Tab/Cap 800 milliGRAM(s) Oral every 12 hours  levoFLOXacin Tablet 500 milliGRAM(s) Oral every 24 hours  vancomycin  Solution 125 milliGRAM(s) Oral every 12 hours    2. VOD Prophylaxis:   ursodiol Capsule 300 milliGRAM(s) Oral two times a day    3. GI Prophylaxis:   pantoprazole Tablet 40 milliGRAM(s) Oral before breakfast    4. Mouthcare - NS / NaHCO3 rinses, Biotene; Skin care     5. GVHD prophylaxis   PTCy days +3, +4   Tacrolimus gtt to start on day + 5  Abatacept days +5, +14, +28, +56    6. Transfuse & replete electrolytes prn     7. IV hydration, daily weights, strict I&O, prn diuresis     8. PO intake as tolerated, nutrition follow up as needed    9. Antiemetics, anti-diarrhea medications:   dexAMETHasone IVPB 10 milliGRAM(s) IV Intermittent every 24 hours  ondansetron IVPB 16 milliGRAM(s) IV Intermittent every 24 hours    10. OOB as tolerated, physical therapy consult if needed     11. Monitor coags / fibrinogen 2x week, vitamin K as needed   phytonadione Solution 5 milliGRAM(s) Oral <User Schedule>    12. Monitor closely for clinical changes, monitor for fevers     13. Emotional support provided, plan of care discussed and questions addressed     14. Patient education done regarding chemotherapy prep, plan of care, restrictions and discharge planning     15. Continue regular social work input     I have written the above note for Dr. Riley who performed service with me in the room.   Bhaskar Esquivel PA-C ([phone])    I have seen and examined patient with PA, I agree with above note as scribed.                        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 Patient admits to dry mouth, otherwise well.  Ambulating this morning and sitting OOB to chair.    O: Vitals:   Vital Signs Last 24 Hrs  T(C): 36.5 (16 May 2025 05:38), Max: 36.8 (15 May 2025 18:11)  T(F): 97.7 (16 May 2025 05:38), Max: 98.2 (15 May 2025 18:11)  HR: 61 (16 May 2025 05:38) (61 - 72)  BP: 133/74 (16 May 2025 05:38) (121/73 - 133/80)  BP(mean): --  RR: 16 (16 May 2025 05:38) (16 - 18)  SpO2: 98% (16 May 2025 05:38) (97% - 100%)    Parameters below as of 16 May 2025 05:38  Patient On (Oxygen Delivery Method): room air    Admit weight: 47.1kg   Daily Weight in k.2 (16 May 2025)    Intake / Output:   05-14 @ 07:15 @ 07:00  --------------------------------------------------------  IN: 1859 mL / OUT: 2800 mL / NET: -941 mL    15 @ 07:  -  16 @ 06:43  --------------------------------------------------------  IN: 1587 mL / OUT: 2550 mL / NET: -963 mL      PE:   Oropharynx: no erythema or ulcerations   Oral Mucositis:                -                                     Grade: n/a  CVS: S1, S2 RRR   Lungs: CTA throughout bilaterally   Abdomen: + BS x 4, soft, NT, ND   Extremities: no edema   Gastric Mucositis:       -                                          Grade: n/a   Intestinal Mucositis:     -                                         Grade: n/a   Skin: no rash   TLC: CDI  Neuro: A&OX3      Labs:   CBC Full  -  ( 16 May 2025 06:23 )  WBC Count : 1.64 K/uL  RBC Count : 3.61 M/uL  Hemoglobin : 10.3 g/dL  Hematocrit : 32.1 %  Platelet Count - Automated : 207 K/uL  Mean Cell Volume : 88.9 fl  Mean Cell Hemoglobin : 28.5 pg  Mean Cell Hemoglobin Concentration : 32.1 g/dL  Auto Neutrophil # : x  Auto Lymphocyte # : x  Auto Monocyte # : x  Auto Eosinophil # : x  Auto Basophil # : x  Auto Neutrophil % : x  Auto Lymphocyte % : x  Auto Monocyte % : x  Auto Eosinophil % : x  Auto Basophil % : x                          10.3   1.64  )-----------( 207      ( 16 May 2025 06:23 )             32.1         140  |  106  |  13  ----------------------------<  121[H]  3.8   |  23  |  0.57    Ca    8.8      16 May 2025 06:23  Phos  3.9       Mg     2.4         TPro  5.2[L]  /  Alb  3.6  /  TBili  0.4  /  DBili  0.1  /  AST  19  /  ALT  49[H]  /  AlkPhos  257[H]        Cultures:       Radiology:       Meds:   Antimicrobials:   acyclovir   Oral Tab/Cap 800 milliGRAM(s) Oral every 12 hours      Heme / Onc:   fludarabine IVPB (eMAR) 40 milliGRAM(s) IV Intermittent every 24 hours      GI:  pantoprazole    Tablet 40 milliGRAM(s) Oral before breakfast  sodium bicarbonate Mouth Rinse 10 milliLiter(s) Swish and Spit five times a day  ursodiol Capsule 300 milliGRAM(s) Oral two times a day      Cardiovascular:       Immunologic:       Other medications:   Biotene Dry Mouth Oral Rinse 5 milliLiter(s) Swish and Spit five times a day  chlorhexidine 4% Liquid 1 Application(s) Topical <User Schedule>  DULoxetine 60 milliGRAM(s) Oral <User Schedule>  levETIRAcetam 500 milliGRAM(s) Oral two times a day  ondansetron  IVPB 16 milliGRAM(s) IV Intermittent every 24 hours  oxyCODONE    IR 5 milliGRAM(s) Oral <User Schedule>  phytonadione   Solution 5 milliGRAM(s) Oral <User Schedule>  tiZANidine 4 milliGRAM(s) Oral <User Schedule>      PRN:   sodium chloride 0.9% lock flush 10 milliLiter(s) IV Push every 1 hour PRN      A/P:  69 year old male with refractory DLBCL, status post R-CHOP x 6, HD MTX x 4, salvage polatuzumab / rituximab / revlimid x 4, admitted for a haplo-identical pbsct from his daughter with Flu / Bu / Cy / TBI prep regimen   Day -4   5/15- busulfan /2, fludarabine . No acute events overnight, vital signs are stable; continue keppra ppx for 24 hours post infusion of last dose of busulfan. No tylenol or posaconazole until day 0. Not neutropenic today, will d/c levaquin / vancomycin.    - fludarabine 3/5. VSS, afebrile. No acute events overnight.    1. Infectious Disease:   acyclovir Oral Tab/Cap 800 milliGRAM(s) Oral every 12 hours    2. VOD Prophylaxis:   ursodiol Capsule 300 milliGRAM(s) Oral two times a day    3. GI Prophylaxis:   pantoprazole Tablet 40 milliGRAM(s) Oral before breakfast    4. Mouthcare - NS / NaHCO3 rinses, Biotene; Skin care     5. GVHD prophylaxis   PTCy days +3, +4   Tacrolimus gtt to start on day + 5  Abatacept days +5, +14, +28, +56    6. Transfuse & replete electrolytes prn     7. IV hydration, daily weights, strict I&O, prn diuresis     8. PO intake as tolerated, nutrition follow up as needed    9. Antiemetics, anti-diarrhea medications:   dexAMETHasone IVPB 10 milliGRAM(s) IV Intermittent every 24 hours  ondansetron IVPB 16 milliGRAM(s) IV Intermittent every 24 hours    10. OOB as tolerated, physical therapy consult if needed     11. Monitor coags / fibrinogen 2x week, vitamin K as needed   phytonadione Solution 5 milliGRAM(s) Oral <User Schedule>    12. Monitor closely for clinical changes, monitor for fevers     13. Emotional support provided, plan of care discussed and questions addressed     14. Patient education done regarding chemotherapy prep, plan of care, restrictions and discharge planning     15. Continue regular social work input     I have written the above note for Dr. Riley who performed service with me in the room.   Bhaskar Esquivel PA-C ([phone])    I have seen and examined patient with PA, I agree with above note as scribed.

## 2025-05-17 LAB
ALBUMIN SERPL ELPH-MCNC: 3.6 G/DL — SIGNIFICANT CHANGE UP (ref 3.3–5)
ALP SERPL-CCNC: 234 U/L — HIGH (ref 40–120)
ALT FLD-CCNC: 39 U/L — SIGNIFICANT CHANGE UP (ref 10–45)
ANION GAP SERPL CALC-SCNC: 11 MMOL/L — SIGNIFICANT CHANGE UP (ref 5–17)
AST SERPL-CCNC: 19 U/L — SIGNIFICANT CHANGE UP (ref 10–40)
BASOPHILS # BLD AUTO: 0.05 K/UL — SIGNIFICANT CHANGE UP (ref 0–0.2)
BASOPHILS NFR BLD AUTO: 6.1 % — HIGH (ref 0–2)
BILIRUB SERPL-MCNC: 0.3 MG/DL — SIGNIFICANT CHANGE UP (ref 0.2–1.2)
BUN SERPL-MCNC: 13 MG/DL — SIGNIFICANT CHANGE UP (ref 7–23)
CALCIUM SERPL-MCNC: 8.6 MG/DL — SIGNIFICANT CHANGE UP (ref 8.4–10.5)
CHLORIDE SERPL-SCNC: 104 MMOL/L — SIGNIFICANT CHANGE UP (ref 96–108)
CO2 SERPL-SCNC: 27 MMOL/L — SIGNIFICANT CHANGE UP (ref 22–31)
CREAT SERPL-MCNC: 0.67 MG/DL — SIGNIFICANT CHANGE UP (ref 0.5–1.3)
EGFR: 102 ML/MIN/1.73M2 — SIGNIFICANT CHANGE UP
EGFR: 102 ML/MIN/1.73M2 — SIGNIFICANT CHANGE UP
EOSINOPHIL # BLD AUTO: 0.03 K/UL — SIGNIFICANT CHANGE UP (ref 0–0.5)
EOSINOPHIL NFR BLD AUTO: 3.5 % — SIGNIFICANT CHANGE UP (ref 0–6)
GLUCOSE SERPL-MCNC: 104 MG/DL — HIGH (ref 70–99)
HADV DNA FLD NAA+PROBE-LOG#: SIGNIFICANT CHANGE UP COPIES/ML
HCT VFR BLD CALC: 32.9 % — LOW (ref 39–50)
HGB BLD-MCNC: 10.6 G/DL — LOW (ref 13–17)
LDH SERPL L TO P-CCNC: 213 U/L — SIGNIFICANT CHANGE UP (ref 50–242)
LYMPHOCYTES # BLD AUTO: 0.19 K/UL — LOW (ref 1–3.3)
LYMPHOCYTES # BLD AUTO: 21.9 % — SIGNIFICANT CHANGE UP (ref 13–44)
MAGNESIUM SERPL-MCNC: 2.4 MG/DL — SIGNIFICANT CHANGE UP (ref 1.6–2.6)
MANUAL SMEAR VERIFICATION: SIGNIFICANT CHANGE UP
MCHC RBC-ENTMCNC: 28.6 PG — SIGNIFICANT CHANGE UP (ref 27–34)
MCHC RBC-ENTMCNC: 32.2 G/DL — SIGNIFICANT CHANGE UP (ref 32–36)
MCV RBC AUTO: 88.9 FL — SIGNIFICANT CHANGE UP (ref 80–100)
MONOCYTES # BLD AUTO: 0.21 K/UL — SIGNIFICANT CHANGE UP (ref 0–0.9)
MONOCYTES NFR BLD AUTO: 23.7 % — HIGH (ref 2–14)
NEUTROPHILS # BLD AUTO: 0.39 K/UL — LOW (ref 1.8–7.4)
NEUTROPHILS NFR BLD AUTO: 43.9 % — SIGNIFICANT CHANGE UP (ref 43–77)
NEUTS BAND # BLD: 0.9 % — SIGNIFICANT CHANGE UP (ref 0–8)
NEUTS BAND NFR BLD: 0.9 % — SIGNIFICANT CHANGE UP (ref 0–8)
PHOSPHATE SERPL-MCNC: 4.4 MG/DL — SIGNIFICANT CHANGE UP (ref 2.5–4.5)
PLAT MORPH BLD: NORMAL — SIGNIFICANT CHANGE UP
PLATELET # BLD AUTO: 196 K/UL — SIGNIFICANT CHANGE UP (ref 150–400)
POTASSIUM SERPL-MCNC: 4.1 MMOL/L — SIGNIFICANT CHANGE UP (ref 3.5–5.3)
POTASSIUM SERPL-SCNC: 4.1 MMOL/L — SIGNIFICANT CHANGE UP (ref 3.5–5.3)
PROT SERPL-MCNC: 5 G/DL — LOW (ref 6–8.3)
RBC # BLD: 3.7 M/UL — LOW (ref 4.2–5.8)
RBC # FLD: 17 % — HIGH (ref 10.3–14.5)
RBC BLD AUTO: SIGNIFICANT CHANGE UP
SODIUM SERPL-SCNC: 142 MMOL/L — SIGNIFICANT CHANGE UP (ref 135–145)
WBC # BLD: 0.88 K/UL — CRITICAL LOW (ref 3.8–10.5)
WBC # FLD AUTO: 0.88 K/UL — CRITICAL LOW (ref 3.8–10.5)

## 2025-05-17 PROCEDURE — 99232 SBSQ HOSP IP/OBS MODERATE 35: CPT

## 2025-05-17 RX ORDER — VANCOMYCIN HCL IN 5 % DEXTROSE 1.5G/250ML
125 PLASTIC BAG, INJECTION (ML) INTRAVENOUS EVERY 12 HOURS
Refills: 0 | Status: DISCONTINUED | OUTPATIENT
Start: 2025-05-17 | End: 2025-06-05

## 2025-05-17 RX ORDER — CYCLOPHOSPHAMIDE INJECTION, SOLUTION 200 MG/ML
685 INJECTION INTRAVENOUS DAILY
Refills: 0 | Status: COMPLETED | OUTPATIENT
Start: 2025-05-17 | End: 2025-05-18

## 2025-05-17 RX ADMIN — Medication 10 MILLILITER(S): at 17:20

## 2025-05-17 RX ADMIN — Medication 10 MILLILITER(S): at 20:12

## 2025-05-17 RX ADMIN — OXYCODONE HYDROCHLORIDE 5 MILLIGRAM(S): 30 TABLET ORAL at 20:26

## 2025-05-17 RX ADMIN — DULOXETINE 60 MILLIGRAM(S): 20 CAPSULE, DELAYED RELEASE ORAL at 20:26

## 2025-05-17 RX ADMIN — URSODIOL 300 MILLIGRAM(S): 300 CAPSULE ORAL at 05:36

## 2025-05-17 RX ADMIN — OXYCODONE HYDROCHLORIDE 5 MILLIGRAM(S): 30 TABLET ORAL at 21:00

## 2025-05-17 RX ADMIN — Medication 10 MILLILITER(S): at 08:30

## 2025-05-17 RX ADMIN — URSODIOL 300 MILLIGRAM(S): 300 CAPSULE ORAL at 17:22

## 2025-05-17 RX ADMIN — LEVETIRACETAM 500 MILLIGRAM(S): 10 INJECTION, SOLUTION INTRAVENOUS at 05:36

## 2025-05-17 RX ADMIN — Medication 5 MILLILITER(S): at 20:11

## 2025-05-17 RX ADMIN — Medication 116 MILLIGRAM(S): at 14:17

## 2025-05-17 RX ADMIN — Medication 40 MILLIGRAM(S): at 05:36

## 2025-05-17 RX ADMIN — Medication 5 MILLILITER(S): at 08:30

## 2025-05-17 RX ADMIN — Medication 5 MILLILITER(S): at 12:43

## 2025-05-17 RX ADMIN — Medication 800 MILLIGRAM(S): at 17:22

## 2025-05-17 RX ADMIN — Medication 10 MILLILITER(S): at 12:44

## 2025-05-17 RX ADMIN — Medication 1 APPLICATION(S): at 08:31

## 2025-05-17 RX ADMIN — TIZANIDINE 4 MILLIGRAM(S): 4 TABLET ORAL at 20:26

## 2025-05-17 RX ADMIN — Medication 800 MILLIGRAM(S): at 05:36

## 2025-05-17 RX ADMIN — CYCLOPHOSPHAMIDE INJECTION, SOLUTION 685 MILLIGRAM(S): 200 INJECTION INTRAVENOUS at 11:38

## 2025-05-17 RX ADMIN — Medication 5 MILLILITER(S): at 17:19

## 2025-05-17 RX ADMIN — FLUDARABINE PHOSPHATE 40 MILLIGRAM(S): 25 INJECTION, SOLUTION INTRAVENOUS at 10:59

## 2025-05-17 RX ADMIN — Medication 125 MILLIGRAM(S): at 17:25

## 2025-05-17 NOTE — PROGRESS NOTE ADULT - NSPROGADDITIONALINFOA_GEN_ALL_CORE
I rounded with the team including nurses, ACPs and PharmD.  I reviewed the data in depth.   I saw and examined the patient myself.   I reviewed and confirmed the above note.  The patient is day 05 of allogeneic HSCT.  He is tolerating his conditioning well.   The patient is doing well with no complaints.  The vitals are stable. The oral cavity is clear. The line site is clean. The lungs are clear. There is no LE edema.  Overall, there are no specific issues. The patient is on appropriate therapy at this stage.   I answered the patient’s questions.  I encouraged po intake and ambulation.  SE Riley MD I rounded with the team including nurses, ACPs and PharmD.  I reviewed the data in depth.   I saw and examined the patient myself.   I reviewed and confirmed the above note.  The patient is day -4 of allogeneic HSCT.  He is tolerating his conditioning well.   The patient is doing well with no complaints.  The vitals are stable. The oral cavity is clear. The line site is clean. The lungs are clear. There is no LE edema.  Overall, there are no specific issues. The patient is on appropriate therapy at this stage.   I answered the patient’s questions.  I encouraged po intake and ambulation.  SE Riley MD

## 2025-05-17 NOTE — PROGRESS NOTE ADULT - SUBJECTIVE AND OBJECTIVE BOX
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    O: Vitals:   Vital Signs Last 24 Hrs  T(C): 36.6 (17 May 2025 05:45), Max: 37.1 (16 May 2025 20:09)  T(F): 97.8 (17 May 2025 05:45), Max: 98.8 (16 May 2025 20:09)  HR: 62 (17 May 2025 05:45) (61 - 62)  BP: 106/63 (17 May 2025 05:45) (103/66 - 118/72)  BP(mean): --  RR: 20 (17 May 2025 05:45) (17 - 20)  SpO2: 96% (17 May 2025 05:45) (95% - 98%)    Parameters below as of 17 May 2025 05:45  Patient On (Oxygen Delivery Method): room air    Admit weight: 47.1kg   Daily Weight in kG:  (17 May 2025)    Intake / Output:   05-15 @ 07:01  -  05-16 @ 07:00  --------------------------------------------------------  IN: 1587 mL / OUT: 2550 mL / NET: -963 mL    05-16 @ 07:01  -  05-17 @ 06:38  --------------------------------------------------------  IN: 1080 mL / OUT: 2245 mL / NET: -1165 mL    PE:   Oropharynx: no erythema or ulcerations   Oral Mucositis:                -                                     Grade: n/a  CVS: S1, S2 RRR   Lungs: CTA throughout bilaterally   Abdomen: + BS x 4, soft, NT, ND   Extremities: no edema   Gastric Mucositis:       -                                          Grade: n/a   Intestinal Mucositis:     -                                         Grade: n/a   Skin: no rash   TLC: CDI  Neuro: A&OX3    Labs:                 Cultures:       Radiology:       Meds:   Antimicrobials:   acyclovir   Oral Tab/Cap 800 milliGRAM(s) Oral every 12 hours      Heme / Onc:   fludarabine IVPB (eMAR) 40 milliGRAM(s) IV Intermittent every 24 hours      GI:  pantoprazole    Tablet 40 milliGRAM(s) Oral before breakfast  sodium bicarbonate Mouth Rinse 10 milliLiter(s) Swish and Spit five times a day  ursodiol Capsule 300 milliGRAM(s) Oral two times a day      Cardiovascular:       Immunologic:       Other medications:   Biotene Dry Mouth Oral Rinse 5 milliLiter(s) Swish and Spit five times a day  chlorhexidine 4% Liquid 1 Application(s) Topical <User Schedule>  DULoxetine 60 milliGRAM(s) Oral <User Schedule>  ondansetron  IVPB 16 milliGRAM(s) IV Intermittent every 24 hours  oxyCODONE    IR 5 milliGRAM(s) Oral <User Schedule>  phytonadione   Solution 5 milliGRAM(s) Oral <User Schedule>  sodium chloride 0.9%. 500 milliLiter(s) IV Continuous <Continuous>  sodium chloride 0.9%. 500 milliLiter(s) IV Continuous <Continuous>  tiZANidine 4 milliGRAM(s) Oral <User Schedule>      PRN:   prochlorperazine   Injectable 10 milliGRAM(s) IV Push every 6 hours PRN  sodium chloride 0.9% lock flush 10 milliLiter(s) IV Push every 1 hour PRN      A/P:  69 year old male with refractory DLBCL, status post R-CHOP x 6, HD MTX x 4, salvage polatuzumab / rituximab / revlimid x 4, admitted for a haplo-identical pbsct from his daughter with Flu / Bu / Cy / TBI prep regimen   Day -3  5/15- busulfan 2/2, fludarabine 2/5. No acute events overnight, vital signs are stable; continue keppra ppx for 24 hours post infusion of last dose of busulfan. No tylenol or posaconazole until day 0. Not neutropenic today, will d/c levaquin / vancomycin.   5/16 - fludarabine 3/5. VSS, afebrile. No acute events overnight.  5/17 - fludarabine 4/5. VSS and afebrile. No acute events overnight.    1. Infectious Disease:   acyclovir Oral Tab/Cap 800 milliGRAM(s) Oral every 12 hours    2. VOD Prophylaxis:   ursodiol Capsule 300 milliGRAM(s) Oral two times a day    3. GI Prophylaxis:   pantoprazole Tablet 40 milliGRAM(s) Oral before breakfast    4. Mouthcare - NS / NaHCO3 rinses, Biotene; Skin care     5. GVHD prophylaxis   PTCy days +3, +4   Tacrolimus gtt to start on day + 5  Abatacept days +5, +14, +28, +56    6. Transfuse & replete electrolytes prn     7. IV hydration, daily weights, strict I&O, prn diuresis     8. PO intake as tolerated, nutrition follow up as needed    9. Antiemetics, anti-diarrhea medications:   dexAMETHasone IVPB 10 milliGRAM(s) IV Intermittent every 24 hours  ondansetron IVPB 16 milliGRAM(s) IV Intermittent every 24 hours    10. OOB as tolerated, physical therapy consult if needed     11. Monitor coags / fibrinogen 2x week, vitamin K as needed   phytonadione Solution 5 milliGRAM(s) Oral <User Schedule>    12. Monitor closely for clinical changes, monitor for fevers     13. Emotional support provided, plan of care discussed and questions addressed     14. Patient education done regarding chemotherapy prep, plan of care, restrictions and discharge planning     15. Continue regular social work input     I have written the above note for Dr. Riley who performed service with me in the room.   Bhaskar Esquivel PA-C ([phone])    I have seen and examined patient with PA, I agree with above note as scribed.                            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 Patient feeling mild fatigue this morning.  Admits to eating breakfast and states that he would like to take a walk later.    O: Vitals:   Vital Signs Last 24 Hrs  T(C): 36.6 (17 May 2025 05:45), Max: 37.1 (16 May 2025 20:09)  T(F): 97.8 (17 May 2025 05:45), Max: 98.8 (16 May 2025 20:09)  HR: 62 (17 May 2025 05:45) (61 - 62)  BP: 106/63 (17 May 2025 05:45) (103/66 - 118/72)  BP(mean): --  RR: 20 (17 May 2025 05:45) (17 - 20)  SpO2: 96% (17 May 2025 05:45) (95% - 98%)    Parameters below as of 17 May 2025 05:45  Patient On (Oxygen Delivery Method): room air    Admit weight: 47.1kg   Daily Weight in k.3 (17 May 2025)    Intake / Output:   15 @ 07:  -  16 @ 07:00  --------------------------------------------------------  IN: 1587 mL / OUT: 2550 mL / NET: -963 mL    16 @ 07:  -  17 @ 06:38  --------------------------------------------------------  IN: 1080 mL / OUT: 2245 mL / NET: -1165 mL    PE:   Oropharynx: no erythema or ulcerations   Oral Mucositis:                -                                     Grade: n/a  CVS: S1, S2 RRR   Lungs: CTA throughout bilaterally   Abdomen: + BS x 4, soft, NT, ND   Extremities: no edema   Gastric Mucositis:       -                                          Grade: n/a   Intestinal Mucositis:     -                                         Grade: n/a   Skin: no rash   TLC: CDI  Neuro: A&OX3    Labs:   CBC Full  -  ( 17 May 2025 06:31 )  WBC Count : 0.88 K/uL  RBC Count : 3.70 M/uL  Hemoglobin : 10.6 g/dL  Hematocrit : 32.9 %  Platelet Count - Automated : 196 K/uL  Mean Cell Volume : 88.9 fl  Mean Cell Hemoglobin : 28.6 pg  Mean Cell Hemoglobin Concentration : 32.2 g/dL  Auto Neutrophil # : x  Auto Lymphocyte # : x  Auto Monocyte # : x  Auto Eosinophil # : x  Auto Basophil # : x  Auto Neutrophil % : x  Auto Lymphocyte % : x  Auto Monocyte % : x  Auto Eosinophil % : x  Auto Basophil % : x                          10.6   0.88  )-----------( 196      ( 17 May 2025 06:31 )             32.9     05-    142  |  104  |  13  ----------------------------<  104[H]  4.1   |  27  |  0.67    Ca    8.6      17 May 2025 06:31  Phos  4.4       Mg     2.4         TPro  5.0[L]  /  Alb  3.6  /  TBili  0.3  /  DBili  x   /  AST  19  /  ALT  39  /  AlkPhos  234[H]        Cultures:       Radiology:       Meds:   Antimicrobials:   acyclovir   Oral Tab/Cap 800 milliGRAM(s) Oral every 12 hours      Heme / Onc:   fludarabine IVPB (eMAR) 40 milliGRAM(s) IV Intermittent every 24 hours      GI:  pantoprazole    Tablet 40 milliGRAM(s) Oral before breakfast  sodium bicarbonate Mouth Rinse 10 milliLiter(s) Swish and Spit five times a day  ursodiol Capsule 300 milliGRAM(s) Oral two times a day      Cardiovascular:       Immunologic:       Other medications:   Biotene Dry Mouth Oral Rinse 5 milliLiter(s) Swish and Spit five times a day  chlorhexidine 4% Liquid 1 Application(s) Topical <User Schedule>  DULoxetine 60 milliGRAM(s) Oral <User Schedule>  ondansetron  IVPB 16 milliGRAM(s) IV Intermittent every 24 hours  oxyCODONE    IR 5 milliGRAM(s) Oral <User Schedule>  phytonadione   Solution 5 milliGRAM(s) Oral <User Schedule>  sodium chloride 0.9%. 500 milliLiter(s) IV Continuous <Continuous>  sodium chloride 0.9%. 500 milliLiter(s) IV Continuous <Continuous>  tiZANidine 4 milliGRAM(s) Oral <User Schedule>      PRN:   prochlorperazine   Injectable 10 milliGRAM(s) IV Push every 6 hours PRN  sodium chloride 0.9% lock flush 10 milliLiter(s) IV Push every 1 hour PRN      A/P:  69 year old male with refractory DLBCL, status post R-CHOP x 6, HD MTX x 4, salvage polatuzumab / rituximab / revlimid x 4, admitted for a haplo-identical pbsct from his daughter with Flu / Bu / Cy / TBI prep regimen   Day -3  15- busulfan /, fludarabine . No acute events overnight, vital signs are stable; continue keppra ppx for 24 hours post infusion of last dose of busulfan. No tylenol or posaconazole until day 0. Not neutropenic today, will d/c levaquin / vancomycin.    - fludarabine 3/. VSS, afebrile. No acute events overnight.   - fludarabine . VSS and afebrile. No acute events overnight. Neutropenic today, started on ppx levaquin/vancomycin PO. No Tylenol or Azoles until day 0.    1. Infectious Disease:   acyclovir Oral Tab/Cap 800 milliGRAM(s) Oral every 12 hours  Levaquin 500mg oral daily  Vancomycin 125mg oral every 12 hours    2. VOD Prophylaxis:   ursodiol Capsule 300 milliGRAM(s) Oral two times a day    3. GI Prophylaxis:   pantoprazole Tablet 40 milliGRAM(s) Oral before breakfast    4. Mouthcare - NS / NaHCO3 rinses, Biotene; Skin care     5. GVHD prophylaxis   PTCy days +3, +4   Tacrolimus gtt to start on day + 5  Abatacept days +5, +14, +28, +56    6. Transfuse & replete electrolytes prn     7. IV hydration, daily weights, strict I&O, prn diuresis     8. PO intake as tolerated, nutrition follow up as needed    9. Antiemetics, anti-diarrhea medications:   dexAMETHasone IVPB 10 milliGRAM(s) IV Intermittent every 24 hours  ondansetron IVPB 16 milliGRAM(s) IV Intermittent every 24 hours    10. OOB as tolerated, physical therapy consult if needed     11. Monitor coags / fibrinogen 2x week, vitamin K as needed   phytonadione Solution 5 milliGRAM(s) Oral <User Schedule>    12. Monitor closely for clinical changes, monitor for fevers     13. Emotional support provided, plan of care discussed and questions addressed     14. Patient education done regarding chemotherapy prep, plan of care, restrictions and discharge planning     15. Continue regular social work input     I have written the above note for Dr. Riley who performed service with me in the room.   Bhaskar Esquivel PA-C ([phone])    I have seen and examined patient with PA, I agree with above note as scribed.

## 2025-05-18 LAB
ALBUMIN SERPL ELPH-MCNC: 3.4 G/DL — SIGNIFICANT CHANGE UP (ref 3.3–5)
ALP SERPL-CCNC: 215 U/L — HIGH (ref 40–120)
ALT FLD-CCNC: 37 U/L — SIGNIFICANT CHANGE UP (ref 10–45)
ANION GAP SERPL CALC-SCNC: 10 MMOL/L — SIGNIFICANT CHANGE UP (ref 5–17)
AST SERPL-CCNC: 21 U/L — SIGNIFICANT CHANGE UP (ref 10–40)
BILIRUB SERPL-MCNC: 0.5 MG/DL — SIGNIFICANT CHANGE UP (ref 0.2–1.2)
BUN SERPL-MCNC: 15 MG/DL — SIGNIFICANT CHANGE UP (ref 7–23)
CALCIUM SERPL-MCNC: 8.5 MG/DL — SIGNIFICANT CHANGE UP (ref 8.4–10.5)
CHLORIDE SERPL-SCNC: 102 MMOL/L — SIGNIFICANT CHANGE UP (ref 96–108)
CO2 SERPL-SCNC: 26 MMOL/L — SIGNIFICANT CHANGE UP (ref 22–31)
CREAT SERPL-MCNC: 0.63 MG/DL — SIGNIFICANT CHANGE UP (ref 0.5–1.3)
EGFR: 104 ML/MIN/1.73M2 — SIGNIFICANT CHANGE UP
EGFR: 104 ML/MIN/1.73M2 — SIGNIFICANT CHANGE UP
GLUCOSE SERPL-MCNC: 95 MG/DL — SIGNIFICANT CHANGE UP (ref 70–99)
HCT VFR BLD CALC: 33.1 % — LOW (ref 39–50)
HGB BLD-MCNC: 10.4 G/DL — LOW (ref 13–17)
LDH SERPL L TO P-CCNC: 215 U/L — SIGNIFICANT CHANGE UP (ref 50–242)
MAGNESIUM SERPL-MCNC: 2.3 MG/DL — SIGNIFICANT CHANGE UP (ref 1.6–2.6)
MCHC RBC-ENTMCNC: 27.9 PG — SIGNIFICANT CHANGE UP (ref 27–34)
MCHC RBC-ENTMCNC: 31.4 G/DL — LOW (ref 32–36)
MCV RBC AUTO: 88.7 FL — SIGNIFICANT CHANGE UP (ref 80–100)
NRBC BLD AUTO-RTO: 0 /100 WBCS — SIGNIFICANT CHANGE UP (ref 0–0)
PHOSPHATE SERPL-MCNC: 4 MG/DL — SIGNIFICANT CHANGE UP (ref 2.5–4.5)
PLATELET # BLD AUTO: 188 K/UL — SIGNIFICANT CHANGE UP (ref 150–400)
POTASSIUM SERPL-MCNC: 3.9 MMOL/L — SIGNIFICANT CHANGE UP (ref 3.5–5.3)
POTASSIUM SERPL-SCNC: 3.9 MMOL/L — SIGNIFICANT CHANGE UP (ref 3.5–5.3)
PROT SERPL-MCNC: 5 G/DL — LOW (ref 6–8.3)
RBC # BLD: 3.73 M/UL — LOW (ref 4.2–5.8)
RBC # FLD: 16.6 % — HIGH (ref 10.3–14.5)
SODIUM SERPL-SCNC: 138 MMOL/L — SIGNIFICANT CHANGE UP (ref 135–145)
WBC # BLD: 0.43 K/UL — CRITICAL LOW (ref 3.8–10.5)
WBC # FLD AUTO: 0.43 K/UL — CRITICAL LOW (ref 3.8–10.5)

## 2025-05-18 PROCEDURE — 99232 SBSQ HOSP IP/OBS MODERATE 35: CPT

## 2025-05-18 RX ADMIN — URSODIOL 300 MILLIGRAM(S): 300 CAPSULE ORAL at 05:02

## 2025-05-18 RX ADMIN — Medication 40 MILLIGRAM(S): at 05:01

## 2025-05-18 RX ADMIN — CYCLOPHOSPHAMIDE INJECTION, SOLUTION 685 MILLIGRAM(S): 200 INJECTION INTRAVENOUS at 12:25

## 2025-05-18 RX ADMIN — Medication 10 MILLILITER(S): at 08:54

## 2025-05-18 RX ADMIN — URSODIOL 300 MILLIGRAM(S): 300 CAPSULE ORAL at 17:43

## 2025-05-18 RX ADMIN — Medication 1 APPLICATION(S): at 08:55

## 2025-05-18 RX ADMIN — Medication 800 MILLIGRAM(S): at 17:43

## 2025-05-18 RX ADMIN — Medication 10 MILLILITER(S): at 20:32

## 2025-05-18 RX ADMIN — TIZANIDINE 4 MILLIGRAM(S): 4 TABLET ORAL at 20:33

## 2025-05-18 RX ADMIN — Medication 125 MILLIGRAM(S): at 05:02

## 2025-05-18 RX ADMIN — Medication 5 MILLILITER(S): at 17:42

## 2025-05-18 RX ADMIN — FLUDARABINE PHOSPHATE 40 MILLIGRAM(S): 25 INJECTION, SOLUTION INTRAVENOUS at 10:14

## 2025-05-18 RX ADMIN — OXYCODONE HYDROCHLORIDE 5 MILLIGRAM(S): 30 TABLET ORAL at 20:33

## 2025-05-18 RX ADMIN — Medication 125 MILLIGRAM(S): at 17:45

## 2025-05-18 RX ADMIN — Medication 116 MILLIGRAM(S): at 13:42

## 2025-05-18 RX ADMIN — Medication 10 MILLILITER(S): at 12:26

## 2025-05-18 RX ADMIN — Medication 5 MILLILITER(S): at 12:26

## 2025-05-18 RX ADMIN — OXYCODONE HYDROCHLORIDE 5 MILLIGRAM(S): 30 TABLET ORAL at 21:00

## 2025-05-18 RX ADMIN — Medication 5 MILLILITER(S): at 08:55

## 2025-05-18 RX ADMIN — DULOXETINE 60 MILLIGRAM(S): 20 CAPSULE, DELAYED RELEASE ORAL at 20:33

## 2025-05-18 RX ADMIN — Medication 5 MILLILITER(S): at 20:32

## 2025-05-18 RX ADMIN — Medication 800 MILLIGRAM(S): at 05:01

## 2025-05-18 RX ADMIN — Medication 10 MILLILITER(S): at 17:42

## 2025-05-18 NOTE — PROGRESS NOTE ADULT - SUBJECTIVE AND OBJECTIVE BOX
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     O: Vitals:   Vital Signs Last 24 Hrs  T(C): 36.9 (18 May 2025 05:15), Max: 37.2 (17 May 2025 19:51)  T(F): 98.5 (18 May 2025 05:15), Max: 98.9 (17 May 2025 19:51)  HR: 61 (18 May 2025 05:15) (61 - 71)  BP: 124/69 (18 May 2025 05:15) (113/68 - 135/75)  BP(mean): --  RR: 19 (18 May 2025 05:15) (18 - 20)  SpO2: 97% (18 May 2025 05:15) (97% - 97%)    Parameters below as of 18 May 2025 05:15  Patient On (Oxygen Delivery Method): room air    Admit weight: 47.1 kg  Daily Weight in kG:  (18 May 2025)    Intake / Output:   05-17 @ 07:01  -  05-18 @ 07:00  --------------------------------------------------------  IN: 2761 mL / OUT: 3045 mL / NET: -284 mL      PE:   Oropharynx: no erythema or ulcerations   Oral Mucositis:                -                                     Grade: n/a  CVS: S1, S2 RRR   Lungs: CTA throughout bilaterally   Abdomen: + BS x 4, soft, NT, ND   Extremities: no edema   Gastric Mucositis:       -                                          Grade: n/a   Intestinal Mucositis:     -                                         Grade: n/a   Skin: no rash   TLC: CDI  Neuro: A&OX3      Labs:   CBC Full  -  ( 18 May 2025 06:24 )  WBC Count : 0.43 K/uL  Hemoglobin : 10.4 g/dL  Hematocrit : 33.1 %  Platelet Count - Automated : 188 K/uL  Mean Cell Volume : 88.7 fl  Mean Cell Hemoglobin : 27.9 pg  Mean Cell Hemoglobin Concentration : 31.4 g/dL  Auto Neutrophil # : x  Auto Lymphocyte # : x  Auto Monocyte # : x  Auto Eosinophil # : x  Auto Basophil # : x  Auto Neutrophil % : x  Auto Lymphocyte % : x  Auto Monocyte % : x  Auto Eosinophil % : x  Auto Basophil % : x                          10.4   0.43  )-----------( 188      ( 18 May 2025 06:24 )             33.1     05-18    138  |  102  |  15  ----------------------------<  95  3.9   |  26  |  0.63    Ca    8.5      18 May 2025 06:24  Phos  4.0     05-18  Mg     2.3     05-18    TPro  5.0[L]  /  Alb  3.4  /  TBili  0.5  /  DBili  x   /  AST  21  /  ALT  37  /  AlkPhos  215[H]  05-18      LIVER FUNCTIONS - ( 18 May 2025 06:24 )  Alb: 3.4 g/dL / Pro: 5.0 g/dL / ALK PHOS: 215 U/L / ALT: 37 U/L / AST: 21 U/L / GGT: x           Lactate Dehydrogenase, Serum: 215 U/L (05-18 @ 06:24)        Cultures:       Radiology:       Meds:   Antimicrobials:   acyclovir   Oral Tab/Cap 800 milliGRAM(s) Oral every 12 hours  levoFLOXacin  Tablet 500 milliGRAM(s) Oral every 24 hours  vancomycin    Solution 125 milliGRAM(s) Oral every 12 hours      Heme / Onc:   cyclophosphamide IVPB (eMAR) 685 milliGRAM(s) IV Intermittent daily  fludarabine IVPB (eMAR) 40 milliGRAM(s) IV Intermittent every 24 hours      GI:  pantoprazole    Tablet 40 milliGRAM(s) Oral before breakfast  sodium bicarbonate Mouth Rinse 10 milliLiter(s) Swish and Spit five times a day  ursodiol Capsule 300 milliGRAM(s) Oral two times a day      Cardiovascular:       Immunologic:       Other medications:   Biotene Dry Mouth Oral Rinse 5 milliLiter(s) Swish and Spit five times a day  chlorhexidine 4% Liquid 1 Application(s) Topical <User Schedule>  DULoxetine 60 milliGRAM(s) Oral <User Schedule>  ondansetron  IVPB 16 milliGRAM(s) IV Intermittent every 24 hours  oxyCODONE    IR 5 milliGRAM(s) Oral <User Schedule>  phytonadione   Solution 5 milliGRAM(s) Oral <User Schedule>  sodium chloride 0.9%. 500 milliLiter(s) IV Continuous <Continuous>  sodium chloride 0.9%. 500 milliLiter(s) IV Continuous <Continuous>  sodium chloride 0.9%. 500 milliLiter(s) IV Continuous <Continuous>  sodium chloride 0.9%. 500 milliLiter(s) IV Continuous <Continuous>  tiZANidine 4 milliGRAM(s) Oral <User Schedule>      PRN:   prochlorperazine   Injectable 10 milliGRAM(s) IV Push every 6 hours PRN  sodium chloride 0.9% lock flush 10 milliLiter(s) IV Push every 1 hour PRN      A/P:  69 year old male with refractory DLBCL, status post R-CHOP x 6, HD MTX x 4, salvage polatuzumab / rituximab / revlimid x 4, admitted for a haplo-identical pbsct from his daughter with Flu / Bu / Cy / TBI prep regimen   Day -2  5/15- busulfan 2/2, fludarabine 2/5. No acute events overnight, vital signs are stable; continue keppra ppx for 24 hours post infusion of last dose of busulfan. No tylenol or posaconazole until day 0. Not neutropenic today, will d/c levaquin / vancomycin.   5/16 - fludarabine 3/5. VSS, afebrile. No acute events overnight.  5/17 - fludarabine 4/5. VSS and afebrile. No acute events overnight. Neutropenic today, started on ppx levaquin/vancomycin PO. No Tylenol or Azoles until day 0.  5/18 - fludarabine 5/5. VSS, remains afebrile. No acute events overnight. No Tylenol or Azoles until day 0.    1. Infectious Disease:   acyclovir Oral Tab/Cap 800 milliGRAM(s) Oral every 12 hours  levoFLOXacin Tablet 500 milliGRAM(s) Oral every 24 hours  vancomycin Solution 125 milliGRAM(s) Oral every 12 hours    2. VOD Prophylaxis:   ursodiol Capsule 300 milliGRAM(s) Oral two times a day    3. GI Prophylaxis:   pantoprazole Tablet 40 milliGRAM(s) Oral before breakfast    4. Mouthcare - NS / NaHCO3 rinses, Biotene; Skin care     5. GVHD prophylaxis   PTCy days +3, +4   Tacrolimus gtt to start on day + 5  Abatacept days +5, +14, +28, +56    6. Transfuse & replete electrolytes prn     7. IV hydration, daily weights, strict I&O, prn diuresis     8. PO intake as tolerated, nutrition follow up as needed    9. Antiemetics, anti-diarrhea medications:   dexAMETHasone IVPB 10 milliGRAM(s) IV Intermittent every 24 hours  ondansetron IVPB 16 milliGRAM(s) IV Intermittent every 24 hours    10. OOB as tolerated, physical therapy consult if needed     11. Monitor coags / fibrinogen 2x week, vitamin K as needed   phytonadione Solution 5 milliGRAM(s) Oral <User Schedule>    12. Monitor closely for clinical changes, monitor for fevers     13. Emotional support provided, plan of care discussed and questions addressed     14. Patient education done regarding chemotherapy prep, plan of care, restrictions and discharge planning     15. Continue regular social work input     I have written the above note for Dr. Riley who performed service with me in the room.   Bhaskar Esquivel PA-C ([phone])    I have seen and examined patient with PA, I agree with above note as scribed.                        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 Patient sitting up in chair this morning. Admits to fatigue and decrease in appetite.    O: Vitals:   Vital Signs Last 24 Hrs  T(C): 36.9 (18 May 2025 05:15), Max: 37.2 (17 May 2025 19:51)  T(F): 98.5 (18 May 2025 05:15), Max: 98.9 (17 May 2025 19:51)  HR: 61 (18 May 2025 05:15) (61 - 71)  BP: 124/69 (18 May 2025 05:15) (113/68 - 135/75)  BP(mean): --  RR: 19 (18 May 2025 05:15) (18 - 20)  SpO2: 97% (18 May 2025 05:15) (97% - 97%)    Parameters below as of 18 May 2025 05:15  Patient On (Oxygen Delivery Method): room air    Admit weight: 47.1 kg  Daily Weight in k.1 (18 May 2025)    Intake / Output:   05-17 @ 07:01  -  -18 @ 07:00  --------------------------------------------------------  IN: 2761 mL / OUT: 3045 mL / NET: -284 mL      PE:   Oropharynx: no erythema or ulcerations   Oral Mucositis:                -                                     Grade: n/a  CVS: S1, S2 RRR   Lungs: CTA throughout bilaterally   Abdomen: + BS x 4, soft, NT, ND   Extremities: no edema   Gastric Mucositis:       -                                          Grade: n/a   Intestinal Mucositis:     -                                         Grade: n/a   Skin: no rash   TLC: CDI  Neuro: A&OX3      Labs:   CBC Full  -  ( 18 May 2025 06:24 )  WBC Count : 0.43 K/uL  Hemoglobin : 10.4 g/dL  Hematocrit : 33.1 %  Platelet Count - Automated : 188 K/uL  Mean Cell Volume : 88.7 fl  Mean Cell Hemoglobin : 27.9 pg  Mean Cell Hemoglobin Concentration : 31.4 g/dL  Auto Neutrophil # : x  Auto Lymphocyte # : x  Auto Monocyte # : x  Auto Eosinophil # : x  Auto Basophil # : x  Auto Neutrophil % : x  Auto Lymphocyte % : x  Auto Monocyte % : x  Auto Eosinophil % : x  Auto Basophil % : x                          10.4   0.43  )-----------( 188      ( 18 May 2025 06:24 )             33.1         138  |  102  |  15  ----------------------------<  95  3.9   |  26  |  0.63    Ca    8.5      18 May 2025 06:24  Phos  4.0       Mg     2.3         TPro  5.0[L]  /  Alb  3.4  /  TBili  0.5  /  DBili  x   /  AST  21  /  ALT  37  /  AlkPhos  215[H]        LIVER FUNCTIONS - ( 18 May 2025 06:24 )  Alb: 3.4 g/dL / Pro: 5.0 g/dL / ALK PHOS: 215 U/L / ALT: 37 U/L / AST: 21 U/L / GGT: x           Lactate Dehydrogenase, Serum: 215 U/L ( @ 06:24)        Cultures:       Radiology:       Meds:   Antimicrobials:   acyclovir   Oral Tab/Cap 800 milliGRAM(s) Oral every 12 hours  levoFLOXacin  Tablet 500 milliGRAM(s) Oral every 24 hours  vancomycin    Solution 125 milliGRAM(s) Oral every 12 hours      Heme / Onc:   cyclophosphamide IVPB (eMAR) 685 milliGRAM(s) IV Intermittent daily  fludarabine IVPB (eMAR) 40 milliGRAM(s) IV Intermittent every 24 hours      GI:  pantoprazole    Tablet 40 milliGRAM(s) Oral before breakfast  sodium bicarbonate Mouth Rinse 10 milliLiter(s) Swish and Spit five times a day  ursodiol Capsule 300 milliGRAM(s) Oral two times a day      Cardiovascular:       Immunologic:       Other medications:   Biotene Dry Mouth Oral Rinse 5 milliLiter(s) Swish and Spit five times a day  chlorhexidine 4% Liquid 1 Application(s) Topical <User Schedule>  DULoxetine 60 milliGRAM(s) Oral <User Schedule>  ondansetron  IVPB 16 milliGRAM(s) IV Intermittent every 24 hours  oxyCODONE    IR 5 milliGRAM(s) Oral <User Schedule>  phytonadione   Solution 5 milliGRAM(s) Oral <User Schedule>  sodium chloride 0.9%. 500 milliLiter(s) IV Continuous <Continuous>  sodium chloride 0.9%. 500 milliLiter(s) IV Continuous <Continuous>  sodium chloride 0.9%. 500 milliLiter(s) IV Continuous <Continuous>  sodium chloride 0.9%. 500 milliLiter(s) IV Continuous <Continuous>  tiZANidine 4 milliGRAM(s) Oral <User Schedule>      PRN:   prochlorperazine   Injectable 10 milliGRAM(s) IV Push every 6 hours PRN  sodium chloride 0.9% lock flush 10 milliLiter(s) IV Push every 1 hour PRN      A/P:  69 year old male with refractory DLBCL, status post R-CHOP x 6, HD MTX x 4, salvage polatuzumab / rituximab / revlimid x 4, admitted for a haplo-identical pbsct from his daughter with Flu / Bu / Cy / TBI prep regimen   Day -2  5/15- busulfan , fludarabine . No acute events overnight, vital signs are stable; continue keppra ppx for 24 hours post infusion of last dose of busulfan. No tylenol or posaconazole until day 0. Not neutropenic today, will d/c levaquin / vancomycin.    - fludarabine 3/5. VSS, afebrile. No acute events overnight.   - fludarabine . VSS and afebrile. No acute events overnight. Neutropenic today, started on ppx levaquin/vancomycin PO. No Tylenol or Azoles until day 0.   - fludarabine /. VSS, remains afebrile. No acute events overnight. No Tylenol or Azoles until day 0.    1. Infectious Disease:   acyclovir Oral Tab/Cap 800 milliGRAM(s) Oral every 12 hours  levoFLOXacin Tablet 500 milliGRAM(s) Oral every 24 hours  vancomycin Solution 125 milliGRAM(s) Oral every 12 hours    2. VOD Prophylaxis:   ursodiol Capsule 300 milliGRAM(s) Oral two times a day    3. GI Prophylaxis:   pantoprazole Tablet 40 milliGRAM(s) Oral before breakfast    4. Mouthcare - NS / NaHCO3 rinses, Biotene; Skin care     5. GVHD prophylaxis   PTCy days +3, +4   Tacrolimus gtt to start on day + 5  Abatacept days +5, +14, +28, +56    6. Transfuse & replete electrolytes prn     7. IV hydration, daily weights, strict I&O, prn diuresis     8. PO intake as tolerated, nutrition follow up as needed    9. Antiemetics, anti-diarrhea medications:   dexAMETHasone IVPB 10 milliGRAM(s) IV Intermittent every 24 hours  ondansetron IVPB 16 milliGRAM(s) IV Intermittent every 24 hours    10. OOB as tolerated, physical therapy consult if needed     11. Monitor coags / fibrinogen 2x week, vitamin K as needed   phytonadione Solution 5 milliGRAM(s) Oral <User Schedule>    12. Monitor closely for clinical changes, monitor for fevers     13. Emotional support provided, plan of care discussed and questions addressed     14. Patient education done regarding chemotherapy prep, plan of care, restrictions and discharge planning     15. Continue regular social work input     I have written the above note for Dr. Riley who performed service with me in the room.   Bhaskar Esquivel PA-C ([phone])    I have seen and examined patient with PA, I agree with above note as scribed.

## 2025-05-18 NOTE — PROGRESS NOTE ADULT - NSPROGADDITIONALINFOA_GEN_ALL_CORE
I rounded with the team including nurses, ACPs and PharmD.  I reviewed the data in depth.   I saw and examined the patient myself.   I reviewed and confirmed the above note.  The patient is day -4 of allogeneic HSCT.  He is tolerating his conditioning well.   The patient is doing well with no complaints.  The vitals are stable. The oral cavity is clear. The line site is clean. The lungs are clear. There is no LE edema.  Overall, there are no specific issues. The patient is on appropriate therapy at this stage.   I answered the patient’s questions.  I encouraged po intake and ambulation.  SE Riley MD I rounded with the team including nurses, ACPs and PharmD.  I reviewed the data in depth.   I saw and examined the patient myself.   I reviewed and confirmed the above note.  The patient is day -3 of allogeneic HSCT.  He is tolerating his conditioning well.   The patient is doing well with no complaints.  The vitals are stable. The oral cavity is clear. The line site is clean. The lungs are clear. There is no LE edema.  Overall, there are no specific issues. The patient is on appropriate therapy at this stage.   I answered the patient’s questions.  I encouraged po intake and ambulation.  SE Riley MD

## 2025-05-19 LAB
ALBUMIN SERPL ELPH-MCNC: 3.6 G/DL — SIGNIFICANT CHANGE UP (ref 3.3–5)
ALP SERPL-CCNC: 198 U/L — HIGH (ref 40–120)
ALT FLD-CCNC: 37 U/L — SIGNIFICANT CHANGE UP (ref 10–45)
ANION GAP SERPL CALC-SCNC: 13 MMOL/L — SIGNIFICANT CHANGE UP (ref 5–17)
APTT BLD: 34.6 SEC — SIGNIFICANT CHANGE UP (ref 26.1–36.8)
AST SERPL-CCNC: 41 U/L — HIGH (ref 10–40)
BILIRUB SERPL-MCNC: 0.5 MG/DL — SIGNIFICANT CHANGE UP (ref 0.2–1.2)
BUN SERPL-MCNC: 14 MG/DL — SIGNIFICANT CHANGE UP (ref 7–23)
CALCIUM SERPL-MCNC: 8.5 MG/DL — SIGNIFICANT CHANGE UP (ref 8.4–10.5)
CHLORIDE SERPL-SCNC: 103 MMOL/L — SIGNIFICANT CHANGE UP (ref 96–108)
CO2 SERPL-SCNC: 23 MMOL/L — SIGNIFICANT CHANGE UP (ref 22–31)
CREAT SERPL-MCNC: 0.61 MG/DL — SIGNIFICANT CHANGE UP (ref 0.5–1.3)
EGFR: 105 ML/MIN/1.73M2 — SIGNIFICANT CHANGE UP
EGFR: 105 ML/MIN/1.73M2 — SIGNIFICANT CHANGE UP
GLUCOSE SERPL-MCNC: 109 MG/DL — HIGH (ref 70–99)
HCT VFR BLD CALC: 32.7 % — LOW (ref 39–50)
HGB BLD-MCNC: 10.6 G/DL — LOW (ref 13–17)
INR BLD: 0.97 RATIO — SIGNIFICANT CHANGE UP (ref 0.85–1.16)
LDH SERPL L TO P-CCNC: 446 U/L — HIGH (ref 50–242)
MAGNESIUM SERPL-MCNC: 2.4 MG/DL — SIGNIFICANT CHANGE UP (ref 1.6–2.6)
MCHC RBC-ENTMCNC: 28 PG — SIGNIFICANT CHANGE UP (ref 27–34)
MCHC RBC-ENTMCNC: 32.4 G/DL — SIGNIFICANT CHANGE UP (ref 32–36)
MCV RBC AUTO: 86.5 FL — SIGNIFICANT CHANGE UP (ref 80–100)
NRBC BLD AUTO-RTO: 0 /100 WBCS — SIGNIFICANT CHANGE UP (ref 0–0)
PHOSPHATE SERPL-MCNC: 3.7 MG/DL — SIGNIFICANT CHANGE UP (ref 2.5–4.5)
PLATELET # BLD AUTO: 190 K/UL — SIGNIFICANT CHANGE UP (ref 150–400)
POTASSIUM SERPL-MCNC: 4.7 MMOL/L — SIGNIFICANT CHANGE UP (ref 3.5–5.3)
POTASSIUM SERPL-SCNC: 4.7 MMOL/L — SIGNIFICANT CHANGE UP (ref 3.5–5.3)
PROT SERPL-MCNC: 5.1 G/DL — LOW (ref 6–8.3)
PROTHROM AB SERPL-ACNC: 11.1 SEC — SIGNIFICANT CHANGE UP (ref 9.9–13.4)
RBC # BLD: 3.78 M/UL — LOW (ref 4.2–5.8)
RBC # FLD: 16.6 % — HIGH (ref 10.3–14.5)
SODIUM SERPL-SCNC: 139 MMOL/L — SIGNIFICANT CHANGE UP (ref 135–145)
WBC # BLD: 0.24 K/UL — CRITICAL LOW (ref 3.8–10.5)
WBC # FLD AUTO: 0.24 K/UL — CRITICAL LOW (ref 3.8–10.5)

## 2025-05-19 PROCEDURE — 99233 SBSQ HOSP IP/OBS HIGH 50: CPT | Mod: FS

## 2025-05-19 RX ORDER — ACETAMINOPHEN 500 MG/5ML
650 LIQUID (ML) ORAL EVERY 6 HOURS
Refills: 0 | Status: DISCONTINUED | OUTPATIENT
Start: 2025-05-20 | End: 2025-05-23

## 2025-05-19 RX ORDER — POSACONAZOLE 100 MG/1
300 TABLET, DELAYED RELEASE ORAL DAILY
Refills: 0 | Status: DISCONTINUED | OUTPATIENT
Start: 2025-05-20 | End: 2025-06-05

## 2025-05-19 RX ADMIN — Medication 800 MILLIGRAM(S): at 17:24

## 2025-05-19 RX ADMIN — Medication 116 MILLIGRAM(S): at 07:09

## 2025-05-19 RX ADMIN — Medication 5 MILLILITER(S): at 07:09

## 2025-05-19 RX ADMIN — Medication 800 MILLIGRAM(S): at 05:31

## 2025-05-19 RX ADMIN — TIZANIDINE 4 MILLIGRAM(S): 4 TABLET ORAL at 21:17

## 2025-05-19 RX ADMIN — Medication 5 MILLILITER(S): at 12:25

## 2025-05-19 RX ADMIN — Medication 1 APPLICATION(S): at 12:24

## 2025-05-19 RX ADMIN — Medication 5 MILLILITER(S): at 16:25

## 2025-05-19 RX ADMIN — Medication 5 MILLILITER(S): at 20:07

## 2025-05-19 RX ADMIN — Medication 125 MILLIGRAM(S): at 05:30

## 2025-05-19 RX ADMIN — OXYCODONE HYDROCHLORIDE 5 MILLIGRAM(S): 30 TABLET ORAL at 21:17

## 2025-05-19 RX ADMIN — DULOXETINE 60 MILLIGRAM(S): 20 CAPSULE, DELAYED RELEASE ORAL at 21:17

## 2025-05-19 RX ADMIN — Medication 10 MILLILITER(S): at 20:08

## 2025-05-19 RX ADMIN — URSODIOL 300 MILLIGRAM(S): 300 CAPSULE ORAL at 17:24

## 2025-05-19 RX ADMIN — Medication 10 MILLILITER(S): at 12:24

## 2025-05-19 RX ADMIN — URSODIOL 300 MILLIGRAM(S): 300 CAPSULE ORAL at 05:30

## 2025-05-19 RX ADMIN — Medication 40 MILLIGRAM(S): at 05:31

## 2025-05-19 RX ADMIN — Medication 10 MILLILITER(S): at 16:25

## 2025-05-19 RX ADMIN — Medication 125 MILLIGRAM(S): at 17:24

## 2025-05-19 RX ADMIN — Medication 10 MILLILITER(S): at 07:09

## 2025-05-19 RX ADMIN — Medication 5 MILLIGRAM(S): at 12:25

## 2025-05-19 NOTE — PROGRESS NOTE ADULT - NS ATTEND AMEND GEN_ALL_CORE FT
I rounded with the team including nurses, ACPs and PharmD.  I reviewed the data in depth.     The patient is day - 1 of allogeneic HSCT.  He is tolerating his conditioning well.   The patient is doing well with no complaints.  The vitals are stable. The oral cavity is clear. The line site is clean. The lungs are clear. There is no LE edema.  Overall, there are no specific issues. The patient is on appropriate therapy at this stage.   All questions answered.   .

## 2025-05-19 NOTE — PROGRESS NOTE ADULT - NS ATTEST RISK PROBLEM GEN_ALL_CORE FT
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

## 2025-05-19 NOTE — PROGRESS NOTE ADULT - TIME BILLING
I rounded with the team including nurses, ACPs and PharmD.  I reviewed the data in depth.   I saw and examined the patient myself.   I reviewed and confirmed the above note.  The patient is day - 1 of allogeneic HSCT.  He is tolerating his conditioning well.   The patient is doing well with no complaints.  The vitals are stable. The oral cavity is clear. The line site is clean. The lungs are clear. There is no LE edema.  Overall, there are no specific issues. The patient is on appropriate therapy at this stage.   I answered the patient’s questions.  I encouraged po intake and ambulation. medical management as above, patient/family encounter, in depth review of vitals, labs, history, imaging, medication and all relevant parts of the chart

## 2025-05-19 NOTE — PROGRESS NOTE ADULT - SUBJECTIVE AND OBJECTIVE BOX
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 with Flu / Bu / Cy / TBI prep regimen for treatment of refractory DLBCL    Disease: DLBCL  Type of transplant: Haplo-identical (daughter)   Prep Regimen: Flu / Bu / Cy / TBI   ABO / CMV:   Recipient: A POS/ NEG   Donor: A POS / NEG     S: Patient seen and examined with HPC Transplant Team:       O: Vitals:   Vital Signs Last 24 Hrs  T(C): 37.1 (19 May 2025 05:32), Max: 37.1 (18 May 2025 20:02)  T(F): 98.8 (19 May 2025 05:32), Max: 98.8 (18 May 2025 20:02)  HR: 69 (19 May 2025 05:32) (68 - 72)  BP: 121/71 (19 May 2025 05:32) (114/72 - 126/81)  BP(mean): --  RR: 17 (19 May 2025 05:32) (17 - 19)  SpO2: 97% (19 May 2025 05:32) (97% - 99%)    Parameters below as of 19 May 2025 05:32  Patient On (Oxygen Delivery Method): room air        Admit weight: 47.1kg       Intake / Output:   05-18 @ 07:01  -  05-19 @ 07:00  --------------------------------------------------------  IN: 2847 mL / OUT: 3145 mL / NET: -298 mL      PE:   Oropharynx: no erythema or ulcerations   Oral Mucositis:                -                                     Grade: n/a  CVS: S1, S2 RRR   Lungs: CTA throughout bilaterally   Abdomen: + BS x 4, soft, NT, ND   Extremities: no edema   Gastric Mucositis:       -                                          Grade: n/a   Intestinal Mucositis:     -                                         Grade: n/a   Skin: no rash   TLC: CDI  Neuro: A&OX3    Labs:   CBC Full  -  ( 19 May 2025 06:29 )  WBC Count : 0.24 K/uL  Hemoglobin : 10.6 g/dL  Hematocrit : 32.7 %  Platelet Count - Automated : 190 K/uL  Mean Cell Volume : 86.5 fl  Mean Cell Hemoglobin : 28.0 pg  Mean Cell Hemoglobin Concentration : 32.4 g/dL  Auto Neutrophil # : x  Auto Lymphocyte # : x  Auto Monocyte # : x  Auto Eosinophil # : x  Auto Basophil # : x  Auto Neutrophil % : x  Auto Lymphocyte % : x  Auto Monocyte % : x  Auto Eosinophil % : x  Auto Basophil % : x                          10.6   0.24  )-----------( 190      ( 19 May 2025 06:29 )             32.7     05-19    139  |  103  |  14  ----------------------------<  109[H]  4.7   |  23  |  0.61    Ca    8.5      19 May 2025 06:29  Phos  3.7     05-19  Mg     2.4     05-19    TPro  5.1[L]  /  Alb  3.6  /  TBili  0.5  /  DBili  x   /  AST  41[H]  /  ALT  37  /  AlkPhos  198[H]  05-19    PT/INR - ( 19 May 2025 06:31 )   PT: 11.1 sec;   INR: 0.97 ratio         PTT - ( 19 May 2025 06:31 )  PTT:34.6 sec  LIVER FUNCTIONS - ( 19 May 2025 06:29 )  Alb: 3.6 g/dL / Pro: 5.1 g/dL / ALK PHOS: 198 U/L / ALT: 37 U/L / AST: 41 U/L / GGT: x           Lactate Dehydrogenase, Serum: 446 U/L (05-19 @ 06:29)        Meds:   Antimicrobials:   acyclovir   Oral Tab/Cap 800 milliGRAM(s) Oral every 12 hours  levoFLOXacin  Tablet 500 milliGRAM(s) Oral every 24 hours  vancomycin    Solution 125 milliGRAM(s) Oral every 12 hours      Heme / Onc:       GI:  pantoprazole    Tablet 40 milliGRAM(s) Oral before breakfast  sodium bicarbonate Mouth Rinse 10 milliLiter(s) Swish and Spit five times a day  ursodiol Capsule 300 milliGRAM(s) Oral two times a day      Cardiovascular:       Immunologic:       Other medications:   Biotene Dry Mouth Oral Rinse 5 milliLiter(s) Swish and Spit five times a day  chlorhexidine 4% Liquid 1 Application(s) Topical <User Schedule>  DULoxetine 60 milliGRAM(s) Oral <User Schedule>  oxyCODONE    IR 5 milliGRAM(s) Oral <User Schedule>  phytonadione   Solution 5 milliGRAM(s) Oral <User Schedule>  sodium chloride 0.9%. 500 milliLiter(s) IV Continuous <Continuous>  sodium chloride 0.9%. 500 milliLiter(s) IV Continuous <Continuous>  sodium chloride 0.9%. 500 milliLiter(s) IV Continuous <Continuous>  sodium chloride 0.9%. 500 milliLiter(s) IV Continuous <Continuous>  tiZANidine 4 milliGRAM(s) Oral <User Schedule>      PRN:   prochlorperazine   Injectable 10 milliGRAM(s) IV Push every 6 hours PRN  sodium chloride 0.9% lock flush 10 milliLiter(s) IV Push every 1 hour PRN    A/P:  69 year old male with refractory DLBCL, status post R-CHOP x 6, HD MTX x 4, salvage polatuzumab / rituximab / revlimid x 4, admitted for a haplo-identical pbsct from his daughter with Flu / Bu / Cy / TBI prep regimen   Day - 1  5/15- busulfan 2/2, fludarabine 2/5. No acute events overnight, vital signs are stable; continue keppra ppx for 24 hours post infusion of last dose of busulfan. No tylenol or posaconazole until day 0. Not neutropenic today, will d/c levaquin / vancomycin.   5/16 - fludarabine 3/5. VSS, afebrile. No acute events overnight.  5/17 - fludarabine 4/5. VSS and afebrile. No acute events overnight. Neutropenic today, started on ppx levaquin/vancomycin PO. No Tylenol or Azoles until day 0.  5/18 - fludarabine 5/5. VSS, remains afebrile. No acute events overnight. No Tylenol or Azoles until day 0.  5/19- TBI today. No acute events overnight. Vital signs are stable.   1. Infectious Disease:   acyclovir Oral Tab/Cap 800 milliGRAM(s) Oral every 12 hours  levoFLOXacin Tablet 500 milliGRAM(s) Oral every 24 hours  vancomycin Solution 125 milliGRAM(s) Oral every 12 hours    2. VOD Prophylaxis:   ursodiol Capsule 300 milliGRAM(s) Oral two times a day    3. GI Prophylaxis:   pantoprazole Tablet 40 milliGRAM(s) Oral before breakfast    4. Mouthcare - NS / NaHCO3 rinses, Biotene; Skin care     5. GVHD prophylaxis   PTCy days +3, +4   Tacrolimus gtt to start on day + 5  Abatacept days +5, +14, +28, +56    6. Transfuse & replete electrolytes prn     7. IV hydration, daily weights, strict I&O, prn diuresis     8. PO intake as tolerated, nutrition follow up as needed    9. Antiemetics, anti-diarrhea medications:   prochlorperazine   Injectable 10 milliGRAM(s) IV Push every 6 hours PRN    10. OOB as tolerated, physical therapy consult if needed     11. Monitor coags / fibrinogen 2x week, vitamin K as needed   phytonadione Solution 5 milliGRAM(s) Oral <User Schedule>    12. Monitor closely for clinical changes, monitor for fevers     13. Emotional support provided, plan of care discussed and questions addressed     14. Patient education done regarding plan of care, restrictions and discharge planning     15. Continue regular social work input     I have written the above note for Dr. Singh who performed service with me in the room.   Helena Diaz NP-C ([phone])    I have seen and examined patient with NP, I agree with above note as scribed.                    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 with Flu / Bu / Cy / TBI prep regimen for treatment of refractory DLBCL    Disease: DLBCL  Type of transplant: Haplo-identical (daughter)   Prep Regimen: Flu / Bu / Cy / TBI   ABO / CMV:   Recipient: A POS/ NEG   Donor: A POS / NEG     S: Patient seen and examined with HPC Transplant Team:   Has no complaints today       O: Vitals:   Vital Signs Last 24 Hrs  T(C): 37.1 (19 May 2025 05:32), Max: 37.1 (18 May 2025 20:02)  T(F): 98.8 (19 May 2025 05:32), Max: 98.8 (18 May 2025 20:02)  HR: 69 (19 May 2025 05:32) (68 - 72)  BP: 121/71 (19 May 2025 05:32) (114/72 - 126/81)  BP(mean): --  RR: 17 (19 May 2025 05:32) (17 - 19)  SpO2: 97% (19 May 2025 05:32) (97% - 99%)    Parameters below as of 19 May 2025 05:32  Patient On (Oxygen Delivery Method): room air        Admit weight: 47.1kg       Intake / Output:   05-18 @ 07:01  -  05-19 @ 07:00  --------------------------------------------------------  IN: 2847 mL / OUT: 3145 mL / NET: -298 mL      PE:   Oropharynx: no erythema or ulcerations   Oral Mucositis:                -                                     Grade: n/a  CVS: S1, S2 RRR   Lungs: CTA throughout bilaterally   Abdomen: + BS x 4, soft, NT, ND   Extremities: no edema   Gastric Mucositis:       -                                          Grade: n/a   Intestinal Mucositis:     -                                         Grade: n/a   Skin: no rash   TLC: CDI  Neuro: A&OX3    Labs:   CBC Full  -  ( 19 May 2025 06:29 )  WBC Count : 0.24 K/uL  Hemoglobin : 10.6 g/dL  Hematocrit : 32.7 %  Platelet Count - Automated : 190 K/uL  Mean Cell Volume : 86.5 fl  Mean Cell Hemoglobin : 28.0 pg  Mean Cell Hemoglobin Concentration : 32.4 g/dL  Auto Neutrophil # : x  Auto Lymphocyte # : x  Auto Monocyte # : x  Auto Eosinophil # : x  Auto Basophil # : x  Auto Neutrophil % : x  Auto Lymphocyte % : x  Auto Monocyte % : x  Auto Eosinophil % : x  Auto Basophil % : x                          10.6   0.24  )-----------( 190      ( 19 May 2025 06:29 )             32.7     05-19    139  |  103  |  14  ----------------------------<  109[H]  4.7   |  23  |  0.61    Ca    8.5      19 May 2025 06:29  Phos  3.7     05-19  Mg     2.4     05-19    TPro  5.1[L]  /  Alb  3.6  /  TBili  0.5  /  DBili  x   /  AST  41[H]  /  ALT  37  /  AlkPhos  198[H]  05-19    PT/INR - ( 19 May 2025 06:31 )   PT: 11.1 sec;   INR: 0.97 ratio         PTT - ( 19 May 2025 06:31 )  PTT:34.6 sec  LIVER FUNCTIONS - ( 19 May 2025 06:29 )  Alb: 3.6 g/dL / Pro: 5.1 g/dL / ALK PHOS: 198 U/L / ALT: 37 U/L / AST: 41 U/L / GGT: x           Lactate Dehydrogenase, Serum: 446 U/L (05-19 @ 06:29)        Meds:   Antimicrobials:   acyclovir   Oral Tab/Cap 800 milliGRAM(s) Oral every 12 hours  levoFLOXacin  Tablet 500 milliGRAM(s) Oral every 24 hours  vancomycin    Solution 125 milliGRAM(s) Oral every 12 hours      Heme / Onc:       GI:  pantoprazole    Tablet 40 milliGRAM(s) Oral before breakfast  sodium bicarbonate Mouth Rinse 10 milliLiter(s) Swish and Spit five times a day  ursodiol Capsule 300 milliGRAM(s) Oral two times a day      Cardiovascular:       Immunologic:       Other medications:   Biotene Dry Mouth Oral Rinse 5 milliLiter(s) Swish and Spit five times a day  chlorhexidine 4% Liquid 1 Application(s) Topical <User Schedule>  DULoxetine 60 milliGRAM(s) Oral <User Schedule>  oxyCODONE    IR 5 milliGRAM(s) Oral <User Schedule>  phytonadione   Solution 5 milliGRAM(s) Oral <User Schedule>  sodium chloride 0.9%. 500 milliLiter(s) IV Continuous <Continuous>  sodium chloride 0.9%. 500 milliLiter(s) IV Continuous <Continuous>  sodium chloride 0.9%. 500 milliLiter(s) IV Continuous <Continuous>  sodium chloride 0.9%. 500 milliLiter(s) IV Continuous <Continuous>  tiZANidine 4 milliGRAM(s) Oral <User Schedule>      PRN:   prochlorperazine   Injectable 10 milliGRAM(s) IV Push every 6 hours PRN  sodium chloride 0.9% lock flush 10 milliLiter(s) IV Push every 1 hour PRN    A/P:  69 year old male with refractory DLBCL, status post R-CHOP x 6, HD MTX x 4, salvage polatuzumab / rituximab / revlimid x 4, admitted for a haplo-identical pbsct from his daughter with Flu / Bu / Cy / TBI prep regimen   Day - 1  5/15- busulfan 2/2, fludarabine 2/5. No acute events overnight, vital signs are stable; continue keppra ppx for 24 hours post infusion of last dose of busulfan. No tylenol or posaconazole until day 0. Not neutropenic today, will d/c levaquin / vancomycin.   5/16 - fludarabine 3/5. VSS, afebrile. No acute events overnight.  5/17 - fludarabine 4/5. VSS and afebrile. No acute events overnight. Neutropenic today, started on ppx levaquin/vancomycin PO. No Tylenol or Azoles until day 0.  5/18 - fludarabine 5/5. VSS, remains afebrile. No acute events overnight. No Tylenol or Azoles until day 0.  5/19- TBI today. No acute events overnight. Vital signs are stable.     1. Infectious Disease:   acyclovir Oral Tab/Cap 800 milliGRAM(s) Oral every 12 hours  levoFLOXacin Tablet 500 milliGRAM(s) Oral every 24 hours  vancomycin Solution 125 milliGRAM(s) Oral every 12 hours    2. VOD Prophylaxis:   ursodiol Capsule 300 milliGRAM(s) Oral two times a day    3. GI Prophylaxis:   pantoprazole Tablet 40 milliGRAM(s) Oral before breakfast    4. Mouthcare - NS / NaHCO3 rinses, Biotene; Skin care     5. GVHD prophylaxis   PTCy days +3, +4   Tacrolimus gtt to start on day + 5  Abatacept days +5, +14, +28, +56    6. Transfuse & replete electrolytes prn     7. IV hydration, daily weights, strict I&O, prn diuresis     8. PO intake as tolerated, nutrition follow up as needed    9. Antiemetics, anti-diarrhea medications:   prochlorperazine   Injectable 10 milliGRAM(s) IV Push every 6 hours PRN    10. OOB as tolerated, physical therapy consult if needed     11. Monitor coags / fibrinogen 2x week, vitamin K as needed   phytonadione Solution 5 milliGRAM(s) Oral <User Schedule>    12. Monitor closely for clinical changes, monitor for fevers     13. Emotional support provided, plan of care discussed and questions addressed     14. Patient education done regarding plan of care, restrictions and discharge planning     15. Continue regular social work input     I have written the above note for Dr. Singh who performed service with me in the room.   Helena Diaz NP-C ([phone])    I have seen and examined patient with NP, I agree with above note as scribed.                    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 with Flu / Bu / Cy / TBI prep regimen for treatment of refractory DLBCL    Disease: DLBCL  Type of transplant: Haplo-identical (daughter)   Prep Regimen: Flu / Bu / Cy / TBI   ABO / CMV:   Recipient: A POS/ NEG   Donor: A POS / NEG     S: Patient seen and examined with [facility]PC Transplant Team:   Has no complaints today       O: Vitals:   Vital Signs Last 24 Hrs  T(C): 37.1 (19 May 2025 05:32), Max: 37.1 (18 May 2025 20:02)  T(F): 98.8 (19 May 2025 05:32), Max: 98.8 (18 May 2025 20:02)  HR: 69 (19 May 2025 05:32) (68 - 72)  BP: 121/71 (19 May 2025 05:32) (114/72 - 126/81)  BP(mean): --  RR: 17 (19 May 2025 05:32) (17 - 19)  SpO2: 97% (19 May 2025 05:32) (97% - 99%)    Parameters below as of 19 May 2025 05:32  Patient On (Oxygen Delivery Method): room air        Admit weight: 47.1kg       Intake / Output:   05-18 @ 07:01  -  05-19 @ 07:00  --------------------------------------------------------  IN: 2847 mL / OUT: 3145 mL / NET: -298 mL      PE:   Oropharynx: no erythema or ulcerations   Oral Mucositis:                -                                     Grade: n/a  CVS: S1, S2 RRR   Lungs: CTA throughout bilaterally   Abdomen: + BS x 4, soft, NT, ND   Extremities: no edema   Gastric Mucositis:       -                                          Grade: n/a   Intestinal Mucositis:     -                                         Grade: n/a   Skin: no rash   TLC: CDI  Neuro: A&OX3    Labs:   CBC Full  -  ( 19 May 2025 06:29 )  WBC Count : 0.24 K/uL  Hemoglobin : 10.6 g/dL  Hematocrit : 32.7 %  Platelet Count - Automated : 190 K/uL  Mean Cell Volume : 86.5 fl  Mean Cell Hemoglobin : 28.0 pg  Mean Cell Hemoglobin Concentration : 32.4 g/dL  Auto Neutrophil # : x  Auto Lymphocyte # : x  Auto Monocyte # : x  Auto Eosinophil # : x  Auto Basophil # : x  Auto Neutrophil % : x  Auto Lymphocyte % : x  Auto Monocyte % : x  Auto Eosinophil % : x  Auto Basophil % : x                          10.6   0.24  )-----------( 190      ( 19 May 2025 06:29 )             32.7     05-19    139  |  103  |  14  ----------------------------<  109[H]  4.7   |  23  |  0.61    Ca    8.5      19 May 2025 06:29  Phos  3.7     05-19  Mg     2.4     05-19    TPro  5.1[L]  /  Alb  3.6  /  TBili  0.5  /  DBili  x   /  AST  41[H]  /  ALT  37  /  AlkPhos  198[H]  05-19    PT/INR - ( 19 May 2025 06:31 )   PT: 11.1 sec;   INR: 0.97 ratio         PTT - ( 19 May 2025 06:31 )  PTT:34.6 sec  LIVER FUNCTIONS - ( 19 May 2025 06:29 )  Alb: 3.6 g/dL / Pro: 5.1 g/dL / ALK PHOS: 198 U/L / ALT: 37 U/L / AST: 41 U/L / GGT: x           Lactate Dehydrogenase, Serum: 446 U/L (05-19 @ 06:29)        Meds:   Antimicrobials:   acyclovir   Oral Tab/Cap 800 milliGRAM(s) Oral every 12 hours  levoFLOXacin  Tablet 500 milliGRAM(s) Oral every 24 hours  vancomycin    Solution 125 milliGRAM(s) Oral every 12 hours      Heme / Onc:       GI:  pantoprazole    Tablet 40 milliGRAM(s) Oral before breakfast  sodium bicarbonate Mouth Rinse 10 milliLiter(s) Swish and Spit five times a day  ursodiol Capsule 300 milliGRAM(s) Oral two times a day      Cardiovascular:       Immunologic:       Other medications:   Biotene Dry Mouth Oral Rinse 5 milliLiter(s) Swish and Spit five times a day  chlorhexidine 4% Liquid 1 Application(s) Topical <User Schedule>  DULoxetine 60 milliGRAM(s) Oral <User Schedule>  oxyCODONE    IR 5 milliGRAM(s) Oral <User Schedule>  phytonadione   Solution 5 milliGRAM(s) Oral <User Schedule>  sodium chloride 0.9%. 500 milliLiter(s) IV Continuous <Continuous>  sodium chloride 0.9%. 500 milliLiter(s) IV Continuous <Continuous>  sodium chloride 0.9%. 500 milliLiter(s) IV Continuous <Continuous>  sodium chloride 0.9%. 500 milliLiter(s) IV Continuous <Continuous>  tiZANidine 4 milliGRAM(s) Oral <User Schedule>      PRN:   prochlorperazine   Injectable 10 milliGRAM(s) IV Push every 6 hours PRN  sodium chloride 0.9% lock flush 10 milliLiter(s) IV Push every 1 hour PRN    A/P:  69 year old male with refractory DLBCL, status post R-CHOP x 6, HD MTX x 4, salvage polatuzumab / rituximab / revlimid x 4, admitted for a haplo-identical pbsct from his daughter with Flu / Bu / Cy / TBI prep regimen   Day - 1  5/15- busulfan 2/2, fludarabine 2/5. No acute events overnight, vital signs are stable; continue keppra ppx for 24 hours post infusion of last dose of busulfan. No tylenol or posaconazole until day 0. Not neutropenic today, will d/c levaquin / vancomycin.   5/16 - fludarabine 3/5. VSS, afebrile. No acute events overnight.  5/17 - fludarabine 4/5. VSS and afebrile. No acute events overnight. Neutropenic today, started on ppx levaquin/vancomycin PO. No Tylenol or Azoles until day 0.  5/18 - fludarabine 5/5. VSS, remains afebrile. No acute events overnight. No Tylenol or Azoles until day 0.  5/19- TBI today. No acute events overnight. Vital signs are stable.     1. Infectious Disease:   acyclovir Oral Tab/Cap 800 milliGRAM(s) Oral every 12 hours  levoFLOXacin Tablet 500 milliGRAM(s) Oral every 24 hours  vancomycin Solution 125 milliGRAM(s) Oral every 12 hours    2. VOD Prophylaxis:   ursodiol Capsule 300 milliGRAM(s) Oral two times a day    3. GI Prophylaxis:   pantoprazole Tablet 40 milliGRAM(s) Oral before breakfast    4. Mouthcare - NS / NaHCO3 rinses, Biotene; Skin care     5. GVHD prophylaxis   PTCy days +3, +4   Tacrolimus gtt to start on day + 5  Abatacept days +5, +14, +28, +56    6. Transfuse & replete electrolytes prn     7. IV hydration, daily weights, strict I&O, prn diuresis     8. PO intake as tolerated, nutrition follow up as needed    9. Antiemetics, anti-diarrhea medications:   prochlorperazine   Injectable 10 milliGRAM(s) IV Push every 6 hours PRN    10. OOB as tolerated, physical therapy consult if needed     11. Monitor coags / fibrinogen 2x week, vitamin K as needed   phytonadione Solution 5 milliGRAM(s) Oral <User Schedule>    12. Monitor closely for clinical changes, monitor for fevers     13. Emotional support provided, plan of care discussed and questions addressed     14. Patient education done regarding plan of care, restrictions and discharge planning     15. Continue regular social work input     I have written the above note for Dr. Singh who performed service with me in the room.   Helena Diaz NP-C ([phone])    I have seen and examined patient with NP, I agree with above note as scribed.

## 2025-05-19 NOTE — CHART NOTE - NSCHARTNOTEFT_GEN_A_CORE
NUTRITION FOLLOW UP NOTE    PATIENT SEEN FOR: BMTU Nutrition Follow up     SOURCE: [x] Patient  [x] Current Medical Record  [x] RN  [] Family/support person at bedside  [] Patient unavailable/inappropriate  [x] Other: Interdisciplinary rounds     CHART REVIEWED/EVENTS NOTED.  [] No changes to nutrition care plan to note  [x] Nutrition Status: Admitted for a haplo-identical pbsct from his daughter with Flu / Bu / Cy / TBI prep regimen   Day - 1    DIET ORDER:   Diet, Regular (25)      CURRENT DIET ORDER IS:  [] Appropriate:  [] Inadequate:  [] Other:    NUTRITION INTAKE/PROVISION:  [x] PO: Pt reports appetite/PO intake; consuming % of most meals.    [] Enteral Nutrition:  [] Parenteral Nutrition:    ANTHROPOMETRICS:  Drug Dosing Weight  Height (cm): 151 (14 May 2025 09:36)  Weight (kg): 47.1 (14 May 2025 09:36)  BMI (kg/m2): 20.7 (14 May 2025 09:36)  BSA (m2): 1.4 (14 May 2025 09:36)  Weights:   Daily Weight in k.4 (-19), Weight in k.1 (-18), Weight in k.3 (-17), Weight in k.2 (05-16), Weight in k.7 (05-15)   ** Weight fluctuating - Weight changes likely secondary to fluid shifts. RD to continue to monitor weight trends as able.     NUTRITIONALLY PERTINENT MEDICATIONS:  MEDICATIONS  (STANDING):  acyclovir   Oral Tab/Cap  levoFLOXacin  Tablet  pantoprazole    Tablet  phytonadione   Solution  sodium bicarbonate Mouth Rinse  sodium chloride 0.9%.  sodium chloride 0.9%.  sodium chloride 0.9%.  sodium chloride 0.9%.  ursodiol Capsule  vancomycin    Solution       NUTRITIONALLY PERTINENT LABS:   Na139 mmol/L Glu 109 mg/dL[H] K+ 4.7 mmol/L Cr  0.61 mg/dL BUN 14 mg/dL  Phos 3.7 mg/dL  Alb 3.6 g/dL ALT 37 U/L AST 41 U/L[H] Alkaline Phosphatase 198 U/L[H]            Finger Sticks:      NUTRITIONALLY PERTINENT MEDICATIONS/LABS:  [x] Reviewed  [x] Relevant notes on medications/labs:    EDEMA:  [x] Reviewed  [] Relevant notes:    GI/ I&O:  [x] Reviewed  [] Relevant notes:  [] Other:    SKIN:   [x] No pressure injuries documented, per nursing flowsheet  [] Pressure injury previously noted  [] Change in pressure injury documentation:  [] Other:    ESTIMATED NEEDS:  [x] No change:  [] Updated:  Energy: 1050-1046  kcal/day (35-40 kcal/kg)  Protein:  70.2-93.6 g/day (1.5-2.0 g/kg)  Fluid:   ml/day or [x] defer to team  Based on: dosing weight 46.8 kg     NUTRITION DIAGNOSIS:  [x] Prior Dx: Increased Nutrient Needs   [] New Dx:    EDUCATION:  [x] Yes: Reinforced BMT nutrition recommendations: food safety recommendations, increased protein-energy needs, small frequent meals as tolerated.   [] Not appropriate/warranted    NUTRITION CARE PLAN:  1. Diet: regular diet   2. Supplements:   3. Multivitamin/mineral supplementation: deferred to team   4: Monitor PO intake/tolerance, weights, labs, hydration status, bowels, and skin integrity.     [] Achieved - Continue current nutrition intervention(s)  [] Current medical condition precludes nutrition intervention at this time.    MONITORING AND EVALUATION:   RD remains available upon request and will follow up per protocol.    Alma Sosa RDN CDN (available on TEAMS)   Available on MS TEAMS NUTRITION FOLLOW UP NOTE    PATIENT SEEN FOR: BMTU Nutrition Follow up     SOURCE: [x] Patient  [x] Current Medical Record  [x] RN  [] Family/support person at bedside  [] Patient unavailable/inappropriate  [x] Other: Interdisciplinary rounds     CHART REVIEWED/EVENTS NOTED.  [] No changes to nutrition care plan to note  [x] Nutrition Status: Admitted for a haplo-identical pbsct from his daughter with Flu / Bu / Cy / TBI prep regimen   Day - 1    DIET ORDER:   Diet, Regular (25)      CURRENT DIET ORDER IS:  [] Appropriate:  [] Inadequate:  [] Other:    NUTRITION INTAKE/PROVISION:  [x] PO: Pt reports fair appetite/PO intake; consuming lunch during RD visit. Is not amenable to receiving oral nutritional supplements at this time.     [] Enteral Nutrition:  [] Parenteral Nutrition:    ANTHROPOMETRICS:  Drug Dosing Weight  Height (cm): 151 (14 May 2025 09:36)  Weight (kg): 47.1 (14 May 2025 09:36)  BMI (kg/m2): 20.7 (14 May 2025 09:36)  BSA (m2): 1.4 (14 May 2025 09:36)  Weights:   Daily Weight in k.4 (-19), Weight in k.1 (-18), Weight in k.3 (-17), Weight in k.2 (-16), Weight in k.7 (-15)   ** Weight fluctuating - Weight changes likely secondary to fluid shifts. RD to continue to monitor weight trends as able.     NUTRITIONALLY PERTINENT MEDICATIONS:  MEDICATIONS  (STANDING):  acyclovir   Oral Tab/Cap  levoFLOXacin  Tablet  pantoprazole    Tablet  phytonadione   Solution  sodium bicarbonate Mouth Rinse  sodium chloride 0.9%.  sodium chloride 0.9%.  sodium chloride 0.9%.  sodium chloride 0.9%.  ursodiol Capsule  vancomycin    Solution       NUTRITIONALLY PERTINENT LABS:   Na139 mmol/L Glu 109 mg/dL[H] K+ 4.7 mmol/L Cr  0.61 mg/dL BUN 14 mg/dL  Phos 3.7 mg/dL  Alb 3.6 g/dL ALT 37 U/L AST 41 U/L[H] Alkaline Phosphatase 198 U/L[H]            Finger Sticks:      NUTRITIONALLY PERTINENT MEDICATIONS/LABS:  [x] Reviewed  [x] Relevant notes on medications/labs:    EDEMA:  [x] Reviewed  [] Relevant notes:    GI/ I&O:  [x] Reviewed  [x] Relevant notes: Last BM: .   [] Other:    SKIN:   [x] No pressure injuries documented, per nursing flowsheet  [] Pressure injury previously noted  [] Change in pressure injury documentation:  [] Other:    ESTIMATED NEEDS:  [x] No change:  [] Updated:  Energy: 0790-2597  kcal/day (35-40 kcal/kg)  Protein:  70.2-93.6 g/day (1.5-2.0 g/kg)  Fluid:   ml/day or [x] defer to team  Based on: dosing weight 46.8 kg     NUTRITION DIAGNOSIS:  [x] Prior Dx: Increased Nutrient Needs   [] New Dx:    EDUCATION:  [x] Yes: Reinforced BMT nutrition recommendations: food safety recommendations, increased protein-energy needs, small frequent meals as tolerated.   [] Not appropriate/warranted    NUTRITION CARE PLAN:  1. Diet: regular diet   2. Supplements: Pt is not amenable to receiving oral nutritional supplements at this time.   3. Multivitamin/mineral supplementation: deferred to team   4: Monitor PO intake/tolerance, weights, labs, hydration status, bowels, and skin integrity.     [] Achieved - Continue current nutrition intervention(s)  [] Current medical condition precludes nutrition intervention at this time.    MONITORING AND EVALUATION:   RD remains available upon request and will follow up per protocol.    Alma Sosa RDN CDN (available on TEAMS)   Available on [facility] TEAMS

## 2025-05-20 ENCOUNTER — APPOINTMENT (OUTPATIENT)
Dept: INTERNAL MEDICINE | Facility: CLINIC | Age: 70
End: 2025-05-20

## 2025-05-20 LAB
ALBUMIN SERPL ELPH-MCNC: 3.5 G/DL — SIGNIFICANT CHANGE UP (ref 3.3–5)
ALP SERPL-CCNC: 198 U/L — HIGH (ref 40–120)
ALT FLD-CCNC: 28 U/L — SIGNIFICANT CHANGE UP (ref 10–45)
ANION GAP SERPL CALC-SCNC: 9 MMOL/L — SIGNIFICANT CHANGE UP (ref 5–17)
AST SERPL-CCNC: 24 U/L — SIGNIFICANT CHANGE UP (ref 10–40)
BILIRUB SERPL-MCNC: 0.6 MG/DL — SIGNIFICANT CHANGE UP (ref 0.2–1.2)
BUN SERPL-MCNC: 14 MG/DL — SIGNIFICANT CHANGE UP (ref 7–23)
CALCIUM SERPL-MCNC: 8.7 MG/DL — SIGNIFICANT CHANGE UP (ref 8.4–10.5)
CHLORIDE SERPL-SCNC: 104 MMOL/L — SIGNIFICANT CHANGE UP (ref 96–108)
CO2 SERPL-SCNC: 25 MMOL/L — SIGNIFICANT CHANGE UP (ref 22–31)
CREAT SERPL-MCNC: 0.54 MG/DL — SIGNIFICANT CHANGE UP (ref 0.5–1.3)
EGFR: 109 ML/MIN/1.73M2 — SIGNIFICANT CHANGE UP
EGFR: 109 ML/MIN/1.73M2 — SIGNIFICANT CHANGE UP
GLUCOSE SERPL-MCNC: 107 MG/DL — HIGH (ref 70–99)
HCT VFR BLD CALC: 33.5 % — LOW (ref 39–50)
HGB BLD-MCNC: 10.7 G/DL — LOW (ref 13–17)
LDH SERPL L TO P-CCNC: 226 U/L — SIGNIFICANT CHANGE UP (ref 50–242)
MAGNESIUM SERPL-MCNC: 2.3 MG/DL — SIGNIFICANT CHANGE UP (ref 1.6–2.6)
MCHC RBC-ENTMCNC: 28.1 PG — SIGNIFICANT CHANGE UP (ref 27–34)
MCHC RBC-ENTMCNC: 31.9 G/DL — LOW (ref 32–36)
MCV RBC AUTO: 87.9 FL — SIGNIFICANT CHANGE UP (ref 80–100)
NRBC BLD AUTO-RTO: 0 /100 WBCS — SIGNIFICANT CHANGE UP (ref 0–0)
PHOSPHATE SERPL-MCNC: 3.3 MG/DL — SIGNIFICANT CHANGE UP (ref 2.5–4.5)
PLATELET # BLD AUTO: 162 K/UL — SIGNIFICANT CHANGE UP (ref 150–400)
POTASSIUM SERPL-MCNC: 3.7 MMOL/L — SIGNIFICANT CHANGE UP (ref 3.5–5.3)
POTASSIUM SERPL-SCNC: 3.7 MMOL/L — SIGNIFICANT CHANGE UP (ref 3.5–5.3)
PROT SERPL-MCNC: 5.2 G/DL — LOW (ref 6–8.3)
RBC # BLD: 3.81 M/UL — LOW (ref 4.2–5.8)
RBC # FLD: 16.7 % — HIGH (ref 10.3–14.5)
SODIUM SERPL-SCNC: 138 MMOL/L — SIGNIFICANT CHANGE UP (ref 135–145)
WBC # BLD: 0.15 K/UL — CRITICAL LOW (ref 3.8–10.5)
WBC # FLD AUTO: 0.15 K/UL — CRITICAL LOW (ref 3.8–10.5)

## 2025-05-20 PROCEDURE — 99233 SBSQ HOSP IP/OBS HIGH 50: CPT | Mod: FS

## 2025-05-20 RX ADMIN — OXYCODONE HYDROCHLORIDE 5 MILLIGRAM(S): 30 TABLET ORAL at 21:00

## 2025-05-20 RX ADMIN — Medication 10 MILLILITER(S): at 23:13

## 2025-05-20 RX ADMIN — Medication 10 MILLIGRAM(S): at 10:18

## 2025-05-20 RX ADMIN — Medication 650 MILLIGRAM(S): at 10:17

## 2025-05-20 RX ADMIN — Medication 125 MILLIGRAM(S): at 05:13

## 2025-05-20 RX ADMIN — Medication 5 MILLILITER(S): at 15:17

## 2025-05-20 RX ADMIN — Medication 5 MILLILITER(S): at 20:20

## 2025-05-20 RX ADMIN — Medication 1 APPLICATION(S): at 08:57

## 2025-05-20 RX ADMIN — Medication 5 MILLILITER(S): at 08:56

## 2025-05-20 RX ADMIN — Medication 800 MILLIGRAM(S): at 05:13

## 2025-05-20 RX ADMIN — Medication 10 MILLILITER(S): at 13:02

## 2025-05-20 RX ADMIN — Medication 150 MILLILITER(S): at 20:21

## 2025-05-20 RX ADMIN — Medication 800 MILLIGRAM(S): at 17:44

## 2025-05-20 RX ADMIN — Medication 5 MILLILITER(S): at 13:02

## 2025-05-20 RX ADMIN — Medication 5 MILLILITER(S): at 23:13

## 2025-05-20 RX ADMIN — URSODIOL 300 MILLIGRAM(S): 300 CAPSULE ORAL at 05:13

## 2025-05-20 RX ADMIN — Medication 125 MILLIGRAM(S): at 17:44

## 2025-05-20 RX ADMIN — Medication 10 MILLILITER(S): at 15:17

## 2025-05-20 RX ADMIN — Medication 25 MILLIGRAM(S): at 10:18

## 2025-05-20 RX ADMIN — Medication 10 MILLILITER(S): at 08:57

## 2025-05-20 RX ADMIN — Medication 10 MILLILITER(S): at 20:20

## 2025-05-20 RX ADMIN — POSACONAZOLE 300 MILLIGRAM(S): 100 TABLET, DELAYED RELEASE ORAL at 13:02

## 2025-05-20 RX ADMIN — OXYCODONE HYDROCHLORIDE 5 MILLIGRAM(S): 30 TABLET ORAL at 21:45

## 2025-05-20 RX ADMIN — Medication 150 MILLILITER(S): at 08:55

## 2025-05-20 RX ADMIN — DULOXETINE 60 MILLIGRAM(S): 20 CAPSULE, DELAYED RELEASE ORAL at 21:00

## 2025-05-20 RX ADMIN — Medication 40 MILLIGRAM(S): at 05:13

## 2025-05-20 RX ADMIN — TIZANIDINE 4 MILLIGRAM(S): 4 TABLET ORAL at 21:00

## 2025-05-20 RX ADMIN — URSODIOL 300 MILLIGRAM(S): 300 CAPSULE ORAL at 17:44

## 2025-05-20 NOTE — PROGRESS NOTE ADULT - NS ATTEND AMEND GEN_ALL_CORE FT
I rounded with the team including nurses, ACPs and PharmD.  I reviewed the data in depth.     The patient is day - 1 of allogeneic HSCT.  He is tolerating his conditioning well.   The patient is doing well with no complaints.  The vitals are stable. The oral cavity is clear. The line site is clean. The lungs are clear. There is no LE edema.  Overall, there are no specific issues. The patient is on appropriate therapy at this stage.   All questions answered.   . I rounded with the team including nurses, ACPs and PharmD.  I reviewed the data in depth.     The patient is day 0 of allogeneic HSCT.  He reports some mild nausea  He will be receiving cells today.   The patient is doing well with no complaints.  The vitals are stable. The oral cavity is clear. The line site is clean. The lungs are clear. There is no LE edema.  Overall, there are no specific issues. The patient is on appropriate therapy at this stage.   All questions answered.   .

## 2025-05-20 NOTE — PHARMACOTHERAPY INTERVENTION NOTE - COMMENTS
69-year-old male with 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 He is admitted for an alloSCT with BERTRAM Bu/Flu/Cy/TBI conditioning and CAST for GVHD ppx. Today is day 0.    Conditioning Regimen: RI Bu/Flu/Cy/TBI  Day -6 (5/14) to Day -5 (5/15): Busulfan 130 mg/m2 IV administered over 3 hours for 2 doses -> avoid APAP and azole antifungals for 48 hours post busulfan (until 5/20)  Day -6 (5/14) to Day -2 (5/18) Fludarabine 30 mg/m2 IV administered over 30 minutes for 5 doses  Moving up by 1 hour every day to ensure 48 hours between final dose and CTP infusion - last dose complete on 5/18 @ 1045 am  Day -3 (5/17) to Day -2 (5/18) Cyclophosphamide 14.5 mg/k2 IV administered over 1 hour for 2 doses  Day -1 (5/19)  cGy x1    GVHD ppx: CAST  Cyclophosphamide 50 mg/kg IV daily on Day +3 (5/23) and Day +4 (5/24).   Abatacept IV 10 mg/kg on days +5 (5/25), +14 (6/3), +28 (6/17), and +56 (7/15).  Patient will start tacrolimus 0.02 mg/kg IV on Sunday, 5/25 (day +5) - please order a one time trough on Wednesday, 5/28 (day +8) and one time trough on Saturday, 5/31 along with troughs every Tuesday to start on 6/3. Goal trough is 8-12 ng/mL. Please ensure trough is drawn peripherally.    Antimicrobial ppx.:  Started antimicrobial (levofloxacin 500 mg PO QD), and PO vanco 125 mg BID once ANC <1000 (5/17) & will continue until ANC >500 for 3 consecutive days. Started antifungal (posaconazole 300 mg PO QD) on day 0 (5/20) and will continue until day +75. Will also plan to start GCSF on day +7 (5/27) & stop once ANC >1500 for two days.     Evelyne Murcia, PharmD, BCOP  Stem Cell Transplant Clinical Pharmacy Specialist  Available via [employer] Teams

## 2025-05-20 NOTE — PROGRESS NOTE ADULT - SUBJECTIVE AND OBJECTIVE BOX
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    O: Vitals:   Vital Signs Last 24 Hrs  T(C): 36.7 (20 May 2025 05:04), Max: 36.7 (19 May 2025 12:20)  T(F): 98.1 (20 May 2025 05:04), Max: 98.1 (19 May 2025 12:20)  HR: 70 (20 May 2025 05:04) (70 - 80)  BP: 114/69 (20 May 2025 05:04) (114/69 - 125/77)  BP(mean): --  RR: 16 (20 May 2025 05:04) (16 - 17)  SpO2: 96% (20 May 2025 05:04) (96% - 98%)    Parameters below as of 20 May 2025 05:04  Patient On (Oxygen Delivery Method): room air    Admit weight: 47.1kg  Daily Weight in kG:  (20 May 2025)    Intake / Output:   05-19 @ 07:01  -  05-20 @ 07:00  --------------------------------------------------------  IN: 928 mL / OUT: 1400 mL / NET: -472 mL      PE:   Oropharynx: no erythema or ulcerations   Oral Mucositis:                -                                     Grade: n/a  CVS: S1, S2 RRR   Lungs: CTA throughout bilaterally   Abdomen: + BS x 4, soft, NT, ND   Extremities: no edema   Gastric Mucositis:       -                                          Grade: n/a   Intestinal Mucositis:     -                                         Grade: n/a   Skin: no rash   TLC: CDI  Neuro: A&OX3      Labs:       05-20    138  |  104  |  14  ----------------------------<  107[H]  3.7   |  25  |  0.54    Ca    8.7      20 May 2025 06:30  Phos  3.3     05-20  Mg     2.3     05-20    TPro  5.2[L]  /  Alb  3.5  /  TBili  0.6  /  DBili  x   /  AST  24  /  ALT  28  /  AlkPhos  198[H]  05-20    PT/INR - ( 19 May 2025 06:31 )   PT: 11.1 sec;   INR: 0.97 ratio         PTT - ( 19 May 2025 06:31 )  PTT:34.6 sec  LIVER FUNCTIONS - ( 20 May 2025 06:30 )  Alb: 3.5 g/dL / Pro: 5.2 g/dL / ALK PHOS: 198 U/L / ALT: 28 U/L / AST: 24 U/L / GGT: x           Lactate Dehydrogenase, Serum: 226 U/L (05-20 @ 06:30)      Cultures:       Radiology:       Meds:   Antimicrobials:   acyclovir   Oral Tab/Cap 800 milliGRAM(s) Oral every 12 hours  levoFLOXacin  Tablet 500 milliGRAM(s) Oral every 24 hours  posaconazole DR Tablet 300 milliGRAM(s) Oral daily  vancomycin    Solution 125 milliGRAM(s) Oral every 12 hours      Heme / Onc:       GI:  pantoprazole    Tablet 40 milliGRAM(s) Oral before breakfast  sodium bicarbonate Mouth Rinse 10 milliLiter(s) Swish and Spit five times a day  ursodiol Capsule 300 milliGRAM(s) Oral two times a day      Cardiovascular:       Immunologic:       Other medications:   acetaminophen     Tablet .. 650 milliGRAM(s) Oral once  Biotene Dry Mouth Oral Rinse 5 milliLiter(s) Swish and Spit five times a day  chlorhexidine 4% Liquid 1 Application(s) Topical <User Schedule>  diphenhydrAMINE 25 milliGRAM(s) Oral once  DULoxetine 60 milliGRAM(s) Oral <User Schedule>  oxyCODONE    IR 5 milliGRAM(s) Oral <User Schedule>  phytonadione   Solution 5 milliGRAM(s) Oral <User Schedule>  sodium chloride 0.9%. 1000 milliLiter(s) IV Continuous <Continuous>  sodium chloride 0.9%. 500 milliLiter(s) IV Continuous <Continuous>  sodium chloride 0.9%. 500 milliLiter(s) IV Continuous <Continuous>  sodium chloride 0.9%. 500 milliLiter(s) IV Continuous <Continuous>  sodium chloride 0.9%. 500 milliLiter(s) IV Continuous <Continuous>  tiZANidine 4 milliGRAM(s) Oral <User Schedule>      PRN:   acetaminophen     Tablet .. 650 milliGRAM(s) Oral every 6 hours PRN  prochlorperazine   Injectable 10 milliGRAM(s) IV Push every 6 hours PRN  sodium chloride 0.9% lock flush 10 milliLiter(s) IV Push every 1 hour PRN      A/P:  69 year old male with refractory DLBCL, status post R-CHOP x 6, HD MTX x 4, salvage polatuzumab / rituximab / revlimid x 4, admitted for a haplo-identical pbsct from his daughter with Flu / Bu / Cy / TBI prep regimen   Day 0  5/15- busulfan 2/2, fludarabine 2/5. No acute events overnight, vital signs are stable; continue keppra ppx for 24 hours post infusion of last dose of busulfan. No tylenol or posaconazole until day 0. Not neutropenic today, will d/c levaquin / vancomycin.   5/16 - fludarabine 3/5. VSS, afebrile. No acute events overnight.  5/17 - fludarabine 4/5. VSS and afebrile. No acute events overnight. Neutropenic today, started on ppx levaquin/vancomycin PO. No Tylenol or Azoles until day 0.  5/18 - fludarabine 5/5. VSS, remains afebrile. No acute events overnight. No Tylenol or Azoles until day 0.  5/19- TBI today. No acute events overnight. Vital signs are stable.   5/20 - will receive Haplo allogeneic SCT today. VSS, afebrile.    1. Infectious Disease:   acyclovir Oral Tab/Cap 800 milliGRAM(s) Oral every 12 hours  levoFLOXacin Tablet 500 milliGRAM(s) Oral every 24 hours  vancomycin Solution 125 milliGRAM(s) Oral every 12 hours    2. VOD Prophylaxis:   ursodiol Capsule 300 milliGRAM(s) Oral two times a day    3. GI Prophylaxis:   pantoprazole Tablet 40 milliGRAM(s) Oral before breakfast    4. Mouthcare - NS / NaHCO3 rinses, Biotene; Skin care     5. GVHD prophylaxis   PTCy days +3, +4   Tacrolimus gtt to start on day + 5  Abatacept days +5, +14, +28, +56    6. Transfuse & replete electrolytes prn     7. IV hydration, daily weights, strict I&O, prn diuresis     8. PO intake as tolerated, nutrition follow up as needed    9. Antiemetics, anti-diarrhea medications:   prochlorperazine   Injectable 10 milliGRAM(s) IV Push every 6 hours PRN    10. OOB as tolerated, physical therapy consult if needed     11. Monitor coags / fibrinogen 2x week, vitamin K as needed   phytonadione Solution 5 milliGRAM(s) Oral <User Schedule>    12. Monitor closely for clinical changes, monitor for fevers     13. Emotional support provided, plan of care discussed and questions addressed     14. Patient education done regarding plan of care, restrictions and discharge planning     15. Continue regular social work input     I have written the above note for Dr. Singh who performed service with me in the room.   Bhaskar Esquivel PA-C ([phone])    I have seen and examined patient with PA, I agree with above note as scribed.                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 Patient admits to mild nausea this morning.  Otherwise feeling well sitting up in chair OOB this AM.    O: Vitals:   Vital Signs Last 24 Hrs  T(C): 36.7 (20 May 2025 05:04), Max: 36.7 (19 May 2025 12:20)  T(F): 98.1 (20 May 2025 05:04), Max: 98.1 (19 May 2025 12:20)  HR: 70 (20 May 2025 05:04) (70 - 80)  BP: 114/69 (20 May 2025 05:04) (114/69 - 125/77)  BP(mean): --  RR: 16 (20 May 2025 05:04) (16 - 17)  SpO2: 96% (20 May 2025 05:04) (96% - 98%)    Parameters below as of 20 May 2025 05:04  Patient On (Oxygen Delivery Method): room air    Admit weight: 47.1kg  Daily Weight in k.2  (20 May 2025)    Intake / Output:   05-19 @ 07:01  -  05-20 @ 07:00  --------------------------------------------------------  IN: 928 mL / OUT: 1400 mL / NET: -472 mL      PE:   Oropharynx: no erythema or ulcerations   Oral Mucositis:                -                                     Grade: n/a  CVS: S1, S2 RRR   Lungs: CTA throughout bilaterally   Abdomen: + BS x 4, soft, NT, ND   Extremities: no edema   Gastric Mucositis:       -                                          Grade: n/a   Intestinal Mucositis:     -                                         Grade: n/a   Skin: no rash   TLC: CDI  Neuro: A&OX3      Labs:   CBC Full  -  ( 20 May 2025 06:33 )  WBC Count : 0.15 K/uL  RBC Count : 3.81 M/uL  Hemoglobin : 10.7 g/dL  Hematocrit : 33.5 %  Platelet Count - Automated : 162 K/uL  Mean Cell Volume : 87.9 fl  Mean Cell Hemoglobin : 28.1 pg  Mean Cell Hemoglobin Concentration : 31.9 g/dL  Auto Neutrophil # : x  Auto Lymphocyte # : x  Auto Monocyte # : x  Auto Eosinophil # : x  Auto Basophil # : x  Auto Neutrophil % : x  Auto Lymphocyte % : x  Auto Monocyte % : x  Auto Eosinophil % : x  Auto Basophil % : x                          10.7   0.15  )-----------( 162      ( 20 May 2025 06:33 )             33.5     05-20    138  |  104  |  14  ----------------------------<  107[H]  3.7   |  25  |  0.54    Ca    8.7      20 May 2025 06:30  Phos  3.3     05-20  Mg     2.3     -20    TPro  5.2[L]  /  Alb  3.5  /  TBili  0.6  /  DBili  x   /  AST  24  /  ALT  28  /  AlkPhos  198[H]  05-20    PT/INR - ( 19 May 2025 06:31 )   PT: 11.1 sec;   INR: 0.97 ratio         PTT - ( 19 May 2025 06:31 )  PTT:34.6 sec  LIVER FUNCTIONS - ( 20 May 2025 06:30 )  Alb: 3.5 g/dL / Pro: 5.2 g/dL / ALK PHOS: 198 U/L / ALT: 28 U/L / AST: 24 U/L / GGT: x           Lactate Dehydrogenase, Serum: 226 U/L ( @ 06:30)      Cultures:       Radiology:       Meds:   Antimicrobials:   acyclovir   Oral Tab/Cap 800 milliGRAM(s) Oral every 12 hours  levoFLOXacin  Tablet 500 milliGRAM(s) Oral every 24 hours  posaconazole DR Tablet 300 milliGRAM(s) Oral daily  vancomycin    Solution 125 milliGRAM(s) Oral every 12 hours      Heme / Onc:       GI:  pantoprazole    Tablet 40 milliGRAM(s) Oral before breakfast  sodium bicarbonate Mouth Rinse 10 milliLiter(s) Swish and Spit five times a day  ursodiol Capsule 300 milliGRAM(s) Oral two times a day      Cardiovascular:       Immunologic:       Other medications:   acetaminophen     Tablet .. 650 milliGRAM(s) Oral once  Biotene Dry Mouth Oral Rinse 5 milliLiter(s) Swish and Spit five times a day  chlorhexidine 4% Liquid 1 Application(s) Topical <User Schedule>  diphenhydrAMINE 25 milliGRAM(s) Oral once  DULoxetine 60 milliGRAM(s) Oral <User Schedule>  oxyCODONE    IR 5 milliGRAM(s) Oral <User Schedule>  phytonadione   Solution 5 milliGRAM(s) Oral <User Schedule>  sodium chloride 0.9%. 1000 milliLiter(s) IV Continuous <Continuous>  sodium chloride 0.9%. 500 milliLiter(s) IV Continuous <Continuous>  sodium chloride 0.9%. 500 milliLiter(s) IV Continuous <Continuous>  sodium chloride 0.9%. 500 milliLiter(s) IV Continuous <Continuous>  sodium chloride 0.9%. 500 milliLiter(s) IV Continuous <Continuous>  tiZANidine 4 milliGRAM(s) Oral <User Schedule>      PRN:   acetaminophen     Tablet .. 650 milliGRAM(s) Oral every 6 hours PRN  prochlorperazine   Injectable 10 milliGRAM(s) IV Push every 6 hours PRN  sodium chloride 0.9% lock flush 10 milliLiter(s) IV Push every 1 hour PRN      A/P:  69 year old male with refractory DLBCL, status post R-CHOP x 6, HD MTX x 4, salvage polatuzumab / rituximab / revlimid x 4, admitted for a haplo-identical pbsct from his daughter with Flu / Bu / Cy / TBI prep regimen   Day 0  5/15- busulfan 2/, fludarabine . No acute events overnight, vital signs are stable; continue keppra ppx for 24 hours post infusion of last dose of busulfan. No tylenol or posaconazole until day 0. Not neutropenic today, will d/c levaquin / vancomycin.    - fludarabine 3/5. VSS, afebrile. No acute events overnight.   - fludarabine . VSS and afebrile. No acute events overnight. Neutropenic today, started on ppx levaquin/vancomycin PO. No Tylenol or Azoles until day 0.   - fludarabine . VSS, remains afebrile. No acute events overnight. No Tylenol or Azoles until day 0.  - TBI today. No acute events overnight. Vital signs are stable.    - will receive Haplo allogeneic SCT today. VSS, afebrile.    1. Infectious Disease:   acyclovir Oral Tab/Cap 800 milliGRAM(s) Oral every 12 hours  levoFLOXacin Tablet 500 milliGRAM(s) Oral every 24 hours  vancomycin Solution 125 milliGRAM(s) Oral every 12 hours    2. VOD Prophylaxis:   ursodiol Capsule 300 milliGRAM(s) Oral two times a day    3. GI Prophylaxis:   pantoprazole Tablet 40 milliGRAM(s) Oral before breakfast    4. Mouthcare - NS / NaHCO3 rinses, Biotene; Skin care     5. GVHD prophylaxis   PTCy days +3, +4   Tacrolimus gtt to start on day + 5  Abatacept days +5, +14, +28, +56    6. Transfuse & replete electrolytes prn     7. IV hydration, daily weights, strict I&O, prn diuresis     8. PO intake as tolerated, nutrition follow up as needed    9. Antiemetics, anti-diarrhea medications:   prochlorperazine   Injectable 10 milliGRAM(s) IV Push every 6 hours PRN    10. OOB as tolerated, physical therapy consult if needed     11. Monitor coags / fibrinogen 2x week, vitamin K as needed   phytonadione Solution 5 milliGRAM(s) Oral <User Schedule>    12. Monitor closely for clinical changes, monitor for fevers     13. Emotional support provided, plan of care discussed and questions addressed     14. Patient education done regarding plan of care, restrictions and discharge planning     15. Continue regular social work input     I have written the above note for Dr. Singh who performed service with me in the room.   Bhaskar Esquivel PA-C ([phone])    I have seen and examined patient with PA, I agree with above note as scribed.

## 2025-05-20 NOTE — CHART NOTE - NSCHARTNOTEFT_GEN_A_CORE
[facility] CTP Procedure Note   Procedure: Cellular Therapy Product Infusion   Type of Transplant:HaploPBSCT  Pre-Infusion Diagnosis: DLBCL  Line: 12F CVC  Infusion Date: 5/20/25    Aliquot 1   Product identification: [id number]  Infusion start time: 11:15  Infusion completion time: 12:35  Product volume (ml): 403.2  RBC volume (ml): 6.9  CD34 / kg infused  =6.78E+06  TNC / kg infused  =1.45E+09  CD34 viability (%) =100  Total DMSO (ml) = N/A  Infusion reaction: no reaction noted    Patti Stern PA-C  Adult Stem cell transplant  [phone]

## 2025-05-20 NOTE — PROGRESS NOTE ADULT - TIME BILLING
medical management as above, patient/family encounter, in depth review of vitals, labs, history, imaging, medication and all relevant parts of the chart

## 2025-05-21 LAB
ALBUMIN SERPL ELPH-MCNC: 3.1 G/DL — LOW (ref 3.3–5)
ALP SERPL-CCNC: 157 U/L — HIGH (ref 40–120)
ALT FLD-CCNC: 25 U/L — SIGNIFICANT CHANGE UP (ref 10–45)
ANION GAP SERPL CALC-SCNC: 10 MMOL/L — SIGNIFICANT CHANGE UP (ref 5–17)
APPEARANCE UR: CLEAR — SIGNIFICANT CHANGE UP
AST SERPL-CCNC: 17 U/L — SIGNIFICANT CHANGE UP (ref 10–40)
BILIRUB SERPL-MCNC: 0.8 MG/DL — SIGNIFICANT CHANGE UP (ref 0.2–1.2)
BILIRUB UR-MCNC: NEGATIVE — SIGNIFICANT CHANGE UP
BUN SERPL-MCNC: 12 MG/DL — SIGNIFICANT CHANGE UP (ref 7–23)
CALCIUM SERPL-MCNC: 8.1 MG/DL — LOW (ref 8.4–10.5)
CHLORIDE SERPL-SCNC: 105 MMOL/L — SIGNIFICANT CHANGE UP (ref 96–108)
CO2 SERPL-SCNC: 23 MMOL/L — SIGNIFICANT CHANGE UP (ref 22–31)
COLOR SPEC: YELLOW — SIGNIFICANT CHANGE UP
CREAT SERPL-MCNC: 0.5 MG/DL — SIGNIFICANT CHANGE UP (ref 0.5–1.3)
DIFF PNL FLD: NEGATIVE — SIGNIFICANT CHANGE UP
EGFR: 112 ML/MIN/1.73M2 — SIGNIFICANT CHANGE UP
EGFR: 112 ML/MIN/1.73M2 — SIGNIFICANT CHANGE UP
GLUCOSE SERPL-MCNC: 96 MG/DL — SIGNIFICANT CHANGE UP (ref 70–99)
GLUCOSE UR QL: NEGATIVE MG/DL — SIGNIFICANT CHANGE UP
HCT VFR BLD CALC: 29.3 % — LOW (ref 39–50)
HGB BLD-MCNC: 9.4 G/DL — LOW (ref 13–17)
KETONES UR QL: NEGATIVE MG/DL — SIGNIFICANT CHANGE UP
LDH SERPL L TO P-CCNC: 191 U/L — SIGNIFICANT CHANGE UP (ref 50–242)
LEUKOCYTE ESTERASE UR-ACNC: NEGATIVE — SIGNIFICANT CHANGE UP
MAGNESIUM SERPL-MCNC: 2 MG/DL — SIGNIFICANT CHANGE UP (ref 1.6–2.6)
MCHC RBC-ENTMCNC: 28.1 PG — SIGNIFICANT CHANGE UP (ref 27–34)
MCHC RBC-ENTMCNC: 32.1 G/DL — SIGNIFICANT CHANGE UP (ref 32–36)
MCV RBC AUTO: 87.5 FL — SIGNIFICANT CHANGE UP (ref 80–100)
NITRITE UR-MCNC: NEGATIVE — SIGNIFICANT CHANGE UP
NRBC BLD AUTO-RTO: 0 /100 WBCS — SIGNIFICANT CHANGE UP (ref 0–0)
PH UR: 6 — SIGNIFICANT CHANGE UP (ref 5–8)
PHOSPHATE SERPL-MCNC: 2.9 MG/DL — SIGNIFICANT CHANGE UP (ref 2.5–4.5)
PLATELET # BLD AUTO: 169 K/UL — SIGNIFICANT CHANGE UP (ref 150–400)
POTASSIUM SERPL-MCNC: 3.6 MMOL/L — SIGNIFICANT CHANGE UP (ref 3.5–5.3)
POTASSIUM SERPL-SCNC: 3.6 MMOL/L — SIGNIFICANT CHANGE UP (ref 3.5–5.3)
PROT SERPL-MCNC: 4.8 G/DL — LOW (ref 6–8.3)
PROT UR-MCNC: SIGNIFICANT CHANGE UP MG/DL
RBC # BLD: 3.35 M/UL — LOW (ref 4.2–5.8)
RBC # FLD: 16.8 % — HIGH (ref 10.3–14.5)
SODIUM SERPL-SCNC: 138 MMOL/L — SIGNIFICANT CHANGE UP (ref 135–145)
SP GR SPEC: 1.02 — SIGNIFICANT CHANGE UP (ref 1–1.03)
UROBILINOGEN FLD QL: 0.2 MG/DL — SIGNIFICANT CHANGE UP (ref 0.2–1)
WBC # BLD: 0.25 K/UL — CRITICAL LOW (ref 3.8–10.5)
WBC # FLD AUTO: 0.25 K/UL — CRITICAL LOW (ref 3.8–10.5)

## 2025-05-21 PROCEDURE — 99233 SBSQ HOSP IP/OBS HIGH 50: CPT | Mod: FS

## 2025-05-21 PROCEDURE — 71045 X-RAY EXAM CHEST 1 VIEW: CPT | Mod: 26

## 2025-05-21 RX ORDER — PIPERACILLIN-TAZO-DEXTROSE,ISO 3.375G/5
4.5 IV SOLUTION, PIGGYBACK PREMIX FROZEN(ML) INTRAVENOUS ONCE
Refills: 0 | Status: COMPLETED | OUTPATIENT
Start: 2025-05-22 | End: 2025-05-21

## 2025-05-21 RX ORDER — PIPERACILLIN-TAZO-DEXTROSE,ISO 3.375G/5
4.5 IV SOLUTION, PIGGYBACK PREMIX FROZEN(ML) INTRAVENOUS ONCE
Refills: 0 | Status: COMPLETED | OUTPATIENT
Start: 2025-05-21 | End: 2025-05-21

## 2025-05-21 RX ORDER — PIPERACILLIN-TAZO-DEXTROSE,ISO 3.375G/5
4.5 IV SOLUTION, PIGGYBACK PREMIX FROZEN(ML) INTRAVENOUS EVERY 8 HOURS
Refills: 0 | Status: DISCONTINUED | OUTPATIENT
Start: 2025-05-22 | End: 2025-05-23

## 2025-05-21 RX ORDER — OXYCODONE HYDROCHLORIDE 30 MG/1
5 TABLET ORAL
Refills: 0 | Status: DISCONTINUED | OUTPATIENT
Start: 2025-05-21 | End: 2025-05-23

## 2025-05-21 RX ADMIN — Medication 5 MILLILITER(S): at 11:50

## 2025-05-21 RX ADMIN — Medication 125 MILLIGRAM(S): at 05:27

## 2025-05-21 RX ADMIN — Medication 10 MILLILITER(S): at 08:09

## 2025-05-21 RX ADMIN — Medication 10 MILLILITER(S): at 20:33

## 2025-05-21 RX ADMIN — DULOXETINE 60 MILLIGRAM(S): 20 CAPSULE, DELAYED RELEASE ORAL at 20:34

## 2025-05-21 RX ADMIN — Medication 800 MILLIGRAM(S): at 17:04

## 2025-05-21 RX ADMIN — Medication 10 MILLILITER(S): at 15:23

## 2025-05-21 RX ADMIN — Medication 650 MILLIGRAM(S): at 12:47

## 2025-05-21 RX ADMIN — Medication 650 MILLIGRAM(S): at 20:00

## 2025-05-21 RX ADMIN — Medication 5 MILLILITER(S): at 08:09

## 2025-05-21 RX ADMIN — Medication 25 GRAM(S): at 23:21

## 2025-05-21 RX ADMIN — Medication 40 MILLIGRAM(S): at 05:27

## 2025-05-21 RX ADMIN — OXYCODONE HYDROCHLORIDE 5 MILLIGRAM(S): 30 TABLET ORAL at 22:00

## 2025-05-21 RX ADMIN — TIZANIDINE 4 MILLIGRAM(S): 4 TABLET ORAL at 20:30

## 2025-05-21 RX ADMIN — Medication 4.5 GRAM(S): at 23:22

## 2025-05-21 RX ADMIN — OXYCODONE HYDROCHLORIDE 5 MILLIGRAM(S): 30 TABLET ORAL at 20:34

## 2025-05-21 RX ADMIN — Medication 650 MILLIGRAM(S): at 20:30

## 2025-05-21 RX ADMIN — Medication 5 MILLILITER(S): at 20:33

## 2025-05-21 RX ADMIN — Medication 800 MILLIGRAM(S): at 05:27

## 2025-05-21 RX ADMIN — POSACONAZOLE 300 MILLIGRAM(S): 100 TABLET, DELAYED RELEASE ORAL at 11:50

## 2025-05-21 RX ADMIN — Medication 200 GRAM(S): at 20:15

## 2025-05-21 RX ADMIN — Medication 10 MILLILITER(S): at 11:50

## 2025-05-21 RX ADMIN — URSODIOL 300 MILLIGRAM(S): 300 CAPSULE ORAL at 05:27

## 2025-05-21 RX ADMIN — URSODIOL 300 MILLIGRAM(S): 300 CAPSULE ORAL at 17:04

## 2025-05-21 RX ADMIN — Medication 1 APPLICATION(S): at 08:13

## 2025-05-21 RX ADMIN — Medication 650 MILLIGRAM(S): at 13:17

## 2025-05-21 RX ADMIN — Medication 5 MILLILITER(S): at 15:23

## 2025-05-21 RX ADMIN — Medication 10 MILLIGRAM(S): at 08:27

## 2025-05-21 RX ADMIN — Medication 10 MILLIGRAM(S): at 17:03

## 2025-05-21 RX ADMIN — Medication 125 MILLIGRAM(S): at 17:03

## 2025-05-21 NOTE — PHARMACOTHERAPY INTERVENTION NOTE - COMMENTS
69-year-old male with 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 He is admitted for an alloSCT with BERTRAM Bu/Flu/Cy/TBI conditioning and CAST for GVHD ppx. Today is day +1.    Conditioning Regimen: RI Bu/Flu/Cy/TBI  Day -6 (5/14) to Day -5 (5/15): Busulfan 130 mg/m2 IV administered over 3 hours for 2 doses -> avoid APAP and azole antifungals for 48 hours post busulfan (until 5/20)  Day -6 (5/14) to Day -2 (5/18) Fludarabine 30 mg/m2 IV administered over 30 minutes for 5 doses  Moving up by 1 hour every day to ensure 48 hours between final dose and CTP infusion - last dose complete on 5/18 @ 1045 am  Day -3 (5/17) to Day -2 (5/18) Cyclophosphamide 14.5 mg/k2 IV administered over 1 hour for 2 doses  Day -1 (5/19)  cGy x1    GVHD ppx: CAST  Cyclophosphamide 50 mg/kg IV daily on Day +3 (5/23) and Day +4 (5/24).   Abatacept IV 10 mg/kg on days +5 (5/25), +14 (6/3), +28 (6/17), and +56 (7/15).  Patient will start tacrolimus 0.02 mg/kg IV on Sunday, 5/25 (day +5) - please order a one time trough on Wednesday, 5/28 (day +8) and one time trough on Saturday, 5/31 along with troughs every Tuesday to start on 6/3. Goal trough is 8-12 ng/mL. Please ensure trough is drawn peripherally.    Antimicrobial ppx.:  Started antimicrobial (levofloxacin 500 mg PO QD), and PO vanco 125 mg BID once ANC <1000 (5/17) & will continue until ANC >500 for 3 consecutive days. Started antifungal (posaconazole 300 mg PO QD) on day 0 (5/20) and will continue until day +75. Will also plan to start GCSF on day +7 (5/27) & stop once ANC >1500 for two days.     In case patient becomes febrile (100.4F sustained over an hour or one temp of 101F and above), please order pip/tazo 4.5 g IV every 8 hours.    Evelyne Murcia, PharmD, [facility]  Stem Cell Transplant Clinical Pharmacy Specialist  Available via Microsoft Teams

## 2025-05-21 NOTE — PROGRESS NOTE ADULT - NS ATTEST RISK PROBLEM GEN_ALL_CORE FT
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

## 2025-05-21 NOTE — PROGRESS NOTE ADULT - NS ATTEND AMEND GEN_ALL_CORE FT
I rounded with the team including nurses, ACPs and PharmD.  I reviewed the data in depth.     The patient is day - 1 of allogeneic HSCT.  He is tolerating his conditioning well.   The patient is doing well with no complaints.  The vitals are stable. The oral cavity is clear. The line site is clean. The lungs are clear. There is no LE edema.  Overall, there are no specific issues. The patient is on appropriate therapy at this stage.   All questions answered.   . I rounded with the team including nurses, ACPs and PharmD.  I reviewed the data in depth.     The patient is day +1 of allogeneic HSCT.  He is tolerated stem cell infusion well.   The patient is doing well with no complaints. mild nausea controlled with compazine  The vitals are stable. The oral cavity is clear. The line site is clean. The lungs are clear. There is no LE edema.  Overall, there are no specific issues. The patient is on appropriate therapy at this stage.   All questions answered.   .

## 2025-05-21 NOTE — CHART NOTE - NSCHARTNOTEFT_GEN_A_CORE
Medicine PA Fever Note    Notified by RN patient with temperature 100.5. Pt. seen and examined at bedside w/o any complaints. Patient is alert, NAD. Denies chills, HA, CP, SOB, cough, N/V, or abd pain. VSS    VITAL SIGNS:  T(C): 38.1 (05-21-25 @ 19:48), Max: 38.1 (05-21-25 @ 19:48)  HR: 85 (05-21-25 @ 19:48) (76 - 85)  BP: 121/76 (05-21-25 @ 19:48) (110/55 - 134/85)  RR: 19 (05-21-25 @ 19:48) (16 - 19)  SpO2: 99% (05-21-25 @ 19:48) (97% - 100%)  Wt(kg): --      LABORATORY:                          9.4    0.25  )-----------( 169      ( 21 May 2025 06:25 )             29.3       05-21    138  |  105  |  12  ----------------------------<  96  3.6   |  23  |  0.50    Ca    8.1[L]      21 May 2025 06:24  Phos  2.9     05-21  Mg     2.0     05-21    TPro  4.8[L]  /  Alb  3.1[L]  /  TBili  0.8  /  DBili  x   /  AST  17  /  ALT  25  /  AlkPhos  157[H]  05-21        PHYSICAL EXAM:    Constitutional: AOx3. NAD.    Respiratory: clear lungs bilaterally. No wheezing, rhonchi, or crackles.    Cardiovascular: S1 S2. No murmurs.    Gastrointestinal: BS X4 active. soft. nontender.    Extremities/Vascular: +2 pulses bilaterally. No BLE edema.      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 Hematologic history as follows: initially presented with complaints of progressive lower extremity parathesias since 1/2024. An MRI of the spine 3/5/24 showed diffuse osseous disease including C4, T8 burst fracture and epidural disease causing spinal cord deformity and cord edema, L4 pathological fracture, L3-L4 continuous involvement of central canal neural foramina and paraspinal soft tissues. A CT C/A/P showed stage IV disease with anterior third right rib oseous lesion extending to a 1.5cm soft tissue lesion on the right lung pleural surface. It also showed a left pleural space surface lesion, a 2.4cm right axillary lymph node, and extensive RP and mesenteric lymphadenopathy with a maira conglomerate mass. He underwent a two stage neurological intervention involving a T8 vertebral column resection, a T6-T10 fusion for tumor resection, a L4 partial corpectomy, and an L2 pelvis fusion. The course was complicated by bilateral soleal DVTs. He was then treated with R_CHOP x 6. The course was complicated by prolonged cytopenias. He then received HD MTX s 4 (completed 9/2/24). He was admitted 1/23/25 with transaminitis, likely an obstruction related to relapsed disease. A CT  A/P 1/23/25 showed increased mesenteric and RP lymphadenopathy and a new left lower renal pole and biliary ductal dilatation. HE had an ERCP with stent placement 1/24/25. He was initially worked up for CAR T therapy, but failed collection secondary to low lymphocyte counts. He was started on polatuzumab / rituximab / revlimid as a bridge to allogeneic pbsct. He received 4 cycles. CT A/P 4/21/25 showed response. He has no complaints on admission.  (14 May 2025 10:39)      # Fever  - Tylenol   - BC x2, UA/UC ordered  - CXR ordered  - Levaquin stop and Zosyn 4.5 Q8 started   - Will Monitor Fever  - Attending was notified and agreed with plan.  - Will endorse Day team     Sommer Aldana PA-C   Department of Medicine  [facility]

## 2025-05-21 NOTE — PROGRESS NOTE ADULT - SUBJECTIVE AND OBJECTIVE BOX
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 with Flu / Bu / Cy / TBI prep regimen for treatment of refractory DLBCL    Disease: DLBCL  Type of transplant: Haplo-identical (daughter)   Prep Regimen: Flu / Bu / Cy / TBI   ABO / CMV:   Recipient: A POS/ NEG   Donor: A POS / NEG     S: Patient seen and examined with HPC Transplant Team:       O:   Vital Signs Last 24 Hrs  T(C): 37.8 (21 May 2025 05:30), Max: 37.8 (21 May 2025 05:30)  T(F): 100 (21 May 2025 05:30), Max: 100 (21 May 2025 05:30)  HR: 79 (21 May 2025 05:30) (61 - 82)  BP: 110/55 (21 May 2025 05:30) (100/60 - 145/72)  BP(mean): --  RR: 16 (21 May 2025 05:30) (16 - 18)  SpO2: 97% (21 May 2025 05:30) (97% - 100%)    Parameters below as of 21 May 2025 05:30  Patient On (Oxygen Delivery Method): room air    Admit weight: 47.1kg   Daily Weight in kG:     Intake / Output:   05-20 @ 07:01  -  05-21 @ 07:00  --------------------------------------------------------  IN: 4126 mL / OUT: 2850 mL / NET: 1276 mL    PE:   Oropharynx: no erythema or ulcerations   Oral Mucositis:                -                                     Grade: n/a  CVS: S1, S2 RRR   Lungs: CTA throughout bilaterally   Abdomen: + BS x 4, soft, NT, ND   Extremities: no edema   Gastric Mucositis:       -                                          Grade: n/a   Intestinal Mucositis:     -                                         Grade: n/a   Skin: no rash   TLC: CDI  Neuro: A&OX3      Labs:   CBC Full  -  ( 21 May 2025 06:25 )  WBC Count : 0.25 K/uL  Hemoglobin : 9.4 g/dL  Hematocrit : 29.3 %  Platelet Count - Automated : 169 K/uL  Mean Cell Volume : 87.5 fl  Mean Cell Hemoglobin : 28.1 pg  Mean Cell Hemoglobin Concentration : 32.1 g/dL  Auto Neutrophil # : x  Auto Lymphocyte # : x  Auto Monocyte # : x  Auto Eosinophil # : x  Auto Basophil # : x  Auto Neutrophil % : x  Auto Lymphocyte % : x  Auto Monocyte % : x  Auto Eosinophil % : x  Auto Basophil % : x                          9.4    0.25  )-----------( 169      ( 21 May 2025 06:25 )             29.3     05-21    138  |  105  |  12  ----------------------------<  96  3.6   |  23  |  0.50    Ca    8.1[L]      21 May 2025 06:24  Phos  2.9     05-21  Mg     2.0     05-21    TPro  4.8[L]  /  Alb  3.1[L]  /  TBili  0.8  /  DBili  x   /  AST  17  /  ALT  25  /  AlkPhos  157[H]  05-21      LIVER FUNCTIONS - ( 21 May 2025 06:24 )  Alb: 3.1 g/dL / Pro: 4.8 g/dL / ALK PHOS: 157 U/L / ALT: 25 U/L / AST: 17 U/L / GGT: x           Lactate Dehydrogenase, Serum: 191 U/L (05-21 @ 06:24)      Cultures:         Radiology:       Meds:   Antimicrobials:   acyclovir   Oral Tab/Cap 800 milliGRAM(s) Oral every 12 hours  levoFLOXacin  Tablet 500 milliGRAM(s) Oral every 24 hours  posaconazole DR Tablet 300 milliGRAM(s) Oral daily  vancomycin    Solution 125 milliGRAM(s) Oral every 12 hours      Heme / Onc:       GI:  pantoprazole    Tablet 40 milliGRAM(s) Oral before breakfast  sodium bicarbonate Mouth Rinse 10 milliLiter(s) Swish and Spit five times a day  ursodiol Capsule 300 milliGRAM(s) Oral two times a day      Cardiovascular:       Immunologic:       Other medications:   Biotene Dry Mouth Oral Rinse 5 milliLiter(s) Swish and Spit five times a day  chlorhexidine 4% Liquid 1 Application(s) Topical <User Schedule>  DULoxetine 60 milliGRAM(s) Oral <User Schedule>  oxyCODONE    IR 5 milliGRAM(s) Oral <User Schedule>  phytonadione   Solution 5 milliGRAM(s) Oral <User Schedule>  sodium chloride 0.9%. 500 milliLiter(s) IV Continuous <Continuous>  sodium chloride 0.9%. 500 milliLiter(s) IV Continuous <Continuous>  sodium chloride 0.9%. 500 milliLiter(s) IV Continuous <Continuous>  sodium chloride 0.9%. 500 milliLiter(s) IV Continuous <Continuous>  sodium chloride 0.9%. 1000 milliLiter(s) IV Continuous <Continuous>  tiZANidine 4 milliGRAM(s) Oral <User Schedule>      PRN:   acetaminophen     Tablet .. 650 milliGRAM(s) Oral every 6 hours PRN  prochlorperazine   Injectable 10 milliGRAM(s) IV Push every 6 hours PRN  sodium chloride 0.9% lock flush 10 milliLiter(s) IV Push every 1 hour PRN      A/P:  69 year old male with refractory DLBCL, status post R-CHOP x 6, HD MTX x 4, salvage polatuzumab / rituximab / revlimid x 4, admitted for a haplo-identical pbsct from his daughter with Flu / Bu / Cy / TBI prep regimen   Day +1  5/15- busulfan 2/2, fludarabine 2/5. No acute events overnight, vital signs are stable; continue keppra ppx for 24 hours post infusion of last dose of busulfan. No tylenol or posaconazole until day 0. Not neutropenic today, will d/c levaquin / vancomycin.   5/16 - fludarabine 3/5. VSS, afebrile. No acute events overnight.  5/17 - fludarabine 4/5. VSS and afebrile. No acute events overnight. Neutropenic today, started on ppx levaquin/vancomycin PO. No Tylenol or Azoles until day 0.  5/18 - fludarabine 5/5. VSS, remains afebrile. No acute events overnight. No Tylenol or Azoles until day 0.  5/19- TBI today. No acute events overnight. Vital signs are stable.   5/20 - will receive Haplo allogeneic SCT today. VSS, afebrile.    1. Infectious Disease:   acyclovir   Oral Tab/Cap 800 milliGRAM(s) Oral every 12 hours  levoFLOXacin  Tablet 500 milliGRAM(s) Oral every 24 hours  posaconazole DR Tablet 300 milliGRAM(s) Oral daily  vancomycin    Solution 125 milliGRAM(s) Oral every 12 hours    2. VOD Prophylaxis:   ursodiol Capsule 300 milliGRAM(s) Oral two times a day    3. GI Prophylaxis:   pantoprazole Tablet 40 milliGRAM(s) Oral before breakfast    4. Mouthcare - NS / NaHCO3 rinses, Biotene; Skin care     5. GVHD prophylaxis   PTCy days +3, +4   Tacrolimus gtt to start on day + 5  Abatacept days +5, +14, +28, +56    6. Transfuse & replete electrolytes prn     7. IV hydration, daily weights, strict I&O, prn diuresis     8. PO intake as tolerated, nutrition follow up as needed    9. Antiemetics, anti-diarrhea medications:   prochlorperazine   Injectable 10 milliGRAM(s) IV Push every 6 hours PRN    10. OOB as tolerated, physical therapy consult if needed     11. Monitor coags / fibrinogen 2x week, vitamin K as needed   phytonadione Solution 5 milliGRAM(s) Oral <User Schedule>    12. Monitor closely for clinical changes, monitor for fevers     13. Emotional support provided, plan of care discussed and questions addressed     14. Patient education done regarding plan of care, restrictions and discharge planning     15. Continue regular social work input     I have written the above note for Dr. Singh who performed service with me in the room.   Ramirez Puri PA-C ([phone])    I have seen and examined patient with PA, I agree with above note as scribed.              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 with Flu / Bu / Cy / TBI prep regimen for treatment of refractory DLBCL    Disease: DLBCL  Type of transplant: Haplo-identical (daughter)   Prep Regimen: Flu / Bu / Cy / TBI   ABO / CMV:   Recipient: A POS/ NEG   Donor: A POS / NEG     S: Patient seen and examined with HPC Transplant Team:   +feels well  OOB  Tolerating PO    O:   Vital Signs Last 24 Hrs  T(C): 37.8 (21 May 2025 05:30), Max: 37.8 (21 May 2025 05:30)  T(F): 100 (21 May 2025 05:30), Max: 100 (21 May 2025 05:30)  HR: 79 (21 May 2025 05:30) (61 - 82)  BP: 110/55 (21 May 2025 05:30) (100/60 - 145/72)  BP(mean): --  RR: 16 (21 May 2025 05:30) (16 - 18)  SpO2: 97% (21 May 2025 05:30) (97% - 100%)    Parameters below as of 21 May 2025 05:30  Patient On (Oxygen Delivery Method): room air    Admit weight: 47.1kg   Daily Weight in k kg     Intake / Output:   05-20 @ 07:01  -  05-21 @ 07:00  --------------------------------------------------------  IN: 4126 mL / OUT: 2850 mL / NET: 1276 mL    PE:   Oropharynx: no erythema or ulcerations   Oral Mucositis:                -                                     Grade: n/a  CVS: S1, S2 RRR   Lungs: CTA throughout bilaterally   Abdomen: + BS x 4, soft, NT, ND   Extremities: no edema   Gastric Mucositis:       -                                          Grade: n/a   Intestinal Mucositis:     -                                         Grade: n/a   Skin: no rash   TLC: CDI  Neuro: A&OX3      Labs:   CBC Full  -  ( 21 May 2025 06:25 )  WBC Count : 0.25 K/uL  Hemoglobin : 9.4 g/dL  Hematocrit : 29.3 %  Platelet Count - Automated : 169 K/uL  Mean Cell Volume : 87.5 fl  Mean Cell Hemoglobin : 28.1 pg  Mean Cell Hemoglobin Concentration : 32.1 g/dL  Auto Neutrophil # : x  Auto Lymphocyte # : x  Auto Monocyte # : x  Auto Eosinophil # : x  Auto Basophil # : x  Auto Neutrophil % : x  Auto Lymphocyte % : x  Auto Monocyte % : x  Auto Eosinophil % : x  Auto Basophil % : x                          9.4    0.25  )-----------( 169      ( 21 May 2025 06:25 )             29.3         138  |  105  |  12  ----------------------------<  96  3.6   |  23  |  0.50    Ca    8.1[L]      21 May 2025 06:24  Phos  2.9       Mg     2.0         TPro  4.8[L]  /  Alb  3.1[L]  /  TBili  0.8  /  DBili  x   /  AST  17  /  ALT  25  /  AlkPhos  157[H]        LIVER FUNCTIONS - ( 21 May 2025 06:24 )  Alb: 3.1 g/dL / Pro: 4.8 g/dL / ALK PHOS: 157 U/L / ALT: 25 U/L / AST: 17 U/L / GGT: x           Lactate Dehydrogenase, Serum: 191 U/L ( @ 06:24)        Meds:   Antimicrobials:   acyclovir   Oral Tab/Cap 800 milliGRAM(s) Oral every 12 hours  levoFLOXacin  Tablet 500 milliGRAM(s) Oral every 24 hours  posaconazole DR Tablet 300 milliGRAM(s) Oral daily  vancomycin    Solution 125 milliGRAM(s) Oral every 12 hours      Heme / Onc:       GI:  pantoprazole    Tablet 40 milliGRAM(s) Oral before breakfast  sodium bicarbonate Mouth Rinse 10 milliLiter(s) Swish and Spit five times a day  ursodiol Capsule 300 milliGRAM(s) Oral two times a day      Cardiovascular:       Immunologic:       Other medications:   Biotene Dry Mouth Oral Rinse 5 milliLiter(s) Swish and Spit five times a day  chlorhexidine 4% Liquid 1 Application(s) Topical <User Schedule>  DULoxetine 60 milliGRAM(s) Oral <User Schedule>  oxyCODONE    IR 5 milliGRAM(s) Oral <User Schedule>  phytonadione   Solution 5 milliGRAM(s) Oral <User Schedule>  sodium chloride 0.9%. 500 milliLiter(s) IV Continuous <Continuous>  sodium chloride 0.9%. 500 milliLiter(s) IV Continuous <Continuous>  sodium chloride 0.9%. 500 milliLiter(s) IV Continuous <Continuous>  sodium chloride 0.9%. 500 milliLiter(s) IV Continuous <Continuous>  sodium chloride 0.9%. 1000 milliLiter(s) IV Continuous <Continuous>  tiZANidine 4 milliGRAM(s) Oral <User Schedule>      PRN:   acetaminophen     Tablet .. 650 milliGRAM(s) Oral every 6 hours PRN  prochlorperazine   Injectable 10 milliGRAM(s) IV Push every 6 hours PRN  sodium chloride 0.9% lock flush 10 milliLiter(s) IV Push every 1 hour PRN      A/P:  69 year old male with refractory DLBCL, status post R-CHOP x 6, HD MTX x 4, salvage polatuzumab / rituximab / revlimid x 4, admitted for a haplo-identical pbsct from his daughter with Flu / Bu / Cy / TBI prep regimen   Day +1  5/15- busulfan 2/, fludarabine . No acute events overnight, vital signs are stable; continue keppra ppx for 24 hours post infusion of last dose of busulfan. No tylenol or posaconazole until day 0. Not neutropenic today, will d/c levaquin / vancomycin.    - fludarabine 3/. VSS, afebrile. No acute events overnight.   - fludarabine . VSS and afebrile. No acute events overnight. Neutropenic today, started on ppx levaquin/vancomycin PO. No Tylenol or Azoles until day 0.   - fludarabine . VSS, remains afebrile. No acute events overnight. No Tylenol or Azoles until day 0.  - TBI today. No acute events overnight. Vital signs are stable.    - will receive Haplo allogeneic SCT today. VSS, afebrile.  - tolerated HPC infusion well. continue IVF hydration 24 hrs post cells, continue to monitor I&Os    1. Infectious Disease:   acyclovir   Oral Tab/Cap 800 milliGRAM(s) Oral every 12 hours  levoFLOXacin  Tablet 500 milliGRAM(s) Oral every 24 hours  posaconazole DR Tablet 300 milliGRAM(s) Oral daily  vancomycin    Solution 125 milliGRAM(s) Oral every 12 hours    2. VOD Prophylaxis:   ursodiol Capsule 300 milliGRAM(s) Oral two times a day    3. GI Prophylaxis:   pantoprazole Tablet 40 milliGRAM(s) Oral before breakfast    4. Mouthcare - NS / NaHCO3 rinses, Biotene; Skin care     5. GVHD prophylaxis   PTCy days +3, +4   Tacrolimus gtt to start on day + 5  Abatacept days +5, +14, +28, +56    6. Transfuse & replete electrolytes prn     7. IV hydration, daily weights, strict I&O, prn diuresis     8. PO intake as tolerated, nutrition follow up as needed    9. Antiemetics, anti-diarrhea medications:   prochlorperazine   Injectable 10 milliGRAM(s) IV Push every 6 hours PRN    10. OOB as tolerated, physical therapy consult if needed     11. Monitor coags / fibrinogen 2x week, vitamin K as needed   phytonadione Solution 5 milliGRAM(s) Oral <User Schedule>    12. Monitor closely for clinical changes, monitor for fevers     13. Emotional support provided, plan of care discussed and questions addressed     14. Patient education done regarding plan of care, restrictions and discharge planning     15. Continue regular social work input     I have written the above note for Dr. Singh who performed service with me in the room.   Ramirez Puri PA-C ([phone])    I have seen and examined patient with PA, I agree with above note as scribed.              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 with Flu / Bu / Cy / TBI prep regimen for treatment of refractory DLBCL    Disease: DLBCL  Type of transplant: Haplo-identical (daughter)   Prep Regimen: Flu / Bu / Cy / TBI   ABO / CMV:   Recipient: A POS/ NEG   Donor: A POS / NEG     S: Patient seen and examined with HPC Transplant Team:   +feels well  OOB  Tolerating PO    O:   Vital Signs Last 24 Hrs  T(C): 37.8 (21 May 2025 05:30), Max: 37.8 (21 May 2025 05:30)  T(F): 100 (21 May 2025 05:30), Max: 100 (21 May 2025 05:30)  HR: 79 (21 May 2025 05:30) (61 - 82)  BP: 110/55 (21 May 2025 05:30) (100/60 - 145/72)  BP(mean): --  RR: 16 (21 May 2025 05:30) (16 - 18)  SpO2: 97% (21 May 2025 05:30) (97% - 100%)    Parameters below as of 21 May 2025 05:30  Patient On (Oxygen Delivery Method): room air    Admit weight: 47.1kg   Daily Weight in k kg     Intake / Output:   05-20 @ 07:01  -  05-21 @ 07:00  --------------------------------------------------------  IN: 4126 mL / OUT: 2850 mL / NET: 1276 mL    PE:   Oropharynx: no erythema or ulcerations   Oral Mucositis:                -                                     Grade: n/a  CVS: S1, S2 RRR   Lungs: CTA throughout bilaterally   Abdomen: + BS x 4, soft, NT, ND   Extremities: no edema   Gastric Mucositis:       -                                          Grade: n/a   Intestinal Mucositis:     -                                         Grade: n/a   Skin: no rash   TLC: CDI  Neuro: A&OX3      Labs:   CBC Full  -  ( 21 May 2025 06:25 )  WBC Count : 0.25 K/uL  Hemoglobin : 9.4 g/dL  Hematocrit : 29.3 %  Platelet Count - Automated : 169 K/uL  Mean Cell Volume : 87.5 fl  Mean Cell Hemoglobin : 28.1 pg  Mean Cell Hemoglobin Concentration : 32.1 g/dL  Auto Neutrophil # : x  Auto Lymphocyte # : x  Auto Monocyte # : x  Auto Eosinophil # : x  Auto Basophil # : x  Auto Neutrophil % : x  Auto Lymphocyte % : x  Auto Monocyte % : x  Auto Eosinophil % : x  Auto Basophil % : x                          9.4    0.25  )-----------( 169      ( 21 May 2025 06:25 )             29.3         138  |  105  |  12  ----------------------------<  96  3.6   |  23  |  0.50    Ca    8.1[L]      21 May 2025 06:24  Phos  2.9       Mg     2.0         TPro  4.8[L]  /  Alb  3.1[L]  /  TBili  0.8  /  DBili  x   /  AST  17  /  ALT  25  /  AlkPhos  157[H]        LIVER FUNCTIONS - ( 21 May 2025 06:24 )  Alb: 3.1 g/dL / Pro: 4.8 g/dL / ALK PHOS: 157 U/L / ALT: 25 U/L / AST: 17 U/L / GGT: x           Lactate Dehydrogenase, Serum: 191 U/L ( @ 06:24)        Meds:   Antimicrobials:   acyclovir   Oral Tab/Cap 800 milliGRAM(s) Oral every 12 hours  levoFLOXacin  Tablet 500 milliGRAM(s) Oral every 24 hours  posaconazole DR Tablet 300 milliGRAM(s) Oral daily  vancomycin    Solution 125 milliGRAM(s) Oral every 12 hours      Heme / Onc:       GI:  pantoprazole    Tablet 40 milliGRAM(s) Oral before breakfast  sodium bicarbonate Mouth Rinse 10 milliLiter(s) Swish and Spit five times a day  ursodiol Capsule 300 milliGRAM(s) Oral two times a day      Cardiovascular:       Immunologic:       Other medications:   Biotene Dry Mouth Oral Rinse 5 milliLiter(s) Swish and Spit five times a day  chlorhexidine 4% Liquid 1 Application(s) Topical <User Schedule>  DULoxetine 60 milliGRAM(s) Oral <User Schedule>  oxyCODONE    IR 5 milliGRAM(s) Oral <User Schedule>  phytonadione   Solution 5 milliGRAM(s) Oral <User Schedule>  sodium chloride 0.9%. 500 milliLiter(s) IV Continuous <Continuous>  sodium chloride 0.9%. 500 milliLiter(s) IV Continuous <Continuous>  sodium chloride 0.9%. 500 milliLiter(s) IV Continuous <Continuous>  sodium chloride 0.9%. 500 milliLiter(s) IV Continuous <Continuous>  sodium chloride 0.9%. 1000 milliLiter(s) IV Continuous <Continuous>  tiZANidine 4 milliGRAM(s) Oral <User Schedule>      PRN:   acetaminophen     Tablet .. 650 milliGRAM(s) Oral every 6 hours PRN  prochlorperazine   Injectable 10 milliGRAM(s) IV Push every 6 hours PRN  sodium chloride 0.9% lock flush 10 milliLiter(s) IV Push every 1 hour PRN      A/P:  69 year old male with refractory DLBCL, status post R-CHOP x 6, HD MTX x 4, salvage polatuzumab / rituximab / revlimid x 4, admitted for a haplo-identical pbsct from his daughter with Flu / Bu / Cy / TBI prep regimen   Day +1  5/15- busulfan , fludarabine . No acute events overnight, vital signs are stable; continue keppra ppx for 24 hours post infusion of last dose of busulfan. No tylenol or posaconazole until day 0. Not neutropenic today, will d/c levaquin / vancomycin.    - fludarabine 3/5. VSS, afebrile. No acute events overnight.   - fludarabine . VSS and afebrile. No acute events overnight. Neutropenic today, started on ppx levaquin/vancomycin PO. No Tylenol or Azoles until day 0.   - fludarabine . VSS, remains afebrile. No acute events overnight. No Tylenol or Azoles until day 0.  - TBI today. No acute events overnight. Vital signs are stable.    - will receive Haplo allogeneic SCT today. VSS, afebrile.  - tolerated HPC infusion well. continue IVF hydration 24 hrs post cells, continue to monitor I&Os    1. Infectious Disease:   acyclovir   Oral Tab/Cap 800 milliGRAM(s) Oral every 12 hours  levoFLOXacin  Tablet 500 milliGRAM(s) Oral every 24 hours  posaconazole DR Tablet 300 milliGRAM(s) Oral daily  vancomycin    Solution 125 milliGRAM(s) Oral every 12 hours    2. VOD Prophylaxis:   ursodiol Capsule 300 milliGRAM(s) Oral two times a day    3. GI Prophylaxis:   pantoprazole Tablet 40 milliGRAM(s) Oral before breakfast    4. Mouthcare - NS / NaHCO3 rinses, Biotene; Skin care     5. GVHD prophylaxis   PTCy days +3, +4   Tacrolimus gtt to start on day + 5  Abatacept days +5, +14, +28, +56    6. Transfuse & replete electrolytes prn     7. IV hydration, daily weights, strict I&O, prn diuresis     8. PO intake as tolerated, nutrition follow up as needed    9. Antiemetics, anti-diarrhea medications:   prochlorperazine   Injectable 10 milliGRAM(s) IV Push every 6 hours PRN    10. OOB as tolerated, physical therapy consult if needed     11. Monitor coags / fibrinogen 2x week, vitamin K as needed   phytonadione Solution 5 milliGRAM(s) Oral <User Schedule>    12. Monitor closely for clinical changes, monitor for fevers     13. Emotional support provided, plan of care discussed and questions addressed     14. Patient education done regarding plan of care, restrictions and discharge planning     15. Continue regular social work input     I have written the above note for Dr. Singh who performed service with me in the room.   Ramirez Puri PA-C ([phone])    I have seen and examined patient with PA, I agree with above note as scribed.

## 2025-05-22 LAB
ALBUMIN SERPL ELPH-MCNC: 3.4 G/DL — SIGNIFICANT CHANGE UP (ref 3.3–5)
ALP SERPL-CCNC: 154 U/L — HIGH (ref 40–120)
ALT FLD-CCNC: 23 U/L — SIGNIFICANT CHANGE UP (ref 10–45)
ANION GAP SERPL CALC-SCNC: 12 MMOL/L — SIGNIFICANT CHANGE UP (ref 5–17)
APTT BLD: 31.8 SEC — SIGNIFICANT CHANGE UP (ref 26.1–36.8)
AST SERPL-CCNC: 19 U/L — SIGNIFICANT CHANGE UP (ref 10–40)
BILIRUB SERPL-MCNC: 0.8 MG/DL — SIGNIFICANT CHANGE UP (ref 0.2–1.2)
BUN SERPL-MCNC: 16 MG/DL — SIGNIFICANT CHANGE UP (ref 7–23)
CALCIUM SERPL-MCNC: 8.4 MG/DL — SIGNIFICANT CHANGE UP (ref 8.4–10.5)
CHLORIDE SERPL-SCNC: 103 MMOL/L — SIGNIFICANT CHANGE UP (ref 96–108)
CO2 SERPL-SCNC: 24 MMOL/L — SIGNIFICANT CHANGE UP (ref 22–31)
CREAT SERPL-MCNC: 0.51 MG/DL — SIGNIFICANT CHANGE UP (ref 0.5–1.3)
CULTURE RESULTS: SIGNIFICANT CHANGE UP
EGFR: 111 ML/MIN/1.73M2 — SIGNIFICANT CHANGE UP
EGFR: 111 ML/MIN/1.73M2 — SIGNIFICANT CHANGE UP
GLUCOSE SERPL-MCNC: 97 MG/DL — SIGNIFICANT CHANGE UP (ref 70–99)
HCT VFR BLD CALC: 31.3 % — LOW (ref 39–50)
HGB BLD-MCNC: 10 G/DL — LOW (ref 13–17)
INR BLD: 0.97 RATIO — SIGNIFICANT CHANGE UP (ref 0.85–1.16)
LDH SERPL L TO P-CCNC: 208 U/L — SIGNIFICANT CHANGE UP (ref 50–242)
MAGNESIUM SERPL-MCNC: 2 MG/DL — SIGNIFICANT CHANGE UP (ref 1.6–2.6)
MCHC RBC-ENTMCNC: 28.2 PG — SIGNIFICANT CHANGE UP (ref 27–34)
MCHC RBC-ENTMCNC: 31.9 G/DL — LOW (ref 32–36)
MCV RBC AUTO: 88.4 FL — SIGNIFICANT CHANGE UP (ref 80–100)
NRBC BLD AUTO-RTO: 0 /100 WBCS — SIGNIFICANT CHANGE UP (ref 0–0)
PHOSPHATE SERPL-MCNC: 3.1 MG/DL — SIGNIFICANT CHANGE UP (ref 2.5–4.5)
PLATELET # BLD AUTO: 130 K/UL — LOW (ref 150–400)
POTASSIUM SERPL-MCNC: 3.2 MMOL/L — LOW (ref 3.5–5.3)
POTASSIUM SERPL-SCNC: 3.2 MMOL/L — LOW (ref 3.5–5.3)
PROT SERPL-MCNC: 5 G/DL — LOW (ref 6–8.3)
PROTHROM AB SERPL-ACNC: 11.2 SEC — SIGNIFICANT CHANGE UP (ref 9.9–13.4)
RBC # BLD: 3.54 M/UL — LOW (ref 4.2–5.8)
RBC # FLD: 16.8 % — HIGH (ref 10.3–14.5)
SODIUM SERPL-SCNC: 139 MMOL/L — SIGNIFICANT CHANGE UP (ref 135–145)
SPECIMEN SOURCE: SIGNIFICANT CHANGE UP
WBC # BLD: 0.33 K/UL — CRITICAL LOW (ref 3.8–10.5)
WBC # FLD AUTO: 0.33 K/UL — CRITICAL LOW (ref 3.8–10.5)

## 2025-05-22 PROCEDURE — 99233 SBSQ HOSP IP/OBS HIGH 50: CPT | Mod: FS

## 2025-05-22 RX ORDER — LIDOCAINE HYDROCHLORIDE 20 MG/ML
1 JELLY TOPICAL DAILY
Refills: 0 | Status: DISCONTINUED | OUTPATIENT
Start: 2025-05-22 | End: 2025-05-22

## 2025-05-22 RX ORDER — HYDROCORTISONE 10 MG/G
1 CREAM TOPICAL DAILY
Refills: 0 | Status: DISCONTINUED | OUTPATIENT
Start: 2025-05-22 | End: 2025-05-28

## 2025-05-22 RX ORDER — LIDOCAINE HYDROCHLORIDE 20 MG/ML
1 JELLY TOPICAL DAILY
Refills: 0 | Status: DISCONTINUED | OUTPATIENT
Start: 2025-05-22 | End: 2025-06-05

## 2025-05-22 RX ORDER — OXYCODONE HYDROCHLORIDE 30 MG/1
5 TABLET ORAL ONCE
Refills: 0 | Status: DISCONTINUED | OUTPATIENT
Start: 2025-05-22 | End: 2025-05-22

## 2025-05-22 RX ADMIN — Medication 25 GRAM(S): at 21:35

## 2025-05-22 RX ADMIN — Medication 650 MILLIGRAM(S): at 13:57

## 2025-05-22 RX ADMIN — OXYCODONE HYDROCHLORIDE 5 MILLIGRAM(S): 30 TABLET ORAL at 17:06

## 2025-05-22 RX ADMIN — Medication 5 MILLIGRAM(S): at 11:29

## 2025-05-22 RX ADMIN — Medication 800 MILLIGRAM(S): at 05:07

## 2025-05-22 RX ADMIN — URSODIOL 300 MILLIGRAM(S): 300 CAPSULE ORAL at 05:08

## 2025-05-22 RX ADMIN — Medication 5 MILLILITER(S): at 20:43

## 2025-05-22 RX ADMIN — Medication 25 GRAM(S): at 05:08

## 2025-05-22 RX ADMIN — Medication 10 MILLILITER(S): at 20:43

## 2025-05-22 RX ADMIN — OXYCODONE HYDROCHLORIDE 5 MILLIGRAM(S): 30 TABLET ORAL at 21:00

## 2025-05-22 RX ADMIN — TIZANIDINE 4 MILLIGRAM(S): 4 TABLET ORAL at 20:43

## 2025-05-22 RX ADMIN — URSODIOL 300 MILLIGRAM(S): 300 CAPSULE ORAL at 17:53

## 2025-05-22 RX ADMIN — Medication 650 MILLIGRAM(S): at 21:10

## 2025-05-22 RX ADMIN — Medication 125 MILLIGRAM(S): at 05:07

## 2025-05-22 RX ADMIN — Medication 40 MILLIEQUIVALENT(S): at 11:29

## 2025-05-22 RX ADMIN — LIDOCAINE HYDROCHLORIDE 1 PATCH: 20 JELLY TOPICAL at 20:44

## 2025-05-22 RX ADMIN — DULOXETINE 60 MILLIGRAM(S): 20 CAPSULE, DELAYED RELEASE ORAL at 20:43

## 2025-05-22 RX ADMIN — Medication 800 MILLIGRAM(S): at 17:53

## 2025-05-22 RX ADMIN — Medication 650 MILLIGRAM(S): at 21:00

## 2025-05-22 RX ADMIN — Medication 10 MILLIGRAM(S): at 11:29

## 2025-05-22 RX ADMIN — Medication 5 MILLILITER(S): at 08:33

## 2025-05-22 RX ADMIN — Medication 5 MILLILITER(S): at 16:07

## 2025-05-22 RX ADMIN — LIDOCAINE HYDROCHLORIDE 1 PATCH: 20 JELLY TOPICAL at 11:29

## 2025-05-22 RX ADMIN — Medication 40 MILLIGRAM(S): at 05:08

## 2025-05-22 RX ADMIN — Medication 10 MILLILITER(S): at 12:25

## 2025-05-22 RX ADMIN — OXYCODONE HYDROCHLORIDE 5 MILLIGRAM(S): 30 TABLET ORAL at 20:43

## 2025-05-22 RX ADMIN — Medication 10 MILLILITER(S): at 08:33

## 2025-05-22 RX ADMIN — Medication 1 APPLICATION(S): at 08:33

## 2025-05-22 RX ADMIN — Medication 650 MILLIGRAM(S): at 14:49

## 2025-05-22 RX ADMIN — Medication 5 MILLILITER(S): at 12:25

## 2025-05-22 RX ADMIN — Medication 10 MILLIGRAM(S): at 21:11

## 2025-05-22 RX ADMIN — POSACONAZOLE 300 MILLIGRAM(S): 100 TABLET, DELAYED RELEASE ORAL at 11:29

## 2025-05-22 RX ADMIN — Medication 25 GRAM(S): at 15:05

## 2025-05-22 RX ADMIN — Medication 125 MILLIGRAM(S): at 17:54

## 2025-05-22 RX ADMIN — Medication 10 MILLILITER(S): at 16:07

## 2025-05-22 RX ADMIN — OXYCODONE HYDROCHLORIDE 5 MILLIGRAM(S): 30 TABLET ORAL at 16:06

## 2025-05-22 NOTE — PHARMACOTHERAPY INTERVENTION NOTE - COMMENTS
69-year-old male with 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 He is admitted for an alloSCT with BERTRAM Bu/Flu/Cy/TBI conditioning and CAST for GVHD ppx. Today is day +2. Course has been complicated by febrile neutropenia/haplostorm.    Conditioning Regimen: RI Bu/Flu/Cy/TBI  Day -6 (5/14) to Day -5 (5/15): Busulfan 130 mg/m2 IV administered over 3 hours for 2 doses -> avoid APAP and azole antifungals for 48 hours post busulfan (until 5/20)  Day -6 (5/14) to Day -2 (5/18) Fludarabine 30 mg/m2 IV administered over 30 minutes for 5 doses  Moving up by 1 hour every day to ensure 48 hours between final dose and CTP infusion - last dose complete on 5/18 @ 1045 am  Day -3 (5/17) to Day -2 (5/18) Cyclophosphamide 14.5 mg/k2 IV administered over 1 hour for 2 doses  Day -1 (5/19)  cGy x1    GVHD ppx: CAST  Cyclophosphamide 50 mg/kg IV daily on Day +3 (5/23) and Day +4 (5/24).   Abatacept IV 10 mg/kg on days +5 (5/25), +14 (6/3), +28 (6/17), and +56 (7/15).  Patient will start tacrolimus 0.02 mg/kg IV on Sunday, 5/25 (day +5) - please order a one time trough on Wednesday, 5/28 (day +8) and one time trough on Saturday, 5/31 along with troughs every Tuesday to start on 6/3. Goal trough is 8-12 ng/mL. Please ensure trough is drawn peripherally.    Antimicrobial ppx.:  Started antimicrobial (levofloxacin 500 mg PO QD), and PO vanco 125 mg BID once ANC <1000 (5/17) & will continue until ANC >500 for 3 consecutive days. Started antifungal (posaconazole 300 mg PO QD) on day 0 (5/20) and will continue until day +75. Will also plan to start GCSF on day +7 (5/27) & stop once ANC >1500 for two days.     Febrile neutropenia/haplostorm:  Patient became febrile to 100.5F on 5/21 @ 1948 & was ordered for pip/tazo 4.5 g IV every 8 hours (5/21-; day 1 to 2). BCx from 5/21 is pending & CXR shows atelectasis. Most likely 2/2 haplostorm, if BCx remain negative & patient is afebrile x24 hours,     Evelyne Murcia, PharmD, BCOP  Stem Cell Transplant Clinical Pharmacy Specialist  Available via [employer] Teams

## 2025-05-22 NOTE — PROGRESS NOTE ADULT - SUBJECTIVE AND OBJECTIVE BOX
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 with Flu / Bu / Cy / TBI prep regimen for treatment of refractory DLBCL    Disease: DLBCL  Type of transplant: Haplo-identical (daughter)   Prep Regimen: Flu / Bu / Cy / TBI   ABO / CMV:   Recipient: A POS/ NEG   Donor: A POS / NEG     S: Patient seen and examined with HPC Transplant Team:         O:   Vital Signs Last 24 Hrs  T(C): 36.6 (22 May 2025 05:20), Max: 38.1 (21 May 2025 19:48)  T(F): 97.9 (22 May 2025 05:20), Max: 100.5 (21 May 2025 19:48)  HR: 85 (22 May 2025 05:20) (72 - 85)  BP: 120/72 (22 May 2025 05:20) (100/63 - 121/76)  BP(mean): --  RR: 17 (22 May 2025 05:20) (17 - 20)  SpO2: 98% (22 May 2025 05:20) (97% - 100%)    Parameters below as of 22 May 2025 05:20  Patient On (Oxygen Delivery Method): room air    Admit weight: 47.1    Daily     Intake / Output:   05-21 @ 07:01  -  05-22 @ 07:00  --------------------------------------------------------  IN: 2103 mL / OUT: 1700 mL / NET: 403 mL    PE:   Oropharynx: no erythema or ulcerations   Oral Mucositis:                -                                     Grade: n/a  CVS: S1, S2 RRR   Lungs: CTA throughout bilaterally   Abdomen: + BS x 4, soft, NT, ND   Extremities: no edema   Gastric Mucositis:       -                                          Grade: n/a   Intestinal Mucositis:     -                                         Grade: n/a   Skin: no rash   TLC: CDI  Neuro: A&OX3      Labs:   CBC Full  -  ( 22 May 2025 06:26 )  WBC Count : 0.33 K/uL  Hemoglobin : 10.0 g/dL  Hematocrit : 31.3 %  Platelet Count - Automated : 130 K/uL  Mean Cell Volume : 88.4 fl  Mean Cell Hemoglobin : 28.2 pg  Mean Cell Hemoglobin Concentration : 31.9 g/dL  Auto Neutrophil # : x  Auto Lymphocyte # : x  Auto Monocyte # : x  Auto Eosinophil # : x  Auto Basophil # : x  Auto Neutrophil % : x  Auto Lymphocyte % : x  Auto Monocyte % : x  Auto Eosinophil % : x  Auto Basophil % : x                          10.0   0.33  )-----------( 130      ( 22 May 2025 06:26 )             31.3     05-22    139  |  103  |  16  ----------------------------<  97  3.2[L]   |  24  |  0.51    Ca    8.4      22 May 2025 06:26  Phos  3.1     05-22  Mg     2.0     05-22    TPro  5.0[L]  /  Alb  3.4  /  TBili  0.8  /  DBili  x   /  AST  19  /  ALT  23  /  AlkPhos  154[H]  05-22    PT/INR - ( 22 May 2025 06:26 )   PT: 11.2 sec;   INR: 0.97 ratio         PTT - ( 22 May 2025 06:26 )  PTT:31.8 sec  LIVER FUNCTIONS - ( 22 May 2025 06:26 )  Alb: 3.4 g/dL / Pro: 5.0 g/dL / ALK PHOS: 154 U/L / ALT: 23 U/L / AST: 19 U/L / GGT: x           Lactate Dehydrogenase, Serum: 208 U/L (05-22 @ 06:26)      Cultures:         Radiology:       Meds:   Antimicrobials:   acyclovir   Oral Tab/Cap 800 milliGRAM(s) Oral every 12 hours  piperacillin/tazobactam IVPB.. 4.5 Gram(s) IV Intermittent every 8 hours  posaconazole DR Tablet 300 milliGRAM(s) Oral daily  vancomycin    Solution 125 milliGRAM(s) Oral every 12 hours      Heme / Onc:       GI:  pantoprazole    Tablet 40 milliGRAM(s) Oral before breakfast  sodium bicarbonate Mouth Rinse 10 milliLiter(s) Swish and Spit five times a day  ursodiol Capsule 300 milliGRAM(s) Oral two times a day      Cardiovascular:       Immunologic:       Other medications:   Biotene Dry Mouth Oral Rinse 5 milliLiter(s) Swish and Spit five times a day  chlorhexidine 4% Liquid 1 Application(s) Topical <User Schedule>  DULoxetine 60 milliGRAM(s) Oral <User Schedule>  oxyCODONE    IR 5 milliGRAM(s) Oral <User Schedule>  phytonadione   Solution 5 milliGRAM(s) Oral <User Schedule>  sodium chloride 0.9%. 1000 milliLiter(s) IV Continuous <Continuous>  sodium chloride 0.9%. 500 milliLiter(s) IV Continuous <Continuous>  sodium chloride 0.9%. 500 milliLiter(s) IV Continuous <Continuous>  sodium chloride 0.9%. 500 milliLiter(s) IV Continuous <Continuous>  sodium chloride 0.9%. 500 milliLiter(s) IV Continuous <Continuous>  tiZANidine 4 milliGRAM(s) Oral <User Schedule>      PRN:   acetaminophen     Tablet .. 650 milliGRAM(s) Oral every 6 hours PRN  prochlorperazine   Injectable 10 milliGRAM(s) IV Push every 6 hours PRN  sodium chloride 0.9% lock flush 10 milliLiter(s) IV Push every 1 hour PRN      A/P:  69 year old male with refractory DLBCL, status post R-CHOP x 6, HD MTX x 4, salvage polatuzumab / rituximab / revlimid x 4, admitted for a haplo-identical pbsct from his daughter with Flu / Bu / Cy / TBI prep regimen   Day +2  5/15- busulfan 2/2, fludarabine 2/5. No acute events overnight, vital signs are stable; continue keppra ppx for 24 hours post infusion of last dose of busulfan. No tylenol or posaconazole until day 0. Not neutropenic today, will d/c levaquin / vancomycin.   5/16 - fludarabine 3/5. VSS, afebrile. No acute events overnight.  5/17 - fludarabine 4/5. VSS and afebrile. No acute events overnight. Neutropenic today, started on ppx levaquin/vancomycin PO. No Tylenol or Azoles until day 0.  5/18 - fludarabine 5/5. VSS, remains afebrile. No acute events overnight. No Tylenol or Azoles until day 0.  5/19- TBI today. No acute events overnight. Vital signs are stable.   5/20 - will receive Haplo allogeneic SCT today. VSS, afebrile.  5/21- tolerated HPC infusion well. continue IVF hydration 24 hrs post cells, continue to monitor I&Os    1. Infectious Disease:   acyclovir   Oral Tab/Cap 800 milliGRAM(s) Oral every 12 hours  piperacillin/tazobactam IVPB.. 4.5 Gram(s) IV Intermittent every 8 hours  posaconazole DR Tablet 300 milliGRAM(s) Oral daily  vancomycin    Solution 125 milliGRAM(s) Oral every 12 hours    2. VOD Prophylaxis:   ursodiol Capsule 300 milliGRAM(s) Oral two times a day    3. GI Prophylaxis:   pantoprazole Tablet 40 milliGRAM(s) Oral before breakfast    4. Mouthcare - NS / NaHCO3 rinses, Biotene; Skin care     5. GVHD prophylaxis   PTCy days +3, +4   Tacrolimus gtt to start on day + 5  Abatacept days +5, +14, +28, +56    6. Transfuse & replete electrolytes prn     7. IV hydration, daily weights, strict I&O, prn diuresis     8. PO intake as tolerated, nutrition follow up as needed    9. Antiemetics, anti-diarrhea medications:   prochlorperazine   Injectable 10 milliGRAM(s) IV Push every 6 hours PRN    10. OOB as tolerated, physical therapy consult if needed     11. Monitor coags  2x week, vitamin K BIW  phytonadione Solution 5 milliGRAM(s) Oral <User Schedule>    12. Monitor closely for clinical changes, monitor for fevers     13. Emotional support provided, plan of care discussed and questions addressed     14. Patient education done regarding plan of care, restrictions and discharge planning     15. Continue regular social work input     I have written the above note for Dr. Singh who performed service with me in the room.   Ramirez Puri PA-C ([phone])    I have seen and examined patient with PA, I agree with above note as scribed.                  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 with Flu / Bu / Cy / TBI prep regimen for treatment of refractory DLBCL    Disease: DLBCL  Type of transplant: Haplo-identical (daughter)   Prep Regimen: Flu / Bu / Cy / TBI   ABO / CMV:   Recipient: A POS/ NEG   Donor: A POS / NEG     S: Patient seen and examined with HPC Transplant Team: +Febrile overnight  +Nausea  +back pain       Vital Signs Last 24 Hrs  T(C): 36.6 (22 May 2025 05:20), Max: 38.1 (21 May 2025 19:48)  T(F): 97.9 (22 May 2025 05:20), Max: 100.5 (21 May 2025 19:48)  HR: 85 (22 May 2025 05:20) (72 - 85)  BP: 120/72 (22 May 2025 05:20) (100/63 - 121/76)  BP(mean): --  RR: 17 (22 May 2025 05:20) (17 - 20)  SpO2: 98% (22 May 2025 05:20) (97% - 100%)    Parameters below as of 22 May 2025 05:20  Patient On (Oxygen Delivery Method): room air    Admit weight: 47.1    Daily 47.7kg     Intake / Output:   05-21 @ 07:01  -  05-22 @ 07:00  --------------------------------------------------------  IN: 2103 mL / OUT: 1700 mL / NET: 403 mL    PE:   Oropharynx: no erythema or ulcerations   Oral Mucositis:                -                                     Grade: n/a  CVS: S1, S2 RRR   Lungs: CTA throughout bilaterally   Abdomen: + BS x 4, soft, NT, ND   Extremities: no edema   Gastric Mucositis:       -                                          Grade: n/a   Intestinal Mucositis:     -                                         Grade: n/a   Skin: no rash   TLC: CDI  Neuro: A&OX3      Labs:   CBC Full  -  ( 22 May 2025 06:26 )  WBC Count : 0.33 K/uL  Hemoglobin : 10.0 g/dL  Hematocrit : 31.3 %  Platelet Count - Automated : 130 K/uL  Mean Cell Volume : 88.4 fl  Mean Cell Hemoglobin : 28.2 pg  Mean Cell Hemoglobin Concentration : 31.9 g/dL  Auto Neutrophil # : x  Auto Lymphocyte # : x  Auto Monocyte # : x  Auto Eosinophil # : x  Auto Basophil # : x  Auto Neutrophil % : x  Auto Lymphocyte % : x  Auto Monocyte % : x  Auto Eosinophil % : x  Auto Basophil % : x                          10.0   0.33  )-----------( 130      ( 22 May 2025 06:26 )             31.3     05-22    139  |  103  |  16  ----------------------------<  97  3.2[L]   |  24  |  0.51    Ca    8.4      22 May 2025 06:26  Phos  3.1     05-22  Mg     2.0     05-22    TPro  5.0[L]  /  Alb  3.4  /  TBili  0.8  /  DBili  x   /  AST  19  /  ALT  23  /  AlkPhos  154[H]  05-22    PT/INR - ( 22 May 2025 06:26 )   PT: 11.2 sec;   INR: 0.97 ratio         PTT - ( 22 May 2025 06:26 )  PTT:31.8 sec  LIVER FUNCTIONS - ( 22 May 2025 06:26 )  Alb: 3.4 g/dL / Pro: 5.0 g/dL / ALK PHOS: 154 U/L / ALT: 23 U/L / AST: 19 U/L / GGT: x           Lactate Dehydrogenase, Serum: 208 U/L (05-22 @ 06:26)      Cultures:   5/22 Bl Cx - p      MRSA/MSSA PCR (05.14.25 @ 16:53)   MRSA PCR Result.: NotDetec    Radiology:   < from: Xray Chest 1 View- PORTABLE-Urgent (Xray Chest 1 View- PORTABLE-Urgent .) (05.21.25 @ 20:58) >  IMPRESSION:  Low lung volumes.  Right mid to lower lung linear atelectasis. Otherwise clear lungs.      Meds:   Antimicrobials:   acyclovir   Oral Tab/Cap 800 milliGRAM(s) Oral every 12 hours  piperacillin/tazobactam IVPB.. 4.5 Gram(s) IV Intermittent every 8 hours  posaconazole DR Tablet 300 milliGRAM(s) Oral daily  vancomycin    Solution 125 milliGRAM(s) Oral every 12 hours      Heme / Onc:       GI:  pantoprazole    Tablet 40 milliGRAM(s) Oral before breakfast  sodium bicarbonate Mouth Rinse 10 milliLiter(s) Swish and Spit five times a day  ursodiol Capsule 300 milliGRAM(s) Oral two times a day      Cardiovascular:       Immunologic:       Other medications:   Biotene Dry Mouth Oral Rinse 5 milliLiter(s) Swish and Spit five times a day  chlorhexidine 4% Liquid 1 Application(s) Topical <User Schedule>  DULoxetine 60 milliGRAM(s) Oral <User Schedule>  oxyCODONE    IR 5 milliGRAM(s) Oral <User Schedule>  phytonadione   Solution 5 milliGRAM(s) Oral <User Schedule>  sodium chloride 0.9%. 1000 milliLiter(s) IV Continuous <Continuous>  sodium chloride 0.9%. 500 milliLiter(s) IV Continuous <Continuous>  sodium chloride 0.9%. 500 milliLiter(s) IV Continuous <Continuous>  sodium chloride 0.9%. 500 milliLiter(s) IV Continuous <Continuous>  sodium chloride 0.9%. 500 milliLiter(s) IV Continuous <Continuous>  tiZANidine 4 milliGRAM(s) Oral <User Schedule>      PRN:   acetaminophen     Tablet .. 650 milliGRAM(s) Oral every 6 hours PRN  prochlorperazine   Injectable 10 milliGRAM(s) IV Push every 6 hours PRN  sodium chloride 0.9% lock flush 10 milliLiter(s) IV Push every 1 hour PRN      A/P:  69 year old male with refractory DLBCL, status post R-CHOP x 6, HD MTX x 4, salvage polatuzumab / rituximab / revlimid x 4, admitted for a haplo-identical pbsct from his daughter with Flu / Bu / Cy / TBI prep regimen   Day +2  5/15- busulfan 2/2, fludarabine 2/5. No acute events overnight, vital signs are stable; continue keppra ppx for 24 hours post infusion of last dose of busulfan. No tylenol or posaconazole until day 0. Not neutropenic today, will d/c levaquin / vancomycin.   5/16 - fludarabine 3/5. VSS, afebrile. No acute events overnight.  5/17 - fludarabine 4/5. VSS and afebrile. No acute events overnight. Neutropenic today, started on ppx levaquin/vancomycin PO. No Tylenol or Azoles until day 0.  5/18 - fludarabine 5/5. VSS, remains afebrile. No acute events overnight. No Tylenol or Azoles until day 0.  5/19- TBI today. No acute events overnight. Vital signs are stable.   5/20 - will receive Haplo allogeneic SCT today. VSS, afebrile.  5/21- tolerated HPC infusion well. continue IVF hydration 24 hrs post cells, continue to monitor I&Os    1. Infectious Disease:   acyclovir   Oral Tab/Cap 800 milliGRAM(s) Oral every 12 hours  piperacillin/tazobactam IVPB.. 4.5 Gram(s) IV Intermittent every 8 hours  posaconazole DR Tablet 300 milliGRAM(s) Oral daily  vancomycin    Solution 125 milliGRAM(s) Oral every 12 hours    2. VOD Prophylaxis:   ursodiol Capsule 300 milliGRAM(s) Oral two times a day    3. GI Prophylaxis:   pantoprazole Tablet 40 milliGRAM(s) Oral before breakfast    4. Mouthcare - NS / NaHCO3 rinses, Biotene; Skin care     5. GVHD prophylaxis   PTCy days +3, +4   Tacrolimus gtt to start on day + 5  Abatacept days +5, +14, +28, +56    6. Transfuse & replete electrolytes prn   Replete K+ po    7. IV hydration, daily weights, strict I&O, prn diuresis     8. PO intake as tolerated, nutrition follow up as needed    9. Antiemetics, anti-diarrhea medications:   prochlorperazine   Injectable 10 milliGRAM(s) IV Push every 6 hours PRN    10. OOB as tolerated, physical therapy consult if needed     11. Monitor coags  2x week, vitamin K BIW  phytonadione Solution 5 milliGRAM(s) Oral <User Schedule>    12. Monitor closely for clinical changes, monitor for fevers     13. Emotional support provided, plan of care discussed and questions addressed     14. Patient education done regarding plan of care, restrictions and discharge planning     15. Continue regular social work input     I have written the above note for Dr. Singh who performed service with me in the room.   Ramirez Puri PA-C ([phone])    I have seen and examined patient with PA, I agree with above note as scribed.                  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 with Flu / Bu / Cy / TBI prep regimen for treatment of refractory DLBCL    Disease: DLBCL  Type of transplant: Haplo-identical (daughter)   Prep Regimen: Flu / Bu / Cy / TBI   ABO / CMV:   Recipient: A POS/ NEG   Donor: A POS / NEG     S: Patient seen and examined with HPC Transplant Team: +Febrile overnight  +Nausea  +back pain       Vital Signs Last 24 Hrs  T(C): 36.6 (22 May 2025 05:20), Max: 38.1 (21 May 2025 19:48)  T(F): 97.9 (22 May 2025 05:20), Max: 100.5 (21 May 2025 19:48)  HR: 85 (22 May 2025 05:20) (72 - 85)  BP: 120/72 (22 May 2025 05:20) (100/63 - 121/76)  BP(mean): --  RR: 17 (22 May 2025 05:20) (17 - 20)  SpO2: 98% (22 May 2025 05:20) (97% - 100%)    Parameters below as of 22 May 2025 05:20  Patient On (Oxygen Delivery Method): room air    Admit weight: 47.1    Daily 47.7kg     Intake / Output:   05-21 @ 07:01  -  05-22 @ 07:00  --------------------------------------------------------  IN: 2103 mL / OUT: 1700 mL / NET: 403 mL    PE:   Oropharynx: no erythema or ulcerations   Oral Mucositis:                -                                     Grade: n/a  CVS: S1, S2 RRR   Lungs: CTA throughout bilaterally   Abdomen: + BS x 4, soft, NT, ND   Extremities: no edema   Gastric Mucositis:       -                                          Grade: n/a   Intestinal Mucositis:     -                                         Grade: n/a   Skin: no rash   TLC: CDI  Neuro: A&OX3      Labs:   CBC Full  -  ( 22 May 2025 06:26 )  WBC Count : 0.33 K/uL  Hemoglobin : 10.0 g/dL  Hematocrit : 31.3 %  Platelet Count - Automated : 130 K/uL  Mean Cell Volume : 88.4 fl  Mean Cell Hemoglobin : 28.2 pg  Mean Cell Hemoglobin Concentration : 31.9 g/dL  Auto Neutrophil # : x  Auto Lymphocyte # : x  Auto Monocyte # : x  Auto Eosinophil # : x  Auto Basophil # : x  Auto Neutrophil % : x  Auto Lymphocyte % : x  Auto Monocyte % : x  Auto Eosinophil % : x  Auto Basophil % : x                          10.0   0.33  )-----------( 130      ( 22 May 2025 06:26 )             31.3     05-22    139  |  103  |  16  ----------------------------<  97  3.2[L]   |  24  |  0.51    Ca    8.4      22 May 2025 06:26  Phos  3.1     05-22  Mg     2.0     05-22    TPro  5.0[L]  /  Alb  3.4  /  TBili  0.8  /  DBili  x   /  AST  19  /  ALT  23  /  AlkPhos  154[H]  05-22    PT/INR - ( 22 May 2025 06:26 )   PT: 11.2 sec;   INR: 0.97 ratio         PTT - ( 22 May 2025 06:26 )  PTT:31.8 sec  LIVER FUNCTIONS - ( 22 May 2025 06:26 )  Alb: 3.4 g/dL / Pro: 5.0 g/dL / ALK PHOS: 154 U/L / ALT: 23 U/L / AST: 19 U/L / GGT: x           Lactate Dehydrogenase, Serum: 208 U/L (05-22 @ 06:26)      Cultures:   5/22 Bl Cx - p      MRSA/MSSA PCR (05.14.25 @ 16:53)   MRSA PCR Result.: NotDetec    Radiology:   < from: Xray Chest 1 View- PORTABLE-Urgent (Xray Chest 1 View- PORTABLE-Urgent .) (05.21.25 @ 20:58) >  IMPRESSION:  Low lung volumes.  Right mid to lower lung linear atelectasis. Otherwise clear lungs.      Meds:   Antimicrobials:   acyclovir   Oral Tab/Cap 800 milliGRAM(s) Oral every 12 hours  piperacillin/tazobactam IVPB.. 4.5 Gram(s) IV Intermittent every 8 hours  posaconazole DR Tablet 300 milliGRAM(s) Oral daily  vancomycin    Solution 125 milliGRAM(s) Oral every 12 hours      Heme / Onc:       GI:  pantoprazole    Tablet 40 milliGRAM(s) Oral before breakfast  sodium bicarbonate Mouth Rinse 10 milliLiter(s) Swish and Spit five times a day  ursodiol Capsule 300 milliGRAM(s) Oral two times a day      Cardiovascular:       Immunologic:       Other medications:   Biotene Dry Mouth Oral Rinse 5 milliLiter(s) Swish and Spit five times a day  chlorhexidine 4% Liquid 1 Application(s) Topical <User Schedule>  DULoxetine 60 milliGRAM(s) Oral <User Schedule>  oxyCODONE    IR 5 milliGRAM(s) Oral <User Schedule>  phytonadione   Solution 5 milliGRAM(s) Oral <User Schedule>  sodium chloride 0.9%. 1000 milliLiter(s) IV Continuous <Continuous>  sodium chloride 0.9%. 500 milliLiter(s) IV Continuous <Continuous>  sodium chloride 0.9%. 500 milliLiter(s) IV Continuous <Continuous>  sodium chloride 0.9%. 500 milliLiter(s) IV Continuous <Continuous>  sodium chloride 0.9%. 500 milliLiter(s) IV Continuous <Continuous>  tiZANidine 4 milliGRAM(s) Oral <User Schedule>      PRN:   acetaminophen     Tablet .. 650 milliGRAM(s) Oral every 6 hours PRN  prochlorperazine   Injectable 10 milliGRAM(s) IV Push every 6 hours PRN  sodium chloride 0.9% lock flush 10 milliLiter(s) IV Push every 1 hour PRN      A/P:  69 year old male with refractory DLBCL, status post R-CHOP x 6, HD MTX x 4, salvage polatuzumab / rituximab / revlimid x 4, admitted for a haplo-identical pbsct from his daughter with Flu / Bu / Cy / TBI prep regimen   Day +2  5/15- busulfan 2/2, fludarabine 2/5. No acute events overnight, vital signs are stable; continue keppra ppx for 24 hours post infusion of last dose of busulfan. No tylenol or posaconazole until day 0. Not neutropenic today, will d/c levaquin / vancomycin.   5/16 - fludarabine 3/5. VSS, afebrile. No acute events overnight.  5/17 - fludarabine 4/5. VSS and afebrile. No acute events overnight. Neutropenic today, started on ppx levaquin/vancomycin PO. No Tylenol or Azoles until day 0.  5/18 - fludarabine 5/5. VSS, remains afebrile. No acute events overnight. No Tylenol or Azoles until day 0.  5/19- TBI today. No acute events overnight. Vital signs are stable.   5/20 - will receive Haplo allogeneic SCT today. VSS, afebrile.  5/21- tolerated HPC infusion well. continue IVF hydration 24 hrs post cells, continue to monitor I&Os    1. Infectious Disease:   acyclovir   Oral Tab/Cap 800 milliGRAM(s) Oral every 12 hours  piperacillin/tazobactam IVPB.. 4.5 Gram(s) IV Intermittent every 8 hours  posaconazole DR Tablet 300 milliGRAM(s) Oral daily  vancomycin    Solution 125 milliGRAM(s) Oral every 12 hours    2. VOD Prophylaxis:   ursodiol Capsule 300 milliGRAM(s) Oral two times a day    3. GI Prophylaxis:   pantoprazole Tablet 40 milliGRAM(s) Oral before breakfast    4. Mouthcare - NS / NaHCO3 rinses, Biotene; Skin care     5. GVHD prophylaxis   PTCy days +3, +4   Tacrolimus gtt to start on day + 5  Abatacept days +5, +14, +28, +56    6. Transfuse & replete electrolytes prn   Replete K+ po    7. IV hydration, daily weights, strict I&O, prn diuresis     8. PO intake as tolerated, nutrition follow up as needed    9. Antiemetics, anti-diarrhea medications:   prochlorperazine   Injectable 10 milliGRAM(s) IV Push every 6 hours PRN    10. OOB as tolerated, physical therapy consult if needed     11. Monitor coags  2x week, vitamin K BIW  phytonadione Solution 5 milliGRAM(s) Oral <User Schedule>    12. Monitor closely for clinical changes, monitor for fevers     13. Emotional support provided, plan of care discussed and questions addressed     14. Patient education done regarding plan of care, restrictions and discharge planning     15. Continue regular social work input     I have written the above note for Dr. Singh who performed service with me in the room.   Ramirez Puri PA-C ([phone])    I have seen and examined patient with PA, I agree with above note as scribed.

## 2025-05-22 NOTE — PROGRESS NOTE ADULT - NS ATTEST RISK PROBLEM GEN_ALL_CORE FT
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

## 2025-05-22 NOTE — PROGRESS NOTE ADULT - NS ATTEND AMEND GEN_ALL_CORE FT
I rounded with the team including nurses, ACPs and PharmD.  I reviewed the data in depth.     The patient is day +1 of allogeneic HSCT.  He is tolerated stem cell infusion well.   The patient is doing well with no complaints. mild nausea controlled with compazine  The vitals are stable. The oral cavity is clear. The line site is clean. The lungs are clear. There is no LE edema.  Overall, there are no specific issues. The patient is on appropriate therapy at this stage.   All questions answered.   . I rounded with the team including nurses, ACPs and PharmD.  I reviewed the data in depth.     The patient is day +2 of allogeneic HSCT.    The patient is doing well with no complaints. mild nausea controlled with compazine  1 x fever last night; on zosyn. infectious work up pending.   The vitals are stable. The oral cavity is clear. The line site is clean. The lungs are clear. There is no LE edema.  Overall, there are no specific issues. The patient is on appropriate therapy at this stage.   All questions answered.   .

## 2025-05-23 LAB
ALBUMIN SERPL ELPH-MCNC: 3.1 G/DL — LOW (ref 3.3–5)
ALP SERPL-CCNC: 124 U/L — HIGH (ref 40–120)
ALT FLD-CCNC: 19 U/L — SIGNIFICANT CHANGE UP (ref 10–45)
ANION GAP SERPL CALC-SCNC: 11 MMOL/L — SIGNIFICANT CHANGE UP (ref 5–17)
AST SERPL-CCNC: 16 U/L — SIGNIFICANT CHANGE UP (ref 10–40)
BILIRUB SERPL-MCNC: 0.4 MG/DL — SIGNIFICANT CHANGE UP (ref 0.2–1.2)
BUN SERPL-MCNC: 16 MG/DL — SIGNIFICANT CHANGE UP (ref 7–23)
CALCIUM SERPL-MCNC: 8.1 MG/DL — LOW (ref 8.4–10.5)
CHLORIDE SERPL-SCNC: 107 MMOL/L — SIGNIFICANT CHANGE UP (ref 96–108)
CO2 SERPL-SCNC: 24 MMOL/L — SIGNIFICANT CHANGE UP (ref 22–31)
CREAT SERPL-MCNC: 0.57 MG/DL — SIGNIFICANT CHANGE UP (ref 0.5–1.3)
EBV DNA SERPL NAA+PROBE-ACNC: SIGNIFICANT CHANGE UP IU/ML
EBVPCR LOG: SIGNIFICANT CHANGE UP LOG10IU/ML
EGFR: 107 ML/MIN/1.73M2 — SIGNIFICANT CHANGE UP
EGFR: 107 ML/MIN/1.73M2 — SIGNIFICANT CHANGE UP
GLUCOSE SERPL-MCNC: 97 MG/DL — SIGNIFICANT CHANGE UP (ref 70–99)
HCT VFR BLD CALC: 27.6 % — LOW (ref 39–50)
HGB BLD-MCNC: 8.7 G/DL — LOW (ref 13–17)
LDH SERPL L TO P-CCNC: 204 U/L — SIGNIFICANT CHANGE UP (ref 50–242)
MAGNESIUM SERPL-MCNC: 2 MG/DL — SIGNIFICANT CHANGE UP (ref 1.6–2.6)
MCHC RBC-ENTMCNC: 28.1 PG — SIGNIFICANT CHANGE UP (ref 27–34)
MCHC RBC-ENTMCNC: 31.5 G/DL — LOW (ref 32–36)
MCV RBC AUTO: 89 FL — SIGNIFICANT CHANGE UP (ref 80–100)
NRBC BLD AUTO-RTO: 0 /100 WBCS — SIGNIFICANT CHANGE UP (ref 0–0)
PHOSPHATE SERPL-MCNC: 2.9 MG/DL — SIGNIFICANT CHANGE UP (ref 2.5–4.5)
PLATELET # BLD AUTO: 119 K/UL — LOW (ref 150–400)
POTASSIUM SERPL-MCNC: 3.1 MMOL/L — LOW (ref 3.5–5.3)
POTASSIUM SERPL-SCNC: 3.1 MMOL/L — LOW (ref 3.5–5.3)
PROT SERPL-MCNC: 4.6 G/DL — LOW (ref 6–8.3)
RBC # BLD: 3.1 M/UL — LOW (ref 4.2–5.8)
RBC # FLD: 16.7 % — HIGH (ref 10.3–14.5)
SODIUM SERPL-SCNC: 142 MMOL/L — SIGNIFICANT CHANGE UP (ref 135–145)
WBC # BLD: 0.16 K/UL — CRITICAL LOW (ref 3.8–10.5)
WBC # FLD AUTO: 0.16 K/UL — CRITICAL LOW (ref 3.8–10.5)

## 2025-05-23 PROCEDURE — 99233 SBSQ HOSP IP/OBS HIGH 50: CPT | Mod: FS

## 2025-05-23 RX ORDER — PIPERACILLIN-TAZO-DEXTROSE,ISO 3.375G/5
4.5 IV SOLUTION, PIGGYBACK PREMIX FROZEN(ML) INTRAVENOUS ONCE
Refills: 0 | Status: DISCONTINUED | OUTPATIENT
Start: 2025-05-24 | End: 2025-05-24

## 2025-05-23 RX ORDER — OXYCODONE HYDROCHLORIDE 30 MG/1
5 TABLET ORAL EVERY 6 HOURS
Refills: 0 | Status: DISCONTINUED | OUTPATIENT
Start: 2025-05-23 | End: 2025-05-23

## 2025-05-23 RX ORDER — PIPERACILLIN-TAZO-DEXTROSE,ISO 3.375G/5
4.5 IV SOLUTION, PIGGYBACK PREMIX FROZEN(ML) INTRAVENOUS ONCE
Refills: 0 | Status: COMPLETED | OUTPATIENT
Start: 2025-05-24 | End: 2025-05-24

## 2025-05-23 RX ORDER — ACETAMINOPHEN 500 MG/5ML
650 LIQUID (ML) ORAL EVERY 6 HOURS
Refills: 0 | Status: DISCONTINUED | OUTPATIENT
Start: 2025-05-23 | End: 2025-06-05

## 2025-05-23 RX ORDER — PIPERACILLIN-TAZO-DEXTROSE,ISO 3.375G/5
4.5 IV SOLUTION, PIGGYBACK PREMIX FROZEN(ML) INTRAVENOUS EVERY 8 HOURS
Refills: 0 | Status: DISCONTINUED | OUTPATIENT
Start: 2025-05-24 | End: 2025-05-24

## 2025-05-23 RX ORDER — OXYCODONE HYDROCHLORIDE 30 MG/1
5 TABLET ORAL EVERY 6 HOURS
Refills: 0 | Status: DISCONTINUED | OUTPATIENT
Start: 2025-05-23 | End: 2025-05-30

## 2025-05-23 RX ORDER — FUROSEMIDE 10 MG/ML
20 INJECTION INTRAMUSCULAR; INTRAVENOUS ONCE
Refills: 0 | Status: COMPLETED | OUTPATIENT
Start: 2025-05-23 | End: 2025-05-23

## 2025-05-23 RX ORDER — PIPERACILLIN-TAZO-DEXTROSE,ISO 3.375G/5
4.5 IV SOLUTION, PIGGYBACK PREMIX FROZEN(ML) INTRAVENOUS ONCE
Refills: 0 | Status: COMPLETED | OUTPATIENT
Start: 2025-05-23 | End: 2025-05-23

## 2025-05-23 RX ORDER — LOPERAMIDE HCL 1 MG/7.5ML
2 SOLUTION ORAL EVERY 6 HOURS
Refills: 0 | Status: DISCONTINUED | OUTPATIENT
Start: 2025-05-23 | End: 2025-05-25

## 2025-05-23 RX ORDER — CYCLOPHOSPHAMIDE INJECTION, SOLUTION 200 MG/ML
2360 INJECTION INTRAVENOUS DAILY
Refills: 0 | Status: COMPLETED | OUTPATIENT
Start: 2025-05-23 | End: 2025-05-24

## 2025-05-23 RX ADMIN — TIZANIDINE 4 MILLIGRAM(S): 4 TABLET ORAL at 21:05

## 2025-05-23 RX ADMIN — OXYCODONE HYDROCHLORIDE 5 MILLIGRAM(S): 30 TABLET ORAL at 21:35

## 2025-05-23 RX ADMIN — Medication 650 MILLIGRAM(S): at 22:55

## 2025-05-23 RX ADMIN — Medication 40 MILLIGRAM(S): at 05:23

## 2025-05-23 RX ADMIN — Medication 116 MILLIGRAM(S): at 12:08

## 2025-05-23 RX ADMIN — Medication 650 MILLIGRAM(S): at 21:44

## 2025-05-23 RX ADMIN — Medication 5 MILLILITER(S): at 08:14

## 2025-05-23 RX ADMIN — CYCLOPHOSPHAMIDE INJECTION, SOLUTION 2360 MILLIGRAM(S): 200 INJECTION INTRAVENOUS at 14:00

## 2025-05-23 RX ADMIN — FUROSEMIDE 20 MILLIGRAM(S): 10 INJECTION INTRAMUSCULAR; INTRAVENOUS at 15:32

## 2025-05-23 RX ADMIN — Medication 10 MILLIGRAM(S): at 17:27

## 2025-05-23 RX ADMIN — DULOXETINE 60 MILLIGRAM(S): 20 CAPSULE, DELAYED RELEASE ORAL at 21:05

## 2025-05-23 RX ADMIN — Medication 250 MILLILITER(S): at 21:06

## 2025-05-23 RX ADMIN — OXYCODONE HYDROCHLORIDE 5 MILLIGRAM(S): 30 TABLET ORAL at 21:05

## 2025-05-23 RX ADMIN — FOSAPREPITANT 300 MILLIGRAM(S): 150 INJECTION, POWDER, LYOPHILIZED, FOR SOLUTION INTRAVENOUS at 13:01

## 2025-05-23 RX ADMIN — Medication 800 MILLIGRAM(S): at 17:20

## 2025-05-23 RX ADMIN — Medication 10 MILLILITER(S): at 08:14

## 2025-05-23 RX ADMIN — Medication 40 MILLIEQUIVALENT(S): at 13:01

## 2025-05-23 RX ADMIN — Medication 10 MILLILITER(S): at 15:33

## 2025-05-23 RX ADMIN — Medication 200 GRAM(S): at 22:33

## 2025-05-23 RX ADMIN — Medication 250 MILLILITER(S): at 11:59

## 2025-05-23 RX ADMIN — Medication 5 MILLILITER(S): at 11:59

## 2025-05-23 RX ADMIN — Medication 10 MILLILITER(S): at 20:33

## 2025-05-23 RX ADMIN — LIDOCAINE HYDROCHLORIDE 1 PATCH: 20 JELLY TOPICAL at 12:08

## 2025-05-23 RX ADMIN — URSODIOL 300 MILLIGRAM(S): 300 CAPSULE ORAL at 05:23

## 2025-05-23 RX ADMIN — Medication 10 MILLILITER(S): at 23:44

## 2025-05-23 RX ADMIN — Medication 10 MILLILITER(S): at 11:59

## 2025-05-23 RX ADMIN — Medication 5 MILLILITER(S): at 23:44

## 2025-05-23 RX ADMIN — Medication 125 MILLIGRAM(S): at 05:23

## 2025-05-23 RX ADMIN — URSODIOL 300 MILLIGRAM(S): 300 CAPSULE ORAL at 17:18

## 2025-05-23 RX ADMIN — HYDROCORTISONE 1 APPLICATION(S): 10 CREAM TOPICAL at 12:07

## 2025-05-23 RX ADMIN — Medication 800 MILLIGRAM(S): at 05:25

## 2025-05-23 RX ADMIN — Medication 5 MILLILITER(S): at 15:33

## 2025-05-23 RX ADMIN — Medication 125 MILLIGRAM(S): at 17:17

## 2025-05-23 RX ADMIN — LIDOCAINE HYDROCHLORIDE 1 PATCH: 20 JELLY TOPICAL at 20:11

## 2025-05-23 RX ADMIN — Medication 5 MILLILITER(S): at 20:33

## 2025-05-23 RX ADMIN — POSACONAZOLE 300 MILLIGRAM(S): 100 TABLET, DELAYED RELEASE ORAL at 12:07

## 2025-05-23 RX ADMIN — Medication 25 GRAM(S): at 05:24

## 2025-05-23 RX ADMIN — LOPERAMIDE HCL 2 MILLIGRAM(S): 1 SOLUTION ORAL at 10:47

## 2025-05-23 RX ADMIN — Medication 40 MILLIEQUIVALENT(S): at 09:19

## 2025-05-23 RX ADMIN — Medication 1 APPLICATION(S): at 12:00

## 2025-05-23 RX ADMIN — LIDOCAINE HYDROCHLORIDE 1 PATCH: 20 JELLY TOPICAL at 00:45

## 2025-05-23 NOTE — CHART NOTE - [id number]_GEN_A_CORE
NUTRITION FOLLOW UP NOTE    PATIENT SEEN FOR: [facility]MT[facility] Nutrition Follow up     SOURCE: [x] Patient  [x] Current Medical Record  [x] RN  [] Family/support person at bedside  [] Patient unavailable/inappropriate  [x] Other: Interdisciplinary rounds     CHART REVIEWED/EVENTS NOTED.  [] No changes to nutrition care plan to note  [x] Nutrition Status: Admitted for a haplo-identical pbsct from his daughter with Flu / Bu / Cy / TBI prep regimen   Day     DIET ORDER:   Diet, Regular (25 @ 18:56) [Active]      CURRENT DIET ORDER IS:  [] Appropriate:  [] Inadequate:  [] Other:    NUTRITION INTAKE/PROVISION:  [x] PO: Pt reports fair appetite/PO intake; consuming lunch during RD visit. Is not amenable to receiving oral nutritional supplements at this time.     [] Enteral Nutrition:  [] Parenteral Nutrition:    ANTHROPOMETRICS:  Drug Dosing Weight  Height (cm): 151 (14 May 2025 09:36)  Weight (kg): 47.1 (14 May 2025 09:36)  BMI (kg/m2): 20.7 (14 May 2025 09:36)  BSA (m2): 1.4 (14 May 2025 09:36)  Weights:   Daily Weight in kG: Daily     Daily Weight in k.7 (23 May 2025 07:54), 46.4 (05-19), Weight in k.1 (05-18), Weight in k.3 (05-17), Weight in k.2 (05-16), Weight in k.7 (05-15)   ** Weight fluctuating - Weight changes likely secondary to fluid shifts. RD to continue to monitor weight trends as able.     NUTRITIONALLY PERTINENT MEDICATIONS:  MEDICATIONS  (STANDING):  acyclovir   Oral Tab/Cap 800 milliGRAM(s) Oral every 12 hours  Biotene Dry Mouth Oral Rinse 5 milliLiter(s) Swish and Spit five times a day  chlorhexidine 4% Liquid 1 Application(s) Topical <User Schedule>  cyclophosphamide IVPB (eMAR) 2360 milliGRAM(s) IV Intermittent daily  DULoxetine 60 milliGRAM(s) Oral <User Schedule>  fosaprepitant IVPB 150 milliGRAM(s) IV Intermittent once  hydrocortisone 1% Cream 1 Application(s) Topical daily  lidocaine   4% Patch 1 Patch Transdermal daily  ondansetron  IVPB 16 milliGRAM(s) IV Intermittent every 24 hours  oxyCODONE    IR 5 milliGRAM(s) Oral <User Schedule>  pantoprazole    Tablet 40 milliGRAM(s) Oral before breakfast  phytonadione   Solution 5 milliGRAM(s) Oral <User Schedule>  piperacillin/tazobactam IVPB.. 4.5 Gram(s) IV Intermittent every 8 hours  posaconazole DR Tablet 300 milliGRAM(s) Oral daily  potassium chloride    Tablet ER 40 milliEquivalent(s) Oral every 4 hours  sodium bicarbonate Mouth Rinse 10 milliLiter(s) Swish and Spit five times a day  sodium chloride 0.9%. 1000 milliLiter(s) (150 mL/Hr) IV Continuous <Continuous>  sodium chloride 0.9%. 500 milliLiter(s) (250 mL/Hr) IV Continuous <Continuous>  sodium chloride 0.9%. 500 milliLiter(s) (250 mL/Hr) IV Continuous <Continuous>  sodium chloride 0.9%. 500 milliLiter(s) (250 mL/Hr) IV Continuous <Continuous>  sodium chloride 0.9%. 500 milliLiter(s) (250 mL/Hr) IV Continuous <Continuous>  sodium chloride 0.9%. 1000 milliLiter(s) (250 mL/Hr) IV Continuous <Continuous>  tiZANidine 4 milliGRAM(s) Oral <User Schedule>  ursodiol Capsule 300 milliGRAM(s) Oral two times a day  vancomycin    Solution 125 milliGRAM(s) Oral every 12 hours    MEDICATIONS  (PRN):  acetaminophen     Tablet .. 650 milliGRAM(s) Oral every 6 hours PRN Temp greater or equal to 38C (100.4F), Moderate Pain (4 - 6)  prochlorperazine   Injectable 10 milliGRAM(s) IV Push every 6 hours PRN Nausea/Vomiting  sodium chloride 0.9% lock flush 10 milliLiter(s) IV Push every 1 hour PRN Pre/post blood products, medications, blood draw, and to maintain line patency      NUTRITIONALLY PERTINENT LABS:   @ 06:45: Na 142, BUN 16, Cr 0.57, BG 97, K+ 3.1[L], Phos 2.9, Mg 2.0, Alk Phos 124[H], ALT/SGPT 19, AST/SGOT 16, HbA1c --          Finger Sticks:      NUTRITIONALLY PERTINENT MEDICATIONS/LABS:  [x] Reviewed  [x] Relevant notes on medications/labs:  - Hypokalemic.     EDEMA:  [x] Reviewed  [] Relevant notes:    GI/ I&O:  [x] Reviewed  [x] Relevant notes: Last BM: 05/ per nursing flow sheets.   [] Other:    SKIN:   [x] No pressure injuries documented, per nursing flowsheet  [] Pressure injury previously noted  [] Change in pressure injury documentation:  [] Other:    ESTIMATED NEEDS:  [x] No change:  [] Updated:  Energy: 7473-5922  kcal/day (35-40 kcal/kg)  Protein:  70.2-93.6 g/day (1.5-2.0 g/kg)  Fluid:   ml/day or [x] defer to team  Based on: dosing weight 46.8 kg     NUTRITION DIAGNOSIS:  [x] Prior Dx: Increased Nutrient Needs   [] New Dx:    EDUCATION:  [x] Yes: Reinforced BMT nutrition recommendations: food safety recommendations, increased protein-energy needs, small frequent meals as tolerated.   [] Not appropriate/warranted    NUTRITION CARE PLAN:  1. Diet: regular diet   2. Supplements:   3. Multivitamin/mineral supplementation: deferred to team   4: Monitor PO intake/tolerance, weights, labs, hydration status, bowels, and skin integrity.     [] Achieved - Continue current nutrition intervention(s)  [] Current medical condition precludes nutrition intervention at this time.    MONITORING AND EVALUATION:   RD remains available upon request and will follow up per protocol.    Alma Sosa RDN CDN (available on TEAMS)   Available on [facility] TEAMS NUTRITION FOLLOW UP NOTE    PATIENT SEEN FOR: BMTU Nutrition Follow up     SOURCE: [x] Patient  [x] Current Medical Record  [x] RN  [] Family/support person at bedside  [] Patient unavailable/inappropriate  [x] Other: Interdisciplinary rounds     CHART REVIEWED/EVENTS NOTED.  [] No changes to nutrition care plan to note  [x] Nutrition Status: Admitted for a haplo-identical pbsct from his daughter with Flu / Bu / Cy / TBI prep regimen   Day +3    DIET ORDER:   Diet, Regular (25 @ 18:56) [Active]      CURRENT DIET ORDER IS:  [x] Appropriate:  [] Inadequate:  [] Other:    NUTRITION INTAKE/PROVISION:  [x] PO: Pt reports fair appetite/PO intake; reports appetite is best during breakfast and lunch, decreased for dinner. Is not amenable to receiving oral nutritional supplements at this time.     [] Enteral Nutrition:  [] Parenteral Nutrition:    ANTHROPOMETRICS:  Drug Dosing Weight  Height (cm): 151 (14 May 2025 09:36)  Weight (kg): 47.1 (14 May 2025 09:36)  BMI (kg/m2): 20.7 (14 May 2025 09:36)  BSA (m2): 1.4 (14 May 2025 09:36)  Weights:   Daily Weight in kG: Daily     Daily Weight in k.7 (23 May 2025 07:54), 46.4 (05-19), Weight in k.1 (05-18), Weight in k.3 (05-17), Weight in k.2 (05-16), Weight in k.7 (05-15)   ** Weight fluctuating - Weight changes likely secondary to fluid shifts. RD to continue to monitor weight trends as able.     NUTRITIONALLY PERTINENT MEDICATIONS:  MEDICATIONS  (STANDING):  acyclovir   Oral Tab/Cap 800 milliGRAM(s) Oral every 12 hours  Biotene Dry Mouth Oral Rinse 5 milliLiter(s) Swish and Spit five times a day  chlorhexidine 4% Liquid 1 Application(s) Topical <User Schedule>  cyclophosphamide IVPB (eMAR) 2360 milliGRAM(s) IV Intermittent daily  DULoxetine 60 milliGRAM(s) Oral <User Schedule>  fosaprepitant IVPB 150 milliGRAM(s) IV Intermittent once  hydrocortisone 1% Cream 1 Application(s) Topical daily  lidocaine   4% Patch 1 Patch Transdermal daily  ondansetron  IVPB 16 milliGRAM(s) IV Intermittent every 24 hours  oxyCODONE    IR 5 milliGRAM(s) Oral <User Schedule>  pantoprazole    Tablet 40 milliGRAM(s) Oral before breakfast  phytonadione   Solution 5 milliGRAM(s) Oral <User Schedule>  piperacillin/tazobactam IVPB.. 4.5 Gram(s) IV Intermittent every 8 hours  posaconazole DR Tablet 300 milliGRAM(s) Oral daily  potassium chloride    Tablet ER 40 milliEquivalent(s) Oral every 4 hours  sodium bicarbonate Mouth Rinse 10 milliLiter(s) Swish and Spit five times a day  sodium chloride 0.9%. 1000 milliLiter(s) (150 mL/Hr) IV Continuous <Continuous>  sodium chloride 0.9%. 500 milliLiter(s) (250 mL/Hr) IV Continuous <Continuous>  sodium chloride 0.9%. 500 milliLiter(s) (250 mL/Hr) IV Continuous <Continuous>  sodium chloride 0.9%. 500 milliLiter(s) (250 mL/Hr) IV Continuous <Continuous>  sodium chloride 0.9%. 500 milliLiter(s) (250 mL/Hr) IV Continuous <Continuous>  sodium chloride 0.9%. 1000 milliLiter(s) (250 mL/Hr) IV Continuous <Continuous>  tiZANidine 4 milliGRAM(s) Oral <User Schedule>  ursodiol Capsule 300 milliGRAM(s) Oral two times a day  vancomycin    Solution 125 milliGRAM(s) Oral every 12 hours    MEDICATIONS  (PRN):  acetaminophen     Tablet .. 650 milliGRAM(s) Oral every 6 hours PRN Temp greater or equal to 38C (100.4F), Moderate Pain (4 - 6)  prochlorperazine   Injectable 10 milliGRAM(s) IV Push every 6 hours PRN Nausea/Vomiting  sodium chloride 0.9% lock flush 10 milliLiter(s) IV Push every 1 hour PRN Pre/post blood products, medications, blood draw, and to maintain line patency      NUTRITIONALLY PERTINENT LABS:   @ 06:45: Na 142, BUN 16, Cr 0.57, BG 97, K+ 3.1[L], Phos 2.9, Mg 2.0, Alk Phos 124[H], ALT/SGPT 19, AST/SGOT 16, HbA1c --          Finger Sticks:      NUTRITIONALLY PERTINENT MEDICATIONS/LABS:  [x] Reviewed  [x] Relevant notes on medications/labs:  - Hypokalemic; s/p repletion.     EDEMA:  [x] Reviewed  [] Relevant notes:    GI/ I&O:  [x] Reviewed  [x] Relevant notes: Last BM:  per nursing flow sheets.   [] Other:    SKIN:   [x] No pressure injuries documented, per nursing flowsheet  [] Pressure injury previously noted  [] Change in pressure injury documentation:  [] Other:    ESTIMATED NEEDS:  [x] No change:  [] Updated:  Energy: 0177-1759  kcal/day (35-40 kcal/kg)  Protein:  70.2-93.6 g/day (1.5-2.0 g/kg)  Fluid:   ml/day or [x] defer to team  Based on: dosing weight 46.8 kg     NUTRITION DIAGNOSIS:  [x] Prior Dx: Increased Nutrient Needs   [] New Dx:    EDUCATION:  [x] Yes: Reinforced BMT nutrition recommendations: food safety recommendations, increased protein-energy needs, small frequent meals as tolerated.   [] Not appropriate/warranted    NUTRITION CARE PLAN:  1. Diet: regular diet   2. Supplements: Pt is not amenable to receiving oral nutritional supplements at this time.   3. Multivitamin/mineral supplementation: deferred to team   4: Monitor PO intake/tolerance, weights, labs, hydration status, bowels, and skin integrity.     [] Achieved - Continue current nutrition intervention(s)  [] Current medical condition precludes nutrition intervention at this time.    MONITORING AND EVALUATION:   RD remains available upon request and will follow up per protocol.    Alma Sosa RDN CDN (available on TEAMS)   Available on [facility] TEAMS

## 2025-05-23 NOTE — PROGRESS NOTE ADULT - SUBJECTIVE AND OBJECTIVE BOX
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 with Flu / Bu / Cy / TBI prep regimen for treatment of refractory DLBCL    Disease: DLBCL  Type of transplant: Haplo-identical (daughter)   Prep Regimen: Flu / Bu / Cy / TBI   ABO / CMV:   Recipient: A POS/ NEG   Donor: A POS / NEG     S: Patient seen and examined with HPC Transplant Team:   Overnight patient complained of rash of the thighs/upper back that is pruritic - added hydrocortisone ointment.    O: Vitals:   Vital Signs Last 24 Hrs  T(C): 36.8 (23 May 2025 05:12), Max: 37.3 (22 May 2025 11:55)  T(F): 98.3 (23 May 2025 05:12), Max: 99.1 (22 May 2025 11:55)  HR: 74 (23 May 2025 05:12) (72 - 81)  BP: 105/66 (23 May 2025 05:12) (100/62 - 106/68)  BP(mean): --  RR: 20 (23 May 2025 05:12) (16 - 20)  SpO2: 98% (23 May 2025 05:12) (96% - 98%)    Parameters below as of 23 May 2025 05:12  Patient On (Oxygen Delivery Method): room air    Admit weight: 47.1 kg  Daily Weight in kG:  (23 May 2025)    Intake / Output:   05-22 @ 07:01  -  05-23 @ 07:00  --------------------------------------------------------  IN: 1175 mL / OUT: 1050 mL / NET: 125 mL      PE:   Oropharynx: no erythema or ulcerations   Oral Mucositis:                -                                     Grade: n/a  CVS: S1, S2 RRR   Lungs: CTA throughout bilaterally   Abdomen: + BS x 4, soft, NT, ND   Extremities: no edema   Gastric Mucositis:       -                                          Grade: n/a   Intestinal Mucositis:     -                                         Grade: n/a   Skin: macular erythematous rash of the upper thighs/back area   TLC: CDI  Neuro: A&OX3      Labs:                    Cultures:   Culture - Blood (05.21.25 @ 21:16)   Specimen Source: Blood Blood-Peripheral  Culture Results:   No growth at 24 hours    Culture - Blood (05.21.25 @ 21:16)   Specimen Source: Blood Blood-Catheter  Culture Results:   No growth at 24 hours    MRSA/MSSA PCR (05.14.25 @ 16:53)   MRSA PCR Result.: NotDete    Radiology:   < from: Xray Chest 1 View- PORTABLE-Urgent (Xray Chest 1 View- PORTABLE-Urgent .) (05.21.25 @ 20:58) >  IMPRESSION:  Low lung volumes.  Right mid to lower lung linear atelectasis. Otherwise clear lungs.    Meds:   Antimicrobials:   acyclovir   Oral Tab/Cap 800 milliGRAM(s) Oral every 12 hours  piperacillin/tazobactam IVPB.. 4.5 Gram(s) IV Intermittent every 8 hours  posaconazole DR Tablet 300 milliGRAM(s) Oral daily  vancomycin    Solution 125 milliGRAM(s) Oral every 12 hours      Heme / Onc:       GI:  pantoprazole    Tablet 40 milliGRAM(s) Oral before breakfast  sodium bicarbonate Mouth Rinse 10 milliLiter(s) Swish and Spit five times a day  ursodiol Capsule 300 milliGRAM(s) Oral two times a day      Cardiovascular:       Immunologic:       Other medications:   Biotene Dry Mouth Oral Rinse 5 milliLiter(s) Swish and Spit five times a day  chlorhexidine 4% Liquid 1 Application(s) Topical <User Schedule>  DULoxetine 60 milliGRAM(s) Oral <User Schedule>  fosaprepitant IVPB 150 milliGRAM(s) IV Intermittent once  hydrocortisone 1% Cream 1 Application(s) Topical daily  lidocaine   4% Patch 1 Patch Transdermal daily  ondansetron  IVPB 16 milliGRAM(s) IV Intermittent every 24 hours  oxyCODONE    IR 5 milliGRAM(s) Oral <User Schedule>  phytonadione   Solution 5 milliGRAM(s) Oral <User Schedule>  sodium chloride 0.9%. 1000 milliLiter(s) IV Continuous <Continuous>  sodium chloride 0.9%. 500 milliLiter(s) IV Continuous <Continuous>  sodium chloride 0.9%. 500 milliLiter(s) IV Continuous <Continuous>  sodium chloride 0.9%. 500 milliLiter(s) IV Continuous <Continuous>  sodium chloride 0.9%. 500 milliLiter(s) IV Continuous <Continuous>  sodium chloride 0.9%. 1000 milliLiter(s) IV Continuous <Continuous>  tiZANidine 4 milliGRAM(s) Oral <User Schedule>      PRN:   acetaminophen     Tablet .. 650 milliGRAM(s) Oral every 6 hours PRN  prochlorperazine   Injectable 10 milliGRAM(s) IV Push every 6 hours PRN  sodium chloride 0.9% lock flush 10 milliLiter(s) IV Push every 1 hour PRN      A/P:  69 year old male with refractory DLBCL, status post R-CHOP x 6, HD MTX x 4, salvage polatuzumab / rituximab / revlimid x 4, admitted for a haplo-identical pbsct from his daughter with Flu / Bu / Cy / TBI prep regimen   Day +3  5/15- busulfan 2/2, fludarabine 2/5. No acute events overnight, vital signs are stable; continue keppra ppx for 24 hours post infusion of last dose of busulfan. No tylenol or posaconazole until day 0. Not neutropenic today, will d/c levaquin / vancomycin.   5/16 - fludarabine 3/5. VSS, afebrile. No acute events overnight.  5/17 - fludarabine 4/5. VSS and afebrile. No acute events overnight. Neutropenic today, started on ppx levaquin/vancomycin PO. No Tylenol or Azoles until day 0.  5/18 - fludarabine 5/5. VSS, remains afebrile. No acute events overnight. No Tylenol or Azoles until day 0.  5/19- TBI today. No acute events overnight. Vital signs are stable.   5/20 - will receive Haplo allogeneic SCT today. VSS, afebrile.  5/21- tolerated HPC infusion well. continue IVF hydration 24 hrs post cells, continue to monitor I&Os    1. Infectious Disease:   acyclovir Oral Tab/Cap 800 milliGRAM(s) Oral every 12 hours  piperacillin/tazobactam IVPB.. 4.5 Gram(s) IV Intermittent every 8 hours  posaconazole DR Tablet 300 milliGRAM(s) Oral daily  vancomycin Solution 125 milliGRAM(s) Oral every 12 hours    2. VOD Prophylaxis:   ursodiol Capsule 300 milliGRAM(s) Oral two times a day    3. GI Prophylaxis:   pantoprazole Tablet 40 milliGRAM(s) Oral before breakfast    4. Mouthcare - NS / NaHCO3 rinses, Biotene; Skin care     5. GVHD prophylaxis   PTCy days +3, +4   Tacrolimus gtt to start on day + 5  Abatacept days +5, +14, +28, +56    6. Transfuse & replete electrolytes prn     7. IV hydration, daily weights, strict I&O, prn diuresis     8. PO intake as tolerated, nutrition follow up as needed    9. Antiemetics, anti-diarrhea medications:   prochlorperazine   Injectable 10 milliGRAM(s) IV Push every 6 hours PRN    10. OOB as tolerated, physical therapy consult if needed     11. Monitor coags  2x week, vitamin K BIW  phytonadione Solution 5 milliGRAM(s) Oral <User Schedule>    12. Monitor closely for clinical changes, monitor for fevers     13. Emotional support provided, plan of care discussed and questions addressed     14. Patient education done regarding plan of care, restrictions and discharge planning     15. Continue regular social work input     I have written the above note for Dr. Singh who performed service with me in the room.   Bhaskar Esquivel PA-C ([phone])    I have seen and examined patient with PA, I agree with above note as scribed.                      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 with Flu / Bu / Cy / TBI prep regimen for treatment of refractory DLBCL    Disease: DLBCL  Type of transplant: Haplo-identical (daughter)   Prep Regimen: Flu / Bu / Cy / TBI   ABO / CMV:   Recipient: A POS/ NEG   Donor: A POS / NEG     S: Patient seen and examined with HPC Transplant Team:   Overnight patient complained of rash of the thighs/upper back that is pruritic - added hydrocortisone ointment.  Patient with increased loose BM overnight as well.  Sitting OOB to chair this morning. Admits to fatigue.    O: Vitals:   Vital Signs Last 24 Hrs  T(C): 36.8 (23 May 2025 05:12), Max: 37.3 (22 May 2025 11:55)  T(F): 98.3 (23 May 2025 05:12), Max: 99.1 (22 May 2025 11:55)  HR: 74 (23 May 2025 05:12) (72 - 81)  BP: 105/66 (23 May 2025 05:12) (100/62 - 106/68)  BP(mean): --  RR: 20 (23 May 2025 05:12) (16 - 20)  SpO2: 98% (23 May 2025 05:12) (96% - 98%)    Parameters below as of 23 May 2025 05:12  Patient On (Oxygen Delivery Method): room air    Admit weight: 47.1 kg  Daily Weight in k.7 (23 May 2025)    Intake / Output:   -22 @ 07:01  -   @ 07:00  --------------------------------------------------------  IN: 1175 mL / OUT: 1050 mL / NET: 125 mL      PE:   Oropharynx: no erythema or ulcerations   Oral Mucositis:                -                                     Grade: n/a  CVS: S1, S2 RRR   Lungs: CTA throughout bilaterally   Abdomen: + BS x 4, soft, NT, ND   Extremities: no edema   Gastric Mucositis:       -                                          Grade: n/a   Intestinal Mucositis:     -                                         Grade: n/a   Skin: macular erythematous rash of the upper thighs/back area   TLC: CDI  Neuro: A&OX3      Labs:   CBC Full  -  ( 23 May 2025 06:45 )  WBC Count : 0.16 K/uL  RBC Count : 3.10 M/uL  Hemoglobin : 8.7 g/dL  Hematocrit : 27.6 %  Platelet Count - Automated : 119 K/uL  Mean Cell Volume : 89.0 fl  Mean Cell Hemoglobin : 28.1 pg  Mean Cell Hemoglobin Concentration : 31.5 g/dL  Auto Neutrophil # : x  Auto Lymphocyte # : x  Auto Monocyte # : x  Auto Eosinophil # : x  Auto Basophil # : x  Auto Neutrophil % : x  Auto Lymphocyte % : x  Auto Monocyte % : x  Auto Eosinophil % : x  Auto Basophil % : x                          8.7    0.16  )-----------( 119      ( 23 May 2025 06:45 )             27.6         142  |  107  |  16  ----------------------------<  97  3.1[L]   |  24  |  0.57    Ca    8.1[L]      23 May 2025 06:45  Phos  2.9       Mg     2.0         TPro  4.6[L]  /  Alb  3.1[L]  /  TBili  0.4  /  DBili  x   /  AST  16  /  ALT  19  /  AlkPhos  124[H]         Cultures:   Culture - Blood (25 @ 21:16)   Specimen Source: Blood Blood-Peripheral  Culture Results:   No growth at 24 hours    Culture - Blood (25 @ 21:16)   Specimen Source: Blood Blood-Catheter  Culture Results:   No growth at 24 hours    MRSA/MSSA PCR (25 @ 16:53)   MRSA PCR Result.: NotDetec    Radiology:   < from: Xray Chest 1 View- PORTABLE-Urgent (Xray Chest 1 View- PORTABLE-Urgent .) (25 @ 20:58) >  IMPRESSION:  Low lung volumes.  Right mid to lower lung linear atelectasis. Otherwise clear lungs.    Meds:   Antimicrobials:   acyclovir   Oral Tab/Cap 800 milliGRAM(s) Oral every 12 hours  piperacillin/tazobactam IVPB.. 4.5 Gram(s) IV Intermittent every 8 hours  posaconazole DR Tablet 300 milliGRAM(s) Oral daily  vancomycin    Solution 125 milliGRAM(s) Oral every 12 hours      Heme / Onc:       GI:  pantoprazole    Tablet 40 milliGRAM(s) Oral before breakfast  sodium bicarbonate Mouth Rinse 10 milliLiter(s) Swish and Spit five times a day  ursodiol Capsule 300 milliGRAM(s) Oral two times a day      Cardiovascular:       Immunologic:       Other medications:   Biotene Dry Mouth Oral Rinse 5 milliLiter(s) Swish and Spit five times a day  chlorhexidine 4% Liquid 1 Application(s) Topical <User Schedule>  DULoxetine 60 milliGRAM(s) Oral <User Schedule>  fosaprepitant IVPB 150 milliGRAM(s) IV Intermittent once  hydrocortisone 1% Cream 1 Application(s) Topical daily  lidocaine   4% Patch 1 Patch Transdermal daily  ondansetron  IVPB 16 milliGRAM(s) IV Intermittent every 24 hours  oxyCODONE    IR 5 milliGRAM(s) Oral <User Schedule>  phytonadione   Solution 5 milliGRAM(s) Oral <User Schedule>  sodium chloride 0.9%. 1000 milliLiter(s) IV Continuous <Continuous>  sodium chloride 0.9%. 500 milliLiter(s) IV Continuous <Continuous>  sodium chloride 0.9%. 500 milliLiter(s) IV Continuous <Continuous>  sodium chloride 0.9%. 500 milliLiter(s) IV Continuous <Continuous>  sodium chloride 0.9%. 500 milliLiter(s) IV Continuous <Continuous>  sodium chloride 0.9%. 1000 milliLiter(s) IV Continuous <Continuous>  tiZANidine 4 milliGRAM(s) Oral <User Schedule>      PRN:   acetaminophen     Tablet .. 650 milliGRAM(s) Oral every 6 hours PRN  prochlorperazine   Injectable 10 milliGRAM(s) IV Push every 6 hours PRN  sodium chloride 0.9% lock flush 10 milliLiter(s) IV Push every 1 hour PRN      A/P:  69 year old male with refractory DLBCL, status post R-CHOP x 6, HD MTX x 4, salvage polatuzumab / rituximab / revlimid x 4, admitted for a haplo-identical pbsct from his daughter with Flu / Bu / Cy / TBI prep regimen   Day +3  5/15- busulfan 2/2, fludarabine . No acute events overnight, vital signs are stable; continue keppra ppx for 24 hours post infusion of last dose of busulfan. No tylenol or posaconazole until day 0. Not neutropenic today, will d/c levaquin / vancomycin.    - fludarabine 3/5. VSS, afebrile. No acute events overnight.   - fludarabine . VSS and afebrile. No acute events overnight. Neutropenic today, started on ppx levaquin/vancomycin PO. No Tylenol or Azoles until day 0.   - fludarabine . VSS, remains afebrile. No acute events overnight. No Tylenol or Azoles until day 0.  - TBI today. No acute events overnight. Vital signs are stable.    - will receive Haplo allogeneic SCT today. VSS, afebrile.  - tolerated HPC infusion well. continue IVF hydration 24 hrs post cells, continue to monitor I&Os   - increased loose BM, imodium prn. VSS, afebrile and infectious work up negative - will discontinue zosyn and switch to levaquin ppx. Continue supportive care.    1. Infectious Disease:   acyclovir Oral Tab/Cap 800 milliGRAM(s) Oral every 12 hours  Levaquin 500mg oral daily  posaconazole DR Tablet 300 milliGRAM(s) Oral daily  vancomycin Solution 125 milliGRAM(s) Oral every 12 hours    2. VOD Prophylaxis:   ursodiol Capsule 300 milliGRAM(s) Oral two times a day    3. GI Prophylaxis:   pantoprazole Tablet 40 milliGRAM(s) Oral before breakfast    4. Mouthcare - NS / NaHCO3 rinses, Biotene; Skin care     5. GVHD prophylaxis   PTCy days +3, +4   Tacrolimus gtt to start on day + 5  Abatacept days +5, +14, +28, +56    6. Transfuse & replete electrolytes prn    Hypokalemia - repleted.    7. IV hydration, daily weights, strict I&O, prn diuresis     8. PO intake as tolerated, nutrition follow up as needed    9. Antiemetics, anti-diarrhea medications:   prochlorperazine   Injectable 10 milliGRAM(s) IV Push every 6 hours PRN    10. OOB as tolerated, physical therapy consult if needed     11. Monitor coags  2x week, vitamin K BIW  phytonadione Solution 5 milliGRAM(s) Oral <User Schedule>    12. Monitor closely for clinical changes, monitor for fevers     13. Emotional support provided, plan of care discussed and questions addressed     14. Patient education done regarding plan of care, restrictions and discharge planning     15. Continue regular social work input     I have written the above note for Dr. Singh who performed service with me in the room.   Bhaskar Esquivel PA-C ([phone])    I have seen and examined patient with PA, I agree with above note as scribed.

## 2025-05-23 NOTE — PROGRESS NOTE ADULT - NS ATTEND AMEND GEN_ALL_CORE FT
I rounded with the team including nurses, ACPs and PharmD.  I reviewed the data in depth.     The patient is day +2 of allogeneic HSCT.    The patient is doing well with no complaints. mild nausea controlled with compazine  1 x fever last night; on zosyn. infectious work up pending.   The vitals are stable. The oral cavity is clear. The line site is clean. The lungs are clear. There is no LE edema.  Overall, there are no specific issues. The patient is on appropriate therapy at this stage.   All questions answered.   . I rounded with the team including nurses, ACPs and PharmD.  I reviewed the data in depth.     The patient is day +3 of allogeneic HSCT.    The patient is doing well with no complaints. mild nausea controlled with compazine  afebrile x 48 hours  The vitals are stable. The oral cavity is clear. The line site is clean. The lungs are clear. There is no LE edema. 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  All questions answered.   .

## 2025-05-24 LAB
ALBUMIN SERPL ELPH-MCNC: 2.8 G/DL — LOW (ref 3.3–5)
ALP SERPL-CCNC: 103 U/L — SIGNIFICANT CHANGE UP (ref 40–120)
ALT FLD-CCNC: 16 U/L — SIGNIFICANT CHANGE UP (ref 10–45)
ANION GAP SERPL CALC-SCNC: 11 MMOL/L — SIGNIFICANT CHANGE UP (ref 5–17)
APPEARANCE UR: CLEAR — SIGNIFICANT CHANGE UP
AST SERPL-CCNC: 14 U/L — SIGNIFICANT CHANGE UP (ref 10–40)
BILIRUB SERPL-MCNC: 0.5 MG/DL — SIGNIFICANT CHANGE UP (ref 0.2–1.2)
BILIRUB UR-MCNC: NEGATIVE — SIGNIFICANT CHANGE UP
BUN SERPL-MCNC: 8 MG/DL — SIGNIFICANT CHANGE UP (ref 7–23)
CALCIUM SERPL-MCNC: 7.6 MG/DL — LOW (ref 8.4–10.5)
CHLORIDE SERPL-SCNC: 112 MMOL/L — HIGH (ref 96–108)
CO2 SERPL-SCNC: 22 MMOL/L — SIGNIFICANT CHANGE UP (ref 22–31)
COLOR SPEC: YELLOW — SIGNIFICANT CHANGE UP
CREAT SERPL-MCNC: 0.63 MG/DL — SIGNIFICANT CHANGE UP (ref 0.5–1.3)
DIFF PNL FLD: NEGATIVE — SIGNIFICANT CHANGE UP
EGFR: 104 ML/MIN/1.73M2 — SIGNIFICANT CHANGE UP
EGFR: 104 ML/MIN/1.73M2 — SIGNIFICANT CHANGE UP
GLUCOSE SERPL-MCNC: 94 MG/DL — SIGNIFICANT CHANGE UP (ref 70–99)
GLUCOSE UR QL: NEGATIVE MG/DL — SIGNIFICANT CHANGE UP
HADV DNA FLD NAA+PROBE-LOG#: SIGNIFICANT CHANGE UP COPIES/ML
HCT VFR BLD CALC: 25.3 % — LOW (ref 39–50)
HGB BLD-MCNC: 8.1 G/DL — LOW (ref 13–17)
KETONES UR QL: NEGATIVE MG/DL — SIGNIFICANT CHANGE UP
LDH SERPL L TO P-CCNC: 176 U/L — SIGNIFICANT CHANGE UP (ref 50–242)
LEUKOCYTE ESTERASE UR-ACNC: NEGATIVE — SIGNIFICANT CHANGE UP
MAGNESIUM SERPL-MCNC: 1.7 MG/DL — SIGNIFICANT CHANGE UP (ref 1.6–2.6)
MCHC RBC-ENTMCNC: 28.8 PG — SIGNIFICANT CHANGE UP (ref 27–34)
MCHC RBC-ENTMCNC: 32 G/DL — SIGNIFICANT CHANGE UP (ref 32–36)
MCV RBC AUTO: 90 FL — SIGNIFICANT CHANGE UP (ref 80–100)
NITRITE UR-MCNC: NEGATIVE — SIGNIFICANT CHANGE UP
NRBC BLD AUTO-RTO: 0 /100 WBCS — SIGNIFICANT CHANGE UP (ref 0–0)
PH UR: 6.5 — SIGNIFICANT CHANGE UP (ref 5–8)
PHOSPHATE SERPL-MCNC: 2.6 MG/DL — SIGNIFICANT CHANGE UP (ref 2.5–4.5)
PLATELET # BLD AUTO: 72 K/UL — LOW (ref 150–400)
POTASSIUM SERPL-MCNC: 2.8 MMOL/L — CRITICAL LOW (ref 3.5–5.3)
POTASSIUM SERPL-MCNC: 3.8 MMOL/L — SIGNIFICANT CHANGE UP (ref 3.5–5.3)
POTASSIUM SERPL-SCNC: 2.8 MMOL/L — CRITICAL LOW (ref 3.5–5.3)
POTASSIUM SERPL-SCNC: 3.8 MMOL/L — SIGNIFICANT CHANGE UP (ref 3.5–5.3)
PROT SERPL-MCNC: 4.2 G/DL — LOW (ref 6–8.3)
PROT UR-MCNC: NEGATIVE MG/DL — SIGNIFICANT CHANGE UP
RBC # BLD: 2.81 M/UL — LOW (ref 4.2–5.8)
RBC # FLD: 16.5 % — HIGH (ref 10.3–14.5)
SODIUM SERPL-SCNC: 145 MMOL/L — SIGNIFICANT CHANGE UP (ref 135–145)
SP GR SPEC: 1.01 — SIGNIFICANT CHANGE UP (ref 1–1.03)
UROBILINOGEN FLD QL: 0.2 MG/DL — SIGNIFICANT CHANGE UP (ref 0.2–1)
WBC # BLD: 0.14 K/UL — CRITICAL LOW (ref 3.8–10.5)
WBC # FLD AUTO: 0.14 K/UL — CRITICAL LOW (ref 3.8–10.5)

## 2025-05-24 PROCEDURE — 99233 SBSQ HOSP IP/OBS HIGH 50: CPT | Mod: FS

## 2025-05-24 RX ORDER — FUROSEMIDE 10 MG/ML
20 INJECTION INTRAMUSCULAR; INTRAVENOUS ONCE
Refills: 0 | Status: COMPLETED | OUTPATIENT
Start: 2025-05-24 | End: 2025-05-24

## 2025-05-24 RX ORDER — MAGNESIUM, ALUMINUM HYDROXIDE 200-200 MG
30 TABLET,CHEWABLE ORAL EVERY 4 HOURS
Refills: 0 | Status: DISCONTINUED | OUTPATIENT
Start: 2025-05-24 | End: 2025-06-05

## 2025-05-24 RX ORDER — PIPERACILLIN-TAZO-DEXTROSE,ISO 3.375G/5
4.5 IV SOLUTION, PIGGYBACK PREMIX FROZEN(ML) INTRAVENOUS EVERY 8 HOURS
Refills: 0 | Status: DISCONTINUED | OUTPATIENT
Start: 2025-05-24 | End: 2025-05-25

## 2025-05-24 RX ORDER — PIPERACILLIN-TAZO-DEXTROSE,ISO 3.375G/5
4.5 IV SOLUTION, PIGGYBACK PREMIX FROZEN(ML) INTRAVENOUS ONCE
Refills: 0 | Status: COMPLETED | OUTPATIENT
Start: 2025-05-24 | End: 2025-05-24

## 2025-05-24 RX ADMIN — FUROSEMIDE 20 MILLIGRAM(S): 10 INJECTION INTRAMUSCULAR; INTRAVENOUS at 09:32

## 2025-05-24 RX ADMIN — TIZANIDINE 4 MILLIGRAM(S): 4 TABLET ORAL at 21:45

## 2025-05-24 RX ADMIN — Medication 800 MILLIGRAM(S): at 05:05

## 2025-05-24 RX ADMIN — Medication 25 GRAM(S): at 17:14

## 2025-05-24 RX ADMIN — URSODIOL 300 MILLIGRAM(S): 300 CAPSULE ORAL at 17:13

## 2025-05-24 RX ADMIN — FUROSEMIDE 20 MILLIGRAM(S): 10 INJECTION INTRAMUSCULAR; INTRAVENOUS at 14:38

## 2025-05-24 RX ADMIN — Medication 10 MILLILITER(S): at 19:35

## 2025-05-24 RX ADMIN — HYDROCORTISONE 1 APPLICATION(S): 10 CREAM TOPICAL at 12:47

## 2025-05-24 RX ADMIN — Medication 40 MILLIGRAM(S): at 05:05

## 2025-05-24 RX ADMIN — Medication 50 MILLIEQUIVALENT(S): at 07:39

## 2025-05-24 RX ADMIN — Medication 50 MILLIEQUIVALENT(S): at 10:00

## 2025-05-24 RX ADMIN — LIDOCAINE HYDROCHLORIDE 1 PATCH: 20 JELLY TOPICAL at 00:10

## 2025-05-24 RX ADMIN — Medication 50 MILLIEQUIVALENT(S): at 12:21

## 2025-05-24 RX ADMIN — Medication 40 MILLIEQUIVALENT(S): at 12:20

## 2025-05-24 RX ADMIN — LIDOCAINE HYDROCHLORIDE 1 PATCH: 20 JELLY TOPICAL at 19:30

## 2025-05-24 RX ADMIN — Medication 1 APPLICATION(S): at 08:36

## 2025-05-24 RX ADMIN — DULOXETINE 60 MILLIGRAM(S): 20 CAPSULE, DELAYED RELEASE ORAL at 21:45

## 2025-05-24 RX ADMIN — Medication 5 MILLILITER(S): at 12:21

## 2025-05-24 RX ADMIN — LOPERAMIDE HCL 2 MILLIGRAM(S): 1 SOLUTION ORAL at 02:35

## 2025-05-24 RX ADMIN — LIDOCAINE HYDROCHLORIDE 1 PATCH: 20 JELLY TOPICAL at 17:14

## 2025-05-24 RX ADMIN — Medication 800 MILLIGRAM(S): at 17:13

## 2025-05-24 RX ADMIN — Medication 116 MILLIGRAM(S): at 14:23

## 2025-05-24 RX ADMIN — POSACONAZOLE 300 MILLIGRAM(S): 100 TABLET, DELAYED RELEASE ORAL at 12:21

## 2025-05-24 RX ADMIN — CYCLOPHOSPHAMIDE INJECTION, SOLUTION 2360 MILLIGRAM(S): 200 INJECTION INTRAVENOUS at 14:49

## 2025-05-24 RX ADMIN — Medication 125 MILLIGRAM(S): at 17:13

## 2025-05-24 RX ADMIN — Medication 25 GRAM(S): at 09:45

## 2025-05-24 RX ADMIN — Medication 5 MILLILITER(S): at 19:35

## 2025-05-24 RX ADMIN — Medication 5 MILLILITER(S): at 08:36

## 2025-05-24 RX ADMIN — Medication 40 MILLIEQUIVALENT(S): at 07:05

## 2025-05-24 RX ADMIN — Medication 5 MILLILITER(S): at 16:08

## 2025-05-24 RX ADMIN — Medication 10 MILLILITER(S): at 16:08

## 2025-05-24 RX ADMIN — Medication 10 MILLILITER(S): at 12:21

## 2025-05-24 RX ADMIN — Medication 10 MILLILITER(S): at 08:37

## 2025-05-24 RX ADMIN — Medication 25 GRAM(S): at 02:00

## 2025-05-24 RX ADMIN — Medication 40 MILLIEQUIVALENT(S): at 14:28

## 2025-05-24 RX ADMIN — Medication 125 MILLIGRAM(S): at 05:05

## 2025-05-24 RX ADMIN — URSODIOL 300 MILLIGRAM(S): 300 CAPSULE ORAL at 05:05

## 2025-05-24 NOTE — DISCHARGE NOTE PROVIDER - NSDCFUADDAPPT_GEN_ALL_CORE_FT
You have a follow up at [facility] on:  -  - You have a follow up at [facility] on:  - Friday 6/6 at 8AM for possible platelets,labs and Zarxio injection. To likely see MD/provider in treatment room  - Saturday 6/7 at 9:40 for possible platelets, labs and Zarxio injection   - Sunday 6/8 at 8AM for possible platelets,labs and Zarxio injection   - Monday 6/9 at 8AM for possible platelets,labs and Zarxio injection  You have a follow up at [facility] on:  - Friday 6/6 at 8AM for possible platelets,labs and Zarxio injection.   - Friday 6/6 at 10am with Dr. Singh   - Saturday 6/7 at 9:40 for possible platelets, labs and Zarxio injection   - Sunday 6/8 at 8AM for possible platelets,labs and Zarxio injection   - Monday 6/9 at 8AM for possible platelets,labs and Zarxio injection

## 2025-05-24 NOTE — DISCHARGE NOTE PROVIDER - NSDCMRMEDTOKEN_GEN_ALL_CORE_FT
DULoxetine 60 mg oral delayed release capsule: 1 cap(s) orally once a day with dinner  oxyCODONE 5 mg oral tablet: 1 tab(s) orally once a day as needed for Severe back pain.  Pepcid 20 mg oral tablet: 1 tab(s) orally once a day before breakfast  tiZANidine 4 mg oral capsule: 1 cap(s) orally once a day (at bedtime)   acyclovir 800 mg oral tablet: 1 tab(s) orally every 12 hours  Bactrim 400 mg-80 mg oral tablet: 1 tab(s) orally once a day  Cymbalta 60 mg oral delayed release capsule: 1 cap(s) orally once a day  ondansetron 8 mg oral tablet: 1 tab(s) orally every 8 hours as needed for  nausea  pantoprazole 40 mg oral delayed release tablet: 1 tab(s) orally once a day (before a meal)  posaconazole 100 mg oral delayed release tablet: 2 tab(s) orally once a day  ursodiol 300 mg oral capsule: 1 cap(s) orally 2 times a day   acyclovir 800 mg oral tablet: 1 tab(s) orally every 12 hours  Bactrim 400 mg-80 mg oral tablet: 1 tab(s) orally once a day  Cymbalta 60 mg oral delayed release capsule: 1 cap(s) orally once a day  ondansetron 8 mg oral tablet: 1 tab(s) orally every 8 hours as needed for  nausea  pantoprazole 40 mg oral delayed release tablet: 1 tab(s) orally once a day (before a meal)  posaconazole 100 mg oral delayed release tablet: 2 tab(s) orally once a day  tacrolimus 0.5 mg oral capsule: 2 cap(s) orally 2 times a day take dose as instructed by your BMT team  ursodiol 300 mg oral capsule: 1 cap(s) orally 2 times a day

## 2025-05-24 NOTE — DISCHARGE NOTE PROVIDER - DETAILS OF MALNUTRITION DIAGNOSIS/DIAGNOSES
This patient has been assessed with a concern for Malnutrition and was treated during this hospitalization for the following Nutrition diagnosis/diagnoses:     -  06/03/2025: Severe protein-calorie malnutrition

## 2025-05-24 NOTE — DISCHARGE NOTE PROVIDER - NSDCCPCAREPLAN_GEN_ALL_CORE_FT
PRINCIPAL DISCHARGE DIAGNOSIS  Diagnosis: Stem cells transplant status  Assessment and Plan of Treatment: You are immunocompromised, should you experience fever >100.4, intractable nausea, vomiting, diarrhea, abdominal pain,  shortness of breath, chest pain, contact your BMT team at [phone] for further management. To get better and follow your care plan as instructed.       PRINCIPAL DISCHARGE DIAGNOSIS  Diagnosis: Stem cells transplant status  Assessment and Plan of Treatment: You are immunocompromised, should you experience fever >100.4, intractable nausea, vomiting, diarrhea, abdominal pain,  shortness of breath, chest pain, contact your BMT team at [phone] for further management. To get better and follow your care plan as instructed.  take all medcation as prescribed  - Acyclovir: anti viral  - Posaconazole: anit fungal  -Levaquin: anti bacterial  - bactrim: anit PCP pneumonia( sart taking once instructed to do so by BMT team)  Follow up at [facility] as advised.        PRINCIPAL DISCHARGE DIAGNOSIS  Diagnosis: Stem cells transplant status  Assessment and Plan of Treatment: You are immunocompromised, should you experience fever >100.4, intractable nausea, vomiting, diarrhea, abdominal pain,  shortness of breath, chest pain, contact your BMT team at [phone] for further management. To get better and follow your care plan as instructed.  take all medcation as prescribed  Diet and activities as per [facility] discharge guidelines and safe food handling guidelines. NO RESTAURANT OR TAKE OUT FOOD AT THIS TIME, ONLY HOME COOKED PREPARED/FROZEN FOODS. You are allowed to have fresh baked pizza right out of the oven. This is the ONLY takeout food at this time.  On the day you have an appointment at the [facility], do NOT take your Tacrolimus morning dose. Instead, bring it with you to the [facility]. After you have your bloods drawn you may take your morning dose of Tacrolimus. BRING YOUR MEDICATIONS WITH YOU TO YOUR APPOINTMENT.  Follow up at [facility] as advised.         SECONDARY DISCHARGE DIAGNOSES  Diagnosis: Need for prophylactic measure  Assessment and Plan of Treatment: Continue your prophylactic medications as directed:   - Acyclovir: antiviral  - Posaconazole: antifungal  - Bactrim: anti PCP pneumonia (start taking once instructed to do so by BMT team)

## 2025-05-24 NOTE — PROGRESS NOTE ADULT - SUBJECTIVE AND OBJECTIVE BOX
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 with Flu / Bu / Cy / TBI prep regimen for treatment of refractory DLBCL    Disease: DLBCL  Type of transplant: Haplo-identical (daughter)   Prep Regimen: Flu / Bu / Cy / TBI   ABO / CMV:   Recipient: A POS/ NEG   Donor: A POS / NEG     S: Patient seen and examined with HPC Transplant Team:     O: Vitals:   Vital Signs Last 24 Hrs  T(C): 36.8 (24 May 2025 05:10), Max: 38.1 (23 May 2025 21:23)  T(F): 98.2 (24 May 2025 05:10), Max: 100.6 (23 May 2025 21:23)  HR: 70 (24 May 2025 05:10) (70 - 84)  BP: 102/58 (24 May 2025 05:10) (102/58 - 117/67)  BP(mean): --  RR: 17 (24 May 2025 05:10) (16 - 18)  SpO2: 98% (24 May 2025 05:10) (98% - 99%)    Parameters below as of 24 May 2025 05:10  Patient On (Oxygen Delivery Method): room air        Admit weight:  47.1 kg  Daily     Daily Weight in k.7 (23 May 2025 07:54)    Intake / Output:    @ 07:01  -   @ 07:00  --------------------------------------------------------  IN: 6679 mL / OUT: 4420 mL / NET: 2259 mL      PE:   Oropharynx: no erythema or ulcerations   Oral Mucositis:                -                                     Grade: n/a  CVS: S1, S2 RRR   Lungs: CTA throughout bilaterally   Abdomen: + BS x 4, soft, NT, ND   Extremities: no edema   Gastric Mucositis:       -                                          Grade: n/a   Intestinal Mucositis:     -                                         Grade: n/a   Skin: macular erythematous rash of the upper thighs/back area   TLC: CDI  Neuro: A&OX3          Labs:   CBC Full  -  ( 24 May 2025 06:17 )  WBC Count : 0.14 K/uL  Hemoglobin : 8.1 g/dL  Hematocrit : 25.3 %  Platelet Count - Automated : 72 K/uL  Mean Cell Volume : 90.0 fl  Mean Cell Hemoglobin : 28.8 pg  Mean Cell Hemoglobin Concentration : 32.0 g/dL  Auto Neutrophil # : x  Auto Lymphocyte # : x  Auto Monocyte # : x  Auto Eosinophil # : x  Auto Basophil # : x  Auto Neutrophil % : x  Auto Lymphocyte % : x  Auto Monocyte % : x  Auto Eosinophil % : x  Auto Basophil % : x                          8.1    0.14  )-----------( 72       ( 24 May 2025 06:17 )             25.3         145  |  112[H]  |  8   ----------------------------<  94  2.8[LL]   |  22  |  0.63    Ca    7.6[L]      24 May 2025 06:17  Phos  2.6       Mg     1.7         TPro  4.2[L]  /  Alb  2.8[L]  /  TBili  0.5  /  DBili  x   /  AST  14  /  ALT  16  /  AlkPhos  103        LIVER FUNCTIONS - ( 24 May 2025 06:17 )  Alb: 2.8 g/dL / Pro: 4.2 g/dL / ALK PHOS: 103 U/L / ALT: 16 U/L / AST: 14 U/L / GGT: x           Lactate Dehydrogenase, Serum: 176 U/L ( @ 06:17)              Cultures:   Culture - Urine (25 @ 21:16)   Specimen Source: Clean Catch Clean Catch (Midstream)  Culture Results:   <10,000 CFU/mL Normal Urogenital FloraCulture - Blood (25 @ 21:16)   Specimen Source: Blood Blood-Peripheral  Culture Results:   No growth at 48 HoursCulture - Blood (25 @ 21:16)   Specimen Source: Blood Blood-Catheter  Culture Results:   No growth at 48 Hours      Radiology:       Meds:   Antimicrobials:   acyclovir   Oral Tab/Cap 800 milliGRAM(s) Oral every 12 hours  levoFLOXacin  Tablet 500 milliGRAM(s) Oral every 24 hours  piperacillin/tazobactam IVPB.- 4.5 Gram(s) IV Intermittent once  piperacillin/tazobactam IVPB.. 4.5 Gram(s) IV Intermittent every 8 hours  posaconazole DR Tablet 300 milliGRAM(s) Oral daily  vancomycin    Solution 125 milliGRAM(s) Oral every 12 hours      Heme / Onc:   cyclophosphamide IVPB (eMAR) 2360 milliGRAM(s) IV Intermittent daily      GI:  loperamide 2 milliGRAM(s) Oral every 6 hours PRN  pantoprazole    Tablet 40 milliGRAM(s) Oral before breakfast  sodium bicarbonate Mouth Rinse 10 milliLiter(s) Swish and Spit five times a day  ursodiol Capsule 300 milliGRAM(s) Oral two times a day      Cardiovascular:   furosemide   Injectable 20 milliGRAM(s) IV Push once      Immunologic:       Other medications:   Biotene Dry Mouth Oral Rinse 5 milliLiter(s) Swish and Spit five times a day  chlorhexidine 4% Liquid 1 Application(s) Topical <User Schedule>  DULoxetine 60 milliGRAM(s) Oral <User Schedule>  hydrocortisone 1% Cream 1 Application(s) Topical daily  lidocaine   4% Patch 1 Patch Transdermal daily  ondansetron  IVPB 16 milliGRAM(s) IV Intermittent every 24 hours  phytonadione   Solution 5 milliGRAM(s) Oral <User Schedule>  potassium chloride    Tablet ER 40 milliEquivalent(s) Oral every 4 hours  potassium chloride  20 mEq/100 mL IVPB 20 milliEquivalent(s) IV Intermittent every 2 hours  sodium chloride 0.9%. 1000 milliLiter(s) IV Continuous <Continuous>  sodium chloride 0.9%. 500 milliLiter(s) IV Continuous <Continuous>  sodium chloride 0.9%. 500 milliLiter(s) IV Continuous <Continuous>  sodium chloride 0.9%. 500 milliLiter(s) IV Continuous <Continuous>  sodium chloride 0.9%. 500 milliLiter(s) IV Continuous <Continuous>  sodium chloride 0.9%. 1000 milliLiter(s) IV Continuous <Continuous>  tiZANidine 4 milliGRAM(s) Oral <User Schedule>      PRN:   acetaminophen     Tablet .. 650 milliGRAM(s) Oral every 6 hours PRN  loperamide 2 milliGRAM(s) Oral every 6 hours PRN  oxyCODONE    IR 5 milliGRAM(s) Oral every 6 hours PRN  prochlorperazine   Injectable 10 milliGRAM(s) IV Push every 6 hours PRN  sodium chloride 0.9% lock flush 10 milliLiter(s) IV Push every 1 hour PRN          A/P:  69 year old male with refractory DLBCL, status post R-CHOP x 6, HD MTX x 4, salvage polatuzumab / rituximab / revlimid x 4, admitted for a haplo-identical pbsct from his daughter with Flu / Bu / Cy / TBI prep regimen   Day +4  5/15- busulfan 2/, fludarabine . No acute events overnight, vital signs are stable; continue keppra ppx for 24 hours post infusion of last dose of busulfan. No tylenol or posaconazole until day 0. Not neutropenic today, will d/c levaquin / vancomycin.    - fludarabine 3/5. VSS, afebrile. No acute events overnight.   - fludarabine . VSS and afebrile. No acute events overnight. Neutropenic today, started on ppx levaquin/vancomycin PO. No Tylenol or Azoles until day 0.   - fludarabine . VSS, remains afebrile. No acute events overnight. No Tylenol or Azoles until day 0.  - TBI today. No acute events overnight. Vital signs are stable.    - will receive Haplo allogeneic SCT today. VSS, afebrile.  - tolerated HPC infusion well. continue IVF hydration 24 hrs post cells, continue to monitor I&Os   - increased loose BM, imodium prn. VSS, afebrile and infectious work up negative - will discontinue zosyn and switch to levaquin ppx. Continue supportive care.    1. Infectious Disease:   acyclovir   Oral Tab/Cap 800 milliGRAM(s) Oral every 12 hours  levoFLOXacin  Tablet 500 milliGRAM(s) Oral every 24 hours  piperacillin/tazobactam IVPB.- 4.5 Gram(s) IV Intermittent once  piperacillin/tazobactam IVPB.. 4.5 Gram(s) IV Intermittent every 8 hours  posaconazole DR Tablet 300 milliGRAM(s) Oral daily  vancomycin    Solution 125 milliGRAM(s) Oral every 12 hours      2. VOD Prophylaxis:   ursodiol Capsule 300 milliGRAM(s) Oral two times a day    3. GI Prophylaxis:   pantoprazole Tablet 40 milliGRAM(s) Oral before breakfast    4. Mouthcare - NS / NaHCO3 rinses, Biotene; Skin care     5. GVHD prophylaxis   PTCy days +3, +4   Tacrolimus gtt to start on day + 5  Abatacept days +5, +14, +28, +56    6. Transfuse & replete electrolytes prn   5/4  Hypokalemia - repleted.    7. IV hydration, daily weights, strict I&O, prn diuresis     8. PO intake as tolerated, nutrition follow up as needed    9. Antiemetics, anti-diarrhea medications:   prochlorperazine   Injectable 10 milliGRAM(s) IV Push every 6 hours PRN    10. OOB as tolerated, physical therapy consult if needed     11. Monitor coags  2x week, vitamin K BIW  phytonadione Solution 5 milliGRAM(s) Oral <User Schedule>    12. Monitor closely for clinical changes, monitor for fevers     13. Emotional support provided, plan of care discussed and questions addressed     14. Patient education done regarding plan of care, restrictions and discharge planning     15. Continue regular social work input     I have written the above note for Dr. Singh who performed service with me in the room.   Patti Carpenter PA-C ([phone])    I have seen and examined patient with PA, I agree with above note as scribed.                    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 with Flu / Bu / Cy / TBI prep regimen for treatment of refractory DLBCL    Disease: DLBCL  Type of transplant: Haplo-identical (daughter)   Prep Regimen: Flu / Bu / Cy / TBI   ABO / CMV:   Recipient: A POS/ NEG   Donor: A POS / NEG     S: Patient seen and examined with HPC Transplant Team:   +fatigue  +loose stools    O: Vitals:   Vital Signs Last 24 Hrs  T(C): 36.8 (24 May 2025 05:10), Max: 38.1 (23 May 2025 21:23)  T(F): 98.2 (24 May 2025 05:10), Max: 100.6 (23 May 2025 21:23)  HR: 70 (24 May 2025 05:10) (70 - 84)  BP: 102/58 (24 May 2025 05:10) (102/58 - 117/67)  BP(mean): --  RR: 17 (24 May 2025 05:10) (16 - 18)  SpO2: 98% (24 May 2025 05:10) (98% - 99%)    Parameters below as of 24 May 2025 05:10  Patient On (Oxygen Delivery Method): room air        Admit weight:  47.1 kg  Daily : 49.9 kg    Daily Weight in k.7 (23 May 2025 07:54)    Intake / Output:    @ 07:01  -   @ 07:00  --------------------------------------------------------  IN: 6679 mL / OUT: 4420 mL / NET: 2259 mL      PE:   Oropharynx: no erythema or ulcerations   Oral Mucositis:                -                                     Grade: n/a  CVS: S1, S2 RRR   Lungs: CTA throughout bilaterally   Abdomen: + BS x 4, soft, NT, ND   Extremities: no edema   Gastric Mucositis:       -                                          Grade: n/a   Intestinal Mucositis:     -                                         Grade: n/a   Skin: macular erythematous rash of the upper thighs/back area   TLC: CDI  Neuro: A&OX3          Labs:   CBC Full  -  ( 24 May 2025 06:17 )  WBC Count : 0.14 K/uL  Hemoglobin : 8.1 g/dL  Hematocrit : 25.3 %  Platelet Count - Automated : 72 K/uL  Mean Cell Volume : 90.0 fl  Mean Cell Hemoglobin : 28.8 pg  Mean Cell Hemoglobin Concentration : 32.0 g/dL  Auto Neutrophil # : x  Auto Lymphocyte # : x  Auto Monocyte # : x  Auto Eosinophil # : x  Auto Basophil # : x  Auto Neutrophil % : x  Auto Lymphocyte % : x  Auto Monocyte % : x  Auto Eosinophil % : x  Auto Basophil % : x                          8.1    0.14  )-----------( 72       ( 24 May 2025 06:17 )             25.3         145  |  112[H]  |  8   ----------------------------<  94  2.8[LL]   |  22  |  0.63    Ca    7.6[L]      24 May 2025 06:17  Phos  2.6       Mg     1.7         TPro  4.2[L]  /  Alb  2.8[L]  /  TBili  0.5  /  DBili  x   /  AST  14  /  ALT  16  /  AlkPhos  103        LIVER FUNCTIONS - ( 24 May 2025 06:17 )  Alb: 2.8 g/dL / Pro: 4.2 g/dL / ALK PHOS: 103 U/L / ALT: 16 U/L / AST: 14 U/L / GGT: x           Lactate Dehydrogenase, Serum: 176 U/L ( @ 06:17)              Cultures:   Culture - Urine (25 @ 21:16)   Specimen Source: Clean Catch Clean Catch (Midstream)  Culture Results:   <10,000 CFU/mL Normal Urogenital FloraCulture - Blood (25 @ 21:16)   Specimen Source: Blood Blood-Peripheral  Culture Results:   No growth at 48 HoursCulture - Blood (25 @ 21:16)   Specimen Source: Blood Blood-Catheter  Culture Results:   No growth at 48 Hours      Radiology:       Meds:   Antimicrobials:   acyclovir   Oral Tab/Cap 800 milliGRAM(s) Oral every 12 hours  levoFLOXacin  Tablet 500 milliGRAM(s) Oral every 24 hours  piperacillin/tazobactam IVPB.- 4.5 Gram(s) IV Intermittent once  piperacillin/tazobactam IVPB.. 4.5 Gram(s) IV Intermittent every 8 hours  posaconazole DR Tablet 300 milliGRAM(s) Oral daily  vancomycin    Solution 125 milliGRAM(s) Oral every 12 hours      Heme / Onc:   cyclophosphamide IVPB (eMAR) 2360 milliGRAM(s) IV Intermittent daily      GI:  loperamide 2 milliGRAM(s) Oral every 6 hours PRN  pantoprazole    Tablet 40 milliGRAM(s) Oral before breakfast  sodium bicarbonate Mouth Rinse 10 milliLiter(s) Swish and Spit five times a day  ursodiol Capsule 300 milliGRAM(s) Oral two times a day      Cardiovascular:   furosemide   Injectable 20 milliGRAM(s) IV Push once      Immunologic:       Other medications:   Biotene Dry Mouth Oral Rinse 5 milliLiter(s) Swish and Spit five times a day  chlorhexidine 4% Liquid 1 Application(s) Topical <User Schedule>  DULoxetine 60 milliGRAM(s) Oral <User Schedule>  hydrocortisone 1% Cream 1 Application(s) Topical daily  lidocaine   4% Patch 1 Patch Transdermal daily  ondansetron  IVPB 16 milliGRAM(s) IV Intermittent every 24 hours  phytonadione   Solution 5 milliGRAM(s) Oral <User Schedule>  potassium chloride    Tablet ER 40 milliEquivalent(s) Oral every 4 hours  potassium chloride  20 mEq/100 mL IVPB 20 milliEquivalent(s) IV Intermittent every 2 hours  sodium chloride 0.9%. 1000 milliLiter(s) IV Continuous <Continuous>  sodium chloride 0.9%. 500 milliLiter(s) IV Continuous <Continuous>  sodium chloride 0.9%. 500 milliLiter(s) IV Continuous <Continuous>  sodium chloride 0.9%. 500 milliLiter(s) IV Continuous <Continuous>  sodium chloride 0.9%. 500 milliLiter(s) IV Continuous <Continuous>  sodium chloride 0.9%. 1000 milliLiter(s) IV Continuous <Continuous>  tiZANidine 4 milliGRAM(s) Oral <User Schedule>      PRN:   acetaminophen     Tablet .. 650 milliGRAM(s) Oral every 6 hours PRN  loperamide 2 milliGRAM(s) Oral every 6 hours PRN  oxyCODONE    IR 5 milliGRAM(s) Oral every 6 hours PRN  prochlorperazine   Injectable 10 milliGRAM(s) IV Push every 6 hours PRN  sodium chloride 0.9% lock flush 10 milliLiter(s) IV Push every 1 hour PRN          A/P:  69 year old male with refractory DLBCL, status post R-CHOP x 6, HD MTX x 4, salvage polatuzumab / rituximab / revlimid x 4, admitted for a haplo-identical pbsct from his daughter with Flu / Bu / Cy / TBI prep regimen   Day +4  5/15- busulfan /, fludarabine . No acute events overnight, vital signs are stable; continue keppra ppx for 24 hours post infusion of last dose of busulfan. No tylenol or posaconazole until day 0. Not neutropenic today, will d/c levaquin / vancomycin.    - fludarabine 3/5. VSS, afebrile. No acute events overnight.   - fludarabine . VSS and afebrile. No acute events overnight. Neutropenic today, started on ppx levaquin/vancomycin PO. No Tylenol or Azoles until day 0.   - fludarabine . VSS, remains afebrile. No acute events overnight. No Tylenol or Azoles until day 0.  - TBI today. No acute events overnight. Vital signs are stable.    - will receive Haplo allogeneic SCT today. VSS, afebrile.  - tolerated HPC infusion well. continue IVF hydration 24 hrs post cells, continue to monitor I&Os   - increased loose BM, imodium prn. VSS, afebrile and infectious work up negative - will discontinue zosyn and switch to levaquin ppx. Continue supportive care.  - PTCY2/2. febrile overnight: f/u cultures. Broaden to Zosyn. s.p lasix today      1. Infectious Disease:   acyclovir   Oral Tab/Cap 800 milliGRAM(s) Oral every 12 hours  levoFLOXacin  Tablet 500 milliGRAM(s) Oral every 24 hours  piperacillin/tazobactam IVPB.- 4.5 Gram(s) IV Intermittent once  piperacillin/tazobactam IVPB.. 4.5 Gram(s) IV Intermittent every 8 hours  posaconazole DR Tablet 300 milliGRAM(s) Oral daily  vancomycin    Solution 125 milliGRAM(s) Oral every 12 hours      2. VOD Prophylaxis:   ursodiol Capsule 300 milliGRAM(s) Oral two times a day    3. GI Prophylaxis:   pantoprazole Tablet 40 milliGRAM(s) Oral before breakfast    4. Mouthcare - NS / NaHCO3 rinses, Biotene; Skin care     5. GVHD prophylaxis   PTCy days +3, +4   Tacrolimus gtt to start on day + 5  Abatacept days +5, +14, +28, +56    6. Transfuse & replete electrolytes prn   5/4  Hypokalemia - repleted.    7. IV hydration, daily weights, strict I&O, prn diuresis     8. PO intake as tolerated, nutrition follow up as needed    9. Antiemetics, anti-diarrhea medications:   prochlorperazine   Injectable 10 milliGRAM(s) IV Push every 6 hours PRN    10. OOB as tolerated, physical therapy consult if needed     11. Monitor coags  2x week, vitamin K BIW  phytonadione Solution 5 milliGRAM(s) Oral <User Schedule>    12. Monitor closely for clinical changes, monitor for fevers     13. Emotional support provided, plan of care discussed and questions addressed     14. Patient education done regarding plan of care, restrictions and discharge planning     15. Continue regular social work input     I have written the above note for Dr. Singh who performed service with me in the room.   Patti Carpenter PA-C ([phone])    I have seen and examined patient with PA, I agree with above note as scribed.

## 2025-05-24 NOTE — DISCHARGE NOTE PROVIDER - NSDCFUSCHEDAPPT_GEN_ALL_CORE_FT
[facility]  [facility]  Scheduled Appointment: 06/17/2025    Jorge Solis  [facility]  [address]  Scheduled Appointment: 07/10/2025    [facility]  [facility]  Scheduled Appointment: 07/15/2025    Patricio Bautista  [facility]  [facility] [address]  Scheduled Appointment: 07/22/2025     [facility]  [facility]  Scheduled Appointment: 06/06/2025    [facility]  [facility]  Scheduled Appointment: 06/07/2025    [facility]  [facility]  Scheduled Appointment: 06/08/2025    [facility]  [facility]  Scheduled Appointment: 06/09/2025    [facility]  [facility]  Scheduled Appointment: 06/17/2025    Jorge Solis  [facility]  [address]  Scheduled Appointment: 07/10/2025    [facility]  [facility]  Scheduled Appointment: 07/15/2025    Patricio Bautista  [facility]  [address] [address]  Scheduled Appointment: 07/22/2025     [facility]  [facility]  Scheduled Appointment: 06/06/2025    Cynthia Singh  [facility]  Scheduled Appointment: 06/06/2025    [facility]  [facility]  Scheduled Appointment: 06/07/2025    [facility]  [facility]  Scheduled Appointment: 06/08/2025    [facility]  [facility]  Scheduled Appointment: 06/09/2025    [facility]  [facility]  Scheduled Appointment: 06/17/2025    Jorge Solis  [facility]  [address]  Scheduled Appointment: 07/10/2025    [facility]  [facility]  Scheduled Appointment: 07/15/2025    Patriico Bautista  [facility]  [address]  Scheduled Appointment: 07/22/2025

## 2025-05-24 NOTE — DISCHARGE NOTE PROVIDER - NSDCACTIVITY_GEN_ALL_CORE
Bathing allowed/Do not drive or operate machinery/Stairs allowed/Walking - Indoors allowed/No heavy lifting/straining/Activity as tolerated

## 2025-05-24 NOTE — DISCHARGE NOTE PROVIDER - NSDCFUADDINST_GEN_ALL_CORE_FT
Diet and activities as per [facility] discharge guidelines and safe food handling guidelines. NO RESTAURANT OR TAKE OUT FOOD AT THIS TIME, ONLY HOME COOKED PREPARED/FROZEN FOODS. You are allowed to have fresh baked pizza right out of the oven. This is the ONLY takeout food at this time.    Notify your physician if bleeding; swelling; persistent nausea and vomiting; unable to urinate; pain not relieved by medications; fever; numbness, tingling; excessive diarrhea, inability to tolerate liquids or foods; increased irritability or sluggishness, rash   You have a follow up at [facility] on:  - Friday 6/6 at 8AM for possible platelets, labs and Zarxio injection   - Saturday 6/7 at 9:40 for possible platelets, labs and Zarxio injection   - Sunday 6/8 at 8AM for possible platelets,labs and Zarxio injection   - Monday 6/9 at 8AM for possible platelets,labs and Zarxio injection     Diet and activities as per [facility] discharge guidelines and safe food handling guidelines. NO RESTAURANT OR TAKE OUT FOOD AT THIS TIME, ONLY HOME COOKED PREPARED/FROZEN FOODS. You are allowed to have fresh baked pizza right out of the oven. This is the ONLY takeout food at this time.    Notify your physician/BMT TEAM at [phone] if bleeding; swelling; persistent nausea and vomiting; unable to urinate; pain not relieved by medications; fever; numbness, tingling; excessive diarrhea, inability to tolerate liquids or foods; increased irritability or sluggishness, rash

## 2025-05-24 NOTE — DISCHARGE NOTE PROVIDER - CARE PROVIDER_API CALL
Cynthia Singh  Hematology/Oncology  [address] [place] [id number]  Phone: [phone]  Fax: [phone]  Follow Up Time:

## 2025-05-24 NOTE — DISCHARGE NOTE PROVIDER - HOSPITAL COURSE
Mr. Solis is a 69 year old male with a medical history of DVT, kyphoscoliosis and DLBCL who on 5/14 was  admitted for a haplo-identical pbsct from his daughter with Flu / Bu /Cy / TBI prep regimen. Hematologic history as follows: initially presented with complaints of progressive lower extremity parathesias since 1/2024. An MRI of the spine 3/5/24 showed diffuse osseous disease including C4, T8 burst fracture and epidural disease causing spinal cord deformity and cord edema, L4 pathological fracture, L3-L4 continuous involvement of central canal neural foramina and paraspinal soft tissues. A CT C/A/P showed stage IV disease with anterior third right rib oseous lesion extending to a 1.5cm soft tissue lesion on the right lung pleural surface. It also showed a left pleural space surface lesion, a 2.4cm right axillary lymph node, and extensive RP and mesenteric lymphadenopathy with a maira conglomerate mass. He underwent a two stage neurological intervention involving a T8 vertebral column resection, a T6-T10 fusion for tumor resection, a L4 partial corpectomy, and an L2 pelvis fusion. The course was complicated by bilateral soleal DVTs. He was then treated with R_CHOP x 6. The course was complicated by prolonged cytopenias. He then received HD MTX s 4 (completed 9/2/24). He was admitted 1/23/25 with transaminitis, likely an obstruction related to relapsed disease. A CT  A/P 1/23/25 showed increased mesenteric and RP lymphadenopathy and a new left lower renal pole and biliary ductal dilatation. HE had an ERCP with stent placement 1/24/25. He was initially worked up for CAR T therapy, but failed collection secondary to low lymphocyte counts. He was started on polatuzumab / rituximab / revlimid as a bridge to allogeneic pbsct. He received 4 cycles. CT A/P 4/21/25 showed response. He offered no complaints at the time of admission.         Mr. Solis is a 69 year old male with a medical history of DVT, kyphoscoliosis and DLBCL who on 5/14 was  admitted for a haplo-identical pbsct from his daughter with Flu / Bu /Cy / TBI prep regimen. Hematologic history as follows: initially presented with complaints of progressive lower extremity parathesias since 1/2024. An MRI of the spine 3/5/24 showed diffuse osseous disease including C4, T8 burst fracture and epidural disease causing spinal cord deformity and cord edema, L4 pathological fracture, L3-L4 continuous involvement of central canal neural foramina and paraspinal soft tissues. A CT C/A/P showed stage IV disease with anterior third right rib oseous lesion extending to a 1.5cm soft tissue lesion on the right lung pleural surface. It also showed a left pleural space surface lesion, a 2.4cm right axillary lymph node, and extensive RP and mesenteric lymphadenopathy with a maira conglomerate mass. He underwent a two stage neurological intervention involving a T8 vertebral column resection, a T6-T10 fusion for tumor resection, a L4 partial corpectomy, and an L2 pelvis fusion. The course was complicated by bilateral soleal DVTs. He was then treated with R_CHOP x 6. The course was complicated by prolonged cytopenias. He then received HD MTX s 4 (completed 9/2/24). He was admitted 1/23/25 with transaminitis, likely an obstruction related to relapsed disease. A CT  A/P 1/23/25 showed increased mesenteric and RP lymphadenopathy and a new left lower renal pole and biliary ductal dilatation. HE had an ERCP with stent placement 1/24/25. He was initially worked up for CAR T therapy, but failed collection secondary to low lymphocyte counts. He was started on polatuzumab / rituximab / revlimid as a bridge to allogeneic pbsct. He received 4 cycles. CT A/P 4/21/25 showed response.      Patient received a Haplo allogeneic SCT (daughter) on 05/20/25:  Aliquot 1   Product identification: [id number]  Infusion start time: 11:15  Infusion completion time: 12:35  Product volume (ml): 403.2  RBC volume (ml): 6.9  CD34 / kg infused  =6.78E+06  TNC / kg infused  =1.45E+09  CD34 viability (%) =100  Total DMSO (ml) = N/A  Infusion reaction: no reaction noted    Mr. Solis received IV hydration, pain management, nutritional support and antibacterial / antifungal / antiviral / GI and PCP prophylaxis. Labs were monitored on a daily basis and he received electrolyte repletion and transfusional support as needed.    His course was complicated by neutropenic fevers likely secondary to CRS s/p haplo allogeneic SCT. He completed a course of zosyn and infectious work up was negative. On 5/27 patient complained of SOB symptoms and intermittently required oxygen. CXR (5/27): showed hazy opacities and small to moderate pleural effusions. Patient was given lasix with adequate response and has not required oxygen as SOB has resolved.    Mr Solis was noted to have mucositis grade 1 requiring only use of mouth care and has since resolved.    To Do/Follow up:  [ ]       Mr. Solis is a 69 year old male with a medical history of DVT (bilateral soleal DVTs), kyphoscoliosis and DLBCL who on 5/14 was  admitted for a haplo-identical pbsct from his daughter with Flu / Bu /Cy / TBI prep regimen. GVH ppx following CAST ( PTCy, Abatacept, Tacrolimus). Hematologic history as follows: initially presented with complaints of progressive lower extremity paresthesias since 1/2024. An MRI of the spine 3/5/24 showed diffuse osseous disease including C4, T8 burst fracture and epidural disease causing spinal cord deformity and cord edema, L4 pathological fracture, L3-L4 continuous involvement of central canal neural foramina and paraspinal soft tissues. A CT C/A/P showed stage IV disease with anterior third right rib oseous lesion extending to a 1.5cm soft tissue lesion on the right lung pleural surface. He underwent a two stage neurological intervention involving a T8 vertebral column resection, a T6-T10 fusion for tumor resection, a L4 partial corpectomy, and an L2 pelvis fusion. He was then treated with R_CHOP x 6. The course was complicated by prolonged cytopenias. He then received HD MTX s 4 (completed 9/2/24). He was admitted 1/23/25 with transaminitis, likely an obstruction related to relapsed disease. A CT  A/P 1/23/25 showed increased mesenteric and RP lymphadenopathy and a new left lower renal pole and biliary ductal dilatation. HE had an ERCP with stent placement 1/24/25. He was initially worked up for CAR T therapy, but failed collection secondary to low lymphocyte counts. He was started on polatuzumab / rituximab / revlimid as a bridge to allogeneic pbsct. He received 4 cycles. CT A/P 4/21/25 showed response. On admission, his central line was accessed, daily labs obtained.     Patient received a Haplo allogeneic SCT (daughter) on 05/20/25, cell count as follows:  Aliquot 1   Product identification: [id number]  Infusion start time: 11:15  Infusion completion time: 12:35  Product volume (ml): 403.2  RBC volume (ml): 6.9  CD34 / kg infused  =6.78E+06  TNC / kg infused  =1.45E+09  CD34 viability (%) =100  Total DMSO (ml) = N/A  Infusion reaction: no reaction noted    Mr. Solis received IV hydration, pain management, nutritional support and antibacterial / antifungal / antiviral / GI and PCP prophylaxis. Labs were monitored on a daily basis and he received electrolyte repletion and transfusional support as needed.    His course was complicated by neutropenic fevers likely secondary to CRS s/p haplo allogeneic SCT. He completed a course of zosyn and infectious work up was negative. On 5/27 patient complained of SOB symptoms and intermittently required oxygen. CXR (5/27): showed hazy opacities and small to moderate pleural effusions. Patient was given lasix with adequate response and has not required oxygen as SOB has resolved. Zarxio started on D+7, Tacrolimus gtt started on D+5, transitioned po on D+8, levels obtained weekly/prn for goal 8-12.     Mr Solis was noted to have mucositis grade 1 requiring only use of mouth care and has since resolved.    To Do/Follow up:  [ ]       Mr. Solis is a 69 year old male with a medical history of DVT (bilateral soleal DVTs), kyphoscoliosis and DLBCL who on 5/14 was  admitted for a haplo-identical pbsct from his daughter with Flu / Bu /Cy / TBI prep regimen. GVH ppx following CAST ( PTCy, Abatacept, Tacrolimus). Hematologic history as follows: initially presented with complaints of progressive lower extremity paresthesias since 1/2024. An MRI of the spine 3/5/24 showed diffuse osseous disease including C4, T8 burst fracture and epidural disease causing spinal cord deformity and cord edema, L4 pathological fracture, L3-L4 continuous involvement of central canal neural foramina and paraspinal soft tissues. A CT C/A/P showed stage IV disease with anterior third right rib oseous lesion extending to a 1.5cm soft tissue lesion on the right lung pleural surface. He underwent a two stage neurological intervention involving a T8 vertebral column resection, a T6-T10 fusion for tumor resection, a L4 partial corpectomy, and an L2 pelvis fusion. He was then treated with R_CHOP x 6. The course was complicated by prolonged cytopenias. He then received HD MTX s 4 (completed 9/2/24). He was admitted 1/23/25 with transaminitis, likely an obstruction related to relapsed disease. A CT  A/P 1/23/25 showed increased mesenteric and RP lymphadenopathy and a new left lower renal pole and biliary ductal dilatation. HE had an ERCP with stent placement 1/24/25. He was initially worked up for CAR T therapy, but failed collection secondary to low lymphocyte counts. He was started on polatuzumab / rituximab / revlimid as a bridge to allogeneic pbsct. He received 4 cycles. CT A/P 4/21/25 showed response. On admission, his central line was accessed, daily labs obtained.     Patient received a Haplo allogeneic SCT (daughter) on 05/20/25, cell count as follows:  Aliquot 1   Product identification: [id number]  Infusion start time: 11:15  Infusion completion time: 12:35  Product volume (ml): 403.2  RBC volume (ml): 6.9  CD34 / kg infused  =6.78E+06  TNC / kg infused  =1.45E+09  CD34 viability (%) =100  Total DMSO (ml) = N/A  Infusion reaction: no reaction noted    Mr. Solis received IV hydration, pain management, nutritional support and antibacterial / antifungal / antiviral / GI and PCP prophylaxis. Labs were monitored on a daily basis and he received electrolyte repletion and transfusional support as needed.    His course was complicated by neutropenic fevers likely secondary to CRS s/p haplo allogeneic SCT. He completed a course of zosyn and infectious work up was negative. On 5/27 patient complained of SOB symptoms and intermittently required oxygen. CXR (5/27): showed hazy opacities and small to moderate pleural effusions. Patient was given lasix with adequate response and has not required oxygen as SOB has resolved. Zarxio started on D+7, Tacrolimus gtt started on D+5, transitioned po on D+8, levels obtained weekly/prn for goal 8-12.     Mr Solis was noted to have mucositis grade 1 requiring only use of mouth care and has since resolved.    Early engraftment noted on 6/2. CMV/ Chimerism was send off on 6/4.     Mr. Solis is ready for a discharge with daily follow ups at [facility]. He was provided with a VIP card upon discharge.          Mr. Solis is a 69 year old male with a medical history of DVT (bilateral soleal DVTs), kyphoscoliosis and DLBCL who on 5/14 was  admitted for a haplo-identical pbsct from his daughter with Flu / Bu /Cy / TBI prep regimen. GVH ppx following CAST ( PTCy, Abatacept, Tacrolimus). Hematologic history as follows: initially presented with complaints of progressive lower extremity paresthesias since 1/2024. An MRI of the spine 3/5/24 showed diffuse osseous disease including C4, T8 burst fracture and epidural disease causing spinal cord deformity and cord edema, L4 pathological fracture, L3-L4 continuous involvement of central canal neural foramina and paraspinal soft tissues. A CT C/A/P showed stage IV disease with anterior third right rib oseous lesion extending to a 1.5cm soft tissue lesion on the right lung pleural surface. He underwent a two stage neurological intervention involving a T8 vertebral column resection, a T6-T10 fusion for tumor resection, a L4 partial corpectomy, and an L2 pelvis fusion. He was then treated with R_CHOP x 6. The course was complicated by prolonged cytopenias. He then received HD MTX s 4 (completed 9/2/24). He was admitted 1/23/25 with transaminitis, likely an obstruction related to relapsed disease. A CT  A/P 1/23/25 showed increased mesenteric and RP lymphadenopathy and a new left lower renal pole and biliary ductal dilatation. HE had an ERCP with stent placement 1/24/25. He was initially worked up for CAR T therapy, but failed collection secondary to low lymphocyte counts. He was started on polatuzumab / rituximab / revlimid as a bridge to allogeneic pbsct. He received 4 cycles. CT A/P 4/21/25 showed response. On admission, his central line was accessed, daily labs obtained.     Patient received a Haplo allogeneic SCT (daughter) on 05/20/25, cell count as follows:  Aliquot 1   Product identification: [id number]  Infusion start time: 11:15  Infusion completion time: 12:35  Product volume (ml): 403.2  RBC volume (ml): 6.9  CD34 / kg infused  =6.78E+06  TNC / kg infused  =1.45E+09  CD34 viability (%) =100  Total DMSO (ml) = N/A  Infusion reaction: no reaction noted    Mr. Solis received IV hydration, pain management, nutritional support and antibacterial / antifungal / antiviral / GI and PCP prophylaxis. Labs were monitored on a daily basis and he received electrolyte repletion and transfusional support as needed.    His course was complicated by neutropenic fevers likely secondary to CRS s/p haplo allogeneic SCT. He completed a course of zosyn and infectious work up was negative. On 5/27 patient complained of SOB symptoms and intermittently required oxygen. CXR (5/27): showed hazy opacities and small to moderate pleural effusions. Patient was given lasix with adequate response and has not required oxygen as SOB has resolved. Zarxio started on D+7, Tacrolimus gtt started on D+5, transitioned po on D+8, levels obtained weekly/prn for goal 8-12.     Mr Solis was noted to have mucositis grade 1 requiring only use of mouth care and has since resolved.    Early engraftment noted on 6/2. CMV/ Chimerism was send off on 6/4.     Mr. Solis is ready for a discharge with daily follow ups at [facility]. He was provided with a VIP card upon discharge.          Mr. Solis is a 69 year old male with a medical history of DVT (bilateral soleal DVTs), kyphoscoliosis and DLBCL who on 5/14 was  admitted for a haplo-identical pbsct from his daughter with Flu / Bu /Cy / TBI prep regimen. GVH ppx following CAST ( PTCy, Abatacept, Tacrolimus). Hematologic history as follows: initially presented with complaints of progressive lower extremity paresthesias since 1/2024. An MRI of the spine 3/5/24 showed diffuse osseous disease including C4, T8 burst fracture and epidural disease causing spinal cord deformity and cord edema, L4 pathological fracture, L3-L4 continuous involvement of central canal neural foramina and paraspinal soft tissues. A CT C/A/P showed stage IV disease with anterior third right rib oseous lesion extending to a 1.5cm soft tissue lesion on the right lung pleural surface. He underwent a two stage neurological intervention involving a T8 vertebral column resection, a T6-T10 fusion for tumor resection, a L4 partial corpectomy, and an L2 pelvis fusion. He was then treated with R_CHOP x 6. The course was complicated by prolonged cytopenias. He then received HD MTX s 4 (completed 9/2/24). He was admitted 1/23/25 with transaminitis, likely an obstruction related to relapsed disease. A CT  A/P 1/23/25 showed increased mesenteric and RP lymphadenopathy and a new left lower renal pole and biliary ductal dilatation. HE had an ERCP with stent placement 1/24/25. He was initially worked up for CAR T therapy, but failed collection secondary to low lymphocyte counts. He was started on polatuzumab / rituximab / revlimid as a bridge to allogeneic pbsct. He received 4 cycles. CT A/P 4/21/25 showed response. On admission, his central line was accessed, daily labs obtained.     Patient received a Haplo allogeneic SCT (daughter) on 05/20/25, cell count as follows:  Aliquot 1   Product identification: [id number]  Infusion start time: 11:15  Infusion completion time: 12:35  Product volume (ml): 403.2  RBC volume (ml): 6.9  CD34 / kg infused  =6.78E+06  TNC / kg infused  =1.45E+09  CD34 viability (%) =100  Total DMSO (ml) = N/A  Infusion reaction: no reaction noted    Mr. Solis received IV hydration, pain management, nutritional support and antibacterial / antifungal / antiviral / GI and PCP prophylaxis. Labs were monitored on a daily basis and he received electrolyte repletion and transfusional support as needed.    His course was complicated by neutropenic fevers likely secondary to CRS s/p haplo allogeneic SCT. He completed a course of zosyn and infectious work up was negative. On 5/27 patient complained of SOB symptoms and intermittently required oxygen. CXR (5/27): showed hazy opacities and small to moderate pleural effusions. Patient was given lasix with adequate response and has not required oxygen as SOB has resolved. Zarxio started on D+7, Tacrolimus gtt started on D+5, transitioned po on D+8, levels obtained weekly/prn for goal 8-12.     Mr Solis was noted to have mucositis grade 1 requiring only use of mouth care and has since resolved.    Early engraftment noted on 6/2. CMV/ Chimerism was send off on 6/4. He engrafted on 6/5/25.     Mr. Solis is ready for a discharge with daily follow ups at [facility]. He was provided with a VIP card upon discharge.

## 2025-05-24 NOTE — PROGRESS NOTE ADULT - NS ATTEND AMEND GEN_ALL_CORE FT
I rounded with the team including nurses, ACPs and PharmD.  I reviewed the data in depth.     The patient is day +4 of allogeneic HSCT.    The patient is doing well with no complaints. mild nausea controlled with compazine  afebrile x 48 hours  The vitals are stable. The oral cavity is clear. The line site is clean. The lungs are clear. There is no LE edema. 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  All questions answered.   . I rounded with the team including nurses, ACPs and PharmD.  I reviewed the data in depth.     The patient is day +4 of allogeneic HSCT.    The patient is doing well with no complaints. diarrhea overnight.   afebrile x 48 hours; Zosyn de-escalated   The vitals are stable. The oral cavity is clear. The line site is clean. The lungs are clear. There is no LE edema. 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  All questions answered.   .

## 2025-05-25 LAB
ALBUMIN SERPL ELPH-MCNC: 2.9 G/DL — LOW (ref 3.3–5)
ALP SERPL-CCNC: 105 U/L — SIGNIFICANT CHANGE UP (ref 40–120)
ALT FLD-CCNC: 20 U/L — SIGNIFICANT CHANGE UP (ref 10–45)
ANION GAP SERPL CALC-SCNC: 10 MMOL/L — SIGNIFICANT CHANGE UP (ref 5–17)
AST SERPL-CCNC: 18 U/L — SIGNIFICANT CHANGE UP (ref 10–40)
BILIRUB SERPL-MCNC: 0.4 MG/DL — SIGNIFICANT CHANGE UP (ref 0.2–1.2)
BUN SERPL-MCNC: 6 MG/DL — LOW (ref 7–23)
CALCIUM SERPL-MCNC: 7.8 MG/DL — LOW (ref 8.4–10.5)
CHLORIDE SERPL-SCNC: 110 MMOL/L — HIGH (ref 96–108)
CO2 SERPL-SCNC: 23 MMOL/L — SIGNIFICANT CHANGE UP (ref 22–31)
CREAT SERPL-MCNC: 0.52 MG/DL — SIGNIFICANT CHANGE UP (ref 0.5–1.3)
CULTURE RESULTS: SIGNIFICANT CHANGE UP
EGFR: 110 ML/MIN/1.73M2 — SIGNIFICANT CHANGE UP
EGFR: 110 ML/MIN/1.73M2 — SIGNIFICANT CHANGE UP
GLUCOSE SERPL-MCNC: 108 MG/DL — HIGH (ref 70–99)
HCT VFR BLD CALC: 25 % — LOW (ref 39–50)
HGB BLD-MCNC: 8 G/DL — LOW (ref 13–17)
LDH SERPL L TO P-CCNC: 206 U/L — SIGNIFICANT CHANGE UP (ref 50–242)
MAGNESIUM SERPL-MCNC: 1.6 MG/DL — SIGNIFICANT CHANGE UP (ref 1.6–2.6)
MCHC RBC-ENTMCNC: 28.4 PG — SIGNIFICANT CHANGE UP (ref 27–34)
MCHC RBC-ENTMCNC: 32 G/DL — SIGNIFICANT CHANGE UP (ref 32–36)
MCV RBC AUTO: 88.7 FL — SIGNIFICANT CHANGE UP (ref 80–100)
NRBC BLD AUTO-RTO: 0 /100 WBCS — SIGNIFICANT CHANGE UP (ref 0–0)
PHOSPHATE SERPL-MCNC: 1.9 MG/DL — LOW (ref 2.5–4.5)
PLATELET # BLD AUTO: 54 K/UL — LOW (ref 150–400)
POTASSIUM SERPL-MCNC: 3 MMOL/L — LOW (ref 3.5–5.3)
POTASSIUM SERPL-SCNC: 3 MMOL/L — LOW (ref 3.5–5.3)
PROT SERPL-MCNC: 4.5 G/DL — LOW (ref 6–8.3)
RBC # BLD: 2.82 M/UL — LOW (ref 4.2–5.8)
RBC # FLD: 16.4 % — HIGH (ref 10.3–14.5)
SODIUM SERPL-SCNC: 143 MMOL/L — SIGNIFICANT CHANGE UP (ref 135–145)
SPECIMEN SOURCE: SIGNIFICANT CHANGE UP
WBC # BLD: 0.06 K/UL — CRITICAL LOW (ref 3.8–10.5)
WBC # FLD AUTO: 0.06 K/UL — CRITICAL LOW (ref 3.8–10.5)

## 2025-05-25 PROCEDURE — 99233 SBSQ HOSP IP/OBS HIGH 50: CPT | Mod: FS

## 2025-05-25 RX ORDER — LOPERAMIDE HCL 1 MG/7.5ML
2 SOLUTION ORAL EVERY 6 HOURS
Refills: 0 | Status: DISCONTINUED | OUTPATIENT
Start: 2025-05-25 | End: 2025-05-27

## 2025-05-25 RX ORDER — POTASSIUM PHOSPHATE, MONOBASIC POTASSIUM PHOSPHATE, DIBASIC INJECTION, 236; 224 MG/ML; MG/ML
30 SOLUTION, CONCENTRATE INTRAVENOUS ONCE
Refills: 0 | Status: COMPLETED | OUTPATIENT
Start: 2025-05-25 | End: 2025-05-25

## 2025-05-25 RX ORDER — ABATACEPT 125 MG/ML
500 INJECTION, SOLUTION SUBCUTANEOUS ONCE
Refills: 0 | Status: COMPLETED | OUTPATIENT
Start: 2025-05-25 | End: 2025-05-25

## 2025-05-25 RX ORDER — FUROSEMIDE 10 MG/ML
20 INJECTION INTRAMUSCULAR; INTRAVENOUS ONCE
Refills: 0 | Status: COMPLETED | OUTPATIENT
Start: 2025-05-25 | End: 2025-05-25

## 2025-05-25 RX ORDER — FUROSEMIDE 10 MG/ML
20 INJECTION INTRAMUSCULAR; INTRAVENOUS ONCE
Refills: 0 | Status: DISCONTINUED | OUTPATIENT
Start: 2025-05-25 | End: 2025-05-25

## 2025-05-25 RX ORDER — POTASSIUM PHOSPHATE, MONOBASIC POTASSIUM PHOSPHATE, DIBASIC INJECTION, 236; 224 MG/ML; MG/ML
30 SOLUTION, CONCENTRATE INTRAVENOUS ONCE
Refills: 0 | Status: DISCONTINUED | OUTPATIENT
Start: 2025-05-25 | End: 2025-05-25

## 2025-05-25 RX ORDER — SOD PHOS DI, MONO/K PHOS MONO 250 MG
1 TABLET ORAL ONCE
Refills: 0 | Status: COMPLETED | OUTPATIENT
Start: 2025-05-25 | End: 2025-05-25

## 2025-05-25 RX ADMIN — LIDOCAINE HYDROCHLORIDE 1 PATCH: 20 JELLY TOPICAL at 06:58

## 2025-05-25 RX ADMIN — Medication 25 GRAM(S): at 03:00

## 2025-05-25 RX ADMIN — Medication 1 PACKET(S): at 08:19

## 2025-05-25 RX ADMIN — POSACONAZOLE 300 MILLIGRAM(S): 100 TABLET, DELAYED RELEASE ORAL at 11:22

## 2025-05-25 RX ADMIN — Medication 5 MILLILITER(S): at 00:09

## 2025-05-25 RX ADMIN — LIDOCAINE HYDROCHLORIDE 1 PATCH: 20 JELLY TOPICAL at 23:00

## 2025-05-25 RX ADMIN — Medication 800 MILLIGRAM(S): at 17:02

## 2025-05-25 RX ADMIN — LIDOCAINE HYDROCHLORIDE 1 PATCH: 20 JELLY TOPICAL at 11:21

## 2025-05-25 RX ADMIN — FUROSEMIDE 20 MILLIGRAM(S): 10 INJECTION INTRAMUSCULAR; INTRAVENOUS at 10:21

## 2025-05-25 RX ADMIN — Medication 40 MILLIEQUIVALENT(S): at 10:22

## 2025-05-25 RX ADMIN — Medication 5 MILLILITER(S): at 11:20

## 2025-05-25 RX ADMIN — LIDOCAINE HYDROCHLORIDE 1 PATCH: 20 JELLY TOPICAL at 18:01

## 2025-05-25 RX ADMIN — Medication 10 MILLIGRAM(S): at 17:47

## 2025-05-25 RX ADMIN — POTASSIUM PHOSPHATE, MONOBASIC POTASSIUM PHOSPHATE, DIBASIC INJECTION, 125 MILLIMOLE(S): 236; 224 SOLUTION, CONCENTRATE INTRAVENOUS at 08:18

## 2025-05-25 RX ADMIN — DULOXETINE 60 MILLIGRAM(S): 20 CAPSULE, DELAYED RELEASE ORAL at 21:29

## 2025-05-25 RX ADMIN — Medication 10 MILLILITER(S): at 23:52

## 2025-05-25 RX ADMIN — TACROLIMUS 6.26 MILLIGRAM(S): 0.5 CAPSULE ORAL at 06:27

## 2025-05-25 RX ADMIN — Medication 10 MILLILITER(S): at 16:01

## 2025-05-25 RX ADMIN — Medication 800 MILLIGRAM(S): at 06:27

## 2025-05-25 RX ADMIN — Medication 5 MILLILITER(S): at 08:18

## 2025-05-25 RX ADMIN — LOPERAMIDE HCL 2 MILLIGRAM(S): 1 SOLUTION ORAL at 11:20

## 2025-05-25 RX ADMIN — Medication 40 MILLIEQUIVALENT(S): at 13:12

## 2025-05-25 RX ADMIN — Medication 125 MILLIGRAM(S): at 06:26

## 2025-05-25 RX ADMIN — Medication 10 MILLILITER(S): at 00:09

## 2025-05-25 RX ADMIN — Medication 5 MILLILITER(S): at 23:52

## 2025-05-25 RX ADMIN — Medication 10 MILLILITER(S): at 19:55

## 2025-05-25 RX ADMIN — Medication 25 GRAM(S): at 09:18

## 2025-05-25 RX ADMIN — Medication 40 MILLIGRAM(S): at 06:26

## 2025-05-25 RX ADMIN — URSODIOL 300 MILLIGRAM(S): 300 CAPSULE ORAL at 06:26

## 2025-05-25 RX ADMIN — LOPERAMIDE HCL 2 MILLIGRAM(S): 1 SOLUTION ORAL at 23:52

## 2025-05-25 RX ADMIN — Medication 30 MILLILITER(S): at 10:21

## 2025-05-25 RX ADMIN — TIZANIDINE 4 MILLIGRAM(S): 4 TABLET ORAL at 21:29

## 2025-05-25 RX ADMIN — HYDROCORTISONE 1 APPLICATION(S): 10 CREAM TOPICAL at 11:23

## 2025-05-25 RX ADMIN — Medication 10 MILLIGRAM(S): at 08:45

## 2025-05-25 RX ADMIN — LOPERAMIDE HCL 2 MILLIGRAM(S): 1 SOLUTION ORAL at 17:03

## 2025-05-25 RX ADMIN — ABATACEPT 100 MILLIGRAM(S): 125 INJECTION, SOLUTION SUBCUTANEOUS at 17:02

## 2025-05-25 RX ADMIN — Medication 5 MILLILITER(S): at 19:55

## 2025-05-25 RX ADMIN — URSODIOL 300 MILLIGRAM(S): 300 CAPSULE ORAL at 17:06

## 2025-05-25 RX ADMIN — Medication 1 APPLICATION(S): at 06:27

## 2025-05-25 RX ADMIN — Medication 5 MILLILITER(S): at 16:01

## 2025-05-25 RX ADMIN — Medication 10 MILLILITER(S): at 08:18

## 2025-05-25 RX ADMIN — Medication 10 MILLILITER(S): at 11:20

## 2025-05-25 RX ADMIN — Medication 125 MILLIGRAM(S): at 17:03

## 2025-05-25 NOTE — PROGRESS NOTE ADULT - SUBJECTIVE AND OBJECTIVE BOX
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 with Flu / Bu / Cy / TBI prep regimen for treatment of refractory DLBCL    Disease: DLBCL  Type of transplant: Haplo-identical (daughter)   Prep Regimen: Flu / Bu / Cy / TBI   ABO / CMV:   Recipient: A POS/ NEG   Donor: A POS / NEG     S: Patient seen and examined with HPC Transplant Team:     O: Vitals:   Vital Signs Last 24 Hrs  T(C): 36.8 (25 May 2025 05:51), Max: 37.3 (24 May 2025 17:23)  T(F): 98.2 (25 May 2025 05:51), Max: 99.1 (24 May 2025 17:23)  HR: 68 (25 May 2025 05:51) (68 - 75)  BP: 110/60 (25 May 2025 05:51) (110/60 - 129/83)  BP(mean): --  RR: 18 (25 May 2025 05:51) (17 - 18)  SpO2: 95% (25 May 2025 05:51) (95% - 99%)    Parameters below as of 25 May 2025 05:51  Patient On (Oxygen Delivery Method): room air        Admit weight: 47.1 kg  Daily     Daily Weight in k.9 (24 May 2025 08:08)    Intake / Output:   - @ 07:01  -   @ 07:00  --------------------------------------------------------  IN: 7692 mL / OUT: 8205 mL / NET: -513 mL        PE:   Oropharynx: no erythema or ulcerations   Oral Mucositis:                -                                     Grade: n/a  CVS: S1, S2 RRR   Lungs: CTA throughout bilaterally   Abdomen: + BS x 4, soft, NT, ND   Extremities: no edema   Gastric Mucositis:       -                                          Grade: n/a   Intestinal Mucositis:     -                                         Grade: n/a   Skin: macular erythematous rash of the upper thighs/back area   TLC: CDI  Neuro: A&OX3          Labs:   CBC Full  -  ( 25 May 2025 06:30 )  WBC Count : 0.06 K/uL  Hemoglobin : 8.0 g/dL  Hematocrit : 25.0 %  Platelet Count - Automated : 54 K/uL  Mean Cell Volume : 88.7 fl  Mean Cell Hemoglobin : 28.4 pg  Mean Cell Hemoglobin Concentration : 32.0 g/dL  Auto Neutrophil # : x  Auto Lymphocyte # : x  Auto Monocyte # : x  Auto Eosinophil # : x  Auto Basophil # : x  Auto Neutrophil % : x  Auto Lymphocyte % : x  Auto Monocyte % : x  Auto Eosinophil % : x  Auto Basophil % : x                          8.0    0.06  )-----------( 54       ( 25 May 2025 06:30 )             25.0         143  |  110[H]  |  6[L]  ----------------------------<  108[H]  3.0[L]   |  23  |  0.52    Ca    7.8[L]      25 May 2025 06:30  Phos  1.9       Mg     1.6         TPro  4.5[L]  /  Alb  2.9[L]  /  TBili  0.4  /  DBili  x   /  AST  18  /  ALT  20  /  AlkPhos  105        LIVER FUNCTIONS - ( 25 May 2025 06:30 )  Alb: 2.9 g/dL / Pro: 4.5 g/dL / ALK PHOS: 105 U/L / ALT: 20 U/L / AST: 18 U/L / GGT: x           Lactate Dehydrogenase, Serum: 206 U/L ( @ 06:30)        Cultures:   Culture - Urine (25 @ 21:16)   Specimen Source: Clean Catch Clean Catch (Midstream)  Culture Results:   <10,000 CFU/mL Normal Urogenital FloraCulture - Blood (25 @ 21:16)   Specimen Source: Blood Blood-Peripheral  Culture Results:   No growth at 48 HoursCulture - Blood (25 @ 21:16)   Specimen Source: Blood Blood-Catheter  Culture Results:   No growth at 48 Hours    Culture - Blood (25 @ 22:30)   Specimen Source: Blood Blood-Peripheral  Culture Results:   No growth at 24 hoursCulture - Blood (25 @ 22:15)   Specimen Source: Blood Blood-Catheter  Culture Results:   No growth at 24 hours< from: Xray Chest 1 View- PORTABLE-Urgent (Xray Chest 1 View- PORTABLE-Urgent .) (25 @ 20:58) >    IMPRESSION:  Low lung volumes.    Right mid to lower lung linear atelectasis. Otherwise clear lungs.    --- End of Report ---    < end of copied text >          Meds:   Antimicrobials:   acyclovir   Oral Tab/Cap 800 milliGRAM(s) Oral every 12 hours  levoFLOXacin  Tablet 500 milliGRAM(s) Oral every 24 hours  piperacillin/tazobactam IVPB.. 4.5 Gram(s) IV Intermittent every 8 hours  posaconazole DR Tablet 300 milliGRAM(s) Oral daily  vancomycin    Solution 125 milliGRAM(s) Oral every 12 hours      Heme / Onc:       GI:  aluminum hydroxide/magnesium hydroxide/simethicone Suspension 30 milliLiter(s) Oral every 4 hours PRN  loperamide 2 milliGRAM(s) Oral every 6 hours PRN  pantoprazole    Tablet 40 milliGRAM(s) Oral before breakfast  sodium bicarbonate Mouth Rinse 10 milliLiter(s) Swish and Spit five times a day  ursodiol Capsule 300 milliGRAM(s) Oral two times a day      Cardiovascular:       Immunologic:   tacrolimus  IVPB 0.9 milliGRAM(s) IV Intermittent every 24 hours      Other medications:   Biotene Dry Mouth Oral Rinse 5 milliLiter(s) Swish and Spit five times a day  chlorhexidine 4% Liquid 1 Application(s) Topical <User Schedule>  DULoxetine 60 milliGRAM(s) Oral <User Schedule>  hydrocortisone 1% Cream 1 Application(s) Topical daily  lidocaine   4% Patch 1 Patch Transdermal daily  phytonadione   Solution 5 milliGRAM(s) Oral <User Schedule>  potassium chloride    Tablet ER 40 milliEquivalent(s) Oral every 4 hours  potassium phosphate / sodium phosphate Powder (PHOS-NaK) 1 Packet(s) Oral once  potassium phosphate IVPB 30 milliMole(s) IV Intermittent once  sodium chloride 0.9%. 500 milliLiter(s) IV Continuous <Continuous>  sodium chloride 0.9%. 500 milliLiter(s) IV Continuous <Continuous>  sodium chloride 0.9%. 500 milliLiter(s) IV Continuous <Continuous>  sodium chloride 0.9%. 500 milliLiter(s) IV Continuous <Continuous>  sodium chloride 0.9%. 1000 milliLiter(s) IV Continuous <Continuous>  sodium chloride 0.9%. 1000 milliLiter(s) IV Continuous <Continuous>  tiZANidine 4 milliGRAM(s) Oral <User Schedule>      PRN:   acetaminophen     Tablet .. 650 milliGRAM(s) Oral every 6 hours PRN  aluminum hydroxide/magnesium hydroxide/simethicone Suspension 30 milliLiter(s) Oral every 4 hours PRN  loperamide 2 milliGRAM(s) Oral every 6 hours PRN  oxyCODONE    IR 5 milliGRAM(s) Oral every 6 hours PRN  prochlorperazine   Injectable 10 milliGRAM(s) IV Push every 6 hours PRN  sodium chloride 0.9% lock flush 10 milliLiter(s) IV Push every 1 hour PRN          A/P:  69 year old male with refractory DLBCL, status post R-CHOP x 6, HD MTX x 4, salvage polatuzumab / rituximab / revlimid x 4, admitted for a haplo-identical pbsct from his daughter with Flu / Bu / Cy / TBI prep regimen   Day +5  5/15- busulfan , fludarabine . No acute events overnight, vital signs are stable; continue keppra ppx for 24 hours post infusion of last dose of busulfan. No tylenol or posaconazole until day 0. Not neutropenic today, will d/c levaquin / vancomycin.    - fludarabine 3/5. VSS, afebrile. No acute events overnight.   - fludarabine . VSS and afebrile. No acute events overnight. Neutropenic today, started on ppx levaquin/vancomycin PO. No Tylenol or Azoles until day 0.   - fludarabine . VSS, remains afebrile. No acute events overnight. No Tylenol or Azoles until day 0.  - TBI today. No acute events overnight. Vital signs are stable.    - will receive Haplo allogeneic SCT today. VSS, afebrile.  - tolerated HPC infusion well. continue IVF hydration 24 hrs post cells, continue to monitor I&Os   - increased loose BM, imodium prn. VSS, afebrile and infectious work up negative - will discontinue zosyn and switch to levaquin ppx. Continue supportive care.  5/24- PTCY2/2. febrile overnight: f/u cultures. Broaden to Zosyn. s.p lasix today   -       1. Infectious Disease:   acyclovir   Oral Tab/Cap 800 milliGRAM(s) Oral every 12 hours  levoFLOXacin  Tablet 500 milliGRAM(s) Oral every 24 hours  piperacillin/tazobactam IVPB.- 4.5 Gram(s) IV Intermittent once  piperacillin/tazobactam IVPB.. 4.5 Gram(s) IV Intermittent every 8 hours  posaconazole DR Tablet 300 milliGRAM(s) Oral daily  vancomycin    Solution 125 milliGRAM(s) Oral every 12 hours      2. VOD Prophylaxis:   ursodiol Capsule 300 milliGRAM(s) Oral two times a day    3. GI Prophylaxis:   pantoprazole Tablet 40 milliGRAM(s) Oral before breakfast    4. Mouthcare - NS / NaHCO3 rinses, Biotene; Skin care     5. GVHD prophylaxis   PTCy days +3, +4   Tacrolimus gtt to start on day + 5  Abatacept days +5, +14, +28, +56    6. Transfuse & replete electrolytes prn   /4  Hypokalemia - repleted.    7. IV hydration, daily weights, strict I&O, prn diuresis     8. PO intake as tolerated, nutrition follow up as needed    9. Antiemetics, anti-diarrhea medications:   prochlorperazine   Injectable 10 milliGRAM(s) IV Push every 6 hours PRN    10. OOB as tolerated, physical therapy consult if needed     11. Monitor coags  2x week, vitamin K BIW  phytonadione Solution 5 milliGRAM(s) Oral <User Schedule>    12. Monitor closely for clinical changes, monitor for fevers     13. Emotional support provided, plan of care discussed and questions addressed     14. Patient education done regarding plan of care, restrictions and discharge planning     15. Continue regular social work input     I have written the above note for Dr. Singh who performed service with me in the room.   Patti Carpenter PA-C ([phone])    I have seen and examined patient with PA, I agree with above note as scribed.                      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 with Flu / Bu / Cy / TBI prep regimen for treatment of refractory DLBCL    Disease: DLBCL  Type of transplant: Haplo-identical (daughter)   Prep Regimen: Flu / Bu / Cy / TBI   ABO / CMV:   Recipient: A POS/ NEG   Donor: A POS / NEG     S: Patient seen and examined with HPC Transplant Team:   +dyspepsia  + loose stools  +fatigue     O: Vitals:   Vital Signs Last 24 Hrs  T(C): 36.8 (25 May 2025 05:51), Max: 37.3 (24 May 2025 17:23)  T(F): 98.2 (25 May 2025 05:51), Max: 99.1 (24 May 2025 17:23)  HR: 68 (25 May 2025 05:51) (68 - 75)  BP: 110/60 (25 May 2025 05:51) (110/60 - 129/83)  BP(mean): --  RR: 18 (25 May 2025 05:51) (17 - 18)  SpO2: 95% (25 May 2025 05:51) (95% - 99%)    Parameters below as of 25 May 2025 05:51  Patient On (Oxygen Delivery Method): room air        Admit weight: 47.1 kg  Daily: 48 kg      Daily Weight in k.9 (24 May 2025 08:08)    Intake / Output:    @ 07:01  -  - @ 07:00  --------------------------------------------------------  IN: 7692 mL / OUT: 8205 mL / NET: -513 mL        PE:   Oropharynx: no erythema or ulcerations   Oral Mucositis:                -                                     Grade: n/a  CVS: S1, S2 RRR   Lungs: CTA throughout bilaterally   Abdomen: + BS x 4, soft, NT, ND   Extremities: no edema   Gastric Mucositis:       -                                          Grade: n/a   Intestinal Mucositis:     -                                         Grade: n/a   Skin: macular erythematous rash of the upper thighs/back area   TLC: CDI  Neuro: A&OX3          Labs:   CBC Full  -  ( 25 May 2025 06:30 )  WBC Count : 0.06 K/uL  Hemoglobin : 8.0 g/dL  Hematocrit : 25.0 %  Platelet Count - Automated : 54 K/uL  Mean Cell Volume : 88.7 fl  Mean Cell Hemoglobin : 28.4 pg  Mean Cell Hemoglobin Concentration : 32.0 g/dL  Auto Neutrophil # : x  Auto Lymphocyte # : x  Auto Monocyte # : x  Auto Eosinophil # : x  Auto Basophil # : x  Auto Neutrophil % : x  Auto Lymphocyte % : x  Auto Monocyte % : x  Auto Eosinophil % : x  Auto Basophil % : x                          8.0    0.06  )-----------( 54       ( 25 May 2025 06:30 )             25.0     05-    143  |  110[H]  |  6[L]  ----------------------------<  108[H]  3.0[L]   |  23  |  0.52    Ca    7.8[L]      25 May 2025 06:30  Phos  1.9       Mg     1.6         TPro  4.5[L]  /  Alb  2.9[L]  /  TBili  0.4  /  DBili  x   /  AST  18  /  ALT  20  /  AlkPhos  105        LIVER FUNCTIONS - ( 25 May 2025 06:30 )  Alb: 2.9 g/dL / Pro: 4.5 g/dL / ALK PHOS: 105 U/L / ALT: 20 U/L / AST: 18 U/L / GGT: x           Lactate Dehydrogenase, Serum: 206 U/L ( @ 06:30)        Cultures:   Culture - Urine (25 @ 21:16)   Specimen Source: Clean Catch Clean Catch (Midstream)  Culture Results:   <10,000 CFU/mL Normal Urogenital FloraCulture - Blood (25 @ 21:16)   Specimen Source: Blood Blood-Peripheral  Culture Results:   No growth at 48 HoursCulture - Blood (25 @ 21:16)   Specimen Source: Blood Blood-Catheter  Culture Results:   No growth at 48 Hours    Culture - Blood (25 @ 22:30)   Specimen Source: Blood Blood-Peripheral  Culture Results:   No growth at 24 hoursCulture - Blood (25 @ 22:15)   Specimen Source: Blood Blood-Catheter  Culture Results:   No growth at 24 hours< from: Xray Chest 1 View- PORTABLE-Urgent (Xray Chest 1 View- PORTABLE-Urgent .) (25 @ 20:58) >    IMPRESSION:  Low lung volumes.    Right mid to lower lung linear atelectasis. Otherwise clear lungs.    --- End of Report ---    < end of copied text >          Meds:   Antimicrobials:   acyclovir   Oral Tab/Cap 800 milliGRAM(s) Oral every 12 hours  levoFLOXacin  Tablet 500 milliGRAM(s) Oral every 24 hours  piperacillin/tazobactam IVPB.. 4.5 Gram(s) IV Intermittent every 8 hours  posaconazole DR Tablet 300 milliGRAM(s) Oral daily  vancomycin    Solution 125 milliGRAM(s) Oral every 12 hours      Heme / Onc:       GI:  aluminum hydroxide/magnesium hydroxide/simethicone Suspension 30 milliLiter(s) Oral every 4 hours PRN  loperamide 2 milliGRAM(s) Oral every 6 hours PRN  pantoprazole    Tablet 40 milliGRAM(s) Oral before breakfast  sodium bicarbonate Mouth Rinse 10 milliLiter(s) Swish and Spit five times a day  ursodiol Capsule 300 milliGRAM(s) Oral two times a day      Cardiovascular:       Immunologic:   tacrolimus  IVPB 0.9 milliGRAM(s) IV Intermittent every 24 hours      Other medications:   Biotene Dry Mouth Oral Rinse 5 milliLiter(s) Swish and Spit five times a day  chlorhexidine 4% Liquid 1 Application(s) Topical <User Schedule>  DULoxetine 60 milliGRAM(s) Oral <User Schedule>  hydrocortisone 1% Cream 1 Application(s) Topical daily  lidocaine   4% Patch 1 Patch Transdermal daily  phytonadione   Solution 5 milliGRAM(s) Oral <User Schedule>  potassium chloride    Tablet ER 40 milliEquivalent(s) Oral every 4 hours  potassium phosphate / sodium phosphate Powder (PHOS-NaK) 1 Packet(s) Oral once  potassium phosphate IVPB 30 milliMole(s) IV Intermittent once  sodium chloride 0.9%. 500 milliLiter(s) IV Continuous <Continuous>  sodium chloride 0.9%. 500 milliLiter(s) IV Continuous <Continuous>  sodium chloride 0.9%. 500 milliLiter(s) IV Continuous <Continuous>  sodium chloride 0.9%. 500 milliLiter(s) IV Continuous <Continuous>  sodium chloride 0.9%. 1000 milliLiter(s) IV Continuous <Continuous>  sodium chloride 0.9%. 1000 milliLiter(s) IV Continuous <Continuous>  tiZANidine 4 milliGRAM(s) Oral <User Schedule>      PRN:   acetaminophen     Tablet .. 650 milliGRAM(s) Oral every 6 hours PRN  aluminum hydroxide/magnesium hydroxide/simethicone Suspension 30 milliLiter(s) Oral every 4 hours PRN  loperamide 2 milliGRAM(s) Oral every 6 hours PRN  oxyCODONE    IR 5 milliGRAM(s) Oral every 6 hours PRN  prochlorperazine   Injectable 10 milliGRAM(s) IV Push every 6 hours PRN  sodium chloride 0.9% lock flush 10 milliLiter(s) IV Push every 1 hour PRN          A/P:  69 year old male with refractory DLBCL, status post R-CHOP x 6, HD MTX x 4, salvage polatuzumab / rituximab / revlimid x 4, admitted for a haplo-identical pbsct from his daughter with Flu / Bu / Cy / TBI prep regimen   Day +5  5/15- busulfan , fludarabine . No acute events overnight, vital signs are stable; continue keppra ppx for 24 hours post infusion of last dose of busulfan. No tylenol or posaconazole until day 0. Not neutropenic today, will d/c levaquin / vancomycin.    - fludarabine 3/5. VSS, afebrile. No acute events overnight.   - fludarabine . VSS and afebrile. No acute events overnight. Neutropenic today, started on ppx levaquin/vancomycin PO. No Tylenol or Azoles until day 0.   - fludarabine . VSS, remains afebrile. No acute events overnight. No Tylenol or Azoles until day 0.  - TBI today. No acute events overnight. Vital signs are stable.    - will receive Haplo allogeneic SCT today. VSS, afebrile.  - tolerated HPC infusion well. continue IVF hydration 24 hrs post cells, continue to monitor I&Os   - increased loose BM, imodium prn. VSS, afebrile and infectious work up negative - will discontinue zosyn and switch to levaquin ppx. Continue supportive care.  - PTCY2/2. febrile overnight: f/u cultures. Broaden to Zosyn. s.p lasix today   -continue PTCY hydration. Monitor I&O: s/p lasix today        1. Infectious Disease:   acyclovir   Oral Tab/Cap 800 milliGRAM(s) Oral every 12 hours  levoFLOXacin  Tablet 500 milliGRAM(s) Oral every 24 hours  piperacillin/tazobactam IVPB.- 4.5 Gram(s) IV Intermittent once  piperacillin/tazobactam IVPB.. 4.5 Gram(s) IV Intermittent every 8 hours  posaconazole DR Tablet 300 milliGRAM(s) Oral daily  vancomycin    Solution 125 milliGRAM(s) Oral every 12 hours      2. VOD Prophylaxis:   ursodiol Capsule 300 milliGRAM(s) Oral two times a day    3. GI Prophylaxis:   pantoprazole Tablet 40 milliGRAM(s) Oral before breakfast    4. Mouthcare - NS / NaHCO3 rinses, Biotene; Skin care     5. GVHD prophylaxis   PTCy days +3, +4   Tacrolimus gtt to start on day + 5  Abatacept days +5, +14, +28, +56    6. Transfuse & replete electrolytes prn     Hypokalemia - repleted.    7. IV hydration, daily weights, strict I&O, prn diuresis     8. PO intake as tolerated, nutrition follow up as needed    9. Antiemetics, anti-diarrhea medications:   prochlorperazine   Injectable 10 milliGRAM(s) IV Push every 6 hours PRN    10. OOB as tolerated, physical therapy consult if needed     11. Monitor coags  2x week, vitamin K BIW  phytonadione Solution 5 milliGRAM(s) Oral <User Schedule>    12. Monitor closely for clinical changes, monitor for fevers     13. Emotional support provided, plan of care discussed and questions addressed     14. Patient education done regarding plan of care, restrictions and discharge planning     15. Continue regular social work input     I have written the above note for Dr. Singh who performed service with me in the room.   Patti Carpenter PA-C ([phone])    I have seen and examined patient with PA, I agree with above note as scribed.                      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 with Flu / Bu / Cy / TBI prep regimen for treatment of refractory DLBCL    Disease: DLBCL  Type of transplant: Haplo-identical (daughter)   Prep Regimen: Flu / Bu / Cy / TBI   ABO / CMV:   Recipient: A POS/ NEG   Donor: A POS / NEG     S: Patient seen and examined with HPC Transplant Team:   +dyspepsia  + loose stools  +fatigue     O: Vitals:   Vital Signs Last 24 Hrs  T(C): 36.8 (25 May 2025 05:51), Max: 37.3 (24 May 2025 17:23)  T(F): 98.2 (25 May 2025 05:51), Max: 99.1 (24 May 2025 17:23)  HR: 68 (25 May 2025 05:51) (68 - 75)  BP: 110/60 (25 May 2025 05:51) (110/60 - 129/83)  BP(mean): --  RR: 18 (25 May 2025 05:51) (17 - 18)  SpO2: 95% (25 May 2025 05:51) (95% - 99%)    Parameters below as of 25 May 2025 05:51  Patient On (Oxygen Delivery Method): room air        Admit weight: 47.1 kg  Daily: 48 kg      Daily Weight in k.9 (24 May 2025 08:08)    Intake / Output:    @ 07:01  -  - @ 07:00  --------------------------------------------------------  IN: 7692 mL / OUT: 8205 mL / NET: -513 mL        PE:   Oropharynx: no erythema or ulcerations   Oral Mucositis:                -                                     Grade: n/a  CVS: S1, S2 RRR   Lungs: CTA throughout bilaterally   Abdomen: + BS x 4, soft, NT, ND   Extremities: no edema   Gastric Mucositis:       -                                          Grade: n/a   Intestinal Mucositis:     -                                         Grade: n/a   Skin: macular erythematous rash of the upper thighs/back area   TLC: CDI  Neuro: A&OX3          Labs:   CBC Full  -  ( 25 May 2025 06:30 )  WBC Count : 0.06 K/uL  Hemoglobin : 8.0 g/dL  Hematocrit : 25.0 %  Platelet Count - Automated : 54 K/uL  Mean Cell Volume : 88.7 fl  Mean Cell Hemoglobin : 28.4 pg  Mean Cell Hemoglobin Concentration : 32.0 g/dL  Auto Neutrophil # : x  Auto Lymphocyte # : x  Auto Monocyte # : x  Auto Eosinophil # : x  Auto Basophil # : x  Auto Neutrophil % : x  Auto Lymphocyte % : x  Auto Monocyte % : x  Auto Eosinophil % : x  Auto Basophil % : x                          8.0    0.06  )-----------( 54       ( 25 May 2025 06:30 )             25.0     05-    143  |  110[H]  |  6[L]  ----------------------------<  108[H]  3.0[L]   |  23  |  0.52    Ca    7.8[L]      25 May 2025 06:30  Phos  1.9       Mg     1.6         TPro  4.5[L]  /  Alb  2.9[L]  /  TBili  0.4  /  DBili  x   /  AST  18  /  ALT  20  /  AlkPhos  105        LIVER FUNCTIONS - ( 25 May 2025 06:30 )  Alb: 2.9 g/dL / Pro: 4.5 g/dL / ALK PHOS: 105 U/L / ALT: 20 U/L / AST: 18 U/L / GGT: x           Lactate Dehydrogenase, Serum: 206 U/L ( @ 06:30)        Cultures:   Culture - Urine (25 @ 21:16)   Specimen Source: Clean Catch Clean Catch (Midstream)  Culture Results:   <10,000 CFU/mL Normal Urogenital FloraCulture - Blood (25 @ 21:16)   Specimen Source: Blood Blood-Peripheral  Culture Results:   No growth at 48 HoursCulture - Blood (25 @ 21:16)   Specimen Source: Blood Blood-Catheter  Culture Results:   No growth at 48 Hours    Culture - Blood (25 @ 22:30)   Specimen Source: Blood Blood-Peripheral  Culture Results:   No growth at 24 hoursCulture - Blood (25 @ 22:15)   Specimen Source: Blood Blood-Catheter  Culture Results:   No growth at 24 hours< from: Xray Chest 1 View- PORTABLE-Urgent (Xray Chest 1 View- PORTABLE-Urgent .) (25 @ 20:58) >    IMPRESSION:  Low lung volumes.    Right mid to lower lung linear atelectasis. Otherwise clear lungs.    --- End of Report ---    < end of copied text >          Meds:   Antimicrobials:   acyclovir   Oral Tab/Cap 800 milliGRAM(s) Oral every 12 hours  levoFLOXacin  Tablet 500 milliGRAM(s) Oral every 24 hours  piperacillin/tazobactam IVPB.. 4.5 Gram(s) IV Intermittent every 8 hours  posaconazole DR Tablet 300 milliGRAM(s) Oral daily  vancomycin    Solution 125 milliGRAM(s) Oral every 12 hours      Heme / Onc:       GI:  aluminum hydroxide/magnesium hydroxide/simethicone Suspension 30 milliLiter(s) Oral every 4 hours PRN  loperamide 2 milliGRAM(s) Oral every 6 hours PRN  pantoprazole    Tablet 40 milliGRAM(s) Oral before breakfast  sodium bicarbonate Mouth Rinse 10 milliLiter(s) Swish and Spit five times a day  ursodiol Capsule 300 milliGRAM(s) Oral two times a day      Cardiovascular:       Immunologic:   tacrolimus  IVPB 0.9 milliGRAM(s) IV Intermittent every 24 hours      Other medications:   Biotene Dry Mouth Oral Rinse 5 milliLiter(s) Swish and Spit five times a day  chlorhexidine 4% Liquid 1 Application(s) Topical <User Schedule>  DULoxetine 60 milliGRAM(s) Oral <User Schedule>  hydrocortisone 1% Cream 1 Application(s) Topical daily  lidocaine   4% Patch 1 Patch Transdermal daily  phytonadione   Solution 5 milliGRAM(s) Oral <User Schedule>  potassium chloride    Tablet ER 40 milliEquivalent(s) Oral every 4 hours  potassium phosphate / sodium phosphate Powder (PHOS-NaK) 1 Packet(s) Oral once  potassium phosphate IVPB 30 milliMole(s) IV Intermittent once  sodium chloride 0.9%. 500 milliLiter(s) IV Continuous <Continuous>  sodium chloride 0.9%. 500 milliLiter(s) IV Continuous <Continuous>  sodium chloride 0.9%. 500 milliLiter(s) IV Continuous <Continuous>  sodium chloride 0.9%. 500 milliLiter(s) IV Continuous <Continuous>  sodium chloride 0.9%. 1000 milliLiter(s) IV Continuous <Continuous>  sodium chloride 0.9%. 1000 milliLiter(s) IV Continuous <Continuous>  tiZANidine 4 milliGRAM(s) Oral <User Schedule>      PRN:   acetaminophen     Tablet .. 650 milliGRAM(s) Oral every 6 hours PRN  aluminum hydroxide/magnesium hydroxide/simethicone Suspension 30 milliLiter(s) Oral every 4 hours PRN  loperamide 2 milliGRAM(s) Oral every 6 hours PRN  oxyCODONE    IR 5 milliGRAM(s) Oral every 6 hours PRN  prochlorperazine   Injectable 10 milliGRAM(s) IV Push every 6 hours PRN  sodium chloride 0.9% lock flush 10 milliLiter(s) IV Push every 1 hour PRN          A/P:  69 year old male with refractory DLBCL, status post R-CHOP x 6, HD MTX x 4, salvage polatuzumab / rituximab / revlimid x 4, admitted for a haplo-identical pbsct from his daughter with Flu / Bu / Cy / TBI prep regimen   Day +5  5/15- busulfan , fludarabine . No acute events overnight, vital signs are stable; continue keppra ppx for 24 hours post infusion of last dose of busulfan. No tylenol or posaconazole until day 0. Not neutropenic today, will d/c levaquin / vancomycin.    - fludarabine 3/5. VSS, afebrile. No acute events overnight.   - fludarabine . VSS and afebrile. No acute events overnight. Neutropenic today, started on ppx levaquin/vancomycin PO. No Tylenol or Azoles until day 0.   - fludarabine . VSS, remains afebrile. No acute events overnight. No Tylenol or Azoles until day 0.  - TBI today. No acute events overnight. Vital signs are stable.    - will receive Haplo allogeneic SCT today. VSS, afebrile.  - tolerated HPC infusion well. continue IVF hydration 24 hrs post cells, continue to monitor I&Os   - increased loose BM, imodium prn. VSS, afebrile and infectious work up negative - will discontinue zosyn and switch to levaquin ppx. Continue supportive care.  - PTCY2/2. febrile overnight: f/u cultures. Broaden to Zosyn. s.p lasix today   -continue PTCY hydration. Monitor I&O: s/p lasix today. afebrile: blood cultures negative Zosyn de-escalated to Levaquin        1. Infectious Disease:   acyclovir   Oral Tab/Cap 800 milliGRAM(s) Oral every 12 hours  levoFLOXacin  Tablet 500 milliGRAM(s) Oral every 24 hours  posaconazole DR Tablet 300 milliGRAM(s) Oral daily  vancomycin    Solution 125 milliGRAM(s) Oral every 12 hours      2. VOD Prophylaxis:   ursodiol Capsule 300 milliGRAM(s) Oral two times a day    3. GI Prophylaxis:   pantoprazole Tablet 40 milliGRAM(s) Oral before breakfast    4. Mouthcare - NS / NaHCO3 rinses, Biotene; Skin care     5. GVHD prophylaxis   PTCy days +3, +4   Tacrolimus gtt to start on day + 5  Abatacept days +5, +14, +28, +56    6. Transfuse & replete electrolytes prn   /4  Hypokalemia - repleted.    7. IV hydration, daily weights, strict I&O, prn diuresis     8. PO intake as tolerated, nutrition follow up as needed    9. Antiemetics, anti-diarrhea medications:   prochlorperazine   Injectable 10 milliGRAM(s) IV Push every 6 hours PRN    10. OOB as tolerated, physical therapy consult if needed     11. Monitor coags  2x week, vitamin K BIW  phytonadione Solution 5 milliGRAM(s) Oral <User Schedule>    12. Monitor closely for clinical changes, monitor for fevers     13. Emotional support provided, plan of care discussed and questions addressed     14. Patient education done regarding plan of care, restrictions and discharge planning     15. Continue regular social work input     I have written the above note for Dr. Singh who performed service with me in the room.   Patti Carpenter PA-C ([phone])    I have seen and examined patient with PA, I agree with above note as scribed.

## 2025-05-25 NOTE — PROGRESS NOTE ADULT - NS ATTEND AMEND GEN_ALL_CORE FT
I rounded with the team including nurses, ACPs and PharmD.  I reviewed the data in depth.     The patient is day +5 of allogeneic HSCT.    The patient is doing well with no complaints. diarrhea overnight.   afebrile x 48 hours; Zosyn de-escalated   The vitals are stable. The oral cavity is clear. The line site is clean. The lungs are clear. There is no LE edema. 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  All questions answered.   . I rounded with the team including nurses, ACPs and PharmD.  I reviewed the data in depth.     The patient is day +5 of allogeneic HSCT.    The patient is doing well overall; reports throat/stomach pain when eating;   afebrile, vital signs stable  The vitals are stable. mild mucositis. The line site is clean. The lungs are clear. There is no LE edema. 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  All questions answered.   .

## 2025-05-26 LAB
ALBUMIN SERPL ELPH-MCNC: 2.7 G/DL — LOW (ref 3.3–5)
ALP SERPL-CCNC: 103 U/L — SIGNIFICANT CHANGE UP (ref 40–120)
ALT FLD-CCNC: 21 U/L — SIGNIFICANT CHANGE UP (ref 10–45)
ANION GAP SERPL CALC-SCNC: 8 MMOL/L — SIGNIFICANT CHANGE UP (ref 5–17)
APTT BLD: 31.9 SEC — SIGNIFICANT CHANGE UP (ref 26.1–36.8)
AST SERPL-CCNC: 21 U/L — SIGNIFICANT CHANGE UP (ref 10–40)
BILIRUB DIRECT SERPL-MCNC: 0.1 MG/DL — SIGNIFICANT CHANGE UP (ref 0–0.3)
BILIRUB INDIRECT FLD-MCNC: 0.2 MG/DL — SIGNIFICANT CHANGE UP (ref 0.2–1)
BILIRUB SERPL-MCNC: 0.3 MG/DL — SIGNIFICANT CHANGE UP (ref 0.2–1.2)
BLD GP AB SCN SERPL QL: NEGATIVE — SIGNIFICANT CHANGE UP
BUN SERPL-MCNC: 7 MG/DL — SIGNIFICANT CHANGE UP (ref 7–23)
CALCIUM SERPL-MCNC: 8.1 MG/DL — LOW (ref 8.4–10.5)
CHLORIDE SERPL-SCNC: 110 MMOL/L — HIGH (ref 96–108)
CO2 SERPL-SCNC: 25 MMOL/L — SIGNIFICANT CHANGE UP (ref 22–31)
CREAT SERPL-MCNC: 0.51 MG/DL — SIGNIFICANT CHANGE UP (ref 0.5–1.3)
EGFR: 111 ML/MIN/1.73M2 — SIGNIFICANT CHANGE UP
EGFR: 111 ML/MIN/1.73M2 — SIGNIFICANT CHANGE UP
GLUCOSE SERPL-MCNC: 87 MG/DL — SIGNIFICANT CHANGE UP (ref 70–99)
HCT VFR BLD CALC: 25 % — LOW (ref 39–50)
HGB BLD-MCNC: 8.1 G/DL — LOW (ref 13–17)
INR BLD: 0.95 RATIO — SIGNIFICANT CHANGE UP (ref 0.85–1.16)
LDH SERPL L TO P-CCNC: 214 U/L — SIGNIFICANT CHANGE UP (ref 50–242)
MAGNESIUM SERPL-MCNC: 1.6 MG/DL — SIGNIFICANT CHANGE UP (ref 1.6–2.6)
MCHC RBC-ENTMCNC: 28.4 PG — SIGNIFICANT CHANGE UP (ref 27–34)
MCHC RBC-ENTMCNC: 32.4 G/DL — SIGNIFICANT CHANGE UP (ref 32–36)
MCV RBC AUTO: 87.7 FL — SIGNIFICANT CHANGE UP (ref 80–100)
NRBC BLD AUTO-RTO: 0 /100 WBCS — SIGNIFICANT CHANGE UP (ref 0–0)
PHOSPHATE SERPL-MCNC: 1.9 MG/DL — LOW (ref 2.5–4.5)
PLATELET # BLD AUTO: 50 K/UL — LOW (ref 150–400)
POTASSIUM SERPL-MCNC: 3.3 MMOL/L — LOW (ref 3.5–5.3)
POTASSIUM SERPL-SCNC: 3.3 MMOL/L — LOW (ref 3.5–5.3)
PROT SERPL-MCNC: 4.4 G/DL — LOW (ref 6–8.3)
PROTHROM AB SERPL-ACNC: 10.8 SEC — SIGNIFICANT CHANGE UP (ref 9.9–13.4)
RBC # BLD: 2.85 M/UL — LOW (ref 4.2–5.8)
RBC # FLD: 16.5 % — HIGH (ref 10.3–14.5)
RH IG SCN BLD-IMP: POSITIVE — SIGNIFICANT CHANGE UP
SODIUM SERPL-SCNC: 143 MMOL/L — SIGNIFICANT CHANGE UP (ref 135–145)
WBC # BLD: 0.09 K/UL — CRITICAL LOW (ref 3.8–10.5)
WBC # FLD AUTO: 0.09 K/UL — CRITICAL LOW (ref 3.8–10.5)

## 2025-05-26 PROCEDURE — 99233 SBSQ HOSP IP/OBS HIGH 50: CPT | Mod: FS

## 2025-05-26 PROCEDURE — 71045 X-RAY EXAM CHEST 1 VIEW: CPT | Mod: 26

## 2025-05-26 RX ORDER — SOD PHOS DI, MONO/K PHOS MONO 250 MG
1 TABLET ORAL ONCE
Refills: 0 | Status: COMPLETED | OUTPATIENT
Start: 2025-05-26 | End: 2025-05-26

## 2025-05-26 RX ORDER — PIPERACILLIN-TAZO-DEXTROSE,ISO 3.375G/5
4.5 IV SOLUTION, PIGGYBACK PREMIX FROZEN(ML) INTRAVENOUS ONCE
Refills: 0 | Status: COMPLETED | OUTPATIENT
Start: 2025-05-27 | End: 2025-05-27

## 2025-05-26 RX ORDER — ACETAMINOPHEN 500 MG/5ML
325 LIQUID (ML) ORAL ONCE
Refills: 0 | Status: COMPLETED | OUTPATIENT
Start: 2025-05-26 | End: 2025-05-26

## 2025-05-26 RX ORDER — PIPERACILLIN-TAZO-DEXTROSE,ISO 3.375G/5
4.5 IV SOLUTION, PIGGYBACK PREMIX FROZEN(ML) INTRAVENOUS EVERY 8 HOURS
Refills: 0 | Status: DISCONTINUED | OUTPATIENT
Start: 2025-05-27 | End: 2025-05-29

## 2025-05-26 RX ORDER — FUROSEMIDE 10 MG/ML
20 INJECTION INTRAMUSCULAR; INTRAVENOUS ONCE
Refills: 0 | Status: COMPLETED | OUTPATIENT
Start: 2025-05-26 | End: 2025-05-26

## 2025-05-26 RX ORDER — VANCOMYCIN HCL IN 5 % DEXTROSE 1.5G/250ML
1000 PLASTIC BAG, INJECTION (ML) INTRAVENOUS ONCE
Refills: 0 | Status: COMPLETED | OUTPATIENT
Start: 2025-05-26 | End: 2025-05-26

## 2025-05-26 RX ORDER — PIPERACILLIN-TAZO-DEXTROSE,ISO 3.375G/5
4.5 IV SOLUTION, PIGGYBACK PREMIX FROZEN(ML) INTRAVENOUS ONCE
Refills: 0 | Status: COMPLETED | OUTPATIENT
Start: 2025-05-26 | End: 2025-05-26

## 2025-05-26 RX ORDER — POTASSIUM PHOSPHATE, MONOBASIC POTASSIUM PHOSPHATE, DIBASIC INJECTION, 236; 224 MG/ML; MG/ML
30 SOLUTION, CONCENTRATE INTRAVENOUS ONCE
Refills: 0 | Status: COMPLETED | OUTPATIENT
Start: 2025-05-26 | End: 2025-05-26

## 2025-05-26 RX ADMIN — Medication 125 MILLIGRAM(S): at 06:22

## 2025-05-26 RX ADMIN — Medication 325 MILLIGRAM(S): at 23:09

## 2025-05-26 RX ADMIN — Medication 5 MILLILITER(S): at 08:20

## 2025-05-26 RX ADMIN — DULOXETINE 60 MILLIGRAM(S): 20 CAPSULE, DELAYED RELEASE ORAL at 21:12

## 2025-05-26 RX ADMIN — Medication 10 MILLILITER(S): at 16:10

## 2025-05-26 RX ADMIN — Medication 5 MILLIGRAM(S): at 11:13

## 2025-05-26 RX ADMIN — LOPERAMIDE HCL 2 MILLIGRAM(S): 1 SOLUTION ORAL at 11:14

## 2025-05-26 RX ADMIN — Medication 800 MILLIGRAM(S): at 17:59

## 2025-05-26 RX ADMIN — LOPERAMIDE HCL 2 MILLIGRAM(S): 1 SOLUTION ORAL at 06:22

## 2025-05-26 RX ADMIN — URSODIOL 300 MILLIGRAM(S): 300 CAPSULE ORAL at 17:58

## 2025-05-26 RX ADMIN — OXYCODONE HYDROCHLORIDE 5 MILLIGRAM(S): 30 TABLET ORAL at 19:16

## 2025-05-26 RX ADMIN — LOPERAMIDE HCL 2 MILLIGRAM(S): 1 SOLUTION ORAL at 17:58

## 2025-05-26 RX ADMIN — Medication 650 MILLIGRAM(S): at 22:15

## 2025-05-26 RX ADMIN — POTASSIUM PHOSPHATE, MONOBASIC POTASSIUM PHOSPHATE, DIBASIC INJECTION, 125 MILLIMOLE(S): 236; 224 SOLUTION, CONCENTRATE INTRAVENOUS at 09:42

## 2025-05-26 RX ADMIN — Medication 1 APPLICATION(S): at 06:23

## 2025-05-26 RX ADMIN — LIDOCAINE HYDROCHLORIDE 1 PATCH: 20 JELLY TOPICAL at 11:14

## 2025-05-26 RX ADMIN — OXYCODONE HYDROCHLORIDE 5 MILLIGRAM(S): 30 TABLET ORAL at 18:33

## 2025-05-26 RX ADMIN — Medication 10 MILLILITER(S): at 11:14

## 2025-05-26 RX ADMIN — Medication 40 MILLIGRAM(S): at 06:22

## 2025-05-26 RX ADMIN — LIDOCAINE HYDROCHLORIDE 1 PATCH: 20 JELLY TOPICAL at 23:00

## 2025-05-26 RX ADMIN — Medication 30 MILLILITER(S): at 18:00

## 2025-05-26 RX ADMIN — URSODIOL 300 MILLIGRAM(S): 300 CAPSULE ORAL at 06:22

## 2025-05-26 RX ADMIN — POSACONAZOLE 300 MILLIGRAM(S): 100 TABLET, DELAYED RELEASE ORAL at 11:14

## 2025-05-26 RX ADMIN — Medication 40 MILLIEQUIVALENT(S): at 08:20

## 2025-05-26 RX ADMIN — Medication 10 MILLILITER(S): at 08:20

## 2025-05-26 RX ADMIN — TACROLIMUS 6.26 MILLIGRAM(S): 0.5 CAPSULE ORAL at 03:23

## 2025-05-26 RX ADMIN — Medication 5 MILLILITER(S): at 21:13

## 2025-05-26 RX ADMIN — LIDOCAINE HYDROCHLORIDE 1 PATCH: 20 JELLY TOPICAL at 18:50

## 2025-05-26 RX ADMIN — TIZANIDINE 4 MILLIGRAM(S): 4 TABLET ORAL at 21:12

## 2025-05-26 RX ADMIN — Medication 125 MILLIGRAM(S): at 17:58

## 2025-05-26 RX ADMIN — HYDROCORTISONE 1 APPLICATION(S): 10 CREAM TOPICAL at 11:18

## 2025-05-26 RX ADMIN — Medication 650 MILLIGRAM(S): at 21:31

## 2025-05-26 RX ADMIN — Medication 250 MILLIGRAM(S): at 23:50

## 2025-05-26 RX ADMIN — Medication 5 MILLILITER(S): at 11:14

## 2025-05-26 RX ADMIN — Medication 200 GRAM(S): at 23:09

## 2025-05-26 RX ADMIN — Medication 10 MILLILITER(S): at 21:13

## 2025-05-26 RX ADMIN — Medication 1 TABLET(S): at 09:42

## 2025-05-26 RX ADMIN — FUROSEMIDE 20 MILLIGRAM(S): 10 INJECTION INTRAMUSCULAR; INTRAVENOUS at 14:33

## 2025-05-26 RX ADMIN — Medication 5 MILLILITER(S): at 16:10

## 2025-05-26 RX ADMIN — Medication 800 MILLIGRAM(S): at 06:22

## 2025-05-26 NOTE — CHART NOTE - NSCHARTNOTEFT_GEN_A_CORE
Medicine PA Note    Notified by RN patient desat to 89% on RA. Pt. seen and examined at bedside and complaints of having mid sternal chest pain when he take deep breaths. He also endorses a bit difficulty breathing. He states that this started a few day ago but felt it worst today. Patient displayed no signs of increase WOB or accessory muscle use. Patient is alert, NAD. Looks comfortable during assessment. On exam lungs clear lungs bilaterally. No wheezing, rhonchi, or crackles. Pt further Denies HA, N/V, or abd pain. VSS otherwise.  Attending was made aware and rec continue patient on 2L NC overnight. VSS otherwise    VITAL SIGNS:  T(C): 37.7 (05-26-25 @ 20:35), Max: 37.7 (05-26-25 @ 20:35)  HR: 73 (05-26-25 @ 20:35) (73 - 82)  BP: 117/75 (05-26-25 @ 20:35) (117/75 - 155/92)  RR: 22 (05-26-25 @ 20:35) (17 - 22)  SpO2: 89% (05-26-25 @ 20:35) (89% - 95%)  Wt(kg): --    Constitutional: AOx3. NAD.    Respiratory: clear lungs bilaterally. No wheezing, rhonchi, or crackles.    Cardiovascular: S1 S2. No murmurs.    Gastrointestinal: BS X4 active. soft. nontender.    Extremities/Vascular: +2 pulses bilaterally. Ace wraps on B/L LE.     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 Hematologic history as follows: initially presented with complaints of progressive lower extremity parathesias since 1/2024. An MRI of the spine 3/5/24 showed diffuse osseous disease including C4, T8 burst fracture and epidural disease causing spinal cord deformity and cord edema, L4 pathological fracture, L3-L4 continuous involvement of central canal neural foramina and paraspinal soft tissues. A CT C/A/P showed stage IV disease with anterior third right rib oseous lesion extending to a 1.5cm soft tissue lesion on the right lung pleural surface. It also showed a left pleural space surface lesion, a 2.4cm right axillary lymph node, and extensive RP and mesenteric lymphadenopathy with a maira conglomerate mass. He underwent a two stage neurological intervention involving a T8 vertebral column resection, a T6-T10 fusion for tumor resection, a L4 partial corpectomy, and an L2 pelvis fusion. The course was complicated by bilateral soleal DVTs. He was then treated with R_CHOP x 6. The course was complicated by prolonged cytopenias. He then received HD MTX s 4 (completed 9/2/24). He was admitted 1/23/25 with transaminitis, likely an obstruction related to relapsed disease. A CT  A/P 1/23/25 showed increased mesenteric and RP lymphadenopathy and a new left lower renal pole and biliary ductal dilatation. HE had an ERCP with stent placement 1/24/25. He was initially worked up for CAR T therapy, but failed collection secondary to low lymphocyte counts. He was started on polatuzumab / rituximab / revlimid as a bridge to allogeneic pbsct. He received 4 cycles. CT A/P 4/21/25 showed response. He has no complaints on admission.  (14 May 2025 10:39)    # Desat on RA  - EkG ordered showed NSR. No Acute changes were noted  - Attending was made aware and rec continue patient on 2L NC  - Pt received Lasix earlier during the day and is having good output.   - Will consider CXR  - Will continue to monitor   - Will endorse Day team in the am    Sommer Aldana PA-C   Department of Medicine  [facility].

## 2025-05-26 NOTE — PROGRESS NOTE ADULT - SUBJECTIVE AND OBJECTIVE BOX
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 with Flu / Bu / Cy / TBI prep regimen for treatment of refractory DLBCL    Disease: DLBCL  Type of transplant: Haplo-identical (daughter)   Prep Regimen: Flu / Bu / Cy / TBI   ABO / CMV:   Recipient: A POS/ NEG   Donor: A POS / NEG     S: Patient seen and examined with HPC Transplant Team:     O: Vitals:   Vital Signs Last 24 Hrs  T(C): 37 (26 May 2025 05:58), Max: 37 (26 May 2025 05:58)  T(F): 98.6 (26 May 2025 05:58), Max: 98.6 (26 May 2025 05:58)  HR: 74 (26 May 2025 05:58) (69 - 84)  BP: 135/80 (26 May 2025 05:58) (131/70 - 135/80)  BP(mean): --  RR: 18 (26 May 2025 05:58) (17 - 18)  SpO2: 95% (26 May 2025 05:58) (95% - 99%)    Parameters below as of 26 May 2025 05:58  Patient On (Oxygen Delivery Method): room air        Admit weight: 47.1 kg  Daily     Daily Weight in k.1 (25 May 2025 08:05)    Intake / Output:   - @ 07:01  -  05- @ 07:00  --------------------------------------------------------  IN: 4359.9 mL / OUT: 4475 mL / NET: -115.1 mL          PE:   Oropharynx: no erythema or ulcerations   Oral Mucositis:                -                                     Grade: n/a  CVS: S1, S2 RRR   Lungs: CTA throughout bilaterally   Abdomen: + BS x 4, soft, NT, ND   Extremities: no edema   Gastric Mucositis:       -                                          Grade: n/a   Intestinal Mucositis:     -                                         Grade: n/a   Skin: macular erythematous rash of the upper thighs/back area   TLC: CDI  Neuro: A&OX3        Labs:   CBC Full  -  ( 26 May 2025 06:47 )  WBC Count : 0.09 K/uL  Hemoglobin : 8.1 g/dL  Hematocrit : 25.0 %  Platelet Count - Automated : 50 K/uL  Mean Cell Volume : 87.7 fl  Mean Cell Hemoglobin : 28.4 pg  Mean Cell Hemoglobin Concentration : 32.4 g/dL  Auto Neutrophil # : x  Auto Lymphocyte # : x  Auto Monocyte # : x  Auto Eosinophil # : x  Auto Basophil # : x  Auto Neutrophil % : x  Auto Lymphocyte % : x  Auto Monocyte % : x  Auto Eosinophil % : x  Auto Basophil % : x                          8.1    0.09  )-----------( 50       ( 26 May 2025 06:47 )             25.0         143  |  110[H]  |  7   ----------------------------<  87  3.3[L]   |  25  |  0.51    Ca    8.1[L]      26 May 2025 06:47  Phos  1.9       Mg     1.6         TPro  4.4[L]  /  Alb  2.7[L]  /  TBili  0.3  /  DBili  0.1  /  AST  21  /  ALT  21  /  AlkPhos  103      PT/INR - ( 26 May 2025 06:46 )   PT: 10.8 sec;   INR: 0.95 ratio         PTT - ( 26 May 2025 06:46 )  PTT:31.9 sec  LIVER FUNCTIONS - ( 26 May 2025 06:47 )  Alb: 2.7 g/dL / Pro: 4.4 g/dL / ALK PHOS: 103 U/L / ALT: 21 U/L / AST: 21 U/L / GGT: x           Lactate Dehydrogenase, Serum: 214 U/L ( @ 06:47)            Cultures:   Culture - Urine (25 @ 21:16)   Specimen Source: Clean Catch Clean Catch (Midstream)  Culture Results:   <10,000 CFU/mL Normal Urogenital FloraCulture - Blood (25 @ 21:16)   Specimen Source: Blood Blood-Peripheral  Culture Results:   No growth at 48 HoursCulture - Blood (25 @ 21:16)   Specimen Source: Blood Blood-Catheter  Culture Results:   No growth at 48 Hours    Culture - Blood (25 @ 22:30)   Specimen Source: Blood Blood-Peripheral  Culture Results:   No growth at 24 hoursCulture - Blood (25 @ 22:15)   Specimen Source: Blood Blood-Catheter  Culture Results:   No growth at 24 hours< from: Xray Chest 1 View- PORTABLE-Urgent (Xray Chest 1 View- PORTABLE-Urgent .) (25 @ 20:58) >    IMPRESSION:  Low lung volumes.    Right mid to lower lung linear atelectasis. Otherwise clear lungs.    --- End of Report ---    < end of copied text >          Meds:   Antimicrobials:   acyclovir   Oral Tab/Cap 800 milliGRAM(s) Oral every 12 hours  levoFLOXacin  Tablet 500 milliGRAM(s) Oral every 24 hours  posaconazole DR Tablet 300 milliGRAM(s) Oral daily  vancomycin    Solution 125 milliGRAM(s) Oral every 12 hours      Heme / Onc:       GI:  aluminum hydroxide/magnesium hydroxide/simethicone Suspension 30 milliLiter(s) Oral every 4 hours PRN  loperamide 2 milliGRAM(s) Oral every 6 hours  pantoprazole    Tablet 40 milliGRAM(s) Oral before breakfast  sodium bicarbonate Mouth Rinse 10 milliLiter(s) Swish and Spit five times a day  ursodiol Capsule 300 milliGRAM(s) Oral two times a day      Cardiovascular:       Immunologic:   tacrolimus  IVPB 0.9 milliGRAM(s) IV Intermittent every 24 hours      Other medications:   Biotene Dry Mouth Oral Rinse 5 milliLiter(s) Swish and Spit five times a day  chlorhexidine 4% Liquid 1 Application(s) Topical <User Schedule>  DULoxetine 60 milliGRAM(s) Oral <User Schedule>  hydrocortisone 1% Cream 1 Application(s) Topical daily  lidocaine   4% Patch 1 Patch Transdermal daily  phytonadione   Solution 5 milliGRAM(s) Oral <User Schedule>  potassium chloride    Tablet ER 40 milliEquivalent(s) Oral once  potassium phosphate / sodium phosphate Tablet (K-PHOS No. 2) 1 Tablet(s) Oral once  potassium phosphate IVPB 30 milliMole(s) IV Intermittent once  sodium chloride 0.9%. 500 milliLiter(s) IV Continuous <Continuous>  sodium chloride 0.9%. 500 milliLiter(s) IV Continuous <Continuous>  sodium chloride 0.9%. 500 milliLiter(s) IV Continuous <Continuous>  sodium chloride 0.9%. 500 milliLiter(s) IV Continuous <Continuous>  sodium chloride 0.9%. 1000 milliLiter(s) IV Continuous <Continuous>  sodium chloride 0.9%. 1000 milliLiter(s) IV Continuous <Continuous>  tiZANidine 4 milliGRAM(s) Oral <User Schedule>      PRN:   acetaminophen     Tablet .. 650 milliGRAM(s) Oral every 6 hours PRN  aluminum hydroxide/magnesium hydroxide/simethicone Suspension 30 milliLiter(s) Oral every 4 hours PRN  FIRST- Mouthwash  BLM 15 milliLiter(s) Swish and Swallow four times a day PRN  oxyCODONE    IR 5 milliGRAM(s) Oral every 6 hours PRN  prochlorperazine   Injectable 10 milliGRAM(s) IV Push every 6 hours PRN  sodium chloride 0.9% lock flush 10 milliLiter(s) IV Push every 1 hour PRN          A/P:  69 year old male with refractory DLBCL, status post R-CHOP x 6, HD MTX x 4, salvage polatuzumab / rituximab / revlimid x 4, admitted for a haplo-identical pbsct from his daughter with Flu / Bu / Cy / TBI prep regimen   Day +6  5/15- busulfan 2, fludarabine . No acute events overnight, vital signs are stable; continue keppra ppx for 24 hours post infusion of last dose of busulfan. No tylenol or posaconazole until day 0. Not neutropenic today, will d/c levaquin / vancomycin.    - fludarabine 3/5. VSS, afebrile. No acute events overnight.   - fludarabine . VSS and afebrile. No acute events overnight. Neutropenic today, started on ppx levaquin/vancomycin PO. No Tylenol or Azoles until day 0.   - fludarabine . VSS, remains afebrile. No acute events overnight. No Tylenol or Azoles until day 0.  - TBI today. No acute events overnight. Vital signs are stable.    - will receive Haplo allogeneic SCT today. VSS, afebrile.  - tolerated HPC infusion well. continue IVF hydration 24 hrs post cells, continue to monitor I&Os   - increased loose BM, imodium prn. VSS, afebrile and infectious work up negative - will discontinue zosyn and switch to levaquin ppx. Continue supportive care.  - PTCY2/2. febrile overnight: f/u cultures. Broaden to Zosyn. s.p lasix today   -continue PTCY hydration. Monitor I&O: s/p lasix today. afebrile: blood cultures negative Zosyn de-escalated to Levaquin   -     1. Infectious Disease:   acyclovir   Oral Tab/Cap 800 milliGRAM(s) Oral every 12 hours  levoFLOXacin  Tablet 500 milliGRAM(s) Oral every 24 hours  posaconazole DR Tablet 300 milliGRAM(s) Oral daily  vancomycin    Solution 125 milliGRAM(s) Oral every 12 hours      2. VOD Prophylaxis:   ursodiol Capsule 300 milliGRAM(s) Oral two times a day    3. GI Prophylaxis:   pantoprazole Tablet 40 milliGRAM(s) Oral before breakfast    4. Mouthcare - NS / NaHCO3 rinses, Biotene; Skin care     5. GVHD prophylaxis   PTCy days +3, +4   Tacrolimus gtt to start on day + 5  Abatacept days +5, +14, +28, +56    6. Transfuse & replete electrolytes prn     7. IV hydration, daily weights, strict I&O, prn diuresis     8. PO intake as tolerated, nutrition follow up as needed    9. Antiemetics, anti-diarrhea medications:   prochlorperazine   Injectable 10 milliGRAM(s) IV Push every 6 hours PRN    10. OOB as tolerated, physical therapy consult if needed     11. Monitor coags  2x week, vitamin K BIW  phytonadione Solution 5 milliGRAM(s) Oral <User Schedule>    12. Monitor closely for clinical changes, monitor for fevers     13. Emotional support provided, plan of care discussed and questions addressed     14. Patient education done regarding plan of care, restrictions and discharge planning     15. Continue regular social work input     I have written the above note for Dr. Snider who performed service with me in the room.   Patti Carpenter PA-C ([phone])    I have seen and examined patient with PA, I agree with above note as scribed.                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 with Flu / Bu / Cy / TBI prep regimen for treatment of refractory DLBCL    Disease: DLBCL  Type of transplant: Haplo-identical (daughter)   Prep Regimen: Flu / Bu / Cy / TBI   ABO / CMV:   Recipient: A POS/ NEG   Donor: A POS / NEG     S: Patient seen and examined with HPC Transplant Team:   +fatigue  +dyspepsia: improved     O: Vitals:   Vital Signs Last 24 Hrs  T(C): 37 (26 May 2025 05:58), Max: 37 (26 May 2025 05:58)  T(F): 98.6 (26 May 2025 05:58), Max: 98.6 (26 May 2025 05:58)  HR: 74 (26 May 2025 05:58) (69 - 84)  BP: 135/80 (26 May 2025 05:58) (131/70 - 135/80)  BP(mean): --  RR: 18 (26 May 2025 05:58) (17 - 18)  SpO2: 95% (26 May 2025 05:58) (95% - 99%)    Parameters below as of 26 May 2025 05:58  Patient On (Oxygen Delivery Method): room air        Admit weight: 47.1 kg  Daily: 50 kg      Daily Weight in k.1 (25 May 2025 08:05)    Intake / Output:   - @ 07:01  -  05-26 @ 07:00  --------------------------------------------------------  IN: 4359.9 mL / OUT: 4475 mL / NET: -115.1 mL          PE:   Oropharynx: no erythema or ulcerations   Oral Mucositis:                -                                     Grade: n/a  CVS: S1, S2 RRR   Lungs: CTA throughout bilaterally   Abdomen: + BS x 4, soft, NT, ND   Extremities: no edema   Gastric Mucositis:       -                                          Grade: n/a   Intestinal Mucositis:     -                                         Grade: n/a   Skin: macular erythematous rash of the upper thighs/back area   TLC: CDI  Neuro: A&OX3        Labs:   CBC Full  -  ( 26 May 2025 06:47 )  WBC Count : 0.09 K/uL  Hemoglobin : 8.1 g/dL  Hematocrit : 25.0 %  Platelet Count - Automated : 50 K/uL  Mean Cell Volume : 87.7 fl  Mean Cell Hemoglobin : 28.4 pg  Mean Cell Hemoglobin Concentration : 32.4 g/dL  Auto Neutrophil # : x  Auto Lymphocyte # : x  Auto Monocyte # : x  Auto Eosinophil # : x  Auto Basophil # : x  Auto Neutrophil % : x  Auto Lymphocyte % : x  Auto Monocyte % : x  Auto Eosinophil % : x  Auto Basophil % : x                          8.1    0.09  )-----------( 50       ( 26 May 2025 06:47 )             25.0         143  |  110[H]  |  7   ----------------------------<  87  3.3[L]   |  25  |  0.51    Ca    8.1[L]      26 May 2025 06:47  Phos  1.9       Mg     1.6         TPro  4.4[L]  /  Alb  2.7[L]  /  TBili  0.3  /  DBili  0.1  /  AST  21  /  ALT  21  /  AlkPhos  103      PT/INR - ( 26 May 2025 06:46 )   PT: 10.8 sec;   INR: 0.95 ratio         PTT - ( 26 May 2025 06:46 )  PTT:31.9 sec  LIVER FUNCTIONS - ( 26 May 2025 06:47 )  Alb: 2.7 g/dL / Pro: 4.4 g/dL / ALK PHOS: 103 U/L / ALT: 21 U/L / AST: 21 U/L / GGT: x           Lactate Dehydrogenase, Serum: 214 U/L ( @ 06:47)            Cultures:   Culture - Urine (25 @ 21:16)   Specimen Source: Clean Catch Clean Catch (Midstream)  Culture Results:   <10,000 CFU/mL Normal Urogenital FloraCulture - Blood (25 @ 21:16)   Specimen Source: Blood Blood-Peripheral  Culture Results:   No growth at 48 HoursCulture - Blood (25 @ 21:16)   Specimen Source: Blood Blood-Catheter  Culture Results:   No growth at 48 Hours    Culture - Blood (25 @ 22:30)   Specimen Source: Blood Blood-Peripheral  Culture Results:   No growth at 24 hoursCulture - Blood (25 @ 22:15)   Specimen Source: Blood Blood-Catheter  Culture Results:   No growth at 24 hours< from: Xray Chest 1 View- PORTABLE-Urgent (Xray Chest 1 View- PORTABLE-Urgent .) (25 @ 20:58) >    IMPRESSION:  Low lung volumes.    Right mid to lower lung linear atelectasis. Otherwise clear lungs.    --- End of Report ---    < end of copied text >          Meds:   Antimicrobials:   acyclovir   Oral Tab/Cap 800 milliGRAM(s) Oral every 12 hours  levoFLOXacin  Tablet 500 milliGRAM(s) Oral every 24 hours  posaconazole DR Tablet 300 milliGRAM(s) Oral daily  vancomycin    Solution 125 milliGRAM(s) Oral every 12 hours      Heme / Onc:       GI:  aluminum hydroxide/magnesium hydroxide/simethicone Suspension 30 milliLiter(s) Oral every 4 hours PRN  loperamide 2 milliGRAM(s) Oral every 6 hours  pantoprazole    Tablet 40 milliGRAM(s) Oral before breakfast  sodium bicarbonate Mouth Rinse 10 milliLiter(s) Swish and Spit five times a day  ursodiol Capsule 300 milliGRAM(s) Oral two times a day      Cardiovascular:       Immunologic:   tacrolimus  IVPB 0.9 milliGRAM(s) IV Intermittent every 24 hours      Other medications:   Biotene Dry Mouth Oral Rinse 5 milliLiter(s) Swish and Spit five times a day  chlorhexidine 4% Liquid 1 Application(s) Topical <User Schedule>  DULoxetine 60 milliGRAM(s) Oral <User Schedule>  hydrocortisone 1% Cream 1 Application(s) Topical daily  lidocaine   4% Patch 1 Patch Transdermal daily  phytonadione   Solution 5 milliGRAM(s) Oral <User Schedule>  potassium chloride    Tablet ER 40 milliEquivalent(s) Oral once  potassium phosphate / sodium phosphate Tablet (K-PHOS No. 2) 1 Tablet(s) Oral once  potassium phosphate IVPB 30 milliMole(s) IV Intermittent once  sodium chloride 0.9%. 500 milliLiter(s) IV Continuous <Continuous>  sodium chloride 0.9%. 500 milliLiter(s) IV Continuous <Continuous>  sodium chloride 0.9%. 500 milliLiter(s) IV Continuous <Continuous>  sodium chloride 0.9%. 500 milliLiter(s) IV Continuous <Continuous>  sodium chloride 0.9%. 1000 milliLiter(s) IV Continuous <Continuous>  sodium chloride 0.9%. 1000 milliLiter(s) IV Continuous <Continuous>  tiZANidine 4 milliGRAM(s) Oral <User Schedule>      PRN:   acetaminophen     Tablet .. 650 milliGRAM(s) Oral every 6 hours PRN  aluminum hydroxide/magnesium hydroxide/simethicone Suspension 30 milliLiter(s) Oral every 4 hours PRN  FIRST- Mouthwash  BLM 15 milliLiter(s) Swish and Swallow four times a day PRN  oxyCODONE    IR 5 milliGRAM(s) Oral every 6 hours PRN  prochlorperazine   Injectable 10 milliGRAM(s) IV Push every 6 hours PRN  sodium chloride 0.9% lock flush 10 milliLiter(s) IV Push every 1 hour PRN          A/P:  69 year old male with refractory DLBCL, status post R-CHOP x 6, HD MTX x 4, salvage polatuzumab / rituximab / revlimid x 4, admitted for a haplo-identical pbsct from his daughter with Flu / Bu / Cy / TBI prep regimen   Day +6  5/15- busulfan , fludarabine . No acute events overnight, vital signs are stable; continue keppra ppx for 24 hours post infusion of last dose of busulfan. No tylenol or posaconazole until day 0. Not neutropenic today, will d/c levaquin / vancomycin.    - fludarabine 3/5. VSS, afebrile. No acute events overnight.   - fludarabine . VSS and afebrile. No acute events overnight. Neutropenic today, started on ppx levaquin/vancomycin PO. No Tylenol or Azoles until day 0.   - fludarabine . VSS, remains afebrile. No acute events overnight. No Tylenol or Azoles until day 0.  - TBI today. No acute events overnight. Vital signs are stable.    - will receive Haplo allogeneic SCT today. VSS, afebrile.  - tolerated HPC infusion well. continue IVF hydration 24 hrs post cells, continue to monitor I&Os   - increased loose BM, imodium prn. VSS, afebrile and infectious work up negative - will discontinue zosyn and switch to levaquin ppx. Continue supportive care.  - PTCY2/2. febrile overnight: f/u cultures. Broaden to Zosyn. s.p lasix today   -continue PTCY hydration. Monitor I&O: s/p lasix today. afebrile: blood cultures negative Zosyn de-escalated to Levaquin   -VSS, no acute events: awaiting count recovery      1. Infectious Disease:   acyclovir   Oral Tab/Cap 800 milliGRAM(s) Oral every 12 hours  levoFLOXacin  Tablet 500 milliGRAM(s) Oral every 24 hours  posaconazole DR Tablet 300 milliGRAM(s) Oral daily  vancomycin    Solution 125 milliGRAM(s) Oral every 12 hours      2. VOD Prophylaxis:   ursodiol Capsule 300 milliGRAM(s) Oral two times a day    3. GI Prophylaxis:   pantoprazole Tablet 40 milliGRAM(s) Oral before breakfast    4. Mouthcare - NS / NaHCO3 rinses, Biotene; Skin care     5. GVHD prophylaxis   PTCy days +3, +4   Tacrolimus gtt to start on day + 5  Abatacept days +5, +14, +28, +56    6. Transfuse & replete electrolytes prn     7. IV hydration, daily weights, strict I&O, prn diuresis     8. PO intake as tolerated, nutrition follow up as needed    9. Antiemetics, anti-diarrhea medications:   prochlorperazine   Injectable 10 milliGRAM(s) IV Push every 6 hours PRN    10. OOB as tolerated, physical therapy consult if needed     11. Monitor coags  2x week, vitamin K BIW  phytonadione Solution 5 milliGRAM(s) Oral <User Schedule>    12. Monitor closely for clinical changes, monitor for fevers     13. Emotional support provided, plan of care discussed and questions addressed     14. Patient education done regarding plan of care, restrictions and discharge planning     15. Continue regular social work input     I have written the above note for Dr. Snider who performed service with me in the room.   Patti Carpenter PA-C ([phone])    I have seen and examined patient with PA, I agree with above note as scribed.

## 2025-05-26 NOTE — PROVIDER CONTACT NOTE (CHANGE IN STATUS NOTIFICATION) - BACKGROUND
Pt. s/p Haplo transplant form DLBCL,neutropenic
Pt. s/p Haplo transplant for DLBCL,day +6
Pt. w/ DLBCL,s/p Haplo transplant receiving IVF hydration of NS @ 250cc/hr for day +3&4 Cytoxan
Pt. s/p Haplo transplant for DLBCL,Day +3,received Cytoxan

## 2025-05-27 LAB
ALBUMIN SERPL ELPH-MCNC: 2.9 G/DL — LOW (ref 3.3–5)
ALP SERPL-CCNC: 104 U/L — SIGNIFICANT CHANGE UP (ref 40–120)
ALT FLD-CCNC: 18 U/L — SIGNIFICANT CHANGE UP (ref 10–45)
ANION GAP SERPL CALC-SCNC: 11 MMOL/L — SIGNIFICANT CHANGE UP (ref 5–17)
APPEARANCE UR: CLEAR — SIGNIFICANT CHANGE UP
AST SERPL-CCNC: 17 U/L — SIGNIFICANT CHANGE UP (ref 10–40)
BACTERIA # UR AUTO: NEGATIVE /HPF — SIGNIFICANT CHANGE UP
BILIRUB SERPL-MCNC: 0.7 MG/DL — SIGNIFICANT CHANGE UP (ref 0.2–1.2)
BILIRUB UR-MCNC: NEGATIVE — SIGNIFICANT CHANGE UP
BUN SERPL-MCNC: 12 MG/DL — SIGNIFICANT CHANGE UP (ref 7–23)
CALCIUM SERPL-MCNC: 7.7 MG/DL — LOW (ref 8.4–10.5)
CAST: 0 /LPF — SIGNIFICANT CHANGE UP (ref 0–4)
CHLORIDE SERPL-SCNC: 103 MMOL/L — SIGNIFICANT CHANGE UP (ref 96–108)
CO2 SERPL-SCNC: 26 MMOL/L — SIGNIFICANT CHANGE UP (ref 22–31)
COLOR SPEC: YELLOW — SIGNIFICANT CHANGE UP
CREAT SERPL-MCNC: 0.61 MG/DL — SIGNIFICANT CHANGE UP (ref 0.5–1.3)
CULTURE RESULTS: SIGNIFICANT CHANGE UP
CULTURE RESULTS: SIGNIFICANT CHANGE UP
DIFF PNL FLD: NEGATIVE — SIGNIFICANT CHANGE UP
EGFR: 105 ML/MIN/1.73M2 — SIGNIFICANT CHANGE UP
EGFR: 105 ML/MIN/1.73M2 — SIGNIFICANT CHANGE UP
GLUCOSE SERPL-MCNC: 132 MG/DL — HIGH (ref 70–99)
GLUCOSE UR QL: NEGATIVE MG/DL — SIGNIFICANT CHANGE UP
HCT VFR BLD CALC: 25.3 % — LOW (ref 39–50)
HGB BLD-MCNC: 8.4 G/DL — LOW (ref 13–17)
KETONES UR QL: 40 MG/DL
LDH SERPL L TO P-CCNC: 227 U/L — SIGNIFICANT CHANGE UP (ref 50–242)
LEUKOCYTE ESTERASE UR-ACNC: NEGATIVE — SIGNIFICANT CHANGE UP
MAGNESIUM SERPL-MCNC: 1.8 MG/DL — SIGNIFICANT CHANGE UP (ref 1.6–2.6)
MCHC RBC-ENTMCNC: 28.7 PG — SIGNIFICANT CHANGE UP (ref 27–34)
MCHC RBC-ENTMCNC: 33.2 G/DL — SIGNIFICANT CHANGE UP (ref 32–36)
MCV RBC AUTO: 86.3 FL — SIGNIFICANT CHANGE UP (ref 80–100)
MRSA PCR RESULT.: SIGNIFICANT CHANGE UP
NITRITE UR-MCNC: NEGATIVE — SIGNIFICANT CHANGE UP
NRBC BLD AUTO-RTO: 0 /100 WBCS — SIGNIFICANT CHANGE UP (ref 0–0)
PH UR: 8 — SIGNIFICANT CHANGE UP (ref 5–8)
PHOSPHATE SERPL-MCNC: 3.1 MG/DL — SIGNIFICANT CHANGE UP (ref 2.5–4.5)
PLATELET # BLD AUTO: 28 K/UL — LOW (ref 150–400)
POTASSIUM SERPL-MCNC: 3.4 MMOL/L — LOW (ref 3.5–5.3)
POTASSIUM SERPL-SCNC: 3.4 MMOL/L — LOW (ref 3.5–5.3)
PROT SERPL-MCNC: 4.5 G/DL — LOW (ref 6–8.3)
PROT UR-MCNC: 30 MG/DL
RBC # BLD: 2.93 M/UL — LOW (ref 4.2–5.8)
RBC # FLD: 16.3 % — HIGH (ref 10.3–14.5)
RBC CASTS # UR COMP ASSIST: 2 /HPF — SIGNIFICANT CHANGE UP (ref 0–4)
S AUREUS DNA NOSE QL NAA+PROBE: SIGNIFICANT CHANGE UP
SODIUM SERPL-SCNC: 140 MMOL/L — SIGNIFICANT CHANGE UP (ref 135–145)
SP GR SPEC: 1.01 — SIGNIFICANT CHANGE UP (ref 1–1.03)
SPECIMEN SOURCE: SIGNIFICANT CHANGE UP
SPECIMEN SOURCE: SIGNIFICANT CHANGE UP
SQUAMOUS # UR AUTO: 0 /HPF — SIGNIFICANT CHANGE UP (ref 0–5)
UROBILINOGEN FLD QL: 0.2 MG/DL — SIGNIFICANT CHANGE UP (ref 0.2–1)
WBC # BLD: 0.02 K/UL — CRITICAL LOW (ref 3.8–10.5)
WBC # FLD AUTO: 0.02 K/UL — CRITICAL LOW (ref 3.8–10.5)
WBC UR QL: 1 /HPF — SIGNIFICANT CHANGE UP (ref 0–5)

## 2025-05-27 PROCEDURE — 99233 SBSQ HOSP IP/OBS HIGH 50: CPT | Mod: FS

## 2025-05-27 RX ORDER — LOPERAMIDE HCL 1 MG/7.5ML
2 SOLUTION ORAL EVERY 6 HOURS
Refills: 0 | Status: DISCONTINUED | OUTPATIENT
Start: 2025-05-27 | End: 2025-06-05

## 2025-05-27 RX ADMIN — DULOXETINE 60 MILLIGRAM(S): 20 CAPSULE, DELAYED RELEASE ORAL at 20:46

## 2025-05-27 RX ADMIN — TIZANIDINE 4 MILLIGRAM(S): 4 TABLET ORAL at 20:45

## 2025-05-27 RX ADMIN — OXYCODONE HYDROCHLORIDE 5 MILLIGRAM(S): 30 TABLET ORAL at 21:30

## 2025-05-27 RX ADMIN — Medication 650 MILLIGRAM(S): at 18:22

## 2025-05-27 RX ADMIN — Medication 25 GRAM(S): at 03:07

## 2025-05-27 RX ADMIN — Medication 10 MILLILITER(S): at 11:31

## 2025-05-27 RX ADMIN — LOPERAMIDE HCL 2 MILLIGRAM(S): 1 SOLUTION ORAL at 05:47

## 2025-05-27 RX ADMIN — FILGRASTIM 300 MICROGRAM(S): 300 INJECTION, SOLUTION INTRAVENOUS; SUBCUTANEOUS at 15:22

## 2025-05-27 RX ADMIN — LOPERAMIDE HCL 2 MILLIGRAM(S): 1 SOLUTION ORAL at 17:46

## 2025-05-27 RX ADMIN — Medication 25 GRAM(S): at 16:53

## 2025-05-27 RX ADMIN — LIDOCAINE HYDROCHLORIDE 1 PATCH: 20 JELLY TOPICAL at 20:05

## 2025-05-27 RX ADMIN — TACROLIMUS 6.26 MILLIGRAM(S): 0.5 CAPSULE ORAL at 03:27

## 2025-05-27 RX ADMIN — Medication 40 MILLIEQUIVALENT(S): at 15:33

## 2025-05-27 RX ADMIN — Medication 800 MILLIGRAM(S): at 05:46

## 2025-05-27 RX ADMIN — Medication 10 MILLILITER(S): at 00:30

## 2025-05-27 RX ADMIN — Medication 125 MILLIGRAM(S): at 05:46

## 2025-05-27 RX ADMIN — HYDROCORTISONE 1 APPLICATION(S): 10 CREAM TOPICAL at 12:31

## 2025-05-27 RX ADMIN — Medication 325 MILLIGRAM(S): at 00:30

## 2025-05-27 RX ADMIN — Medication 125 MILLIGRAM(S): at 17:45

## 2025-05-27 RX ADMIN — Medication 40 MILLIEQUIVALENT(S): at 09:00

## 2025-05-27 RX ADMIN — LOPERAMIDE HCL 2 MILLIGRAM(S): 1 SOLUTION ORAL at 00:30

## 2025-05-27 RX ADMIN — Medication 25 GRAM(S): at 20:47

## 2025-05-27 RX ADMIN — Medication 1 APPLICATION(S): at 09:29

## 2025-05-27 RX ADMIN — Medication 40 MILLIGRAM(S): at 05:46

## 2025-05-27 RX ADMIN — URSODIOL 300 MILLIGRAM(S): 300 CAPSULE ORAL at 17:45

## 2025-05-27 RX ADMIN — URSODIOL 300 MILLIGRAM(S): 300 CAPSULE ORAL at 05:46

## 2025-05-27 RX ADMIN — Medication 5 MILLILITER(S): at 20:22

## 2025-05-27 RX ADMIN — Medication 650 MILLIGRAM(S): at 19:40

## 2025-05-27 RX ADMIN — Medication 25 GRAM(S): at 09:26

## 2025-05-27 RX ADMIN — Medication 5 MILLILITER(S): at 16:53

## 2025-05-27 RX ADMIN — OXYCODONE HYDROCHLORIDE 5 MILLIGRAM(S): 30 TABLET ORAL at 20:52

## 2025-05-27 RX ADMIN — LOPERAMIDE HCL 2 MILLIGRAM(S): 1 SOLUTION ORAL at 12:09

## 2025-05-27 RX ADMIN — Medication 30 MILLILITER(S): at 13:11

## 2025-05-27 RX ADMIN — Medication 800 MILLIGRAM(S): at 17:45

## 2025-05-27 RX ADMIN — POSACONAZOLE 300 MILLIGRAM(S): 100 TABLET, DELAYED RELEASE ORAL at 12:09

## 2025-05-27 RX ADMIN — Medication 10 MILLILITER(S): at 09:28

## 2025-05-27 RX ADMIN — Medication 10 MILLILITER(S): at 20:22

## 2025-05-27 RX ADMIN — Medication 5 MILLILITER(S): at 11:31

## 2025-05-27 RX ADMIN — Medication 10 MILLILITER(S): at 16:53

## 2025-05-27 RX ADMIN — Medication 5 MILLILITER(S): at 01:30

## 2025-05-27 RX ADMIN — Medication 5 MILLILITER(S): at 09:28

## 2025-05-27 RX ADMIN — LIDOCAINE HYDROCHLORIDE 1 PATCH: 20 JELLY TOPICAL at 12:14

## 2025-05-27 NOTE — PHARMACOTHERAPY INTERVENTION NOTE - COMMENTS
69-year-old male with 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 He is admitted for an alloSCT with BERTRAM Bu/Flu/Cy/TBI conditioning and CAST for GVHD ppx. Today is day +7. Course has been complicated by febrile neutropenia/haplostorm.    Conditioning Regimen: RI Bu/Flu/Cy/TBI (complete)  Day -6 () to Day -5 (5/15): Busulfan 130 mg/m2 IV administered over 3 hours for 2 doses -> avoid APAP and azole antifungals for 48 hours post busulfan (until )  Day -6 () to Day -2 () Fludarabine 30 mg/m2 IV administered over 30 minutes for 5 doses  Moving up by 1 hour every day to ensure 48 hours between final dose and CTP infusion - last dose complete on  @ 1045 am  Day -3 () to Day -2 () Cyclophosphamide 14.5 mg/k2 IV administered over 1 hour for 2 doses  Day -1 ()  cGy x1    GVHD ppx: CAST  Cyclophosphamide 50 mg/kg IV daily on Day +3 () and Day +4 ().   Abatacept IV 10 mg/kg on days +5 (), +14 (6/3), +28 (), and +56 (7/15).  Patient will start tacrolimus 0.02 mg/kg IV on ,  (day +5) - please order a one time trough on Wednesday,  (day +8) and one time trough on Saturday,  along with troughs every Tuesday to start on 6/3. Goal trough is 8-12 ng/mL. Please ensure trough is drawn peripherally.    Date      Day         Level          Action          Day 0       N/A          Started posaconazole 300 mg PO QD (CY inhibitor)          Day +3     N/A          Received Fosaprepitant 150 mg IV x1 (CY inhibitor)          Day +5     N/A          Started tacrolimus 0.9 mg IV  ---Next level: ---    Antimicrobial ppx.:  Started antimicrobial (levofloxacin 500 mg PO QD), and PO vanco 125 mg BID once ANC <1000 () & will continue until ANC >500 for 3 consecutive days. Started antifungal (posaconazole 300 mg PO QD) on day 0 () and will continue until day +75. Started GCSF on day +7 () & will stop once ANC >1500 for two days.     Febrile neutropenia/haplostorm:  Patient became febrile to 100.5F on  @ 1948 & was ordered for pip/tazo 4.5 g IV every 8 hours (-). BCx from  &  showed NGTD & CXR from  shows atelectasis. Most likely 2/2 haplostorm, BCx remained negative & patient was afebrile x24 hours, so was de-escalated to levofloxacin 500 mg PO QD ().      Patient became febrile again to 101.1F on  and was escalated to pip/tazo 4.5 g IV every 8 hours (-; day 1) and vancomycin 1000 mg IV x1. Pending BCx from  and will follow cultures.     Evelyne Murcia, PharmD, BCOP  Stem Cell Transplant Clinical Pharmacy Specialist  Available via [employer] Teams

## 2025-05-27 NOTE — PROGRESS NOTE ADULT - SUBJECTIVE AND OBJECTIVE BOX
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  Patient now on RA sating well.    O: Vitals:   Vital Signs Last 24 Hrs  T(C): 36.9 (27 May 2025 05:45), Max: 38.7 (27 May 2025 00:30)  T(F): 98.4 (27 May 2025 05:45), Max: 101.7 (27 May 2025 00:30)  HR: 73 (27 May 2025 05:45) (73 - 96)  BP: 93/58 (27 May 2025 06:40) (89/54 - 155/92)  BP(mean): --  RR: 19 (27 May 2025 05:45) (17 - 22)  SpO2: 95% (27 May 2025 05:45) (89% - 98%)    Parameters below as of 27 May 2025 05:45  Patient On (Oxygen Delivery Method): room air    Admit weight: 47.1 kg   Daily Weight in kG:  (27 May 2025)    Intake / Output:   05-25 @ 07:01 - 05-26 @ 07:00  --------------------------------------------------------  IN: 4359.9 mL / OUT: 4475 mL / NET: -115.1 mL    05-26 @ 07:01  -  05-27 @ 06:57  --------------------------------------------------------  IN: 2152.7 mL / OUT: 3625 mL / NET: -1472.3 mL      PE:   Oropharynx: no erythema or ulcerations   Oral Mucositis:                -                                     Grade: n/a  CVS: S1, S2 RRR   Lungs: CTA throughout bilaterally   Abdomen: + BS x 4, soft, NT, ND   Extremities: no edema   Gastric Mucositis:       -                                          Grade: n/a   Intestinal Mucositis:     -                                         Grade: n/a   Skin: macular erythematous rash of the upper thighs/back area   TLC: CDI  Neuro: A&OX3      Labs:               Cultures:   Culture - Urine (05.21.25 @ 21:16)   Specimen Source: Clean Catch Clean Catch (Midstream)  Culture Results:   <10,000 CFU/mL Normal Urogenital FloraCulture - Blood (05.21.25 @ 21:16)   Specimen Source: Blood Blood-Peripheral  Culture Results:   No growth at 48 HoursCulture - Blood (05.21.25 @ 21:16)   Specimen Source: Blood Blood-Catheter  Culture Results:   No growth at 48 Hours    Culture - Blood (05.23.25 @ 22:30)   Specimen Source: Blood Blood-Peripheral  Culture Results:   No growth at 24 hoursCulture - Blood (05.23.25 @ 22:15)   Specimen Source: Blood Blood-Catheter  Culture Results:   No growth at 24 hours< from: Xray Chest 1 View- PORTABLE-Urgent (Xray Chest 1 View- PORTABLE-Urgent .) (05.21.25 @ 20:58) >    IMPRESSION:  Low lung volumes.    Right mid to lower lung linear atelectasis. Otherwise clear lungs.    --- End of Report ---        Meds:   Antimicrobials:   acyclovir   Oral Tab/Cap 800 milliGRAM(s) Oral every 12 hours  piperacillin/tazobactam IVPB.- 4.5 Gram(s) IV Intermittent once  piperacillin/tazobactam IVPB.- 4.5 Gram(s) IV Intermittent once  piperacillin/tazobactam IVPB.. 4.5 Gram(s) IV Intermittent every 8 hours  posaconazole DR Tablet 300 milliGRAM(s) Oral daily  vancomycin    Solution 125 milliGRAM(s) Oral every 12 hours      Heme / Onc:       GI:  aluminum hydroxide/magnesium hydroxide/simethicone Suspension 30 milliLiter(s) Oral every 4 hours PRN  loperamide 2 milliGRAM(s) Oral every 6 hours  pantoprazole    Tablet 40 milliGRAM(s) Oral before breakfast  sodium bicarbonate Mouth Rinse 10 milliLiter(s) Swish and Spit five times a day  ursodiol Capsule 300 milliGRAM(s) Oral two times a day      Cardiovascular:       Immunologic:   filgrastim-aafi (NIVESTYM) Injectable 300 MICROGram(s) SubCutaneous daily  tacrolimus  IVPB 0.9 milliGRAM(s) IV Intermittent every 24 hours      Other medications:   Biotene Dry Mouth Oral Rinse 5 milliLiter(s) Swish and Spit five times a day  chlorhexidine 4% Liquid 1 Application(s) Topical <User Schedule>  DULoxetine 60 milliGRAM(s) Oral <User Schedule>  hydrocortisone 1% Cream 1 Application(s) Topical daily  lidocaine   4% Patch 1 Patch Transdermal daily  phytonadione   Solution 5 milliGRAM(s) Oral <User Schedule>  sodium chloride 0.9%. 500 milliLiter(s) IV Continuous <Continuous>  sodium chloride 0.9%. 500 milliLiter(s) IV Continuous <Continuous>  sodium chloride 0.9%. 500 milliLiter(s) IV Continuous <Continuous>  sodium chloride 0.9%. 500 milliLiter(s) IV Continuous <Continuous>  sodium chloride 0.9%. 1000 milliLiter(s) IV Continuous <Continuous>  sodium chloride 0.9%. 1000 milliLiter(s) IV Continuous <Continuous>  tiZANidine 4 milliGRAM(s) Oral <User Schedule>      PRN:   acetaminophen     Tablet .. 650 milliGRAM(s) Oral every 6 hours PRN  aluminum hydroxide/magnesium hydroxide/simethicone Suspension 30 milliLiter(s) Oral every 4 hours PRN  FIRST- Mouthwash  BLM 15 milliLiter(s) Swish and Swallow four times a day PRN  oxyCODONE    IR 5 milliGRAM(s) Oral every 6 hours PRN  prochlorperazine   Injectable 10 milliGRAM(s) IV Push every 6 hours PRN  sodium chloride 0.9% lock flush 10 milliLiter(s) IV Push every 1 hour PRN    A/P:  69 year old male with refractory DLBCL, status post R-CHOP x 6, HD MTX x 4, salvage polatuzumab / rituximab / revlimid x 4, admitted for a haplo-identical pbsct from his daughter with Flu / Bu / Cy / TBI prep regimen   Day +7  5/15- busulfan 2/2, fludarabine 2/5. No acute events overnight, vital signs are stable; continue keppra ppx for 24 hours post infusion of last dose of busulfan. No tylenol or posaconazole until day 0. Not neutropenic today, will d/c levaquin / vancomycin.   5/16 - fludarabine 3/5. VSS, afebrile. No acute events overnight.  5/17 - fludarabine 4/5. VSS and afebrile. No acute events overnight. Neutropenic today, started on ppx levaquin/vancomycin PO. No Tylenol or Azoles until day 0.  5/18 - fludarabine 5/5. VSS, remains afebrile. No acute events overnight. No Tylenol or Azoles until day 0.  5/19- TBI today. No acute events overnight. Vital signs are stable.   5/20 - will receive Haplo allogeneic SCT today. VSS, afebrile.  5/21- tolerated HPC infusion well. continue IVF hydration 24 hrs post cells, continue to monitor I&Os  5/23 - increased loose BM, imodium prn. VSS, afebrile and infectious work up negative - will discontinue zosyn and switch to levaquin ppx. Continue supportive care.  5/24- PTCY2/2. febrile overnight: f/u cultures. Broaden to Zosyn. s.p lasix today   5/25-continue PTCY hydration. Monitor I&O: s/p lasix today. afebrile: blood cultures negative Zosyn de-escalated to Levaquin   5/26- VSS, no acute events: awaiting count recovery      1. Infectious Disease:   acyclovir Oral Tab/Cap 800 milliGRAM(s) Oral every 12 hours  levoFLOXacin  Tablet 500 milliGRAM(s) Oral every 24 hours  posaconazole DR Tablet 300 milliGRAM(s) Oral daily  vancomycin  Solution 125 milliGRAM(s) Oral every 12 hours      2. VOD Prophylaxis:   ursodiol Capsule 300 milliGRAM(s) Oral two times a day    3. GI Prophylaxis:   pantoprazole Tablet 40 milliGRAM(s) Oral before breakfast    4. Mouthcare - NS / NaHCO3 rinses, Biotene; Skin care     5. GVHD prophylaxis   PTCy days +3, +4   Tacrolimus gtt to start on day + 5  Abatacept days +5, +14, +28, +56    6. Transfuse & replete electrolytes prn     7. IV hydration, daily weights, strict I&O, prn diuresis     8. PO intake as tolerated, nutrition follow up as needed    9. Antiemetics, anti-diarrhea medications:   prochlorperazine Injectable 10 milliGRAM(s) IV Push every 6 hours PRN    10. OOB as tolerated, physical therapy consult if needed     11. Monitor coags  2x week, vitamin K BIW  phytonadione Solution 5 milliGRAM(s) Oral <User Schedule>    12. Monitor closely for clinical changes, monitor for fevers     13. Emotional support provided, plan of care discussed and questions addressed     14. Patient education done regarding plan of care, restrictions and discharge planning     15. Continue regular social work input     I have written the above note for Dr. Snider who performed service with me in the room.   Bhaskar Esquivel PA-C ([phone])    I have seen and examined patient with PA, I agree with above note as scribed.                        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  Patient now on RA sating well. Patient states intermittent SOB while eating.    O: Vitals:   Vital Signs Last 24 Hrs  T(C): 36.9 (27 May 2025 05:45), Max: 38.7 (27 May 2025 00:30)  T(F): 98.4 (27 May 2025 05:45), Max: 101.7 (27 May 2025 00:30)  HR: 73 (27 May 2025 05:45) (73 - 96)  BP: 93/58 (27 May 2025 06:40) (89/54 - 155/92)  BP(mean): --  RR: 19 (27 May 2025 05:45) (17 - 22)  SpO2: 95% (27 May 2025 05:45) (89% - 98%)    Parameters below as of 27 May 2025 05:45  Patient On (Oxygen Delivery Method): room air    Admit weight: 47.1 kg   Daily Weight in k.9 (27 May 2025)    Intake / Output:    @ 07: @ 07:00  --------------------------------------------------------  IN: 4359.9 mL / OUT: 4475 mL / NET: -115.1 mL     @ 07:  -   @ 06:57  --------------------------------------------------------  IN: 2152.7 mL / OUT: 3625 mL / NET: -1472.3 mL      PE:   Oropharynx: no erythema or ulcerations   Oral Mucositis:                -                                     Grade: n/a  CVS: S1, S2 RRR   Lungs: CTA throughout bilaterally   Abdomen: + BS x 4, soft, NT, ND   Extremities: no edema   Gastric Mucositis:       -                                          Grade: n/a   Intestinal Mucositis:     -                                         Grade: n/a   Skin: macular erythematous rash of the upper thighs/back area (improving)  TLC: CDI  Neuro: A&OX3      Labs:   CBC Full  -  ( 27 May 2025 06:30 )  WBC Count : 0.02 K/uL  RBC Count : 2.93 M/uL  Hemoglobin : 8.4 g/dL  Hematocrit : 25.3 %  Platelet Count - Automated : 28 K/uL  Mean Cell Volume : 86.3 fl  Mean Cell Hemoglobin : 28.7 pg  Mean Cell Hemoglobin Concentration : 33.2 g/dL  Auto Neutrophil # : x  Auto Lymphocyte # : x  Auto Monocyte # : x  Auto Eosinophil # : x  Auto Basophil # : x  Auto Neutrophil % : x  Auto Lymphocyte % : x  Auto Monocyte % : x  Auto Eosinophil % : x  Auto Basophil % : x                          8.4    0.02  )-----------( 28       ( 27 May 2025 06:30 )             25.3         140  |  103  |  12  ----------------------------<  132[H]  3.4[L]   |  26  |  0.61    Ca    7.7[L]      27 May 2025 06:30  Phos  3.1       Mg     1.8         TPro  4.5[L]  /  Alb  2.9[L]  /  TBili  0.7  /  DBili  x   /  AST  17  /  ALT  18  /  AlkPhos  104        Cultures:   Culture - Urine (25 @ 21:16)   Specimen Source: Clean Catch Clean Catch (Midstream)  Culture Results:   <10,000 CFU/mL Normal Urogenital FloraCulture - Blood (25 @ 21:16)   Specimen Source: Blood Blood-Peripheral  Culture Results:   No growth at 48 HoursCulture - Blood (25 @ 21:16)   Specimen Source: Blood Blood-Catheter  Culture Results:   No growth at 48 Hours    Culture - Blood (25 @ 22:30)   Specimen Source: Blood Blood-Peripheral  Culture Results:   No growth at 24 hoursCulture - Blood (25 @ 22:15)   Specimen Source: Blood Blood-Catheter  Culture Results:   No growth at 24 hours    Radiology:  ACC: [id number] EXAM:  XR CHEST PORTABLE URGENT 1V ORDERED BY:    PROCEDURE DATE:  2025    IMPRESSION:  Bibasilar hazy opacities.  Small-to-moderate left pleural effusion and small right-sided pleural   effusion.    Xray Chest 1 View- PORTABLE-Urgent (Xray Chest 1 View- PORTABLE-Urgent:   IMPRESSION:  Low lung volumes.  Right mid to lower lung linear atelectasis. Otherwise clear lungs.      Meds:   Antimicrobials:   acyclovir   Oral Tab/Cap 800 milliGRAM(s) Oral every 12 hours  piperacillin/tazobactam IVPB.- 4.5 Gram(s) IV Intermittent once  piperacillin/tazobactam IVPB.- 4.5 Gram(s) IV Intermittent once  piperacillin/tazobactam IVPB.. 4.5 Gram(s) IV Intermittent every 8 hours  posaconazole DR Tablet 300 milliGRAM(s) Oral daily  vancomycin    Solution 125 milliGRAM(s) Oral every 12 hours      Heme / Onc:       GI:  aluminum hydroxide/magnesium hydroxide/simethicone Suspension 30 milliLiter(s) Oral every 4 hours PRN  loperamide 2 milliGRAM(s) Oral every 6 hours  pantoprazole    Tablet 40 milliGRAM(s) Oral before breakfast  sodium bicarbonate Mouth Rinse 10 milliLiter(s) Swish and Spit five times a day  ursodiol Capsule 300 milliGRAM(s) Oral two times a day      Cardiovascular:       Immunologic:   filgrastim-aafi (NIVESTYM) Injectable 300 MICROGram(s) SubCutaneous daily  tacrolimus  IVPB 0.9 milliGRAM(s) IV Intermittent every 24 hours      Other medications:   Biotene Dry Mouth Oral Rinse 5 milliLiter(s) Swish and Spit five times a day  chlorhexidine 4% Liquid 1 Application(s) Topical <User Schedule>  DULoxetine 60 milliGRAM(s) Oral <User Schedule>  hydrocortisone 1% Cream 1 Application(s) Topical daily  lidocaine   4% Patch 1 Patch Transdermal daily  phytonadione   Solution 5 milliGRAM(s) Oral <User Schedule>  sodium chloride 0.9%. 500 milliLiter(s) IV Continuous <Continuous>  sodium chloride 0.9%. 500 milliLiter(s) IV Continuous <Continuous>  sodium chloride 0.9%. 500 milliLiter(s) IV Continuous <Continuous>  sodium chloride 0.9%. 500 milliLiter(s) IV Continuous <Continuous>  sodium chloride 0.9%. 1000 milliLiter(s) IV Continuous <Continuous>  sodium chloride 0.9%. 1000 milliLiter(s) IV Continuous <Continuous>  tiZANidine 4 milliGRAM(s) Oral <User Schedule>      PRN:   acetaminophen     Tablet .. 650 milliGRAM(s) Oral every 6 hours PRN  aluminum hydroxide/magnesium hydroxide/simethicone Suspension 30 milliLiter(s) Oral every 4 hours PRN  FIRST- Mouthwash  BLM 15 milliLiter(s) Swish and Swallow four times a day PRN  oxyCODONE    IR 5 milliGRAM(s) Oral every 6 hours PRN  prochlorperazine   Injectable 10 milliGRAM(s) IV Push every 6 hours PRN  sodium chloride 0.9% lock flush 10 milliLiter(s) IV Push every 1 hour PRN    A/P:  69 year old male with refractory DLBCL, status post R-CHOP x 6, HD MTX x 4, salvage polatuzumab / rituximab / revlimid x 4, admitted for a haplo-identical pbsct from his daughter with Flu / Bu / Cy / TBI prep regimen   Day +7  5/15- busulfan 2/2, fludarabine 2/. No acute events overnight, vital signs are stable; continue keppra ppx for 24 hours post infusion of last dose of busulfan. No tylenol or posaconazole until day 0. Not neutropenic today, will d/c levaquin / vancomycin.    - fludarabine 3/. VSS, afebrile. No acute events overnight.   - fludarabine /. VSS and afebrile. No acute events overnight. Neutropenic today, started on ppx levaquin/vancomycin PO. No Tylenol or Azoles until day 0.   - fludarabine 5/. VSS, remains afebrile. No acute events overnight. No Tylenol or Azoles until day 0.  - TBI today. No acute events overnight. Vital signs are stable.    - will receive Haplo allogeneic SCT today. VSS, afebrile.  - tolerated HPC infusion well. continue IVF hydration 24 hrs post cells, continue to monitor I&Os   - increased loose BM, imodium prn. VSS, afebrile and infectious work up negative - will discontinue zosyn and switch to levaquin ppx. Continue supportive care.  - PTCY2/2. febrile overnight: f/u cultures. Broaden to Zosyn. s.p lasix today   -continue PTCY hydration. Monitor I&O: s/p lasix today. afebrile: blood cultures negative Zosyn de-escalated to Levaquin   - VSS, no acute events: awaiting count recovery    Febrile overight - empirically started on zosyn, BCx/UCx pending, CXR with bibasilar hazy opacites and small to mod pleural effusions. Monitor strict I/O's, consider lasix prn. VSS, afebrile currently.     1. Infectious Disease:   acyclovir Oral Tab/Cap 800 milliGRAM(s) Oral every 12 hours  levoFLOXacin  Tablet 500 milliGRAM(s) Oral every 24 hours  posaconazole DR Tablet 300 milliGRAM(s) Oral daily  vancomycin  Solution 125 milliGRAM(s) Oral every 12 hours    2. VOD Prophylaxis:   ursodiol Capsule 300 milliGRAM(s) Oral two times a day    3. GI Prophylaxis:   pantoprazole Tablet 40 milliGRAM(s) Oral before breakfast    4. Mouthcare - NS / NaHCO3 rinses, Biotene; Skin care     5. GVHD prophylaxis   PTCy days +3, +4   Tacrolimus gtt to start on day + 5  Abatacept days +5, +14, +28, +56    6. Transfuse & replete electrolytes prn    Hypokalemia - repleted.    7. IV hydration, daily weights, strict I&O, prn diuresis     8. PO intake as tolerated, nutrition follow up as needed    9. Antiemetics, anti-diarrhea medications:   prochlorperazine Injectable 10 milliGRAM(s) IV Push every 6 hours PRN    10. OOB as tolerated, physical therapy consult if needed     11. Monitor coags  2x week, vitamin K BIW  phytonadione Solution 5 milliGRAM(s) Oral <User Schedule>    12. Monitor closely for clinical changes, monitor for fevers     13. Emotional support provided, plan of care discussed and questions addressed     14. Patient education done regarding plan of care, restrictions and discharge planning     15. Continue regular social work input     I have written the above note for Dr. Snider who performed service with me in the room.   Bhaskar Esquivel PA-C ([phone])    I have seen and examined patient with PA, I agree with above note as scribed.

## 2025-05-27 NOTE — PROVIDER CONTACT NOTE (CHANGE IN STATUS NOTIFICATION) - ACTION/TREATMENT ORDERED:
Blood cultures from catheter & peripheral  UA,urine culture  Restart Piperacillin
First mouthwash given since pt. stated it helped before  EKG  O2 2 liters nasal cannula
Awaiting orders
No orders @ this time
Panculture  MRSA/MSSA  swab  CXR  Restart Zosyn 4.5 gms q 8 hrs  Give Vanco 1 gm IVSS x1 dose  [id number] mg po x1 dose

## 2025-05-27 NOTE — PROVIDER CONTACT NOTE (CHANGE IN STATUS NOTIFICATION) - ASSESSMENT
Pt. asymptomatic
Pt. asymptomatic of fluid overload
T=37.7;HR=73;IJ=819/75;RR=22;O2 sat=89%-90% room air  Pain is 5-6/10 on the pain scale  Pain not radiating
T=38.4;RR=20;HR=96;O2 sat=98% w/ O2 2 liters nc
T=38.1,no chills

## 2025-05-27 NOTE — PROVIDER CONTACT NOTE (CHANGE IN STATUS NOTIFICATION) - SITUATION
BP=89/54 via dynamap;90/55=manual
Pt. w/ decreased urine output,+1400
Pt. febrile,pain persists
Pt. febrile
Pt. complaining of epigastric/midsternal pain-not new but worst today as per pt.  Pt. complaining of sob,states he cannot take deep breaths sec. to pain

## 2025-05-27 NOTE — PROGRESS NOTE ADULT - NS ATTEND AMEND GEN_ALL_CORE FT
I rounded with the team including nurses, ACPs and PharmD.  I reviewed the data in depth.   pt seen and examined    The patient is day +6 of haplo allogeneic HSCT.    The patient is doing well overall; reports occ throat/stomach pain when eating;   afebrile, vital signs stable  The vitals are stable. mild mucositis. The line site is clean. The lungs are clear. There is no LE edema. 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  All questions answered.   . I rounded with the team including nurses, ACPs and PharmD.  I reviewed the data in depth.   pt seen and examined    The patient is day +7 of haplo allogeneic HSCT.  haplo storm, with capillary leak, cxr noted small bilateral pleural effusions  The patient is doing well overall; reports occ throat/stomach pain when eating; and occ sob..events of last nite noted  afebrile, vital signs stable  The vitals are stable. mild mucositis. The line site is clean. The lungs are clear. There is no LE edema. 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  All questions answered.   .

## 2025-05-28 LAB
ALBUMIN SERPL ELPH-MCNC: 2.6 G/DL — LOW (ref 3.3–5)
ALP SERPL-CCNC: 93 U/L — SIGNIFICANT CHANGE UP (ref 40–120)
ALT FLD-CCNC: 13 U/L — SIGNIFICANT CHANGE UP (ref 10–45)
ANION GAP SERPL CALC-SCNC: 10 MMOL/L — SIGNIFICANT CHANGE UP (ref 5–17)
AST SERPL-CCNC: 10 U/L — SIGNIFICANT CHANGE UP (ref 10–40)
BILIRUB SERPL-MCNC: 0.7 MG/DL — SIGNIFICANT CHANGE UP (ref 0.2–1.2)
BUN SERPL-MCNC: 18 MG/DL — SIGNIFICANT CHANGE UP (ref 7–23)
CALCIUM SERPL-MCNC: 8 MG/DL — LOW (ref 8.4–10.5)
CHLORIDE SERPL-SCNC: 103 MMOL/L — SIGNIFICANT CHANGE UP (ref 96–108)
CO2 SERPL-SCNC: 24 MMOL/L — SIGNIFICANT CHANGE UP (ref 22–31)
CREAT SERPL-MCNC: 0.63 MG/DL — SIGNIFICANT CHANGE UP (ref 0.5–1.3)
CULTURE RESULTS: NO GROWTH — SIGNIFICANT CHANGE UP
EGFR: 104 ML/MIN/1.73M2 — SIGNIFICANT CHANGE UP
EGFR: 104 ML/MIN/1.73M2 — SIGNIFICANT CHANGE UP
GLUCOSE SERPL-MCNC: 118 MG/DL — HIGH (ref 70–99)
HCT VFR BLD CALC: 23.7 % — LOW (ref 39–50)
HGB BLD-MCNC: 7.8 G/DL — LOW (ref 13–17)
LDH SERPL L TO P-CCNC: 205 U/L — SIGNIFICANT CHANGE UP (ref 50–242)
MAGNESIUM SERPL-MCNC: 2 MG/DL — SIGNIFICANT CHANGE UP (ref 1.6–2.6)
MCHC RBC-ENTMCNC: 28.8 PG — SIGNIFICANT CHANGE UP (ref 27–34)
MCHC RBC-ENTMCNC: 32.9 G/DL — SIGNIFICANT CHANGE UP (ref 32–36)
MCV RBC AUTO: 87.5 FL — SIGNIFICANT CHANGE UP (ref 80–100)
NRBC BLD AUTO-RTO: 0 /100 WBCS — SIGNIFICANT CHANGE UP (ref 0–0)
PHOSPHATE SERPL-MCNC: 2.1 MG/DL — LOW (ref 2.5–4.5)
PLATELET # BLD AUTO: 14 K/UL — CRITICAL LOW (ref 150–400)
POTASSIUM SERPL-MCNC: 3.7 MMOL/L — SIGNIFICANT CHANGE UP (ref 3.5–5.3)
POTASSIUM SERPL-SCNC: 3.7 MMOL/L — SIGNIFICANT CHANGE UP (ref 3.5–5.3)
PROT SERPL-MCNC: 4.4 G/DL — LOW (ref 6–8.3)
RBC # BLD: 2.71 M/UL — LOW (ref 4.2–5.8)
RBC # FLD: 16.1 % — HIGH (ref 10.3–14.5)
SODIUM SERPL-SCNC: 137 MMOL/L — SIGNIFICANT CHANGE UP (ref 135–145)
SPECIMEN SOURCE: SIGNIFICANT CHANGE UP
TACROLIMUS SERPL-MCNC: 6.9 NG/ML — SIGNIFICANT CHANGE UP
WBC # BLD: 0.03 K/UL — CRITICAL LOW (ref 3.8–10.5)
WBC # FLD AUTO: 0.03 K/UL — CRITICAL LOW (ref 3.8–10.5)

## 2025-05-28 PROCEDURE — 99233 SBSQ HOSP IP/OBS HIGH 50: CPT | Mod: FS

## 2025-05-28 RX ORDER — TACROLIMUS 0.5 MG/1
1.5 CAPSULE ORAL
Refills: 0 | Status: DISCONTINUED | OUTPATIENT
Start: 2025-05-29 | End: 2025-06-05

## 2025-05-28 RX ORDER — TACROLIMUS 0.5 MG/1
1 CAPSULE ORAL
Refills: 0 | Status: DISCONTINUED | OUTPATIENT
Start: 2025-05-28 | End: 2025-06-02

## 2025-05-28 RX ADMIN — Medication 10 MILLILITER(S): at 23:40

## 2025-05-28 RX ADMIN — Medication 25 GRAM(S): at 14:00

## 2025-05-28 RX ADMIN — Medication 10 MILLILITER(S): at 12:29

## 2025-05-28 RX ADMIN — Medication 5 MILLILITER(S): at 12:29

## 2025-05-28 RX ADMIN — OXYCODONE HYDROCHLORIDE 5 MILLIGRAM(S): 30 TABLET ORAL at 20:19

## 2025-05-28 RX ADMIN — Medication 5 MILLILITER(S): at 17:46

## 2025-05-28 RX ADMIN — Medication 650 MILLIGRAM(S): at 00:30

## 2025-05-28 RX ADMIN — Medication 5 MILLILITER(S): at 23:40

## 2025-05-28 RX ADMIN — Medication 5 MILLILITER(S): at 20:11

## 2025-05-28 RX ADMIN — Medication 10 MILLILITER(S): at 08:30

## 2025-05-28 RX ADMIN — DULOXETINE 60 MILLIGRAM(S): 20 CAPSULE, DELAYED RELEASE ORAL at 20:12

## 2025-05-28 RX ADMIN — URSODIOL 300 MILLIGRAM(S): 300 CAPSULE ORAL at 17:46

## 2025-05-28 RX ADMIN — TACROLIMUS 1 MILLIGRAM(S): 0.5 CAPSULE ORAL at 20:11

## 2025-05-28 RX ADMIN — Medication 800 MILLIGRAM(S): at 05:07

## 2025-05-28 RX ADMIN — Medication 650 MILLIGRAM(S): at 00:00

## 2025-05-28 RX ADMIN — URSODIOL 300 MILLIGRAM(S): 300 CAPSULE ORAL at 05:07

## 2025-05-28 RX ADMIN — LIDOCAINE HYDROCHLORIDE 1 PATCH: 20 JELLY TOPICAL at 20:01

## 2025-05-28 RX ADMIN — Medication 125 MILLIGRAM(S): at 05:08

## 2025-05-28 RX ADMIN — Medication 25 GRAM(S): at 05:08

## 2025-05-28 RX ADMIN — LOPERAMIDE HCL 2 MILLIGRAM(S): 1 SOLUTION ORAL at 05:08

## 2025-05-28 RX ADMIN — Medication 10 MILLILITER(S): at 17:47

## 2025-05-28 RX ADMIN — Medication 800 MILLIGRAM(S): at 17:46

## 2025-05-28 RX ADMIN — Medication 125 MILLIGRAM(S): at 17:46

## 2025-05-28 RX ADMIN — Medication 40 MILLIGRAM(S): at 05:07

## 2025-05-28 RX ADMIN — POSACONAZOLE 300 MILLIGRAM(S): 100 TABLET, DELAYED RELEASE ORAL at 12:28

## 2025-05-28 RX ADMIN — TACROLIMUS 6.26 MILLIGRAM(S): 0.5 CAPSULE ORAL at 01:04

## 2025-05-28 RX ADMIN — LIDOCAINE HYDROCHLORIDE 1 PATCH: 20 JELLY TOPICAL at 12:29

## 2025-05-28 RX ADMIN — FILGRASTIM 300 MICROGRAM(S): 300 INJECTION, SOLUTION INTRAVENOUS; SUBCUTANEOUS at 15:25

## 2025-05-28 RX ADMIN — LIDOCAINE HYDROCHLORIDE 1 PATCH: 20 JELLY TOPICAL at 00:00

## 2025-05-28 RX ADMIN — Medication 10 MILLILITER(S): at 20:12

## 2025-05-28 RX ADMIN — TIZANIDINE 4 MILLIGRAM(S): 4 TABLET ORAL at 20:12

## 2025-05-28 RX ADMIN — Medication 25 GRAM(S): at 21:08

## 2025-05-28 RX ADMIN — OXYCODONE HYDROCHLORIDE 5 MILLIGRAM(S): 30 TABLET ORAL at 21:00

## 2025-05-28 RX ADMIN — Medication 5 MILLILITER(S): at 08:30

## 2025-05-28 RX ADMIN — Medication 1 APPLICATION(S): at 08:30

## 2025-05-28 NOTE — PROGRESS NOTE ADULT - NS ATTEND AMEND GEN_ALL_CORE FT
I rounded with the team including nurses, ACPs and PharmD.  I reviewed the data in depth.   pt seen and examined    The patient is day +8 of haplo allogeneic HSCT.  haplo storm, with capillary leak, cxr noted small bilateral pleural effusions  The patient is doing well overall; reports occ throat/stomach pain when eating; and occ sob..events of last nite noted  afebrile, vital signs stable  The vitals are stable. mild mucositis. The line site is clean. The lungs are clear. There is no LE edema. 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  All questions answered.   . I rounded with the team including nurses, ACPs and PharmD.  I reviewed the data in depth.   pt seen and examined  prep flu/bu/cy/tbi  gvhd CAST...on iv tacro 0.9 mg    The patient is day +8 of haplo allogeneic HSCT.  haplo storm, with capillary leak, cxr noted small bilateral pleural effusions  The patient is doing well overall; reports occ throat/stomach pain when eating; and occ sob..improved today  febrile, vital signs stable  The vitals are stable. mild mucositis. The line site is clean. The lungs are clear. There is no LE edema. 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  All questions answered.   .

## 2025-05-28 NOTE — CHART NOTE - NSCHARTNOTEFT_GEN_A_CORE
NUTRITION FOLLOW UP NOTE    PATIENT SEEN FOR: BMTU Nutrition Follow up     SOURCE: [x] Patient  [x] Current Medical Record  [x] RN  [] Family/support person at bedside  [] Patient unavailable/inappropriate  [x] Other: Interdisciplinary rounds     CHART REVIEWED/EVENTS NOTED.  [] No changes to nutrition care plan to note  [x] Nutrition Status: Admitted for a haplo-identical pbsct from his daughter with Flu / Bu / Cy / TBI prep regimen   Day     DIET ORDER:   Diet, Regular (25 @ 18:56) [Active]      CURRENT DIET ORDER IS:  [x] Appropriate:  [] Inadequate:  [] Other:    NUTRITION INTAKE/PROVISION:  [x] PO: Pt reports fair appetite/PO intake; reports appetite is best during breakfast and lunch, decreased for dinner. Is not amenable to receiving oral nutritional supplements at this time.     [] Enteral Nutrition:  [] Parenteral Nutrition:    ANTHROPOMETRICS:  Drug Dosing Weight  Height (cm): 151 (14 May 2025 09:36)  Weight (kg): 47.1 (14 May 2025 09:36)  BMI (kg/m2): 20.7 (14 May 2025 09:36)  BSA (m2): 1.4 (14 May 2025 09:36)  Weights:   Daily Weight in kG: Daily     Daily Weight in k.7 (23 May 2025 07:54), 46.4 (05-19), Weight in k.1 (05-18), Weight in k.3 (05-17), Weight in k.2 (05-16), Weight in k.7 (05-15)   ** Weight fluctuating - Weight changes likely secondary to fluid shifts. RD to continue to monitor weight trends as able.     NUTRITIONALLY PERTINENT MEDICATIONS:  MEDICATIONS  (STANDING):  acyclovir   Oral Tab/Cap 800 milliGRAM(s) Oral every 12 hours  Biotene Dry Mouth Oral Rinse 5 milliLiter(s) Swish and Spit five times a day  chlorhexidine 4% Liquid 1 Application(s) Topical <User Schedule>  cyclophosphamide IVPB (eMAR) 2360 milliGRAM(s) IV Intermittent daily  DULoxetine 60 milliGRAM(s) Oral <User Schedule>  fosaprepitant IVPB 150 milliGRAM(s) IV Intermittent once  hydrocortisone 1% Cream 1 Application(s) Topical daily  lidocaine   4% Patch 1 Patch Transdermal daily  ondansetron  IVPB 16 milliGRAM(s) IV Intermittent every 24 hours  oxyCODONE    IR 5 milliGRAM(s) Oral <User Schedule>  pantoprazole    Tablet 40 milliGRAM(s) Oral before breakfast  phytonadione   Solution 5 milliGRAM(s) Oral <User Schedule>  piperacillin/tazobactam IVPB.. 4.5 Gram(s) IV Intermittent every 8 hours  posaconazole DR Tablet 300 milliGRAM(s) Oral daily  potassium chloride    Tablet ER 40 milliEquivalent(s) Oral every 4 hours  sodium bicarbonate Mouth Rinse 10 milliLiter(s) Swish and Spit five times a day  sodium chloride 0.9%. 1000 milliLiter(s) (150 mL/Hr) IV Continuous <Continuous>  sodium chloride 0.9%. 500 milliLiter(s) (250 mL/Hr) IV Continuous <Continuous>  sodium chloride 0.9%. 500 milliLiter(s) (250 mL/Hr) IV Continuous <Continuous>  sodium chloride 0.9%. 500 milliLiter(s) (250 mL/Hr) IV Continuous <Continuous>  sodium chloride 0.9%. 500 milliLiter(s) (250 mL/Hr) IV Continuous <Continuous>  sodium chloride 0.9%. 1000 milliLiter(s) (250 mL/Hr) IV Continuous <Continuous>  tiZANidine 4 milliGRAM(s) Oral <User Schedule>  ursodiol Capsule 300 milliGRAM(s) Oral two times a day  vancomycin    Solution 125 milliGRAM(s) Oral every 12 hours    MEDICATIONS  (PRN):  acetaminophen     Tablet .. 650 milliGRAM(s) Oral every 6 hours PRN Temp greater or equal to 38C (100.4F), Moderate Pain (4 - 6)  prochlorperazine   Injectable 10 milliGRAM(s) IV Push every 6 hours PRN Nausea/Vomiting  sodium chloride 0.9% lock flush 10 milliLiter(s) IV Push every 1 hour PRN Pre/post blood products, medications, blood draw, and to maintain line patency      NUTRITIONALLY PERTINENT LABS:   @ 06:45: Na 142, BUN 16, Cr 0.57, BG 97, K+ 3.1[L], Phos 2.9, Mg 2.0, Alk Phos 124[H], ALT/SGPT 19, AST/SGOT 16, HbA1c --          Finger Sticks:      NUTRITIONALLY PERTINENT MEDICATIONS/LABS:  [x] Reviewed  [x] Relevant notes on medications/labs:  - Hypokalemic; s/p repletion.     EDEMA:  [x] Reviewed  [] Relevant notes:    GI/ I&O:  [x] Reviewed  [x] Relevant notes: Last BM:  per nursing flow sheets.   [] Other:    SKIN:   [x] No pressure injuries documented, per nursing flowsheet  [] Pressure injury previously noted  [] Change in pressure injury documentation:  [] Other:    ESTIMATED NEEDS:  [x] No change:  [] Updated:  Energy: 7731-8594  kcal/day (35-40 kcal/kg)  Protein:  70.2-93.6 g/day (1.5-2.0 g/kg)  Fluid:   ml/day or [x] defer to team  Based on: dosing weight 46.8 kg     NUTRITION DIAGNOSIS:  [x] Prior Dx: Increased Nutrient Needs   [] New Dx:    EDUCATION:  [x] Yes: Reinforced BMT nutrition recommendations: food safety recommendations, increased protein-energy needs, small frequent meals as tolerated.   [] Not appropriate/warranted    NUTRITION CARE PLAN:  1. Diet: regular diet   2. Supplements: Pt is not amenable to receiving oral nutritional supplements at this time.   3. Multivitamin/mineral supplementation: deferred to team   4: Monitor PO intake/tolerance, weights, labs, hydration status, bowels, and skin integrity.     [] Achieved - Continue current nutrition intervention(s)  [] Current medical condition precludes nutrition intervention at this time.    MONITORING AND EVALUATION:   RD remains available upon request and will follow up per protocol.    Alma Sosa RDN CDN (available on TEAMS)   Available on [facility] TEAMS NUTRITION FOLLOW UP NOTE    PATIENT SEEN FOR: BMTU Nutrition Follow up     SOURCE: [x] Patient  [x] Current Medical Record  [x] RN  [] Family/support person at bedside  [] Patient unavailable/inappropriate  [x] Other: Interdisciplinary rounds     CHART REVIEWED/EVENTS NOTED.  [] No changes to nutrition care plan to note  [x] Nutrition Status: Admitted for a haplo-identical pbsct from his daughter with Flu / Bu / Cy / TBI prep regimen   Today is Day +8.     DIET ORDER:   Diet, Regular (25 @ 18:56) [Active]      CURRENT DIET ORDER IS:  [x] Appropriate:  [] Inadequate:  [] Other:    NUTRITION INTAKE/PROVISION:  [x] PO: Pt reports poor- fair appetite/PO intake x >/5 days; reports appetite is best during breakfast and lunch, decreased for dinner. Is not amenable to receiving oral nutritional supplements at this time.     [] Enteral Nutrition:  [] Parenteral Nutrition:    ANTHROPOMETRICS:  Drug Dosing Weight  Height (cm): 151 (14 May 2025 09:36)  Weight (kg): 47.1 (14 May 2025 09:36)  BMI (kg/m2): 20.7 (14 May 2025 09:36)  BSA (m2): 1.4 (14 May 2025 09:36)  Weights:   Daily Weight in kG: Daily     Daily Weight in k.7 (23 May 2025 07:54), 46.4 (05-19), Weight in k.1 (05-18), Weight in k.3 (05-17), Weight in k.2 (05-16), Weight in k.7 (05-15)   ** Weight fluctuating - Weight changes likely secondary to fluid shifts. RD to continue to monitor weight trends as able.     NUTRITIONALLY PERTINENT MEDICATIONS:  MEDICATIONS  (STANDING):  acyclovir   Oral Tab/Cap 800 milliGRAM(s) Oral every 12 hours  Biotene Dry Mouth Oral Rinse 5 milliLiter(s) Swish and Spit five times a day  chlorhexidine 4% Liquid 1 Application(s) Topical <User Schedule>  DULoxetine 60 milliGRAM(s) Oral <User Schedule>  filgrastim-aafi (NIVESTYM) Injectable 300 MICROGram(s) SubCutaneous daily  lidocaine   4% Patch 1 Patch Transdermal daily  pantoprazole    Tablet 40 milliGRAM(s) Oral before breakfast  phytonadione   Solution 5 milliGRAM(s) Oral <User Schedule>  piperacillin/tazobactam IVPB.. 4.5 Gram(s) IV Intermittent every 8 hours  posaconazole DR Tablet 300 milliGRAM(s) Oral daily  sodium bicarbonate Mouth Rinse 10 milliLiter(s) Swish and Spit five times a day  sodium chloride 0.9%. 1000 milliLiter(s) (150 mL/Hr) IV Continuous <Continuous>  sodium chloride 0.9%. 500 milliLiter(s) (250 mL/Hr) IV Continuous <Continuous>  sodium chloride 0.9%. 500 milliLiter(s) (250 mL/Hr) IV Continuous <Continuous>  sodium chloride 0.9%. 500 milliLiter(s) (250 mL/Hr) IV Continuous <Continuous>  sodium chloride 0.9%. 500 milliLiter(s) (250 mL/Hr) IV Continuous <Continuous>  sodium chloride 0.9%. 1000 milliLiter(s) (250 mL/Hr) IV Continuous <Continuous>  tacrolimus 1.5 milliGRAM(s) Oral <User Schedule>  tacrolimus 1 milliGRAM(s) Oral <User Schedule>  tacrolimus  IVPB 0.9 milliGRAM(s) IV Intermittent every 24 hours  tiZANidine 4 milliGRAM(s) Oral <User Schedule>  ursodiol Capsule 300 milliGRAM(s) Oral two times a day  vancomycin    Solution 125 milliGRAM(s) Oral every 12 hours    MEDICATIONS  (PRN):  acetaminophen     Tablet .. 650 milliGRAM(s) Oral every 6 hours PRN Temp greater or equal to 38C (100.4F), Mild Pain (1 - 3)  aluminum hydroxide/magnesium hydroxide/simethicone Suspension 30 milliLiter(s) Oral every 4 hours PRN Dyspepsia  FIRST- Mouthwash  BLM 15 milliLiter(s) Swish and Swallow four times a day PRN mucisitis  loperamide 2 milliGRAM(s) Oral every 6 hours PRN Diarrhea  oxyCODONE    IR 5 milliGRAM(s) Oral every 6 hours PRN Moderate Pain (4 - 6)  prochlorperazine   Injectable 10 milliGRAM(s) IV Push every 6 hours PRN Nausea/Vomiting  sodium chloride 0.9% lock flush 10 milliLiter(s) IV Push every 1 hour PRN Pre/post blood products, medications, blood draw, and to maintain line patency        NUTRITIONALLY PERTINENT LABS:   @ 06:30: Na 137, BUN 18, Cr 0.63, [H], K+ 3.7, Phos 2.1[L], Mg 2.0, Alk Phos 93, ALT/SGPT 13, AST/SGOT 10, HbA1c --            Finger Sticks:      NUTRITIONALLY PERTINENT MEDICATIONS/LABS:  [x] Reviewed  [x] Relevant notes on medications/labs:  - Hypophosphatemic; s/p repletion.   - Tacrolimus ordered.    EDEMA:  [x] Reviewed  [] Relevant notes:    GI/ I&O:  [x] Reviewed  [x] Relevant notes: Last BM:  per nursing flow sheets.   [] Other:    SKIN:   [x] No pressure injuries documented, per nursing flowsheet  [] Pressure injury previously noted  [] Change in pressure injury documentation:  [] Other:    ESTIMATED NEEDS:  [x] No change:  [] Updated:  Energy: 5311-8160  kcal/day (35-40 kcal/kg)  Protein:  70.2-93.6 g/day (1.5-2.0 g/kg)  Fluid:   ml/day or [x] defer to team  Based on: dosing weight 46.8 kg     NUTRITION DIAGNOSIS:  [x] Prior Dx: Increased Nutrient Needs   [x] New Dx: Inadequate protein-energy intake related to decreased ability to consume adequate protein-energy in setting of increased physiological demand for nutrients as evidenced by pt meeting <50% of estimated needs >/5 days.   Goal: Pt to meet >75% of estimated protein- energy needs during hospital stay.     EDUCATION:  [x] Yes: Reinforced BMT nutrition recommendations: food safety recommendations, increased protein-energy needs, small frequent meals as tolerated.   [] Not appropriate/warranted    NUTRITION CARE PLAN:  1. Diet: regular diet   2. Supplements: Pt is not amenable to receiving oral nutritional supplements at this time.   3. Multivitamin/mineral supplementation: deferred to team   4: Monitor PO intake/tolerance, weights, labs, hydration status, bowels, and skin integrity.     [] Achieved - Continue current nutrition intervention(s)  [] Current medical condition precludes nutrition intervention at this time.    MONITORING AND EVALUATION:   RD remains available upon request and will follow up per protocol.    Alma Sosa RDN CDN (available on TEAMS)

## 2025-05-28 NOTE — PHARMACOTHERAPY INTERVENTION NOTE - COMMENTS
69-year-old male with 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 He is admitted for an alloSCT with BERTRAM Bu/Flu/Cy/TBI conditioning and CAST for GVHD ppx. Today is day +8. Course has been complicated by febrile neutropenia/haplostorm.    Conditioning Regimen: RI Bu/Flu/Cy/TBI (complete)  Day -6 () to Day -5 (5/15): Busulfan 130 mg/m2 IV administered over 3 hours for 2 doses -> avoid APAP and azole antifungals for 48 hours post busulfan (until )  Day -6 () to Day -2 () Fludarabine 30 mg/m2 IV administered over 30 minutes for 5 doses  Moving up by 1 hour every day to ensure 48 hours between final dose and CTP infusion - last dose complete on  @ 1045 am  Day -3 () to Day -2 () Cyclophosphamide 14.5 mg/k2 IV administered over 1 hour for 2 doses  Day -1 ()  cGy x1    GVHD ppx: CAST  Cyclophosphamide 50 mg/kg IV daily on Day +3 () and Day +4 ().   Abatacept IV 10 mg/kg on days +5 (), +14 (6/3), +28 (), and +56 (7/15).  Patient will start tacrolimus 0.02 mg/kg IV on ,  (day +5) - please order a one time trough on Wednesday,  (day +8) and one time trough on Saturday,  along with troughs every Tuesday to start on 6/3. Goal trough is 8-12 ng/mL. Please ensure trough is drawn peripherally.    Date      Day         Level          Action          Day 0       N/A          Started posaconazole 300 mg PO QD (CY inhibitor)          Day +3     N/A          Received Fosaprepitant 150 mg IV x1 (CY inhibitor)          Day +5     N/A          Started tacrolimus 0.9 mg IV          Day +8     6.9          Switched to PO - tacrolimus 1.5 mg PO QAM and 1 mg PO QPM  ---Next level: ---    Antimicrobial ppx.:  Started antimicrobial (levofloxacin 500 mg PO QD), and PO vanco 125 mg BID once ANC <1000 () & will continue until ANC >500 for 3 consecutive days. Started antifungal (posaconazole 300 mg PO QD) on day 0 () and will continue until day +75. Started GCSF on day +7 () & will stop once ANC >1500 for two days.     Febrile neutropenia/haplostorm:  Patient became febrile to 100.5F on  @ 1948 & was ordered for pip/tazo 4.5 g IV every 8 hours (-). BCx from  &  showed NGTD & CXR from  shows atelectasis. Most likely 2/2 haplostorm, BCx remained negative & patient was afebrile x24 hours, so was de-escalated to levofloxacin 500 mg PO QD ().      Patient became febrile again to 101.1F on  and was escalated to pip/tazo 4.5 g IV every 8 hours (-; day 2) and vancomycin 1000 mg IV x1. BCx from  shows NGTD.     Evelyne Murcia, PharmD, BCOP  Stem Cell Transplant Clinical Pharmacy Specialist  Available via [employer] Teams

## 2025-05-28 NOTE — PROGRESS NOTE ADULT - SUBJECTIVE AND OBJECTIVE BOX
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 with Flu / Bu / Cy / TBI prep regimen for treatment of refractory DLBCL    Disease: DLBCL  Type of transplant: Haplo-identical (daughter)   Prep Regimen: Flu / Bu / Cy / TBI   ABO / CMV:   Recipient: A POS/ NEG   Donor: A POS / NEG     S: Patient seen and examined with HPC Transplant Team:       O: Vitals:   Vital Signs Last 24 Hrs  T(C): 36.7 (28 May 2025 05:00), Max: 38.2 (27 May 2025 18:20)  T(F): 98 (28 May 2025 05:00), Max: 100.8 (27 May 2025 18:20)  HR: 60 (28 May 2025 05:00) (60 - 102)  BP: 94/57 (28 May 2025 05:00) (90/58 - 134/81)  BP(mean): --  RR: 17 (28 May 2025 05:00) (17 - 21)  SpO2: 94% (28 May 2025 05:00) (89% - 98%)    Parameters below as of 28 May 2025 05:00  Patient On (Oxygen Delivery Method): room air      Admit weight: 47.1kg   Daily Weight in k.9 (27 May 2025 08:16)    Intake / Output:    @ 07:01  -   @ 07:00  --------------------------------------------------------  IN: 1465 mL / OUT: 1270 mL / NET: 195 mL      PE:   Oropharynx: no erythema or ulcerations   Oral Mucositis:                -                                     Grade: n/a  CVS: S1, S2 RRR   Lungs: CTA throughout bilaterally   Abdomen: + BS x 4, soft, NT, ND   Extremities: no edema   Gastric Mucositis:       -                                          Grade: n/a   Intestinal Mucositis:     -                                         Grade: n/a   Skin: macular erythematous rash of the upper thighs/back area (improving)  TLC: CDI  Neuro: A&OX3    Labs:   CBC Full  -  ( 28 May 2025 06:29 )  WBC Count : 0.03 K/uL  Hemoglobin : 7.8 g/dL  Hematocrit : 23.7 %  Platelet Count - Automated : 14 K/uL  Mean Cell Volume : 87.5 fl  Mean Cell Hemoglobin : 28.8 pg  Mean Cell Hemoglobin Concentration : 32.9 g/dL  Auto Neutrophil # : x  Auto Lymphocyte # : x  Auto Monocyte # : x  Auto Eosinophil # : x  Auto Basophil # : x  Auto Neutrophil % : x  Auto Lymphocyte % : x  Auto Monocyte % : x  Auto Eosinophil % : x  Auto Basophil % : x                          7.8    0.03  )-----------( 14       ( 28 May 2025 06:29 )             23.7         137  |  103  |  18  ----------------------------<  118[H]  3.7   |  24  |  0.63    Ca    8.0[L]      28 May 2025 06:30  Phos  2.1       Mg     2.0         TPro  4.4[L]  /  Alb  2.6[L]  /  TBili  0.7  /  DBili  x   /  AST  10  /  ALT  13  /  AlkPhos  93        LIVER FUNCTIONS - ( 28 May 2025 06:30 )  Alb: 2.6 g/dL / Pro: 4.4 g/dL / ALK PHOS: 93 U/L / ALT: 13 U/L / AST: 10 U/L / GGT: x           Lactate Dehydrogenase, Serum: 205 U/L ( @ 06:30)      Cultures:   Culture Results:   No growth (25 @ 01:52)    Culture Results:   No growth at 24 hours (25 @ 23:00)    Culture Results:   No growth at 24 hours (25 @ 22:50)    Culture Results:   <10,000 CFU/mL Normal Urogenital Mari (25 @ 01:42)    Culture Results:   No growth at 4 days (25 @ 22:30)    Culture Results:   No growth at 4 days (25 @ 22:15)    Culture Results:   <10,000 CFU/mL Normal Urogenital Mari (25 @ 21:16)    Culture Results:   No growth at 5 days (25 @ 21:16)    Culture Results:   No growth at 5 days (25 @ 21:16)    Radiology:   ACC: [id number] EXAM:  XR CHEST PORTABLE URGENT 1V   ORDERED BY:    MIHAELA CAUSEY   PROCEDURE DATE:  2025    IMPRESSION:  Bibasilar hazy opacities.  Small-to-moderate left pleural effusion and small right-sided pleural   effusion.        Meds:   Antimicrobials:   acyclovir   Oral Tab/Cap 800 milliGRAM(s) Oral every 12 hours  piperacillin/tazobactam IVPB.. 4.5 Gram(s) IV Intermittent every 8 hours  posaconazole DR Tablet 300 milliGRAM(s) Oral daily  vancomycin    Solution 125 milliGRAM(s) Oral every 12 hours      Heme / Onc:       GI:  aluminum hydroxide/magnesium hydroxide/simethicone Suspension 30 milliLiter(s) Oral every 4 hours PRN  loperamide 2 milliGRAM(s) Oral every 6 hours PRN  pantoprazole    Tablet 40 milliGRAM(s) Oral before breakfast  sodium bicarbonate Mouth Rinse 10 milliLiter(s) Swish and Spit five times a day  ursodiol Capsule 300 milliGRAM(s) Oral two times a day      Cardiovascular:       Immunologic:   filgrastim-aafi (NIVESTYM) Injectable 300 MICROGram(s) SubCutaneous daily  tacrolimus  IVPB 0.9 milliGRAM(s) IV Intermittent every 24 hours      Other medications:   Biotene Dry Mouth Oral Rinse 5 milliLiter(s) Swish and Spit five times a day  chlorhexidine 4% Liquid 1 Application(s) Topical <User Schedule>  DULoxetine 60 milliGRAM(s) Oral <User Schedule>  hydrocortisone 1% Cream 1 Application(s) Topical daily  lidocaine   4% Patch 1 Patch Transdermal daily  phytonadione   Solution 5 milliGRAM(s) Oral <User Schedule>  sodium chloride 0.9%. 500 milliLiter(s) IV Continuous <Continuous>  sodium chloride 0.9%. 500 milliLiter(s) IV Continuous <Continuous>  sodium chloride 0.9%. 500 milliLiter(s) IV Continuous <Continuous>  sodium chloride 0.9%. 500 milliLiter(s) IV Continuous <Continuous>  sodium chloride 0.9%. 1000 milliLiter(s) IV Continuous <Continuous>  sodium chloride 0.9%. 1000 milliLiter(s) IV Continuous <Continuous>  tiZANidine 4 milliGRAM(s) Oral <User Schedule>      PRN:   acetaminophen     Tablet .. 650 milliGRAM(s) Oral every 6 hours PRN  aluminum hydroxide/magnesium hydroxide/simethicone Suspension 30 milliLiter(s) Oral every 4 hours PRN  FIRST- Mouthwash  BLM 15 milliLiter(s) Swish and Swallow four times a day PRN  loperamide 2 milliGRAM(s) Oral every 6 hours PRN  oxyCODONE    IR 5 milliGRAM(s) Oral every 6 hours PRN  prochlorperazine   Injectable 10 milliGRAM(s) IV Push every 6 hours PRN  sodium chloride 0.9% lock flush 10 milliLiter(s) IV Push every 1 hour PRN    A/P:  69 year old male with refractory DLBCL, status post R-CHOP x 6, HD MTX x 4, salvage polatuzumab / rituximab / revlimid x 4, admitted for a haplo-identical pbsct from his daughter with Flu / Bu / Cy / TBI prep regimen   Day + 8  5/15- busulfan , fludarabine . No acute events overnight, vital signs are stable; continue keppra ppx for 24 hours post infusion of last dose of busulfan. No tylenol or posaconazole until day 0. Not neutropenic today, will d/c levaquin / vancomycin.    - fludarabine 3/5. VSS, afebrile. No acute events overnight.   - fludarabine . VSS and afebrile. No acute events overnight. Neutropenic today, started on ppx levaquin/vancomycin PO. No Tylenol or Azoles until day 0.   - fludarabine . VSS, remains afebrile. No acute events overnight. No Tylenol or Azoles until day 0.  - TBI today. No acute events overnight. Vital signs are stable.    - will receive Haplo allogeneic SCT today. VSS, afebrile.  - tolerated HPC infusion well. continue IVF hydration 24 hrs post cells, continue to monitor I&Os   - increased loose BM, imodium prn. VSS, afebrile and infectious work up negative - will discontinue zosyn and switch to levaquin ppx. Continue supportive care.  - PTCY2/2. febrile overnight: f/u cultures. Broaden to Zosyn. s.p lasix today   -continue PTCY hydration. Monitor I&O: s/p lasix today. afebrile: blood cultures negative Zosyn de-escalated to Levaquin   - VSS, no acute events: awaiting count recovery    Febrile overight - empirically started on zosyn, BCx/UCx pending, CXR with bibasilar hazy opacites and small to mod pleural effusions. Monitor strict I/O's, consider lasix prn. VSS, afebrile currently.     1. Infectious Disease:   acyclovir   Oral Tab/Cap 800 milliGRAM(s) Oral every 12 hours  piperacillin/tazobactam IVPB.. 4.5 Gram(s) IV Intermittent every 8 hours  posaconazole DR Tablet 300 milliGRAM(s) Oral daily  vancomycin    Solution 125 milliGRAM(s) Oral every 12 hours    2. VOD Prophylaxis:   ursodiol Capsule 300 milliGRAM(s) Oral two times a day    3. GI Prophylaxis:    pantoprazole    Tablet 40 milliGRAM(s) Oral before breakfast    4. Mouthcare - NS / NaHCO3 rinses, Biotene; Skin care     5. GVHD prophylaxis   PTCy + 3, +4   tacrolimus  IVPB 0.9 milliGRAM(s) IV Intermittent every 24 hours  Abatacept days +5. +14, +28, +56     6. Transfuse & replete electrolytes prn     7. IV hydration, daily weights, strict I&O, prn diuresis     8. PO intake as tolerated, nutrition follow up as needed    9. Antiemetics, anti-diarrhea medications:   loperamide 2 milliGRAM(s) Oral every 6 hours PRN  prochlorperazine   Injectable 10 milliGRAM(s) IV Push every 6 hours PRN    10. OOB as tolerated, physical therapy consult if needed     11. Monitor coags / fibrinogen 2x week, vitamin K as needed   phytonadione   Solution 5 milliGRAM(s) Oral <User Schedule>    12. Monitor closely for clinical changes, monitor for fevers     13. Emotional support provided, plan of care discussed and questions addressed     14. Patient education done regarding plan of care, restrictions and discharge planning     15. Continue regular social work input     I have written the above note for Dr. Snider who performed service with me in the room.   Helena Diaz NP-C ([phone])    I have seen and examined patient with NP, I agree with above note as scribed.                    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 with Flu / Bu / Cy / TBI prep regimen for treatment of refractory DLBCL    Disease: DLBCL  Type of transplant: Haplo-identical (daughter)   Prep Regimen: Flu / Bu / Cy / TBI   ABO / CMV:   Recipient: A POS/ NEG   Donor: A POS / NEG     S: Patient seen and examined with HPC Transplant Team:   + weakness  + LE edema - wearing ACE wraps   + intermittent dypnea   + small volume stool - not measurable   + heartburn  + back pain       O: Vitals:   Vital Signs Last 24 Hrs  T(C): 36.7 (28 May 2025 05:00), Max: 38.2 (27 May 2025 18:20)  T(F): 98 (28 May 2025 05:00), Max: 100.8 (27 May 2025 18:20)  HR: 60 (28 May 2025 05:00) (60 - 102)  BP: 94/57 (28 May 2025 05:00) (90/58 - 134/81)  BP(mean): --  RR: 17 (28 May 2025 05:00) (17 - 21)  SpO2: 94% (28 May 2025 05:00) (89% - 98%)    Parameters below as of 28 May 2025 05:00  Patient On (Oxygen Delivery Method): room air      Admit weight: 47.1kg   Daily Weight in k.9 (27 May 2025 08:16)    Intake / Output:    @ 07:01  -  -28 @ 07:00  --------------------------------------------------------  IN: 1465 mL / OUT: 1270 mL / NET: 195 mL      PE:   Oropharynx: no erythema or ulcerations   Oral Mucositis:                -                                     Grade: n/a  CVS: S1, S2 RRR   Lungs: CTA throughout bilaterally   Abdomen: + BS x 4, soft, NT, ND   Extremities: no edema   Gastric Mucositis:       -                                          Grade: n/a   Intestinal Mucositis:     -                                         Grade: n/a   Skin: macular erythematous rash of the upper thighs/back area (improving)  TLC: CDI  Neuro: A&OX3    Labs:   CBC Full  -  ( 28 May 2025 06:29 )  WBC Count : 0.03 K/uL  Hemoglobin : 7.8 g/dL  Hematocrit : 23.7 %  Platelet Count - Automated : 14 K/uL  Mean Cell Volume : 87.5 fl  Mean Cell Hemoglobin : 28.8 pg  Mean Cell Hemoglobin Concentration : 32.9 g/dL  Auto Neutrophil # : x  Auto Lymphocyte # : x  Auto Monocyte # : x  Auto Eosinophil # : x  Auto Basophil # : x  Auto Neutrophil % : x  Auto Lymphocyte % : x  Auto Monocyte % : x  Auto Eosinophil % : x  Auto Basophil % : x                          7.8    0.03  )-----------( 14       ( 28 May 2025 06:29 )             23.7         137  |  103  |  18  ----------------------------<  118[H]  3.7   |  24  |  0.63    Ca    8.0[L]      28 May 2025 06:30  Phos  2.1       Mg     2.0         TPro  4.4[L]  /  Alb  2.6[L]  /  TBili  0.7  /  DBili  x   /  AST  10  /  ALT  13  /  AlkPhos  93        LIVER FUNCTIONS - ( 28 May 2025 06:30 )  Alb: 2.6 g/dL / Pro: 4.4 g/dL / ALK PHOS: 93 U/L / ALT: 13 U/L / AST: 10 U/L / GGT: x           Lactate Dehydrogenase, Serum: 205 U/L ( @ 06:30)      Cultures:   Culture Results:   No growth (25 @ 01:52)    Culture Results:   No growth at 24 hours (25 @ 23:00)    Culture Results:   No growth at 24 hours (25 @ 22:50)    Culture Results:   <10,000 CFU/mL Normal Urogenital Mari (25 @ 01:42)    Culture Results:   No growth at 4 days (25 @ 22:30)    Culture Results:   No growth at 4 days (25 @ 22:15)    Culture Results:   <10,000 CFU/mL Normal Urogenital Mari (25 @ 21:16)    Culture Results:   No growth at 5 days (25 @ 21:16)    Culture Results:   No growth at 5 days (25 @ 21:16)    Radiology:   ACC: [id number] EXAM:  XR CHEST PORTABLE URGENT 1V   ORDERED BY:    MIHAELA CAUSEY   PROCEDURE DATE:  2025    IMPRESSION:  Bibasilar hazy opacities.  Small-to-moderate left pleural effusion and small right-sided pleural   effusion.        Meds:   Antimicrobials:   acyclovir   Oral Tab/Cap 800 milliGRAM(s) Oral every 12 hours  piperacillin/tazobactam IVPB.. 4.5 Gram(s) IV Intermittent every 8 hours  posaconazole DR Tablet 300 milliGRAM(s) Oral daily  vancomycin    Solution 125 milliGRAM(s) Oral every 12 hours      Heme / Onc:       GI:  aluminum hydroxide/magnesium hydroxide/simethicone Suspension 30 milliLiter(s) Oral every 4 hours PRN  loperamide 2 milliGRAM(s) Oral every 6 hours PRN  pantoprazole    Tablet 40 milliGRAM(s) Oral before breakfast  sodium bicarbonate Mouth Rinse 10 milliLiter(s) Swish and Spit five times a day  ursodiol Capsule 300 milliGRAM(s) Oral two times a day      Cardiovascular:       Immunologic:   filgrastim-aafi (NIVESTYM) Injectable 300 MICROGram(s) SubCutaneous daily  tacrolimus  IVPB 0.9 milliGRAM(s) IV Intermittent every 24 hours      Other medications:   Biotene Dry Mouth Oral Rinse 5 milliLiter(s) Swish and Spit five times a day  chlorhexidine 4% Liquid 1 Application(s) Topical <User Schedule>  DULoxetine 60 milliGRAM(s) Oral <User Schedule>  hydrocortisone 1% Cream 1 Application(s) Topical daily  lidocaine   4% Patch 1 Patch Transdermal daily  phytonadione   Solution 5 milliGRAM(s) Oral <User Schedule>  sodium chloride 0.9%. 500 milliLiter(s) IV Continuous <Continuous>  sodium chloride 0.9%. 500 milliLiter(s) IV Continuous <Continuous>  sodium chloride 0.9%. 500 milliLiter(s) IV Continuous <Continuous>  sodium chloride 0.9%. 500 milliLiter(s) IV Continuous <Continuous>  sodium chloride 0.9%. 1000 milliLiter(s) IV Continuous <Continuous>  sodium chloride 0.9%. 1000 milliLiter(s) IV Continuous <Continuous>  tiZANidine 4 milliGRAM(s) Oral <User Schedule>      PRN:   acetaminophen     Tablet .. 650 milliGRAM(s) Oral every 6 hours PRN  aluminum hydroxide/magnesium hydroxide/simethicone Suspension 30 milliLiter(s) Oral every 4 hours PRN  FIRST- Mouthwash  BLM 15 milliLiter(s) Swish and Swallow four times a day PRN  loperamide 2 milliGRAM(s) Oral every 6 hours PRN  oxyCODONE    IR 5 milliGRAM(s) Oral every 6 hours PRN  prochlorperazine   Injectable 10 milliGRAM(s) IV Push every 6 hours PRN  sodium chloride 0.9% lock flush 10 milliLiter(s) IV Push every 1 hour PRN    A/P:  69 year old male with refractory DLBCL, status post R-CHOP x 6, HD MTX x 4, salvage polatuzumab / rituximab / revlimid x 4, admitted for a haplo-identical pbsct from his daughter with Flu / Bu / Cy / TBI prep regimen   Day + 8  5/15- busulfan , fludarabine . No acute events overnight, vital signs are stable; continue keppra ppx for 24 hours post infusion of last dose of busulfan. No tylenol or posaconazole until day 0. Not neutropenic today, will d/c levaquin / vancomycin.    - fludarabine 3/5. VSS, afebrile. No acute events overnight.   - fludarabine . VSS and afebrile. No acute events overnight. Neutropenic today, started on ppx levaquin/vancomycin PO. No Tylenol or Azoles until day 0.   - fludarabine . VSS, remains afebrile. No acute events overnight. No Tylenol or Azoles until day 0.  - TBI today. No acute events overnight. Vital signs are stable.    - will receive Haplo allogeneic SCT today. VSS, afebrile.  - tolerated HPC infusion well. continue IVF hydration 24 hrs post cells, continue to monitor I&Os   - increased loose BM, imodium prn. VSS, afebrile and infectious work up negative - will discontinue zosyn and switch to levaquin ppx. Continue supportive care.  - PTCY2/2. febrile overnight: f/u cultures. Broaden to Zosyn. s.p lasix today   -continue PTCY hydration. Monitor I&O: s/p lasix today. afebrile: blood cultures negative Zosyn de-escalated to Levaquin   - VSS, no acute events: awaiting count recovery    Febrile overight - empirically started on zosyn, BCx/UCx pending, CXR with bibasilar hazy opacites and small to mod pleural effusions. Monitor strict I/O's, consider lasix prn. VSS, afebrile currently.    - no acute events overnight, vital signs are stable. Intermittent dyspnea; requiring O2.     1. Infectious Disease:   acyclovir   Oral Tab/Cap 800 milliGRAM(s) Oral every 12 hours  piperacillin/tazobactam IVPB.. 4.5 Gram(s) IV Intermittent every 8 hours  posaconazole DR Tablet 300 milliGRAM(s) Oral daily  vancomycin    Solution 125 milliGRAM(s) Oral every 12 hours    2. VOD Prophylaxis:   ursodiol Capsule 300 milliGRAM(s) Oral two times a day    3. GI Prophylaxis:    pantoprazole    Tablet 40 milliGRAM(s) Oral before breakfast    4. Mouthcare - NS / NaHCO3 rinses, Biotene; Skin care     5. GVHD prophylaxis   PTCy + 3, +4   tacrolimus  IVPB 0.9 milliGRAM(s) IV Intermittent every 24 hours  Abatacept days +5. +14, +28, +56     6. Transfuse & replete electrolytes prn     7. IV hydration, daily weights, strict I&O, prn diuresis     8. PO intake as tolerated, nutrition follow up as needed    9. Antiemetics, anti-diarrhea medications:   loperamide 2 milliGRAM(s) Oral every 6 hours PRN  prochlorperazine   Injectable 10 milliGRAM(s) IV Push every 6 hours PRN    10. OOB as tolerated, physical therapy consult if needed     11. Monitor coags / fibrinogen 2x week, vitamin K as needed   phytonadione   Solution 5 milliGRAM(s) Oral <User Schedule>    12. Monitor closely for clinical changes, monitor for fevers     13. Emotional support provided, plan of care discussed and questions addressed     14. Patient education done regarding plan of care, restrictions and discharge planning     15. Continue regular social work input     I have written the above note for Dr. Snider who performed service with me in the room.   Helena Diaz NP-C ([phone])    I have seen and examined patient with NP, I agree with above note as scribed.

## 2025-05-29 LAB
ALBUMIN SERPL ELPH-MCNC: 2.7 G/DL — LOW (ref 3.3–5)
ALP SERPL-CCNC: 90 U/L — SIGNIFICANT CHANGE UP (ref 40–120)
ALT FLD-CCNC: 10 U/L — SIGNIFICANT CHANGE UP (ref 10–45)
ANION GAP SERPL CALC-SCNC: 9 MMOL/L — SIGNIFICANT CHANGE UP (ref 5–17)
APTT BLD: 34.4 SEC — SIGNIFICANT CHANGE UP (ref 26.1–36.8)
AST SERPL-CCNC: 9 U/L — LOW (ref 10–40)
BILIRUB SERPL-MCNC: 0.7 MG/DL — SIGNIFICANT CHANGE UP (ref 0.2–1.2)
BUN SERPL-MCNC: 16 MG/DL — SIGNIFICANT CHANGE UP (ref 7–23)
CALCIUM SERPL-MCNC: 8.1 MG/DL — LOW (ref 8.4–10.5)
CHLORIDE SERPL-SCNC: 105 MMOL/L — SIGNIFICANT CHANGE UP (ref 96–108)
CO2 SERPL-SCNC: 24 MMOL/L — SIGNIFICANT CHANGE UP (ref 22–31)
CREAT SERPL-MCNC: 0.62 MG/DL — SIGNIFICANT CHANGE UP (ref 0.5–1.3)
CULTURE RESULTS: SIGNIFICANT CHANGE UP
CULTURE RESULTS: SIGNIFICANT CHANGE UP
EGFR: 105 ML/MIN/1.73M2 — SIGNIFICANT CHANGE UP
EGFR: 105 ML/MIN/1.73M2 — SIGNIFICANT CHANGE UP
GLUCOSE SERPL-MCNC: 100 MG/DL — HIGH (ref 70–99)
HCT VFR BLD CALC: 23.6 % — LOW (ref 39–50)
HGB BLD-MCNC: 7.7 G/DL — LOW (ref 13–17)
INR BLD: 0.92 RATIO — SIGNIFICANT CHANGE UP (ref 0.85–1.16)
LDH SERPL L TO P-CCNC: 175 U/L — SIGNIFICANT CHANGE UP (ref 50–242)
MAGNESIUM SERPL-MCNC: 2 MG/DL — SIGNIFICANT CHANGE UP (ref 1.6–2.6)
MCHC RBC-ENTMCNC: 28.6 PG — SIGNIFICANT CHANGE UP (ref 27–34)
MCHC RBC-ENTMCNC: 32.6 G/DL — SIGNIFICANT CHANGE UP (ref 32–36)
MCV RBC AUTO: 87.7 FL — SIGNIFICANT CHANGE UP (ref 80–100)
MRSA PCR RESULT.: SIGNIFICANT CHANGE UP
NRBC BLD AUTO-RTO: 0 /100 WBCS — SIGNIFICANT CHANGE UP (ref 0–0)
PHOSPHATE SERPL-MCNC: 2.2 MG/DL — LOW (ref 2.5–4.5)
PLATELET # BLD AUTO: 6 K/UL — CRITICAL LOW (ref 150–400)
POTASSIUM SERPL-MCNC: 3.4 MMOL/L — LOW (ref 3.5–5.3)
POTASSIUM SERPL-SCNC: 3.4 MMOL/L — LOW (ref 3.5–5.3)
PROT SERPL-MCNC: 4.5 G/DL — LOW (ref 6–8.3)
PROTHROM AB SERPL-ACNC: 10.5 SEC — SIGNIFICANT CHANGE UP (ref 9.9–13.4)
RBC # BLD: 2.69 M/UL — LOW (ref 4.2–5.8)
RBC # FLD: 15.8 % — HIGH (ref 10.3–14.5)
S AUREUS DNA NOSE QL NAA+PROBE: SIGNIFICANT CHANGE UP
SODIUM SERPL-SCNC: 138 MMOL/L — SIGNIFICANT CHANGE UP (ref 135–145)
SPECIMEN SOURCE: SIGNIFICANT CHANGE UP
SPECIMEN SOURCE: SIGNIFICANT CHANGE UP
WBC # BLD: 0.04 K/UL — CRITICAL LOW (ref 3.8–10.5)
WBC # FLD AUTO: 0.04 K/UL — CRITICAL LOW (ref 3.8–10.5)

## 2025-05-29 PROCEDURE — 99233 SBSQ HOSP IP/OBS HIGH 50: CPT | Mod: FS

## 2025-05-29 RX ORDER — BENZONATATE 100 MG
100 CAPSULE ORAL ONCE
Refills: 0 | Status: COMPLETED | OUTPATIENT
Start: 2025-05-29 | End: 2025-05-29

## 2025-05-29 RX ORDER — SODIUM PHOSPHATE,DIBASIC DIHYD
15 POWDER (GRAM) MISCELLANEOUS ONCE
Refills: 0 | Status: COMPLETED | OUTPATIENT
Start: 2025-05-29 | End: 2025-05-29

## 2025-05-29 RX ADMIN — URSODIOL 300 MILLIGRAM(S): 300 CAPSULE ORAL at 16:58

## 2025-05-29 RX ADMIN — Medication 5 MILLILITER(S): at 08:36

## 2025-05-29 RX ADMIN — Medication 40 MILLIGRAM(S): at 05:11

## 2025-05-29 RX ADMIN — FILGRASTIM 300 MICROGRAM(S): 300 INJECTION, SOLUTION INTRAVENOUS; SUBCUTANEOUS at 14:36

## 2025-05-29 RX ADMIN — Medication 800 MILLIGRAM(S): at 05:11

## 2025-05-29 RX ADMIN — TACROLIMUS 1 MILLIGRAM(S): 0.5 CAPSULE ORAL at 20:09

## 2025-05-29 RX ADMIN — LIDOCAINE HYDROCHLORIDE 1 PATCH: 20 JELLY TOPICAL at 00:23

## 2025-05-29 RX ADMIN — LIDOCAINE HYDROCHLORIDE 1 PATCH: 20 JELLY TOPICAL at 16:56

## 2025-05-29 RX ADMIN — Medication 85 MILLIMOLE(S): at 09:23

## 2025-05-29 RX ADMIN — Medication 5 MILLILITER(S): at 16:54

## 2025-05-29 RX ADMIN — Medication 800 MILLIGRAM(S): at 16:58

## 2025-05-29 RX ADMIN — Medication 5 MILLILITER(S): at 20:09

## 2025-05-29 RX ADMIN — Medication 125 MILLIGRAM(S): at 05:10

## 2025-05-29 RX ADMIN — Medication 125 MILLIGRAM(S): at 16:58

## 2025-05-29 RX ADMIN — OXYCODONE HYDROCHLORIDE 5 MILLIGRAM(S): 30 TABLET ORAL at 21:12

## 2025-05-29 RX ADMIN — TIZANIDINE 4 MILLIGRAM(S): 4 TABLET ORAL at 21:12

## 2025-05-29 RX ADMIN — Medication 10 MILLILITER(S): at 08:36

## 2025-05-29 RX ADMIN — Medication 5 MILLIGRAM(S): at 08:50

## 2025-05-29 RX ADMIN — LIDOCAINE HYDROCHLORIDE 1 PATCH: 20 JELLY TOPICAL at 19:00

## 2025-05-29 RX ADMIN — OXYCODONE HYDROCHLORIDE 5 MILLIGRAM(S): 30 TABLET ORAL at 22:00

## 2025-05-29 RX ADMIN — Medication 5 MILLILITER(S): at 23:02

## 2025-05-29 RX ADMIN — Medication 10 MILLILITER(S): at 16:54

## 2025-05-29 RX ADMIN — Medication 10 MILLILITER(S): at 12:03

## 2025-05-29 RX ADMIN — TACROLIMUS 1.5 MILLIGRAM(S): 0.5 CAPSULE ORAL at 08:39

## 2025-05-29 RX ADMIN — Medication 100 MILLIGRAM(S): at 21:26

## 2025-05-29 RX ADMIN — Medication 5 MILLILITER(S): at 12:02

## 2025-05-29 RX ADMIN — Medication 1 APPLICATION(S): at 08:42

## 2025-05-29 RX ADMIN — Medication 40 MILLIEQUIVALENT(S): at 08:49

## 2025-05-29 RX ADMIN — Medication 25 GRAM(S): at 05:10

## 2025-05-29 RX ADMIN — Medication 10 MILLILITER(S): at 23:02

## 2025-05-29 RX ADMIN — Medication 40 MILLIEQUIVALENT(S): at 12:03

## 2025-05-29 RX ADMIN — URSODIOL 300 MILLIGRAM(S): 300 CAPSULE ORAL at 05:11

## 2025-05-29 RX ADMIN — DULOXETINE 60 MILLIGRAM(S): 20 CAPSULE, DELAYED RELEASE ORAL at 21:12

## 2025-05-29 RX ADMIN — POSACONAZOLE 300 MILLIGRAM(S): 100 TABLET, DELAYED RELEASE ORAL at 12:03

## 2025-05-29 RX ADMIN — Medication 30 MILLILITER(S): at 09:01

## 2025-05-29 RX ADMIN — Medication 10 MILLILITER(S): at 20:09

## 2025-05-29 NOTE — PROGRESS NOTE ADULT - SUBJECTIVE AND OBJECTIVE BOX
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    O: Vitals:   Vital Signs Last 24 Hrs  T(C): 37.1 (29 May 2025 05:10), Max: 37.2 (28 May 2025 16:55)  T(F): 98.8 (29 May 2025 05:10), Max: 99 (28 May 2025 16:55)  HR: 68 (29 May 2025 05:10) (66 - 80)  BP: 100/61 (29 May 2025 05:10) (100/61 - 122/73)  BP(mean): --  RR: 17 (29 May 2025 05:10) (17 - 18)  SpO2: 95% (29 May 2025 05:10) (95% - 96%)    Parameters below as of 29 May 2025 05:10  Patient On (Oxygen Delivery Method): room air    Admit weight: 47.1kg   Daily Weight in kG:  (29 May 2025)    Intake / Output:   05-28 @ 07:01  -  05-29 @ 07:00  --------------------------------------------------------  IN: 959 mL / OUT: 640 mL / NET: 319 mL    PE:   Oropharynx: no erythema or ulcerations   Oral Mucositis:                -                                     Grade: n/a  CVS: S1, S2 RRR   Lungs: CTA throughout bilaterally   Abdomen: + BS x 4, soft, NT, ND   Extremities: no edema   Gastric Mucositis:       -                                          Grade: n/a   Intestinal Mucositis:     -                                         Grade: n/a   Skin: macular erythematous rash of the upper thighs/back area (improving)  TLC: CDI  Neuro: A&OX3      Labs:       PT/INR - ( 29 May 2025 06:31 )   PT: 10.5 sec;   INR: 0.92 ratio    PTT - ( 29 May 2025 06:31 )  PTT:34.4 sec        Cultures:   Culture Results:   No growth (05.27.25 @ 01:52)    Culture Results:   No growth at 24 hours (05.26.25 @ 23:00)    Culture Results:   No growth at 24 hours (05.26.25 @ 22:50)    Culture Results:   <10,000 CFU/mL Normal Urogenital Mari (05.24.25 @ 01:42)    Culture Results:   No growth at 4 days (05.23.25 @ 22:30)    Culture Results:   No growth at 4 days (05.23.25 @ 22:15)    Culture Results:   <10,000 CFU/mL Normal Urogenital Mari (05.21.25 @ 21:16)    Culture Results:   No growth at 5 days (05.21.25 @ 21:16)    Culture Results:   No growth at 5 days (05.21.25 @ 21:16)    Radiology:   ACC: [id number] EXAM:  XR CHEST PORTABLE URGENT 1V  PROCEDURE DATE:  05/26/2025    IMPRESSION:  Bibasilar hazy opacities.  Small-to-moderate left pleural effusion and small right-sided pleural   effusion.      Meds:   Antimicrobials:   acyclovir   Oral Tab/Cap 800 milliGRAM(s) Oral every 12 hours  piperacillin/tazobactam IVPB.. 4.5 Gram(s) IV Intermittent every 8 hours  posaconazole DR Tablet 300 milliGRAM(s) Oral daily  vancomycin    Solution 125 milliGRAM(s) Oral every 12 hours      Heme / Onc:       GI:  aluminum hydroxide/magnesium hydroxide/simethicone Suspension 30 milliLiter(s) Oral every 4 hours PRN  loperamide 2 milliGRAM(s) Oral every 6 hours PRN  pantoprazole    Tablet 40 milliGRAM(s) Oral before breakfast  sodium bicarbonate Mouth Rinse 10 milliLiter(s) Swish and Spit five times a day  ursodiol Capsule 300 milliGRAM(s) Oral two times a day      Cardiovascular:       Immunologic:   filgrastim-aafi (NIVESTYM) Injectable 300 MICROGram(s) SubCutaneous daily  tacrolimus 1.5 milliGRAM(s) Oral <User Schedule>  tacrolimus 1 milliGRAM(s) Oral <User Schedule>      Other medications:   Biotene Dry Mouth Oral Rinse 5 milliLiter(s) Swish and Spit five times a day  chlorhexidine 4% Liquid 1 Application(s) Topical <User Schedule>  DULoxetine 60 milliGRAM(s) Oral <User Schedule>  lidocaine   4% Patch 1 Patch Transdermal daily  phytonadione   Solution 5 milliGRAM(s) Oral <User Schedule>  sodium chloride 0.9%. 1000 milliLiter(s) IV Continuous <Continuous>  sodium chloride 0.9%. 500 milliLiter(s) IV Continuous <Continuous>  sodium chloride 0.9%. 500 milliLiter(s) IV Continuous <Continuous>  sodium chloride 0.9%. 500 milliLiter(s) IV Continuous <Continuous>  sodium chloride 0.9%. 500 milliLiter(s) IV Continuous <Continuous>  sodium chloride 0.9%. 1000 milliLiter(s) IV Continuous <Continuous>  tiZANidine 4 milliGRAM(s) Oral <User Schedule>      PRN:   acetaminophen     Tablet .. 650 milliGRAM(s) Oral every 6 hours PRN  aluminum hydroxide/magnesium hydroxide/simethicone Suspension 30 milliLiter(s) Oral every 4 hours PRN  FIRST- Mouthwash  BLM 15 milliLiter(s) Swish and Swallow four times a day PRN  loperamide 2 milliGRAM(s) Oral every 6 hours PRN  oxyCODONE    IR 5 milliGRAM(s) Oral every 6 hours PRN  prochlorperazine   Injectable 10 milliGRAM(s) IV Push every 6 hours PRN  sodium chloride 0.9% lock flush 10 milliLiter(s) IV Push every 1 hour PRN    A/P:  69 year old male with refractory DLBCL, status post R-CHOP x 6, HD MTX x 4, salvage polatuzumab / rituximab / revlimid x 4, admitted for a haplo-identical pbsct from his daughter with Flu / Bu / Cy / TBI prep regimen   Day +9  5/15- busulfan 2/2, fludarabine 2/5. No acute events overnight, vital signs are stable; continue keppra ppx for 24 hours post infusion of last dose of busulfan. No tylenol or posaconazole until day 0. Not neutropenic today, will d/c levaquin / vancomycin.   5/16 - fludarabine 3/5. VSS, afebrile. No acute events overnight.  5/17 - fludarabine 4/5. VSS and afebrile. No acute events overnight. Neutropenic today, started on ppx levaquin/vancomycin PO. No Tylenol or Azoles until day 0.  5/18 - fludarabine 5/5. VSS, remains afebrile. No acute events overnight. No Tylenol or Azoles until day 0.  5/19- TBI today. No acute events overnight. Vital signs are stable.   5/20 - will receive Haplo allogeneic SCT today. VSS, afebrile.  5/21- tolerated HPC infusion well. continue IVF hydration 24 hrs post cells, continue to monitor I&Os  5/23 - increased loose BM, imodium prn. VSS, afebrile and infectious work up negative - will discontinue zosyn and switch to levaquin ppx. Continue supportive care.  5/24- PTCY2/2. febrile overnight: f/u cultures. Broaden to Zosyn. s.p lasix today   5/25-continue PTCY hydration. Monitor I&O: s/p lasix today. afebrile: blood cultures negative Zosyn de-escalated to Levaquin   5/26- VSS, no acute events: awaiting count recovery   5/27 Febrile overight - empirically started on zosyn, BCx/UCx pending, CXR with bibasilar hazy opacites and small to mod pleural effusions. Monitor strict I/O's, consider lasix prn. VSS, afebrile currently.   5/28 - no acute events overnight, vital signs are stable. Intermittent dyspnea; requiring O2.     1. Infectious Disease:   acyclovir Oral Tab/Cap 800 milliGRAM(s) Oral every 12 hours  piperacillin/tazobactam IVPB.. 4.5 Gram(s) IV Intermittent every 8 hours  posaconazole DR Tablet 300 milliGRAM(s) Oral daily  vancomycin Solution 125 milliGRAM(s) Oral every 12 hours    2. VOD Prophylaxis:   ursodiol Capsule 300 milliGRAM(s) Oral two times a day    3. GI Prophylaxis:    pantoprazole    Tablet 40 milliGRAM(s) Oral before breakfast    4. Mouthcare - NS / NaHCO3 rinses, Biotene; Skin care     5. GVHD prophylaxis   PTCy + 3, +4   tacrolimus  IVPB 0.9 milliGRAM(s) IV Intermittent every 24 hours  Abatacept days +5. +14, +28, +56     6. Transfuse & replete electrolytes prn     7. IV hydration, daily weights, strict I&O, prn diuresis     8. PO intake as tolerated, nutrition follow up as needed    9. Antiemetics, anti-diarrhea medications:   loperamide 2 milliGRAM(s) Oral every 6 hours PRN  prochlorperazine Injectable 10 milliGRAM(s) IV Push every 6 hours PRN    10. OOB as tolerated, physical therapy consult if needed     11. Monitor coags / fibrinogen 2x week, vitamin K as needed   phytonadione Solution 5 milliGRAM(s) Oral <User Schedule>    12. Monitor closely for clinical changes, monitor for fevers     13. Emotional support provided, plan of care discussed and questions addressed     14. Patient education done regarding plan of care, restrictions and discharge planning     15. Continue regular social work input     I have written the above note for Dr. Snider who performed service with me in the room.   Bhaskar Esquivel PA-C ([phone])    I have seen and examined patient with PA, I agree with above note as scribed.                        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 Patient feeling well today. Admits to mild fatigue, but ambulating/sitting OOB to chair daily.  Able to tolerate PO intake.    O: Vitals:   Vital Signs Last 24 Hrs  T(C): 37.1 (29 May 2025 05:10), Max: 37.2 (28 May 2025 16:55)  T(F): 98.8 (29 May 2025 05:10), Max: 99 (28 May 2025 16:55)  HR: 68 (29 May 2025 05:10) (66 - 80)  BP: 100/61 (29 May 2025 05:10) (100/61 - 122/73)  BP(mean): --  RR: 17 (29 May 2025 05:10) (17 - 18)  SpO2: 95% (29 May 2025 05:10) (95% - 96%)    Parameters below as of 29 May 2025 05:10  Patient On (Oxygen Delivery Method): room air    Admit weight: 47.1kg   Daily Weight in k.4 (29 May 2025)    Intake / Output:    @ 07:01  -  - @ 07:00  --------------------------------------------------------  IN: 959 mL / OUT: 640 mL / NET: 319 mL    PE:   Oropharynx: no erythema or ulcerations   Oral Mucositis:                -                                     Grade: n/a  CVS: S1, S2 RRR   Lungs: CTA throughout bilaterally   Abdomen: + BS x 4, soft, NT, ND   Extremities: no edema   Gastric Mucositis:       -                                          Grade: n/a   Intestinal Mucositis:     -                                         Grade: n/a   Skin: macular erythematous rash of the upper thighs/back area (improving)  TLC: CDI  Neuro: A&OX3      Labs:   CBC Full  -  ( 29 May 2025 06:31 )  WBC Count : 0.04 K/uL  RBC Count : 2.69 M/uL  Hemoglobin : 7.7 g/dL  Hematocrit : 23.6 %  Platelet Count - Automated : 6 K/uL  Mean Cell Volume : 87.7 fl  Mean Cell Hemoglobin : 28.6 pg  Mean Cell Hemoglobin Concentration : 32.6 g/dL  Auto Neutrophil # : x  Auto Lymphocyte # : x  Auto Monocyte # : x  Auto Eosinophil # : x  Auto Basophil # : x  Auto Neutrophil % : x  Auto Lymphocyte % : x  Auto Monocyte % : x  Auto Eosinophil % : x  Auto Basophil % : x                          7.7    0.04  )-----------( 6        ( 29 May 2025 06:31 )             23.6         138  |  105  |  16  ----------------------------<  100[H]  3.4[L]   |  24  |  0.62    Ca    8.1[L]      29 May 2025 06:31  Phos  2.2       Mg     2.0         TPro  4.5[L]  /  Alb  2.7[L]  /  TBili  0.7  /  DBili  x   /  AST  9[L]  /  ALT  10  /  AlkPhos  90    No growth at 24 hours (25 @ 23:00)    PT/INR - ( 29 May 2025 06:31 )   PT: 10.5 sec;   INR: 0.92 ratio    PTT - ( 29 May 2025 06:31 )  PTT:34.4 sec      Cultures:   Culture Results:   No growth (25 @ 01:52)    Culture Results:     Culture Results:   No growth at 24 hours (25 @ 22:50)    Culture Results:   <10,000 CFU/mL Normal Urogenital Mari (25 @ 01:42)    Culture Results:   No growth at 4 days (25 @ 22:30)    Culture Results:   No growth at 4 days (25 @ 22:15)    Culture Results:   <10,000 CFU/mL Normal Urogenital Mari (25 @ 21:16)    Culture Results:   No growth at 5 days (25 @ 21:16)    Culture Results:   No growth at 5 days (25 @ 21:16)    Radiology:   ACC: [id number] EXAM:  XR CHEST PORTABLE URGENT 1V  PROCEDURE DATE:  2025    IMPRESSION:  Bibasilar hazy opacities.  Small-to-moderate left pleural effusion and small right-sided pleural   effusion.      Meds:   Antimicrobials:   acyclovir   Oral Tab/Cap 800 milliGRAM(s) Oral every 12 hours  piperacillin/tazobactam IVPB.. 4.5 Gram(s) IV Intermittent every 8 hours  posaconazole DR Tablet 300 milliGRAM(s) Oral daily  vancomycin    Solution 125 milliGRAM(s) Oral every 12 hours      Heme / Onc:       GI:  aluminum hydroxide/magnesium hydroxide/simethicone Suspension 30 milliLiter(s) Oral every 4 hours PRN  loperamide 2 milliGRAM(s) Oral every 6 hours PRN  pantoprazole    Tablet 40 milliGRAM(s) Oral before breakfast  sodium bicarbonate Mouth Rinse 10 milliLiter(s) Swish and Spit five times a day  ursodiol Capsule 300 milliGRAM(s) Oral two times a day      Cardiovascular:       Immunologic:   filgrastim-aafi (NIVESTYM) Injectable 300 MICROGram(s) SubCutaneous daily  tacrolimus 1.5 milliGRAM(s) Oral <User Schedule>  tacrolimus 1 milliGRAM(s) Oral <User Schedule>      Other medications:   Biotene Dry Mouth Oral Rinse 5 milliLiter(s) Swish and Spit five times a day  chlorhexidine 4% Liquid 1 Application(s) Topical <User Schedule>  DULoxetine 60 milliGRAM(s) Oral <User Schedule>  lidocaine   4% Patch 1 Patch Transdermal daily  phytonadione   Solution 5 milliGRAM(s) Oral <User Schedule>  sodium chloride 0.9%. 1000 milliLiter(s) IV Continuous <Continuous>  sodium chloride 0.9%. 500 milliLiter(s) IV Continuous <Continuous>  sodium chloride 0.9%. 500 milliLiter(s) IV Continuous <Continuous>  sodium chloride 0.9%. 500 milliLiter(s) IV Continuous <Continuous>  sodium chloride 0.9%. 500 milliLiter(s) IV Continuous <Continuous>  sodium chloride 0.9%. 1000 milliLiter(s) IV Continuous <Continuous>  tiZANidine 4 milliGRAM(s) Oral <User Schedule>      PRN:   acetaminophen     Tablet .. 650 milliGRAM(s) Oral every 6 hours PRN  aluminum hydroxide/magnesium hydroxide/simethicone Suspension 30 milliLiter(s) Oral every 4 hours PRN  FIRST- Mouthwash  BLM 15 milliLiter(s) Swish and Swallow four times a day PRN  loperamide 2 milliGRAM(s) Oral every 6 hours PRN  oxyCODONE    IR 5 milliGRAM(s) Oral every 6 hours PRN  prochlorperazine   Injectable 10 milliGRAM(s) IV Push every 6 hours PRN  sodium chloride 0.9% lock flush 10 milliLiter(s) IV Push every 1 hour PRN    A/P:  69 year old male with refractory DLBCL, status post R-CHOP x 6, HD MTX x 4, salvage polatuzumab / rituximab / revlimid x 4, admitted for a haplo-identical pbsct from his daughter with Flu / Bu / Cy / TBI prep regimen   Day +9  5/15- busulfan , fludarabine . No acute events overnight, vital signs are stable; continue keppra ppx for 24 hours post infusion of last dose of busulfan. No tylenol or posaconazole until day 0. Not neutropenic today, will d/c levaquin / vancomycin.    - fludarabine 3/5. VSS, afebrile. No acute events overnight.   - fludarabine . VSS and afebrile. No acute events overnight. Neutropenic today, started on ppx levaquin/vancomycin PO. No Tylenol or Azoles until day 0.   - fludarabine . VSS, remains afebrile. No acute events overnight. No Tylenol or Azoles until day 0.  - TBI today. No acute events overnight. Vital signs are stable.    - will receive Haplo allogeneic SCT today. VSS, afebrile.  - tolerated HPC infusion well. continue IVF hydration 24 hrs post cells, continue to monitor I&Os   - increased loose BM, imodium prn. VSS, afebrile and infectious work up negative - will discontinue zosyn and switch to levaquin ppx. Continue supportive care.  - PTCY2/2. febrile overnight: f/u cultures. Broaden to Zosyn. s.p lasix today   -continue PTCY hydration. Monitor I&O: s/p lasix today. afebrile: blood cultures negative Zosyn de-escalated to Levaquin   - VSS, no acute events: awaiting count recovery    Febrile overight - empirically started on zosyn, BCx/UCx pending, CXR with bibasilar hazy opacites and small to mod pleural effusions. Monitor strict I/O's, consider lasix prn. VSS, afebrile currently.    - no acute events overnight, vital signs are stable. Intermittent dyspnea; requiring O2.    - no acute events overnight. VSS and remains afebrile. Next tacrolimus level on Saturday (). No SOB symptoms and sating well on RA. Discontinued zosyn switched to levaquin ppx.    1. Infectious Disease:   acyclovir Oral Tab/Cap 800 milliGRAM(s) Oral every 12 hours  Levaquin 500mg oral daily  posaconazole DR Tablet 300 milliGRAM(s) Oral daily  vancomycin Solution 125 milliGRAM(s) Oral every 12 hours    2. VOD Prophylaxis:   ursodiol Capsule 300 milliGRAM(s) Oral two times a day    3. GI Prophylaxis:    pantoprazole    Tablet 40 milliGRAM(s) Oral before breakfast    4. Mouthcare - NS / NaHCO3 rinses, Biotene; Skin care     5. GVHD prophylaxis   PTCy + 3, +4   tacrolimus  IVPB 0.9 milliGRAM(s) IV Intermittent every 24 hours  Abatacept days +5. +14, +28, +56     6. Transfuse & replete electrolytes prn    1U Plt given.   Hypokalemia/hypomagnesemia - repleted.    7. IV hydration, daily weights, strict I&O, prn diuresis     8. PO intake as tolerated, nutrition follow up as needed    9. Antiemetics, anti-diarrhea medications:   loperamide 2 milliGRAM(s) Oral every 6 hours PRN  prochlorperazine Injectable 10 milliGRAM(s) IV Push every 6 hours PRN    10. OOB as tolerated, physical therapy consult if needed     11. Monitor coags / fibrinogen 2x week, vitamin K as needed   phytonadione Solution 5 milliGRAM(s) Oral <User Schedule>    12. Monitor closely for clinical changes, monitor for fevers     13. Emotional support provided, plan of care discussed and questions addressed     14. Patient education done regarding plan of care, restrictions and discharge planning     15. Continue regular social work input     I have written the above note for Dr. Snider who performed service with me in the room.   Bhaskar Esquivel PA-C ([phone])    I have seen and examined patient with PA, I agree with above note as scribed.

## 2025-05-29 NOTE — PROGRESS NOTE ADULT - NS ATTEND AMEND GEN_ALL_CORE FT
I rounded with the team including nurses, ACPs and PharmD.  I reviewed the data in depth.   pt seen and examined  prep flu/bu/cy/tbi  gvhd CAST...on iv tacro 0.9 mg    The patient is day +8 of haplo allogeneic HSCT.  haplo storm, with capillary leak, cxr noted small bilateral pleural effusions  The patient is doing well overall; reports occ throat/stomach pain when eating; and occ sob..improved today  febrile, vital signs stable  The vitals are stable. mild mucositis. The line site is clean. The lungs are clear. There is no LE edema. 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  All questions answered.   . I rounded with the team including nurses, ACPs and PharmD.  I reviewed the data in depth.   pt seen and examined  prep flu/bu/cy/tbi  gvhd CAST...on iv tacro 0.9 mg    The patient is day +9 of haplo allogeneic HSCT.  haplo storm, with capillary leak, cxr noted small bilateral pleural effusions  The patient is doing well overall; reports occ throat/stomach pain when eating; and occ sob..improved further today  febrile, vital signs stable  The vitals are stable. mild mucositis. The line site is clean. The lungs are clear. There is no LE edema. 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  All questions answered.   .

## 2025-05-29 NOTE — PHARMACOTHERAPY INTERVENTION NOTE - COMMENTS
69-year-old male with 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 He is admitted for an alloSCT with BERTRAM Bu/Flu/Cy/TBI conditioning and CAST for GVHD ppx. Today is day +9. Course has been complicated by febrile neutropenia/haplostorm.    Conditioning Regimen: RI Bu/Flu/Cy/TBI (complete)  Day -6 () to Day -5 (5/15): Busulfan 130 mg/m2 IV administered over 3 hours for 2 doses -> avoid APAP and azole antifungals for 48 hours post busulfan (until )  Day -6 () to Day -2 () Fludarabine 30 mg/m2 IV administered over 30 minutes for 5 doses  Moving up by 1 hour every day to ensure 48 hours between final dose and CTP infusion - last dose complete on  @ 1045 am  Day -3 () to Day -2 () Cyclophosphamide 14.5 mg/k2 IV administered over 1 hour for 2 doses  Day -1 ()  cGy x1    GVHD ppx: CAST  Cyclophosphamide 50 mg/kg IV daily on Day +3 () and Day +4 ().   Abatacept IV 10 mg/kg on days +5 (), +14 (6/3), +28 (), and +56 (7/15).  Patient will start tacrolimus 0.02 mg/kg IV on ,  (day +5) - please order a one time trough on Wednesday,  (day +8) and one time trough on Saturday,  along with troughs every Tuesday to start on 6/3. Goal trough is 8-12 ng/mL. Please ensure trough is drawn peripherally.    Date      Day         Level          Action          Day 0       N/A          Started posaconazole 300 mg PO QD (CY inhibitor)          Day +3     N/A          Received Fosaprepitant 150 mg IV x1 (CY inhibitor)          Day +5     N/A          Started tacrolimus 0.9 mg IV          Day +8     6.9          Switched to PO - tacrolimus 1.5 mg PO QAM and 1 mg PO QPM  ---Next level: ---    Antimicrobial ppx.:  Started antimicrobial (levofloxacin 500 mg PO QD), and PO vanco 125 mg BID once ANC <1000 () & will continue until ANC >500 for 3 consecutive days. Started antifungal (posaconazole 300 mg PO QD) on day 0 () and will continue until day +75. Started GCSF on day +7 () & will stop once ANC >1500 for two days.     Febrile neutropenia/haplostorm:  Patient became febrile to 100.5F on  @ 1948 & was ordered for pip/tazo 4.5 g IV every 8 hours (-). BCx from  &  showed NGTD & CXR from  shows atelectasis. Most likely 2/2 haplostorm, BCx remained negative & patient was afebrile x24 hours, so was de-escalated to levofloxacin 500 mg PO QD ().      Patient became febrile again to 101.1F on  and was escalated to pip/tazo 4.5 g IV every 8 hours (-) and vancomycin 1000 mg IV x1. BCx from  shows NGTD and patient has been afebrile since  @ 20:02. De-escalating to levofloxacin 500 mg PO QD ().     Evelyne Murcia, PharmD, BCOP  Stem Cell Transplant Clinical Pharmacy Specialist  Available via [employer] Teams

## 2025-05-30 LAB
ALBUMIN SERPL ELPH-MCNC: 2.8 G/DL — LOW (ref 3.3–5)
ALP SERPL-CCNC: 91 U/L — SIGNIFICANT CHANGE UP (ref 40–120)
ALT FLD-CCNC: 10 U/L — SIGNIFICANT CHANGE UP (ref 10–45)
ANION GAP SERPL CALC-SCNC: 11 MMOL/L — SIGNIFICANT CHANGE UP (ref 5–17)
AST SERPL-CCNC: 9 U/L — LOW (ref 10–40)
BILIRUB DIRECT SERPL-MCNC: 0.2 MG/DL — SIGNIFICANT CHANGE UP (ref 0–0.3)
BILIRUB INDIRECT FLD-MCNC: 0.3 MG/DL — SIGNIFICANT CHANGE UP (ref 0.2–1)
BILIRUB SERPL-MCNC: 0.5 MG/DL — SIGNIFICANT CHANGE UP (ref 0.2–1.2)
BLD GP AB SCN SERPL QL: NEGATIVE — SIGNIFICANT CHANGE UP
BUN SERPL-MCNC: 16 MG/DL — SIGNIFICANT CHANGE UP (ref 7–23)
CALCIUM SERPL-MCNC: 8.1 MG/DL — LOW (ref 8.4–10.5)
CHLORIDE SERPL-SCNC: 108 MMOL/L — SIGNIFICANT CHANGE UP (ref 96–108)
CO2 SERPL-SCNC: 21 MMOL/L — LOW (ref 22–31)
CREAT SERPL-MCNC: 0.54 MG/DL — SIGNIFICANT CHANGE UP (ref 0.5–1.3)
EBV DNA SERPL NAA+PROBE-ACNC: SIGNIFICANT CHANGE UP IU/ML
EBVPCR LOG: SIGNIFICANT CHANGE UP LOG10IU/ML
EGFR: 109 ML/MIN/1.73M2 — SIGNIFICANT CHANGE UP
EGFR: 109 ML/MIN/1.73M2 — SIGNIFICANT CHANGE UP
GLUCOSE SERPL-MCNC: 97 MG/DL — SIGNIFICANT CHANGE UP (ref 70–99)
HCT VFR BLD CALC: 23.6 % — LOW (ref 39–50)
HGB BLD-MCNC: 7.5 G/DL — LOW (ref 13–17)
LDH SERPL L TO P-CCNC: 174 U/L — SIGNIFICANT CHANGE UP (ref 50–242)
MAGNESIUM SERPL-MCNC: 2 MG/DL — SIGNIFICANT CHANGE UP (ref 1.6–2.6)
MCHC RBC-ENTMCNC: 27.9 PG — SIGNIFICANT CHANGE UP (ref 27–34)
MCHC RBC-ENTMCNC: 31.8 G/DL — LOW (ref 32–36)
MCV RBC AUTO: 87.7 FL — SIGNIFICANT CHANGE UP (ref 80–100)
MRSA PCR RESULT.: SIGNIFICANT CHANGE UP
NRBC BLD AUTO-RTO: 0 /100 WBCS — SIGNIFICANT CHANGE UP (ref 0–0)
PHOSPHATE SERPL-MCNC: 2.2 MG/DL — LOW (ref 2.5–4.5)
PLATELET # BLD AUTO: 17 K/UL — CRITICAL LOW (ref 150–400)
POTASSIUM SERPL-MCNC: 4 MMOL/L — SIGNIFICANT CHANGE UP (ref 3.5–5.3)
POTASSIUM SERPL-SCNC: 4 MMOL/L — SIGNIFICANT CHANGE UP (ref 3.5–5.3)
PROT SERPL-MCNC: 4.9 G/DL — LOW (ref 6–8.3)
RBC # BLD: 2.69 M/UL — LOW (ref 4.2–5.8)
RBC # FLD: 15.4 % — HIGH (ref 10.3–14.5)
RH IG SCN BLD-IMP: POSITIVE — SIGNIFICANT CHANGE UP
S AUREUS DNA NOSE QL NAA+PROBE: SIGNIFICANT CHANGE UP
SODIUM SERPL-SCNC: 140 MMOL/L — SIGNIFICANT CHANGE UP (ref 135–145)
WBC # BLD: 0.04 K/UL — CRITICAL LOW (ref 3.8–10.5)
WBC # FLD AUTO: 0.04 K/UL — CRITICAL LOW (ref 3.8–10.5)

## 2025-05-30 PROCEDURE — 99233 SBSQ HOSP IP/OBS HIGH 50: CPT | Mod: FS

## 2025-05-30 RX ORDER — OXYCODONE HYDROCHLORIDE 30 MG/1
5 TABLET ORAL EVERY 6 HOURS
Refills: 0 | Status: DISCONTINUED | OUTPATIENT
Start: 2025-05-30 | End: 2025-06-05

## 2025-05-30 RX ADMIN — TIZANIDINE 4 MILLIGRAM(S): 4 TABLET ORAL at 21:07

## 2025-05-30 RX ADMIN — OXYCODONE HYDROCHLORIDE 5 MILLIGRAM(S): 30 TABLET ORAL at 17:06

## 2025-05-30 RX ADMIN — URSODIOL 300 MILLIGRAM(S): 300 CAPSULE ORAL at 05:06

## 2025-05-30 RX ADMIN — DULOXETINE 60 MILLIGRAM(S): 20 CAPSULE, DELAYED RELEASE ORAL at 21:07

## 2025-05-30 RX ADMIN — Medication 15 MILLILITER(S): at 18:21

## 2025-05-30 RX ADMIN — Medication 125 MILLIGRAM(S): at 05:05

## 2025-05-30 RX ADMIN — Medication 125 MILLIGRAM(S): at 16:52

## 2025-05-30 RX ADMIN — Medication 5 MILLILITER(S): at 08:26

## 2025-05-30 RX ADMIN — OXYCODONE HYDROCHLORIDE 5 MILLIGRAM(S): 30 TABLET ORAL at 17:36

## 2025-05-30 RX ADMIN — POSACONAZOLE 300 MILLIGRAM(S): 100 TABLET, DELAYED RELEASE ORAL at 11:46

## 2025-05-30 RX ADMIN — TACROLIMUS 1.5 MILLIGRAM(S): 0.5 CAPSULE ORAL at 08:26

## 2025-05-30 RX ADMIN — FILGRASTIM 300 MICROGRAM(S): 300 INJECTION, SOLUTION INTRAVENOUS; SUBCUTANEOUS at 16:56

## 2025-05-30 RX ADMIN — Medication 10 MILLILITER(S): at 11:46

## 2025-05-30 RX ADMIN — Medication 30 MILLILITER(S): at 08:35

## 2025-05-30 RX ADMIN — Medication 5 MILLILITER(S): at 20:01

## 2025-05-30 RX ADMIN — Medication 10 MILLILITER(S): at 15:54

## 2025-05-30 RX ADMIN — URSODIOL 300 MILLIGRAM(S): 300 CAPSULE ORAL at 16:53

## 2025-05-30 RX ADMIN — LIDOCAINE HYDROCHLORIDE 1 PATCH: 20 JELLY TOPICAL at 19:00

## 2025-05-30 RX ADMIN — Medication 1 APPLICATION(S): at 08:27

## 2025-05-30 RX ADMIN — Medication 800 MILLIGRAM(S): at 16:53

## 2025-05-30 RX ADMIN — Medication 5 MILLILITER(S): at 11:46

## 2025-05-30 RX ADMIN — Medication 5 MILLILITER(S): at 15:53

## 2025-05-30 RX ADMIN — Medication 10 MILLILITER(S): at 17:01

## 2025-05-30 RX ADMIN — TACROLIMUS 1 MILLIGRAM(S): 0.5 CAPSULE ORAL at 20:00

## 2025-05-30 RX ADMIN — LIDOCAINE HYDROCHLORIDE 1 PATCH: 20 JELLY TOPICAL at 04:00

## 2025-05-30 RX ADMIN — Medication 10 MILLILITER(S): at 08:26

## 2025-05-30 RX ADMIN — Medication 800 MILLIGRAM(S): at 05:05

## 2025-05-30 RX ADMIN — Medication 40 MILLIGRAM(S): at 05:06

## 2025-05-30 RX ADMIN — LIDOCAINE HYDROCHLORIDE 1 PATCH: 20 JELLY TOPICAL at 16:55

## 2025-05-30 NOTE — PROVIDER CONTACT NOTE (OTHER) - BACKGROUND
s/p haplo daughter,
Pt. w/ DLBCL,s/p Haplo transplant
Day + 3 Cytoxan hydration infusing NS@250cc/hr.
Day +9 Haplo allo stem armen transplant.  FLU/BU/CY/TBI, GVHD ppx:  CAST.
S/p haplo transplant.

## 2025-05-30 NOTE — PROVIDER CONTACT NOTE (OTHER) - REASON
pt. had rashes both thighs lower abdomen , buttocks and the hands
Pt c/o cough
temp 100.5
I&O status
Decreased urine output

## 2025-05-30 NOTE — PROGRESS NOTE ADULT - NS ATTEND AMEND GEN_ALL_CORE FT
I rounded with the team including nurses, ACPs and PharmD.  I reviewed the data in depth.   pt seen and examined  prep flu/bu/cy/tbi  gvhd CAST...on iv tacro 0.9 mg    The patient is day +9 of haplo allogeneic HSCT.  haplo storm, with capillary leak, cxr noted small bilateral pleural effusions  The patient is doing well overall; reports occ throat/stomach pain when eating; and occ sob..improved further today  febrile, vital signs stable  The vitals are stable. mild mucositis. The line site is clean. The lungs are clear. There is no LE edema. 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  All questions answered.   . I rounded with the team including nurses, ACPs and PharmD.  I reviewed the data in depth.   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 On nyvestem  haplo storm, with capillary leak, cxr noted small bilateral pleural effusions  The patient is doing well overall; reports occ throat/stomach pain when eating; and occ sob..improved further today  febrile, vital signs stable  The vitals are stable. mild mucositis. The line site is clean. The lungs are clear. There is no LE edema. 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  All questions answered.   .

## 2025-05-30 NOTE — PHARMACOTHERAPY INTERVENTION NOTE - COMMENTS
69-year-old male with 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 He is admitted for an alloSCT with BERTRAM Bu/Flu/Cy/TBI conditioning and CAST for GVHD ppx. Today is day +10. Course has been complicated by febrile neutropenia/haplostorm.    Conditioning Regimen: RI Bu/Flu/Cy/TBI (complete)  Day -6 () to Day -5 (5/15): Busulfan 130 mg/m2 IV administered over 3 hours for 2 doses -> avoid APAP and azole antifungals for 48 hours post busulfan (until )  Day -6 () to Day -2 () Fludarabine 30 mg/m2 IV administered over 30 minutes for 5 doses  Moving up by 1 hour every day to ensure 48 hours between final dose and CTP infusion - last dose complete on  @ 1045 am  Day -3 () to Day -2 () Cyclophosphamide 14.5 mg/k2 IV administered over 1 hour for 2 doses  Day -1 ()  cGy x1    GVHD ppx: CAST  Cyclophosphamide 50 mg/kg IV daily on Day +3 () and Day +4 ().   Abatacept IV 10 mg/kg on days +5 (), +14 (6/3), +28 (), and +56 (7/15).  Patient will start tacrolimus 0.02 mg/kg IV on ,  (day +5) - please order a one time trough on Wednesday,  (day +8) and one time trough on Saturday,  along with troughs every Tuesday to start on 6/3. Goal trough is 8-12 ng/mL. Please ensure trough is drawn peripherally.    Date      Day         Level          Action          Day 0       N/A          Started posaconazole 300 mg PO QD (CY inhibitor)          Day +3     N/A          Received Fosaprepitant 150 mg IV x1 (CY inhibitor)          Day +5     N/A          Started tacrolimus 0.9 mg IV          Day +8     6.9          Switched to PO - tacrolimus 1.5 mg PO QAM and 1 mg PO QPM  ---Next level: ---    Antimicrobial ppx.:  Started antimicrobial (levofloxacin 500 mg PO QD), and PO vanco 125 mg BID once ANC <1000 () & will continue until ANC >500 for 3 consecutive days. Started antifungal (posaconazole 300 mg PO QD) on day 0 () and will continue until day +75. Started GCSF on day +7 () & will stop once ANC >1500 for two days.     Febrile neutropenia/haplostorm:  Patient became febrile to 100.5F on  @ 1948 & was ordered for pip/tazo 4.5 g IV every 8 hours (-). BCx from  &  showed NGTD & CXR from  shows atelectasis. Most likely 2/2 haplostorm, BCx remained negative & patient was afebrile x24 hours, so was de-escalated to levofloxacin 500 mg PO QD ().      Patient became febrile again to 101.1F on  and was escalated to pip/tazo 4.5 g IV every 8 hours (-) and vancomycin 1000 mg IV x1. BCx from  shows NGTD and patient has been afebrile since  @ 20:02. De-escalating to levofloxacin 500 mg PO QD ().     Evelyne Murcia, PharmD, BCOP  Stem Cell Transplant Clinical Pharmacy Specialist  Available via [employer] Teams

## 2025-05-30 NOTE — PROVIDER CONTACT NOTE (OTHER) - ACTION/TREATMENT ORDERED:
Lasix 20mg ivp ordered x1 dose
blood cultures donex2 tylenol po given UA, C&S done. chest X-ray done. zosyn IV started . pt. was seen and examined by provider.
will administer to pt. as soon as medication becomes available.
No interventions
Tessalon perle 100MG oral X 1 dose.

## 2025-05-30 NOTE — PROVIDER CONTACT NOTE (OTHER) - SITUATION
Pt c/o frequent cough
Pt. remains behind in his output,+1300
Strict I&O maintained.
pt. had rashes on both  thighs , lower abdomen and buttocks and hands
pt. first spike temp 100.5

## 2025-05-30 NOTE — PROVIDER CONTACT NOTE (OTHER) - ASSESSMENT
Patient I&O positive [id number]
pt. alert and oriented x4
Alert & oriented X4.  Frequent non productive cough noted.
Pt. w/no signs & symptoms of fluid overload
pt. alert and oriented x4

## 2025-05-30 NOTE — PROGRESS NOTE ADULT - SUBJECTIVE AND OBJECTIVE BOX
O: Vitals:   Vital Signs Last 24 Hrs  T(C): 36.3 (30 May 2025 05:04), Max: 36.7 (29 May 2025 11:35)  T(F): 97.3 (30 May 2025 05:04), Max: 98.1 (29 May 2025 11:35)  HR: 84 (30 May 2025 05:04) (72 - 84)  BP: 102/67 (30 May 2025 05:04) (102/67 - 129/87)  BP(mean): --  RR: 16 (30 May 2025 05:04) (16 - 18)  SpO2: 95% (30 May 2025 05:04) (95% - 98%)    Parameters below as of 30 May 2025 05:04  Patient On (Oxygen Delivery Method): room air        Admit weight:   Daily     Daily Weight in k.4 (30 May 2025 07:12)    Intake / Output:    @ 07:01  -  30 @ 07:00  --------------------------------------------------------  IN: 1650 mL / OUT: 1201 mL / NET: 449 mL     @ 07:01  -   @ 11:19  --------------------------------------------------------  IN: 0 mL / OUT: 100 mL / NET: -100 mL              Labs:   CBC Full  -  ( 30 May 2025 06:25 )  WBC Count : 0.04 K/uL  Hemoglobin : 7.5 g/dL  Hematocrit : 23.6 %  Platelet Count - Automated : 17 K/uL  Mean Cell Volume : 87.7 fl  Mean Cell Hemoglobin : 27.9 pg  Mean Cell Hemoglobin Concentration : 31.8 g/dL  Auto Neutrophil # : x  Auto Lymphocyte # : x  Auto Monocyte # : x  Auto Eosinophil # : x  Auto Basophil # : x  Auto Neutrophil % : x  Auto Lymphocyte % : x  Auto Monocyte % : x  Auto Eosinophil % : x  Auto Basophil % : x                          7.5    0.04  )-----------( 17       ( 30 May 2025 06:25 )             23.6         140  |  108  |  16  ----------------------------<  97  4.0   |  21[L]  |  0.54    Ca    8.1[L]      30 May 2025 06:25  Phos  2.2       Mg     2.0         TPro  4.9[L]  /  Alb  2.8[L]  /  TBili  0.5  /  DBili  0.2  /  AST  9[L]  /  ALT  10  /  AlkPhos  91      PT/INR - ( 29 May 2025 06:31 )   PT: 10.5 sec;   INR: 0.92 ratio         PTT - ( 29 May 2025 06:31 )  PTT:34.4 sec  LIVER FUNCTIONS - ( 30 May 2025 06:25 )  Alb: 2.8 g/dL / Pro: 4.9 g/dL / ALK PHOS: 91 U/L / ALT: 10 U/L / AST: 9 U/L / GGT: x           Lactate Dehydrogenase, Serum: 174 U/L ( @ 06:25)              Cultures:         Radiology:       Meds:   Antimicrobials:   acyclovir   Oral Tab/Cap 800 milliGRAM(s) Oral every 12 hours  levoFLOXacin  Tablet 500 milliGRAM(s) Oral every 24 hours  posaconazole DR Tablet 300 milliGRAM(s) Oral daily  vancomycin    Solution 125 milliGRAM(s) Oral every 12 hours      Heme / Onc:       GI:  aluminum hydroxide/magnesium hydroxide/simethicone Suspension 30 milliLiter(s) Oral every 4 hours PRN  loperamide 2 milliGRAM(s) Oral every 6 hours PRN  pantoprazole    Tablet 40 milliGRAM(s) Oral before breakfast  sodium bicarbonate Mouth Rinse 10 milliLiter(s) Swish and Spit five times a day  ursodiol Capsule 300 milliGRAM(s) Oral two times a day      Cardiovascular:       Immunologic:   filgrastim-aafi (NIVESTYM) Injectable 300 MICROGram(s) SubCutaneous daily  tacrolimus 1.5 milliGRAM(s) Oral <User Schedule>  tacrolimus 1 milliGRAM(s) Oral <User Schedule>      Other medications:   Biotene Dry Mouth Oral Rinse 5 milliLiter(s) Swish and Spit five times a day  chlorhexidine 4% Liquid 1 Application(s) Topical <User Schedule>  DULoxetine 60 milliGRAM(s) Oral <User Schedule>  lidocaine   4% Patch 1 Patch Transdermal daily  phytonadione   Solution 5 milliGRAM(s) Oral <User Schedule>  tiZANidine 4 milliGRAM(s) Oral <User Schedule>      PRN:   acetaminophen     Tablet .. 650 milliGRAM(s) Oral every 6 hours PRN  aluminum hydroxide/magnesium hydroxide/simethicone Suspension 30 milliLiter(s) Oral every 4 hours PRN  FIRST- Mouthwash  BLM 15 milliLiter(s) Swish and Swallow four times a day PRN  loperamide 2 milliGRAM(s) Oral every 6 hours PRN  oxyCODONE    IR 5 milliGRAM(s) Oral every 6 hours PRN  prochlorperazine   Injectable 10 milliGRAM(s) IV Push every 6 hours PRN  sodium chloride 0.9% lock flush 10 milliLiter(s) IV Push every 1 hour PRN                     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 Patient admits to 2-3 loose BM overnight.  Patient states mild back pain, but otherwise feels well.    O: Vitals:   Vital Signs Last 24 Hrs  T(C): 36.3 (30 May 2025 05:04), Max: 36.7 (29 May 2025 11:35)  T(F): 97.3 (30 May 2025 05:04), Max: 98.1 (29 May 2025 11:35)  HR: 84 (30 May 2025 05:04) (72 - 84)  BP: 102/67 (30 May 2025 05:04) (102/67 - 129/87)  BP(mean): --  RR: 16 (30 May 2025 05:04) (16 - 18)  SpO2: 95% (30 May 2025 05:04) (95% - 98%)    Parameters below as of 30 May 2025 05:04  Patient On (Oxygen Delivery Method): room air    Admit weight: 47.1kg  Daily Weight in k.4 (30 May 2025 07:12)    Intake / Output:    @ 07:  -   @ 07:00  --------------------------------------------------------  IN: 1650 mL / OUT: 1201 mL / NET: 449 mL     @ 07:  -   @ 11:19  --------------------------------------------------------  IN: 0 mL / OUT: 100 mL / NET: -100 mL      PE:   Oropharynx: no erythema or ulcerations   Oral Mucositis:                -                                     Grade: n/a  CVS: S1, S2 RRR   Lungs: CTA throughout bilaterally   Abdomen: + BS x 4, soft, NT, ND   Extremities: no edema   Gastric Mucositis:       -                                          Grade: n/a   Intestinal Mucositis:     -                                         Grade: n/a   Skin: no rash  TLC: CDI  Neuro: A&OX3      Labs:   CBC Full  -  ( 30 May 2025 06:25 )  WBC Count : 0.04 K/uL  Hemoglobin : 7.5 g/dL  Hematocrit : 23.6 %  Platelet Count - Automated : 17 K/uL  Mean Cell Volume : 87.7 fl  Mean Cell Hemoglobin : 27.9 pg  Mean Cell Hemoglobin Concentration : 31.8 g/dL  Auto Neutrophil # : x  Auto Lymphocyte # : x  Auto Monocyte # : x  Auto Eosinophil # : x  Auto Basophil # : x  Auto Neutrophil % : x  Auto Lymphocyte % : x  Auto Monocyte % : x  Auto Eosinophil % : x  Auto Basophil % : x                          7.5    0.04  )-----------( 17       ( 30 May 2025 06:25 )             23.6         140  |  108  |  16  ----------------------------<  97  4.0   |  21[L]  |  0.54    Ca    8.1[L]      30 May 2025 06:25  Phos  2.2       Mg     2.0         TPro  4.9[L]  /  Alb  2.8[L]  /  TBili  0.5  /  DBili  0.2  /  AST  9[L]  /  ALT  10  /  AlkPhos  91      PT/INR - ( 29 May 2025 06:31 )   PT: 10.5 sec;   INR: 0.92 ratio         PTT - ( 29 May 2025 06:31 )  PTT:34.4 sec  LIVER FUNCTIONS - ( 30 May 2025 06:25 )  Alb: 2.8 g/dL / Pro: 4.9 g/dL / ALK PHOS: 91 U/L / ALT: 10 U/L / AST: 9 U/L / GGT: x           Lactate Dehydrogenase, Serum: 174 U/L ( @ 06:25)      Cultures:   Culture Results:   No growth (25 @ 01:52)    Culture Results:     Culture Results:   No growth at 24 hours (25 @ 22:50)    Culture Results:   <10,000 CFU/mL Normal Urogenital Mari (25 @ 01:42)    Culture Results:   No growth at 4 days (25 @ 22:30)    Culture Results:   No growth at 4 days (25 @ 22:15)    Culture Results:   <10,000 CFU/mL Normal Urogenital Mari (25 @ 21:16)    Culture Results:   No growth at 5 days (25 @ 21:16)    Culture Results:   No growth at 5 days (25 @ 21:16)    Radiology:   ACC: [id number] EXAM:  XR CHEST PORTABLE URGENT 1V  PROCEDURE DATE:  2025    IMPRESSION:  Bibasilar hazy opacities.  Small-to-moderate left pleural effusion and small right-sided pleural   effusion.        Meds:   Antimicrobials:   acyclovir   Oral Tab/Cap 800 milliGRAM(s) Oral every 12 hours  levoFLOXacin  Tablet 500 milliGRAM(s) Oral every 24 hours  posaconazole DR Tablet 300 milliGRAM(s) Oral daily  vancomycin    Solution 125 milliGRAM(s) Oral every 12 hours      Heme / Onc:       GI:  aluminum hydroxide/magnesium hydroxide/simethicone Suspension 30 milliLiter(s) Oral every 4 hours PRN  loperamide 2 milliGRAM(s) Oral every 6 hours PRN  pantoprazole    Tablet 40 milliGRAM(s) Oral before breakfast  sodium bicarbonate Mouth Rinse 10 milliLiter(s) Swish and Spit five times a day  ursodiol Capsule 300 milliGRAM(s) Oral two times a day      Cardiovascular:       Immunologic:   filgrastim-aafi (NIVESTYM) Injectable 300 MICROGram(s) SubCutaneous daily  tacrolimus 1.5 milliGRAM(s) Oral <User Schedule>  tacrolimus 1 milliGRAM(s) Oral <User Schedule>      Other medications:   Biotene Dry Mouth Oral Rinse 5 milliLiter(s) Swish and Spit five times a day  chlorhexidine 4% Liquid 1 Application(s) Topical <User Schedule>  DULoxetine 60 milliGRAM(s) Oral <User Schedule>  lidocaine   4% Patch 1 Patch Transdermal daily  phytonadione   Solution 5 milliGRAM(s) Oral <User Schedule>  tiZANidine 4 milliGRAM(s) Oral <User Schedule>      PRN:   acetaminophen     Tablet .. 650 milliGRAM(s) Oral every 6 hours PRN  aluminum hydroxide/magnesium hydroxide/simethicone Suspension 30 milliLiter(s) Oral every 4 hours PRN  FIRST- Mouthwash  BLM 15 milliLiter(s) Swish and Swallow four times a day PRN  loperamide 2 milliGRAM(s) Oral every 6 hours PRN  oxyCODONE    IR 5 milliGRAM(s) Oral every 6 hours PRN  prochlorperazine   Injectable 10 milliGRAM(s) IV Push every 6 hours PRN  sodium chloride 0.9% lock flush 10 milliLiter(s) IV Push every 1 hour PRN    A/P:  69 year old male with refractory DLBCL, status post R-CHOP x 6, HD MTX x 4, salvage polatuzumab / rituximab / revlimid x 4, admitted for a haplo-identical pbsct from his daughter with Flu / Bu / Cy / TBI prep regimen   Day +10  5/15- busulfan , fludarabine . No acute events overnight, vital signs are stable; continue keppra ppx for 24 hours post infusion of last dose of busulfan. No tylenol or posaconazole until day 0. Not neutropenic today, will d/c levaquin / vancomycin.    - fludarabine 3/5. VSS, afebrile. No acute events overnight.   - fludarabine . VSS and afebrile. No acute events overnight. Neutropenic today, started on ppx levaquin/vancomycin PO. No Tylenol or Azoles until day 0.   - fludarabine . VSS, remains afebrile. No acute events overnight. No Tylenol or Azoles until day 0.  - TBI today. No acute events overnight. Vital signs are stable.    - will receive Haplo allogeneic SCT today. VSS, afebrile.  - tolerated HPC infusion well. continue IVF hydration 24 hrs post cells, continue to monitor I&Os   - increased loose BM, imodium prn. VSS, afebrile and infectious work up negative - will discontinue zosyn and switch to levaquin ppx. Continue supportive care.  - PTCY2/2. febrile overnight: f/u cultures. Broaden to Zosyn. s.p lasix today   -continue PTCY hydration. Monitor I&O: s/p lasix today. afebrile: blood cultures negative Zosyn de-escalated to Levaquin   - VSS, no acute events: awaiting count recovery    Febrile overight - empirically started on zosyn, BCx/UCx pending, CXR with bibasilar hazy opacites and small to mod pleural effusions. Monitor strict I/O's, consider lasix prn. VSS, afebrile currently.    - no acute events overnight, vital signs are stable. Intermittent dyspnea; requiring O2.    - no acute events overnight. VSS and remains afebrile. Next tacrolimus level on Saturday (). No SOB symptoms and sating well on RA. Discontinued zosyn switched to levaquin ppx.   - rash of the bilateral thighs/lower back resolved. No acute events overnight and VSS/afebrile. Tacrolimus level on .     1. Infectious Disease:   acyclovir Oral Tab/Cap 800 milliGRAM(s) Oral every 12 hours  Levaquin 500mg oral daily  posaconazole DR Tablet 300 milliGRAM(s) Oral daily  vancomycin Solution 125 milliGRAM(s) Oral every 12 hours    2. VOD Prophylaxis:   ursodiol Capsule 300 milliGRAM(s) Oral two times a day    3. GI Prophylaxis:    pantoprazole    Tablet 40 milliGRAM(s) Oral before breakfast    4. Mouthcare - NS / NaHCO3 rinses, Biotene; Skin care     5. GVHD prophylaxis   PTCy + 3, +4   tacrolimus  IVPB 0.9 milliGRAM(s) IV Intermittent every 24 hours  Abatacept days +5. +14, +28, +56     6. Transfuse & replete electrolytes prn     7. IV hydration, daily weights, strict I&O, prn diuresis     8. PO intake as tolerated, nutrition follow up as needed    9. Antiemetics, anti-diarrhea medications:   loperamide 2 milliGRAM(s) Oral every 6 hours PRN  prochlorperazine Injectable 10 milliGRAM(s) IV Push every 6 hours PRN    10. OOB as tolerated, physical therapy consult if needed     11. Monitor coags / fibrinogen 2x week, vitamin K as needed   phytonadione Solution 5 milliGRAM(s) Oral <User Schedule>    12. Monitor closely for clinical changes, monitor for fevers     13. Emotional support provided, plan of care discussed and questions addressed     14. Patient education done regarding plan of care, restrictions and discharge planning     15. Continue regular social work input     I have written the above note for Dr. Snider who performed service with me in the room.   Bhaskar Esquivel PA-C ([phone])    I have seen and examined patient with PA, I agree with above note as scribed.

## 2025-05-30 NOTE — PROVIDER CONTACT NOTE (OTHER) - RECOMMENDATIONS
Lasix  continue to maintain strict I&O
notify provider, and MARY Chaudhari came to see and examined pt. attending on call was called and MARY chaudhari ordered hydrocortisone to affected areas,
MARY Westbrook made aware , antibiotics Zosyn , UA, urine C&S. chest X-ray. tylenol 650mgs po. blood cultures activated, one peripheral and one from the port
tessalon

## 2025-05-31 LAB
ALBUMIN SERPL ELPH-MCNC: 2.8 G/DL — LOW (ref 3.3–5)
ALP SERPL-CCNC: 93 U/L — SIGNIFICANT CHANGE UP (ref 40–120)
ALT FLD-CCNC: 11 U/L — SIGNIFICANT CHANGE UP (ref 10–45)
ANION GAP SERPL CALC-SCNC: 10 MMOL/L — SIGNIFICANT CHANGE UP (ref 5–17)
AST SERPL-CCNC: 10 U/L — SIGNIFICANT CHANGE UP (ref 10–40)
BILIRUB SERPL-MCNC: 0.4 MG/DL — SIGNIFICANT CHANGE UP (ref 0.2–1.2)
BUN SERPL-MCNC: 14 MG/DL — SIGNIFICANT CHANGE UP (ref 7–23)
CALCIUM SERPL-MCNC: 8.4 MG/DL — SIGNIFICANT CHANGE UP (ref 8.4–10.5)
CHLORIDE SERPL-SCNC: 109 MMOL/L — HIGH (ref 96–108)
CO2 SERPL-SCNC: 22 MMOL/L — SIGNIFICANT CHANGE UP (ref 22–31)
CREAT SERPL-MCNC: 0.55 MG/DL — SIGNIFICANT CHANGE UP (ref 0.5–1.3)
EGFR: 109 ML/MIN/1.73M2 — SIGNIFICANT CHANGE UP
EGFR: 109 ML/MIN/1.73M2 — SIGNIFICANT CHANGE UP
GLUCOSE SERPL-MCNC: 106 MG/DL — HIGH (ref 70–99)
HADV DNA FLD NAA+PROBE-LOG#: SIGNIFICANT CHANGE UP COPIES/ML
HCT VFR BLD CALC: 23.2 % — LOW (ref 39–50)
HGB BLD-MCNC: 7.6 G/DL — LOW (ref 13–17)
LDH SERPL L TO P-CCNC: 165 U/L — SIGNIFICANT CHANGE UP (ref 50–242)
MAGNESIUM SERPL-MCNC: 1.9 MG/DL — SIGNIFICANT CHANGE UP (ref 1.6–2.6)
MCHC RBC-ENTMCNC: 28.4 PG — SIGNIFICANT CHANGE UP (ref 27–34)
MCHC RBC-ENTMCNC: 32.8 G/DL — SIGNIFICANT CHANGE UP (ref 32–36)
MCV RBC AUTO: 86.6 FL — SIGNIFICANT CHANGE UP (ref 80–100)
NRBC BLD AUTO-RTO: 0 /100 WBCS — SIGNIFICANT CHANGE UP (ref 0–0)
PHOSPHATE SERPL-MCNC: 2.2 MG/DL — LOW (ref 2.5–4.5)
PLATELET # BLD AUTO: 12 K/UL — CRITICAL LOW (ref 150–400)
POTASSIUM SERPL-MCNC: 4.1 MMOL/L — SIGNIFICANT CHANGE UP (ref 3.5–5.3)
POTASSIUM SERPL-SCNC: 4.1 MMOL/L — SIGNIFICANT CHANGE UP (ref 3.5–5.3)
PROT SERPL-MCNC: 4.6 G/DL — LOW (ref 6–8.3)
RBC # BLD: 2.68 M/UL — LOW (ref 4.2–5.8)
RBC # FLD: 14.9 % — HIGH (ref 10.3–14.5)
SODIUM SERPL-SCNC: 141 MMOL/L — SIGNIFICANT CHANGE UP (ref 135–145)
TACROLIMUS SERPL-MCNC: 8.8 NG/ML — SIGNIFICANT CHANGE UP
WBC # BLD: 0.03 K/UL — CRITICAL LOW (ref 3.8–10.5)
WBC # FLD AUTO: 0.03 K/UL — CRITICAL LOW (ref 3.8–10.5)

## 2025-05-31 PROCEDURE — 99233 SBSQ HOSP IP/OBS HIGH 50: CPT | Mod: FS

## 2025-05-31 RX ADMIN — LIDOCAINE HYDROCHLORIDE 1 PATCH: 20 JELLY TOPICAL at 20:27

## 2025-05-31 RX ADMIN — Medication 5 MILLILITER(S): at 12:46

## 2025-05-31 RX ADMIN — TACROLIMUS 1 MILLIGRAM(S): 0.5 CAPSULE ORAL at 20:07

## 2025-05-31 RX ADMIN — FILGRASTIM 300 MICROGRAM(S): 300 INJECTION, SOLUTION INTRAVENOUS; SUBCUTANEOUS at 15:13

## 2025-05-31 RX ADMIN — LOPERAMIDE HCL 2 MILLIGRAM(S): 1 SOLUTION ORAL at 10:25

## 2025-05-31 RX ADMIN — Medication 125 MILLIGRAM(S): at 18:12

## 2025-05-31 RX ADMIN — Medication 125 MILLIGRAM(S): at 05:27

## 2025-05-31 RX ADMIN — Medication 10 MILLILITER(S): at 09:01

## 2025-05-31 RX ADMIN — TIZANIDINE 4 MILLIGRAM(S): 4 TABLET ORAL at 20:27

## 2025-05-31 RX ADMIN — LIDOCAINE HYDROCHLORIDE 1 PATCH: 20 JELLY TOPICAL at 12:47

## 2025-05-31 RX ADMIN — Medication 15 MILLILITER(S): at 05:27

## 2025-05-31 RX ADMIN — Medication 40 MILLIGRAM(S): at 05:28

## 2025-05-31 RX ADMIN — OXYCODONE HYDROCHLORIDE 5 MILLIGRAM(S): 30 TABLET ORAL at 20:07

## 2025-05-31 RX ADMIN — URSODIOL 300 MILLIGRAM(S): 300 CAPSULE ORAL at 18:12

## 2025-05-31 RX ADMIN — Medication 10 MILLILITER(S): at 20:08

## 2025-05-31 RX ADMIN — Medication 5 MILLILITER(S): at 09:01

## 2025-05-31 RX ADMIN — Medication 10 MILLILITER(S): at 12:46

## 2025-05-31 RX ADMIN — Medication 800 MILLIGRAM(S): at 05:28

## 2025-05-31 RX ADMIN — OXYCODONE HYDROCHLORIDE 5 MILLIGRAM(S): 30 TABLET ORAL at 14:24

## 2025-05-31 RX ADMIN — URSODIOL 300 MILLIGRAM(S): 300 CAPSULE ORAL at 05:28

## 2025-05-31 RX ADMIN — Medication 15 MILLILITER(S): at 17:12

## 2025-05-31 RX ADMIN — TACROLIMUS 1.5 MILLIGRAM(S): 0.5 CAPSULE ORAL at 09:01

## 2025-05-31 RX ADMIN — Medication 1 LOZENGE: at 13:29

## 2025-05-31 RX ADMIN — LIDOCAINE HYDROCHLORIDE 1 PATCH: 20 JELLY TOPICAL at 05:00

## 2025-05-31 RX ADMIN — Medication 10 MILLILITER(S): at 17:12

## 2025-05-31 RX ADMIN — OXYCODONE HYDROCHLORIDE 5 MILLIGRAM(S): 30 TABLET ORAL at 15:05

## 2025-05-31 RX ADMIN — OXYCODONE HYDROCHLORIDE 5 MILLIGRAM(S): 30 TABLET ORAL at 20:30

## 2025-05-31 RX ADMIN — Medication 30 MILLILITER(S): at 16:11

## 2025-05-31 RX ADMIN — Medication 800 MILLIGRAM(S): at 18:12

## 2025-05-31 RX ADMIN — POSACONAZOLE 300 MILLIGRAM(S): 100 TABLET, DELAYED RELEASE ORAL at 12:46

## 2025-05-31 RX ADMIN — DULOXETINE 60 MILLIGRAM(S): 20 CAPSULE, DELAYED RELEASE ORAL at 20:27

## 2025-05-31 RX ADMIN — Medication 5 MILLILITER(S): at 17:12

## 2025-05-31 RX ADMIN — Medication 1 APPLICATION(S): at 09:01

## 2025-05-31 RX ADMIN — Medication 5 MILLILITER(S): at 20:08

## 2025-05-31 NOTE — PROGRESS NOTE ADULT - NS ATTEND AMEND GEN_ALL_CORE FT
I rounded with the team including nurses, ACPs and PharmD.  I reviewed the data in depth.   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 On nyvestem  haplo storm, with capillary leak, cxr noted small bilateral pleural effusions  The patient is doing well overall; reports occ throat/stomach pain when eating; and occ sob..improved further today  febrile, vital signs stable  The vitals are stable. mild mucositis. The line site is clean. The lungs are clear. There is no LE edema. 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  All questions answered.   . I rounded with the team including nurses, ACPs and PharmD.  I reviewed the data in depth.   pt seen and examined  prep flu/bu/cy/tbi  gvhd CAST...on iv tacro 0.9 mg..switched to po  1.5mg  in am and 1mg in pm..level in am    The patient is day +11 of haplo allogeneic HSCT. On nivestym  haplo storm, with capillary leak, cxr noted small bilateral pleural effusions  The patient is doing well overall; reports occ throat/stomach pain when eating; and occ sob and loose stool..improved further today  febrile, vital signs stable  The vitals are stable. mild mucositis. The line site is clean. The lungs are clear. There is no LE edema. 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  All questions answered.

## 2025-05-31 NOTE — PROGRESS NOTE ADULT - SUBJECTIVE AND OBJECTIVE BOX
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    O: Vitals:   Vital Signs Last 24 Hrs  T(C): 36.7 (31 May 2025 05:26), Max: 36.7 (31 May 2025 05:26)  T(F): 98.1 (31 May 2025 05:26), Max: 98.1 (31 May 2025 05:26)  HR: 79 (31 May 2025 05:26) (75 - 79)  BP: 120/74 (31 May 2025 05:26) (117/79 - 124/84)  BP(mean): --  RR: 17 (31 May 2025 05:26) (16 - 17)  SpO2: 93% (31 May 2025 05:26) (93% - 98%)    Parameters below as of 31 May 2025 05:26  Patient On (Oxygen Delivery Method): room air    Admit weight: 47.1 kg  Daily Weight in kG:  (31 May 2025)    Intake / Output:   05-29 @ 07:01 - 05-30 @ 07:00  --------------------------------------------------------  IN: 1650 mL / OUT: 1201 mL / NET: 449 mL    05-30 @ 07:01 - 05-31 @ 06:36  --------------------------------------------------------  IN: 760 mL / OUT: 1325 mL / NET: -565 mL      PE:   Oropharynx: no erythema or ulcerations   Oral Mucositis:                -                                     Grade: n/a  CVS: S1, S2 RRR   Lungs: CTA throughout bilaterally   Abdomen: + BS x 4, soft, NT, ND   Extremities: no edema   Gastric Mucositis:       -                                          Grade: n/a   Intestinal Mucositis:     -                                         Grade: n/a   Skin: no rash  TLC: CDI  Neuro: A&OX3      Labs:                 Cultures:   Culture Results:   No growth (05.27.25 @ 01:52)    Culture Results:     Culture Results:   No growth at 24 hours (05.26.25 @ 22:50)    Culture Results:   <10,000 CFU/mL Normal Urogenital Mari (05.24.25 @ 01:42)    Culture Results:   No growth at 4 days (05.23.25 @ 22:30)    Culture Results:   No growth at 4 days (05.23.25 @ 22:15)    Culture Results:   <10,000 CFU/mL Normal Urogenital Mari (05.21.25 @ 21:16)    Culture Results:   No growth at 5 days (05.21.25 @ 21:16)    Culture Results:   No growth at 5 days (05.21.25 @ 21:16)    Radiology:   ACC: [id number] EXAM:  XR CHEST PORTABLE URGENT 1V  PROCEDURE DATE:  05/26/2025    IMPRESSION:  Bibasilar hazy opacities.  Small-to-moderate left pleural effusion and small right-sided pleural   effusion.      Meds:   Antimicrobials:   acyclovir   Oral Tab/Cap 800 milliGRAM(s) Oral every 12 hours  levoFLOXacin  Tablet 500 milliGRAM(s) Oral every 24 hours  posaconazole DR Tablet 300 milliGRAM(s) Oral daily  vancomycin    Solution 125 milliGRAM(s) Oral every 12 hours      Heme / Onc:       GI:  aluminum hydroxide/magnesium hydroxide/simethicone Suspension 30 milliLiter(s) Oral every 4 hours PRN  loperamide 2 milliGRAM(s) Oral every 6 hours PRN  pantoprazole    Tablet 40 milliGRAM(s) Oral before breakfast  sodium bicarbonate Mouth Rinse 10 milliLiter(s) Swish and Spit five times a day  ursodiol Capsule 300 milliGRAM(s) Oral two times a day      Cardiovascular:       Immunologic:   filgrastim-aafi (NIVESTYM) Injectable 300 MICROGram(s) SubCutaneous daily  tacrolimus 1.5 milliGRAM(s) Oral <User Schedule>  tacrolimus 1 milliGRAM(s) Oral <User Schedule>      Other medications:   Biotene Dry Mouth Oral Rinse 5 milliLiter(s) Swish and Spit five times a day  chlorhexidine 0.12% Liquid 15 milliLiter(s) Swish and Spit two times a day  chlorhexidine 4% Liquid 1 Application(s) Topical <User Schedule>  DULoxetine 60 milliGRAM(s) Oral <User Schedule>  lidocaine   4% Patch 1 Patch Transdermal daily  phytonadione   Solution 5 milliGRAM(s) Oral <User Schedule>  tiZANidine 4 milliGRAM(s) Oral <User Schedule>      PRN:   acetaminophen     Tablet .. 650 milliGRAM(s) Oral every 6 hours PRN  aluminum hydroxide/magnesium hydroxide/simethicone Suspension 30 milliLiter(s) Oral every 4 hours PRN  FIRST- Mouthwash  BLM 15 milliLiter(s) Swish and Swallow four times a day PRN  loperamide 2 milliGRAM(s) Oral every 6 hours PRN  oxyCODONE    IR 5 milliGRAM(s) Oral every 6 hours PRN  prochlorperazine   Injectable 10 milliGRAM(s) IV Push every 6 hours PRN  sodium chloride 0.9% lock flush 10 milliLiter(s) IV Push every 1 hour PRN      A/P:  69 year old male with refractory DLBCL, status post R-CHOP x 6, HD MTX x 4, salvage polatuzumab / rituximab / revlimid x 4, admitted for a haplo-identical pbsct from his daughter with Flu / Bu / Cy / TBI prep regimen   Day +11  5/15- busulfan 2/2, fludarabine 2/5. No acute events overnight, vital signs are stable; continue keppra ppx for 24 hours post infusion of last dose of busulfan. No tylenol or posaconazole until day 0. Not neutropenic today, will d/c levaquin / vancomycin.   5/16 - fludarabine 3/5. VSS, afebrile. No acute events overnight.  5/17 - fludarabine 4/5. VSS and afebrile. No acute events overnight. Neutropenic today, started on ppx levaquin/vancomycin PO. No Tylenol or Azoles until day 0.  5/18 - fludarabine 5/5. VSS, remains afebrile. No acute events overnight. No Tylenol or Azoles until day 0.  5/19- TBI today. No acute events overnight. Vital signs are stable.   5/20 - will receive Haplo allogeneic SCT today. VSS, afebrile.  5/21- tolerated HPC infusion well. continue IVF hydration 24 hrs post cells, continue to monitor I&Os  5/23 - increased loose BM, imodium prn. VSS, afebrile and infectious work up negative - will discontinue zosyn and switch to levaquin ppx. Continue supportive care.  5/24- PTCY2/2. febrile overnight: f/u cultures. Broaden to Zosyn. s.p lasix today   5/25-continue PTCY hydration. Monitor I&O: s/p lasix today. afebrile: blood cultures negative Zosyn de-escalated to Levaquin   5/26- VSS, no acute events: awaiting count recovery   5/27 Febrile overight - empirically started on zosyn, BCx/UCx pending, CXR with bibasilar hazy opacites and small to mod pleural effusions. Monitor strict I/O's, consider lasix prn. VSS, afebrile currently.   5/28 - no acute events overnight, vital signs are stable. Intermittent dyspnea; requiring O2.   5/29 - no acute events overnight. VSS and remains afebrile. Next tacrolimus level on Saturday (5/31). No SOB symptoms and sating well on RA. Discontinued zosyn switched to levaquin ppx.  5/30 - rash of the bilateral thighs/lower back resolved. No acute events overnight and VSS/afebrile. Tacrolimus level on 5/31.   5/31 - no acute events overnight. VSS and remains afebrile. Tacrolimus level pending today. Continue with supportive care.    1. Infectious Disease:   acyclovir Oral Tab/Cap 800 milliGRAM(s) Oral every 12 hours  Levaquin 500mg oral daily  posaconazole DR Tablet 300 milliGRAM(s) Oral daily  vancomycin Solution 125 milliGRAM(s) Oral every 12 hours    2. VOD Prophylaxis:   ursodiol Capsule 300 milliGRAM(s) Oral two times a day    3. GI Prophylaxis:    pantoprazole Tablet 40 milliGRAM(s) Oral before breakfast    4. Mouthcare - NS / NaHCO3 rinses, Biotene; Skin care     5. GVHD prophylaxis   PTCy + 3, +4   tacrolimus  IVPB 0.9 milliGRAM(s) IV Intermittent every 24 hours  Abatacept days +5. +14, +28, +56     6. Transfuse & replete electrolytes prn     7. IV hydration, daily weights, strict I&O, prn diuresis     8. PO intake as tolerated, nutrition follow up as needed    9. Antiemetics, anti-diarrhea medications:   loperamide 2 milliGRAM(s) Oral every 6 hours PRN  prochlorperazine Injectable 10 milliGRAM(s) IV Push every 6 hours PRN    10. OOB as tolerated, physical therapy consult if needed     11. Monitor coags / fibrinogen 2x week, vitamin K as needed   phytonadione Solution 5 milliGRAM(s) Oral <User Schedule>    12. Monitor closely for clinical changes, monitor for fevers     13. Emotional support provided, plan of care discussed and questions addressed     14. Patient education done regarding plan of care, restrictions and discharge planning     15. Continue regular social work input     I have written the above note for Dr. Snider who performed service with me in the room.   Bhaskar Esquivel PA-C ([phone])    I have seen and examined patient with PA, I agree with above note as scribed.                              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 Patient with intermittent loose BM.   Tolerating PO intake and sitting OOB to chair/ambulation.    O: Vitals:   Vital Signs Last 24 Hrs  T(C): 36.7 (31 May 2025 05:26), Max: 36.7 (31 May 2025 05:26)  T(F): 98.1 (31 May 2025 05:26), Max: 98.1 (31 May 2025 05:26)  HR: 79 (31 May 2025 05:26) (75 - 79)  BP: 120/74 (31 May 2025 05:26) (117/79 - 124/84)  BP(mean): --  RR: 17 (31 May 2025 05:26) (16 - 17)  SpO2: 93% (31 May 2025 05:26) (93% - 98%)    Parameters below as of 31 May 2025 05:26  Patient On (Oxygen Delivery Method): room air    Admit weight: 47.1 kg  Daily Weight in k.1 (31 May 2025)    Intake / Output:   29 @ 07: @ 07:00  --------------------------------------------------------  IN: 1650 mL / OUT: 1201 mL / NET: 449 mL     @ 07: @ 06:36  --------------------------------------------------------  IN: 760 mL / OUT: 1325 mL / NET: -565 mL      PE:   Oropharynx: no erythema or ulcerations   Oral Mucositis:                -                                     Grade: n/a  CVS: S1, S2 RRR   Lungs: CTA throughout bilaterally   Abdomen: + BS x 4, soft, NT, ND   Extremities: no edema   Gastric Mucositis:       -                                          Grade: n/a   Intestinal Mucositis:     -                                         Grade: n/a   Skin: no rash  TLC: CDI  Neuro: A&OX3      Labs:   CBC Full  -  ( 31 May 2025 06:27 )  WBC Count : 0.03 K/uL  RBC Count : 2.68 M/uL  Hemoglobin : 7.6 g/dL  Hematocrit : 23.2 %  Platelet Count - Automated : 12 K/uL  Mean Cell Volume : 86.6 fl  Mean Cell Hemoglobin : 28.4 pg  Mean Cell Hemoglobin Concentration : 32.8 g/dL  Auto Neutrophil # : x  Auto Lymphocyte # : x  Auto Monocyte # : x  Auto Eosinophil # : x  Auto Basophil # : x  Auto Neutrophil % : x  Auto Lymphocyte % : x  Auto Monocyte % : x  Auto Eosinophil % : x  Auto Basophil % : x                          7.6    0.03  )-----------( 12       ( 31 May 2025 06:27 )             23.2         141  |  109[H]  |  14  ----------------------------<  106[H]  4.1   |  22  |  0.55    Ca    8.4      31 May 2025 06:27  Phos  2.2       Mg     1.9         TPro  4.6[L]  /  Alb  2.8[L]  /  TBili  0.4  /  DBili  x   /  AST  10  /  ALT  11  /  AlkPhos  93      Cultures:   Culture Results:   No growth (25 @ 01:52)    Culture Results:     Culture Results:   No growth at 24 hours (25 @ 22:50)    Culture Results:   <10,000 CFU/mL Normal Urogenital Mari (25 @ 01:42)    Culture Results:   No growth at 4 days (25 @ 22:30)    Culture Results:   No growth at 4 days (25 @ 22:15)    Culture Results:   <10,000 CFU/mL Normal Urogenital Mari (25 @ 21:16)    Culture Results:   No growth at 5 days (25 @ 21:16)    Culture Results:   No growth at 5 days (25 @ 21:16)    Radiology:   ACC: [id number] EXAM:  XR CHEST PORTABLE URGENT 1V  PROCEDURE DATE:  2025    IMPRESSION:  Bibasilar hazy opacities.  Small-to-moderate left pleural effusion and small right-sided pleural   effusion.      Meds:   Antimicrobials:   acyclovir   Oral Tab/Cap 800 milliGRAM(s) Oral every 12 hours  levoFLOXacin  Tablet 500 milliGRAM(s) Oral every 24 hours  posaconazole DR Tablet 300 milliGRAM(s) Oral daily  vancomycin    Solution 125 milliGRAM(s) Oral every 12 hours      Heme / Onc:       GI:  aluminum hydroxide/magnesium hydroxide/simethicone Suspension 30 milliLiter(s) Oral every 4 hours PRN  loperamide 2 milliGRAM(s) Oral every 6 hours PRN  pantoprazole    Tablet 40 milliGRAM(s) Oral before breakfast  sodium bicarbonate Mouth Rinse 10 milliLiter(s) Swish and Spit five times a day  ursodiol Capsule 300 milliGRAM(s) Oral two times a day      Cardiovascular:       Immunologic:   filgrastim-aafi (NIVESTYM) Injectable 300 MICROGram(s) SubCutaneous daily  tacrolimus 1.5 milliGRAM(s) Oral <User Schedule>  tacrolimus 1 milliGRAM(s) Oral <User Schedule>      Other medications:   Biotene Dry Mouth Oral Rinse 5 milliLiter(s) Swish and Spit five times a day  chlorhexidine 0.12% Liquid 15 milliLiter(s) Swish and Spit two times a day  chlorhexidine 4% Liquid 1 Application(s) Topical <User Schedule>  DULoxetine 60 milliGRAM(s) Oral <User Schedule>  lidocaine   4% Patch 1 Patch Transdermal daily  phytonadione   Solution 5 milliGRAM(s) Oral <User Schedule>  tiZANidine 4 milliGRAM(s) Oral <User Schedule>      PRN:   acetaminophen     Tablet .. 650 milliGRAM(s) Oral every 6 hours PRN  aluminum hydroxide/magnesium hydroxide/simethicone Suspension 30 milliLiter(s) Oral every 4 hours PRN  FIRST- Mouthwash  BLM 15 milliLiter(s) Swish and Swallow four times a day PRN  loperamide 2 milliGRAM(s) Oral every 6 hours PRN  oxyCODONE    IR 5 milliGRAM(s) Oral every 6 hours PRN  prochlorperazine   Injectable 10 milliGRAM(s) IV Push every 6 hours PRN  sodium chloride 0.9% lock flush 10 milliLiter(s) IV Push every 1 hour PRN      A/P:  69 year old male with refractory DLBCL, status post R-CHOP x 6, HD MTX x 4, salvage polatuzumab / rituximab / revlimid x 4, admitted for a haplo-identical pbsct from his daughter with Flu / Bu / Cy / TBI prep regimen   Day +11  5/15- busulfan 2/, fludarabine . No acute events overnight, vital signs are stable; continue keppra ppx for 24 hours post infusion of last dose of busulfan. No tylenol or posaconazole until day 0. Not neutropenic today, will d/c levaquin / vancomycin.    - fludarabine 3/5. VSS, afebrile. No acute events overnight.   - fludarabine . VSS and afebrile. No acute events overnight. Neutropenic today, started on ppx levaquin/vancomycin PO. No Tylenol or Azoles until day 0.   - fludarabine . VSS, remains afebrile. No acute events overnight. No Tylenol or Azoles until day 0.  - TBI today. No acute events overnight. Vital signs are stable.    - will receive Haplo allogeneic SCT today. VSS, afebrile.  - tolerated HPC infusion well. continue IVF hydration 24 hrs post cells, continue to monitor I&Os   - increased loose BM, imodium prn. VSS, afebrile and infectious work up negative - will discontinue zosyn and switch to levaquin ppx. Continue supportive care.  - PTCY2/2. febrile overnight: f/u cultures. Broaden to Zosyn. s.p lasix today   -continue PTCY hydration. Monitor I&O: s/p lasix today. afebrile: blood cultures negative Zosyn de-escalated to Levaquin   - VSS, no acute events: awaiting count recovery    Febrile overight - empirically started on zosyn, BCx/UCx pending, CXR with bibasilar hazy opacites and small to mod pleural effusions. Monitor strict I/O's, consider lasix prn. VSS, afebrile currently.    - no acute events overnight, vital signs are stable. Intermittent dyspnea; requiring O2.    - no acute events overnight. VSS and remains afebrile. Next tacrolimus level on Saturday (). No SOB symptoms and sating well on RA. Discontinued zosyn switched to levaquin ppx.   - rash of the bilateral thighs/lower back resolved. No acute events overnight and VSS/afebrile. Tacrolimus level on .    - no acute events overnight. VSS and remains afebrile. Tacrolimus level: 8.8 - maintain dose. Continue with supportive care.    1. Infectious Disease:   acyclovir Oral Tab/Cap 800 milliGRAM(s) Oral every 12 hours  Levaquin 500mg oral daily  posaconazole DR Tablet 300 milliGRAM(s) Oral daily  vancomycin Solution 125 milliGRAM(s) Oral every 12 hours    2. VOD Prophylaxis:   ursodiol Capsule 300 milliGRAM(s) Oral two times a day    3. GI Prophylaxis:    pantoprazole Tablet 40 milliGRAM(s) Oral before breakfast    4. Mouthcare - NS / NaHCO3 rinses, Biotene; Skin care     5. GVHD prophylaxis   PTCy + 3, +4   tacrolimus  IVPB 0.9 milliGRAM(s) IV Intermittent every 24 hours  Abatacept days +5. +14, +28, +56     6. Transfuse & replete electrolytes prn     7. IV hydration, daily weights, strict I&O, prn diuresis     8. PO intake as tolerated, nutrition follow up as needed    9. Antiemetics, anti-diarrhea medications:   loperamide 2 milliGRAM(s) Oral every 6 hours PRN  prochlorperazine Injectable 10 milliGRAM(s) IV Push every 6 hours PRN    10. OOB as tolerated, physical therapy consult if needed     11. Monitor coags / fibrinogen 2x week, vitamin K as needed   phytonadione Solution 5 milliGRAM(s) Oral <User Schedule>    12. Monitor closely for clinical changes, monitor for fevers     13. Emotional support provided, plan of care discussed and questions addressed     14. Patient education done regarding plan of care, restrictions and discharge planning     15. Continue regular social work input     I have written the above note for Dr. Snider who performed service with me in the room.   Bhaskar Esquivel PA-C ([phone])    I have seen and examined patient with PA, I agree with above note as scribed.

## 2025-06-01 LAB
ALBUMIN SERPL ELPH-MCNC: 2.9 G/DL — LOW (ref 3.3–5)
ALP SERPL-CCNC: 92 U/L — SIGNIFICANT CHANGE UP (ref 40–120)
ALT FLD-CCNC: 10 U/L — SIGNIFICANT CHANGE UP (ref 10–45)
ANION GAP SERPL CALC-SCNC: 9 MMOL/L — SIGNIFICANT CHANGE UP (ref 5–17)
AST SERPL-CCNC: 8 U/L — LOW (ref 10–40)
BILIRUB SERPL-MCNC: 0.5 MG/DL — SIGNIFICANT CHANGE UP (ref 0.2–1.2)
BUN SERPL-MCNC: 12 MG/DL — SIGNIFICANT CHANGE UP (ref 7–23)
CALCIUM SERPL-MCNC: 8.3 MG/DL — LOW (ref 8.4–10.5)
CHLORIDE SERPL-SCNC: 107 MMOL/L — SIGNIFICANT CHANGE UP (ref 96–108)
CO2 SERPL-SCNC: 22 MMOL/L — SIGNIFICANT CHANGE UP (ref 22–31)
CREAT SERPL-MCNC: 0.5 MG/DL — SIGNIFICANT CHANGE UP (ref 0.5–1.3)
CULTURE RESULTS: SIGNIFICANT CHANGE UP
CULTURE RESULTS: SIGNIFICANT CHANGE UP
EGFR: 112 ML/MIN/1.73M2 — SIGNIFICANT CHANGE UP
EGFR: 112 ML/MIN/1.73M2 — SIGNIFICANT CHANGE UP
GLUCOSE SERPL-MCNC: 104 MG/DL — HIGH (ref 70–99)
HCT VFR BLD CALC: 22.1 % — LOW (ref 39–50)
HGB BLD-MCNC: 7.3 G/DL — LOW (ref 13–17)
LDH SERPL L TO P-CCNC: 153 U/L — SIGNIFICANT CHANGE UP (ref 50–242)
MAGNESIUM SERPL-MCNC: 1.8 MG/DL — SIGNIFICANT CHANGE UP (ref 1.6–2.6)
MCHC RBC-ENTMCNC: 28.3 PG — SIGNIFICANT CHANGE UP (ref 27–34)
MCHC RBC-ENTMCNC: 33 G/DL — SIGNIFICANT CHANGE UP (ref 32–36)
MCV RBC AUTO: 85.7 FL — SIGNIFICANT CHANGE UP (ref 80–100)
NRBC BLD AUTO-RTO: 0 /100 WBCS — SIGNIFICANT CHANGE UP (ref 0–0)
PHOSPHATE SERPL-MCNC: 2.4 MG/DL — LOW (ref 2.5–4.5)
PLATELET # BLD AUTO: 8 K/UL — CRITICAL LOW (ref 150–400)
POTASSIUM SERPL-MCNC: 4.1 MMOL/L — SIGNIFICANT CHANGE UP (ref 3.5–5.3)
POTASSIUM SERPL-SCNC: 4.1 MMOL/L — SIGNIFICANT CHANGE UP (ref 3.5–5.3)
PROT SERPL-MCNC: 4.7 G/DL — LOW (ref 6–8.3)
RBC # BLD: 2.58 M/UL — LOW (ref 4.2–5.8)
RBC # FLD: 14.6 % — HIGH (ref 10.3–14.5)
SODIUM SERPL-SCNC: 138 MMOL/L — SIGNIFICANT CHANGE UP (ref 135–145)
SPECIMEN SOURCE: SIGNIFICANT CHANGE UP
SPECIMEN SOURCE: SIGNIFICANT CHANGE UP
WBC # BLD: 0.03 K/UL — CRITICAL LOW (ref 3.8–10.5)
WBC # FLD AUTO: 0.03 K/UL — CRITICAL LOW (ref 3.8–10.5)

## 2025-06-01 PROCEDURE — 99233 SBSQ HOSP IP/OBS HIGH 50: CPT | Mod: FS

## 2025-06-01 RX ADMIN — Medication 10 MILLILITER(S): at 19:37

## 2025-06-01 RX ADMIN — Medication 10 MILLILITER(S): at 12:10

## 2025-06-01 RX ADMIN — Medication 10 MILLILITER(S): at 08:09

## 2025-06-01 RX ADMIN — Medication 30 MILLILITER(S): at 08:05

## 2025-06-01 RX ADMIN — URSODIOL 300 MILLIGRAM(S): 300 CAPSULE ORAL at 05:13

## 2025-06-01 RX ADMIN — Medication 800 MILLIGRAM(S): at 05:12

## 2025-06-01 RX ADMIN — LIDOCAINE HYDROCHLORIDE 1 PATCH: 20 JELLY TOPICAL at 17:29

## 2025-06-01 RX ADMIN — Medication 125 MILLIGRAM(S): at 05:13

## 2025-06-01 RX ADMIN — TACROLIMUS 1 MILLIGRAM(S): 0.5 CAPSULE ORAL at 19:38

## 2025-06-01 RX ADMIN — TACROLIMUS 1.5 MILLIGRAM(S): 0.5 CAPSULE ORAL at 08:14

## 2025-06-01 RX ADMIN — POSACONAZOLE 300 MILLIGRAM(S): 100 TABLET, DELAYED RELEASE ORAL at 12:11

## 2025-06-01 RX ADMIN — Medication 15 MILLILITER(S): at 17:25

## 2025-06-01 RX ADMIN — TIZANIDINE 4 MILLIGRAM(S): 4 TABLET ORAL at 19:39

## 2025-06-01 RX ADMIN — Medication 5 MILLILITER(S): at 19:37

## 2025-06-01 RX ADMIN — Medication 125 MILLIGRAM(S): at 17:20

## 2025-06-01 RX ADMIN — Medication 10 MILLILITER(S): at 17:21

## 2025-06-01 RX ADMIN — Medication 800 MILLIGRAM(S): at 17:25

## 2025-06-01 RX ADMIN — Medication 40 MILLIGRAM(S): at 05:13

## 2025-06-01 RX ADMIN — OXYCODONE HYDROCHLORIDE 5 MILLIGRAM(S): 30 TABLET ORAL at 19:40

## 2025-06-01 RX ADMIN — URSODIOL 300 MILLIGRAM(S): 300 CAPSULE ORAL at 17:20

## 2025-06-01 RX ADMIN — LIDOCAINE HYDROCHLORIDE 1 PATCH: 20 JELLY TOPICAL at 19:37

## 2025-06-01 RX ADMIN — Medication 1 APPLICATION(S): at 08:10

## 2025-06-01 RX ADMIN — Medication 5 MILLILITER(S): at 12:10

## 2025-06-01 RX ADMIN — LIDOCAINE HYDROCHLORIDE 1 PATCH: 20 JELLY TOPICAL at 00:09

## 2025-06-01 RX ADMIN — OXYCODONE HYDROCHLORIDE 5 MILLIGRAM(S): 30 TABLET ORAL at 20:10

## 2025-06-01 RX ADMIN — DULOXETINE 60 MILLIGRAM(S): 20 CAPSULE, DELAYED RELEASE ORAL at 19:37

## 2025-06-01 RX ADMIN — Medication 5 MILLILITER(S): at 08:10

## 2025-06-01 RX ADMIN — Medication 5 MILLILITER(S): at 17:21

## 2025-06-01 RX ADMIN — FILGRASTIM 300 MICROGRAM(S): 300 INJECTION, SOLUTION INTRAVENOUS; SUBCUTANEOUS at 18:00

## 2025-06-01 RX ADMIN — Medication 15 MILLILITER(S): at 05:15

## 2025-06-01 NOTE — PROGRESS NOTE ADULT - NS ATTEND AMEND GEN_ALL_CORE FT
I rounded with the team including nurses, ACPs and PharmD.  I reviewed the data in depth.   pt seen and examined  prep flu/bu/cy/tbi  gvhd CAST...on iv tacro 0.9 mg..switched to po  1.5mg  in am and 1mg in pm..level in am    The patient is day +11 of haplo allogeneic HSCT. On nivestym  haplo storm, with capillary leak, cxr noted small bilateral pleural effusions  The patient is doing well overall; reports occ throat/stomach pain when eating; and occ sob and loose stool..improved further today  febrile, vital signs stable  The vitals are stable. mild mucositis. The line site is clean. The lungs are clear. There is no LE edema. 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  All questions answered. I rounded with the team including nurses, ACP's  I reviewed the data / labs  pt seen and examined  prep flu/bu/cy/tbi  gvhd CAST...on iv tacro 0.9 mg..switched to po  1.5mg  in am and 1mg in pm..level 8.8 5/31    The patient is day +12 of haplo allogeneic HSCT. On nivestym  haplo storm, with capillary leak resolved, cxr noted small bilateral pleural effusions  The patient is doing well overall; reports occ throat/stomach pain when eating; and occ loose stool..improved further today  afebrile, vital signs stable  mild oral mucositis improved. The line site is clean. The lungs are clear. There is trace edema...using ace bandages for gentle compression.... 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 on levaquin..zosyn to be restarted with spike...mrsa swab neg, can avoid vanco  PRN diuresis..weight stable  continue antimicrobial ppx and supportive care   encourage ambulation and PO intake.  All questions answered.  d/c instruction started

## 2025-06-01 NOTE — PROGRESS NOTE ADULT - SUBJECTIVE AND OBJECTIVE BOX
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    O: Vitals:   Vital Signs Last 24 Hrs  T(C): 36.3 (01 Jun 2025 05:10), Max: 37.1 (31 May 2025 12:01)  T(F): 97.4 (01 Jun 2025 05:10), Max: 98.7 (31 May 2025 12:01)  HR: 82 (01 Jun 2025 05:10) (76 - 86)  BP: 111/67 (01 Jun 2025 05:10) (96/63 - 142/87)  BP(mean): --  RR: 17 (01 Jun 2025 05:10) (17 - 19)  SpO2: 94% (01 Jun 2025 05:10) (94% - 100%)    Parameters below as of 01 Jun 2025 05:10  Patient On (Oxygen Delivery Method): room air    Admit weight: 47.1 kg   Daily Weight in kG:  (1 June 2025)    Intake / Output:   05-31 @ 07:01  -  06-01 @ 07:00  --------------------------------------------------------  IN: 780 mL / OUT: 1340 mL / NET: -560 mL    PE:   Oropharynx: no erythema or ulcerations   Oral Mucositis:                -                                     Grade: n/a  CVS: S1, S2 RRR   Lungs: CTA throughout bilaterally   Abdomen: + BS x 4, soft, NT, ND   Extremities: no edema   Gastric Mucositis:       -                                          Grade: n/a   Intestinal Mucositis:     -                                         Grade: n/a   Skin: no rash  TLC: CDI  Neuro: A&OX3      Labs:   CBC Full  -  ( 01 Jun 2025 06:25 )  WBC Count : 0.03 K/uL  Hemoglobin : 7.3 g/dL  Hematocrit : 22.1 %  Platelet Count - Automated : 8 K/uL  Mean Cell Volume : 85.7 fl  Mean Cell Hemoglobin : 28.3 pg  Mean Cell Hemoglobin Concentration : 33.0 g/dL  Auto Neutrophil # : x  Auto Lymphocyte # : x  Auto Monocyte # : x  Auto Eosinophil # : x  Auto Basophil # : x  Auto Neutrophil % : x  Auto Lymphocyte % : x  Auto Monocyte % : x  Auto Eosinophil % : x  Auto Basophil % : x                          7.3    0.03  )-----------( 8        ( 01 Jun 2025 06:25 )             22.1     06-01    138  |  107  |  12  ----------------------------<  104[H]  4.1   |  22  |  0.50    Ca    8.3[L]      01 Jun 2025 06:25  Phos  2.4     06-01  Mg     1.8     06-01    TPro  4.7[L]  /  Alb  2.9[L]  /  TBili  0.5  /  DBili  x   /  AST  8[L]  /  ALT  10  /  AlkPhos  92  06-01      LIVER FUNCTIONS - ( 01 Jun 2025 06:25 )  Alb: 2.9 g/dL / Pro: 4.7 g/dL / ALK PHOS: 92 U/L / ALT: 10 U/L / AST: 8 U/L / GGT: x           Lactate Dehydrogenase, Serum: 153 U/L (06-01 @ 06:25)      Cultures:   Culture Results:   No growth (05.27.25 @ 01:52)    Culture Results:     Culture Results:   No growth at 24 hours (05.26.25 @ 22:50)    Culture Results:   <10,000 CFU/mL Normal Urogenital Mari (05.24.25 @ 01:42)    Culture Results:   No growth at 4 days (05.23.25 @ 22:30)    Culture Results:   No growth at 4 days (05.23.25 @ 22:15)    Culture Results:   <10,000 CFU/mL Normal Urogenital Mari (05.21.25 @ 21:16)    Culture Results:   No growth at 5 days (05.21.25 @ 21:16)    Culture Results:   No growth at 5 days (05.21.25 @ 21:16)    Radiology:   ACC: [id number] EXAM:  XR CHEST PORTABLE URGENT 1V  PROCEDURE DATE:  05/26/2025    IMPRESSION:  Bibasilar hazy opacities.  Small-to-moderate left pleural effusion and small right-sided pleural   effusion.  Cultures:   Culture Results:   No growth (05.27.25 @ 01:52)    Culture Results:     Culture Results:   No growth at 24 hours (05.26.25 @ 22:50)    Culture Results:   <10,000 CFU/mL Normal Urogenital Mari (05.24.25 @ 01:42)    Culture Results:   No growth at 4 days (05.23.25 @ 22:30)    Culture Results:   No growth at 4 days (05.23.25 @ 22:15)    Culture Results:   <10,000 CFU/mL Normal Urogenital Mari (05.21.25 @ 21:16)    Culture Results:   No growth at 5 days (05.21.25 @ 21:16)    Culture Results:   No growth at 5 days (05.21.25 @ 21:16)    Radiology:   ACC: [id number] EXAM:  XR CHEST PORTABLE URGENT 1V  PROCEDURE DATE:  05/26/2025    IMPRESSION:  Bibasilar hazy opacities.  Small-to-moderate left pleural effusion and small right-sided pleural   effusion.      Meds:   Antimicrobials:   acyclovir   Oral Tab/Cap 800 milliGRAM(s) Oral every 12 hours  levoFLOXacin  Tablet 500 milliGRAM(s) Oral every 24 hours  posaconazole DR Tablet 300 milliGRAM(s) Oral daily  vancomycin    Solution 125 milliGRAM(s) Oral every 12 hours      Heme / Onc:       GI:  aluminum hydroxide/magnesium hydroxide/simethicone Suspension 30 milliLiter(s) Oral every 4 hours PRN  loperamide 2 milliGRAM(s) Oral every 6 hours PRN  pantoprazole    Tablet 40 milliGRAM(s) Oral before breakfast  sodium bicarbonate Mouth Rinse 10 milliLiter(s) Swish and Spit five times a day  ursodiol Capsule 300 milliGRAM(s) Oral two times a day      Cardiovascular:       Immunologic:   filgrastim-aafi (NIVESTYM) Injectable 300 MICROGram(s) SubCutaneous daily  tacrolimus 1.5 milliGRAM(s) Oral <User Schedule>  tacrolimus 1 milliGRAM(s) Oral <User Schedule>      Other medications:   Biotene Dry Mouth Oral Rinse 5 milliLiter(s) Swish and Spit five times a day  chlorhexidine 0.12% Liquid 15 milliLiter(s) Swish and Spit two times a day  chlorhexidine 4% Liquid 1 Application(s) Topical <User Schedule>  DULoxetine 60 milliGRAM(s) Oral <User Schedule>  lidocaine   4% Patch 1 Patch Transdermal daily  phytonadione   Solution 5 milliGRAM(s) Oral <User Schedule>  tiZANidine 4 milliGRAM(s) Oral <User Schedule>      PRN:   acetaminophen     Tablet .. 650 milliGRAM(s) Oral every 6 hours PRN  aluminum hydroxide/magnesium hydroxide/simethicone Suspension 30 milliLiter(s) Oral every 4 hours PRN  benzocaine/menthol Lozenge 1 Lozenge Oral every 6 hours PRN  FIRST- Mouthwash  BLM 15 milliLiter(s) Swish and Swallow four times a day PRN  loperamide 2 milliGRAM(s) Oral every 6 hours PRN  oxyCODONE    IR 5 milliGRAM(s) Oral every 6 hours PRN  prochlorperazine   Injectable 10 milliGRAM(s) IV Push every 6 hours PRN  sodium chloride 0.9% lock flush 10 milliLiter(s) IV Push every 1 hour PRN    A/P:  69 year old male with refractory DLBCL, status post R-CHOP x 6, HD MTX x 4, salvage polatuzumab / rituximab / revlimid x 4, admitted for a haplo-identical pbsct from his daughter with Flu / Bu / Cy / TBI prep regimen   Day +12  5/15- busulfan 2/2, fludarabine 2/5. No acute events overnight, vital signs are stable; continue keppra ppx for 24 hours post infusion of last dose of busulfan. No tylenol or posaconazole until day 0. Not neutropenic today, will d/c levaquin / vancomycin.   5/16 - fludarabine 3/5. VSS, afebrile. No acute events overnight.  5/17 - fludarabine 4/5. VSS and afebrile. No acute events overnight. Neutropenic today, started on ppx levaquin/vancomycin PO. No Tylenol or Azoles until day 0.  5/18 - fludarabine 5/5. VSS, remains afebrile. No acute events overnight. No Tylenol or Azoles until day 0.  5/19- TBI today. No acute events overnight. Vital signs are stable.   5/20 - will receive Haplo allogeneic SCT today. VSS, afebrile.  5/21- tolerated HPC infusion well. continue IVF hydration 24 hrs post cells, continue to monitor I&Os  5/23 - increased loose BM, imodium prn. VSS, afebrile and infectious work up negative - will discontinue zosyn and switch to levaquin ppx. Continue supportive care.  5/24- PTCY2/2. febrile overnight: f/u cultures. Broaden to Zosyn. s.p lasix today   5/25-continue PTCY hydration. Monitor I&O: s/p lasix today. afebrile: blood cultures negative Zosyn de-escalated to Levaquin   5/26- VSS, no acute events: awaiting count recovery   5/27 Febrile overight - empirically started on zosyn, BCx/UCx pending, CXR with bibasilar hazy opacites and small to mod pleural effusions. Monitor strict I/O's, consider lasix prn. VSS, afebrile currently.   5/28 - no acute events overnight, vital signs are stable. Intermittent dyspnea; requiring O2.   5/29 - no acute events overnight. VSS and remains afebrile. Next tacrolimus level on Saturday (5/31). No SOB symptoms and sating well on RA. Discontinued zosyn switched to levaquin ppx.  5/30 - rash of the bilateral thighs/lower back resolved. No acute events overnight and VSS/afebrile. Tacrolimus level on 5/31.   5/31 - no acute events overnight. VSS and remains afebrile. Tacrolimus level: 8.8 - maintain dose. Continue with supportive care.  6/1 VSS, remains afebrile. No acute events overnight. Continue with supportive care.    1. Infectious Disease:   acyclovir Oral Tab/Cap 800 milliGRAM(s) Oral every 12 hours  Levaquin 500mg oral daily  posaconazole DR Tablet 300 milliGRAM(s) Oral daily  vancomycin Solution 125 milliGRAM(s) Oral every 12 hours    2. VOD Prophylaxis:   ursodiol Capsule 300 milliGRAM(s) Oral two times a day    3. GI Prophylaxis:    pantoprazole Tablet 40 milliGRAM(s) Oral before breakfast    4. Mouthcare - NS / NaHCO3 rinses, Biotene; Skin care     5. GVHD prophylaxis   PTCy + 3, +4   tacrolimus  IVPB 0.9 milliGRAM(s) IV Intermittent every 24 hours  Abatacept days +5. +14, +28, +56     6. Transfuse & replete electrolytes prn   6/1 1U Plt given.    7. IV hydration, daily weights, strict I&O, prn diuresis     8. PO intake as tolerated, nutrition follow up as needed    9. Antiemetics, anti-diarrhea medications:   loperamide 2 milliGRAM(s) Oral every 6 hours PRN  prochlorperazine Injectable 10 milliGRAM(s) IV Push every 6 hours PRN    10. OOB as tolerated, physical therapy consult if needed     11. Monitor coags / fibrinogen 2x week, vitamin K as needed   phytonadione Solution 5 milliGRAM(s) Oral <User Schedule>    12. Monitor closely for clinical changes, monitor for fevers     13. Emotional support provided, plan of care discussed and questions addressed     14. Patient education done regarding plan of care, restrictions and discharge planning     15. Continue regular social work input     I have written the above note for Dr. Snider who performed service with me in the room.   Bhaskar Esquivel PA-C ([phone])    I have seen and examined patient with PA, I agree with above note as scribed.                    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 Admits to mild mouth pain.  Able to tolerate PO intake and OOB to chair/ambulation daily.    O: Vitals:   Vital Signs Last 24 Hrs  T(C): 36.3 (2025 05:10), Max: 37.1 (31 May 2025 12:01)  T(F): 97.4 (2025 05:10), Max: 98.7 (31 May 2025 12:01)  HR: 82 (2025 05:10) (76 - 86)  BP: 111/67 (2025 05:10) (96/63 - 142/87)  BP(mean): --  RR: 17 (2025 05:10) (17 - 19)  SpO2: 94% (2025 05:10) (94% - 100%)    Parameters below as of 2025 05:10  Patient On (Oxygen Delivery Method): room air    Admit weight: 47.1 kg   Daily Weight in k.4 (2025)    Intake / Output:    @ 07:01  -  06-01 @ 07:00  --------------------------------------------------------  IN: 780 mL / OUT: 1340 mL / NET: -560 mL    PE:   Oropharynx: no erythema or ulcerations   Oral Mucositis: +                                                    stGstrstastdstest:st st1st CVS: S1, S2 RRR   Lungs: CTA throughout bilaterally   Abdomen: + BS x 4, soft, NT, ND   Extremities: no edema   Gastric Mucositis:       -                                          Grade: n/a   Intestinal Mucositis:     -                                         Grade: n/a   Skin: no rash  TLC: CDI  Neuro: A&OX3      Labs:   CBC Full  -  ( 2025 06:25 )  WBC Count : 0.03 K/uL  Hemoglobin : 7.3 g/dL  Hematocrit : 22.1 %  Platelet Count - Automated : 8 K/uL  Mean Cell Volume : 85.7 fl  Mean Cell Hemoglobin : 28.3 pg  Mean Cell Hemoglobin Concentration : 33.0 g/dL  Auto Neutrophil # : x  Auto Lymphocyte # : x  Auto Monocyte # : x  Auto Eosinophil # : x  Auto Basophil # : x  Auto Neutrophil % : x  Auto Lymphocyte % : x  Auto Monocyte % : x  Auto Eosinophil % : x  Auto Basophil % : x                          7.3    0.03  )-----------( 8        ( 2025 06:25 )             22.1     06-    138  |  107  |  12  ----------------------------<  104[H]  4.1   |  22  |  0.50    Ca    8.3[L]      2025 06:25  Phos  2.4     06-  Mg     1.8         TPro  4.7[L]  /  Alb  2.9[L]  /  TBili  0.5  /  DBili  x   /  AST  8[L]  /  ALT  10  /  AlkPhos  92  -01      LIVER FUNCTIONS - ( 2025 06:25 )  Alb: 2.9 g/dL / Pro: 4.7 g/dL / ALK PHOS: 92 U/L / ALT: 10 U/L / AST: 8 U/L / GGT: x           Lactate Dehydrogenase, Serum: 153 U/L ( @ 06:25)      Cultures:   Culture Results:   No growth (25 @ 01:52)    Culture Results:     Culture Results:   No growth at 24 hours (25 @ 22:50)    Culture Results:   <10,000 CFU/mL Normal Urogenital Mari (25 @ 01:42)    Culture Results:   No growth at 4 days (25 @ 22:30)    Culture Results:   No growth at 4 days (25 @ 22:15)    Culture Results:   <10,000 CFU/mL Normal Urogenital Mari (25 @ 21:16)    Culture Results:   No growth at 5 days (25 @ 21:16)    Culture Results:   No growth at 5 days (25 @ 21:16)    Radiology:   ACC: [id number] EXAM:  XR CHEST PORTABLE URGENT 1V  PROCEDURE DATE:  2025    IMPRESSION:  Bibasilar hazy opacities.  Small-to-moderate left pleural effusion and small right-sided pleural   effusion.  Cultures:   Culture Results:   No growth (25 @ 01:52)    Culture Results:     Culture Results:   No growth at 24 hours (25 @ 22:50)    Culture Results:   <10,000 CFU/mL Normal Urogenital Mari (25 @ 01:42)    Culture Results:   No growth at 4 days (25 @ 22:30)    Culture Results:   No growth at 4 days (25 @ 22:15)    Culture Results:   <10,000 CFU/mL Normal Urogenital Mari (25 @ 21:16)    Culture Results:   No growth at 5 days (25 @ 21:16)    Culture Results:   No growth at 5 days (25 @ 21:16)    Radiology:   ACC: [id number] EXAM:  XR CHEST PORTABLE URGENT 1V  PROCEDURE DATE:  2025    IMPRESSION:  Bibasilar hazy opacities.  Small-to-moderate left pleural effusion and small right-sided pleural   effusion.      Meds:   Antimicrobials:   acyclovir   Oral Tab/Cap 800 milliGRAM(s) Oral every 12 hours  levoFLOXacin  Tablet 500 milliGRAM(s) Oral every 24 hours  posaconazole DR Tablet 300 milliGRAM(s) Oral daily  vancomycin    Solution 125 milliGRAM(s) Oral every 12 hours      Heme / Onc:       GI:  aluminum hydroxide/magnesium hydroxide/simethicone Suspension 30 milliLiter(s) Oral every 4 hours PRN  loperamide 2 milliGRAM(s) Oral every 6 hours PRN  pantoprazole    Tablet 40 milliGRAM(s) Oral before breakfast  sodium bicarbonate Mouth Rinse 10 milliLiter(s) Swish and Spit five times a day  ursodiol Capsule 300 milliGRAM(s) Oral two times a day      Cardiovascular:       Immunologic:   filgrastim-aafi (NIVESTYM) Injectable 300 MICROGram(s) SubCutaneous daily  tacrolimus 1.5 milliGRAM(s) Oral <User Schedule>  tacrolimus 1 milliGRAM(s) Oral <User Schedule>      Other medications:   Biotene Dry Mouth Oral Rinse 5 milliLiter(s) Swish and Spit five times a day  chlorhexidine 0.12% Liquid 15 milliLiter(s) Swish and Spit two times a day  chlorhexidine 4% Liquid 1 Application(s) Topical <User Schedule>  DULoxetine 60 milliGRAM(s) Oral <User Schedule>  lidocaine   4% Patch 1 Patch Transdermal daily  phytonadione   Solution 5 milliGRAM(s) Oral <User Schedule>  tiZANidine 4 milliGRAM(s) Oral <User Schedule>      PRN:   acetaminophen     Tablet .. 650 milliGRAM(s) Oral every 6 hours PRN  aluminum hydroxide/magnesium hydroxide/simethicone Suspension 30 milliLiter(s) Oral every 4 hours PRN  benzocaine/menthol Lozenge 1 Lozenge Oral every 6 hours PRN  FIRST- Mouthwash  BLM 15 milliLiter(s) Swish and Swallow four times a day PRN  loperamide 2 milliGRAM(s) Oral every 6 hours PRN  oxyCODONE    IR 5 milliGRAM(s) Oral every 6 hours PRN  prochlorperazine   Injectable 10 milliGRAM(s) IV Push every 6 hours PRN  sodium chloride 0.9% lock flush 10 milliLiter(s) IV Push every 1 hour PRN    A/P:  69 year old male with refractory DLBCL, status post R-CHOP x 6, HD MTX x 4, salvage polatuzumab / rituximab / revlimid x 4, admitted for a haplo-identical pbsct from his daughter with Flu / Bu / Cy / TBI prep regimen   Day +12  5/15- busulfan 2/2, fludarabine 2/5. No acute events overnight, vital signs are stable; continue keppra ppx for 24 hours post infusion of last dose of busulfan. No tylenol or posaconazole until day 0. Not neutropenic today, will d/c levaquin / vancomycin.    - fludarabine 3/. VSS, afebrile. No acute events overnight.   - fludarabine . VSS and afebrile. No acute events overnight. Neutropenic today, started on ppx levaquin/vancomycin PO. No Tylenol or Azoles until day 0.   - fludarabine . VSS, remains afebrile. No acute events overnight. No Tylenol or Azoles until day 0.  - TBI today. No acute events overnight. Vital signs are stable.    - will receive Haplo allogeneic SCT today. VSS, afebrile.  - tolerated HPC infusion well. continue IVF hydration 24 hrs post cells, continue to monitor I&Os   - increased loose BM, imodium prn. VSS, afebrile and infectious work up negative - will discontinue zosyn and switch to levaquin ppx. Continue supportive care.  - PTCY2/2. febrile overnight: f/u cultures. Broaden to Zosyn. s.p lasix today   -continue PTCY hydration. Monitor I&O: s/p lasix today. afebrile: blood cultures negative Zosyn de-escalated to Levaquin   - VSS, no acute events: awaiting count recovery    Febrile overight - empirically started on zosyn, BCx/UCx pending, CXR with bibasilar hazy opacites and small to mod pleural effusions. Monitor strict I/O's, consider lasix prn. VSS, afebrile currently.    - no acute events overnight, vital signs are stable. Intermittent dyspnea; requiring O2.    - no acute events overnight. VSS and remains afebrile. Next tacrolimus level on Saturday (). No SOB symptoms and sating well on RA. Discontinued zosyn switched to levaquin ppx.   - rash of the bilateral thighs/lower back resolved. No acute events overnight and VSS/afebrile. Tacrolimus level on .    - no acute events overnight. VSS and remains afebrile. Tacrolimus level: 8.8 - maintain dose. Continue with supportive care.   VSS, remains afebrile. No acute events overnight. Continue with supportive care.    1. Infectious Disease:   acyclovir Oral Tab/Cap 800 milliGRAM(s) Oral every 12 hours  Levaquin 500mg oral daily  posaconazole DR Tablet 300 milliGRAM(s) Oral daily  vancomycin Solution 125 milliGRAM(s) Oral every 12 hours    2. VOD Prophylaxis:   ursodiol Capsule 300 milliGRAM(s) Oral two times a day    3. GI Prophylaxis:    pantoprazole Tablet 40 milliGRAM(s) Oral before breakfast    4. Mouthcare - NS / NaHCO3 rinses, Biotene; Skin care     5. GVHD prophylaxis   PTCy + 3, +4   tacrolimus  IVPB 0.9 milliGRAM(s) IV Intermittent every 24 hours  Abatacept days +5. +14, +28, +56     6. Transfuse & replete electrolytes prn   6/1 1U Plt given.    7. IV hydration, daily weights, strict I&O, prn diuresis     8. PO intake as tolerated, nutrition follow up as needed    9. Antiemetics, anti-diarrhea medications:   loperamide 2 milliGRAM(s) Oral every 6 hours PRN  prochlorperazine Injectable 10 milliGRAM(s) IV Push every 6 hours PRN    10. OOB as tolerated, physical therapy consult if needed     11. Monitor coags / fibrinogen 2x week, vitamin K as needed   phytonadione Solution 5 milliGRAM(s) Oral <User Schedule>    12. Monitor closely for clinical changes, monitor for fevers     13. Emotional support provided, plan of care discussed and questions addressed     14. Patient education done regarding plan of care, restrictions and discharge planning     15. Continue regular social work input     I have written the above note for Dr. Snider who performed service with me in the room.   Bhaskar Esquivel PA-C ([phone])

## 2025-06-02 LAB
ALBUMIN SERPL ELPH-MCNC: 2.9 G/DL — LOW (ref 3.3–5)
ALP SERPL-CCNC: 93 U/L — SIGNIFICANT CHANGE UP (ref 40–120)
ALT FLD-CCNC: 8 U/L — LOW (ref 10–45)
ANION GAP SERPL CALC-SCNC: 9 MMOL/L — SIGNIFICANT CHANGE UP (ref 5–17)
APTT BLD: 32.1 SEC — SIGNIFICANT CHANGE UP (ref 26.1–36.8)
AST SERPL-CCNC: 11 U/L — SIGNIFICANT CHANGE UP (ref 10–40)
BILIRUB SERPL-MCNC: 0.6 MG/DL — SIGNIFICANT CHANGE UP (ref 0.2–1.2)
BUN SERPL-MCNC: 12 MG/DL — SIGNIFICANT CHANGE UP (ref 7–23)
CALCIUM SERPL-MCNC: 8.3 MG/DL — LOW (ref 8.4–10.5)
CHLORIDE SERPL-SCNC: 108 MMOL/L — SIGNIFICANT CHANGE UP (ref 96–108)
CO2 SERPL-SCNC: 23 MMOL/L — SIGNIFICANT CHANGE UP (ref 22–31)
CREAT SERPL-MCNC: 0.53 MG/DL — SIGNIFICANT CHANGE UP (ref 0.5–1.3)
EGFR: 110 ML/MIN/1.73M2 — SIGNIFICANT CHANGE UP
EGFR: 110 ML/MIN/1.73M2 — SIGNIFICANT CHANGE UP
GLUCOSE SERPL-MCNC: 99 MG/DL — SIGNIFICANT CHANGE UP (ref 70–99)
HCT VFR BLD CALC: 22.4 % — LOW (ref 39–50)
HGB BLD-MCNC: 7.3 G/DL — LOW (ref 13–17)
INR BLD: 1.04 RATIO — SIGNIFICANT CHANGE UP (ref 0.85–1.16)
LDH SERPL L TO P-CCNC: 165 U/L — SIGNIFICANT CHANGE UP (ref 50–242)
MAGNESIUM SERPL-MCNC: 1.7 MG/DL — SIGNIFICANT CHANGE UP (ref 1.6–2.6)
MCHC RBC-ENTMCNC: 27.9 PG — SIGNIFICANT CHANGE UP (ref 27–34)
MCHC RBC-ENTMCNC: 32.6 G/DL — SIGNIFICANT CHANGE UP (ref 32–36)
MCV RBC AUTO: 85.5 FL — SIGNIFICANT CHANGE UP (ref 80–100)
NRBC BLD AUTO-RTO: 0 /100 WBCS — SIGNIFICANT CHANGE UP (ref 0–0)
PHOSPHATE SERPL-MCNC: 2.5 MG/DL — SIGNIFICANT CHANGE UP (ref 2.5–4.5)
PLATELET # BLD AUTO: 30 K/UL — LOW (ref 150–400)
POTASSIUM SERPL-MCNC: 3.9 MMOL/L — SIGNIFICANT CHANGE UP (ref 3.5–5.3)
POTASSIUM SERPL-SCNC: 3.9 MMOL/L — SIGNIFICANT CHANGE UP (ref 3.5–5.3)
PROT SERPL-MCNC: 4.9 G/DL — LOW (ref 6–8.3)
PROTHROM AB SERPL-ACNC: 11.9 SEC — SIGNIFICANT CHANGE UP (ref 9.9–13.4)
RBC # BLD: 2.62 M/UL — LOW (ref 4.2–5.8)
RBC # FLD: 14.3 % — SIGNIFICANT CHANGE UP (ref 10.3–14.5)
SODIUM SERPL-SCNC: 140 MMOL/L — SIGNIFICANT CHANGE UP (ref 135–145)
TACROLIMUS SERPL-MCNC: 7.8 NG/ML — SIGNIFICANT CHANGE UP
WBC # BLD: 0.04 K/UL — CRITICAL LOW (ref 3.8–10.5)
WBC # FLD AUTO: 0.04 K/UL — CRITICAL LOW (ref 3.8–10.5)

## 2025-06-02 PROCEDURE — 99233 SBSQ HOSP IP/OBS HIGH 50: CPT | Mod: FS

## 2025-06-02 RX ORDER — SULFAMETHOXAZOLE/TRIMETHOPRIM 800-160 MG
1 TABLET ORAL
Qty: 0 | Refills: 0 | DISCHARGE

## 2025-06-02 RX ORDER — TACROLIMUS 0.5 MG/1
1.5 CAPSULE ORAL
Refills: 0 | Status: DISCONTINUED | OUTPATIENT
Start: 2025-06-02 | End: 2025-06-05

## 2025-06-02 RX ORDER — ONDANSETRON HCL/PF 4 MG/2 ML
1 VIAL (ML) INJECTION
Qty: 0 | Refills: 0 | DISCHARGE

## 2025-06-02 RX ORDER — POSACONAZOLE 100 MG/1
2 TABLET, DELAYED RELEASE ORAL
Qty: 0 | Refills: 0 | DISCHARGE
Start: 2025-06-02

## 2025-06-02 RX ORDER — DULOXETINE 20 MG/1
1 CAPSULE, DELAYED RELEASE ORAL
Qty: 0 | Refills: 0 | DISCHARGE
Start: 2025-06-02

## 2025-06-02 RX ORDER — URSODIOL 300 MG/1
1 CAPSULE ORAL
Qty: 0 | Refills: 0 | DISCHARGE
Start: 2025-06-02

## 2025-06-02 RX ADMIN — OXYCODONE HYDROCHLORIDE 5 MILLIGRAM(S): 30 TABLET ORAL at 22:00

## 2025-06-02 RX ADMIN — TACROLIMUS 1.5 MILLIGRAM(S): 0.5 CAPSULE ORAL at 19:31

## 2025-06-02 RX ADMIN — Medication 5 MILLILITER(S): at 17:52

## 2025-06-02 RX ADMIN — Medication 800 MILLIGRAM(S): at 05:52

## 2025-06-02 RX ADMIN — LIDOCAINE HYDROCHLORIDE 1 PATCH: 20 JELLY TOPICAL at 05:53

## 2025-06-02 RX ADMIN — OXYCODONE HYDROCHLORIDE 5 MILLIGRAM(S): 30 TABLET ORAL at 15:22

## 2025-06-02 RX ADMIN — Medication 650 MILLIGRAM(S): at 20:00

## 2025-06-02 RX ADMIN — LIDOCAINE HYDROCHLORIDE 1 PATCH: 20 JELLY TOPICAL at 19:26

## 2025-06-02 RX ADMIN — Medication 10 MILLILITER(S): at 17:52

## 2025-06-02 RX ADMIN — Medication 125 MILLIGRAM(S): at 17:52

## 2025-06-02 RX ADMIN — Medication 5 MILLILITER(S): at 08:36

## 2025-06-02 RX ADMIN — Medication 800 MILLIGRAM(S): at 17:53

## 2025-06-02 RX ADMIN — Medication 15 MILLILITER(S): at 17:52

## 2025-06-02 RX ADMIN — OXYCODONE HYDROCHLORIDE 5 MILLIGRAM(S): 30 TABLET ORAL at 14:34

## 2025-06-02 RX ADMIN — LIDOCAINE HYDROCHLORIDE 1 PATCH: 20 JELLY TOPICAL at 12:52

## 2025-06-02 RX ADMIN — Medication 10 MILLILITER(S): at 12:45

## 2025-06-02 RX ADMIN — Medication 40 MILLIGRAM(S): at 05:54

## 2025-06-02 RX ADMIN — URSODIOL 300 MILLIGRAM(S): 300 CAPSULE ORAL at 17:52

## 2025-06-02 RX ADMIN — OXYCODONE HYDROCHLORIDE 5 MILLIGRAM(S): 30 TABLET ORAL at 21:30

## 2025-06-02 RX ADMIN — Medication 650 MILLIGRAM(S): at 19:26

## 2025-06-02 RX ADMIN — POSACONAZOLE 300 MILLIGRAM(S): 100 TABLET, DELAYED RELEASE ORAL at 12:46

## 2025-06-02 RX ADMIN — Medication 5 MILLILITER(S): at 12:45

## 2025-06-02 RX ADMIN — DULOXETINE 60 MILLIGRAM(S): 20 CAPSULE, DELAYED RELEASE ORAL at 19:29

## 2025-06-02 RX ADMIN — Medication 5 MILLILITER(S): at 19:25

## 2025-06-02 RX ADMIN — Medication 10 MILLILITER(S): at 19:25

## 2025-06-02 RX ADMIN — Medication 10 MILLILITER(S): at 08:36

## 2025-06-02 RX ADMIN — URSODIOL 300 MILLIGRAM(S): 300 CAPSULE ORAL at 05:53

## 2025-06-02 RX ADMIN — Medication 30 MILLILITER(S): at 20:08

## 2025-06-02 RX ADMIN — FILGRASTIM 300 MICROGRAM(S): 300 INJECTION, SOLUTION INTRAVENOUS; SUBCUTANEOUS at 15:22

## 2025-06-02 RX ADMIN — Medication 15 MILLILITER(S): at 05:52

## 2025-06-02 RX ADMIN — Medication 125 MILLIGRAM(S): at 05:53

## 2025-06-02 RX ADMIN — TIZANIDINE 4 MILLIGRAM(S): 4 TABLET ORAL at 19:29

## 2025-06-02 RX ADMIN — TACROLIMUS 1.5 MILLIGRAM(S): 0.5 CAPSULE ORAL at 08:37

## 2025-06-02 RX ADMIN — Medication 1 APPLICATION(S): at 08:36

## 2025-06-02 RX ADMIN — Medication 5 MILLIGRAM(S): at 09:31

## 2025-06-02 NOTE — PROGRESS NOTE ADULT - SUBJECTIVE AND OBJECTIVE BOX
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 with Flu / Bu / Cy / TBI prep regimen for treatment of refractory DLBCL    Disease: DLBCL  Type of transplant: Haplo-identical (daughter)   Prep Regimen: Flu / Bu / Cy / TBI   ABO / CMV:   Recipient: A POS/ NEG   Donor: A POS / NEG     S: Patient seen and examined with HPC Transplant Team:     O: Vitals:   Vital Signs Last 24 Hrs  T(C): 36.8 (2025 05:28), Max: 37.1 (2025 20:25)  T(F): 98.2 (2025 05:28), Max: 98.8 (2025 20:25)  HR: 76 (:) (76 - 88)  BP: 111/70 (:28) (111/70 - 129/87)  BP(mean): --  RR: 19 (:) (16 - 19)  SpO2: 95% (:) (95% - 100%)    Parameters below as of 2025 05:28  Patient On (Oxygen Delivery Method): room air        Admit weight: 47.1kg   Daily Weight in k.4 (2025 07:45)    Intake / Output:    @ 07:01  -  -02 @ 07:00  --------------------------------------------------------  IN: 1160 mL / OUT: 1540 mL / NET: -380 mL      PE:   Oropharynx: no erythema or ulcerations   Oral Mucositis: +                                                    stGstrstastdstest:st st1st CVS: S1, S2 RRR   Lungs: CTA throughout bilaterally   Abdomen: + BS x 4, soft, NT, ND   Extremities: no edema   Gastric Mucositis:       -                                          Grade: n/a   Intestinal Mucositis:     -                                         Grade: n/a   Skin: no rash  TLC: CDI  Neuro: A&OX3    Labs:           140  |  108  |  12  ----------------------------<  99  3.9   |  23  |  0.53    Ca    8.3[L]      2025 06:37  Phos  2.5     06-  Mg     1.7     06-02    TPro  4.9[L]  /  Alb  2.9[L]  /  TBili  0.6  /  DBili  x   /  AST  11  /  ALT  8[L]  /  AlkPhos  93  06-02    PT/INR - ( 2025 06:38 )   PT: 11.9 sec;   INR: 1.04 ratio         PTT - ( 2025 06:38 )  PTT:32.1 sec  LIVER FUNCTIONS - ( 2025 06:37 )  Alb: 2.9 g/dL / Pro: 4.9 g/dL / ALK PHOS: 93 U/L / ALT: 8 U/L / AST: 11 U/L / GGT: x           Lactate Dehydrogenase, Serum: 165 U/L ( @ 06:37)      Cultures:   Culture Results:   No growth (25 @ :52)    Culture Results:     Culture Results:   No growth at 24 hours (25 @ 22:50)    Culture Results:   <10,000 CFU/mL Normal Urogenital Mari (25 @ 01:42)    Culture Results:   No growth at 4 days (25 @ 22:30)    Culture Results:   No growth at 4 days (25 @ 22:15)    Culture Results:   <10,000 CFU/mL Normal Urogenital Mari (25 @ 21:16)    Culture Results:   No growth at 5 days (25 @ 21:16)    Culture Results:   No growth at 5 days (25 @ 21:16)    Radiology:   ACC: [id number] EXAM:  XR CHEST PORTABLE URGENT 1V  PROCEDURE DATE:  2025    IMPRESSION:  Bibasilar hazy opacities.  Small-to-moderate left pleural effusion and small right-sided pleural   effusion.  Cultures:   Culture Results:   No growth (25 @ :52)    Culture Results:     Culture Results:   No growth at 24 hours (25 @ 22:50)    Culture Results:   <10,000 CFU/mL Normal Urogenital Mari (25 @ 01:42)    Culture Results:   No growth at 4 days (25 @ 22:30)    Culture Results:   No growth at 4 days (25 @ 22:15)    Culture Results:   <10,000 CFU/mL Normal Urogenital Mari (25 @ 21:16)    Culture Results:   No growth at 5 days (25 @ 21:16)    Culture Results:   No growth at 5 days (25 @ 21:16)    Radiology:   ACC: [id number] EXAM:  XR CHEST PORTABLE URGENT 1V  PROCEDURE DATE:  2025    IMPRESSION:  Bibasilar hazy opacities.  Small-to-moderate left pleural effusion and small right-sided pleural   effusion.      Meds:   Antimicrobials:   acyclovir   Oral Tab/Cap 800 milliGRAM(s) Oral every 12 hours  levoFLOXacin  Tablet 500 milliGRAM(s) Oral every 24 hours  posaconazole DR Tablet 300 milliGRAM(s) Oral daily  vancomycin    Solution 125 milliGRAM(s) Oral every 12 hours      Heme / Onc:       GI:  aluminum hydroxide/magnesium hydroxide/simethicone Suspension 30 milliLiter(s) Oral every 4 hours PRN  loperamide 2 milliGRAM(s) Oral every 6 hours PRN  pantoprazole    Tablet 40 milliGRAM(s) Oral before breakfast  sodium bicarbonate Mouth Rinse 10 milliLiter(s) Swish and Spit five times a day  ursodiol Capsule 300 milliGRAM(s) Oral two times a day      Cardiovascular:       Immunologic:   filgrastim-aafi (NIVESTYM) Injectable 300 MICROGram(s) SubCutaneous daily  tacrolimus 1.5 milliGRAM(s) Oral <User Schedule>  tacrolimus 1 milliGRAM(s) Oral <User Schedule>      Other medications:   Biotene Dry Mouth Oral Rinse 5 milliLiter(s) Swish and Spit five times a day  chlorhexidine 0.12% Liquid 15 milliLiter(s) Swish and Spit two times a day  chlorhexidine 4% Liquid 1 Application(s) Topical <User Schedule>  DULoxetine 60 milliGRAM(s) Oral <User Schedule>  lidocaine   4% Patch 1 Patch Transdermal daily  phytonadione   Solution 5 milliGRAM(s) Oral <User Schedule>  tiZANidine 4 milliGRAM(s) Oral <User Schedule>      PRN:   acetaminophen     Tablet .. 650 milliGRAM(s) Oral every 6 hours PRN  aluminum hydroxide/magnesium hydroxide/simethicone Suspension 30 milliLiter(s) Oral every 4 hours PRN  benzocaine/menthol Lozenge 1 Lozenge Oral every 6 hours PRN  FIRST- Mouthwash  BLM 15 milliLiter(s) Swish and Swallow four times a day PRN  loperamide 2 milliGRAM(s) Oral every 6 hours PRN  oxyCODONE    IR 5 milliGRAM(s) Oral every 6 hours PRN  prochlorperazine   Injectable 10 milliGRAM(s) IV Push every 6 hours PRN  sodium chloride 0.9% lock flush 10 milliLiter(s) IV Push every 1 hour PRN    A/P:  69 year old male with refractory DLBCL, status post R-CHOP x 6, HD MTX x 4, salvage polatuzumab / rituximab / revlimid x 4, admitted for a haplo-identical pbsct from his daughter with Flu / Bu / Cy / TBI prep regimen   Day +13  5/15- busulfan 2, fludarabine . No acute events overnight, vital signs are stable; continue keppra ppx for 24 hours post infusion of last dose of busulfan. No tylenol or posaconazole until day 0. Not neutropenic today, will d/c levaquin / vancomycin.    - fludarabine 3/5. VSS, afebrile. No acute events overnight.   - fludarabine . VSS and afebrile. No acute events overnight. Neutropenic today, started on ppx levaquin/vancomycin PO. No Tylenol or Azoles until day 0.   - fludarabine . VSS, remains afebrile. No acute events overnight. No Tylenol or Azoles until day 0.  - TBI today. No acute events overnight. Vital signs are stable.    - will receive Haplo allogeneic SCT today. VSS, afebrile.  - tolerated HPC infusion well. continue IVF hydration 24 hrs post cells, continue to monitor I&Os   - increased loose BM, imodium prn. VSS, afebrile and infectious work up negative - will discontinue zosyn and switch to levaquin ppx. Continue supportive care.  - PTCY2/2. febrile overnight: f/u cultures. Broaden to Zosyn. s.p lasix today   -continue PTCY hydration. Monitor I&O: s/p lasix today. afebrile: blood cultures negative Zosyn de-escalated to Levaquin   - VSS, no acute events: awaiting count recovery    Febrile overight - empirically started on zosyn, BCx/UCx pending, CXR with bibasilar hazy opacites and small to mod pleural effusions. Monitor strict I/O's, consider lasix prn. VSS, afebrile currently.    - no acute events overnight, vital signs are stable. Intermittent dyspnea; requiring O2.    - no acute events overnight. VSS and remains afebrile. Next tacrolimus level on Saturday (). No SOB symptoms and sating well on RA. Discontinued zosyn switched to levaquin ppx.   - rash of the bilateral thighs/lower back resolved. No acute events overnight and VSS/afebrile. Tacrolimus level on .    - no acute events overnight. VSS and remains afebrile. Tacrolimus level: 8.8 - maintain dose. Continue with supportive care.   VSS, remains afebrile. No acute events overnight. Continue with supportive care.  - No acute events overnight, vital signs are stable.     1. Infectious Disease:   acyclovir   Oral Tab/Cap 800 milliGRAM(s) Oral every 12 hours  levoFLOXacin  Tablet 500 milliGRAM(s) Oral every 24 hours  posaconazole DR Tablet 300 milliGRAM(s) Oral daily  vancomycin    Solution 125 milliGRAM(s) Oral every 12 hours    2. VOD Prophylaxis:   ursodiol Capsule 300 milliGRAM(s) Oral two times a day    3. GI Prophylaxis:    pantoprazole Tablet 40 milliGRAM(s) Oral before breakfast    4. Mouthcare - NS / NaHCO3 rinses, Biotene; Skin care     5. GVHD prophylaxis   PTCy + 3, +4   tacrolimus 1.5 milliGRAM(s) Oral <User Schedule>  tacrolimus 1 milliGRAM(s) Oral <User Schedule>  Abatacept days +5. +14, +28, +56     6. Transfuse & replete electrolytes prn     7. IV hydration, daily weights, strict I&O, prn diuresis     8. PO intake as tolerated, nutrition follow up as needed    9. Antiemetics, anti-diarrhea medications:   loperamide 2 milliGRAM(s) Oral every 6 hours PRN  prochlorperazine Injectable 10 milliGRAM(s) IV Push every 6 hours PRN    10. OOB as tolerated, physical therapy consult if needed     11. Monitor coags / fibrinogen 2x week, vitamin K as needed   phytonadione Solution 5 milliGRAM(s) Oral <User Schedule>    12. Monitor closely for clinical changes, monitor for fevers     13. Emotional support provided, plan of care discussed and questions addressed     14. Patient education done regarding plan of care, restrictions and discharge planning     15. Continue regular social work input     I have written the above note for Dr. Snider who performed service with me in the room.   Helena Diaz NP-C ([phone])    I have seen and examined patient with NP, I agree with above note as scribed.                    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 with Flu / Bu / Cy / TBI prep regimen for treatment of refractory DLBCL    Disease: DLBCL  Type of transplant: Haplo-identical (daughter)   Prep Regimen: Flu / Bu / Cy / TBI   ABO / CMV:   Recipient: A POS/ NEG   Donor: A POS / NEG     S: Patient seen and examined with [facility]PC Transplant Team:     O: Vitals:   Vital Signs Last 24 Hrs  T(C): 36.8 (2025 05:28), Max: 37.1 (2025 20:25)  T(F): 98.2 (2025 05:28), Max: 98.8 (2025 20:25)  HR: 76 (:) (76 - 88)  BP: 111/70 (2025 05:28) (111/70 - 129/87)  BP(mean): --  RR: 19 (2025 05:) (16 - 19)  SpO2: 95% (:) (95% - 100%)    Parameters below as of 2025 05:28  Patient On (Oxygen Delivery Method): room air        Admit weight: 47.1kg   Daily Weight in k.4 (2025 07:45)    Intake / Output:   - @ 07:01  -  06-02 @ 07:00  --------------------------------------------------------  IN: 1160 mL / OUT: 1540 mL / NET: -380 mL      PE:   Oropharynx: no erythema or ulcerations   Oral Mucositis: +                                                    stGstrstastdstest:st st1st CVS: S1, S2 RRR   Lungs: CTA throughout bilaterally   Abdomen: + BS x 4, soft, NT, ND   Extremities: no edema   Gastric Mucositis:       -                                          Grade: n/a   Intestinal Mucositis:     -                                         Grade: n/a   Skin: no rash  TLC: CDI  Neuro: A&OX3    Labs:                         7.3    0.04  )-----------( 30       ( 2025 06:37 )             22.4       06-02    140  |  108  |  12  ----------------------------<  99  3.9   |  23  |  0.53    Ca    8.3[L]      2025 06:37  Phos  2.5       Mg     1.7         TPro  4.9[L]  /  Alb  2.9[L]  /  TBili  0.6  /  DBili  x   /  AST  11  /  ALT  8[L]  /  AlkPhos  93      PT/INR - ( 2025 06:38 )   PT: 11.9 sec;   INR: 1.04 ratio         PTT - ( 2025 06:38 )  PTT:32.1 sec  LIVER FUNCTIONS - ( 2025 06:37 )  Alb: 2.9 g/dL / Pro: 4.9 g/dL / ALK PHOS: 93 U/L / ALT: 8 U/L / AST: 11 U/L / GGT: x           Lactate Dehydrogenase, Serum: 165 U/L ( @ 06:37)      Cultures:   Culture Results:   No growth (25 @ 01:52)    Culture Results:     Culture Results:   No growth at 24 hours (25 @ 22:50)    Culture Results:   <10,000 CFU/mL Normal Urogenital Mari (25 @ 01:42)    Culture Results:   No growth at 4 days (25 @ 22:30)    Culture Results:   No growth at 4 days (25 @ 22:15)    Culture Results:   <10,000 CFU/mL Normal Urogenital Mari (25 @ 21:16)    Culture Results:   No growth at 5 days (25 @ 21:16)    Culture Results:   No growth at 5 days (25 @ 21:16)    Radiology:   ACC: [id number] EXAM:  XR CHEST PORTABLE URGENT 1V  PROCEDURE DATE:  2025    IMPRESSION:  Bibasilar hazy opacities.  Small-to-moderate left pleural effusion and small right-sided pleural   effusion.  Cultures:   Culture Results:   No growth (25 @ 01:52)    Culture Results:     Culture Results:   No growth at 24 hours (25 @ 22:50)    Culture Results:   <10,000 CFU/mL Normal Urogenital Mari (25 @ 01:42)    Culture Results:   No growth at 4 days (25 @ 22:30)    Culture Results:   No growth at 4 days (25 @ 22:15)    Culture Results:   <10,000 CFU/mL Normal Urogenital Mari (25 @ 21:16)    Culture Results:   No growth at 5 days (25 @ 21:16)    Culture Results:   No growth at 5 days (25 @ 21:16)    Radiology:   ACC: [id number] EXAM:  XR CHEST PORTABLE URGENT 1V  PROCEDURE DATE:  2025    IMPRESSION:  Bibasilar hazy opacities.  Small-to-moderate left pleural effusion and small right-sided pleural   effusion.      Meds:   Antimicrobials:   acyclovir   Oral Tab/Cap 800 milliGRAM(s) Oral every 12 hours  levoFLOXacin  Tablet 500 milliGRAM(s) Oral every 24 hours  posaconazole DR Tablet 300 milliGRAM(s) Oral daily  vancomycin    Solution 125 milliGRAM(s) Oral every 12 hours      Heme / Onc:       GI:  aluminum hydroxide/magnesium hydroxide/simethicone Suspension 30 milliLiter(s) Oral every 4 hours PRN  loperamide 2 milliGRAM(s) Oral every 6 hours PRN  pantoprazole    Tablet 40 milliGRAM(s) Oral before breakfast  sodium bicarbonate Mouth Rinse 10 milliLiter(s) Swish and Spit five times a day  ursodiol Capsule 300 milliGRAM(s) Oral two times a day      Cardiovascular:       Immunologic:   filgrastim-aafi (NIVESTYM) Injectable 300 MICROGram(s) SubCutaneous daily  tacrolimus 1.5 milliGRAM(s) Oral <User Schedule>  tacrolimus 1 milliGRAM(s) Oral <User Schedule>      Other medications:   Biotene Dry Mouth Oral Rinse 5 milliLiter(s) Swish and Spit five times a day  chlorhexidine 0.12% Liquid 15 milliLiter(s) Swish and Spit two times a day  chlorhexidine 4% Liquid 1 Application(s) Topical <User Schedule>  DULoxetine 60 milliGRAM(s) Oral <User Schedule>  lidocaine   4% Patch 1 Patch Transdermal daily  phytonadione   Solution 5 milliGRAM(s) Oral <User Schedule>  tiZANidine 4 milliGRAM(s) Oral <User Schedule>      PRN:   acetaminophen     Tablet .. 650 milliGRAM(s) Oral every 6 hours PRN  aluminum hydroxide/magnesium hydroxide/simethicone Suspension 30 milliLiter(s) Oral every 4 hours PRN  benzocaine/menthol Lozenge 1 Lozenge Oral every 6 hours PRN  FIRST- Mouthwash  BLM 15 milliLiter(s) Swish and Swallow four times a day PRN  loperamide 2 milliGRAM(s) Oral every 6 hours PRN  oxyCODONE    IR 5 milliGRAM(s) Oral every 6 hours PRN  prochlorperazine   Injectable 10 milliGRAM(s) IV Push every 6 hours PRN  sodium chloride 0.9% lock flush 10 milliLiter(s) IV Push every 1 hour PRN    A/P:  69 year old male with refractory DLBCL, status post R-CHOP x 6, HD MTX x 4, salvage polatuzumab / rituximab / revlimid x 4, admitted for a haplo-identical pbsct from his daughter with Flu / Bu / Cy / TBI prep regimen   Day +13  5/15- busulfan 2, fludarabine . No acute events overnight, vital signs are stable; continue keppra ppx for 24 hours post infusion of last dose of busulfan. No tylenol or posaconazole until day 0. Not neutropenic today, will d/c levaquin / vancomycin.    - fludarabine 3/5. VSS, afebrile. No acute events overnight.   - fludarabine . VSS and afebrile. No acute events overnight. Neutropenic today, started on ppx levaquin/vancomycin PO. No Tylenol or Azoles until day 0.   - fludarabine . VSS, remains afebrile. No acute events overnight. No Tylenol or Azoles until day 0.  - TBI today. No acute events overnight. Vital signs are stable.    - will receive Haplo allogeneic SCT today. VSS, afebrile.  - tolerated HPC infusion well. continue IVF hydration 24 hrs post cells, continue to monitor I&Os   - increased loose BM, imodium prn. VSS, afebrile and infectious work up negative - will discontinue zosyn and switch to levaquin ppx. Continue supportive care.  - PTCY2/2. febrile overnight: f/u cultures. Broaden to Zosyn. s.p lasix today   -continue PTCY hydration. Monitor I&O: s/p lasix today. afebrile: blood cultures negative Zosyn de-escalated to Levaquin   - VSS, no acute events: awaiting count recovery    Febrile overight - empirically started on zosyn, BCx/UCx pending, CXR with bibasilar hazy opacites and small to mod pleural effusions. Monitor strict I/O's, consider lasix prn. VSS, afebrile currently.    - no acute events overnight, vital signs are stable. Intermittent dyspnea; requiring O2.    - no acute events overnight. VSS and remains afebrile. Next tacrolimus level on Saturday (). No SOB symptoms and sating well on RA. Discontinued zosyn switched to levaquin ppx.   - rash of the bilateral thighs/lower back resolved. No acute events overnight and VSS/afebrile. Tacrolimus level on .    - no acute events overnight. VSS and remains afebrile. Tacrolimus level: 8.8 - maintain dose. Continue with supportive care.   VSS, remains afebrile. No acute events overnight. Continue with supportive care.  - No acute events overnight, vital signs are stable.     1. Infectious Disease:   acyclovir   Oral Tab/Cap 800 milliGRAM(s) Oral every 12 hours  levoFLOXacin  Tablet 500 milliGRAM(s) Oral every 24 hours  posaconazole DR Tablet 300 milliGRAM(s) Oral daily  vancomycin    Solution 125 milliGRAM(s) Oral every 12 hours    2. VOD Prophylaxis:   ursodiol Capsule 300 milliGRAM(s) Oral two times a day    3. GI Prophylaxis:    pantoprazole Tablet 40 milliGRAM(s) Oral before breakfast    4. Mouthcare - NS / NaHCO3 rinses, Biotene; Skin care     5. GVHD prophylaxis   PTCy + 3, +4   tacrolimus 1.5 milliGRAM(s) Oral <User Schedule>  tacrolimus 1 milliGRAM(s) Oral <User Schedule>  Abatacept days +5. +14, +28, +56     6. Transfuse & replete electrolytes prn     7. IV hydration, daily weights, strict I&O, prn diuresis     8. PO intake as tolerated, nutrition follow up as needed    9. Antiemetics, anti-diarrhea medications:   loperamide 2 milliGRAM(s) Oral every 6 hours PRN  prochlorperazine Injectable 10 milliGRAM(s) IV Push every 6 hours PRN    10. OOB as tolerated, physical therapy consult if needed     11. Monitor coags / fibrinogen 2x week, vitamin K as needed   phytonadione Solution 5 milliGRAM(s) Oral <User Schedule>    12. Monitor closely for clinical changes, monitor for fevers     13. Emotional support provided, plan of care discussed and questions addressed     14. Patient education done regarding plan of care, restrictions and discharge planning     15. Continue regular social work input     I have written the above note for Dr. Snider who performed service with me in the room.   Helena Diaz NP-C ([phone])    I have seen and examined patient with NP, I agree with above note as scribed.                    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 with Flu / Bu / Cy / TBI prep regimen for treatment of refractory DLBCL    Disease: DLBCL  Type of transplant: Haplo-identical (daughter)   Prep Regimen: Flu / Bu / Cy / TBI   ABO / CMV:   Recipient: A POS/ NEG   Donor: A POS / NEG     S: Patient seen and examined with HPC Transplant Team:   + occasional heartburn    O: Vitals:   Vital Signs Last 24 Hrs  T(C): 36.8 (2025 05:28), Max: 37.1 (2025 20:25)  T(F): 98.2 (2025 05:28), Max: 98.8 (2025 20:25)  HR: 76 (:) (76 - 88)  BP: 111/70 (2025 05:28) (111/70 - 129/87)  BP(mean): --  RR: 19 (2025 05:) (16 - 19)  SpO2: 95% (:28) (95% - 100%)    Parameters below as of 2025 05:28  Patient On (Oxygen Delivery Method): room air        Admit weight: 47.1kg   Daily Weight in k.4 (2025 07:45)    Intake / Output:   - @ 07:01  -  -02 @ 07:00  --------------------------------------------------------  IN: 1160 mL / OUT: 1540 mL / NET: -380 mL      PE:   Oropharynx: no erythema or ulcerations   Oral Mucositis: -                                              Grade: n/a  CVS: S1, S2 RRR   Lungs: CTA throughout bilaterally   Abdomen: + BS x 4, soft, NT, ND   Extremities: no edema   Gastric Mucositis:       +                                         ndGndrndanddndend:nd nd2nd Intestinal Mucositis:     -                                         Grade: n/a   Skin: no rash  TLC: CDI  Neuro: A&OX3    Labs:                         7.3    0.04  )-----------( 30       ( 2025 06:37 )             22.4       06-02    140  |  108  |  12  ----------------------------<  99  3.9   |  23  |  0.53    Ca    8.3[L]      2025 06:37  Phos  2.5       Mg     1.7         TPro  4.9[L]  /  Alb  2.9[L]  /  TBili  0.6  /  DBili  x   /  AST  11  /  ALT  8[L]  /  AlkPhos  93      PT/INR - ( 2025 06:38 )   PT: 11.9 sec;   INR: 1.04 ratio         PTT - ( 2025 06:38 )  PTT:32.1 sec  LIVER FUNCTIONS - ( 2025 06:37 )  Alb: 2.9 g/dL / Pro: 4.9 g/dL / ALK PHOS: 93 U/L / ALT: 8 U/L / AST: 11 U/L / GGT: x           Lactate Dehydrogenase, Serum: 165 U/L ( @ 06:37)      Cultures:   Culture Results:   No growth (25 @ 01:52)    Culture Results:     Culture Results:   No growth at 24 hours (25 @ 22:50)    Culture Results:   <10,000 CFU/mL Normal Urogenital Mari (25 @ 01:42)    Culture Results:   No growth at 4 days (25 @ 22:30)    Culture Results:   No growth at 4 days (25 @ 22:15)    Culture Results:   <10,000 CFU/mL Normal Urogenital Mari (25 @ 21:16)    Culture Results:   No growth at 5 days (25 @ 21:16)    Culture Results:   No growth at 5 days (25 @ 21:16)    Radiology:   ACC: [id number] EXAM:  XR CHEST PORTABLE URGENT 1V  PROCEDURE DATE:  2025    IMPRESSION:  Bibasilar hazy opacities.  Small-to-moderate left pleural effusion and small right-sided pleural   effusion.  Cultures:   Culture Results:   No growth (25 @ :52)    Culture Results:     Culture Results:   No growth at 24 hours (25 @ 22:50)    Culture Results:   <10,000 CFU/mL Normal Urogenital Mari (25 @ 01:42)    Culture Results:   No growth at 4 days (25 @ 22:30)    Culture Results:   No growth at 4 days (25 @ 22:15)    Culture Results:   <10,000 CFU/mL Normal Urogenital Mari (25 @ 21:16)    Culture Results:   No growth at 5 days (25 @ 21:16)    Culture Results:   No growth at 5 days (25 @ 21:16)    Radiology:   ACC: [id number] EXAM:  XR CHEST PORTABLE URGENT 1V  PROCEDURE DATE:  2025    IMPRESSION:  Bibasilar hazy opacities.  Small-to-moderate left pleural effusion and small right-sided pleural   effusion.      Meds:   Antimicrobials:   acyclovir   Oral Tab/Cap 800 milliGRAM(s) Oral every 12 hours  levoFLOXacin  Tablet 500 milliGRAM(s) Oral every 24 hours  posaconazole DR Tablet 300 milliGRAM(s) Oral daily  vancomycin    Solution 125 milliGRAM(s) Oral every 12 hours      Heme / Onc:       GI:  aluminum hydroxide/magnesium hydroxide/simethicone Suspension 30 milliLiter(s) Oral every 4 hours PRN  loperamide 2 milliGRAM(s) Oral every 6 hours PRN  pantoprazole    Tablet 40 milliGRAM(s) Oral before breakfast  sodium bicarbonate Mouth Rinse 10 milliLiter(s) Swish and Spit five times a day  ursodiol Capsule 300 milliGRAM(s) Oral two times a day      Cardiovascular:       Immunologic:   filgrastim-aafi (NIVESTYM) Injectable 300 MICROGram(s) SubCutaneous daily  tacrolimus 1.5 milliGRAM(s) Oral <User Schedule>  tacrolimus 1 milliGRAM(s) Oral <User Schedule>      Other medications:   Biotene Dry Mouth Oral Rinse 5 milliLiter(s) Swish and Spit five times a day  chlorhexidine 0.12% Liquid 15 milliLiter(s) Swish and Spit two times a day  chlorhexidine 4% Liquid 1 Application(s) Topical <User Schedule>  DULoxetine 60 milliGRAM(s) Oral <User Schedule>  lidocaine   4% Patch 1 Patch Transdermal daily  phytonadione   Solution 5 milliGRAM(s) Oral <User Schedule>  tiZANidine 4 milliGRAM(s) Oral <User Schedule>      PRN:   acetaminophen     Tablet .. 650 milliGRAM(s) Oral every 6 hours PRN  aluminum hydroxide/magnesium hydroxide/simethicone Suspension 30 milliLiter(s) Oral every 4 hours PRN  benzocaine/menthol Lozenge 1 Lozenge Oral every 6 hours PRN  FIRST- Mouthwash  BLM 15 milliLiter(s) Swish and Swallow four times a day PRN  loperamide 2 milliGRAM(s) Oral every 6 hours PRN  oxyCODONE    IR 5 milliGRAM(s) Oral every 6 hours PRN  prochlorperazine   Injectable 10 milliGRAM(s) IV Push every 6 hours PRN  sodium chloride 0.9% lock flush 10 milliLiter(s) IV Push every 1 hour PRN    A/P:  69 year old male with refractory DLBCL, status post R-CHOP x 6, HD MTX x 4, salvage polatuzumab / rituximab / revlimid x 4, admitted for a haplo-identical pbsct from his daughter with Flu / Bu / Cy / TBI prep regimen   Day +13  5/15- busulfan 2, fludarabine . No acute events overnight, vital signs are stable; continue keppra ppx for 24 hours post infusion of last dose of busulfan. No tylenol or posaconazole until day 0. Not neutropenic today, will d/c levaquin / vancomycin.    - fludarabine 3/5. VSS, afebrile. No acute events overnight.   - fludarabine . VSS and afebrile. No acute events overnight. Neutropenic today, started on ppx levaquin/vancomycin PO. No Tylenol or Azoles until day 0.   - fludarabine . VSS, remains afebrile. No acute events overnight. No Tylenol or Azoles until day 0.  - TBI today. No acute events overnight. Vital signs are stable.    - will receive Haplo allogeneic SCT today. VSS, afebrile.  - tolerated HPC infusion well. continue IVF hydration 24 hrs post cells, continue to monitor I&Os   - increased loose BM, imodium prn. VSS, afebrile and infectious work up negative - will discontinue zosyn and switch to levaquin ppx. Continue supportive care.  - PTCY2/2. febrile overnight: f/u cultures. Broaden to Zosyn. s.p lasix today   -continue PTCY hydration. Monitor I&O: s/p lasix today. afebrile: blood cultures negative Zosyn de-escalated to Levaquin   - VSS, no acute events: awaiting count recovery    Febrile overight - empirically started on zosyn, BCx/UCx pending, CXR with bibasilar hazy opacites and small to mod pleural effusions. Monitor strict I/O's, consider lasix prn. VSS, afebrile currently.    - no acute events overnight, vital signs are stable. Intermittent dyspnea; requiring O2.    - no acute events overnight. VSS and remains afebrile. Next tacrolimus level on Saturday (). No SOB symptoms and sating well on RA. Discontinued zosyn switched to levaquin ppx.   - rash of the bilateral thighs/lower back resolved. No acute events overnight and VSS/afebrile. Tacrolimus level on .    - no acute events overnight. VSS and remains afebrile. Tacrolimus level: 8.8 - maintain dose. Continue with supportive care.   VSS, remains afebrile. No acute events overnight. Continue with supportive care.  - No acute events overnight, vital signs are stable. Tacrolimus level pending from this AM. Grade 1 chemotherapy induced GI mucositis (Heartburn, relieved with maalox), continue supportive care.     1. Infectious Disease:   acyclovir   Oral Tab/Cap 800 milliGRAM(s) Oral every 12 hours  levoFLOXacin  Tablet 500 milliGRAM(s) Oral every 24 hours  posaconazole DR Tablet 300 milliGRAM(s) Oral daily  vancomycin    Solution 125 milliGRAM(s) Oral every 12 hours    2. VOD Prophylaxis:   ursodiol Capsule 300 milliGRAM(s) Oral two times a day    3. GI Prophylaxis:    pantoprazole Tablet 40 milliGRAM(s) Oral before breakfast    4. Mouthcare - NS / NaHCO3 rinses, Biotene; Skin care     5. GVHD prophylaxis   PTCy + 3, +4   tacrolimus 1.5 milliGRAM(s) Oral <User Schedule>  tacrolimus 1 milliGRAM(s) Oral <User Schedule>  Abatacept days +5. +14, +28, +56     6. Transfuse & replete electrolytes prn     7. IV hydration, daily weights, strict I&O, prn diuresis     8. PO intake as tolerated, nutrition follow up as needed    9. Antiemetics, anti-diarrhea medications:   loperamide 2 milliGRAM(s) Oral every 6 hours PRN  prochlorperazine Injectable 10 milliGRAM(s) IV Push every 6 hours PRN    10. OOB as tolerated, physical therapy consult if needed     11. Monitor coags / fibrinogen 2x week, vitamin K as needed   phytonadione Solution 5 milliGRAM(s) Oral <User Schedule>    12. Monitor closely for clinical changes, monitor for fevers     13. Emotional support provided, plan of care discussed and questions addressed     14. Patient education done regarding plan of care, restrictions and discharge planning     15. Continue regular social work input     I have written the above note for Dr. Snider who performed service with me in the room.   Helena Diaz NP-C ([phone])    I have seen and examined patient with NP, I agree with above note as scribed.

## 2025-06-02 NOTE — PROGRESS NOTE ADULT - NS ATTEND AMEND GEN_ALL_CORE FT
I rounded with the team including nurses, ACP's  I reviewed the data / labs  pt seen and examined  prep flu/bu/cy/tbi  gvhd CAST...on iv tacro 0.9 mg..switched to po  1.5mg  in am and 1mg in pm..level 8.8 5/31    The patient is day +13 of haplo allogeneic HSCT. On nivestym  haplo storm, with capillary leak resolved, cxr noted small bilateral pleural effusions  The patient is doing well overall; reports occ throat/stomach pain when eating; and occ loose stool..improved further today  afebrile, vital signs stable  mild oral mucositis improved. The line site is clean. The lungs are clear. There is trace edema...using ace bandages for gentle compression.... 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 on levaquin..zosyn to be restarted with spike...mrsa swab neg, can avoid vanco  PRN diuresis..weight stable  continue antimicrobial ppx and supportive care   encourage ambulation and PO intake.  All questions answered.  d/c instruction started I rounded with the team including nurses, ACP's  I reviewed the data / labs  pt seen and examined  prep flu/bu/cy/tbi  gvhd CAST...on iv tacro 0.9 mg..switched to po  1.5mg  in am and 1mg in pm..level 8.8 5/31..benja pending day 14,28,56    The patient is day +13 of haplo allogeneic HSCT. On nivestym  haplo storm, with capillary leak resolved, cxr noted small bilateral pleural effusions  The patient is doing well overall; reports occ throat/stomach pain when eating; and occ loose stool..improved further today  afebrile, vital signs stable  mild oral mucositis improved. The line site is clean. The lungs are clear. There is trace edema...using ace bandages for gentle compression.... 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 on levaquin..zosyn to be restarted with spike...mrsa swab neg, can avoid vanco  PRN diuresis..weight stable  continue antimicrobial ppx and supportive care   encourage ambulation and PO intake.  All questions answered.  d/c instruction started

## 2025-06-02 NOTE — PHARMACOTHERAPY INTERVENTION NOTE - COMMENTS
69-year-old male with 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 He is admitted for an alloSCT with BERTRAM Bu/Flu/Cy/TBI conditioning and CAST for GVHD ppx. Today is day +13. Course has been complicated by febrile neutropenia/haplostorm.    Conditioning Regimen: RI Bu/Flu/Cy/TBI (complete)  Day -6 () to Day -5 (5/15): Busulfan 130 mg/m2 IV administered over 3 hours for 2 doses -> avoid APAP and azole antifungals for 48 hours post busulfan (until )  Day -6 () to Day -2 () Fludarabine 30 mg/m2 IV administered over 30 minutes for 5 doses  Moving up by 1 hour every day to ensure 48 hours between final dose and CTP infusion - last dose complete on  @ 1045 am  Day -3 () to Day -2 () Cyclophosphamide 14.5 mg/k2 IV administered over 1 hour for 2 doses  Day -1 ()  cGy x1    GVHD ppx: CAST  Cyclophosphamide 50 mg/kg IV daily on Day +3 () and Day +4 ().   Abatacept IV 10 mg/kg on days +5 (), +14 (6/3), +28 (), and +56 (7/15).  Patient will start tacrolimus 0.02 mg/kg IV on ,  (day +5) - please order a one time trough on Wednesday,  (day +8) and one time trough on Saturday,  along with troughs every Tuesday to start on 6/3. Goal trough is 8-12 ng/mL. Please ensure trough is drawn peripherally.    Date      Day         Level          Action          Day 0       N/A          Started posaconazole 300 mg PO QD (CY inhibitor)          Day +3     N/A          Received Fosaprepitant 150 mg IV x1 (CY inhibitor)          Day +5     N/A          Started tacrolimus 0.9 mg IV          Day +8      6.9          Switched to PO - tacrolimus 1.5 mg PO QAM and 1 mg PO QPM          Day +11   8.8          Kept tacrolimus dose the same           Day +13  ---Next level:  ---    Antimicrobial ppx.:  Started antimicrobial (levofloxacin 500 mg PO QD), and PO vanco 125 mg BID once ANC <1000 () & will continue until ANC >500 for 3 consecutive days. Started antifungal (posaconazole 300 mg PO QD) on day 0 () and will continue until day +75. Started GCSF on day +7 () & will stop once ANC >1500 for two days.     Febrile neutropenia/haplostorm:  Patient became febrile to 100.5F on  @ 1948 & was ordered for pip/tazo 4.5 g IV every 8 hours (-). BCx from  &  showed NGTD & CXR from  shows atelectasis. Most likely 2/2 haplostorm, BCx remained negative & patient was afebrile x24 hours, so was de-escalated to levofloxacin 500 mg PO QD ().      Patient became febrile again to 101.1F on  and was escalated to pip/tazo 4.5 g IV every 8 hours (-) and vancomycin 1000 mg IV x1. BCx from  shows NGTD and patient has been afebrile since  @ 20:02. De-escalating to levofloxacin 500 mg PO QD ().     Evelyne Murcia, PharmD, BCOP  Stem Cell Transplant Clinical Pharmacy Specialist  Available via [employer] Teams

## 2025-06-03 LAB
ALBUMIN SERPL ELPH-MCNC: 2.8 G/DL — LOW (ref 3.3–5)
ALP SERPL-CCNC: 87 U/L — SIGNIFICANT CHANGE UP (ref 40–120)
ALT FLD-CCNC: 9 U/L — LOW (ref 10–45)
ANION GAP SERPL CALC-SCNC: 10 MMOL/L — SIGNIFICANT CHANGE UP (ref 5–17)
AST SERPL-CCNC: 8 U/L — LOW (ref 10–40)
BILIRUB SERPL-MCNC: 0.5 MG/DL — SIGNIFICANT CHANGE UP (ref 0.2–1.2)
BUN SERPL-MCNC: 14 MG/DL — SIGNIFICANT CHANGE UP (ref 7–23)
CALCIUM SERPL-MCNC: 8.2 MG/DL — LOW (ref 8.4–10.5)
CHLORIDE SERPL-SCNC: 107 MMOL/L — SIGNIFICANT CHANGE UP (ref 96–108)
CO2 SERPL-SCNC: 23 MMOL/L — SIGNIFICANT CHANGE UP (ref 22–31)
CREAT SERPL-MCNC: 0.52 MG/DL — SIGNIFICANT CHANGE UP (ref 0.5–1.3)
EGFR: 110 ML/MIN/1.73M2 — SIGNIFICANT CHANGE UP
EGFR: 110 ML/MIN/1.73M2 — SIGNIFICANT CHANGE UP
GLUCOSE SERPL-MCNC: 97 MG/DL — SIGNIFICANT CHANGE UP (ref 70–99)
HCT VFR BLD CALC: 21.1 % — LOW (ref 39–50)
HGB BLD-MCNC: 6.9 G/DL — CRITICAL LOW (ref 13–17)
LDH SERPL L TO P-CCNC: 156 U/L — SIGNIFICANT CHANGE UP (ref 50–242)
MAGNESIUM SERPL-MCNC: 1.6 MG/DL — SIGNIFICANT CHANGE UP (ref 1.6–2.6)
MCHC RBC-ENTMCNC: 27.8 PG — SIGNIFICANT CHANGE UP (ref 27–34)
MCHC RBC-ENTMCNC: 32.7 G/DL — SIGNIFICANT CHANGE UP (ref 32–36)
MCV RBC AUTO: 85.1 FL — SIGNIFICANT CHANGE UP (ref 80–100)
NRBC BLD AUTO-RTO: 0 /100 WBCS — SIGNIFICANT CHANGE UP (ref 0–0)
PHOSPHATE SERPL-MCNC: 2.5 MG/DL — SIGNIFICANT CHANGE UP (ref 2.5–4.5)
PLATELET # BLD AUTO: 18 K/UL — CRITICAL LOW (ref 150–400)
POTASSIUM SERPL-MCNC: 4 MMOL/L — SIGNIFICANT CHANGE UP (ref 3.5–5.3)
POTASSIUM SERPL-SCNC: 4 MMOL/L — SIGNIFICANT CHANGE UP (ref 3.5–5.3)
PROT SERPL-MCNC: 4.5 G/DL — LOW (ref 6–8.3)
RBC # BLD: 2.48 M/UL — LOW (ref 4.2–5.8)
RBC # FLD: 14.1 % — SIGNIFICANT CHANGE UP (ref 10.3–14.5)
SODIUM SERPL-SCNC: 140 MMOL/L — SIGNIFICANT CHANGE UP (ref 135–145)
WBC # BLD: 0.08 K/UL — CRITICAL LOW (ref 3.8–10.5)
WBC # FLD AUTO: 0.08 K/UL — CRITICAL LOW (ref 3.8–10.5)

## 2025-06-03 PROCEDURE — 99233 SBSQ HOSP IP/OBS HIGH 50: CPT

## 2025-06-03 RX ORDER — ABATACEPT 125 MG/ML
500 INJECTION, SOLUTION SUBCUTANEOUS ONCE
Refills: 0 | Status: COMPLETED | OUTPATIENT
Start: 2025-06-03 | End: 2025-06-03

## 2025-06-03 RX ADMIN — Medication 10 MILLILITER(S): at 23:22

## 2025-06-03 RX ADMIN — Medication 650 MILLIGRAM(S): at 05:30

## 2025-06-03 RX ADMIN — Medication 40 MILLIGRAM(S): at 05:12

## 2025-06-03 RX ADMIN — Medication 10 MILLILITER(S): at 20:40

## 2025-06-03 RX ADMIN — Medication 15 MILLILITER(S): at 17:25

## 2025-06-03 RX ADMIN — Medication 30 MILLILITER(S): at 20:40

## 2025-06-03 RX ADMIN — Medication 5 MILLILITER(S): at 11:50

## 2025-06-03 RX ADMIN — ABATACEPT 100 MILLIGRAM(S): 125 INJECTION, SOLUTION SUBCUTANEOUS at 11:51

## 2025-06-03 RX ADMIN — OXYCODONE HYDROCHLORIDE 5 MILLIGRAM(S): 30 TABLET ORAL at 17:53

## 2025-06-03 RX ADMIN — Medication 10 MILLILITER(S): at 08:32

## 2025-06-03 RX ADMIN — Medication 650 MILLIGRAM(S): at 06:00

## 2025-06-03 RX ADMIN — Medication 1 APPLICATION(S): at 08:32

## 2025-06-03 RX ADMIN — Medication 5 MILLILITER(S): at 08:32

## 2025-06-03 RX ADMIN — Medication 5 MILLILITER(S): at 16:41

## 2025-06-03 RX ADMIN — FILGRASTIM 300 MICROGRAM(S): 300 INJECTION, SOLUTION INTRAVENOUS; SUBCUTANEOUS at 16:30

## 2025-06-03 RX ADMIN — TACROLIMUS 1.5 MILLIGRAM(S): 0.5 CAPSULE ORAL at 08:31

## 2025-06-03 RX ADMIN — Medication 650 MILLIGRAM(S): at 12:55

## 2025-06-03 RX ADMIN — TACROLIMUS 1.5 MILLIGRAM(S): 0.5 CAPSULE ORAL at 20:39

## 2025-06-03 RX ADMIN — Medication 10 MILLILITER(S): at 16:41

## 2025-06-03 RX ADMIN — LIDOCAINE HYDROCHLORIDE 1 PATCH: 20 JELLY TOPICAL at 19:30

## 2025-06-03 RX ADMIN — TIZANIDINE 4 MILLIGRAM(S): 4 TABLET ORAL at 20:40

## 2025-06-03 RX ADMIN — Medication 5 MILLILITER(S): at 20:40

## 2025-06-03 RX ADMIN — Medication 125 MILLIGRAM(S): at 17:24

## 2025-06-03 RX ADMIN — LIDOCAINE HYDROCHLORIDE 1 PATCH: 20 JELLY TOPICAL at 17:26

## 2025-06-03 RX ADMIN — Medication 30 MILLILITER(S): at 05:36

## 2025-06-03 RX ADMIN — Medication 125 MILLIGRAM(S): at 05:12

## 2025-06-03 RX ADMIN — Medication 800 MILLIGRAM(S): at 17:24

## 2025-06-03 RX ADMIN — Medication 10 MILLILITER(S): at 11:50

## 2025-06-03 RX ADMIN — URSODIOL 300 MILLIGRAM(S): 300 CAPSULE ORAL at 05:12

## 2025-06-03 RX ADMIN — Medication 15 MILLILITER(S): at 05:12

## 2025-06-03 RX ADMIN — Medication 5 MILLILITER(S): at 23:22

## 2025-06-03 RX ADMIN — URSODIOL 300 MILLIGRAM(S): 300 CAPSULE ORAL at 17:23

## 2025-06-03 RX ADMIN — Medication 800 MILLIGRAM(S): at 05:13

## 2025-06-03 RX ADMIN — DULOXETINE 60 MILLIGRAM(S): 20 CAPSULE, DELAYED RELEASE ORAL at 20:40

## 2025-06-03 RX ADMIN — OXYCODONE HYDROCHLORIDE 5 MILLIGRAM(S): 30 TABLET ORAL at 17:23

## 2025-06-03 RX ADMIN — Medication 650 MILLIGRAM(S): at 13:25

## 2025-06-03 RX ADMIN — LIDOCAINE HYDROCHLORIDE 1 PATCH: 20 JELLY TOPICAL at 00:15

## 2025-06-03 RX ADMIN — POSACONAZOLE 300 MILLIGRAM(S): 100 TABLET, DELAYED RELEASE ORAL at 11:50

## 2025-06-03 NOTE — PROGRESS NOTE ADULT - TIME BILLING
medical management as above, patient/family encounter, in depth review of vitals, labs, history, imaging, medication and all relevant parts of the chart Complex transplant care

## 2025-06-03 NOTE — PHARMACOTHERAPY INTERVENTION NOTE - COMMENTS
69-year-old male with 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 He is admitted for an alloSCT with BERTRAM Bu/Flu/Cy/TBI conditioning and CAST for GVHD ppx. Today is day +14. Course has been complicated by febrile neutropenia/haplostorm.    Conditioning Regimen: RI Bu/Flu/Cy/TBI (complete)  Day -6 () to Day -5 (5/15): Busulfan 130 mg/m2 IV administered over 3 hours for 2 doses -> avoid APAP and azole antifungals for 48 hours post busulfan (until )  Day -6 () to Day -2 () Fludarabine 30 mg/m2 IV administered over 30 minutes for 5 doses  Moving up by 1 hour every day to ensure 48 hours between final dose and CTP infusion - last dose complete on  @ 1045 am  Day -3 () to Day -2 () Cyclophosphamide 14.5 mg/k2 IV administered over 1 hour for 2 doses  Day -1 ()  cGy x1    GVHD ppx: CAST  Cyclophosphamide 50 mg/kg IV daily on Day +3 () and Day +4 ().   Abatacept IV 10 mg/kg on days +5 (), +14 (6/3), +28 (), and +56 (7/15).  Patient will start tacrolimus 0.02 mg/kg IV on ,  (day +5) - please order a one time trough on Wednesday,  (day +8) and one time trough on Saturday,  along with troughs every Tuesday to start on 6/3. Goal trough is 8-12 ng/mL. Please ensure trough is drawn peripherally.    Date      Day         Level          Action          Day 0       N/A          Started posaconazole 300 mg PO QD (CY inhibitor)          Day +3     N/A          Received Fosaprepitant 150 mg IV x1 (CY inhibitor)          Day +5     N/A          Started tacrolimus 0.9 mg IV          Day +8      6.9          Switched to PO - tacrolimus 1.5 mg PO QAM and 1 mg PO QPM          Day +11    8.8           Kept tacrolimus dose the same           Day +13    7.5          Increased dose to 1.5 mg PO BID  ---Next level: -    Antimicrobial ppx.:  Started antimicrobial (levofloxacin 500 mg PO QD), and PO vanco 125 mg BID once ANC <1000 () & will continue until ANC >500 for 3 consecutive days. Started antifungal (posaconazole 300 mg PO QD) on day 0 () and will continue until day +75. Started GCSF on day +7 () & will stop once ANC >1500 for two days.     Febrile neutropenia/haplostorm:  Patient became febrile to 100.5F on  @ 1948 & was ordered for pip/tazo 4.5 g IV every 8 hours (-). BCx from  &  showed NGTD & CXR from  shows atelectasis. Most likely 2/2 haplostorm, BCx remained negative & patient was afebrile x24 hours, so was de-escalated to levofloxacin 500 mg PO QD ().      Patient became febrile again to 101.1F on  and was escalated to pip/tazo 4.5 g IV every 8 hours (-) and vancomycin 1000 mg IV x1. BCx from  shows NGTD and patient has been afebrile since  @ 20:02. De-escalating to levofloxacin 500 mg PO QD ().     Evelyne Murcia, PharmD, BCOP  Stem Cell Transplant Clinical Pharmacy Specialist  Available via [employer] Teams

## 2025-06-03 NOTE — CHART NOTE - NSCHARTNOTEFT_GEN_A_CORE
NUTRITION FOLLOW UP NOTE    PATIENT SEEN FOR: BMT follow-up     SOURCE: [x] Patient  [x] Current Medical Record  [x] RN  [] Family/support person at bedside  [] Patient unavailable/inappropriate  [] Other:    CHART REVIEWED/EVENTS NOTED.  [] No changes to nutrition care plan to note  [x] Nutrition Status:  - Admitted for a haplo-identical pbsct from his daughter with Flu / Bu / Cy / TBI prep regimen. Today is Day +14    DIET ORDER:   Diet, Regular:   Supplement Feeding Modality:  Oral  Ensure Clear Cans or Servings Per Day:  2       Frequency:  Daily (25)    CURRENT DIET ORDER IS:  [] Appropriate:  [] Inadequate:  [x] Other: see below for recommendation     NUTRITION INTAKE/PROVISION:  [x] PO: reports PO intake <=50% for > 5 days with lack of appetite, reports eating better at breakfast (had french toast, turkey siegel and oatmeal today), not much for lunch and dinner. does not like Ensure clear provided. Food preferences updated, will honor as able.   [] Enteral Nutrition:  [] Parenteral Nutrition:    ANTHROPOMETRICS:  Drug Dosing Weight  Height (cm): 151 (14 May 2025 09:36)  Weight (kg): 47.1 (14 May 2025 09:36)  BMI (kg/m2): 20.7 (14 May 2025 09:36)  Weights:   Daily Weight in k.3 (-03), Weight in k.8 (), Weight in k.4 (), Weight in k.1 (), Weight in k.4 (30), Weight in k.4 (), Weight in k.8 ()   weight fluctuation in house might be multifactorial: fluid shift with treatment, inconsistent/decreased PO intake    MEDICATIONS:  MEDICATIONS  (STANDING):  acyclovir   Oral Tab/Cap 800 milliGRAM(s) Oral every 12 hours  Biotene Dry Mouth Oral Rinse 5 milliLiter(s) Swish and Spit five times a day  chlorhexidine 0.12% Liquid 15 milliLiter(s) Swish and Spit two times a day  chlorhexidine 4% Liquid 1 Application(s) Topical <User Schedule>  DULoxetine 60 milliGRAM(s) Oral <User Schedule>  filgrastim-aafi (NIVESTYM) Injectable 300 MICROGram(s) SubCutaneous daily  levoFLOXacin  Tablet 500 milliGRAM(s) Oral every 24 hours  lidocaine   4% Patch 1 Patch Transdermal daily  pantoprazole    Tablet 40 milliGRAM(s) Oral before breakfast  phytonadione   Solution 5 milliGRAM(s) Oral <User Schedule>  posaconazole DR Tablet 300 milliGRAM(s) Oral daily  sodium bicarbonate Mouth Rinse 10 milliLiter(s) Swish and Spit five times a day  tacrolimus 1.5 milliGRAM(s) Oral <User Schedule>  tacrolimus 1.5 milliGRAM(s) Oral <User Schedule>  tiZANidine 4 milliGRAM(s) Oral <User Schedule>  ursodiol Capsule 300 milliGRAM(s) Oral two times a day  vancomycin    Solution 125 milliGRAM(s) Oral every 12 hours    MEDICATIONS  (PRN):  acetaminophen     Tablet .. 650 milliGRAM(s) Oral every 6 hours PRN Temp greater or equal to 38C (100.4F), Mild Pain (1 - 3)  aluminum hydroxide/magnesium hydroxide/simethicone Suspension 30 milliLiter(s) Oral every 4 hours PRN Dyspepsia  benzocaine/menthol Lozenge 1 Lozenge Oral every 6 hours PRN Sore Throat  FIRST- Mouthwash  BLM 15 milliLiter(s) Swish and Swallow four times a day PRN mucisitis  loperamide 2 milliGRAM(s) Oral every 6 hours PRN Diarrhea  oxyCODONE    IR 5 milliGRAM(s) Oral every 6 hours PRN Moderate Pain (4 - 6)  prochlorperazine   Injectable 10 milliGRAM(s) IV Push every 6 hours PRN Nausea/Vomiting  sodium chloride 0.9% lock flush 10 milliLiter(s) IV Push every 1 hour PRN Pre/post blood products, medications, blood draw, and to maintain line patency      NUTRITIONALLY PERTINENT LABS:   Na140 mmol/L Glu 97 mg/dL K+ 4.0 mmol/L Cr  0.52 mg/dL BUN 14 mg/dL  Phos 2.5 mg/dL  Alb 2.8 g/dL[L]- ALT 9 U/L[L] AST 8 U/L[L] Alkaline Phosphatase 87 U/L    Finger Sticks:    NUTRITIONALLY PERTINENT MEDICATIONS/LABS:  [x] Reviewed  [x] Relevant notes on medications/labs:  - Tacrolimus     EDEMA:  [x] Reviewed  [x] Relevant notes: +2 to left and right celia per flowsheet 6/2    GI/ I&O:  [x] Reviewed  [x] Relevant notes: ordered for Protonix, Compazine, Maalox, Loperamide, pt reports episodes of frequent BM, x4 today per flowsheet   [x] Other: Ordered for mouth Care: Biotene, NaHCO3, FIRST    SKIN:   [x] No pressure injuries documented, per nursing flowsheet  [] Pressure injury previously noted  [] Change in pressure injury documentation:  [] Other:    Nutrition focused physical exam conducted:    Subcutaneous fat loss: [mild] Orbital fat pads region, [moderate]Buccal fat region, [ ]Triceps region,  [ ]Ribs region.    Muscle wasting: [moderate]Temples region, [moderate]Clavicle region, [ ]Shoulder region, [ ]Scapula region, [ ]Interosseous region,  [ ]thigh region, [ ]Calf region.     ESTIMATED NEEDS:  [x] No change:  [] Updated:  Energy: 2557-7828  kcal/day (35-40 kcal/kg)  Protein:  71-94 g/day (1.5-2.0 g/kg)  Fluid:   ml/day or [x] defer to team  Based on: dosing weight 47 kg     NUTRITION DIAGNOSIS:  [] Prior Dx: Increased nutrient needs, Inadequate Protein Energy Intake progressing into acute severe malnutrition   [x] New Dx: acute severe malnutrition related to decreased ability to consume adequate protein-energy in setting of increased physiological demand for nutrients as evidenced by reported PO intake </=50% for > 5 days, mild and moderate fat and muscle wasting.  Goal: Pt will meet >75% estimated nutrient needs as tolerated.     EDUCATION:  [x] Yes: Emphasized the importance of adequate kcal and protein intake; recommend to optimize nutritional intake in case of decreased appetite; recommended small frequent meals by ordering nutrient-dense snacks and leaving non-perishable food away from tray for later consumption during the day or between meals; to start with protein, and sips of supplement throughout the day; reviewed foods with protein and menu order procedures in hospital. Reinforced BMT nutrition recommendations: food safety recommendations, increased protein-energy needs, small frequent meals as tolerated. Reviewed food choices that can help with loose BM.   [] Not appropriate/warranted    NUTRITION CARE PLAN:  1. Diet: Continue diet free of therapeutic restriction. RD remains available to adjust diet as needed.   2. Supplements: Recommend to discontinue Ensure Clear 2x daily (480 calories, 16g proteins)   - RD will provide trial of protein-fortified smoothie of day 1x/day (Monday-Friday), High Protein Apple Juice 1x daily (155kcal, 13g proteins) and High Protein Gelatin 1x daily (125kcal, 11g proteins)   3. Monitor and encourage PO intake. Encourage use of daily menus. Honor dietary preferences as expressed as able.   4: malnutrition alert placed in chart     [x] Achieved - Continue current nutrition intervention(s)  [] Current medical condition precludes nutrition intervention at this time.    MONITORING AND EVALUATION:   RD remains available upon request and will follow up per protocol:   Sunni Melo, MS, RDN, CDN (Teams)   Available on [employer] TEAMS

## 2025-06-03 NOTE — PROGRESS NOTE ADULT - NS ATTEST RISK PROBLEM GEN_ALL_CORE FT
complex allo-HSCT requiring chemotherapy induced pancytopenia management and supportive measures for toxicity Complex transplant care

## 2025-06-03 NOTE — PROGRESS NOTE ADULT - NS ATTEND AMEND GEN_ALL_CORE FT
I rounded with the team including nurses, ACP's  I reviewed the data / labs  pt seen and examined  prep flu/bu/cy/tbi  gvhd CAST...on iv tacro 0.9 mg..switched to po  1.5mg  in am and 1mg in pm..level 8.8 5/31..benja pending day 14,28,56    The patient is day +13 of haplo allogeneic HSCT. On nivestym  haplo storm, with capillary leak resolved, cxr noted small bilateral pleural effusions  The patient is doing well overall; reports occ throat/stomach pain when eating; and occ loose stool..improved further today  afebrile, vital signs stable  mild oral mucositis improved. The line site is clean. The lungs are clear. There is trace edema...using ace bandages for gentle compression.... 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 on levaquin..zosyn to be restarted with spike...mrsa swab neg, can avoid vanco  PRN diuresis..weight stable  continue antimicrobial ppx and supportive care   encourage ambulation and PO intake.  All questions answered.  d/c instruction started I conducted multidisciplinary rounds with the staff including nursing staff, ACPs, and clinical pharmacist.   I reviewed the data.  I saw and examined the patient.   I generated a treatment plan.   The patient is doing well following his allogeneic HSCT.   He has no complaints. His ROS is negative.   He is afebrile. His line site is clean. His lungs are clear. He has no LE edema.   Overall, the patient is doing well.   I answered his questions. I encouraged ambulation and oral intake.  SE Riley MD

## 2025-06-03 NOTE — DIETITIAN NUTRITION RISK NOTIFICATION - TREATMENT: THE FOLLOWING DIET HAS BEEN RECOMMENDED
Diet, Regular:   Supplement Feeding Modality:  Oral  Ensure Clear Cans or Servings Per Day:  2       Frequency:  Daily (05-24-25 @ 09:30) [Active]

## 2025-06-03 NOTE — PROGRESS NOTE ADULT - SUBJECTIVE AND OBJECTIVE BOX
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 with Flu / Bu / Cy / TBI prep regimen for treatment of refractory DLBCL    Disease: DLBCL  Type of transplant: Haplo-identical (daughter)   Prep Regimen: Flu / Bu / Cy / TBI   ABO / CMV:   Recipient: A POS/ NEG   Donor: A POS / NEG     S: Patient seen and examined with HPC Transplant Team:     O: Vitals:   Vital Signs Last 24 Hrs  T(C): 36.6 (2025 05:15), Max: 37.4 (2025 20:31)  T(F): 97.9 (2025 05:15), Max: 99.3 (2025 20:31)  HR: 79 (2025 05:15) (78 - 90)  BP: 101/54 (2025 05:15) (90/55 - 131/85)  BP(mean): --  RR: 20 (2025 05:15) (17 - 20)  SpO2: 96% (2025 05:15) (96% - 99%)    Parameters below as of 2025 05:15  Patient On (Oxygen Delivery Method): room air        Admit weight: 47.1kg   Daily     Daily Weight in k.8 (2025 08:18)    Intake / Output:   -02 @ 07:01  -  06-03 @ 07:00  --------------------------------------------------------  IN: 840 mL / OUT: 1350 mL / NET: -510 mL        PE:   Oropharynx: no erythema or ulcerations   Oral Mucositis: -                                              Grade: n/a  CVS: S1, S2 RRR   Lungs: CTA throughout bilaterally   Abdomen: + BS x 4, soft, NT, ND   Extremities: no edema   Gastric Mucositis:       +                                         ndGndrndanddndend:nd nd2nd Intestinal Mucositis:     -                                         Grade: n/a   Skin: no rash  TLC: CDI  Neuro: A&OX3          Labs:           Cultures:   Culture Results:   No growth (25 @ 01:52)    Culture Results:     Culture Results:   No growth at 24 hours (25 @ 22:50)    Culture Results:   <10,000 CFU/mL Normal Urogenital Mari (.25 @ 01:42)    Culture Results:   No growth at 4 days (. @ 22:30)    Culture Results:   No growth at 4 days (. @ 22:15)    Culture Results:   <10,000 CFU/mL Normal Urogenital Mari (. @ 21:16)    Culture Results:   No growth at 5 days (25 @ 21:16)    Culture Results:   No growth at 5 days (25 @ 21:16)    Radiology:   ACC: [id number] EXAM:  XR CHEST PORTABLE URGENT 1V  PROCEDURE DATE:  2025    IMPRESSION:  Bibasilar hazy opacities.  Small-to-moderate left pleural effusion and small right-sided pleural   effusion.  Cultures:   Culture Results:   No growth (25 @ 01:52)    Culture Results:     Culture Results:   No growth at 24 hours (25 @ 22:50)    Culture Results:   <10,000 CFU/mL Normal Urogenital Mari (25 @ 01:42)    Culture Results:   No growth at 4 days (25 @ 22:30)    Culture Results:   No growth at 4 days (25 @ 22:15)    Culture Results:   <10,000 CFU/mL Normal Urogenital Mari (25 @ 21:16)    Culture Results:   No growth at 5 days (25 @ 21:16)    Culture Results:   No growth at 5 days (25 @ 21:16)    Radiology:   ACC: [id number] EXAM:  XR CHEST PORTABLE URGENT 1V  PROCEDURE DATE:  2025    IMPRESSION:  Bibasilar hazy opacities.  Small-to-moderate left pleural effusion and small right-sided pleural   effusion.        Meds:   Antimicrobials:   acyclovir   Oral Tab/Cap 800 milliGRAM(s) Oral every 12 hours  levoFLOXacin  Tablet 500 milliGRAM(s) Oral every 24 hours  posaconazole DR Tablet 300 milliGRAM(s) Oral daily  vancomycin    Solution 125 milliGRAM(s) Oral every 12 hours      Heme / Onc:       GI:  aluminum hydroxide/magnesium hydroxide/simethicone Suspension 30 milliLiter(s) Oral every 4 hours PRN  loperamide 2 milliGRAM(s) Oral every 6 hours PRN  pantoprazole    Tablet 40 milliGRAM(s) Oral before breakfast  sodium bicarbonate Mouth Rinse 10 milliLiter(s) Swish and Spit five times a day  ursodiol Capsule 300 milliGRAM(s) Oral two times a day      Cardiovascular:       Immunologic:   filgrastim-aafi (NIVESTYM) Injectable 300 MICROGram(s) SubCutaneous daily  tacrolimus 1.5 milliGRAM(s) Oral <User Schedule>  tacrolimus 1.5 milliGRAM(s) Oral <User Schedule>      Other medications:   Biotene Dry Mouth Oral Rinse 5 milliLiter(s) Swish and Spit five times a day  chlorhexidine 0.12% Liquid 15 milliLiter(s) Swish and Spit two times a day  chlorhexidine 4% Liquid 1 Application(s) Topical <User Schedule>  DULoxetine 60 milliGRAM(s) Oral <User Schedule>  lidocaine   4% Patch 1 Patch Transdermal daily  phytonadione   Solution 5 milliGRAM(s) Oral <User Schedule>  tiZANidine 4 milliGRAM(s) Oral <User Schedule>      PRN:   acetaminophen     Tablet .. 650 milliGRAM(s) Oral every 6 hours PRN  aluminum hydroxide/magnesium hydroxide/simethicone Suspension 30 milliLiter(s) Oral every 4 hours PRN  benzocaine/menthol Lozenge 1 Lozenge Oral every 6 hours PRN  FIRST- Mouthwash  BLM 15 milliLiter(s) Swish and Swallow four times a day PRN  loperamide 2 milliGRAM(s) Oral every 6 hours PRN  oxyCODONE    IR 5 milliGRAM(s) Oral every 6 hours PRN  prochlorperazine   Injectable 10 milliGRAM(s) IV Push every 6 hours PRN  sodium chloride 0.9% lock flush 10 milliLiter(s) IV Push every 1 hour PRN          A/P:  69 year old male with refractory DLBCL, status post R-CHOP x 6, HD MTX x 4, salvage polatuzumab / rituximab / revlimid x 4, admitted for a haplo-identical pbsct from his daughter with Flu / Bu / Cy / TBI prep regimen   Day +14  5/15- busulfan 2/2, fludarabine 2/5. No acute events overnight, vital signs are stable; continue keppra ppx for 24 hours post infusion of last dose of busulfan. No tylenol or posaconazole until day 0. Not neutropenic today, will d/c levaquin / vancomycin.    - fludarabine 3/5. VSS, afebrile. No acute events overnight.   - fludarabine . VSS and afebrile. No acute events overnight. Neutropenic today, started on ppx levaquin/vancomycin PO. No Tylenol or Azoles until day 0.   - fludarabine . VSS, remains afebrile. No acute events overnight. No Tylenol or Azoles until day 0.  - TBI today. No acute events overnight. Vital signs are stable.    - will receive Haplo allogeneic SCT today. VSS, afebrile.  - tolerated HPC infusion well. continue IVF hydration 24 hrs post cells, continue to monitor I&Os   - increased loose BM, imodium prn. VSS, afebrile and infectious work up negative - will discontinue zosyn and switch to levaquin ppx. Continue supportive care.  - PTCY2/2. febrile overnight: f/u cultures. Broaden to Zosyn. s.p lasix today   -continue PTCY hydration. Monitor I&O: s/p lasix today. afebrile: blood cultures negative Zosyn de-escalated to Levaquin   - VSS, no acute events: awaiting count recovery    Febrile overight - empirically started on zosyn, BCx/UCx pending, CXR with bibasilar hazy opacites and small to mod pleural effusions. Monitor strict I/O's, consider lasix prn. VSS, afebrile currently.    - no acute events overnight, vital signs are stable. Intermittent dyspnea; requiring O2.    - no acute events overnight. VSS and remains afebrile. Next tacrolimus level on Saturday (). No SOB symptoms and sating well on RA. Discontinued zosyn switched to levaquin ppx.   - rash of the bilateral thighs/lower back resolved. No acute events overnight and VSS/afebrile. Tacrolimus level on .    - no acute events overnight. VSS and remains afebrile. Tacrolimus level: 8.8 - maintain dose. Continue with supportive care.   VSS, remains afebrile. No acute events overnight. Continue with supportive care.  - No acute events overnight, vital signs are stable. Tacrolimus level pending from this AM. Grade 1 chemotherapy induced GI mucositis (Heartburn, relieved with maalox), continue supportive care.     1. Infectious Disease:   acyclovir   Oral Tab/Cap 800 milliGRAM(s) Oral every 12 hours  levoFLOXacin  Tablet 500 milliGRAM(s) Oral every 24 hours  posaconazole DR Tablet 300 milliGRAM(s) Oral daily  vancomycin    Solution 125 milliGRAM(s) Oral every 12 hours    2. VOD Prophylaxis:   ursodiol Capsule 300 milliGRAM(s) Oral two times a day    3. GI Prophylaxis:    pantoprazole Tablet 40 milliGRAM(s) Oral before breakfast    4. Mouthcare - NS / NaHCO3 rinses, Biotene; Skin care     5. GVHD prophylaxis   PTCy + 3, +4   tacrolimus 1.5 milliGRAM(s) Oral <User Schedule>  tacrolimus 1 milliGRAM(s) Oral <User Schedule>  Abatacept days +5. +14, +28, +56     6. Transfuse & replete electrolytes prn     7. IV hydration, daily weights, strict I&O, prn diuresis     8. PO intake as tolerated, nutrition follow up as needed    9. Antiemetics, anti-diarrhea medications:   loperamide 2 milliGRAM(s) Oral every 6 hours PRN  prochlorperazine Injectable 10 milliGRAM(s) IV Push every 6 hours PRN    10. OOB as tolerated, physical therapy consult if needed     11. Monitor coags / fibrinogen 2x week, vitamin K as needed   phytonadione Solution 5 milliGRAM(s) Oral <User Schedule>    12. Monitor closely for clinical changes, monitor for fevers     13. Emotional support provided, plan of care discussed and questions addressed     14. Patient education done regarding plan of care, restrictions and discharge planning     15. Continue regular social work input     I have written the above note for Dr. Riley  who performed service with me in the room.   Patti Stern PA-C ([phone])    I have seen and examined patient with PA, I agree with above note as scribed.              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 with Flu / Bu / Cy / TBI prep regimen for treatment of refractory DLBCL    Disease: DLBCL  Type of transplant: Haplo-identical (daughter)   Prep Regimen: Flu / Bu / Cy / TBI   ABO / CMV:   Recipient: A POS/ NEG   Donor: A POS / NEG     S: Patient seen and examined with HPC Transplant Team:       +fatigue    O: Vitals:   Vital Signs Last 24 Hrs  T(C): 36.6 (2025 05:15), Max: 37.4 (2025 20:31)  T(F): 97.9 (2025 05:15), Max: 99.3 (2025 20:31)  HR: 79 (2025 05:15) (78 - 90)  BP: 101/54 (2025 05:15) (90/55 - 131/85)  BP(mean): --  RR: 20 (2025 05:15) (17 - 20)  SpO2: 96% (2025 05:15) (96% - 99%)    Parameters below as of 2025 05:15  Patient On (Oxygen Delivery Method): room air        Admit weight: 47.1kg   Daily : 47 kg    Daily Weight in k.8 (2025 08:18)    Intake / Output:   -02 @ 07:01  -  06-03 @ 07:00  --------------------------------------------------------  IN: 840 mL / OUT: 1350 mL / NET: -510 mL        PE:   Oropharynx: no erythema or ulcerations   Oral Mucositis: -                                              Grade: n/a  CVS: S1, S2 RRR   Lungs: CTA throughout bilaterally   Abdomen: + BS x 4, soft, NT, ND   Extremities: no edema   Gastric Mucositis:       +                                         ndGndrndanddndend:nd nd2nd Intestinal Mucositis:     -                                         Grade: n/a   Skin: no rash  TLC: CDI  Neuro: A&OX3          Labs:                           6.9    0.08  )-----------( 18       ( 2025 06:32 )             21.1   06-03    140  |  107  |  14  ----------------------------<  97  4.0   |  23  |  0.52    Ca    8.2[L]      2025 06:32  Phos  2.5       Mg     1.6         TPro  4.5[L]  /  Alb  2.8[L]  /  TBili  0.5  /  DBili  x   /  AST  8[L]  /  ALT  9[L]  /  AlkPhos  87          Cultures:   Culture Results:   No growth (25 @ :52)    Culture Results:     Culture Results:   No growth at 24 hours (25 @ 22:50)    Culture Results:   <10,000 CFU/mL Normal Urogenital Mari (25 @ :42)    Culture Results:   No growth at 4 days (25 @ 22:30)    Culture Results:   No growth at 4 days (25 @ 22:15)    Culture Results:   <10,000 CFU/mL Normal Urogenital Mari (25 @ 21:16)    Culture Results:   No growth at 5 days (25 @ 21:16)    Culture Results:   No growth at 5 days (25 @ 21:16)    Radiology:   ACC: [id number] EXAM:  XR CHEST PORTABLE URGENT 1V  PROCEDURE DATE:  2025    IMPRESSION:  Bibasilar hazy opacities.  Small-to-moderate left pleural effusion and small right-sided pleural   effusion.  Cultures:   Culture Results:   No growth (25 @ :52)    Culture Results:     Culture Results:   No growth at 24 hours (25 @ 22:50)    Culture Results:   <10,000 CFU/mL Normal Urogenital Mari (25 @ 01:42)    Culture Results:   No growth at 4 days (25 @ 22:30)    Culture Results:   No growth at 4 days (25 @ 22:15)    Culture Results:   <10,000 CFU/mL Normal Urogenital Mari (25 @ 21:16)    Culture Results:   No growth at 5 days (25 @ 21:16)    Culture Results:   No growth at 5 days (25 @ 21:16)    Radiology:   ACC: [id number] EXAM:  XR CHEST PORTABLE URGENT 1V  PROCEDURE DATE:  2025    IMPRESSION:  Bibasilar hazy opacities.  Small-to-moderate left pleural effusion and small right-sided pleural   effusion.        Meds:   Antimicrobials:   acyclovir   Oral Tab/Cap 800 milliGRAM(s) Oral every 12 hours  levoFLOXacin  Tablet 500 milliGRAM(s) Oral every 24 hours  posaconazole DR Tablet 300 milliGRAM(s) Oral daily  vancomycin    Solution 125 milliGRAM(s) Oral every 12 hours      Heme / Onc:       GI:  aluminum hydroxide/magnesium hydroxide/simethicone Suspension 30 milliLiter(s) Oral every 4 hours PRN  loperamide 2 milliGRAM(s) Oral every 6 hours PRN  pantoprazole    Tablet 40 milliGRAM(s) Oral before breakfast  sodium bicarbonate Mouth Rinse 10 milliLiter(s) Swish and Spit five times a day  ursodiol Capsule 300 milliGRAM(s) Oral two times a day      Cardiovascular:       Immunologic:   filgrastim-aafi (NIVESTYM) Injectable 300 MICROGram(s) SubCutaneous daily  tacrolimus 1.5 milliGRAM(s) Oral <User Schedule>  tacrolimus 1.5 milliGRAM(s) Oral <User Schedule>      Other medications:   Biotene Dry Mouth Oral Rinse 5 milliLiter(s) Swish and Spit five times a day  chlorhexidine 0.12% Liquid 15 milliLiter(s) Swish and Spit two times a day  chlorhexidine 4% Liquid 1 Application(s) Topical <User Schedule>  DULoxetine 60 milliGRAM(s) Oral <User Schedule>  lidocaine   4% Patch 1 Patch Transdermal daily  phytonadione   Solution 5 milliGRAM(s) Oral <User Schedule>  tiZANidine 4 milliGRAM(s) Oral <User Schedule>      PRN:   acetaminophen     Tablet .. 650 milliGRAM(s) Oral every 6 hours PRN  aluminum hydroxide/magnesium hydroxide/simethicone Suspension 30 milliLiter(s) Oral every 4 hours PRN  benzocaine/menthol Lozenge 1 Lozenge Oral every 6 hours PRN  FIRST- Mouthwash  BLM 15 milliLiter(s) Swish and Swallow four times a day PRN  loperamide 2 milliGRAM(s) Oral every 6 hours PRN  oxyCODONE    IR 5 milliGRAM(s) Oral every 6 hours PRN  prochlorperazine   Injectable 10 milliGRAM(s) IV Push every 6 hours PRN  sodium chloride 0.9% lock flush 10 milliLiter(s) IV Push every 1 hour PRN          A/P:  69 year old male with refractory DLBCL, status post R-CHOP x 6, HD MTX x 4, salvage polatuzumab / rituximab / revlimid x 4, admitted for a haplo-identical pbsct from his daughter with Flu / Bu / Cy / TBI prep regimen   Day +14  5/15- busulfan , fludarabine . No acute events overnight, vital signs are stable; continue keppra ppx for 24 hours post infusion of last dose of busulfan. No tylenol or posaconazole until day 0. Not neutropenic today, will d/c levaquin / vancomycin.    - fludarabine 3/5. VSS, afebrile. No acute events overnight.   - fludarabine . VSS and afebrile. No acute events overnight. Neutropenic today, started on ppx levaquin/vancomycin PO. No Tylenol or Azoles until day 0.   - fludarabine . VSS, remains afebrile. No acute events overnight. No Tylenol or Azoles until day 0.  - TBI today. No acute events overnight. Vital signs are stable.    - will receive Haplo allogeneic SCT today. VSS, afebrile.  - tolerated HPC infusion well. continue IVF hydration 24 hrs post cells, continue to monitor I&Os   - increased loose BM, imodium prn. VSS, afebrile and infectious work up negative - will discontinue zosyn and switch to levaquin ppx. Continue supportive care.  - PTCY2/2. febrile overnight: f/u cultures. Broaden to Zosyn. s.p lasix today   -continue PTCY hydration. Monitor I&O: s/p lasix today. afebrile: blood cultures negative Zosyn de-escalated to Levaquin   - VSS, no acute events: awaiting count recovery    Febrile overight - empirically started on zosyn, BCx/UCx pending, CXR with bibasilar hazy opacites and small to mod pleural effusions. Monitor strict I/O's, consider lasix prn. VSS, afebrile currently.    - no acute events overnight, vital signs are stable. Intermittent dyspnea; requiring O2.    - no acute events overnight. VSS and remains afebrile. Next tacrolimus level on Saturday (). No SOB symptoms and sating well on RA. Discontinued zosyn switched to levaquin ppx.   - rash of the bilateral thighs/lower back resolved. No acute events overnight and VSS/afebrile. Tacrolimus level on .    - no acute events overnight. VSS and remains afebrile. Tacrolimus level: 8.8 - maintain dose. Continue with supportive care.   VSS, remains afebrile. No acute events overnight. Continue with supportive care.  - No acute events overnight, vital signs are stable. Tacrolimus level pending from this AM. Grade 1 chemotherapy induced GI mucositis (Heartburn, relieved with maalox), continue supportive care.   6/3-[place]rafting. Continue zarxio, VSS, discharge planning       1. Infectious Disease:   acyclovir   Oral Tab/Cap 800 milliGRAM(s) Oral every 12 hours  levoFLOXacin  Tablet 500 milliGRAM(s) Oral every 24 hours  posaconazole DR Tablet 300 milliGRAM(s) Oral daily  vancomycin    Solution 125 milliGRAM(s) Oral every 12 hours    2. VOD Prophylaxis:   ursodiol Capsule 300 milliGRAM(s) Oral two times a day    3. GI Prophylaxis:    pantoprazole Tablet 40 milliGRAM(s) Oral before breakfast    4. Mouthcare - NS / NaHCO3 rinses, Biotene; Skin care     5. GVHD prophylaxis   PTCy + 3, +4   tacrolimus 1.5 milliGRAM(s) Oral <User Schedule>  tacrolimus 1 milliGRAM(s) Oral <User Schedule>  Abatacept days +5. +14, +28, +56     6. Transfuse & replete electrolytes prn     7. IV hydration, daily weights, strict I&O, prn diuresis     8. PO intake as tolerated, nutrition follow up as needed    9. Antiemetics, anti-diarrhea medications:   loperamide 2 milliGRAM(s) Oral every 6 hours PRN  prochlorperazine Injectable 10 milliGRAM(s) IV Push every 6 hours PRN    10. OOB as tolerated, physical therapy consult if needed     11. Monitor coags / fibrinogen 2x week, vitamin K as needed   phytonadione Solution 5 milliGRAM(s) Oral <User Schedule>    12. Monitor closely for clinical changes, monitor for fevers     13. Emotional support provided, plan of care discussed and questions addressed     14. Patient education done regarding plan of care, restrictions and discharge planning     15. Continue regular social work input     I have written the above note for Dr. Riley  who performed service with me in the room.   Patti Stern PA-C ([phone])    I have seen and examined patient with PA, I agree with above note as scribed.

## 2025-06-03 NOTE — CHART NOTE - NSCHARTNOTESELECT_GEN_ALL_CORE
Desat/Event Note
HPC infusion/Event Note
Nutrition Services
Fever/Event Note
Nutrition Services

## 2025-06-04 LAB
ALBUMIN SERPL ELPH-MCNC: 2.9 G/DL — LOW (ref 3.3–5)
ALP SERPL-CCNC: 91 U/L — SIGNIFICANT CHANGE UP (ref 40–120)
ALT FLD-CCNC: 8 U/L — LOW (ref 10–45)
ANION GAP SERPL CALC-SCNC: 12 MMOL/L — SIGNIFICANT CHANGE UP (ref 5–17)
AST SERPL-CCNC: 9 U/L — LOW (ref 10–40)
BILIRUB SERPL-MCNC: 0.8 MG/DL — SIGNIFICANT CHANGE UP (ref 0.2–1.2)
BUN SERPL-MCNC: 14 MG/DL — SIGNIFICANT CHANGE UP (ref 7–23)
CALCIUM SERPL-MCNC: 8.1 MG/DL — LOW (ref 8.4–10.5)
CHLORIDE SERPL-SCNC: 103 MMOL/L — SIGNIFICANT CHANGE UP (ref 96–108)
CO2 SERPL-SCNC: 22 MMOL/L — SIGNIFICANT CHANGE UP (ref 22–31)
CREAT SERPL-MCNC: 0.54 MG/DL — SIGNIFICANT CHANGE UP (ref 0.5–1.3)
EGFR: 109 ML/MIN/1.73M2 — SIGNIFICANT CHANGE UP
EGFR: 109 ML/MIN/1.73M2 — SIGNIFICANT CHANGE UP
GLUCOSE SERPL-MCNC: 94 MG/DL — SIGNIFICANT CHANGE UP (ref 70–99)
HCT VFR BLD CALC: 23.8 % — LOW (ref 39–50)
HGB BLD-MCNC: 8 G/DL — LOW (ref 13–17)
LDH SERPL L TO P-CCNC: 153 U/L — SIGNIFICANT CHANGE UP (ref 50–242)
MAGNESIUM SERPL-MCNC: 1.7 MG/DL — SIGNIFICANT CHANGE UP (ref 1.6–2.6)
MCHC RBC-ENTMCNC: 28.7 PG — SIGNIFICANT CHANGE UP (ref 27–34)
MCHC RBC-ENTMCNC: 33.6 G/DL — SIGNIFICANT CHANGE UP (ref 32–36)
MCV RBC AUTO: 85.3 FL — SIGNIFICANT CHANGE UP (ref 80–100)
NRBC BLD AUTO-RTO: 0 /100 WBCS — SIGNIFICANT CHANGE UP (ref 0–0)
PHOSPHATE SERPL-MCNC: 2.4 MG/DL — LOW (ref 2.5–4.5)
PLATELET # BLD AUTO: 11 K/UL — CRITICAL LOW (ref 150–400)
POTASSIUM SERPL-MCNC: 4.1 MMOL/L — SIGNIFICANT CHANGE UP (ref 3.5–5.3)
POTASSIUM SERPL-SCNC: 4.1 MMOL/L — SIGNIFICANT CHANGE UP (ref 3.5–5.3)
PROT SERPL-MCNC: 4.8 G/DL — LOW (ref 6–8.3)
RBC # BLD: 2.79 M/UL — LOW (ref 4.2–5.8)
RBC # FLD: 14 % — SIGNIFICANT CHANGE UP (ref 10.3–14.5)
SODIUM SERPL-SCNC: 137 MMOL/L — SIGNIFICANT CHANGE UP (ref 135–145)
WBC # BLD: 0.38 K/UL — CRITICAL LOW (ref 3.8–10.5)
WBC # FLD AUTO: 0.38 K/UL — CRITICAL LOW (ref 3.8–10.5)

## 2025-06-04 PROCEDURE — 99233 SBSQ HOSP IP/OBS HIGH 50: CPT

## 2025-06-04 RX ORDER — SOD PHOS DI, MONO/K PHOS MONO 250 MG
1 TABLET ORAL ONCE
Refills: 0 | Status: COMPLETED | OUTPATIENT
Start: 2025-06-04 | End: 2025-06-04

## 2025-06-04 RX ADMIN — Medication 10 MILLILITER(S): at 20:18

## 2025-06-04 RX ADMIN — Medication 1 PACKET(S): at 08:25

## 2025-06-04 RX ADMIN — DULOXETINE 60 MILLIGRAM(S): 20 CAPSULE, DELAYED RELEASE ORAL at 20:19

## 2025-06-04 RX ADMIN — Medication 5 MILLILITER(S): at 20:18

## 2025-06-04 RX ADMIN — Medication 15 MILLILITER(S): at 05:41

## 2025-06-04 RX ADMIN — OXYCODONE HYDROCHLORIDE 5 MILLIGRAM(S): 30 TABLET ORAL at 01:05

## 2025-06-04 RX ADMIN — URSODIOL 300 MILLIGRAM(S): 300 CAPSULE ORAL at 17:10

## 2025-06-04 RX ADMIN — Medication 5 MILLILITER(S): at 08:24

## 2025-06-04 RX ADMIN — Medication 5 MILLILITER(S): at 11:40

## 2025-06-04 RX ADMIN — OXYCODONE HYDROCHLORIDE 5 MILLIGRAM(S): 30 TABLET ORAL at 17:46

## 2025-06-04 RX ADMIN — LIDOCAINE HYDROCHLORIDE 1 PATCH: 20 JELLY TOPICAL at 05:40

## 2025-06-04 RX ADMIN — Medication 800 MILLIGRAM(S): at 17:09

## 2025-06-04 RX ADMIN — LIDOCAINE HYDROCHLORIDE 1 PATCH: 20 JELLY TOPICAL at 20:19

## 2025-06-04 RX ADMIN — POSACONAZOLE 300 MILLIGRAM(S): 100 TABLET, DELAYED RELEASE ORAL at 11:39

## 2025-06-04 RX ADMIN — Medication 650 MILLIGRAM(S): at 20:30

## 2025-06-04 RX ADMIN — Medication 650 MILLIGRAM(S): at 06:41

## 2025-06-04 RX ADMIN — Medication 1 APPLICATION(S): at 08:24

## 2025-06-04 RX ADMIN — OXYCODONE HYDROCHLORIDE 5 MILLIGRAM(S): 30 TABLET ORAL at 01:50

## 2025-06-04 RX ADMIN — Medication 650 MILLIGRAM(S): at 06:03

## 2025-06-04 RX ADMIN — TACROLIMUS 1.5 MILLIGRAM(S): 0.5 CAPSULE ORAL at 20:20

## 2025-06-04 RX ADMIN — Medication 10 MILLILITER(S): at 11:40

## 2025-06-04 RX ADMIN — LIDOCAINE HYDROCHLORIDE 1 PATCH: 20 JELLY TOPICAL at 17:08

## 2025-06-04 RX ADMIN — TIZANIDINE 4 MILLIGRAM(S): 4 TABLET ORAL at 20:20

## 2025-06-04 RX ADMIN — Medication 10 MILLILITER(S): at 16:42

## 2025-06-04 RX ADMIN — Medication 125 MILLIGRAM(S): at 05:40

## 2025-06-04 RX ADMIN — URSODIOL 300 MILLIGRAM(S): 300 CAPSULE ORAL at 05:40

## 2025-06-04 RX ADMIN — Medication 40 MILLIGRAM(S): at 06:03

## 2025-06-04 RX ADMIN — OXYCODONE HYDROCHLORIDE 5 MILLIGRAM(S): 30 TABLET ORAL at 17:16

## 2025-06-04 RX ADMIN — Medication 125 MILLIGRAM(S): at 17:09

## 2025-06-04 RX ADMIN — Medication 800 MILLIGRAM(S): at 05:40

## 2025-06-04 RX ADMIN — FILGRASTIM 300 MICROGRAM(S): 300 INJECTION, SOLUTION INTRAVENOUS; SUBCUTANEOUS at 14:00

## 2025-06-04 RX ADMIN — TACROLIMUS 1.5 MILLIGRAM(S): 0.5 CAPSULE ORAL at 08:23

## 2025-06-04 RX ADMIN — Medication 10 MILLILITER(S): at 08:24

## 2025-06-04 RX ADMIN — Medication 15 MILLILITER(S): at 17:10

## 2025-06-04 RX ADMIN — Medication 650 MILLIGRAM(S): at 21:00

## 2025-06-04 RX ADMIN — Medication 5 MILLILITER(S): at 16:42

## 2025-06-04 NOTE — PHARMACOTHERAPY INTERVENTION NOTE - INTERVENTION TYPE MISC
other...

## 2025-06-04 NOTE — PROVIDER CONTACT NOTE (CRITICAL VALUE NOTIFICATION) - BACKGROUND
DLBC lymphoma day +15 s/p haplo/ daughter stem cell transplant.
Pt. s/p Haplo transplant for DLBCL
DLBC lymphoma s/p day + 9 haplo/ daughter stem cell transplant
DLBC Lymphoma s/p day +12 stem cell transplant
DLBC lymphoma day +14 Haplo/ daughter stem cell transplant.
DLB cell lymphoma s/p day +10 s/p haplo / daughter stem cell transplant

## 2025-06-04 NOTE — PHARMACOTHERAPY INTERVENTION NOTE - REASON FOR NOTE
Progress Note

## 2025-06-04 NOTE — PROGRESS NOTE ADULT - NS ATTEND AMEND GEN_ALL_CORE FT
I conducted multidisciplinary rounds with the staff including nursing staff, ACPs, and clinical pharmacist.   I reviewed the data.  I saw and examined the patient.   I generated a treatment plan.   The patient is doing well following his allogeneic HSCT.   He has no complaints. His ROS is negative.   He is afebrile. His line site is clean. His lungs are clear. He has no LE edema.   Overall, the patient is doing well.   I answered his questions. I encouraged ambulation and oral intake.  SE Riley MD I conducted multidisciplinary rounds with the staff including nursing staff, ACPs, and clinical pharmacist.   I reviewed the data.  I saw and examined the patient.   I generated a treatment plan.   The patient is doing well following his allogeneic HSCT.   He has no complaints. His ROS is negative except for neck pain.    He is afebrile. His line site is clean. His lungs are clear. He has no LE edema.   Overall, the patient is doing well. His counts are recovering. He will likely be discharged in am.   I answered his questions. I encouraged ambulation and oral intake.  SE Riley MD

## 2025-06-04 NOTE — PHARMACOTHERAPY INTERVENTION NOTE - COMMENTS
69-year-old male with 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 He is admitted for an alloSCT with BERTRAM Bu/Flu/Cy/TBI conditioning and CAST for GVHD ppx. Today is day +15. Course has been complicated by febrile neutropenia/haplostorm.    Conditioning Regimen: RI Bu/Flu/Cy/TBI (complete)  Day -6 () to Day -5 (5/15): Busulfan 130 mg/m2 IV administered over 3 hours for 2 doses -> avoid APAP and azole antifungals for 48 hours post busulfan (until )  Day -6 () to Day -2 () Fludarabine 30 mg/m2 IV administered over 30 minutes for 5 doses  Moving up by 1 hour every day to ensure 48 hours between final dose and CTP infusion - last dose complete on  @ 1045 am  Day -3 () to Day -2 () Cyclophosphamide 14.5 mg/k2 IV administered over 1 hour for 2 doses  Day -1 ()  cGy x1    GVHD ppx: CAST  Cyclophosphamide 50 mg/kg IV daily on Day +3 () and Day +4 ().   Abatacept IV 10 mg/kg on days +5 (), +14 (6/3), +28 (), and +56 (7/15).  Patient will start tacrolimus 0.02 mg/kg IV on ,  (day +5) - please order a one time trough on Wednesday,  (day +8) and one time trough on Saturday,  along with troughs every Tuesday to start on 6/3. Goal trough is 8-12 ng/mL. Please ensure trough is drawn peripherally.    Date      Day         Level          Action          Day 0       N/A          Started posaconazole 300 mg PO QD (CY inhibitor)          Day +3     N/A          Received Fosaprepitant 150 mg IV x1 (CY inhibitor)          Day +5     N/A          Started tacrolimus 0.9 mg IV          Day +8      6.9          Switched to PO - tacrolimus 1.5 mg PO QAM and 1 mg PO QPM          Day +11    8.8           Kept tacrolimus dose the same           Day +13    7.5          Increased dose to 1.5 mg PO BID  ---Next level: ---    Antimicrobial ppx.:  Started antimicrobial (levofloxacin 500 mg PO QD), and PO vanco 125 mg BID once ANC <1000 () & will continue until ANC >500 for 3 consecutive days. Started antifungal (posaconazole 300 mg PO QD) on day 0 () and will continue until day +75. Started GCSF on day +7 () & will stop once ANC >1500 for two days.     Febrile neutropenia/haplostorm:  Patient became febrile to 100.5F on  @ 1948 & was ordered for pip/tazo 4.5 g IV every 8 hours (-). BCx from  &  showed NGTD & CXR from  shows atelectasis. Most likely 2/2 haplostorm, BCx remained negative & patient was afebrile x24 hours, so was de-escalated to levofloxacin 500 mg PO QD ().      Patient became febrile again to 101.1F on  and was escalated to pip/tazo 4.5 g IV every 8 hours (-) and vancomycin 1000 mg IV x1. BCx from  shows NGTD and patient has been afebrile since  @ 20:02. De-escalating to levofloxacin 500 mg PO QD ().     Evelyne Murcia, PharmD, BCOP  Stem Cell Transplant Clinical Pharmacy Specialist  Available via [employer] Teams

## 2025-06-04 NOTE — PROGRESS NOTE ADULT - SUBJECTIVE AND OBJECTIVE BOX
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 with Flu / Bu / Cy / TBI prep regimen for treatment of refractory DLBCL    Disease: DLBCL  Type of transplant: Haplo-identical (daughter)   Prep Regimen: Flu / Bu / Cy / TBI   ABO / CMV:   Recipient: A POS/ NEG   Donor: A POS / NEG     S: Patient seen and examined with HPC Transplant Team:       +fatigue    O: Vitals:   Vital Signs Last 24 Hrs  T(C): 36.5 (2025 05:50), Max: 37.1 (2025 22:30)  T(F): 97.7 (2025 05:50), Max: 98.8 (2025 22:30)  HR: 83 (2025 05:50) (83 - 89)  BP: 115/74 (2025 05:50) (114/71 - 125/74)  BP(mean): --  RR: 18 (2025 05:50) (17 - 19)  SpO2: 97% (2025 05:50) (97% - 99%)    Parameters below as of 2025 05:50  Patient On (Oxygen Delivery Method): room air        Admit weight: 47.1kg  Daily   47 kg  Daily Weight in k.2 (2025 07:50)    Intake / Output:   06-03 @ 07:01  -  -04 @ 07:00  --------------------------------------------------------  IN: 780 mL / OUT: 830 mL / NET: -50 mL        PE:   Oropharynx: no erythema or ulcerations   Oral Mucositis: -                                              Grade: n/a  CVS: S1, S2 RRR   Lungs: CTA throughout bilaterally   Abdomen: + BS x 4, soft, NT, ND   Extremities: no edema   Gastric Mucositis:       +                                         ndGndrndanddndend:nd nd2nd Intestinal Mucositis:     -                                         Grade: n/a   Skin: no rash  TLC: CDI  Neuro: A&OX3        Labs:   CBC Full  -  ( 2025 06:28 )  WBC Count : 0.38 K/uL  Hemoglobin : 8.0 g/dL  Hematocrit : 23.8 %  Platelet Count - Automated : 11 K/uL  Mean Cell Volume : 85.3 fl  Mean Cell Hemoglobin : 28.7 pg  Mean Cell Hemoglobin Concentration : 33.6 g/dL  Auto Neutrophil # : x  Auto Lymphocyte # : x  Auto Monocyte # : x  Auto Eosinophil # : x  Auto Basophil # : x  Auto Neutrophil % : x  Auto Lymphocyte % : x  Auto Monocyte % : x  Auto Eosinophil % : x  Auto Basophil % : x                          8.0    0.38  )-----------( 11       ( 2025 06:28 )             23.8     06-    137  |  103  |  14  ----------------------------<  94  4.1   |  22  |  0.54    Ca    8.1[L]      2025 06:28  Phos  2.4     -  Mg     1.7         TPro  4.8[L]  /  Alb  2.9[L]  /  TBili  0.8  /  DBili  x   /  AST  9[L]  /  ALT  8[L]  /  AlkPhos  91        LIVER FUNCTIONS - ( 2025 06:28 )  Alb: 2.9 g/dL / Pro: 4.8 g/dL / ALK PHOS: 91 U/L / ALT: 8 U/L / AST: 9 U/L / GGT: x           Lactate Dehydrogenase, Serum: 153 U/L ( @ 06:28)    Cultures:   Culture Results:   No growth (25 @ 01:52)    Culture Results:     Culture Results:   No growth at 24 hours (25 @ 22:50)    Culture Results:   <10,000 CFU/mL Normal Urogenital Mari (25 @ 01:42)    Culture Results:   No growth at 4 days (25 @ 22:30)    Culture Results:   No growth at 4 days (25 @ 22:15)    Culture Results:   <10,000 CFU/mL Normal Urogenital Mari (25 @ 21:16)    Culture Results:   No growth at 5 days (25 @ 21:16)    Culture Results:   No growth at 5 days (25 @ 21:16)    Radiology:   ACC: [id number] EXAM:  XR CHEST PORTABLE URGENT 1V  PROCEDURE DATE:  2025    IMPRESSION:  Bibasilar hazy opacities.  Small-to-moderate left pleural effusion and small right-sided pleural   effusion.  Cultures:   Culture Results:   No growth (25 @ 01:52)    Culture Results:     Culture Results:   No growth at 24 hours (25 @ 22:50)    Culture Results:   <10,000 CFU/mL Normal Urogenital Mari (25 @ 01:42)    Culture Results:   No growth at 4 days (25 @ 22:30)    Culture Results:   No growth at 4 days (25 @ 22:15)    Culture Results:   <10,000 CFU/mL Normal Urogenital Mari (25 @ 21:16)    Culture Results:   No growth at 5 days (25 @ 21:16)    Culture Results:   No growth at 5 days (25 @ 21:16)    Radiology:   ACC: [id number] EXAM:  XR CHEST PORTABLE URGENT 1V  PROCEDURE DATE:  2025    IMPRESSION:  Bibasilar hazy opacities.  Small-to-moderate left pleural effusion and small right-sided pleural   effusion.        Meds:   Antimicrobials:   acyclovir   Oral Tab/Cap 800 milliGRAM(s) Oral every 12 hours  levoFLOXacin  Tablet 500 milliGRAM(s) Oral every 24 hours  posaconazole DR Tablet 300 milliGRAM(s) Oral daily  vancomycin    Solution 125 milliGRAM(s) Oral every 12 hours      Heme / Onc:       GI:  aluminum hydroxide/magnesium hydroxide/simethicone Suspension 30 milliLiter(s) Oral every 4 hours PRN  loperamide 2 milliGRAM(s) Oral every 6 hours PRN  pantoprazole    Tablet 40 milliGRAM(s) Oral before breakfast  sodium bicarbonate Mouth Rinse 10 milliLiter(s) Swish and Spit five times a day  ursodiol Capsule 300 milliGRAM(s) Oral two times a day      Cardiovascular:       Immunologic:   filgrastim-aafi (NIVESTYM) Injectable 300 MICROGram(s) SubCutaneous daily  tacrolimus 1.5 milliGRAM(s) Oral <User Schedule>  tacrolimus 1.5 milliGRAM(s) Oral <User Schedule>      Other medications:   Biotene Dry Mouth Oral Rinse 5 milliLiter(s) Swish and Spit five times a day  chlorhexidine 0.12% Liquid 15 milliLiter(s) Swish and Spit two times a day  chlorhexidine 4% Liquid 1 Application(s) Topical <User Schedule>  DULoxetine 60 milliGRAM(s) Oral <User Schedule>  lidocaine   4% Patch 1 Patch Transdermal daily  phytonadione   Solution 5 milliGRAM(s) Oral <User Schedule>  tiZANidine 4 milliGRAM(s) Oral <User Schedule>      PRN:   acetaminophen     Tablet .. 650 milliGRAM(s) Oral every 6 hours PRN  aluminum hydroxide/magnesium hydroxide/simethicone Suspension 30 milliLiter(s) Oral every 4 hours PRN  benzocaine/menthol Lozenge 1 Lozenge Oral every 6 hours PRN  FIRST- Mouthwash  BLM 15 milliLiter(s) Swish and Swallow four times a day PRN  loperamide 2 milliGRAM(s) Oral every 6 hours PRN  oxyCODONE    IR 5 milliGRAM(s) Oral every 6 hours PRN  prochlorperazine   Injectable 10 milliGRAM(s) IV Push every 6 hours PRN  sodium chloride 0.9% lock flush 10 milliLiter(s) IV Push every 1 hour PRN          A/P:  69 year old male with refractory DLBCL, status post R-CHOP x 6, HD MTX x 4, salvage polatuzumab / rituximab / revlimid x 4, admitted for a haplo-identical pbsct from his daughter with Flu / Bu / Cy / TBI prep regimen   Day +15  5/15- busulfan 2/2, fludarabine . No acute events overnight, vital signs are stable; continue keppra ppx for 24 hours post infusion of last dose of busulfan. No tylenol or posaconazole until day 0. Not neutropenic today, will d/c levaquin / vancomycin.    - fludarabine 3/. VSS, afebrile. No acute events overnight.   - fludarabine /. VSS and afebrile. No acute events overnight. Neutropenic today, started on ppx levaquin/vancomycin PO. No Tylenol or Azoles until day 0.   - fludarabine /. VSS, remains afebrile. No acute events overnight. No Tylenol or Azoles until day 0.  - TBI today. No acute events overnight. Vital signs are stable.    - will receive Haplo allogeneic SCT today. VSS, afebrile.  - tolerated HPC infusion well. continue IVF hydration 24 hrs post cells, continue to monitor I&Os   - increased loose BM, imodium prn. VSS, afebrile and infectious work up negative - will discontinue zosyn and switch to levaquin ppx. Continue supportive care.  - PTCY2/2. febrile overnight: f/u cultures. Broaden to Zosyn. s.p lasix today   -continue PTCY hydration. Monitor I&O: s/p lasix today. afebrile: blood cultures negative Zosyn de-escalated to Levaquin   - VSS, no acute events: awaiting count recovery    Febrile overight - empirically started on zosyn, BCx/UCx pending, CXR with bibasilar hazy opacites and small to mod pleural effusions. Monitor strict I/O's, consider lasix prn. VSS, afebrile currently.    - no acute events overnight, vital signs are stable. Intermittent dyspnea; requiring O2.    - no acute events overnight. VSS and remains afebrile. Next tacrolimus level on Saturday (). No SOB symptoms and sating well on RA. Discontinued zosyn switched to levaquin ppx.   - rash of the bilateral thighs/lower back resolved. No acute events overnight and VSS/afebrile. Tacrolimus level on .    - no acute events overnight. VSS and remains afebrile. Tacrolimus level: 8.8 - maintain dose. Continue with supportive care.   VSS, remains afebrile. No acute events overnight. Continue with supportive care.  - No acute events overnight, vital signs are stable. Tacrolimus level pending from this AM. Grade 1 chemotherapy induced GI mucositis (Heartburn, relieved with maalox), continue supportive care.   6/3-Engrafting. Continue zarxio, VSS, discharge planning   ngrafting. Continue zarxio, VSS, discharge for       1. Infectious Disease:   acyclovir   Oral Tab/Cap 800 milliGRAM(s) Oral every 12 hours  levoFLOXacin  Tablet 500 milliGRAM(s) Oral every 24 hours  posaconazole DR Tablet 300 milliGRAM(s) Oral daily  vancomycin    Solution 125 milliGRAM(s) Oral every 12 hours    2. VOD Prophylaxis:   ursodiol Capsule 300 milliGRAM(s) Oral two times a day    3. GI Prophylaxis:    pantoprazole Tablet 40 milliGRAM(s) Oral before breakfast    4. Mouthcare - NS / NaHCO3 rinses, Biotene; Skin care     5. GVHD prophylaxis   PTCy + 3, +4   tacrolimus 1.5 milliGRAM(s) Oral <User Schedule>  tacrolimus 1 milliGRAM(s) Oral <User Schedule>  Abatacept days +5. +14, +28, +56     6. Transfuse & replete electrolytes prn     7. IV hydration, daily weights, strict I&O, prn diuresis     8. PO intake as tolerated, nutrition follow up as needed    9. Antiemetics, anti-diarrhea medications:   loperamide 2 milliGRAM(s) Oral every 6 hours PRN  prochlorperazine Injectable 10 milliGRAM(s) IV Push every 6 hours PRN    10. OOB as tolerated, physical therapy consult if needed     11. Monitor coags / fibrinogen 2x week, vitamin K as needed   phytonadione Solution 5 milliGRAM(s) Oral <User Schedule>    12. Monitor closely for clinical changes, monitor for fevers     13. Emotional support provided, plan of care discussed and questions addressed     14. Patient education done regarding plan of care, restrictions and discharge planning     15. Continue regular social work input     I have written the above note for Dr. Riley  who performed service with me in the room.   Patti Stern PA-C ([phone])    I have seen and examined patient with PA, I agree with above note as scribed.

## 2025-06-04 NOTE — PROVIDER CONTACT NOTE (CRITICAL VALUE NOTIFICATION) - SITUATION
As per lab,K=2.8
Platelet count 6
Platelet count 17
Platelet count 8
Hgb 6.9, Hct 18
Platelet count 11

## 2025-06-04 NOTE — PROVIDER CONTACT NOTE (CRITICAL VALUE NOTIFICATION) - ASSESSMENT
In no acute distress
Stable and afebrile.

## 2025-06-04 NOTE — PROVIDER CONTACT NOTE (CRITICAL VALUE NOTIFICATION) - ACTION/TREATMENT ORDERED:
PA aware and notified. One unit platelets ordered and given and tolerated well. Monitoring for bleeding is ongoing.
PA aware and notified. One unit platelets ordered and given and tolerated well. Monitoring for s/s of bleeding is ongoing.
PA aware and notified. No orders at this time. PA aware and notified. Monitoring for s/s of bleeding is ongoing.
PA aware and notified. No transfusion at this time.
Awaiting orderes
PA's aware and notified. One unit prbc ordered and transfused and tolerated well.

## 2025-06-04 NOTE — PROVIDER CONTACT NOTE (CRITICAL VALUE NOTIFICATION) - RECOMMENDATIONS
K supplementation
Notify PA. Will monitor for s/s of bleeding.
Notify PA. One unit platelets. Monitor for s/s of bleeding.
Notify PA. One unit prbc transfusion.
Notify PA.
Notify PA. One unit platelets transfusion. Monitor for s/s of bleeding.

## 2025-06-05 ENCOUNTER — TRANSCRIPTION ENCOUNTER (OUTPATIENT)
Age: 70
End: 2025-06-05

## 2025-06-05 VITALS
SYSTOLIC BLOOD PRESSURE: 114 MMHG | HEART RATE: 82 BPM | RESPIRATION RATE: 17 BRPM | OXYGEN SATURATION: 98 % | DIASTOLIC BLOOD PRESSURE: 69 MMHG | TEMPERATURE: 97 F

## 2025-06-05 LAB
ALBUMIN SERPL ELPH-MCNC: 2.7 G/DL — LOW (ref 3.3–5)
ALP SERPL-CCNC: 92 U/L — SIGNIFICANT CHANGE UP (ref 40–120)
ALT FLD-CCNC: 6 U/L — LOW (ref 10–45)
ANION GAP SERPL CALC-SCNC: 10 MMOL/L — SIGNIFICANT CHANGE UP (ref 5–17)
APTT BLD: 32.7 SEC — SIGNIFICANT CHANGE UP (ref 26.1–36.8)
AST SERPL-CCNC: 11 U/L — SIGNIFICANT CHANGE UP (ref 10–40)
BASOPHILS # BLD AUTO: 0 K/UL — SIGNIFICANT CHANGE UP (ref 0–0.2)
BASOPHILS NFR BLD AUTO: 0 % — SIGNIFICANT CHANGE UP (ref 0–2)
BILIRUB SERPL-MCNC: 0.4 MG/DL — SIGNIFICANT CHANGE UP (ref 0.2–1.2)
BLD GP AB SCN SERPL QL: NEGATIVE — SIGNIFICANT CHANGE UP
BUN SERPL-MCNC: 16 MG/DL — SIGNIFICANT CHANGE UP (ref 7–23)
CALCIUM SERPL-MCNC: 8.2 MG/DL — LOW (ref 8.4–10.5)
CHLORIDE SERPL-SCNC: 105 MMOL/L — SIGNIFICANT CHANGE UP (ref 96–108)
CMV DNA CSF QL NAA+PROBE: SIGNIFICANT CHANGE UP IU/ML
CMV DNA SPEC NAA+PROBE-LOG#: SIGNIFICANT CHANGE UP LOG10IU/ML
CO2 SERPL-SCNC: 24 MMOL/L — SIGNIFICANT CHANGE UP (ref 22–31)
CREAT SERPL-MCNC: 0.54 MG/DL — SIGNIFICANT CHANGE UP (ref 0.5–1.3)
EGFR: 109 ML/MIN/1.73M2 — SIGNIFICANT CHANGE UP
EGFR: 109 ML/MIN/1.73M2 — SIGNIFICANT CHANGE UP
EOSINOPHIL # BLD AUTO: 0 K/UL — SIGNIFICANT CHANGE UP (ref 0–0.5)
EOSINOPHIL NFR BLD AUTO: 0 % — SIGNIFICANT CHANGE UP (ref 0–6)
GIANT PLATELETS BLD QL SMEAR: PRESENT — SIGNIFICANT CHANGE UP
GLUCOSE SERPL-MCNC: 86 MG/DL — SIGNIFICANT CHANGE UP (ref 70–99)
HCT VFR BLD CALC: 23.7 % — LOW (ref 39–50)
HGB BLD-MCNC: 7.8 G/DL — LOW (ref 13–17)
INR BLD: 1.06 RATIO — SIGNIFICANT CHANGE UP (ref 0.85–1.16)
LDH SERPL L TO P-CCNC: 160 U/L — SIGNIFICANT CHANGE UP (ref 50–242)
LYMPHOCYTES # BLD AUTO: 0.08 K/UL — LOW (ref 1–3.3)
LYMPHOCYTES # BLD AUTO: 7.3 % — LOW (ref 13–44)
MAGNESIUM SERPL-MCNC: 1.6 MG/DL — SIGNIFICANT CHANGE UP (ref 1.6–2.6)
MANUAL SMEAR VERIFICATION: SIGNIFICANT CHANGE UP
MCHC RBC-ENTMCNC: 28.4 PG — SIGNIFICANT CHANGE UP (ref 27–34)
MCHC RBC-ENTMCNC: 32.9 G/DL — SIGNIFICANT CHANGE UP (ref 32–36)
MCV RBC AUTO: 86.2 FL — SIGNIFICANT CHANGE UP (ref 80–100)
METAMYELOCYTES # FLD: 1.8 % — HIGH (ref 0–0)
METAMYELOCYTES NFR BLD: 1.8 % — HIGH (ref 0–0)
MONOCYTES # BLD AUTO: 0.12 K/UL — SIGNIFICANT CHANGE UP (ref 0–0.9)
MONOCYTES NFR BLD AUTO: 10.9 % — SIGNIFICANT CHANGE UP (ref 2–14)
MYELOCYTES NFR BLD: 1.8 % — HIGH (ref 0–0)
NEUTROPHILS # BLD AUTO: 0.89 K/UL — LOW (ref 1.8–7.4)
NEUTROPHILS NFR BLD AUTO: 63.6 % — SIGNIFICANT CHANGE UP (ref 43–77)
NEUTS BAND # BLD: 14.6 % — HIGH (ref 0–8)
NEUTS BAND NFR BLD: 14.6 % — HIGH (ref 0–8)
PHOSPHATE SERPL-MCNC: 2.5 MG/DL — SIGNIFICANT CHANGE UP (ref 2.5–4.5)
PLAT MORPH BLD: ABNORMAL
PLATELET # BLD AUTO: 8 K/UL — CRITICAL LOW (ref 150–400)
POTASSIUM SERPL-MCNC: 4.1 MMOL/L — SIGNIFICANT CHANGE UP (ref 3.5–5.3)
POTASSIUM SERPL-SCNC: 4.1 MMOL/L — SIGNIFICANT CHANGE UP (ref 3.5–5.3)
PROT SERPL-MCNC: 4.4 G/DL — LOW (ref 6–8.3)
PROTHROM AB SERPL-ACNC: 12.1 SEC — SIGNIFICANT CHANGE UP (ref 9.9–13.4)
RBC # BLD: 2.75 M/UL — LOW (ref 4.2–5.8)
RBC # FLD: 14 % — SIGNIFICANT CHANGE UP (ref 10.3–14.5)
RBC BLD AUTO: SIGNIFICANT CHANGE UP
RH IG SCN BLD-IMP: POSITIVE — SIGNIFICANT CHANGE UP
SODIUM SERPL-SCNC: 139 MMOL/L — SIGNIFICANT CHANGE UP (ref 135–145)
TACROLIMUS SERPL-MCNC: 11 NG/ML — SIGNIFICANT CHANGE UP
TOXIC GRANULES BLD QL SMEAR: PRESENT — SIGNIFICANT CHANGE UP
WBC # BLD: 1.14 K/UL — LOW (ref 3.8–10.5)
WBC # FLD AUTO: 1.14 K/UL — LOW (ref 3.8–10.5)

## 2025-06-05 PROCEDURE — 85025 COMPLETE CBC W/AUTO DIFF WBC: CPT

## 2025-06-05 PROCEDURE — 83615 LACTATE (LD) (LDH) ENZYME: CPT

## 2025-06-05 PROCEDURE — 87641 MR-STAPH DNA AMP PROBE: CPT

## 2025-06-05 PROCEDURE — 81267 CHIMERISM ANAL NO CELL SELEC: CPT

## 2025-06-05 PROCEDURE — 38207 CRYOPRESERVE STEM CELLS: CPT

## 2025-06-05 PROCEDURE — P9037: CPT

## 2025-06-05 PROCEDURE — 71045 X-RAY EXAM CHEST 1 VIEW: CPT

## 2025-06-05 PROCEDURE — 81001 URINALYSIS AUTO W/SCOPE: CPT

## 2025-06-05 PROCEDURE — 87640 STAPH A DNA AMP PROBE: CPT

## 2025-06-05 PROCEDURE — 82248 BILIRUBIN DIRECT: CPT

## 2025-06-05 PROCEDURE — 84132 ASSAY OF SERUM POTASSIUM: CPT

## 2025-06-05 PROCEDURE — 87086 URINE CULTURE/COLONY COUNT: CPT

## 2025-06-05 PROCEDURE — 99232 SBSQ HOSP IP/OBS MODERATE 35: CPT

## 2025-06-05 PROCEDURE — 85610 PROTHROMBIN TIME: CPT

## 2025-06-05 PROCEDURE — 87040 BLOOD CULTURE FOR BACTERIA: CPT

## 2025-06-05 PROCEDURE — 83735 ASSAY OF MAGNESIUM: CPT

## 2025-06-05 PROCEDURE — 85730 THROMBOPLASTIN TIME PARTIAL: CPT

## 2025-06-05 PROCEDURE — P9100: CPT

## 2025-06-05 PROCEDURE — 97530 THERAPEUTIC ACTIVITIES: CPT

## 2025-06-05 PROCEDURE — 86923 COMPATIBILITY TEST ELECTRIC: CPT

## 2025-06-05 PROCEDURE — 86901 BLOOD TYPING SEROLOGIC RH(D): CPT

## 2025-06-05 PROCEDURE — 97162 PT EVAL MOD COMPLEX 30 MIN: CPT

## 2025-06-05 PROCEDURE — 97116 GAIT TRAINING THERAPY: CPT

## 2025-06-05 PROCEDURE — 36430 TRANSFUSION BLD/BLD COMPNT: CPT

## 2025-06-05 PROCEDURE — 80053 COMPREHEN METABOLIC PANEL: CPT

## 2025-06-05 PROCEDURE — 81003 URINALYSIS AUTO W/O SCOPE: CPT

## 2025-06-05 PROCEDURE — 87799 DETECT AGENT NOS DNA QUANT: CPT

## 2025-06-05 PROCEDURE — P9040: CPT

## 2025-06-05 PROCEDURE — 86850 RBC ANTIBODY SCREEN: CPT

## 2025-06-05 PROCEDURE — 80197 ASSAY OF TACROLIMUS: CPT

## 2025-06-05 PROCEDURE — 86900 BLOOD TYPING SEROLOGIC ABO: CPT

## 2025-06-05 PROCEDURE — 36415 COLL VENOUS BLD VENIPUNCTURE: CPT

## 2025-06-05 PROCEDURE — 84100 ASSAY OF PHOSPHORUS: CPT

## 2025-06-05 PROCEDURE — P9073: CPT

## 2025-06-05 RX ORDER — TACROLIMUS 0.5 MG/1
3 CAPSULE ORAL
Qty: 0 | Refills: 0 | DISCHARGE
Start: 2025-06-05

## 2025-06-05 RX ORDER — MAGNESIUM SULFATE 500 MG/ML
2 SYRINGE (ML) INJECTION ONCE
Refills: 0 | Status: COMPLETED | OUTPATIENT
Start: 2025-06-05 | End: 2025-06-05

## 2025-06-05 RX ADMIN — OXYCODONE HYDROCHLORIDE 5 MILLIGRAM(S): 30 TABLET ORAL at 00:05

## 2025-06-05 RX ADMIN — POSACONAZOLE 300 MILLIGRAM(S): 100 TABLET, DELAYED RELEASE ORAL at 12:47

## 2025-06-05 RX ADMIN — URSODIOL 300 MILLIGRAM(S): 300 CAPSULE ORAL at 05:33

## 2025-06-05 RX ADMIN — Medication 30 MILLILITER(S): at 08:21

## 2025-06-05 RX ADMIN — Medication 125 MILLIGRAM(S): at 05:33

## 2025-06-05 RX ADMIN — Medication 40 MILLIGRAM(S): at 05:32

## 2025-06-05 RX ADMIN — LIDOCAINE HYDROCHLORIDE 1 PATCH: 20 JELLY TOPICAL at 05:32

## 2025-06-05 RX ADMIN — TACROLIMUS 1.5 MILLIGRAM(S): 0.5 CAPSULE ORAL at 08:21

## 2025-06-05 RX ADMIN — Medication 5 MILLIGRAM(S): at 12:45

## 2025-06-05 RX ADMIN — Medication 650 MILLIGRAM(S): at 14:15

## 2025-06-05 RX ADMIN — Medication 10 MILLILITER(S): at 12:46

## 2025-06-05 RX ADMIN — Medication 650 MILLIGRAM(S): at 13:15

## 2025-06-05 RX ADMIN — Medication 1 APPLICATION(S): at 08:22

## 2025-06-05 RX ADMIN — Medication 650 MILLIGRAM(S): at 06:30

## 2025-06-05 RX ADMIN — Medication 5 MILLILITER(S): at 08:22

## 2025-06-05 RX ADMIN — Medication 650 MILLIGRAM(S): at 05:50

## 2025-06-05 RX ADMIN — OXYCODONE HYDROCHLORIDE 5 MILLIGRAM(S): 30 TABLET ORAL at 00:30

## 2025-06-05 RX ADMIN — Medication 10 MILLILITER(S): at 08:22

## 2025-06-05 RX ADMIN — Medication 25 GRAM(S): at 10:46

## 2025-06-05 RX ADMIN — Medication 800 MILLIGRAM(S): at 05:32

## 2025-06-05 RX ADMIN — Medication 5 MILLILITER(S): at 12:45

## 2025-06-05 RX ADMIN — Medication 15 MILLILITER(S): at 05:32

## 2025-06-05 RX ADMIN — FILGRASTIM 300 MICROGRAM(S): 300 INJECTION, SOLUTION INTRAVENOUS; SUBCUTANEOUS at 12:44

## 2025-06-05 NOTE — PROGRESS NOTE ADULT - SUBJECTIVE AND OBJECTIVE BOX
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 with Flu / Bu / Cy / TBI prep regimen for treatment of refractory DLBCL    Disease: DLBCL  Type of transplant: Haplo-identical (daughter)   Prep Regimen: Flu / Bu / Cy / TBI   ABO / CMV:   Recipient: A POS/ NEG   Donor: A POS / NEG     S: Patient seen and examined with HPC Transplant Team:       O: Vitals:   Vital Signs Last 24 Hrs  T(C): 36.1 (2025 05:30), Max: 36.7 (2025 21:07)  T(F): 96.9 (2025 05:30), Max: 98.1 (2025 21:07)  HR: 80 (2025 05:30) (80 - 86)  BP: 105/63 (2025 05:30) (103/63 - 146/85)  BP(mean): --  RR: 17 (2025 05:30) (16 - 19)  SpO2: 95% (2025 05:30) (94% - 98%)    Parameters below as of 2025 05:30  Patient On (Oxygen Delivery Method): room air        Admit weight: 47.1kg   Daily Weight in k.2 (2025 07:50)    Intake / Output:   04 @ 07:01  -  06-05 @ 07:00  --------------------------------------------------------  IN: 880 mL / OUT: 920 mL / NET: -40 mL      PE:   Oropharynx: no erythema or ulcerations   Oral Mucositis: -                                              Grade: n/a  CVS: S1, S2 RRR   Lungs: CTA throughout bilaterally   Abdomen: + BS x 4, soft, NT, ND   Extremities: no edema   Gastric Mucositis:       +                                         ndGndrndanddndend:nd nd2nd Intestinal Mucositis:     -                                         Grade: n/a   Skin: no rash  TLC: CDI  Neuro: A&OX3    Labs:           139  |  105  |  16  ----------------------------<  86  4.1   |  24  |  0.54    Ca    8.2[L]      2025 06:26  Phos  2.5     06-05  Mg     1.6     06-05    TPro  4.4[L]  /  Alb  2.7[L]  /  TBili  0.4  /  DBili  x   /  AST  11  /  ALT  6[L]  /  AlkPhos  92  06-05    PT/INR - ( 2025 06:26 )   PT: 12.1 sec;   INR: 1.06 ratio         PTT - ( 2025 06:26 )  PTT:32.7 sec  LIVER FUNCTIONS - ( 2025 06:26 )  Alb: 2.7 g/dL / Pro: 4.4 g/dL / ALK PHOS: 92 U/L / ALT: 6 U/L / AST: 11 U/L / GGT: x           Lactate Dehydrogenase, Serum: 160 U/L ( @ 06:26)      Cultures:   Culture Results:   No growth (25 @ 01:52)    Culture Results:     Culture Results:   No growth at 24 hours (25 @ 22:50)    Culture Results:   <10,000 CFU/mL Normal Urogenital Mari (25 @ 01:42)    Culture Results:   No growth at 4 days (25 @ 22:30)    Culture Results:   No growth at 4 days (25 @ 22:15)    Culture Results:   <10,000 CFU/mL Normal Urogenital Mari (25 @ 21:16)    Culture Results:   No growth at 5 days (25 @ 21:16)    Culture Results:   No growth at 5 days (25 @ 21:16)    Radiology:   ACC: [id number] EXAM:  XR CHEST PORTABLE URGENT 1V  PROCEDURE DATE:  2025    IMPRESSION:  Bibasilar hazy opacities.  Small-to-moderate left pleural effusion and small right-sided pleural   effusion.  Cultures:   Culture Results:   No growth (25 @ 01:52)    Meds:   Antimicrobials:   acyclovir   Oral Tab/Cap 800 milliGRAM(s) Oral every 12 hours  levoFLOXacin  Tablet 500 milliGRAM(s) Oral every 24 hours  posaconazole DR Tablet 300 milliGRAM(s) Oral daily  vancomycin    Solution 125 milliGRAM(s) Oral every 12 hours      Heme / Onc:       GI:  aluminum hydroxide/magnesium hydroxide/simethicone Suspension 30 milliLiter(s) Oral every 4 hours PRN  loperamide 2 milliGRAM(s) Oral every 6 hours PRN  pantoprazole    Tablet 40 milliGRAM(s) Oral before breakfast  sodium bicarbonate Mouth Rinse 10 milliLiter(s) Swish and Spit five times a day  ursodiol Capsule 300 milliGRAM(s) Oral two times a day      Cardiovascular:       Immunologic:   filgrastim-aafi (NIVESTYM) Injectable 300 MICROGram(s) SubCutaneous daily  tacrolimus 1.5 milliGRAM(s) Oral <User Schedule>  tacrolimus 1.5 milliGRAM(s) Oral <User Schedule>      Other medications:   Biotene Dry Mouth Oral Rinse 5 milliLiter(s) Swish and Spit five times a day  chlorhexidine 0.12% Liquid 15 milliLiter(s) Swish and Spit two times a day  chlorhexidine 4% Liquid 1 Application(s) Topical <User Schedule>  DULoxetine 60 milliGRAM(s) Oral <User Schedule>  lidocaine   4% Patch 1 Patch Transdermal daily  phytonadione   Solution 5 milliGRAM(s) Oral <User Schedule>  tiZANidine 4 milliGRAM(s) Oral <User Schedule>      PRN:   acetaminophen     Tablet .. 650 milliGRAM(s) Oral every 6 hours PRN  aluminum hydroxide/magnesium hydroxide/simethicone Suspension 30 milliLiter(s) Oral every 4 hours PRN  benzocaine/menthol Lozenge 1 Lozenge Oral every 6 hours PRN  FIRST- Mouthwash  BLM 15 milliLiter(s) Swish and Swallow four times a day PRN  loperamide 2 milliGRAM(s) Oral every 6 hours PRN  oxyCODONE    IR 5 milliGRAM(s) Oral every 6 hours PRN  prochlorperazine   Injectable 10 milliGRAM(s) IV Push every 6 hours PRN  sodium chloride 0.9% lock flush 10 milliLiter(s) IV Push every 1 hour PRN      A/P:  69 year old male with refractory DLBCL, status post R-CHOP x 6, HD MTX x 4, salvage polatuzumab / rituximab / revlimid x 4, admitted for a haplo-identical pbsct from his daughter with Flu / Bu / Cy / TBI prep regimen   Day +16  5/15- busulfan 2/2, fludarabine 2/. No acute events overnight, vital signs are stable; continue keppra ppx for 24 hours post infusion of last dose of busulfan. No tylenol or posaconazole until day 0. Not neutropenic today, will d/c levaquin / vancomycin.    - fludarabine 3/5. VSS, afebrile. No acute events overnight.   - fludarabine . VSS and afebrile. No acute events overnight. Neutropenic today, started on ppx levaquin/vancomycin PO. No Tylenol or Azoles until day 0.   - fludarabine . VSS, remains afebrile. No acute events overnight. No Tylenol or Azoles until day 0.  - TBI today. No acute events overnight. Vital signs are stable.    - will receive Haplo allogeneic SCT today. VSS, afebrile.  - tolerated HPC infusion well. continue IVF hydration 24 hrs post cells, continue to monitor I&Os   - increased loose BM, imodium prn. VSS, afebrile and infectious work up negative - will discontinue zosyn and switch to levaquin ppx. Continue supportive care.  - PTCY2/2. febrile overnight: f/u cultures. Broaden to Zosyn. s.p lasix today   -continue PTCY hydration. Monitor I&O: s/p lasix today. afebrile: blood cultures negative Zosyn de-escalated to Levaquin   - VSS, no acute events: awaiting count recovery    Febrile overight - empirically started on zosyn, BCx/UCx pending, CXR with bibasilar hazy opacites and small to mod pleural effusions. Monitor strict I/O's, consider lasix prn. VSS, afebrile currently.    - no acute events overnight, vital signs are stable. Intermittent dyspnea; requiring O2.    - no acute events overnight. VSS and remains afebrile. Next tacrolimus level on Saturday (). No SOB symptoms and sating well on RA. Discontinued zosyn switched to levaquin ppx.   - rash of the bilateral thighs/lower back resolved. No acute events overnight and VSS/afebrile. Tacrolimus level on .    - no acute events overnight. VSS and remains afebrile. Tacrolimus level: 8.8 - maintain dose. Continue with supportive care.   VSS, remains afebrile. No acute events overnight. Continue with supportive care.  - No acute events overnight, vital signs are stable. Tacrolimus level pending from this AM. Grade 1 chemotherapy induced GI mucositis (Heartburn, relieved with maalox), continue supportive care.   6/3-Engrafting. Continue zarxio, VSS, discharge planning   - Engrafting. Continue zarxio, VSS, discharge for       1. Infectious Disease:   acyclovir   Oral Tab/Cap 800 milliGRAM(s) Oral every 12 hours  levoFLOXacin  Tablet 500 milliGRAM(s) Oral every 24 hours  posaconazole DR Tablet 300 milliGRAM(s) Oral daily  vancomycin    Solution 125 milliGRAM(s) Oral every 12 hours    2. VOD Prophylaxis:   ursodiol Capsule 300 milliGRAM(s) Oral two times a day    3. GI Prophylaxis:    pantoprazole Tablet 40 milliGRAM(s) Oral before breakfast    4. Mouthcare - NS / NaHCO3 rinses, Biotene; Skin care     5. GVHD prophylaxis   PTCy + 3, +4   tacrolimus 1.5 milliGRAM(s) Oral <User Schedule>  Abatacept days +5. +14, +28, +56     6. Transfuse & replete electrolytes prn     7. IV hydration, daily weights, strict I&O, prn diuresis     8. PO intake as tolerated, nutrition follow up as needed    9. Antiemetics, anti-diarrhea medications:   loperamide 2 milliGRAM(s) Oral every 6 hours PRN  prochlorperazine Injectable 10 milliGRAM(s) IV Push every 6 hours PRN    10. OOB as tolerated, physical therapy consult if needed     11. Monitor coags / fibrinogen 2x week, vitamin K as needed   phytonadione Solution 5 milliGRAM(s) Oral <User Schedule>    12. Monitor closely for clinical changes, monitor for fevers     13. Emotional support provided, plan of care discussed and questions addressed     14. Patient education done regarding plan of care, restrictions and discharge planning     15. Continue regular social work input     I have written the above note for Dr. Riley who performed service with me in the room.   Helena Diaz NP-C ([phone])    I have seen and examined patient with NP, I agree with above note as scribed.                    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 with Flu / Bu / Cy / TBI prep regimen for treatment of refractory DLBCL    Disease: DLBCL  Type of transplant: Haplo-identical (daughter)   Prep Regimen: Flu / Bu / Cy / TBI   ABO / CMV:   Recipient: A POS/ NEG   Donor: A POS / NEG     S: Patient seen and examined with HPC Transplant Team:   + fatigue   + rash       O: Vitals:   Vital Signs Last 24 Hrs  T(C): 36.1 (2025 05:30), Max: 36.7 (2025 21:07)  T(F): 96.9 (2025 05:30), Max: 98.1 (2025 21:07)  HR: 80 (2025 05:30) (80 - 86)  BP: 105/63 (2025 05:30) (103/63 - 146/85)  BP(mean): --  RR: 17 (2025 05:30) (16 - 19)  SpO2: 95% (2025 05:30) (94% - 98%)    Parameters below as of 2025 05:30  Patient On (Oxygen Delivery Method): room air        Admit weight: 47.1kg   Daily Weight in k.2 (2025 07:50)    Intake / Output:   -04 @ 07:01  -  06-05 @ 07:00  --------------------------------------------------------  IN: 880 mL / OUT: 920 mL / NET: -40 mL      PE:   Oropharynx: no erythema or ulcerations   Oral Mucositis: -                                              Grade: n/a  CVS: S1, S2 RRR   Lungs: CTA throughout bilaterally   Abdomen: + BS x 4, soft, NT, ND   Extremities: no edema   Gastric Mucositis:       +                                         ndGndrndanddndend:nd nd2nd Intestinal Mucositis:     -                                         Grade: n/a   Skin: no rash  TLC: CDI  Neuro: A&OX3    Labs:                         7.8    1.14  )-----------( 8        ( 2025 06:26 )             23.7       06-05    139  |  105  |  16  ----------------------------<  86  4.1   |  24  |  0.54    Ca    8.2[L]      2025 06:26  Phos  2.5       Mg     1.6         TPro  4.4[L]  /  Alb  2.7[L]  /  TBili  0.4  /  DBili  x   /  AST  11  /  ALT  6[L]  /  AlkPhos  92      PT/INR - ( :26 )   PT: 12.1 sec;   INR: 1.06 ratio         PTT - ( : )  PTT:32.7 sec  LIVER FUNCTIONS - ( : )  Alb: 2.7 g/dL / Pro: 4.4 g/dL / ALK PHOS: 92 U/L / ALT: 6 U/L / AST: 11 U/L / GGT: x           Lactate Dehydrogenase, Serum: 160 U/L ( @ 06:26)      Cultures:   Culture Results:   No growth (25 @ 01:52)    Culture Results:     Culture Results:   No growth at 24 hours (25 @ 22:50)    Culture Results:   <10,000 CFU/mL Normal Urogenital Mari (25 @ 01:42)    Culture Results:   No growth at 4 days (25 @ 22:30)    Culture Results:   No growth at 4 days (25 @ 22:15)    Culture Results:   <10,000 CFU/mL Normal Urogenital Mari (25 @ 21:16)    Culture Results:   No growth at 5 days (25 @ 21:16)    Culture Results:   No growth at 5 days (25 @ 21:16)    Radiology:   ACC: [id number] EXAM:  XR CHEST PORTABLE URGENT 1V  PROCEDURE DATE:  2025    IMPRESSION:  Bibasilar hazy opacities.  Small-to-moderate left pleural effusion and small right-sided pleural   effusion.  Cultures:   Culture Results:   No growth (25 @ 01:52)    Meds:   Antimicrobials:   acyclovir   Oral Tab/Cap 800 milliGRAM(s) Oral every 12 hours  levoFLOXacin  Tablet 500 milliGRAM(s) Oral every 24 hours  posaconazole DR Tablet 300 milliGRAM(s) Oral daily  vancomycin    Solution 125 milliGRAM(s) Oral every 12 hours      Heme / Onc:       GI:  aluminum hydroxide/magnesium hydroxide/simethicone Suspension 30 milliLiter(s) Oral every 4 hours PRN  loperamide 2 milliGRAM(s) Oral every 6 hours PRN  pantoprazole    Tablet 40 milliGRAM(s) Oral before breakfast  sodium bicarbonate Mouth Rinse 10 milliLiter(s) Swish and Spit five times a day  ursodiol Capsule 300 milliGRAM(s) Oral two times a day      Cardiovascular:       Immunologic:   filgrastim-aafi (NIVESTYM) Injectable 300 MICROGram(s) SubCutaneous daily  tacrolimus 1.5 milliGRAM(s) Oral <User Schedule>  tacrolimus 1.5 milliGRAM(s) Oral <User Schedule>      Other medications:   Biotene Dry Mouth Oral Rinse 5 milliLiter(s) Swish and Spit five times a day  chlorhexidine 0.12% Liquid 15 milliLiter(s) Swish and Spit two times a day  chlorhexidine 4% Liquid 1 Application(s) Topical <User Schedule>  DULoxetine 60 milliGRAM(s) Oral <User Schedule>  lidocaine   4% Patch 1 Patch Transdermal daily  phytonadione   Solution 5 milliGRAM(s) Oral <User Schedule>  tiZANidine 4 milliGRAM(s) Oral <User Schedule>      PRN:   acetaminophen     Tablet .. 650 milliGRAM(s) Oral every 6 hours PRN  aluminum hydroxide/magnesium hydroxide/simethicone Suspension 30 milliLiter(s) Oral every 4 hours PRN  benzocaine/menthol Lozenge 1 Lozenge Oral every 6 hours PRN  FIRST- Mouthwash  BLM 15 milliLiter(s) Swish and Swallow four times a day PRN  loperamide 2 milliGRAM(s) Oral every 6 hours PRN  oxyCODONE    IR 5 milliGRAM(s) Oral every 6 hours PRN  prochlorperazine   Injectable 10 milliGRAM(s) IV Push every 6 hours PRN  sodium chloride 0.9% lock flush 10 milliLiter(s) IV Push every 1 hour PRN      A/P:  69 year old male with refractory DLBCL, status post R-CHOP x 6, HD MTX x 4, salvage polatuzumab / rituximab / revlimid x 4, admitted for a haplo-identical pbsct from his daughter with Flu / Bu / Cy / TBI prep regimen   Day +16  5/15- busulfan , fludarabine . No acute events overnight, vital signs are stable; continue keppra ppx for 24 hours post infusion of last dose of busulfan. No tylenol or posaconazole until day 0. Not neutropenic today, will d/c levaquin / vancomycin.    - fludarabine 3/5. VSS, afebrile. No acute events overnight.   - fludarabine . VSS and afebrile. No acute events overnight. Neutropenic today, started on ppx levaquin/vancomycin PO. No Tylenol or Azoles until day 0.   - fludarabine . VSS, remains afebrile. No acute events overnight. No Tylenol or Azoles until day 0.  - TBI today. No acute events overnight. Vital signs are stable.    - will receive Haplo allogeneic SCT today. VSS, afebrile.  - tolerated HPC infusion well. continue IVF hydration 24 hrs post cells, continue to monitor I&Os   - increased loose BM, imodium prn. VSS, afebrile and infectious work up negative - will discontinue zosyn and switch to levaquin ppx. Continue supportive care.  - PTCY2/2. febrile overnight: f/u cultures. Broaden to Zosyn. s.p lasix today   -continue PTCY hydration. Monitor I&O: s/p lasix today. afebrile: blood cultures negative Zosyn de-escalated to Levaquin   - VSS, no acute events: awaiting count recovery    Febrile overight - empirically started on zosyn, BCx/UCx pending, CXR with bibasilar hazy opacites and small to mod pleural effusions. Monitor strict I/O's, consider lasix prn. VSS, afebrile currently.    - no acute events overnight, vital signs are stable. Intermittent dyspnea; requiring O2.    - no acute events overnight. VSS and remains afebrile. Next tacrolimus level on Saturday (). No SOB symptoms and sating well on RA. Discontinued zosyn switched to levaquin ppx.   - rash of the bilateral thighs/lower back resolved. No acute events overnight and VSS/afebrile. Tacrolimus level on .    - no acute events overnight. VSS and remains afebrile. Tacrolimus level: 8.8 - maintain dose. Continue with supportive care.   VSS, remains afebrile. No acute events overnight. Continue with supportive care.  - No acute events overnight, vital signs are stable. Tacrolimus level pending from this AM. Grade 1 chemotherapy induced GI mucositis (Heartburn, relieved with maalox), continue supportive care.   6/3-Engrafting. Continue zarxio, VSS, discharge planning   - Engrafting. Continue zarxio, VSS, discharge for - no acute events overnight, vital signs are stable. Complaints of rash to bilateral lower extremities - appears consistent with dry skin / dermatitis. Engrafted 25, d/c levaquin, d/c vancomycin. Start bactrim. CMV / VNTR pending. Discharge planning.       1. Infectious Disease:   acyclovir   Oral Tab/Cap 800 milliGRAM(s) Oral every 12 hours  levoFLOXacin  Tablet 500 milliGRAM(s) Oral every 24 hours  posaconazole DR Tablet 300 milliGRAM(s) Oral daily  vancomycin    Solution 125 milliGRAM(s) Oral every 12 hours    2. VOD Prophylaxis:   ursodiol Capsule 300 milliGRAM(s) Oral two times a day    3. GI Prophylaxis:    pantoprazole Tablet 40 milliGRAM(s) Oral before breakfast    4. Mouthcare - NS / NaHCO3 rinses, Biotene; Skin care     5. GVHD prophylaxis   PTCy + 3, +4   tacrolimus 1.5 milliGRAM(s) Oral <User Schedule>  Abatacept days +5. +14, +28, +56     6. Transfuse & replete electrolytes prn   1 bag platelets     7. IV hydration, daily weights, strict I&O, prn diuresis     8. PO intake as tolerated, nutrition follow up as needed    9. Antiemetics, anti-diarrhea medications:   loperamide 2 milliGRAM(s) Oral every 6 hours PRN  prochlorperazine Injectable 10 milliGRAM(s) IV Push every 6 hours PRN    10. OOB as tolerated, physical therapy consult if needed     11. Monitor coags / fibrinogen 2x week, vitamin K as needed   phytonadione Solution 5 milliGRAM(s) Oral <User Schedule>    12. Monitor closely for clinical changes, monitor for fevers     13. Emotional support provided, plan of care discussed and questions addressed     14. Patient education done regarding plan of care, restrictions and discharge planning     15. Continue regular social work input     I have written the above note for Dr. Riley who performed service with me in the room.   Helena Diaz NP-C ([phone])    I have seen and examined patient with NP, I agree with above note as scribed.

## 2025-06-05 NOTE — DISCHARGE NOTE NURSING/CASE MANAGEMENT/SOCIAL WORK - NSDCPNDISPN_GEN_ALL_CORE
Education provided on the pain management plan of care/Side effects of pain management treatment/Activities of daily living, including home environment that might     exacerbate pain or reduce effectiveness of the pain management plan of care as well as strategies to address these issues [EKG obtained to assist in diagnosis and management of assessed problem(s)] : EKG obtained to assist in diagnosis and management of assessed problem(s)

## 2025-06-05 NOTE — PROGRESS NOTE ADULT - PROVIDER SPECIALTY LIST ADULT
BMT/SCT

## 2025-06-05 NOTE — PROGRESS NOTE ADULT - NS ATTEND AMEND GEN_ALL_CORE FT
I conducted multidisciplinary rounds with the staff including nursing staff, ACPs, and clinical pharmacist.   I reviewed the data.  I saw and examined the patient.   I generated a treatment plan.   The patient is doing well following his allogeneic HSCT.   He has no complaints. His ROS is negative except for neck pain.    He is afebrile. His line site is clean. His lungs are clear. He has no LE edema.   Overall, the patient is doing well. His counts are recovering. He will likely be discharged in am.   I answered his questions. I encouraged ambulation and oral intake.  SE Riley MD I conducted multidisciplinary rounds with the staff including nursing staff, ACPs, and clinical pharmacist.   I reviewed the data.  I saw and examined the patient.   I generated a treatment plan.   The patient is doing well following his allogeneic HSCT.   He has no complaints. His ROS is negative except for neck pain.    He is afebrile. His line site is clean. His lungs are clear. He has no LE edema.   Overall, the patient is doing well. His counts are recovering. He will be discharged today.   I answered his questions. I encouraged ambulation and oral intake.  SE Riley MD

## 2025-06-05 NOTE — DISCHARGE NOTE NURSING/CASE MANAGEMENT/SOCIAL WORK - NSDCFUADDAPPT_GEN_ALL_CORE_FT
You have a follow up at [facility] on:  - Friday 6/6 at 8AM for possible platelets,labs and Zarxio injection.   - Friday 6/6 at 10am with Dr. Singh   - Saturday 6/7 at 9:40 for possible platelets, labs and Zarxio injection   - Sunday 6/8 at 8AM for possible platelets,labs and Zarxio injection   - Monday 6/9 at 8AM for possible platelets,labs and Zarxio injection

## 2025-06-05 NOTE — DISCHARGE NOTE NURSING/CASE MANAGEMENT/SOCIAL WORK - PATIENT PORTAL LINK FT
You can access the FollowMyHealth Patient Portal offered by [facility] by registering at the following website: [email or web address]. By joining [employer]’s FollowMyHealth portal, you will also be able to view your health information using other applications (apps) compatible with our system.

## 2025-06-05 NOTE — DISCHARGE NOTE NURSING/CASE MANAGEMENT/SOCIAL WORK - NSDCPEFALRISK_GEN_ALL_CORE
For information on Fall & Injury Prevention, visit: [email or web address] OR  [email or web address] OR  https://www.cdc.gov/steadi/patient.html

## 2025-06-05 NOTE — DISCHARGE NOTE NURSING/CASE MANAGEMENT/SOCIAL WORK - FINANCIAL ASSISTANCE
[facility] provides services at a reduced cost to those who are determined to be eligible through [facility]’s financial assistance program. Information regarding [facility]’s financial assistance program can be found by going to [email or web address] or by calling [phone].

## 2025-06-05 NOTE — DISCHARGE NOTE NURSING/CASE MANAGEMENT/SOCIAL WORK - NSDCPNINST_GEN_ALL_CORE
Activities as tolerated. Take medication as prescribed. Call for increased pain or no relief with intervention.

## 2025-06-05 NOTE — PHARMACOTHERAPY INTERVENTION NOTE - INTERVENTION CATEGORIES
Misc
Misc
Patient Education
Med Reconciliation
Misc

## 2025-06-05 NOTE — PHARMACOTHERAPY INTERVENTION NOTE - COMMENTS
Allergies    No Known Drug Allergies  latex (Rash)    Intolerances      MEDICATIONS  (STANDING):    MEDICATIONS  (PRN):    Recipient of Education:  [X]Patient  [  ]Family, please specify ___________  [  ]Other, please specify ____________    Readiness to Learn:  [X]Ready  [  ]Not ready: cognitive  [  ]Not ready: physical  [  ]Not ready: emotional  Comment(s):    Barriers to Information Exhange:  [X]None identified  [  ]Vision  [  ]Hearing  [  ]Language  [  ]Literacy  [  ]Memory and Learning  [  ]Culture/Jew  [  ]Other, please specify ____________  Comment(s):    Patient Education Topics:  [  ]Anticoagulation  [  ]Heart Failure (HF)  [  ]Chronic Obstructive Pulmonary Disease (COPD)  [  ]Diabetes Mellitus (DM)  [  ]Stroke  [X]Other, please specify: post alloSCT discharge teach  Comment(s):    Medication Counseling Points:  [X]Medications Reviewed  [X]Medication Schedule  [X]Precautions  [X]Side Effects  [X]Indication for Use  [X]Drug/Drug Interactions  [X]Drug/Food Interactions  [  ]Other, please specify ____________  Comment(s):    Teaching Method:  [X]Verbal Instruction  [X]Written Material, please specify: personalized medication sheet  [  ]Skill Demonstration  [X]Teach Back (Patient repeats in own words)  [  ]Ask Me 3  [  ]Computer/Internet  [  ]Other, please specify ____________  Comment(s):    Educational Evaluation:  [X]Meets goals/outcomes  [  ]Partially meets - needs review/practice  [  ]Unable to meet - needs instruction  [  ]Reinforced previously met goal  [  ]Patient declines instruction  [  ]Not applicable  Comment(s):    69-year-old male with 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 He is admitted for an alloSCT with BERTRAM Bu/Flu/Cy/TBI conditioning and CAST for GVHD ppx. Today is day +16. Course has been complicated by febrile neutropenia/haplostorm.    Counseled patient today for discharge on new transplant medications including immunosuppression, anti-infectives, SOS prophylaxis and PRN nausea meds. Reviewed doses, indications, efficacy and safety monitoring parameters, storage, and medication timeline.  Also counseled patient on the importance of adherence. Advised patient to: wear sunblock SPF>30 secondary to immunosuppressive regimen increasing risk of skin cancer; avoid grapefruit juice and pomegranate juice as they may increase tacrolimus levels; avoid over-the-counter NSAIDs, herbal, or dietary supplements as they may be harmful to the kidney; contact the clinic before initiating any new OTC or RX medications; hold immunosuppression dose before any clinic visits and bring medication to take in clinic after blood draws.    Patient expressed understanding and teach back method was used to confirm. All questions were answered to the patient's satisfaction and medication sheet was provided with medication name, strength, schedule, indication, and special instructions.      Time spent: 30 minutes    Evelyne Murcia, PharmD, BCOP  St[facility] Transplant Clinical Pharmacy Specialist  Available via [employer] Teams

## 2025-06-06 ENCOUNTER — RESULT REVIEW (OUTPATIENT)
Age: 70
End: 2025-06-06

## 2025-06-06 ENCOUNTER — APPOINTMENT (OUTPATIENT)
Dept: INFUSION THERAPY | Facility: HOSPITAL | Age: 70
End: 2025-06-06

## 2025-06-06 ENCOUNTER — APPOINTMENT (OUTPATIENT)
Dept: HEMATOLOGY ONCOLOGY | Facility: CLINIC | Age: 70
End: 2025-06-06
Payer: MEDICARE

## 2025-06-06 LAB
ALBUMIN SERPL ELPH-MCNC: 3.3 G/DL — SIGNIFICANT CHANGE UP (ref 3.3–5)
ALP SERPL-CCNC: 112 U/L — SIGNIFICANT CHANGE UP (ref 40–120)
ALT FLD-CCNC: 9 U/L — LOW (ref 10–45)
ANION GAP SERPL CALC-SCNC: 11 MMOL/L — SIGNIFICANT CHANGE UP (ref 5–17)
AST SERPL-CCNC: 16 U/L — SIGNIFICANT CHANGE UP (ref 10–40)
BASOPHILS # BLD AUTO: 0.08 K/UL — SIGNIFICANT CHANGE UP (ref 0–0.2)
BASOPHILS NFR BLD AUTO: 2 % — SIGNIFICANT CHANGE UP (ref 0–2)
BILIRUB SERPL-MCNC: 0.5 MG/DL — SIGNIFICANT CHANGE UP (ref 0.2–1.2)
BUN SERPL-MCNC: 14 MG/DL — SIGNIFICANT CHANGE UP (ref 7–23)
CALCIUM SERPL-MCNC: 8.4 MG/DL — SIGNIFICANT CHANGE UP (ref 8.4–10.5)
CHLORIDE SERPL-SCNC: 104 MMOL/L — SIGNIFICANT CHANGE UP (ref 96–108)
CO2 SERPL-SCNC: 24 MMOL/L — SIGNIFICANT CHANGE UP (ref 22–31)
CREAT SERPL-MCNC: 0.57 MG/DL — SIGNIFICANT CHANGE UP (ref 0.5–1.3)
DACRYOCYTES BLD QL SMEAR: SLIGHT — SIGNIFICANT CHANGE UP
EGFR: 107 ML/MIN/1.73M2 — SIGNIFICANT CHANGE UP
EGFR: 107 ML/MIN/1.73M2 — SIGNIFICANT CHANGE UP
ELLIPTOCYTES BLD QL SMEAR: SLIGHT — SIGNIFICANT CHANGE UP
EOSINOPHIL # BLD AUTO: 0 K/UL — SIGNIFICANT CHANGE UP (ref 0–0.5)
EOSINOPHIL NFR BLD AUTO: 0 % — SIGNIFICANT CHANGE UP (ref 0–6)
GLUCOSE SERPL-MCNC: 171 MG/DL — HIGH (ref 70–99)
HCT VFR BLD CALC: 24.9 % — LOW (ref 39–50)
HGB BLD-MCNC: 8.4 G/DL — LOW (ref 13–17)
HYPOCHROMIA BLD QL: SLIGHT — SIGNIFICANT CHANGE UP
IMM GRANULOCYTES NFR BLD AUTO: 3.5 % — HIGH (ref 0–0.9)
LDH SERPL L TO P-CCNC: 205 U/L — SIGNIFICANT CHANGE UP (ref 50–242)
LYMPHOCYTES # BLD AUTO: 0.02 K/UL — LOW (ref 1–3.3)
LYMPHOCYTES # BLD AUTO: 0.5 % — LOW (ref 13–44)
MAGNESIUM SERPL-MCNC: 1.7 MG/DL — SIGNIFICANT CHANGE UP (ref 1.6–2.6)
MCHC RBC-ENTMCNC: 28.6 PG — SIGNIFICANT CHANGE UP (ref 27–34)
MCHC RBC-ENTMCNC: 33.7 G/DL — SIGNIFICANT CHANGE UP (ref 32–36)
MCV RBC AUTO: 84.7 FL — SIGNIFICANT CHANGE UP (ref 80–100)
MONOCYTES # BLD AUTO: 0.67 K/UL — SIGNIFICANT CHANGE UP (ref 0–0.9)
MONOCYTES NFR BLD AUTO: 16.7 % — HIGH (ref 2–14)
NEUTROPHILS # BLD AUTO: 3.1 K/UL — SIGNIFICANT CHANGE UP (ref 1.8–7.4)
NEUTROPHILS NFR BLD AUTO: 77.3 % — HIGH (ref 43–77)
NRBC BLD AUTO-RTO: 0 /100 WBCS — SIGNIFICANT CHANGE UP (ref 0–0)
PLAT MORPH BLD: NORMAL — SIGNIFICANT CHANGE UP
PLATELET # BLD AUTO: 31 K/UL — LOW (ref 150–400)
POIKILOCYTOSIS BLD QL AUTO: SLIGHT — SIGNIFICANT CHANGE UP
POTASSIUM SERPL-MCNC: 4.2 MMOL/L — SIGNIFICANT CHANGE UP (ref 3.5–5.3)
POTASSIUM SERPL-SCNC: 4.2 MMOL/L — SIGNIFICANT CHANGE UP (ref 3.5–5.3)
PROT SERPL-MCNC: 5.3 G/DL — LOW (ref 6–8.3)
RBC # BLD: 2.94 M/UL — LOW (ref 4.2–5.8)
RBC # FLD: 13.9 % — SIGNIFICANT CHANGE UP (ref 10.3–14.5)
RBC BLD AUTO: ABNORMAL
SCHISTOCYTES BLD QL AUTO: SLIGHT — SIGNIFICANT CHANGE UP
SODIUM SERPL-SCNC: 138 MMOL/L — SIGNIFICANT CHANGE UP (ref 135–145)
TOXIC GRANULES BLD QL SMEAR: PRESENT — SIGNIFICANT CHANGE UP
WBC # BLD: 4.01 K/UL — SIGNIFICANT CHANGE UP (ref 3.8–10.5)
WBC # FLD AUTO: 4.01 K/UL — SIGNIFICANT CHANGE UP (ref 3.8–10.5)

## 2025-06-06 PROCEDURE — 99215 OFFICE O/P EST HI 40 MIN: CPT

## 2025-06-06 PROCEDURE — G2211 COMPLEX E/M VISIT ADD ON: CPT

## 2025-06-06 RX ORDER — FUROSEMIDE 20 MG/1
20 TABLET ORAL DAILY
Qty: 30 | Refills: 0 | Status: ACTIVE | COMMUNITY
Start: 2025-06-06 | End: 1900-01-01

## 2025-06-06 RX ORDER — TAMSULOSIN HYDROCHLORIDE 0.4 MG/1
0.4 CAPSULE ORAL
Qty: 30 | Refills: 0 | Status: ACTIVE | COMMUNITY
Start: 2025-06-06 | End: 1900-01-01

## 2025-06-07 ENCOUNTER — RESULT REVIEW (OUTPATIENT)
Age: 70
End: 2025-06-07

## 2025-06-07 ENCOUNTER — APPOINTMENT (OUTPATIENT)
Dept: INFUSION THERAPY | Facility: HOSPITAL | Age: 70
End: 2025-06-07

## 2025-06-07 ENCOUNTER — APPOINTMENT (OUTPATIENT)
Dept: HEMATOLOGY ONCOLOGY | Facility: CLINIC | Age: 70
End: 2025-06-07
Payer: MEDICARE

## 2025-06-07 LAB
ALBUMIN SERPL ELPH-MCNC: 3.2 G/DL — LOW (ref 3.3–5)
ALP SERPL-CCNC: 119 U/L — SIGNIFICANT CHANGE UP (ref 40–120)
ALT FLD-CCNC: 12 U/L — SIGNIFICANT CHANGE UP (ref 10–50)
ANION GAP SERPL CALC-SCNC: 14 MMOL/L — SIGNIFICANT CHANGE UP (ref 5–17)
AST SERPL-CCNC: 18 U/L — SIGNIFICANT CHANGE UP (ref 10–40)
BASOPHILS # BLD AUTO: 0 K/UL — SIGNIFICANT CHANGE UP (ref 0–0.2)
BASOPHILS NFR BLD AUTO: 0 % — SIGNIFICANT CHANGE UP (ref 0–2)
BILIRUB SERPL-MCNC: 0.3 MG/DL — SIGNIFICANT CHANGE UP (ref 0.2–1.2)
BUN SERPL-MCNC: 13 MG/DL — SIGNIFICANT CHANGE UP (ref 7–23)
CALCIUM SERPL-MCNC: 8.4 MG/DL — SIGNIFICANT CHANGE UP (ref 8.4–10.5)
CHLORIDE SERPL-SCNC: 102 MMOL/L — SIGNIFICANT CHANGE UP (ref 96–108)
CO2 SERPL-SCNC: 22 MMOL/L — SIGNIFICANT CHANGE UP (ref 22–31)
CREAT SERPL-MCNC: 0.59 MG/DL — SIGNIFICANT CHANGE UP (ref 0.5–1.3)
DACRYOCYTES BLD QL SMEAR: SLIGHT — SIGNIFICANT CHANGE UP
DOHLE BOD BLD QL SMEAR: PRESENT — SIGNIFICANT CHANGE UP
EGFR: 106 ML/MIN/1.73M2 — SIGNIFICANT CHANGE UP
EGFR: 106 ML/MIN/1.73M2 — SIGNIFICANT CHANGE UP
ELLIPTOCYTES BLD QL SMEAR: SLIGHT — SIGNIFICANT CHANGE UP
EOSINOPHIL # BLD AUTO: 0 K/UL — SIGNIFICANT CHANGE UP (ref 0–0.5)
EOSINOPHIL NFR BLD AUTO: 0 % — SIGNIFICANT CHANGE UP (ref 0–6)
GLUCOSE SERPL-MCNC: 195 MG/DL — HIGH (ref 70–99)
HCT VFR BLD CALC: 23.8 % — LOW (ref 39–50)
HGB BLD-MCNC: 8.1 G/DL — LOW (ref 13–17)
HYPOCHROMIA BLD QL: SLIGHT — SIGNIFICANT CHANGE UP
LDH SERPL L TO P-CCNC: 228 U/L — SIGNIFICANT CHANGE UP (ref 50–242)
LYMPHOCYTES # BLD AUTO: 0.13 K/UL — LOW (ref 1–3.3)
LYMPHOCYTES # BLD AUTO: 2 % — LOW (ref 13–44)
MAGNESIUM SERPL-MCNC: 1.7 MG/DL — SIGNIFICANT CHANGE UP (ref 1.6–2.6)
MCHC RBC-ENTMCNC: 28.8 PG — SIGNIFICANT CHANGE UP (ref 27–34)
MCHC RBC-ENTMCNC: 34 G/DL — SIGNIFICANT CHANGE UP (ref 32–36)
MCV RBC AUTO: 84.7 FL — SIGNIFICANT CHANGE UP (ref 80–100)
METAMYELOCYTES # FLD: 3 % — HIGH (ref 0–0)
METAMYELOCYTES NFR BLD: 3 % — HIGH (ref 0–0)
MONOCYTES # BLD AUTO: 1.21 K/UL — HIGH (ref 0–0.9)
MONOCYTES NFR BLD AUTO: 18 % — HIGH (ref 2–14)
MYELOCYTES NFR BLD: 5 % — HIGH (ref 0–0)
NEUTROPHILS # BLD AUTO: 4.85 K/UL — SIGNIFICANT CHANGE UP (ref 1.8–7.4)
NEUTROPHILS NFR BLD AUTO: 72 % — SIGNIFICANT CHANGE UP (ref 43–77)
NRBC # BLD: 0 /100 WBCS — SIGNIFICANT CHANGE UP (ref 0–0)
NRBC BLD AUTO-RTO: SIGNIFICANT CHANGE UP /100 WBCS (ref 0–0)
NRBC BLD-RTO: 0 /100 WBCS — SIGNIFICANT CHANGE UP (ref 0–0)
PLAT MORPH BLD: NORMAL — SIGNIFICANT CHANGE UP
PLATELET # BLD AUTO: 20 K/UL — CRITICAL LOW (ref 150–400)
POIKILOCYTOSIS BLD QL AUTO: SLIGHT — SIGNIFICANT CHANGE UP
POTASSIUM SERPL-MCNC: 4 MMOL/L — SIGNIFICANT CHANGE UP (ref 3.5–5.3)
POTASSIUM SERPL-SCNC: 4 MMOL/L — SIGNIFICANT CHANGE UP (ref 3.5–5.3)
PROT SERPL-MCNC: 5.1 G/DL — LOW (ref 6–8.3)
PSA FLD-MCNC: 1.43 NG/ML — SIGNIFICANT CHANGE UP (ref 0–4)
RBC # BLD: 2.81 M/UL — LOW (ref 4.2–5.8)
RBC # FLD: 14.4 % — SIGNIFICANT CHANGE UP (ref 10.3–14.5)
RBC BLD AUTO: ABNORMAL
SCHISTOCYTES BLD QL AUTO: SLIGHT — SIGNIFICANT CHANGE UP
SODIUM SERPL-SCNC: 138 MMOL/L — SIGNIFICANT CHANGE UP (ref 135–145)
TOXIC GRANULES BLD QL SMEAR: PRESENT — SIGNIFICANT CHANGE UP
WBC # BLD: 6.73 K/UL — SIGNIFICANT CHANGE UP (ref 3.8–10.5)
WBC # FLD AUTO: 6.73 K/UL — SIGNIFICANT CHANGE UP (ref 3.8–10.5)

## 2025-06-07 PROCEDURE — 99215 OFFICE O/P EST HI 40 MIN: CPT

## 2025-06-09 ENCOUNTER — APPOINTMENT (OUTPATIENT)
Dept: INFUSION THERAPY | Facility: HOSPITAL | Age: 70
End: 2025-06-09

## 2025-06-09 ENCOUNTER — APPOINTMENT (OUTPATIENT)
Dept: HEMATOLOGY ONCOLOGY | Facility: CLINIC | Age: 70
End: 2025-06-09
Payer: MEDICARE

## 2025-06-09 ENCOUNTER — NON-APPOINTMENT (OUTPATIENT)
Age: 70
End: 2025-06-09

## 2025-06-09 ENCOUNTER — RESULT REVIEW (OUTPATIENT)
Age: 70
End: 2025-06-09

## 2025-06-09 DIAGNOSIS — E86.0 DEHYDRATION: ICD-10-CM

## 2025-06-09 DIAGNOSIS — Z51.89 ENCOUNTER FOR OTHER SPECIFIED AFTERCARE: ICD-10-CM

## 2025-06-09 LAB
ALBUMIN SERPL ELPH-MCNC: 3.4 G/DL — SIGNIFICANT CHANGE UP (ref 3.3–5)
ALP SERPL-CCNC: 131 U/L — HIGH (ref 40–120)
ALT FLD-CCNC: 11 U/L — SIGNIFICANT CHANGE UP (ref 10–45)
ANION GAP SERPL CALC-SCNC: 11 MMOL/L — SIGNIFICANT CHANGE UP (ref 5–17)
AST SERPL-CCNC: 21 U/L — SIGNIFICANT CHANGE UP (ref 10–40)
BASOPHILS # BLD AUTO: 0 K/UL — SIGNIFICANT CHANGE UP (ref 0–0.2)
BASOPHILS NFR BLD AUTO: 0 % — SIGNIFICANT CHANGE UP (ref 0–2)
BILIRUB SERPL-MCNC: 0.3 MG/DL — SIGNIFICANT CHANGE UP (ref 0.2–1.2)
BUN SERPL-MCNC: 14 MG/DL — SIGNIFICANT CHANGE UP (ref 7–23)
CALCIUM SERPL-MCNC: 8.2 MG/DL — LOW (ref 8.4–10.5)
CHLORIDE SERPL-SCNC: 102 MMOL/L — SIGNIFICANT CHANGE UP (ref 96–108)
CO2 SERPL-SCNC: 24 MMOL/L — SIGNIFICANT CHANGE UP (ref 22–31)
CREAT SERPL-MCNC: 0.56 MG/DL — SIGNIFICANT CHANGE UP (ref 0.5–1.3)
DACRYOCYTES BLD QL SMEAR: SLIGHT — SIGNIFICANT CHANGE UP
DOHLE BOD BLD QL SMEAR: PRESENT — SIGNIFICANT CHANGE UP
EGFR: 108 ML/MIN/1.73M2 — SIGNIFICANT CHANGE UP
EGFR: 108 ML/MIN/1.73M2 — SIGNIFICANT CHANGE UP
ELLIPTOCYTES BLD QL SMEAR: SLIGHT — SIGNIFICANT CHANGE UP
EOSINOPHIL # BLD AUTO: 0 K/UL — SIGNIFICANT CHANGE UP (ref 0–0.5)
EOSINOPHIL NFR BLD AUTO: 0 % — SIGNIFICANT CHANGE UP (ref 0–6)
GLUCOSE SERPL-MCNC: 247 MG/DL — HIGH (ref 70–99)
HCT VFR BLD CALC: 23.6 % — LOW (ref 39–50)
HGB BLD-MCNC: 7.9 G/DL — LOW (ref 13–17)
HYPOCHROMIA BLD QL: SLIGHT — SIGNIFICANT CHANGE UP
LDH SERPL L TO P-CCNC: 272 U/L — HIGH (ref 50–242)
LYMPHOCYTES # BLD AUTO: 0 % — LOW (ref 13–44)
LYMPHOCYTES # BLD AUTO: 0 K/UL — LOW (ref 1–3.3)
MAGNESIUM SERPL-MCNC: 1.4 MG/DL — LOW (ref 1.6–2.6)
MCHC RBC-ENTMCNC: 28.7 PG — SIGNIFICANT CHANGE UP (ref 27–34)
MCHC RBC-ENTMCNC: 33.5 G/DL — SIGNIFICANT CHANGE UP (ref 32–36)
MCV RBC AUTO: 85.8 FL — SIGNIFICANT CHANGE UP (ref 80–100)
METAMYELOCYTES # FLD: 1 % — HIGH (ref 0–0)
METAMYELOCYTES NFR BLD: 1 % — HIGH (ref 0–0)
MONOCYTES # BLD AUTO: 1.68 K/UL — HIGH (ref 0–0.9)
MONOCYTES NFR BLD AUTO: 25 % — HIGH (ref 2–14)
NEUTROPHILS # BLD AUTO: 4.96 K/UL — SIGNIFICANT CHANGE UP (ref 1.8–7.4)
NEUTROPHILS NFR BLD AUTO: 74 % — SIGNIFICANT CHANGE UP (ref 43–77)
NEUTS HYPERSEG # BLD: PRESENT — SIGNIFICANT CHANGE UP
NRBC # BLD: 0 /100 WBCS — SIGNIFICANT CHANGE UP (ref 0–0)
NRBC BLD AUTO-RTO: SIGNIFICANT CHANGE UP /100 WBCS (ref 0–0)
NRBC BLD-RTO: 0 /100 WBCS — SIGNIFICANT CHANGE UP (ref 0–0)
PLAT MORPH BLD: NORMAL — SIGNIFICANT CHANGE UP
PLATELET # BLD AUTO: 30 K/UL — LOW (ref 150–400)
POIKILOCYTOSIS BLD QL AUTO: SLIGHT — SIGNIFICANT CHANGE UP
POTASSIUM SERPL-MCNC: 4.3 MMOL/L — SIGNIFICANT CHANGE UP (ref 3.5–5.3)
POTASSIUM SERPL-SCNC: 4.3 MMOL/L — SIGNIFICANT CHANGE UP (ref 3.5–5.3)
PROT SERPL-MCNC: 5.2 G/DL — LOW (ref 6–8.3)
RBC # BLD: 2.75 M/UL — LOW (ref 4.2–5.8)
RBC # FLD: 14.4 % — SIGNIFICANT CHANGE UP (ref 10.3–14.5)
RBC BLD AUTO: ABNORMAL
SCHISTOCYTES BLD QL AUTO: SLIGHT — SIGNIFICANT CHANGE UP
SODIUM SERPL-SCNC: 136 MMOL/L — SIGNIFICANT CHANGE UP (ref 135–145)
TACROLIMUS SERPL-MCNC: 11 NG/ML — SIGNIFICANT CHANGE UP
TOXIC GRANULES BLD QL SMEAR: PRESENT — SIGNIFICANT CHANGE UP
WBC # BLD: 6.7 K/UL — SIGNIFICANT CHANGE UP (ref 3.8–10.5)
WBC # FLD AUTO: 6.7 K/UL — SIGNIFICANT CHANGE UP (ref 3.8–10.5)

## 2025-06-09 PROCEDURE — 99215 OFFICE O/P EST HI 40 MIN: CPT

## 2025-06-09 PROCEDURE — G2211 COMPLEX E/M VISIT ADD ON: CPT

## 2025-06-10 LAB
CMV DNA CSF QL NAA+PROBE: SIGNIFICANT CHANGE UP IU/ML
CMV DNA SPEC NAA+PROBE-LOG#: SIGNIFICANT CHANGE UP LOG10IU/ML
EBV DNA SERPL NAA+PROBE-ACNC: SIGNIFICANT CHANGE UP IU/ML
EBVPCR LOG: SIGNIFICANT CHANGE UP LOG10IU/ML

## 2025-06-11 LAB — HADV DNA FLD NAA+PROBE-LOG#: SIGNIFICANT CHANGE UP COPIES/ML

## 2025-06-12 ENCOUNTER — RESULT REVIEW (OUTPATIENT)
Age: 70
End: 2025-06-12

## 2025-06-12 ENCOUNTER — APPOINTMENT (OUTPATIENT)
Dept: INFUSION THERAPY | Facility: HOSPITAL | Age: 70
End: 2025-06-12

## 2025-06-12 ENCOUNTER — NON-APPOINTMENT (OUTPATIENT)
Age: 70
End: 2025-06-12

## 2025-06-12 LAB
ALBUMIN SERPL ELPH-MCNC: 3.5 G/DL — SIGNIFICANT CHANGE UP (ref 3.3–5)
ALP SERPL-CCNC: 127 U/L — HIGH (ref 40–120)
ALT FLD-CCNC: 10 U/L — SIGNIFICANT CHANGE UP (ref 10–45)
ANION GAP SERPL CALC-SCNC: 10 MMOL/L — SIGNIFICANT CHANGE UP (ref 5–17)
AST SERPL-CCNC: 20 U/L — SIGNIFICANT CHANGE UP (ref 10–40)
BASOPHILS # BLD AUTO: 0 K/UL — SIGNIFICANT CHANGE UP (ref 0–0.2)
BASOPHILS NFR BLD AUTO: 0 % — SIGNIFICANT CHANGE UP (ref 0–2)
BILIRUB SERPL-MCNC: 0.3 MG/DL — SIGNIFICANT CHANGE UP (ref 0.2–1.2)
BUN SERPL-MCNC: 14 MG/DL — SIGNIFICANT CHANGE UP (ref 7–23)
CALCIUM SERPL-MCNC: 8.4 MG/DL — SIGNIFICANT CHANGE UP (ref 8.4–10.5)
CHLORIDE SERPL-SCNC: 102 MMOL/L — SIGNIFICANT CHANGE UP (ref 96–108)
CO2 SERPL-SCNC: 24 MMOL/L — SIGNIFICANT CHANGE UP (ref 22–31)
CREAT SERPL-MCNC: 0.62 MG/DL — SIGNIFICANT CHANGE UP (ref 0.5–1.3)
DACRYOCYTES BLD QL SMEAR: SLIGHT — SIGNIFICANT CHANGE UP
EGFR: 105 ML/MIN/1.73M2 — SIGNIFICANT CHANGE UP
EGFR: 105 ML/MIN/1.73M2 — SIGNIFICANT CHANGE UP
ELLIPTOCYTES BLD QL SMEAR: SLIGHT — SIGNIFICANT CHANGE UP
EOSINOPHIL # BLD AUTO: 0 K/UL — SIGNIFICANT CHANGE UP (ref 0–0.5)
EOSINOPHIL NFR BLD AUTO: 0 % — SIGNIFICANT CHANGE UP (ref 0–6)
GLUCOSE SERPL-MCNC: 188 MG/DL — HIGH (ref 70–99)
HCT VFR BLD CALC: 25.3 % — LOW (ref 39–50)
HGB BLD-MCNC: 8.4 G/DL — LOW (ref 13–17)
HYPOCHROMIA BLD QL: SLIGHT — SIGNIFICANT CHANGE UP
LYMPHOCYTES # BLD AUTO: 0.18 K/UL — LOW (ref 1–3.3)
LYMPHOCYTES # BLD AUTO: 5 % — LOW (ref 13–44)
MAGNESIUM SERPL-MCNC: 1.2 MG/DL — LOW (ref 1.6–2.6)
MCHC RBC-ENTMCNC: 28.9 PG — SIGNIFICANT CHANGE UP (ref 27–34)
MCHC RBC-ENTMCNC: 33.2 G/DL — SIGNIFICANT CHANGE UP (ref 32–36)
MCV RBC AUTO: 86.9 FL — SIGNIFICANT CHANGE UP (ref 80–100)
MONOCYTES # BLD AUTO: 1.2 K/UL — HIGH (ref 0–0.9)
MONOCYTES NFR BLD AUTO: 33 % — HIGH (ref 2–14)
MYELOCYTES NFR BLD: 3 % — HIGH (ref 0–0)
NEUTROPHILS # BLD AUTO: 2.15 K/UL — SIGNIFICANT CHANGE UP (ref 1.8–7.4)
NEUTROPHILS NFR BLD AUTO: 59 % — SIGNIFICANT CHANGE UP (ref 43–77)
NRBC # BLD: 0 /100 WBCS — SIGNIFICANT CHANGE UP (ref 0–0)
NRBC BLD AUTO-RTO: SIGNIFICANT CHANGE UP /100 WBCS (ref 0–0)
NRBC BLD-RTO: 0 /100 WBCS — SIGNIFICANT CHANGE UP (ref 0–0)
PHOSPHATE SERPL-MCNC: 2.9 MG/DL — SIGNIFICANT CHANGE UP (ref 2.5–4.5)
PLAT MORPH BLD: NORMAL — SIGNIFICANT CHANGE UP
PLATELET # BLD AUTO: 26 K/UL — LOW (ref 150–400)
POIKILOCYTOSIS BLD QL AUTO: SLIGHT — SIGNIFICANT CHANGE UP
POTASSIUM SERPL-MCNC: 4.2 MMOL/L — SIGNIFICANT CHANGE UP (ref 3.5–5.3)
POTASSIUM SERPL-SCNC: 4.2 MMOL/L — SIGNIFICANT CHANGE UP (ref 3.5–5.3)
PROT SERPL-MCNC: 5.3 G/DL — LOW (ref 6–8.3)
RBC # BLD: 2.91 M/UL — LOW (ref 4.2–5.8)
RBC # FLD: 14.1 % — SIGNIFICANT CHANGE UP (ref 10.3–14.5)
RBC BLD AUTO: ABNORMAL
SCHISTOCYTES BLD QL AUTO: SLIGHT — SIGNIFICANT CHANGE UP
SODIUM SERPL-SCNC: 137 MMOL/L — SIGNIFICANT CHANGE UP (ref 135–145)
WBC # BLD: 3.65 K/UL — LOW (ref 3.8–10.5)
WBC # FLD AUTO: 3.65 K/UL — LOW (ref 3.8–10.5)

## 2025-06-13 DIAGNOSIS — Z94.84 STEM CELLS TRANSPLANT STATUS: ICD-10-CM

## 2025-06-15 ENCOUNTER — OUTPATIENT (OUTPATIENT)
Dept: OUTPATIENT SERVICES | Facility: HOSPITAL | Age: 70
LOS: 1 days | Discharge: HOME | End: 2025-06-15

## 2025-06-15 DIAGNOSIS — C83.30 DIFFUSE LARGE B-CELL LYMPHOMA, UNSPECIFIED SITE: ICD-10-CM

## 2025-06-15 DIAGNOSIS — Z98.890 OTHER SPECIFIED POSTPROCEDURAL STATES: Chronic | ICD-10-CM

## 2025-06-15 DIAGNOSIS — Z90.49 ACQUIRED ABSENCE OF OTHER SPECIFIED PARTS OF DIGESTIVE TRACT: Chronic | ICD-10-CM

## 2025-06-17 ENCOUNTER — RESULT REVIEW (OUTPATIENT)
Age: 70
End: 2025-06-17

## 2025-06-17 ENCOUNTER — APPOINTMENT (OUTPATIENT)
Dept: HEMATOLOGY ONCOLOGY | Facility: CLINIC | Age: 70
End: 2025-06-17
Payer: MEDICARE

## 2025-06-17 ENCOUNTER — APPOINTMENT (OUTPATIENT)
Dept: INFUSION THERAPY | Facility: HOSPITAL | Age: 70
End: 2025-06-17

## 2025-06-17 DIAGNOSIS — E86.0 DEHYDRATION: ICD-10-CM

## 2025-06-17 DIAGNOSIS — Z51.11 ENCOUNTER FOR ANTINEOPLASTIC CHEMOTHERAPY: ICD-10-CM

## 2025-06-17 DIAGNOSIS — R11.2 NAUSEA WITH VOMITING, UNSPECIFIED: ICD-10-CM

## 2025-06-17 LAB
ALBUMIN SERPL ELPH-MCNC: 3.8 G/DL — SIGNIFICANT CHANGE UP (ref 3.3–5)
ALP SERPL-CCNC: 115 U/L — SIGNIFICANT CHANGE UP (ref 40–120)
ALT FLD-CCNC: 11 U/L — SIGNIFICANT CHANGE UP (ref 10–45)
ANION GAP SERPL CALC-SCNC: 11 MMOL/L — SIGNIFICANT CHANGE UP (ref 5–17)
AST SERPL-CCNC: 20 U/L — SIGNIFICANT CHANGE UP (ref 10–40)
BASOPHILS # BLD AUTO: 0.02 K/UL — SIGNIFICANT CHANGE UP (ref 0–0.2)
BASOPHILS NFR BLD AUTO: 0.8 % — SIGNIFICANT CHANGE UP (ref 0–2)
BILIRUB SERPL-MCNC: 0.3 MG/DL — SIGNIFICANT CHANGE UP (ref 0.2–1.2)
BUN SERPL-MCNC: 16 MG/DL — SIGNIFICANT CHANGE UP (ref 7–23)
CALCIUM SERPL-MCNC: 8.8 MG/DL — SIGNIFICANT CHANGE UP (ref 8.4–10.5)
CHLORIDE SERPL-SCNC: 100 MMOL/L — SIGNIFICANT CHANGE UP (ref 96–108)
CO2 SERPL-SCNC: 24 MMOL/L — SIGNIFICANT CHANGE UP (ref 22–31)
CREAT SERPL-MCNC: 0.62 MG/DL — SIGNIFICANT CHANGE UP (ref 0.5–1.3)
EGFR: 105 ML/MIN/1.73M2 — SIGNIFICANT CHANGE UP
EGFR: 105 ML/MIN/1.73M2 — SIGNIFICANT CHANGE UP
EOSINOPHIL # BLD AUTO: 0 K/UL — SIGNIFICANT CHANGE UP (ref 0–0.5)
EOSINOPHIL NFR BLD AUTO: 0 % — SIGNIFICANT CHANGE UP (ref 0–6)
GLUCOSE SERPL-MCNC: 236 MG/DL — HIGH (ref 70–99)
HCT VFR BLD CALC: 25.7 % — LOW (ref 39–50)
HGB BLD-MCNC: 8.5 G/DL — LOW (ref 13–17)
IMM GRANULOCYTES NFR BLD AUTO: 1.7 % — HIGH (ref 0–0.9)
LDH SERPL L TO P-CCNC: 223 U/L — SIGNIFICANT CHANGE UP (ref 50–242)
LYMPHOCYTES # BLD AUTO: 0.26 K/UL — LOW (ref 1–3.3)
LYMPHOCYTES # BLD AUTO: 11 % — LOW (ref 13–44)
MAGNESIUM SERPL-MCNC: 1.4 MG/DL — LOW (ref 1.6–2.6)
MCHC RBC-ENTMCNC: 29.3 PG — SIGNIFICANT CHANGE UP (ref 27–34)
MCHC RBC-ENTMCNC: 33.1 G/DL — SIGNIFICANT CHANGE UP (ref 32–36)
MCV RBC AUTO: 88.6 FL — SIGNIFICANT CHANGE UP (ref 80–100)
MONOCYTES # BLD AUTO: 0.54 K/UL — SIGNIFICANT CHANGE UP (ref 0–0.9)
MONOCYTES NFR BLD AUTO: 22.8 % — HIGH (ref 2–14)
NEUTROPHILS # BLD AUTO: 1.51 K/UL — LOW (ref 1.8–7.4)
NEUTROPHILS NFR BLD AUTO: 63.7 % — SIGNIFICANT CHANGE UP (ref 43–77)
NRBC BLD AUTO-RTO: 0 /100 WBCS — SIGNIFICANT CHANGE UP (ref 0–0)
PLATELET # BLD AUTO: 67 K/UL — LOW (ref 150–400)
POTASSIUM SERPL-MCNC: 4.4 MMOL/L — SIGNIFICANT CHANGE UP (ref 3.5–5.3)
POTASSIUM SERPL-SCNC: 4.4 MMOL/L — SIGNIFICANT CHANGE UP (ref 3.5–5.3)
PROT SERPL-MCNC: 5.7 G/DL — LOW (ref 6–8.3)
RBC # BLD: 2.9 M/UL — LOW (ref 4.2–5.8)
RBC # FLD: 15.6 % — HIGH (ref 10.3–14.5)
SODIUM SERPL-SCNC: 135 MMOL/L — SIGNIFICANT CHANGE UP (ref 135–145)
TACROLIMUS SERPL-MCNC: 11.6 NG/ML — SIGNIFICANT CHANGE UP
WBC # BLD: 2.37 K/UL — LOW (ref 3.8–10.5)
WBC # FLD AUTO: 2.37 K/UL — LOW (ref 3.8–10.5)

## 2025-06-17 PROCEDURE — G2211 COMPLEX E/M VISIT ADD ON: CPT

## 2025-06-17 PROCEDURE — 99215 OFFICE O/P EST HI 40 MIN: CPT

## 2025-06-19 LAB — HADV DNA FLD NAA+PROBE-LOG#: SIGNIFICANT CHANGE UP COPIES/ML

## 2025-06-23 ENCOUNTER — RESULT REVIEW (OUTPATIENT)
Age: 70
End: 2025-06-23

## 2025-06-23 ENCOUNTER — APPOINTMENT (OUTPATIENT)
Dept: HEMATOLOGY ONCOLOGY | Facility: CLINIC | Age: 70
End: 2025-06-23
Payer: MEDICARE

## 2025-06-23 ENCOUNTER — APPOINTMENT (OUTPATIENT)
Dept: INFUSION THERAPY | Facility: HOSPITAL | Age: 70
End: 2025-06-23

## 2025-06-23 LAB
ALBUMIN SERPL ELPH-MCNC: 3.6 G/DL — SIGNIFICANT CHANGE UP (ref 3.3–5)
ALP SERPL-CCNC: 112 U/L — SIGNIFICANT CHANGE UP (ref 40–120)
ALT FLD-CCNC: 16 U/L — SIGNIFICANT CHANGE UP (ref 10–50)
ANION GAP SERPL CALC-SCNC: 11 MMOL/L — SIGNIFICANT CHANGE UP (ref 5–17)
AST SERPL-CCNC: 25 U/L — SIGNIFICANT CHANGE UP (ref 10–40)
BASOPHILS # BLD AUTO: 0.02 K/UL — SIGNIFICANT CHANGE UP (ref 0–0.2)
BASOPHILS NFR BLD AUTO: 0.6 % — SIGNIFICANT CHANGE UP (ref 0–2)
BILIRUB SERPL-MCNC: 0.2 MG/DL — SIGNIFICANT CHANGE UP (ref 0.2–1.2)
BUN SERPL-MCNC: 17 MG/DL — SIGNIFICANT CHANGE UP (ref 7–23)
CALCIUM SERPL-MCNC: 9.1 MG/DL — SIGNIFICANT CHANGE UP (ref 8.4–10.5)
CHLORIDE SERPL-SCNC: 102 MMOL/L — SIGNIFICANT CHANGE UP (ref 96–108)
CO2 SERPL-SCNC: 22 MMOL/L — SIGNIFICANT CHANGE UP (ref 22–31)
CREAT SERPL-MCNC: 0.61 MG/DL — SIGNIFICANT CHANGE UP (ref 0.5–1.3)
DACRYOCYTES BLD QL SMEAR: SLIGHT — SIGNIFICANT CHANGE UP
EGFR: 105 ML/MIN/1.73M2 — SIGNIFICANT CHANGE UP
EGFR: 105 ML/MIN/1.73M2 — SIGNIFICANT CHANGE UP
ELLIPTOCYTES BLD QL SMEAR: SLIGHT — SIGNIFICANT CHANGE UP
EOSINOPHIL # BLD AUTO: 0.01 K/UL — SIGNIFICANT CHANGE UP (ref 0–0.5)
EOSINOPHIL NFR BLD AUTO: 0.3 % — SIGNIFICANT CHANGE UP (ref 0–6)
GLUCOSE SERPL-MCNC: 177 MG/DL — HIGH (ref 70–99)
HCT VFR BLD CALC: 24.6 % — LOW (ref 39–50)
HGB BLD-MCNC: 8 G/DL — LOW (ref 13–17)
HYPOCHROMIA BLD QL: SLIGHT — SIGNIFICANT CHANGE UP
IMM GRANULOCYTES NFR BLD AUTO: 0.6 % — SIGNIFICANT CHANGE UP (ref 0–0.9)
LDH SERPL L TO P-CCNC: 235 U/L — SIGNIFICANT CHANGE UP (ref 50–242)
LYMPHOCYTES # BLD AUTO: 0.27 K/UL — LOW (ref 1–3.3)
LYMPHOCYTES # BLD AUTO: 7.6 % — LOW (ref 13–44)
MAGNESIUM SERPL-MCNC: 1.6 MG/DL — SIGNIFICANT CHANGE UP (ref 1.6–2.6)
MCHC RBC-ENTMCNC: 29.3 PG — SIGNIFICANT CHANGE UP (ref 27–34)
MCHC RBC-ENTMCNC: 32.5 G/DL — SIGNIFICANT CHANGE UP (ref 32–36)
MCV RBC AUTO: 90.1 FL — SIGNIFICANT CHANGE UP (ref 80–100)
MONOCYTES # BLD AUTO: 0.79 K/UL — SIGNIFICANT CHANGE UP (ref 0–0.9)
MONOCYTES NFR BLD AUTO: 22.3 % — HIGH (ref 2–14)
NEUTROPHILS # BLD AUTO: 2.44 K/UL — SIGNIFICANT CHANGE UP (ref 1.8–7.4)
NEUTROPHILS NFR BLD AUTO: 68.6 % — SIGNIFICANT CHANGE UP (ref 43–77)
NRBC BLD AUTO-RTO: 0 /100 WBCS — SIGNIFICANT CHANGE UP (ref 0–0)
PLAT MORPH BLD: NORMAL — SIGNIFICANT CHANGE UP
PLATELET # BLD AUTO: 127 K/UL — LOW (ref 150–400)
POIKILOCYTOSIS BLD QL AUTO: SLIGHT — SIGNIFICANT CHANGE UP
POTASSIUM SERPL-MCNC: 4.1 MMOL/L — SIGNIFICANT CHANGE UP (ref 3.5–5.3)
POTASSIUM SERPL-SCNC: 4.1 MMOL/L — SIGNIFICANT CHANGE UP (ref 3.5–5.3)
PROT SERPL-MCNC: 5.7 G/DL — LOW (ref 6–8.3)
RBC # BLD: 2.73 M/UL — LOW (ref 4.2–5.8)
RBC # FLD: 17.1 % — HIGH (ref 10.3–14.5)
RBC BLD AUTO: ABNORMAL
SCHISTOCYTES BLD QL AUTO: SLIGHT — SIGNIFICANT CHANGE UP
SODIUM SERPL-SCNC: 135 MMOL/L — SIGNIFICANT CHANGE UP (ref 135–145)
TACROLIMUS SERPL-MCNC: 6.9 NG/ML — SIGNIFICANT CHANGE UP
WBC # BLD: 3.55 K/UL — LOW (ref 3.8–10.5)
WBC # FLD AUTO: 3.55 K/UL — LOW (ref 3.8–10.5)

## 2025-06-23 PROCEDURE — G2211 COMPLEX E/M VISIT ADD ON: CPT

## 2025-06-23 PROCEDURE — 99215 OFFICE O/P EST HI 40 MIN: CPT

## 2025-06-25 ENCOUNTER — NON-APPOINTMENT (OUTPATIENT)
Age: 70
End: 2025-06-25

## 2025-06-25 LAB
HADV DNA FLD NAA+PROBE-LOG#: SIGNIFICANT CHANGE UP COPIES/ML
HERPES-6 (HSV-6) PCR: SIGNIFICANT CHANGE UP COPIES/ML

## 2025-06-26 RX ORDER — DIPHENHYDRAMINE HYDROCHLORIDE AND LIDOCAINE HYDROCHLORIDE AND ALUMINUM HYDROXIDE AND MAGNESIUM HYDRO
KIT
Qty: 1 | Refills: 0 | Status: ACTIVE | COMMUNITY
Start: 2025-06-26 | End: 1900-01-01

## 2025-06-30 ENCOUNTER — RESULT REVIEW (OUTPATIENT)
Age: 70
End: 2025-06-30

## 2025-06-30 ENCOUNTER — APPOINTMENT (OUTPATIENT)
Dept: INFUSION THERAPY | Facility: HOSPITAL | Age: 70
End: 2025-06-30

## 2025-06-30 ENCOUNTER — APPOINTMENT (OUTPATIENT)
Dept: HEMATOLOGY ONCOLOGY | Facility: CLINIC | Age: 70
End: 2025-06-30
Payer: MEDICARE

## 2025-06-30 LAB
ALBUMIN SERPL ELPH-MCNC: 3.4 G/DL — SIGNIFICANT CHANGE UP (ref 3.3–5)
ALP SERPL-CCNC: 105 U/L — SIGNIFICANT CHANGE UP (ref 40–120)
ALT FLD-CCNC: 16 U/L — SIGNIFICANT CHANGE UP (ref 10–45)
ANION GAP SERPL CALC-SCNC: 11 MMOL/L — SIGNIFICANT CHANGE UP (ref 5–17)
AST SERPL-CCNC: 20 U/L — SIGNIFICANT CHANGE UP (ref 10–40)
BASOPHILS # BLD AUTO: 0.01 K/UL — SIGNIFICANT CHANGE UP (ref 0–0.2)
BASOPHILS NFR BLD AUTO: 0.2 % — SIGNIFICANT CHANGE UP (ref 0–2)
BILIRUB SERPL-MCNC: 0.3 MG/DL — SIGNIFICANT CHANGE UP (ref 0.2–1.2)
BUN SERPL-MCNC: 11 MG/DL — SIGNIFICANT CHANGE UP (ref 7–23)
CALCIUM SERPL-MCNC: 8.6 MG/DL — SIGNIFICANT CHANGE UP (ref 8.4–10.5)
CHLORIDE SERPL-SCNC: 99 MMOL/L — SIGNIFICANT CHANGE UP (ref 96–108)
CO2 SERPL-SCNC: 24 MMOL/L — SIGNIFICANT CHANGE UP (ref 22–31)
CREAT SERPL-MCNC: 0.63 MG/DL — SIGNIFICANT CHANGE UP (ref 0.5–1.3)
EGFR: 104 ML/MIN/1.73M2 — SIGNIFICANT CHANGE UP
EGFR: 104 ML/MIN/1.73M2 — SIGNIFICANT CHANGE UP
EOSINOPHIL # BLD AUTO: 0.03 K/UL — SIGNIFICANT CHANGE UP (ref 0–0.5)
EOSINOPHIL NFR BLD AUTO: 0.6 % — SIGNIFICANT CHANGE UP (ref 0–6)
GLUCOSE SERPL-MCNC: 203 MG/DL — HIGH (ref 70–99)
HCT VFR BLD CALC: 22.1 % — LOW (ref 39–50)
HGB BLD-MCNC: 7.1 G/DL — LOW (ref 13–17)
IMM GRANULOCYTES NFR BLD AUTO: 0.6 % — SIGNIFICANT CHANGE UP (ref 0–0.9)
LDH SERPL L TO P-CCNC: 210 U/L — SIGNIFICANT CHANGE UP (ref 50–242)
LYMPHOCYTES # BLD AUTO: 0.26 K/UL — LOW (ref 1–3.3)
LYMPHOCYTES # BLD AUTO: 5.5 % — LOW (ref 13–44)
MAGNESIUM SERPL-MCNC: 1.5 MG/DL — LOW (ref 1.6–2.6)
MCHC RBC-ENTMCNC: 28.6 PG — SIGNIFICANT CHANGE UP (ref 27–34)
MCHC RBC-ENTMCNC: 32.1 G/DL — SIGNIFICANT CHANGE UP (ref 32–36)
MCV RBC AUTO: 89.1 FL — SIGNIFICANT CHANGE UP (ref 80–100)
MONOCYTES # BLD AUTO: 1.18 K/UL — HIGH (ref 0–0.9)
MONOCYTES NFR BLD AUTO: 24.8 % — HIGH (ref 2–14)
NEUTROPHILS # BLD AUTO: 3.24 K/UL — SIGNIFICANT CHANGE UP (ref 1.8–7.4)
NEUTROPHILS NFR BLD AUTO: 68.3 % — SIGNIFICANT CHANGE UP (ref 43–77)
NRBC BLD AUTO-RTO: 0 /100 WBCS — SIGNIFICANT CHANGE UP (ref 0–0)
PLATELET # BLD AUTO: 188 K/UL — SIGNIFICANT CHANGE UP (ref 150–400)
POTASSIUM SERPL-MCNC: 4.5 MMOL/L — SIGNIFICANT CHANGE UP (ref 3.5–5.3)
POTASSIUM SERPL-SCNC: 4.5 MMOL/L — SIGNIFICANT CHANGE UP (ref 3.5–5.3)
PROT SERPL-MCNC: 5.4 G/DL — LOW (ref 6–8.3)
RBC # BLD: 2.48 M/UL — LOW (ref 4.2–5.8)
RBC # FLD: 16.8 % — HIGH (ref 10.3–14.5)
SODIUM SERPL-SCNC: 135 MMOL/L — SIGNIFICANT CHANGE UP (ref 135–145)
TACROLIMUS SERPL-MCNC: 6.2 NG/ML — SIGNIFICANT CHANGE UP
WBC # BLD: 4.75 K/UL — SIGNIFICANT CHANGE UP (ref 3.8–10.5)
WBC # FLD AUTO: 4.75 K/UL — SIGNIFICANT CHANGE UP (ref 3.8–10.5)

## 2025-06-30 PROCEDURE — G2211 COMPLEX E/M VISIT ADD ON: CPT

## 2025-06-30 PROCEDURE — 99215 OFFICE O/P EST HI 40 MIN: CPT

## 2025-07-01 DIAGNOSIS — C83.38 DIFFUSE LARGE B-CELL LYMPHOMA, LYMPH NODES OF MULTIPLE SITES: ICD-10-CM

## 2025-07-01 LAB
CMV DNA CSF QL NAA+PROBE: SIGNIFICANT CHANGE UP IU/ML
CMV DNA SPEC NAA+PROBE-LOG#: SIGNIFICANT CHANGE UP LOG10IU/ML
EBV DNA SERPL NAA+PROBE-ACNC: SIGNIFICANT CHANGE UP IU/ML
EBVPCR LOG: SIGNIFICANT CHANGE UP LOG10IU/ML
HADV DNA FLD NAA+PROBE-LOG#: SIGNIFICANT CHANGE UP COPIES/ML

## 2025-07-03 ENCOUNTER — APPOINTMENT (OUTPATIENT)
Dept: INFUSION THERAPY | Facility: HOSPITAL | Age: 70
End: 2025-07-03

## 2025-07-03 ENCOUNTER — RESULT REVIEW (OUTPATIENT)
Age: 70
End: 2025-07-03

## 2025-07-03 ENCOUNTER — NON-APPOINTMENT (OUTPATIENT)
Age: 70
End: 2025-07-03

## 2025-07-03 LAB
ALBUMIN SERPL ELPH-MCNC: 3.4 G/DL — SIGNIFICANT CHANGE UP (ref 3.3–5)
ALP SERPL-CCNC: 104 U/L — SIGNIFICANT CHANGE UP (ref 40–120)
ALT FLD-CCNC: 15 U/L — SIGNIFICANT CHANGE UP (ref 10–45)
ANION GAP SERPL CALC-SCNC: 11 MMOL/L — SIGNIFICANT CHANGE UP (ref 5–17)
AST SERPL-CCNC: 20 U/L — SIGNIFICANT CHANGE UP (ref 10–40)
BASOPHILS # BLD AUTO: 0.03 K/UL — SIGNIFICANT CHANGE UP (ref 0–0.2)
BASOPHILS NFR BLD AUTO: 0.5 % — SIGNIFICANT CHANGE UP (ref 0–2)
BILIRUB SERPL-MCNC: 0.4 MG/DL — SIGNIFICANT CHANGE UP (ref 0.2–1.2)
BUN SERPL-MCNC: 19 MG/DL — SIGNIFICANT CHANGE UP (ref 7–23)
CALCIUM SERPL-MCNC: 8.6 MG/DL — SIGNIFICANT CHANGE UP (ref 8.4–10.5)
CHLORIDE SERPL-SCNC: 99 MMOL/L — SIGNIFICANT CHANGE UP (ref 96–108)
CO2 SERPL-SCNC: 24 MMOL/L — SIGNIFICANT CHANGE UP (ref 22–31)
CREAT SERPL-MCNC: 0.63 MG/DL — SIGNIFICANT CHANGE UP (ref 0.5–1.3)
EGFR: 104 ML/MIN/1.73M2 — SIGNIFICANT CHANGE UP
EGFR: 104 ML/MIN/1.73M2 — SIGNIFICANT CHANGE UP
EOSINOPHIL # BLD AUTO: 0.04 K/UL — SIGNIFICANT CHANGE UP (ref 0–0.5)
EOSINOPHIL NFR BLD AUTO: 0.6 % — SIGNIFICANT CHANGE UP (ref 0–6)
GLUCOSE SERPL-MCNC: 150 MG/DL — HIGH (ref 70–99)
HCT VFR BLD CALC: 22.6 % — LOW (ref 39–50)
HGB BLD-MCNC: 7.3 G/DL — LOW (ref 13–17)
IMM GRANULOCYTES NFR BLD AUTO: 1.5 % — HIGH (ref 0–0.9)
LYMPHOCYTES # BLD AUTO: 0.33 K/UL — LOW (ref 1–3.3)
LYMPHOCYTES # BLD AUTO: 5.3 % — LOW (ref 13–44)
MAGNESIUM SERPL-MCNC: 1.7 MG/DL — SIGNIFICANT CHANGE UP (ref 1.6–2.6)
MCHC RBC-ENTMCNC: 28.6 PG — SIGNIFICANT CHANGE UP (ref 27–34)
MCHC RBC-ENTMCNC: 32.3 G/DL — SIGNIFICANT CHANGE UP (ref 32–36)
MCV RBC AUTO: 88.6 FL — SIGNIFICANT CHANGE UP (ref 80–100)
MONOCYTES # BLD AUTO: 1.46 K/UL — HIGH (ref 0–0.9)
MONOCYTES NFR BLD AUTO: 23.6 % — HIGH (ref 2–14)
NEUTROPHILS # BLD AUTO: 4.24 K/UL — SIGNIFICANT CHANGE UP (ref 1.8–7.4)
NEUTROPHILS NFR BLD AUTO: 68.5 % — SIGNIFICANT CHANGE UP (ref 43–77)
NRBC BLD AUTO-RTO: 0 /100 WBCS — SIGNIFICANT CHANGE UP (ref 0–0)
PLATELET # BLD AUTO: 211 K/UL — SIGNIFICANT CHANGE UP (ref 150–400)
POTASSIUM SERPL-MCNC: 4.2 MMOL/L — SIGNIFICANT CHANGE UP (ref 3.5–5.3)
POTASSIUM SERPL-SCNC: 4.2 MMOL/L — SIGNIFICANT CHANGE UP (ref 3.5–5.3)
PROT SERPL-MCNC: 5.4 G/DL — LOW (ref 6–8.3)
RBC # BLD: 2.55 M/UL — LOW (ref 4.2–5.8)
RBC # FLD: 17.1 % — HIGH (ref 10.3–14.5)
SODIUM SERPL-SCNC: 134 MMOL/L — LOW (ref 135–145)
WBC # BLD: 6.19 K/UL — SIGNIFICANT CHANGE UP (ref 3.8–10.5)
WBC # FLD AUTO: 6.19 K/UL — SIGNIFICANT CHANGE UP (ref 3.8–10.5)

## 2025-07-07 ENCOUNTER — RESULT REVIEW (OUTPATIENT)
Age: 70
End: 2025-07-07

## 2025-07-07 ENCOUNTER — APPOINTMENT (OUTPATIENT)
Dept: INFUSION THERAPY | Facility: HOSPITAL | Age: 70
End: 2025-07-07

## 2025-07-07 ENCOUNTER — APPOINTMENT (OUTPATIENT)
Dept: CT IMAGING | Facility: IMAGING CENTER | Age: 70
End: 2025-07-07
Payer: MEDICARE

## 2025-07-07 ENCOUNTER — APPOINTMENT (OUTPATIENT)
Dept: HEMATOLOGY ONCOLOGY | Facility: CLINIC | Age: 70
End: 2025-07-07
Payer: MEDICARE

## 2025-07-07 ENCOUNTER — OUTPATIENT (OUTPATIENT)
Dept: OUTPATIENT SERVICES | Facility: HOSPITAL | Age: 70
LOS: 1 days | End: 2025-07-07
Payer: MEDICARE

## 2025-07-07 DIAGNOSIS — Z94.84 STEM CELLS TRANSPLANT STATUS: ICD-10-CM

## 2025-07-07 LAB
ALBUMIN SERPL ELPH-MCNC: 3.3 G/DL — SIGNIFICANT CHANGE UP (ref 3.3–5)
ALP SERPL-CCNC: 108 U/L — SIGNIFICANT CHANGE UP (ref 40–120)
ALT FLD-CCNC: 15 U/L — SIGNIFICANT CHANGE UP (ref 10–45)
ANION GAP SERPL CALC-SCNC: 12 MMOL/L — SIGNIFICANT CHANGE UP (ref 5–17)
AST SERPL-CCNC: 24 U/L — SIGNIFICANT CHANGE UP (ref 10–40)
BASOPHILS # BLD AUTO: 0.02 K/UL — SIGNIFICANT CHANGE UP (ref 0–0.2)
BASOPHILS NFR BLD AUTO: 0.4 % — SIGNIFICANT CHANGE UP (ref 0–2)
BILIRUB SERPL-MCNC: 0.3 MG/DL — SIGNIFICANT CHANGE UP (ref 0.2–1.2)
BUN SERPL-MCNC: 18 MG/DL — SIGNIFICANT CHANGE UP (ref 7–23)
CALCIUM SERPL-MCNC: 8.4 MG/DL — SIGNIFICANT CHANGE UP (ref 8.4–10.5)
CHLORIDE SERPL-SCNC: 98 MMOL/L — SIGNIFICANT CHANGE UP (ref 96–108)
CK SERPL-CCNC: 115 U/L — SIGNIFICANT CHANGE UP (ref 30–200)
CO2 SERPL-SCNC: 21 MMOL/L — LOW (ref 22–31)
CREAT SERPL-MCNC: 0.74 MG/DL — SIGNIFICANT CHANGE UP (ref 0.5–1.3)
EGFR: 99 ML/MIN/1.73M2 — SIGNIFICANT CHANGE UP
EGFR: 99 ML/MIN/1.73M2 — SIGNIFICANT CHANGE UP
EOSINOPHIL # BLD AUTO: 0.03 K/UL — SIGNIFICANT CHANGE UP (ref 0–0.5)
EOSINOPHIL NFR BLD AUTO: 0.6 % — SIGNIFICANT CHANGE UP (ref 0–6)
GLUCOSE SERPL-MCNC: 224 MG/DL — HIGH (ref 70–99)
HCT VFR BLD CALC: 22.4 % — LOW (ref 39–50)
HGB BLD-MCNC: 7.2 G/DL — LOW (ref 13–17)
IMM GRANULOCYTES NFR BLD AUTO: 1.1 % — HIGH (ref 0–0.9)
LDH SERPL L TO P-CCNC: 241 U/L — SIGNIFICANT CHANGE UP (ref 50–242)
LYMPHOCYTES # BLD AUTO: 0.28 K/UL — LOW (ref 1–3.3)
LYMPHOCYTES # BLD AUTO: 5.9 % — LOW (ref 13–44)
MAGNESIUM SERPL-MCNC: 1.8 MG/DL — SIGNIFICANT CHANGE UP (ref 1.6–2.6)
MCHC RBC-ENTMCNC: 27.9 PG — SIGNIFICANT CHANGE UP (ref 27–34)
MCHC RBC-ENTMCNC: 32.1 G/DL — SIGNIFICANT CHANGE UP (ref 32–36)
MCV RBC AUTO: 86.8 FL — SIGNIFICANT CHANGE UP (ref 80–100)
MONOCYTES # BLD AUTO: 0.93 K/UL — HIGH (ref 0–0.9)
MONOCYTES NFR BLD AUTO: 19.7 % — HIGH (ref 2–14)
NEUTROPHILS # BLD AUTO: 3.4 K/UL — SIGNIFICANT CHANGE UP (ref 1.8–7.4)
NEUTROPHILS NFR BLD AUTO: 72.3 % — SIGNIFICANT CHANGE UP (ref 43–77)
NRBC BLD AUTO-RTO: 0 /100 WBCS — SIGNIFICANT CHANGE UP (ref 0–0)
PLATELET # BLD AUTO: 204 K/UL — SIGNIFICANT CHANGE UP (ref 150–400)
POTASSIUM SERPL-MCNC: 4.5 MMOL/L — SIGNIFICANT CHANGE UP (ref 3.5–5.3)
POTASSIUM SERPL-SCNC: 4.5 MMOL/L — SIGNIFICANT CHANGE UP (ref 3.5–5.3)
PROT SERPL-MCNC: 5.4 G/DL — LOW (ref 6–8.3)
RBC # BLD: 2.58 M/UL — LOW (ref 4.2–5.8)
RBC # FLD: 17.4 % — HIGH (ref 10.3–14.5)
SODIUM SERPL-SCNC: 130 MMOL/L — LOW (ref 135–145)
TACROLIMUS SERPL-MCNC: 8.9 NG/ML — SIGNIFICANT CHANGE UP
WBC # BLD: 4.71 K/UL — SIGNIFICANT CHANGE UP (ref 3.8–10.5)
WBC # FLD AUTO: 4.71 K/UL — SIGNIFICANT CHANGE UP (ref 3.8–10.5)

## 2025-07-07 PROCEDURE — 72129 CT CHEST SPINE W/DYE: CPT | Mod: 26

## 2025-07-07 PROCEDURE — 72132 CT LUMBAR SPINE W/DYE: CPT | Mod: 26

## 2025-07-07 PROCEDURE — 99215 OFFICE O/P EST HI 40 MIN: CPT

## 2025-07-07 PROCEDURE — 72132 CT LUMBAR SPINE W/DYE: CPT

## 2025-07-07 PROCEDURE — G2211 COMPLEX E/M VISIT ADD ON: CPT

## 2025-07-07 PROCEDURE — 72129 CT CHEST SPINE W/DYE: CPT

## 2025-07-07 RX ORDER — FUROSEMIDE 20 MG/1
20 TABLET ORAL DAILY
Qty: 30 | Refills: 0 | Status: ACTIVE | COMMUNITY
Start: 2025-07-07 | End: 1900-01-01

## 2025-07-07 RX ORDER — FAMOTIDINE 40 MG/1
40 TABLET, FILM COATED ORAL
Qty: 30 | Refills: 0 | Status: ACTIVE | COMMUNITY
Start: 2025-07-07 | End: 1900-01-01

## 2025-07-09 ENCOUNTER — APPOINTMENT (OUTPATIENT)
Dept: INFUSION THERAPY | Facility: HOSPITAL | Age: 70
End: 2025-07-09

## 2025-07-09 LAB — HADV DNA FLD NAA+PROBE-LOG#: SIGNIFICANT CHANGE UP COPIES/ML

## 2025-07-10 ENCOUNTER — RESULT REVIEW (OUTPATIENT)
Age: 70
End: 2025-07-10

## 2025-07-10 ENCOUNTER — APPOINTMENT (OUTPATIENT)
Dept: HEMATOLOGY ONCOLOGY | Facility: CLINIC | Age: 70
End: 2025-07-10
Payer: MEDICARE

## 2025-07-10 ENCOUNTER — APPOINTMENT (OUTPATIENT)
Dept: GASTROENTEROLOGY | Facility: CLINIC | Age: 70
End: 2025-07-10

## 2025-07-10 ENCOUNTER — APPOINTMENT (OUTPATIENT)
Dept: INFUSION THERAPY | Facility: HOSPITAL | Age: 70
End: 2025-07-10

## 2025-07-10 VITALS
HEART RATE: 95 BPM | RESPIRATION RATE: 16 BRPM | BODY MASS INDEX: 19.4 KG/M2 | TEMPERATURE: 97.8 F | WEIGHT: 106.04 LBS | SYSTOLIC BLOOD PRESSURE: 118 MMHG | DIASTOLIC BLOOD PRESSURE: 84 MMHG | OXYGEN SATURATION: 97 %

## 2025-07-10 LAB
ALBUMIN SERPL ELPH-MCNC: 3.3 G/DL — SIGNIFICANT CHANGE UP (ref 3.3–5)
ALP SERPL-CCNC: 113 U/L — SIGNIFICANT CHANGE UP (ref 40–120)
ALT FLD-CCNC: 17 U/L — SIGNIFICANT CHANGE UP (ref 10–45)
ANION GAP SERPL CALC-SCNC: 10 MMOL/L — SIGNIFICANT CHANGE UP (ref 5–17)
AST SERPL-CCNC: 25 U/L — SIGNIFICANT CHANGE UP (ref 10–40)
BASOPHILS # BLD AUTO: 0.02 K/UL — SIGNIFICANT CHANGE UP (ref 0–0.2)
BASOPHILS NFR BLD AUTO: 0.3 % — SIGNIFICANT CHANGE UP (ref 0–2)
BILIRUB SERPL-MCNC: 0.5 MG/DL — SIGNIFICANT CHANGE UP (ref 0.2–1.2)
BUN SERPL-MCNC: 17 MG/DL — SIGNIFICANT CHANGE UP (ref 7–23)
CALCIUM SERPL-MCNC: 8.4 MG/DL — SIGNIFICANT CHANGE UP (ref 8.4–10.5)
CHLORIDE SERPL-SCNC: 100 MMOL/L — SIGNIFICANT CHANGE UP (ref 96–108)
CO2 SERPL-SCNC: 21 MMOL/L — LOW (ref 22–31)
CREAT SERPL-MCNC: 0.67 MG/DL — SIGNIFICANT CHANGE UP (ref 0.5–1.3)
EGFR: 102 ML/MIN/1.73M2 — SIGNIFICANT CHANGE UP
EGFR: 102 ML/MIN/1.73M2 — SIGNIFICANT CHANGE UP
EOSINOPHIL # BLD AUTO: 0.05 K/UL — SIGNIFICANT CHANGE UP (ref 0–0.5)
EOSINOPHIL NFR BLD AUTO: 0.8 % — SIGNIFICANT CHANGE UP (ref 0–6)
GLUCOSE SERPL-MCNC: 131 MG/DL — HIGH (ref 70–99)
HCT VFR BLD CALC: 27.8 % — LOW (ref 39–50)
HGB BLD-MCNC: 8.9 G/DL — LOW (ref 13–17)
IMM GRANULOCYTES NFR BLD AUTO: 0.6 % — SIGNIFICANT CHANGE UP (ref 0–0.9)
LDH SERPL L TO P-CCNC: 312 U/L — HIGH (ref 50–242)
LYMPHOCYTES # BLD AUTO: 0.21 K/UL — LOW (ref 1–3.3)
LYMPHOCYTES # BLD AUTO: 3.2 % — LOW (ref 13–44)
MAGNESIUM SERPL-MCNC: 1.8 MG/DL — SIGNIFICANT CHANGE UP (ref 1.6–2.6)
MCHC RBC-ENTMCNC: 27.6 PG — SIGNIFICANT CHANGE UP (ref 27–34)
MCHC RBC-ENTMCNC: 32 G/DL — SIGNIFICANT CHANGE UP (ref 32–36)
MCV RBC AUTO: 86.1 FL — SIGNIFICANT CHANGE UP (ref 80–100)
MONOCYTES # BLD AUTO: 1.09 K/UL — HIGH (ref 0–0.9)
MONOCYTES NFR BLD AUTO: 16.5 % — HIGH (ref 2–14)
NEUTROPHILS # BLD AUTO: 5.21 K/UL — SIGNIFICANT CHANGE UP (ref 1.8–7.4)
NEUTROPHILS NFR BLD AUTO: 78.6 % — HIGH (ref 43–77)
NRBC BLD AUTO-RTO: 0 /100 WBCS — SIGNIFICANT CHANGE UP (ref 0–0)
PLATELET # BLD AUTO: 177 K/UL — SIGNIFICANT CHANGE UP (ref 150–400)
POTASSIUM SERPL-MCNC: 4.8 MMOL/L — SIGNIFICANT CHANGE UP (ref 3.5–5.3)
POTASSIUM SERPL-SCNC: 4.8 MMOL/L — SIGNIFICANT CHANGE UP (ref 3.5–5.3)
PROT SERPL-MCNC: 5.4 G/DL — LOW (ref 6–8.3)
RBC # BLD: 3.23 M/UL — LOW (ref 4.2–5.8)
RBC # FLD: 16.9 % — HIGH (ref 10.3–14.5)
SODIUM SERPL-SCNC: 131 MMOL/L — LOW (ref 135–145)
WBC # BLD: 6.62 K/UL — SIGNIFICANT CHANGE UP (ref 3.8–10.5)
WBC # FLD AUTO: 6.62 K/UL — SIGNIFICANT CHANGE UP (ref 3.8–10.5)

## 2025-07-10 PROCEDURE — 99214 OFFICE O/P EST MOD 30 MIN: CPT

## 2025-07-15 ENCOUNTER — RESULT REVIEW (OUTPATIENT)
Age: 70
End: 2025-07-15

## 2025-07-15 ENCOUNTER — APPOINTMENT (OUTPATIENT)
Dept: HEMATOLOGY ONCOLOGY | Facility: CLINIC | Age: 70
End: 2025-07-15
Payer: MEDICARE

## 2025-07-15 ENCOUNTER — APPOINTMENT (OUTPATIENT)
Dept: INFUSION THERAPY | Facility: HOSPITAL | Age: 70
End: 2025-07-15

## 2025-07-15 PROBLEM — Z94.84 HISTORY OF ALLOGENEIC STEM CELL TRANSPLANT: Status: ACTIVE | Noted: 2025-05-07

## 2025-07-15 LAB
ALBUMIN SERPL ELPH-MCNC: 3.2 G/DL — LOW (ref 3.3–5)
ALP SERPL-CCNC: 111 U/L — SIGNIFICANT CHANGE UP (ref 40–120)
ALT FLD-CCNC: 15 U/L — SIGNIFICANT CHANGE UP (ref 10–45)
ANION GAP SERPL CALC-SCNC: 14 MMOL/L — SIGNIFICANT CHANGE UP (ref 5–17)
AST SERPL-CCNC: 23 U/L — SIGNIFICANT CHANGE UP (ref 10–40)
BASOPHILS # BLD AUTO: 0.02 K/UL — SIGNIFICANT CHANGE UP (ref 0–0.2)
BASOPHILS NFR BLD AUTO: 0.4 % — SIGNIFICANT CHANGE UP (ref 0–2)
BILIRUB SERPL-MCNC: 0.4 MG/DL — SIGNIFICANT CHANGE UP (ref 0.2–1.2)
BUN SERPL-MCNC: 16 MG/DL — SIGNIFICANT CHANGE UP (ref 7–23)
CALCIUM SERPL-MCNC: 8.4 MG/DL — SIGNIFICANT CHANGE UP (ref 8.4–10.5)
CHLORIDE SERPL-SCNC: 96 MMOL/L — SIGNIFICANT CHANGE UP (ref 96–108)
CO2 SERPL-SCNC: 20 MMOL/L — LOW (ref 22–31)
CREAT SERPL-MCNC: 0.69 MG/DL — SIGNIFICANT CHANGE UP (ref 0.5–1.3)
EGFR: 101 ML/MIN/1.73M2 — SIGNIFICANT CHANGE UP
EGFR: 101 ML/MIN/1.73M2 — SIGNIFICANT CHANGE UP
EOSINOPHIL # BLD AUTO: 0.06 K/UL — SIGNIFICANT CHANGE UP (ref 0–0.5)
EOSINOPHIL NFR BLD AUTO: 1.2 % — SIGNIFICANT CHANGE UP (ref 0–6)
GLUCOSE SERPL-MCNC: 196 MG/DL — HIGH (ref 70–99)
HCT VFR BLD CALC: 27.1 % — LOW (ref 39–50)
HGB BLD-MCNC: 8.6 G/DL — LOW (ref 13–17)
IMM GRANULOCYTES NFR BLD AUTO: 0.4 % — SIGNIFICANT CHANGE UP (ref 0–0.9)
LDH SERPL L TO P-CCNC: 249 U/L — HIGH (ref 50–242)
LYMPHOCYTES # BLD AUTO: 0.22 K/UL — LOW (ref 1–3.3)
LYMPHOCYTES # BLD AUTO: 4.3 % — LOW (ref 13–44)
MAGNESIUM SERPL-MCNC: 1.6 MG/DL — SIGNIFICANT CHANGE UP (ref 1.6–2.6)
MCHC RBC-ENTMCNC: 26.9 PG — LOW (ref 27–34)
MCHC RBC-ENTMCNC: 31.7 G/DL — LOW (ref 32–36)
MCV RBC AUTO: 84.7 FL — SIGNIFICANT CHANGE UP (ref 80–100)
MONOCYTES # BLD AUTO: 1.08 K/UL — HIGH (ref 0–0.9)
MONOCYTES NFR BLD AUTO: 20.9 % — HIGH (ref 2–14)
NEUTROPHILS # BLD AUTO: 3.76 K/UL — SIGNIFICANT CHANGE UP (ref 1.8–7.4)
NEUTROPHILS NFR BLD AUTO: 72.8 % — SIGNIFICANT CHANGE UP (ref 43–77)
NRBC BLD AUTO-RTO: 0 /100 WBCS — SIGNIFICANT CHANGE UP (ref 0–0)
PLATELET # BLD AUTO: 175 K/UL — SIGNIFICANT CHANGE UP (ref 150–400)
POTASSIUM SERPL-MCNC: 4.4 MMOL/L — SIGNIFICANT CHANGE UP (ref 3.5–5.3)
POTASSIUM SERPL-SCNC: 4.4 MMOL/L — SIGNIFICANT CHANGE UP (ref 3.5–5.3)
PROT SERPL-MCNC: 5.3 G/DL — LOW (ref 6–8.3)
RBC # BLD: 3.2 M/UL — LOW (ref 4.2–5.8)
RBC # FLD: 16.9 % — HIGH (ref 10.3–14.5)
SODIUM SERPL-SCNC: 130 MMOL/L — LOW (ref 135–145)
TACROLIMUS SERPL-MCNC: 8.6 NG/ML — SIGNIFICANT CHANGE UP
WBC # BLD: 5.16 K/UL — SIGNIFICANT CHANGE UP (ref 3.8–10.5)
WBC # FLD AUTO: 5.16 K/UL — SIGNIFICANT CHANGE UP (ref 3.8–10.5)

## 2025-07-15 PROCEDURE — G2211 COMPLEX E/M VISIT ADD ON: CPT

## 2025-07-15 PROCEDURE — 99215 OFFICE O/P EST HI 40 MIN: CPT

## 2025-07-15 RX ORDER — AZITHROMYCIN 250 MG/1
250 TABLET, FILM COATED ORAL
Qty: 1 | Refills: 0 | Status: ACTIVE | COMMUNITY
Start: 2025-07-15 | End: 1900-01-01

## 2025-07-20 ENCOUNTER — INPATIENT (INPATIENT)
Facility: HOSPITAL | Age: 70
LOS: 10 days | Discharge: NURSING, REHAB OR LONG-TERM CARE | DRG: 864 | End: 2025-07-31
Attending: INTERNAL MEDICINE | Admitting: INTERNAL MEDICINE
Payer: MEDICARE

## 2025-07-20 VITALS
HEIGHT: 60 IN | TEMPERATURE: 101 F | SYSTOLIC BLOOD PRESSURE: 121 MMHG | OXYGEN SATURATION: 91 % | WEIGHT: 105.82 LBS | DIASTOLIC BLOOD PRESSURE: 76 MMHG | HEART RATE: 107 BPM | RESPIRATION RATE: 18 BRPM

## 2025-07-20 DIAGNOSIS — R50.9 FEVER, UNSPECIFIED: ICD-10-CM

## 2025-07-20 DIAGNOSIS — Z90.49 ACQUIRED ABSENCE OF OTHER SPECIFIED PARTS OF DIGESTIVE TRACT: Chronic | ICD-10-CM

## 2025-07-20 DIAGNOSIS — Z29.9 ENCOUNTER FOR PROPHYLACTIC MEASURES, UNSPECIFIED: ICD-10-CM

## 2025-07-20 DIAGNOSIS — Z98.890 OTHER SPECIFIED POSTPROCEDURAL STATES: Chronic | ICD-10-CM

## 2025-07-20 DIAGNOSIS — Z94.84 STEM CELLS TRANSPLANT STATUS: ICD-10-CM

## 2025-07-20 LAB
ACANTHOCYTES BLD QL SMEAR: SLIGHT — SIGNIFICANT CHANGE UP
ALBUMIN SERPL ELPH-MCNC: 3.3 G/DL — SIGNIFICANT CHANGE UP (ref 3.3–5)
ALP SERPL-CCNC: 110 U/L — SIGNIFICANT CHANGE UP (ref 40–120)
ALT FLD-CCNC: 16 U/L — SIGNIFICANT CHANGE UP (ref 10–45)
ANION GAP SERPL CALC-SCNC: 12 MMOL/L — SIGNIFICANT CHANGE UP (ref 5–17)
APPEARANCE UR: CLEAR — SIGNIFICANT CHANGE UP
APTT BLD: 34.7 SEC — SIGNIFICANT CHANGE UP (ref 26.1–36.8)
AST SERPL-CCNC: 22 U/L — SIGNIFICANT CHANGE UP (ref 10–40)
BASOPHILS # BLD AUTO: 0.01 K/UL — SIGNIFICANT CHANGE UP (ref 0–0.2)
BASOPHILS # BLD MANUAL: 0 K/UL — SIGNIFICANT CHANGE UP (ref 0–0.2)
BASOPHILS NFR BLD AUTO: 0.3 % — SIGNIFICANT CHANGE UP (ref 0–2)
BASOPHILS NFR BLD MANUAL: 0 % — SIGNIFICANT CHANGE UP (ref 0–2)
BILIRUB DIRECT SERPL-MCNC: 0.4 MG/DL — HIGH (ref 0–0.3)
BILIRUB INDIRECT FLD-MCNC: 0.6 MG/DL — SIGNIFICANT CHANGE UP (ref 0.2–1)
BILIRUB SERPL-MCNC: 1 MG/DL — SIGNIFICANT CHANGE UP (ref 0.2–1.2)
BILIRUB UR-MCNC: NEGATIVE — SIGNIFICANT CHANGE UP
BLD GP AB SCN SERPL QL: NEGATIVE — SIGNIFICANT CHANGE UP
BUN SERPL-MCNC: 16 MG/DL — SIGNIFICANT CHANGE UP (ref 7–23)
CALCIUM SERPL-MCNC: 8.5 MG/DL — SIGNIFICANT CHANGE UP (ref 8.4–10.5)
CHLORIDE SERPL-SCNC: 95 MMOL/L — LOW (ref 96–108)
CO2 SERPL-SCNC: 20 MMOL/L — LOW (ref 22–31)
COLOR SPEC: YELLOW — SIGNIFICANT CHANGE UP
CREAT SERPL-MCNC: 0.61 MG/DL — SIGNIFICANT CHANGE UP (ref 0.5–1.3)
DACRYOCYTES BLD QL SMEAR: SLIGHT — SIGNIFICANT CHANGE UP
DIFF PNL FLD: NEGATIVE — SIGNIFICANT CHANGE UP
EGFR: 105 ML/MIN/1.73M2 — SIGNIFICANT CHANGE UP
EGFR: 105 ML/MIN/1.73M2 — SIGNIFICANT CHANGE UP
ELLIPTOCYTES BLD QL SMEAR: SLIGHT — SIGNIFICANT CHANGE UP
EOSINOPHIL # BLD AUTO: 0 K/UL — SIGNIFICANT CHANGE UP (ref 0–0.5)
EOSINOPHIL # BLD MANUAL: 0 K/UL — SIGNIFICANT CHANGE UP (ref 0–0.5)
EOSINOPHIL NFR BLD AUTO: 0 % — SIGNIFICANT CHANGE UP (ref 0–6)
EOSINOPHIL NFR BLD MANUAL: 0 % — SIGNIFICANT CHANGE UP (ref 0–6)
FLUAV AG NPH QL: SIGNIFICANT CHANGE UP
FLUBV AG NPH QL: SIGNIFICANT CHANGE UP
GLUCOSE SERPL-MCNC: 113 MG/DL — HIGH (ref 70–99)
GLUCOSE UR QL: NEGATIVE MG/DL — SIGNIFICANT CHANGE UP
HCT VFR BLD CALC: 26.3 % — LOW (ref 39–50)
HGB BLD-MCNC: 8.4 G/DL — LOW (ref 13–17)
HYPOCHROMIA BLD QL: SLIGHT — SIGNIFICANT CHANGE UP
IMM GRANULOCYTES # BLD AUTO: 0.03 K/UL — SIGNIFICANT CHANGE UP (ref 0–0.07)
IMM GRANULOCYTES NFR BLD AUTO: 1 % — HIGH (ref 0–0.9)
INR BLD: 1.23 RATIO — HIGH (ref 0.85–1.16)
KETONES UR QL: ABNORMAL MG/DL
LDH SERPL L TO P-CCNC: 288 U/L — HIGH (ref 50–242)
LEUKOCYTE ESTERASE UR-ACNC: NEGATIVE — SIGNIFICANT CHANGE UP
LYMPHOCYTES # BLD AUTO: 0.15 K/UL — LOW (ref 1–3.3)
LYMPHOCYTES # BLD MANUAL: 0.08 K/UL — LOW (ref 1–3.3)
LYMPHOCYTES NFR BLD AUTO: 4.8 % — LOW (ref 13–44)
LYMPHOCYTES NFR BLD MANUAL: 2.5 % — LOW (ref 13–44)
MAGNESIUM SERPL-MCNC: 1.6 MG/DL — SIGNIFICANT CHANGE UP (ref 1.6–2.6)
MCHC RBC-ENTMCNC: 26.3 PG — LOW (ref 27–34)
MCHC RBC-ENTMCNC: 31.9 G/DL — LOW (ref 32–36)
MCV RBC AUTO: 82.2 FL — SIGNIFICANT CHANGE UP (ref 80–100)
MONOCYTES # BLD AUTO: 0.63 K/UL — SIGNIFICANT CHANGE UP (ref 0–0.9)
MONOCYTES # BLD MANUAL: 0.4 K/UL — SIGNIFICANT CHANGE UP (ref 0–0.9)
MONOCYTES NFR BLD AUTO: 20.1 % — HIGH (ref 2–14)
MONOCYTES NFR BLD MANUAL: 12.6 % — SIGNIFICANT CHANGE UP (ref 2–14)
MRSA PCR RESULT.: SIGNIFICANT CHANGE UP
NEUTROPHILS # BLD AUTO: 2.32 K/UL — SIGNIFICANT CHANGE UP (ref 1.8–7.4)
NEUTROPHILS # BLD MANUAL: 2.67 K/UL — SIGNIFICANT CHANGE UP (ref 1.8–7.4)
NEUTROPHILS NFR BLD AUTO: 73.8 % — SIGNIFICANT CHANGE UP (ref 43–77)
NEUTROPHILS NFR BLD MANUAL: 84.9 % — HIGH (ref 43–77)
NITRITE UR-MCNC: NEGATIVE — SIGNIFICANT CHANGE UP
NRBC # BLD AUTO: 0 K/UL — SIGNIFICANT CHANGE UP (ref 0–0)
NRBC # FLD: 0 K/UL — SIGNIFICANT CHANGE UP (ref 0–0)
NRBC BLD AUTO-RTO: 0 /100 WBCS — SIGNIFICANT CHANGE UP (ref 0–0)
OVALOCYTES BLD QL SMEAR: SLIGHT — SIGNIFICANT CHANGE UP
PH UR: 6.5 — SIGNIFICANT CHANGE UP (ref 5–8)
PHOSPHATE SERPL-MCNC: 3.3 MG/DL — SIGNIFICANT CHANGE UP (ref 2.5–4.5)
PLAT MORPH BLD: ABNORMAL
PLATELET # BLD AUTO: 116 K/UL — LOW (ref 150–400)
PMV BLD: 10.8 FL — SIGNIFICANT CHANGE UP (ref 7–13)
POIKILOCYTOSIS BLD QL AUTO: SLIGHT — SIGNIFICANT CHANGE UP
POLYCHROMASIA BLD QL SMEAR: SLIGHT — SIGNIFICANT CHANGE UP
POTASSIUM SERPL-MCNC: 4.5 MMOL/L — SIGNIFICANT CHANGE UP (ref 3.5–5.3)
POTASSIUM SERPL-SCNC: 4.5 MMOL/L — SIGNIFICANT CHANGE UP (ref 3.5–5.3)
PROT SERPL-MCNC: 5.4 G/DL — LOW (ref 6–8.3)
PROT UR-MCNC: SIGNIFICANT CHANGE UP MG/DL
PROTHROM AB SERPL-ACNC: 14 SEC — HIGH (ref 9.9–13.4)
RAPID RVP RESULT: SIGNIFICANT CHANGE UP
RBC # BLD: 3.2 M/UL — LOW (ref 4.2–5.8)
RBC # FLD: 17.6 % — HIGH (ref 10.3–14.5)
RBC BLD AUTO: NORMAL — SIGNIFICANT CHANGE UP
RH IG SCN BLD-IMP: POSITIVE — SIGNIFICANT CHANGE UP
RSV RNA NPH QL NAA+NON-PROBE: SIGNIFICANT CHANGE UP
S AUREUS DNA NOSE QL NAA+PROBE: SIGNIFICANT CHANGE UP
SARS-COV-2 RNA SPEC QL NAA+PROBE: SIGNIFICANT CHANGE UP
SARS-COV-2 RNA SPEC QL NAA+PROBE: SIGNIFICANT CHANGE UP
SODIUM SERPL-SCNC: 127 MMOL/L — LOW (ref 135–145)
SOURCE RESPIRATORY: SIGNIFICANT CHANGE UP
SOURCE RESPIRATORY: SIGNIFICANT CHANGE UP
SP GR SPEC: 1.02 — SIGNIFICANT CHANGE UP (ref 1–1.03)
UROBILINOGEN FLD QL: 1 MG/DL — SIGNIFICANT CHANGE UP (ref 0.2–1)
WBC # BLD: 3.14 K/UL — LOW (ref 3.8–10.5)
WBC # FLD AUTO: 3.14 K/UL — LOW (ref 3.8–10.5)

## 2025-07-20 PROCEDURE — 85025 COMPLETE CBC W/AUTO DIFF WBC: CPT

## 2025-07-20 PROCEDURE — 81003 URINALYSIS AUTO W/O SCOPE: CPT

## 2025-07-20 PROCEDURE — 85610 PROTHROMBIN TIME: CPT

## 2025-07-20 PROCEDURE — 71045 X-RAY EXAM CHEST 1 VIEW: CPT

## 2025-07-20 PROCEDURE — 87040 BLOOD CULTURE FOR BACTERIA: CPT

## 2025-07-20 PROCEDURE — 87637 SARSCOV2&INF A&B&RSV AMP PRB: CPT

## 2025-07-20 PROCEDURE — 83615 LACTATE (LD) (LDH) ENZYME: CPT

## 2025-07-20 PROCEDURE — 85730 THROMBOPLASTIN TIME PARTIAL: CPT

## 2025-07-20 PROCEDURE — 0225U NFCT DS DNA&RNA 21 SARSCOV2: CPT

## 2025-07-20 PROCEDURE — 87086 URINE CULTURE/COLONY COUNT: CPT

## 2025-07-20 PROCEDURE — 80053 COMPREHEN METABOLIC PANEL: CPT

## 2025-07-20 PROCEDURE — 86900 BLOOD TYPING SEROLOGIC ABO: CPT

## 2025-07-20 PROCEDURE — 93010 ELECTROCARDIOGRAM REPORT: CPT

## 2025-07-20 PROCEDURE — 71045 X-RAY EXAM CHEST 1 VIEW: CPT | Mod: 26

## 2025-07-20 PROCEDURE — 83735 ASSAY OF MAGNESIUM: CPT

## 2025-07-20 PROCEDURE — 99233 SBSQ HOSP IP/OBS HIGH 50: CPT | Mod: FS

## 2025-07-20 PROCEDURE — 87641 MR-STAPH DNA AMP PROBE: CPT

## 2025-07-20 PROCEDURE — 86901 BLOOD TYPING SEROLOGIC RH(D): CPT

## 2025-07-20 PROCEDURE — 87640 STAPH A DNA AMP PROBE: CPT

## 2025-07-20 PROCEDURE — 86850 RBC ANTIBODY SCREEN: CPT

## 2025-07-20 PROCEDURE — 82248 BILIRUBIN DIRECT: CPT

## 2025-07-20 PROCEDURE — 84100 ASSAY OF PHOSPHORUS: CPT

## 2025-07-20 PROCEDURE — 36415 COLL VENOUS BLD VENIPUNCTURE: CPT

## 2025-07-20 RX ORDER — VANCOMYCIN HCL IN 5 % DEXTROSE 1.5G/250ML
1000 PLASTIC BAG, INJECTION (ML) INTRAVENOUS EVERY 12 HOURS
Refills: 0 | Status: DISCONTINUED | OUTPATIENT
Start: 2025-07-20 | End: 2025-07-22

## 2025-07-20 RX ORDER — PIPERACILLIN-TAZO-DEXTROSE,ISO 3.375G/5
4.5 IV SOLUTION, PIGGYBACK PREMIX FROZEN(ML) INTRAVENOUS ONCE
Refills: 0 | Status: COMPLETED | OUTPATIENT
Start: 2025-07-20 | End: 2025-07-20

## 2025-07-20 RX ORDER — VANCOMYCIN HCL IN 5 % DEXTROSE 1.5G/250ML
125 PLASTIC BAG, INJECTION (ML) INTRAVENOUS EVERY 12 HOURS
Refills: 0 | Status: DISCONTINUED | OUTPATIENT
Start: 2025-07-20 | End: 2025-07-30

## 2025-07-20 RX ORDER — URSODIOL 300 MG/1
300 CAPSULE ORAL
Refills: 0 | Status: DISCONTINUED | OUTPATIENT
Start: 2025-07-20 | End: 2025-07-20

## 2025-07-20 RX ORDER — TACROLIMUS 0.5 MG/1
0.5 CAPSULE ORAL
Refills: 0 | Status: DISCONTINUED | OUTPATIENT
Start: 2025-07-20 | End: 2025-07-21

## 2025-07-20 RX ORDER — POSACONAZOLE 100 MG/1
300 TABLET, DELAYED RELEASE ORAL DAILY
Refills: 0 | Status: DISCONTINUED | OUTPATIENT
Start: 2025-07-20 | End: 2025-07-31

## 2025-07-20 RX ORDER — DULOXETINE 20 MG/1
60 CAPSULE, DELAYED RELEASE ORAL DAILY
Refills: 0 | Status: DISCONTINUED | OUTPATIENT
Start: 2025-07-20 | End: 2025-07-31

## 2025-07-20 RX ORDER — PIPERACILLIN-TAZO-DEXTROSE,ISO 3.375G/5
4.5 IV SOLUTION, PIGGYBACK PREMIX FROZEN(ML) INTRAVENOUS EVERY 8 HOURS
Refills: 0 | Status: DISCONTINUED | OUTPATIENT
Start: 2025-07-20 | End: 2025-07-26

## 2025-07-20 RX ORDER — ACETAMINOPHEN 500 MG/5ML
650 LIQUID (ML) ORAL EVERY 6 HOURS
Refills: 0 | Status: DISCONTINUED | OUTPATIENT
Start: 2025-07-20 | End: 2025-07-31

## 2025-07-20 RX ORDER — OXYCODONE HYDROCHLORIDE 30 MG/1
5 TABLET ORAL EVERY 6 HOURS
Refills: 0 | Status: DISCONTINUED | OUTPATIENT
Start: 2025-07-20 | End: 2025-07-27

## 2025-07-20 RX ORDER — SODIUM BICARBONATE 1 MEQ/ML
15 SYRINGE (ML) INTRAVENOUS
Refills: 0 | Status: DISCONTINUED | OUTPATIENT
Start: 2025-07-20 | End: 2025-07-31

## 2025-07-20 RX ORDER — SULFAMETHOXAZOLE/TRIMETHOPRIM 800-160 MG
1 TABLET ORAL DAILY
Refills: 0 | Status: DISCONTINUED | OUTPATIENT
Start: 2025-07-20 | End: 2025-07-31

## 2025-07-20 RX ORDER — TACROLIMUS 0.5 MG/1
1 CAPSULE ORAL
Refills: 0 | Status: DISCONTINUED | OUTPATIENT
Start: 2025-07-20 | End: 2025-07-21

## 2025-07-20 RX ORDER — TAMSULOSIN HYDROCHLORIDE 0.4 MG/1
0.4 CAPSULE ORAL AT BEDTIME
Refills: 0 | Status: DISCONTINUED | OUTPATIENT
Start: 2025-07-20 | End: 2025-07-31

## 2025-07-20 RX ADMIN — Medication 15 MILLILITER(S): at 20:11

## 2025-07-20 RX ADMIN — Medication 4.5 GRAM(S): at 15:26

## 2025-07-20 RX ADMIN — Medication 250 MILLIGRAM(S): at 12:16

## 2025-07-20 RX ADMIN — TACROLIMUS 1 MILLIGRAM(S): 0.5 CAPSULE ORAL at 10:04

## 2025-07-20 RX ADMIN — Medication 1 APPLICATION(S): at 11:19

## 2025-07-20 RX ADMIN — POSACONAZOLE 300 MILLIGRAM(S): 100 TABLET, DELAYED RELEASE ORAL at 11:39

## 2025-07-20 RX ADMIN — Medication 650 MILLIGRAM(S): at 12:07

## 2025-07-20 RX ADMIN — Medication 15 MILLILITER(S): at 15:16

## 2025-07-20 RX ADMIN — OXYCODONE HYDROCHLORIDE 5 MILLIGRAM(S): 30 TABLET ORAL at 18:29

## 2025-07-20 RX ADMIN — OXYCODONE HYDROCHLORIDE 5 MILLIGRAM(S): 30 TABLET ORAL at 10:37

## 2025-07-20 RX ADMIN — Medication 800 MILLIGRAM(S): at 17:40

## 2025-07-20 RX ADMIN — Medication 200 GRAM(S): at 11:18

## 2025-07-20 RX ADMIN — Medication 1 TABLET(S): at 11:37

## 2025-07-20 RX ADMIN — TACROLIMUS 0.5 MILLIGRAM(S): 0.5 CAPSULE ORAL at 20:10

## 2025-07-20 RX ADMIN — Medication 25 GRAM(S): at 15:12

## 2025-07-20 RX ADMIN — Medication 15 MILLILITER(S): at 11:39

## 2025-07-20 RX ADMIN — Medication 25 GRAM(S): at 22:37

## 2025-07-20 RX ADMIN — OXYCODONE HYDROCHLORIDE 5 MILLIGRAM(S): 30 TABLET ORAL at 17:45

## 2025-07-20 RX ADMIN — OXYCODONE HYDROCHLORIDE 5 MILLIGRAM(S): 30 TABLET ORAL at 11:35

## 2025-07-20 RX ADMIN — Medication 650 MILLIGRAM(S): at 10:03

## 2025-07-20 RX ADMIN — TAMSULOSIN HYDROCHLORIDE 0.4 MILLIGRAM(S): 0.4 CAPSULE ORAL at 22:08

## 2025-07-20 RX ADMIN — Medication 15 MILLILITER(S): at 23:38

## 2025-07-20 RX ADMIN — DULOXETINE 60 MILLIGRAM(S): 20 CAPSULE, DELAYED RELEASE ORAL at 11:38

## 2025-07-20 RX ADMIN — Medication 125 MILLIGRAM(S): at 17:40

## 2025-07-21 ENCOUNTER — APPOINTMENT (OUTPATIENT)
Dept: HEMATOLOGY ONCOLOGY | Facility: CLINIC | Age: 70
End: 2025-07-21

## 2025-07-21 ENCOUNTER — RESULT REVIEW (OUTPATIENT)
Age: 70
End: 2025-07-21

## 2025-07-21 ENCOUNTER — APPOINTMENT (OUTPATIENT)
Dept: INFUSION THERAPY | Facility: HOSPITAL | Age: 70
End: 2025-07-21

## 2025-07-21 LAB
ALBUMIN SERPL ELPH-MCNC: 3.1 G/DL — LOW (ref 3.3–5)
ALP SERPL-CCNC: 102 U/L — SIGNIFICANT CHANGE UP (ref 40–120)
ALT FLD-CCNC: 18 U/L — SIGNIFICANT CHANGE UP (ref 10–45)
ANION GAP SERPL CALC-SCNC: 13 MMOL/L — SIGNIFICANT CHANGE UP (ref 5–17)
APTT BLD: 34.6 SEC — SIGNIFICANT CHANGE UP (ref 26.1–36.8)
AST SERPL-CCNC: 25 U/L — SIGNIFICANT CHANGE UP (ref 10–40)
BASOPHILS # BLD AUTO: 0.01 K/UL — SIGNIFICANT CHANGE UP (ref 0–0.2)
BASOPHILS NFR BLD AUTO: 0.3 % — SIGNIFICANT CHANGE UP (ref 0–2)
BILIRUB DIRECT SERPL-MCNC: 0.4 MG/DL — HIGH (ref 0–0.3)
BILIRUB INDIRECT FLD-MCNC: 0.4 MG/DL — SIGNIFICANT CHANGE UP (ref 0.2–1)
BILIRUB SERPL-MCNC: 0.8 MG/DL — SIGNIFICANT CHANGE UP (ref 0.2–1.2)
BLD GP AB SCN SERPL QL: NEGATIVE — SIGNIFICANT CHANGE UP
BUN SERPL-MCNC: 16 MG/DL — SIGNIFICANT CHANGE UP (ref 7–23)
CALCIUM SERPL-MCNC: 8.1 MG/DL — LOW (ref 8.4–10.5)
CHLORIDE SERPL-SCNC: 93 MMOL/L — LOW (ref 96–108)
CO2 SERPL-SCNC: 21 MMOL/L — LOW (ref 22–31)
CREAT SERPL-MCNC: 0.68 MG/DL — SIGNIFICANT CHANGE UP (ref 0.5–1.3)
CULTURE RESULTS: NO GROWTH — SIGNIFICANT CHANGE UP
EGFR: 102 ML/MIN/1.73M2 — SIGNIFICANT CHANGE UP
EGFR: 102 ML/MIN/1.73M2 — SIGNIFICANT CHANGE UP
EOSINOPHIL # BLD AUTO: 0 K/UL — SIGNIFICANT CHANGE UP (ref 0–0.5)
EOSINOPHIL NFR BLD AUTO: 0 % — SIGNIFICANT CHANGE UP (ref 0–6)
GLUCOSE SERPL-MCNC: 135 MG/DL — HIGH (ref 70–99)
HCT VFR BLD CALC: 26 % — LOW (ref 39–50)
HGB BLD-MCNC: 8.5 G/DL — LOW (ref 13–17)
IMM GRANULOCYTES # BLD AUTO: 0.02 K/UL — SIGNIFICANT CHANGE UP (ref 0–0.07)
IMM GRANULOCYTES NFR BLD AUTO: 0.7 % — SIGNIFICANT CHANGE UP (ref 0–0.9)
IMMATURE PLATELET FRACTION #: 3.1 K/UL — LOW (ref 3.9–12.5)
IMMATURE PLATELET FRACTION %: 3 % — SIGNIFICANT CHANGE UP (ref 1.6–7.1)
INR BLD: 1.2 RATIO — HIGH (ref 0.85–1.16)
LDH SERPL L TO P-CCNC: 308 U/L — HIGH (ref 50–242)
LYMPHOCYTES # BLD AUTO: 0.11 K/UL — LOW (ref 1–3.3)
LYMPHOCYTES NFR BLD AUTO: 3.8 % — LOW (ref 13–44)
MAGNESIUM SERPL-MCNC: 1.5 MG/DL — LOW (ref 1.6–2.6)
MCHC RBC-ENTMCNC: 26.8 PG — LOW (ref 27–34)
MCHC RBC-ENTMCNC: 32.7 G/DL — SIGNIFICANT CHANGE UP (ref 32–36)
MCV RBC AUTO: 82 FL — SIGNIFICANT CHANGE UP (ref 80–100)
MONOCYTES # BLD AUTO: 0.64 K/UL — SIGNIFICANT CHANGE UP (ref 0–0.9)
MONOCYTES NFR BLD AUTO: 21.8 % — HIGH (ref 2–14)
NEUTROPHILS # BLD AUTO: 2.15 K/UL — SIGNIFICANT CHANGE UP (ref 1.8–7.4)
NEUTROPHILS NFR BLD AUTO: 73.4 % — SIGNIFICANT CHANGE UP (ref 43–77)
NRBC # BLD AUTO: 0 K/UL — SIGNIFICANT CHANGE UP (ref 0–0)
NRBC # FLD: 0 K/UL — SIGNIFICANT CHANGE UP (ref 0–0)
NRBC BLD AUTO-RTO: 0 /100 WBCS — SIGNIFICANT CHANGE UP (ref 0–0)
PHOSPHATE SERPL-MCNC: 3.6 MG/DL — SIGNIFICANT CHANGE UP (ref 2.5–4.5)
PLATELET # BLD AUTO: 102 K/UL — LOW (ref 150–400)
PMV BLD: 11.5 FL — SIGNIFICANT CHANGE UP (ref 7–13)
POTASSIUM SERPL-MCNC: 3.4 MMOL/L — LOW (ref 3.5–5.3)
POTASSIUM SERPL-SCNC: 3.4 MMOL/L — LOW (ref 3.5–5.3)
PROT SERPL-MCNC: 5.1 G/DL — LOW (ref 6–8.3)
PROTHROM AB SERPL-ACNC: 13.6 SEC — HIGH (ref 9.9–13.4)
RBC # BLD: 3.17 M/UL — LOW (ref 4.2–5.8)
RBC # FLD: 17.7 % — HIGH (ref 10.3–14.5)
RH IG SCN BLD-IMP: POSITIVE — SIGNIFICANT CHANGE UP
SODIUM SERPL-SCNC: 127 MMOL/L — LOW (ref 135–145)
SPECIMEN SOURCE: SIGNIFICANT CHANGE UP
TACROLIMUS SERPL-MCNC: 9.4 NG/ML — SIGNIFICANT CHANGE UP
WBC # BLD: 2.93 K/UL — LOW (ref 3.8–10.5)
WBC # FLD AUTO: 2.93 K/UL — LOW (ref 3.8–10.5)

## 2025-07-21 PROCEDURE — 86850 RBC ANTIBODY SCREEN: CPT

## 2025-07-21 PROCEDURE — 93306 TTE W/DOPPLER COMPLETE: CPT | Mod: 26

## 2025-07-21 PROCEDURE — 71250 CT THORAX DX C-: CPT

## 2025-07-21 PROCEDURE — 87640 STAPH A DNA AMP PROBE: CPT

## 2025-07-21 PROCEDURE — 87086 URINE CULTURE/COLONY COUNT: CPT

## 2025-07-21 PROCEDURE — 87040 BLOOD CULTURE FOR BACTERIA: CPT

## 2025-07-21 PROCEDURE — 82248 BILIRUBIN DIRECT: CPT

## 2025-07-21 PROCEDURE — 81003 URINALYSIS AUTO W/O SCOPE: CPT

## 2025-07-21 PROCEDURE — 86901 BLOOD TYPING SEROLOGIC RH(D): CPT

## 2025-07-21 PROCEDURE — 83735 ASSAY OF MAGNESIUM: CPT

## 2025-07-21 PROCEDURE — 86900 BLOOD TYPING SEROLOGIC ABO: CPT

## 2025-07-21 PROCEDURE — 71250 CT THORAX DX C-: CPT | Mod: 26

## 2025-07-21 PROCEDURE — 83615 LACTATE (LD) (LDH) ENZYME: CPT

## 2025-07-21 PROCEDURE — 80053 COMPREHEN METABOLIC PANEL: CPT

## 2025-07-21 PROCEDURE — 87637 SARSCOV2&INF A&B&RSV AMP PRB: CPT

## 2025-07-21 PROCEDURE — 85610 PROTHROMBIN TIME: CPT

## 2025-07-21 PROCEDURE — 80197 ASSAY OF TACROLIMUS: CPT

## 2025-07-21 PROCEDURE — 71045 X-RAY EXAM CHEST 1 VIEW: CPT

## 2025-07-21 PROCEDURE — 94640 AIRWAY INHALATION TREATMENT: CPT

## 2025-07-21 PROCEDURE — 0225U NFCT DS DNA&RNA 21 SARSCOV2: CPT

## 2025-07-21 PROCEDURE — 85730 THROMBOPLASTIN TIME PARTIAL: CPT

## 2025-07-21 PROCEDURE — 36415 COLL VENOUS BLD VENIPUNCTURE: CPT

## 2025-07-21 PROCEDURE — 99233 SBSQ HOSP IP/OBS HIGH 50: CPT

## 2025-07-21 PROCEDURE — 85025 COMPLETE CBC W/AUTO DIFF WBC: CPT

## 2025-07-21 PROCEDURE — 87641 MR-STAPH DNA AMP PROBE: CPT

## 2025-07-21 PROCEDURE — 84100 ASSAY OF PHOSPHORUS: CPT

## 2025-07-21 RX ORDER — TACROLIMUS 0.5 MG/1
0.5 CAPSULE ORAL
Refills: 0 | Status: DISCONTINUED | OUTPATIENT
Start: 2025-07-21 | End: 2025-07-31

## 2025-07-21 RX ORDER — FUROSEMIDE 10 MG/ML
20 INJECTION INTRAMUSCULAR; INTRAVENOUS ONCE
Refills: 0 | Status: COMPLETED | OUTPATIENT
Start: 2025-07-21 | End: 2025-07-21

## 2025-07-21 RX ORDER — IPRATROPIUM BROMIDE AND ALBUTEROL SULFATE .5; 2.5 MG/3ML; MG/3ML
3 SOLUTION RESPIRATORY (INHALATION) ONCE
Refills: 0 | Status: COMPLETED | OUTPATIENT
Start: 2025-07-21 | End: 2025-07-21

## 2025-07-21 RX ORDER — WHITE PETROLATUM 1 G/G
1 OINTMENT TOPICAL DAILY
Refills: 0 | Status: DISCONTINUED | OUTPATIENT
Start: 2025-07-21 | End: 2025-07-31

## 2025-07-21 RX ORDER — MAGNESIUM SULFATE 500 MG/ML
2 SYRINGE (ML) INJECTION
Refills: 0 | Status: COMPLETED | OUTPATIENT
Start: 2025-07-21 | End: 2025-07-21

## 2025-07-21 RX ADMIN — FUROSEMIDE 20 MILLIGRAM(S): 10 INJECTION INTRAMUSCULAR; INTRAVENOUS at 20:05

## 2025-07-21 RX ADMIN — Medication 250 MILLIGRAM(S): at 00:15

## 2025-07-21 RX ADMIN — Medication 125 MILLIGRAM(S): at 17:24

## 2025-07-21 RX ADMIN — Medication 25 GRAM(S): at 14:23

## 2025-07-21 RX ADMIN — IPRATROPIUM BROMIDE AND ALBUTEROL SULFATE 3 MILLILITER(S): .5; 2.5 SOLUTION RESPIRATORY (INHALATION) at 03:16

## 2025-07-21 RX ADMIN — OXYCODONE HYDROCHLORIDE 5 MILLIGRAM(S): 30 TABLET ORAL at 08:41

## 2025-07-21 RX ADMIN — Medication 40 MILLIGRAM(S): at 06:26

## 2025-07-21 RX ADMIN — OXYCODONE HYDROCHLORIDE 5 MILLIGRAM(S): 30 TABLET ORAL at 21:12

## 2025-07-21 RX ADMIN — TACROLIMUS 1 MILLIGRAM(S): 0.5 CAPSULE ORAL at 08:24

## 2025-07-21 RX ADMIN — Medication 15 MILLILITER(S): at 17:23

## 2025-07-21 RX ADMIN — Medication 1 APPLICATION(S): at 06:27

## 2025-07-21 RX ADMIN — Medication 15 MILLILITER(S): at 08:24

## 2025-07-21 RX ADMIN — Medication 15 MILLILITER(S): at 15:54

## 2025-07-21 RX ADMIN — FUROSEMIDE 20 MILLIGRAM(S): 10 INJECTION INTRAMUSCULAR; INTRAVENOUS at 03:16

## 2025-07-21 RX ADMIN — OXYCODONE HYDROCHLORIDE 5 MILLIGRAM(S): 30 TABLET ORAL at 20:12

## 2025-07-21 RX ADMIN — Medication 25 GRAM(S): at 08:24

## 2025-07-21 RX ADMIN — DULOXETINE 60 MILLIGRAM(S): 20 CAPSULE, DELAYED RELEASE ORAL at 12:10

## 2025-07-21 RX ADMIN — POSACONAZOLE 300 MILLIGRAM(S): 100 TABLET, DELAYED RELEASE ORAL at 12:10

## 2025-07-21 RX ADMIN — Medication 800 MILLIGRAM(S): at 05:29

## 2025-07-21 RX ADMIN — Medication 800 MILLIGRAM(S): at 17:24

## 2025-07-21 RX ADMIN — Medication 15 MILLILITER(S): at 12:11

## 2025-07-21 RX ADMIN — TAMSULOSIN HYDROCHLORIDE 0.4 MILLIGRAM(S): 0.4 CAPSULE ORAL at 21:46

## 2025-07-21 RX ADMIN — Medication 15 MILLILITER(S): at 20:07

## 2025-07-21 RX ADMIN — Medication 25 GRAM(S): at 06:27

## 2025-07-21 RX ADMIN — WHITE PETROLATUM 1 APPLICATION(S): 1 OINTMENT TOPICAL at 12:11

## 2025-07-21 RX ADMIN — Medication 250 MILLIGRAM(S): at 12:14

## 2025-07-21 RX ADMIN — Medication 25 GRAM(S): at 10:22

## 2025-07-21 RX ADMIN — Medication 125 MILLIGRAM(S): at 05:29

## 2025-07-21 RX ADMIN — OXYCODONE HYDROCHLORIDE 5 MILLIGRAM(S): 30 TABLET ORAL at 09:54

## 2025-07-21 RX ADMIN — Medication 25 GRAM(S): at 22:29

## 2025-07-21 RX ADMIN — TACROLIMUS 0.5 MILLIGRAM(S): 0.5 CAPSULE ORAL at 20:09

## 2025-07-21 RX ADMIN — Medication 1 TABLET(S): at 12:10

## 2025-07-21 RX ADMIN — Medication 5 MILLILITER(S): at 18:07

## 2025-07-22 LAB
ALBUMIN SERPL ELPH-MCNC: 2.4 G/DL — LOW (ref 3.3–5)
ALP SERPL-CCNC: 84 U/L — SIGNIFICANT CHANGE UP (ref 40–120)
ALT FLD-CCNC: 13 U/L — SIGNIFICANT CHANGE UP (ref 10–45)
ANION GAP SERPL CALC-SCNC: 14 MMOL/L — SIGNIFICANT CHANGE UP (ref 5–17)
ANISOCYTOSIS BLD QL: SLIGHT — SIGNIFICANT CHANGE UP
AST SERPL-CCNC: 21 U/L — SIGNIFICANT CHANGE UP (ref 10–40)
BASOPHILS # BLD AUTO: 0 K/UL — SIGNIFICANT CHANGE UP (ref 0–0.2)
BASOPHILS # BLD MANUAL: 0 K/UL — SIGNIFICANT CHANGE UP (ref 0–0.2)
BASOPHILS NFR BLD AUTO: 0 % — SIGNIFICANT CHANGE UP (ref 0–2)
BASOPHILS NFR BLD MANUAL: 0 % — SIGNIFICANT CHANGE UP (ref 0–2)
BILIRUB SERPL-MCNC: 0.5 MG/DL — SIGNIFICANT CHANGE UP (ref 0.2–1.2)
BUN SERPL-MCNC: 17 MG/DL — SIGNIFICANT CHANGE UP (ref 7–23)
CALCIUM SERPL-MCNC: 7.4 MG/DL — LOW (ref 8.4–10.5)
CHLORIDE SERPL-SCNC: 93 MMOL/L — LOW (ref 96–108)
CO2 SERPL-SCNC: 23 MMOL/L — SIGNIFICANT CHANGE UP (ref 22–31)
CREAT SERPL-MCNC: 0.65 MG/DL — SIGNIFICANT CHANGE UP (ref 0.5–1.3)
DACRYOCYTES BLD QL SMEAR: SLIGHT — SIGNIFICANT CHANGE UP
EGFR: 103 ML/MIN/1.73M2 — SIGNIFICANT CHANGE UP
EGFR: 103 ML/MIN/1.73M2 — SIGNIFICANT CHANGE UP
EOSINOPHIL # BLD AUTO: 0.01 K/UL — SIGNIFICANT CHANGE UP (ref 0–0.5)
EOSINOPHIL # BLD MANUAL: 0 K/UL — SIGNIFICANT CHANGE UP (ref 0–0.5)
EOSINOPHIL NFR BLD AUTO: 0.7 % — SIGNIFICANT CHANGE UP (ref 0–6)
EOSINOPHIL NFR BLD MANUAL: 0 % — SIGNIFICANT CHANGE UP (ref 0–6)
GLUCOSE SERPL-MCNC: 121 MG/DL — HIGH (ref 70–99)
HCT VFR BLD CALC: 24 % — LOW (ref 39–50)
HGB BLD-MCNC: 7.7 G/DL — LOW (ref 13–17)
IMM GRANULOCYTES # BLD AUTO: 0.01 K/UL — SIGNIFICANT CHANGE UP (ref 0–0.07)
IMM GRANULOCYTES NFR BLD AUTO: 0.7 % — SIGNIFICANT CHANGE UP (ref 0–0.9)
IMMATURE PLATELET FRACTION #: 2.9 K/UL — LOW (ref 3.9–12.5)
IMMATURE PLATELET FRACTION %: 3.8 % — SIGNIFICANT CHANGE UP (ref 1.6–7.1)
LDH SERPL L TO P-CCNC: 307 U/L — HIGH (ref 50–242)
LYMPHOCYTES # BLD AUTO: 0.17 K/UL — LOW (ref 1–3.3)
LYMPHOCYTES # BLD MANUAL: 0.15 K/UL — LOW (ref 1–3.3)
LYMPHOCYTES NFR BLD AUTO: 12.5 % — LOW (ref 13–44)
LYMPHOCYTES NFR BLD MANUAL: 10.8 % — LOW (ref 13–44)
MAGNESIUM SERPL-MCNC: 1.9 MG/DL — SIGNIFICANT CHANGE UP (ref 1.6–2.6)
MANUAL NEUTROPHIL BANDS #: 0.09 K/UL — SIGNIFICANT CHANGE UP (ref 0–0.84)
MANUAL NRBC #: 0.03 K/UL — HIGH (ref 0–0)
MCHC RBC-ENTMCNC: 25.8 PG — LOW (ref 27–34)
MCHC RBC-ENTMCNC: 32.1 G/DL — SIGNIFICANT CHANGE UP (ref 32–36)
MCV RBC AUTO: 80.3 FL — SIGNIFICANT CHANGE UP (ref 80–100)
MONOCYTES # BLD AUTO: 0.34 K/UL — SIGNIFICANT CHANGE UP (ref 0–0.9)
MONOCYTES # BLD MANUAL: 0.19 K/UL — SIGNIFICANT CHANGE UP (ref 0–0.9)
MONOCYTES NFR BLD AUTO: 25 % — HIGH (ref 2–14)
MONOCYTES NFR BLD MANUAL: 14.2 % — HIGH (ref 2–14)
NEUTROPHILS # BLD AUTO: 0.83 K/UL — LOW (ref 1.8–7.4)
NEUTROPHILS # BLD MANUAL: 0.93 K/UL — LOW (ref 1.8–7.4)
NEUTROPHILS NFR BLD AUTO: 61.1 % — SIGNIFICANT CHANGE UP (ref 43–77)
NEUTROPHILS NFR BLD MANUAL: 68.3 % — SIGNIFICANT CHANGE UP (ref 43–77)
NEUTS BAND # BLD: 6.7 % — SIGNIFICANT CHANGE UP (ref 0–8)
NEUTS BAND NFR BLD: 6.7 % — SIGNIFICANT CHANGE UP (ref 0–8)
NRBC # BLD AUTO: 0 K/UL — SIGNIFICANT CHANGE UP (ref 0–0)
NRBC # BLD: 2 /100 WBCS — HIGH (ref 0–0)
NRBC # FLD: 0 K/UL — SIGNIFICANT CHANGE UP (ref 0–0)
NRBC BLD AUTO-RTO: 0 /100 WBCS — SIGNIFICANT CHANGE UP (ref 0–0)
NRBC BLD-RTO: 2 /100 WBCS — HIGH (ref 0–0)
OVALOCYTES BLD QL SMEAR: SLIGHT — SIGNIFICANT CHANGE UP
PHOSPHATE SERPL-MCNC: 3.2 MG/DL — SIGNIFICANT CHANGE UP (ref 2.5–4.5)
PLAT MORPH BLD: NORMAL — SIGNIFICANT CHANGE UP
PLATELET # BLD AUTO: 76 K/UL — LOW (ref 150–400)
PMV BLD: 10 FL — SIGNIFICANT CHANGE UP (ref 7–13)
POIKILOCYTOSIS BLD QL AUTO: SLIGHT — SIGNIFICANT CHANGE UP
POLYCHROMASIA BLD QL SMEAR: SLIGHT — SIGNIFICANT CHANGE UP
POTASSIUM SERPL-MCNC: 3.2 MMOL/L — LOW (ref 3.5–5.3)
POTASSIUM SERPL-SCNC: 3.2 MMOL/L — LOW (ref 3.5–5.3)
PROT SERPL-MCNC: 4.2 G/DL — LOW (ref 6–8.3)
RBC # BLD: 2.99 M/UL — LOW (ref 4.2–5.8)
RBC # FLD: 17.6 % — HIGH (ref 10.3–14.5)
RBC BLD AUTO: ABNORMAL
SODIUM SERPL-SCNC: 130 MMOL/L — LOW (ref 135–145)
SPHEROCYTES BLD QL SMEAR: SLIGHT — SIGNIFICANT CHANGE UP
VANCOMYCIN TROUGH SERPL-MCNC: 9.3 UG/ML — LOW (ref 10–20)
WBC # BLD: 1.36 K/UL — LOW (ref 3.8–10.5)
WBC # FLD AUTO: 1.36 K/UL — LOW (ref 3.8–10.5)

## 2025-07-22 PROCEDURE — 86900 BLOOD TYPING SEROLOGIC ABO: CPT

## 2025-07-22 PROCEDURE — 87637 SARSCOV2&INF A&B&RSV AMP PRB: CPT

## 2025-07-22 PROCEDURE — 80053 COMPREHEN METABOLIC PANEL: CPT

## 2025-07-22 PROCEDURE — 93005 ELECTROCARDIOGRAM TRACING: CPT

## 2025-07-22 PROCEDURE — 87641 MR-STAPH DNA AMP PROBE: CPT

## 2025-07-22 PROCEDURE — 85025 COMPLETE CBC W/AUTO DIFF WBC: CPT

## 2025-07-22 PROCEDURE — 84100 ASSAY OF PHOSPHORUS: CPT

## 2025-07-22 PROCEDURE — 94640 AIRWAY INHALATION TREATMENT: CPT

## 2025-07-22 PROCEDURE — 36415 COLL VENOUS BLD VENIPUNCTURE: CPT

## 2025-07-22 PROCEDURE — 0225U NFCT DS DNA&RNA 21 SARSCOV2: CPT

## 2025-07-22 PROCEDURE — 81003 URINALYSIS AUTO W/O SCOPE: CPT

## 2025-07-22 PROCEDURE — 85730 THROMBOPLASTIN TIME PARTIAL: CPT

## 2025-07-22 PROCEDURE — 86901 BLOOD TYPING SEROLOGIC RH(D): CPT

## 2025-07-22 PROCEDURE — 99233 SBSQ HOSP IP/OBS HIGH 50: CPT

## 2025-07-22 PROCEDURE — 93306 TTE W/DOPPLER COMPLETE: CPT

## 2025-07-22 PROCEDURE — 80202 ASSAY OF VANCOMYCIN: CPT

## 2025-07-22 PROCEDURE — 83615 LACTATE (LD) (LDH) ENZYME: CPT

## 2025-07-22 PROCEDURE — 71045 X-RAY EXAM CHEST 1 VIEW: CPT

## 2025-07-22 PROCEDURE — 86850 RBC ANTIBODY SCREEN: CPT

## 2025-07-22 PROCEDURE — 80197 ASSAY OF TACROLIMUS: CPT

## 2025-07-22 PROCEDURE — 82248 BILIRUBIN DIRECT: CPT

## 2025-07-22 PROCEDURE — 83735 ASSAY OF MAGNESIUM: CPT

## 2025-07-22 PROCEDURE — 87040 BLOOD CULTURE FOR BACTERIA: CPT

## 2025-07-22 PROCEDURE — 87640 STAPH A DNA AMP PROBE: CPT

## 2025-07-22 PROCEDURE — 71250 CT THORAX DX C-: CPT

## 2025-07-22 PROCEDURE — 87086 URINE CULTURE/COLONY COUNT: CPT

## 2025-07-22 PROCEDURE — 85610 PROTHROMBIN TIME: CPT

## 2025-07-22 RX ORDER — FUROSEMIDE 10 MG/ML
20 INJECTION INTRAMUSCULAR; INTRAVENOUS ONCE
Refills: 0 | Status: COMPLETED | OUTPATIENT
Start: 2025-07-22 | End: 2025-07-22

## 2025-07-22 RX ORDER — FILGRASTIM 300 UG/.5ML
300 INJECTION, SOLUTION INTRAVENOUS; SUBCUTANEOUS ONCE
Refills: 0 | Status: COMPLETED | OUTPATIENT
Start: 2025-07-22 | End: 2025-07-22

## 2025-07-22 RX ADMIN — Medication 5 MILLILITER(S): at 00:02

## 2025-07-22 RX ADMIN — Medication 1 APPLICATION(S): at 08:04

## 2025-07-22 RX ADMIN — TACROLIMUS 0.5 MILLIGRAM(S): 0.5 CAPSULE ORAL at 20:00

## 2025-07-22 RX ADMIN — Medication 5 MILLILITER(S): at 05:44

## 2025-07-22 RX ADMIN — Medication 40 MILLIGRAM(S): at 05:44

## 2025-07-22 RX ADMIN — Medication 25 GRAM(S): at 14:16

## 2025-07-22 RX ADMIN — Medication 250 MILLIGRAM(S): at 00:01

## 2025-07-22 RX ADMIN — Medication 50 MILLIEQUIVALENT(S): at 11:37

## 2025-07-22 RX ADMIN — Medication 650 MILLIGRAM(S): at 18:23

## 2025-07-22 RX ADMIN — Medication 800 MILLIGRAM(S): at 05:44

## 2025-07-22 RX ADMIN — OXYCODONE HYDROCHLORIDE 5 MILLIGRAM(S): 30 TABLET ORAL at 10:37

## 2025-07-22 RX ADMIN — FUROSEMIDE 20 MILLIGRAM(S): 10 INJECTION INTRAMUSCULAR; INTRAVENOUS at 17:23

## 2025-07-22 RX ADMIN — Medication 650 MILLIGRAM(S): at 17:23

## 2025-07-22 RX ADMIN — Medication 5 MILLILITER(S): at 11:37

## 2025-07-22 RX ADMIN — Medication 15 MILLILITER(S): at 05:44

## 2025-07-22 RX ADMIN — TAMSULOSIN HYDROCHLORIDE 0.4 MILLIGRAM(S): 0.4 CAPSULE ORAL at 21:48

## 2025-07-22 RX ADMIN — Medication 50 MILLIEQUIVALENT(S): at 14:16

## 2025-07-22 RX ADMIN — Medication 25 GRAM(S): at 22:23

## 2025-07-22 RX ADMIN — Medication 15 MILLILITER(S): at 17:23

## 2025-07-22 RX ADMIN — Medication 15 MILLILITER(S): at 11:49

## 2025-07-22 RX ADMIN — Medication 15 MILLILITER(S): at 17:42

## 2025-07-22 RX ADMIN — Medication 5 MILLILITER(S): at 17:24

## 2025-07-22 RX ADMIN — OXYCODONE HYDROCHLORIDE 5 MILLIGRAM(S): 30 TABLET ORAL at 09:37

## 2025-07-22 RX ADMIN — FILGRASTIM 300 MICROGRAM(S): 300 INJECTION, SOLUTION INTRAVENOUS; SUBCUTANEOUS at 11:36

## 2025-07-22 RX ADMIN — Medication 25 GRAM(S): at 07:08

## 2025-07-22 RX ADMIN — Medication 800 MILLIGRAM(S): at 17:27

## 2025-07-22 RX ADMIN — Medication 15 MILLILITER(S): at 08:04

## 2025-07-22 RX ADMIN — DULOXETINE 60 MILLIGRAM(S): 20 CAPSULE, DELAYED RELEASE ORAL at 11:37

## 2025-07-22 RX ADMIN — Medication 125 MILLIGRAM(S): at 17:24

## 2025-07-22 RX ADMIN — POSACONAZOLE 300 MILLIGRAM(S): 100 TABLET, DELAYED RELEASE ORAL at 11:37

## 2025-07-22 RX ADMIN — Medication 1 TABLET(S): at 11:37

## 2025-07-22 RX ADMIN — Medication 125 MILLIGRAM(S): at 05:44

## 2025-07-22 RX ADMIN — Medication 50 MILLIEQUIVALENT(S): at 08:54

## 2025-07-22 RX ADMIN — FUROSEMIDE 20 MILLIGRAM(S): 10 INJECTION INTRAMUSCULAR; INTRAVENOUS at 09:29

## 2025-07-22 RX ADMIN — Medication 15 MILLILITER(S): at 20:30

## 2025-07-22 RX ADMIN — TACROLIMUS 0.5 MILLIGRAM(S): 0.5 CAPSULE ORAL at 07:58

## 2025-07-22 RX ADMIN — Medication 15 MILLILITER(S): at 00:02

## 2025-07-22 RX ADMIN — WHITE PETROLATUM 1 APPLICATION(S): 1 OINTMENT TOPICAL at 11:50

## 2025-07-23 LAB
ACANTHOCYTES BLD QL SMEAR: SLIGHT — SIGNIFICANT CHANGE UP
ALBUMIN SERPL ELPH-MCNC: 2.8 G/DL — LOW (ref 3.3–5)
ALP SERPL-CCNC: 95 U/L — SIGNIFICANT CHANGE UP (ref 40–120)
ALT FLD-CCNC: 14 U/L — SIGNIFICANT CHANGE UP (ref 10–45)
ANION GAP SERPL CALC-SCNC: 14 MMOL/L — SIGNIFICANT CHANGE UP (ref 5–17)
ANISOCYTOSIS BLD QL: SLIGHT — SIGNIFICANT CHANGE UP
AST SERPL-CCNC: 25 U/L — SIGNIFICANT CHANGE UP (ref 10–40)
BASOPHILS # BLD AUTO: 0.02 K/UL — SIGNIFICANT CHANGE UP (ref 0–0.2)
BASOPHILS # BLD MANUAL: 0 K/UL — SIGNIFICANT CHANGE UP (ref 0–0.2)
BASOPHILS NFR BLD AUTO: 0.3 % — SIGNIFICANT CHANGE UP (ref 0–2)
BASOPHILS NFR BLD MANUAL: 0 % — SIGNIFICANT CHANGE UP (ref 0–2)
BILIRUB SERPL-MCNC: 0.6 MG/DL — SIGNIFICANT CHANGE UP (ref 0.2–1.2)
BLD GP AB SCN SERPL QL: NEGATIVE — SIGNIFICANT CHANGE UP
BUN SERPL-MCNC: 20 MG/DL — SIGNIFICANT CHANGE UP (ref 7–23)
BURR CELLS BLD QL SMEAR: SLIGHT — SIGNIFICANT CHANGE UP
CALCIUM SERPL-MCNC: 7.9 MG/DL — LOW (ref 8.4–10.5)
CHLORIDE SERPL-SCNC: 96 MMOL/L — SIGNIFICANT CHANGE UP (ref 96–108)
CO2 SERPL-SCNC: 21 MMOL/L — LOW (ref 22–31)
CREAT SERPL-MCNC: 0.74 MG/DL — SIGNIFICANT CHANGE UP (ref 0.5–1.3)
EGFR: 99 ML/MIN/1.73M2 — SIGNIFICANT CHANGE UP
EGFR: 99 ML/MIN/1.73M2 — SIGNIFICANT CHANGE UP
ELLIPTOCYTES BLD QL SMEAR: SLIGHT — SIGNIFICANT CHANGE UP
EOSINOPHIL # BLD AUTO: 0.01 K/UL — SIGNIFICANT CHANGE UP (ref 0–0.5)
EOSINOPHIL # BLD MANUAL: 0 K/UL — SIGNIFICANT CHANGE UP (ref 0–0.5)
EOSINOPHIL NFR BLD AUTO: 0.1 % — SIGNIFICANT CHANGE UP (ref 0–6)
EOSINOPHIL NFR BLD MANUAL: 0 % — SIGNIFICANT CHANGE UP (ref 0–6)
GLUCOSE SERPL-MCNC: 117 MG/DL — HIGH (ref 70–99)
HCT VFR BLD CALC: 26.2 % — LOW (ref 39–50)
HGB BLD-MCNC: 8.6 G/DL — LOW (ref 13–17)
HYPOCHROMIA BLD QL: SLIGHT — SIGNIFICANT CHANGE UP
IMM GRANULOCYTES # BLD AUTO: 0.65 K/UL — HIGH (ref 0–0.07)
IMM GRANULOCYTES NFR BLD AUTO: 8.6 % — HIGH (ref 0–0.9)
IMMATURE PLATELET FRACTION #: 3.2 K/UL — LOW (ref 3.9–12.5)
IMMATURE PLATELET FRACTION %: 4.3 % — SIGNIFICANT CHANGE UP (ref 1.6–7.1)
LDH SERPL L TO P-CCNC: 342 U/L — HIGH (ref 50–242)
LYMPHOCYTES # BLD AUTO: 0.38 K/UL — LOW (ref 1–3.3)
LYMPHOCYTES # BLD MANUAL: 0.19 K/UL — LOW (ref 1–3.3)
LYMPHOCYTES NFR BLD AUTO: 5.1 % — LOW (ref 13–44)
LYMPHOCYTES NFR BLD MANUAL: 2.5 % — LOW (ref 13–44)
MAGNESIUM SERPL-MCNC: 1.7 MG/DL — SIGNIFICANT CHANGE UP (ref 1.6–2.6)
MANUAL NEUTROPHIL BANDS #: 1.11 K/UL — HIGH (ref 0–0.84)
MCHC RBC-ENTMCNC: 26.6 PG — LOW (ref 27–34)
MCHC RBC-ENTMCNC: 32.8 G/DL — SIGNIFICANT CHANGE UP (ref 32–36)
MCV RBC AUTO: 81.1 FL — SIGNIFICANT CHANGE UP (ref 80–100)
MONOCYTES # BLD AUTO: 0.48 K/UL — SIGNIFICANT CHANGE UP (ref 0–0.9)
MONOCYTES # BLD MANUAL: 0.06 K/UL — SIGNIFICANT CHANGE UP (ref 0–0.9)
MONOCYTES NFR BLD AUTO: 6.4 % — SIGNIFICANT CHANGE UP (ref 2–14)
MONOCYTES NFR BLD MANUAL: 0.8 % — LOW (ref 2–14)
NEUTROPHILS # BLD AUTO: 5.98 K/UL — SIGNIFICANT CHANGE UP (ref 1.8–7.4)
NEUTROPHILS # BLD MANUAL: 6.16 K/UL — SIGNIFICANT CHANGE UP (ref 1.8–7.4)
NEUTROPHILS NFR BLD AUTO: 79.5 % — HIGH (ref 43–77)
NEUTROPHILS NFR BLD MANUAL: 81.9 % — HIGH (ref 43–77)
NEUTS BAND # BLD: 14.8 % — HIGH (ref 0–8)
NEUTS BAND NFR BLD: 14.8 % — HIGH (ref 0–8)
NRBC # BLD AUTO: 0 K/UL — SIGNIFICANT CHANGE UP (ref 0–0)
NRBC # FLD: 0 K/UL — SIGNIFICANT CHANGE UP (ref 0–0)
NRBC BLD AUTO-RTO: 0 /100 WBCS — SIGNIFICANT CHANGE UP (ref 0–0)
OVALOCYTES BLD QL SMEAR: SLIGHT — SIGNIFICANT CHANGE UP
PHOSPHATE SERPL-MCNC: 3.1 MG/DL — SIGNIFICANT CHANGE UP (ref 2.5–4.5)
PLAT MORPH BLD: NORMAL — SIGNIFICANT CHANGE UP
PLATELET # BLD AUTO: 75 K/UL — LOW (ref 150–400)
PMV BLD: 10.3 FL — SIGNIFICANT CHANGE UP (ref 7–13)
POIKILOCYTOSIS BLD QL AUTO: SLIGHT — SIGNIFICANT CHANGE UP
POLYCHROMASIA BLD QL SMEAR: SLIGHT — SIGNIFICANT CHANGE UP
POTASSIUM SERPL-MCNC: 3.5 MMOL/L — SIGNIFICANT CHANGE UP (ref 3.5–5.3)
POTASSIUM SERPL-SCNC: 3.5 MMOL/L — SIGNIFICANT CHANGE UP (ref 3.5–5.3)
PROT SERPL-MCNC: 4.5 G/DL — LOW (ref 6–8.3)
RBC # BLD: 3.23 M/UL — LOW (ref 4.2–5.8)
RBC # FLD: 17.3 % — HIGH (ref 10.3–14.5)
RBC BLD AUTO: ABNORMAL
RH IG SCN BLD-IMP: POSITIVE — SIGNIFICANT CHANGE UP
SODIUM SERPL-SCNC: 131 MMOL/L — LOW (ref 135–145)
WBC # BLD: 7.52 K/UL — SIGNIFICANT CHANGE UP (ref 3.8–10.5)
WBC # FLD AUTO: 7.52 K/UL — SIGNIFICANT CHANGE UP (ref 3.8–10.5)

## 2025-07-23 PROCEDURE — 99233 SBSQ HOSP IP/OBS HIGH 50: CPT | Mod: FS

## 2025-07-23 RX ORDER — FUROSEMIDE 10 MG/ML
20 INJECTION INTRAMUSCULAR; INTRAVENOUS ONCE
Refills: 0 | Status: COMPLETED | OUTPATIENT
Start: 2025-07-23 | End: 2025-07-23

## 2025-07-23 RX ORDER — FUROSEMIDE 10 MG/ML
40 INJECTION INTRAMUSCULAR; INTRAVENOUS ONCE
Refills: 0 | Status: COMPLETED | OUTPATIENT
Start: 2025-07-23 | End: 2025-07-23

## 2025-07-23 RX ORDER — MAGNESIUM SULFATE 500 MG/ML
2 SYRINGE (ML) INJECTION ONCE
Refills: 0 | Status: COMPLETED | OUTPATIENT
Start: 2025-07-23 | End: 2025-07-23

## 2025-07-23 RX ORDER — LOPERAMIDE HCL 1 MG/7.5ML
2 SOLUTION ORAL EVERY 8 HOURS
Refills: 0 | Status: DISCONTINUED | OUTPATIENT
Start: 2025-07-23 | End: 2025-07-31

## 2025-07-23 RX ADMIN — Medication 125 MILLIGRAM(S): at 06:08

## 2025-07-23 RX ADMIN — Medication 15 MILLILITER(S): at 05:56

## 2025-07-23 RX ADMIN — Medication 15 MILLILITER(S): at 17:55

## 2025-07-23 RX ADMIN — DULOXETINE 60 MILLIGRAM(S): 20 CAPSULE, DELAYED RELEASE ORAL at 13:30

## 2025-07-23 RX ADMIN — Medication 1 TABLET(S): at 13:30

## 2025-07-23 RX ADMIN — TACROLIMUS 0.5 MILLIGRAM(S): 0.5 CAPSULE ORAL at 20:00

## 2025-07-23 RX ADMIN — TACROLIMUS 0.5 MILLIGRAM(S): 0.5 CAPSULE ORAL at 08:23

## 2025-07-23 RX ADMIN — Medication 1 APPLICATION(S): at 09:24

## 2025-07-23 RX ADMIN — Medication 50 MILLIEQUIVALENT(S): at 12:26

## 2025-07-23 RX ADMIN — TAMSULOSIN HYDROCHLORIDE 0.4 MILLIGRAM(S): 0.4 CAPSULE ORAL at 20:11

## 2025-07-23 RX ADMIN — Medication 5 MILLILITER(S): at 05:54

## 2025-07-23 RX ADMIN — Medication 125 MILLIGRAM(S): at 17:56

## 2025-07-23 RX ADMIN — Medication 15 MILLILITER(S): at 19:25

## 2025-07-23 RX ADMIN — Medication 25 GRAM(S): at 23:33

## 2025-07-23 RX ADMIN — Medication 50 MILLIEQUIVALENT(S): at 10:24

## 2025-07-23 RX ADMIN — POSACONAZOLE 300 MILLIGRAM(S): 100 TABLET, DELAYED RELEASE ORAL at 13:28

## 2025-07-23 RX ADMIN — Medication 25 GRAM(S): at 15:33

## 2025-07-23 RX ADMIN — Medication 800 MILLIGRAM(S): at 17:56

## 2025-07-23 RX ADMIN — OXYCODONE HYDROCHLORIDE 5 MILLIGRAM(S): 30 TABLET ORAL at 15:20

## 2025-07-23 RX ADMIN — FUROSEMIDE 40 MILLIGRAM(S): 10 INJECTION INTRAMUSCULAR; INTRAVENOUS at 13:26

## 2025-07-23 RX ADMIN — Medication 50 MILLIEQUIVALENT(S): at 15:35

## 2025-07-23 RX ADMIN — Medication 800 MILLIGRAM(S): at 05:50

## 2025-07-23 RX ADMIN — WHITE PETROLATUM 1 APPLICATION(S): 1 OINTMENT TOPICAL at 13:31

## 2025-07-23 RX ADMIN — OXYCODONE HYDROCHLORIDE 5 MILLIGRAM(S): 30 TABLET ORAL at 14:36

## 2025-07-23 RX ADMIN — Medication 5 MILLILITER(S): at 13:27

## 2025-07-23 RX ADMIN — Medication 25 GRAM(S): at 10:27

## 2025-07-23 RX ADMIN — LOPERAMIDE HCL 2 MILLIGRAM(S): 1 SOLUTION ORAL at 09:42

## 2025-07-23 RX ADMIN — Medication 40 MILLIGRAM(S): at 05:56

## 2025-07-23 RX ADMIN — FUROSEMIDE 20 MILLIGRAM(S): 10 INJECTION INTRAMUSCULAR; INTRAVENOUS at 07:35

## 2025-07-23 RX ADMIN — Medication 15 MILLILITER(S): at 13:27

## 2025-07-23 RX ADMIN — Medication 25 GRAM(S): at 05:56

## 2025-07-23 RX ADMIN — Medication 15 MILLILITER(S): at 09:23

## 2025-07-23 RX ADMIN — Medication 5 MILLILITER(S): at 17:56

## 2025-07-23 RX ADMIN — Medication 15 MILLILITER(S): at 20:10

## 2025-07-24 LAB
ALBUMIN SERPL ELPH-MCNC: 2.8 G/DL — LOW (ref 3.3–5)
ALP SERPL-CCNC: 91 U/L — SIGNIFICANT CHANGE UP (ref 40–120)
ALT FLD-CCNC: 14 U/L — SIGNIFICANT CHANGE UP (ref 10–45)
ANION GAP SERPL CALC-SCNC: 12 MMOL/L — SIGNIFICANT CHANGE UP (ref 5–17)
APTT BLD: 33.6 SEC — SIGNIFICANT CHANGE UP (ref 26.1–36.8)
AST SERPL-CCNC: 23 U/L — SIGNIFICANT CHANGE UP (ref 10–40)
BASOPHILS # BLD AUTO: 0.02 K/UL — SIGNIFICANT CHANGE UP (ref 0–0.2)
BASOPHILS # BLD MANUAL: 0 K/UL — SIGNIFICANT CHANGE UP (ref 0–0.2)
BASOPHILS NFR BLD AUTO: 0.3 % — SIGNIFICANT CHANGE UP (ref 0–2)
BASOPHILS NFR BLD MANUAL: 0 % — SIGNIFICANT CHANGE UP (ref 0–2)
BILIRUB SERPL-MCNC: 0.4 MG/DL — SIGNIFICANT CHANGE UP (ref 0.2–1.2)
BUN SERPL-MCNC: 19 MG/DL — SIGNIFICANT CHANGE UP (ref 7–23)
CALCIUM SERPL-MCNC: 7.7 MG/DL — LOW (ref 8.4–10.5)
CHLORIDE SERPL-SCNC: 96 MMOL/L — SIGNIFICANT CHANGE UP (ref 96–108)
CO2 SERPL-SCNC: 23 MMOL/L — SIGNIFICANT CHANGE UP (ref 22–31)
CREAT SERPL-MCNC: 0.72 MG/DL — SIGNIFICANT CHANGE UP (ref 0.5–1.3)
EGFR: 100 ML/MIN/1.73M2 — SIGNIFICANT CHANGE UP
EGFR: 100 ML/MIN/1.73M2 — SIGNIFICANT CHANGE UP
ELLIPTOCYTES BLD QL SMEAR: SLIGHT — SIGNIFICANT CHANGE UP
EOSINOPHIL # BLD AUTO: 0.02 K/UL — SIGNIFICANT CHANGE UP (ref 0–0.5)
EOSINOPHIL # BLD MANUAL: 0 K/UL — SIGNIFICANT CHANGE UP (ref 0–0.5)
EOSINOPHIL NFR BLD AUTO: 0.3 % — SIGNIFICANT CHANGE UP (ref 0–6)
EOSINOPHIL NFR BLD MANUAL: 0 % — SIGNIFICANT CHANGE UP (ref 0–6)
FUNGITELL: <31 PG/ML — SIGNIFICANT CHANGE UP
GLUCOSE SERPL-MCNC: 103 MG/DL — HIGH (ref 70–99)
HCT VFR BLD CALC: 23.5 % — LOW (ref 39–50)
HGB BLD-MCNC: 7.7 G/DL — LOW (ref 13–17)
HYPOCHROMIA BLD QL: SLIGHT — SIGNIFICANT CHANGE UP
IMM GRANULOCYTES # BLD AUTO: 0.71 K/UL — HIGH (ref 0–0.07)
IMM GRANULOCYTES NFR BLD AUTO: 11.1 % — HIGH (ref 0–0.9)
IMMATURE PLATELET FRACTION #: 3 K/UL — LOW (ref 3.9–12.5)
IMMATURE PLATELET FRACTION %: 4.4 % — SIGNIFICANT CHANGE UP (ref 1.6–7.1)
INR BLD: 0.98 RATIO — SIGNIFICANT CHANGE UP (ref 0.85–1.16)
LDH SERPL L TO P-CCNC: 359 U/L — HIGH (ref 50–242)
LYMPHOCYTES # BLD AUTO: 0.21 K/UL — LOW (ref 1–3.3)
LYMPHOCYTES # BLD MANUAL: 0.3 K/UL — LOW (ref 1–3.3)
LYMPHOCYTES NFR BLD AUTO: 3.3 % — LOW (ref 13–44)
LYMPHOCYTES NFR BLD MANUAL: 4.7 % — LOW (ref 13–44)
MAGNESIUM SERPL-MCNC: 1.8 MG/DL — SIGNIFICANT CHANGE UP (ref 1.6–2.6)
MANUAL METAMYELOCYTE #: 0.05 K/UL — HIGH (ref 0–0)
MANUAL NEUTROPHIL BANDS #: 0.6 K/UL — SIGNIFICANT CHANGE UP (ref 0–0.84)
MCHC RBC-ENTMCNC: 26.7 PG — LOW (ref 27–34)
MCHC RBC-ENTMCNC: 32.8 G/DL — SIGNIFICANT CHANGE UP (ref 32–36)
MCV RBC AUTO: 81.6 FL — SIGNIFICANT CHANGE UP (ref 80–100)
METAMYELOCYTES # FLD: 0.8 % — HIGH (ref 0–0)
METAMYELOCYTES NFR BLD: 0.8 % — HIGH (ref 0–0)
MONOCYTES # BLD AUTO: 0.63 K/UL — SIGNIFICANT CHANGE UP (ref 0–0.9)
MONOCYTES # BLD MANUAL: 0.15 K/UL — SIGNIFICANT CHANGE UP (ref 0–0.9)
MONOCYTES NFR BLD AUTO: 9.8 % — SIGNIFICANT CHANGE UP (ref 2–14)
MONOCYTES NFR BLD MANUAL: 2.4 % — SIGNIFICANT CHANGE UP (ref 2–14)
NEUTROPHILS # BLD AUTO: 4.81 K/UL — SIGNIFICANT CHANGE UP (ref 1.8–7.4)
NEUTROPHILS # BLD MANUAL: 5.29 K/UL — SIGNIFICANT CHANGE UP (ref 1.8–7.4)
NEUTROPHILS NFR BLD AUTO: 75.2 % — SIGNIFICANT CHANGE UP (ref 43–77)
NEUTROPHILS NFR BLD MANUAL: 82.7 % — HIGH (ref 43–77)
NEUTS BAND # BLD: 9.4 % — HIGH (ref 0–8)
NEUTS BAND NFR BLD: 9.4 % — HIGH (ref 0–8)
NRBC # BLD AUTO: 0 K/UL — SIGNIFICANT CHANGE UP (ref 0–0)
NRBC # FLD: 0 K/UL — SIGNIFICANT CHANGE UP (ref 0–0)
NRBC BLD AUTO-RTO: 0 /100 WBCS — SIGNIFICANT CHANGE UP (ref 0–0)
NT-PROBNP SERPL-SCNC: 1434 PG/ML — HIGH (ref 0–300)
PHOSPHATE SERPL-MCNC: 3 MG/DL — SIGNIFICANT CHANGE UP (ref 2.5–4.5)
PLAT MORPH BLD: NORMAL — SIGNIFICANT CHANGE UP
PLATELET # BLD AUTO: 69 K/UL — LOW (ref 150–400)
PMV BLD: 10.9 FL — SIGNIFICANT CHANGE UP (ref 7–13)
POIKILOCYTOSIS BLD QL AUTO: SLIGHT — SIGNIFICANT CHANGE UP
POLYCHROMASIA BLD QL SMEAR: ABNORMAL
POTASSIUM SERPL-MCNC: 3.7 MMOL/L — SIGNIFICANT CHANGE UP (ref 3.5–5.3)
POTASSIUM SERPL-SCNC: 3.7 MMOL/L — SIGNIFICANT CHANGE UP (ref 3.5–5.3)
PROT SERPL-MCNC: 4.5 G/DL — LOW (ref 6–8.3)
PROTHROM AB SERPL-ACNC: 11.3 SEC — SIGNIFICANT CHANGE UP (ref 9.9–13.4)
RBC # BLD: 2.88 M/UL — LOW (ref 4.2–5.8)
RBC # FLD: 17.7 % — HIGH (ref 10.3–14.5)
RBC BLD AUTO: ABNORMAL
SODIUM SERPL-SCNC: 131 MMOL/L — LOW (ref 135–145)
WBC # BLD: 6.4 K/UL — SIGNIFICANT CHANGE UP (ref 3.8–10.5)
WBC # FLD AUTO: 6.4 K/UL — SIGNIFICANT CHANGE UP (ref 3.8–10.5)

## 2025-07-24 PROCEDURE — 80202 ASSAY OF VANCOMYCIN: CPT

## 2025-07-24 PROCEDURE — 87305 ASPERGILLUS AG IA: CPT

## 2025-07-24 PROCEDURE — 81003 URINALYSIS AUTO W/O SCOPE: CPT

## 2025-07-24 PROCEDURE — 87086 URINE CULTURE/COLONY COUNT: CPT

## 2025-07-24 PROCEDURE — P9047: CPT

## 2025-07-24 PROCEDURE — 85730 THROMBOPLASTIN TIME PARTIAL: CPT

## 2025-07-24 PROCEDURE — 0225U NFCT DS DNA&RNA 21 SARSCOV2: CPT

## 2025-07-24 PROCEDURE — 86850 RBC ANTIBODY SCREEN: CPT

## 2025-07-24 PROCEDURE — 99233 SBSQ HOSP IP/OBS HIGH 50: CPT

## 2025-07-24 PROCEDURE — 83880 ASSAY OF NATRIURETIC PEPTIDE: CPT

## 2025-07-24 PROCEDURE — 82248 BILIRUBIN DIRECT: CPT

## 2025-07-24 PROCEDURE — 97162 PT EVAL MOD COMPLEX 30 MIN: CPT

## 2025-07-24 PROCEDURE — 99223 1ST HOSP IP/OBS HIGH 75: CPT | Mod: GC

## 2025-07-24 PROCEDURE — 94640 AIRWAY INHALATION TREATMENT: CPT

## 2025-07-24 PROCEDURE — 83615 LACTATE (LD) (LDH) ENZYME: CPT

## 2025-07-24 PROCEDURE — 85025 COMPLETE CBC W/AUTO DIFF WBC: CPT

## 2025-07-24 PROCEDURE — 87640 STAPH A DNA AMP PROBE: CPT

## 2025-07-24 PROCEDURE — 93005 ELECTROCARDIOGRAM TRACING: CPT

## 2025-07-24 PROCEDURE — 36415 COLL VENOUS BLD VENIPUNCTURE: CPT

## 2025-07-24 PROCEDURE — 86900 BLOOD TYPING SEROLOGIC ABO: CPT

## 2025-07-24 PROCEDURE — 87040 BLOOD CULTURE FOR BACTERIA: CPT

## 2025-07-24 PROCEDURE — 87641 MR-STAPH DNA AMP PROBE: CPT

## 2025-07-24 PROCEDURE — 71250 CT THORAX DX C-: CPT

## 2025-07-24 PROCEDURE — 83735 ASSAY OF MAGNESIUM: CPT

## 2025-07-24 PROCEDURE — 87637 SARSCOV2&INF A&B&RSV AMP PRB: CPT

## 2025-07-24 PROCEDURE — 71045 X-RAY EXAM CHEST 1 VIEW: CPT

## 2025-07-24 PROCEDURE — 93306 TTE W/DOPPLER COMPLETE: CPT

## 2025-07-24 PROCEDURE — 86901 BLOOD TYPING SEROLOGIC RH(D): CPT

## 2025-07-24 PROCEDURE — 84100 ASSAY OF PHOSPHORUS: CPT

## 2025-07-24 PROCEDURE — 85610 PROTHROMBIN TIME: CPT

## 2025-07-24 PROCEDURE — 80053 COMPREHEN METABOLIC PANEL: CPT

## 2025-07-24 PROCEDURE — 80197 ASSAY OF TACROLIMUS: CPT

## 2025-07-24 PROCEDURE — 87449 NOS EACH ORGANISM AG IA: CPT

## 2025-07-24 RX ORDER — BUMETANIDE 1 MG/1
1 TABLET ORAL ONCE
Refills: 0 | Status: COMPLETED | OUTPATIENT
Start: 2025-07-24 | End: 2025-07-24

## 2025-07-24 RX ORDER — FUROSEMIDE 10 MG/ML
40 INJECTION INTRAMUSCULAR; INTRAVENOUS ONCE
Refills: 0 | Status: COMPLETED | OUTPATIENT
Start: 2025-07-24 | End: 2025-07-24

## 2025-07-24 RX ORDER — ALBUMIN (HUMAN) 12.5 G/50ML
50 INJECTION, SOLUTION INTRAVENOUS ONCE
Refills: 0 | Status: COMPLETED | OUTPATIENT
Start: 2025-07-24 | End: 2025-07-24

## 2025-07-24 RX ADMIN — Medication 40 MILLIGRAM(S): at 05:05

## 2025-07-24 RX ADMIN — TACROLIMUS 0.5 MILLIGRAM(S): 0.5 CAPSULE ORAL at 20:48

## 2025-07-24 RX ADMIN — Medication 50 MILLIEQUIVALENT(S): at 22:33

## 2025-07-24 RX ADMIN — BUMETANIDE 1 MILLIGRAM(S): 1 TABLET ORAL at 18:30

## 2025-07-24 RX ADMIN — WHITE PETROLATUM 1 APPLICATION(S): 1 OINTMENT TOPICAL at 12:25

## 2025-07-24 RX ADMIN — Medication 1 TABLET(S): at 12:24

## 2025-07-24 RX ADMIN — Medication 125 MILLIGRAM(S): at 05:07

## 2025-07-24 RX ADMIN — FUROSEMIDE 40 MILLIGRAM(S): 10 INJECTION INTRAMUSCULAR; INTRAVENOUS at 11:38

## 2025-07-24 RX ADMIN — Medication 5 MILLILITER(S): at 12:25

## 2025-07-24 RX ADMIN — Medication 40 MILLIEQUIVALENT(S): at 18:40

## 2025-07-24 RX ADMIN — Medication 5 MILLILITER(S): at 18:30

## 2025-07-24 RX ADMIN — Medication 50 MILLIEQUIVALENT(S): at 18:29

## 2025-07-24 RX ADMIN — OXYCODONE HYDROCHLORIDE 5 MILLIGRAM(S): 30 TABLET ORAL at 01:10

## 2025-07-24 RX ADMIN — Medication 15 MILLILITER(S): at 12:25

## 2025-07-24 RX ADMIN — Medication 25 GRAM(S): at 23:10

## 2025-07-24 RX ADMIN — Medication 25 GRAM(S): at 05:06

## 2025-07-24 RX ADMIN — OXYCODONE HYDROCHLORIDE 5 MILLIGRAM(S): 30 TABLET ORAL at 22:10

## 2025-07-24 RX ADMIN — OXYCODONE HYDROCHLORIDE 5 MILLIGRAM(S): 30 TABLET ORAL at 01:30

## 2025-07-24 RX ADMIN — Medication 15 MILLILITER(S): at 18:39

## 2025-07-24 RX ADMIN — ALBUMIN (HUMAN) 50 MILLILITER(S): 12.5 INJECTION, SOLUTION INTRAVENOUS at 17:33

## 2025-07-24 RX ADMIN — Medication 5 MILLILITER(S): at 05:06

## 2025-07-24 RX ADMIN — OXYCODONE HYDROCHLORIDE 5 MILLIGRAM(S): 30 TABLET ORAL at 17:40

## 2025-07-24 RX ADMIN — Medication 125 MILLIGRAM(S): at 18:31

## 2025-07-24 RX ADMIN — OXYCODONE HYDROCHLORIDE 5 MILLIGRAM(S): 30 TABLET ORAL at 14:57

## 2025-07-24 RX ADMIN — Medication 15 MILLILITER(S): at 20:48

## 2025-07-24 RX ADMIN — TACROLIMUS 0.5 MILLIGRAM(S): 0.5 CAPSULE ORAL at 08:49

## 2025-07-24 RX ADMIN — Medication 15 MILLILITER(S): at 18:40

## 2025-07-24 RX ADMIN — Medication 25 GRAM(S): at 14:57

## 2025-07-24 RX ADMIN — Medication 800 MILLIGRAM(S): at 05:06

## 2025-07-24 RX ADMIN — Medication 40 MILLIEQUIVALENT(S): at 11:38

## 2025-07-24 RX ADMIN — TAMSULOSIN HYDROCHLORIDE 0.4 MILLIGRAM(S): 0.4 CAPSULE ORAL at 21:41

## 2025-07-24 RX ADMIN — OXYCODONE HYDROCHLORIDE 5 MILLIGRAM(S): 30 TABLET ORAL at 21:11

## 2025-07-24 RX ADMIN — DULOXETINE 60 MILLIGRAM(S): 20 CAPSULE, DELAYED RELEASE ORAL at 12:24

## 2025-07-24 RX ADMIN — Medication 800 MILLIGRAM(S): at 18:31

## 2025-07-24 RX ADMIN — Medication 15 MILLILITER(S): at 05:06

## 2025-07-24 RX ADMIN — POSACONAZOLE 300 MILLIGRAM(S): 100 TABLET, DELAYED RELEASE ORAL at 12:24

## 2025-07-24 RX ADMIN — Medication 1 APPLICATION(S): at 08:49

## 2025-07-24 RX ADMIN — Medication 15 MILLILITER(S): at 08:48

## 2025-07-24 RX ADMIN — Medication 50 MILLIEQUIVALENT(S): at 20:48

## 2025-07-25 LAB
ALBUMIN SERPL ELPH-MCNC: 3.1 G/DL — LOW (ref 3.3–5)
ALP SERPL-CCNC: 97 U/L — SIGNIFICANT CHANGE UP (ref 40–120)
ALT FLD-CCNC: 17 U/L — SIGNIFICANT CHANGE UP (ref 10–45)
ANION GAP SERPL CALC-SCNC: 10 MMOL/L — SIGNIFICANT CHANGE UP (ref 5–17)
AST SERPL-CCNC: 30 U/L — SIGNIFICANT CHANGE UP (ref 10–40)
BASOPHILS # BLD AUTO: 0.04 K/UL — SIGNIFICANT CHANGE UP (ref 0–0.2)
BASOPHILS NFR BLD AUTO: 0.8 % — SIGNIFICANT CHANGE UP (ref 0–2)
BILIRUB DIRECT SERPL-MCNC: 0.3 MG/DL — SIGNIFICANT CHANGE UP (ref 0–0.3)
BILIRUB INDIRECT FLD-MCNC: 0.2 MG/DL — SIGNIFICANT CHANGE UP (ref 0.2–1)
BILIRUB SERPL-MCNC: 0.5 MG/DL — SIGNIFICANT CHANGE UP (ref 0.2–1.2)
BLD GP AB SCN SERPL QL: NEGATIVE — SIGNIFICANT CHANGE UP
BUN SERPL-MCNC: 18 MG/DL — SIGNIFICANT CHANGE UP (ref 7–23)
CALCIUM SERPL-MCNC: 8.1 MG/DL — LOW (ref 8.4–10.5)
CHLORIDE SERPL-SCNC: 99 MMOL/L — SIGNIFICANT CHANGE UP (ref 96–108)
CMV DNA CSF QL NAA+PROBE: SIGNIFICANT CHANGE UP IU/ML
CMV DNA SPEC NAA+PROBE-LOG#: SIGNIFICANT CHANGE UP LOG10IU/ML
CO2 SERPL-SCNC: 23 MMOL/L — SIGNIFICANT CHANGE UP (ref 22–31)
CREAT SERPL-MCNC: 0.72 MG/DL — SIGNIFICANT CHANGE UP (ref 0.5–1.3)
CULTURE RESULTS: SIGNIFICANT CHANGE UP
CULTURE RESULTS: SIGNIFICANT CHANGE UP
EBV DNA SERPL NAA+PROBE-ACNC: SIGNIFICANT CHANGE UP IU/ML
EBVPCR LOG: SIGNIFICANT CHANGE UP LOG10IU/ML
EGFR: 100 ML/MIN/1.73M2 — SIGNIFICANT CHANGE UP
EGFR: 100 ML/MIN/1.73M2 — SIGNIFICANT CHANGE UP
EOSINOPHIL # BLD AUTO: 0.05 K/UL — SIGNIFICANT CHANGE UP (ref 0–0.5)
EOSINOPHIL NFR BLD AUTO: 1 % — SIGNIFICANT CHANGE UP (ref 0–6)
GLUCOSE SERPL-MCNC: 102 MG/DL — HIGH (ref 70–99)
HCT VFR BLD CALC: 23.5 % — LOW (ref 39–50)
HGB BLD-MCNC: 7.5 G/DL — LOW (ref 13–17)
IMM GRANULOCYTES # BLD AUTO: 0.38 K/UL — HIGH (ref 0–0.07)
IMM GRANULOCYTES NFR BLD AUTO: 7.5 % — HIGH (ref 0–0.9)
IMMATURE PLATELET FRACTION #: 2.3 K/UL — LOW (ref 3.9–12.5)
IMMATURE PLATELET FRACTION %: 3.7 % — SIGNIFICANT CHANGE UP (ref 1.6–7.1)
LDH SERPL L TO P-CCNC: 396 U/L — HIGH (ref 50–242)
LYMPHOCYTES # BLD AUTO: 0.21 K/UL — LOW (ref 1–3.3)
LYMPHOCYTES NFR BLD AUTO: 4.1 % — LOW (ref 13–44)
MAGNESIUM SERPL-MCNC: 1.6 MG/DL — SIGNIFICANT CHANGE UP (ref 1.6–2.6)
MCHC RBC-ENTMCNC: 26.2 PG — LOW (ref 27–34)
MCHC RBC-ENTMCNC: 31.9 G/DL — LOW (ref 32–36)
MCV RBC AUTO: 82.2 FL — SIGNIFICANT CHANGE UP (ref 80–100)
MONOCYTES # BLD AUTO: 0.73 K/UL — SIGNIFICANT CHANGE UP (ref 0–0.9)
MONOCYTES NFR BLD AUTO: 14.4 % — HIGH (ref 2–14)
NEUTROPHILS # BLD AUTO: 3.67 K/UL — SIGNIFICANT CHANGE UP (ref 1.8–7.4)
NEUTROPHILS NFR BLD AUTO: 72.2 % — SIGNIFICANT CHANGE UP (ref 43–77)
NRBC # BLD AUTO: 0 K/UL — SIGNIFICANT CHANGE UP (ref 0–0)
NRBC # FLD: 0 K/UL — SIGNIFICANT CHANGE UP (ref 0–0)
NRBC BLD AUTO-RTO: 0 /100 WBCS — SIGNIFICANT CHANGE UP (ref 0–0)
PHOSPHATE SERPL-MCNC: 2.8 MG/DL — SIGNIFICANT CHANGE UP (ref 2.5–4.5)
PLATELET # BLD AUTO: 61 K/UL — LOW (ref 150–400)
PMV BLD: 10.7 FL — SIGNIFICANT CHANGE UP (ref 7–13)
POTASSIUM SERPL-MCNC: 4.6 MMOL/L — SIGNIFICANT CHANGE UP (ref 3.5–5.3)
POTASSIUM SERPL-SCNC: 4.6 MMOL/L — SIGNIFICANT CHANGE UP (ref 3.5–5.3)
PROT SERPL-MCNC: 4.8 G/DL — LOW (ref 6–8.3)
RBC # BLD: 2.86 M/UL — LOW (ref 4.2–5.8)
RBC # FLD: 17.9 % — HIGH (ref 10.3–14.5)
RH IG SCN BLD-IMP: POSITIVE — SIGNIFICANT CHANGE UP
SODIUM SERPL-SCNC: 132 MMOL/L — LOW (ref 135–145)
SPECIMEN SOURCE: SIGNIFICANT CHANGE UP
SPECIMEN SOURCE: SIGNIFICANT CHANGE UP
WBC # BLD: 5.08 K/UL — SIGNIFICANT CHANGE UP (ref 3.8–10.5)
WBC # FLD AUTO: 5.08 K/UL — SIGNIFICANT CHANGE UP (ref 3.8–10.5)

## 2025-07-25 PROCEDURE — 99233 SBSQ HOSP IP/OBS HIGH 50: CPT | Mod: GC

## 2025-07-25 PROCEDURE — 99232 SBSQ HOSP IP/OBS MODERATE 35: CPT

## 2025-07-25 RX ORDER — ALBUMIN (HUMAN) 12.5 G/50ML
50 INJECTION, SOLUTION INTRAVENOUS ONCE
Refills: 0 | Status: COMPLETED | OUTPATIENT
Start: 2025-07-25 | End: 2025-07-25

## 2025-07-25 RX ORDER — BUMETANIDE 1 MG/1
1 TABLET ORAL ONCE
Refills: 0 | Status: COMPLETED | OUTPATIENT
Start: 2025-07-25 | End: 2025-07-25

## 2025-07-25 RX ADMIN — POSACONAZOLE 300 MILLIGRAM(S): 100 TABLET, DELAYED RELEASE ORAL at 11:58

## 2025-07-25 RX ADMIN — OXYCODONE HYDROCHLORIDE 5 MILLIGRAM(S): 30 TABLET ORAL at 20:59

## 2025-07-25 RX ADMIN — Medication 25 GRAM(S): at 14:30

## 2025-07-25 RX ADMIN — Medication 15 MILLILITER(S): at 07:28

## 2025-07-25 RX ADMIN — BUMETANIDE 1 MILLIGRAM(S): 1 TABLET ORAL at 17:11

## 2025-07-25 RX ADMIN — WHITE PETROLATUM 1 APPLICATION(S): 1 OINTMENT TOPICAL at 12:01

## 2025-07-25 RX ADMIN — Medication 15 MILLILITER(S): at 20:22

## 2025-07-25 RX ADMIN — Medication 15 MILLILITER(S): at 16:15

## 2025-07-25 RX ADMIN — BUMETANIDE 1 MILLIGRAM(S): 1 TABLET ORAL at 10:40

## 2025-07-25 RX ADMIN — TACROLIMUS 0.5 MILLIGRAM(S): 0.5 CAPSULE ORAL at 08:09

## 2025-07-25 RX ADMIN — Medication 800 MILLIGRAM(S): at 17:11

## 2025-07-25 RX ADMIN — DULOXETINE 60 MILLIGRAM(S): 20 CAPSULE, DELAYED RELEASE ORAL at 11:58

## 2025-07-25 RX ADMIN — Medication 25 GRAM(S): at 07:07

## 2025-07-25 RX ADMIN — Medication 1 TABLET(S): at 11:58

## 2025-07-25 RX ADMIN — Medication 1 APPLICATION(S): at 08:09

## 2025-07-25 RX ADMIN — OXYCODONE HYDROCHLORIDE 5 MILLIGRAM(S): 30 TABLET ORAL at 08:45

## 2025-07-25 RX ADMIN — Medication 125 MILLIGRAM(S): at 06:03

## 2025-07-25 RX ADMIN — Medication 125 MILLIGRAM(S): at 17:11

## 2025-07-25 RX ADMIN — OXYCODONE HYDROCHLORIDE 5 MILLIGRAM(S): 30 TABLET ORAL at 08:09

## 2025-07-25 RX ADMIN — Medication 15 MILLILITER(S): at 11:58

## 2025-07-25 RX ADMIN — Medication 25 GRAM(S): at 23:14

## 2025-07-25 RX ADMIN — Medication 5 MILLILITER(S): at 11:58

## 2025-07-25 RX ADMIN — Medication 5 MILLILITER(S): at 07:06

## 2025-07-25 RX ADMIN — Medication 5 MILLILITER(S): at 17:12

## 2025-07-25 RX ADMIN — TACROLIMUS 0.5 MILLIGRAM(S): 0.5 CAPSULE ORAL at 20:22

## 2025-07-25 RX ADMIN — Medication 15 MILLILITER(S): at 08:08

## 2025-07-25 RX ADMIN — Medication 800 MILLIGRAM(S): at 06:03

## 2025-07-25 RX ADMIN — ALBUMIN (HUMAN) 50 MILLILITER(S): 12.5 INJECTION, SOLUTION INTRAVENOUS at 16:00

## 2025-07-25 RX ADMIN — Medication 15 MILLILITER(S): at 17:11

## 2025-07-25 RX ADMIN — OXYCODONE HYDROCHLORIDE 5 MILLIGRAM(S): 30 TABLET ORAL at 21:59

## 2025-07-25 RX ADMIN — ALBUMIN (HUMAN) 50 MILLILITER(S): 12.5 INJECTION, SOLUTION INTRAVENOUS at 09:34

## 2025-07-25 RX ADMIN — TAMSULOSIN HYDROCHLORIDE 0.4 MILLIGRAM(S): 0.4 CAPSULE ORAL at 21:38

## 2025-07-25 RX ADMIN — Medication 40 MILLIGRAM(S): at 06:03

## 2025-07-26 LAB
ALBUMIN SERPL ELPH-MCNC: 3.4 G/DL — SIGNIFICANT CHANGE UP (ref 3.3–5)
ALP SERPL-CCNC: 93 U/L — SIGNIFICANT CHANGE UP (ref 40–120)
ALT FLD-CCNC: 18 U/L — SIGNIFICANT CHANGE UP (ref 10–45)
ANION GAP SERPL CALC-SCNC: 15 MMOL/L — SIGNIFICANT CHANGE UP (ref 5–17)
AST SERPL-CCNC: 28 U/L — SIGNIFICANT CHANGE UP (ref 10–40)
BASOPHILS # BLD AUTO: 0.03 K/UL — SIGNIFICANT CHANGE UP (ref 0–0.2)
BASOPHILS # BLD MANUAL: 0 K/UL — SIGNIFICANT CHANGE UP (ref 0–0.2)
BASOPHILS NFR BLD AUTO: 0.8 % — SIGNIFICANT CHANGE UP (ref 0–2)
BASOPHILS NFR BLD MANUAL: 0 % — SIGNIFICANT CHANGE UP (ref 0–2)
BILIRUB SERPL-MCNC: 0.8 MG/DL — SIGNIFICANT CHANGE UP (ref 0.2–1.2)
BUN SERPL-MCNC: 18 MG/DL — SIGNIFICANT CHANGE UP (ref 7–23)
CALCIUM SERPL-MCNC: 8.4 MG/DL — SIGNIFICANT CHANGE UP (ref 8.4–10.5)
CHLORIDE SERPL-SCNC: 96 MMOL/L — SIGNIFICANT CHANGE UP (ref 96–108)
CO2 SERPL-SCNC: 24 MMOL/L — SIGNIFICANT CHANGE UP (ref 22–31)
CREAT SERPL-MCNC: 0.77 MG/DL — SIGNIFICANT CHANGE UP (ref 0.5–1.3)
EGFR: 98 ML/MIN/1.73M2 — SIGNIFICANT CHANGE UP
EGFR: 98 ML/MIN/1.73M2 — SIGNIFICANT CHANGE UP
EOSINOPHIL # BLD AUTO: 0.11 K/UL — SIGNIFICANT CHANGE UP (ref 0–0.5)
EOSINOPHIL # BLD MANUAL: 0.14 K/UL — SIGNIFICANT CHANGE UP (ref 0–0.5)
EOSINOPHIL NFR BLD AUTO: 3 % — SIGNIFICANT CHANGE UP (ref 0–6)
EOSINOPHIL NFR BLD MANUAL: 4 % — SIGNIFICANT CHANGE UP (ref 0–6)
GLUCOSE SERPL-MCNC: 105 MG/DL — HIGH (ref 70–99)
HADV DNA FLD NAA+PROBE-LOG#: SIGNIFICANT CHANGE UP COPIES/ML
HCT VFR BLD CALC: 22.4 % — LOW (ref 39–50)
HGB BLD-MCNC: 7.1 G/DL — LOW (ref 13–17)
IMM GRANULOCYTES # BLD AUTO: 0.05 K/UL — SIGNIFICANT CHANGE UP (ref 0–0.07)
IMM GRANULOCYTES NFR BLD AUTO: 1.4 % — HIGH (ref 0–0.9)
IMMATURE PLATELET FRACTION #: 3.2 K/UL — LOW (ref 3.9–12.5)
IMMATURE PLATELET FRACTION %: 5.3 % — SIGNIFICANT CHANGE UP (ref 1.6–7.1)
LDH SERPL L TO P-CCNC: 422 U/L — HIGH (ref 50–242)
LYMPHOCYTES # BLD AUTO: 0.19 K/UL — LOW (ref 1–3.3)
LYMPHOCYTES # BLD MANUAL: 0.09 K/UL — LOW (ref 1–3.3)
LYMPHOCYTES NFR BLD AUTO: 5.2 % — LOW (ref 13–44)
LYMPHOCYTES NFR BLD MANUAL: 2.4 % — LOW (ref 13–44)
MAGNESIUM SERPL-MCNC: 1.4 MG/DL — LOW (ref 1.6–2.6)
MCHC RBC-ENTMCNC: 25.8 PG — LOW (ref 27–34)
MCHC RBC-ENTMCNC: 31.7 G/DL — LOW (ref 32–36)
MCV RBC AUTO: 81.5 FL — SIGNIFICANT CHANGE UP (ref 80–100)
MONOCYTES # BLD AUTO: 0.86 K/UL — SIGNIFICANT CHANGE UP (ref 0–0.9)
MONOCYTES # BLD MANUAL: 0.52 K/UL — SIGNIFICANT CHANGE UP (ref 0–0.9)
MONOCYTES NFR BLD AUTO: 23.8 % — HIGH (ref 2–14)
MONOCYTES NFR BLD MANUAL: 14.4 % — HIGH (ref 2–14)
NEUTROPHILS # BLD AUTO: 2.38 K/UL — SIGNIFICANT CHANGE UP (ref 1.8–7.4)
NEUTROPHILS # BLD MANUAL: 2.87 K/UL — SIGNIFICANT CHANGE UP (ref 1.8–7.4)
NEUTROPHILS NFR BLD AUTO: 65.8 % — SIGNIFICANT CHANGE UP (ref 43–77)
NEUTROPHILS NFR BLD MANUAL: 79.2 % — HIGH (ref 43–77)
NRBC # BLD AUTO: 0 K/UL — SIGNIFICANT CHANGE UP (ref 0–0)
NRBC # FLD: 0 K/UL — SIGNIFICANT CHANGE UP (ref 0–0)
PHOSPHATE SERPL-MCNC: 2.9 MG/DL — SIGNIFICANT CHANGE UP (ref 2.5–4.5)
PLAT MORPH BLD: NORMAL — SIGNIFICANT CHANGE UP
PLATELET # BLD AUTO: 60 K/UL — LOW (ref 150–400)
PMV BLD: SIGNIFICANT CHANGE UP FL (ref 7–13)
POTASSIUM SERPL-MCNC: 3.3 MMOL/L — LOW (ref 3.5–5.3)
POTASSIUM SERPL-SCNC: 3.3 MMOL/L — LOW (ref 3.5–5.3)
PROT SERPL-MCNC: 5.1 G/DL — LOW (ref 6–8.3)
RBC # BLD: 2.75 M/UL — LOW (ref 4.2–5.8)
RBC # FLD: 18.2 % — HIGH (ref 10.3–14.5)
RBC BLD AUTO: SIGNIFICANT CHANGE UP
SODIUM SERPL-SCNC: 135 MMOL/L — SIGNIFICANT CHANGE UP (ref 135–145)
WBC # BLD: 3.62 K/UL — LOW (ref 3.8–10.5)
WBC # FLD AUTO: 3.62 K/UL — LOW (ref 3.8–10.5)

## 2025-07-26 PROCEDURE — 99232 SBSQ HOSP IP/OBS MODERATE 35: CPT

## 2025-07-26 PROCEDURE — 99233 SBSQ HOSP IP/OBS HIGH 50: CPT

## 2025-07-26 RX ORDER — MAGNESIUM SULFATE 500 MG/ML
2 SYRINGE (ML) INJECTION
Refills: 0 | Status: COMPLETED | OUTPATIENT
Start: 2025-07-26 | End: 2025-07-26

## 2025-07-26 RX ORDER — BUMETANIDE 1 MG/1
1 TABLET ORAL ONCE
Refills: 0 | Status: COMPLETED | OUTPATIENT
Start: 2025-07-26 | End: 2025-07-26

## 2025-07-26 RX ADMIN — Medication 15 MILLILITER(S): at 12:34

## 2025-07-26 RX ADMIN — POSACONAZOLE 300 MILLIGRAM(S): 100 TABLET, DELAYED RELEASE ORAL at 12:36

## 2025-07-26 RX ADMIN — Medication 800 MILLIGRAM(S): at 06:12

## 2025-07-26 RX ADMIN — Medication 15 MILLILITER(S): at 16:47

## 2025-07-26 RX ADMIN — Medication 25 GRAM(S): at 14:45

## 2025-07-26 RX ADMIN — Medication 25 GRAM(S): at 08:27

## 2025-07-26 RX ADMIN — Medication 25 GRAM(S): at 11:29

## 2025-07-26 RX ADMIN — Medication 15 MILLILITER(S): at 00:50

## 2025-07-26 RX ADMIN — Medication 800 MILLIGRAM(S): at 17:05

## 2025-07-26 RX ADMIN — BUMETANIDE 1 MILLIGRAM(S): 1 TABLET ORAL at 12:33

## 2025-07-26 RX ADMIN — OXYCODONE HYDROCHLORIDE 5 MILLIGRAM(S): 30 TABLET ORAL at 20:45

## 2025-07-26 RX ADMIN — Medication 15 MILLILITER(S): at 09:33

## 2025-07-26 RX ADMIN — Medication 15 MILLILITER(S): at 06:13

## 2025-07-26 RX ADMIN — Medication 15 MILLILITER(S): at 19:41

## 2025-07-26 RX ADMIN — TAMSULOSIN HYDROCHLORIDE 0.4 MILLIGRAM(S): 0.4 CAPSULE ORAL at 19:41

## 2025-07-26 RX ADMIN — Medication 5 MILLILITER(S): at 12:34

## 2025-07-26 RX ADMIN — Medication 1 APPLICATION(S): at 09:33

## 2025-07-26 RX ADMIN — Medication 125 MILLIGRAM(S): at 17:05

## 2025-07-26 RX ADMIN — Medication 25 GRAM(S): at 06:57

## 2025-07-26 RX ADMIN — Medication 40 MILLIGRAM(S): at 06:12

## 2025-07-26 RX ADMIN — TACROLIMUS 0.5 MILLIGRAM(S): 0.5 CAPSULE ORAL at 08:25

## 2025-07-26 RX ADMIN — Medication 5 MILLILITER(S): at 17:04

## 2025-07-26 RX ADMIN — DULOXETINE 60 MILLIGRAM(S): 20 CAPSULE, DELAYED RELEASE ORAL at 12:35

## 2025-07-26 RX ADMIN — Medication 5 MILLILITER(S): at 06:13

## 2025-07-26 RX ADMIN — OXYCODONE HYDROCHLORIDE 5 MILLIGRAM(S): 30 TABLET ORAL at 21:15

## 2025-07-26 RX ADMIN — TACROLIMUS 0.5 MILLIGRAM(S): 0.5 CAPSULE ORAL at 19:42

## 2025-07-26 RX ADMIN — Medication 50 MILLIEQUIVALENT(S): at 11:28

## 2025-07-26 RX ADMIN — Medication 50 MILLIEQUIVALENT(S): at 14:45

## 2025-07-26 RX ADMIN — Medication 1 TABLET(S): at 12:35

## 2025-07-26 RX ADMIN — Medication 50 MILLIEQUIVALENT(S): at 08:26

## 2025-07-26 RX ADMIN — Medication 40 MILLIEQUIVALENT(S): at 08:26

## 2025-07-26 RX ADMIN — Medication 15 MILLILITER(S): at 17:05

## 2025-07-26 RX ADMIN — WHITE PETROLATUM 1 APPLICATION(S): 1 OINTMENT TOPICAL at 12:39

## 2025-07-26 RX ADMIN — Medication 125 MILLIGRAM(S): at 06:12

## 2025-07-26 RX ADMIN — Medication 5 MILLILITER(S): at 00:51

## 2025-07-26 RX ADMIN — BUMETANIDE 1 MILLIGRAM(S): 1 TABLET ORAL at 16:52

## 2025-07-27 LAB
ALBUMIN SERPL ELPH-MCNC: 3.4 G/DL — SIGNIFICANT CHANGE UP (ref 3.3–5)
ALP SERPL-CCNC: 99 U/L — SIGNIFICANT CHANGE UP (ref 40–120)
ALT FLD-CCNC: 20 U/L — SIGNIFICANT CHANGE UP (ref 10–45)
ANION GAP SERPL CALC-SCNC: 14 MMOL/L — SIGNIFICANT CHANGE UP (ref 5–17)
AST SERPL-CCNC: 30 U/L — SIGNIFICANT CHANGE UP (ref 10–40)
BASOPHILS # BLD AUTO: 0.02 K/UL — SIGNIFICANT CHANGE UP (ref 0–0.2)
BASOPHILS NFR BLD AUTO: 0.8 % — SIGNIFICANT CHANGE UP (ref 0–2)
BILIRUB SERPL-MCNC: 0.7 MG/DL — SIGNIFICANT CHANGE UP (ref 0.2–1.2)
BUN SERPL-MCNC: 23 MG/DL — SIGNIFICANT CHANGE UP (ref 7–23)
CALCIUM SERPL-MCNC: 8.5 MG/DL — SIGNIFICANT CHANGE UP (ref 8.4–10.5)
CHLORIDE SERPL-SCNC: 99 MMOL/L — SIGNIFICANT CHANGE UP (ref 96–108)
CO2 SERPL-SCNC: 24 MMOL/L — SIGNIFICANT CHANGE UP (ref 22–31)
CREAT SERPL-MCNC: 0.75 MG/DL — SIGNIFICANT CHANGE UP (ref 0.5–1.3)
EGFR: 99 ML/MIN/1.73M2 — SIGNIFICANT CHANGE UP
EGFR: 99 ML/MIN/1.73M2 — SIGNIFICANT CHANGE UP
EOSINOPHIL # BLD AUTO: 0.12 K/UL — SIGNIFICANT CHANGE UP (ref 0–0.5)
EOSINOPHIL NFR BLD AUTO: 5 % — SIGNIFICANT CHANGE UP (ref 0–6)
GLUCOSE SERPL-MCNC: 113 MG/DL — HIGH (ref 70–99)
HCT VFR BLD CALC: 22.7 % — LOW (ref 39–50)
HGB BLD-MCNC: 7.2 G/DL — LOW (ref 13–17)
IMM GRANULOCYTES # BLD AUTO: 0.03 K/UL — SIGNIFICANT CHANGE UP (ref 0–0.07)
IMM GRANULOCYTES NFR BLD AUTO: 1.3 % — HIGH (ref 0–0.9)
IMMATURE PLATELET FRACTION #: 3.4 K/UL — LOW (ref 3.9–12.5)
IMMATURE PLATELET FRACTION %: 5.7 % — SIGNIFICANT CHANGE UP (ref 1.6–7.1)
LDH SERPL L TO P-CCNC: 431 U/L — HIGH (ref 50–242)
LYMPHOCYTES # BLD AUTO: 0.19 K/UL — LOW (ref 1–3.3)
LYMPHOCYTES NFR BLD AUTO: 7.9 % — LOW (ref 13–44)
MAGNESIUM SERPL-MCNC: 2.3 MG/DL — SIGNIFICANT CHANGE UP (ref 1.6–2.6)
MCHC RBC-ENTMCNC: 26 PG — LOW (ref 27–34)
MCHC RBC-ENTMCNC: 31.7 G/DL — LOW (ref 32–36)
MCV RBC AUTO: 81.9 FL — SIGNIFICANT CHANGE UP (ref 80–100)
MONOCYTES # BLD AUTO: 0.9 K/UL — SIGNIFICANT CHANGE UP (ref 0–0.9)
MONOCYTES NFR BLD AUTO: 37.5 % — HIGH (ref 2–14)
NEUTROPHILS # BLD AUTO: 1.14 K/UL — LOW (ref 1.8–7.4)
NEUTROPHILS NFR BLD AUTO: 47.5 % — SIGNIFICANT CHANGE UP (ref 43–77)
NRBC # BLD AUTO: 0 K/UL — SIGNIFICANT CHANGE UP (ref 0–0)
NRBC # FLD: 0 K/UL — SIGNIFICANT CHANGE UP (ref 0–0)
NRBC BLD AUTO-RTO: 0 /100 WBCS — SIGNIFICANT CHANGE UP (ref 0–0)
PHOSPHATE SERPL-MCNC: 2.5 MG/DL — SIGNIFICANT CHANGE UP (ref 2.5–4.5)
PLATELET # BLD AUTO: 59 K/UL — LOW (ref 150–400)
PMV BLD: 10.3 FL — SIGNIFICANT CHANGE UP (ref 7–13)
POTASSIUM SERPL-MCNC: 3.8 MMOL/L — SIGNIFICANT CHANGE UP (ref 3.5–5.3)
POTASSIUM SERPL-SCNC: 3.8 MMOL/L — SIGNIFICANT CHANGE UP (ref 3.5–5.3)
PROT SERPL-MCNC: 5.2 G/DL — LOW (ref 6–8.3)
RBC # BLD: 2.77 M/UL — LOW (ref 4.2–5.8)
RBC # FLD: 18.1 % — HIGH (ref 10.3–14.5)
SODIUM SERPL-SCNC: 137 MMOL/L — SIGNIFICANT CHANGE UP (ref 135–145)
WBC # BLD: 2.4 K/UL — LOW (ref 3.8–10.5)
WBC # FLD AUTO: 2.4 K/UL — LOW (ref 3.8–10.5)

## 2025-07-27 PROCEDURE — 99232 SBSQ HOSP IP/OBS MODERATE 35: CPT

## 2025-07-27 RX ORDER — BUMETANIDE 1 MG/1
1 TABLET ORAL ONCE
Refills: 0 | Status: COMPLETED | OUTPATIENT
Start: 2025-07-27 | End: 2025-07-27

## 2025-07-27 RX ADMIN — TAMSULOSIN HYDROCHLORIDE 0.4 MILLIGRAM(S): 0.4 CAPSULE ORAL at 20:54

## 2025-07-27 RX ADMIN — Medication 800 MILLIGRAM(S): at 05:23

## 2025-07-27 RX ADMIN — Medication 40 MILLIGRAM(S): at 05:23

## 2025-07-27 RX ADMIN — Medication 15 MILLILITER(S): at 05:23

## 2025-07-27 RX ADMIN — Medication 15 MILLILITER(S): at 12:52

## 2025-07-27 RX ADMIN — TACROLIMUS 0.5 MILLIGRAM(S): 0.5 CAPSULE ORAL at 08:09

## 2025-07-27 RX ADMIN — BUMETANIDE 1 MILLIGRAM(S): 1 TABLET ORAL at 17:08

## 2025-07-27 RX ADMIN — WHITE PETROLATUM 1 APPLICATION(S): 1 OINTMENT TOPICAL at 12:55

## 2025-07-27 RX ADMIN — OXYCODONE HYDROCHLORIDE 5 MILLIGRAM(S): 30 TABLET ORAL at 21:00

## 2025-07-27 RX ADMIN — BUMETANIDE 1 MILLIGRAM(S): 1 TABLET ORAL at 11:17

## 2025-07-27 RX ADMIN — Medication 800 MILLIGRAM(S): at 18:00

## 2025-07-27 RX ADMIN — OXYCODONE HYDROCHLORIDE 5 MILLIGRAM(S): 30 TABLET ORAL at 20:30

## 2025-07-27 RX ADMIN — POSACONAZOLE 300 MILLIGRAM(S): 100 TABLET, DELAYED RELEASE ORAL at 12:55

## 2025-07-27 RX ADMIN — Medication 5 MILLILITER(S): at 18:00

## 2025-07-27 RX ADMIN — TACROLIMUS 0.5 MILLIGRAM(S): 0.5 CAPSULE ORAL at 19:35

## 2025-07-27 RX ADMIN — Medication 125 MILLIGRAM(S): at 18:01

## 2025-07-27 RX ADMIN — LOPERAMIDE HCL 2 MILLIGRAM(S): 1 SOLUTION ORAL at 11:17

## 2025-07-27 RX ADMIN — Medication 5 MILLILITER(S): at 12:52

## 2025-07-27 RX ADMIN — Medication 15 MILLILITER(S): at 20:52

## 2025-07-27 RX ADMIN — DULOXETINE 60 MILLIGRAM(S): 20 CAPSULE, DELAYED RELEASE ORAL at 12:53

## 2025-07-27 RX ADMIN — Medication 125 MILLIGRAM(S): at 05:24

## 2025-07-27 RX ADMIN — Medication 1 APPLICATION(S): at 08:09

## 2025-07-27 RX ADMIN — Medication 15 MILLILITER(S): at 08:08

## 2025-07-27 RX ADMIN — Medication 15 MILLILITER(S): at 18:01

## 2025-07-27 RX ADMIN — Medication 15 MILLILITER(S): at 16:17

## 2025-07-27 RX ADMIN — Medication 1 TABLET(S): at 12:54

## 2025-07-27 RX ADMIN — Medication 5 MILLILITER(S): at 05:23

## 2025-07-28 ENCOUNTER — APPOINTMENT (OUTPATIENT)
Dept: INFUSION THERAPY | Facility: HOSPITAL | Age: 70
End: 2025-07-28

## 2025-07-28 ENCOUNTER — APPOINTMENT (OUTPATIENT)
Dept: HEMATOLOGY ONCOLOGY | Facility: CLINIC | Age: 70
End: 2025-07-28

## 2025-07-28 LAB
ALBUMIN SERPL ELPH-MCNC: 3.4 G/DL — SIGNIFICANT CHANGE UP (ref 3.3–5)
ALP SERPL-CCNC: 100 U/L — SIGNIFICANT CHANGE UP (ref 40–120)
ALT FLD-CCNC: 24 U/L — SIGNIFICANT CHANGE UP (ref 10–45)
ANION GAP SERPL CALC-SCNC: 15 MMOL/L — SIGNIFICANT CHANGE UP (ref 5–17)
APTT BLD: 31.6 SEC — SIGNIFICANT CHANGE UP (ref 26.1–36.8)
AST SERPL-CCNC: 31 U/L — SIGNIFICANT CHANGE UP (ref 10–40)
BASOPHILS # BLD AUTO: 0.01 K/UL — SIGNIFICANT CHANGE UP (ref 0–0.2)
BASOPHILS # BLD MANUAL: 0.06 K/UL — SIGNIFICANT CHANGE UP (ref 0–0.2)
BASOPHILS NFR BLD AUTO: 0.5 % — SIGNIFICANT CHANGE UP (ref 0–2)
BASOPHILS NFR BLD MANUAL: 3.3 % — HIGH (ref 0–2)
BILIRUB SERPL-MCNC: 0.8 MG/DL — SIGNIFICANT CHANGE UP (ref 0.2–1.2)
BUN SERPL-MCNC: 33 MG/DL — HIGH (ref 7–23)
CALCIUM SERPL-MCNC: 8.6 MG/DL — SIGNIFICANT CHANGE UP (ref 8.4–10.5)
CHLORIDE SERPL-SCNC: 98 MMOL/L — SIGNIFICANT CHANGE UP (ref 96–108)
CO2 SERPL-SCNC: 24 MMOL/L — SIGNIFICANT CHANGE UP (ref 22–31)
CREAT SERPL-MCNC: 0.94 MG/DL — SIGNIFICANT CHANGE UP (ref 0.5–1.3)
EGFR: 89 ML/MIN/1.73M2 — SIGNIFICANT CHANGE UP
EGFR: 89 ML/MIN/1.73M2 — SIGNIFICANT CHANGE UP
EOSINOPHIL # BLD AUTO: 0.14 K/UL — SIGNIFICANT CHANGE UP (ref 0–0.5)
EOSINOPHIL # BLD MANUAL: 0.12 K/UL — SIGNIFICANT CHANGE UP (ref 0–0.5)
EOSINOPHIL NFR BLD AUTO: 7.4 % — HIGH (ref 0–6)
EOSINOPHIL NFR BLD MANUAL: 6.5 % — HIGH (ref 0–6)
GALACTOMANNAN AG SERPL-ACNC: 0.09 INDEX — SIGNIFICANT CHANGE UP (ref 0–0.49)
GLUCOSE SERPL-MCNC: 109 MG/DL — HIGH (ref 70–99)
HCT VFR BLD CALC: 22 % — LOW (ref 39–50)
HGB BLD-MCNC: 7.1 G/DL — LOW (ref 13–17)
IMM GRANULOCYTES # BLD AUTO: 0.09 K/UL — HIGH (ref 0–0.07)
IMM GRANULOCYTES NFR BLD AUTO: 4.8 % — HIGH (ref 0–0.9)
IMMATURE PLATELET FRACTION #: 3.9 K/UL — SIGNIFICANT CHANGE UP (ref 3.9–12.5)
IMMATURE PLATELET FRACTION %: 6.2 % — SIGNIFICANT CHANGE UP (ref 1.6–7.1)
INR BLD: 1.02 RATIO — SIGNIFICANT CHANGE UP (ref 0.85–1.16)
LDH SERPL L TO P-CCNC: 402 U/L — HIGH (ref 50–242)
LYMPHOCYTES # BLD AUTO: 0.17 K/UL — LOW (ref 1–3.3)
LYMPHOCYTES # BLD MANUAL: 0.23 K/UL — LOW (ref 1–3.3)
LYMPHOCYTES NFR BLD AUTO: 9 % — LOW (ref 13–44)
LYMPHOCYTES NFR BLD MANUAL: 12 % — LOW (ref 13–44)
MAGNESIUM SERPL-MCNC: 1.9 MG/DL — SIGNIFICANT CHANGE UP (ref 1.6–2.6)
MCHC RBC-ENTMCNC: 26.6 PG — LOW (ref 27–34)
MCHC RBC-ENTMCNC: 32.3 G/DL — SIGNIFICANT CHANGE UP (ref 32–36)
MCV RBC AUTO: 82.4 FL — SIGNIFICANT CHANGE UP (ref 80–100)
MONOCYTES # BLD AUTO: 0.82 K/UL — SIGNIFICANT CHANGE UP (ref 0–0.9)
MONOCYTES # BLD MANUAL: 0.37 K/UL — SIGNIFICANT CHANGE UP (ref 0–0.9)
MONOCYTES NFR BLD AUTO: 43.6 % — HIGH (ref 2–14)
MONOCYTES NFR BLD MANUAL: 19.6 % — HIGH (ref 2–14)
NEUTROPHILS # BLD AUTO: 0.65 K/UL — LOW (ref 1.8–7.4)
NEUTROPHILS # BLD MANUAL: 1.1 K/UL — LOW (ref 1.8–7.4)
NEUTROPHILS NFR BLD AUTO: 34.7 % — LOW (ref 43–77)
NEUTROPHILS NFR BLD MANUAL: 58.6 % — SIGNIFICANT CHANGE UP (ref 43–77)
NRBC # BLD AUTO: 0 K/UL — SIGNIFICANT CHANGE UP (ref 0–0)
NRBC # FLD: 0 K/UL — SIGNIFICANT CHANGE UP (ref 0–0)
PHOSPHATE SERPL-MCNC: 3.7 MG/DL — SIGNIFICANT CHANGE UP (ref 2.5–4.5)
PLAT MORPH BLD: NORMAL — SIGNIFICANT CHANGE UP
PLATELET # BLD AUTO: 63 K/UL — LOW (ref 150–400)
PMV BLD: SIGNIFICANT CHANGE UP FL (ref 7–13)
POTASSIUM SERPL-MCNC: 3.6 MMOL/L — SIGNIFICANT CHANGE UP (ref 3.5–5.3)
POTASSIUM SERPL-SCNC: 3.6 MMOL/L — SIGNIFICANT CHANGE UP (ref 3.5–5.3)
PROT SERPL-MCNC: 5.3 G/DL — LOW (ref 6–8.3)
PROTHROM AB SERPL-ACNC: 11.6 SEC — SIGNIFICANT CHANGE UP (ref 9.9–13.4)
RBC # BLD: 2.67 M/UL — LOW (ref 4.2–5.8)
RBC # FLD: 18.3 % — HIGH (ref 10.3–14.5)
RBC BLD AUTO: SIGNIFICANT CHANGE UP
SODIUM SERPL-SCNC: 137 MMOL/L — SIGNIFICANT CHANGE UP (ref 135–145)
TACROLIMUS SERPL-MCNC: 9.2 NG/ML — SIGNIFICANT CHANGE UP
WBC # BLD: 1.88 K/UL — LOW (ref 3.8–10.5)
WBC # FLD AUTO: 1.88 K/UL — LOW (ref 3.8–10.5)

## 2025-07-28 PROCEDURE — 83735 ASSAY OF MAGNESIUM: CPT

## 2025-07-28 PROCEDURE — 83615 LACTATE (LD) (LDH) ENZYME: CPT

## 2025-07-28 PROCEDURE — 93005 ELECTROCARDIOGRAM TRACING: CPT

## 2025-07-28 PROCEDURE — 85610 PROTHROMBIN TIME: CPT

## 2025-07-28 PROCEDURE — 97116 GAIT TRAINING THERAPY: CPT

## 2025-07-28 PROCEDURE — 85025 COMPLETE CBC W/AUTO DIFF WBC: CPT

## 2025-07-28 PROCEDURE — 87640 STAPH A DNA AMP PROBE: CPT

## 2025-07-28 PROCEDURE — 87637 SARSCOV2&INF A&B&RSV AMP PRB: CPT

## 2025-07-28 PROCEDURE — 99232 SBSQ HOSP IP/OBS MODERATE 35: CPT | Mod: FS

## 2025-07-28 PROCEDURE — 99221 1ST HOSP IP/OBS SF/LOW 40: CPT

## 2025-07-28 PROCEDURE — 81003 URINALYSIS AUTO W/O SCOPE: CPT

## 2025-07-28 PROCEDURE — 80202 ASSAY OF VANCOMYCIN: CPT

## 2025-07-28 PROCEDURE — P9047: CPT

## 2025-07-28 PROCEDURE — 93306 TTE W/DOPPLER COMPLETE: CPT

## 2025-07-28 PROCEDURE — 82248 BILIRUBIN DIRECT: CPT

## 2025-07-28 PROCEDURE — 71045 X-RAY EXAM CHEST 1 VIEW: CPT

## 2025-07-28 PROCEDURE — 97162 PT EVAL MOD COMPLEX 30 MIN: CPT

## 2025-07-28 PROCEDURE — 80197 ASSAY OF TACROLIMUS: CPT

## 2025-07-28 PROCEDURE — 71045 X-RAY EXAM CHEST 1 VIEW: CPT | Mod: 26

## 2025-07-28 PROCEDURE — 85730 THROMBOPLASTIN TIME PARTIAL: CPT

## 2025-07-28 PROCEDURE — 97530 THERAPEUTIC ACTIVITIES: CPT

## 2025-07-28 PROCEDURE — 0225U NFCT DS DNA&RNA 21 SARSCOV2: CPT

## 2025-07-28 PROCEDURE — 87449 NOS EACH ORGANISM AG IA: CPT

## 2025-07-28 PROCEDURE — 99233 SBSQ HOSP IP/OBS HIGH 50: CPT

## 2025-07-28 PROCEDURE — 94640 AIRWAY INHALATION TREATMENT: CPT

## 2025-07-28 PROCEDURE — 83880 ASSAY OF NATRIURETIC PEPTIDE: CPT

## 2025-07-28 PROCEDURE — 87305 ASPERGILLUS AG IA: CPT

## 2025-07-28 PROCEDURE — 86901 BLOOD TYPING SEROLOGIC RH(D): CPT

## 2025-07-28 PROCEDURE — 36415 COLL VENOUS BLD VENIPUNCTURE: CPT

## 2025-07-28 PROCEDURE — 87641 MR-STAPH DNA AMP PROBE: CPT

## 2025-07-28 PROCEDURE — 71250 CT THORAX DX C-: CPT

## 2025-07-28 PROCEDURE — 87799 DETECT AGENT NOS DNA QUANT: CPT

## 2025-07-28 PROCEDURE — 84100 ASSAY OF PHOSPHORUS: CPT

## 2025-07-28 PROCEDURE — 87040 BLOOD CULTURE FOR BACTERIA: CPT

## 2025-07-28 PROCEDURE — 86850 RBC ANTIBODY SCREEN: CPT

## 2025-07-28 PROCEDURE — 86900 BLOOD TYPING SEROLOGIC ABO: CPT

## 2025-07-28 PROCEDURE — 80053 COMPREHEN METABOLIC PANEL: CPT

## 2025-07-28 PROCEDURE — 87086 URINE CULTURE/COLONY COUNT: CPT

## 2025-07-28 RX ORDER — FILGRASTIM 300 UG/.5ML
300 INJECTION, SOLUTION INTRAVENOUS; SUBCUTANEOUS ONCE
Refills: 0 | Status: COMPLETED | OUTPATIENT
Start: 2025-07-28 | End: 2025-07-28

## 2025-07-28 RX ORDER — OXYCODONE HYDROCHLORIDE 30 MG/1
5 TABLET ORAL EVERY 6 HOURS
Refills: 0 | Status: DISCONTINUED | OUTPATIENT
Start: 2025-07-28 | End: 2025-07-31

## 2025-07-28 RX ADMIN — OXYCODONE HYDROCHLORIDE 5 MILLIGRAM(S): 30 TABLET ORAL at 18:40

## 2025-07-28 RX ADMIN — POSACONAZOLE 300 MILLIGRAM(S): 100 TABLET, DELAYED RELEASE ORAL at 13:28

## 2025-07-28 RX ADMIN — TACROLIMUS 0.5 MILLIGRAM(S): 0.5 CAPSULE ORAL at 19:10

## 2025-07-28 RX ADMIN — Medication 650 MILLIGRAM(S): at 21:00

## 2025-07-28 RX ADMIN — Medication 15 MILLILITER(S): at 21:02

## 2025-07-28 RX ADMIN — Medication 5 MILLILITER(S): at 13:29

## 2025-07-28 RX ADMIN — Medication 15 MILLILITER(S): at 17:33

## 2025-07-28 RX ADMIN — Medication 800 MILLIGRAM(S): at 17:27

## 2025-07-28 RX ADMIN — TACROLIMUS 0.5 MILLIGRAM(S): 0.5 CAPSULE ORAL at 09:09

## 2025-07-28 RX ADMIN — DULOXETINE 60 MILLIGRAM(S): 20 CAPSULE, DELAYED RELEASE ORAL at 13:28

## 2025-07-28 RX ADMIN — FILGRASTIM 300 MICROGRAM(S): 300 INJECTION, SOLUTION INTRAVENOUS; SUBCUTANEOUS at 13:27

## 2025-07-28 RX ADMIN — TAMSULOSIN HYDROCHLORIDE 0.4 MILLIGRAM(S): 0.4 CAPSULE ORAL at 21:03

## 2025-07-28 RX ADMIN — Medication 15 MILLILITER(S): at 17:27

## 2025-07-28 RX ADMIN — Medication 40 MILLIGRAM(S): at 05:33

## 2025-07-28 RX ADMIN — Medication 650 MILLIGRAM(S): at 20:30

## 2025-07-28 RX ADMIN — WHITE PETROLATUM 1 APPLICATION(S): 1 OINTMENT TOPICAL at 13:29

## 2025-07-28 RX ADMIN — Medication 5 MILLILITER(S): at 17:28

## 2025-07-28 RX ADMIN — Medication 40 MILLIEQUIVALENT(S): at 11:44

## 2025-07-28 RX ADMIN — Medication 15 MILLILITER(S): at 09:10

## 2025-07-28 RX ADMIN — Medication 125 MILLIGRAM(S): at 05:33

## 2025-07-28 RX ADMIN — Medication 5 MILLILITER(S): at 05:33

## 2025-07-28 RX ADMIN — Medication 1 APPLICATION(S): at 09:10

## 2025-07-28 RX ADMIN — Medication 15 MILLILITER(S): at 13:28

## 2025-07-28 RX ADMIN — Medication 800 MILLIGRAM(S): at 05:33

## 2025-07-28 RX ADMIN — OXYCODONE HYDROCHLORIDE 5 MILLIGRAM(S): 30 TABLET ORAL at 20:10

## 2025-07-28 RX ADMIN — Medication 15 MILLILITER(S): at 05:34

## 2025-07-28 RX ADMIN — Medication 1 TABLET(S): at 13:28

## 2025-07-28 RX ADMIN — Medication 125 MILLIGRAM(S): at 17:27

## 2025-07-29 ENCOUNTER — TRANSCRIPTION ENCOUNTER (OUTPATIENT)
Age: 70
End: 2025-07-29

## 2025-07-29 LAB
ALBUMIN SERPL ELPH-MCNC: 3.3 G/DL — SIGNIFICANT CHANGE UP (ref 3.3–5)
ALP SERPL-CCNC: 106 U/L — SIGNIFICANT CHANGE UP (ref 40–120)
ALT FLD-CCNC: 26 U/L — SIGNIFICANT CHANGE UP (ref 10–45)
ANION GAP SERPL CALC-SCNC: 13 MMOL/L — SIGNIFICANT CHANGE UP (ref 5–17)
AST SERPL-CCNC: 33 U/L — SIGNIFICANT CHANGE UP (ref 10–40)
BASOPHILS # BLD AUTO: 0.09 K/UL — SIGNIFICANT CHANGE UP (ref 0–0.2)
BASOPHILS # BLD MANUAL: 0 K/UL — SIGNIFICANT CHANGE UP (ref 0–0.2)
BASOPHILS NFR BLD AUTO: 0.8 % — SIGNIFICANT CHANGE UP (ref 0–2)
BASOPHILS NFR BLD MANUAL: 0 % — SIGNIFICANT CHANGE UP (ref 0–2)
BILIRUB SERPL-MCNC: 0.8 MG/DL — SIGNIFICANT CHANGE UP (ref 0.2–1.2)
BUN SERPL-MCNC: 44 MG/DL — HIGH (ref 7–23)
CALCIUM SERPL-MCNC: 8.7 MG/DL — SIGNIFICANT CHANGE UP (ref 8.4–10.5)
CHLORIDE SERPL-SCNC: 101 MMOL/L — SIGNIFICANT CHANGE UP (ref 96–108)
CO2 SERPL-SCNC: 23 MMOL/L — SIGNIFICANT CHANGE UP (ref 22–31)
CREAT SERPL-MCNC: 1.07 MG/DL — SIGNIFICANT CHANGE UP (ref 0.5–1.3)
EGFR: 76 ML/MIN/1.73M2 — SIGNIFICANT CHANGE UP
EGFR: 76 ML/MIN/1.73M2 — SIGNIFICANT CHANGE UP
EOSINOPHIL # BLD AUTO: 0.16 K/UL — SIGNIFICANT CHANGE UP (ref 0–0.5)
EOSINOPHIL # BLD MANUAL: 0 K/UL — SIGNIFICANT CHANGE UP (ref 0–0.5)
EOSINOPHIL NFR BLD AUTO: 1.4 % — SIGNIFICANT CHANGE UP (ref 0–6)
EOSINOPHIL NFR BLD MANUAL: 0 % — SIGNIFICANT CHANGE UP (ref 0–6)
GLUCOSE SERPL-MCNC: 96 MG/DL — SIGNIFICANT CHANGE UP (ref 70–99)
HCT VFR BLD CALC: 22.3 % — LOW (ref 39–50)
HGB BLD-MCNC: 7.1 G/DL — LOW (ref 13–17)
IMM GRANULOCYTES # BLD AUTO: 1.09 K/UL — HIGH (ref 0–0.07)
IMM GRANULOCYTES NFR BLD AUTO: 9.8 % — HIGH (ref 0–0.9)
IMMATURE PLATELET FRACTION #: 3.5 K/UL — LOW (ref 3.9–12.5)
IMMATURE PLATELET FRACTION %: 6.4 % — SIGNIFICANT CHANGE UP (ref 1.6–7.1)
LDH SERPL L TO P-CCNC: 626 U/L — HIGH (ref 50–242)
LYMPHOCYTES # BLD AUTO: 0.19 K/UL — LOW (ref 1–3.3)
LYMPHOCYTES # BLD MANUAL: 0.18 K/UL — LOW (ref 1–3.3)
LYMPHOCYTES NFR BLD AUTO: 1.7 % — LOW (ref 13–44)
LYMPHOCYTES NFR BLD MANUAL: 1.6 % — LOW (ref 13–44)
MAGNESIUM SERPL-MCNC: 1.9 MG/DL — SIGNIFICANT CHANGE UP (ref 1.6–2.6)
MANUAL NEUTROPHIL BANDS #: 0.09 K/UL — SIGNIFICANT CHANGE UP (ref 0–0.84)
MCHC RBC-ENTMCNC: 26.2 PG — LOW (ref 27–34)
MCHC RBC-ENTMCNC: 31.8 G/DL — LOW (ref 32–36)
MCV RBC AUTO: 82.3 FL — SIGNIFICANT CHANGE UP (ref 80–100)
MONOCYTES # BLD AUTO: 0.81 K/UL — SIGNIFICANT CHANGE UP (ref 0–0.9)
MONOCYTES # BLD MANUAL: 0.45 K/UL — SIGNIFICANT CHANGE UP (ref 0–0.9)
MONOCYTES NFR BLD AUTO: 7.3 % — SIGNIFICANT CHANGE UP (ref 2–14)
MONOCYTES NFR BLD MANUAL: 4 % — SIGNIFICANT CHANGE UP (ref 2–14)
NEUTROPHILS # BLD AUTO: 8.82 K/UL — HIGH (ref 1.8–7.4)
NEUTROPHILS # BLD MANUAL: 10.45 K/UL — HIGH (ref 1.8–7.4)
NEUTROPHILS NFR BLD AUTO: 79 % — HIGH (ref 43–77)
NEUTROPHILS NFR BLD MANUAL: 93.6 % — HIGH (ref 43–77)
NEUTS BAND # BLD: 0.8 % — SIGNIFICANT CHANGE UP (ref 0–8)
NEUTS BAND NFR BLD: 0.8 % — SIGNIFICANT CHANGE UP (ref 0–8)
NRBC # BLD AUTO: 0 K/UL — SIGNIFICANT CHANGE UP (ref 0–0)
NRBC # FLD: 0 K/UL — SIGNIFICANT CHANGE UP (ref 0–0)
PHOSPHATE SERPL-MCNC: 4 MG/DL — SIGNIFICANT CHANGE UP (ref 2.5–4.5)
PLAT MORPH BLD: NORMAL — SIGNIFICANT CHANGE UP
PLATELET # BLD AUTO: 55 K/UL — LOW (ref 150–400)
PMV BLD: SIGNIFICANT CHANGE UP FL (ref 7–13)
POTASSIUM SERPL-MCNC: 4.1 MMOL/L — SIGNIFICANT CHANGE UP (ref 3.5–5.3)
POTASSIUM SERPL-SCNC: 4.1 MMOL/L — SIGNIFICANT CHANGE UP (ref 3.5–5.3)
PROT SERPL-MCNC: 5.1 G/DL — LOW (ref 6–8.3)
RBC # BLD: 2.71 M/UL — LOW (ref 4.2–5.8)
RBC # FLD: 18.5 % — HIGH (ref 10.3–14.5)
RBC BLD AUTO: SIGNIFICANT CHANGE UP
SODIUM SERPL-SCNC: 137 MMOL/L — SIGNIFICANT CHANGE UP (ref 135–145)
WBC # BLD: 11.16 K/UL — HIGH (ref 3.8–10.5)
WBC # FLD AUTO: 11.16 K/UL — HIGH (ref 3.8–10.5)

## 2025-07-29 PROCEDURE — 99232 SBSQ HOSP IP/OBS MODERATE 35: CPT | Mod: FS

## 2025-07-29 PROCEDURE — 99233 SBSQ HOSP IP/OBS HIGH 50: CPT

## 2025-07-29 PROCEDURE — 99222 1ST HOSP IP/OBS MODERATE 55: CPT

## 2025-07-29 PROCEDURE — 76705 ECHO EXAM OF ABDOMEN: CPT | Mod: 26

## 2025-07-29 RX ADMIN — OXYCODONE HYDROCHLORIDE 5 MILLIGRAM(S): 30 TABLET ORAL at 00:10

## 2025-07-29 RX ADMIN — Medication 1 APPLICATION(S): at 08:44

## 2025-07-29 RX ADMIN — Medication 125 MILLIGRAM(S): at 05:35

## 2025-07-29 RX ADMIN — Medication 40 MILLIGRAM(S): at 05:35

## 2025-07-29 RX ADMIN — OXYCODONE HYDROCHLORIDE 5 MILLIGRAM(S): 30 TABLET ORAL at 21:01

## 2025-07-29 RX ADMIN — Medication 15 MILLILITER(S): at 12:26

## 2025-07-29 RX ADMIN — Medication 15 MILLILITER(S): at 08:43

## 2025-07-29 RX ADMIN — TACROLIMUS 0.5 MILLIGRAM(S): 0.5 CAPSULE ORAL at 08:44

## 2025-07-29 RX ADMIN — TACROLIMUS 0.5 MILLIGRAM(S): 0.5 CAPSULE ORAL at 19:51

## 2025-07-29 RX ADMIN — Medication 15 MILLILITER(S): at 18:10

## 2025-07-29 RX ADMIN — Medication 125 MILLIGRAM(S): at 18:10

## 2025-07-29 RX ADMIN — Medication 5 MILLILITER(S): at 12:27

## 2025-07-29 RX ADMIN — OXYCODONE HYDROCHLORIDE 5 MILLIGRAM(S): 30 TABLET ORAL at 14:18

## 2025-07-29 RX ADMIN — Medication 5 MILLILITER(S): at 23:03

## 2025-07-29 RX ADMIN — Medication 15 MILLILITER(S): at 05:34

## 2025-07-29 RX ADMIN — Medication 5 MILLILITER(S): at 05:35

## 2025-07-29 RX ADMIN — TAMSULOSIN HYDROCHLORIDE 0.4 MILLIGRAM(S): 0.4 CAPSULE ORAL at 21:01

## 2025-07-29 RX ADMIN — Medication 800 MILLIGRAM(S): at 18:10

## 2025-07-29 RX ADMIN — Medication 5 MILLILITER(S): at 18:10

## 2025-07-29 RX ADMIN — Medication 15 MILLILITER(S): at 19:52

## 2025-07-29 RX ADMIN — OXYCODONE HYDROCHLORIDE 5 MILLIGRAM(S): 30 TABLET ORAL at 15:00

## 2025-07-29 RX ADMIN — OXYCODONE HYDROCHLORIDE 5 MILLIGRAM(S): 30 TABLET ORAL at 22:00

## 2025-07-29 RX ADMIN — DULOXETINE 60 MILLIGRAM(S): 20 CAPSULE, DELAYED RELEASE ORAL at 12:25

## 2025-07-29 RX ADMIN — Medication 800 MILLIGRAM(S): at 05:35

## 2025-07-29 RX ADMIN — POSACONAZOLE 300 MILLIGRAM(S): 100 TABLET, DELAYED RELEASE ORAL at 12:25

## 2025-07-29 RX ADMIN — Medication 15 MILLILITER(S): at 23:03

## 2025-07-29 RX ADMIN — WHITE PETROLATUM 1 APPLICATION(S): 1 OINTMENT TOPICAL at 12:29

## 2025-07-29 RX ADMIN — Medication 1 TABLET(S): at 12:25

## 2025-07-29 RX ADMIN — OXYCODONE HYDROCHLORIDE 5 MILLIGRAM(S): 30 TABLET ORAL at 00:30

## 2025-07-30 LAB
ALBUMIN SERPL ELPH-MCNC: 3.5 G/DL — SIGNIFICANT CHANGE UP (ref 3.3–5)
ALP SERPL-CCNC: 118 U/L — SIGNIFICANT CHANGE UP (ref 40–120)
ALT FLD-CCNC: 26 U/L — SIGNIFICANT CHANGE UP (ref 10–45)
ANION GAP SERPL CALC-SCNC: 13 MMOL/L — SIGNIFICANT CHANGE UP (ref 5–17)
AST SERPL-CCNC: 35 U/L — SIGNIFICANT CHANGE UP (ref 10–40)
BASOPHILS # BLD AUTO: 0.06 K/UL — SIGNIFICANT CHANGE UP (ref 0–0.2)
BASOPHILS # BLD MANUAL: 0.09 K/UL — SIGNIFICANT CHANGE UP (ref 0–0.2)
BASOPHILS NFR BLD AUTO: 0.5 % — SIGNIFICANT CHANGE UP (ref 0–2)
BASOPHILS NFR BLD MANUAL: 0.8 % — SIGNIFICANT CHANGE UP (ref 0–2)
BILIRUB DIRECT SERPL-MCNC: 0.3 MG/DL — SIGNIFICANT CHANGE UP (ref 0–0.3)
BILIRUB INDIRECT FLD-MCNC: 0.5 MG/DL — SIGNIFICANT CHANGE UP (ref 0.2–1)
BILIRUB SERPL-MCNC: 0.8 MG/DL — SIGNIFICANT CHANGE UP (ref 0.2–1.2)
BLD GP AB SCN SERPL QL: NEGATIVE — SIGNIFICANT CHANGE UP
BUN SERPL-MCNC: 43 MG/DL — HIGH (ref 7–23)
CALCIUM SERPL-MCNC: 8.7 MG/DL — SIGNIFICANT CHANGE UP (ref 8.4–10.5)
CHLORIDE SERPL-SCNC: 101 MMOL/L — SIGNIFICANT CHANGE UP (ref 96–108)
CO2 SERPL-SCNC: 23 MMOL/L — SIGNIFICANT CHANGE UP (ref 22–31)
CREAT SERPL-MCNC: 0.91 MG/DL — SIGNIFICANT CHANGE UP (ref 0.5–1.3)
EGFR: 92 ML/MIN/1.73M2 — SIGNIFICANT CHANGE UP
EGFR: 92 ML/MIN/1.73M2 — SIGNIFICANT CHANGE UP
EOSINOPHIL # BLD AUTO: 0.12 K/UL — SIGNIFICANT CHANGE UP (ref 0–0.5)
EOSINOPHIL # BLD MANUAL: 0.19 K/UL — SIGNIFICANT CHANGE UP (ref 0–0.5)
EOSINOPHIL NFR BLD AUTO: 1.1 % — SIGNIFICANT CHANGE UP (ref 0–6)
EOSINOPHIL NFR BLD MANUAL: 1.7 % — SIGNIFICANT CHANGE UP (ref 0–6)
GIANT PLATELETS BLD QL SMEAR: PRESENT
GLUCOSE SERPL-MCNC: 109 MG/DL — HIGH (ref 70–99)
HCT VFR BLD CALC: 21.6 % — LOW (ref 39–50)
HGB BLD-MCNC: 6.7 G/DL — CRITICAL LOW (ref 13–17)
IMM GRANULOCYTES # BLD AUTO: 0.43 K/UL — HIGH (ref 0–0.07)
IMM GRANULOCYTES NFR BLD AUTO: 3.9 % — HIGH (ref 0–0.9)
IMMATURE PLATELET FRACTION #: 3.6 K/UL — LOW (ref 3.9–12.5)
IMMATURE PLATELET FRACTION %: 7.9 % — HIGH (ref 1.6–7.1)
LDH SERPL L TO P-CCNC: 529 U/L — HIGH (ref 50–242)
LYMPHOCYTES # BLD AUTO: 0.27 K/UL — LOW (ref 1–3.3)
LYMPHOCYTES # BLD MANUAL: 0.46 K/UL — LOW (ref 1–3.3)
LYMPHOCYTES NFR BLD AUTO: 2.5 % — LOW (ref 13–44)
LYMPHOCYTES NFR BLD MANUAL: 4.2 % — LOW (ref 13–44)
MAGNESIUM SERPL-MCNC: 2 MG/DL — SIGNIFICANT CHANGE UP (ref 1.6–2.6)
MANUAL NEUTROPHIL BANDS #: 0.09 K/UL — SIGNIFICANT CHANGE UP (ref 0–0.84)
MCHC RBC-ENTMCNC: 25.5 PG — LOW (ref 27–34)
MCHC RBC-ENTMCNC: 31 G/DL — LOW (ref 32–36)
MCV RBC AUTO: 82.1 FL — SIGNIFICANT CHANGE UP (ref 80–100)
MONOCYTES # BLD AUTO: 0.74 K/UL — SIGNIFICANT CHANGE UP (ref 0–0.9)
MONOCYTES # BLD MANUAL: 0.55 K/UL — SIGNIFICANT CHANGE UP (ref 0–0.9)
MONOCYTES NFR BLD AUTO: 6.7 % — SIGNIFICANT CHANGE UP (ref 2–14)
MONOCYTES NFR BLD MANUAL: 5 % — SIGNIFICANT CHANGE UP (ref 2–14)
NEUTROPHILS # BLD AUTO: 9.39 K/UL — HIGH (ref 1.8–7.4)
NEUTROPHILS # BLD MANUAL: 9.63 K/UL — HIGH (ref 1.8–7.4)
NEUTROPHILS NFR BLD AUTO: 85.3 % — HIGH (ref 43–77)
NEUTROPHILS NFR BLD MANUAL: 87.5 % — HIGH (ref 43–77)
NEUTS BAND # BLD: 0.8 % — SIGNIFICANT CHANGE UP (ref 0–8)
NEUTS BAND NFR BLD: 0.8 % — SIGNIFICANT CHANGE UP (ref 0–8)
NRBC # BLD AUTO: 0 K/UL — SIGNIFICANT CHANGE UP (ref 0–0)
NRBC # FLD: 0 K/UL — SIGNIFICANT CHANGE UP (ref 0–0)
NRBC BLD AUTO-RTO: 0 /100 WBCS — SIGNIFICANT CHANGE UP (ref 0–0)
PHOSPHATE SERPL-MCNC: 3.6 MG/DL — SIGNIFICANT CHANGE UP (ref 2.5–4.5)
PLAT MORPH BLD: ABNORMAL
PLATELET # BLD AUTO: 46 K/UL — LOW (ref 150–400)
PMV BLD: SIGNIFICANT CHANGE UP FL (ref 7–13)
POTASSIUM SERPL-MCNC: 4.2 MMOL/L — SIGNIFICANT CHANGE UP (ref 3.5–5.3)
POTASSIUM SERPL-SCNC: 4.2 MMOL/L — SIGNIFICANT CHANGE UP (ref 3.5–5.3)
PROT SERPL-MCNC: 5.2 G/DL — LOW (ref 6–8.3)
RBC # BLD: 2.63 M/UL — LOW (ref 4.2–5.8)
RBC # FLD: 18.6 % — HIGH (ref 10.3–14.5)
RBC BLD AUTO: SIGNIFICANT CHANGE UP
RH IG SCN BLD-IMP: POSITIVE — SIGNIFICANT CHANGE UP
SODIUM SERPL-SCNC: 137 MMOL/L — SIGNIFICANT CHANGE UP (ref 135–145)
WBC # BLD: 11.01 K/UL — HIGH (ref 3.8–10.5)
WBC # FLD AUTO: 11.01 K/UL — HIGH (ref 3.8–10.5)

## 2025-07-30 PROCEDURE — 99231 SBSQ HOSP IP/OBS SF/LOW 25: CPT | Mod: FS

## 2025-07-30 PROCEDURE — 99233 SBSQ HOSP IP/OBS HIGH 50: CPT

## 2025-07-30 RX ADMIN — Medication 800 MILLIGRAM(S): at 17:23

## 2025-07-30 RX ADMIN — TACROLIMUS 0.5 MILLIGRAM(S): 0.5 CAPSULE ORAL at 07:57

## 2025-07-30 RX ADMIN — Medication 1 APPLICATION(S): at 07:57

## 2025-07-30 RX ADMIN — Medication 15 MILLILITER(S): at 11:06

## 2025-07-30 RX ADMIN — Medication 15 MILLILITER(S): at 15:46

## 2025-07-30 RX ADMIN — OXYCODONE HYDROCHLORIDE 5 MILLIGRAM(S): 30 TABLET ORAL at 20:20

## 2025-07-30 RX ADMIN — POSACONAZOLE 300 MILLIGRAM(S): 100 TABLET, DELAYED RELEASE ORAL at 11:06

## 2025-07-30 RX ADMIN — Medication 15 MILLILITER(S): at 17:24

## 2025-07-30 RX ADMIN — OXYCODONE HYDROCHLORIDE 5 MILLIGRAM(S): 30 TABLET ORAL at 19:33

## 2025-07-30 RX ADMIN — Medication 15 MILLILITER(S): at 07:57

## 2025-07-30 RX ADMIN — WHITE PETROLATUM 1 APPLICATION(S): 1 OINTMENT TOPICAL at 11:07

## 2025-07-30 RX ADMIN — Medication 15 MILLILITER(S): at 19:32

## 2025-07-30 RX ADMIN — Medication 15 MILLILITER(S): at 05:20

## 2025-07-30 RX ADMIN — TAMSULOSIN HYDROCHLORIDE 0.4 MILLIGRAM(S): 0.4 CAPSULE ORAL at 21:05

## 2025-07-30 RX ADMIN — Medication 125 MILLIGRAM(S): at 05:20

## 2025-07-30 RX ADMIN — Medication 5 MILLILITER(S): at 05:21

## 2025-07-30 RX ADMIN — Medication 40 MILLIGRAM(S): at 06:21

## 2025-07-30 RX ADMIN — Medication 5 MILLILITER(S): at 11:05

## 2025-07-30 RX ADMIN — Medication 1 TABLET(S): at 11:10

## 2025-07-30 RX ADMIN — TACROLIMUS 0.5 MILLIGRAM(S): 0.5 CAPSULE ORAL at 19:33

## 2025-07-30 RX ADMIN — DULOXETINE 60 MILLIGRAM(S): 20 CAPSULE, DELAYED RELEASE ORAL at 11:10

## 2025-07-30 RX ADMIN — Medication 800 MILLIGRAM(S): at 05:21

## 2025-07-30 RX ADMIN — Medication 5 MILLILITER(S): at 17:23

## 2025-07-31 ENCOUNTER — TRANSCRIPTION ENCOUNTER (OUTPATIENT)
Age: 70
End: 2025-07-31

## 2025-07-31 VITALS
DIASTOLIC BLOOD PRESSURE: 74 MMHG | TEMPERATURE: 98 F | RESPIRATION RATE: 17 BRPM | HEART RATE: 86 BPM | OXYGEN SATURATION: 99 % | SYSTOLIC BLOOD PRESSURE: 104 MMHG

## 2025-07-31 LAB
ALBUMIN SERPL ELPH-MCNC: 3.2 G/DL — LOW (ref 3.3–5)
ALP SERPL-CCNC: 123 U/L — HIGH (ref 40–120)
ALT FLD-CCNC: 25 U/L — SIGNIFICANT CHANGE UP (ref 10–45)
ANION GAP SERPL CALC-SCNC: 11 MMOL/L — SIGNIFICANT CHANGE UP (ref 5–17)
APTT BLD: 30.6 SEC — SIGNIFICANT CHANGE UP (ref 26.1–36.8)
AST SERPL-CCNC: 33 U/L — SIGNIFICANT CHANGE UP (ref 10–40)
BASOPHILS # BLD AUTO: 0.04 K/UL — SIGNIFICANT CHANGE UP (ref 0–0.2)
BASOPHILS NFR BLD AUTO: 0.6 % — SIGNIFICANT CHANGE UP (ref 0–2)
BILIRUB SERPL-MCNC: 0.8 MG/DL — SIGNIFICANT CHANGE UP (ref 0.2–1.2)
BUN SERPL-MCNC: 36 MG/DL — HIGH (ref 7–23)
CALCIUM SERPL-MCNC: 8.7 MG/DL — SIGNIFICANT CHANGE UP (ref 8.4–10.5)
CHLORIDE SERPL-SCNC: 101 MMOL/L — SIGNIFICANT CHANGE UP (ref 96–108)
CO2 SERPL-SCNC: 23 MMOL/L — SIGNIFICANT CHANGE UP (ref 22–31)
CREAT SERPL-MCNC: 0.74 MG/DL — SIGNIFICANT CHANGE UP (ref 0.5–1.3)
EGFR: 99 ML/MIN/1.73M2 — SIGNIFICANT CHANGE UP
EGFR: 99 ML/MIN/1.73M2 — SIGNIFICANT CHANGE UP
EOSINOPHIL # BLD AUTO: 0.07 K/UL — SIGNIFICANT CHANGE UP (ref 0–0.5)
EOSINOPHIL NFR BLD AUTO: 1 % — SIGNIFICANT CHANGE UP (ref 0–6)
GLUCOSE SERPL-MCNC: 104 MG/DL — HIGH (ref 70–99)
HCT VFR BLD CALC: 24.5 % — LOW (ref 39–50)
HGB BLD-MCNC: 8 G/DL — LOW (ref 13–17)
IMM GRANULOCYTES # BLD AUTO: 0.3 K/UL — HIGH (ref 0–0.07)
IMM GRANULOCYTES NFR BLD AUTO: 4.2 % — HIGH (ref 0–0.9)
IMMATURE PLATELET FRACTION #: 3.4 K/UL — LOW (ref 3.9–12.5)
IMMATURE PLATELET FRACTION %: 8.6 % — HIGH (ref 1.6–7.1)
INR BLD: 1.1 RATIO — SIGNIFICANT CHANGE UP (ref 0.85–1.16)
LDH SERPL L TO P-CCNC: 520 U/L — HIGH (ref 50–242)
LYMPHOCYTES # BLD AUTO: 0.27 K/UL — LOW (ref 1–3.3)
LYMPHOCYTES NFR BLD AUTO: 3.8 % — LOW (ref 13–44)
MAGNESIUM SERPL-MCNC: 1.9 MG/DL — SIGNIFICANT CHANGE UP (ref 1.6–2.6)
MCHC RBC-ENTMCNC: 27 PG — SIGNIFICANT CHANGE UP (ref 27–34)
MCHC RBC-ENTMCNC: 32.7 G/DL — SIGNIFICANT CHANGE UP (ref 32–36)
MCV RBC AUTO: 82.8 FL — SIGNIFICANT CHANGE UP (ref 80–100)
MONOCYTES # BLD AUTO: 0.55 K/UL — SIGNIFICANT CHANGE UP (ref 0–0.9)
MONOCYTES NFR BLD AUTO: 7.6 % — SIGNIFICANT CHANGE UP (ref 2–14)
NEUTROPHILS # BLD AUTO: 5.97 K/UL — SIGNIFICANT CHANGE UP (ref 1.8–7.4)
NEUTROPHILS NFR BLD AUTO: 82.8 % — HIGH (ref 43–77)
NRBC # BLD AUTO: 0 K/UL — SIGNIFICANT CHANGE UP (ref 0–0)
NRBC # FLD: 0 K/UL — SIGNIFICANT CHANGE UP (ref 0–0)
PHOSPHATE SERPL-MCNC: 3.2 MG/DL — SIGNIFICANT CHANGE UP (ref 2.5–4.5)
PLATELET # BLD AUTO: 39 K/UL — LOW (ref 150–400)
PMV BLD: SIGNIFICANT CHANGE UP FL (ref 7–13)
POTASSIUM SERPL-MCNC: 4.1 MMOL/L — SIGNIFICANT CHANGE UP (ref 3.5–5.3)
POTASSIUM SERPL-SCNC: 4.1 MMOL/L — SIGNIFICANT CHANGE UP (ref 3.5–5.3)
PROT SERPL-MCNC: 5 G/DL — LOW (ref 6–8.3)
PROTHROM AB SERPL-ACNC: 12.5 SEC — SIGNIFICANT CHANGE UP (ref 9.9–13.4)
RBC # BLD: 2.96 M/UL — LOW (ref 4.2–5.8)
RBC # FLD: 17.9 % — HIGH (ref 10.3–14.5)
SODIUM SERPL-SCNC: 135 MMOL/L — SIGNIFICANT CHANGE UP (ref 135–145)
WBC # BLD: 7.2 K/UL — SIGNIFICANT CHANGE UP (ref 3.8–10.5)
WBC # FLD AUTO: 7.2 K/UL — SIGNIFICANT CHANGE UP (ref 3.8–10.5)

## 2025-07-31 PROCEDURE — P9047: CPT

## 2025-07-31 PROCEDURE — 94640 AIRWAY INHALATION TREATMENT: CPT

## 2025-07-31 PROCEDURE — 84100 ASSAY OF PHOSPHORUS: CPT

## 2025-07-31 PROCEDURE — 97530 THERAPEUTIC ACTIVITIES: CPT

## 2025-07-31 PROCEDURE — 87799 DETECT AGENT NOS DNA QUANT: CPT

## 2025-07-31 PROCEDURE — 86900 BLOOD TYPING SEROLOGIC ABO: CPT

## 2025-07-31 PROCEDURE — 87640 STAPH A DNA AMP PROBE: CPT

## 2025-07-31 PROCEDURE — 0225U NFCT DS DNA&RNA 21 SARSCOV2: CPT

## 2025-07-31 PROCEDURE — 97116 GAIT TRAINING THERAPY: CPT

## 2025-07-31 PROCEDURE — 97162 PT EVAL MOD COMPLEX 30 MIN: CPT

## 2025-07-31 PROCEDURE — 99232 SBSQ HOSP IP/OBS MODERATE 35: CPT

## 2025-07-31 PROCEDURE — 99231 SBSQ HOSP IP/OBS SF/LOW 25: CPT | Mod: FS

## 2025-07-31 PROCEDURE — 85025 COMPLETE CBC W/AUTO DIFF WBC: CPT

## 2025-07-31 PROCEDURE — 87449 NOS EACH ORGANISM AG IA: CPT

## 2025-07-31 PROCEDURE — P9040: CPT

## 2025-07-31 PROCEDURE — 71045 X-RAY EXAM CHEST 1 VIEW: CPT | Mod: 26

## 2025-07-31 PROCEDURE — 93306 TTE W/DOPPLER COMPLETE: CPT

## 2025-07-31 PROCEDURE — 86850 RBC ANTIBODY SCREEN: CPT

## 2025-07-31 PROCEDURE — 86923 COMPATIBILITY TEST ELECTRIC: CPT

## 2025-07-31 PROCEDURE — 81003 URINALYSIS AUTO W/O SCOPE: CPT

## 2025-07-31 PROCEDURE — 80197 ASSAY OF TACROLIMUS: CPT

## 2025-07-31 PROCEDURE — 36430 TRANSFUSION BLD/BLD COMPNT: CPT

## 2025-07-31 PROCEDURE — 76705 ECHO EXAM OF ABDOMEN: CPT

## 2025-07-31 PROCEDURE — 83880 ASSAY OF NATRIURETIC PEPTIDE: CPT

## 2025-07-31 PROCEDURE — 87637 SARSCOV2&INF A&B&RSV AMP PRB: CPT

## 2025-07-31 PROCEDURE — 82248 BILIRUBIN DIRECT: CPT

## 2025-07-31 PROCEDURE — 80202 ASSAY OF VANCOMYCIN: CPT

## 2025-07-31 PROCEDURE — 71250 CT THORAX DX C-: CPT

## 2025-07-31 PROCEDURE — 83615 LACTATE (LD) (LDH) ENZYME: CPT

## 2025-07-31 PROCEDURE — 36415 COLL VENOUS BLD VENIPUNCTURE: CPT

## 2025-07-31 PROCEDURE — 83735 ASSAY OF MAGNESIUM: CPT

## 2025-07-31 PROCEDURE — 99233 SBSQ HOSP IP/OBS HIGH 50: CPT

## 2025-07-31 PROCEDURE — 85730 THROMBOPLASTIN TIME PARTIAL: CPT

## 2025-07-31 PROCEDURE — 86901 BLOOD TYPING SEROLOGIC RH(D): CPT

## 2025-07-31 PROCEDURE — 87040 BLOOD CULTURE FOR BACTERIA: CPT

## 2025-07-31 PROCEDURE — 87305 ASPERGILLUS AG IA: CPT

## 2025-07-31 PROCEDURE — 80053 COMPREHEN METABOLIC PANEL: CPT

## 2025-07-31 PROCEDURE — 93005 ELECTROCARDIOGRAM TRACING: CPT

## 2025-07-31 PROCEDURE — 85610 PROTHROMBIN TIME: CPT

## 2025-07-31 PROCEDURE — 87641 MR-STAPH DNA AMP PROBE: CPT

## 2025-07-31 PROCEDURE — 71045 X-RAY EXAM CHEST 1 VIEW: CPT

## 2025-07-31 PROCEDURE — 87086 URINE CULTURE/COLONY COUNT: CPT

## 2025-07-31 PROCEDURE — 97165 OT EVAL LOW COMPLEX 30 MIN: CPT

## 2025-07-31 RX ORDER — DULOXETINE 20 MG/1
1 CAPSULE, DELAYED RELEASE ORAL
Qty: 0 | Refills: 0 | DISCHARGE
Start: 2025-07-31

## 2025-07-31 RX ORDER — ATOVAQUONE 750 MG/5ML
1500 SUSPENSION ORAL DAILY
Refills: 0 | Status: DISCONTINUED | OUTPATIENT
Start: 2025-07-31 | End: 2025-07-31

## 2025-07-31 RX ORDER — LOPERAMIDE HCL 1 MG/7.5ML
1 SOLUTION ORAL
Qty: 0 | Refills: 0 | DISCHARGE
Start: 2025-07-31

## 2025-07-31 RX ORDER — OXYCODONE HYDROCHLORIDE 30 MG/1
1 TABLET ORAL
Qty: 0 | Refills: 0 | DISCHARGE
Start: 2025-07-31

## 2025-07-31 RX ORDER — ACETAMINOPHEN 500 MG/5ML
2 LIQUID (ML) ORAL
Qty: 0 | Refills: 0 | DISCHARGE
Start: 2025-07-31

## 2025-07-31 RX ORDER — TAMSULOSIN HYDROCHLORIDE 0.4 MG/1
1 CAPSULE ORAL
Qty: 0 | Refills: 0 | DISCHARGE
Start: 2025-07-31

## 2025-07-31 RX ORDER — ATOVAQUONE 750 MG/5ML
10 SUSPENSION ORAL
Qty: 0 | Refills: 0 | DISCHARGE
Start: 2025-07-31

## 2025-07-31 RX ORDER — TACROLIMUS 0.5 MG/1
1 CAPSULE ORAL
Qty: 0 | Refills: 0 | DISCHARGE
Start: 2025-07-31

## 2025-07-31 RX ADMIN — Medication 15 MILLILITER(S): at 16:17

## 2025-07-31 RX ADMIN — Medication 5 MILLILITER(S): at 17:25

## 2025-07-31 RX ADMIN — ATOVAQUONE 1500 MILLIGRAM(S): 750 SUSPENSION ORAL at 12:35

## 2025-07-31 RX ADMIN — Medication 5 MILLILITER(S): at 12:11

## 2025-07-31 RX ADMIN — DULOXETINE 60 MILLIGRAM(S): 20 CAPSULE, DELAYED RELEASE ORAL at 12:12

## 2025-07-31 RX ADMIN — Medication 800 MILLIGRAM(S): at 05:28

## 2025-07-31 RX ADMIN — Medication 800 MILLIGRAM(S): at 17:26

## 2025-07-31 RX ADMIN — WHITE PETROLATUM 1 APPLICATION(S): 1 OINTMENT TOPICAL at 12:14

## 2025-07-31 RX ADMIN — Medication 5 MILLILITER(S): at 05:29

## 2025-07-31 RX ADMIN — Medication 1 APPLICATION(S): at 08:07

## 2025-07-31 RX ADMIN — Medication 15 MILLILITER(S): at 05:29

## 2025-07-31 RX ADMIN — Medication 15 MILLILITER(S): at 08:07

## 2025-07-31 RX ADMIN — Medication 15 MILLILITER(S): at 12:11

## 2025-07-31 RX ADMIN — Medication 40 MILLIGRAM(S): at 05:28

## 2025-07-31 RX ADMIN — Medication 15 MILLILITER(S): at 17:26

## 2025-07-31 RX ADMIN — POSACONAZOLE 300 MILLIGRAM(S): 100 TABLET, DELAYED RELEASE ORAL at 12:13

## 2025-07-31 RX ADMIN — TACROLIMUS 0.5 MILLIGRAM(S): 0.5 CAPSULE ORAL at 08:08

## 2025-08-01 ENCOUNTER — APPOINTMENT (OUTPATIENT)
Dept: HEMATOLOGY ONCOLOGY | Facility: CLINIC | Age: 70
End: 2025-08-01
Payer: MEDICARE

## 2025-08-01 DIAGNOSIS — C83.38 DIFFUSE LARGE B-CELL LYMPHOMA, LYMPH NODES OF MULTIPLE SITES: ICD-10-CM

## 2025-08-01 PROCEDURE — 99214 OFFICE O/P EST MOD 30 MIN: CPT | Mod: 2W

## 2025-08-02 PROBLEM — I82.409 ACUTE EMBOLISM AND THROMBOSIS OF UNSPECIFIED DEEP VEINS OF UNSPECIFIED LOWER EXTREMITY: Chronic | Status: ACTIVE | Noted: 2025-07-31

## 2025-08-02 PROBLEM — M41.9 SCOLIOSIS, UNSPECIFIED: Chronic | Status: ACTIVE | Noted: 2025-07-31

## 2025-08-04 ENCOUNTER — APPOINTMENT (OUTPATIENT)
Dept: INFUSION THERAPY | Facility: HOSPITAL | Age: 70
End: 2025-08-04

## 2025-08-04 ENCOUNTER — TRANSCRIPTION ENCOUNTER (OUTPATIENT)
Age: 70
End: 2025-08-04

## 2025-08-04 ENCOUNTER — APPOINTMENT (OUTPATIENT)
Dept: HEMATOLOGY ONCOLOGY | Facility: CLINIC | Age: 70
End: 2025-08-04

## 2025-08-06 ENCOUNTER — APPOINTMENT (OUTPATIENT)
Dept: HEMATOLOGY ONCOLOGY | Facility: CLINIC | Age: 70
End: 2025-08-06

## 2025-08-08 ENCOUNTER — RESULT REVIEW (OUTPATIENT)
Age: 70
End: 2025-08-08

## 2025-08-11 ENCOUNTER — APPOINTMENT (OUTPATIENT)
Dept: HEMATOLOGY ONCOLOGY | Facility: CLINIC | Age: 70
End: 2025-08-11

## 2025-08-11 ENCOUNTER — APPOINTMENT (OUTPATIENT)
Dept: INFUSION THERAPY | Facility: HOSPITAL | Age: 70
End: 2025-08-11

## 2025-08-13 ENCOUNTER — RESULT REVIEW (OUTPATIENT)
Age: 70
End: 2025-08-13

## 2025-09-10 LAB
CULTURE RESULTS: SIGNIFICANT CHANGE UP
SPECIMEN SOURCE: SIGNIFICANT CHANGE UP

## 2025-09-23 PROCEDURE — 97116 GAIT TRAINING THERAPY: CPT | Mod: GP

## 2025-09-23 PROCEDURE — P9047: CPT

## 2025-09-23 PROCEDURE — 85025 COMPLETE CBC W/AUTO DIFF WBC: CPT

## 2025-09-23 PROCEDURE — 97112 NEUROMUSCULAR REEDUCATION: CPT | Mod: GP

## 2025-09-23 PROCEDURE — 84145 PROCALCITONIN (PCT): CPT

## 2025-09-23 PROCEDURE — 97165 OT EVAL LOW COMPLEX 30 MIN: CPT | Mod: GO

## 2025-09-23 PROCEDURE — 83735 ASSAY OF MAGNESIUM: CPT

## 2025-09-23 PROCEDURE — 71275 CT ANGIOGRAPHY CHEST: CPT

## 2025-09-23 PROCEDURE — 87635 SARS-COV-2 COVID-19 AMP PRB: CPT

## 2025-09-23 PROCEDURE — 72128 CT CHEST SPINE W/O DYE: CPT

## 2025-09-23 PROCEDURE — 97530 THERAPEUTIC ACTIVITIES: CPT | Mod: GO

## 2025-09-23 PROCEDURE — 83605 ASSAY OF LACTIC ACID: CPT

## 2025-09-23 PROCEDURE — 93005 ELECTROCARDIOGRAM TRACING: CPT

## 2025-09-23 PROCEDURE — 83615 LACTATE (LD) (LDH) ENZYME: CPT

## 2025-09-23 PROCEDURE — 84484 ASSAY OF TROPONIN QUANT: CPT

## 2025-09-23 PROCEDURE — 80076 HEPATIC FUNCTION PANEL: CPT

## 2025-09-23 PROCEDURE — 84100 ASSAY OF PHOSPHORUS: CPT

## 2025-09-23 PROCEDURE — 80053 COMPREHEN METABOLIC PANEL: CPT

## 2025-09-23 PROCEDURE — 82962 GLUCOSE BLOOD TEST: CPT

## 2025-09-23 PROCEDURE — 97161 PT EVAL LOW COMPLEX 20 MIN: CPT | Mod: GP

## 2025-09-23 PROCEDURE — 97535 SELF CARE MNGMENT TRAINING: CPT | Mod: GO

## 2025-09-23 PROCEDURE — 80048 BASIC METABOLIC PNL TOTAL CA: CPT

## 2025-09-23 PROCEDURE — 36415 COLL VENOUS BLD VENIPUNCTURE: CPT

## 2025-09-23 PROCEDURE — 97110 THERAPEUTIC EXERCISES: CPT | Mod: GO

## 2025-09-23 PROCEDURE — 83036 HEMOGLOBIN GLYCOSYLATED A1C: CPT

## (undated) DEVICE — VENODYNE/SCD SLEEVE CALF MEDIUM

## (undated) DEVICE — POSITIONER FOAM HEADREST (PINK)

## (undated) DEVICE — POSITIONER FOAM EGG CRATE ULNAR 2PCS (PINK)

## (undated) DEVICE — SYR LUER LOK 3CC

## (undated) DEVICE — VISITEC 4X4

## (undated) DEVICE — STAPLER SKIN VISI-STAT 35 WIDE

## (undated) DEVICE — SPECIMEN CONTAINER 100ML

## (undated) DEVICE — PREP DURAPREP 26CC

## (undated) DEVICE — DRAPE EQUIPMENT BANDED BAG 30 X 30" (SHOWER CAP)

## (undated) DEVICE — FILTERLINE NASAL O2 CO2 ADLT

## (undated) DEVICE — Device

## (undated) DEVICE — WARMING BLANKET LOWER ADULT

## (undated) DEVICE — DRAPE C ARM UNIVERSAL

## (undated) DEVICE — DRAPE 1/2 SHEET 40X57"

## (undated) DEVICE — BRUSH COLONOSCOPY CYTOLOGY

## (undated) DEVICE — BITE BLOCK ADULT 20 X 27MM (GREEN)

## (undated) DEVICE — VENODYNE/SCD SLEEVE CALF LARGE

## (undated) DEVICE — MARKING PEN W RULER

## (undated) DEVICE — DRSG CURITY GAUZE SPONGE 4 X 4" 12-PLY

## (undated) DEVICE — SOL INJ NS 0.9% 500ML 2 PORT

## (undated) DEVICE — BALLOON SINGLE FOR BF-UC160F

## (undated) DEVICE — SNARE POLYP SENS SM 13MM 240CM

## (undated) DEVICE — PREP CHLORAPREP HI-LITE ORANGE 26ML

## (undated) DEVICE — MISONIX BONESCALPEL BLUNT BLADE & TUBESET 20MM

## (undated) DEVICE — TAP DUAL LEAD 6MM

## (undated) DEVICE — DRSG XEROFORM 1 X 8"

## (undated) DEVICE — TRAP SPECIMEN SPUTUM 40CC

## (undated) DEVICE — CATH IV SAFE BC 20G X 1.16" (PINK)

## (undated) DEVICE — BIOPSY FORCEP RADIAL JAW 4 STANDARD WITH NEEDLE

## (undated) DEVICE — ELCTR MONOPOLAR STIMULATOR PROBE FLUSH-TIP

## (undated) DEVICE — FILTERLINE ET TUBE PED/ADLT ETCO2

## (undated) DEVICE — POSITIONER HEAD REST PRONE

## (undated) DEVICE — GLV 7 PROTEXIS (WHITE)

## (undated) DEVICE — SUT BIOSYN 4-0 18" P-12

## (undated) DEVICE — PACK LUMBAR LAMI

## (undated) DEVICE — TUBING STRYKER SET AND EXTENDER FILTER TUBING

## (undated) DEVICE — POSITIONER FOAM HEAD CRADLE (PINK)

## (undated) DEVICE — TUBING SUCTION CONN 6FT STERILE

## (undated) DEVICE — PAPIL BILRTH II 6-5FRX0.035IN

## (undated) DEVICE — KIT CLOSED WOUND SUCTION 400 MED 1/8 10FR

## (undated) DEVICE — DRAPE 3/4 SHEET W REINFORCEMENT 56X77"

## (undated) DEVICE — SOL IRR POUR H2O 250ML

## (undated) DEVICE — DRAIN BLAKE 15FR BARD CHANNEL

## (undated) DEVICE — NDL INJ SCLERO INTERJECT 23G

## (undated) DEVICE — SENSOR O2 FINGER ADULT

## (undated) DEVICE — SOL INJ NS 0.9% 250ML

## (undated) DEVICE — ELCTR SUBDERMAL CORKSCREW NDL 1.2MM

## (undated) DEVICE — TUBING IV SET GRAVITY 3Y 100" MACRO

## (undated) DEVICE — DRAPE TOWEL BLUE 17" X 24"

## (undated) DEVICE — POSITIONER CUSHION INSERT PRONE VIEW LG

## (undated) DEVICE — CHEST DRAIN PLEUR-EVAC WET/WET ADULT-PEDS SINGLE (QUICK)

## (undated) DEVICE — ELCTR SUBDERMAL NDL CLASSIC 1.5M X 59" (6 COLOR)

## (undated) DEVICE — VALVE BIOPSY BRONCHOVIDEOSCOPE

## (undated) DEVICE — WOUND IRR SURGIPHOR

## (undated) DEVICE — TUBING BIPOLAR IRRIGATOR AND CORD SET

## (undated) DEVICE — ELCTR BOVIE PENCIL HANDPIECE

## (undated) DEVICE — ELCTR BOVIE TIP BLADE INSULATED 2.75" EDGE

## (undated) DEVICE — TUBING SUCTION 20FT

## (undated) DEVICE — SUCTION YANKAUER NO CONTROL VENT

## (undated) DEVICE — DRAIN JACKSON PRATT 10MM FLAT FULL NO TROCAR

## (undated) DEVICE — ELCTR PEDICLE SCREW PROBE 3MM BALL 1.8MM X 100MM

## (undated) DEVICE — ELCTR AQUAMANTYS BIPOLAR SEALER 6.0

## (undated) DEVICE — URETERAL CATH RED RUBBER 12FR (WHITE)

## (undated) DEVICE — LITHOTRIPY BASKET TRAPEZOID 2.5CM

## (undated) DEVICE — SUT QUILL PDO 0 24X24CM

## (undated) DEVICE — SUT MAXON 1 36" GS-24

## (undated) DEVICE — SYR LUER LOK 50CC

## (undated) DEVICE — SUT VICRYL 2-0 18" CP-2 UNDYED (POP-OFF)

## (undated) DEVICE — GOWN TRIMAX LG

## (undated) DEVICE — ELCTR SUBDERMAL NDL 27G X 1/2" WITH TWISTED PAIR

## (undated) DEVICE — SUT QUILL PDO 2 30CM 36MM

## (undated) DEVICE — WARMING BLANKET UPPER ADULT

## (undated) DEVICE — BRUSH CYTO RAP EXCHG 3MM

## (undated) DEVICE — BRUSH CYTO ENDO

## (undated) DEVICE — GLV 7.5 PROTEXIS (WHITE)

## (undated) DEVICE — DRSG TAPE HYPAFIX 4"

## (undated) DEVICE — OLYMPUS DISTAL COVER ENDOSCOPE

## (undated) DEVICE — GLV 8.5 PROTEXIS (WHITE)

## (undated) DEVICE — SOL INJ NS 0.9% 500ML 1-PORT

## (undated) DEVICE — SNARE POLYP MINI ACCUSNR 1.5 X 3CM

## (undated) DEVICE — NDL ASPIRATION 21G

## (undated) DEVICE — FOLEY HOLDER STATLOCK 2 WAY ADULT

## (undated) DEVICE — LAP PAD 18 X 18"

## (undated) DEVICE — DRAPE MAYO STAND 30"

## (undated) DEVICE — TAP DUAL LEAD 7MM

## (undated) DEVICE — ELCTR BIPOLAR PROBE

## (undated) DEVICE — GLV 8 PROTEXIS (WHITE)

## (undated) DEVICE — SUT MONOSOF 3-0 18" C-14

## (undated) DEVICE — FOLEY TRAY 16FR 5CC LTX UMETER CLOSED

## (undated) DEVICE — DRAIN JACKSON PRATT 3 SPRING RESERVOIR W 10FR PVC DRAIN

## (undated) DEVICE — GLV 6.5 PROTEXIS (WHITE)

## (undated) DEVICE — DEPUY CANNULA FEN OPEN STERILE

## (undated) DEVICE — NDL HYPO SAFE 18G X 1.5" (PINK)

## (undated) DEVICE — PACK IV START WITH CHG

## (undated) DEVICE — SUCTION CATH ARGYLE WHISTLE TIP 14FR STRAIGHT PACKED

## (undated) DEVICE — MASK O2 NON REBREATH 3IN1 ADULT

## (undated) DEVICE — NDL INJ SCLERO INTERJECT 25G

## (undated) DEVICE — TAP DBL LEAD 4.35MM

## (undated) DEVICE — SYR LUER LOK 10CC

## (undated) DEVICE — DVC AUTO CAP RX LOKG

## (undated) DEVICE — STRYKER SONOPET TIP STRAIGHT MICRO DIAMETER

## (undated) DEVICE — SUT VICRYL 0 18" OS-6 (POP-OFF)

## (undated) DEVICE — DRAIN JACKSON PRATT 7MM FLAT FULL NO TROCAR

## (undated) DEVICE — SPHINCTEROTOME CLEVERCUT WIRE 25MM  2.8MM X 170CM

## (undated) DEVICE — TAP DUAL LEAD 5MM

## (undated) DEVICE — NDL ASPIRATION 22G W SYR

## (undated) DEVICE — DRSG BIOPATCH DISK W CHG 1" W 7.0MM HOLE

## (undated) DEVICE — FORCEP RADIAL JAW 4 PEDIATRIC W NDL 1.8MM 2MM 160CM DISP

## (undated) DEVICE — MIDAS REX LEGEND MATCH HEAD FLUTED LG BORE 3.0MM X 14CM

## (undated) DEVICE — NDL SPINAL 18G X 3.5" (PINK)

## (undated) DEVICE — MISONIX BONESCALPEL DIAMOND SHAVER & TUBESET

## (undated) DEVICE — ELCTR 4-DISC 20MM 49" (RED, BLUE, GREEN, BLACK)

## (undated) DEVICE — SOL IRR POUR NS 0.9% 500ML

## (undated) DEVICE — BAG BILE

## (undated) DEVICE — ELCTR GROUNDING PAD ADULT COVIDIEN

## (undated) DEVICE — VALVE SUCTION EVIS 160/200/240

## (undated) DEVICE — DRAIN RESERVOIR FOR JACKSON PRATT 100CC CARDINAL

## (undated) DEVICE — CATH IV SAFE BC 22G X 1" (BLUE)